# Patient Record
Sex: MALE | Race: WHITE | NOT HISPANIC OR LATINO | Employment: OTHER | URBAN - METROPOLITAN AREA
[De-identification: names, ages, dates, MRNs, and addresses within clinical notes are randomized per-mention and may not be internally consistent; named-entity substitution may affect disease eponyms.]

---

## 2017-02-08 ENCOUNTER — HOSPITAL ENCOUNTER (OUTPATIENT)
Dept: CT IMAGING | Facility: HOSPITAL | Age: 67
Discharge: HOME | End: 2017-02-08
Payer: COMMERCIAL

## 2017-02-08 DIAGNOSIS — I82.220 EMBOLISM OF INFERIOR VENA CAVA (HCC): ICD-10-CM

## 2017-02-08 DIAGNOSIS — I82.423: ICD-10-CM

## 2017-02-08 PROCEDURE — 75635 CT ANGIO ABDOMINAL ARTERIES: CPT

## 2017-02-08 RX ADMIN — IOHEXOL 120 ML: 350 INJECTION, SOLUTION INTRAVENOUS at 10:05

## 2017-02-25 ENCOUNTER — GENERIC CONVERSION - ENCOUNTER (OUTPATIENT)
Dept: OTHER | Facility: OTHER | Age: 67
End: 2017-02-25

## 2017-02-25 ENCOUNTER — APPOINTMENT (EMERGENCY)
Dept: RADIOLOGY | Facility: HOSPITAL | Age: 67
DRG: 698 | End: 2017-02-25
Payer: MEDICARE

## 2017-02-25 ENCOUNTER — HOSPITAL ENCOUNTER (INPATIENT)
Facility: HOSPITAL | Age: 67
LOS: 3 days | Discharge: NURSING, REHAB OR LONG-TERM CARE | DRG: 698 | End: 2017-03-01
Attending: EMERGENCY MEDICINE | Admitting: INTERNAL MEDICINE
Payer: MEDICARE

## 2017-02-25 DIAGNOSIS — N39.0 COMPLICATED URINARY TRACT INFECTION: Primary | ICD-10-CM

## 2017-02-25 DIAGNOSIS — E86.0 DEHYDRATION: ICD-10-CM

## 2017-02-25 LAB
ALBUMIN SERPL BCP-MCNC: 3.3 G/DL (ref 3.5–5)
ALP SERPL-CCNC: 89 U/L (ref 46–116)
ALT SERPL W P-5'-P-CCNC: 25 U/L (ref 12–78)
ANION GAP SERPL CALCULATED.3IONS-SCNC: 7 MMOL/L (ref 4–13)
AST SERPL W P-5'-P-CCNC: 11 U/L (ref 5–45)
BASOPHILS # BLD AUTO: 0.02 THOUSANDS/ΜL (ref 0–0.1)
BASOPHILS NFR BLD AUTO: 0 % (ref 0–1)
BILIRUB SERPL-MCNC: 0.2 MG/DL (ref 0.2–1)
BUN SERPL-MCNC: 20 MG/DL (ref 5–25)
CALCIUM SERPL-MCNC: 9 MG/DL (ref 8.3–10.1)
CHLORIDE SERPL-SCNC: 103 MMOL/L (ref 100–108)
CO2 SERPL-SCNC: 31 MMOL/L (ref 21–32)
CREAT SERPL-MCNC: 1.47 MG/DL (ref 0.6–1.3)
EOSINOPHIL # BLD AUTO: 0.42 THOUSAND/ΜL (ref 0–0.61)
EOSINOPHIL NFR BLD AUTO: 5 % (ref 0–6)
ERYTHROCYTE [DISTWIDTH] IN BLOOD BY AUTOMATED COUNT: 13.7 % (ref 11.6–15.1)
GFR SERPL CREATININE-BSD FRML MDRD: 47.6 ML/MIN/1.73SQ M
GLUCOSE SERPL-MCNC: 122 MG/DL (ref 65–140)
HCT VFR BLD AUTO: 43.7 % (ref 36.5–49.3)
HGB BLD-MCNC: 14.7 G/DL (ref 12–17)
INR PPP: 1.35 (ref 0.86–1.16)
LACTATE SERPL-SCNC: 1.1 MMOL/L (ref 0.5–2)
LIPASE SERPL-CCNC: 161 U/L (ref 73–393)
LYMPHOCYTES # BLD AUTO: 1.68 THOUSANDS/ΜL (ref 0.6–4.47)
LYMPHOCYTES NFR BLD AUTO: 22 % (ref 14–44)
MCH RBC QN AUTO: 28.5 PG (ref 26.8–34.3)
MCHC RBC AUTO-ENTMCNC: 33.6 G/DL (ref 31.4–37.4)
MCV RBC AUTO: 85 FL (ref 82–98)
MONOCYTES # BLD AUTO: 0.6 THOUSAND/ΜL (ref 0.17–1.22)
MONOCYTES NFR BLD AUTO: 8 % (ref 4–12)
NEUTROPHILS # BLD AUTO: 4.99 THOUSANDS/ΜL (ref 1.85–7.62)
NEUTS SEG NFR BLD AUTO: 65 % (ref 43–75)
PLATELET # BLD AUTO: 262 THOUSANDS/UL (ref 149–390)
PMV BLD AUTO: 9.2 FL (ref 8.9–12.7)
POTASSIUM SERPL-SCNC: 3.3 MMOL/L (ref 3.5–5.3)
PROT SERPL-MCNC: 7.2 G/DL (ref 6.4–8.2)
PROTHROMBIN TIME: 16.3 SECONDS (ref 12–14.3)
RBC # BLD AUTO: 5.15 MILLION/UL (ref 3.88–5.62)
SODIUM SERPL-SCNC: 141 MMOL/L (ref 136–145)
TROPONIN I SERPL-MCNC: <0.02 NG/ML
WBC # BLD AUTO: 7.71 THOUSAND/UL (ref 4.31–10.16)

## 2017-02-25 PROCEDURE — 71020 HB CHEST X-RAY 2VW FRONTAL&LATL: CPT

## 2017-02-25 PROCEDURE — 80053 COMPREHEN METABOLIC PANEL: CPT | Performed by: EMERGENCY MEDICINE

## 2017-02-25 PROCEDURE — 96361 HYDRATE IV INFUSION ADD-ON: CPT

## 2017-02-25 PROCEDURE — 83690 ASSAY OF LIPASE: CPT | Performed by: EMERGENCY MEDICINE

## 2017-02-25 PROCEDURE — 36415 COLL VENOUS BLD VENIPUNCTURE: CPT | Performed by: EMERGENCY MEDICINE

## 2017-02-25 PROCEDURE — 84484 ASSAY OF TROPONIN QUANT: CPT | Performed by: EMERGENCY MEDICINE

## 2017-02-25 PROCEDURE — 85025 COMPLETE CBC W/AUTO DIFF WBC: CPT | Performed by: EMERGENCY MEDICINE

## 2017-02-25 PROCEDURE — 85610 PROTHROMBIN TIME: CPT | Performed by: EMERGENCY MEDICINE

## 2017-02-25 PROCEDURE — 93005 ELECTROCARDIOGRAM TRACING: CPT | Performed by: EMERGENCY MEDICINE

## 2017-02-25 PROCEDURE — 83605 ASSAY OF LACTIC ACID: CPT | Performed by: EMERGENCY MEDICINE

## 2017-02-25 PROCEDURE — 93005 ELECTROCARDIOGRAM TRACING: CPT

## 2017-02-25 RX ADMIN — SODIUM CHLORIDE 1000 ML: 0.9 INJECTION, SOLUTION INTRAVENOUS at 23:28

## 2017-02-26 ENCOUNTER — APPOINTMENT (EMERGENCY)
Dept: CT IMAGING | Facility: HOSPITAL | Age: 67
DRG: 698 | End: 2017-02-26
Payer: MEDICARE

## 2017-02-26 PROBLEM — G93.40 ENCEPHALOPATHY: Status: ACTIVE | Noted: 2017-02-26

## 2017-02-26 PROBLEM — N39.0 COMPLICATED URINARY TRACT INFECTION: Status: ACTIVE | Noted: 2017-02-26

## 2017-02-26 PROBLEM — R41.82 ALTERED MENTAL STATUS: Status: ACTIVE | Noted: 2017-02-26

## 2017-02-26 LAB
ANION GAP SERPL CALCULATED.3IONS-SCNC: 8 MMOL/L (ref 4–13)
ATRIAL RATE: 60 BPM
BACTERIA UR QL AUTO: ABNORMAL /HPF
BILIRUB UR QL STRIP: NEGATIVE
BUN SERPL-MCNC: 17 MG/DL (ref 5–25)
CALCIUM SERPL-MCNC: 8.8 MG/DL (ref 8.3–10.1)
CHLORIDE SERPL-SCNC: 106 MMOL/L (ref 100–108)
CLARITY UR: ABNORMAL
CO2 SERPL-SCNC: 26 MMOL/L (ref 21–32)
COLOR UR: YELLOW
CREAT SERPL-MCNC: 1.27 MG/DL (ref 0.6–1.3)
ERYTHROCYTE [DISTWIDTH] IN BLOOD BY AUTOMATED COUNT: 13.7 % (ref 11.6–15.1)
GFR SERPL CREATININE-BSD FRML MDRD: 56.4 ML/MIN/1.73SQ M
GLUCOSE SERPL-MCNC: 110 MG/DL (ref 65–140)
GLUCOSE UR STRIP-MCNC: NEGATIVE MG/DL
HCT VFR BLD AUTO: 42.7 % (ref 36.5–49.3)
HGB BLD-MCNC: 14.1 G/DL (ref 12–17)
HGB UR QL STRIP.AUTO: ABNORMAL
KETONES UR STRIP-MCNC: NEGATIVE MG/DL
LEUKOCYTE ESTERASE UR QL STRIP: ABNORMAL
MCH RBC QN AUTO: 28 PG (ref 26.8–34.3)
MCHC RBC AUTO-ENTMCNC: 33 G/DL (ref 31.4–37.4)
MCV RBC AUTO: 85 FL (ref 82–98)
MUCOUS THREADS UR QL AUTO: ABNORMAL
NITRITE UR QL STRIP: POSITIVE
NON-SQ EPI CELLS URNS QL MICRO: ABNORMAL /HPF
P AXIS: 63 DEGREES
PH UR STRIP.AUTO: 6 [PH] (ref 4.5–8)
PLATELET # BLD AUTO: 257 THOUSANDS/UL (ref 149–390)
PMV BLD AUTO: 9.6 FL (ref 8.9–12.7)
POTASSIUM SERPL-SCNC: 3.6 MMOL/L (ref 3.5–5.3)
PR INTERVAL: 168 MS
PROT UR STRIP-MCNC: ABNORMAL MG/DL
QRS AXIS: -48 DEGREES
QRSD INTERVAL: 84 MS
QT INTERVAL: 406 MS
QTC INTERVAL: 406 MS
RBC # BLD AUTO: 5.03 MILLION/UL (ref 3.88–5.62)
RBC #/AREA URNS AUTO: ABNORMAL /HPF
SODIUM SERPL-SCNC: 140 MMOL/L (ref 136–145)
SP GR UR STRIP.AUTO: 1.02 (ref 1–1.03)
T WAVE AXIS: 20 DEGREES
UROBILINOGEN UR QL STRIP.AUTO: 0.2 E.U./DL
VENTRICULAR RATE: 60 BPM
WBC # BLD AUTO: 8.33 THOUSAND/UL (ref 4.31–10.16)
WBC #/AREA URNS AUTO: ABNORMAL /HPF
WBC CLUMPS # UR AUTO: PRESENT /UL

## 2017-02-26 PROCEDURE — 87086 URINE CULTURE/COLONY COUNT: CPT | Performed by: EMERGENCY MEDICINE

## 2017-02-26 PROCEDURE — 87040 BLOOD CULTURE FOR BACTERIA: CPT | Performed by: EMERGENCY MEDICINE

## 2017-02-26 PROCEDURE — 71260 CT THORAX DX C+: CPT

## 2017-02-26 PROCEDURE — 99285 EMERGENCY DEPT VISIT HI MDM: CPT

## 2017-02-26 PROCEDURE — 96361 HYDRATE IV INFUSION ADD-ON: CPT

## 2017-02-26 PROCEDURE — 85027 COMPLETE CBC AUTOMATED: CPT | Performed by: PHYSICIAN ASSISTANT

## 2017-02-26 PROCEDURE — 36415 COLL VENOUS BLD VENIPUNCTURE: CPT | Performed by: EMERGENCY MEDICINE

## 2017-02-26 PROCEDURE — 96374 THER/PROPH/DIAG INJ IV PUSH: CPT

## 2017-02-26 PROCEDURE — 80048 BASIC METABOLIC PNL TOTAL CA: CPT | Performed by: PHYSICIAN ASSISTANT

## 2017-02-26 PROCEDURE — 81001 URINALYSIS AUTO W/SCOPE: CPT | Performed by: EMERGENCY MEDICINE

## 2017-02-26 PROCEDURE — 74177 CT ABD & PELVIS W/CONTRAST: CPT

## 2017-02-26 RX ORDER — MIRTAZAPINE 15 MG/1
15 TABLET, FILM COATED ORAL
Status: DISCONTINUED | OUTPATIENT
Start: 2017-02-26 | End: 2017-03-01 | Stop reason: HOSPADM

## 2017-02-26 RX ORDER — FAMOTIDINE 20 MG/1
20 TABLET, FILM COATED ORAL 2 TIMES DAILY
Status: DISCONTINUED | OUTPATIENT
Start: 2017-02-26 | End: 2017-03-01 | Stop reason: HOSPADM

## 2017-02-26 RX ORDER — LABETALOL HYDROCHLORIDE 5 MG/ML
10 INJECTION, SOLUTION INTRAVENOUS EVERY 4 HOURS PRN
Status: DISCONTINUED | OUTPATIENT
Start: 2017-02-26 | End: 2017-03-01 | Stop reason: HOSPADM

## 2017-02-26 RX ORDER — RISPERIDONE 0.25 MG/1
0.25 TABLET, FILM COATED ORAL 2 TIMES DAILY
Status: DISCONTINUED | OUTPATIENT
Start: 2017-02-26 | End: 2017-03-01 | Stop reason: HOSPADM

## 2017-02-26 RX ORDER — ACETAMINOPHEN 325 MG/1
650 TABLET ORAL EVERY 6 HOURS PRN
Status: DISCONTINUED | OUTPATIENT
Start: 2017-02-26 | End: 2017-03-01 | Stop reason: HOSPADM

## 2017-02-26 RX ORDER — MEMANTINE HYDROCHLORIDE 5 MG/1
5 TABLET ORAL 2 TIMES DAILY
Status: DISCONTINUED | OUTPATIENT
Start: 2017-02-26 | End: 2017-03-01 | Stop reason: HOSPADM

## 2017-02-26 RX ORDER — ONDANSETRON 2 MG/ML
4 INJECTION INTRAMUSCULAR; INTRAVENOUS EVERY 6 HOURS PRN
Status: DISCONTINUED | OUTPATIENT
Start: 2017-02-26 | End: 2017-03-01 | Stop reason: HOSPADM

## 2017-02-26 RX ORDER — LACTULOSE 20 G/30ML
20 SOLUTION ORAL 2 TIMES DAILY
Status: DISCONTINUED | OUTPATIENT
Start: 2017-02-26 | End: 2017-03-01 | Stop reason: HOSPADM

## 2017-02-26 RX ORDER — BISACODYL 10 MG
10 SUPPOSITORY, RECTAL RECTAL AS NEEDED
Status: DISCONTINUED | OUTPATIENT
Start: 2017-02-26 | End: 2017-02-26

## 2017-02-26 RX ORDER — POTASSIUM CHLORIDE 14.9 MG/ML
20 INJECTION INTRAVENOUS ONCE
Status: COMPLETED | OUTPATIENT
Start: 2017-02-26 | End: 2017-02-27

## 2017-02-26 RX ORDER — BISACODYL 10 MG
10 SUPPOSITORY, RECTAL RECTAL DAILY PRN
Status: DISCONTINUED | OUTPATIENT
Start: 2017-02-26 | End: 2017-03-01 | Stop reason: HOSPADM

## 2017-02-26 RX ORDER — SODIUM CHLORIDE 9 MG/ML
75 INJECTION, SOLUTION INTRAVENOUS ONCE
Status: COMPLETED | OUTPATIENT
Start: 2017-02-26 | End: 2017-02-27

## 2017-02-26 RX ORDER — AMLODIPINE BESYLATE 5 MG/1
5 TABLET ORAL DAILY
Status: DISCONTINUED | OUTPATIENT
Start: 2017-02-26 | End: 2017-02-27

## 2017-02-26 RX ORDER — BUSPIRONE HYDROCHLORIDE 5 MG/1
5 TABLET ORAL 3 TIMES DAILY
Status: DISCONTINUED | OUTPATIENT
Start: 2017-02-26 | End: 2017-03-01 | Stop reason: HOSPADM

## 2017-02-26 RX ORDER — TAMSULOSIN HYDROCHLORIDE 0.4 MG/1
0.4 CAPSULE ORAL
Status: DISCONTINUED | OUTPATIENT
Start: 2017-02-26 | End: 2017-03-01 | Stop reason: HOSPADM

## 2017-02-26 RX ORDER — NYSTATIN 100000 [USP'U]/G
1 POWDER TOPICAL 3 TIMES DAILY
Status: DISCONTINUED | OUTPATIENT
Start: 2017-02-26 | End: 2017-03-01 | Stop reason: HOSPADM

## 2017-02-26 RX ADMIN — NYSTATIN 1 APPLICATION: 100000 POWDER TOPICAL at 17:38

## 2017-02-26 RX ADMIN — MEMANTINE 5 MG: 5 TABLET ORAL at 08:38

## 2017-02-26 RX ADMIN — POTASSIUM CHLORIDE 20 MEQ: 200 INJECTION, SOLUTION INTRAVENOUS at 06:03

## 2017-02-26 RX ADMIN — BUSPIRONE HYDROCHLORIDE 5 MG: 5 TABLET ORAL at 17:38

## 2017-02-26 RX ADMIN — APIXABAN 5 MG: 5 TABLET, FILM COATED ORAL at 08:38

## 2017-02-26 RX ADMIN — METOPROLOL TARTRATE 12.5 MG: 25 TABLET ORAL at 08:38

## 2017-02-26 RX ADMIN — RISPERIDONE 0.25 MG: 0.25 TABLET ORAL at 08:38

## 2017-02-26 RX ADMIN — RISPERIDONE 0.25 MG: 0.25 TABLET ORAL at 17:38

## 2017-02-26 RX ADMIN — NYSTATIN 1 APPLICATION: 100000 POWDER TOPICAL at 21:48

## 2017-02-26 RX ADMIN — BUSPIRONE HYDROCHLORIDE 5 MG: 5 TABLET ORAL at 08:38

## 2017-02-26 RX ADMIN — FAMOTIDINE 20 MG: 20 TABLET ORAL at 17:38

## 2017-02-26 RX ADMIN — AMLODIPINE BESYLATE 5 MG: 5 TABLET ORAL at 08:38

## 2017-02-26 RX ADMIN — MEMANTINE 5 MG: 5 TABLET ORAL at 17:38

## 2017-02-26 RX ADMIN — APIXABAN 5 MG: 5 TABLET, FILM COATED ORAL at 17:38

## 2017-02-26 RX ADMIN — LACTULOSE 20 G: 20 SOLUTION ORAL at 17:35

## 2017-02-26 RX ADMIN — BUSPIRONE HYDROCHLORIDE 5 MG: 5 TABLET ORAL at 23:02

## 2017-02-26 RX ADMIN — METOPROLOL TARTRATE 5 MG: 5 INJECTION INTRAVENOUS at 05:05

## 2017-02-26 RX ADMIN — SODIUM CHLORIDE 75 ML/HR: 0.9 INJECTION, SOLUTION INTRAVENOUS at 06:03

## 2017-02-26 RX ADMIN — IODIXANOL 100 ML: 320 INJECTION, SOLUTION INTRAVASCULAR at 00:54

## 2017-02-26 RX ADMIN — FAMOTIDINE 20 MG: 20 TABLET ORAL at 08:38

## 2017-02-26 RX ADMIN — MIRTAZAPINE 15 MG: 15 TABLET, FILM COATED ORAL at 23:02

## 2017-02-26 RX ADMIN — METOPROLOL TARTRATE 12.5 MG: 25 TABLET ORAL at 17:38

## 2017-02-26 RX ADMIN — LACTULOSE 20 G: 20 SOLUTION ORAL at 13:48

## 2017-02-26 RX ADMIN — SODIUM CHLORIDE 1000 MG: 9 INJECTION, SOLUTION INTRAVENOUS at 01:22

## 2017-02-26 RX ADMIN — NYSTATIN 1 APPLICATION: 100000 POWDER TOPICAL at 09:29

## 2017-02-26 RX ADMIN — TAMSULOSIN HYDROCHLORIDE 0.4 MG: 0.4 CAPSULE ORAL at 17:37

## 2017-02-27 LAB — BACTERIA UR CULT: NORMAL

## 2017-02-27 PROCEDURE — G8978 MOBILITY CURRENT STATUS: HCPCS

## 2017-02-27 PROCEDURE — G8979 MOBILITY GOAL STATUS: HCPCS

## 2017-02-27 PROCEDURE — 97163 PT EVAL HIGH COMPLEX 45 MIN: CPT

## 2017-02-27 RX ORDER — AMLODIPINE BESYLATE 10 MG/1
10 TABLET ORAL DAILY
Status: DISCONTINUED | OUTPATIENT
Start: 2017-02-27 | End: 2017-03-01 | Stop reason: HOSPADM

## 2017-02-27 RX ADMIN — METOPROLOL TARTRATE 25 MG: 25 TABLET ORAL at 18:45

## 2017-02-27 RX ADMIN — FAMOTIDINE 20 MG: 20 TABLET ORAL at 10:07

## 2017-02-27 RX ADMIN — RISPERIDONE 0.25 MG: 0.25 TABLET ORAL at 18:45

## 2017-02-27 RX ADMIN — SODIUM CHLORIDE 500 MG: 9 INJECTION, SOLUTION INTRAVENOUS at 01:59

## 2017-02-27 RX ADMIN — APIXABAN 5 MG: 5 TABLET, FILM COATED ORAL at 18:45

## 2017-02-27 RX ADMIN — BUSPIRONE HYDROCHLORIDE 5 MG: 5 TABLET ORAL at 18:44

## 2017-02-27 RX ADMIN — LABETALOL HYDROCHLORIDE 20 MG: 5 INJECTION, SOLUTION INTRAVENOUS at 14:19

## 2017-02-27 RX ADMIN — LACTULOSE 20 G: 20 SOLUTION ORAL at 10:06

## 2017-02-27 RX ADMIN — APIXABAN 5 MG: 5 TABLET, FILM COATED ORAL at 10:06

## 2017-02-27 RX ADMIN — NYSTATIN 1 APPLICATION: 100000 POWDER TOPICAL at 10:07

## 2017-02-27 RX ADMIN — BUSPIRONE HYDROCHLORIDE 5 MG: 5 TABLET ORAL at 10:07

## 2017-02-27 RX ADMIN — METOPROLOL TARTRATE 25 MG: 25 TABLET ORAL at 10:07

## 2017-02-27 RX ADMIN — AMLODIPINE BESYLATE 10 MG: 10 TABLET ORAL at 10:06

## 2017-02-27 RX ADMIN — FAMOTIDINE 20 MG: 20 TABLET ORAL at 18:44

## 2017-02-27 RX ADMIN — MEMANTINE 5 MG: 5 TABLET ORAL at 18:45

## 2017-02-27 RX ADMIN — LABETALOL HYDROCHLORIDE 10 MG: 5 INJECTION, SOLUTION INTRAVENOUS at 14:21

## 2017-02-27 RX ADMIN — RISPERIDONE 0.25 MG: 0.25 TABLET ORAL at 10:07

## 2017-02-27 RX ADMIN — NYSTATIN 1 APPLICATION: 100000 POWDER TOPICAL at 18:45

## 2017-02-27 RX ADMIN — TAMSULOSIN HYDROCHLORIDE 0.4 MG: 0.4 CAPSULE ORAL at 18:44

## 2017-02-27 RX ADMIN — MEMANTINE 5 MG: 5 TABLET ORAL at 10:07

## 2017-02-28 LAB
ANION GAP SERPL CALCULATED.3IONS-SCNC: 6 MMOL/L (ref 4–13)
BUN SERPL-MCNC: 13 MG/DL (ref 5–25)
CALCIUM SERPL-MCNC: 8.8 MG/DL (ref 8.3–10.1)
CHLORIDE SERPL-SCNC: 104 MMOL/L (ref 100–108)
CO2 SERPL-SCNC: 32 MMOL/L (ref 21–32)
CREAT SERPL-MCNC: 1.38 MG/DL (ref 0.6–1.3)
GFR SERPL CREATININE-BSD FRML MDRD: 51.2 ML/MIN/1.73SQ M
GLUCOSE SERPL-MCNC: 85 MG/DL (ref 65–140)
POTASSIUM SERPL-SCNC: 3.3 MMOL/L (ref 3.5–5.3)
SODIUM SERPL-SCNC: 142 MMOL/L (ref 136–145)

## 2017-02-28 PROCEDURE — 80048 BASIC METABOLIC PNL TOTAL CA: CPT | Performed by: INTERNAL MEDICINE

## 2017-02-28 RX ORDER — METOPROLOL TARTRATE 50 MG/1
50 TABLET, FILM COATED ORAL 2 TIMES DAILY
Status: DISCONTINUED | OUTPATIENT
Start: 2017-02-28 | End: 2017-02-28

## 2017-02-28 RX ORDER — METOPROLOL TARTRATE 50 MG/1
50 TABLET, FILM COATED ORAL 2 TIMES DAILY
Status: DISCONTINUED | OUTPATIENT
Start: 2017-02-28 | End: 2017-03-01 | Stop reason: HOSPADM

## 2017-02-28 RX ADMIN — NYSTATIN 1 APPLICATION: 100000 POWDER TOPICAL at 17:25

## 2017-02-28 RX ADMIN — TAMSULOSIN HYDROCHLORIDE 0.4 MG: 0.4 CAPSULE ORAL at 17:24

## 2017-02-28 RX ADMIN — APIXABAN 5 MG: 5 TABLET, FILM COATED ORAL at 17:24

## 2017-02-28 RX ADMIN — APIXABAN 5 MG: 5 TABLET, FILM COATED ORAL at 08:12

## 2017-02-28 RX ADMIN — METOPROLOL TARTRATE 25 MG: 25 TABLET ORAL at 08:12

## 2017-02-28 RX ADMIN — MEMANTINE 5 MG: 5 TABLET ORAL at 08:13

## 2017-02-28 RX ADMIN — BUSPIRONE HYDROCHLORIDE 5 MG: 5 TABLET ORAL at 17:24

## 2017-02-28 RX ADMIN — METOPROLOL TARTRATE 50 MG: 50 TABLET ORAL at 17:24

## 2017-02-28 RX ADMIN — RISPERIDONE 0.25 MG: 0.25 TABLET ORAL at 08:13

## 2017-02-28 RX ADMIN — AMLODIPINE BESYLATE 10 MG: 10 TABLET ORAL at 08:12

## 2017-02-28 RX ADMIN — BUSPIRONE HYDROCHLORIDE 5 MG: 5 TABLET ORAL at 21:47

## 2017-02-28 RX ADMIN — MEMANTINE 5 MG: 5 TABLET ORAL at 17:25

## 2017-02-28 RX ADMIN — SODIUM CHLORIDE 500 MG: 9 INJECTION, SOLUTION INTRAVENOUS at 01:43

## 2017-02-28 RX ADMIN — FAMOTIDINE 20 MG: 20 TABLET ORAL at 08:12

## 2017-02-28 RX ADMIN — RISPERIDONE 0.25 MG: 0.25 TABLET ORAL at 17:24

## 2017-02-28 RX ADMIN — FAMOTIDINE 20 MG: 20 TABLET ORAL at 17:24

## 2017-02-28 RX ADMIN — NYSTATIN 1 APPLICATION: 100000 POWDER TOPICAL at 08:14

## 2017-02-28 RX ADMIN — BUSPIRONE HYDROCHLORIDE 5 MG: 5 TABLET ORAL at 08:12

## 2017-02-28 RX ADMIN — NYSTATIN 1 APPLICATION: 100000 POWDER TOPICAL at 21:48

## 2017-02-28 RX ADMIN — LACTULOSE 20 G: 20 SOLUTION ORAL at 08:12

## 2017-02-28 RX ADMIN — MIRTAZAPINE 15 MG: 15 TABLET, FILM COATED ORAL at 21:47

## 2017-03-01 ENCOUNTER — APPOINTMENT (INPATIENT)
Dept: RADIOLOGY | Facility: HOSPITAL | Age: 67
DRG: 698 | End: 2017-03-01
Attending: INTERNAL MEDICINE
Payer: MEDICARE

## 2017-03-01 ENCOUNTER — GENERIC CONVERSION - ENCOUNTER (OUTPATIENT)
Dept: OTHER | Facility: OTHER | Age: 67
End: 2017-03-01

## 2017-03-01 VITALS
DIASTOLIC BLOOD PRESSURE: 80 MMHG | TEMPERATURE: 97.7 F | RESPIRATION RATE: 18 BRPM | HEART RATE: 51 BPM | SYSTOLIC BLOOD PRESSURE: 142 MMHG | HEIGHT: 71 IN | OXYGEN SATURATION: 93 % | WEIGHT: 208.11 LBS | BODY MASS INDEX: 29.14 KG/M2

## 2017-03-01 LAB
ANION GAP SERPL CALCULATED.3IONS-SCNC: 7 MMOL/L (ref 4–13)
BUN SERPL-MCNC: 17 MG/DL (ref 5–25)
CALCIUM SERPL-MCNC: 8.8 MG/DL (ref 8.3–10.1)
CHLORIDE SERPL-SCNC: 104 MMOL/L (ref 100–108)
CO2 SERPL-SCNC: 29 MMOL/L (ref 21–32)
CREAT SERPL-MCNC: 1.26 MG/DL (ref 0.6–1.3)
GFR SERPL CREATININE-BSD FRML MDRD: 56.9 ML/MIN/1.73SQ M
GLUCOSE SERPL-MCNC: 95 MG/DL (ref 65–140)
POTASSIUM SERPL-SCNC: 3.7 MMOL/L (ref 3.5–5.3)
SODIUM SERPL-SCNC: 140 MMOL/L (ref 136–145)

## 2017-03-01 PROCEDURE — 36569 INSJ PICC 5 YR+ W/O IMAGING: CPT

## 2017-03-01 PROCEDURE — 77001 FLUOROGUIDE FOR VEIN DEVICE: CPT

## 2017-03-01 PROCEDURE — 76937 US GUIDE VASCULAR ACCESS: CPT

## 2017-03-01 PROCEDURE — C1751 CATH, INF, PER/CENT/MIDLINE: HCPCS

## 2017-03-01 PROCEDURE — 02HV33Z INSERTION OF INFUSION DEVICE INTO SUPERIOR VENA CAVA, PERCUTANEOUS APPROACH: ICD-10-PCS | Performed by: INTERNAL MEDICINE

## 2017-03-01 PROCEDURE — 80048 BASIC METABOLIC PNL TOTAL CA: CPT | Performed by: INTERNAL MEDICINE

## 2017-03-01 RX ORDER — TAMSULOSIN HYDROCHLORIDE 0.4 MG/1
0.4 CAPSULE ORAL
Qty: 30 CAPSULE | Refills: 0 | Status: SHIPPED | OUTPATIENT
Start: 2017-03-01 | End: 2019-12-01 | Stop reason: HOSPADM

## 2017-03-01 RX ORDER — BISACODYL 10 MG
10 SUPPOSITORY, RECTAL RECTAL DAILY PRN
Qty: 12 SUPPOSITORY | Refills: 0 | Status: SHIPPED | OUTPATIENT
Start: 2017-03-01 | End: 2017-05-01

## 2017-03-01 RX ORDER — LACTULOSE 20 G/30ML
20 SOLUTION ORAL 2 TIMES DAILY
Qty: 1800 ML | Refills: 0 | Status: SHIPPED | OUTPATIENT
Start: 2017-03-01 | End: 2017-05-08 | Stop reason: HOSPADM

## 2017-03-01 RX ORDER — NYSTATIN 100000 [USP'U]/G
1 POWDER TOPICAL 3 TIMES DAILY
Qty: 90 APPLICATION | Refills: 0 | Status: SHIPPED | OUTPATIENT
Start: 2017-03-01 | End: 2017-09-21

## 2017-03-01 RX ORDER — MIRTAZAPINE 15 MG/1
15 TABLET, FILM COATED ORAL
Qty: 30 TABLET | Refills: 0 | Status: SHIPPED | OUTPATIENT
Start: 2017-03-01 | End: 2020-03-19 | Stop reason: HOSPADM

## 2017-03-01 RX ORDER — RISPERIDONE 0.25 MG/1
0.25 TABLET, FILM COATED ORAL 2 TIMES DAILY
Qty: 60 TABLET | Refills: 0 | Status: SHIPPED | OUTPATIENT
Start: 2017-03-01 | End: 2017-05-01

## 2017-03-01 RX ADMIN — FAMOTIDINE 20 MG: 20 TABLET ORAL at 08:47

## 2017-03-01 RX ADMIN — FAMOTIDINE 20 MG: 20 TABLET ORAL at 17:44

## 2017-03-01 RX ADMIN — TAMSULOSIN HYDROCHLORIDE 0.4 MG: 0.4 CAPSULE ORAL at 17:44

## 2017-03-01 RX ADMIN — BUSPIRONE HYDROCHLORIDE 5 MG: 5 TABLET ORAL at 08:48

## 2017-03-01 RX ADMIN — BUSPIRONE HYDROCHLORIDE 5 MG: 5 TABLET ORAL at 17:44

## 2017-03-01 RX ADMIN — RISPERIDONE 0.25 MG: 0.25 TABLET ORAL at 08:47

## 2017-03-01 RX ADMIN — NYSTATIN 1 APPLICATION: 100000 POWDER TOPICAL at 08:48

## 2017-03-01 RX ADMIN — LACTULOSE 20 G: 20 SOLUTION ORAL at 08:47

## 2017-03-01 RX ADMIN — APIXABAN 5 MG: 5 TABLET, FILM COATED ORAL at 08:47

## 2017-03-01 RX ADMIN — RISPERIDONE 0.25 MG: 0.25 TABLET ORAL at 17:45

## 2017-03-01 RX ADMIN — MEMANTINE 5 MG: 5 TABLET ORAL at 17:45

## 2017-03-01 RX ADMIN — NYSTATIN 1 APPLICATION: 100000 POWDER TOPICAL at 17:45

## 2017-03-01 RX ADMIN — METOPROLOL TARTRATE 50 MG: 50 TABLET ORAL at 17:44

## 2017-03-01 RX ADMIN — APIXABAN 5 MG: 5 TABLET, FILM COATED ORAL at 17:44

## 2017-03-01 RX ADMIN — METOPROLOL TARTRATE 50 MG: 50 TABLET ORAL at 08:48

## 2017-03-01 RX ADMIN — SODIUM CHLORIDE 500 MG: 9 INJECTION, SOLUTION INTRAVENOUS at 02:19

## 2017-03-01 RX ADMIN — AMLODIPINE BESYLATE 10 MG: 10 TABLET ORAL at 08:48

## 2017-03-01 RX ADMIN — MEMANTINE 5 MG: 5 TABLET ORAL at 08:50

## 2017-03-03 LAB — BACTERIA BLD CULT: NORMAL

## 2017-03-20 ENCOUNTER — ALLSCRIPTS OFFICE VISIT (OUTPATIENT)
Dept: OTHER | Facility: OTHER | Age: 67
End: 2017-03-20

## 2017-03-20 DIAGNOSIS — Z95.828 PRESENCE OF OTHER VASCULAR IMPLANTS AND GRAFTS: ICD-10-CM

## 2017-04-12 ENCOUNTER — HOSPITAL ENCOUNTER (OUTPATIENT)
Dept: RADIOLOGY | Facility: HOSPITAL | Age: 67
Discharge: HOME | End: 2017-04-12
Attending: SURGERY | Admitting: RADIOLOGY
Payer: COMMERCIAL

## 2017-04-12 VITALS
RESPIRATION RATE: 18 BRPM | OXYGEN SATURATION: 99 % | TEMPERATURE: 97.2 F | BODY MASS INDEX: 30.92 KG/M2 | DIASTOLIC BLOOD PRESSURE: 95 MMHG | SYSTOLIC BLOOD PRESSURE: 184 MMHG | WEIGHT: 216 LBS | HEIGHT: 70 IN | HEART RATE: 61 BPM

## 2017-04-12 DIAGNOSIS — Z95.828 PRESENCE OF OTHER VASCULAR IMPLANTS AND GRAFTS: ICD-10-CM

## 2017-04-12 PROCEDURE — 99152 MOD SED SAME PHYS/QHP 5/>YRS: CPT

## 2017-04-12 PROCEDURE — C1894 INTRO/SHEATH, NON-LASER: HCPCS

## 2017-04-12 PROCEDURE — 37193 REM ENDOVAS VENA CAVA FILTER: CPT

## 2017-04-12 PROCEDURE — 99153 MOD SED SAME PHYS/QHP EA: CPT

## 2017-04-12 PROCEDURE — C1773 RET DEV, INSERTABLE: HCPCS

## 2017-04-12 RX ORDER — SODIUM CHLORIDE 9 MG/ML
75 INJECTION, SOLUTION INTRAVENOUS CONTINUOUS
Status: DISCONTINUED | OUTPATIENT
Start: 2017-04-12 | End: 2017-04-13 | Stop reason: HOSPADM

## 2017-04-12 RX ORDER — MIDAZOLAM HYDROCHLORIDE 1 MG/ML
INJECTION INTRAMUSCULAR; INTRAVENOUS CODE/TRAUMA/SEDATION MEDICATION
Status: COMPLETED | OUTPATIENT
Start: 2017-04-12 | End: 2017-04-12

## 2017-04-12 RX ORDER — QUETIAPINE FUMARATE 25 MG/1
25 TABLET, FILM COATED ORAL
COMMUNITY

## 2017-04-12 RX ORDER — FENTANYL CITRATE 50 UG/ML
INJECTION, SOLUTION INTRAMUSCULAR; INTRAVENOUS CODE/TRAUMA/SEDATION MEDICATION
Status: COMPLETED | OUTPATIENT
Start: 2017-04-12 | End: 2017-04-12

## 2017-04-12 RX ADMIN — IOHEXOL 22 ML: 300 INJECTION, SOLUTION INTRAVENOUS at 11:55

## 2017-04-12 RX ADMIN — MIDAZOLAM HYDROCHLORIDE 1 MG: 1 INJECTION, SOLUTION INTRAMUSCULAR; INTRAVENOUS at 11:27

## 2017-04-12 RX ADMIN — SODIUM CHLORIDE 75 ML/HR: 0.9 INJECTION, SOLUTION INTRAVENOUS at 09:45

## 2017-04-12 RX ADMIN — MIDAZOLAM HYDROCHLORIDE 2 MG: 1 INJECTION, SOLUTION INTRAMUSCULAR; INTRAVENOUS at 11:03

## 2017-04-12 RX ADMIN — FENTANYL CITRATE 50 MCG: 50 INJECTION INTRAMUSCULAR; INTRAVENOUS at 11:03

## 2017-04-12 RX ADMIN — FENTANYL CITRATE 50 MCG: 50 INJECTION INTRAMUSCULAR; INTRAVENOUS at 11:28

## 2017-05-01 ENCOUNTER — ANESTHESIA (INPATIENT)
Dept: RADIOLOGY | Facility: HOSPITAL | Age: 67
DRG: 871 | End: 2017-05-01
Payer: MEDICARE

## 2017-05-01 ENCOUNTER — HOSPITAL ENCOUNTER (INPATIENT)
Facility: HOSPITAL | Age: 67
LOS: 7 days | Discharge: NURSING, REHAB OR LONG-TERM CARE | DRG: 871 | End: 2017-05-08
Attending: EMERGENCY MEDICINE | Admitting: INTERNAL MEDICINE
Payer: MEDICARE

## 2017-05-01 ENCOUNTER — APPOINTMENT (EMERGENCY)
Dept: RADIOLOGY | Facility: HOSPITAL | Age: 67
DRG: 871 | End: 2017-05-01
Payer: MEDICARE

## 2017-05-01 ENCOUNTER — APPOINTMENT (INPATIENT)
Dept: RADIOLOGY | Facility: HOSPITAL | Age: 67
DRG: 871 | End: 2017-05-01
Payer: MEDICARE

## 2017-05-01 ENCOUNTER — ANESTHESIA EVENT (INPATIENT)
Dept: RADIOLOGY | Facility: HOSPITAL | Age: 67
DRG: 871 | End: 2017-05-01
Payer: MEDICARE

## 2017-05-01 ENCOUNTER — APPOINTMENT (EMERGENCY)
Dept: CT IMAGING | Facility: HOSPITAL | Age: 67
DRG: 871 | End: 2017-05-01
Payer: MEDICARE

## 2017-05-01 DIAGNOSIS — A41.9 SEPTIC SHOCK (HCC): ICD-10-CM

## 2017-05-01 DIAGNOSIS — R65.21 SEPTIC SHOCK (HCC): ICD-10-CM

## 2017-05-01 DIAGNOSIS — N13.2 URETERAL STONE WITH HYDRONEPHROSIS: ICD-10-CM

## 2017-05-01 DIAGNOSIS — A41.9 SEPSIS (HCC): ICD-10-CM

## 2017-05-01 DIAGNOSIS — N39.0 URINARY TRACT INFECTION: Primary | ICD-10-CM

## 2017-05-01 DIAGNOSIS — N18.9 CHRONIC KIDNEY DISEASE, UNSPECIFIED: ICD-10-CM

## 2017-05-01 PROBLEM — D72.829 LEUKOCYTOSIS: Status: ACTIVE | Noted: 2017-05-01

## 2017-05-01 PROBLEM — N13.5 URETERAL OBSTRUCTION: Status: ACTIVE | Noted: 2017-05-01

## 2017-05-01 LAB
ALBUMIN SERPL BCP-MCNC: 2.6 G/DL (ref 3.5–5)
ALBUMIN SERPL BCP-MCNC: 3.1 G/DL (ref 3.5–5)
ALP SERPL-CCNC: 200 U/L (ref 46–116)
ALP SERPL-CCNC: 88 U/L (ref 46–116)
ALT SERPL W P-5'-P-CCNC: 19 U/L (ref 12–78)
ALT SERPL W P-5'-P-CCNC: 20 U/L (ref 12–78)
ANION GAP SERPL CALCULATED.3IONS-SCNC: 14 MMOL/L (ref 4–13)
ANION GAP SERPL CALCULATED.3IONS-SCNC: 15 MMOL/L (ref 4–13)
AST SERPL W P-5'-P-CCNC: 17 U/L (ref 5–45)
AST SERPL W P-5'-P-CCNC: 21 U/L (ref 5–45)
ATRIAL RATE: 111 BPM
BACTERIA UR QL AUTO: ABNORMAL /HPF
BASE EX.OXY STD BLDV CALC-SCNC: 82.4 % (ref 60–80)
BASE EXCESS BLDA CALC-SCNC: -7.1 MMOL/L
BASE EXCESS BLDV CALC-SCNC: -8.3 MMOL/L
BASOPHILS # BLD MANUAL: 0 THOUSAND/UL (ref 0–0.1)
BASOPHILS # BLD MANUAL: 0 THOUSAND/UL (ref 0–0.1)
BASOPHILS NFR MAR MANUAL: 0 % (ref 0–1)
BASOPHILS NFR MAR MANUAL: 0 % (ref 0–1)
BILIRUB SERPL-MCNC: 0.8 MG/DL (ref 0.2–1)
BILIRUB SERPL-MCNC: 0.8 MG/DL (ref 0.2–1)
BILIRUB UR QL STRIP: ABNORMAL
BUN SERPL-MCNC: 20 MG/DL (ref 5–25)
BUN SERPL-MCNC: 22 MG/DL (ref 5–25)
CA-I BLD-SCNC: 1.15 MMOL/L (ref 1.12–1.32)
CALCIUM SERPL-MCNC: 7.7 MG/DL (ref 8.3–10.1)
CALCIUM SERPL-MCNC: 9.2 MG/DL (ref 8.3–10.1)
CHLORIDE SERPL-SCNC: 102 MMOL/L (ref 100–108)
CHLORIDE SERPL-SCNC: 105 MMOL/L (ref 100–108)
CLARITY UR: ABNORMAL
CO2 SERPL-SCNC: 20 MMOL/L (ref 21–32)
CO2 SERPL-SCNC: 23 MMOL/L (ref 21–32)
COLOR UR: YELLOW
CREAT SERPL-MCNC: 2.37 MG/DL (ref 0.6–1.3)
CREAT SERPL-MCNC: 2.54 MG/DL (ref 0.6–1.3)
EOSINOPHIL # BLD MANUAL: 0 THOUSAND/UL (ref 0–0.4)
EOSINOPHIL # BLD MANUAL: 0 THOUSAND/UL (ref 0–0.4)
EOSINOPHIL NFR BLD MANUAL: 0 % (ref 0–6)
EOSINOPHIL NFR BLD MANUAL: 0 % (ref 0–6)
ERYTHROCYTE [DISTWIDTH] IN BLOOD BY AUTOMATED COUNT: 14.9 % (ref 11.6–15.1)
ERYTHROCYTE [DISTWIDTH] IN BLOOD BY AUTOMATED COUNT: 15.1 % (ref 11.6–15.1)
GFR SERPL CREATININE-BSD FRML MDRD: 25.3 ML/MIN/1.73SQ M
GFR SERPL CREATININE-BSD FRML MDRD: 27.5 ML/MIN/1.73SQ M
GLUCOSE SERPL-MCNC: 157 MG/DL (ref 65–140)
GLUCOSE SERPL-MCNC: 198 MG/DL (ref 65–140)
GLUCOSE UR STRIP-MCNC: NEGATIVE MG/DL
HCO3 BLDA-SCNC: 18.3 MMOL/L (ref 22–28)
HCO3 BLDV-SCNC: 17.5 MMOL/L (ref 24–30)
HCT VFR BLD AUTO: 42.4 % (ref 36.5–49.3)
HCT VFR BLD AUTO: 45.4 % (ref 36.5–49.3)
HGB BLD-MCNC: 14.2 G/DL (ref 12–17)
HGB BLD-MCNC: 15.5 G/DL (ref 12–17)
HGB UR QL STRIP.AUTO: ABNORMAL
HOROWITZ INDEX BLDA+IHG-RTO: 50 MM[HG]
HOROWITZ INDEX BLDA+IHG-RTO: 50 MM[HG]
INR PPP: 1.32 (ref 0.86–1.16)
INR PPP: 1.78 (ref 0.86–1.16)
KETONES UR STRIP-MCNC: ABNORMAL MG/DL
LACTATE SERPL-SCNC: 4 MMOL/L (ref 0.5–2)
LACTATE SERPL-SCNC: 4.1 MMOL/L (ref 0.5–2)
LACTATE SERPL-SCNC: 4.3 MMOL/L (ref 0.5–2)
LACTATE SERPL-SCNC: 4.5 MMOL/L (ref 0.5–2)
LACTATE SERPL-SCNC: 5 MMOL/L (ref 0.5–2)
LEUKOCYTE ESTERASE UR QL STRIP: ABNORMAL
LG PLATELETS BLD QL SMEAR: PRESENT
LG PLATELETS BLD QL SMEAR: PRESENT
LYMPHOCYTES # BLD AUTO: 0 % (ref 14–44)
LYMPHOCYTES # BLD AUTO: 0 THOUSAND/UL (ref 0.6–4.47)
LYMPHOCYTES # BLD AUTO: 0.23 THOUSAND/UL (ref 0.6–4.47)
LYMPHOCYTES # BLD AUTO: 5 % (ref 14–44)
MAGNESIUM SERPL-MCNC: 1.3 MG/DL (ref 1.6–2.6)
MCH RBC QN AUTO: 29.2 PG (ref 26.8–34.3)
MCH RBC QN AUTO: 29.2 PG (ref 26.8–34.3)
MCHC RBC AUTO-ENTMCNC: 33.5 G/DL (ref 31.4–37.4)
MCHC RBC AUTO-ENTMCNC: 34.1 G/DL (ref 31.4–37.4)
MCV RBC AUTO: 86 FL (ref 82–98)
MCV RBC AUTO: 87 FL (ref 82–98)
MONOCYTES # BLD AUTO: 0.05 THOUSAND/UL (ref 0–1.22)
MONOCYTES # BLD AUTO: 0.23 THOUSAND/UL (ref 0–1.22)
MONOCYTES NFR BLD: 1 % (ref 4–12)
MONOCYTES NFR BLD: 1 % (ref 4–12)
MUCOUS THREADS UR QL AUTO: ABNORMAL
NEUTROPHILS # BLD MANUAL: 22.77 THOUSAND/UL (ref 1.85–7.62)
NEUTROPHILS # BLD MANUAL: 4.32 THOUSAND/UL (ref 1.85–7.62)
NEUTS BAND NFR BLD MANUAL: 34 % (ref 0–8)
NEUTS BAND NFR BLD MANUAL: 6 % (ref 0–8)
NEUTS SEG NFR BLD AUTO: 60 % (ref 43–75)
NEUTS SEG NFR BLD AUTO: 93 % (ref 43–75)
NITRITE UR QL STRIP: POSITIVE
NON-SQ EPI CELLS URNS QL MICRO: ABNORMAL /HPF
O2 CT BLDA-SCNC: 19.8 ML/DL (ref 16–23)
O2 CT BLDV-SCNC: 14.4 ML/DL
OXYHGB MFR BLDA: 92.6 % (ref 94–97)
P AXIS: 50 DEGREES
PCO2 BLDA: 37.1 MM HG (ref 36–44)
PCO2 BLDV: 37 MM HG (ref 42–50)
PEEP RESPIRATORY: 5 CM[H2O]
PEEP RESPIRATORY: 5 CM[H2O]
PH BLDA: 7.31 [PH] (ref 7.35–7.45)
PH BLDV: 7.29 [PH] (ref 7.3–7.4)
PH UR STRIP.AUTO: 6 [PH] (ref 4.5–8)
PLATELET # BLD AUTO: 190 THOUSANDS/UL (ref 149–390)
PLATELET # BLD AUTO: 312 THOUSANDS/UL (ref 149–390)
PLATELET BLD QL SMEAR: ADEQUATE
PLATELET BLD QL SMEAR: ADEQUATE
PMV BLD AUTO: 9.1 FL (ref 8.9–12.7)
PMV BLD AUTO: 9.7 FL (ref 8.9–12.7)
PO2 BLDA: 74.7 MM HG (ref 75–129)
PO2 BLDV: 50.9 MM HG (ref 35–45)
POTASSIUM SERPL-SCNC: 3 MMOL/L (ref 3.5–5.3)
POTASSIUM SERPL-SCNC: 3.2 MMOL/L (ref 3.5–5.3)
PR INTERVAL: 160 MS
PROT SERPL-MCNC: 6.2 G/DL (ref 6.4–8.2)
PROT SERPL-MCNC: 7.5 G/DL (ref 6.4–8.2)
PROT UR STRIP-MCNC: ABNORMAL MG/DL
PROTHROMBIN TIME: 16.8 SECONDS (ref 12.1–14.4)
PROTHROMBIN TIME: 21.3 SECONDS (ref 12.1–14.4)
QRS AXIS: -21 DEGREES
QRSD INTERVAL: 76 MS
QT INTERVAL: 302 MS
QTC INTERVAL: 403 MS
RBC # BLD AUTO: 4.87 MILLION/UL (ref 3.88–5.62)
RBC # BLD AUTO: 5.3 MILLION/UL (ref 3.88–5.62)
RBC #/AREA URNS AUTO: ABNORMAL /HPF
SODIUM SERPL-SCNC: 139 MMOL/L (ref 136–145)
SODIUM SERPL-SCNC: 140 MMOL/L (ref 136–145)
SP GR UR STRIP.AUTO: 1.02 (ref 1–1.03)
SPECIMEN SOURCE: ABNORMAL
T WAVE AXIS: 12 DEGREES
TOTAL CELLS COUNTED SPEC: 100
TOTAL CELLS COUNTED SPEC: 100
UROBILINOGEN UR QL STRIP.AUTO: 1 E.U./DL
VENT AC: 12
VENT AC: 12
VENT- AC: AC
VENT- AC: AC
VENTRICULAR RATE: 107 BPM
VT SETTING VENT: 500 ML
VT SETTING VENT: 500 ML
WBC # BLD AUTO: 13.53 THOUSAND/UL (ref 4.31–10.16)
WBC # BLD AUTO: 23 THOUSAND/UL (ref 4.31–10.16)
WBC # BLD AUTO: 4.6 THOUSAND/UL (ref 4.31–10.16)
WBC #/AREA URNS AUTO: ABNORMAL /HPF
WBC TOXIC VACUOLES BLD QL SMEAR: PRESENT

## 2017-05-01 PROCEDURE — 83605 ASSAY OF LACTIC ACID: CPT | Performed by: EMERGENCY MEDICINE

## 2017-05-01 PROCEDURE — 81001 URINALYSIS AUTO W/SCOPE: CPT | Performed by: EMERGENCY MEDICINE

## 2017-05-01 PROCEDURE — 71010 HB CHEST X-RAY 1 VIEW FRONTAL (PORTABLE): CPT

## 2017-05-01 PROCEDURE — 71020 HB CHEST X-RAY 2VW FRONTAL&LATL: CPT

## 2017-05-01 PROCEDURE — 87186 SC STD MICRODIL/AGAR DIL: CPT | Performed by: RADIOLOGY

## 2017-05-01 PROCEDURE — 87077 CULTURE AEROBIC IDENTIFY: CPT | Performed by: RADIOLOGY

## 2017-05-01 PROCEDURE — 96361 HYDRATE IV INFUSION ADD-ON: CPT

## 2017-05-01 PROCEDURE — 85610 PROTHROMBIN TIME: CPT | Performed by: PHYSICIAN ASSISTANT

## 2017-05-01 PROCEDURE — C1729 CATH, DRAINAGE: HCPCS

## 2017-05-01 PROCEDURE — 93005 ELECTROCARDIOGRAM TRACING: CPT | Performed by: EMERGENCY MEDICINE

## 2017-05-01 PROCEDURE — 4A133B1 MONITORING OF ARTERIAL PRESSURE, PERIPHERAL, PERCUTANEOUS APPROACH: ICD-10-PCS | Performed by: INTERNAL MEDICINE

## 2017-05-01 PROCEDURE — 50432 PLMT NEPHROSTOMY CATHETER: CPT

## 2017-05-01 PROCEDURE — 83605 ASSAY OF LACTIC ACID: CPT | Performed by: PHYSICIAN ASSISTANT

## 2017-05-01 PROCEDURE — 82533 TOTAL CORTISOL: CPT | Performed by: PHYSICIAN ASSISTANT

## 2017-05-01 PROCEDURE — 74176 CT ABD & PELVIS W/O CONTRAST: CPT

## 2017-05-01 PROCEDURE — BT1D1ZZ FLUOROSCOPY OF RIGHT KIDNEY, URETER AND BLADDER USING LOW OSMOLAR CONTRAST: ICD-10-PCS | Performed by: RADIOLOGY

## 2017-05-01 PROCEDURE — 85048 AUTOMATED LEUKOCYTE COUNT: CPT | Performed by: PHYSICIAN ASSISTANT

## 2017-05-01 PROCEDURE — 87205 SMEAR GRAM STAIN: CPT | Performed by: RADIOLOGY

## 2017-05-01 PROCEDURE — 85027 COMPLETE CBC AUTOMATED: CPT | Performed by: EMERGENCY MEDICINE

## 2017-05-01 PROCEDURE — 80053 COMPREHEN METABOLIC PANEL: CPT | Performed by: EMERGENCY MEDICINE

## 2017-05-01 PROCEDURE — 0T2BX0Z CHANGE DRAINAGE DEVICE IN BLADDER, EXTERNAL APPROACH: ICD-10-PCS | Performed by: EMERGENCY MEDICINE

## 2017-05-01 PROCEDURE — 82805 BLOOD GASES W/O2 SATURATION: CPT | Performed by: PHYSICIAN ASSISTANT

## 2017-05-01 PROCEDURE — 4A133J1 MONITORING OF ARTERIAL PULSE, PERIPHERAL, PERCUTANEOUS APPROACH: ICD-10-PCS | Performed by: INTERNAL MEDICINE

## 2017-05-01 PROCEDURE — 70450 CT HEAD/BRAIN W/O DYE: CPT

## 2017-05-01 PROCEDURE — 87040 BLOOD CULTURE FOR BACTERIA: CPT | Performed by: EMERGENCY MEDICINE

## 2017-05-01 PROCEDURE — 94002 VENT MGMT INPAT INIT DAY: CPT

## 2017-05-01 PROCEDURE — 85610 PROTHROMBIN TIME: CPT | Performed by: EMERGENCY MEDICINE

## 2017-05-01 PROCEDURE — 0T903ZZ DRAINAGE OF RIGHT KIDNEY, PERCUTANEOUS APPROACH: ICD-10-PCS | Performed by: RADIOLOGY

## 2017-05-01 PROCEDURE — 36415 COLL VENOUS BLD VENIPUNCTURE: CPT | Performed by: EMERGENCY MEDICINE

## 2017-05-01 PROCEDURE — 87086 URINE CULTURE/COLONY COUNT: CPT | Performed by: EMERGENCY MEDICINE

## 2017-05-01 PROCEDURE — 0T9330Z DRAINAGE OF RIGHT KIDNEY PELVIS WITH DRAINAGE DEVICE, PERCUTANEOUS APPROACH: ICD-10-PCS | Performed by: RADIOLOGY

## 2017-05-01 PROCEDURE — 96374 THER/PROPH/DIAG INJ IV PUSH: CPT

## 2017-05-01 PROCEDURE — 83735 ASSAY OF MAGNESIUM: CPT | Performed by: PHYSICIAN ASSISTANT

## 2017-05-01 PROCEDURE — 94760 N-INVAS EAR/PLS OXIMETRY 1: CPT

## 2017-05-01 PROCEDURE — 80053 COMPREHEN METABOLIC PANEL: CPT | Performed by: PHYSICIAN ASSISTANT

## 2017-05-01 PROCEDURE — 85027 COMPLETE CBC AUTOMATED: CPT | Performed by: PHYSICIAN ASSISTANT

## 2017-05-01 PROCEDURE — 5A1945Z RESPIRATORY VENTILATION, 24-96 CONSECUTIVE HOURS: ICD-10-PCS | Performed by: INTERNAL MEDICINE

## 2017-05-01 PROCEDURE — 03HY32Z INSERTION OF MONITORING DEVICE INTO UPPER ARTERY, PERCUTANEOUS APPROACH: ICD-10-PCS | Performed by: INTERNAL MEDICINE

## 2017-05-01 PROCEDURE — 82330 ASSAY OF CALCIUM: CPT | Performed by: PHYSICIAN ASSISTANT

## 2017-05-01 PROCEDURE — 99285 EMERGENCY DEPT VISIT HI MDM: CPT

## 2017-05-01 PROCEDURE — 85007 BL SMEAR W/DIFF WBC COUNT: CPT | Performed by: EMERGENCY MEDICINE

## 2017-05-01 PROCEDURE — 87147 CULTURE TYPE IMMUNOLOGIC: CPT | Performed by: EMERGENCY MEDICINE

## 2017-05-01 PROCEDURE — 87077 CULTURE AEROBIC IDENTIFY: CPT | Performed by: EMERGENCY MEDICINE

## 2017-05-01 PROCEDURE — 87070 CULTURE OTHR SPECIMN AEROBIC: CPT | Performed by: RADIOLOGY

## 2017-05-01 PROCEDURE — 85007 BL SMEAR W/DIFF WBC COUNT: CPT | Performed by: PHYSICIAN ASSISTANT

## 2017-05-01 PROCEDURE — 87186 SC STD MICRODIL/AGAR DIL: CPT | Performed by: EMERGENCY MEDICINE

## 2017-05-01 RX ORDER — HALOPERIDOL 2 MG/1
2 TABLET ORAL 4 TIMES DAILY PRN
Status: ON HOLD | COMMUNITY
End: 2017-05-08

## 2017-05-01 RX ORDER — ROCURONIUM BROMIDE 10 MG/ML
INJECTION, SOLUTION INTRAVENOUS AS NEEDED
Status: DISCONTINUED | OUTPATIENT
Start: 2017-05-01 | End: 2017-05-01 | Stop reason: SURG

## 2017-05-01 RX ORDER — FENTANYL CITRATE/PF 50 MCG/ML
50 SYRINGE (ML) INJECTION ONCE
Status: COMPLETED | OUTPATIENT
Start: 2017-05-01 | End: 2017-05-01

## 2017-05-01 RX ORDER — PROCHLORPERAZINE 25 MG
25 SUPPOSITORY, RECTAL RECTAL EVERY 6 HOURS PRN
COMMUNITY
End: 2017-05-08 | Stop reason: HOSPADM

## 2017-05-01 RX ORDER — ACETAMINOPHEN 650 MG/1
650 SUPPOSITORY RECTAL EVERY 4 HOURS PRN
Status: DISCONTINUED | OUTPATIENT
Start: 2017-05-01 | End: 2017-05-08 | Stop reason: HOSPADM

## 2017-05-01 RX ORDER — SODIUM CHLORIDE 9 MG/ML
125 INJECTION, SOLUTION INTRAVENOUS CONTINUOUS
Status: DISCONTINUED | OUTPATIENT
Start: 2017-05-01 | End: 2017-05-02

## 2017-05-01 RX ORDER — LORAZEPAM 2 MG/ML
0.5 INJECTION INTRAMUSCULAR ONCE
Status: DISCONTINUED | OUTPATIENT
Start: 2017-05-01 | End: 2017-05-03

## 2017-05-01 RX ORDER — FENTANYL CITRATE 50 UG/ML
50 INJECTION, SOLUTION INTRAMUSCULAR; INTRAVENOUS
Status: DISCONTINUED | OUTPATIENT
Start: 2017-05-01 | End: 2017-05-03

## 2017-05-01 RX ORDER — HEPARIN SODIUM 5000 [USP'U]/ML
5000 INJECTION, SOLUTION INTRAVENOUS; SUBCUTANEOUS EVERY 8 HOURS SCHEDULED
Status: DISCONTINUED | OUTPATIENT
Start: 2017-05-01 | End: 2017-05-02

## 2017-05-01 RX ORDER — SUCCINYLCHOLINE/SOD CL,ISO/PF 100 MG/5ML
SYRINGE (ML) INTRAVENOUS AS NEEDED
Status: DISCONTINUED | OUTPATIENT
Start: 2017-05-01 | End: 2017-05-01 | Stop reason: SURG

## 2017-05-01 RX ORDER — ETOMIDATE 2 MG/ML
INJECTION INTRAVENOUS AS NEEDED
Status: DISCONTINUED | OUTPATIENT
Start: 2017-05-01 | End: 2017-05-01 | Stop reason: SURG

## 2017-05-01 RX ORDER — POTASSIUM CHLORIDE 14.9 MG/ML
20 INJECTION INTRAVENOUS ONCE
Status: COMPLETED | OUTPATIENT
Start: 2017-05-01 | End: 2017-05-02

## 2017-05-01 RX ORDER — LIDOCAINE HYDROCHLORIDE 10 MG/ML
INJECTION, SOLUTION EPIDURAL; INFILTRATION; INTRACAUDAL; PERINEURAL
Status: DISPENSED
Start: 2017-05-01 | End: 2017-05-02

## 2017-05-01 RX ORDER — ONDANSETRON 2 MG/ML
4 INJECTION INTRAMUSCULAR; INTRAVENOUS ONCE
Status: COMPLETED | OUTPATIENT
Start: 2017-05-01 | End: 2017-05-01

## 2017-05-01 RX ORDER — FENTANYL CITRATE 50 UG/ML
INJECTION, SOLUTION INTRAMUSCULAR; INTRAVENOUS
Status: COMPLETED
Start: 2017-05-01 | End: 2017-05-01

## 2017-05-01 RX ORDER — ACETAMINOPHEN 650 MG/1
650 SUPPOSITORY RECTAL ONCE
Status: COMPLETED | OUTPATIENT
Start: 2017-05-01 | End: 2017-05-01

## 2017-05-01 RX ORDER — MIDAZOLAM HYDROCHLORIDE 1 MG/ML
INJECTION INTRAMUSCULAR; INTRAVENOUS
Status: COMPLETED
Start: 2017-05-01 | End: 2017-05-02

## 2017-05-01 RX ORDER — ALBUMIN, HUMAN INJ 5% 5 %
25 SOLUTION INTRAVENOUS ONCE
Status: COMPLETED | OUTPATIENT
Start: 2017-05-01 | End: 2017-05-01

## 2017-05-01 RX ORDER — MAGNESIUM SULFATE HEPTAHYDRATE 40 MG/ML
2 INJECTION, SOLUTION INTRAVENOUS ONCE
Status: COMPLETED | OUTPATIENT
Start: 2017-05-01 | End: 2017-05-01

## 2017-05-01 RX ADMIN — SODIUM CHLORIDE 1000 ML: 0.9 INJECTION, SOLUTION INTRAVENOUS at 15:02

## 2017-05-01 RX ADMIN — ONDANSETRON 4 MG: 2 INJECTION INTRAMUSCULAR; INTRAVENOUS at 14:09

## 2017-05-01 RX ADMIN — SODIUM CHLORIDE 125 ML/HR: 0.9 INJECTION, SOLUTION INTRAVENOUS at 22:15

## 2017-05-01 RX ADMIN — SODIUM CHLORIDE 125 ML/HR: 0.9 INJECTION, SOLUTION INTRAVENOUS at 20:30

## 2017-05-01 RX ADMIN — DAPTOMYCIN 600 MG: 500 INJECTION, POWDER, LYOPHILIZED, FOR SOLUTION INTRAVENOUS at 20:02

## 2017-05-01 RX ADMIN — IOHEXOL 10 ML: 300 INJECTION, SOLUTION INTRAVENOUS at 18:23

## 2017-05-01 RX ADMIN — ALBUMIN HUMAN 25 G: 0.05 INJECTION, SOLUTION INTRAVENOUS at 20:40

## 2017-05-01 RX ADMIN — FENTANYL CITRATE 50 MCG: 50 INJECTION INTRAMUSCULAR; INTRAVENOUS at 19:40

## 2017-05-01 RX ADMIN — ROCURONIUM BROMIDE 20 MG: 10 INJECTION, SOLUTION INTRAVENOUS at 17:20

## 2017-05-01 RX ADMIN — POTASSIUM CHLORIDE 20 MEQ: 200 INJECTION, SOLUTION INTRAVENOUS at 19:34

## 2017-05-01 RX ADMIN — SODIUM CHLORIDE 1000 ML: 0.9 INJECTION, SOLUTION INTRAVENOUS at 14:06

## 2017-05-01 RX ADMIN — DEXMEDETOMIDINE HYDROCHLORIDE 0.2 MCG/KG/HR: 100 INJECTION, SOLUTION INTRAVENOUS at 19:59

## 2017-05-01 RX ADMIN — FENTANYL CITRATE 50 MCG: 50 INJECTION INTRAMUSCULAR; INTRAVENOUS at 18:55

## 2017-05-01 RX ADMIN — HYDROCORTISONE SODIUM SUCCINATE 100 MG: 100 INJECTION, POWDER, FOR SOLUTION INTRAMUSCULAR; INTRAVENOUS at 21:53

## 2017-05-01 RX ADMIN — SODIUM CHLORIDE: 0.9 INJECTION, SOLUTION INTRAVENOUS at 16:51

## 2017-05-01 RX ADMIN — ACETAMINOPHEN 650 MG: 650 SUPPOSITORY RECTAL at 19:42

## 2017-05-01 RX ADMIN — SODIUM CHLORIDE 1000 MG: 9 INJECTION, SOLUTION INTRAVENOUS at 15:41

## 2017-05-01 RX ADMIN — VASOPRESSIN 0.04 UNITS/MIN: 20 INJECTION INTRAVENOUS at 21:22

## 2017-05-01 RX ADMIN — HEPARIN SODIUM 5000 UNITS: 5000 INJECTION, SOLUTION INTRAVENOUS; SUBCUTANEOUS at 21:14

## 2017-05-01 RX ADMIN — Medication 100 MG: at 16:55

## 2017-05-01 RX ADMIN — NOREPINEPHRINE BITARTRATE 21 MCG/MIN: 1 INJECTION INTRAVENOUS at 23:35

## 2017-05-01 RX ADMIN — MAGNESIUM SULFATE HEPTAHYDRATE 2 G: 40 INJECTION, SOLUTION INTRAVENOUS at 19:34

## 2017-05-01 RX ADMIN — ETOMIDATE 12 MG: 2 INJECTION, SOLUTION INTRAVENOUS at 16:55

## 2017-05-01 RX ADMIN — NOREPINEPHRINE BITARTRATE 3 MCG/MIN: 1 INJECTION, SOLUTION, CONCENTRATE INTRAVENOUS at 17:25

## 2017-05-01 RX ADMIN — FENTANYL CITRATE 50 MCG: 50 INJECTION INTRAMUSCULAR; INTRAVENOUS at 20:42

## 2017-05-01 RX ADMIN — ACETAMINOPHEN 650 MG: 650 SUPPOSITORY RECTAL at 13:53

## 2017-05-01 RX ADMIN — ROCURONIUM BROMIDE 10 MG: 10 INJECTION, SOLUTION INTRAVENOUS at 17:50

## 2017-05-01 RX ADMIN — ALBUMIN HUMAN 25 G: 0.05 INJECTION, SOLUTION INTRAVENOUS at 19:33

## 2017-05-01 RX ADMIN — NOREPINEPHRINE BITARTRATE 5 MCG/MIN: 1 INJECTION INTRAVENOUS at 20:13

## 2017-05-01 RX ADMIN — POTASSIUM CHLORIDE 20 MEQ: 200 INJECTION, SOLUTION INTRAVENOUS at 21:24

## 2017-05-01 RX ADMIN — MEROPENEM 1000 MG: 1 INJECTION, POWDER, FOR SOLUTION INTRAVENOUS at 21:05

## 2017-05-02 ENCOUNTER — APPOINTMENT (INPATIENT)
Dept: RADIOLOGY | Facility: HOSPITAL | Age: 67
DRG: 871 | End: 2017-05-02
Payer: MEDICARE

## 2017-05-02 PROBLEM — E83.42 HYPOMAGNESEMIA: Status: ACTIVE | Noted: 2017-05-02

## 2017-05-02 LAB
ALBUMIN SERPL BCP-MCNC: 2.6 G/DL (ref 3.5–5)
ALBUMIN SERPL BCP-MCNC: 2.6 G/DL (ref 3.5–5)
ALP SERPL-CCNC: 66 U/L (ref 46–116)
ALP SERPL-CCNC: 79 U/L (ref 46–116)
ALT SERPL W P-5'-P-CCNC: 30 U/L (ref 12–78)
ALT SERPL W P-5'-P-CCNC: 36 U/L (ref 12–78)
ANION GAP SERPL CALCULATED.3IONS-SCNC: 14 MMOL/L (ref 4–13)
ANION GAP SERPL CALCULATED.3IONS-SCNC: 15 MMOL/L (ref 4–13)
ANION GAP SERPL CALCULATED.3IONS-SCNC: 17 MMOL/L (ref 4–13)
ANION GAP SERPL CALCULATED.3IONS-SCNC: 18 MMOL/L (ref 4–13)
ANION GAP SERPL CALCULATED.3IONS-SCNC: 9 MMOL/L (ref 4–13)
APTT PPP: 112 SECONDS (ref 23–35)
APTT PPP: 46 SECONDS (ref 23–35)
AST SERPL W P-5'-P-CCNC: 27 U/L (ref 5–45)
AST SERPL W P-5'-P-CCNC: 38 U/L (ref 5–45)
BASE EXCESS BLDA CALC-SCNC: -11.3 MMOL/L
BASE EXCESS BLDA CALC-SCNC: -7.8 MMOL/L
BASE EXCESS BLDA CALC-SCNC: -9.3 MMOL/L
BASOPHILS # BLD MANUAL: 0 THOUSAND/UL (ref 0–0.1)
BASOPHILS NFR MAR MANUAL: 0 % (ref 0–1)
BILIRUB SERPL-MCNC: 1 MG/DL (ref 0.2–1)
BILIRUB SERPL-MCNC: 1.1 MG/DL (ref 0.2–1)
BUN SERPL-MCNC: 23 MG/DL (ref 5–25)
BUN SERPL-MCNC: 24 MG/DL (ref 5–25)
BUN SERPL-MCNC: 25 MG/DL (ref 5–25)
BUN SERPL-MCNC: 25 MG/DL (ref 5–25)
BUN SERPL-MCNC: 27 MG/DL (ref 5–25)
CALCIUM SERPL-MCNC: 7.2 MG/DL (ref 8.3–10.1)
CALCIUM SERPL-MCNC: 7.3 MG/DL (ref 8.3–10.1)
CALCIUM SERPL-MCNC: 7.4 MG/DL (ref 8.3–10.1)
CALCIUM SERPL-MCNC: 7.4 MG/DL (ref 8.3–10.1)
CALCIUM SERPL-MCNC: 7.7 MG/DL (ref 8.3–10.1)
CHLORIDE SERPL-SCNC: 108 MMOL/L (ref 100–108)
CHLORIDE SERPL-SCNC: 108 MMOL/L (ref 100–108)
CHLORIDE SERPL-SCNC: 109 MMOL/L (ref 100–108)
CO2 SERPL-SCNC: 14 MMOL/L (ref 21–32)
CO2 SERPL-SCNC: 15 MMOL/L (ref 21–32)
CO2 SERPL-SCNC: 19 MMOL/L (ref 21–32)
CO2 SERPL-SCNC: 19 MMOL/L (ref 21–32)
CO2 SERPL-SCNC: 25 MMOL/L (ref 21–32)
CORTIS SERPL-MCNC: 46.8 UG/ML
CREAT SERPL-MCNC: 2.14 MG/DL (ref 0.6–1.3)
CREAT SERPL-MCNC: 2.33 MG/DL (ref 0.6–1.3)
CREAT SERPL-MCNC: 2.38 MG/DL (ref 0.6–1.3)
CREAT SERPL-MCNC: 2.45 MG/DL (ref 0.6–1.3)
CREAT SERPL-MCNC: 2.52 MG/DL (ref 0.6–1.3)
DOHLE BOD BLD QL SMEAR: PRESENT
EOSINOPHIL # BLD MANUAL: 0 THOUSAND/UL (ref 0–0.4)
EOSINOPHIL NFR BLD MANUAL: 0 % (ref 0–6)
ERYTHROCYTE [DISTWIDTH] IN BLOOD BY AUTOMATED COUNT: 15.4 % (ref 11.6–15.1)
GFR SERPL CREATININE-BSD FRML MDRD: 25.6 ML/MIN/1.73SQ M
GFR SERPL CREATININE-BSD FRML MDRD: 26.4 ML/MIN/1.73SQ M
GFR SERPL CREATININE-BSD FRML MDRD: 27.3 ML/MIN/1.73SQ M
GFR SERPL CREATININE-BSD FRML MDRD: 28 ML/MIN/1.73SQ M
GFR SERPL CREATININE-BSD FRML MDRD: 30.9 ML/MIN/1.73SQ M
GLUCOSE SERPL-MCNC: 168 MG/DL (ref 65–140)
GLUCOSE SERPL-MCNC: 171 MG/DL (ref 65–140)
GLUCOSE SERPL-MCNC: 176 MG/DL (ref 65–140)
GLUCOSE SERPL-MCNC: 178 MG/DL (ref 65–140)
GLUCOSE SERPL-MCNC: 189 MG/DL (ref 65–140)
GLUCOSE SERPL-MCNC: 192 MG/DL (ref 65–140)
GLUCOSE SERPL-MCNC: 227 MG/DL (ref 65–140)
GLUCOSE SERPL-MCNC: 228 MG/DL (ref 65–140)
GLUCOSE SERPL-MCNC: 240 MG/DL (ref 65–140)
HCO3 BLDA-SCNC: 12.6 MMOL/L (ref 22–28)
HCO3 BLDA-SCNC: 15.4 MMOL/L (ref 22–28)
HCO3 BLDA-SCNC: 15.6 MMOL/L (ref 22–28)
HCT VFR BLD AUTO: 36.9 % (ref 36.5–49.3)
HGB BLD-MCNC: 12.4 G/DL (ref 12–17)
HOROWITZ INDEX BLDA+IHG-RTO: 50 MM[HG]
INR PPP: 2.75 (ref 0.86–1.16)
LACTATE SERPL-SCNC: 2.8 MMOL/L (ref 0.5–2)
LACTATE SERPL-SCNC: 3.2 MMOL/L (ref 0.5–2)
LACTATE SERPL-SCNC: 3.4 MMOL/L (ref 0.5–2)
LACTATE SERPL-SCNC: 3.4 MMOL/L (ref 0.5–2)
LACTATE SERPL-SCNC: 3.9 MMOL/L (ref 0.5–2)
LACTATE SERPL-SCNC: 4 MMOL/L (ref 0.5–2)
LACTATE SERPL-SCNC: 4.5 MMOL/L (ref 0.5–2)
LYMPHOCYTES # BLD AUTO: 1.72 THOUSAND/UL (ref 0.6–4.47)
LYMPHOCYTES # BLD AUTO: 4 % (ref 14–44)
MAGNESIUM SERPL-MCNC: 1.8 MG/DL (ref 1.6–2.6)
MAGNESIUM SERPL-MCNC: 1.9 MG/DL (ref 1.6–2.6)
MCH RBC QN AUTO: 29.5 PG (ref 26.8–34.3)
MCHC RBC AUTO-ENTMCNC: 33.6 G/DL (ref 31.4–37.4)
MCV RBC AUTO: 88 FL (ref 82–98)
METAMYELOCYTES NFR BLD MANUAL: 8 % (ref 0–1)
MONOCYTES # BLD AUTO: 1.72 THOUSAND/UL (ref 0–1.22)
MONOCYTES NFR BLD: 4 % (ref 4–12)
NEUTROPHILS # BLD MANUAL: 36.17 THOUSAND/UL (ref 1.85–7.62)
NEUTS BAND NFR BLD MANUAL: 21 % (ref 0–8)
NEUTS SEG NFR BLD AUTO: 63 % (ref 43–75)
O2 CT BLDA-SCNC: 17.5 ML/DL (ref 16–23)
O2 CT BLDA-SCNC: 17.5 ML/DL (ref 16–23)
O2 CT BLDA-SCNC: 18.3 ML/DL (ref 16–23)
OXYHGB MFR BLDA: 97 % (ref 94–97)
OXYHGB MFR BLDA: 97.2 % (ref 94–97)
OXYHGB MFR BLDA: 97.4 % (ref 94–97)
PCO2 BLDA: 24 MM HG (ref 36–44)
PCO2 BLDA: 26.6 MM HG (ref 36–44)
PCO2 BLDA: 30.3 MM HG (ref 36–44)
PEEP RESPIRATORY: 5 CM[H2O]
PH BLDA: 7.32 [PH] (ref 7.35–7.45)
PH BLDA: 7.34 [PH] (ref 7.35–7.45)
PH BLDA: 7.39 [PH] (ref 7.35–7.45)
PLATELET # BLD AUTO: 207 THOUSANDS/UL (ref 149–390)
PLATELET BLD QL SMEAR: ADEQUATE
PMV BLD AUTO: 9.6 FL (ref 8.9–12.7)
PO2 BLDA: 105.7 MM HG (ref 75–129)
PO2 BLDA: 115.5 MM HG (ref 75–129)
PO2 BLDA: 132.1 MM HG (ref 75–129)
POTASSIUM SERPL-SCNC: 3.3 MMOL/L (ref 3.5–5.3)
POTASSIUM SERPL-SCNC: 3.7 MMOL/L (ref 3.5–5.3)
POTASSIUM SERPL-SCNC: 4 MMOL/L (ref 3.5–5.3)
POTASSIUM SERPL-SCNC: 4.5 MMOL/L (ref 3.5–5.3)
POTASSIUM SERPL-SCNC: 5 MMOL/L (ref 3.5–5.3)
PROT SERPL-MCNC: 5.4 G/DL (ref 6.4–8.2)
PROT SERPL-MCNC: 5.5 G/DL (ref 6.4–8.2)
PROTHROMBIN TIME: 30.1 SECONDS (ref 12.1–14.4)
RBC # BLD AUTO: 4.2 MILLION/UL (ref 3.88–5.62)
SODIUM SERPL-SCNC: 141 MMOL/L (ref 136–145)
SODIUM SERPL-SCNC: 141 MMOL/L (ref 136–145)
SODIUM SERPL-SCNC: 142 MMOL/L (ref 136–145)
SPECIMEN SOURCE: ABNORMAL
TOTAL CELLS COUNTED SPEC: 100
VENT AC: 12
VENT- AC: AC
VT SETTING VENT: 500 ML
WBC # BLD AUTO: 43.06 THOUSAND/UL (ref 4.31–10.16)
WBC TOXIC VACUOLES BLD QL SMEAR: PRESENT

## 2017-05-02 PROCEDURE — 82948 REAGENT STRIP/BLOOD GLUCOSE: CPT

## 2017-05-02 PROCEDURE — 82805 BLOOD GASES W/O2 SATURATION: CPT | Performed by: NURSE PRACTITIONER

## 2017-05-02 PROCEDURE — 85007 BL SMEAR W/DIFF WBC COUNT: CPT | Performed by: INTERNAL MEDICINE

## 2017-05-02 PROCEDURE — C9113 INJ PANTOPRAZOLE SODIUM, VIA: HCPCS | Performed by: PHYSICIAN ASSISTANT

## 2017-05-02 PROCEDURE — 83735 ASSAY OF MAGNESIUM: CPT | Performed by: PHYSICIAN ASSISTANT

## 2017-05-02 PROCEDURE — 71010 HB CHEST X-RAY 1 VIEW FRONTAL (PORTABLE): CPT

## 2017-05-02 PROCEDURE — 85730 THROMBOPLASTIN TIME PARTIAL: CPT | Performed by: PHYSICIAN ASSISTANT

## 2017-05-02 PROCEDURE — 80053 COMPREHEN METABOLIC PANEL: CPT | Performed by: INTERNAL MEDICINE

## 2017-05-02 PROCEDURE — 94150 VITAL CAPACITY TEST: CPT

## 2017-05-02 PROCEDURE — 83605 ASSAY OF LACTIC ACID: CPT | Performed by: EMERGENCY MEDICINE

## 2017-05-02 PROCEDURE — 82805 BLOOD GASES W/O2 SATURATION: CPT | Performed by: PHYSICIAN ASSISTANT

## 2017-05-02 PROCEDURE — 83605 ASSAY OF LACTIC ACID: CPT | Performed by: INTERNAL MEDICINE

## 2017-05-02 PROCEDURE — 80048 BASIC METABOLIC PNL TOTAL CA: CPT | Performed by: PHYSICIAN ASSISTANT

## 2017-05-02 PROCEDURE — 85730 THROMBOPLASTIN TIME PARTIAL: CPT | Performed by: INTERNAL MEDICINE

## 2017-05-02 PROCEDURE — 94760 N-INVAS EAR/PLS OXIMETRY 1: CPT

## 2017-05-02 PROCEDURE — 83605 ASSAY OF LACTIC ACID: CPT | Performed by: PHYSICIAN ASSISTANT

## 2017-05-02 PROCEDURE — 85610 PROTHROMBIN TIME: CPT | Performed by: NURSE PRACTITIONER

## 2017-05-02 PROCEDURE — 85027 COMPLETE CBC AUTOMATED: CPT | Performed by: INTERNAL MEDICINE

## 2017-05-02 PROCEDURE — 80053 COMPREHEN METABOLIC PANEL: CPT | Performed by: PHYSICIAN ASSISTANT

## 2017-05-02 PROCEDURE — 94003 VENT MGMT INPAT SUBQ DAY: CPT

## 2017-05-02 PROCEDURE — 83735 ASSAY OF MAGNESIUM: CPT | Performed by: INTERNAL MEDICINE

## 2017-05-02 PROCEDURE — 80048 BASIC METABOLIC PNL TOTAL CA: CPT | Performed by: NURSE PRACTITIONER

## 2017-05-02 PROCEDURE — 85730 THROMBOPLASTIN TIME PARTIAL: CPT | Performed by: NURSE PRACTITIONER

## 2017-05-02 RX ORDER — CHLORHEXIDINE GLUCONATE 0.12 MG/ML
15 RINSE ORAL EVERY 12 HOURS SCHEDULED
Status: DISCONTINUED | OUTPATIENT
Start: 2017-05-02 | End: 2017-05-03

## 2017-05-02 RX ORDER — PANTOPRAZOLE SODIUM 40 MG/1
40 INJECTION, POWDER, FOR SOLUTION INTRAVENOUS ONCE
Status: COMPLETED | OUTPATIENT
Start: 2017-05-02 | End: 2017-05-02

## 2017-05-02 RX ORDER — ALBUMIN, HUMAN INJ 5% 5 %
12.5 SOLUTION INTRAVENOUS ONCE
Status: COMPLETED | OUTPATIENT
Start: 2017-05-02 | End: 2017-05-02

## 2017-05-02 RX ORDER — HEPARIN SODIUM 1000 [USP'U]/ML
4000 INJECTION, SOLUTION INTRAVENOUS; SUBCUTANEOUS AS NEEDED
Status: DISCONTINUED | OUTPATIENT
Start: 2017-05-02 | End: 2017-05-04

## 2017-05-02 RX ORDER — SODIUM CHLORIDE, SODIUM GLUCONATE, SODIUM ACETATE, POTASSIUM CHLORIDE, MAGNESIUM CHLORIDE, SODIUM PHOSPHATE, DIBASIC, AND POTASSIUM PHOSPHATE .53; .5; .37; .037; .03; .012; .00082 G/100ML; G/100ML; G/100ML; G/100ML; G/100ML; G/100ML; G/100ML
500 INJECTION, SOLUTION INTRAVENOUS ONCE
Status: COMPLETED | OUTPATIENT
Start: 2017-05-02 | End: 2017-05-02

## 2017-05-02 RX ORDER — SODIUM CHLORIDE, SODIUM GLUCONATE, SODIUM ACETATE, POTASSIUM CHLORIDE, MAGNESIUM CHLORIDE, SODIUM PHOSPHATE, DIBASIC, AND POTASSIUM PHOSPHATE .53; .5; .37; .037; .03; .012; .00082 G/100ML; G/100ML; G/100ML; G/100ML; G/100ML; G/100ML; G/100ML
125 INJECTION, SOLUTION INTRAVENOUS CONTINUOUS
Status: DISCONTINUED | OUTPATIENT
Start: 2017-05-02 | End: 2017-05-04

## 2017-05-02 RX ORDER — POTASSIUM CHLORIDE 29.8 MG/ML
40 INJECTION INTRAVENOUS ONCE
Status: COMPLETED | OUTPATIENT
Start: 2017-05-02 | End: 2017-05-02

## 2017-05-02 RX ORDER — HEPARIN SODIUM 1000 [USP'U]/ML
2000 INJECTION, SOLUTION INTRAVENOUS; SUBCUTANEOUS AS NEEDED
Status: DISCONTINUED | OUTPATIENT
Start: 2017-05-02 | End: 2017-05-04

## 2017-05-02 RX ORDER — HEPARIN SODIUM 10000 [USP'U]/100ML
3-20 INJECTION, SOLUTION INTRAVENOUS
Status: DISCONTINUED | OUTPATIENT
Start: 2017-05-02 | End: 2017-05-04

## 2017-05-02 RX ORDER — MIDAZOLAM HYDROCHLORIDE 1 MG/ML
2 INJECTION INTRAMUSCULAR; INTRAVENOUS ONCE
Status: COMPLETED | OUTPATIENT
Start: 2017-05-02 | End: 2017-05-02

## 2017-05-02 RX ORDER — ACETAMINOPHEN 325 MG/1
650 TABLET ORAL EVERY 6 HOURS PRN
Status: DISCONTINUED | OUTPATIENT
Start: 2017-05-02 | End: 2017-05-08 | Stop reason: HOSPADM

## 2017-05-02 RX ADMIN — HYDROCORTISONE SODIUM SUCCINATE 100 MG: 100 INJECTION, POWDER, FOR SOLUTION INTRAMUSCULAR; INTRAVENOUS at 05:38

## 2017-05-02 RX ADMIN — INSULIN LISPRO 1 UNITS: 100 INJECTION, SOLUTION INTRAVENOUS; SUBCUTANEOUS at 18:22

## 2017-05-02 RX ADMIN — SODIUM CHLORIDE, SODIUM GLUCONATE, SODIUM ACETATE, POTASSIUM CHLORIDE, MAGNESIUM CHLORIDE, SODIUM PHOSPHATE, DIBASIC, AND POTASSIUM PHOSPHATE 500 ML: .53; .5; .37; .037; .03; .012; .00082 INJECTION, SOLUTION INTRAVENOUS at 00:20

## 2017-05-02 RX ADMIN — ALBUMIN HUMAN 12.5 G: 0.05 INJECTION, SOLUTION INTRAVENOUS at 09:14

## 2017-05-02 RX ADMIN — MIDAZOLAM HYDROCHLORIDE 2 MG: 1 INJECTION, SOLUTION INTRAMUSCULAR; INTRAVENOUS at 02:31

## 2017-05-02 RX ADMIN — HYDROCORTISONE SODIUM SUCCINATE 100 MG: 100 INJECTION, POWDER, FOR SOLUTION INTRAMUSCULAR; INTRAVENOUS at 13:43

## 2017-05-02 RX ADMIN — ACETAMINOPHEN 650 MG: 325 TABLET ORAL at 21:12

## 2017-05-02 RX ADMIN — CHLORHEXIDINE GLUCONATE 15 ML: 1.2 RINSE ORAL at 22:32

## 2017-05-02 RX ADMIN — INSULIN LISPRO 1 UNITS: 100 INJECTION, SOLUTION INTRAVENOUS; SUBCUTANEOUS at 23:53

## 2017-05-02 RX ADMIN — HEPARIN SODIUM AND DEXTROSE 11.1 UNITS/KG/HR: 10000; 5 INJECTION INTRAVENOUS at 09:23

## 2017-05-02 RX ADMIN — INSULIN LISPRO 2 UNITS: 100 INJECTION, SOLUTION INTRAVENOUS; SUBCUTANEOUS at 11:57

## 2017-05-02 RX ADMIN — POTASSIUM CHLORIDE 40 MEQ: 400 INJECTION, SOLUTION INTRAVENOUS at 01:00

## 2017-05-02 RX ADMIN — SODIUM BICARBONATE 125 ML/HR: 84 INJECTION, SOLUTION INTRAVENOUS at 15:48

## 2017-05-02 RX ADMIN — HYDROCORTISONE SODIUM SUCCINATE 100 MG: 100 INJECTION, POWDER, FOR SOLUTION INTRAMUSCULAR; INTRAVENOUS at 22:32

## 2017-05-02 RX ADMIN — SODIUM CHLORIDE, SODIUM GLUCONATE, SODIUM ACETATE, POTASSIUM CHLORIDE, MAGNESIUM CHLORIDE, SODIUM PHOSPHATE, DIBASIC, AND POTASSIUM PHOSPHATE 125 ML/HR: .53; .5; .37; .037; .03; .012; .00082 INJECTION, SOLUTION INTRAVENOUS at 01:00

## 2017-05-02 RX ADMIN — NOREPINEPHRINE BITARTRATE 23 MCG/MIN: 1 INJECTION INTRAVENOUS at 05:42

## 2017-05-02 RX ADMIN — MEROPENEM 1000 MG: 1 INJECTION, POWDER, FOR SOLUTION INTRAVENOUS at 20:04

## 2017-05-02 RX ADMIN — MEROPENEM 1000 MG: 1 INJECTION, POWDER, FOR SOLUTION INTRAVENOUS at 08:14

## 2017-05-02 RX ADMIN — PANTOPRAZOLE SODIUM 40 MG: 40 INJECTION, POWDER, FOR SOLUTION INTRAVENOUS at 01:00

## 2017-05-02 RX ADMIN — HEPARIN SODIUM AND DEXTROSE 11.1 UNITS/KG/HR: 10000; 5 INJECTION INTRAVENOUS at 09:31

## 2017-05-02 RX ADMIN — NOREPINEPHRINE BITARTRATE 25 MCG/MIN: 1 INJECTION INTRAVENOUS at 02:34

## 2017-05-02 RX ADMIN — HEPARIN SODIUM 5000 UNITS: 5000 INJECTION, SOLUTION INTRAVENOUS; SUBCUTANEOUS at 05:39

## 2017-05-02 RX ADMIN — MIDAZOLAM HYDROCHLORIDE 2 MG: 1 INJECTION INTRAMUSCULAR; INTRAVENOUS at 02:31

## 2017-05-02 RX ADMIN — SODIUM BICARBONATE 100 ML/HR: 84 INJECTION, SOLUTION INTRAVENOUS at 05:10

## 2017-05-02 RX ADMIN — INSULIN LISPRO 1 UNITS: 100 INJECTION, SOLUTION INTRAVENOUS; SUBCUTANEOUS at 06:09

## 2017-05-02 RX ADMIN — ACETAMINOPHEN 650 MG: 325 TABLET ORAL at 01:32

## 2017-05-02 RX ADMIN — CHLORHEXIDINE GLUCONATE 15 ML: 1.2 RINSE ORAL at 09:32

## 2017-05-02 RX ADMIN — SODIUM CHLORIDE, SODIUM GLUCONATE, SODIUM ACETATE, POTASSIUM CHLORIDE, MAGNESIUM CHLORIDE, SODIUM PHOSPHATE, DIBASIC, AND POTASSIUM PHOSPHATE 500 ML: .53; .5; .37; .037; .03; .012; .00082 INJECTION, SOLUTION INTRAVENOUS at 00:23

## 2017-05-02 RX ADMIN — VASOPRESSIN 0.04 UNITS/MIN: 20 INJECTION INTRAVENOUS at 04:09

## 2017-05-03 PROBLEM — E87.2 LACTIC ACIDOSIS: Status: ACTIVE | Noted: 2017-05-03

## 2017-05-03 PROBLEM — E87.20 LACTIC ACIDOSIS: Status: ACTIVE | Noted: 2017-05-03

## 2017-05-03 LAB
ANION GAP SERPL CALCULATED.3IONS-SCNC: 7 MMOL/L (ref 4–13)
ANION GAP SERPL CALCULATED.3IONS-SCNC: 9 MMOL/L (ref 4–13)
ANION GAP SERPL CALCULATED.3IONS-SCNC: 9 MMOL/L (ref 4–13)
APTT PPP: 65 SECONDS (ref 23–35)
APTT PPP: 68 SECONDS (ref 23–35)
ATRIAL RATE: 96 BPM
BACTERIA SPEC BFLD CULT: ABNORMAL
BACTERIA SPEC BFLD CULT: ABNORMAL
BASOPHILS # BLD MANUAL: 0 THOUSAND/UL (ref 0–0.1)
BASOPHILS NFR MAR MANUAL: 0 % (ref 0–1)
BUN SERPL-MCNC: 28 MG/DL (ref 5–25)
BUN SERPL-MCNC: 31 MG/DL (ref 5–25)
BUN SERPL-MCNC: 31 MG/DL (ref 5–25)
BURR CELLS BLD QL SMEAR: PRESENT
C DIFF TOX GENS STL QL NAA+PROBE: NORMAL
CA-I BLD-SCNC: 1.01 MMOL/L (ref 1.12–1.32)
CALCIUM SERPL-MCNC: 7.3 MG/DL (ref 8.3–10.1)
CALCIUM SERPL-MCNC: 7.6 MG/DL (ref 8.3–10.1)
CALCIUM SERPL-MCNC: 8.4 MG/DL (ref 8.3–10.1)
CHLORIDE SERPL-SCNC: 106 MMOL/L (ref 100–108)
CHLORIDE SERPL-SCNC: 106 MMOL/L (ref 100–108)
CHLORIDE SERPL-SCNC: 107 MMOL/L (ref 100–108)
CO2 SERPL-SCNC: 26 MMOL/L (ref 21–32)
CO2 SERPL-SCNC: 27 MMOL/L (ref 21–32)
CO2 SERPL-SCNC: 31 MMOL/L (ref 21–32)
CREAT SERPL-MCNC: 1.84 MG/DL (ref 0.6–1.3)
CREAT SERPL-MCNC: 2.01 MG/DL (ref 0.6–1.3)
CREAT SERPL-MCNC: 2.19 MG/DL (ref 0.6–1.3)
EOSINOPHIL # BLD MANUAL: 0 THOUSAND/UL (ref 0–0.4)
EOSINOPHIL NFR BLD MANUAL: 0 % (ref 0–6)
ERYTHROCYTE [DISTWIDTH] IN BLOOD BY AUTOMATED COUNT: 15.7 % (ref 11.6–15.1)
GFR SERPL CREATININE-BSD FRML MDRD: 30.1 ML/MIN/1.73SQ M
GFR SERPL CREATININE-BSD FRML MDRD: 33.2 ML/MIN/1.73SQ M
GFR SERPL CREATININE-BSD FRML MDRD: 36.8 ML/MIN/1.73SQ M
GLUCOSE SERPL-MCNC: 112 MG/DL (ref 65–140)
GLUCOSE SERPL-MCNC: 112 MG/DL (ref 65–140)
GLUCOSE SERPL-MCNC: 117 MG/DL (ref 65–140)
GLUCOSE SERPL-MCNC: 126 MG/DL (ref 65–140)
GLUCOSE SERPL-MCNC: 170 MG/DL (ref 65–140)
GLUCOSE SERPL-MCNC: 172 MG/DL (ref 65–140)
GLUCOSE SERPL-MCNC: 215 MG/DL (ref 65–140)
GRAM STN SPEC: ABNORMAL
HCT VFR BLD AUTO: 31.8 % (ref 36.5–49.3)
HGB BLD-MCNC: 10.6 G/DL (ref 12–17)
INR PPP: 2.13 (ref 0.86–1.16)
LACTATE SERPL-SCNC: 2.3 MMOL/L (ref 0.5–2)
LACTATE SERPL-SCNC: 2.5 MMOL/L (ref 0.5–2)
LACTATE SERPL-SCNC: 2.8 MMOL/L (ref 0.5–2)
LYMPHOCYTES # BLD AUTO: 0.3 THOUSAND/UL (ref 0.6–4.47)
LYMPHOCYTES # BLD AUTO: 1 % (ref 14–44)
MAGNESIUM SERPL-MCNC: 1.9 MG/DL (ref 1.6–2.6)
MCH RBC QN AUTO: 29.2 PG (ref 26.8–34.3)
MCHC RBC AUTO-ENTMCNC: 33.3 G/DL (ref 31.4–37.4)
MCV RBC AUTO: 88 FL (ref 82–98)
MONOCYTES # BLD AUTO: 0 THOUSAND/UL (ref 0–1.22)
MONOCYTES NFR BLD: 0 % (ref 4–12)
NEUTROPHILS # BLD MANUAL: 29.63 THOUSAND/UL (ref 1.85–7.62)
NEUTS BAND NFR BLD MANUAL: 12 % (ref 0–8)
NEUTS SEG NFR BLD AUTO: 87 % (ref 43–75)
P AXIS: 50 DEGREES
PHOSPHATE SERPL-MCNC: 2.3 MG/DL (ref 2.3–4.1)
PLATELET # BLD AUTO: 130 THOUSANDS/UL (ref 149–390)
PLATELET BLD QL SMEAR: ABNORMAL
PMV BLD AUTO: 10 FL (ref 8.9–12.7)
POIKILOCYTOSIS BLD QL SMEAR: PRESENT
POTASSIUM SERPL-SCNC: 3.4 MMOL/L (ref 3.5–5.3)
POTASSIUM SERPL-SCNC: 3.6 MMOL/L (ref 3.5–5.3)
POTASSIUM SERPL-SCNC: 3.6 MMOL/L (ref 3.5–5.3)
PR INTERVAL: 160 MS
PROTHROMBIN TIME: 24.6 SECONDS (ref 12.1–14.4)
QRS AXIS: 48 DEGREES
QRSD INTERVAL: 86 MS
QT INTERVAL: 352 MS
QTC INTERVAL: 444 MS
RBC # BLD AUTO: 3.63 MILLION/UL (ref 3.88–5.62)
SODIUM SERPL-SCNC: 141 MMOL/L (ref 136–145)
SODIUM SERPL-SCNC: 143 MMOL/L (ref 136–145)
SODIUM SERPL-SCNC: 144 MMOL/L (ref 136–145)
T WAVE AXIS: 43 DEGREES
TOTAL CELLS COUNTED SPEC: 100
VENTRICULAR RATE: 96 BPM
WBC # BLD AUTO: 29.93 THOUSAND/UL (ref 4.31–10.16)

## 2017-05-03 PROCEDURE — 82330 ASSAY OF CALCIUM: CPT | Performed by: NURSE PRACTITIONER

## 2017-05-03 PROCEDURE — 80048 BASIC METABOLIC PNL TOTAL CA: CPT | Performed by: PHYSICIAN ASSISTANT

## 2017-05-03 PROCEDURE — 94760 N-INVAS EAR/PLS OXIMETRY 1: CPT

## 2017-05-03 PROCEDURE — 85610 PROTHROMBIN TIME: CPT | Performed by: NURSE PRACTITIONER

## 2017-05-03 PROCEDURE — 83605 ASSAY OF LACTIC ACID: CPT | Performed by: INTERNAL MEDICINE

## 2017-05-03 PROCEDURE — 83735 ASSAY OF MAGNESIUM: CPT | Performed by: NURSE PRACTITIONER

## 2017-05-03 PROCEDURE — 94003 VENT MGMT INPAT SUBQ DAY: CPT

## 2017-05-03 PROCEDURE — 84100 ASSAY OF PHOSPHORUS: CPT | Performed by: NURSE PRACTITIONER

## 2017-05-03 PROCEDURE — 80048 BASIC METABOLIC PNL TOTAL CA: CPT | Performed by: NURSE PRACTITIONER

## 2017-05-03 PROCEDURE — 87493 C DIFF AMPLIFIED PROBE: CPT | Performed by: NURSE PRACTITIONER

## 2017-05-03 PROCEDURE — 85730 THROMBOPLASTIN TIME PARTIAL: CPT | Performed by: INTERNAL MEDICINE

## 2017-05-03 PROCEDURE — 85007 BL SMEAR W/DIFF WBC COUNT: CPT | Performed by: NURSE PRACTITIONER

## 2017-05-03 PROCEDURE — 94150 VITAL CAPACITY TEST: CPT

## 2017-05-03 PROCEDURE — 85027 COMPLETE CBC AUTOMATED: CPT | Performed by: NURSE PRACTITIONER

## 2017-05-03 PROCEDURE — 93005 ELECTROCARDIOGRAM TRACING: CPT | Performed by: NURSE PRACTITIONER

## 2017-05-03 PROCEDURE — 82948 REAGENT STRIP/BLOOD GLUCOSE: CPT

## 2017-05-03 PROCEDURE — 83605 ASSAY OF LACTIC ACID: CPT | Performed by: PHYSICIAN ASSISTANT

## 2017-05-03 RX ORDER — POTASSIUM CHLORIDE 14.9 MG/ML
20 INJECTION INTRAVENOUS ONCE
Status: COMPLETED | OUTPATIENT
Start: 2017-05-03 | End: 2017-05-03

## 2017-05-03 RX ORDER — POTASSIUM CHLORIDE 20MEQ/15ML
40 LIQUID (ML) ORAL ONCE
Status: COMPLETED | OUTPATIENT
Start: 2017-05-03 | End: 2017-05-03

## 2017-05-03 RX ORDER — HALOPERIDOL 1 MG/1
2 TABLET ORAL 4 TIMES DAILY PRN
Status: DISCONTINUED | OUTPATIENT
Start: 2017-05-03 | End: 2017-05-08 | Stop reason: HOSPADM

## 2017-05-03 RX ORDER — POTASSIUM CHLORIDE 14.9 MG/ML
20 INJECTION INTRAVENOUS
Status: COMPLETED | OUTPATIENT
Start: 2017-05-03 | End: 2017-05-04

## 2017-05-03 RX ORDER — ALBUMIN, HUMAN INJ 5% 5 %
12.5 SOLUTION INTRAVENOUS ONCE
Status: COMPLETED | OUTPATIENT
Start: 2017-05-03 | End: 2017-05-03

## 2017-05-03 RX ORDER — AMLODIPINE BESYLATE 5 MG/1
5 TABLET ORAL DAILY
Status: DISCONTINUED | OUTPATIENT
Start: 2017-05-03 | End: 2017-05-08 | Stop reason: HOSPADM

## 2017-05-03 RX ORDER — LORAZEPAM 2 MG/ML
INJECTION INTRAMUSCULAR
Status: COMPLETED
Start: 2017-05-03 | End: 2017-05-03

## 2017-05-03 RX ORDER — FUROSEMIDE 10 MG/ML
10 INJECTION INTRAMUSCULAR; INTRAVENOUS ONCE
Status: COMPLETED | OUTPATIENT
Start: 2017-05-03 | End: 2017-05-03

## 2017-05-03 RX ORDER — POTASSIUM CHLORIDE 20 MEQ/1
40 TABLET, EXTENDED RELEASE ORAL ONCE
Status: DISCONTINUED | OUTPATIENT
Start: 2017-05-03 | End: 2017-05-03

## 2017-05-03 RX ORDER — LORAZEPAM 2 MG/ML
1 INJECTION INTRAMUSCULAR ONCE
Status: COMPLETED | OUTPATIENT
Start: 2017-05-03 | End: 2017-05-03

## 2017-05-03 RX ORDER — LABETALOL HYDROCHLORIDE 5 MG/ML
10 INJECTION, SOLUTION INTRAVENOUS EVERY 4 HOURS PRN
Status: DISCONTINUED | OUTPATIENT
Start: 2017-05-03 | End: 2017-05-08 | Stop reason: HOSPADM

## 2017-05-03 RX ORDER — QUETIAPINE FUMARATE 25 MG/1
25 TABLET, FILM COATED ORAL 2 TIMES DAILY
Status: DISCONTINUED | OUTPATIENT
Start: 2017-05-03 | End: 2017-05-08 | Stop reason: HOSPADM

## 2017-05-03 RX ADMIN — VASOPRESSIN 0.04 UNITS/MIN: 20 INJECTION INTRAVENOUS at 04:57

## 2017-05-03 RX ADMIN — FUROSEMIDE 10 MG: 10 INJECTION, SOLUTION INTRAMUSCULAR; INTRAVENOUS at 17:02

## 2017-05-03 RX ADMIN — POTASSIUM CHLORIDE 20 MEQ: 200 INJECTION, SOLUTION INTRAVENOUS at 22:18

## 2017-05-03 RX ADMIN — SODIUM CHLORIDE 1000 MG: 9 INJECTION, SOLUTION INTRAVENOUS at 19:37

## 2017-05-03 RX ADMIN — LORAZEPAM 1 MG: 2 INJECTION INTRAMUSCULAR at 15:28

## 2017-05-03 RX ADMIN — INSULIN LISPRO 1 UNITS: 100 INJECTION, SOLUTION INTRAVENOUS; SUBCUTANEOUS at 05:40

## 2017-05-03 RX ADMIN — ALBUMIN HUMAN 12.5 G: 0.05 INJECTION, SOLUTION INTRAVENOUS at 02:09

## 2017-05-03 RX ADMIN — QUETIAPINE FUMARATE 25 MG: 25 TABLET, FILM COATED ORAL at 15:29

## 2017-05-03 RX ADMIN — POTASSIUM CHLORIDE 20 MEQ: 200 INJECTION, SOLUTION INTRAVENOUS at 02:11

## 2017-05-03 RX ADMIN — POTASSIUM CHLORIDE 40 MEQ: 20 SOLUTION ORAL at 09:10

## 2017-05-03 RX ADMIN — MEROPENEM 1000 MG: 1 INJECTION, POWDER, FOR SOLUTION INTRAVENOUS at 08:44

## 2017-05-03 RX ADMIN — HYDROCORTISONE SODIUM SUCCINATE 100 MG: 100 INJECTION, POWDER, FOR SOLUTION INTRAMUSCULAR; INTRAVENOUS at 05:40

## 2017-05-03 RX ADMIN — HEPARIN SODIUM AND DEXTROSE 8.1 UNITS/KG/HR: 10000; 5 INJECTION INTRAVENOUS at 09:00

## 2017-05-03 RX ADMIN — POTASSIUM CHLORIDE 20 MEQ: 200 INJECTION, SOLUTION INTRAVENOUS at 22:00

## 2017-05-03 RX ADMIN — MAGNESIUM SULFATE HEPTAHYDRATE 1 G: 1 INJECTION, SOLUTION INTRAVENOUS at 08:44

## 2017-05-03 RX ADMIN — AMLODIPINE BESYLATE 5 MG: 5 TABLET ORAL at 14:30

## 2017-05-03 RX ADMIN — SODIUM CHLORIDE, SODIUM GLUCONATE, SODIUM ACETATE, POTASSIUM CHLORIDE, MAGNESIUM CHLORIDE, SODIUM PHOSPHATE, DIBASIC, AND POTASSIUM PHOSPHATE 125 ML/HR: .53; .5; .37; .037; .03; .012; .00082 INJECTION, SOLUTION INTRAVENOUS at 17:00

## 2017-05-03 RX ADMIN — HYDROCORTISONE SODIUM SUCCINATE 100 MG: 100 INJECTION, POWDER, FOR SOLUTION INTRAMUSCULAR; INTRAVENOUS at 14:34

## 2017-05-03 RX ADMIN — SODIUM BICARBONATE 125 ML/HR: 84 INJECTION, SOLUTION INTRAVENOUS at 01:04

## 2017-05-03 RX ADMIN — CALCIUM GLUCONATE 2 G: 94 INJECTION, SOLUTION INTRAVENOUS at 08:44

## 2017-05-03 RX ADMIN — SODIUM CHLORIDE, SODIUM GLUCONATE, SODIUM ACETATE, POTASSIUM CHLORIDE, MAGNESIUM CHLORIDE, SODIUM PHOSPHATE, DIBASIC, AND POTASSIUM PHOSPHATE 125 ML/HR: .53; .5; .37; .037; .03; .012; .00082 INJECTION, SOLUTION INTRAVENOUS at 09:00

## 2017-05-03 RX ADMIN — METOPROLOL TARTRATE 12.5 MG: 25 TABLET ORAL at 17:09

## 2017-05-03 RX ADMIN — HYDROCORTISONE SODIUM SUCCINATE 50 MG: 100 INJECTION, POWDER, FOR SOLUTION INTRAMUSCULAR; INTRAVENOUS at 21:05

## 2017-05-03 RX ADMIN — LORAZEPAM 1 MG: 2 INJECTION INTRAMUSCULAR; INTRAVENOUS at 15:28

## 2017-05-03 RX ADMIN — CHLORHEXIDINE GLUCONATE 15 ML: 1.2 RINSE ORAL at 08:43

## 2017-05-04 ENCOUNTER — APPOINTMENT (INPATIENT)
Dept: RADIOLOGY | Facility: HOSPITAL | Age: 67
DRG: 871 | End: 2017-05-04
Payer: MEDICARE

## 2017-05-04 LAB
ANION GAP SERPL CALCULATED.3IONS-SCNC: 6 MMOL/L (ref 4–13)
ANION GAP SERPL CALCULATED.3IONS-SCNC: 9 MMOL/L (ref 4–13)
APTT PPP: 33 SECONDS (ref 23–35)
APTT PPP: 34 SECONDS (ref 23–35)
ATRIAL RATE: 90 BPM
BASOPHILS # BLD MANUAL: 0 THOUSAND/UL (ref 0–0.1)
BASOPHILS NFR MAR MANUAL: 0 % (ref 0–1)
BUN SERPL-MCNC: 33 MG/DL (ref 5–25)
BUN SERPL-MCNC: 33 MG/DL (ref 5–25)
CALCIUM SERPL-MCNC: 7.8 MG/DL (ref 8.3–10.1)
CALCIUM SERPL-MCNC: 8.2 MG/DL (ref 8.3–10.1)
CHLORIDE SERPL-SCNC: 106 MMOL/L (ref 100–108)
CHLORIDE SERPL-SCNC: 106 MMOL/L (ref 100–108)
CO2 SERPL-SCNC: 30 MMOL/L (ref 21–32)
CO2 SERPL-SCNC: 32 MMOL/L (ref 21–32)
CREAT SERPL-MCNC: 1.82 MG/DL (ref 0.6–1.3)
CREAT SERPL-MCNC: 1.83 MG/DL (ref 0.6–1.3)
EOSINOPHIL # BLD MANUAL: 0 THOUSAND/UL (ref 0–0.4)
EOSINOPHIL NFR BLD MANUAL: 0 % (ref 0–6)
ERYTHROCYTE [DISTWIDTH] IN BLOOD BY AUTOMATED COUNT: 15.6 % (ref 11.6–15.1)
GFR SERPL CREATININE-BSD FRML MDRD: 37 ML/MIN/1.73SQ M
GFR SERPL CREATININE-BSD FRML MDRD: 37.2 ML/MIN/1.73SQ M
GLUCOSE SERPL-MCNC: 109 MG/DL (ref 65–140)
GLUCOSE SERPL-MCNC: 126 MG/DL (ref 65–140)
GLUCOSE SERPL-MCNC: 127 MG/DL (ref 65–140)
GLUCOSE SERPL-MCNC: 92 MG/DL (ref 65–140)
HCT VFR BLD AUTO: 32.6 % (ref 36.5–49.3)
HGB BLD-MCNC: 10.9 G/DL (ref 12–17)
LACTATE SERPL-SCNC: 1.9 MMOL/L (ref 0.5–2)
LYMPHOCYTES # BLD AUTO: 0.33 THOUSAND/UL (ref 0.6–4.47)
LYMPHOCYTES # BLD AUTO: 1 % (ref 14–44)
MCH RBC QN AUTO: 29.4 PG (ref 26.8–34.3)
MCHC RBC AUTO-ENTMCNC: 33.4 G/DL (ref 31.4–37.4)
MCV RBC AUTO: 88 FL (ref 82–98)
METAMYELOCYTES NFR BLD MANUAL: 1 % (ref 0–1)
MONOCYTES # BLD AUTO: 0.33 THOUSAND/UL (ref 0–1.22)
MONOCYTES NFR BLD: 1 % (ref 4–12)
NEUTROPHILS # BLD MANUAL: 31.7 THOUSAND/UL (ref 1.85–7.62)
NEUTS BAND NFR BLD MANUAL: 6 % (ref 0–8)
NEUTS SEG NFR BLD AUTO: 91 % (ref 43–75)
P AXIS: 64 DEGREES
PLATELET # BLD AUTO: 133 THOUSANDS/UL (ref 149–390)
PLATELET BLD QL SMEAR: ABNORMAL
PMV BLD AUTO: 10.5 FL (ref 8.9–12.7)
POTASSIUM SERPL-SCNC: 3.2 MMOL/L (ref 3.5–5.3)
POTASSIUM SERPL-SCNC: 3.6 MMOL/L (ref 3.5–5.3)
PR INTERVAL: 166 MS
QRS AXIS: 60 DEGREES
QRSD INTERVAL: 82 MS
QT INTERVAL: 396 MS
QTC INTERVAL: 484 MS
RBC # BLD AUTO: 3.71 MILLION/UL (ref 3.88–5.62)
SODIUM SERPL-SCNC: 144 MMOL/L (ref 136–145)
SODIUM SERPL-SCNC: 145 MMOL/L (ref 136–145)
T WAVE AXIS: 64 DEGREES
TOTAL CELLS COUNTED SPEC: 100
VENTRICULAR RATE: 90 BPM
WBC # BLD AUTO: 32.68 THOUSAND/UL (ref 4.31–10.16)

## 2017-05-04 PROCEDURE — 82948 REAGENT STRIP/BLOOD GLUCOSE: CPT

## 2017-05-04 PROCEDURE — 93005 ELECTROCARDIOGRAM TRACING: CPT | Performed by: NURSE PRACTITIONER

## 2017-05-04 PROCEDURE — 83605 ASSAY OF LACTIC ACID: CPT | Performed by: INTERNAL MEDICINE

## 2017-05-04 PROCEDURE — 80048 BASIC METABOLIC PNL TOTAL CA: CPT | Performed by: INTERNAL MEDICINE

## 2017-05-04 PROCEDURE — 87076 CULTURE ANAEROBE IDENT EACH: CPT | Performed by: PHYSICIAN ASSISTANT

## 2017-05-04 PROCEDURE — 85007 BL SMEAR W/DIFF WBC COUNT: CPT | Performed by: INTERNAL MEDICINE

## 2017-05-04 PROCEDURE — 85730 THROMBOPLASTIN TIME PARTIAL: CPT | Performed by: INTERNAL MEDICINE

## 2017-05-04 PROCEDURE — 71010 HB CHEST X-RAY 1 VIEW FRONTAL (PORTABLE): CPT

## 2017-05-04 PROCEDURE — 85027 COMPLETE CBC AUTOMATED: CPT | Performed by: INTERNAL MEDICINE

## 2017-05-04 PROCEDURE — 87040 BLOOD CULTURE FOR BACTERIA: CPT | Performed by: PHYSICIAN ASSISTANT

## 2017-05-04 RX ORDER — POTASSIUM CHLORIDE 20MEQ/15ML
40 LIQUID (ML) ORAL ONCE
Status: COMPLETED | OUTPATIENT
Start: 2017-05-04 | End: 2017-05-04

## 2017-05-04 RX ORDER — FUROSEMIDE 10 MG/ML
20 INJECTION INTRAMUSCULAR; INTRAVENOUS ONCE
Status: COMPLETED | OUTPATIENT
Start: 2017-05-04 | End: 2017-05-04

## 2017-05-04 RX ADMIN — POTASSIUM CHLORIDE 40 MEQ: 20 SOLUTION ORAL at 08:29

## 2017-05-04 RX ADMIN — HYDROCORTISONE SODIUM SUCCINATE 50 MG: 100 INJECTION, POWDER, FOR SOLUTION INTRAMUSCULAR; INTRAVENOUS at 05:09

## 2017-05-04 RX ADMIN — HEPARIN SODIUM 4000 UNITS: 1000 INJECTION, SOLUTION INTRAVENOUS; SUBCUTANEOUS at 04:40

## 2017-05-04 RX ADMIN — ACETAMINOPHEN 650 MG: 325 TABLET ORAL at 17:47

## 2017-05-04 RX ADMIN — HALOPERIDOL 2 MG: 1 TABLET ORAL at 09:10

## 2017-05-04 RX ADMIN — METOPROLOL TARTRATE 12.5 MG: 25 TABLET ORAL at 21:47

## 2017-05-04 RX ADMIN — HYDROCORTISONE SODIUM SUCCINATE 25 MG: 100 INJECTION, POWDER, FOR SOLUTION INTRAMUSCULAR; INTRAVENOUS at 21:48

## 2017-05-04 RX ADMIN — FUROSEMIDE 20 MG: 10 INJECTION, SOLUTION INTRAMUSCULAR; INTRAVENOUS at 09:10

## 2017-05-04 RX ADMIN — QUETIAPINE FUMARATE 25 MG: 25 TABLET, FILM COATED ORAL at 08:00

## 2017-05-04 RX ADMIN — QUETIAPINE FUMARATE 25 MG: 25 TABLET, FILM COATED ORAL at 17:47

## 2017-05-04 RX ADMIN — AMLODIPINE BESYLATE 5 MG: 5 TABLET ORAL at 08:00

## 2017-05-04 RX ADMIN — SODIUM CHLORIDE 1000 MG: 9 INJECTION, SOLUTION INTRAVENOUS at 18:06

## 2017-05-04 RX ADMIN — SODIUM CHLORIDE, SODIUM GLUCONATE, SODIUM ACETATE, POTASSIUM CHLORIDE, MAGNESIUM CHLORIDE, SODIUM PHOSPHATE, DIBASIC, AND POTASSIUM PHOSPHATE 125 ML/HR: .53; .5; .37; .037; .03; .012; .00082 INJECTION, SOLUTION INTRAVENOUS at 08:00

## 2017-05-04 RX ADMIN — APIXABAN 2.5 MG: 2.5 TABLET, FILM COATED ORAL at 09:10

## 2017-05-04 RX ADMIN — SODIUM CHLORIDE, SODIUM GLUCONATE, SODIUM ACETATE, POTASSIUM CHLORIDE, MAGNESIUM CHLORIDE, SODIUM PHOSPHATE, DIBASIC, AND POTASSIUM PHOSPHATE 125 ML/HR: .53; .5; .37; .037; .03; .012; .00082 INJECTION, SOLUTION INTRAVENOUS at 00:32

## 2017-05-04 RX ADMIN — METOPROLOL TARTRATE 12.5 MG: 25 TABLET ORAL at 08:00

## 2017-05-04 RX ADMIN — APIXABAN 2.5 MG: 2.5 TABLET, FILM COATED ORAL at 17:47

## 2017-05-05 LAB
ANION GAP SERPL CALCULATED.3IONS-SCNC: 6 MMOL/L (ref 4–13)
ANION GAP SERPL CALCULATED.3IONS-SCNC: 9 MMOL/L (ref 4–13)
BACTERIA BLD CULT: ABNORMAL
BACTERIA BLD CULT: NORMAL
BACTERIA UR CULT: ABNORMAL
BACTERIA UR CULT: ABNORMAL
BUN SERPL-MCNC: 24 MG/DL (ref 5–25)
BUN SERPL-MCNC: 28 MG/DL (ref 5–25)
CALCIUM SERPL-MCNC: 8.5 MG/DL (ref 8.3–10.1)
CALCIUM SERPL-MCNC: 8.7 MG/DL (ref 8.3–10.1)
CHLORIDE SERPL-SCNC: 105 MMOL/L (ref 100–108)
CHLORIDE SERPL-SCNC: 106 MMOL/L (ref 100–108)
CO2 SERPL-SCNC: 32 MMOL/L (ref 21–32)
CO2 SERPL-SCNC: 33 MMOL/L (ref 21–32)
CREAT SERPL-MCNC: 1.36 MG/DL (ref 0.6–1.3)
CREAT SERPL-MCNC: 1.49 MG/DL (ref 0.6–1.3)
GFR SERPL CREATININE-BSD FRML MDRD: 46.9 ML/MIN/1.73SQ M
GFR SERPL CREATININE-BSD FRML MDRD: 52.1 ML/MIN/1.73SQ M
GLUCOSE SERPL-MCNC: 104 MG/DL (ref 65–140)
GLUCOSE SERPL-MCNC: 110 MG/DL (ref 65–140)
GLUCOSE SERPL-MCNC: 111 MG/DL (ref 65–140)
GLUCOSE SERPL-MCNC: 112 MG/DL (ref 65–140)
GLUCOSE SERPL-MCNC: 116 MG/DL (ref 65–140)
GLUCOSE SERPL-MCNC: 138 MG/DL (ref 65–140)
GLUCOSE SERPL-MCNC: 140 MG/DL (ref 65–140)
GLUCOSE SERPL-MCNC: 99 MG/DL (ref 65–140)
GRAM STN SPEC: ABNORMAL
GRAM STN SPEC: NORMAL
POTASSIUM SERPL-SCNC: 3 MMOL/L (ref 3.5–5.3)
POTASSIUM SERPL-SCNC: 3.3 MMOL/L (ref 3.5–5.3)
SODIUM SERPL-SCNC: 144 MMOL/L (ref 136–145)
SODIUM SERPL-SCNC: 147 MMOL/L (ref 136–145)

## 2017-05-05 PROCEDURE — 82948 REAGENT STRIP/BLOOD GLUCOSE: CPT

## 2017-05-05 PROCEDURE — 80048 BASIC METABOLIC PNL TOTAL CA: CPT | Performed by: INTERNAL MEDICINE

## 2017-05-05 RX ORDER — POTASSIUM CHLORIDE 20 MEQ/1
20 TABLET, EXTENDED RELEASE ORAL ONCE
Status: COMPLETED | OUTPATIENT
Start: 2017-05-05 | End: 2017-05-05

## 2017-05-05 RX ADMIN — HYDROCORTISONE SODIUM SUCCINATE 25 MG: 100 INJECTION, POWDER, FOR SOLUTION INTRAMUSCULAR; INTRAVENOUS at 08:28

## 2017-05-05 RX ADMIN — METOPROLOL TARTRATE 12.5 MG: 25 TABLET ORAL at 22:00

## 2017-05-05 RX ADMIN — APIXABAN 2.5 MG: 2.5 TABLET, FILM COATED ORAL at 17:46

## 2017-05-05 RX ADMIN — SODIUM CHLORIDE 1000 MG: 9 INJECTION, SOLUTION INTRAVENOUS at 19:19

## 2017-05-05 RX ADMIN — METOPROLOL TARTRATE 12.5 MG: 25 TABLET ORAL at 08:25

## 2017-05-05 RX ADMIN — QUETIAPINE FUMARATE 25 MG: 25 TABLET, FILM COATED ORAL at 08:25

## 2017-05-05 RX ADMIN — AMLODIPINE BESYLATE 5 MG: 5 TABLET ORAL at 08:25

## 2017-05-05 RX ADMIN — APIXABAN 2.5 MG: 2.5 TABLET, FILM COATED ORAL at 08:25

## 2017-05-05 RX ADMIN — QUETIAPINE FUMARATE 25 MG: 25 TABLET, FILM COATED ORAL at 17:46

## 2017-05-05 RX ADMIN — POTASSIUM CHLORIDE 20 MEQ: 1500 TABLET, EXTENDED RELEASE ORAL at 11:35

## 2017-05-06 LAB
ALBUMIN SERPL BCP-MCNC: 2.5 G/DL (ref 3.5–5)
ALP SERPL-CCNC: 112 U/L (ref 46–116)
ALT SERPL W P-5'-P-CCNC: 53 U/L (ref 12–78)
ANION GAP SERPL CALCULATED.3IONS-SCNC: 8 MMOL/L (ref 4–13)
ANION GAP SERPL CALCULATED.3IONS-SCNC: 8 MMOL/L (ref 4–13)
AST SERPL W P-5'-P-CCNC: 19 U/L (ref 5–45)
BASOPHILS # BLD MANUAL: 0 THOUSAND/UL (ref 0–0.1)
BASOPHILS NFR MAR MANUAL: 0 % (ref 0–1)
BILIRUB SERPL-MCNC: 0.6 MG/DL (ref 0.2–1)
BUN SERPL-MCNC: 20 MG/DL (ref 5–25)
BUN SERPL-MCNC: 21 MG/DL (ref 5–25)
CALCIUM SERPL-MCNC: 8.1 MG/DL (ref 8.3–10.1)
CALCIUM SERPL-MCNC: 8.3 MG/DL (ref 8.3–10.1)
CHLORIDE SERPL-SCNC: 103 MMOL/L (ref 100–108)
CHLORIDE SERPL-SCNC: 104 MMOL/L (ref 100–108)
CO2 SERPL-SCNC: 31 MMOL/L (ref 21–32)
CO2 SERPL-SCNC: 31 MMOL/L (ref 21–32)
CREAT SERPL-MCNC: 1.25 MG/DL (ref 0.6–1.3)
CREAT SERPL-MCNC: 1.47 MG/DL (ref 0.6–1.3)
EOSINOPHIL # BLD MANUAL: 0.49 THOUSAND/UL (ref 0–0.4)
EOSINOPHIL NFR BLD MANUAL: 4 % (ref 0–6)
ERYTHROCYTE [DISTWIDTH] IN BLOOD BY AUTOMATED COUNT: 15.6 % (ref 11.6–15.1)
GFR SERPL CREATININE-BSD FRML MDRD: 47.6 ML/MIN/1.73SQ M
GFR SERPL CREATININE-BSD FRML MDRD: 57.4 ML/MIN/1.73SQ M
GLUCOSE SERPL-MCNC: 103 MG/DL (ref 65–140)
GLUCOSE SERPL-MCNC: 106 MG/DL (ref 65–140)
GLUCOSE SERPL-MCNC: 110 MG/DL (ref 65–140)
GLUCOSE SERPL-MCNC: 121 MG/DL (ref 65–140)
GLUCOSE SERPL-MCNC: 133 MG/DL (ref 65–140)
GLUCOSE SERPL-MCNC: 154 MG/DL (ref 65–140)
HCT VFR BLD AUTO: 37.9 % (ref 36.5–49.3)
HGB BLD-MCNC: 12.6 G/DL (ref 12–17)
LYMPHOCYTES # BLD AUTO: 1.83 THOUSAND/UL (ref 0.6–4.47)
LYMPHOCYTES # BLD AUTO: 15 % (ref 14–44)
MCH RBC QN AUTO: 29.2 PG (ref 26.8–34.3)
MCHC RBC AUTO-ENTMCNC: 33.2 G/DL (ref 31.4–37.4)
MCV RBC AUTO: 88 FL (ref 82–98)
MONOCYTES # BLD AUTO: 1.47 THOUSAND/UL (ref 0–1.22)
MONOCYTES NFR BLD: 12 % (ref 4–12)
NEUTROPHILS # BLD MANUAL: 8.19 THOUSAND/UL (ref 1.85–7.62)
NEUTS BAND NFR BLD MANUAL: 3 % (ref 0–8)
NEUTS SEG NFR BLD AUTO: 64 % (ref 43–75)
PLATELET # BLD AUTO: 187 THOUSANDS/UL (ref 149–390)
PLATELET BLD QL SMEAR: ADEQUATE
PMV BLD AUTO: 10.6 FL (ref 8.9–12.7)
POLYCHROMASIA BLD QL SMEAR: PRESENT
POTASSIUM SERPL-SCNC: 3.1 MMOL/L (ref 3.5–5.3)
POTASSIUM SERPL-SCNC: 3.4 MMOL/L (ref 3.5–5.3)
PROT SERPL-MCNC: 6.3 G/DL (ref 6.4–8.2)
RBC # BLD AUTO: 4.32 MILLION/UL (ref 3.88–5.62)
SODIUM SERPL-SCNC: 142 MMOL/L (ref 136–145)
SODIUM SERPL-SCNC: 143 MMOL/L (ref 136–145)
TOTAL CELLS COUNTED SPEC: 100
VARIANT LYMPHS # BLD AUTO: 2 %
WBC # BLD AUTO: 12.23 THOUSAND/UL (ref 4.31–10.16)

## 2017-05-06 PROCEDURE — 85007 BL SMEAR W/DIFF WBC COUNT: CPT | Performed by: INTERNAL MEDICINE

## 2017-05-06 PROCEDURE — 82948 REAGENT STRIP/BLOOD GLUCOSE: CPT

## 2017-05-06 PROCEDURE — 80048 BASIC METABOLIC PNL TOTAL CA: CPT | Performed by: INTERNAL MEDICINE

## 2017-05-06 PROCEDURE — 85027 COMPLETE CBC AUTOMATED: CPT | Performed by: INTERNAL MEDICINE

## 2017-05-06 PROCEDURE — 80053 COMPREHEN METABOLIC PANEL: CPT | Performed by: INTERNAL MEDICINE

## 2017-05-06 RX ORDER — POTASSIUM CHLORIDE 20 MEQ/1
40 TABLET, EXTENDED RELEASE ORAL ONCE
Status: COMPLETED | OUTPATIENT
Start: 2017-05-06 | End: 2017-05-06

## 2017-05-06 RX ADMIN — SODIUM CHLORIDE 1000 MG: 9 INJECTION, SOLUTION INTRAVENOUS at 18:06

## 2017-05-06 RX ADMIN — QUETIAPINE FUMARATE 25 MG: 25 TABLET, FILM COATED ORAL at 18:13

## 2017-05-06 RX ADMIN — APIXABAN 2.5 MG: 2.5 TABLET, FILM COATED ORAL at 09:03

## 2017-05-06 RX ADMIN — METOPROLOL TARTRATE 25 MG: 25 TABLET ORAL at 20:04

## 2017-05-06 RX ADMIN — AMLODIPINE BESYLATE 5 MG: 5 TABLET ORAL at 09:03

## 2017-05-06 RX ADMIN — QUETIAPINE FUMARATE 25 MG: 25 TABLET, FILM COATED ORAL at 09:03

## 2017-05-06 RX ADMIN — HYDROCORTISONE SODIUM SUCCINATE 25 MG: 100 INJECTION, POWDER, FOR SOLUTION INTRAMUSCULAR; INTRAVENOUS at 09:03

## 2017-05-06 RX ADMIN — APIXABAN 2.5 MG: 2.5 TABLET, FILM COATED ORAL at 18:13

## 2017-05-06 RX ADMIN — METOPROLOL TARTRATE 12.5 MG: 25 TABLET ORAL at 09:03

## 2017-05-06 RX ADMIN — POTASSIUM CHLORIDE 40 MEQ: 1500 TABLET, EXTENDED RELEASE ORAL at 14:24

## 2017-05-07 LAB
ANION GAP SERPL CALCULATED.3IONS-SCNC: 8 MMOL/L (ref 4–13)
BUN SERPL-MCNC: 20 MG/DL (ref 5–25)
CALCIUM SERPL-MCNC: 8.5 MG/DL (ref 8.3–10.1)
CHLORIDE SERPL-SCNC: 104 MMOL/L (ref 100–108)
CO2 SERPL-SCNC: 29 MMOL/L (ref 21–32)
CREAT SERPL-MCNC: 1.24 MG/DL (ref 0.6–1.3)
GFR SERPL CREATININE-BSD FRML MDRD: 58 ML/MIN/1.73SQ M
GLUCOSE SERPL-MCNC: 117 MG/DL (ref 65–140)
GLUCOSE SERPL-MCNC: 128 MG/DL (ref 65–140)
GLUCOSE SERPL-MCNC: 92 MG/DL (ref 65–140)
GLUCOSE SERPL-MCNC: 97 MG/DL (ref 65–140)
POTASSIUM SERPL-SCNC: 3.7 MMOL/L (ref 3.5–5.3)
SODIUM SERPL-SCNC: 141 MMOL/L (ref 136–145)

## 2017-05-07 PROCEDURE — 80048 BASIC METABOLIC PNL TOTAL CA: CPT | Performed by: HOSPITALIST

## 2017-05-07 PROCEDURE — 82948 REAGENT STRIP/BLOOD GLUCOSE: CPT

## 2017-05-07 RX ADMIN — APIXABAN 2.5 MG: 2.5 TABLET, FILM COATED ORAL at 18:22

## 2017-05-07 RX ADMIN — QUETIAPINE FUMARATE 25 MG: 25 TABLET, FILM COATED ORAL at 18:22

## 2017-05-07 RX ADMIN — QUETIAPINE FUMARATE 25 MG: 25 TABLET, FILM COATED ORAL at 08:05

## 2017-05-07 RX ADMIN — METOPROLOL TARTRATE 25 MG: 25 TABLET ORAL at 08:05

## 2017-05-07 RX ADMIN — AMLODIPINE BESYLATE 5 MG: 5 TABLET ORAL at 08:05

## 2017-05-07 RX ADMIN — METOPROLOL TARTRATE 25 MG: 25 TABLET ORAL at 21:59

## 2017-05-07 RX ADMIN — SODIUM CHLORIDE 1000 MG: 9 INJECTION, SOLUTION INTRAVENOUS at 18:19

## 2017-05-07 RX ADMIN — APIXABAN 2.5 MG: 2.5 TABLET, FILM COATED ORAL at 08:05

## 2017-05-08 ENCOUNTER — GENERIC CONVERSION - ENCOUNTER (OUTPATIENT)
Dept: OTHER | Facility: OTHER | Age: 67
End: 2017-05-08

## 2017-05-08 ENCOUNTER — APPOINTMENT (INPATIENT)
Dept: RADIOLOGY | Facility: HOSPITAL | Age: 67
DRG: 871 | End: 2017-05-08
Attending: HOSPITALIST
Payer: MEDICARE

## 2017-05-08 VITALS
DIASTOLIC BLOOD PRESSURE: 90 MMHG | OXYGEN SATURATION: 94 % | HEART RATE: 98 BPM | BODY MASS INDEX: 31.09 KG/M2 | TEMPERATURE: 98 F | RESPIRATION RATE: 16 BRPM | SYSTOLIC BLOOD PRESSURE: 148 MMHG | WEIGHT: 216.71 LBS

## 2017-05-08 LAB
ANION GAP SERPL CALCULATED.3IONS-SCNC: 7 MMOL/L (ref 4–13)
BUN SERPL-MCNC: 21 MG/DL (ref 5–25)
CALCIUM SERPL-MCNC: 9.1 MG/DL (ref 8.3–10.1)
CHLORIDE SERPL-SCNC: 105 MMOL/L (ref 100–108)
CO2 SERPL-SCNC: 32 MMOL/L (ref 21–32)
CREAT SERPL-MCNC: 1.51 MG/DL (ref 0.6–1.3)
GFR SERPL CREATININE-BSD FRML MDRD: 46.2 ML/MIN/1.73SQ M
GLUCOSE SERPL-MCNC: 102 MG/DL (ref 65–140)
GLUCOSE SERPL-MCNC: 107 MG/DL (ref 65–140)
GLUCOSE SERPL-MCNC: 115 MG/DL (ref 65–140)
GLUCOSE SERPL-MCNC: 127 MG/DL (ref 65–140)
GLUCOSE SERPL-MCNC: 94 MG/DL (ref 65–140)
POTASSIUM SERPL-SCNC: 4.2 MMOL/L (ref 3.5–5.3)
SODIUM SERPL-SCNC: 144 MMOL/L (ref 136–145)

## 2017-05-08 PROCEDURE — C1751 CATH, INF, PER/CENT/MIDLINE: HCPCS

## 2017-05-08 PROCEDURE — 77001 FLUOROGUIDE FOR VEIN DEVICE: CPT

## 2017-05-08 PROCEDURE — 36569 INSJ PICC 5 YR+ W/O IMAGING: CPT

## 2017-05-08 PROCEDURE — 02HV33Z INSERTION OF INFUSION DEVICE INTO SUPERIOR VENA CAVA, PERCUTANEOUS APPROACH: ICD-10-PCS | Performed by: RADIOLOGY

## 2017-05-08 PROCEDURE — 76937 US GUIDE VASCULAR ACCESS: CPT

## 2017-05-08 PROCEDURE — 80048 BASIC METABOLIC PNL TOTAL CA: CPT | Performed by: HOSPITALIST

## 2017-05-08 PROCEDURE — 82948 REAGENT STRIP/BLOOD GLUCOSE: CPT

## 2017-05-08 RX ORDER — HALOPERIDOL 2 MG/1
2 TABLET ORAL 2 TIMES DAILY PRN
Qty: 4 TABLET | Refills: 0 | Status: SHIPPED | OUTPATIENT
Start: 2017-05-08 | End: 2018-05-03 | Stop reason: ALTCHOICE

## 2017-05-08 RX ORDER — DOCUSATE SODIUM 100 MG/1
100 CAPSULE, LIQUID FILLED ORAL 2 TIMES DAILY
Qty: 60 CAPSULE | Refills: 0 | Status: CANCELLED
Start: 2017-05-08 | End: 2017-06-07

## 2017-05-08 RX ORDER — DIPHENHYDRAMINE HYDROCHLORIDE, ZINC ACETATE 2; .1 G/100G; G/100G
CREAM TOPICAL 2 TIMES DAILY
Status: DISCONTINUED | OUTPATIENT
Start: 2017-05-08 | End: 2017-05-08 | Stop reason: HOSPADM

## 2017-05-08 RX ORDER — DOCUSATE SODIUM 100 MG/1
100 CAPSULE, LIQUID FILLED ORAL 2 TIMES DAILY
Qty: 60 CAPSULE | Refills: 0
Start: 2017-05-08 | End: 2019-06-13

## 2017-05-08 RX ORDER — DOCUSATE SODIUM 100 MG/1
100 CAPSULE, LIQUID FILLED ORAL 2 TIMES DAILY
Status: DISCONTINUED | OUTPATIENT
Start: 2017-05-08 | End: 2017-05-08 | Stop reason: HOSPADM

## 2017-05-08 RX ORDER — SENNOSIDES 8.6 MG
1 TABLET ORAL
Qty: 7 TABLET | Refills: 0
Start: 2017-05-08 | End: 2018-11-03

## 2017-05-08 RX ORDER — DIPHENHYDRAMINE HYDROCHLORIDE, ZINC ACETATE 2; .1 G/100G; G/100G
1 CREAM TOPICAL 2 TIMES DAILY
Status: CANCELLED
Start: 2017-05-08 | End: 2017-06-07

## 2017-05-08 RX ORDER — SENNOSIDES 8.6 MG
1 TABLET ORAL
Status: DISCONTINUED | OUTPATIENT
Start: 2017-05-08 | End: 2017-05-08 | Stop reason: HOSPADM

## 2017-05-08 RX ORDER — SENNOSIDES 8.6 MG
1 TABLET ORAL
Qty: 7 TABLET | Refills: 0 | Status: CANCELLED
Start: 2017-05-08 | End: 2017-05-15

## 2017-05-08 RX ORDER — DIPHENHYDRAMINE HYDROCHLORIDE, ZINC ACETATE 2; .1 G/100G; G/100G
1 CREAM TOPICAL 2 TIMES DAILY
Start: 2017-05-08 | End: 2017-09-21

## 2017-05-08 RX ADMIN — APIXABAN 2.5 MG: 2.5 TABLET, FILM COATED ORAL at 17:27

## 2017-05-08 RX ADMIN — QUETIAPINE FUMARATE 25 MG: 25 TABLET, FILM COATED ORAL at 17:27

## 2017-05-08 RX ADMIN — METOPROLOL TARTRATE 25 MG: 25 TABLET ORAL at 08:46

## 2017-05-08 RX ADMIN — AMLODIPINE BESYLATE 5 MG: 5 TABLET ORAL at 08:46

## 2017-05-08 RX ADMIN — DOCUSATE SODIUM 100 MG: 100 CAPSULE, LIQUID FILLED ORAL at 11:09

## 2017-05-08 RX ADMIN — QUETIAPINE FUMARATE 25 MG: 25 TABLET, FILM COATED ORAL at 08:46

## 2017-05-08 RX ADMIN — DIPHENHYDRAMINE HYDROCHLORIDE AND ZINC ACETATE: 20; 1 CREAM TOPICAL at 18:03

## 2017-05-08 RX ADMIN — DOCUSATE SODIUM 100 MG: 100 CAPSULE, LIQUID FILLED ORAL at 17:27

## 2017-05-08 RX ADMIN — APIXABAN 2.5 MG: 2.5 TABLET, FILM COATED ORAL at 08:46

## 2017-05-08 RX ADMIN — DIPHENHYDRAMINE HYDROCHLORIDE AND ZINC ACETATE: 20; 1 CREAM TOPICAL at 11:09

## 2017-05-08 RX ADMIN — SODIUM CHLORIDE 1000 MG: 9 INJECTION, SOLUTION INTRAVENOUS at 16:46

## 2017-05-10 ENCOUNTER — ALLSCRIPTS OFFICE VISIT (OUTPATIENT)
Dept: OTHER | Facility: OTHER | Age: 67
End: 2017-05-10

## 2017-05-11 LAB
BACTERIA BLD CULT: NORMAL
BACTERIA BLD CULT: NORMAL
GRAM STN SPEC: NORMAL
GRAM STN SPEC: NORMAL

## 2017-05-15 DIAGNOSIS — N20.0 CALCULUS OF KIDNEY: ICD-10-CM

## 2017-06-05 ENCOUNTER — ALLSCRIPTS OFFICE VISIT (OUTPATIENT)
Dept: OTHER | Facility: OTHER | Age: 67
End: 2017-06-05

## 2017-06-13 ENCOUNTER — ANESTHESIA EVENT (OUTPATIENT)
Dept: PERIOP | Facility: HOSPITAL | Age: 67
End: 2017-06-13
Payer: COMMERCIAL

## 2017-06-14 ENCOUNTER — ANESTHESIA (OUTPATIENT)
Dept: PERIOP | Facility: HOSPITAL | Age: 67
End: 2017-06-14
Payer: COMMERCIAL

## 2017-06-14 ENCOUNTER — HOSPITAL ENCOUNTER (OUTPATIENT)
Dept: RADIOLOGY | Facility: HOSPITAL | Age: 67
Setting detail: OUTPATIENT SURGERY
Discharge: HOME | End: 2017-06-14
Payer: COMMERCIAL

## 2017-06-14 ENCOUNTER — HOSPITAL ENCOUNTER (OUTPATIENT)
Facility: HOSPITAL | Age: 67
Setting detail: OUTPATIENT SURGERY
Discharge: NURSING, REHAB OR LONG-TERM CARE | End: 2017-06-14
Attending: UROLOGY | Admitting: UROLOGY
Payer: COMMERCIAL

## 2017-06-14 VITALS
SYSTOLIC BLOOD PRESSURE: 182 MMHG | RESPIRATION RATE: 18 BRPM | HEIGHT: 71 IN | DIASTOLIC BLOOD PRESSURE: 117 MMHG | TEMPERATURE: 97.7 F | BODY MASS INDEX: 28.42 KG/M2 | OXYGEN SATURATION: 97 % | WEIGHT: 203 LBS | HEART RATE: 86 BPM

## 2017-06-14 DIAGNOSIS — N20.0 CALCULUS OF KIDNEY: ICD-10-CM

## 2017-06-14 DIAGNOSIS — N20.0 URIC ACID NEPHROLITHIASIS: ICD-10-CM

## 2017-06-14 LAB
APTT PPP: 30 SECONDS (ref 23–35)
GLUCOSE SERPL-MCNC: 114 MG/DL (ref 65–140)
INR PPP: 1.01 (ref 0.86–1.16)
PROTHROMBIN TIME: 13.3 SECONDS (ref 12.1–14.4)

## 2017-06-14 PROCEDURE — 82948 REAGENT STRIP/BLOOD GLUCOSE: CPT

## 2017-06-14 PROCEDURE — 74450 X-RAY URETHRA/BLADDER: CPT

## 2017-06-14 PROCEDURE — 85730 THROMBOPLASTIN TIME PARTIAL: CPT | Performed by: UROLOGY

## 2017-06-14 PROCEDURE — C1769 GUIDE WIRE: HCPCS | Performed by: UROLOGY

## 2017-06-14 PROCEDURE — 82360 CALCULUS ASSAY QUANT: CPT | Performed by: UROLOGY

## 2017-06-14 PROCEDURE — C1758 CATHETER, URETERAL: HCPCS | Performed by: UROLOGY

## 2017-06-14 PROCEDURE — C2617 STENT, NON-COR, TEM W/O DEL: HCPCS | Performed by: UROLOGY

## 2017-06-14 PROCEDURE — 85610 PROTHROMBIN TIME: CPT | Performed by: UROLOGY

## 2017-06-14 DEVICE — STENT URETERAL 6 FR 26CM INLAY OPTIMA: Type: IMPLANTABLE DEVICE | Site: URETER | Status: FUNCTIONAL

## 2017-06-14 RX ORDER — ONDANSETRON 2 MG/ML
INJECTION INTRAMUSCULAR; INTRAVENOUS AS NEEDED
Status: DISCONTINUED | OUTPATIENT
Start: 2017-06-14 | End: 2017-06-14 | Stop reason: SURG

## 2017-06-14 RX ORDER — OXYBUTYNIN CHLORIDE 5 MG/1
5 TABLET, EXTENDED RELEASE ORAL ONCE
Status: COMPLETED | OUTPATIENT
Start: 2017-06-14 | End: 2017-06-14

## 2017-06-14 RX ORDER — PROPOFOL 10 MG/ML
INJECTION, EMULSION INTRAVENOUS AS NEEDED
Status: DISCONTINUED | OUTPATIENT
Start: 2017-06-14 | End: 2017-06-14 | Stop reason: SURG

## 2017-06-14 RX ORDER — ESMOLOL HYDROCHLORIDE 10 MG/ML
INJECTION INTRAVENOUS AS NEEDED
Status: DISCONTINUED | OUTPATIENT
Start: 2017-06-14 | End: 2017-06-14 | Stop reason: SURG

## 2017-06-14 RX ORDER — ONDANSETRON 2 MG/ML
4 INJECTION INTRAMUSCULAR; INTRAVENOUS ONCE AS NEEDED
Status: DISCONTINUED | OUTPATIENT
Start: 2017-06-14 | End: 2017-06-14 | Stop reason: HOSPADM

## 2017-06-14 RX ORDER — MAGNESIUM HYDROXIDE 1200 MG/15ML
LIQUID ORAL AS NEEDED
Status: DISCONTINUED | OUTPATIENT
Start: 2017-06-14 | End: 2017-06-14 | Stop reason: HOSPADM

## 2017-06-14 RX ORDER — FENTANYL CITRATE 50 UG/ML
INJECTION, SOLUTION INTRAMUSCULAR; INTRAVENOUS AS NEEDED
Status: DISCONTINUED | OUTPATIENT
Start: 2017-06-14 | End: 2017-06-14 | Stop reason: SURG

## 2017-06-14 RX ORDER — NITROFURANTOIN 25; 75 MG/1; MG/1
100 CAPSULE ORAL 2 TIMES DAILY
Qty: 20 CAPSULE | Refills: 0 | Status: SHIPPED | OUTPATIENT
Start: 2017-06-14 | End: 2017-06-24

## 2017-06-14 RX ORDER — ROCURONIUM BROMIDE 10 MG/ML
INJECTION, SOLUTION INTRAVENOUS AS NEEDED
Status: DISCONTINUED | OUTPATIENT
Start: 2017-06-14 | End: 2017-06-14 | Stop reason: SURG

## 2017-06-14 RX ORDER — FENTANYL CITRATE/PF 50 MCG/ML
50 SYRINGE (ML) INJECTION
Status: DISCONTINUED | OUTPATIENT
Start: 2017-06-14 | End: 2017-06-14 | Stop reason: HOSPADM

## 2017-06-14 RX ORDER — SODIUM CHLORIDE 9 MG/ML
125 INJECTION, SOLUTION INTRAVENOUS CONTINUOUS
Status: DISCONTINUED | OUTPATIENT
Start: 2017-06-14 | End: 2017-06-14 | Stop reason: HOSPADM

## 2017-06-14 RX ORDER — LIDOCAINE HYDROCHLORIDE 10 MG/ML
INJECTION, SOLUTION INFILTRATION; PERINEURAL AS NEEDED
Status: DISCONTINUED | OUTPATIENT
Start: 2017-06-14 | End: 2017-06-14 | Stop reason: SURG

## 2017-06-14 RX ADMIN — OXYBUTYNIN CHLORIDE 5 MG: 5 TABLET, EXTENDED RELEASE ORAL at 13:39

## 2017-06-14 RX ADMIN — LIDOCAINE HYDROCHLORIDE 60 MG: 10 INJECTION, SOLUTION INFILTRATION; PERINEURAL at 10:21

## 2017-06-14 RX ADMIN — LIDOCAINE HYDROCHLORIDE 40 MG: 10 INJECTION, SOLUTION INFILTRATION; PERINEURAL at 10:42

## 2017-06-14 RX ADMIN — SODIUM CHLORIDE 1000 MG: 9 INJECTION, SOLUTION INTRAVENOUS at 10:26

## 2017-06-14 RX ADMIN — ONDANSETRON HYDROCHLORIDE 4 MG: 2 INJECTION, SOLUTION INTRAVENOUS at 11:07

## 2017-06-14 RX ADMIN — ESMOLOL HYDROCHLORIDE 30 MG: 10 INJECTION, SOLUTION INTRAVENOUS at 11:29

## 2017-06-14 RX ADMIN — SODIUM CHLORIDE 125 ML/HR: 0.9 INJECTION, SOLUTION INTRAVENOUS at 09:07

## 2017-06-14 RX ADMIN — PROPOFOL 300 MG: 10 INJECTION, EMULSION INTRAVENOUS at 10:20

## 2017-06-14 RX ADMIN — METOPROLOL TARTRATE 25 MG: 25 TABLET ORAL at 13:37

## 2017-06-14 RX ADMIN — ROCURONIUM BROMIDE 10 MG: 10 INJECTION, SOLUTION INTRAVENOUS at 10:39

## 2017-06-14 RX ADMIN — FENTANYL CITRATE 100 MCG: 50 INJECTION, SOLUTION INTRAMUSCULAR; INTRAVENOUS at 10:20

## 2017-06-23 LAB
CA PHOS MFR STONE: 30 %
CALCIUM OXALATE DIHYDRATE MFR STONE IR: 25 %
COLOR STONE: NORMAL
COM MFR STONE: 45 %
COMMENT-STONE3: NORMAL
COMPOSITION: NORMAL
LABORATORY COMMENT REPORT: NORMAL
NIDUS STONE QL: NORMAL
PHOTO: NORMAL
SIZE STONE: NORMAL MM
STONE ANALYSIS-IMP: NORMAL
STONE ANALYSIS-IMP: NORMAL
WT STONE: 39 MG

## 2017-07-25 ENCOUNTER — HOSPITAL ENCOUNTER (OUTPATIENT)
Dept: RADIOLOGY | Facility: HOSPITAL | Age: 67
Discharge: HOME | End: 2017-07-25
Payer: COMMERCIAL

## 2017-07-25 DIAGNOSIS — N13.30 HYDRONEPHROSIS: ICD-10-CM

## 2017-07-25 PROCEDURE — 50431 NJX PX NFROSGRM &/URTRGRM: CPT

## 2017-07-25 RX ADMIN — IOHEXOL 25 ML: 300 INJECTION, SOLUTION INTRAVENOUS at 14:54

## 2017-08-02 ENCOUNTER — HOSPITAL ENCOUNTER (OUTPATIENT)
Dept: RADIOLOGY | Facility: HOSPITAL | Age: 67
Discharge: HOME | End: 2017-08-02
Attending: INTERNAL MEDICINE
Payer: COMMERCIAL

## 2017-08-02 DIAGNOSIS — N18.9 CHRONIC KIDNEY DISEASE: ICD-10-CM

## 2017-08-25 ENCOUNTER — ALLSCRIPTS OFFICE VISIT (OUTPATIENT)
Dept: OTHER | Facility: OTHER | Age: 67
End: 2017-08-25

## 2017-09-14 ENCOUNTER — HOSPITAL ENCOUNTER (EMERGENCY)
Facility: HOSPITAL | Age: 67
Discharge: HOME | End: 2017-09-14
Attending: EMERGENCY MEDICINE | Admitting: EMERGENCY MEDICINE
Payer: COMMERCIAL

## 2017-09-14 ENCOUNTER — TREATMENT (OUTPATIENT)
Dept: OTHER | Facility: HOSPITAL | Age: 67
End: 2017-09-14

## 2017-09-14 VITALS
DIASTOLIC BLOOD PRESSURE: 98 MMHG | WEIGHT: 202.16 LBS | OXYGEN SATURATION: 96 % | BODY MASS INDEX: 28.2 KG/M2 | TEMPERATURE: 98.2 F | RESPIRATION RATE: 16 BRPM | HEART RATE: 98 BPM | SYSTOLIC BLOOD PRESSURE: 169 MMHG

## 2017-09-14 DIAGNOSIS — R33.9 URINARY RETENTION: Primary | Chronic | ICD-10-CM

## 2017-09-14 PROCEDURE — 99283 EMERGENCY DEPT VISIT LOW MDM: CPT

## 2017-09-14 PROCEDURE — 96372 THER/PROPH/DIAG INJ SC/IM: CPT

## 2017-09-14 RX ORDER — HALOPERIDOL 1 MG/1
2 TABLET ORAL ONCE
Status: COMPLETED | OUTPATIENT
Start: 2017-09-14 | End: 2017-09-14

## 2017-09-14 RX ORDER — LIDOCAINE HYDROCHLORIDE 20 MG/ML
JELLY TOPICAL ONCE
Status: COMPLETED | OUTPATIENT
Start: 2017-09-14 | End: 2017-09-14

## 2017-09-14 RX ORDER — MORPHINE SULFATE 4 MG/ML
4 INJECTION, SOLUTION INTRAMUSCULAR; INTRAVENOUS ONCE
Status: COMPLETED | OUTPATIENT
Start: 2017-09-14 | End: 2017-09-14

## 2017-09-14 RX ORDER — MORPHINE SULFATE 4 MG/ML
4 INJECTION, SOLUTION INTRAMUSCULAR; INTRAVENOUS ONCE
Status: DISCONTINUED | OUTPATIENT
Start: 2017-09-14 | End: 2017-09-14

## 2017-09-14 RX ADMIN — MORPHINE SULFATE 4 MG: 4 INJECTION, SOLUTION INTRAMUSCULAR; INTRAVENOUS at 18:11

## 2017-09-14 RX ADMIN — HALOPERIDOL 2 MG: 1 TABLET ORAL at 18:11

## 2017-09-14 RX ADMIN — LIDOCAINE HYDROCHLORIDE: 20 JELLY TOPICAL at 17:15

## 2017-09-14 NOTE — ED NOTES
Attempted to insert urinary cath with 18f and 16f coude  Both attempts unsuccessful  Dr Yeny Heard made aware        Molly David, RN  09/14/17 6025

## 2017-09-14 NOTE — ED PROVIDER NOTES
History  Chief Complaint   Patient presents with    Urinary Catheter Problem     [facility], pt cath clogged, unable to pass urine  40-year-old male presents for evaluation because of a dislodged Thorne catheter  Patient has urinary retention and a history of a fall cerebral tract  Today at his facility there was a blockage of the Thorne and they were unable to get drainage  They attempted to exchange the Thorne however were not able to place a new Thorne  No fevers or chills  No nausea or vomiting  No significant abdominal pain  Of note patient is in hospice care  History provided by:  Patient   used: No    Urinary Catheter Problem   Associated symptoms: no abdominal pain, no fever, no nausea and no vomiting        Prior to Admission Medications   Prescriptions Last Dose Informant Patient Reported? Taking?    QUEtiapine (SEROquel) 25 mg tablet  Other Yes No   Sig: Take 25 mg by mouth 2 (two) times a day     acetaminophen (TYLENOL) 325 mg tablet  Other Yes No   Sig: Take 650 mg by mouth every 6 (six) hours as needed for mild pain   amLODIPine (NORVASC) 5 mg tablet  Other Yes No   Sig: Take 5 mg by mouth daily   apixaban (ELIQUIS) 2 5 mg  Other Yes No   Sig: Take 2 5 mg by mouth 2 (two) times a day   bisacodyl (BISAC-EVAC) 10 mg suppository  Other Yes No   Sig: Insert 10 mg into the rectum as needed for constipation   busPIRone (BUSPAR) 5 mg tablet  Other Yes No   Sig: Take 5 mg by mouth 3 (three) times a day   diphenhydrAMINE-zinc acetate (BENADRYL) cream   No No   Sig: Apply 1 application topically 2 (two) times a day for 30 days   docusate sodium (COLACE) 100 mg capsule   No No   Sig: Take 1 capsule by mouth 2 (two) times a day for 30 days   haloperidol (HALDOL) 2 mg tablet   No No   Sig: Take 1 tablet by mouth 2 (two) times a day as needed for agitation for up to 2 days   magnesium hydroxide (MILK OF MAGNESIA) 400 mg/5 mL oral suspension  Other No No   Sig: Take 30 mL by mouth daily as needed for constipation for up to 30 days   memantine (NAMENDA) 5 mg tablet  Other No No   Sig: Take 1 tablet by mouth 2 (two) times a day for 30 days   metoprolol tartrate (LOPRESSOR) 25 mg tablet   No No   Sig: Take 1 tablet by mouth 2 (two) times a day for 30 days   Patient taking differently: Take 25 mg by mouth 2 (two) times a day     mirtazapine (REMERON) 15 mg tablet  Other No No   Sig: Take 1 tablet by mouth daily at bedtime for 30 days   nystatin (NYSTOP) 100,000 units/g  Other No No   Sig: Apply 1 application topically 3 (three) times a day for 30 days   Patient taking differently: Apply 1 application topically daily     senna (SENOKOT) 8 6 mg  Other No No   Sig: Take 1 tablet by mouth daily at bedtime for 7 days   tamsulosin (FLOMAX) 0 4 mg  Other No No   Sig: Take 1 capsule by mouth daily with dinner for 30 days      Facility-Administered Medications: None       Past Medical History:   Diagnosis Date    Anxiety     Aortic aneurysm     Aphasia     Ataxia     Bladder adhesions     BPH (benign prostatic hypertrophy)     COPD (chronic obstructive pulmonary disease)     wife denies - "never had"    Dementia     Difficulty walking     Essential (primary) hypertension     Generalized anxiety disorder     GERD (gastroesophageal reflux disease)     H/O blood clots     History of kidney stones     Muscle weakness     Neuromuscular dysfunction of bladder     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     Retention of urine, unspecified     Urinary tract bacterial infections     Urinary tract infection        Past Surgical History:   Procedure Laterality Date    CYSTOSCOPY      IVC FILTER INSERTION      X2    IVC FILTER INSERTION      x2    KIDNEY STONE SURGERY      KNEE SURGERY      Sepsis infection     NEPHROSTOMY      NM CYSTO/URETERO W/LITHOTRIPSY &INDWELL STENT INSRT Right 6/14/2017    Procedure: CYSTOSCOPY URETEROSCOPY WITH LITHOTRIPSY HOLMIUM LASER,,BASKET STONE EXTRACTION, RETROGRADE PYELOGRAM AND INSERTION STENT URETERAL;  Surgeon: Evelyne Tse MD;  Location: [facility];  Service: Urology    URINARY SURGERY         History reviewed  No pertinent family history  I have reviewed and agree with the history as documented  Social History   Substance Use Topics    Smoking status: Never Smoker    Smokeless tobacco: Not on file    Alcohol use No        Review of Systems   Constitutional: Negative for chills and fever  Gastrointestinal: Negative for abdominal pain, nausea and vomiting  Genitourinary: Positive for decreased urine volume  All other systems reviewed and are negative  Physical Exam  ED Triage Vitals [09/14/17 1506]   Temp Pulse Respirations Blood Pressure SpO2   -- 76 18 165/94 95 %      Temp src Heart Rate Source Patient Position - Orthostatic VS BP Location FiO2 (%)   -- Monitor Lying Right arm --      Pain Score       --           Physical Exam   Constitutional: He appears well-developed and well-nourished  HENT:   Head: Normocephalic and atraumatic  Eyes: EOM are normal  Pupils are equal, round, and reactive to light  Cardiovascular: Normal rate, regular rhythm, normal heart sounds and intact distal pulses  Exam reveals no gallop and no friction rub  No murmur heard  Pulmonary/Chest: Effort normal and breath sounds normal  No respiratory distress  He has no wheezes  He has no rales  Abdominal: Soft  He exhibits no distension  There is no tenderness  Musculoskeletal: Normal range of motion  He exhibits edema (trace)  He exhibits no deformity  Neurological: He is alert  Skin: Skin is warm and dry  Capillary refill takes less than 2 seconds  Nursing note and vitals reviewed        ED Medications  Medications   lidocaine (URO-JET) 2 % topical gel ( Topical Given by Other 9/14/17 1715)   haloperidol (HALDOL) tablet 2 mg (2 mg Oral Given 9/14/17 1811)   morphine (PF) 4 mg/mL injection 4 mg (4 mg Intramuscular Given 9/14/17 1811)       Diagnostic Studies  Labs Reviewed - No data to display    No orders to display       Procedures  Procedures      Phone Contacts  ED Phone Contact    ED Course  ED Course as of Sep 14 1857   Thu Sep 14, 2017   8350 I spoke with the PA from Urology and she will be contacting the surgical resident for an attempt at lucia placement  If the resident is also unsuccessful she will come here to make placement  [phone] The surgical nurse practitioner was also unsuccessful in lucia placement  She will contact urology and they will come in for placement  MDM  Number of Diagnoses or Management Options  Urinary retention: new and does not require workup  Diagnosis management comments: Patient with chronic urinary retention with need for lucia placement  Patient Progress  Patient progress: stable    CritCare Time    Disposition  Final diagnoses:   Urinary retention     ED Disposition     ED Disposition Condition Comment    Discharge  [facility] discharge to home/self care  Condition at discharge: Good        Follow-up Information     Follow up With Specialties Details Why Contact Info    Carole Hay MD  Schedule an appointment as soon as possible for a visit As needed [address] [facility]  [phone]          Patient's Medications   Discharge Prescriptions    No medications on file     No discharge procedures on file      ED Provider  Electronically Signed by       Terrie Braragan MD  09/14/17 Ja Stone MD  09/14/17 8738

## 2017-09-14 NOTE — DISCHARGE INSTRUCTIONS
Thorne catheter to be changed only by the urologist!!!!            Urinary Retention in Men   WHAT YOU NEED TO KNOW:   What is urinary retention? Urinary retention is a condition that develops when your bladder does not empty completely when you urinate  What causes urinary retention? · An enlarged prostate    · Blockages, such as a stone, growth, or narrowing of your urethra    · A weak bladder muscle    · Nerve damage from diabetes, stroke, or spinal cord injury    · Bladder diverticula, which are pockets of urine that form in your bladder and do not empty    · Certain medicines, such as narcotics, antihistamines, or antidepressants  What are the signs and symptoms of urinary retention? · Frequent urination, or the urge to urinate right after you finish    · An urge to urinate, but your urine does not come out or dribbles out slowly and weakly    · Frequent urine leaks that happen during the day or while you sleep    · Pain or pressure when you urinate    · Pain or stiffness in your abdomen, lower back, hips, or upper thighs    · Blood in your urine  How is urinary retention diagnosed? Your healthcare provider will ask about your health history and the medicines you take  He will press or tap on your lower abdomen  You may need any of the following tests:  · A digital rectal exam  is when healthcare providers carefully feel the size of your prostate  · A post void residual  test will show how much urine is left in your bladder after you urinate  You will be asked to urinate and then healthcare providers will use a small ultrasound machine to check how much urine is left in your bladder  · Blood or urine tests  may show infection or prostate specific antigen (PSA) levels  PSA may be elevated in prostate cancer  · An ultrasound  uses sound waves to show pictures on a monitor  An ultrasound may be done to show bladder stones, infection, or other problems      · A CT scan , or CAT scan, is a type of x-ray that is taken of your prostate, kidneys, and bladder  The pictures may show what is causing your urinary retention  You may be given a dye before the pictures are taken to help healthcare providers see the pictures better  Tell the healthcare provider if you have ever had an allergic reaction to contrast dye  How is urinary retention treated? · A Thorne catheter  is a tube put into your bladder to drain urine into a bag  Keep the bag below your waist  This will prevent urine from flowing back into your bladder and causing an infection or other problems  Also, keep the tube free of kinks so the urine will drain properly  Do not pull on the catheter  This can cause pain and bleeding, and may cause the catheter to come out  · Medicines  can help decrease the size of your prostate, fight infection, and help you urinate more easily  · Surgery  may be needed to treat the condition that is causing your urinary retention  When should I contact my healthcare provider? · You have a fever  · You have pain when you urinate  · You have blood in your urine  · You have problems with your catheter  · You have questions or concerns about your condition or care  When should I seek immediate care or call 911? · You have severe abdominal pain  · You are breathing faster than usual     · Your heartbeat is faster than usual     · Your face, hands, feet, or ankles are swollen  CARE AGREEMENT:   You have the right to help plan your care  Learn about your health condition and how it may be treated  Discuss treatment options with your caregivers to decide what care you want to receive  You always have the right to refuse treatment  The above information is an  only  It is not intended as medical advice for individual conditions or treatments  Talk to your doctor, nurse or pharmacist before following any medical regimen to see if it is safe and effective for you    © 2017 [employer] [address] [place] [address] is for End User's use only and may not be sold, redistributed or otherwise used for commercial purposes  All illustrations and images included in CareNotes® are the copyrighted property of A D A M [employer] Inc  or Mika Mcclain

## 2017-09-14 NOTE — CONSULTS
Consultation -General Surgery  Roberth Brandon [facility] 74 y o  male MRN: [id number]  Unit/Bed#: ED 29 Encounter: [id number]        Consults    ASSESSMENT/PLAN:  Problem List     Urinary retention (Chronic)       Assessment: 73 yo male with end stage dementia on hospice care in urinary retention  Lucia catheter couldn't be reinserted at SNF     Plan: Lucia catheter  Uro jet used for anesthesia  2 unsuccessful attempts made with 16 and 18 Fr coude tip catheters  Catheter was not able to be passed pass the prostate  Blood noted at urethral meatus prior to insertion  Nursing had attempted 16 Fr coude and 18 Fr regular catheters prior to my arrival  RICARDO Hernandez for [facility] notified  Someone from there practice will come in         Reason for Consult / Principal Problem: urinary retention    HPI: Mary Crews is a 74y o  year old nonverbal male on hospice care for dementia with h/o nephrostomy tube, cystos, a false tract and previous stent insertion was having his chronic lucia catheter exchanged today and nursing was not able to replace it and he was brought to ED  Nursing attempted here but was unsuccessful  Review of Systems   Genitourinary: Difficulty urinating: without catheter in place         Historical Information   Past Medical History:   Diagnosis Date    Anxiety     Aortic aneurysm     Aphasia     Ataxia     Bladder adhesions     BPH (benign prostatic hypertrophy)     COPD (chronic obstructive pulmonary disease)     wife denies - "never had"    Dementia     Difficulty walking     Essential (primary) hypertension     Generalized anxiety disorder     GERD (gastroesophageal reflux disease)     H/O blood clots     History of kidney stones     Muscle weakness     Neuromuscular dysfunction of bladder     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     Retention of urine, unspecified     Urinary tract bacterial infections     Urinary tract infection Past Surgical History:   Procedure Laterality Date    CYSTOSCOPY      IVC FILTER INSERTION      X2    IVC FILTER INSERTION      x2    KIDNEY STONE SURGERY      KNEE SURGERY      Sepsis infection     NEPHROSTOMY      MD CYSTO/URETERO W/LITHOTRIPSY &INDWELL STENT INSRT Right 6/14/2017    Procedure: CYSTOSCOPY URETEROSCOPY WITH LITHOTRIPSY HOLMIUM LASER,,BASKET STONE EXTRACTION, RETROGRADE PYELOGRAM AND INSERTION STENT URETERAL;  Surgeon: Benson Teague MD;  Location: [facility] OR;  Service: Urology    URINARY SURGERY       Social History   History   Alcohol Use No     History   Drug Use No     History   Smoking Status    Never Smoker   Smokeless Tobacco    Not on file     History reviewed  No pertinent family history  Meds/Allergies       (Not in a hospital admission)  Current Facility-Administered Medications   Medication Dose Route Frequency    lidocaine (URO-JET) 2 % topical gel   Topical Once       Allergies   Allergen Reactions    Gentamycin [Gentamicin] Anaphylaxis    Vancomycin Anaphylaxis    Zosyn [Piperacillin Sod-Tazobactam So] Anaphylaxis     However, has tolerated Ertapenem multiple times and Cefepime   Other      antipersperents  Stat lock from lucia catheter    Sulfa Antibiotics Rash       Objective     Blood pressure 165/94, pulse 76, resp  rate 18, weight 91 7 kg (202 lb 2 6 oz), SpO2 95 %  No intake or output data in the 24 hours ending 09/14/17 1714    PHYSICAL EXAM  General appearance: alert and no distress  Skin: Skin color, texture, turgor normal  No rashes or lesions  Head: Normocephalic, without obvious abnormality  Heart: regular rate and rhythm, S1, S2 normal, no murmur, click, rub or gallop  Lungs: clear to auscultation bilaterally   Groin: normal male, small amount of clot at urethral meatus  Abdomen: soft, non-tender; bowel sounds normal; no masses,  no organomegaly  Neurological: awake    Lab Results:   No visits with results within 1 Day(s) from this visit  Latest known visit with results is:   Admission on 06/14/2017, Discharged on 06/14/2017   Component Date Value    PTT 06/14/2017 30     Protime 06/14/2017 13 3     INR 06/14/2017 1 01     COLOR 06/23/2017 Song     Size 06/23/2017 Comment     Stone Weight 06/23/2017 39 0     Composition 06/23/2017 Comment     Ca Oxalate,Dihydrate 06/23/2017 25     Ca Oxalate,Monohydr  06/23/2017 45     CALCIUM PHOSPHATE 06/23/2017 30     Nidus 06/23/2017 No Nidus visualized     STONE COMMENT 06/23/2017 Note:     STONE COMMENT 06/23/2017 Comment     Please note 06/23/2017 Comment     Disclaimer 06/23/2017 Comment     Photo 06/23/2017 Comment     POC Glucose 06/14/2017 114      Imaging Studies: N/A        Counseling / Coordination of Care  Total time spent today  30 minutes  Greater than 50% of total time was spent with the patient and / or family counseling and / or coordination of care

## 2017-09-21 ENCOUNTER — APPOINTMENT (EMERGENCY)
Dept: RADIOLOGY | Facility: HOSPITAL | Age: 67
DRG: 698 | End: 2017-09-21
Payer: MEDICARE

## 2017-09-21 ENCOUNTER — HOSPITAL ENCOUNTER (INPATIENT)
Facility: HOSPITAL | Age: 67
LOS: 6 days | Discharge: NURSING, REHAB OR LONG-TERM CARE | DRG: 698 | End: 2017-09-27
Attending: EMERGENCY MEDICINE | Admitting: INTERNAL MEDICINE
Payer: MEDICARE

## 2017-09-21 DIAGNOSIS — T83.511A URINARY TRACT INFECTION ASSOCIATED WITH INDWELLING URETHRAL CATHETER, INITIAL ENCOUNTER (HCC): Primary | ICD-10-CM

## 2017-09-21 DIAGNOSIS — N39.0 URINARY TRACT INFECTION ASSOCIATED WITH INDWELLING URETHRAL CATHETER, INITIAL ENCOUNTER (HCC): Primary | ICD-10-CM

## 2017-09-21 DIAGNOSIS — N12 EMPHYSEMATOUS PYELITIS: ICD-10-CM

## 2017-09-21 DIAGNOSIS — A41.9 SEPSIS (HCC): ICD-10-CM

## 2017-09-21 PROBLEM — R65.20 SEVERE SEPSIS (HCC): Status: ACTIVE | Noted: 2017-09-21

## 2017-09-21 LAB
ALBUMIN SERPL BCP-MCNC: 3.1 G/DL (ref 3.5–5)
ALP SERPL-CCNC: 91 U/L (ref 46–116)
ALT SERPL W P-5'-P-CCNC: 27 U/L (ref 12–78)
ANION GAP SERPL CALCULATED.3IONS-SCNC: 8 MMOL/L (ref 4–13)
APTT PPP: 35 SECONDS (ref 23–35)
AST SERPL W P-5'-P-CCNC: 14 U/L (ref 5–45)
BACTERIA UR QL AUTO: ABNORMAL /HPF
BASOPHILS # BLD AUTO: 0.02 THOUSANDS/ΜL (ref 0–0.1)
BASOPHILS NFR BLD AUTO: 0 % (ref 0–1)
BILIRUB SERPL-MCNC: 0.4 MG/DL (ref 0.2–1)
BILIRUB UR QL STRIP: NEGATIVE
BUN SERPL-MCNC: 21 MG/DL (ref 5–25)
CALCIUM SERPL-MCNC: 9 MG/DL (ref 8.3–10.1)
CHLORIDE SERPL-SCNC: 103 MMOL/L (ref 100–108)
CLARITY UR: ABNORMAL
CO2 SERPL-SCNC: 29 MMOL/L (ref 21–32)
COLOR UR: YELLOW
CREAT SERPL-MCNC: 1.9 MG/DL (ref 0.6–1.3)
EOSINOPHIL # BLD AUTO: 0 THOUSAND/ΜL (ref 0–0.61)
EOSINOPHIL NFR BLD AUTO: 0 % (ref 0–6)
ERYTHROCYTE [DISTWIDTH] IN BLOOD BY AUTOMATED COUNT: 15.8 % (ref 11.6–15.1)
GFR SERPL CREATININE-BSD FRML MDRD: 35 ML/MIN/1.73SQ M
GLUCOSE SERPL-MCNC: 145 MG/DL (ref 65–140)
GLUCOSE UR STRIP-MCNC: NEGATIVE MG/DL
HCT VFR BLD AUTO: 45.4 % (ref 36.5–49.3)
HGB BLD-MCNC: 14.8 G/DL (ref 12–17)
HGB UR QL STRIP.AUTO: ABNORMAL
INR PPP: 1.27 (ref 0.86–1.16)
KETONES UR STRIP-MCNC: NEGATIVE MG/DL
LACTATE SERPL-SCNC: 1.3 MMOL/L (ref 0.5–2)
LACTATE SERPL-SCNC: 2.2 MMOL/L (ref 0.5–2)
LACTATE SERPL-SCNC: 3.7 MMOL/L (ref 0.5–2)
LEUKOCYTE ESTERASE UR QL STRIP: ABNORMAL
LYMPHOCYTES # BLD AUTO: 0.75 THOUSANDS/ΜL (ref 0.6–4.47)
LYMPHOCYTES NFR BLD AUTO: 6 % (ref 14–44)
MCH RBC QN AUTO: 29.1 PG (ref 26.8–34.3)
MCHC RBC AUTO-ENTMCNC: 32.6 G/DL (ref 31.4–37.4)
MCV RBC AUTO: 89 FL (ref 82–98)
MONOCYTES # BLD AUTO: 0.96 THOUSAND/ΜL (ref 0.17–1.22)
MONOCYTES NFR BLD AUTO: 7 % (ref 4–12)
NEUTROPHILS # BLD AUTO: 11.8 THOUSANDS/ΜL (ref 1.85–7.62)
NEUTS SEG NFR BLD AUTO: 87 % (ref 43–75)
NITRITE UR QL STRIP: NEGATIVE
NON-SQ EPI CELLS URNS QL MICRO: ABNORMAL /HPF
PH UR STRIP.AUTO: 7.5 [PH] (ref 4.5–8)
PLATELET # BLD AUTO: 298 THOUSANDS/UL (ref 149–390)
PMV BLD AUTO: 9.7 FL (ref 8.9–12.7)
POTASSIUM SERPL-SCNC: 3.8 MMOL/L (ref 3.5–5.3)
PROT SERPL-MCNC: 7.7 G/DL (ref 6.4–8.2)
PROT UR STRIP-MCNC: ABNORMAL MG/DL
PROTHROMBIN TIME: 16.3 SECONDS (ref 12.1–14.4)
RBC # BLD AUTO: 5.09 MILLION/UL (ref 3.88–5.62)
RBC #/AREA URNS AUTO: ABNORMAL /HPF
SODIUM SERPL-SCNC: 140 MMOL/L (ref 136–145)
SP GR UR STRIP.AUTO: 1.01 (ref 1–1.03)
UROBILINOGEN UR QL STRIP.AUTO: 0.2 E.U./DL
WBC # BLD AUTO: 13.53 THOUSAND/UL (ref 4.31–10.16)
WBC #/AREA URNS AUTO: ABNORMAL /HPF

## 2017-09-21 PROCEDURE — 80053 COMPREHEN METABOLIC PANEL: CPT | Performed by: PHYSICIAN ASSISTANT

## 2017-09-21 PROCEDURE — 81001 URINALYSIS AUTO W/SCOPE: CPT | Performed by: PHYSICIAN ASSISTANT

## 2017-09-21 PROCEDURE — 87147 CULTURE TYPE IMMUNOLOGIC: CPT | Performed by: PHYSICIAN ASSISTANT

## 2017-09-21 PROCEDURE — 36415 COLL VENOUS BLD VENIPUNCTURE: CPT | Performed by: PHYSICIAN ASSISTANT

## 2017-09-21 PROCEDURE — 87077 CULTURE AEROBIC IDENTIFY: CPT | Performed by: PHYSICIAN ASSISTANT

## 2017-09-21 PROCEDURE — 85730 THROMBOPLASTIN TIME PARTIAL: CPT | Performed by: PHYSICIAN ASSISTANT

## 2017-09-21 PROCEDURE — 87040 BLOOD CULTURE FOR BACTERIA: CPT | Performed by: PHYSICIAN ASSISTANT

## 2017-09-21 PROCEDURE — 83605 ASSAY OF LACTIC ACID: CPT | Performed by: PHYSICIAN ASSISTANT

## 2017-09-21 PROCEDURE — 96365 THER/PROPH/DIAG IV INF INIT: CPT

## 2017-09-21 PROCEDURE — 0T2BX0Z CHANGE DRAINAGE DEVICE IN BLADDER, EXTERNAL APPROACH: ICD-10-PCS | Performed by: INTERNAL MEDICINE

## 2017-09-21 PROCEDURE — 87086 URINE CULTURE/COLONY COUNT: CPT | Performed by: PHYSICIAN ASSISTANT

## 2017-09-21 PROCEDURE — 87081 CULTURE SCREEN ONLY: CPT | Performed by: PHYSICIAN ASSISTANT

## 2017-09-21 PROCEDURE — 71010 HB CHEST X-RAY 1 VIEW FRONTAL (PORTABLE): CPT

## 2017-09-21 PROCEDURE — 85610 PROTHROMBIN TIME: CPT | Performed by: PHYSICIAN ASSISTANT

## 2017-09-21 PROCEDURE — 96366 THER/PROPH/DIAG IV INF ADDON: CPT

## 2017-09-21 PROCEDURE — 85025 COMPLETE CBC W/AUTO DIFF WBC: CPT | Performed by: PHYSICIAN ASSISTANT

## 2017-09-21 PROCEDURE — 93005 ELECTROCARDIOGRAM TRACING: CPT

## 2017-09-21 PROCEDURE — 99285 EMERGENCY DEPT VISIT HI MDM: CPT

## 2017-09-21 PROCEDURE — 87186 SC STD MICRODIL/AGAR DIL: CPT | Performed by: PHYSICIAN ASSISTANT

## 2017-09-21 RX ORDER — BISACODYL 10 MG
10 SUPPOSITORY, RECTAL RECTAL
Status: DISCONTINUED | OUTPATIENT
Start: 2017-09-21 | End: 2017-09-27 | Stop reason: HOSPADM

## 2017-09-21 RX ORDER — DOCUSATE SODIUM 100 MG/1
100 CAPSULE, LIQUID FILLED ORAL 2 TIMES DAILY
Status: DISCONTINUED | OUTPATIENT
Start: 2017-09-21 | End: 2017-09-24

## 2017-09-21 RX ORDER — ACETAMINOPHEN 325 MG/1
975 TABLET ORAL EVERY 6 HOURS PRN
Status: DISCONTINUED | OUTPATIENT
Start: 2017-09-21 | End: 2017-09-27 | Stop reason: HOSPADM

## 2017-09-21 RX ORDER — TAMSULOSIN HYDROCHLORIDE 0.4 MG/1
0.4 CAPSULE ORAL
Status: DISCONTINUED | OUTPATIENT
Start: 2017-09-22 | End: 2017-09-27 | Stop reason: HOSPADM

## 2017-09-21 RX ORDER — MIRTAZAPINE 15 MG/1
15 TABLET, FILM COATED ORAL
Status: DISCONTINUED | OUTPATIENT
Start: 2017-09-21 | End: 2017-09-27 | Stop reason: HOSPADM

## 2017-09-21 RX ORDER — ONDANSETRON 2 MG/ML
4 INJECTION INTRAMUSCULAR; INTRAVENOUS EVERY 6 HOURS PRN
Status: DISCONTINUED | OUTPATIENT
Start: 2017-09-21 | End: 2017-09-27 | Stop reason: HOSPADM

## 2017-09-21 RX ORDER — QUETIAPINE FUMARATE 25 MG/1
25 TABLET, FILM COATED ORAL 2 TIMES DAILY
Status: DISCONTINUED | OUTPATIENT
Start: 2017-09-21 | End: 2017-09-27 | Stop reason: HOSPADM

## 2017-09-21 RX ORDER — HALOPERIDOL 1 MG/1
2 TABLET ORAL 2 TIMES DAILY PRN
Status: DISCONTINUED | OUTPATIENT
Start: 2017-09-21 | End: 2017-09-27 | Stop reason: HOSPADM

## 2017-09-21 RX ORDER — FAMOTIDINE 20 MG/1
20 TABLET, FILM COATED ORAL 2 TIMES DAILY
COMMUNITY

## 2017-09-21 RX ORDER — CALCIUM CARBONATE 200(500)MG
1000 TABLET,CHEWABLE ORAL DAILY PRN
Status: DISCONTINUED | OUTPATIENT
Start: 2017-09-21 | End: 2017-09-27 | Stop reason: HOSPADM

## 2017-09-21 RX ORDER — AMLODIPINE BESYLATE 5 MG/1
5 TABLET ORAL DAILY
Status: DISCONTINUED | OUTPATIENT
Start: 2017-09-22 | End: 2017-09-27 | Stop reason: HOSPADM

## 2017-09-21 RX ORDER — LEVOFLOXACIN 5 MG/ML
750 INJECTION, SOLUTION INTRAVENOUS ONCE
Status: COMPLETED | OUTPATIENT
Start: 2017-09-21 | End: 2017-09-21

## 2017-09-21 RX ORDER — SENNOSIDES 8.6 MG
1 TABLET ORAL
Status: DISCONTINUED | OUTPATIENT
Start: 2017-09-21 | End: 2017-09-24

## 2017-09-21 RX ORDER — BUSPIRONE HYDROCHLORIDE 5 MG/1
5 TABLET ORAL 3 TIMES DAILY
Status: DISCONTINUED | OUTPATIENT
Start: 2017-09-21 | End: 2017-09-27 | Stop reason: HOSPADM

## 2017-09-21 RX ORDER — SODIUM CHLORIDE 9 MG/ML
100 INJECTION, SOLUTION INTRAVENOUS CONTINUOUS
Status: DISCONTINUED | OUTPATIENT
Start: 2017-09-21 | End: 2017-09-25

## 2017-09-21 RX ORDER — MEMANTINE HYDROCHLORIDE 5 MG/1
5 TABLET ORAL 2 TIMES DAILY
Status: DISCONTINUED | OUTPATIENT
Start: 2017-09-21 | End: 2017-09-27 | Stop reason: HOSPADM

## 2017-09-21 RX ORDER — ACETAMINOPHEN 650 MG/1
650 SUPPOSITORY RECTAL ONCE
Status: COMPLETED | OUTPATIENT
Start: 2017-09-21 | End: 2017-09-21

## 2017-09-21 RX ORDER — SENNOSIDES 8.6 MG
1 TABLET ORAL DAILY
Status: DISCONTINUED | OUTPATIENT
Start: 2017-09-22 | End: 2017-09-24

## 2017-09-21 RX ORDER — FAMOTIDINE 20 MG/1
20 TABLET, FILM COATED ORAL 2 TIMES DAILY
Status: DISCONTINUED | OUTPATIENT
Start: 2017-09-21 | End: 2017-09-27 | Stop reason: HOSPADM

## 2017-09-21 RX ADMIN — MEMANTINE 5 MG: 5 TABLET ORAL at 22:07

## 2017-09-21 RX ADMIN — LEVOFLOXACIN 750 MG: 5 INJECTION, SOLUTION INTRAVENOUS at 17:27

## 2017-09-21 RX ADMIN — SODIUM CHLORIDE 1000 ML: 0.9 INJECTION, SOLUTION INTRAVENOUS at 22:08

## 2017-09-21 RX ADMIN — METOPROLOL TARTRATE 25 MG: 25 TABLET, FILM COATED ORAL at 22:06

## 2017-09-21 RX ADMIN — FAMOTIDINE 20 MG: 20 TABLET ORAL at 22:07

## 2017-09-21 RX ADMIN — SENNOSIDES 8.6 MG: 8.6 TABLET, FILM COATED ORAL at 22:06

## 2017-09-21 RX ADMIN — SODIUM CHLORIDE 1000 ML: 0.9 INJECTION, SOLUTION INTRAVENOUS at 17:26

## 2017-09-21 RX ADMIN — SODIUM CHLORIDE 125 ML/HR: 0.9 INJECTION, SOLUTION INTRAVENOUS at 22:15

## 2017-09-21 RX ADMIN — DOCUSATE SODIUM 100 MG: 100 CAPSULE, LIQUID FILLED ORAL at 22:06

## 2017-09-21 RX ADMIN — SODIUM CHLORIDE 1000 ML: 0.9 INJECTION, SOLUTION INTRAVENOUS at 20:04

## 2017-09-21 RX ADMIN — MIRTAZAPINE 15 MG: 15 TABLET, FILM COATED ORAL at 22:06

## 2017-09-21 RX ADMIN — ACETAMINOPHEN 975 MG: 325 TABLET ORAL at 22:22

## 2017-09-21 RX ADMIN — ACETAMINOPHEN 650 MG: 650 SUPPOSITORY RECTAL at 17:16

## 2017-09-21 RX ADMIN — SODIUM CHLORIDE 1000 ML: 0.9 INJECTION, SOLUTION INTRAVENOUS at 20:00

## 2017-09-21 RX ADMIN — QUETIAPINE FUMARATE 25 MG: 25 TABLET, FILM COATED ORAL at 22:07

## 2017-09-21 RX ADMIN — BUSPIRONE HYDROCHLORIDE 5 MG: 5 TABLET ORAL at 22:06

## 2017-09-21 RX ADMIN — APIXABAN 2.5 MG: 2.5 TABLET, FILM COATED ORAL at 22:07

## 2017-09-22 ENCOUNTER — APPOINTMENT (INPATIENT)
Dept: ULTRASOUND IMAGING | Facility: HOSPITAL | Age: 67
DRG: 698 | End: 2017-09-22
Payer: MEDICARE

## 2017-09-22 LAB
ALBUMIN SERPL BCP-MCNC: 2 G/DL (ref 3.5–5)
ALP SERPL-CCNC: 72 U/L (ref 46–116)
ALT SERPL W P-5'-P-CCNC: 23 U/L (ref 12–78)
ANION GAP SERPL CALCULATED.3IONS-SCNC: 10 MMOL/L (ref 4–13)
APTT PPP: 40 SECONDS (ref 23–35)
AST SERPL W P-5'-P-CCNC: 20 U/L (ref 5–45)
ATRIAL RATE: 100 BPM
BILIRUB SERPL-MCNC: 0.5 MG/DL (ref 0.2–1)
BUN SERPL-MCNC: 18 MG/DL (ref 5–25)
CALCIUM SERPL-MCNC: 7.7 MG/DL (ref 8.3–10.1)
CHLORIDE SERPL-SCNC: 109 MMOL/L (ref 100–108)
CO2 SERPL-SCNC: 19 MMOL/L (ref 21–32)
CREAT SERPL-MCNC: 1.71 MG/DL (ref 0.6–1.3)
ERYTHROCYTE [DISTWIDTH] IN BLOOD BY AUTOMATED COUNT: 15.8 % (ref 11.6–15.1)
GFR SERPL CREATININE-BSD FRML MDRD: 40 ML/MIN/1.73SQ M
GLUCOSE SERPL-MCNC: 106 MG/DL (ref 65–140)
HCT VFR BLD AUTO: 39.2 % (ref 36.5–49.3)
HGB BLD-MCNC: 12.6 G/DL (ref 12–17)
INR PPP: 1.44 (ref 0.86–1.16)
MCH RBC QN AUTO: 29 PG (ref 26.8–34.3)
MCHC RBC AUTO-ENTMCNC: 32.1 G/DL (ref 31.4–37.4)
MCV RBC AUTO: 90 FL (ref 82–98)
P AXIS: 49 DEGREES
PLATELET # BLD AUTO: 223 THOUSANDS/UL (ref 149–390)
PMV BLD AUTO: 9.3 FL (ref 8.9–12.7)
POTASSIUM SERPL-SCNC: 4 MMOL/L (ref 3.5–5.3)
PR INTERVAL: 162 MS
PROT SERPL-MCNC: 6 G/DL (ref 6.4–8.2)
PROTHROMBIN TIME: 18 SECONDS (ref 12.1–14.4)
QRS AXIS: -49 DEGREES
QRSD INTERVAL: 76 MS
QT INTERVAL: 314 MS
QTC INTERVAL: 405 MS
RBC # BLD AUTO: 4.34 MILLION/UL (ref 3.88–5.62)
SODIUM SERPL-SCNC: 138 MMOL/L (ref 136–145)
T WAVE AXIS: 53 DEGREES
VENTRICULAR RATE: 100 BPM
WBC # BLD AUTO: 13.37 THOUSAND/UL (ref 4.31–10.16)

## 2017-09-22 PROCEDURE — 85610 PROTHROMBIN TIME: CPT | Performed by: PHYSICIAN ASSISTANT

## 2017-09-22 PROCEDURE — 85730 THROMBOPLASTIN TIME PARTIAL: CPT | Performed by: PHYSICIAN ASSISTANT

## 2017-09-22 PROCEDURE — 80053 COMPREHEN METABOLIC PANEL: CPT | Performed by: PHYSICIAN ASSISTANT

## 2017-09-22 PROCEDURE — 85027 COMPLETE CBC AUTOMATED: CPT | Performed by: PHYSICIAN ASSISTANT

## 2017-09-22 PROCEDURE — 76770 US EXAM ABDO BACK WALL COMP: CPT

## 2017-09-22 RX ORDER — LEVOFLOXACIN 5 MG/ML
750 INJECTION, SOLUTION INTRAVENOUS EVERY 24 HOURS
Status: DISCONTINUED | OUTPATIENT
Start: 2017-09-22 | End: 2017-09-22

## 2017-09-22 RX ADMIN — METOPROLOL TARTRATE 25 MG: 25 TABLET, FILM COATED ORAL at 17:41

## 2017-09-22 RX ADMIN — AMLODIPINE BESYLATE 5 MG: 5 TABLET ORAL at 09:57

## 2017-09-22 RX ADMIN — SODIUM CHLORIDE 125 ML/HR: 0.9 INJECTION, SOLUTION INTRAVENOUS at 07:30

## 2017-09-22 RX ADMIN — SENNOSIDES 8.6 MG: 8.6 TABLET, FILM COATED ORAL at 23:23

## 2017-09-22 RX ADMIN — ACETAMINOPHEN 975 MG: 325 TABLET ORAL at 23:30

## 2017-09-22 RX ADMIN — BUSPIRONE HYDROCHLORIDE 5 MG: 5 TABLET ORAL at 09:57

## 2017-09-22 RX ADMIN — APIXABAN 2.5 MG: 2.5 TABLET, FILM COATED ORAL at 17:41

## 2017-09-22 RX ADMIN — MEMANTINE 5 MG: 5 TABLET ORAL at 17:41

## 2017-09-22 RX ADMIN — FAMOTIDINE 20 MG: 20 TABLET ORAL at 17:42

## 2017-09-22 RX ADMIN — QUETIAPINE FUMARATE 25 MG: 25 TABLET, FILM COATED ORAL at 09:57

## 2017-09-22 RX ADMIN — DOCUSATE SODIUM 100 MG: 100 CAPSULE, LIQUID FILLED ORAL at 09:57

## 2017-09-22 RX ADMIN — FAMOTIDINE 20 MG: 20 TABLET ORAL at 09:57

## 2017-09-22 RX ADMIN — BUSPIRONE HYDROCHLORIDE 5 MG: 5 TABLET ORAL at 16:31

## 2017-09-22 RX ADMIN — TAMSULOSIN HYDROCHLORIDE 0.4 MG: 0.4 CAPSULE ORAL at 16:31

## 2017-09-22 RX ADMIN — APIXABAN 2.5 MG: 2.5 TABLET, FILM COATED ORAL at 09:57

## 2017-09-22 RX ADMIN — SODIUM CHLORIDE 125 ML/HR: 0.9 INJECTION, SOLUTION INTRAVENOUS at 15:34

## 2017-09-22 RX ADMIN — QUETIAPINE FUMARATE 25 MG: 25 TABLET, FILM COATED ORAL at 17:41

## 2017-09-22 RX ADMIN — MEROPENEM 1000 MG: 1 INJECTION, POWDER, FOR SOLUTION INTRAVENOUS at 17:40

## 2017-09-22 RX ADMIN — MEMANTINE 5 MG: 5 TABLET ORAL at 09:57

## 2017-09-22 RX ADMIN — SENNOSIDES 8.6 MG: 8.6 TABLET, FILM COATED ORAL at 09:57

## 2017-09-22 RX ADMIN — ACETAMINOPHEN 975 MG: 325 TABLET ORAL at 16:31

## 2017-09-22 RX ADMIN — METOPROLOL TARTRATE 25 MG: 25 TABLET, FILM COATED ORAL at 09:57

## 2017-09-22 RX ADMIN — MIRTAZAPINE 15 MG: 15 TABLET, FILM COATED ORAL at 23:23

## 2017-09-22 RX ADMIN — BUSPIRONE HYDROCHLORIDE 5 MG: 5 TABLET ORAL at 23:23

## 2017-09-23 PROBLEM — E87.6 HYPOKALEMIA: Status: ACTIVE | Noted: 2017-05-02

## 2017-09-23 LAB
ANION GAP SERPL CALCULATED.3IONS-SCNC: 9 MMOL/L (ref 4–13)
BACTERIA UR CULT: ABNORMAL
BUN SERPL-MCNC: 11 MG/DL (ref 5–25)
CALCIUM SERPL-MCNC: 8.6 MG/DL (ref 8.3–10.1)
CHLORIDE SERPL-SCNC: 107 MMOL/L (ref 100–108)
CO2 SERPL-SCNC: 25 MMOL/L (ref 21–32)
CREAT SERPL-MCNC: 1.4 MG/DL (ref 0.6–1.3)
ERYTHROCYTE [DISTWIDTH] IN BLOOD BY AUTOMATED COUNT: 15.7 % (ref 11.6–15.1)
GFR SERPL CREATININE-BSD FRML MDRD: 51 ML/MIN/1.73SQ M
GLUCOSE SERPL-MCNC: 102 MG/DL (ref 65–140)
HCT VFR BLD AUTO: 42.6 % (ref 36.5–49.3)
HGB BLD-MCNC: 13.9 G/DL (ref 12–17)
MAGNESIUM SERPL-MCNC: 1.8 MG/DL (ref 1.6–2.6)
MCH RBC QN AUTO: 29 PG (ref 26.8–34.3)
MCHC RBC AUTO-ENTMCNC: 32.6 G/DL (ref 31.4–37.4)
MCV RBC AUTO: 89 FL (ref 82–98)
MRSA NOSE QL CULT: ABNORMAL
PLATELET # BLD AUTO: 232 THOUSANDS/UL (ref 149–390)
PMV BLD AUTO: 9.2 FL (ref 8.9–12.7)
POTASSIUM SERPL-SCNC: 2.9 MMOL/L (ref 3.5–5.3)
RBC # BLD AUTO: 4.79 MILLION/UL (ref 3.88–5.62)
SODIUM SERPL-SCNC: 141 MMOL/L (ref 136–145)
WBC # BLD AUTO: 10.05 THOUSAND/UL (ref 4.31–10.16)

## 2017-09-23 PROCEDURE — 80048 BASIC METABOLIC PNL TOTAL CA: CPT | Performed by: FAMILY MEDICINE

## 2017-09-23 PROCEDURE — 85027 COMPLETE CBC AUTOMATED: CPT | Performed by: FAMILY MEDICINE

## 2017-09-23 PROCEDURE — 83735 ASSAY OF MAGNESIUM: CPT | Performed by: PHYSICIAN ASSISTANT

## 2017-09-23 RX ORDER — POTASSIUM CHLORIDE 14.9 MG/ML
20 INJECTION INTRAVENOUS
Status: COMPLETED | OUTPATIENT
Start: 2017-09-23 | End: 2017-09-23

## 2017-09-23 RX ORDER — POTASSIUM CHLORIDE 20 MEQ/1
40 TABLET, EXTENDED RELEASE ORAL ONCE
Status: COMPLETED | OUTPATIENT
Start: 2017-09-23 | End: 2017-09-23

## 2017-09-23 RX ADMIN — MEMANTINE 5 MG: 5 TABLET ORAL at 18:20

## 2017-09-23 RX ADMIN — SENNOSIDES 8.6 MG: 8.6 TABLET, FILM COATED ORAL at 08:53

## 2017-09-23 RX ADMIN — QUETIAPINE FUMARATE 25 MG: 25 TABLET, FILM COATED ORAL at 08:52

## 2017-09-23 RX ADMIN — MEROPENEM 1000 MG: 1 INJECTION, POWDER, FOR SOLUTION INTRAVENOUS at 18:11

## 2017-09-23 RX ADMIN — BUSPIRONE HYDROCHLORIDE 5 MG: 5 TABLET ORAL at 08:52

## 2017-09-23 RX ADMIN — APIXABAN 2.5 MG: 2.5 TABLET, FILM COATED ORAL at 18:20

## 2017-09-23 RX ADMIN — AMLODIPINE BESYLATE 5 MG: 5 TABLET ORAL at 08:52

## 2017-09-23 RX ADMIN — MEROPENEM 1000 MG: 1 INJECTION, POWDER, FOR SOLUTION INTRAVENOUS at 05:06

## 2017-09-23 RX ADMIN — BUSPIRONE HYDROCHLORIDE 5 MG: 5 TABLET ORAL at 16:21

## 2017-09-23 RX ADMIN — TAMSULOSIN HYDROCHLORIDE 0.4 MG: 0.4 CAPSULE ORAL at 16:21

## 2017-09-23 RX ADMIN — DOCUSATE SODIUM 100 MG: 100 CAPSULE, LIQUID FILLED ORAL at 08:52

## 2017-09-23 RX ADMIN — POTASSIUM CHLORIDE 20 MEQ: 200 INJECTION, SOLUTION INTRAVENOUS at 11:17

## 2017-09-23 RX ADMIN — METOPROLOL TARTRATE 25 MG: 25 TABLET, FILM COATED ORAL at 08:52

## 2017-09-23 RX ADMIN — SODIUM CHLORIDE 125 ML/HR: 0.9 INJECTION, SOLUTION INTRAVENOUS at 16:31

## 2017-09-23 RX ADMIN — MIRTAZAPINE 15 MG: 15 TABLET, FILM COATED ORAL at 21:06

## 2017-09-23 RX ADMIN — FAMOTIDINE 20 MG: 20 TABLET ORAL at 08:52

## 2017-09-23 RX ADMIN — QUETIAPINE FUMARATE 25 MG: 25 TABLET, FILM COATED ORAL at 18:20

## 2017-09-23 RX ADMIN — FAMOTIDINE 20 MG: 20 TABLET ORAL at 18:20

## 2017-09-23 RX ADMIN — MEMANTINE 5 MG: 5 TABLET ORAL at 08:52

## 2017-09-23 RX ADMIN — POTASSIUM CHLORIDE 40 MEQ: 1500 TABLET, EXTENDED RELEASE ORAL at 11:17

## 2017-09-23 RX ADMIN — SODIUM CHLORIDE 125 ML/HR: 0.9 INJECTION, SOLUTION INTRAVENOUS at 08:38

## 2017-09-23 RX ADMIN — SENNOSIDES 8.6 MG: 8.6 TABLET, FILM COATED ORAL at 21:06

## 2017-09-23 RX ADMIN — POTASSIUM CHLORIDE 20 MEQ: 200 INJECTION, SOLUTION INTRAVENOUS at 13:02

## 2017-09-23 RX ADMIN — METOPROLOL TARTRATE 25 MG: 25 TABLET, FILM COATED ORAL at 18:20

## 2017-09-23 RX ADMIN — APIXABAN 2.5 MG: 2.5 TABLET, FILM COATED ORAL at 08:52

## 2017-09-23 RX ADMIN — BUSPIRONE HYDROCHLORIDE 5 MG: 5 TABLET ORAL at 21:06

## 2017-09-24 LAB
ANION GAP SERPL CALCULATED.3IONS-SCNC: 5 MMOL/L (ref 4–13)
BACTERIA BLD CULT: ABNORMAL
BASOPHILS # BLD AUTO: 0.02 THOUSANDS/ΜL (ref 0–0.1)
BASOPHILS NFR BLD AUTO: 0 % (ref 0–1)
BUN SERPL-MCNC: 8 MG/DL (ref 5–25)
CALCIUM SERPL-MCNC: 8.5 MG/DL (ref 8.3–10.1)
CHLORIDE SERPL-SCNC: 107 MMOL/L (ref 100–108)
CO2 SERPL-SCNC: 27 MMOL/L (ref 21–32)
CREAT SERPL-MCNC: 1.39 MG/DL (ref 0.6–1.3)
EOSINOPHIL # BLD AUTO: 0.29 THOUSAND/ΜL (ref 0–0.61)
EOSINOPHIL NFR BLD AUTO: 4 % (ref 0–6)
ERYTHROCYTE [DISTWIDTH] IN BLOOD BY AUTOMATED COUNT: 15.6 % (ref 11.6–15.1)
GFR SERPL CREATININE-BSD FRML MDRD: 52 ML/MIN/1.73SQ M
GLUCOSE SERPL-MCNC: 98 MG/DL (ref 65–140)
GRAM STN SPEC: ABNORMAL
HCT VFR BLD AUTO: 39.8 % (ref 36.5–49.3)
HGB BLD-MCNC: 13 G/DL (ref 12–17)
LYMPHOCYTES # BLD AUTO: 1.59 THOUSANDS/ΜL (ref 0.6–4.47)
LYMPHOCYTES NFR BLD AUTO: 19 % (ref 14–44)
MCH RBC QN AUTO: 29 PG (ref 26.8–34.3)
MCHC RBC AUTO-ENTMCNC: 32.7 G/DL (ref 31.4–37.4)
MCV RBC AUTO: 89 FL (ref 82–98)
MONOCYTES # BLD AUTO: 0.83 THOUSAND/ΜL (ref 0.17–1.22)
MONOCYTES NFR BLD AUTO: 10 % (ref 4–12)
NEUTROPHILS # BLD AUTO: 5.6 THOUSANDS/ΜL (ref 1.85–7.62)
NEUTS SEG NFR BLD AUTO: 67 % (ref 43–75)
PLATELET # BLD AUTO: 242 THOUSANDS/UL (ref 149–390)
PMV BLD AUTO: 9.4 FL (ref 8.9–12.7)
POTASSIUM SERPL-SCNC: 3.6 MMOL/L (ref 3.5–5.3)
RBC # BLD AUTO: 4.48 MILLION/UL (ref 3.88–5.62)
SODIUM SERPL-SCNC: 139 MMOL/L (ref 136–145)
WBC # BLD AUTO: 8.33 THOUSAND/UL (ref 4.31–10.16)

## 2017-09-24 PROCEDURE — 80048 BASIC METABOLIC PNL TOTAL CA: CPT | Performed by: PHYSICIAN ASSISTANT

## 2017-09-24 PROCEDURE — 85025 COMPLETE CBC W/AUTO DIFF WBC: CPT | Performed by: PHYSICIAN ASSISTANT

## 2017-09-24 PROCEDURE — 87040 BLOOD CULTURE FOR BACTERIA: CPT | Performed by: PHYSICIAN ASSISTANT

## 2017-09-24 RX ORDER — AMOXICILLIN 250 MG
2 CAPSULE ORAL 2 TIMES DAILY
Status: DISCONTINUED | OUTPATIENT
Start: 2017-09-24 | End: 2017-09-24

## 2017-09-24 RX ORDER — AMOXICILLIN 250 MG
2 CAPSULE ORAL DAILY
Status: DISCONTINUED | OUTPATIENT
Start: 2017-09-25 | End: 2017-09-27 | Stop reason: HOSPADM

## 2017-09-24 RX ADMIN — METOPROLOL TARTRATE 25 MG: 25 TABLET, FILM COATED ORAL at 17:00

## 2017-09-24 RX ADMIN — MEMANTINE 5 MG: 5 TABLET ORAL at 08:36

## 2017-09-24 RX ADMIN — TAMSULOSIN HYDROCHLORIDE 0.4 MG: 0.4 CAPSULE ORAL at 16:54

## 2017-09-24 RX ADMIN — METOPROLOL TARTRATE 25 MG: 25 TABLET, FILM COATED ORAL at 08:37

## 2017-09-24 RX ADMIN — APIXABAN 2.5 MG: 2.5 TABLET, FILM COATED ORAL at 17:00

## 2017-09-24 RX ADMIN — QUETIAPINE FUMARATE 25 MG: 25 TABLET, FILM COATED ORAL at 17:00

## 2017-09-24 RX ADMIN — BUSPIRONE HYDROCHLORIDE 5 MG: 5 TABLET ORAL at 21:26

## 2017-09-24 RX ADMIN — QUETIAPINE FUMARATE 25 MG: 25 TABLET, FILM COATED ORAL at 08:37

## 2017-09-24 RX ADMIN — FAMOTIDINE 20 MG: 20 TABLET ORAL at 18:13

## 2017-09-24 RX ADMIN — BUSPIRONE HYDROCHLORIDE 5 MG: 5 TABLET ORAL at 08:36

## 2017-09-24 RX ADMIN — BUSPIRONE HYDROCHLORIDE 5 MG: 5 TABLET ORAL at 16:54

## 2017-09-24 RX ADMIN — DOCUSATE SODIUM 100 MG: 100 CAPSULE, LIQUID FILLED ORAL at 08:37

## 2017-09-24 RX ADMIN — MEMANTINE 5 MG: 5 TABLET ORAL at 17:00

## 2017-09-24 RX ADMIN — SODIUM CHLORIDE 125 ML/HR: 0.9 INJECTION, SOLUTION INTRAVENOUS at 10:20

## 2017-09-24 RX ADMIN — MIRTAZAPINE 15 MG: 15 TABLET, FILM COATED ORAL at 21:26

## 2017-09-24 RX ADMIN — SODIUM CHLORIDE 125 ML/HR: 0.9 INJECTION, SOLUTION INTRAVENOUS at 02:52

## 2017-09-24 RX ADMIN — AMLODIPINE BESYLATE 5 MG: 5 TABLET ORAL at 08:36

## 2017-09-24 RX ADMIN — FAMOTIDINE 20 MG: 20 TABLET ORAL at 08:36

## 2017-09-24 RX ADMIN — SODIUM CHLORIDE 1000 MG: 9 INJECTION, SOLUTION INTRAVENOUS at 05:12

## 2017-09-24 RX ADMIN — SODIUM CHLORIDE 100 ML/HR: 0.9 INJECTION, SOLUTION INTRAVENOUS at 18:13

## 2017-09-24 RX ADMIN — APIXABAN 2.5 MG: 2.5 TABLET, FILM COATED ORAL at 08:37

## 2017-09-25 LAB
ANION GAP SERPL CALCULATED.3IONS-SCNC: 6 MMOL/L (ref 4–13)
BACTERIA BLD CULT: ABNORMAL
BACTERIA BLD CULT: ABNORMAL
BASOPHILS # BLD AUTO: 0.07 THOUSANDS/ΜL (ref 0–0.1)
BASOPHILS NFR BLD AUTO: 1 % (ref 0–1)
BUN SERPL-MCNC: 10 MG/DL (ref 5–25)
CALCIUM SERPL-MCNC: 8.8 MG/DL (ref 8.3–10.1)
CHLORIDE SERPL-SCNC: 106 MMOL/L (ref 100–108)
CO2 SERPL-SCNC: 29 MMOL/L (ref 21–32)
CREAT SERPL-MCNC: 1.03 MG/DL (ref 0.6–1.3)
EOSINOPHIL # BLD AUTO: 0.4 THOUSAND/ΜL (ref 0–0.61)
EOSINOPHIL NFR BLD AUTO: 6 % (ref 0–6)
ERYTHROCYTE [DISTWIDTH] IN BLOOD BY AUTOMATED COUNT: 15.1 % (ref 11.6–15.1)
GFR SERPL CREATININE-BSD FRML MDRD: 74 ML/MIN/1.73SQ M
GLUCOSE SERPL-MCNC: 91 MG/DL (ref 65–140)
GRAM STN SPEC: ABNORMAL
HCT VFR BLD AUTO: 38.5 % (ref 36.5–49.3)
HGB BLD-MCNC: 12.7 G/DL (ref 12–17)
LYMPHOCYTES # BLD AUTO: 1.52 THOUSANDS/ΜL (ref 0.6–4.47)
LYMPHOCYTES NFR BLD AUTO: 22 % (ref 14–44)
MCH RBC QN AUTO: 28.9 PG (ref 26.8–34.3)
MCHC RBC AUTO-ENTMCNC: 33 G/DL (ref 31.4–37.4)
MCV RBC AUTO: 88 FL (ref 82–98)
MONOCYTES # BLD AUTO: 0.54 THOUSAND/ΜL (ref 0.17–1.22)
MONOCYTES NFR BLD AUTO: 8 % (ref 4–12)
NEUTROPHILS # BLD AUTO: 4.25 THOUSANDS/ΜL (ref 1.85–7.62)
NEUTS SEG NFR BLD AUTO: 63 % (ref 43–75)
PLATELET # BLD AUTO: 277 THOUSANDS/UL (ref 149–390)
PMV BLD AUTO: 9.8 FL (ref 8.9–12.7)
POTASSIUM SERPL-SCNC: 3.5 MMOL/L (ref 3.5–5.3)
RBC # BLD AUTO: 4.39 MILLION/UL (ref 3.88–5.62)
SODIUM SERPL-SCNC: 141 MMOL/L (ref 136–145)
WBC # BLD AUTO: 6.78 THOUSAND/UL (ref 4.31–10.16)

## 2017-09-25 PROCEDURE — 80048 BASIC METABOLIC PNL TOTAL CA: CPT | Performed by: PHYSICIAN ASSISTANT

## 2017-09-25 PROCEDURE — 85025 COMPLETE CBC W/AUTO DIFF WBC: CPT | Performed by: PHYSICIAN ASSISTANT

## 2017-09-25 RX ORDER — POTASSIUM CHLORIDE 20 MEQ/1
20 TABLET, EXTENDED RELEASE ORAL DAILY
Status: DISCONTINUED | OUTPATIENT
Start: 2017-09-26 | End: 2017-09-27 | Stop reason: HOSPADM

## 2017-09-25 RX ORDER — POTASSIUM CHLORIDE 20 MEQ/1
40 TABLET, EXTENDED RELEASE ORAL ONCE
Status: COMPLETED | OUTPATIENT
Start: 2017-09-25 | End: 2017-09-25

## 2017-09-25 RX ADMIN — SODIUM CHLORIDE 1000 MG: 9 INJECTION, SOLUTION INTRAVENOUS at 05:28

## 2017-09-25 RX ADMIN — Medication 400 MG: at 11:38

## 2017-09-25 RX ADMIN — POTASSIUM CHLORIDE 40 MEQ: 1500 TABLET, EXTENDED RELEASE ORAL at 11:39

## 2017-09-25 RX ADMIN — APIXABAN 2.5 MG: 2.5 TABLET, FILM COATED ORAL at 08:42

## 2017-09-25 RX ADMIN — TAMSULOSIN HYDROCHLORIDE 0.4 MG: 0.4 CAPSULE ORAL at 17:39

## 2017-09-25 RX ADMIN — FAMOTIDINE 20 MG: 20 TABLET ORAL at 08:42

## 2017-09-25 RX ADMIN — BUSPIRONE HYDROCHLORIDE 5 MG: 5 TABLET ORAL at 17:38

## 2017-09-25 RX ADMIN — Medication 400 MG: at 17:38

## 2017-09-25 RX ADMIN — BUSPIRONE HYDROCHLORIDE 5 MG: 5 TABLET ORAL at 08:42

## 2017-09-25 RX ADMIN — MEMANTINE 5 MG: 5 TABLET ORAL at 17:38

## 2017-09-25 RX ADMIN — METOPROLOL TARTRATE 25 MG: 25 TABLET, FILM COATED ORAL at 17:38

## 2017-09-25 RX ADMIN — APIXABAN 2.5 MG: 2.5 TABLET, FILM COATED ORAL at 17:38

## 2017-09-25 RX ADMIN — AMLODIPINE BESYLATE 5 MG: 5 TABLET ORAL at 08:42

## 2017-09-25 RX ADMIN — FAMOTIDINE 20 MG: 20 TABLET ORAL at 17:38

## 2017-09-25 RX ADMIN — METOPROLOL TARTRATE 25 MG: 25 TABLET, FILM COATED ORAL at 08:42

## 2017-09-25 RX ADMIN — MEMANTINE 5 MG: 5 TABLET ORAL at 08:42

## 2017-09-25 RX ADMIN — QUETIAPINE FUMARATE 25 MG: 25 TABLET, FILM COATED ORAL at 08:42

## 2017-09-25 RX ADMIN — SODIUM CHLORIDE 100 ML/HR: 0.9 INJECTION, SOLUTION INTRAVENOUS at 04:33

## 2017-09-25 RX ADMIN — QUETIAPINE FUMARATE 25 MG: 25 TABLET, FILM COATED ORAL at 17:38

## 2017-09-26 PROBLEM — E87.6 HYPOKALEMIA: Status: RESOLVED | Noted: 2017-05-02 | Resolved: 2017-09-26

## 2017-09-26 PROBLEM — A49.9 ESBL (EXTENDED SPECTRUM BETA-LACTAMASE) PRODUCING BACTERIA INFECTION: Status: ACTIVE | Noted: 2017-09-26

## 2017-09-26 PROBLEM — Z16.12 ESBL (EXTENDED SPECTRUM BETA-LACTAMASE) PRODUCING BACTERIA INFECTION: Status: ACTIVE | Noted: 2017-09-26

## 2017-09-26 LAB
ANION GAP SERPL CALCULATED.3IONS-SCNC: 5 MMOL/L (ref 4–13)
BUN SERPL-MCNC: 14 MG/DL (ref 5–25)
CALCIUM SERPL-MCNC: 8.9 MG/DL (ref 8.3–10.1)
CHLORIDE SERPL-SCNC: 106 MMOL/L (ref 100–108)
CO2 SERPL-SCNC: 28 MMOL/L (ref 21–32)
CREAT SERPL-MCNC: 1.06 MG/DL (ref 0.6–1.3)
GFR SERPL CREATININE-BSD FRML MDRD: 71 ML/MIN/1.73SQ M
GLUCOSE SERPL-MCNC: 96 MG/DL (ref 65–140)
MAGNESIUM SERPL-MCNC: 1.9 MG/DL (ref 1.6–2.6)
POTASSIUM SERPL-SCNC: 4.4 MMOL/L (ref 3.5–5.3)
SODIUM SERPL-SCNC: 139 MMOL/L (ref 136–145)

## 2017-09-26 PROCEDURE — 83735 ASSAY OF MAGNESIUM: CPT | Performed by: PHYSICIAN ASSISTANT

## 2017-09-26 PROCEDURE — 80048 BASIC METABOLIC PNL TOTAL CA: CPT | Performed by: PHYSICIAN ASSISTANT

## 2017-09-26 RX ADMIN — Medication 400 MG: at 17:06

## 2017-09-26 RX ADMIN — QUETIAPINE FUMARATE 25 MG: 25 TABLET, FILM COATED ORAL at 17:05

## 2017-09-26 RX ADMIN — Medication 400 MG: at 08:43

## 2017-09-26 RX ADMIN — FAMOTIDINE 20 MG: 20 TABLET ORAL at 08:44

## 2017-09-26 RX ADMIN — APIXABAN 2.5 MG: 2.5 TABLET, FILM COATED ORAL at 08:44

## 2017-09-26 RX ADMIN — METOPROLOL TARTRATE 25 MG: 25 TABLET, FILM COATED ORAL at 08:44

## 2017-09-26 RX ADMIN — MIRTAZAPINE 15 MG: 15 TABLET, FILM COATED ORAL at 22:03

## 2017-09-26 RX ADMIN — BUSPIRONE HYDROCHLORIDE 5 MG: 5 TABLET ORAL at 15:51

## 2017-09-26 RX ADMIN — METOPROLOL TARTRATE 25 MG: 25 TABLET, FILM COATED ORAL at 17:05

## 2017-09-26 RX ADMIN — TAMSULOSIN HYDROCHLORIDE 0.4 MG: 0.4 CAPSULE ORAL at 15:51

## 2017-09-26 RX ADMIN — AMLODIPINE BESYLATE 5 MG: 5 TABLET ORAL at 08:43

## 2017-09-26 RX ADMIN — FAMOTIDINE 20 MG: 20 TABLET ORAL at 17:05

## 2017-09-26 RX ADMIN — MEMANTINE 5 MG: 5 TABLET ORAL at 17:06

## 2017-09-26 RX ADMIN — APIXABAN 2.5 MG: 2.5 TABLET, FILM COATED ORAL at 17:05

## 2017-09-26 RX ADMIN — MEMANTINE 5 MG: 5 TABLET ORAL at 08:43

## 2017-09-26 RX ADMIN — BUSPIRONE HYDROCHLORIDE 5 MG: 5 TABLET ORAL at 22:03

## 2017-09-26 RX ADMIN — SODIUM CHLORIDE 1000 MG: 9 INJECTION, SOLUTION INTRAVENOUS at 06:16

## 2017-09-26 RX ADMIN — QUETIAPINE FUMARATE 25 MG: 25 TABLET, FILM COATED ORAL at 08:44

## 2017-09-26 RX ADMIN — BUSPIRONE HYDROCHLORIDE 5 MG: 5 TABLET ORAL at 08:44

## 2017-09-26 RX ADMIN — STANDARDIZED SENNA CONCENTRATE AND DOCUSATE SODIUM 2 TABLET: 8.6; 5 TABLET, FILM COATED ORAL at 08:44

## 2017-09-26 RX ADMIN — POTASSIUM CHLORIDE 20 MEQ: 1500 TABLET, EXTENDED RELEASE ORAL at 08:44

## 2017-09-27 ENCOUNTER — GENERIC CONVERSION - ENCOUNTER (OUTPATIENT)
Dept: OTHER | Facility: OTHER | Age: 67
End: 2017-09-27

## 2017-09-27 ENCOUNTER — APPOINTMENT (INPATIENT)
Dept: RADIOLOGY | Facility: HOSPITAL | Age: 67
DRG: 698 | End: 2017-09-27
Attending: INTERNAL MEDICINE
Payer: MEDICARE

## 2017-09-27 VITALS
DIASTOLIC BLOOD PRESSURE: 78 MMHG | BODY MASS INDEX: 29.07 KG/M2 | SYSTOLIC BLOOD PRESSURE: 160 MMHG | HEIGHT: 71 IN | TEMPERATURE: 98 F | HEART RATE: 61 BPM | WEIGHT: 207.67 LBS | RESPIRATION RATE: 18 BRPM | OXYGEN SATURATION: 96 %

## 2017-09-27 PROBLEM — A41.9 SEVERE SEPSIS (HCC): Status: RESOLVED | Noted: 2017-09-21 | Resolved: 2017-09-27

## 2017-09-27 PROBLEM — R65.20 SEVERE SEPSIS (HCC): Status: RESOLVED | Noted: 2017-09-21 | Resolved: 2017-09-27

## 2017-09-27 PROCEDURE — C1751 CATH, INF, PER/CENT/MIDLINE: HCPCS

## 2017-09-27 PROCEDURE — 76937 US GUIDE VASCULAR ACCESS: CPT

## 2017-09-27 PROCEDURE — 02HV33Z INSERTION OF INFUSION DEVICE INTO SUPERIOR VENA CAVA, PERCUTANEOUS APPROACH: ICD-10-PCS | Performed by: INTERNAL MEDICINE

## 2017-09-27 PROCEDURE — 77001 FLUOROGUIDE FOR VEIN DEVICE: CPT

## 2017-09-27 PROCEDURE — 36569 INSJ PICC 5 YR+ W/O IMAGING: CPT

## 2017-09-27 RX ADMIN — MEMANTINE 5 MG: 5 TABLET ORAL at 17:32

## 2017-09-27 RX ADMIN — Medication 400 MG: at 09:05

## 2017-09-27 RX ADMIN — MEMANTINE 5 MG: 5 TABLET ORAL at 09:05

## 2017-09-27 RX ADMIN — METOPROLOL TARTRATE 25 MG: 25 TABLET, FILM COATED ORAL at 17:32

## 2017-09-27 RX ADMIN — SODIUM CHLORIDE 1000 MG: 9 INJECTION, SOLUTION INTRAVENOUS at 05:34

## 2017-09-27 RX ADMIN — APIXABAN 2.5 MG: 2.5 TABLET, FILM COATED ORAL at 17:32

## 2017-09-27 RX ADMIN — QUETIAPINE FUMARATE 25 MG: 25 TABLET, FILM COATED ORAL at 17:32

## 2017-09-27 RX ADMIN — AMLODIPINE BESYLATE 5 MG: 5 TABLET ORAL at 09:05

## 2017-09-27 RX ADMIN — POTASSIUM CHLORIDE 20 MEQ: 1500 TABLET, EXTENDED RELEASE ORAL at 09:05

## 2017-09-27 RX ADMIN — APIXABAN 2.5 MG: 2.5 TABLET, FILM COATED ORAL at 09:05

## 2017-09-27 RX ADMIN — BUSPIRONE HYDROCHLORIDE 5 MG: 5 TABLET ORAL at 09:05

## 2017-09-27 RX ADMIN — TAMSULOSIN HYDROCHLORIDE 0.4 MG: 0.4 CAPSULE ORAL at 17:32

## 2017-09-27 RX ADMIN — BUSPIRONE HYDROCHLORIDE 5 MG: 5 TABLET ORAL at 17:32

## 2017-09-27 RX ADMIN — FAMOTIDINE 20 MG: 20 TABLET ORAL at 09:05

## 2017-09-27 RX ADMIN — FAMOTIDINE 20 MG: 20 TABLET ORAL at 17:32

## 2017-09-27 RX ADMIN — Medication 400 MG: at 17:32

## 2017-09-27 RX ADMIN — METOPROLOL TARTRATE 25 MG: 25 TABLET, FILM COATED ORAL at 09:05

## 2017-09-27 RX ADMIN — QUETIAPINE FUMARATE 25 MG: 25 TABLET, FILM COATED ORAL at 09:06

## 2017-09-29 LAB
BACTERIA BLD CULT: NORMAL
BACTERIA BLD CULT: NORMAL

## 2017-10-06 DIAGNOSIS — N20.0 CALCULUS OF KIDNEY: ICD-10-CM

## 2017-10-15 ENCOUNTER — HOSPITAL ENCOUNTER (EMERGENCY)
Facility: HOSPITAL | Age: 67
Discharge: HOME | End: 2017-10-15
Attending: EMERGENCY MEDICINE | Admitting: EMERGENCY MEDICINE
Payer: COMMERCIAL

## 2017-10-15 VITALS
TEMPERATURE: 99.1 F | HEART RATE: 69 BPM | RESPIRATION RATE: 16 BRPM | DIASTOLIC BLOOD PRESSURE: 82 MMHG | OXYGEN SATURATION: 97 % | SYSTOLIC BLOOD PRESSURE: 140 MMHG | WEIGHT: 201 LBS | BODY MASS INDEX: 28.03 KG/M2

## 2017-10-15 DIAGNOSIS — T83.021A DISLODGED FOLEY CATHETER, INITIAL ENCOUNTER: Primary | ICD-10-CM

## 2017-10-15 LAB
CLARITY, POC: CLEAR
COLOR, POC: YELLOW
EXT BILIRUBIN, UA: NORMAL
EXT BLOOD URINE: NORMAL
EXT GLUCOSE, UA: NORMAL
EXT KETONES: NORMAL
EXT NITRITE, UA: NORMAL
EXT PH, UA: 6.5
EXT PROTEIN, UA: 30
EXT SPECIFIC GRAVITY, UA: 1.01
EXT UROBILINOGEN: 0.2
WBC # BLD EST: NORMAL 10*3/UL

## 2017-10-15 PROCEDURE — 99284 EMERGENCY DEPT VISIT MOD MDM: CPT

## 2017-10-15 PROCEDURE — 81002 URINALYSIS NONAUTO W/O SCOPE: CPT | Performed by: EMERGENCY MEDICINE

## 2017-10-15 RX ORDER — ACETAMINOPHEN 325 MG/1
650 TABLET ORAL ONCE
Status: COMPLETED | OUTPATIENT
Start: 2017-10-15 | End: 2017-10-15

## 2017-10-15 RX ADMIN — ACETAMINOPHEN 650 MG: 325 TABLET ORAL at 21:25

## 2017-10-16 NOTE — ED NOTES
Per order from Dr Anita Moody, lucia catheter advanced into bladder to Y  Balloon checked 6 ml - filled per lucia instructions to 10 ml  Urine draining from catheter without obstruction or complication  Patient resting more comfortably at this time  See I/O chart for total output  Flushed catheter following emptying of bladder - lucia catheter flushed easily (30 ml) with return of flush         Ara Alexandre RN  10/15/17 1003

## 2017-10-16 NOTE — ED NOTES
Attempted to flush catheter- unable after multiple attempts  Dr Edmond Quintero notified       Debora Dodd, RN  10/15/17 5997

## 2017-10-19 NOTE — ED PROVIDER NOTES
History  Chief Complaint   Patient presents with    Urinary Catheter Problem     Pt presents to ED for evaluation and treatment of uerinary catheter obstruction  Hx of cath problems  Wife reports that catheter needs to be changed with a guidewire by urology  Hx of ESBL/sepsis a few weeks ago       History provided by:  Caregiver   used: No    Urinary Catheter Problem    75-year-old nonverbal male from the nursing home currently getting treated for UTI brought in for blocked Thorne catheter  His caretaker noted no output over the last 4 hours  He has had issues with blockages in the past   Stated that sometimes when he was laid down flat it would flow again  Attempted flushing  Fluids flow in nicely but will not follow out  Will attempt readjustment  Catheter is 12 [address], was placed by Urology and last time needed to be changed over a wire  Prior to Admission Medications   Prescriptions Last Dose Informant Patient Reported? Taking?    QUEtiapine (SEROquel) 25 mg tablet  Other Yes No   Sig: Take 25 mg by mouth 2 (two) times a day     acetaminophen (TYLENOL) 325 mg tablet  Other Yes No   Sig: Take 650 mg by mouth every 6 (six) hours as needed for mild pain   amLODIPine (NORVASC) 5 mg tablet  Other Yes No   Sig: Take 5 mg by mouth daily   apixaban (ELIQUIS) 2 5 mg  Other Yes No   Sig: Take 2 5 mg by mouth 2 (two) times a day   bisacodyl (BISAC-EVAC) 10 mg suppository  Other Yes No   Sig: Insert 10 mg into the rectum as needed for constipation   busPIRone (BUSPAR) 5 mg tablet  Other Yes No   Sig: Take 5 mg by mouth 3 (three) times a day   docusate sodium (COLACE) 100 mg capsule   No No   Sig: Take 1 capsule by mouth 2 (two) times a day for 30 days   famotidine (PEPCID) 20 mg tablet   Yes No   Sig: Take 20 mg by mouth 2 (two) times a day   haloperidol (HALDOL) 2 mg tablet   No No   Sig: Take 1 tablet by mouth 2 (two) times a day as needed for agitation for up to 2 days   memantine (NAMENDA) 5 mg tablet  Other No No   Sig: Take 1 tablet by mouth 2 (two) times a day for 30 days   metoprolol tartrate (LOPRESSOR) 25 mg tablet   No No   Sig: Take 1 tablet by mouth 2 (two) times a day for 30 days   Patient taking differently: Take 25 mg by mouth 2 (two) times a day     mirtazapine (REMERON) 15 mg tablet  Other No No   Sig: Take 1 tablet by mouth daily at bedtime for 30 days   senna (SENOKOT) 8 6 mg  Other No No   Sig: Take 1 tablet by mouth daily at bedtime for 7 days   tamsulosin (FLOMAX) 0 4 mg  Other No No   Sig: Take 1 capsule by mouth daily with dinner for 30 days      Facility-Administered Medications: None       Past Medical History:   Diagnosis Date    Anxiety     Aortic aneurysm (HCC)     Aphasia     Ataxia     Bladder adhesions     BPH (benign prostatic hypertrophy)     COPD (chronic obstructive pulmonary disease) (HCC)     wife denies - "never had"    Dementia     Difficulty walking     Essential (primary) hypertension     Generalized anxiety disorder     GERD (gastroesophageal reflux disease)     Global aphasia     H/O blood clots     History of kidney stones     Muscle weakness     Neuromuscular dysfunction of bladder     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     Retention of urine, unspecified     Urinary tract bacterial infections     Urinary tract infection        Past Surgical History:   Procedure Laterality Date    CYSTOSCOPY      IVC FILTER INSERTION      X2    IVC FILTER INSERTION      x2    KIDNEY STONE SURGERY      KNEE SURGERY      Sepsis infection     NEPHROSTOMY      WY CYSTO/URETERO W/LITHOTRIPSY &INDWELL STENT INSRT Right 6/14/2017    Procedure: CYSTOSCOPY URETEROSCOPY WITH LITHOTRIPSY HOLMIUM LASER,,BASKET STONE EXTRACTION, RETROGRADE PYELOGRAM AND INSERTION STENT URETERAL;  Surgeon: Una Polanco MD;  [facility];  Service: Urology    URINARY SURGERY         No family history on file   I have reviewed and agree with the history as documented  Social History   Substance Use Topics    Smoking status: Never Smoker    Smokeless tobacco: Never Used    Alcohol use No        Review of Systems   Unable to perform ROS: Patient nonverbal       Physical Exam  ED Triage Vitals   Temperature Pulse Respirations Blood Pressure SpO2   10/15/17 2024 10/15/17 2019 10/15/17 2019 10/15/17 2019 10/15/17 2019   100 3 °F (37 9 °C) 80 18 139/84 94 %      Temp Source Heart Rate Source Patient Position - Orthostatic VS BP Location FiO2 (%)   10/15/17 2024 10/15/17 2019 10/15/17 2019 10/15/17 2019 --   Axillary Monitor Lying Right arm       Pain Score       10/15/17 2019       No Pain           Physical Exam   Constitutional: He appears well-developed and well-nourished  HENT:   Head: Normocephalic and atraumatic  Mouth/Throat: Oropharynx is clear and moist    Cardiovascular: Normal rate and regular rhythm  Pulmonary/Chest: Effort normal and breath sounds normal    Abdominal: Soft  He exhibits no distension  Genitourinary:   Genitourinary Comments: Catheter in place with minimal clear yellow urine  Musculoskeletal: Normal range of motion  He exhibits no deformity  Neurological:   Nonverbal    Skin: Skin is warm and dry  Nursing note and vitals reviewed        ED Medications  Medications   acetaminophen (TYLENOL) tablet 650 mg (650 mg Oral Given 10/15/17 2125)       Diagnostic Studies  Labs Reviewed   POCT URINALYSIS DIPSTICK - Normal       Result Value Ref Range Status    Color, UA yellow   Final    Clarity, UA clear   Final    EXT Glucose, UA neg   Final    EXT Bilirubin, UA (Ref: Negative) neg   Final    EXT Ketones, UA (Ref: Negative) neg   Final    EXT Spec Grav, UA 1 015   Final    EXT Blood, UA (Ref: Negative) large   Final    EXT pH, UA 6 5   Final    EXT Protein, UA (Ref: Negative) 30   Final    EXT Urobilinogen, UA (Ref: 0 2- 1 0) 0 2   Final    EXT Leukocytes, UA (Ref: Negative) large Final    EXT Nitrite, UA (Ref: Negative) neg   Final       No orders to display       Procedures  Procedures      Phone Contacts  ED Phone Contact    ED Course  ED Course                                MDM  Number of Diagnoses or Management Options  Dislodged Thorne catheter, initial encounter [facility]):   Diagnosis management comments: 64-year-old male presented with decreased Thorne catheter output over the last 4 hours  Bladder scanned for about 400 mL  Nurse readjusted catheter with normal urine flow following  No other complaints  Amount and/or Complexity of Data Reviewed  Obtain history from someone other than the patient: yes    Patient Progress  Patient progress: resolved    CritCare Time    Disposition  Final diagnoses:   Dislodged Thorne catheter, initial encounter [facility])     ED Disposition     ED Disposition Condition Comment    [facility]self [facility]      Condition at discharge: Good        Follow-up Information     Follow up With Specialties Details Why Contact Info Additional Information    your urologist         [facility] Emergency [facility]  If symptoms worsen [address] [id number] [phone]  ED, [address], [place], [place], [facility]        Discharge Medication List as of 10/15/2017 10:44 PM      CONTINUE these medications which have NOT CHANGED    Details   acetaminophen (TYLENOL) 325 mg tablet Take 650 mg by mouth every 6 (six) hours as needed for mild pain, Until Discontinued, Historical Med      amLODIPine (NORVASC) 5 mg tablet Take 5 mg by mouth daily, Until Discontinued, Historical Med      apixaban (ELIQUIS) 2 5 mg Take 2 5 mg by mouth 2 (two) times a day, Historical Med      bisacodyl (BISAC-EVAC) 10 mg suppository Insert 10 mg into the rectum as needed for constipation, Until Discontinued, Historical Med      busPIRone (BUSPAR) 5 mg tablet Take 5 mg by mouth 3 (three) times a day, Until Discontinued, Historical Med      docusate sodium (COLACE) 100 mg capsule Take 1 capsule by mouth 2 (two) times a day for 30 days, Starting Mon 5/8/2017, Until Thu 9/21/2017, No Print      famotidine (PEPCID) 20 mg tablet Take 20 mg by mouth 2 (two) times a day, Historical Med      haloperidol (HALDOL) 2 mg tablet Take 1 tablet by mouth 2 (two) times a day as needed for agitation for up to 2 days, Starting Mon 5/8/2017, Until Thu 9/21/2017, Print      memantine (NAMENDA) 5 mg tablet Take 1 tablet by mouth 2 (two) times a day for 30 days, Starting Fri 11/11/2016, Until Thu 9/21/2017, Print      metoprolol tartrate (LOPRESSOR) 25 mg tablet Take 1 tablet by mouth 2 (two) times a day for 30 days, Starting Fri 11/11/2016, Until Thu 9/21/2017, Print      mirtazapine (REMERON) 15 mg tablet Take 1 tablet by mouth daily at bedtime for 30 days, Starting Wed 3/1/2017, Until Thu 9/21/2017, Print      QUEtiapine (SEROquel) 25 mg tablet Take 25 mg by mouth 2 (two) times a day  , Historical Med      senna (SENOKOT) 8 6 mg Take 1 tablet by mouth daily at bedtime for 7 days, Starting Mon 5/8/2017, Until Thu 9/21/2017, No Print      tamsulosin (FLOMAX) 0 4 mg Take 1 capsule by mouth daily with dinner for 30 days, Starting Wed 3/1/2017, Until Thu 9/21/2017, Print           No discharge procedures on file      ED Provider  Electronically Signed by       Kane Berkowitz MD  10/19/17 7727

## 2017-10-31 ENCOUNTER — ALLSCRIPTS OFFICE VISIT (OUTPATIENT)
Dept: OTHER | Facility: OTHER | Age: 67
End: 2017-10-31

## 2017-11-01 NOTE — PROGRESS NOTES
Assessment  1  Kidney stones (592 0) (N20 0)   2  Urinary retention (788 20) (R33 9)    Plan  Urinary retention    · Urology Follow Up Evaluation and Treatment  Follow-up catheter change over a wire  (patient has history of difficult catheter placement) with doctor Prince  Status: Hold For -  Scheduling  Requested for: [id number]   Ordered; For: Urinary retention; Ordered By: Sita Guzman Performed:  Due: 48TZH0292    Discussion/Summary  Discussion Summary:   Truong Rose is doing relatively well overall today despite his multiple comorbid medical conditions  He does have a history of sepsis due to urinary source and has previously required hospitalization for this  Thorne catheter now requires changing over a wire due to a likely false passage within his urethra and this difficult Thorne catheter placement bladder irrigation was performed today successfully using sterile technique and he is left with an 18 [address] [place] tip Thorne catheter which will require changing in 1 month  films reviewed that he is stone free at this time does not require further stone surgery intervention for ureteral stones  They should continue to perform Renacidin irrigations of his bladder and Thorne catheter at [facility]  Return to clinic in 1 month for Thorne catheter change over a wire by Dr Lexis Madison  Goals and Barriers: The patient has the current Goals: To maintain Thorne catheter patency  The patent has the current Barriers: Patient's comorbid medical conditions  Patient's Capacity to Self-Care: Patient is unable to Self-Care: Resident at [facility], patient's wife is active in his care  Patient Education: Educational resources provided: Thorne catheter irrigation reviewed with the wife     Counseling Documentation With Imm: The patient, patient's family was counseled regarding diagnostic results,-- instructions for management,-- risk factor reductions,-- prognosis,-- patient and family education,-- impressions,-- risks and benefits of treatment options,-- importance of compliance with treatment  Self Referrals:   Self Referrals: No      Chief Complaint  Chief Complaint Free Text Note Form: Patient presents for neurogenic bladder, nephrolithiasis, urinary retention      History of Present Illness  HPI: 56-year-old gentleman previously seen by Dr Britany Noonan with a history of nephrolithiasis as well as urinary retention  He was last seen roughly 2 months ago in follow-up at which time the plan was for Thorne catheter changes at [facility] in the plan for follow-up imaging  This follow-up imaging was done and reports were reviewed by me personally today which shows no active stone disease  Per review of Dr Griselda Has operative note the patient was stone free at the termination of his procedure and a follow-up IR nephrostogram showed no obstruction and the right nephrostomy tube was removed  patient does have a history of Thorne catheter clogging as well as been undergoing Renacidin irrigations at [facility] with positive affect  His Thorne catheter changes have become difficult, however, and he last required a Thorne catheter placement change over a guidewire in the emergency room by the urology service  He presents today for Thorne catheter change for his history of urinary retention, neurogenic bladder, and difficult Thorne catheter placement likely due to a false passage  is here with his wife who takes excellent care of him and who is an excellent historian and who helps with the Thorne catheter changed today  Review of Systems  Complete-Male Urology:   Constitutional: No fever or chills, feels well, no tiredness, no recent weight gain or weight loss  Respiratory: No complaints of shortness of breath, no wheezing, no cough, no SOB on exertion, no orthopnea or PND  Cardiovascular: No complaints of slow heart rate, no fast heart rate, no chest pain, no palpitations, no leg claudication, no lower extremity     Gastrointestinal: No complaints of abdominal pain, no constipation, no nausea or vomiting, no diarrhea or bloody stools  Genitourinary: Thorne catheter, patients wife states he has a lot of sediment  Musculoskeletal: No complaints of arthralgia, no myalgias, no joint swelling or stiffness, no limb pain or swelling  Integumentary: No complaints of skin rash or skin lesions, no itching, no skin wound, no dry skin  Hematologic/Lymphatic: No complaints of swollen glands, no swollen glands in the neck, does not bleed easily, no easy bruising  Neurological: No compliants of headache, no confusion, no convulsions, no numbness or tingling, no dizziness or fainting, no limb weakness, no difficulty walking  ROS Reviewed:   ROS reviewed  Active Problems  1  Acute deep vein thrombosis (DVT) of both iliofemoral veins (453 41) (I82 423)   2  Aphasia of unknown origin (784 3) (R47 01)   3  Bowel trouble (569 9) (K63 9)   4  GERD (gastroesophageal reflux disease) (530 81) (K21 9)   5  Gram-negative sepsis with organ dysfunction (038 40,995 92) (A41 50,R65 20)   6  High blood pressure (401 9) (I10)   7  Infection of drug-resistant bacteria (136 9,V09 90) (A49 9,Z16 30)   8  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)   9  Kidney stones (592 0) (N20 0)   10  Knee problem (719 96) (M25 9)   11  Mental disorder (300 9) (F99)   12  Presence of IVC filter (V45 89) (Z95 828)   13  Urinary retention (788 20) (R33 9)    Past Medical History  Active Problems And Past Medical History Reviewed: The active problems and past medical history were reviewed and updated today  Surgical History  1  History of Knee Surgery  Surgical History Reviewed: The surgical history was reviewed and updated today  Family History  Mother    1  Family history of cardiac disorder (V17 49) (Z82 49)  Father    2  Family history of diabetes mellitus (V18 0) (Z83 3)  Family History Reviewed: The family history was reviewed and updated today         Social History   · Never a smoker   · No alcohol use  Social History Reviewed: The social history was reviewed and updated today  The social history was reviewed and is unchanged  Current Meds   1  Acetaminophen 325 MG Oral Tablet; TAKE 2 TABLET Every 6 hours PRN; Therapy: (Recorded:05Jun2017) to Recorded   2  AmLODIPine Besylate 5 MG Oral Tablet; TAKE 1 TABLET DAILY AS DIRECTED; Therapy: (Recorded:05Jun2017) to Recorded   3  BusPIRone HCl - 5 MG Oral Tablet; TAKE 1 TABLET 3 TIMES DAILY; Therapy: ([facility]) to Recorded   4  Eliquis 2 5 MG Oral Tablet; Therapy: ([facility]) to Recorded   5  Famotidine 20 MG Oral Tablet; TAKE 1 TABLET TWICE DAILY; Therapy: ([facility]) to Recorded   6  Haloperidol 2 MG Oral Tablet; TAKE 1 TABLET Twice daily PRN; Therapy: ([facility]) to Recorded   7  Metoprolol Tartrate 25 MG Oral Tablet; TAKE 1 TABLET TWICE DAILY; Therapy: ([facility]) to Recorded   8  Mirtazapine 15 MG Oral Tablet; TAKE 1 TABLET AT BEDTIME; Therapy: ([facility]) to Recorded   9  SEROquel 25 MG Oral Tablet; TAKE 1 TABLET TWICE DAILY; Therapy: (Recorded:05Jun2017) to Recorded   10  Tamsulosin HCl - 0 4 MG Oral Capsule; take 1 capsule daily; Therapy: (Recorded:05Jun2017) to Recorded  Medication List Reviewed: The medication list was reviewed and updated today  Allergies  1  gentamicin   2  Sulfa Drugs   3  Vancomycin HCl CAPS   4  Zosyn  5  Adhesive Tape    Vitals  Vital Signs    Recorded: [id number] 02:08PM   Heart Rate 76   Systolic 452   Diastolic 64   Height Unobtainable Yes   Weight Unobtainable Yes     Physical Exam    Constitutional The patient is in a stretcher, is oriented to the examiner and apparently to himself, is aphasic, does contract his knees towards his chest during the Thorne catheter change and somewhat tries to impede the catheter change as well     Pulmonary   Respiratory effort: No increased work of breathing or signs of respiratory distress  Cardiovascular   Examination of extremities for edema and/or varicosities: Normal     Abdomen   Abdomen: Non-tender, no masses  -- Abdomen is soft, nontender, nondistended there are no masses or defects  Liver and spleen: No hepatomegaly or splenomegaly  Genitourinary Deferred  Scrotum contents: Normal size, no masses  Epididymis: Normal, no masses  Testes: Normal testes, no masses  Urethral meatus: Normal, no lesions  Penis: Normal, no lesions  -- Deferred  Musculoskeletal   Gait and station: Abnormal  -- Patient with residual neurologic deficits from his history of stroke, patient is in a stretcher  Digits and nails: Normal without clubbing or cyanosis  Inspection/palpation of joints, bones, and muscles: Abnormal  -- Scar from the surgery over right knee  Skin   Skin and subcutaneous tissue: Normal without rashes or lesions  Lymphatic   Palpation of lymph nodes in groin: Normal     Neurologic   Cranial nerves: Abnormal  -- Patient's face has a masked appearance  Results/Data  Diagnostic Studies Reviewed Urology:   X-ray Review Outside films reviewed shows no stone burden  Procedure    Procedure: Bladder Catheterization  Bladder catheterization was performed for urine retention  The procedure's risks, benefits, alternatives and infection risk were discussed with the patient and the guardian  An indwelling catheter was inserted and Difficult Thorne catheter placement over a Super Stiff wire  Catheter was up sized to an [facility] tip catheter  Placement of bladder was confirmed via bladder irrigation and this flushed well   under strict aseptic technique  Urine Apperance: cloudy-- and-- bloody  The patient tolerated the procedure well  There were no complications        Signatures   Electronically signed by : PATIENCE Hodges ; Oct 31 2017  2:20PM EST                       (Author)

## 2017-11-27 ENCOUNTER — HOSPITAL ENCOUNTER (EMERGENCY)
Facility: HOSPITAL | Age: 67
Discharge: HOME | End: 2017-11-27
Attending: EMERGENCY MEDICINE | Admitting: EMERGENCY MEDICINE
Payer: COMMERCIAL

## 2017-11-27 ENCOUNTER — GENERIC CONVERSION - ENCOUNTER (OUTPATIENT)
Dept: OTHER | Facility: OTHER | Age: 67
End: 2017-11-27

## 2017-11-27 VITALS
WEIGHT: 209.44 LBS | BODY MASS INDEX: 29.21 KG/M2 | HEART RATE: 116 BPM | OXYGEN SATURATION: 94 % | RESPIRATION RATE: 18 BRPM | SYSTOLIC BLOOD PRESSURE: 170 MMHG | DIASTOLIC BLOOD PRESSURE: 101 MMHG | TEMPERATURE: 98.6 F

## 2017-11-27 DIAGNOSIS — T83.9XXS FOLEY CATHETER PROBLEM, SEQUELA: Primary | ICD-10-CM

## 2017-11-27 LAB
ANION GAP SERPL CALCULATED.3IONS-SCNC: 12 MMOL/L (ref 4–13)
BUN SERPL-MCNC: 19 MG/DL (ref 5–25)
CALCIUM SERPL-MCNC: 9.3 MG/DL (ref 8.3–10.1)
CHLORIDE SERPL-SCNC: 106 MMOL/L (ref 100–108)
CO2 SERPL-SCNC: 27 MMOL/L (ref 21–32)
CREAT SERPL-MCNC: 1.46 MG/DL (ref 0.6–1.3)
GFR SERPL CREATININE-BSD FRML MDRD: 48 ML/MIN/1.73SQ M
GLUCOSE SERPL-MCNC: 142 MG/DL (ref 65–140)
POTASSIUM SERPL-SCNC: 3.4 MMOL/L (ref 3.5–5.3)
SODIUM SERPL-SCNC: 145 MMOL/L (ref 136–145)

## 2017-11-27 PROCEDURE — 80048 BASIC METABOLIC PNL TOTAL CA: CPT | Performed by: EMERGENCY MEDICINE

## 2017-11-27 PROCEDURE — 36415 COLL VENOUS BLD VENIPUNCTURE: CPT | Performed by: EMERGENCY MEDICINE

## 2017-11-27 PROCEDURE — 99283 EMERGENCY DEPT VISIT LOW MDM: CPT

## 2017-11-27 NOTE — ED NOTES
Dr Kyle Yoder at bedside to orestes/change lucia catheter  Pt tolerated procedure well with negative complications       Ruby Padilla RN  11/27/17 7159

## 2017-11-27 NOTE — ED PROVIDER NOTES
History  Chief Complaint   Patient presents with    Urinary Catheter Problem     Pt brought to ER via EMS from NH for "clogged lucia for about a day or so" per EMS  Pt schedule for appt with Urology 11/28 for catheter change     HPI    Prior to Admission Medications   Prescriptions Last Dose Informant Patient Reported? Taking?    QUEtiapine (SEROquel) 25 mg tablet  Other (Specify) Yes No   Sig: Take 25 mg by mouth 2 (two) times a day     acetaminophen (TYLENOL) 325 mg tablet  Other (Specify) Yes No   Sig: Take 650 mg by mouth every 6 (six) hours as needed for mild pain   amLODIPine (NORVASC) 5 mg tablet  Other (Specify) Yes No   Sig: Take 5 mg by mouth daily   apixaban (ELIQUIS) 2 5 mg  Other (Specify) Yes No   Sig: Take 2 5 mg by mouth 2 (two) times a day   bisacodyl (BISAC-EVAC) 10 mg suppository  Other (Specify) Yes No   Sig: Insert 10 mg into the rectum as needed for constipation   busPIRone (BUSPAR) 5 mg tablet  Other (Specify) Yes No   Sig: Take 5 mg by mouth 3 (three) times a day   docusate sodium (COLACE) 100 mg capsule   No No   Sig: Take 1 capsule by mouth 2 (two) times a day for 30 days   famotidine (PEPCID) 20 mg tablet   Yes No   Sig: Take 20 mg by mouth 2 (two) times a day   haloperidol (HALDOL) 2 mg tablet   No No   Sig: Take 1 tablet by mouth 2 (two) times a day as needed for agitation for up to 2 days   memantine (NAMENDA) 5 mg tablet  Other (Specify) No No   Sig: Take 1 tablet by mouth 2 (two) times a day for 30 days   metoprolol tartrate (LOPRESSOR) 25 mg tablet   No No   Sig: Take 1 tablet by mouth 2 (two) times a day for 30 days   Patient taking differently: Take 25 mg by mouth 2 (two) times a day     mirtazapine (REMERON) 15 mg tablet  Other (Specify) No No   Sig: Take 1 tablet by mouth daily at bedtime for 30 days   senna (SENOKOT) 8 6 mg  Other (Specify) No No   Sig: Take 1 tablet by mouth daily at bedtime for 7 days   tamsulosin (FLOMAX) 0 4 mg  Other (Specify) No No   Sig: Take 1 capsule by mouth daily with dinner for 30 days      Facility-Administered Medications: None       Past Medical History:   Diagnosis Date    Anxiety     Aortic aneurysm (HCC)     Aphasia     Ataxia     Bladder adhesions     BPH (benign prostatic hypertrophy)     COPD (chronic obstructive pulmonary disease) (HCC)     wife denies - "never had"    Dementia     Difficulty walking     Essential (primary) hypertension     Generalized anxiety disorder     GERD (gastroesophageal reflux disease)     Global aphasia     H/O blood clots     History of kidney stones     Muscle weakness     Neuromuscular dysfunction of bladder     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     Retention of urine, unspecified     Urinary tract bacterial infections     Urinary tract infection        Past Surgical History:   Procedure Laterality Date    CYSTOSCOPY      IVC FILTER INSERTION      X2    IVC FILTER INSERTION      x2    KIDNEY STONE SURGERY      KNEE SURGERY      Sepsis infection     NEPHROSTOMY      MT CYSTO/URETERO W/LITHOTRIPSY &INDWELL STENT INSRT Right 6/14/2017    Procedure: CYSTOSCOPY URETEROSCOPY WITH LITHOTRIPSY HOLMIUM LASER,,BASKET STONE EXTRACTION, RETROGRADE PYELOGRAM AND INSERTION STENT URETERAL;  Surgeon: Mendy Yates MD;  Location[facility] [place] [facility] OR;  Service: Urology    URINARY SURGERY         History reviewed  No pertinent family history  I have reviewed and agree with the history as documented  Social History   Substance Use Topics    Smoking status: Never Smoker    Smokeless tobacco: Never Used    Alcohol use No        Review of Systems   Constitutional: Negative  HENT: Negative  Eyes: Negative  Respiratory: Negative  Cardiovascular: Negative  Gastrointestinal: Negative  Endocrine: Negative  Genitourinary: Negative  Musculoskeletal: Negative  Skin: Negative  Allergic/Immunologic: Negative  Neurological: Negative  Hematological: Negative  Psychiatric/Behavioral: Negative  Physical Exam  ED Triage Vitals [11/27/17 1410]   Temperature Pulse Respirations Blood Pressure SpO2   98 6 °F (37 °C) 86 18 (!) 174/92 99 %      Temp Source Heart Rate Source Patient Position - Orthostatic VS BP Location FiO2 (%)   Oral Monitor Lying Right arm --      Pain Score       6           Orthostatic Vital Signs  Vitals:    11/27/17 1410   BP: (!) 174/92   Pulse: 86   Patient Position - Orthostatic VS: Lying       Physical Exam   Constitutional: No distress  avss-- htnsive- pulse ox 99 % on ra- intepretation is normal- no intervention    HENT:   Head: Normocephalic and atraumatic  Eyes: Conjunctivae and EOM are normal  Pupils are equal, round, and reactive to light  Right eye exhibits no discharge  Left eye exhibits no discharge  No scleral icterus  Mm pink   Neck: Normal range of motion  Neck supple  No JVD present  No tracheal deviation present  No thyromegaly present  Cardiovascular: Normal rate, regular rhythm, normal heart sounds and intact distal pulses  Exam reveals no gallop and no friction rub  No murmur heard  Pulmonary/Chest: Effort normal and breath sounds normal  No stridor  No respiratory distress  He has no wheezes  He has no rales  He exhibits no tenderness  Abdominal: Soft  Bowel sounds are normal  He exhibits no distension and no mass  There is no tenderness  There is no rebound (  ) and no guarding  No hernia  No peritoneal signs- no cva tendenress bilaterally bilaterally   Musculoskeletal: Normal range of motion  He exhibits no edema, tenderness or deformity  Lymphadenopathy:     He has no cervical adenopathy  Neurological: He is alert  No cranial nerve deficit or sensory deficit  He exhibits normal muscle tone  Coordination normal    Skin: Skin is warm  Capillary refill takes less than 2 seconds  No rash noted  He is not diaphoretic  No erythema  No pallor     Nursing note and vitals reviewed  ED Medications  Medications - No data to display    Diagnostic Studies  Results Reviewed     Procedure Component Value Units Date/Time    Basic metabolic panel [[id number]]  (Abnormal) Collected:  11/27/17 1520    Lab Status:  Final result Specimen:  Blood from Arm, Right Updated:  11/27/17 1545     Sodium 145 mmol/L      Potassium 3 4 (L) mmol/L      Chloride 106 mmol/L      CO2 27 mmol/L      Anion Gap 12 mmol/L      BUN 19 mg/dL      Creatinine 1 46 (H) mg/dL      Glucose 142 (H) mg/dL      Calcium 9 3 mg/dL      eGFR 48 ml/min/1 73sq m     Narrative:         National Kidney Disease Education Program recommendations are as follows:  GFR calculation is accurate only with a steady state creatinine  Chronic Kidney disease less than 60 ml/min/1 73 sq  meters  Kidney failure less than 15 ml/min/1 73 sq  meters  No orders to display              Procedures  Procedures       Phone Contacts  ED Phone Contact    ED Course  ED Course as of Nov 27 1617   Mon Nov 27, 2017   1600 Er md note- urology - dr Tonie Stone to come in and  place [facility] Time    Disposition  Final diagnoses:   None     ED Disposition     None      Follow-up Information    None       Patient's Medications   Discharge Prescriptions    No medications on file     No discharge procedures on file      ED Provider  Electronically Signed by           Nia Boyd MD  11/27/17 5321

## 2017-11-27 NOTE — ED NOTES
RN received call from Dr Rama Fields, Urologist office  Instructed to not remove lucia that Dr Rama Fields will bring equipment to replace lucia catheter himself   MD updated     Heather Gr RN  11/27/17 1500

## 2017-11-27 NOTE — DISCHARGE INSTRUCTIONS
Diagnosis: lucia catheter problem    - lucia catheter was chagned by dr Xavier Reed in the er     - please return to  The er for any new/ worsening/concertning symptoms to you

## 2017-11-28 ENCOUNTER — GENERIC CONVERSION - ENCOUNTER (OUTPATIENT)
Dept: OTHER | Facility: OTHER | Age: 67
End: 2017-11-28

## 2017-11-28 NOTE — PROCEDURES
Thorne catheter exchanged over a guidewire for chronic retention due to BPH, false passage    Patient tolerated well

## 2017-12-09 ENCOUNTER — HOSPITAL ENCOUNTER (EMERGENCY)
Facility: HOSPITAL | Age: 67
Discharge: NURSING, REHAB OR LONG-TERM CARE | End: 2017-12-09
Attending: EMERGENCY MEDICINE | Admitting: EMERGENCY MEDICINE
Payer: COMMERCIAL

## 2017-12-09 VITALS
OXYGEN SATURATION: 95 % | TEMPERATURE: 98 F | WEIGHT: 209.22 LBS | BODY MASS INDEX: 29.18 KG/M2 | DIASTOLIC BLOOD PRESSURE: 90 MMHG | SYSTOLIC BLOOD PRESSURE: 159 MMHG | RESPIRATION RATE: 16 BRPM | HEART RATE: 64 BPM

## 2017-12-09 DIAGNOSIS — T83.091A OBSTRUCTION OF FOLEY CATHETER, INITIAL ENCOUNTER (HCC): Primary | ICD-10-CM

## 2017-12-09 LAB
CLARITY, POC: ABNORMAL
COLOR, POC: YELLOW
EXT BILIRUBIN, UA: NEGATIVE
EXT BLOOD URINE: ABNORMAL
EXT GLUCOSE, UA: NEGATIVE
EXT KETONES: NEGATIVE
EXT NITRITE, UA: POSITIVE
EXT PH, UA: 7
EXT PROTEIN, UA: 30
EXT SPECIFIC GRAVITY, UA: 1.01
EXT UROBILINOGEN: 0.2
WBC # BLD EST: ABNORMAL 10*3/UL

## 2017-12-09 PROCEDURE — 99283 EMERGENCY DEPT VISIT LOW MDM: CPT

## 2017-12-09 PROCEDURE — 87077 CULTURE AEROBIC IDENTIFY: CPT | Performed by: EMERGENCY MEDICINE

## 2017-12-09 PROCEDURE — 87186 SC STD MICRODIL/AGAR DIL: CPT | Performed by: EMERGENCY MEDICINE

## 2017-12-09 PROCEDURE — 81002 URINALYSIS NONAUTO W/O SCOPE: CPT | Performed by: EMERGENCY MEDICINE

## 2017-12-09 PROCEDURE — 87086 URINE CULTURE/COLONY COUNT: CPT | Performed by: EMERGENCY MEDICINE

## 2017-12-09 NOTE — ED CARE HANDOFF
Emergency Department Sign Out Note        I needed to print out discharge instructions  ED Course      Procedures  MDM  CritCare Time      Disposition  Final diagnoses:   Obstruction of Thorne catheter, initial encounter [facility])     Time reflects when diagnosis was documented in both MDM as applicable and the Disposition within this note     Time User Action Codes Description Comment    12/9/2017  2:15 AM Shelbie Kayewojciech Add [T83 091A] Obstruction of Thorne catheter, initial encounter [facility])       ED Disposition     ED Disposition Condition Comment    Discharge  [facility] discharge to home/self care  Condition at discharge: Good        Follow-up Information     Follow up With Specialties Details Why Contact Info Additional [address] [facility] [address] [place] for Urology  [address] [facility] Urology   [address] [id number]  [place] [address] Emergency Department Emergency Medicine  If symptoms worsen [address] [address]  [phone]  ED,  [address], [place], [address]        Patient's Medications   Discharge Prescriptions    No medications on file     No discharge procedures on file         ED Provider  Electronically Signed by

## 2017-12-09 NOTE — ED PROVIDER NOTES
History  Chief Complaint   Patient presents with    Urinary Catheter Problem       History provided by:  Patient   used: No     55-year-old male, nonverbal from nursing home sent for blocked urinary catheter  Unclear how long or when last changed  Attempted flushing in the emergency department without improvement  Patient indicating lower abdominal discomfort and need to urinate  Plan to replace Thorne, check urine and re-evaluate  Prior to Admission Medications   Prescriptions Last Dose Informant Patient Reported? Taking?    QUEtiapine (SEROquel) 25 mg tablet  Other (Specify) Yes No   Sig: Take 25 mg by mouth 2 (two) times a day     acetaminophen (TYLENOL) 325 mg tablet  Other (Specify) Yes No   Sig: Take 650 mg by mouth every 6 (six) hours as needed for mild pain   amLODIPine (NORVASC) 5 mg tablet  Other (Specify) Yes No   Sig: Take 5 mg by mouth daily   apixaban (ELIQUIS) 2 5 mg  Other (Specify) Yes No   Sig: Take 2 5 mg by mouth 2 (two) times a day   bisacodyl (BISAC-EVAC) 10 mg suppository  Other (Specify) Yes No   Sig: Insert 10 mg into the rectum as needed for constipation   busPIRone (BUSPAR) 5 mg tablet  Other (Specify) Yes No   Sig: Take 5 mg by mouth 3 (three) times a day   docusate sodium (COLACE) 100 mg capsule   No No   Sig: Take 1 capsule by mouth 2 (two) times a day for 30 days   famotidine (PEPCID) 20 mg tablet   Yes No   Sig: Take 20 mg by mouth 2 (two) times a day   haloperidol (HALDOL) 2 mg tablet   No No   Sig: Take 1 tablet by mouth 2 (two) times a day as needed for agitation for up to 2 days   memantine (NAMENDA) 5 mg tablet  Other (Specify) No No   Sig: Take 1 tablet by mouth 2 (two) times a day for 30 days   metoprolol tartrate (LOPRESSOR) 25 mg tablet   No No   Sig: Take 1 tablet by mouth 2 (two) times a day for 30 days   Patient taking differently: Take 25 mg by mouth 2 (two) times a day     mirtazapine (REMERON) 15 mg tablet  Other (Specify) No No   Sig: Take 1 tablet by mouth daily at bedtime for 30 days   senna (SENOKOT) 8 6 mg  Other (Specify) No No   Sig: Take 1 tablet by mouth daily at bedtime for 7 days   tamsulosin (FLOMAX) 0 4 mg  Other (Specify) No No   Sig: Take 1 capsule by mouth daily with dinner for 30 days      Facility-Administered Medications: None       Past Medical History:   Diagnosis Date    Anxiety     Aortic aneurysm (HCC)     Aphasia     Ataxia     Bladder adhesions     BPH (benign prostatic hypertrophy)     COPD (chronic obstructive pulmonary disease) (HCC)     wife denies - "never had"    Dementia     Difficulty walking     Essential (primary) hypertension     Generalized anxiety disorder     GERD (gastroesophageal reflux disease)     Global aphasia     H/O blood clots     History of kidney stones     Muscle weakness     Neuromuscular dysfunction of bladder     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     Retention of urine, unspecified     Urinary tract bacterial infections     Urinary tract infection        Past Surgical History:   Procedure Laterality Date    CYSTOSCOPY      IVC FILTER INSERTION      X2    IVC FILTER INSERTION      x2    KIDNEY STONE SURGERY      KNEE SURGERY      Sepsis infection     NEPHROSTOMY      SC CYSTO/URETERO W/LITHOTRIPSY &INDWELL STENT INSRT Right 6/14/2017    Procedure: CYSTOSCOPY URETEROSCOPY WITH LITHOTRIPSY HOLMIUM LASER,,BASKET STONE EXTRACTION, RETROGRADE PYELOGRAM AND INSERTION STENT URETERAL;  Surgeon: Jay Garcia MD;  Location: [facility] OR;  Service: Urology    URINARY SURGERY         History reviewed  No pertinent family history  I have reviewed and agree with the history as documented      Social History   Substance Use Topics    Smoking status: Never Smoker    Smokeless tobacco: Never Used    Alcohol use No        Review of Systems   Unable to perform ROS: Patient nonverbal       Physical Exam  ED Triage Vitals   Temperature Pulse Respirations Blood Pressure SpO2   12/09/17 0105 12/09/17 0101 12/09/17 0101 12/09/17 0101 12/09/17 0101   98 °F (36 7 °C) 63 16 162/67 97 %      Temp Source Heart Rate Source Patient Position - Orthostatic VS BP Location FiO2 (%)   12/09/17 0105 -- 12/09/17 0101 12/09/17 0101 --   Oral  Lying Left arm       Pain Score       --                  Orthostatic Vital Signs  Vitals:    12/09/17 0101   BP: 162/67   Pulse: 63   Patient Position - Orthostatic VS: Lying       Physical Exam   Constitutional: He appears well-developed and well-nourished  HENT:   Head: Normocephalic  Mouth/Throat: Oropharynx is clear and moist    Cardiovascular: Normal rate and regular rhythm  Pulmonary/Chest: Effort normal  No respiratory distress  Abdominal: Soft  He exhibits no distension  Suprapubic discomfort  Genitourinary:   Genitourinary Comments: Thorne catheter in place  Neurological: He is alert  Skin: Skin is warm and dry  No rash noted  Psychiatric: He has a normal mood and affect  His behavior is normal    Nursing note and vitals reviewed  ED Medications  Medications - No data to display    Diagnostic Studies  Results Reviewed     Procedure Component Value Units Date/Time    Urine culture [[id number]] Updated:  12/09/17 0216    Lab Status:   In process Specimen:  Urine     POCT urinalysis dipstick [[id number]]  (Abnormal) Resulted:  12/09/17 0211    Lab Status:  Final result Specimen:  Urine Updated:  12/09/17 0213     Color, UA Yellow     Clarity, UA Hazy     EXT Glucose, UA Negative     EXT Bilirubin, UA (Ref: Negative) Negative     EXT Ketones, UA (Ref: Negative) Negative     EXT Spec Grav, UA 1 015     EXT Blood, UA (Ref: Negative) Large     EXT pH, UA 7 0     EXT Protein, UA (Ref: Negative) 30     EXT Urobilinogen, UA (Ref: 0 2- 1 0) 0 2     EXT Leukocytes, UA (Ref: Negative) Large     EXT Nitrite, UA (Ref: Negative) Positive                 No orders to display              Procedures  Procedures Phone Contacts  ED Phone Contact    ED Course  ED Course as of Dec 09 0218   Sat Dec 09, 2017   0136 Patient reported improved pain after Thorne insertion  MDM  Number of Diagnoses or Management Options  Obstruction of Thorne catheter, initial encounter [facility]):   Diagnosis management comments: 75-year-old male with chronic urinary retention, chronic Thorne presented with blocked catheter from the nursing home  Attempted to flush without improvement  Catheter changed without complication in the emergency department with improvement in the patient's discomfort  Urine dip with nitrates, leukocytes  Patient has multiple antibiotic allergies and has had multiple resistant urinary tract infections  Will send for culture prior to initiation of any antibiotics  Patient is otherwise afebrile, has normal vitals and no complaints after catheter change  Patient Progress  Patient progress: improved    CritCare Time    Disposition  Final diagnoses:   Obstruction of Thorne catheter, initial encounter [facility])     Time reflects when diagnosis was documented in both MDM as applicable and the Disposition within this note     Time User Action Codes Description Comment    12/9/2017  2:15 AM Kevin Sessions Add [R23 320C] Obstruction of Thorne catheter, initial encounter [facility])       ED Disposition     ED Disposition Condition Comment    Discharge  [facility] discharge to home/self care      Condition at discharge: Good        Follow-up Information     Follow up With Specialties Details Why Contact Info Additional [address] for Urology  [address] [address]  [place]   [address] [facility] [address]  [facility]  If symptoms worsen [address] [facility] [address] [phone] [facility] ED,  [address], [place], [place], [address]        Patient's Medications Discharge Prescriptions    No medications on file     No discharge procedures on file      ED Provider  Electronically Signed by           Tad Field MD  12/09/17 3882

## 2017-12-11 LAB
BACTERIA UR CULT: ABNORMAL
BACTERIA UR CULT: ABNORMAL

## 2017-12-18 ENCOUNTER — GENERIC CONVERSION - ENCOUNTER (OUTPATIENT)
Dept: OTHER | Facility: OTHER | Age: 67
End: 2017-12-18

## 2018-01-11 ENCOUNTER — GENERIC CONVERSION - ENCOUNTER (OUTPATIENT)
Dept: OTHER | Facility: OTHER | Age: 68
End: 2018-01-11

## 2018-01-12 VITALS — HEART RATE: 80 BPM | SYSTOLIC BLOOD PRESSURE: 142 MMHG | DIASTOLIC BLOOD PRESSURE: 80 MMHG

## 2018-01-12 NOTE — PROCEDURES
Procedures by Alvaro Peng MD at 11/27/2017  6:40 PM      Author:  Alvaro Peng MD Service:  Urology Author Type:  Physician    Filed:  11/28/2017  8:39 AM Date of Service:  11/27/2017  6:40 PM Status:  Signed    :  Alvaro Peng MD (Physician)        Procedures:       1   INSERT URINARY CATHETER [BBX813 (Custom)]                 Thorne catheter exchanged over a guidewire for chronic retention due to BPH, false passage    Patient tolerated well           Received Alyson Vazquez MD  Nov 28 2017  8:39AM [facility] Standard Time

## 2018-01-12 NOTE — PROCEDURES
Procedures by Kisha Nice MD at 11/7/2016   4:44 PM      Author:  Kisha Nice MD Service:  Interventional Radiology  Author Type:  Physician     Filed:  11/7/2016  4:48 PM Date of Service:  11/7/2016  4:44 PM Status:  Signed     :  Kisha Nice MD (Physician)            Procedures    INTERVENTIONAL RADIOLOGY PROCEDURE NOTE    Date: 11/07/16    Procedures:     1  US guided right IJ puncture  2  IVC cavogram  3  ALN retrievable IVC filter placement  4  Completion cavogram      Preoperative diagnosis: Indwelling IVC filter, thrombus above the filter  Postoperative diagnosis: Same    Surgeon: Kisha Nice MD     Assistant: None  No qualified resident was available  Blood loss: Minimal    Specimens: none    Findings: Large thrombus sitting above the filter, also thrombus in the filter  Based on cavogram there was space above the filter and below the renals for additional filter placement, and therefore ALN filter was placed in infra renal position  Completion  cavogram demonstrated appropriate placement of the filter below the renals, no clot above the filter identified  Complications: None    Anesthesia: IV moderate conscious sedation    Plan:  Bed rest 1 hour  If patient clear the thrombus can consider removal of the filter at that time and when medically appropriate  Discussed results with patients spouse Khushi Alberto                  Received for:Provider  [facility]   Nov 7 2016  4:48PM [facility] Standard Time

## 2018-01-13 VITALS — SYSTOLIC BLOOD PRESSURE: 138 MMHG | RESPIRATION RATE: 16 BRPM | HEART RATE: 62 BPM | DIASTOLIC BLOOD PRESSURE: 86 MMHG

## 2018-01-13 VITALS — SYSTOLIC BLOOD PRESSURE: 140 MMHG | HEART RATE: 68 BPM | DIASTOLIC BLOOD PRESSURE: 100 MMHG

## 2018-01-13 VITALS — DIASTOLIC BLOOD PRESSURE: 64 MMHG | SYSTOLIC BLOOD PRESSURE: 102 MMHG | HEART RATE: 76 BPM

## 2018-01-13 NOTE — MISCELLANEOUS
Message  Dr Anglees Oliver was kind enough to change Mr Tomas's catheter over a wire in the [facility] yesterday  This required use of an open ended catheter and wires due to the patient's difficult urethral anatomy  Unfortunately, he is not a candidate for a suprapubic tube due to his neurologic condition and his tendency to pull on tubes and appliances  He will return in 1 month for a lucia catheter change (difficult and over a wire)        Signatures   Electronically signed by : PATIENCE Griffin ; Nov 28 2017  7:20AM EST                       (Author)

## 2018-01-13 NOTE — PROCEDURES
Procedures by Antwon Sweeney RN  at 3/1/2017  4:58 PM      Author:  Antwon Sweeney RN Service:  Interventional Radiology  Author Type:  Registered Nurse    Filed:  3/1/2017  5:04 PM Date of Service:  3/1/2017  4:58 PM Status:  Signed    :  Antwon Sweeney RN (Registered Nurse)  Cosigner:  Danyelle Sanchez MD at 3/3/2017  5:16 PM      Procedure Orders:       1  Insert PICC line [id number] ordered by Antwon Sweeney RN at 03/01/17 1658                    PICC Line Insertion  Date/Time: 3/1/2017 4:58 PM  Performed by: Jordin Regalado  Authorized by: Puja Bui     Patient location: IR  Consent:     Consent obtained:  Written and verbal    Consent given by:  Spouse    Risks discussed:  Bleeding and infection    Alternatives discussed:  No treatment  Universal protocol:     Procedure explained and questions answered to patient or proxy's satisfaction: yes      Relevant documents present and verified: yes      Test results available and properly labeled: yes       Imaging studies available: yes      Required blood products, implants, devices, and special equipment available: yes      Site/side marked: yes      Immediately prior to procedure, a time out was called: yes      Patient identity confirmed:  Verbally with patient, arm band and hospital-assigned identification number  Pre-procedure details:     Hand hygiene: Hand hygiene performed prior to insertion      Sterile barrier technique: All elements of maximal sterile technique followed      Skin preparation:  ChloraPrep    Skin preparation agent: Skin preparation agent completely dried prior to procedure    Indications:     PICC line indications: long term antibiotics    Anesthesia (see MAR for exact dosages):      Anesthesia method:  Local infiltration    Local anesthetic:  Lidocaine 1% w/o epi  Procedure details:     Location:  Basilic    Vessel type: vein      Laterality:  Right    Approach: percutaneous technique used      Patient position:  Flat    Catheter size:  5 Fr    Landmarks identified: yes      Ultrasound guidance: yes      Sterile ultrasound techniques: Sterile gel and sterile probe covers were used      Number of attempts:  1    Successful placement: yes      Total catheter length (cm):  45    Catheter out on skin (cm):  0    Max flow rate:  999 ml/hr    Arm circumference:  33  Post-procedure details:     Post-procedure:  Dressing applied    Assessment:  Blood return through all ports    Post-procedure complications: none      Patient tolerance of procedure:   Tolerated well, no immediate complications  Comments:      RIGHT PICC line placed in IR - TIP in the SVC/right atrium and OK to use                     Received for:Provider  EPIC   Mar  3 2017  5:16PM [facility] Standard Time

## 2018-01-14 NOTE — PROCEDURES
Procedures by Valarie Quintanilla RN at 9/27/2017  4:33 PM      Author:  Valarie Quintanilla RN Service:  Interventional Radiology  Author Type:  Registered Nurse    Filed:  9/27/2017  4:34 PM Date of Service:  9/27/2017  4:33 PM Status:  Signed    :  Valarie Quintanilla RN (Registered Nurse)  Cosigner:  Jo Ann Mccloud MD at 10/4/2017 10:22 AM      Procedure Orders:       1  Insert PICC line [[id number]] ordered by Valarie Quintanilla RN at 09/27/17 1633                  PICC Line Insertion  Date/Time: 9/27/2017 4:33 PM  Performed by: Suzan Palomino by: Colonel Mathis     Patient location:  IR  Consent:     Consent obtained:  Written    Consent given by:  Patient    Risks discussed:  Arterial puncture, bleeding, infection, nerve damage, pneumothorax and incorrect placement    Alternatives discussed:  No treatment, delayed treatment and alternative treatment  Universal protocol:     Procedure explained and questions answered to patient or proxy's satisfaction: yes      Relevant documents present and verified: yes      Test results available and properly labeled: yes       Imaging studies available: yes      Required blood products, implants, devices, and special equipment available: yes      Site/side marked: yes      Immediately prior to procedure, a time out was called: yes      Patient identity confirmed:  Verbally with patient, arm band, provided demographic data and hospital-assigned identification number  Pre-procedure details:     Hand hygiene: Hand hygiene performed prior to insertion      Sterile barrier technique: All elements of maximal sterile technique followed      Skin preparation:  ChloraPrep    Skin preparation agent: Skin preparation agent completely dried prior to procedure    Indications:     PICC line indications: long term antibiotics    Anesthesia (see MAR for exact dosages):      Anesthesia method:  Local infiltration    Local anesthetic:  Lidocaine 1% w/o epi  Procedure details:     Location:  Basilic    Vessel type: vein      Laterality:  Right    Approach: percutaneous technique used      Patient position:  Flat    Procedural supplies:  Single lumen    Catheter size:  5 Fr    Landmarks identified: yes      Ultrasound guidance: yes      Sterile ultrasound techniques: Sterile gel and sterile probe covers were used      Number of attempts:  1    Successful placement: yes      Total catheter length (cm):  44    Catheter out on skin (cm):  0    Max flow rate:  99cc/hour    Arm circumference:  30 5  Post-procedure details:     Post-procedure:  Dressing applied    Assessment:  Blood return through all ports, free fluid flow, placement verified by x-ray and no pneumothorax on x-ray    Post-procedure complications: none      Patient tolerance of procedure:   Tolerated well, no immediate complications                     Received for:Provider  [facility]   Oct  4 2017 10:22AM Long Standard Time

## 2018-01-15 NOTE — PROCEDURES
Procedures by Cruzito Dubois RN  at 5/8/2017  1:37 PM      Author:  Cruzito Dubois RN Service:  Interventional Radiology  Author Type:  Registered Nurse    Filed:  5/8/2017  1:43 PM Date of Service:  5/8/2017  1:37 PM Status:  Attested    :  Cruzito Dubois RN (Registered Nurse)  Cosigner:  Marina Richard MD at 5/8/2017  2:02 PM      Procedure Orders:       1  Insert PICC line [id number] ordered by Cruzito Dubois RN at 05/08/17 1337               Attestation signed by Marina Richard MD at 5/8/2017  2:02 PM           I have reviewed the images and am in agreement  PICC Line Insertion  Date/Time: 5/8/2017 1:37 PM  Performed by: Enrique Pena by: Sydney Carver     Patient location:  IR  Other Assisting Provider: No     Consent:     Consent obtained:  Written    Consent given by:  Spouse    Risks discussed:  Arterial puncture, bleeding, infection, incorrect placement, nerve damage and pneumothorax    Alternatives discussed:  No treatment, delayed treatment, alternative treatment, observation and referral  Universal protocol:     Procedure explained and questions answered to patient or proxy's satisfaction: yes      Relevant documents present and verified: yes      Test results available and properly labeled: yes       Imaging studies available: yes      Required blood products, implants, devices, and special equipment available: yes      Site/side marked: yes      Immediately prior to procedure, a time out was called: yes      Patient identity confirmed:  Arm band  Pre-procedure details:     Hand hygiene: Hand hygiene performed prior to insertion      Sterile barrier technique:  All elements of maximal sterile technique followed      Skin preparation:  ChloraPrep    Skin preparation agent: Skin preparation agent completely dried prior to procedure    Indications:     PICC line indications: long term antibiotics    Anesthesia (see MAR for exact dosages): Anesthesia method:  Local infiltration    Local anesthetic:  Lidocaine 1% w/o epi  Procedure details:     Location:  Cephalic    Vessel type: vein      Laterality:  Left    Approach: percutaneous technique used      Patient position:  Flat    Catheter size:  5 Fr    Landmarks identified: yes      Ultrasound guidance: yes      Sterile ultrasound techniques: Sterile gel and sterile probe covers were used      Number of attempts:  1    Successful placement: yes      Total catheter length (cm):  44    Catheter out on skin (cm):  0    Arm circumference:  33  Post-procedure details:     Post-procedure:  Dressing applied and securement device placed    Assessment:  Blood return through all ports and free fluid flow    Post-procedure complications: none      Patient tolerance of procedure:   Tolerated well, no immediate complications                     Received for:Provider  [facility]   May  8 2017  2:03PM [facility] Standard Time

## 2018-01-16 NOTE — PROGRESS NOTES
Chief Complaint  Chief Complaint Free Text Note Form: Referral from urology for clearance for upcoming stone extraction surgery  History of Present Illness  HPI: Patient with advanced dementia/aphasia, urinary retention and obstructing right ureter stone with right PCN in place  Patient has had multiple admission for UTI/sepsis  He was last admitted at [address] [place] [address] in early May with ESBL producing Escherichia coli sepsis secondary to urinary source  He did well with a 14 day course of IV ertapenem  He was discharged back to [facility], at [facility] care, patient was released is treated with another seven-day course of IV antibiotic for UTI  He completed this a week ago  He still has an IV in place  At present, patient feels well  No fever/chills  No abdominal flank pain  His surgery is scheduled for next week  Review of Systems  Complete-Male:   Constitutional: No fever or chills, feels well, no tiredness, no recent weight gain or weight loss  Eyes: No complaints of eye pain, no red eyes, no discharge from eyes, no itchy eyes  ENT: no complaints of earache, no hearing loss, no nosebleeds, no nasal discharge, no sore throat, no hoarseness  Cardiovascular: No complaints of slow heart rate, no fast heart rate, no chest pain, no palpitations, no leg claudication, no lower extremity  Respiratory: No complaints of shortness of breath, no wheezing, no cough, no SOB on exertion, no orthopnea or PND  Gastrointestinal: No complaints of abdominal pain, no constipation, no nausea or vomiting, no diarrhea or bloody stools  Genitourinary: No complaints of dysuria, no incontinence, no hesitancy, no nocturia, no genital lesion, no testicular pain  Musculoskeletal: No complaints of arthralgia, no myalgias, no joint swelling or stiffness, no limb pain or swelling  Integumentary: No complaints of skin rash or skin lesions, no itching, no skin wound, no dry skin     Neurological: No compliants of headache, no confusion, no convulsions, no numbness or tingling, no dizziness or fainting, no limb weakness, no difficulty walking  Active Problems    1  Acute deep vein thrombosis (DVT) of both iliofemoral veins (453 41) (I82 423)   2  Aphasia of unknown origin (784 3) (R47 01)   3  Bowel trouble (569 9) (K63 9)   4  GERD (gastroesophageal reflux disease) (530 81) (K21 9)   5  High blood pressure (401 9) (I10)   6  Infection of drug-resistant bacteria (136 9,V09 90) (A49 9,Z16 30)   7  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)   8  Kidney stones (592 0) (N20 0)   9  Knee problem (719 96) (M25 9)   10  Mental disorder (300 9) (F99)   11  Presence of IVC filter (V45 89) (Z95 828)   12  Urinary retention (788 20) (R33 9)    Surgical History    1  History of Knee Surgery    Family History    1  Family history of cardiac disorder (V17 49) (Z82 49)    2  Family history of diabetes mellitus (V18 0) (Z83 3)    Social History    · Never a smoker   · No alcohol use    Current Meds   1  Acetaminophen 325 MG Oral Tablet; TAKE 2 TABLET Every 6 hours PRN; Therapy: (Recorded:05Jun2017) to Recorded   2  AmLODIPine Besylate 5 MG Oral Tablet; TAKE 1 TABLET DAILY AS DIRECTED; Therapy: (Recorded:05Jun2017) to Recorded   3  BusPIRone HCl - 5 MG Oral Tablet; TAKE 1 TABLET 3 TIMES DAILY; Therapy: (Delilah Whitaker) to Recorded   4  Eliquis 2 5 MG Oral Tablet; Take 1 tablet twice daily; Therapy: (Delilah Whitaker) to Recorded   5  Famotidine 20 MG Oral Tablet; TAKE 1 TABLET TWICE DAILY; Therapy: (Delilah Whitaker) to Recorded   6  Haloperidol 2 MG Oral Tablet; TAKE 1 TABLET Twice daily PRN; Therapy: (Delilah Whitaker) to Recorded   7  Metoprolol Tartrate 25 MG Oral Tablet; TAKE 1 TABLET TWICE DAILY; Therapy: (Delilah Whitaker) to Recorded   8  Mirtazapine 15 MG Oral Tablet; TAKE 1 TABLET AT BEDTIME; Therapy: (Delilah Whitaker) to Recorded   9  Namenda 5 MG Oral Tablet;  Take 1 tablet twice daily; Therapy: (Recorded:05Jun2017) to Recorded   10  SEROquel 25 MG Oral Tablet; TAKE 1 TABLET TWICE DAILY; Therapy: (Recorded:05Jun2017) to Recorded   11  Tamsulosin HCl - 0 4 MG Oral Capsule; take 1 capsule daily; Therapy: (Recorded:05Jun2017) to Recorded    Allergies    1  gentamicin   2  Sulfa Drugs   3  Vancomycin HCl CAPS   4  Zosyn    5  Adhesive Tape    Vitals  Signs   Recorded: [id number] 12:06PM   Temperature: 97 1 F  Heart Rate: 68  Respiration: 16  Systolic: 322  Diastolic: 78  Height Unobtainable: Yes  Weight Unobtainable: Yes    Physical Exam    Constitutional   General appearance: No acute distress, well appearing and well nourished  well developed, well nourished, within normal limits of ideal weight and appearance reflects stated age  Pulmonary   Respiratory effort: No increased work of breathing or signs of respiratory distress  Respiratory rate: normal  Assessment of respiratory effort revealed normal rhythm and effort  Auscultation of lungs: Clear to auscultation, equal breath sounds bilaterally, no wheezes, no rales, no rhonci  no rales or crackles were heard bilaterally  no rhonchi  no friction rub  no wheezing  Cardiovascular   Auscultation of heart: Normal rate and rhythm, normal S1 and S2, without murmurs  The heart rate was normal  The rhythm was regular  Heart sounds: normal S1 and normal S2  no murmurs were heard  Abdomen   Abdomen: Non-tender, no masses  The abdomen was flat  Bowel sounds were normal  The abdomen was soft and nontender  Liver and spleen: No hepatomegaly or splenomegaly  No hepatosplenomegaly  Musculoskeletal   Inspection/palpation of joints, bones, and muscles: Normal   Trace leg edema  No erythema  No warmth  No tenderness  Assessment    1  Infection of drug-resistant bacteria (136 9,V09 90) (A49 9,Z16 30)   2  Kidney stones (592 0) (N20 0)   3  Urinary retention (788 20) (R33 9)   4   Aphasia of unknown origin (784 3) (R47 01)    Discussion/Summary  Discussion Summary:   1  Recurrent UTI/sepsis with ESBL producing Escherichia coli  Patient is currently well, without active infection  However, he is at risk for recurrent UTI with manipulation of a urinary tract  He should be fine to proceed to renal stone extraction next week  However, he will need perioperative IV ertapenem  Due to his colonization with ESBL, no oral antibiotic alternative exists  2  Obstructing right ureteral stone, with PCN in place  Patient is scheduled to undergo stone extraction and possible right ureteral stent next week  3  Urinary retention  Patient is a candidate for surgery catheterization  However, due to his dementia/aphasia, he is at risk of self removing catheter  4  Dementia/aphasia  Okay from ID viewpoint to proceed to stone extraction next week  Patient will need perioperative IV ertapenem  Recommend removing all IV catheter if not used for antibiotic infusion  Discussed with patient and his wife in detail regarding the above plans/recommendations  Follow-up with chin almazan r n  Future Appointments    Date/Time Provider Specialty Site   06/14/2017 07:30 AM PATIENCE Brunson  Urology [address] [place] [address] OR     Signatures   Electronically signed by :  PATIENCE Yan ; Jun 5 2017 12:30PM EST                       (Author)

## 2018-01-22 VITALS
HEART RATE: 68 BPM | RESPIRATION RATE: 16 BRPM | TEMPERATURE: 97.1 F | SYSTOLIC BLOOD PRESSURE: 114 MMHG | DIASTOLIC BLOOD PRESSURE: 78 MMHG

## 2018-01-24 VITALS — HEART RATE: 84 BPM | DIASTOLIC BLOOD PRESSURE: 80 MMHG | SYSTOLIC BLOOD PRESSURE: 130 MMHG

## 2018-01-24 VITALS — SYSTOLIC BLOOD PRESSURE: 118 MMHG | HEART RATE: 84 BPM | DIASTOLIC BLOOD PRESSURE: 78 MMHG

## 2018-02-12 PROBLEM — F99 MENTAL DISORDER: Status: ACTIVE | Noted: 2017-05-10

## 2018-02-12 PROBLEM — K63.9 BOWEL TROUBLE: Status: ACTIVE | Noted: 2017-05-10

## 2018-02-12 PROBLEM — T83.091D: Status: ACTIVE | Noted: 2018-02-12

## 2018-02-12 PROBLEM — M25.9 KNEE PROBLEM: Status: ACTIVE | Noted: 2017-05-10

## 2018-02-12 PROBLEM — T83.9XXA FOLEY CATHETER PROBLEM (HCC): Status: ACTIVE | Noted: 2017-12-18

## 2018-02-12 PROBLEM — Z16.30 INFECTION OF DRUG-RESISTANT BACTERIA: Status: ACTIVE | Noted: 2017-05-11

## 2018-02-12 PROBLEM — A41.50 GRAM-NEGATIVE SEPSIS WITH ORGAN DYSFUNCTION (HCC): Status: ACTIVE | Noted: 2017-06-20

## 2018-02-12 PROBLEM — N36.5 FALSE PASSAGE OF URETHRA: Status: ACTIVE | Noted: 2018-02-12

## 2018-02-12 PROBLEM — N20.0 KIDNEY STONES: Status: ACTIVE | Noted: 2017-05-10

## 2018-02-12 PROBLEM — A49.9 INFECTION OF DRUG-RESISTANT BACTERIA: Status: ACTIVE | Noted: 2017-05-11

## 2018-02-12 PROBLEM — R65.20 GRAM-NEGATIVE SEPSIS WITH ORGAN DYSFUNCTION (HCC): Status: ACTIVE | Noted: 2017-06-20

## 2018-02-12 NOTE — PATIENT INSTRUCTIONS
Thorne Catheter Placement and Care   WHAT YOU NEED TO KNOW:   What is a Thorne catheter? A Thorne catheter is a sterile tube that is inserted into your bladder to drain urine  It is also called an indwelling urinary catheter  The tip of the catheter has a small balloon filled with solution that holds the catheter in your bladder  How is a Thorne catheter placed? · Your healthcare provider will clean your genital area with a sterile solution  He or she will put lubricant jelly on the catheter to help it go in smoothly  The end of the catheter with the deflated balloon will be inserted into your urethra  The catheter will be moved slowly and gently into your bladder  You may be asked to take slow, deep breaths or to push as if you were trying to urinate as the catheter is inserted  · When your healthcare provider sees urine flowing from the catheter, he or she will fill the balloon at the end of the catheter  The balloon holds the catheter in place so it does not come out  Your healthcare provider will attach the open end of the catheter to a sterile drainage bag  How do I care for my Thorne catheter? · Clean your genital area 2 times every day  Clean your catheter and the area around where it was inserted  Use soap and water  Clean your anal opening and catheter area after every bowel movement  · Secure the catheter tube  so you do not pull or move the catheter  This helps prevent pain and bladder spasms  Healthcare providers will show you how to use medical tape or a strap to secure the catheter tube to your body  · Keep a closed drainage system  Your Thorne catheter should always be attached to the drainage bag to form a closed system  Do not disconnect any part of the closed system unless you need to change the bag  How do I care for my drainage bag? · Ask if a leg bag is right for you  A leg bag can be worn under your clothes   Ask your healthcare provider for more information about a leg bag  · Keep the drainage bag below the level of your waist   This helps stop urine from moving back up the tubing and into your bladder  Do not loop or kink the tubing  This can cause urine to back up and collect in your bladder  Do not let the drainage bag touch or lie on the floor  · Empty the drainage bag when needed  The weight of a full drainage bag can be painful  Empty the drainage bag every 3 to 6 hours or when it is ? full  · Clean and change the drainage bag as directed  Ask your healthcare provider how often you should change the drainage bag and what cleaning solution to use  Wear disposable gloves when you change the bag  Do not allow the end of the catheter or tubing to touch anything  Clean the ends with an alcohol pad before you reconnect them  What can I do if problems develop? · No urine is draining into the bag:      ¨ Check for kinks in the tubing and straighten them out  ¨ Check the tape or strap used to secure the catheter tube to your skin  Make sure it is not blocking the tube  ¨ Make sure you are not sitting or lying on the tubing  ¨ Make sure the urine bag is hanging below the level of your waist     · Urine leaks from or around the catheter, tubing, or drainage bag:  Check if the closed drainage system has accidently come open or apart  Clean the catheter and tubing ends with a new alcohol pad and reconnect them  What are the risks of a Thorne catheter? You may develop an infection caused by bacteria  This can happen when the drainage system is opened and bacteria get inside the tubing  You can also get an infection if the catheter equipment is not cleaned well or you do not wash your hands  The infection can spread to your bladder or other organs, and may become severe  How can I help prevent an infection? · Wash your hands often  Wash before and after you touch your catheter, tubing, or drainage bag  Use soap and water   Wear clean disposable gloves when you care for your catheter or disconnect the drainage bag  Wash your hands before you prepare or eat food  · Drink liquids as directed  Ask your healthcare provider how much liquid to drink each day and which liquids are best for you  Liquids will help flush your kidneys and bladder to help prevent infection  When should I seek immediate care? · Your catheter comes out  · You suddenly have material that looks like sand in the tubing or drainage bag  · No urine is draining into the bag and you have checked the system  · You have pain in your hip, back, pelvis, or lower abdomen  · You are confused or cannot think clearly  When should I contact my healthcare provider? · You have a fever  · You have bladder spasms for more than 1 day after the catheter is placed  · You see blood in the tubing or drainage bag  · You have a rash or itching where the catheter tube is secured to your skin  · Urine leaks from or around the catheter, tubing, or drainage bag  · The closed drainage system has accidently come open or apart  · You see a layer of crystals inside the tubing  · You have questions or concerns about your condition or care  CARE AGREEMENT:   You have the right to help plan your care  Learn about your health condition and how it may be treated  Discuss treatment options with your caregivers to decide what care you want to receive  You always have the right to refuse treatment  The above information is an  only  It is not intended as medical advice for individual conditions or treatments  Talk to your doctor, nurse or pharmacist before following any medical regimen to see if it is safe and effective for you  © 2017 [employer]  Information is for End User's use only and may not be sold, redistributed or otherwise used for commercial purposes   All illustrations and images included in CareNotes® are the copyrighted property of [employer]  or Mika Mcclain

## 2018-02-12 NOTE — ASSESSMENT & PLAN NOTE
Thorne catheter is to remain in place for treatment of urinary retention to be changed every 3-4 week intervals by a urologist

## 2018-02-12 NOTE — PROGRESS NOTES
Bladder catheterization  Date/Time: 2/12/2018 3:16 PM  Performed by: Rodger Marinelli  Authorized by: Rodger Marinelli     Patient location:  Other (comment) (Urology office)  Other Assisting Provider: Yes (comment) Zeke Marinelli RN)    Consent:     Consent obtained:  Verbal    Consent given by:  Spouse    Risks discussed:  False passage, incomplete procedure, infection, pain and urethral injury    Alternatives discussed:  No treatment, delayed treatment, alternative treatment and observation  Universal protocol:     Procedure explained and questions answered to patient or proxy's satisfaction: yes      Relevant documents present and verified: yes      Test results available and properly labeled: yes      Imaging studies available: no      Required blood products, implants, devices and special equipment available: yes      Site/side marked: no (Site marking is not indicated)      Immediately prior to procedure a time out was called: yes      Patient identity confirmed:  Provided demographic data and verbally with patient (Confirmed identity with patient, spouse, and caregivers)  Pre-procedure details:     Procedure purpose:  Therapeutic    Preparation: Patient was prepped and draped in usual sterile fashion    Anesthesia (see MAR for exact dosages): Anesthesia method:  None  Procedure details:     Bladder irrigation: yes      Catheter insertion:  Indwelling    Approach: natural orifice      Catheter type:  Latex ([place] tip catheter over a Super Stiff wire)    Catheter size:  18 Fr    Number of attempts:  1    Successful placement: yes      Urine characteristics:  Cloudy    Provider performed due to:  Complicated insertion, altered anatomy and nurse unable to complete    Altered anatomy:  Urethral stricture and trauma  Post-procedure details:     Patient tolerance of procedure:   Tolerated well, no immediate complications  Comments:      Lana Dub was seen to have debris at the base of the bladder in addition to a residual of approximately 600 milliliters of urine indicating poor drainage from the previous Thorne catheter  His bladder was drained of this debris and gently irrigated with sterile water to decrease debris and bacterial burden  Alejandra Shelton continues to require these Thorne catheter changes given his history of difficult Thorne catheter placement, urethral false passage, urethral stricture, and urinary retention  He is not a candidate for suprapubic catheter placement given his altered mental status and tendency to pull at tubes and lines  Plan:  He should continue Renacidin irrigations and potassium citrate to decrease incrustation of his Thorne catheter  He will return in 3 to 4 weeks for another Thorne catheter change over a wire  I also recommended that his facility consider belladonna and opiate suppositories every 6 hours as needed for bladder spasms

## 2018-02-12 NOTE — ASSESSMENT & PLAN NOTE
Continue Renacidin irrigations and every 3-4 week Thorne catheter changes over a wire given history of difficult Thorne catheter placement by non urology staff

## 2018-02-13 ENCOUNTER — OFFICE VISIT (OUTPATIENT)
Dept: UROLOGY | Facility: CLINIC | Age: 68
End: 2018-02-13
Payer: COMMERCIAL

## 2018-02-13 VITALS — SYSTOLIC BLOOD PRESSURE: 124 MMHG | DIASTOLIC BLOOD PRESSURE: 82 MMHG | HEART RATE: 76 BPM

## 2018-02-13 DIAGNOSIS — T83.091D: ICD-10-CM

## 2018-02-13 DIAGNOSIS — R33.9 RETENTION OF URINE: Primary | Chronic | ICD-10-CM

## 2018-02-13 DIAGNOSIS — N36.5 FALSE PASSAGE OF URETHRA: ICD-10-CM

## 2018-02-13 DIAGNOSIS — T83.9XXD PROBLEM WITH FOLEY CATHETER, SUBSEQUENT ENCOUNTER: ICD-10-CM

## 2018-02-13 PROCEDURE — 99213 OFFICE O/P EST LOW 20 MIN: CPT | Performed by: UROLOGY

## 2018-02-13 PROCEDURE — 51700 IRRIGATION OF BLADDER: CPT | Performed by: UROLOGY

## 2018-02-13 RX ORDER — POTASSIUM CITRATE 10 MEQ/1
10 TABLET, EXTENDED RELEASE ORAL
COMMUNITY
Start: 2018-02-08 | End: 2018-11-03

## 2018-02-13 NOTE — PROGRESS NOTES
Problem List Items Addressed This Visit     Retention of urine - Primary (Chronic)    Relevant Medications    potassium citrate (UROCIT-K) 10 mEq    Other Relevant Orders    Bladder catheterization    Lucia catheter problem ([facility] [address] )    False passage of urethra     Lucia catheter is to remain in place for treatment of urinary retention to be changed every 3-4 week intervals by a urologist         Relevant Medications    potassium citrate (UROCIT-K) 10 mEq    Other Relevant Orders    Bladder catheterization    Complication, blocked Lucia catheter, subsequent encounter     Continue Renacidin irrigations and every 3-4 week Lucia catheter changes over a wire given history of difficult Lucia catheter placement by non urology staff         Relevant Orders    Bladder catheterization              Assessment and plan:       1  Neurogenic bladder with urinary retention requiring complex Lucia catheter changes every 3-4 weeks by a urologist given history of false passage of the urethra difficult Lucia catheter placement  2  Recommend continuing Renacidin irrigations as well as potassium citrate orally  Also recommend belladonna and opiate suppositories for amelioration of bladder spasms as needed  Francie Vivas MD      Chief Complaint     Chief Complaint   Patient presents with    chronic catheter     4 week lucia catheter change         History of Present Illness     Kae Mina is a 69 y o  Man with a complex past medical history including residual   Neurologic deficits and  Neurogenic bladder urinary retention  Since we saw him last 3-4 weeks ago he has not had difficulties with his Lucia catheter has not required emergency room visits per his wife, his main caretaker  He did arrive  Late to today's visit due to transportation problems from the transportation service      Detailed Urologic History     - please refer to HPI    Review of Systems     Review of Systems   Reason unable to perform ROS: Melquiades Myers is nonverbal and cannot provide his own review of systems  Allergies     Allergies   Allergen Reactions    Gentamycin [Gentamicin] Anaphylaxis    Vancomycin Anaphylaxis    Zosyn [Piperacillin Sod-Tazobactam So] Anaphylaxis     However, has tolerated Ertapenem multiple times and Cefepime   Other      antipersperents  Stat lock from lucia catheter    Sulfa Antibiotics Rash       Physical Exam     Physical Exam   Constitutional:    Masked facial expression, patient is unshaven, contractures are present   HENT:   Head: Normocephalic and atraumatic  Eyes: EOM are normal  Pupils are equal, round, and reactive to light  Right eye exhibits no discharge  Left eye exhibits no discharge  Neck: Neck supple  No tracheal deviation present  Cardiovascular: Intact distal pulses  Pulmonary/Chest: Effort normal  No stridor  No respiratory distress  He has no wheezes  Abdominal: Soft  He exhibits no distension and no mass  There is no tenderness  There is no rebound and no guarding  Musculoskeletal: He exhibits edema and deformity  He exhibits no tenderness  Neurological: He is alert  He displays abnormal reflex  A cranial nerve deficit and sensory deficit is present  He exhibits abnormal muscle tone  Coordination abnormal    Skin: Skin is warm  Skin has an oily texture   Psychiatric:    Patient with residual neurologic deficits, unable to assess mood, affect, and behavior    He is able to tolerate catheter changes with oral intake of Skittles   candy during catheter change           Vital Signs  Vitals:    02/13/18 1503   BP: 124/82   BP Location: Right arm   Patient Position: Supine   Cuff Size: Standard   Pulse: 76         Current Medications       Current Outpatient Prescriptions:     acetaminophen (TYLENOL) 325 mg tablet, Take 650 mg by mouth every 6 (six) hours as needed for mild pain, Disp: , Rfl:     amLODIPine (NORVASC) 5 mg tablet, Take 5 mg by mouth daily, Disp: , Rfl:    apixaban (ELIQUIS) 2 5 mg, Take 2 5 mg by mouth 2 (two) times a day, Disp: , Rfl:     bisacodyl (BISAC-EVAC) 10 mg suppository, Insert 10 mg into the rectum as needed for constipation, Disp: , Rfl:     busPIRone (BUSPAR) 5 mg tablet, Take 5 mg by mouth 3 (three) times a day, Disp: , Rfl:     docusate sodium (COLACE) 100 mg capsule, Take 1 capsule by mouth 2 (two) times a day for 30 days, Disp: 60 capsule, Rfl: 0    memantine (NAMENDA) 5 mg tablet, Take 1 tablet by mouth 2 (two) times a day for 30 days, Disp: 60 tablet, Rfl: 0    metoprolol tartrate (LOPRESSOR) 25 mg tablet, Take 1 tablet by mouth 2 (two) times a day for 30 days (Patient taking differently: Take 25 mg by mouth 2 (two) times a day  ), Disp: 60 tablet, Rfl: 0    mirtazapine (REMERON) 15 mg tablet, Take 1 tablet by mouth daily at bedtime for 30 days, Disp: 30 tablet, Rfl: 0    potassium citrate (UROCIT-K) 10 mEq, , Disp: , Rfl:     QUEtiapine (SEROquel) 25 mg tablet, Take 25 mg by mouth 2 (two) times a day  , Disp: , Rfl:     tamsulosin (FLOMAX) 0 4 mg, Take 1 capsule by mouth daily with dinner for 30 days, Disp: 30 capsule, Rfl: 0    famotidine (PEPCID) 20 mg tablet, Take 20 mg by mouth 2 (two) times a day, Disp: , Rfl:     haloperidol (HALDOL) 2 mg tablet, Take 1 tablet by mouth 2 (two) times a day as needed for agitation for up to 2 days, Disp: 4 tablet, Rfl: 0    senna (SENOKOT) 8 6 mg, Take 1 tablet by mouth daily at bedtime for 7 days, Disp: 7 tablet, Rfl: 0      Active Problems     Patient Active Problem List   Diagnosis    Hypotension    Aphagia    COPD (chronic obstructive pulmonary disease) (HCC)    Sepsis ([facility] [address] )    History of DVT (deep vein thrombosis)    Aneurysm of thoracic aorta (HCC)    Retention of urine    Left lower lobe pneumonia (HCC)    Essential hypertension    Gross hematuria    Aphasia of unknown origin    CKD (chronic kidney disease)    Altered mental status    Urinary tract infection    Encephalopathy  Septic shock (HCC)    Hydronephrosis with obstructing calculus    Ureteral obstruction    Leukocytosis    Lactic acidosis    GERD (gastroesophageal reflux disease)    HTN, goal below 140/90    Generalized anxiety disorder    BPH (benign prostatic hypertrophy)    Muscle weakness    H/O blood clots    ESBL (extended spectrum beta-lactamase) producing bacteria infection    Acute deep vein thrombosis (DVT) of both iliofemoral veins ([facility])    Adverse drug effect    Anemia    Bowel trouble    Dementia with behavioral disturbance    Thorne catheter problem ([facility])    Gram-negative sepsis with organ dysfunction ([facility])    Infection of drug-resistant bacteria    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[facility])    Joint effusion, knee    Kidney stones    Knee problem    Mental disorder    MSSA (methicillin susceptible Staphylococcus aureus) septicemia ([facility])    Progressive aphasia     pulmonary hemorrhage    False passage of urethra    Complication, blocked Thorne catheter, subsequent encounter         Past Medical History     Past Medical History:   Diagnosis Date    Anxiety     Aortic aneurysm ([facility])     Aphasia     Ataxia     Bladder adhesions     BPH (benign prostatic hypertrophy)     COPD (chronic obstructive pulmonary disease) ([facility])     wife denies - "never had"    Dementia     Difficulty walking     Essential (primary) hypertension     Generalized anxiety disorder     GERD (gastroesophageal reflux disease)     Global aphasia     H/O blood clots     High blood pressure     History of kidney stones     Kidney stones     Mental disorder     Muscle weakness     Neuromuscular dysfunction of bladder     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     Retention of urine, unspecified     Thrombosis     Urinary retention     Urinary tract bacterial infections     Urinary tract infection          Surgical History     Past Surgical History:   Procedure Laterality Date    CYSTOSCOPY      IVC FILTER INSERTION      X2    IVC FILTER INSERTION      x2    KIDNEY STONE SURGERY      KNEE SURGERY      Sepsis infection     NEPHROSTOMY      LA CYSTO/URETERO W/LITHOTRIPSY &INDWELL STENT INSRT Right 6/14/2017    Procedure: CYSTOSCOPY URETEROSCOPY WITH LITHOTRIPSY HOLMIUM LASER,,BASKET STONE EXTRACTION, RETROGRADE PYELOGRAM AND INSERTION STENT URETERAL;  Surgeon: Brendon Henning MD;  Location: [facility];  Service: Urology    URINARY SURGERY           Family History     Family History   Problem Relation Age of Onset    Diabetes Father          Social History     Social History     History   Smoking Status    Never Smoker   Smokeless Tobacco    Never Used         Pertinent Lab Values     Lab Results   Component Value Date    CREATININE 1 46 (H) 11/27/2017       No results found for: PSA          Pertinent Imaging       No new imaging since his last visit for my review

## 2018-02-13 NOTE — LETTER
February 13, 2018     Asha Anderson, [address] [id number]    Patient: Ines Reynolds [facility]   YOB: 1950   Date of Visit: 2/13/2018       Dear Dr Justice Gonzalez: Thank you for referring Shar Golden to me for evaluation  Below are my notes for this consultation  If you have questions, please do not hesitate to call me  I look forward to following your patient along with you           Sincerely,        Jinny Botello MD        CC: No Recipients

## 2018-03-08 ENCOUNTER — OFFICE VISIT (OUTPATIENT)
Dept: UROLOGY | Facility: CLINIC | Age: 68
End: 2018-03-08
Payer: COMMERCIAL

## 2018-03-08 VITALS — HEART RATE: 72 BPM | DIASTOLIC BLOOD PRESSURE: 80 MMHG | SYSTOLIC BLOOD PRESSURE: 132 MMHG

## 2018-03-08 DIAGNOSIS — T83.9XXS FOLEY CATHETER PROBLEM, SEQUELA: Primary | ICD-10-CM

## 2018-03-08 DIAGNOSIS — IMO0002 CHRONIC URETHRAL STRICTURE: ICD-10-CM

## 2018-03-08 DIAGNOSIS — N30.00 ACUTE CYSTITIS WITHOUT HEMATURIA: ICD-10-CM

## 2018-03-08 PROCEDURE — 99213 OFFICE O/P EST LOW 20 MIN: CPT | Performed by: UROLOGY

## 2018-03-08 PROCEDURE — 51710 CHANGE OF BLADDER TUBE: CPT | Performed by: UROLOGY

## 2018-03-08 NOTE — ASSESSMENT & PLAN NOTE
Patient continues to require every 3 week Thorne catheter changes as he encrusts his Thorne and the lumen thereof if we wait longer than this

## 2018-03-08 NOTE — PROGRESS NOTES
Irrigation of bladder  Date/Time: 3/8/2018 2:13 PM  Performed by: Rodger Marinelli  Authorized by: Rodger Marinelli   Consent: Verbal consent obtained  Consent given by: spouse  Patient understanding: patient states understanding of the procedure being performed  Patient consent: the patient's understanding of the procedure matches consent given  Procedure consent: procedure consent matches procedure scheduled  Relevant documents: relevant documents present and verified  Test results: test results available and properly labeled  Site marked: the operative site was not marked  Imaging studies: imaging studies not available  Required items: required blood products, implants, devices, and special equipment available  Patient identity confirmed: provided demographic data  Local anesthesia used: no    Anesthesia:  Local anesthesia used: no    Sedation:  Patient sedated: no  Patient tolerance: Patient tolerated the procedure well with no immediate complications  Comments: Irrigated patient's bladder of copious debris with sterile water using a catheter tip syringe        Difficult Thorne catheter placement  Performing surgeon and procedure consent information is the same as listed above  Indication:  Patient with history of chronic urethral stricture, and difficult Thorne catheter placement with multiple false passages requiring a difficult Thorne catheter change performed by a urologist    Procedure:  Patient was placed in the supine position and prepped in the usual sterile fashion, upon examination it was noted that his Thorne catheter was essentially completely within the urethra with the Thorne catheter balloon inflated  A Super Stiff wire was placed through this and into the lumen of the bladder and the indwelling catheter was removed    Over the Super Stiff wire a 18 Western Brisa [place] tip catheter was then placed in the lumen of the bladder with return of copious pus and bladder debris necessitated the bladder irrigation above  10 milliliters of sterile water was placed into the Thorne catheter balloon and was placed to gravity drainage after the above irrigation of the bladder  Plan:  He will follow up in 3 weeks, as usual for his Thorne catheter change

## 2018-03-08 NOTE — LETTER
March 8, 2018     Thong Becca, [address] [place] [address] [address] [address]    Patient: Galen Nicole [facility]   YOB: 1950   Date of Visit: 3/8/2018       Dear Dr Sasha Mon: Thank you for referring Riya Marrero to me for evaluation  Below are my notes for this consultation  If you have questions, please do not hesitate to call me  I look forward to following your patient along with you  Sincerely,        Lona Hurt MD        CC: No Recipients  Lona Hurt MD  3/8/2018  2:37 PM  Sign at close encounter       Problem List Items Addressed This Visit     Urinary tract infection     Patient with chronic colonization of his bladder  Patient with pyocystis and poor bladder emptying when his catheter is pulled into his bladder  Catheter was placed into the lumen of the bladder and the bladder was irrigated free of copious debris and pus with sterile water         Lucia catheter problem, sequela - Primary     Patient continues to require every 3 week Lucia catheter changes as he encrusts his Lucia and the lumen thereof if we wait longer than this         Relevant Orders    Irrigation of bladder    Chronic urethral stricture     Patient with history of difficult Lucia catheter placement requiring urological manipulation and wire placement  Lucia catheter was changed today over a wire, again, it was seen to be in the urethra, just as last time      Patient should return in 3 weeks for Lucia catheter change, I have documented in the patient's facility documents that this should take care that he does not pull his catheter into his urethra and that they do not pull the catheter into his urethra         Relevant Orders    Irrigation of bladder              Assessment and plan:       Please see problem oriented charting    Lona Hurt MD      Chief Complaint     Chief Complaint   Patient presents with    Lucia catheter change     3 week lucia change         History of Present Illness     Galen Nicole Genoveva is a 69 y o  man with multiple medical problems including neurological insult with residual deficits and difficult Lucia catheter placement  He also has a history of Lucia catheter encrustation and blockage and has required Renacidin irrigations at his nursing facility  He presents today for Lucia catheter change, and upon examination, his Lucia catheter is again within the lumen of the urethra which is evidence of it being pulled into the urethra  He is unable to complain of any other issues although his wife does not mention any other particular new issues or complaints    Detailed Urologic History     - please refer to HPI    Review of Systems     Review of Systems   Unable to perform ROS: Patient nonverbal             Allergies     Allergies   Allergen Reactions    Gentamycin [Gentamicin] Anaphylaxis    Vancomycin Anaphylaxis    Zosyn [Piperacillin Sod-Tazobactam So] Anaphylaxis     However, has tolerated Ertapenem multiple times and Cefepime   Other      antipersperents  Stat lock from lucia catheter    Sulfa Antibiotics Rash       Physical Exam     Physical Exam   Constitutional: No distress  Patient on a stretcher, strapped to the bed, eating skittles to keep him calm   HENT:   Head: Normocephalic and atraumatic  Eyes: Right eye exhibits no discharge  Left eye exhibits no discharge  No scleral icterus  Neck: No tracheal deviation present  No thyromegaly present  Cardiovascular: Intact distal pulses  Pulmonary/Chest: Effort normal  No stridor  No respiratory distress  He has no wheezes  He exhibits no tenderness  Abdominal: He exhibits no distension and no mass  There is no tenderness  There is no rebound and no guarding  No hernia  Musculoskeletal: He exhibits edema and deformity  He exhibits no tenderness  Lymphadenopathy:     He has no cervical adenopathy  Neurological: He displays abnormal reflex  A cranial nerve deficit and sensory deficit is present   He exhibits abnormal muscle tone  Coordination abnormal    Skin: Skin is warm  No rash noted  He is not diaphoretic  No erythema  No pallor  Skin somewhat greasy   Psychiatric:   Unable to fully assess psychiatric condition given residual neurologic deficits             Vital Signs  Vitals:    03/08/18 1125   BP: 132/80   BP Location: Right arm   Patient Position: Supine   Cuff Size: Standard   Pulse: 72         Current Medications       Current Outpatient Prescriptions:     acetaminophen (TYLENOL) 325 mg tablet, Take 650 mg by mouth every 6 (six) hours as needed for mild pain, Disp: , Rfl:     amLODIPine (NORVASC) 5 mg tablet, Take 5 mg by mouth daily, Disp: , Rfl:     apixaban (ELIQUIS) 2 5 mg, Take 2 5 mg by mouth 2 (two) times a day, Disp: , Rfl:     bisacodyl (BISAC-EVAC) 10 mg suppository, Insert 10 mg into the rectum as needed for constipation, Disp: , Rfl:     busPIRone (BUSPAR) 5 mg tablet, Take 5 mg by mouth 3 (three) times a day, Disp: , Rfl:     famotidine (PEPCID) 20 mg tablet, Take 20 mg by mouth 2 (two) times a day, Disp: , Rfl:     potassium citrate (UROCIT-K) 10 mEq, , Disp: , Rfl:     QUEtiapine (SEROquel) 25 mg tablet, Take 25 mg by mouth 2 (two) times a day  , Disp: , Rfl:     docusate sodium (COLACE) 100 mg capsule, Take 1 capsule by mouth 2 (two) times a day for 30 days, Disp: 60 capsule, Rfl: 0    haloperidol (HALDOL) 2 mg tablet, Take 1 tablet by mouth 2 (two) times a day as needed for agitation for up to 2 days, Disp: 4 tablet, Rfl: 0    memantine (NAMENDA) 5 mg tablet, Take 1 tablet by mouth 2 (two) times a day for 30 days, Disp: 60 tablet, Rfl: 0    metoprolol tartrate (LOPRESSOR) 25 mg tablet, Take 1 tablet by mouth 2 (two) times a day for 30 days (Patient taking differently: Take 25 mg by mouth 2 (two) times a day  ), Disp: 60 tablet, Rfl: 0    mirtazapine (REMERON) 15 mg tablet, Take 1 tablet by mouth daily at bedtime for 30 days, Disp: 30 tablet, Rfl: 0    senna (SENOKOT) 8 6 mg, Take 1 tablet by mouth daily at bedtime for 7 days, Disp: 7 tablet, Rfl: 0    tamsulosin (FLOMAX) 0 4 mg, Take 1 capsule by mouth daily with dinner for 30 days, Disp: 30 capsule, Rfl: 0      Active Problems     Patient Active Problem List   Diagnosis    Hypotension    Aphagia    COPD (chronic obstructive pulmonary disease) ([facility] [address] )    Sepsis ([facility] )    History of DVT (deep vein thrombosis)    Aneurysm of thoracic aorta ([facility] )    Retention of urine    Left lower lobe pneumonia ([facility] )    Essential hypertension    Gross hematuria    Aphasia of unknown origin    CKD (chronic kidney disease)    Altered mental status    Urinary tract infection    Encephalopathy    Septic shock (HCC)    Hydronephrosis with obstructing calculus    Ureteral obstruction    Leukocytosis    Lactic acidosis    GERD (gastroesophageal reflux disease)    HTN, goal below 140/90    Generalized anxiety disorder    BPH (benign prostatic hypertrophy)    Muscle weakness    H/O blood clots    ESBL (extended spectrum beta-lactamase) producing bacteria infection    Acute deep vein thrombosis (DVT) of both iliofemoral veins (HCC)    Adverse drug effect    Anemia    Bowel trouble    Dementia with behavioral disturbance    Thorne catheter problem, sequela    Gram-negative sepsis with organ dysfunction (HCC)    Infection of drug-resistant bacteria    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HCC)    Joint effusion, knee    Kidney stones    Knee problem    Mental disorder    MSSA (methicillin susceptible Staphylococcus aureus) septicemia (HCC)    Progressive aphasia     pulmonary hemorrhage    False passage of urethra    Complication, blocked Thorne catheter, subsequent encounter    Chronic urethral stricture         Past Medical History     Past Medical History:   Diagnosis Date    Anxiety     Aortic aneurysm (HCC)     Aphasia     Ataxia     Bladder adhesions     BPH (benign prostatic hypertrophy)     COPD (ch[facility] obstructive pulmonary 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     Retention of urine, unspecified     Thrombosis     Urinary retention     Urinary tract bacterial infections     Urinary tract infection          Surgical History     Past Surgical History:   Procedure Laterality Date    CYSTOSCOPY      IVC FILTER INSERTION      X2    IVC FILTER INSERTION      x2    KIDNEY STONE SURGERY      KNEE SURGERY      Sepsis infection     NEPHROSTOMY      OR CYSTO/URETERO W/LITHOTRIPSY &INDWELL STENT INSRT Right 6/14/2017    Procedure: CYSTOSCOPY URETEROSCOPY WITH LITHOTRIPSY HOLMIUM LASER,,BASKET STONE EXTRACTION, RETROGRADE PYELOGRAM AND INSERTION STENT URETERAL;  Surgeon: Andrei Schmidt MD;  Location: [facility];  Service: Urology    URINARY SURGERY           Family History     Family History   Problem Relation Age of Onset    Diabetes Father          Social History     Social History     History   Smoking Status    Never Smoker   Smokeless Tobacco    Never Used         Pertinent Lab Values     Lab Results   Component Value Date    CREATININE 1 46 (H) 11/27/2017                 Pertinent Imaging      No newimaging for my review    Naina Freedman MD  3/8/2018  2:16 PM  Sign at close encounter  Irrigation of bladder  Date/Time: 3/8/2018 2:13 PM  Performed by: Wilberto Patel  Authorized by: Wilberto Patel   Consent: Verbal consent obtained    Consent given by: spouse  Patient understanding: patient states understanding of the procedure being performed  Patient consent: the patient's understanding of the procedure matches consent given  Procedure consent: procedure consent matches procedure scheduled  Relevant documents: relevant documents present and verified  Test results: test results available and properly labeled  Site marked: the operative site was not marked  Imaging studies: imaging studies not available  Required items: required blood products, implants, devices, and special equipment available  Patient identity confirmed: provided demographic data  Local anesthesia used: no    Anesthesia:  Local anesthesia used: no    Sedation:  Patient sedated: no  Patient tolerance: Patient tolerated the procedure well with no immediate complications  Comments: Irrigated patient's bladder of copious debris with sterile water using a catheter tip syringe        Difficult Thorne catheter placement  Performing surgeon and procedure consent information is the same as listed above  Indication:  Patient with history of chronic urethral stricture, and difficult Thorne catheter placement with multiple false passages requiring a difficult Thorne catheter change performed by a urologist    Procedure:  Patient was placed in the supine position and prepped in the usual sterile fashion, upon examination it was noted that his Thorne catheter was essentially completely within the urethra with the Thorne catheter balloon inflated  A Super Stiff wire was placed through this and into the lumen of the bladder and the indwelling catheter was removed  Over the Super Stiff wire a 18 Western Brisa [place] tip catheter was then placed in the lumen of the bladder with return of copious pus and bladder debris necessitated the bladder irrigation above  10 milliliters of sterile water was placed into the Thorne catheter balloon and was placed to gravity drainage after the above irrigation of the bladder  Plan:  He will follow up in 3 weeks, as usual for his Thorne catheter change

## 2018-03-08 NOTE — ASSESSMENT & PLAN NOTE
Patient with history of difficult Thorne catheter placement requiring urological manipulation and wire placement  Thorne catheter was changed today over a wire, again, it was seen to be in the urethra, just as last time      Patient should return in 3 weeks for Thorne catheter change, I have documented in the patient's facility documents that this should take care that he does not pull his catheter into his urethra and that they do not pull the catheter into his urethra

## 2018-03-08 NOTE — PATIENT INSTRUCTIONS
Urinary Retention in Men   WHAT YOU NEED TO KNOW:   What is urinary retention? Urinary retention is a condition that develops when your bladder does not empty completely when you urinate  What causes urinary retention? · An enlarged prostate    · Blockages, such as a stone, growth, or narrowing of your urethra    · A weak bladder muscle    · Nerve damage from diabetes, stroke, or spinal cord injury    · Bladder diverticula, which are pockets of urine that form in your bladder and do not empty    · Certain medicines, such as narcotics, antihistamines, or antidepressants  What are the signs and symptoms of urinary retention? · Frequent urination, or the urge to urinate right after you finish    · An urge to urinate, but your urine does not come out or dribbles out slowly and weakly    · Frequent urine leaks that happen during the day or while you sleep    · Pain or pressure when you urinate    · Pain or stiffness in your abdomen, lower back, hips, or upper thighs    · Blood in your urine  How is urinary retention diagnosed? Your healthcare provider will ask about your health history and the medicines you take  He will press or tap on your lower abdomen  You may need any of the following tests:  · A digital rectal exam  is when healthcare providers carefully feel the size of your prostate  · A post void residual  test will show how much urine is left in your bladder after you urinate  You will be asked to urinate and then healthcare providers will use a small ultrasound machine to check how much urine is left in your bladder  · Blood or urine tests  may show infection or prostate specific antigen (PSA) levels  PSA may be elevated in prostate cancer  · An ultrasound  uses sound waves to show pictures on a monitor  An ultrasound may be done to show bladder stones, infection, or other problems  · A CT scan , or CAT scan, is a type of x-ray that is taken of your prostate, kidneys, and bladder   The pictures may show what is causing your urinary retention  You may be given a dye before the pictures are taken to help healthcare providers see the pictures better  Tell the healthcare provider if you have ever had an allergic reaction to contrast dye  How is urinary retention treated? · A Thorne catheter  is a tube put into your bladder to drain urine into a bag  Keep the bag below your waist  This will prevent urine from flowing back into your bladder and causing an infection or other problems  Also, keep the tube free of kinks so the urine will drain properly  Do not pull on the catheter  This can cause pain and bleeding, and may cause the catheter to come out  · Medicines  can help decrease the size of your prostate, fight infection, and help you urinate more easily  · Surgery  may be needed to treat the condition that is causing your urinary retention  When should I contact my healthcare provider? · You have a fever  · You have pain when you urinate  · You have blood in your urine  · You have problems with your catheter  · You have questions or concerns about your condition or care  When should I seek immediate care or call 911? · You have severe abdominal pain  · You are breathing faster than usual     · Your heartbeat is faster than usual     · Your face, hands, feet, or ankles are swollen  CARE AGREEMENT:   You have the right to help plan your care  Learn about your health condition and how it may be treated  Discuss treatment options with your caregivers to decide what care you want to receive  You always have the right to refuse treatment  The above information is an  only  It is not intended as medical advice for individual conditions or treatments  Talk to your doctor, nurse or pharmacist before following any medical regimen to see if it is safe and effective for you    © 2017 [employer] Information is for End User's use only and may not be sold, redistributed or otherwise used for commercial purposes  All illustrations and images included in CareNotes® are the copyrighted property of A [employer]  or Mika Mcclain

## 2018-03-08 NOTE — ASSESSMENT & PLAN NOTE
Patient with chronic colonization of his bladder  Patient with pyocystis and poor bladder emptying when his catheter is pulled into his bladder   Catheter was placed into the lumen of the bladder and the bladder was irrigated free of copious debris and pus with sterile water

## 2018-03-08 NOTE — PROGRESS NOTES
Problem List Items Addressed This Visit     Urinary tract infection     Patient with chronic colonization of his bladder  Patient with pyocystis and poor bladder emptying when his catheter is pulled into his bladder  Catheter was placed into the lumen of the bladder and the bladder was irrigated free of copious debris and pus with sterile water         Lucia catheter problem, sequela - Primary     Patient continues to require every 3 week Lucia catheter changes as he encrusts his Lucia and the lumen thereof if we wait longer than this         Relevant Orders    Irrigation of bladder    Chronic urethral stricture     Patient with history of difficult Lucia catheter placement requiring urological manipulation and wire placement  Lucia catheter was changed today over a wire, again, it was seen to be in the urethra, just as last time  Patient should return in 3 weeks for Lucia catheter change, I have documented in the patient's facility documents that this should take care that he does not pull his catheter into his urethra and that they do not pull the catheter into his urethra         Relevant Orders    Irrigation of bladder              Assessment and plan:       Please see problem oriented charting    Daniele Mayorga MD      Chief Complaint     Chief Complaint   Patient presents with    Lucia catheter change     3 week lucia change         History of Present Illness     Edith Mendez is a 69 y o  man with multiple medical problems including neurological insult with residual deficits and difficult Lucia catheter placement  He also has a history of Lucia catheter encrustation and blockage and has required Renacidin irrigations at his nursing facility  He presents today for Lucia catheter change, and upon examination, his Lucia catheter is again within the lumen of the urethra which is evidence of it being pulled into the urethra      He is unable to complain of any other issues although his wife does not mention any other particular new issues or complaints    Detailed Urologic History     - please refer to HPI    Review of Systems     Review of Systems   Unable to perform ROS: Patient nonverbal             Allergies     Allergies   Allergen Reactions    Gentamycin [Gentamicin] Anaphylaxis    Vancomycin Anaphylaxis    Zosyn [Piperacillin Sod-Tazobactam So] Anaphylaxis     However, has tolerated Ertapenem multiple times and Cefepime   Other      antipersperents  Stat lock from lucia catheter    Sulfa Antibiotics Rash       Physical Exam     Physical Exam   Constitutional: No distress  Patient on a stretcher, strapped to the bed, eating skittles to keep him calm   HENT:   Head: Normocephalic and atraumatic  Eyes: Right eye exhibits no discharge  Left eye exhibits no discharge  No scleral icterus  Neck: No tracheal deviation present  No thyromegaly present  Cardiovascular: Intact distal pulses  Pulmonary/Chest: Effort normal  No stridor  No respiratory distress  He has no wheezes  He exhibits no tenderness  Abdominal: He exhibits no distension and no mass  There is no tenderness  There is no rebound and no guarding  No hernia  Musculoskeletal: He exhibits edema and deformity  He exhibits no tenderness  Lymphadenopathy:     He has no cervical adenopathy  Neurological: He displays abnormal reflex  A cranial nerve deficit and sensory deficit is present  He exhibits abnormal muscle tone  Coordination abnormal    Skin: Skin is warm  No rash noted  He is not diaphoretic  No erythema  No pallor  Skin somewhat greasy   Psychiatric:   Unable to fully assess psychiatric condition given residual neurologic deficits             Vital Signs  Vitals:    03/08/18 1125   BP: 132/80   BP Location: Right arm   Patient Position: Supine   Cuff Size: Standard   Pulse: 72         Current Medications       Current Outpatient Prescriptions:     acetaminophen (TYLENOL) 325 mg tablet, Take 650 mg by mouth every 6 (six) hours as needed for mild pain, Disp: , Rfl:     amLODIPine (NORVASC) 5 mg tablet, Take 5 mg by mouth daily, Disp: , Rfl:     apixaban (ELIQUIS) 2 5 mg, Take 2 5 mg by mouth 2 (two) times a day, Disp: , Rfl:     bisacodyl (BISAC-EVAC) 10 mg suppository, Insert 10 mg into the rectum as needed for constipation, Disp: , Rfl:     busPIRone (BUSPAR) 5 mg tablet, Take 5 mg by mouth 3 (three) times a day, Disp: , Rfl:     famotidine (PEPCID) 20 mg tablet, Take 20 mg by mouth 2 (two) times a day, Disp: , Rfl:     potassium citrate (UROCIT-K) 10 mEq, , Disp: , Rfl:     QUEtiapine (SEROquel) 25 mg tablet, Take 25 mg by mouth 2 (two) times a day  , Disp: , Rfl:     docusate sodium (COLACE) 100 mg capsule, Take 1 capsule by mouth 2 (two) times a day for 30 days, Disp: 60 capsule, Rfl: 0    haloperidol (HALDOL) 2 mg tablet, Take 1 tablet by mouth 2 (two) times a day as needed for agitation for up to 2 days, Disp: 4 tablet, Rfl: 0    memantine (NAMENDA) 5 mg tablet, Take 1 tablet by mouth 2 (two) times a day for 30 days, Disp: 60 tablet, Rfl: 0    metoprolol tartrate (LOPRESSOR) 25 mg tablet, Take 1 tablet by mouth 2 (two) times a day for 30 days (Patient taking differently: Take 25 mg by mouth 2 (two) times a day  ), Disp: 60 tablet, Rfl: 0    mirtazapine (REMERON) 15 mg tablet, Take 1 tablet by mouth daily at bedtime for 30 days, Disp: 30 tablet, Rfl: 0    senna (SENOKOT) 8 6 mg, Take 1 tablet by mouth daily at bedtime for 7 days, Disp: 7 tablet, Rfl: 0    tamsulosin (FLOMAX) 0 4 mg, Take 1 capsule by mouth daily with dinner for 30 days, Disp: 30 capsule, Rfl: 0      Active Problems     Patient Active Problem List   Diagnosis    Hypotension    Aphagia    [facility]    Sepsis ([address]    History of DVT (deep vein thrombosis)    Aneurysm of thoracic aorta ([address] )    Retention of urine    Left lower lobe pneumonia (HCC)    Essential hypertension    Gross hematuria    Aphasia of unknown origin    CKD (chronic kidney disease)    Altered mental status    Urinary tract infection    Encephalopathy    Sept[facility] shock ([facility])    Hydronephrosis with obstructing calculus    Ureteral obstruction    Leukocytosis    Lactic acidosis    GERD (gastroesophageal reflux disease)    HTN, goal below 140/90    Generalized anxiety disorder    BPH (benign prostatic hypertrophy)    Muscle weakness    H/O blood clots    ESBL (extended spectrum beta-lactamase) producing bacteria infection    Acute deep vein thrombosis (DVT) of both iliofemoral veins ([facility])    Adverse drug effect    Anemia    Bowel trouble    Dementia with behavioral disturbance    Thorne catheter problem, sequela    Gram-negative sepsis with organ dysfunction ([facility])    Infection of drug-resistant bacteria    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[facility])    Joint effusion, knee    Kidney stones    Knee problem    Mental disorder    MSSA (methicillin susceptible Staphylococcus aureus) septicemia ([facility])    Progressive aphasia     pulmonary hemorrhage    False passage of urethra    Complication, blocked Thorne catheter, subsequent encounter    Chronic urethral stricture         Past Medical History     Past Medical History:   Diagnosis Date    Anxiety     Aortic aneurysm ([facility])     Aphasia     Ataxia     Bladder adhesions     BPH (benign prostatic hypertrophy)     COPD (chronic obstructive pulmonary disease) ([facility])     wife denies - "never had"    Dementia     Difficulty walking     Essential (primary) hypertension     Generalized anxiety disorder     GERD (gastroesophageal reflux disease)     Global aphasia     H/O blood clots     High blood pressure     History of kidney stones     Kidney stones     Mental disorder     Muscle weakness     Neuromuscular dysfunction of bladder     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     Retention of urine, unspecified     Thrombosis  Urinary retention     Urinary tract bacterial infections     Urinary tract infection          Surgical History     Past Surgical History:   Procedure Laterality Date    CYSTOSCOPY      IVC FILTER INSERTION      X2    IVC FILTER INSERTION      x2    KIDNEY STONE SURGERY      KNEE SURGERY      Sepsis infection     NEPHROSTOMY      VA CYSTO/URETERO W/LITHOTRIPSY &INDWELL STENT INSRT Right 6/14/2017    Procedure: CYSTOSCOPY URETEROSCOPY WITH LITHOTRIPSY HOLMIUM LASER,,BASKET STONE EXTRACTION, RETROGRADE PYELOGRAM AND INSERTION STENT URETERAL;  Surgeon: Yumi Newby MD;  Location: [facility];  Service: Urology    URINARY SURGERY           Family History     Family History   Problem Relation Age of Onset    Diabetes Father          Social History     Social History     History   Smoking Status    Never Smoker   Smokeless Tobacco    Never Used         Pertinent Lab Values     Lab Results   Component Value Date    CREATININE 1 46 (H) 11/27/2017                 Pertinent Imaging      No newimaging for my review

## 2018-04-08 NOTE — PROGRESS NOTES
Problem List Items Addressed This Visit     Retention of urine - Primary (Chronic)     Due to neurogenic bladder, continue urethral Thorne catheter changes over a wire         False passage of urethra     History of false passages, requires Thorne catheter changes over a wire by a urologist every 3-4 weeks    Plan:  Continue with urethral Thorne catheter changes         Complication, blocked Thorne catheter, subsequent encounter       I was happy to see that Sean Jimenez did not have pyocystis today as he has at the previous to Thorne catheter changes  His catheter was patent and I was able to change over a wire with slightly less difficulty than usual          Chronic urethral stricture       Managed with urethral Thorne catheter, not a candidate for suprapubic catheter given his mental state  He was counseled by a urologist at [facility] about undergoing perineal urethrostomy with Thorne catheter placement  While this is an option, I do not feel that it would decrease the chance that he would this logic is Thorne catheter or pull it, given his mental status  For now, we will continue with Thorne catheter changes over a wire                   Assessment and plan:       Please see problem oriented charting for the assessment plan of today's urological complaints      Paul Renteria MD      Chief Complaint     Chief Complaint   Patient presents with    Thorne catheter problem    Urinary Retention         History of Present Illness     Mary Crews is a 69 y o  Medically comorbid man with neurogenic bladder and urinary retention and poor baseline mental status  Since the last time we saw him, he was hospitalized for "urinary tract infection" and was then discharged back to his facility        He presents today for follow-up as well as for difficult Thorne catheter change over a wire, as he does every 3-4 weeks given his history of chronic urethral stricture and difficult Thorne catheter placement requiring a urologist to place his catheter multiple times  Apart from the above, his wife states that they were counseled on undergoing perineal urethrostomy with Lucia catheter drainage via the perineum  I did discuss with them that I am unsure that this would help his pulling of his catheter or make his care any easier or decrease his chance of infection  Detailed Urologic History     - please refer to HPI    Review of Systems     Review of Systems   Constitutional: Negative  HENT: Negative  Eyes: Negative  Respiratory: Negative  Cardiovascular: Negative  Gastrointestinal: Negative  Endocrine: Negative  Genitourinary: Positive for decreased urine volume and difficulty urinating  Musculoskeletal: Negative  Skin: Negative  Allergic/Immunologic: Negative  Neurological: Negative  Hematological: Negative  Psychiatric/Behavioral: Negative  Allergies     Allergies   Allergen Reactions    Gentamycin [Gentamicin] Anaphylaxis    Vancomycin Anaphylaxis    Zosyn [Piperacillin Sod-Tazobactam So] Anaphylaxis     However, has tolerated Ertapenem multiple times and Cefepime   Other      antipersperents  Stat lock from lucia catheter    Sulfa Antibiotics Rash       Physical Exam     Physical Exam   Constitutional: He is oriented to person, place, and time  Patient is strapped to a stretcher, eating Skittle to  Keep him   Calm  HENT:   Head: Normocephalic and atraumatic  Skin is greasy   Eyes: EOM are normal  Pupils are equal, round, and reactive to light  Right eye exhibits no discharge  Left eye exhibits no discharge  No scleral icterus  Neck: Normal range of motion  Neck supple  No tracheal deviation present  No thyromegaly present  Pulmonary/Chest: Effort normal  No stridor  No respiratory distress  He has no wheezes  He exhibits no tenderness  Abdominal: Soft  He exhibits no distension and no mass  There is no tenderness  There is no rebound and no guarding  No hernia  Hernia confirmed negative in the right inguinal area and confirmed negative in the left inguinal area  Genitourinary: Right testis shows no mass, no swelling and no tenderness  Right testis is descended  Cremasteric reflex is not absent on the right side  Left testis shows no mass, no swelling and no tenderness  Left testis is descended  Cremasteric reflex is not absent on the left side  Circumcised  No hypospadias, penile erythema or penile tenderness  No discharge found  Musculoskeletal: Normal range of motion  He exhibits no edema, tenderness or deformity  Lymphadenopathy:     He has no cervical adenopathy  No inguinal adenopathy noted on the right or left side  Neurological: He is alert and oriented to person, place, and time  No cranial nerve deficit or sensory deficit  He exhibits normal muscle tone  Coordination normal    Skin: Skin is warm and dry  No rash noted  No erythema  No pallor  Psychiatric: He has a normal mood and affect  His behavior is normal  Judgment and thought content normal    Nursing note and vitals reviewed            Vital Signs  Vitals:    04/09/18 1027   BP: 136/82   BP Location: Left arm   Patient Position: Sitting   Cuff Size: Standard   Pulse: 76         Current Medications       Current Outpatient Prescriptions:     acetaminophen (TYLENOL) 325 mg tablet, Take 650 mg by mouth every 6 (six) hours as needed for mild pain, Disp: , Rfl:     amLODIPine (NORVASC) 5 mg tablet, Take 5 mg by mouth daily, Disp: , Rfl:     apixaban (ELIQUIS) 2 5 mg, Take 2 5 mg by mouth 2 (two) times a day, Disp: , Rfl:     bisacodyl (BISAC-EVAC) 10 mg suppository, Insert 10 mg into the rectum as needed for constipation, Disp: , Rfl:     busPIRone (BUSPAR) 5 mg tablet, Take 5 mg by mouth 3 (three) times a day, Disp: , Rfl:     famotidine (PEPCID) 20 mg tablet, Take 20 mg by mouth 2 (two) times a day, Disp: , Rfl:     potassium citrate (UROCIT-K) 10 mEq, , Disp: , Rfl:     QUEtiapine (SEROquel) 25 mg tablet, Take 25 mg by mouth 2 (two) times a day  , Disp: , Rfl:     docusate sodium (COLACE) 100 mg capsule, Take 1 capsule by mouth 2 (two) times a day for 30 days, Disp: 60 capsule, Rfl: 0    haloperidol (HALDOL) 2 mg tablet, Take 1 tablet by mouth 2 (two) times a day as needed for agitation for up to 2 days, Disp: 4 tablet, Rfl: 0    memantine (NAMENDA) 5 mg tablet, Take 1 tablet by mouth 2 (two) times a day for 30 days, Disp: 60 tablet, Rfl: 0    metoprolol tartrate (LOPRESSOR) 25 mg tablet, Take 1 tablet by mouth 2 (two) times a day for 30 days (Patient taking differently: Take 25 mg by mouth 2 (two) times a day  ), Disp: 60 tablet, Rfl: 0    mirtazapine (REMERON) 15 mg tablet, Take 1 tablet by mouth daily at bedtime for 30 days, Disp: 30 tablet, Rfl: 0    senna (SENOKOT) 8 6 mg, Take 1 tablet by mouth daily at bedtime for 7 days, Disp: 7 tablet, Rfl: 0    tamsulosin (FLOMAX) 0 4 mg, Take 1 capsule by mouth daily with dinner for 30 days, Disp: 30 capsule, Rfl: 0      Active Problems     Patient Active Problem List   Diagnosis    Hypotension    Aphagia    COPD (chronic obstructive pulmonary disease) (HCC)    Sepsis ([facility] [address] )    History of DVT (deep vein thrombosis)    [facility] aorta ([facility])    Retention of urine    Left lower lobe pneumonia ([facility])    Essential hypertension    Gross hematuria    Aphasia of unknown origin    CKD (chronic kidney disease)    Altered mental status    Urinary tract infection    Encephalopathy    Septic shock ([facility])    Hydronephrosis with obstructing calculus    Ureteral obstruction    Leukocytosis    Lactic acidosis    GERD (gastroesophageal reflux disease)    HTN, goal below 140/90    Generalized anxiety disorder    BPH (benign prostatic hypertrophy)    Muscle weakness    H/O blood clots    ESBL (extended spectrum beta-lactamase) producing bacteria infection    Acute deep vein thrombosis (DVT) of both iliofemoral veins ([facility] [address] )    Adverse drug effect    Anemia    Bowel trouble    Dementia with behavioral disturbance    Thorne catheter problem, sequela    Gram-negative sepsis with organ dysfunction (HCC)    Infection of drug-resistant bacteria    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HCC)    Joint effusion, knee    Kidney stones    Knee problem    Mental disorder    MSSA (methicillin susceptible Staphylococcus aureus) septicemia (HCC)    Progressive aphasia     pulmonary hemorrhage    False passage of urethra    Complication, blocked Thorne catheter, subsequent encounter    Chronic urethral stricture         Past Medical History     Past Medical History:   Diagnosis Date    Anxiety     Aortic aneurysm (HCC)     Aphasia     Ataxia     Bladder adhesions     BPH (benign prostatic hypertrophy)     COPD (chronic obstructive pulmonary disease) ([address] )     wife denies - "never had"    Dementia     Difficulty walking     Essential (primary) hypertension     Generalized anxiety disorder     GERD (gastroesophageal reflux disease)     Global aphasia     H/O blood clots     High blood pressure     History of kidney stones     Kidney stones     Mental disorder     Muscle weakness     Neuromuscular dysfunction of bladder     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     Retention of urine, unspecified     Thrombosis     Urinary retention     Urinary tract bacterial infections     Urinary tract infection          Surgical History     Past Surgical History:   Procedure Laterality Date    CYSTOSCOPY      IVC FILTER INSERTION      X2    IVC FILTER INSERTION      x2    KIDNEY STONE SURGERY      KNEE SURGERY      Sepsis infection     NEPHROSTOMY      [place] CYSTO/URETERO W/LITHOTRIPSY &INDWELL STENT INSRT Right 2017    Procedure: CYSTOSCOPY URETEROSCOPY WITH LITHOTRIPSY HOLMIUM LASER,,BASKET STONE EXTRACTION, RETROGRADE PYELOGRAM AND INSERTION STENT URETERAL;  Surgeon: Shannan Whitaker, MD;  Location: [facility];  Service: Urology    URINARY SURGERY           Family History     Family History   Problem Relation Age of Onset    Diabetes Father          Social History     Social History     History   Smoking Status    Never Smoker   Smokeless Tobacco    Never Used         Pertinent Lab Values     Lab Results   Component Value Date    CREATININE 1 46 (H) 11/27/2017       No results found for: PSA          Pertinent Imaging        There is no new pertinent imaging for my review

## 2018-04-09 ENCOUNTER — OFFICE VISIT (OUTPATIENT)
Dept: UROLOGY | Facility: CLINIC | Age: 68
End: 2018-04-09
Payer: COMMERCIAL

## 2018-04-09 VITALS — HEART RATE: 76 BPM | SYSTOLIC BLOOD PRESSURE: 136 MMHG | DIASTOLIC BLOOD PRESSURE: 82 MMHG

## 2018-04-09 DIAGNOSIS — R33.9 RETENTION OF URINE: Primary | Chronic | ICD-10-CM

## 2018-04-09 DIAGNOSIS — N36.5 FALSE PASSAGE OF URETHRA: ICD-10-CM

## 2018-04-09 DIAGNOSIS — T83.091D: ICD-10-CM

## 2018-04-09 DIAGNOSIS — IMO0002 CHRONIC URETHRAL STRICTURE: ICD-10-CM

## 2018-04-09 PROCEDURE — 51710 CHANGE OF BLADDER TUBE: CPT | Performed by: UROLOGY

## 2018-04-09 PROCEDURE — 99214 OFFICE O/P EST MOD 30 MIN: CPT | Performed by: UROLOGY

## 2018-04-09 NOTE — LETTER
April 9, 2018     Zeeshan Srinivasan, [address] [place] [address]    [facility]   YOB: 1950   Date of Visit: 4/9/2018       Dear Dr Preet Mckeonr: Thank you for referring Brit Reagan to me for evaluation  Below are my notes for this consultation  If you have questions, please do not hesitate to call me  I look forward to following your patient along with you  Sincerely,        Enrique Mcclure MD        CC: No Recipients  Enrique Mcclure MD  4/9/2018  5:00 PM  Sign at close encounter  Irrigation of bladder  Date/Time: 4/9/2018 4:58 PM  Performed by: Lc Szymanski  Authorized by: Lc Szymanski   Consent: Verbal consent obtained  Risks and benefits: risks, benefits and alternatives were discussed  Consent given by: guardian  Patient understanding: patient does not state understanding of the procedure being performed (Patient unable to express consent, patient's guardian does express consent and understanding)  Patient consent: the patient's understanding of the procedure matches consent given  Procedure consent: procedure consent matches procedure scheduled  Relevant documents: relevant documents present and verified  Test results: test results available and properly labeled  Site marked: the operative site was not marked  Imaging studies: imaging studies not available  Required items: required blood products, implants, devices, and special equipment available  Patient identity confirmed: provided demographic data  Preparation: Patient was prepped and draped in the usual sterile fashion  Local anesthesia used: no    Anesthesia:  Local anesthesia used: no    Sedation:  Patient sedated: no  Patient tolerance: Patient tolerated the procedure well with no immediate complications  Comments:   The patient's indwelling Thorne catheter was prepped sterilely, a superstiff Amplatz wire was placed into the lumen of the bladder, after the glans penis was prepped a [address] [place] [address] Sterling tip catheter was placed, with slight difficulty over the Amplatz wire into the urinary bladder  The bladder was then irrigated and there was no significant debris, which represents a positive change from the previous Thorne catheter change  Return in 3-4 weeks for Thorne catheter change  Marli Davidson MD  4/9/2018  4:58 PM  Sign at close encounter       Problem List Items Addressed This Visit     Retention of urine - Primary (Chronic)     Due to neurogenic bladder, continue urethral Thorne catheter changes over a wire         False passage of urethra     History of false passages, requires Thorne catheter changes over a wire by a urologist every 3-4 weeks    Plan:  Continue with urethral Thorne catheter changes         Complication, blocked Thorne catheter, subsequent encounter       I was happy to see that Angle Alcaraz did not have pyocystis today as he has at the previous to Thorne catheter changes  His catheter was patent and I was able to change over a wire with slightly less difficulty than usual          Chronic urethral stricture       Managed with urethral Thorne catheter, not a candidate for suprapubic catheter given his mental state  He was counseled by a urologist at [facility] about undergoing perineal urethrostomy with Thorne catheter placement  While this is an option, I do not feel that it would decrease the chance that he would this logic is Thorne catheter or pull it, given his mental status  For now, we will continue with Thorne catheter changes over a wire                   Assessment and plan:       Please see problem oriented charting for the assessment plan of today's urological complaints      Marli Davidson MD      Chief Complaint     Chief Complaint   Patient presents with    Thorne catheter problem    Urinary Retention         History of Present Illness     Giorgi Holloway is a 69 y o    Medically comorbid man with neurogenic bladder and urinary retention and poor baseline mental status  Since the last time we saw him, he was hospitalized for "urinary tract infection" and was then discharged back to his facility  He presents today for follow-up as well as for difficult Lucia catheter change over a wire, as he does every 3-4 weeks given his history of chronic urethral stricture and difficult Lucia catheter placement requiring a urologist to place his catheter multiple times  Apart from the above, his wife states that they were counseled on undergoing perineal urethrostomy with Lucia catheter drainage via the perineum  I did discuss with them that I am unsure that this would help his pulling of his catheter or make his care any easier or decrease his chance of infection  Detailed Urologic History     - please refer to HPI    Review of Systems     Review of Systems   Constitutional: Negative  HENT: Negative  Eyes: Negative  Respiratory: Negative  Cardiovascular: Negative  Gastrointestinal: Negative  Endocrine: Negative  Genitourinary: Positive for decreased urine volume and difficulty urinating  Musculoskeletal: Negative  Skin: Negative  Allergic/Immunologic: Negative  Neurological: Negative  Hematological: Negative  Psychiatric/Behavioral: Negative  Allergies     Allergies   Allergen Reactions    Gentamycin [Gentamicin] Anaphylaxis    Vancomycin Anaphylaxis    Zosyn [Piperacillin Sod-Tazobactam So] Anaphylaxis     However, has tolerated Ertapenem multiple times and Cefepime   Other      antipersperents  Stat lock from lucia catheter    Sulfa Antibiotics Rash       Physical Exam     Physical Exam   Constitutional: He is oriented to person, place, and time  Patient is strapped to a stretcher, eating Skittle to  Keep him   Calm  HENT:   Head: Normocephalic and atraumatic  Skin is greasy   Eyes: EOM are normal  Pupils are equal, round, and reactive to light  Right eye exhibits no discharge   Left eye exhibits no discharge  No scleral icterus  Neck: Normal range of motion  Neck supple  No tracheal deviation present  No thyromegaly present  Pulmonary/Chest: Effort normal  No stridor  No respiratory distress  He has no wheezes  He exhibits no tenderness  Abdominal: Soft  He exhibits no distension and no mass  There is no tenderness  There is no rebound and no guarding  No hernia  Hernia confirmed negative in the right inguinal area and confirmed negative in the left inguinal area  Genitourinary: Right testis shows no mass, no swelling and no tenderness  Right testis is descended  Cremasteric reflex is not absent on the right side  Left testis shows no mass, no swelling and no tenderness  Left testis is descended  Cremasteric reflex is not absent on the left side  Circumcised  No hypospadias, penile erythema or penile tenderness  No discharge found  Musculoskeletal: Normal range of motion  He exhibits no edema, tenderness or deformity  Lymphadenopathy:     He has no cervical adenopathy  No inguinal adenopathy noted on the right or left side  Neurological: He is alert and oriented to person, place, and time  No cranial nerve deficit or sensory deficit  He exhibits normal muscle tone  Coordination normal    Skin: Skin is warm and dry  No rash noted  No erythema  No pallor  Psychiatric: He has a normal mood and affect  His behavior is normal  Judgment and thought content normal    Nursing note and vitals reviewed            Vital Signs  Vitals:    04/09/18 1027   BP: 136/82   BP Location: Left arm   Patient Position: Sitting   Cuff Size: Standard   Pulse: 76         Current Medications       Current Outpatient Prescriptions:     acetaminophen (TYLENOL) 325 mg tablet, Take 650 mg by mouth every 6 (six) hours as needed for mild pain, Disp: , Rfl:     amLODIPine (NORVASC) 5 mg tablet, Take 5 mg by mouth daily, Disp: , Rfl:     apixaban (ELIQUIS) 2 5 mg, Take 2 5 mg by mouth 2 (two) times a day, Disp: , Rfl:     bisacodyl (BISAC-EVAC) 10 mg suppository, Insert 10 mg into the rectum as needed for constipation, Disp: , Rfl:     busPIRone (BUSPAR) 5 mg tablet, Take 5 mg by mouth 3 (three) times a day, Disp: , Rfl:     famotidine (PEPCID) 20 mg tablet, Take 20 mg by mouth 2 (two) times a day, Disp: , Rfl:     potassium citrate (UROCIT-K) 10 mEq, , Disp: , Rfl:     QUEtiapine (SEROquel) 25 mg tablet, Take 25 mg by mouth 2 (two) times a day  , Disp: , Rfl:     docusate sodium (COLACE) 100 mg capsule, Take 1 capsule by mouth 2 (two) times a day for 30 days, Disp: 60 capsule, Rfl: 0    haloperidol (HALDOL) 2 mg tablet, Take 1 tablet by mouth 2 (two) times a day as needed for agitation for up to 2 days, Disp: 4 tablet, Rfl: 0    memantine (NAMENDA) 5 mg tablet, Take 1 tablet by mouth 2 (two) times a day for 30 days, Disp: 60 tablet, Rfl: 0    metoprolol tartrate (LOPRESSOR) 25 mg tablet, Take 1 tablet by mouth 2 (two) times a day for 30 days (Patient taking differently: Take 25 mg by mouth 2 (two) times a day  ), Disp: 60 tablet, Rfl: 0    mirtazapine (REMERON) 15 mg tablet, Take 1 tablet by mouth daily at bedtime for 30 days, Disp: 30 tablet, Rfl: 0    senna (SENOKOT) 8 6 mg, Take 1 tablet by mouth daily at bedtime for 7 days, Disp: 7 tablet, Rfl: 0    tamsulosin (FLOMAX) 0 4 mg, Take 1 capsule by mouth daily with dinner for 30 days, Disp: 30 capsule, Rfl: 0      Active Problems     Patient Active Problem List   Diagnosis    Hypotension    Aphagia    COPD (chronic obstructive pulmonary disease) (HCC)    [facility]is [facility] [address] [facility]    History of DVT (deep vein thrombosis)    Aneurysm of thoracic aorta (HCC)    Retention of urine    Left lower lobe pneumonia (HCC)    Essential hypertension    Gross hematuria    Aphasia of unknown origin    CKD (chronic kidney disease)    Altered mental status    Urinary tract infection    Encephalopathy    Septic shock (HCC)    Hydronephrosis with obstructing calculus    Ureteral obstruction    Leukocytosis    Lactic acidosis    GERD (gastroesophageal reflux disease)    HTN, goal below 140/90    Generalized anxiety disorder    BPH (benign prostatic hypertrophy)    Muscle weakness    H/O blood clots    ESBL (extended spectrum beta-lactamase) producing bacteria infection    Acute deep vein thrombosis (DVT) of both iliofemoral veins ([facility])    Adverse drug effect    Anemia    Bowel trouble    Dementia with behavioral disturbance    Thorne catheter problem, sequela    Gram-negative sepsis with organ dysfunction ([facility])    Infection of drug-resistant bacteria    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[facility])    Joint effusion, knee    Kidney stones    Knee problem    Mental disorder    MSSA (methicillin susceptible Staphylococcus aureus) septicemia ([facility] [address] )    Progressive aphasia     pulmonary hemorrhage    False passage of urethra    Complication, blocked Thorne catheter, subsequent encounter    Chronic urethral stricture         Past Medical History     Past Medical History:   Diagnosis Date    Anxiety     Aortic aneurysm ([facility])     Aphasia     Ataxia     Bladder adhesions     BPH (benign prostatic hypertrophy)     COPD (chronic obstructive pulmonary disease) ([facility])     wife denies - "never had"    Dementia     Difficulty walking     Essential (primary) hypertension     Generalized anxiety disorder     GERD (gastroesophageal reflux disease)     Global aphasia     H/O blood clots     High blood pressure     History of kidney stones     Kidney stones     Mental disorder     Muscle weakness     Neuromuscular dysfunction of bladder     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     Retention of urine, unspecified     Thrombosis     Urinary retention     Urinary tract bacterial infections     Urinary tract infection          Surgical History     Past Surgical History:   Procedure Laterality Date    CYSTOSCOPY      IVC FILTER INSERTION      X2    IVC FILTER INSERTION      x2    KIDNEY STONE SURGERY      KNEE SURGERY      Sepsis infection     NEPHROSTOMY      [place] CYSTO/URETERO W/LITHOTRIPSY &INDWELL STENT INSRT Right 6/14/2017    Procedure: CYSTOSCOPY URETEROSCOPY WITH LITHOTRIPSY HOLMIUM LASER,,BASKET STONE EXTRACTION, RETROGRADE PYELOGRAM AND INSERTION STENT URETERAL;  Surgeon: Guy Mcdonough MD;  Location: [facility];  Service: Urology    URINARY SURGERY           Family History     Family History   Problem Relation Age of Onset    Diabetes Father          Social History     Social History     History   Smoking Status    Never Smoker   Smokeless Tobacco    Never Used         Pertinent Lab Values     Lab Results   Component Value Date    CREATININE 1 46 (H) 11/27/2017       No results found for: PSA          Pertinent Imaging        There is no new pertinent imaging for my review

## 2018-04-09 NOTE — ASSESSMENT & PLAN NOTE
Managed with urethral Thorne catheter, not a candidate for suprapubic catheter given his mental state  He was counseled by a urologist at [facility] about undergoing perineal urethrostomy with Thorne catheter placement  While this is an option, I do not feel that it would decrease the chance that he would this logic is Thorne catheter or pull it, given his mental status        For now, we will continue with Thorne catheter changes over a wire

## 2018-04-09 NOTE — ASSESSMENT & PLAN NOTE
History of false passages, requires Thorne catheter changes over a wire by a urologist every 3-4 weeks    Plan:  Continue with urethral Thorne catheter changes

## 2018-04-09 NOTE — PROGRESS NOTES
Irrigation of bladder  Date/Time: 4/9/2018 4:58 PM  Performed by: Roberto Lara  Authorized by: Roberto Lara   Consent: Verbal consent obtained  Risks and benefits: risks, benefits and alternatives were discussed  Consent given by: guardian  Patient understanding: patient does not state understanding of the procedure being performed (Patient unable to express consent, patient's guardian does express consent and understanding)  Patient consent: the patient's understanding of the procedure matches consent given  Procedure consent: procedure consent matches procedure scheduled  Relevant documents: relevant documents present and verified  Test results: test results available and properly labeled  Site marked: the operative site was not marked  Imaging studies: imaging studies not available  Required items: required blood products, implants, devices, and special equipment available  Patient identity confirmed: provided demographic data  Preparation: Patient was prepped and draped in the usual sterile fashion  Local anesthesia used: no    Anesthesia:  Local anesthesia used: no    Sedation:  Patient sedated: no  Patient tolerance: Patient tolerated the procedure well with no immediate complications  Comments: The patient's indwelling Thorne catheter was prepped sterilely, a superstiff Amplatz wire was placed into the lumen of the bladder, after the glans penis was prepped a 18 Western Brisa [place] tip catheter was placed, with slight difficulty over the Amplatz wire into the urinary bladder  The bladder was then irrigated and there was no significant debris, which represents a positive change from the previous Thorne catheter change  Return in 3-4 weeks for Thorne catheter change

## 2018-04-09 NOTE — ASSESSMENT & PLAN NOTE
I was happy to see that Tish Ching did not have pyocystis today as he has at the previous to Thorne catheter changes    His catheter was patent and I was able to change over a wire with slightly less difficulty than usual

## 2018-05-01 ENCOUNTER — OFFICE VISIT (OUTPATIENT)
Dept: UROLOGY | Facility: CLINIC | Age: 68
End: 2018-05-01
Payer: COMMERCIAL

## 2018-05-01 DIAGNOSIS — IMO0002 CHRONIC URETHRAL STRICTURE: ICD-10-CM

## 2018-05-01 DIAGNOSIS — R33.9 RETENTION OF URINE: Chronic | ICD-10-CM

## 2018-05-01 DIAGNOSIS — T83.091D: ICD-10-CM

## 2018-05-01 DIAGNOSIS — N36.5 FALSE PASSAGE OF URETHRA: ICD-10-CM

## 2018-05-01 DIAGNOSIS — T83.9XXD PROBLEM WITH FOLEY CATHETER, SUBSEQUENT ENCOUNTER: Primary | ICD-10-CM

## 2018-05-01 PROBLEM — N13.5 URETERAL OBSTRUCTION: Status: RESOLVED | Noted: 2017-05-01 | Resolved: 2018-05-01

## 2018-05-01 PROBLEM — N13.2 HYDRONEPHROSIS WITH OBSTRUCTING CALCULUS: Status: RESOLVED | Noted: 2017-05-01 | Resolved: 2018-05-01

## 2018-05-01 PROCEDURE — 99214 OFFICE O/P EST MOD 30 MIN: CPT | Performed by: UROLOGY

## 2018-05-01 PROCEDURE — 51710 CHANGE OF BLADDER TUBE: CPT | Performed by: UROLOGY

## 2018-05-01 RX ORDER — MEMANTINE HYDROCHLORIDE 5 MG/1
TABLET ORAL
COMMUNITY
Start: 2018-04-11 | End: 2018-05-03 | Stop reason: SDUPTHER

## 2018-05-01 NOTE — PROGRESS NOTES
Irrigation of bladder  Date/Time: 5/1/2018 3:46 PM  Performed by: Manas Darby  Authorized by: Manas Darby   Consent: Verbal consent obtained  Risks and benefits: risks, benefits and alternatives were discussed  Consent given by: guardian  Patient understanding: patient does not state understanding of the procedure being performed (Guardian understands procedure)  Patient consent: the patient's understanding of the procedure matches consent given  Procedure consent: procedure consent matches procedure scheduled  Relevant documents: relevant documents present and verified  Test results: test results available and properly labeled  Site marked: the operative site was not marked  Imaging studies: imaging studies available  Required items: required blood products, implants, devices, and special equipment available  Patient identity confirmed: provided demographic data  Preparation: Patient was prepped and draped in the usual sterile fashion  Local anesthesia used: no    Anesthesia:  Local anesthesia used: no    Sedation:  Patient sedated: no  Patient tolerance: Patient tolerated the procedure well with no immediate complications        The patient's Thorne catheter was seen to be draining clear yellow urine with slight debris  This was prepped and draped in usual sterile fashion and a Amplatz Super Stiff wire was placed through the lumen of the [place] tip catheter and after the glans penis was prepped the old catheter was removed and a new, 25 Western Brisa [place] tip catheter was placed into the lumen of the bladder  The bladder was then irrigated without significant debris were as during previous Thorne changes he had copious thick pus within the body of the bladder      He will return within 3 weeks for a Thorne catheter change over a wire

## 2018-05-01 NOTE — PATIENT INSTRUCTIONS
Thorne Catheter Placement and Care   WHAT YOU NEED TO KNOW:   What is a Thorne catheter? A Thorne catheter is a sterile tube that is inserted into your bladder to drain urine  It is also called an indwelling urinary catheter  The tip of the catheter has a small balloon filled with solution that holds the catheter in your bladder  How is a Thorne catheter placed? · Your healthcare provider will clean your genital area with a sterile solution  He or she will put lubricant jelly on the catheter to help it go in smoothly  The end of the catheter with the deflated balloon will be inserted into your urethra  The catheter will be moved slowly and gently into your bladder  You may be asked to take slow, deep breaths or to push as if you were trying to urinate as the catheter is inserted  · When your healthcare provider sees urine flowing from the catheter, he or she will fill the balloon at the end of the catheter  The balloon holds the catheter in place so it does not come out  Your healthcare provider will attach the open end of the catheter to a sterile drainage bag  How do I care for my Thorne catheter? · Clean your genital area 2 times every day  Clean your catheter and the area around where it was inserted  Use soap and water  Clean your anal opening and catheter area after every bowel movement  · Secure the catheter tube  so you do not pull or move the catheter  This helps prevent pain and bladder spasms  Healthcare providers will show you how to use medical tape or a strap to secure the catheter tube to your body  · Keep a closed drainage system  Your Thorne catheter should always be attached to the drainage bag to form a closed system  Do not disconnect any part of the closed system unless you need to change the bag  How do I care for my drainage bag? · Ask if a leg bag is right for you  A leg bag can be worn under your clothes   Ask your healthcare provider for more information about a leg bag  · Keep the drainage bag below the level of your waist   This helps stop urine from moving back up the tubing and into your bladder  Do not loop or kink the tubing  This can cause urine to back up and collect in your bladder  Do not let the drainage bag touch or lie on the floor  · Empty the drainage bag when needed  The weight of a full drainage bag can be painful  Empty the drainage bag every 3 to 6 hours or when it is ? full  · Clean and change the drainage bag as directed  Ask your healthcare provider how often you should change the drainage bag and what cleaning solution to use  Wear disposable gloves when you change the bag  Do not allow the end of the catheter or tubing to touch anything  Clean the ends with an alcohol pad before you reconnect them  What can I do if problems develop? · No urine is draining into the bag:      ¨ Check for kinks in the tubing and straighten them out  ¨ Check the tape or strap used to secure the catheter tube to your skin  Make sure it is not blocking the tube  ¨ Make sure you are not sitting or lying on the tubing  ¨ Make sure the urine bag is hanging below the level of your waist     · Urine leaks from or around the catheter, tubing, or drainage bag:  Check if the closed drainage system has accidently come open or apart  Clean the catheter and tubing ends with a new alcohol pad and reconnect them  What are the risks of a Thorne catheter? You may develop an infection caused by bacteria  This can happen when the drainage system is opened and bacteria get inside the tubing  You can also get an infection if the catheter equipment is not cleaned well or you do not wash your hands  The infection can spread to your bladder or other organs, and may become severe  How can I help prevent an infection? · Wash your hands often  Wash before and after you touch your catheter, tubing, or drainage bag  Use soap and water   Wear clean disposable gloves when you care for your catheter or disconnect the drainage bag  Wash your hands before you prepare or eat food  · Drink liquids as directed  Ask your healthcare provider how much liquid to drink each day and which liquids are best for you  Liquids will help flush your kidneys and bladder to help prevent infection  When should I seek immediate care? · Your catheter comes out  · You suddenly have material that looks like sand in the tubing or drainage bag  · No urine is draining into the bag and you have checked the system  · You have pain in your hip, back, pelvis, or lower abdomen  · You are confused or cannot think clearly  When should I contact my healthcare provider? · You have a fever  · You have bladder spasms for more than 1 day after the catheter is placed  · You see blood in the tubing or drainage bag  · You have a rash or itching where the catheter tube is secured to your skin  · Urine leaks from or around the catheter, tubing, or drainage bag  · The closed drainage system has accidently come open or apart  · You see a layer of crystals inside the tubing  · You have questions or concerns about your condition or care  CARE AGREEMENT:   You have the right to help plan your care  Learn about your health condition and how it may be treated  Discuss treatment options with your caregivers to decide what care you want to receive  You always have the right to refuse treatment  The above information is an  only  It is not intended as medical advice for individual conditions or treatments  Talk to your doctor, nurse or pharmacist before following any medical regimen to see if it is safe and effective for you  © 2017 [employer]  Information is for End User's use only and may not be sold, redistributed or otherwise used for commercial purposes   All illustrations and images included in CareNotes® are the copyrighted property of [employer]  or Mika [employer]

## 2018-05-01 NOTE — ASSESSMENT & PLAN NOTE
Uneventful change of catheter over a wire today, we will continue this on a every 3-4 week basis given history of significant encrustation of his catheter      Plan:  Continue catheter changes, continue Renacidin irrigation

## 2018-05-01 NOTE — ASSESSMENT & PLAN NOTE
Managed with urethral Thorne catheter, not a candidate for suprapubic catheter given mental state      Plan:  Continue Thorne catheter changes per urethra

## 2018-05-01 NOTE — ASSESSMENT & PLAN NOTE
His Thorne catheter is draining better than usual without debris, it has not been pulled into the urethra as it has been on previous encounters

## 2018-05-01 NOTE — PROGRESS NOTES
Problem List Items Addressed This Visit     Retention of urine (Chronic)     Managed with urethral Thorne catheter, not a candidate for suprapubic catheter given mental state  Plan:  Continue Thorne catheter changes per urethra         False passage of urethra     Uneventful change of catheter over a wire today, we will continue this on a every 3-4 week basis given history of significant encrustation of his catheter  Plan:  Continue catheter changes, continue Renacidin irrigation         Complication, blocked Thorne catheter, subsequent encounter     His Thorne catheter is draining better than usual without debris, it has not been pulled into the urethra as it has been on previous encounters         Chronic urethral stricture     Status post dilation of stricture, requires change over a wire           Other Visit Diagnoses     Problem with Thorne catheter, subsequent encounter    -  Primary    Relevant Orders    Irrigation of bladder              Assessment and plan:       Please see problem oriented charting for the assessment plan of today's urological complaints     Jesus Sauceda MD      Chief Complaint     Chief Complaint   Patient presents with    Urinary Retention     Cath change          History of Present Illness     Pia Hardin is a 69 y o  gentleman with multiple medical problems including poor mental status, dementia, and urethral stricture disease with difficult Thorne catheter placement necessitating change over a wire every 3-4 weeks due to encrustation of catheter  Since his last changed she has been doing quite well and has not been hospitalized  His catheter appears to be in good position and while there is slight debris it is draining well  This is in contrast to previous visits where his catheter has been mostly blocked with the development of pyocystis  Unable to perform review of systems due to patient's mental status      Detailed Urologic History     - please refer to HPI    Review of Systems     Review of Systems   Reason unable to perform ROS: Patient is unable to participate in review of systems  Skin: Negative  Allergies     Allergies   Allergen Reactions    Gentamycin [Gentamicin] Anaphylaxis    Vancomycin Anaphylaxis    Zosyn [Piperacillin Sod-Tazobactam So] Anaphylaxis     However, has tolerated Ertapenem multiple times and Cefepime   Other      antipersperents  Stat lock from lucia catheter    Sulfa Antibiotics Rash       Physical Exam     Physical Exam   Constitutional: No distress  HENT:   Head: Normocephalic and atraumatic  Right Ear: External ear normal    Left Ear: External ear normal    Mouth/Throat: No oropharyngeal exudate  Eyes: Right eye exhibits no discharge  Left eye exhibits no discharge  No scleral icterus  Neck: No thyromegaly present  Cardiovascular: Normal rate and regular rhythm  Pulmonary/Chest: Effort normal  No stridor  No respiratory distress  He has no wheezes  He has no rales  Abdominal: He exhibits no distension and no mass  There is no tenderness  There is no rebound and no guarding  Genitourinary:   Genitourinary Comments: There is no erosion of the urethral meatus   Musculoskeletal: He exhibits deformity  He exhibits no edema or tenderness  Skin: No rash noted  He is not diaphoretic  No erythema  No pallor  Psychiatric:   Sequela of previous stroke are present           Vital Signs  There were no vitals filed for this visit        Current Medications       Current Outpatient Prescriptions:     acetaminophen (TYLENOL) 325 mg tablet, Take 650 mg by mouth every 6 (six) hours as needed for mild pain, Disp: , Rfl:     amLODIPine (NORVASC) 5 mg tablet, Take 5 mg by mouth daily, Disp: , Rfl:     apixaban (ELIQUIS) 2 5 mg, Take 2 5 mg by mouth 2 (two) times a day, Disp: , Rfl:     bisacodyl (BISAC-EVAC) 10 mg suppository, Insert 10 mg into the rectum as needed for constipation, Disp: , Rfl:     busPIRone (BUSPAR) 5 mg tablet, Take 5 mg by mouth 3 (three) times a day, Disp: , Rfl:     famotidine (PEPCID) 20 mg tablet, Take 20 mg by mouth 2 (two) times a day, Disp: , Rfl:     memantine (NAMENDA) 5 mg tablet, , Disp: , Rfl:     potassium citrate (UROCIT-K) 10 mEq, , Disp: , Rfl:     QUEtiapine (SEROquel) 25 mg tablet, Take 25 mg by mouth 2 (two) times a day  , Disp: , Rfl:     docusate sodium (COLACE) 100 mg capsule, Take 1 capsule by mouth 2 (two) times a day for 30 days, Disp: 60 capsule, Rfl: 0    haloperidol (HALDOL) 2 mg tablet, Take 1 tablet by mouth 2 (two) times a day as needed for agitation for up to 2 days, Disp: 4 tablet, Rfl: 0    memantine (NAMENDA) 5 mg tablet, Take 1 tablet by mouth 2 (two) times a day for 30 days, Disp: 60 tablet, Rfl: 0    metoprolol tartrate (LOPRESSOR) 25 mg tablet, Take 1 tablet by mouth 2 (two) times a day for 30 days (Patient taking differently: Take 25 mg by mouth 2 (two) times a day  ), Disp: 60 tablet, Rfl: 0    mirtazapine (REMERON) 15 mg tablet, Take 1 tablet by mouth daily at bedtime for 30 days, Disp: 30 tablet, Rfl: 0    senna (SENOKOT) 8 6 mg, Take 1 tablet by mouth daily at bedtime for 7 days, Disp: 7 tablet, Rfl: 0    tamsulosin (FLOMAX) 0 4 mg, Take 1 capsule by mouth daily with dinner for 30 days, Disp: 30 capsule, Rfl: 0      Active Problems     Patient Active Problem List   Diagnosis    Hypotension    Aphagia    COPD (chronic obstructive pulmonary disease) (HCC)    Sepsis ([facility] [address] )    History of DVT (deep vein thrombosis)    Aneurysm of thoracic aorta (HCC)    Retention of urine    Left lower lobe pneumonia (HCC)    Essential hypertension    Aphasia of unknown origin    CKD (chronic kidney disease)    Altered mental status    Urinary tract infection    Encephalopathy    Septic shock (HCC)    Leukocytosis    Lactic acidosis    GERD (gastroesophageal reflux disease)    HTN, goal below 140/90    Generalized anxiety disorder    BPH (benign prostatic hypertrophy)    Muscle weakness    H/O blood clots    ESBL (extended spectrum beta-lactamase) producing bacteria infection    Acute deep vein thrombosis (DVT) of both iliofemoral veins (HCC)    Adverse drug effect    Anemia    Bowel trouble    Dementia with behavioral disturbance    Thorne catheter problem, sequela    Gram-negative sepsis with organ dysfunction (HCC)    Infection of drug-resistant bacteria    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HCC)    Joint effusion, knee    Kidney stones    Knee problem    Mental disorder    MSSA (methicillin susceptible Staphylococcus aureus) septicemia ([facility])    Progressive aphasia     pulmonary hemorrhage    False passage of urethra    Complication, blocked Thorne catheter, subsequent encounter    Chronic urethral stricture         Past Medical History     Past Medical History:   Diagnosis Date    Anxiety     Aortic aneurysm (HCC)     Aphasia     Ataxia     Bladder adhesions     BPH (benign prostatic hypertrophy)     COPD (chronic obstructive pulmonary disease) ([facility] [address] )     wife denies - "never had"    Dementia     Difficulty walking     Essential (primary) hypertension     Generalized anxiety disorder     GERD (gastroesophageal reflux disease)     Global aphasia     H/O blood clots     High blood pressure     History of kidney stones     Kidney stones     Mental disorder     Muscle weakness     Neuromuscular dysfunction of bladder     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     Retention of urine, unspecified     Thrombosis     Urinary retention     Urinary tract bacterial infections     Urinary tract infection          Surgical History     Past Surgical History:   Procedure Laterality Date    CYSTOSCOPY      IVC FILTER INSERTION      X2    IVC FILTER INSERTION      x2    KIDNEY STONE SURGERY      KNEE SURGERY      Sepsis infection     NEPHROSTOMY      OH CYSTO/URETERO W/LITHOTRIPSY &INDWELL STENT INSRT Right 6/14/2017    Procedure: CYSTOSCOPY URETEROSCOPY WITH LITHOTRIPSY HOLMIUM LASER,,BASKET STONE EXTRACTION, RETROGRADE PYELOGRAM AND INSERTION STENT URETERAL;  Surgeon: Georgia Johnston MD;  Location: [facility];  Service: Urology    URINARY SURGERY           Family History     Family History   Problem Relation Age of Onset    Diabetes Father          Social History     Social History     History   Smoking Status    Never Smoker   Smokeless Tobacco    Never Used         Pertinent Lab Values     Lab Results   Component Value Date    CREATININE 1 46 (H) 11/27/2017       No results found for: PSA          Pertinent Imaging      There is no pertinent urological imaging for my review

## 2018-05-03 ENCOUNTER — OFFICE VISIT (OUTPATIENT)
Dept: INFECTIOUS DISEASES | Facility: CLINIC | Age: 68
End: 2018-05-03
Payer: COMMERCIAL

## 2018-05-03 VITALS
BODY MASS INDEX: 30.52 KG/M2 | DIASTOLIC BLOOD PRESSURE: 60 MMHG | TEMPERATURE: 98 F | SYSTOLIC BLOOD PRESSURE: 110 MMHG | HEART RATE: 51 BPM | HEIGHT: 71 IN | RESPIRATION RATE: 14 BRPM | WEIGHT: 218 LBS

## 2018-05-03 DIAGNOSIS — R33.9 URINARY RETENTION: ICD-10-CM

## 2018-05-03 DIAGNOSIS — N39.0 RECURRENT UTI: Primary | ICD-10-CM

## 2018-05-03 DIAGNOSIS — R21 RASH: ICD-10-CM

## 2018-05-03 DIAGNOSIS — Z88.1 ALLERGY TO MULTIPLE ANTIBIOTICS: ICD-10-CM

## 2018-05-03 PROCEDURE — 99214 OFFICE O/P EST MOD 30 MIN: CPT | Performed by: INTERNAL MEDICINE

## 2018-05-03 RX ORDER — CITRIC ACID, GLUCONOLACTONE AND MAGNESIUM CARBONATE 6.602; .198; 3.268 G/100ML; G/100ML; G/100ML
1 SOLUTION IRRIGATION 2 TIMES DAILY
COMMUNITY
End: 2018-11-03

## 2018-05-03 RX ORDER — DIPHENHYDRAMINE HCL 25 MG
25 TABLET ORAL EVERY 6 HOURS PRN
COMMUNITY
End: 2018-11-03

## 2018-05-03 NOTE — PROGRESS NOTES
Progress Note - Infectious Disease   Hema Tomas 69 y o  male MRN: [id number]  Unit/Bed#:  Encounter: [id number]      Impression/Recommendations:  1  Recurrence ESBL producing E coli UTI/sepsis  This is secondary to chronic indwelling Thorne catheter  At present, patient has no evidence of UTI clinically  No antibiotic necessary  No antibiotic necessary at present  2   Recent rash  Rash has resolved  From photo on patient's wife's phone, it appears to be a drug eruption  At this point, it is impossible for me to even comment on with the culprit drug was  If rash recurs, can consider workup then  3   Urinary retention/incontinence  Patient has chronic Thorne catheter  4   Chronic encephalopathy  5   Multiple antibiotic allergies  I assured her patient's wife that if patient tolerates ertapenem well, he will tolerate meropenem also  He did tolerate meropenem recently  Discussed with patient's wife in detail regarding above plan  Follow-up with me p r n       Antibiotics:  None    Subjective:  Patient's wife requested his follow-up with me for recent UT I     I have seen patient multiple times during hospitalization for ESBL producing E coli UTI and sepsis  Each time, he was successfully treated with IV ertapenem  Apparently, recently, he was admitted he is in hospital with what was felt to be urosepsis  Patient was given meropenem  His wife is concerned that he may have allergic reaction to meropenem due to his multiple other antibiotic allergies  He tolerated it well  In addition, approximately a month ago, patient had a rash which started in his right leg, then involve the whole body  Rash has resolved now  Objective:  Vitals:  Temperature: 98 °F (36 7 °C)    Physical Exam:     General: Awake, alert, comfortable, noncommunicative  Lungs: Expansion symmetric, no rales, no wheezing, respirations unlabored     Heart[de-identified]  Regular rate and rhythm, S1 and S2 normal, no murmur  Abdomen: Soft, nondistended, non-tender, bowel sounds active all four quadrants,        no masses, no organomegaly  Extremities: No edema  No erythema/warmth  No ulcer  Nontender to palpation  Skin:  No rash  Invasive Devices     Drain            Urethral Catheter Latex 79 days                Labs studies:   I have personally reviewed pertinent labs  Imaging Studies:   I have personally reviewed pertinent imaging study reports and images in PACS  EKG, Pathology, and Other Studies:   I have personally reviewed pertinent reports

## 2018-05-10 ENCOUNTER — OFFICE VISIT (OUTPATIENT)
Dept: NEPHROLOGY | Facility: CLINIC | Age: 68
End: 2018-05-10
Payer: COMMERCIAL

## 2018-05-10 VITALS — SYSTOLIC BLOOD PRESSURE: 128 MMHG | DIASTOLIC BLOOD PRESSURE: 88 MMHG

## 2018-05-10 DIAGNOSIS — IMO0002 CHRONIC URETHRAL STRICTURE: ICD-10-CM

## 2018-05-10 DIAGNOSIS — N18.2 CKD (CHRONIC KIDNEY DISEASE) STAGE 2, GFR 60-89 ML/MIN: Primary | ICD-10-CM

## 2018-05-10 DIAGNOSIS — I10 HTN, GOAL BELOW 140/90: ICD-10-CM

## 2018-05-10 PROCEDURE — 99214 OFFICE O/P EST MOD 30 MIN: CPT | Performed by: INTERNAL MEDICINE

## 2018-05-10 NOTE — PATIENT INSTRUCTIONS
1  )  Low 2 g sodium diet    2 )  Monitor weights at home    3 )  Avoid NSAIDs    4 )  Monitor blood pressure at home, call if blood pressure greater than 150/90 persistently

## 2018-05-10 NOTE — PROGRESS NOTES
NEPHROLOGY OFFICE VISIT   Anabel Tomas 69 y o  male MRN: [id number]  5/10/2018    Reason for Visit:  Hospital follow-up for acute kidney injury    ASSESSMENT and PLAN:    I had the pleasure of seeing Angelica Jo today for the initial management and evaluation of chronic kidney disease with acute kidney injury, hospital follow-up  He was discharged from [facility] back at the end of March where he was treated for an ESBL sepsis  1 )  Acute kidney injury  -resolved, renal function back to baseline based on his discharge blood work, was secondary to pre azotemia, sepsis    2 )  Chronic kidney disease stage 2  -baseline creatinine appears to be around 1 2-1 3 mg/dL  -chronic urinary retention, follows with Urology, chronic ureteral stricture, Thorne catheter in place  -avoid NSAIDs  -blood pressure control    3 )  Hypertension  -continue amlodipine and metoprolol    PATIENT INSTRUCTIONS:    Patient Instructions   1 )  Low 2 g sodium diet    2 )  Monitor weights at home    3 )  Avoid NSAIDs    4 )  Monitor blood pressure at home, call if blood pressure greater than 150/90 persistently            Orders Placed This Encounter   Procedures    Basic metabolic panel     This is a patient instruction: Patient fasting for 8 hours or longer recommended  Standing Status:   Future     Standing Expiration Date:   5/10/2019    Comprehensive metabolic panel     This is a patient instruction: Patient fasting for 8 hours or longer recommended  Standing Status:   Future     Standing Expiration Date:   5/10/2019    CBC     Standing Status:   Future     Standing Expiration Date:   5/10/2019       OBJECTIVE:  Current Weight:    Vitals:    05/10/18 1609   BP: 128/88    There is no height or weight on file to calculate BMI  REVIEW OF SYSTEMS:    Review of Systems   Unable to perform ROS: Patient nonverbal       PHYSICAL EXAM:      Physical Exam   Constitutional: He appears well-developed and well-nourished     Start resting comfortably on a stretcher    Nonverbal   HENT:   Head: Normocephalic and atraumatic  Eyes: Conjunctivae and EOM are normal  Pupils are equal, round, and reactive to light  Neck: Normal range of motion  Neck supple  Cardiovascular: Normal rate, regular rhythm and normal heart sounds  Pulmonary/Chest: Effort normal and breath sounds normal  No respiratory distress  He has no wheezes  He has no rales  Abdominal: Soft  He exhibits no distension  There is no tenderness  Genitourinary:   Genitourinary Comments: Thorne   Musculoskeletal: He exhibits no edema  Neurological:   Nonverbal   Nursing note and vitals reviewed        Medications:    Current Outpatient Prescriptions:     acetaminophen (TYLENOL) 325 mg tablet, Take 325 mg by mouth every 4 (four) hours as needed for mild pain  , Disp: , Rfl:     amLODIPine (NORVASC) 5 mg tablet, Take 5 mg by mouth daily, Disp: , Rfl:     apixaban (ELIQUIS) 2 5 mg, Take 2 5 mg by mouth 2 (two) times a day, Disp: , Rfl:     busPIRone (BUSPAR) 5 mg tablet, Take 5 mg by mouth 3 (three) times a day, Disp: , Rfl:     Citric Ko-Qpsifaqclob-Ll Carb (RENACIDIN) SOLN, Irrigate with 1 application as directed 2 (two) times a day Thorne irrigation, Disp: , Rfl:     diphenhydrAMINE (BENADRYL) 25 mg tablet, Take 25 mg by mouth every 6 (six) hours as needed for itching, Disp: , Rfl:     docusate sodium (COLACE) 100 mg capsule, Take 1 capsule by mouth 2 (two) times a day for 30 days, Disp: 60 capsule, Rfl: 0    famotidine (PEPCID) 20 mg tablet, Take 20 mg by mouth 2 (two) times a day, Disp: , Rfl:     memantine (NAMENDA) 5 mg tablet, Take 1 tablet by mouth 2 (two) times a day for 30 days (Patient taking differently: Take 5 mg by mouth daily  ), Disp: 60 tablet, Rfl: 0    metoprolol tartrate (LOPRESSOR) 25 mg tablet, Take 1 tablet by mouth 2 (two) times a day for 30 days (Patient taking differently: Take 25 mg by mouth 2 (two) times a day  ), Disp: 60 tablet, Rfl: 0   miconazole 2 % cream, Apply 1 application topically 2 (two) times a day, Disp: , Rfl:     mirtazapine (REMERON) 15 mg tablet, Take 1 tablet by mouth daily at bedtime for 30 days, Disp: 30 tablet, Rfl: 0    Petrolatum-Zinc Oxide (PHYTOPLEX Z-GUARD) 57-17 % PSTE, Apply 1 application topically 3 (three) times a day To buttocks, Disp: , Rfl:     potassium citrate (UROCIT-K) 10 mEq, 10 mEq 3 (three) times a day with meals  , Disp: , Rfl:     QUEtiapine (SEROquel) 25 mg tablet, Take 25 mg by mouth 2 (two) times a day  , Disp: , Rfl:     tamsulosin (FLOMAX) 0 4 mg, Take 1 capsule by mouth daily with dinner for 30 days, Disp: 30 capsule, Rfl: 0    senna (SENOKOT) 8 6 mg, Take 1 tablet by mouth daily at bedtime for 7 days, Disp: 7 tablet, Rfl: 0    Laboratory Results:        Results for orders placed or performed during the hospital encounter of 12/09/17   Urine culture   Result Value Ref Range    Urine Culture >100,000 cfu/ml Escherichia coli ESBL (A)     Urine Culture >100,000 cfu/ml Morganella morganii (A)        Susceptibility    Morganella morganii - CHARLEY     Amoxicillin + Clavulanate >16/8 Resistant ug/ml     Ampicillin ($$) >16 00 Resistant ug/ml     Ampicillin + Sulbactam ($) >16/8 Resistant ug/ml     Aztreonam ($$$)  <=8 Susceptible ug/ml     Cefazolin ($) >16 00 Resistant ug/ml     Cefepime ($) <=8 00 Susceptible ug/ml     Cefotaxime ($) 8 00 Susceptible ug/ml     Ceftazidime ($$) 4 Susceptible ug/ml     Ceftriaxone ($$) <=8 00 Susceptible ug/ml     Cefuroxime ($$) >16 Resistant ug/ml     Ciprofloxacin ($)  <=1 00 Susceptible ug/ml     Ertapenem ($$$) <=2 0 Susceptible ug/ml     Gentamicin ($$) <=4 Susceptible ug/ml     Imipenem <=4 Susceptible ug/ml     Levofloxacin ($) <=2 00 Susceptible ug/ml     Meropenem ($$) <=4 00 Susceptible ug/ml     Nitrofurantoin 64 Resistant ug/ml     Piperacillin + Tazobactam ($$$) <=16 Susceptible ug/ml     Tetracycline <=4 Resistant ug/ml     Ticarcillin/K Clavulanate <=16 Susceptible ug/ml     Tobramycin ($) <=4 Susceptible ug/ml     Trimethoprim + Sulfamethoxazole ($$$) <=2/38 Susceptible ug/ml    Escherichia coli ESBL - CHARLEY     Ampicillin ($$) >16 00 Resistant ug/ml     Ampicillin + Sulbactam ($) 16/8 Intermediate ug/ml     Aztreonam ($$$)  >16 Resistant ug/ml     Cefazolin ($) >16 00 Resistant ug/ml     Cefepime ($) >16 00 Resistant ug/ml     Cefotaxime ($) >32 00 Resistant ug/ml     Ceftazidime ($$) 16 Resistant ug/ml     Ceftriaxone ($$) >32 00 Resistant ug/ml     Cefuroxime ($$) >16 Resistant ug/ml     Ciprofloxacin ($)  >2 00 Resistant ug/ml     Ertapenem ($$$) <=2 0 Susceptible ug/ml     Gentamicin ($$) <=4 Susceptible ug/ml     Imipenem <=4 Susceptible ug/ml     Levofloxacin ($) >4 00 Resistant ug/ml     Meropenem ($$) <=4 00 Susceptible ug/ml     Nitrofurantoin <=32 Susceptible ug/ml     Piperacillin + Tazobactam ($$$) <=16 Susceptible ug/ml     Tetracycline >8 Resistant ug/ml     Ticarcillin/K Clavulanate <=16 Susceptible ug/ml     Tobramycin ($) <=4 Susceptible ug/ml     Trimethoprim + Sulfamethoxazole ($$$) >2/38 Resistant ug/ml   POCT urinalysis dipstick   Result Value Ref Range    Color, UA Yellow     Clarity, UA Hazy     EXT Glucose, UA (Ref: Negative) Negative     EXT Bilirubin, UA (Ref: Negative) Negative     EXT Ketones, UA (Ref: Negative) Negative     EXT Spec Grav, UA 1 015     EXT Blood, UA (Ref: Negative) Large     EXT pH, UA 7 0     EXT Protein, UA (Ref: Negative) 30     EXT Urobilinogen, UA (Ref: 0 2- 1 0) 0 2     EXT Leukocytes, UA (Ref: Negative) Large     EXT Nitrite, UA (Ref: Negative) Positive

## 2018-05-14 ENCOUNTER — TELEPHONE (OUTPATIENT)
Dept: OTHER | Facility: HOSPITAL | Age: 68
End: 2018-05-14

## 2018-05-14 NOTE — TELEPHONE ENCOUNTER
Nephrology    Most recent creatinine is 1 47 which is just slightly above his baseline but overall I find quite stable  Electrolytes are stable  I discussed this with the nurse the nursing home

## 2018-05-17 ENCOUNTER — TELEPHONE (OUTPATIENT)
Dept: UROLOGY | Facility: CLINIC | Age: 68
End: 2018-05-17

## 2018-05-17 NOTE — TELEPHONE ENCOUNTER
Jaiden Sanches patients wife called  Jaiden Sanches states that at patients last appointment that his change lucia change with Dr Prince was not scheduled  Called [facility] at [phone] and scheduled patient for change on 5/29/18 at 9:30 at the [facility] office  [facility] is aware of time and location of appointment  Also called and spoke to Jaiden Sanches  She is also aware of time and location of appointment

## 2018-05-28 PROBLEM — N20.0 KIDNEY STONES: Status: RESOLVED | Noted: 2017-05-10 | Resolved: 2018-05-28

## 2018-05-28 PROBLEM — N39.0 URINARY TRACT INFECTION: Status: RESOLVED | Noted: 2017-02-26 | Resolved: 2018-05-28

## 2018-05-28 NOTE — PROGRESS NOTES
[id number] Report received from [facility].   Problem List Items Addressed This Visit     Retention of urine - Primary (Chronic)     Continued urethral Thorne catheter changes, in the future, and option is a perineal urethrostomy, although the patient is not the best operative candidate         False passage of urethra     Patient with need for urologist placement of Thorne catheter given multiple false passages and difficult Thorne catheter placements in the future  Status post uneventful Thorne catheter change today, continue changes over a wire every 3-4 weeks given history of encrustation         Complication, blocked Thorne catheter, subsequent encounter     Patient has had less clogging of his Thorne catheter since taking potassium citrate and undergoing Renacidin irrigations  Plan:  Continue potassium citrate orally and Renacidin irrigation per urethra and Thorne catheter         Chronic urethral stricture     Patient stricture has been maintained patent via Thorne catheter changes    Plan:  Continue catheter changes over a wire                   Assessment and plan:       Please see problem oriented charting for the assessment plan of today's urological complaints    Lorenza Busby MD      Chief Complaint      Urethral stricture  Difficult Thorne catheter placement  Urinary retention  Neurogenic bladder  Indwelling Thorne catheter      History of Present Illness     Daniel Byrd is a 69 y o  gentleman with a neurogenic bladder, urinary retention, and history of stroke as well as multiple difficult Thorne catheter placements necessitating urologist placement of Thorne catheter over a wire  Since last time we saw each other he has not had any issues with Thorne catheter dislodgement, he has had less troubles with encrustations since taking potassium citrate and undergoing faithful Renacidin irrigation  He has had no hospitalizations since his last Thorne catheter change      He is unable to perform review of systems today given his poor baseline mental function status post stroke  Detailed Urologic History     - please refer to HPI    Review of Systems     Review of Systems   Reason unable to perform ROS: Patient unable to participate  Allergies     Allergies   Allergen Reactions    Gentamycin [Gentamicin] Anaphylaxis    Vancomycin Anaphylaxis    Zosyn [Piperacillin Sod-Tazobactam So] Anaphylaxis     However, has tolerated Ertapenem multiple times and Cefepime   Other      antipersperents  Stat lock from lucia catheter    Sulfa Antibiotics Rash       Physical Exam     Physical Exam   Constitutional: No distress  Patient with a masked facial expression, grunts intermittently   HENT:   Head: Normocephalic and atraumatic  Right Ear: External ear normal    Left Ear: External ear normal    Nose: Nose normal    Eyes: EOM are normal  Pupils are equal, round, and reactive to light  Right eye exhibits no discharge  Left eye exhibits no discharge  Cardiovascular: Intact distal pulses  Pulmonary/Chest: Effort normal  No respiratory distress  He has no wheezes  He exhibits no tenderness  Abdominal: Soft  He exhibits no distension  There is no tenderness  No hernia  Genitourinary: Penis normal    Musculoskeletal: Normal range of motion  He exhibits tenderness and deformity  Neurological: He is alert  He displays abnormal reflex  A cranial nerve deficit and sensory deficit is present  He exhibits abnormal muscle tone  Coordination abnormal    Skin: Skin is warm and dry  No rash noted  He is not diaphoretic  No erythema  No pallor  Nursing note and vitals reviewed            Vital Signs  Vitals:    05/29/18 0941   BP: 128/76   BP Location: Right arm   Patient Position: Sitting   Cuff Size: Standard   Pulse: 78         Current Medications       Current Outpatient Prescriptions:     acetaminophen (TYLENOL) 325 mg tablet, Take 325 mg by mouth every 4 (four) hours as needed for mild pain  , Disp: , Rfl:     amLODIPine (NORVASC) 5 mg tablet, Take 5 mg by mouth daily, Disp: , Rfl:     apixaban (ELIQUIS) 2 5 mg, Take 2 5 mg by mouth 2 (two) times a day, Disp: , Rfl:     busPIRone (BUSPAR) 5 mg tablet, Take 5 mg by mouth 3 (three) times a day, Disp: , Rfl:     Citric We-Huacypeojvh-Rk Carb (RENACIDIN) SOLN, Irrigate with 1 application as directed 2 (two) times a day Thorne irrigation, Disp: , Rfl:     diphenhydrAMINE (BENADRYL) 25 mg tablet, Take 25 mg by mouth every 6 (six) hours as needed for itching, Disp: , Rfl:     famotidine (PEPCID) 20 mg tablet, Take 20 mg by mouth 2 (two) times a day, Disp: , Rfl:     miconazole 2 % cream, Apply 1 application topically 2 (two) times a day, Disp: , Rfl:     Petrolatum-Zinc Oxide (PHYTOPLEX Z-GUARD) 57-17 % PSTE, Apply 1 application topically 3 (three) times a day To buttocks, Disp: , Rfl:     potassium citrate (UROCIT-K) 10 mEq, 10 mEq 3 (three) times a day with meals  , Disp: , Rfl:     QUEtiapine (SEROquel) 25 mg tablet, Take 25 mg by mouth 2 (two) times a day  , Disp: , Rfl:     docusate sodium (COLACE) 100 mg capsule, Take 1 capsule by mouth 2 (two) times a day for 30 days, Disp: 60 capsule, Rfl: 0    memantine (NAMENDA) 5 mg tablet, Take 1 tablet by mouth 2 (two) times a day for 30 days (Patient taking differently: Take 5 mg by mouth daily  ), Disp: 60 tablet, Rfl: 0    metoprolol tartrate (LOPRESSOR) 25 mg tablet, Take 1 tablet by mouth 2 (two) times a day for 30 days (Patient taking differently: Take 25 mg by mouth 2 (two) times a day  ), Disp: 60 tablet, Rfl: 0    mirtazapine (REMERON) 15 mg tablet, Take 1 tablet by mouth daily at bedtime for 30 days, Disp: 30 tablet, Rfl: 0    senna (SENOKOT) 8 6 mg, Take 1 tablet by mouth daily at bedtime for 7 days, Disp: 7 tablet, Rfl: 0    tamsulosin (FLOMAX) 0 4 mg, Take 1 capsule by mouth daily with dinner for 30 days, Disp: 30 capsule, Rfl: 0      Active Problems     Patient Active Problem List   Diagnosis    Hypotension    Aphagia    COPD (chronic obstructive pulmonary disease) ([facility])    Sepsis ([facility] [address] )    History of DVT (deep vein thrombosis)    Aneurysm of thoracic aorta ([facility])    Retention of urine    Left lower lobe pneumonia (HCC)    Essential hypertension    Aphasia of unknown origin    CKD (chronic kidney disease)    Altered mental status    Encephalopathy    Septic shock ([facility])    Leukocytosis    Lactic acidosis    GERD (gastroesophageal reflux disease)    HTN, goal below 140/90    Generalized anxiety disorder    Muscle weakness    H/O blood clots    ESBL (extended spectrum beta-lactamase) producing bacteria infection    Acute deep vein thrombosis (DVT) of both iliofemoral veins ([facility])    Adverse drug effect    Anemia    Bowel trouble    Dementia with behavioral disturbance    Thorne catheter problem, sequela    Gram-negative sepsis with organ dysfunction ([facility])    Infection of drug-resistant bacteria    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[facility])    Joint effusion, knee    Knee problem    Mental disorder    MSSA (methicillin susceptible Staphylococcus aureus) septicemia ([facility])    Progressive aphasia     pulmonary hemorrhage    False passage of urethra    Complication, blocked Thorne catheter, subsequent encounter    Chronic urethral stricture    CKD (chronic kidney disease) stage 2, GFR 60-89 ml/min         Past Medical History     Past Medical History:   Diagnosis Date    Anxiety     Aortic aneurysm ([facility])     Aphasia     Ataxia     Bladder adhesions     BPH (benign prostatic hypertrophy)     COPD (chronic obstructive pulmonary disease) ([facility])     wife denies - "never had"    Dementia     Difficulty walking     Essential (primary) hypertension     Generalized anxiety disorder     GERD (gastroesophageal reflux disease)     Global aphasia     H/O blood clots     High blood pressure     History of kidney stones     Kidney stones     Mental disorder     Muscle weakness     Neuromuscular dysfunction of bladder     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     Retention of urine, unspecified     Thrombosis     Urinary retention     Urinary tract bacterial infections     Urinary tract infection          Surgical History     Past Surgical History:   Procedure Laterality Date    CYSTOSCOPY      IVC FILTER INSERTION      X2    IVC FILTER INSERTION      x2    KIDNEY STONE SURGERY      KNEE SURGERY      Sepsis infection     NEPHROSTOMY      RI CYSTO/URETERO W/LITHOTRIPSY &INDWELL STENT INSRT Right 6/14/2017    Procedure: CYSTOSCOPY URETEROSCOPY WITH LITHOTRIPSY HOLMIUM LASER,,BASKET STONE EXTRACTION, RETROGRADE PYELOGRAM AND INSERTION STENT URETERAL;  Surgeon: Beni Dixon MD;  [facility];  Service: Urology    URINARY SURGERY           Family History     Family History   Problem Relation Age of Onset    Diabetes Father          Social History     Social History     History   Smoking Status    Never Smoker   Smokeless Tobacco    Never Used         Pertinent Lab Values     Lab Results   Component Value Date    CREATININE 1 46 (H) 11/27/2017        PSA:  Screening for prostate cancer is not indicated in this patient        Pertinent Imaging      There is no pertinent urological imaging for my review

## 2018-05-28 NOTE — PATIENT INSTRUCTIONS
Thorne Catheter Placement and Care   WHAT YOU NEED TO KNOW:   What is a Thorne catheter? A Thorne catheter is a sterile tube that is inserted into your bladder to drain urine  It is also called an indwelling urinary catheter  The tip of the catheter has a small balloon filled with solution that holds the catheter in your bladder  How is a Thorne catheter placed? · Your healthcare provider will clean your genital area with a sterile solution  He or she will put lubricant jelly on the catheter to help it go in smoothly  The end of the catheter with the deflated balloon will be inserted into your urethra  The catheter will be moved slowly and gently into your bladder  You may be asked to take slow, deep breaths or to push as if you were trying to urinate as the catheter is inserted  · When your healthcare provider sees urine flowing from the catheter, he or she will fill the balloon at the end of the catheter  The balloon holds the catheter in place so it does not come out  Your healthcare provider will attach the open end of the catheter to a sterile drainage bag  How do I care for my Thorne catheter? · Clean your genital area 2 times every day  Clean your catheter and the area around where it was inserted  Use soap and water  Clean your anal opening and catheter area after every bowel movement  · Secure the catheter tube  so you do not pull or move the catheter  This helps prevent pain and bladder spasms  Healthcare providers will show you how to use medical tape or a strap to secure the catheter tube to your body  · Keep a closed drainage system  Your Thorne catheter should always be attached to the drainage bag to form a closed system  Do not disconnect any part of the closed system unless you need to change the bag  How do I care for my drainage bag? · Ask if a leg bag is right for you  A leg bag can be worn under your clothes   Ask your healthcare provider for more information about a leg bag  · Keep the drainage bag below the level of your waist   This helps stop urine from moving back up the tubing and into your bladder  Do not loop or kink the tubing  This can cause urine to back up and collect in your bladder  Do not let the drainage bag touch or lie on the floor  · Empty the drainage bag when needed  The weight of a full drainage bag can be painful  Empty the drainage bag every 3 to 6 hours or when it is ? full  · Clean and change the drainage bag as directed  Ask your healthcare provider how often you should change the drainage bag and what cleaning solution to use  Wear disposable gloves when you change the bag  Do not allow the end of the catheter or tubing to touch anything  Clean the ends with an alcohol pad before you reconnect them  What can I do if problems develop? · No urine is draining into the bag:      ¨ Check for kinks in the tubing and straighten them out  ¨ Check the tape or strap used to secure the catheter tube to your skin  Make sure it is not blocking the tube  ¨ Make sure you are not sitting or lying on the tubing  ¨ Make sure the urine bag is hanging below the level of your waist     · Urine leaks from or around the catheter, tubing, or drainage bag:  Check if the closed drainage system has accidently come open or apart  Clean the catheter and tubing ends with a new alcohol pad and reconnect them  What are the risks of a Tohrne catheter? You may develop an infection caused by bacteria  This can happen when the drainage system is opened and bacteria get inside the tubing  You can also get an infection if the catheter equipment is not cleaned well or you do not wash your hands  The infection can spread to your bladder or other organs, and may become severe  How can I help prevent an infection? · Wash your hands often  Wash before and after you touch your catheter, tubing, or drainage bag  Use soap and water   Wear clean disposable gloves when you care for your catheter or disconnect the drainage bag  Wash your hands before you prepare or eat food  · Drink liquids as directed  Ask your healthcare provider how much liquid to drink each day and which liquids are best for you  Liquids will help flush your kidneys and bladder to help prevent infection  When should I seek immediate care? · Your catheter comes out  · You suddenly have material that looks like sand in the tubing or drainage bag  · No urine is draining into the bag and you have checked the system  · You have pain in your hip, back, pelvis, or lower abdomen  · You are confused or cannot think clearly  When should I contact my healthcare provider? · You have a fever  · You have bladder spasms for more than 1 day after the catheter is placed  · You see blood in the tubing or drainage bag  · You have a rash or itching where the catheter tube is secured to your skin  · Urine leaks from or around the catheter, tubing, or drainage bag  · The closed drainage system has accidently come open or apart  · You see a layer of crystals inside the tubing  · You have questions or concerns about your condition or care  CARE AGREEMENT:   You have the right to help plan your care  Learn about your health condition and how it may be treated  Discuss treatment options with your caregivers to decide what care you want to receive  You always have the right to refuse treatment  The above information is an  only  It is not intended as medical advice for individual conditions or treatments  Talk to your doctor, nurse or pharmacist before following any medical regimen to see if it is safe and effective for you  © 2017 [employer]  Information is for End User's use only and may not be sold, redistributed or otherwise used for commercial purposes   All illustrations and images included in CareNotes® are the copyrighted property of [employer]  or Mika [employer]

## 2018-05-29 ENCOUNTER — OFFICE VISIT (OUTPATIENT)
Dept: UROLOGY | Facility: CLINIC | Age: 68
End: 2018-05-29
Payer: COMMERCIAL

## 2018-05-29 VITALS — HEART RATE: 78 BPM | DIASTOLIC BLOOD PRESSURE: 76 MMHG | SYSTOLIC BLOOD PRESSURE: 128 MMHG

## 2018-05-29 DIAGNOSIS — N36.5 FALSE PASSAGE OF URETHRA: ICD-10-CM

## 2018-05-29 DIAGNOSIS — IMO0002 CHRONIC URETHRAL STRICTURE: ICD-10-CM

## 2018-05-29 DIAGNOSIS — T83.091D: ICD-10-CM

## 2018-05-29 DIAGNOSIS — R33.9 RETENTION OF URINE: Primary | Chronic | ICD-10-CM

## 2018-05-29 PROCEDURE — 51710 CHANGE OF BLADDER TUBE: CPT | Performed by: UROLOGY

## 2018-05-29 PROCEDURE — 99213 OFFICE O/P EST LOW 20 MIN: CPT | Performed by: UROLOGY

## 2018-05-29 NOTE — TELEPHONE ENCOUNTER
[facility] called to inform nursing that pt's cath  was clamped 15/30 mins  prior to leaving for scheduled appointment

## 2018-05-29 NOTE — PROGRESS NOTES
Bladder catheterization  Date/Time: 5/29/2018 10:23 AM  Performed by: Veena Rosario  Authorized by: Veena Rosario     Patient location:  Bedside  Other Assisting Provider: Yes (comment)    Consent:     Consent obtained:  Verbal    Consent given by:  Guardian    Risks discussed:  False passage, infection, pain, incomplete procedure and urethral injury    Alternatives discussed:  No treatment  Universal protocol:     Procedure explained and questions answered to patient or proxy's satisfaction: yes      Relevant documents present and verified: yes      Test results available and properly labeled: yes      Imaging studies available: yes      Required blood products, implants, devices and special equipment available: yes      Site/side marked: yes      Immediately prior to procedure a time out was called: yes      Patient identity confirmed:  Provided demographic data  Pre-procedure details:     Procedure purpose:  Therapeutic    Preparation: Patient was prepped and draped in usual sterile fashion    Anesthesia (see MAR for exact dosages): Anesthesia method:  None  Procedure details:     Bladder irrigation: yes      Catheter insertion:  Indwelling    Approach: natural orifice      Catheter type:  Latex and Thorne    Catheter size:  18 Fr ([place] tip)    Number of attempts:  1    Successful placement: yes      Urine characteristics:  Cloudy    Provider performed due to: Altered anatomy and complicated insertion    Altered anatomy:  Urethral stricture  Post-procedure details:     Patient tolerance of procedure: Tolerated well, no immediate complications  Comments:      Eighteen Western Brisa [place] tip catheter placed over a Super Stiff wire, bladder was irrigated free of slight debris, 10 cubic centimeters of sterile water in Thorne catheter balloon, attached to drainage bag    Patient will return in 3-4 weeks for repeat catheter change over a wire given history of multiple urethral strictures and false passages

## 2018-05-29 NOTE — LETTER
May 29, 2018     Emmanuel Sarah MD  [address] [place] [address]  [address]  [address]    Patient: Adriel Nelson [facility]   YOB: 1950   Date of Visit: 5/29/2018       Dear Dr Jeanna Ann: Thank you for referring Rafa  to me for evaluation  Below are my notes for this consultation  If you have questions, please do not hesitate to call me  I look forward to following your patient along with you  Sincerely,        Francesco Sands MD        CC: No Recipients  Francesco Sands MD  5/29/2018 10:25 AM  Sign at close encounter  Bladder catheterization  Date/Time: 5/29/2018 10:23 AM  Performed by: Roberto Lara  Authorized by: Roberto Lara     Patient location:  Bedside  Other Assisting Provider: Yes (comment)    Consent:     Consent obtained:  Verbal    Consent given by:  Guardian    Risks discussed:  False passage, infection, pain, incomplete procedure and urethral injury    Alternatives discussed:  No treatment  Universal protocol:     Procedure explained and questions answered to patient or proxy's satisfaction: yes      Relevant documents present and verified: yes      Test results available and properly labeled: yes      Imaging studies available: yes      Required blood products, implants, devices and special equipment available: yes      Site/side marked: yes      Immediately prior to procedure a time out was called: yes      Patient identity confirmed:  Provided demographic data  Pre-procedure details:     Procedure purpose:  Therapeutic    Preparation: Patient was prepped and draped in usual sterile fashion    Anesthesia (see MAR for exact dosages): Anesthesia method:  None  Procedure details:     Bladder irrigation: yes      Catheter insertion:  Indwelling    Approach: natural orifice      Catheter type:  Latex and Thorne    Catheter size:  18 Fr ([place] tip)    Number of attempts:  1    Successful placement: yes      Urine characteristics:  Cloudy    Provider performed due to:   Altered anatomy and complicated insertion    Altered anatomy:  Urethral stricture  Post-procedure details:     Patient tolerance of procedure: Tolerated well, no immediate complications  Comments:      Eighteen Western Brisa [place] tip catheter placed over a Super Stiff wire, bladder was irrigated free of slight debris, 10 cubic centimeters of sterile water in Thorne catheter balloon, attached to drainage bag  Patient will return in 3-4 weeks for repeat catheter change over a wire given history of multiple urethral strictures and false passages  Jessica Ventura MD  5/29/2018 10:02 AM  Sign at close encounter       Problem List Items Addressed This Visit     Retention of urine - Primary (Chronic)     Continued urethral Thorne catheter changes, in the future, and option is a perineal urethrostomy, although the patient is not the best operative candidate         False passage of urethra     Patient with need for urologist placement of Thorne catheter given multiple false passages and difficult Thorne catheter placements in the future  Status post uneventful Thorne catheter change today, continue changes over a wire every 3-4 weeks given history of encrustation         Complication, blocked Thorne catheter, subsequent encounter     Patient has had less clogging of his Thorne catheter since taking potassium citrate and undergoing Renacidin irrigations      Plan:  Continue potassium citrate orally and Renacidin irrigation per urethra and Thorne catheter         Chronic urethral stricture     Patient stricture has been maintained patent via Thorne catheter changes    Plan:  Continue catheter changes over a wire                   Assessment and plan:       Please see problem oriented charting for the assessment plan of today's urological complaints    Jessica Ventura MD      Chief Complaint      Urethral stricture  Difficult Thorne catheter placement  Urinary retention  Neurogenic bladder  Indwelling Thorne catheter      History of Present Illness     Sundar Anders is a 69 y o  gentleman with a neurogenic bladder, urinary retention, and history of stroke as well as multiple difficult Lucia catheter placements necessitating urologist placement of Lucia catheter over a wire  Since last time we saw each other he has not had any issues with Lucia catheter dislodgement, he has had less troubles with encrustations since taking potassium citrate and undergoing faithful Renacidin irrigation  He has had no hospitalizations since his last Lucia catheter change  He is unable to perform review of systems today given his poor baseline mental function status post stroke  Detailed Urologic History     - please refer to HPI    Review of Systems     Review of Systems   Reason unable to perform ROS: Patient unable to participate  Allergies     Allergies   Allergen Reactions    Gentamycin [Gentamicin] Anaphylaxis    Vancomycin Anaphylaxis    Zosyn [Piperacillin Sod-Tazobactam So] Anaphylaxis     However, has tolerated Ertapenem multiple times and Cefepime   Other      antipersperents  Stat lock from lucia catheter    Sulfa Antibiotics Rash       Physical Exam     Physical Exam   Constitutional: No distress  Patient with a masked facial expression, grunts intermittently   HENT:   Head: Normocephalic and atraumatic  Right Ear: External ear normal    Left Ear: External ear normal    Nose: Nose normal    Eyes: EOM are normal  Pupils are equal, round, and reactive to light  Right eye exhibits no discharge  Left eye exhibits no discharge  Cardiovascular: Intact distal pulses  Pulmonary/Chest: Effort normal  No respiratory distress  He has no wheezes  He exhibits no tenderness  Abdominal: Soft  He exhibits no distension  There is no tenderness  No hernia  Genitourinary: Penis normal    Musculoskeletal: Normal range of motion  He exhibits tenderness and deformity  Neurological: He is alert  He displays abnormal reflex   A cranial nerve deficit and sensory deficit is present  He exhibits abnormal muscle tone  Coordination abnormal    Skin: Skin is warm and dry  No rash noted  He is not diaphoretic  No erythema  No pallor  Nursing note and vitals reviewed            Vital Signs  Vitals:    05/29/18 0941   BP: 128/76   BP Location: Right arm   Patient Position: Sitting   Cuff Size: Standard   Pulse: 78         Current Medications       Current Outpatient Prescriptions:     acetaminophen (TYLENOL) 325 mg tablet, Take 325 mg by mouth every 4 (four) hours as needed for mild pain  , Disp: , Rfl:     amLODIPine (NORVASC) 5 mg tablet, Take 5 mg by mouth daily, Disp: , Rfl:     apixaban (ELIQUIS) 2 5 mg, Take 2 5 mg by mouth 2 (two) times a day, Disp: , Rfl:     busPIRone (BUSPAR) 5 mg tablet, Take 5 mg by mouth 3 (three) times a day, Disp: , Rfl:     Citric Kb-Qidtqznxcvk-Jm Carb (RENACIDIN) SOLN, Irrigate with 1 application as directed 2 (two) times a day Thorne irrigation, Disp: , Rfl:     diphenhydrAMINE (BENADRYL) 25 mg tablet, Take 25 mg by mouth every 6 (six) hours as needed for itching, Disp: , Rfl:     famotidine (PEPCID) 20 mg tablet, Take 20 mg by mouth 2 (two) times a day, Disp: , Rfl:     miconazole 2 % cream, Apply 1 application topically 2 (two) times a day, Disp: , Rfl:     Petrolatum-Zinc Oxide (PHYTOPLEX Z-GUARD) 57-17 % PSTE, Apply 1 application topically 3 (three) times a day To buttocks, Disp: , Rfl:     potassium citrate (UROCIT-K) 10 mEq, 10 mEq 3 (three) times a day with meals  , Disp: , Rfl:     QUEtiapine (SEROquel) 25 mg tablet, Take 25 mg by mouth 2 (two) times a day  , Disp: , Rfl:     docusate sodium (COLACE) 100 mg capsule, Take 1 capsule by mouth 2 (two) times a day for 30 days, Disp: 60 capsule, Rfl: 0    memantine (NAMENDA) 5 mg tablet, Take 1 tablet by mouth 2 (two) times a day for 30 days (Patient taking differently: Take 5 mg by mouth daily  ), Disp: 60 tablet, Rfl: 0    metoprolol tartrate (LOPRESSOR) 25 mg tablet, Take 1 tablet by mouth 2 (two) times a day for 30 days (Patient taking differently: Take 25 mg by mouth 2 (two) times a day  ), Disp: 60 tablet, Rfl: 0    mirtazapine (REMERON) 15 mg tablet, Take 1 tablet by mouth daily at bedtime for 30 days, Disp: 30 tablet, Rfl: 0    senna (SENOKOT) 8 6 mg, Take 1 tablet by mouth daily at bedtime for 7 days, Disp: 7 tablet, Rfl: 0    tamsulosin (FLOMAX) 0 4 mg, Take 1 capsule by mouth daily with dinner for 30 days, Disp: 30 capsule, Rfl: 0      Active Problems     Patient Active Problem List   Diagnosis    Hypotension    Aphagia    [facility])    [facility]    History of DVT (deep vein thrombosis)    Aneurysm of thoracic aorta (HCC)    Retention of urine    Left lower lobe pneumonia (HCC)    Essential hypertension    Aphasia of unknown origin    CKD (chronic kidney disease)    Altered mental status    Encephalopathy    Septic shock (HCC)    Leukocytosis    Lactic acidosis    GERD (gastroesophageal reflux disease)    HTN, goal below 140/90    Generalized anxiety disorder    Muscle weakness    H/O blood clots    ESBL (extended spectrum beta-lactamase) producing bacteria infection    Acute deep vein thrombosis (DVT) of both iliofemoral veins (HCC)    Adverse drug effect    Anemia    Bowel trouble    Dementia with behavioral disturbance    Thorne catheter problem, sequela    Gram-negative sepsis with organ dysfunction (HCC)    Infection of drug-resistant bacteria    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HCC)    Joint effusion, knee    Knee problem    Mental disorder    MSSA (methicillin susceptible Staphylococcus aureus) septicemia (HCC)    Progressive aphasia     pulmonary hemorrhage    False passage of urethra    Complication, blocked Thorne catheter, subsequent encounter    Chronic urethral stricture    CKD (chronic kidney disease) stage 2, GFR 60-89 ml/min         Past Medical History Past Medical History:   Diagnosis Date    Anxiety     Aortic aneurysm (HCC)     Aphasia     Ataxia     Bladder adhesions     BPH (benign prostatic hypertrophy)     COPD (chronic obstructive pulmonary disease) (HCC)     wife denies - "never had"    Dementia     Difficulty walking     Essential (primary) hypertension     Generalized anxiety disorder     GERD (gastroesophageal reflux disease)     Global aphasia     H/O blood clots     High blood pressure     History of kidney stones     Kidney stones     Mental disorder     Muscle weakness     Neuromuscular dysfunction of bladder     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     Retention of urine, unspecified     Thrombosis     Urinary retention     Urinary tract bacterial infections     Urinary tract infection          Surgical History     Past Surgical History:   Procedure Laterality Date    CYSTOSCOPY      IVC FILTER INSERTION      X2    IVC FILTER INSERTION      x2    KIDNEY STONE SURGERY      KNEE SURGERY      Sepsis infection     NEPHROSTOMY      WY CYSTO/URETERO W/LITHOTRIPSY &INDWELL STENT INSRT Right 6/14/2017    Procedure: CYSTOSCOPY URETEROSCOPY WITH LITHOTRIPSY HOLMIUM LASER,,BASKET STONE EXTRACTION, RETROGRADE PYELOGRAM AND INSERTION STENT URETERAL;  Surgeon: Aicha Cummings MD;  [facility];  Service: Urology    URINARY SURGERY           Family History     Family History   Problem Relation Age of Onset    Diabetes Father          Social History     Social History     History   Smoking Status    Never Smoker   Smokeless Tobacco    Never Used         Pertinent Lab Values     Lab Results   Component Value Date    CREATININE 1 46 (H) 11/27/2017        PSA:  Screening for prostate cancer is not indicated in this patient        Pertinent Imaging      There is no pertinent urological imaging for my review

## 2018-05-29 NOTE — ASSESSMENT & PLAN NOTE
Patient with need for urologist placement of Thorne catheter given multiple false passages and difficult Thorne catheter placements in the future      Status post uneventful Thorne catheter change today, continue changes over a wire every 3-4 weeks given history of encrustation

## 2018-05-29 NOTE — ASSESSMENT & PLAN NOTE
Patient stricture has been maintained patent via Thorne catheter changes    Plan:  Continue catheter changes over a wire

## 2018-05-29 NOTE — ASSESSMENT & PLAN NOTE
Patient has had less clogging of his Thorne catheter since taking potassium citrate and undergoing Renacidin irrigations      Plan:  Continue potassium citrate orally and Renacidin irrigation per urethra and Thorne catheter

## 2018-05-29 NOTE — ASSESSMENT & PLAN NOTE
Continued urethral Thorne catheter changes, in the future, and option is a perineal urethrostomy, although the patient is not the best operative candidate

## 2018-06-13 ENCOUNTER — HOSPITAL ENCOUNTER (EMERGENCY)
Facility: HOSPITAL | Age: 68
Discharge: HOME | End: 2018-06-14
Attending: EMERGENCY MEDICINE | Admitting: EMERGENCY MEDICINE
Payer: COMMERCIAL

## 2018-06-13 DIAGNOSIS — N39.0 UTI (URINARY TRACT INFECTION): ICD-10-CM

## 2018-06-13 DIAGNOSIS — T83.091A OBSTRUCTION OF FOLEY CATHETER, INITIAL ENCOUNTER (HCC): Primary | ICD-10-CM

## 2018-06-14 VITALS
DIASTOLIC BLOOD PRESSURE: 103 MMHG | BODY MASS INDEX: 29.09 KG/M2 | OXYGEN SATURATION: 97 % | TEMPERATURE: 98.8 F | RESPIRATION RATE: 16 BRPM | HEART RATE: 55 BPM | SYSTOLIC BLOOD PRESSURE: 204 MMHG | WEIGHT: 208.56 LBS

## 2018-06-14 LAB
BACTERIA UR QL AUTO: ABNORMAL /HPF
BILIRUB UR QL STRIP: NEGATIVE
CLARITY UR: ABNORMAL
COLOR UR: ABNORMAL
GLUCOSE UR STRIP-MCNC: NEGATIVE MG/DL
HGB UR QL STRIP.AUTO: ABNORMAL
KETONES UR STRIP-MCNC: NEGATIVE MG/DL
LEUKOCYTE ESTERASE UR QL STRIP: ABNORMAL
NITRITE UR QL STRIP: POSITIVE
NON-SQ EPI CELLS URNS QL MICRO: ABNORMAL /HPF
PH UR STRIP.AUTO: 7.5 [PH] (ref 4.5–8)
PROT UR STRIP-MCNC: ABNORMAL MG/DL
RBC #/AREA URNS AUTO: ABNORMAL /HPF
SP GR UR STRIP.AUTO: 1.02 (ref 1–1.03)
UROBILINOGEN UR QL STRIP.AUTO: 0.2 E.U./DL
WBC #/AREA URNS AUTO: ABNORMAL /HPF
WBC CLUMPS # UR AUTO: PRESENT /UL

## 2018-06-14 PROCEDURE — 87086 URINE CULTURE/COLONY COUNT: CPT

## 2018-06-14 PROCEDURE — 81001 URINALYSIS AUTO W/SCOPE: CPT

## 2018-06-14 PROCEDURE — 96372 THER/PROPH/DIAG INJ SC/IM: CPT

## 2018-06-14 PROCEDURE — 99284 EMERGENCY DEPT VISIT MOD MDM: CPT

## 2018-06-14 RX ORDER — NITROFURANTOIN 25; 75 MG/1; MG/1
100 CAPSULE ORAL ONCE
Status: COMPLETED | OUTPATIENT
Start: 2018-06-14 | End: 2018-06-14

## 2018-06-14 RX ORDER — NITROFURANTOIN 25; 75 MG/1; MG/1
100 CAPSULE ORAL 2 TIMES DAILY
Qty: 14 CAPSULE | Refills: 0 | Status: SHIPPED | OUTPATIENT
Start: 2018-06-14 | End: 2018-06-21

## 2018-06-14 RX ORDER — DIPHENHYDRAMINE HYDROCHLORIDE 50 MG/ML
INJECTION INTRAMUSCULAR; INTRAVENOUS
Status: COMPLETED
Start: 2018-06-14 | End: 2018-06-14

## 2018-06-14 RX ORDER — LIDOCAINE HYDROCHLORIDE 20 MG/ML
JELLY TOPICAL ONCE
Status: COMPLETED | OUTPATIENT
Start: 2018-06-14 | End: 2018-06-14

## 2018-06-14 RX ORDER — LIDOCAINE HYDROCHLORIDE 20 MG/ML
JELLY TOPICAL
Status: COMPLETED
Start: 2018-06-14 | End: 2018-06-14

## 2018-06-14 RX ORDER — DIPHENHYDRAMINE HYDROCHLORIDE 50 MG/ML
50 INJECTION INTRAMUSCULAR; INTRAVENOUS ONCE
Status: COMPLETED | OUTPATIENT
Start: 2018-06-14 | End: 2018-06-14

## 2018-06-14 RX ADMIN — LIDOCAINE HYDROCHLORIDE: 20 JELLY TOPICAL at 00:48

## 2018-06-14 RX ADMIN — DIPHENHYDRAMINE HYDROCHLORIDE 50 MG: 50 INJECTION INTRAMUSCULAR; INTRAVENOUS at 00:48

## 2018-06-14 RX ADMIN — DIPHENHYDRAMINE HYDROCHLORIDE 50 MG: 50 INJECTION, SOLUTION INTRAMUSCULAR; INTRAVENOUS at 00:48

## 2018-06-14 RX ADMIN — NITROFURANTOIN (MONOHYDRATE/MACROCRYSTALS) 100 MG: 75; 25 CAPSULE ORAL at 07:16

## 2018-06-14 NOTE — ED NOTES
Report called to [facility]  Aware of pt discharge       Sagrario Escobedo RN  06/14/18 [id number]

## 2018-06-14 NOTE — ED PROVIDER NOTES
History  Chief Complaint   Patient presents with    Urinary Catheter Problem     Patient sent in from [facility] for lucia cath blockage  Per EMS, nurses at [facility] are not allowed to change lucia cath; only urology  Has lucia flushed bid  Unable to flush tonight  Patient is a 69year old male with blocked lucia catheter and was sent to ED to have lucia catheter changed  Was last seen in this ED on 12/9/17 for lucia catheter obstruction and it was changed in ED by ED staff then  [facility] website checked on this patient and last Rx filled was on 3/7/18 for ativan for 3 day supply  Patient has dementia and is not cooperative and is scratching his left buttock region with blood noted on hand from left buttocks  History provided by:  EMS personnel and nursing home  History limited by:  Dementia  Urinary Catheter Problem       Prior to Admission Medications   Prescriptions Last Dose Informant Patient Reported? Taking?    Citric Ql-Dluybjxmghh-Zt Carb (RENACIDIN) SOLN  Outside Facility (Specify) Yes No   Sig: Irrigate with 1 application as directed 2 (two) times a day Lucia irrigation   Petrolatum-Zinc Oxide (PHYTOPLEX Z-GUARD) 57-17 % PSTE  Outside Facility (Specify) Yes No   Sig: Apply 1 application topically 3 (three) times a day To buttocks   QUEtiapine (SEROquel) 25 mg tablet  Outside Facility (Specify) Yes No   Sig: Take 25 mg by mouth 2 (two) times a day     acetaminophen (TYLENOL) 325 mg tablet  Outside Facility (Specify) Yes No   Sig: Take 325 mg by mouth every 4 (four) hours as needed for mild pain     amLODIPine (NORVASC) 5 mg tablet  Outside Facility (Specify) Yes No   Sig: Take 5 mg by mouth daily   apixaban (ELIQUIS) 2 5 mg  Outside Facility (Specify) Yes No   Sig: Take 2 5 mg by mouth 2 (two) times a day   busPIRone (BUSPAR) 5 mg tablet  Outside Facility (Specify) Yes No   Sig: Take 5 mg by mouth 3 (three) times a day   diphenhydrAMINE (BENADRYL) 25 mg tablet  Outside Facility (Specify) Yes No   Sig: Take 25 mg by mouth every 6 (six) hours as needed for itching   docusate sodium (COLACE) 100 mg capsule  Outside Facility (Specify) No No   Sig: Take 1 capsule by mouth 2 (two) times a day for 30 days   famotidine (PEPCID) 20 mg tablet  Outside Facility (Specify) Yes No   Sig: Take 20 mg by mouth 2 (two) times a day   memantine (NAMENDA) 5 mg tablet  Outside Facility (Specify) No No   Sig: Take 1 tablet by mouth 2 (two) times a day for 30 days   Patient taking differently: Take 5 mg by mouth daily     metoprolol tartrate (LOPRESSOR) 25 mg tablet  Outside Facility (Specify) No No   Sig: Take 1 tablet by mouth 2 (two) times a day for 30 days   Patient taking differently: Take 25 mg by mouth 2 (two) times a day     miconazole 2 % cream  Outside Facility (Specify) Yes No   Sig: Apply 1 application topically 2 (two) times a day   mirtazapine (REMERON) 15 mg tablet  Outside Facility (Specify) No No   Sig: Take 1 tablet by mouth daily at bedtime for 30 days   potassium citrate (UROCIT-K) 10 mEq  Outside Facility (Specify) Yes No   Sig: 10 mEq 3 (three) times a day with meals     senna (SENOKOT) 8 6 mg  Other (Specify) No No   Sig: Take 1 tablet by mouth daily at bedtime for 7 days   tamsulosin (FLOMAX) 0 4 mg  Outside Facility (Specify) No No   Sig: Take 1 capsule by mouth daily with dinner for 30 days      Facility-Administered Medications: None       Past Medical History:   Diagnosis Date    Anxiety     Aortic aneurysm (HCC)     Aphasia     Ataxia     Bladder adhesions     BPH (benign prostatic hypertrophy)     COPD [facility] obstructive [facility]     wife denies - "never had"    Dementia     Difficulty walking     Essential (primary) hypertension     Generalized anxiety disorder     GERD (gastroesophageal reflux disease)     Global aphasia     H/O blood clots     High blood pressure     History of kidney stones     Kidney stones     Mental disorder     Muscle weakness     Neuromuscular dysfunction of bladder     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     Retention of urine, unspecified     Thrombosis     Urinary retention     Urinary tract bacterial infections     Urinary tract infection        Past Surgical History:   Procedure Laterality Date    CYSTOSCOPY      IVC FILTER INSERTION      X2    IVC FILTER INSERTION      x2    KIDNEY STONE SURGERY      KNEE SURGERY      Sepsis infection     NEPHROSTOMY      NM CYSTO/URETERO W/LITHOTRIPSY &INDWELL STENT INSRT Right 6/14/2017    Procedure: CYSTOSCOPY URETEROSCOPY WITH LITHOTRIPSY HOLMIUM LASER,,BASKET STONE EXTRACTION, RETROGRADE PYELOGRAM AND INSERTION STENT URETERAL;  Surgeon: Meagan Russell MD;  Location: [facility] OR;  Service: Urology    URINARY SURGERY         Family History   Problem Relation Age of Onset    Diabetes Father      I have reviewed and agree with the history as documented  Social History   Substance Use Topics    Smoking status: Never Smoker    Smokeless tobacco: Never Used    Alcohol use No        Review of Systems   Unable to perform ROS: Dementia   Genitourinary:        Blocked lucia catheter         Physical Exam  Physical Exam   Constitutional: He appears distressed (moderate)  HENT:   Head: Normocephalic and atraumatic  Eyes: No scleral icterus  Neck: No tracheal deviation present  Cardiovascular: Regular rhythm and normal heart sounds  No murmur heard  Bradycardia  Pulmonary/Chest: Effort normal and breath sounds normal  No stridor  No respiratory distress  Abdominal: Soft  Bowel sounds are normal  There is no tenderness  Genitourinary:   Genitourinary Comments: (+) lucia without blood noted  Musculoskeletal: He exhibits no edema or deformity  Skin: Skin is warm and dry  Left buttocks wound with some blood noted   Nursing note and vitals reviewed        Vital Signs  ED Triage Vitals [06/13/18 2350] Temperature Pulse Respirations Blood Pressure SpO2   98 8 °F (37 1 °C) (!) 50 14 (!) 182/96 94 %      Temp Source Heart Rate Source Patient Position - Orthostatic VS BP Location FiO2 (%)   Oral Monitor Lying Right arm --      Pain Score       --           Vitals:    06/13/18 2350 06/14/18 0000 06/14/18 0030 06/14/18 0219   BP:  (!) 187/88 (!) 149/120 148/85   Pulse: (!) 50 (!) 50  (!) 50   Patient Position - Orthostatic VS: Lying Lying  Lying       Visual Acuity      ED Medications  Medications   nitrofurantoin (MACROBID) extended-release capsule 100 mg (not administered)   diphenhydrAMINE (BENADRYL) injection 50 mg (50 mg Intramuscular Given 6/14/18 0048)   lidocaine (URO-JET) 2 % topical gel ( Topical Given 6/14/18 0048)       Diagnostic Studies  Results Reviewed     Procedure Component Value Units Date/Time    Urine Microscopic [[id number]]  (Abnormal) Collected:  06/14/18 0135    Lab Status:  Final result Specimen:  Urine from Urine, Indwelling Thorne Catheter Updated:  06/14/18 0342     RBC, UA 20-30 (A) /hpf      WBC, UA 30-50 (A) /hpf      Epithelial Cells None Seen /hpf      Bacteria, UA Moderate (A) /hpf      WBC Clumps Present    Urine culture [[id number]] Collected:  06/14/18 0135    Lab Status:   In process Specimen:  Urine from Urine, Indwelling Thorne Catheter Updated:  06/14/18 0342    ED Urine Macroscopic [[id number]]  (Abnormal) Collected:  06/14/18 0142    Lab Status:  Final result Specimen:  Urine Updated:  06/14/18 0137     Color, UA Dara     Clarity, UA Cloudy     pH, UA 7 5     Leukocytes, UA Large (A)     Nitrite, UA Positive (A)     Protein,  (2+) (A) mg/dl      Glucose, UA Negative mg/dl      Ketones, UA Negative mg/dl      Urobilinogen, UA 0 2 E U /dl      Bilirubin, UA Negative     Blood, UA Large (A)     Specific Chualar, UA 1 020    Narrative:       CLINITEK RESULT                 No orders to display              Procedures  Procedures       Phone Contacts  ED Phone Contact    ED Course                               MDM  Number of Diagnoses or Management Options  Diagnosis management comments: DDx including but not limited to: lucia catheter malfunction, urethral tear, UTI, hematuria, need for new lucia catheter, coagulopathy, metabolic abnormality, dehydration, RUPESH; doubt tumor or renal colic  Amount and/or Complexity of Data Reviewed  Clinical lab tests: ordered and reviewed  Decide to obtain previous medical records or to obtain history from someone other than the patient: yes  Obtain history from someone other than the patient: yes  Review and summarize past medical records: yes      CritCare Time    Disposition  Final diagnoses:   Obstruction of Lucia catheter, initial encounter ([place] [address] )   UTI (urinary tract infection)     Time reflects when diagnosis was documented in both MDM as applicable and the Disposition within this note     Time User Action Codes Description Comment    6/14/2018  5:19 AM Silvia Klein Add [T83 091A] Obstruction of Lucia catheter, initial encounter ([place] [address] )     6/14/2018  5:19 AM Silvia Klein Add [N39 0] UTI (urinary tract infection)       ED Disposition     ED Disposition Condition Comment    Discharge  [facility] discharge to home/self care  Condition at discharge: Stable        Follow-up Information     Follow up With Specialties Details Why Contact Info    Zeeshan Srinivasan MD Internal Medicine Call in 1 day return sooner if bleeding, pain, fever, vomiting, rash    [id number] [facility] Call in 5 days or own urologist  [address] [facility] [id number] [phone]          Patient's Medications   Discharge Prescriptions    NITROFURANTOIN (MACROBID) 100 MG CAPSULE    Take 1 capsule (100 mg total) by mouth 2 (two) times a day for 7 days       Start Date: 6/14/2018 End Date: 6/21/2018       Order Dose: 100 mg       Quantity: 14 capsule Refills: 0     No discharge procedures on file      ED Provider  Electronically Signed by           Kareem Dick MD  06/14/18 3583

## 2018-06-14 NOTE — ED NOTES
16F lucia Catheter inserted at this time with no difficulty, 300cc of cloudy yellow urine returned  Pt  Tolerated well  Tamir Beach RN present at bedside for insertion  Patient Currently resting in bed with call bell in reach       Ian Bajwa RN  06/14/18 7542

## 2018-06-14 NOTE — ED NOTES
This nurse present during lucia cath insertion  [facility] and new brief provided afterwards  Urine sent to madelin Cui RN  06/14/18 3613

## 2018-06-14 NOTE — ED NOTES
Pt resting in room at this time  [facility] called at this time for transport        Saintclair Marble, RN  06/14/18 8317

## 2018-06-14 NOTE — DISCHARGE INSTRUCTIONS
Catheter-associated Urinary Tract Infection   WHAT YOU NEED TO KNOW:   A catheter-associated urinary tract infection (CAUTI) is an infection caused by an indwelling urinary catheter  An indwelling urinary catheter is a thin, flexible tube that is inserted into the bladder  It is left in place to drain urine  The infection may travel along the catheter and into the bladder or kidneys  DISCHARGE INSTRUCTIONS:   Return to the emergency department if:   · You have severe pain in your lower back or abdomen  · You have blood in your urine  · You stop urinating, or you urinate much less than usual   Contact your healthcare provider if:   · You have a fever  · Your symptoms do not improve or get worse  · You have questions or concerns about your condition or care  Medicines: You may need any of the following:  · Antibiotics  help treat an infection caused by bacteria  · Antifungals  help treat an infection caused by fungus  · Acetaminophen  decreases pain and fever  It is available without a doctor's order  Ask how much to take and how often to take it  Follow directions  Read the labels of all other medicines you are using to see if they also contain acetaminophen, or ask your doctor or pharmacist  Acetaminophen can cause liver damage if not taken correctly  Do not use more than 4 grams (4,000 milligrams) total of acetaminophen in one day  · NSAIDs , such as ibuprofen, help decrease swelling, pain, and fever  This medicine is available with or without a doctor's order  NSAIDs can cause stomach bleeding or kidney problems in certain people  If you take blood thinner medicine, always ask your healthcare provider if NSAIDs are safe for you  Always read the medicine label and follow directions  · Take your medicine as directed  Contact your healthcare provider if you think your medicine is not helping or if you have side effects  Tell him of her if you are allergic to any medicine   Keep a list of the medicines, vitamins, and herbs you take  Include the amounts, and when and why you take them  Bring the list or the pill bottles to follow-up visits  Carry your medicine list with you in case of an emergency  Self-care:   · Drink fluids as directed  Fluids may help your kidneys and bladder get rid of the infection  · Keep the catheter area clean  Clean your skin around the catheter as directed  Shower once a day  Do not take baths or go in hot tubs until your infection is gone  · Do not have sex  until your healthcare provider says it is okay  Sex may delay healing or cause another UTI  Prevent another CAUTI:   · Wash your hands before and after you use the bathroom or touch the catheter  Wash your hands to prevent the spread of infection to your urinary tract  · Clean all parts of your catheter as directed  Keep your catheter tubing clean  Do not place the catheter on the ground  Do not allow the drainage spout to touch the toilet  Use an alcohol swab to clean the end of drainage spout as directed  · Keep the drainage bag below your waist   This may prevent urine from moving back into your bladder, which can cause an infection  · Empty the urine bag as directed  This may prevent urine from flowing back into your bladder  · Women should wipe front to back  after a bowel movement  This may prevent germs from getting into the urinary tract  · Keep the catheter secured to your leg as directed  Use tape or a special catheter michel to prevent your catheter from being pulled  This may also prevent kinks that could cause the urine to move back into the bladder  Follow up with your healthcare provider as directed:  Write down your questions so you remember to ask them during your visits  © 2017 [facility] Information is for End User's use only and may not be sold, redistributed or otherwise used for commercial purposes   All illustrations and images included in [employer] are the copyrighted property of [employer]  or Mika Mcclain  The above information is an  only  It is not intended as medical advice for individual conditions or treatments  Talk to your doctor, nurse or pharmacist before following any medical regimen to see if it is safe and effective for you  Thorne Catheter Placement and Care   WHAT YOU NEED TO KNOW:   A Thorne catheter is a sterile tube that is inserted into your bladder to drain urine  It is also called an indwelling urinary catheter  The tip of the catheter has a small balloon filled with solution that holds the catheter inside your bladder  DISCHARGE INSTRUCTIONS:   Return to the emergency department if:   · Your catheter comes out  · You suddenly have material that looks like sand in the tubing or drainage bag  · No urine is draining into the bag and you have checked the system  · You have pain in your hip, back, pelvis, or lower abdomen  · You are confused or cannot think clearly  Contact your healthcare provider if:   · You have a fever  · You have bladder spasms for more than 1 day after the catheter is placed  · You see blood in the tubing or drainage bag  · You have a rash or itching where the catheter tube is secured to your skin  · Urine leaks from or around the catheter, tubing, or drainage bag  · The closed drainage system has accidently come open or apart  · You see a layer of crystals inside the tubing  · You have questions or concerns about your condition or care  Care for your Thorne catheter:   · Clean your genital area 2 times every day  Clean your catheter and the area around where it was inserted  Use soap and water  Clean your anal opening and catheter area after every bowel movement  · Secure the catheter tube  so you do not pull or move the catheter  This helps prevent pain and bladder spasms   Healthcare providers will show you how to use medical tape or a strap to secure the catheter tube to your body  · Keep a closed drainage system  Your Thorne catheter should always be attached to the drainage bag to form a closed system  Do not disconnect any part of the closed system unless you need to change the bag  Care for your drainage bag:   · Ask if a leg bag is right for you  A leg bag can be worn under your clothes  Ask your healthcare provider for more information about a leg bag  · Keep the drainage bag below the level of your waist   This helps stop urine from moving back up the tubing and into your bladder  Do not loop or kink the tubing  This can cause urine to back up and collect in your bladder  Do not let the drainage bag touch or lie on the floor  · Empty the drainage bag when needed  The weight of a full drainage bag can be painful  Empty the drainage bag every 3 to 6 hours or when it is ? full  · Clean and change the drainage bag as directed  Ask your healthcare provider how often you should change the drainage bag and what cleaning solution to use  Wear disposable gloves when you change the bag  Do not allow the end of the catheter or tubing to touch anything  Clean the ends with an alcohol pad before you reconnect them  What to do if problems develop:   · No urine is draining into the bag:      ¨ Check for kinks in the tubing and straighten them out  ¨ Check the tape or strap used to secure the catheter tube to your skin  Make sure it is not blocking the tube  ¨ Make sure you are not sitting or lying on the tubing  ¨ Make sure the urine bag is hanging below the level of your waist     · Urine leaks from or around the catheter, tubing, or drainage bag:  Check if the closed drainage system has accidently come open or apart  Clean the catheter and tubing ends with a new alcohol pad and reconnect them  Prevent an infection:   · Wash your hands often    Wash before and after you touch your catheter, tubing, or drainage bag  Use soap and water  Wear clean disposable gloves when you care for your catheter or disconnect the drainage bag  Wash your hands before you prepare or eat food  · Drink liquids as directed  Ask your healthcare provider how much liquid to drink each day and which liquids are best for you  Liquids will help flush your kidneys and bladder to help prevent infection  Follow up with your healthcare provider as directed:  Write down your questions so you remember to ask them during your visits  © 2017 [facility] Information is for End User's use only and may not be sold, redistributed or otherwise used for commercial purposes  All illustrations and images included in CareNotes® are the copyrighted property of A D A M , Inc  or Mika Mcclain  The above information is an  only  It is not intended as medical advice for individual conditions or treatments  Talk to your doctor, nurse or pharmacist before following any medical regimen to see if it is safe and effective for you

## 2018-06-15 ENCOUNTER — TELEPHONE (OUTPATIENT)
Dept: UROLOGY | Facility: CLINIC | Age: 68
End: 2018-06-15

## 2018-06-15 LAB — BACTERIA UR CULT: NORMAL

## 2018-06-15 NOTE — TELEPHONE ENCOUNTER
[facility] and [facility]  Lm for [facility] with new apt details

## 2018-06-15 NOTE — TELEPHONE ENCOUNTER
Patient managed by Cathryn Coffman at the [facility] office  Patients wife Ashley Headley called to change patients appointments  Patient was in the the hospital for clogged lucia catheter  Lucia was changed in the the hospital  Patients appointment needs to be changed for in 3 weeks  Patient was rescheduled for 7/3/18 at 10:00am  Please call Ashley Headley to confirm appointment

## 2018-07-03 ENCOUNTER — OFFICE VISIT (OUTPATIENT)
Dept: UROLOGY | Facility: CLINIC | Age: 68
End: 2018-07-03
Payer: COMMERCIAL

## 2018-07-03 VITALS — DIASTOLIC BLOOD PRESSURE: 80 MMHG | SYSTOLIC BLOOD PRESSURE: 132 MMHG | HEART RATE: 84 BPM

## 2018-07-03 DIAGNOSIS — T83.091D: ICD-10-CM

## 2018-07-03 DIAGNOSIS — N36.5 FALSE PASSAGE OF URETHRA: Primary | ICD-10-CM

## 2018-07-03 DIAGNOSIS — R33.9 RETENTION OF URINE: Chronic | ICD-10-CM

## 2018-07-03 DIAGNOSIS — IMO0002 CHRONIC URETHRAL STRICTURE: ICD-10-CM

## 2018-07-03 PROBLEM — T83.9XXS: Status: RESOLVED | Noted: 2017-12-18 | Resolved: 2018-07-03

## 2018-07-03 PROCEDURE — 99213 OFFICE O/P EST LOW 20 MIN: CPT | Performed by: UROLOGY

## 2018-07-03 PROCEDURE — 51710 CHANGE OF BLADDER TUBE: CPT | Performed by: UROLOGY

## 2018-07-03 NOTE — PATIENT INSTRUCTIONS
Thorne Catheter Placement and Care   WHAT YOU NEED TO KNOW:   A Thorne catheter is a sterile tube that is inserted into your bladder to drain urine  It is also called an indwelling urinary catheter  The tip of the catheter has a small balloon filled with solution that holds the catheter inside your bladder  DISCHARGE INSTRUCTIONS:   Seek care immediately if:   · Your catheter comes out  · You suddenly have material that looks like sand in the tubing or drainage bag  · No urine is draining into the bag and you have checked the system  · You have pain in your hip, back, pelvis, or lower abdomen  · You are confused or cannot think clearly  Contact your healthcare provider if:   · You have a fever  · You have bladder spasms for more than 1 day after the catheter is placed  · You see blood in the tubing or drainage bag  · You have a rash or itching where the catheter tube is secured to your skin  · Urine leaks from or around the catheter, tubing, or drainage bag  · The closed drainage system has accidently come open or apart  · You see a layer of crystals inside the tubing  · You have questions or concerns about your condition or care  Care for your Thorne catheter:   · Clean your genital area 2 times every day  Clean your catheter and the area around where it was inserted  Use soap and water  Clean your anal opening and catheter area after every bowel movement  · Secure the catheter tube  so you do not pull or move the catheter  This helps prevent pain and bladder spasms  Healthcare providers will show you how to use medical tape or a strap to secure the catheter tube to your body  · Keep a closed drainage system  Your Thorne catheter should always be attached to the drainage bag to form a closed system  Do not disconnect any part of the closed system unless you need to change the bag  Care for your drainage bag:   · Ask if a leg bag is right for you    A leg bag can be worn under your clothes  Ask your healthcare provider for more information about a leg bag  · Keep the drainage bag below the level of your waist   This helps stop urine from moving back up the tubing and into your bladder  Do not loop or kink the tubing  This can cause urine to back up and collect in your bladder  Do not let the drainage bag touch or lie on the floor  · Empty the drainage bag when needed  The weight of a full drainage bag can be painful  Empty the drainage bag every 3 to 6 hours or when it is ? full  · Clean and change the drainage bag as directed  Ask your healthcare provider how often you should change the drainage bag and what cleaning solution to use  Wear disposable gloves when you change the bag  Do not allow the end of the catheter or tubing to touch anything  Clean the ends with an alcohol pad before you reconnect them  What to do if problems develop:   · No urine is draining into the bag:      ¨ Check for kinks in the tubing and straighten them out  ¨ Check the tape or strap used to secure the catheter tube to your skin  Make sure it is not blocking the tube  ¨ Make sure you are not sitting or lying on the tubing  ¨ Make sure the urine bag is hanging below the level of your waist     · Urine leaks from or around the catheter, tubing, or drainage bag:  Check if the closed drainage system has accidently come open or apart  Clean the catheter and tubing ends with a new alcohol pad and reconnect them  Prevent an infection:   · Wash your hands often  Wash before and after you touch your catheter, tubing, or drainage bag  Use soap and water  Wear clean disposable gloves when you care for your catheter or disconnect the drainage bag  Wash your hands before you prepare or eat food  · Drink liquids as directed  Ask your healthcare provider how much liquid to drink each day and which liquids are best for you   Liquids will help flush your kidneys and bladder to help prevent infection  Follow up with your healthcare provider as directed:  Write down your questions so you remember to ask them during your visits  © 2017 [employer] Information is for End User's use only and may not be sold, redistributed or otherwise used for commercial purposes  All illustrations and images included in CareNotes® are the copyrighted property of A [employer] M [employer]  or Mika Mcclain  The above information is an  only  It is not intended as medical advice for individual conditions or treatments  Talk to your doctor, nurse or pharmacist before following any medical regimen to see if it is safe and effective for you

## 2018-07-03 NOTE — PROGRESS NOTES
Irrigation of bladder  Date/Time: 7/3/2018 10:25 AM  Performed by: Lyndon Leigh  Authorized by: Lyndon Leigh   Consent: Verbal consent obtained  Risks and benefits: risks, benefits and alternatives were discussed  Consent given by: guardian and spouse  Patient understanding: patient states understanding of the procedure being performed  Patient consent: the patient's understanding of the procedure matches consent given  Procedure consent: procedure consent matches procedure scheduled  Relevant documents: relevant documents present and verified  Test results: test results available and properly labeled  Site marked: the operative site was not marked  Imaging studies: imaging studies not available  Required items: required blood products, implants, devices, and special equipment available  Patient identity confirmed: provided demographic data  Preparation: Patient was prepped and draped in the usual sterile fashion  Local anesthesia used: no    Anesthesia:  Local anesthesia used: no    Sedation:  Patient sedated: no  Patient tolerance: Patient tolerated the procedure well with no immediate complications  Comments: Patient presents today for his routine catheter change which does require wire placement given history of multiple false passages within the urethra requiring urologist placement of catheter  A Super Stiff wire was placed through the lumen of the indwelling Thorne catheter which was previously within the penile urethra and this was placed carefully into the bladder  The indwelling catheter was removed and a new, [place] tip catheter [address] was placed into the lumen of the bladder  Pyocystis was present and copious pus was irrigated from the lumen of the bladder to clear effluent  The catheter was then placed to gravity drainage

## 2018-07-03 NOTE — PROGRESS NOTES
Problem List Items Addressed This Visit     Retention of urine (Chronic)    False passage of urethra - Primary    Complication, blocked Thorne catheter, subsequent encounter    Relevant Orders    Irrigation of bladder    Chronic urethral stricture    Relevant Orders    Irrigation of bladder          Discussion:  Unfortunately, DOM did require trip to the emergency room due to a blocked Thorne catheter  Per his wife's report they have not been clamping his catheter after instillation of Renacidin  At his visit today, his Thorne catheter was positioned in the distal penile urethra and wire placement was required to advance his catheter into the lumen of the bladder  He is not a candidate for suprapubic catheter or a perineal urethrostomy given his multiple issues  Assessment and plan:       He will continue with 3 week catheter changes over a wire  Terence Matthews MD      Chief Complaint     Chief Complaint   Patient presents with    Urinary Retention         History of Present Illness     Mike Holman is a 69 y o  gentleman who I follow for neurogenic bladder, urethral false passages, urethral stricture, difficult Thorne catheter placement, and need for Thorne catheter placement over a wire on a 3-4 week basis given frequent catheter problems  Please see procedure note from today's visit for details of his Thorne catheter exchange  He did have pyocystis on today's Thorne catheter exchange and I irrigated his bladder free to clear effluent  He is unable to participate in the review of systems  I wrote, again, clear orders for his facility to clamp his Thorne catheter for these 15 minutes after Renacidin instillations      Detailed Urologic History     - please refer to HPI    Review of Systems     Review of Systems   Reason unable to perform ROS: Patient is nonverbal              Allergies     Allergies   Allergen Reactions    Gentamycin [Gentamicin] Anaphylaxis    Vancomycin Anaphylaxis    Zosyn [Piperacillin Sod-Tazobactam So] Anaphylaxis     However, has tolerated Ertapenem multiple times and Cefepime   Other      antipersperents  Stat lock from lucia catheter    Sulfa Antibiotics Rash       Physical Exam     Physical Exam   Constitutional: He is oriented to person, place, and time  He appears well-developed and well-nourished  No distress  HENT:   Head: Normocephalic and atraumatic  Eyes: EOM are normal  Pupils are equal, round, and reactive to light  Right eye exhibits no discharge  Left eye exhibits no discharge  Neck: Normal range of motion  Cardiovascular: Intact distal pulses  Pulmonary/Chest: Effort normal  No respiratory distress  Abdominal: Soft  He exhibits no distension and no mass  There is tenderness (Prior to catheter placement there was suprapubic tenderness)  There is no rebound and no guarding  No hernia  Genitourinary:   Genitourinary Comments: Patient is circumcised, his Lucia catheter was in the distal penile urethra prior to wire placement and proper Lucia catheter placement today  Musculoskeletal: He exhibits edema and deformity  Neurological: He is alert and oriented to person, place, and time  No cranial nerve deficit or sensory deficit  He exhibits normal muscle tone  Coordination normal    Skin: Skin is warm and dry  No rash noted  He is not diaphoretic  No erythema  No pallor  Nursing note and vitals reviewed            Vital Signs  Vitals:    07/03/18 1007   BP: 132/80   BP Location: Right arm   Patient Position: Sitting   Cuff Size: Standard   Pulse: 84         Current Medications       Current Outpatient Prescriptions:     acetaminophen (TYLENOL) 325 mg tablet, Take 325 mg by mouth every 4 (four) hours as needed for mild pain  , Disp: , Rfl:     amLODIPine (NORVASC) 5 mg tablet, Take 5 mg by mouth daily, Disp: , Rfl:     apixaban (ELIQUIS) 2 5 mg, Take 2 5 mg by mouth 2 (two) times a day, Disp: , Rfl:     busPIRone (BUSPAR) 5 mg tablet, Take 5 mg by mouth 3 (three) times a day, Disp: , Rfl:     Citric Oi-Sknwoeolviz-Mx Carb (RENACIDIN) SOLN, Irrigate with 1 application as directed 2 (two) times a day Thorne irrigation, Disp: , Rfl:     diphenhydrAMINE (BENADRYL) 25 mg tablet, Take 25 mg by mouth every 6 (six) hours as needed for itching, Disp: , Rfl:     famotidine (PEPCID) 20 mg tablet, Take 20 mg by mouth 2 (two) times a day, Disp: , Rfl:     miconazole 2 % cream, Apply 1 application topically 2 (two) times a day, Disp: , Rfl:     Petrolatum-Zinc Oxide (PHYTOPLEX Z-GUARD) 57-17 % PSTE, Apply 1 application topically 3 (three) times a day To buttocks, Disp: , Rfl:     potassium citrate (UROCIT-K) 10 mEq, 10 mEq 3 (three) times a day with meals  , Disp: , Rfl:     QUEtiapine (SEROquel) 25 mg tablet, Take 25 mg by mouth 2 (two) times a day  , Disp: , Rfl:     docusate sodium (COLACE) 100 mg capsule, Take 1 capsule by mouth 2 (two) times a day for 30 days, Disp: 60 capsule, Rfl: 0    memantine (NAMENDA) 5 mg tablet, Take 1 tablet by mouth 2 (two) times a day for 30 days (Patient taking differently: Take 5 mg by mouth daily  ), Disp: 60 tablet, Rfl: 0    metoprolol tartrate (LOPRESSOR) 25 mg tablet, Take 1 tablet by mouth 2 (two) times a day for 30 days (Patient taking differently: Take 25 mg by mouth 2 (two) times a day  ), Disp: 60 tablet, Rfl: 0    mirtazapine (REMERON) 15 mg tablet, Take 1 tablet by mouth daily at bedtime for 30 days, Disp: 30 tablet, Rfl: 0    senna (SENOKOT) 8 6 mg, Take 1 tablet by mouth daily at bedtime for 7 days, Disp: 7 tablet, Rfl: 0    tamsulosin (FLOMAX) 0 4 mg, Take 1 capsule by mouth daily with dinner for 30 days, Disp: 30 capsule, Rfl: 0      Active Problems     Patient Active Problem List   Diagnosis    Hypotension    Aphagia    COPD (chronic obstructive pulmonary disease) (HCC)    Sepsis ([address] )    History of DVT (deep vein thrombosis)    Aneurysm of thoracic aorta (HCC)    Retention of urine    Left lower lobe pneumonia ([facility] [address]    Essential hypertension    Aphasia of unknown origin    CKD (chronic kidney disease)    Altered mental status    Encephalopathy    Septic shock (HCC)    Leukocytosis    Lactic acidosis    GERD (gastroesophageal reflux disease)    HTN, goal below 140/90    Generalized anxiety disorder    Muscle weakness    H/O blood clots    ESBL (extended spectrum beta-lactamase) producing bacteria infection    Acute deep vein thrombosis (DVT) of both iliofemoral veins ([facility])    Adverse drug effect    Anemia    Bowel trouble    Dementia with behavioral disturbance    Thorne catheter problem, sequela    Gram-negative sepsis with organ dysfunction ([facility])    Infection of drug-resistant bacteria    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[facility])    Joint effusion, knee    Knee problem    Mental disorder    MSSA (methicillin susceptible Staphylococcus aureus) septicemia ([facility])    Progressive aphasia     pulmonary hemorrhage    False passage of urethra    Complication, blocked Thorne catheter, subsequent encounter    Chronic urethral stricture    CKD (chronic kidney disease) stage 2, GFR 60-89 ml/min         Past Medical History     Past Medical History:   Diagnosis Date    Anxiety     Aortic aneurysm ([facility])     Aphasia     Ataxia     Bladder adhesions     BPH (benign prostatic hypertrophy)     COPD (chronic obstructive pulmonary disease) ([facility])     wife denies - "never had"    Dementia     Difficulty walking     Essential (primary) hypertension     Generalized anxiety disorder     GERD (gastroesophageal reflux disease)     Global aphasia     H/O blood clots     High blood pressure     History of kidney stones     Kidney stones     Mental disorder     Muscle weakness     Neuromuscular dysfunction of bladder     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     Retention of urine, unspecified     Thrombosis     Urinary retention     Urinary tract bacterial infections     Urinary tract infection          Surgical History     Past Surgical History:   Procedure Laterality Date    CYSTOSCOPY      IVC FILTER INSERTION      X2    IVC FILTER INSERTION      x2    KIDNEY STONE SURGERY      KNEE SURGERY      Sepsis infection     NEPHROSTOMY      NV CYSTO/URETERO W/LITHOTRIPSY &INDWELL STENT INSRT Right 6/14/2017    Procedure: CYSTOSCOPY URETEROSCOPY WITH LITHOTRIPSY HOLMIUM LASER,,BASKET STONE EXTRACTION, RETROGRADE PYELOGRAM AND INSERTION STENT URETERAL;  Surgeon: Una Polanco MD;  Location: [facility];  Service: Urology    URINARY SURGERY           Family History     Family History   Problem Relation Age of Onset    Diabetes Father          Social History     Social History     History   Smoking Status    Never Smoker   Smokeless Tobacco    Never Used         Pertinent Lab Values     Lab Results   Component Value Date    CREATININE 1 46 (H) 11/27/2017       No results found for: PSA  This patient is not a candidate for PSA testing        Pertinent Imaging      None

## 2018-07-18 ENCOUNTER — TELEPHONE (OUTPATIENT)
Dept: UROLOGY | Facility: CLINIC | Age: 68
End: 2018-07-18

## 2018-07-18 DIAGNOSIS — T83.9XXD PROBLEM WITH FOLEY CATHETER, SUBSEQUENT ENCOUNTER: Primary | ICD-10-CM

## 2018-07-18 NOTE — TELEPHONE ENCOUNTER
The need for Renacidin is well documented within our previous notes  Below is an exert from my note from December 2017  "I am sorry to hear that Sahil Downs has been having more clogging of his Thorne catheter  Review of his inpatient order show that they have been using ascetic acid instead of Renacidin for Thorne catheter irrigations  I specifically wrote this in his order sheet today for Renacidin 3 times a day instead of ascetic acid  Additionally, I recommend starting potassium citrate as this can be a stone inhibitor  Plan: He will follow up in 3 weeks for a Thorne catheter change over a wire  If he is doing well at that time we will continue with monthly Thorne catheter changes over a wire given his history of difficult Thorne catheter placement )    Since the patient has been undergoing Renacidin irrigations, we have had less difficulty changing his catheter we have had less subjective complaints from the patient's wife  When the other irrigation solution was being use, ascetic acid, we were having more difficulty with Thorne catheter clogging  Continued Renacidin irrigations are indicated in this patient's case given his difficult urologic picture and history of difficult Thorne catheter changes

## 2018-07-18 NOTE — TELEPHONE ENCOUNTER
Zay from [facility] care states that they were calling because patients insurance will no longer cover the renacidin for his catheter flushed  They state that they did have some left but they are running low  They want to know if you can order something else  Bradley Nuno can be reached at [phone] [address]

## 2018-07-18 NOTE — TELEPHONE ENCOUNTER
Spoke with unit manager for unit patient is on at [facility]  He states that he is going to re submit it to patient insurance but he is going to need a new script for the renacidin and also a detailed note on why the patients needs it

## 2018-07-19 RX ORDER — CITRIC ACID, GLUCONOLACTONE AND MAGNESIUM CARBONATE 6.602; .198; 3.268 G/100ML; G/100ML; G/100ML
1 SOLUTION IRRIGATION 2 TIMES DAILY
Qty: 60 BOTTLE | Refills: 6 | Status: SHIPPED | OUTPATIENT
Start: 2018-07-19 | End: 2019-04-25 | Stop reason: HOSPADM

## 2018-07-19 NOTE — TELEPHONE ENCOUNTER
Printed office notes stating need for renacidin  Can you please place a new order for renacidin to be faxed over with notes to [facility]

## 2018-07-24 ENCOUNTER — PROCEDURE VISIT (OUTPATIENT)
Dept: UROLOGY | Facility: CLINIC | Age: 68
End: 2018-07-24
Payer: COMMERCIAL

## 2018-07-24 VITALS — HEART RATE: 78 BPM | DIASTOLIC BLOOD PRESSURE: 74 MMHG | SYSTOLIC BLOOD PRESSURE: 120 MMHG

## 2018-07-24 DIAGNOSIS — N36.5 FALSE PASSAGE OF URETHRA: Primary | ICD-10-CM

## 2018-07-24 DIAGNOSIS — R33.9 RETENTION OF URINE: Chronic | ICD-10-CM

## 2018-07-24 DIAGNOSIS — T83.091D: ICD-10-CM

## 2018-07-24 PROCEDURE — 51710 CHANGE OF BLADDER TUBE: CPT | Performed by: UROLOGY

## 2018-07-24 PROCEDURE — 99214 OFFICE O/P EST MOD 30 MIN: CPT | Performed by: UROLOGY

## 2018-07-24 NOTE — PROGRESS NOTES
Bladder catheterization  Date/Time: 7/24/2018 9:57 AM  Performed by: Dany De Souza  Authorized by: Dany De Souza     Patient location:  Bedside  Other Assisting Provider: Yes (comment) Pamela Valle RN)    Consent:     Consent obtained:  Verbal    Consent given by:  Guardian    Risks discussed:  False passage, incomplete procedure, infection, pain and urethral injury    Alternatives discussed:  No treatment, delayed treatment, alternative treatment and observation  Universal protocol:     Procedure explained and questions answered to patient or proxy's satisfaction: yes      Relevant documents present and verified: yes      Test results available and properly labeled: yes      Radiology Images displayed and confirmed  If images not available, report reviewed: yes      Required blood products, implants, devices and special equipment available: yes      Site/side marked: no      Immediately prior to procedure a time out was called: no      Patient identity confirmed:  Provided demographic data  Pre-procedure details:     Procedure purpose:  Therapeutic    Preparation: Patient was prepped and draped in usual sterile fashion    Anesthesia (see MAR for exact dosages): Anesthesia method:  None  Procedure details:     Bladder irrigation: yes      Catheter insertion:  Indwelling    Approach: natural orifice      Catheter type: Eight[address] [place] tip, over [employer] Stiff wire  Catheter size:  18 Fr    Number of attempts:  1    Successful placement: yes      Urine characteristics:  Clear and mildly cloudy  Post-procedure details:     Patient tolerance of procedure: Tolerated well, no immediate complications  Comments:      Patient returns today for Thorne catheter change over a wire, this was done by removing the fluid in the indwelling Thorne balloon, passing a Super Stiff wire and then placing a fresh 18 [address] [place] tip catheter over this wire with a slight amount of difficulty    This was inflated with 10 cubic centimeters of sterile water and then seen to irrigate well without pyocystis or debris within the bladder

## 2018-07-24 NOTE — ASSESSMENT & PLAN NOTE
Status post exchange of 25 [facility] tip catheter over a Super Stiff wire without issue, the bladder is draining well after Thorne catheter change  Plan:  Continue every 3-4 week catheter changes

## 2018-07-24 NOTE — ASSESSMENT & PLAN NOTE
Continue Thorne catheter drainage of the bladder, continue to change Thorne catheter every 3-4 weeks over a wire given history of multiple fast passages and difficult Thorne placement  He will be due for cystoscopy with possible bladder biopsy in the operating room in December 2018 for squamous cell carcinoma surveillance in the setting of long-term Thorne catheterization

## 2018-07-24 NOTE — PROGRESS NOTES
Problem List Items Addressed This Visit     Retention of urine (Chronic)     Continue Lucia catheter drainage of the bladder, continue to change Lucia catheter every 3-4 weeks over a wire given history of multiple fast passages and difficult Lucia placement  He will be due for cystoscopy with possible bladder biopsy in the operating room in December 2018 for squamous cell carcinoma surveillance in the setting of long-term Lucia catheterization  False passage of urethra - Primary     Status post exchange of 18 [employer] tip catheter over a Super Stiff wire without issue, the bladder is draining well after Lucia catheter change  Plan:  Continue every 3-4 week catheter changes  Complication, blocked Lucia catheter, subsequent encounter     I filled out paperwork for Renacidin approval as the patient previously had trouble with ascetic acid irrigations  Assessment and plan:       Please see problem oriented charting for the assessment plan of today's urological complaints    Xena Thakur MD      Chief Complaint     Chief Complaint   Patient presents with    Urinary Retention    Chronic lucia catheter         History of Present Illness     Renetta Cosme is a 69 y o  gentleman with multiple medical problems including stroke with neurogenic bladder and urinary retention also with history of multiple false passages of the urethra necessitating Lucia catheter change over a wire every 3-4 weeks given history of significant encrustation  In previous notes I have filled out documentation necessary for Renacidin irrigation approval given that he did not do well with ascetic acid irrigations previously  His urine is draining clear than in previous times and his catheter appears to be in good position with no dislodgement of his catheter  See same-day procedure note for details of Lucia catheter change      He is unable to participate in the review of systems but presents with his wife, as always, and she states that he has been doing better since our last visit  I spoke with her and let her know that for a surveillance for squamous cell carcinoma the setting of long-term catheterization he will be due for cystoscopy in December 2018, this is likely best performed in the operating room under sedation or anesthesia given his multiple issues  Detailed Urologic History     - please refer to HPI    Review of Systems     Review of Systems   Reason unable to perform ROS: Poor mental status, patient nonverbal              Allergies     Allergies   Allergen Reactions    Gentamycin [Gentamicin] Anaphylaxis    Vancomycin Anaphylaxis    Zosyn [Piperacillin Sod-Tazobactam So] Anaphylaxis     However, has tolerated Ertapenem multiple times and Cefepime   Other      antipersperents  Stat lock from lucia catheter    Sulfa Antibiotics Rash       Physical Exam     Physical Exam   Constitutional:   The patient is in a stretcher, strapped in, he appears well and is not toxic appearing   HENT:   Head: Normocephalic and atraumatic  Eyes: Conjunctivae and EOM are normal  Pupils are equal, round, and reactive to light  Neck: Normal range of motion  Neck supple  Cardiovascular:   Peripheral pulses are present   Pulmonary/Chest: Breath sounds normal  No respiratory distress  He exhibits no tenderness  Abdominal: He exhibits no distension and no mass  There is no tenderness  There is no guarding  Genitourinary:   Genitourinary Comments: Circumcised, no urethral erosion, Lucia catheter in good position after change, irrigates well   Musculoskeletal: He exhibits no edema or deformity  Neurological: He displays abnormal reflex  A cranial nerve deficit and sensory deficit is present  He exhibits abnormal muscle tone  Coordination abnormal    Skin: Skin is warm and dry  No rash noted     Psychiatric:   Patient intermittently grunts, attempts to withdrawal from the staff Vital Signs  Vitals:    07/24/18 0936   BP: 120/74   BP Location: Right arm   Patient Position: Sitting   Cuff Size: Standard   Pulse: 78         Current Medications       Current Outpatient Prescriptions:     acetaminophen (TYLENOL) 325 mg tablet, Take 325 mg by mouth every 4 (four) hours as needed for mild pain  , Disp: , Rfl:     amLODIPine (NORVASC) 5 mg tablet, Take 5 mg by mouth daily, Disp: , Rfl:     apixaban (ELIQUIS) 2 5 mg, Take 2 5 mg by mouth 2 (two) times a day, Disp: , Rfl:     busPIRone (BUSPAR) 5 mg tablet, Take 5 mg by mouth 3 (three) times a day, Disp: , Rfl:     Citric Fz-Yrcstdtorth-To Carb (RENACIDIN) SOLN, Irrigate with 1 application as directed 2 (two) times a day Lucia irrigation, Disp: , Rfl:     Citric Kj-Tbkqzksiyid-Nr Carb (RENACIDIN) SOLN, Irrigate with 1 ampule as directed 2 (two) times a day Clamp lucia for 15 min after instillation, Disp: 60 Bottle, Rfl: 6    diphenhydrAMINE (BENADRYL) 25 mg tablet, Take 25 mg by mouth every 6 (six) hours as needed for itching, Disp: , Rfl:     docusate sodium (COLACE) 100 mg capsule, Take 1 capsule by mouth 2 (two) times a day for 30 days, Disp: 60 capsule, Rfl: 0    famotidine (PEPCID) 20 mg tablet, Take 20 mg by mouth 2 (two) times a day, Disp: , Rfl:     memantine (NAMENDA) 5 mg tablet, Take 1 tablet by mouth 2 (two) times a day for 30 days (Patient taking differently: Take 5 mg by mouth daily  ), Disp: 60 tablet, Rfl: 0    metoprolol tartrate (LOPRESSOR) 25 mg tablet, Take 1 tablet by mouth 2 (two) times a day for 30 days (Patient taking differently: Take 25 mg by mouth 2 (two) times a day  ), Disp: 60 tablet, Rfl: 0    miconazole 2 % cream, Apply 1 application topically 2 (two) times a day, Disp: , Rfl:     mirtazapine (REMERON) 15 mg tablet, Take 1 tablet by mouth daily at bedtime for 30 days, Disp: 30 tablet, Rfl: 0    Petrolatum-Zinc Oxide (PHYTOPLEX Z-GUARD) 57-17 % PSTE, Apply 1 application topically 3 (three) times a day To buttocks, Disp: , Rfl:     potassium citrate (UROCIT-K) 10 mEq, 10 mEq 3 (three) times a day with meals  , Disp: , Rfl:     QUEtiapine (SEROquel) 25 mg tablet, Take 25 mg by mouth 2 (two) times a day  , Disp: , Rfl:     senna (SENOKOT) 8 6 mg, Take 1 tablet by mouth daily at bedtime for 7 days, Disp: 7 tablet, Rfl: 0    tamsulosin (FLOMAX) 0 4 mg, Take 1 capsule by mouth daily with dinner for 30 days, Disp: 30 capsule, Rfl: 0      Active Problems     Patient Active Problem List   Diagnosis    Hypotension    Aphagia    [facility]    Sepsis [facility]    History of DVT (deep vein thrombosis)    [facility]    Retention of urine    Left lower lobe pneumonia (HCC)    Essential hypertension    Aphasia of unknown origin    CKD (chronic kidney disease)    Altered mental status    Encephalopathy    Septic shock (HCC)    Leukocytosis    Lactic acidosis    GERD (gastroesophageal reflux disease)    HTN, goal below 140/90    Generalized anxiety disorder    Muscle weakness    H/O blood clots    ESBL (extended spectrum beta-lactamase) producing bacteria infection    Acute deep vein thrombosis (DVT) of both iliofemoral veins (HCC)    Adverse drug effect    Anemia    Bowel trouble    Dementia with behavioral disturbance    Gram-negative sepsis with organ dysfunction (HCC)    Infection of drug-resistant bacteria    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HCC)    Joint effusion, knee    Knee problem    Mental disorder    MSSA (methicillin susceptible Staphylococcus aureus) septicemia (HCC)    Progressive aphasia     pulmonary hemorrhage    False passage of urethra    Complication, blocked Thorne catheter, subsequent encounter    Chronic urethral stricture    CKD (chronic kidney disease) stage 2, GFR 60-89 ml/min         Past Medical History     Past Medical History:   Diagnosis Date    Anxiety     Aortic aneurysm (HCC)     Aphasia     Ataxia     Bladder adhesions     BPH (benign prostatic hypertrophy)     COPD (chronic obstructive pulmonary disease) (HCC)     wife denies - "never had"    Dementia     Difficulty walking     Essential (primary) hypertension     Generalized anxiety disorder     GERD (gastroesophageal reflux disease)     Global aphasia     H/O blood clots     High blood pressure     History of kidney stones     Kidney stones     Mental disorder     Muscle weakness     Neuromuscular dysfunction of bladder     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     Retention of urine, unspecified     Thrombosis     Urinary retention     Urinary tract bacterial infections     Urinary tract infection          Surgical History     Past Surgical History:   Procedure Laterality Date    CYSTOSCOPY      IVC FILTER INSERTION      X2    IVC FILTER INSERTION      x2    KIDNEY STONE SURGERY      KNEE SURGERY      Sepsis infection     NEPHROSTOMY      [place] CYSTO/URETERO W/LITHOTRIPSY &INDWELL STENT INSRT Right 6/14/2017    Procedure: CYSTOSCOPY URETEROSCOPY WITH LITHOTRIPSY HOLMIUM LASER,,BASKET STONE EXTRACTION, RETROGRADE PYELOGRAM AND INSERTION STENT URETERAL;  Surgeon: Aaliyah Terrell MD;  Location: [facility];  Service: Urology    URINARY SURGERY           Family History     Family History   Problem Relation Age of Onset    Diabetes Father          Social History     Social History     History   Smoking Status    Never Smoker   Smokeless Tobacco    Never Used         Pertinent Lab Values     Lab Results   Component Value Date    CREATININE 1 46 (H) 11/27/2017       No results found for: PSA          Pertinent Imaging      There is no pertinent urological imaging for my review

## 2018-07-24 NOTE — ASSESSMENT & PLAN NOTE
I filled out paperwork for Renacidin approval as the patient previously had trouble with ascetic acid irrigations

## 2018-08-01 NOTE — TELEPHONE ENCOUNTER
Any untoward events from the insurance company not covering this medication are then the responsibility of the insurance company as this is the recommended medication for this patient

## 2018-08-01 NOTE — TELEPHONE ENCOUNTER
Spoke with Rob Blue from [facility]  They stated that even with office note stating that patients needs renacidin that they will not cover it  Patient just used last dose of renacidin and now out of it

## 2018-08-20 ENCOUNTER — OFFICE VISIT (OUTPATIENT)
Dept: UROLOGY | Facility: CLINIC | Age: 68
End: 2018-08-20
Payer: COMMERCIAL

## 2018-08-20 VITALS — HEIGHT: 71 IN | SYSTOLIC BLOOD PRESSURE: 142 MMHG | HEART RATE: 60 BPM | DIASTOLIC BLOOD PRESSURE: 84 MMHG

## 2018-08-20 DIAGNOSIS — R33.9 RETENTION OF URINE: Chronic | ICD-10-CM

## 2018-08-20 DIAGNOSIS — N36.5 FALSE PASSAGE OF URETHRA: Primary | ICD-10-CM

## 2018-08-20 DIAGNOSIS — IMO0002 CHRONIC URETHRAL STRICTURE: ICD-10-CM

## 2018-08-20 PROCEDURE — 99214 OFFICE O/P EST MOD 30 MIN: CPT | Performed by: UROLOGY

## 2018-08-20 NOTE — LETTER
August 21, 2018     Dot Libman, MD  [address] [place] [address]  [address]   [facility]    Patient: Zeferino [facility]   YOB: 1950   Date of Visit: 8/20/2018       Dear Dr Paulina Chou: Thank you for referring Micaela Ybarra to me for evaluation  Below are my notes for this consultation  If you have questions, please do not hesitate to call me  I look forward to following your patient along with you  Sincerely,        Stephanie Quintana MD        CC: No Recipients  Stephanie Quintana MD  8/20/2018 11:21 AM  Sign at close encounter       Problem List Items Addressed This Visit     Retention of urine (Chronic)    False passage of urethra - Primary    Chronic urethral stricture    Relevant Orders    Irrigation of bladder          Discussion:  I am told by the patient's wife that, luckily, approval was finally obtained for Renacidin irrigation, this is the best medication for this patient  We will continue Lucia catheter changes every 3-4 weeks and I have advised the place of residence to please ensure that the patient's Lucia catheter remains in its proper location within the urinary bladder    We will proceed to the operating room in December 2018 for cystoscopy and bladder biopsy and fulguration given long-term indwelling urethral catheter    Assessment and plan:       Please see problem oriented charting for the assessment plan of today's urological complaints    Stephanie Quintana MD      Chief Complaint     Chief Complaint   Patient presents with    Urinary Retention    lucia change         History of Present Illness     Carin Louis is a 69 y o  gentleman with history of stroke, living in a assisted living facility, with neurogenic bladder and urinary retention  He has a history of difficult Lucia catheter placement and, unfortunately, requires Lucia catheter change over a wire every 3-4 weeks      No acute events since last time we had seen 1 another no hospitalizations, on exam, the patient's Lucia catheter is within the urethra  The following portions of the patient's history were reviewed and updated as appropriate: allergies, current medications, past family history, past medical history, past social history, past surgical history and problem list     Detailed Urologic History     - please refer to HPI    Review of Systems     Review of Systems   Reason unable to perform ROS: Patient is nonverbal              Allergies     Allergies   Allergen Reactions    Gentamycin [Gentamicin] Anaphylaxis    Vancomycin Anaphylaxis    Zosyn [Piperacillin Sod-Tazobactam So] Anaphylaxis     However, has tolerated Ertapenem multiple times and Cefepime   Other Rash     antipersperents  Stat lock from luica catheter    Sulfa Antibiotics Rash       Physical Exam     Physical Exam   Constitutional:   Patient is strapped to a stretcher, as per his usual   HENT:   Patient has aphasia, masked facial expression   Eyes: EOM are normal  No scleral icterus  Neck: No tracheal deviation present  Cardiovascular: Intact distal pulses  Pulmonary/Chest: Effort normal    Abdominal: He exhibits distension  He exhibits no mass  There is no tenderness  There is no rebound and no guarding  No hernia  Genitourinary:   Genitourinary Comments: Circumcised, normal scrotum, no signs of infection, urethral Lucia catheter is well within the urethra and not in the bladder, no urine in the drainage bag, leg bag   Musculoskeletal: He exhibits deformity  Neurological: He displays abnormal reflex  A cranial nerve deficit and sensory deficit is present  He exhibits abnormal muscle tone  Coordination abnormal    Skin: No rash noted  Psychiatric:   Patient intermittently grunts and groans   Nursing note and vitals reviewed            Vital Signs  Vitals:    08/20/18 1038   BP: 142/84   BP Location: Left arm   Patient Position: Supine   Pulse: 60   Height: 5' 11" (1 803 m)         Current Medications       Current Outpatient Prescriptions:     acetaminophen (TYLENOL) 325 mg tablet, Take 325 mg by mouth every 4 (four) hours as needed for mild pain  , Disp: , Rfl:     amLODIPine (NORVASC) 5 mg tablet, Take 5 mg by mouth daily, Disp: , Rfl:     apixaban (ELIQUIS) 2 5 mg, Take 2 5 mg by mouth 2 (two) times a day, Disp: , Rfl:     busPIRone (BUSPAR) 5 mg tablet, Take 5 mg by mouth 3 (three) times a day, Disp: , Rfl:     Citric Ye-Ejtwmywlxrh-Us Carb (RENACIDIN) SOLN, Irrigate with 1 application as directed 2 (two) times a day Lucia irrigation, Disp: , Rfl:     Citric Tg-Rljrfsbbmqb-Ur Carb (RENACIDIN) SOLN, Irrigate with 1 ampule as directed 2 (two) times a day Clamp lucia for 15 min after instillation, Disp: 60 Bottle, Rfl: 6    diphenhydrAMINE (BENADRYL) 25 mg tablet, Take 25 mg by mouth every 6 (six) hours as needed for itching, Disp: , Rfl:     docusate sodium (COLACE) 100 mg capsule, Take 1 capsule by mouth 2 (two) times a day for 30 days, Disp: 60 capsule, Rfl: 0    famotidine (PEPCID) 20 mg tablet, Take 20 mg by mouth 2 (two) times a day, Disp: , Rfl:     memantine (NAMENDA) 5 mg tablet, Take 1 tablet by mouth 2 (two) times a day for 30 days (Patient taking differently: Take 5 mg by mouth daily  ), Disp: 60 tablet, Rfl: 0    metoprolol tartrate (LOPRESSOR) 25 mg tablet, Take 1 tablet by mouth 2 (two) times a day for 30 days (Patient taking differently: Take 25 mg by mouth 2 (two) times a day  ), Disp: 60 tablet, Rfl: 0    miconazole 2 % cream, Apply 1 application topically 2 (two) times a day, Disp: , Rfl:     mirtazapine (REMERON) 15 mg tablet, Take 1 tablet by mouth daily at bedtime for 30 days, Disp: 30 tablet, Rfl: 0    Petrolatum-Zinc Oxide (PHYTOPLEX Z-GUARD) 57-17 % PSTE, Apply 1 application topically 3 (three) times a day To buttocks, Disp: , Rfl:     potassium citrate (UROCIT-K) 10 mEq, 10 mEq 3 (three) times a day with meals  , Disp: , Rfl:     QUEtiapine (SEROquel) 25 mg tablet, Take 25 mg by mouth 2 (two) times a day  , Disp: , Rfl:     senna (SENOKOT) 8 6 mg, Take 1 tablet by mouth daily at bedtime for 7 days, Disp: 7 tablet, Rfl: 0    tamsulosin (FLOMAX) 0 4 mg, Take 1 capsule by mouth daily with dinner for 30 days, Disp: 30 capsule, Rfl: 0      Active Problems     Patient Active Problem List   Diagnosis    Hypotension    Aphagia    [facility]) [facility]    Sepsis ([facility] )    History of DVT (deep vein thrombosis)    Aneurysm of thoracic aorta ([facility] )    Retention of urine    Left lower lobe pneumonia (HCC)    Essential hypertension    Aphasia of unknown origin    CKD (chronic kidney disease)    Altered mental status    Encephalopathy    [facility]    Leukocytosis    Lactic acidosis    GERD (gastroesophageal reflux disease)    HTN, goal below 140/90    Generalized anxiety disorder    Muscle weakness    H/O blood clots    ESBL (extended spectrum beta-lactamase) producing bacteria infection    Acute deep vein thrombosis (DVT) of both iliofemoral veins ([facility])    Adverse drug effect    Anemia    Bowel trouble    Dementia with behavioral disturbance    Gram-negative sepsis with organ dysfunction ([facility])    Infection of drug-resistant bacteria    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[facility])    Joint effusion, knee    Knee problem    Mental disorder    MSSA (methicillin susceptible Staphylococcus aureus) septicemia ([facility])    Progressive aphasia     pulmonary hemorrhage    False passage of urethra    Complication, blocked Thorne catheter, subsequent encounter    Chronic urethral stricture    CKD (chronic kidney disease) stage 2, GFR 60-89 ml/min         Past Medical History     Past Medical History:   Diagnosis Date    Anxiety     Aortic aneurysm (HCC)     Aphasia     Ataxia     Bladder adhesions     BPH (benign prostatic hypertrophy)     COPD (chronic obstructive pulmonary disease) ([facility]     wife denies - "never had"    Dementia     Difficulty walking     Essential (primary) hypertension     Generalized anxiety disorder     GERD (gastroesophageal reflux disease)     Global aphasia     H/O blood clots     High blood pressure     History of kidney stones     Kidney stones     Mental disorder     Muscle weakness     Neuromuscular dysfunction of bladder     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     Retention of urine, unspecified     Thrombosis     Urinary retention     Urinary tract bacterial infections     Urinary tract infection          Surgical History     Past Surgical History:   Procedure Laterality Date    CYSTOSCOPY      IVC FILTER INSERTION      X2    IVC FILTER INSERTION      x2    KIDNEY STONE SURGERY      KNEE SURGERY      Sepsis infection     NEPHROSTOMY      RI CYSTO/URETERO W/LITHOTRIPSY &INDWELL STENT INSRT Right 6/14/2017    Procedure: CYSTOSCOPY URETEROSCOPY WITH LITHOTRIPSY HOLMIUM LASER,,BASKET STONE EXTRACTION, RETROGRADE PYELOGRAM AND INSERTION STENT URETERAL;  Surgeon: Cailin Richmond MD;  Location: [facility];  Service: Urology    URINARY SURGERY           Family History     Family History   Problem Relation Age of Onset    Diabetes Father          Social History     Social History     History   Smoking Status    Never Smoker   Smokeless Tobacco    Never Used         Pertinent Lab Values     Lab Results   Component Value Date    CREATININE 1 46 (H) 11/27/2017       No results found for: PSA          Pertinent Imaging      There is no pertinent urological imaging for my review     Marli Davidson MD  8/20/2018 11:17 AM  Sign at close encounter  Irrigation of bladder  Date/Time: 8/20/2018 11:16 AM  Performed by: Elisha Higgins  Authorized by: Elisha Higgins   Consent: Verbal consent obtained    Risks and benefits: risks, benefits and alternatives were discussed  Consent given by: guardian and spouse  Patient understanding: patient states understanding of the procedure being performed  Patient consent: the patient's understanding of the procedure matches consent given  Procedure consent: procedure consent matches procedure scheduled  Relevant documents: relevant documents present and verified  Test results: test results available and properly labeled  Site marked: the operative site was not marked  Imaging studies: imaging studies not available  Required items: required blood products, implants, devices, and special equipment available  Patient identity confirmed: provided demographic data  Preparation: Patient was prepped and draped in the usual sterile fashion  Local anesthesia used: no    Anesthesia:  Local anesthesia used: no    Sedation:  Patient sedated: no  Patient tolerance: Patient tolerated the procedure well with no immediate complications  Comments: The patient's 25 Czech Thorne catheter was changed over a Super Stiff wire, once the new catheter was in the bladder (the previous catheter was dislodged), copious pus was seen draining from the catheter line  This was evacuated with a Rajni syringe, the bladder was lavaged with sterile water with removal of today shows pus  At the end of the procedure the patient's urine was draining clear      Unfortunately, we have seen this in Mae Schulz multiple times due to his tough clinical situation and a tendency for his urethral catheter to be dislodged into the urethra without corrective action at his place of residence

## 2018-08-20 NOTE — PROGRESS NOTES
Problem List Items Addressed This Visit     Retention of urine (Chronic)    False passage of urethra - Primary    Chronic urethral stricture    Relevant Orders    Irrigation of bladder          Discussion:  I am told by the patient's wife that, luckily, approval was finally obtained for Renacidin irrigation, this is the best medication for this patient  We will continue Lucia catheter changes every 3-4 weeks and I have advised the place of residence to please ensure that the patient's Lucia catheter remains in its proper location within the urinary bladder    We will proceed to the operating room in December 2018 for cystoscopy and bladder biopsy and fulguration given long-term indwelling urethral catheter    Assessment and plan:       Please see problem oriented charting for the assessment plan of today's urological complaints    Eliecer Dutton MD      Chief Complaint     Chief Complaint   Patient presents with    Urinary Retention    lucia change         History of Present Illness     Brenda Lr is a 69 y o  gentleman with history of stroke, living in a assisted living facility, with neurogenic bladder and urinary retention  He has a history of difficult Lucia catheter placement and, unfortunately, requires Lucia catheter change over a wire every 3-4 weeks  No acute events since last time we had seen 1 another no hospitalizations, on exam, the patient's Lucia catheter is within the urethra      The following portions of the patient's history were reviewed and updated as appropriate: allergies, current medications, past family history, past medical history, past social history, past surgical history and problem list     Detailed Urologic History     - please refer to HPI    Review of Systems     Review of Systems   Reason unable to perform ROS: Patient is nonverbal              Allergies     Allergies   Allergen Reactions    Gentamycin [Gentamicin] Anaphylaxis    Vancomycin Anaphylaxis    Zosyn [Piperacillin Sod-Tazobactam So] Anaphylaxis     However, has tolerated Ertapenem multiple times and Cefepime   Other Rash     antipersperents  Stat lock from lucia catheter    Sulfa Antibiotics Rash       Physical Exam     Physical Exam   Constitutional:   Patient is strapped to a stretcher, as per his usual   HENT:   Patient has aphasia, masked facial expression   Eyes: EOM are normal  No scleral icterus  Neck: No tracheal deviation present  Cardiovascular: Intact distal pulses  Pulmonary/Chest: Effort normal    Abdominal: He exhibits distension  He exhibits no mass  There is no tenderness  There is no rebound and no guarding  No hernia  Genitourinary:   Genitourinary Comments: Circumcised, normal scrotum, no signs of infection, urethral Lucia catheter is well within the urethra and not in the bladder, no urine in the drainage bag, leg bag   Musculoskeletal: He exhibits deformity  Neurological: He displays abnormal reflex  A cranial nerve deficit and sensory deficit is present  He exhibits abnormal muscle tone  Coordination abnormal    Skin: No rash noted  Psychiatric:   Patient intermittently grunts and groans   Nursing note and vitals reviewed            Vital Signs  Vitals:    08/20/18 1038   BP: 142/84   BP Location: Left arm   Patient Position: Supine   Pulse: 60   Height: 5' 11" (1 803 m)         Current Medications       Current Outpatient Prescriptions:     acetaminophen (TYLENOL) 325 mg tablet, Take 325 mg by mouth every 4 (four) hours as needed for mild pain  , Disp: , Rfl:     amLODIPine (NORVASC) 5 mg tablet, Take 5 mg by mouth daily, Disp: , Rfl:     apixaban (ELIQUIS) 2 5 mg, Take 2 5 mg by mouth 2 (two) times a day, Disp: , Rfl:     busPIRone (BUSPAR) 5 mg tablet, Take 5 mg by mouth 3 (three) times a day, Disp: , Rfl:     Citric Ei-Oaoxdmuxrrp-Oq Carb (RENACIDIN) SOLN, Irrigate with 1 application as directed 2 (two) times a day Lucia irrigation, Disp: , Rfl:     Citric Bt-Ojxlekfhgur-Ps Carb (RENACIDIN) SOLN, Irrigate with 1 ampule as directed 2 (two) times a day Clamp lucia for 15 min after instillation, Disp: 60 Bottle, Rfl: 6    diphenhydrAMINE (BENADRYL) 25 mg tablet, Take 25 mg by mouth every 6 (six) hours as needed for itching, Disp: , Rfl:     docusate sodium (COLACE) 100 mg capsule, Take 1 capsule by mouth 2 (two) times a day for 30 days, Disp: 60 capsule, Rfl: 0    famotidine (PEPCID) 20 mg tablet, Take 20 mg by mouth 2 (two) times a day, Disp: , Rfl:     memantine (NAMENDA) 5 mg tablet, Take 1 tablet by mouth 2 (two) times a day for 30 days (Patient taking differently: Take 5 mg by mouth daily  ), Disp: 60 tablet, Rfl: 0    metoprolol tartrate (LOPRESSOR) 25 mg tablet, Take 1 tablet by mouth 2 (two) times a day for 30 days (Patient taking differently: Take 25 mg by mouth 2 (two) times a day  ), Disp: 60 tablet, Rfl: 0    miconazole 2 % cream, Apply 1 application topically 2 (two) times a day, Disp: , Rfl:     mirtazapine (REMERON) 15 mg tablet, Take 1 tablet by mouth daily at bedtime for 30 days, Disp: 30 tablet, Rfl: 0    Petrolatum-Zinc Oxide (PHYTOPLEX Z-GUARD) 57-17 % PSTE, Apply 1 application topically 3 (three) times a day To buttocks, Disp: , Rfl:     potassium citrate (UROCIT-K) 10 mEq, 10 mEq 3 (three) times a day with meals  , Disp: , Rfl:     QUEtiapine (SEROquel) 25 mg tablet, Take 25 mg by mouth 2 (two) times a day  , Disp: , Rfl:     senna (SENOKOT) 8 6 mg, Take 1 tablet by mouth daily at bedtime for 7 days, Disp: 7 tablet, Rfl: 0    tamsulosin (FLOMAX) 0 4 mg, Take 1 capsule by mouth daily with dinner for 30 days, Disp: 30 capsule, Rfl: 0      Active Problems     Patient Active Problem List   Diagnosis    Hypotension    Aphagia    [facility]    Sepsis ([facility] [address] )    History of DVT (deep vein thrombosis)    Aneurysm of thoracic aorta (HCC)    Retention of urine    Left lower lobe pneumonia ([address]    Essential hypertension    Aphasia of unknown origin    CKD (chronic kidney disease)    Altered mental status    Encephalopathy    Septic shock (HCC)    Leukocytosis    Lactic acidosis    GERD (gastroesophageal reflux disease)    HTN, goal below 140/90    Generalized anxiety disorder    Muscle weakness    H/O blood clots    ESBL (extended spectrum beta-lactamase) producing bacteria infection    Acute deep vein thrombosis (DVT) of both iliofemoral veins ([facility])    Adverse drug effect    Anemia    Bowel trouble    Dementia with behavioral disturbance    Gram-negative sepsis with organ dysfunction ([facility])    Infection of drug-resistant bacteria    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[facility])    Joint effusion, knee    Knee problem    Mental disorder    MSSA (methicillin susceptible Staphylococcus aureus) septicemia ([facility])    Progressive aphasia     pulmonary hemorrhage    False passage of urethra    Complication, blocked Thorne catheter, subsequent encounter    Chronic urethral stricture    CKD (chronic kidney disease) stage 2, GFR 60-89 ml/min         Past Medical History     Past Medical History:   Diagnosis Date    Anxiety     Aortic aneurysm ([facility])     Aphasia     Ataxia     Bladder adhesions     BPH (benign prostatic hypertrophy)     COPD (chronic obstructive pulmonary disease) ([facility])     wife denies - "never had"    Dementia     Difficulty walking     Essential (primary) hypertension     Generalized anxiety disorder     GERD (gastroesophageal reflux disease)     Global aphasia     H/O blood clots     High blood pressure     History of kidney stones     Kidney stones     Mental disorder     Muscle weakness     Neuromuscular dysfunction of bladder     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     Retention of urine, unspecified     Thrombosis     Urinary retention     Urinary tract bacterial infections     Urinary tract infection Surgical History     Past Surgical History:   Procedure Laterality Date    CYSTOSCOPY      IVC FILTER INSERTION      X2    IVC FILTER INSERTION      x2    KIDNEY STONE SURGERY      KNEE SURGERY      Sepsis infection     NEPHROSTOMY      SC CYSTO/URETERO W/LITHOTRIPSY &INDWELL STENT INSRT Right 6/14/2017    Procedure: CYSTOSCOPY URETEROSCOPY WITH LITHOTRIPSY HOLMIUM LASER,,BASKET STONE EXTRACTION, RETROGRADE PYELOGRAM AND INSERTION STENT URETERAL;  Surgeon: Malaika Thompson MD;  Location: [facility];  Service: Urology    URINARY SURGERY           Family History     Family History   Problem Relation Age of Onset    Diabetes Father          Social History     Social History     History   Smoking Status    Never Smoker   Smokeless Tobacco    Never Used         Pertinent Lab Values     Lab Results   Component Value Date    CREATININE 1 46 (H) 11/27/2017       No results found for: PSA          Pertinent Imaging      There is no pertinent urological imaging for my review

## 2018-08-20 NOTE — PROGRESS NOTES
Irrigation of bladder  Date/Time: 8/20/2018 11:16 AM  Performed by: Lc Szymanski  Authorized by: Lc Szymanski   Consent: Verbal consent obtained  Risks and benefits: risks, benefits and alternatives were discussed  Consent given by: guardian and spouse  Patient understanding: patient states understanding of the procedure being performed  Patient consent: the patient's understanding of the procedure matches consent given  Procedure consent: procedure consent matches procedure scheduled  Relevant documents: relevant documents present and verified  Test results: test results available and properly labeled  Site marked: the operative site was not marked  Imaging studies: imaging studies not available  Required items: required blood products, implants, devices, and special equipment available  Patient identity confirmed: provided demographic data  Preparation: Patient was prepped and draped in the usual sterile fashion  Local anesthesia used: no    Anesthesia:  Local anesthesia used: no    Sedation:  Patient sedated: no  Patient tolerance: Patient tolerated the procedure well with no immediate complications  Comments: The patient's 25 Russian Thorne catheter was changed over a Super Stiff wire, once the new catheter was in the bladder (the previous catheter was dislodged), copious pus was seen draining from the catheter line  This was evacuated with a Rajni syringe, the bladder was lavaged with sterile water with removal of today shows pus  At the end of the procedure the patient's urine was draining clear      Unfortunately, we have seen this in Nini Russo multiple times due to his tough clinical situation and a tendency for his urethral catheter to be dislodged into the urethra without corrective action at his place of residence

## 2018-09-23 ENCOUNTER — HOSPITAL ENCOUNTER (EMERGENCY)
Facility: HOSPITAL | Age: 68
Discharge: HOME | End: 2018-09-24
Attending: EMERGENCY MEDICINE | Admitting: EMERGENCY MEDICINE
Payer: COMMERCIAL

## 2018-09-23 VITALS
TEMPERATURE: 98.2 F | SYSTOLIC BLOOD PRESSURE: 180 MMHG | HEIGHT: 71 IN | BODY MASS INDEX: 29.72 KG/M2 | HEART RATE: 52 BPM | OXYGEN SATURATION: 98 % | WEIGHT: 212.3 LBS | RESPIRATION RATE: 16 BRPM | DIASTOLIC BLOOD PRESSURE: 93 MMHG

## 2018-09-23 DIAGNOSIS — R31.9 HEMATURIA: Primary | ICD-10-CM

## 2018-09-23 LAB
BACTERIA UR QL AUTO: ABNORMAL /HPF
BILIRUB UR QL STRIP: NEGATIVE
CLARITY UR: ABNORMAL
COLOR UR: YELLOW
GLUCOSE UR STRIP-MCNC: NEGATIVE MG/DL
HGB UR QL STRIP.AUTO: ABNORMAL
KETONES UR STRIP-MCNC: NEGATIVE MG/DL
LEUKOCYTE ESTERASE UR QL STRIP: ABNORMAL
NITRITE UR QL STRIP: POSITIVE
NON-SQ EPI CELLS URNS QL MICRO: ABNORMAL /HPF
PH UR STRIP.AUTO: 6.5 [PH] (ref 4.5–8)
PROT UR STRIP-MCNC: ABNORMAL MG/DL
RBC #/AREA URNS AUTO: ABNORMAL /HPF
SP GR UR STRIP.AUTO: 1.02 (ref 1–1.03)
UROBILINOGEN UR QL STRIP.AUTO: 0.2 E.U./DL
WBC #/AREA URNS AUTO: ABNORMAL /HPF

## 2018-09-23 PROCEDURE — 81001 URINALYSIS AUTO W/SCOPE: CPT | Performed by: EMERGENCY MEDICINE

## 2018-09-23 PROCEDURE — 99284 EMERGENCY DEPT VISIT MOD MDM: CPT

## 2018-09-24 NOTE — ED PROVIDER NOTES
History  Chief Complaint   Patient presents with    Blood in Urine     Patient arrives via EMS from [facility] with report of blood in his urine collection bag for his Lucia catheter  64-year-old male with past medical history significant for CVA and chronic indwelling Lucia due to urethral stricture presents today with concern for hematuria  Unknown when it started  Patient is nonverbal from previous CVA  Urine in the Lucia bag is dark, urine in the tubing is light yellow  There are no clots present  History provided by:  EMS personnel  History limited by:  Patient nonverbal  Blood in Urine   The current episode started today  The problem has been resolved since onset  He reports no clotting in his urine stream  Urine color: Urine in the bag is dark yellow, urine in a tubing is light yellow  (Chronic indwelling Lucia due to urethral stricture)       Prior to Admission Medications   Prescriptions Last Dose Informant Patient Reported? Taking?    Citric Uv-Rvvirikpspu-Ir Carb (RENACIDIN) SOLN  Outside Facility (Specify) Yes No   Sig: Irrigate with 1 application as directed 2 (two) times a day Lucia irrigation   Citric Bg-Bocfzzbhigf-Cu Carb (RENACIDIN) SOLN   No No   Sig: Irrigate with 1 ampule as directed 2 (two) times a day Clamp lucia for 15 min after instillation   Petrolatum-Zinc Oxide (PHYTOPLEX Z-GUARD) 57-17 % PSTE  Outside Facility (Specify) Yes No   Sig: Apply 1 application topically 3 (three) times a day To buttocks   QUEtiapine (SEROquel) 25 mg tablet  Outside Facility (Specify) Yes No   Sig: Take 25 mg by mouth 2 (two) times a day     acetaminophen (TYLENOL) 325 mg tablet  Outside Facility (Specify) Yes No   Sig: Take 325 mg by mouth every 4 (four) hours as needed for mild pain     amLODIPine (NORVASC) 5 mg tablet  Outside Facility (Specify) Yes No   Sig: Take 5 mg by mouth daily   apixaban (ELIQUIS) 2 5 mg  Outside Facility (Specify) Yes No   Sig: Take 2 5 mg by mouth 2 (two) times a day busPIRone (BUSPAR) 5 mg tablet  Outside Facility (Specify) Yes No   Sig: Take 5 mg by mouth 3 (three) times a day   diphenhydrAMINE (BENADRYL) 25 mg tablet  Outside Facility (Specify) Yes No   Sig: Take 25 mg by mouth every 6 (six) hours as needed for itching   docusate sodium (COLACE) 100 mg capsule  Outside Facility (Specify) No No   Sig: Take 1 capsule by mouth 2 (two) times a day for 30 days   famotidine (PEPCID) 20 mg tablet  Outside Facility (Specify) Yes No   Sig: Take 20 mg by mouth 2 (two) times a day   memantine (NAMENDA) 5 mg tablet  Outside Facility (Specify) No No   Sig: Take 1 tablet by mouth 2 (two) times a day for 30 days   Patient taking differently: Take 5 mg by mouth daily     metoprolol tartrate (LOPRESSOR) 25 mg tablet  Outside Facility (Specify) No No   Sig: Take 1 tablet by mouth 2 (two) times a day for 30 days   Patient taking differently: Take 25 mg by mouth 2 (two) times a day     miconazole 2 % cream  Outside Facility (Specify) Yes No   Sig: Apply 1 application topically 2 (two) times a day   mirtazapine (REMERON) 15 mg tablet  Outside Facility (Specify) No No   Sig: Take 1 tablet by mouth daily at bedtime for 30 days   potassium citrate (UROCIT-K) 10 mEq  Outside Facility (Specify) Yes No   Sig: 10 mEq 3 (three) times a day with meals     senna (SENOKOT) 8 6 mg  Other (Specify) No No   Sig: Take 1 tablet by mouth daily at bedtime for 7 days   tamsulosin (FLOMAX) 0 4 mg  Outside Facility (Specify) No No   Sig: Take 1 capsule by mouth daily with dinner for 30 days      Facility-Administered Medications: None       Past Medical History:   Diagnosis Date    Anxiety     Aortic aneurysm (HCC)     Aphasia     Ataxia     Bladder adhesions     BPH (benign prostatic hypertrophy)     COPD (chronic obstructive pulmonary disease) (HCC)     wife denies - "never had"    Dementia     Difficulty walking     Essential (primary) hypertension     Generalized anxiety disorder     GERD (gastroesophageal reflux disease)     Global aphasia     H/O blood clots     High blood pressure     History of kidney stones     Kidney stones     Mental disorder     Muscle weakness     Neuromuscular dysfunction of bladder     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     Retention of urine, unspecified     Thrombosis     Urinary retention     Urinary tract bacterial infections     Urinary tract infection        Past Surgical History:   Procedure Laterality Date    CYSTOSCOPY      IVC FILTER INSERTION      X2    IVC FILTER INSERTION      x2    KIDNEY STONE SURGERY      KNEE SURGERY      Sepsis infection     NEPHROSTOMY      AK CYSTO/URETERO W/LITHOTRIPSY &INDWELL STENT INSRT Right 6/14/2017    Procedure: CYSTOSCOPY URETEROSCOPY WITH LITHOTRIPSY HOLMIUM LASER,,BASKET STONE EXTRACTION, RETROGRADE PYELOGRAM AND INSERTION STENT URETERAL;  Surgeon: Yesica Linares MD;  Location: [facility];  Service: Urology    URINARY SURGERY         Family History   Problem Relation Age of Onset    Diabetes Father      I have reviewed and agree with the history as documented  Social History   Substance Use Topics    Smoking status: Never Smoker    Smokeless tobacco: Never Used    Alcohol use No        Review of Systems   Unable to perform ROS: Patient nonverbal   Genitourinary: Positive for hematuria  Physical Exam  Physical Exam   Constitutional: He appears well-developed and well-nourished  HENT:   Head: Normocephalic and atraumatic  Abdominal: Soft  Bowel sounds are normal    Genitourinary:   Genitourinary Comments: Thorne catheter in place, light yellow urine draining from catheter  No blood at the meatus  No swelling or tenderness of the genitalia noted  Neurological: He is alert  Patient is nonverbal   Skin: Skin is warm and dry  Capillary refill takes less than 2 seconds         Vital Signs  ED Triage Vitals [09/23/18 2317]   Temperature Pulse Respirations Blood Pressure SpO2   98 2 °F (36 8 °C) (!) 52 16 (!) 224/106 98 %      Temp Source Heart Rate Source Patient Position - Orthostatic VS BP Location FiO2 (%)   Oral Monitor Lying Right arm --      Pain Score       --           Vitals:    09/23/18 2317 09/23/18 2321   BP: (!) 224/106 (!) 180/93   Pulse: (!) 52    Patient Position - Orthostatic VS: Lying Lying       Visual Acuity      ED Medications  Medications - No data to display    Diagnostic Studies  Results Reviewed     Procedure Component Value Units Date/Time    Urine Microscopic [[id number]]  (Abnormal) Collected:  09/23/18 2332    Lab Status:  Final result Specimen:  Urine from Urine, Indwelling Thorne Catheter Updated:  09/23/18 2350     RBC, UA 10-20 (A) /hpf      WBC, UA Innumerable (A) /hpf      Epithelial Cells Occasional /hpf      Bacteria, UA Innumerable (A) /hpf     UA w Reflex to Microscopic [[id number]]  (Abnormal) Collected:  09/23/18 2332    Lab Status:  Final result Specimen:  Urine from Urine, Indwelling Thorne Catheter Updated:  09/23/18 2341     Color, UA Yellow     Clarity, UA Cloudy     Specific Joshua, UA 1 020     pH, UA 6 5     Leukocytes, UA Large (A)     Nitrite, UA Positive (A)     Protein, UA 30 (1+) (A) mg/dl      Glucose, UA Negative mg/dl      Ketones, UA Negative mg/dl      Urobilinogen, UA 0 2 E U /dl      Bilirubin, UA Negative     Blood, UA Large (A)                 No orders to display              Procedures  Procedures       Phone Contacts  ED Phone Contact    ED Course                               MDM  Number of Diagnoses or Management Options  Hematuria: new and requires workup  Diagnosis management comments: 11:23 PM  Patient presents from [address] [place] [address] with concern for hematuria  This appears to have resolved  Will check a UA to rule out UTI  Patient has a complicated urological history and requires Thorne changes over a wire in the urologist's office due to a urethral stricture    Will recommend follow-up with Urology as an outpatient  12:02 AM  Wife at bedside is concerned that his lucia has been pulled out partially and would like us to advance the catheter back into the bladder  Patient has f/u with urology tomorrow morning  Amount and/or Complexity of Data Reviewed  Clinical lab tests: reviewed and ordered  Obtain history from someone other than the patient: yes  Review and summarize past medical records: yes    Risk of Complications, Morbidity, and/or Mortality  Presenting problems: moderate  Diagnostic procedures: moderate  Management options: low    Patient Progress  Patient progress: stable    CritCare Time    Disposition  Final diagnoses:   Hematuria     Time reflects when diagnosis was documented in both MDM as applicable and the Disposition within this note     Time User Action Codes Description Comment    9/23/2018 11:24 PM Arthur Castro Add [R31 9] Hematuria       ED Disposition     ED Disposition Condition Comment    Discharge  Xochitl [facility] discharge to home/self care  Condition at discharge: Stable        Follow-up Information     Follow up With Specialties Details Why Lizzy Francois MD Urology In 1 day as scheduled [address] [place] [address]  [phone]            Patient's Medications   Discharge Prescriptions    No medications on file     No discharge procedures on file      ED Provider  Electronically Signed by           Michael Prasad DO  09/24/18 0003

## 2018-09-24 NOTE — PROGRESS NOTES
[facility]}      No testing  Lucia catheter change over a wire (please have an 18 [facility] tip catheter, lubrication, Rajni syringe, and wire along with empty syringe and 10 cubic centimeters of sterile water for the catheter change)  Needs to be done in procedure room 2  Assessment and plan:       1  ***  -  -    2  ***  -  -     3  ***  -  -     Iris Giordano MD      Chief Complaint     No chief complaint on file  History of Present Illness     Miguel George is a 69 y o  Detailed Urologic History     - please refer to HPI    Review of Systems     Review of Systems          Allergies     Allergies   Allergen Reactions    Gentamycin [Gentamicin] Anaphylaxis    Vancomycin Anaphylaxis    Zosyn [Piperacillin Sod-Tazobactam So] Anaphylaxis     However, has tolerated Ertapenem multiple times and Cefepime   Other Rash     antipersperents  Stat lock from lucia catheter    Sulfa Antibiotics Rash       Physical Exam     Physical Exam        Vital Signs  There were no vitals filed for this visit        Current Medications       Current Outpatient Prescriptions:     acetaminophen (TYLENOL) 325 mg tablet, Take 325 mg by mouth every 4 (four) hours as needed for mild pain  , Disp: , Rfl:     amLODIPine (NORVASC) 5 mg tablet, Take 5 mg by mouth daily, Disp: , Rfl:     apixaban (ELIQUIS) 2 5 mg, Take 2 5 mg by mouth 2 (two) times a day, Disp: , Rfl:     busPIRone (BUSPAR) 5 mg tablet, Take 5 mg by mouth 3 (three) times a day, Disp: , Rfl:     Citric Lr-Kcdfxmsuddr-Qz Carb (RENACIDIN) SOLN, Irrigate with 1 application as directed 2 (two) times a day Lucia irrigation, Disp: , Rfl:     Citric Qa-Rqquymtvlut-Wj Carb (RENACIDIN) SOLN, Irrigate with 1 ampule as directed 2 (two) times a day Clamp lucia for 15 min after instillation, Disp: 60 Bottle, Rfl: 6    diphenhydrAMINE (BENADRYL) 25 mg tablet, Take 25 mg by mouth every 6 (six) hours as needed for itching, Disp: , Rfl:    docusate sodium (COLACE) 100 mg capsule, Take 1 capsule by mouth 2 (two) times a day for 30 days, Disp: 60 capsule, Rfl: 0    famotidine (PEPCID) 20 mg tablet, Take 20 mg by mouth 2 (two) times a day, Disp: , Rfl:     memantine (NAMENDA) 5 mg tablet, Take 1 tablet by mouth 2 (two) times a day for 30 days (Patient taking differently: Take 5 mg by mouth daily  ), Disp: 60 tablet, Rfl: 0    metoprolol tartrate (LOPRESSOR) 25 mg tablet, Take 1 tablet by mouth 2 (two) times a day for 30 days (Patient taking differently: Take 25 mg by mouth 2 (two) times a day  ), Disp: 60 tablet, Rfl: 0    miconazole 2 % cream, Apply 1 application topically 2 (two) times a day, Disp: , Rfl:     mirtazapine (REMERON) 15 mg tablet, Take 1 tablet by mouth daily at bedtime for 30 days, Disp: 30 tablet, Rfl: 0    Petrolatum-Zinc Oxide (PHYTOPLEX Z-GUARD) 57-17 % PSTE, Apply 1 application topically 3 (three) times a day To buttocks, Disp: , Rfl:     potassium citrate (UROCIT-K) 10 mEq, 10 mEq 3 (three) times a day with meals  , Disp: , Rfl:     QUEtiapine (SEROquel) 25 mg tablet, Take 25 mg by mouth 2 (two) times a day  , Disp: , Rfl:     senna (SENOKOT) 8 6 mg, Take 1 tablet by mouth daily at bedtime for 7 days, Disp: 7 tablet, Rfl: 0    tamsulosin (FLOMAX) 0 4 mg, Take 1 capsule by mouth daily with dinner for 30 days, Disp: 30 capsule, Rfl: 0      Active Problems     Patient Active Problem List   Diagnosis    Hypotension    Aphagia    COPD (chronic obstructive pulmonary disease) (HCC)    Sepsis ([facility] [address] )    History of DVT (deep vein thrombosis)    Aneurysm of thoracic aorta (HCC)    Retention of urine    Left lower lobe pneumonia (HCC)    Essential hypertension    Aphasia of unknown origin    CKD (chronic kidney disease)    Altered mental status    Encephalopathy    Septic shock (HCC)    Leukocytosis    Lactic acidosis    GERD (gastroesophageal reflux disease)    HTN, goal below 140/90    Generalized anxiety disorder  Muscle weakness    H/O blood clots    ESBL (extended spectrum beta-lactamase) producing bacteria infection    Acute deep vein thrombosis (DVT) of both iliofemoral veins (HCC)    Adverse drug effect    Anemia    Bowel trouble    Dementia with behavioral disturbance    Gram-negative sepsis with organ dysfunction (HCC)    Infection of drug-resistant bacteria    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HCC)    Joint effusion, knee    Knee problem    Mental disorder    MSSA (methicillin susceptible Staphylococcus aureus) septicemia (HCC)    Progressive aphasia     pulmonary hemorrhage    False passage of urethra    Complication, blocked Thorne catheter, subsequent encounter    Chronic urethral stricture    CKD (chronic kidney disease) stage 2, GFR 60-89 ml/min         Past Medical History     Past Medical History:   Diagnosis Date    Anxiety     Aortic aneurysm (HCC)     Aphasia     Ataxia     Bladder adhesions     BPH (benign prostatic hypertrophy)     COPD (chronic obstructive pulmonary disease) ([facility] [address]     wife denies - "never had"    Dementia     Difficulty walking     Essential (primary) hypertension     Generalized anxiety disorder     GERD (gastroesophageal reflux disease)     Global aphasia     H/O blood clots     High blood pressure     History of kidney stones     Kidney stones     Mental disorder     Muscle weakness     Neuromuscular dysfunction of bladder     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     Retention of urine, unspecified     Thrombosis     Urinary retention     Urinary tract bacterial infections     Urinary tract infection          Surgical History     Past Surgical History:   Procedure Laterality Date    CYSTOSCOPY      IVC FILTER INSERTION      X2    IVC FILTER INSERTION      x2    KIDNEY STONE SURGERY      KNEE SURGERY      Sepsis infection     NEPHROSTOMY      [place] CYSTO/URETERO W/LITHOTRIPSY &INDWELL STENT INSRT Right 6/14/2017    Procedure: CYSTOSCOPY URETEROSCOPY WITH LITHOTRIPSY HOLMIUM LASER,,BASKET STONE EXTRACTION, RETROGRADE PYELOGRAM AND INSERTION STENT URETERAL;  Surgeon: Kaylynn Carter MD;  Location: [facility];  Service: Urology    URINARY SURGERY           Family History     Family History   Problem Relation Age of Onset    Diabetes Father          Social History     Social History     History   Smoking Status    Never Smoker   Smokeless Tobacco    Never Used         Pertinent Lab Values     Lab Results   Component Value Date    CREATININE 1 46 (H) 11/27/2017       No results found for: PSA          Pertinent Imaging      ***

## 2018-09-24 NOTE — DISCHARGE INSTRUCTIONS
Hematuria   WHAT YOU NEED TO KNOW:   Hematuria is blood in your urine  Your urine may be bright red to dark brown  DISCHARGE INSTRUCTIONS:   Return to the emergency department if:   · You have blood in your urine after a new injury, such as a fall  · You are urinating very small amounts or not at all  · You feel like you cannot empty your bladder  · You have severe back or side pain that does not go away with treatment  Contact your healthcare provider if:   · You have a fever that gets worse or does not go away with treatment  · You cannot keep liquids or medicines down  · Your urine gets darker, even after you drink extra liquids  · You have questions or concerns about your condition, treatment, or care  Drink liquids as directed: You may need to drink extra liquids to help flush the blood from your body through your urine  Water is the best liquid to drink  Ask how much liquid to drink each day and which liquids are best for you  Follow up with your healthcare provider as directed:  Write down your questions so you remember to ask them during your visits  © 2017 [facility] Information is for End User's use only and may not be sold, redistributed or otherwise used for commercial purposes  All illustrations and images included in CareNotes® are the copyrighted property of A D A M [employer] Inc  or Mika Mcclain  The above information is an  only  It is not intended as medical advice for individual conditions or treatments  Talk to your doctor, nurse or pharmacist before following any medical regimen to see if it is safe and effective for you

## 2018-09-25 ENCOUNTER — OFFICE VISIT (OUTPATIENT)
Dept: UROLOGY | Facility: CLINIC | Age: 68
End: 2018-09-25
Payer: COMMERCIAL

## 2018-09-25 VITALS
BODY MASS INDEX: 29.68 KG/M2 | DIASTOLIC BLOOD PRESSURE: 68 MMHG | SYSTOLIC BLOOD PRESSURE: 110 MMHG | HEIGHT: 71 IN | HEART RATE: 56 BPM | WEIGHT: 212 LBS

## 2018-09-25 DIAGNOSIS — T83.091D: Primary | ICD-10-CM

## 2018-09-25 DIAGNOSIS — N36.5 FALSE PASSAGE OF URETHRA: ICD-10-CM

## 2018-09-25 DIAGNOSIS — N31.9 NEUROGENIC BLADDER: ICD-10-CM

## 2018-09-25 PROCEDURE — 51703 INSERT BLADDER CATH COMPLEX: CPT | Performed by: UROLOGY

## 2018-09-25 PROCEDURE — 99213 OFFICE O/P EST LOW 20 MIN: CPT | Performed by: UROLOGY

## 2018-09-25 NOTE — LETTER
September 25, 2018     Vince Arevalo MD  [address] [place] [address]  [address]  [address]    Patient: Chris Savage [facility]   YOB: 1950   Date of Visit: 9/25/2018       Dear Dr Chris Jiménez: Thank you for referring Kayla Peter to me for evaluation  Below are my notes for this consultation  If you have questions, please do not hesitate to call me  I look forward to following your patient along with you  Sincerely,        Veronica Bocanegra MD        CC: No Recipients  Veronica Bocanegra MD  9/25/2018 10:34 AM  Sign at close encounter       Urethral dilation     Date/Time 9/25/2018 10:34 AM     Performed by  Angelica Burgess     Authorized by Angelica Burgess      Procedure Details   Procedure Notes: Patient with history of difficult catheter placement, 18 Armenian catheter placed over Super Stiff wire with moderate difficulty, irrigates well the lumen of the bladder  Patient Transportation: confirmed  Patient tolerance: Patient tolerated the procedure well with no immediate complications               Veronica Bocanegra MD  9/25/2018 10:34 AM  Sign at close encounter       Problem List Items Addressed This Visit     False passage of urethra    Complication, blocked Lucia catheter, subsequent encounter - Primary              Assessment and plan:       Please see problem oriented charting for the assessment plan of today's urological complaints    Veronica Bocanegra MD      Chief Complaint     Chief Complaint   Patient presents with    lucia change         History of Present Illness     Zack Samuels is a 77 y o  gentleman that we have seen multiple times for difficult Lucia placement and neurogenic bladder  He is due for cystoscopy in December 2018  Lucia catheter change over wire performed today, no issues, the patient does not have pyocystis which he has had multiple times previously      The following portions of the patient's history were reviewed and updated as appropriate: allergies, current medications, past family history, past medical history, past social history, past surgical history and problem list     Detailed Urologic History     - please refer to HPI    Review of Systems     Review of Systems   Reason unable to perform ROS: Unable to participate due to mental status  Allergies     Allergies   Allergen Reactions    Gentamycin [Gentamicin] Anaphylaxis    Vancomycin Anaphylaxis    Zosyn [Piperacillin Sod-Tazobactam So] Anaphylaxis     However, has tolerated Ertapenem multiple times and Cefepime   Other Rash     antipersperents  Stat lock from lucia catheter    Sulfa Antibiotics Rash       Physical Exam     Physical Exam   Constitutional:   On a stretcher, nonverbal   HENT:   Right Ear: External ear normal    Left Ear: External ear normal    Nose: Nose normal    Eyes: Conjunctivae are normal  No scleral icterus  Neck: No tracheal deviation present  Cardiovascular: Intact distal pulses  Pulmonary/Chest: Effort normal  No stridor  Abdominal: He exhibits no distension and no mass  There is no tenderness  There is no guarding  Genitourinary: Penis normal    Musculoskeletal: He exhibits no tenderness  Neurological: He is alert  He displays abnormal reflex  A sensory deficit is present  He exhibits abnormal muscle tone  Coordination abnormal    Nursing note and vitals reviewed            Vital Signs  Vitals:    09/25/18 1008   BP: 110/68   BP Location: Right arm   Patient Position: Sitting   Cuff Size: Adult   Pulse: 56   Weight: 96 2 kg (212 lb)   Height: 5' 11" (1 803 m)         Current Medications       Current Outpatient Prescriptions:     acetaminophen (TYLENOL) 325 mg tablet, Take 325 mg by mouth every 4 (four) hours as needed for mild pain  , Disp: , Rfl:     amLODIPine (NORVASC) 5 mg tablet, Take 5 mg by mouth daily, Disp: , Rfl:     apixaban (ELIQUIS) 2 5 mg, Take 2 5 mg by mouth 2 (two) times a day, Disp: , Rfl:     busPIRone (BUSPAR) 5 mg tablet, Take 5 mg by mouth 3 (three) times a day, Disp: , Rfl:     Citric Wo-Igdkiowesdy-Zn Carb (RENACIDIN) SOLN, Irrigate with 1 application as directed 2 (two) times a day Lucia irrigation, Disp: , Rfl:     Citric Lo-Umxpfzaxblv-Zp Carb (RENACIDIN) SOLN, Irrigate with 1 ampule as directed 2 (two) times a day Clamp lucia for 15 min after instillation, Disp: 60 Bottle, Rfl: 6    diphenhydrAMINE (BENADRYL) 25 mg tablet, Take 25 mg by mouth every 6 (six) hours as needed for itching, Disp: , Rfl:     famotidine (PEPCID) 20 mg tablet, Take 20 mg by mouth 2 (two) times a day, Disp: , Rfl:     miconazole 2 % cream, Apply 1 application topically 2 (two) times a day, Disp: , Rfl:     Petrolatum-Zinc Oxide (PHYTOPLEX Z-GUARD) 57-17 % PSTE, Apply 1 application topically 3 (three) times a day To buttocks, Disp: , Rfl:     potassium citrate (UROCIT-K) 10 mEq, 10 mEq 3 (three) times a day with meals  , Disp: , Rfl:     QUEtiapine (SEROquel) 25 mg tablet, Take 25 mg by mouth 2 (two) times a day  , Disp: , Rfl:     docusate sodium (COLACE) 100 mg capsule, Take 1 capsule by mouth 2 (two) times a day for 30 days, Disp: 60 capsule, Rfl: 0    memantine (NAMENDA) 5 mg tablet, Take 1 tablet by mouth 2 (two) times a day for 30 days (Patient taking differently: Take 5 mg by mouth daily  ), Disp: 60 tablet, Rfl: 0    metoprolol tartrate (LOPRESSOR) 25 mg tablet, Take 1 tablet by mouth 2 (two) times a day for 30 days (Patient taking differently: Take 25 mg by mouth 2 (two) times a day  ), Disp: 60 tablet, Rfl: 0    mirtazapine (REMERON) 15 mg tablet, Take 1 tablet by mouth daily at bedtime for 30 days, Disp: 30 tablet, Rfl: 0    senna (SENOKOT) 8 6 mg, Take 1 tablet by mouth daily at bedtime for 7 days, Disp: 7 tablet, Rfl: 0    tamsulosin (FLOMAX) 0 4 mg, Take 1 capsule by mouth daily with dinner for 30 days, Disp: 30 capsule, Rfl: 0      Active Problems     Patient Active Problem List   Diagnosis    Hypotension    Aphagia    [facility]    Sepsis ([facility] [address] )    History of DVT (deep vein thrombosis)    Aneurysm of thoracic aorta ([facility])    Retention of urine    Left lower lobe pneumonia (HCC)    Essential hypertension    Aphasia of unknown origin    CKD (chronic kidney disease)    Altered mental status    Encephalopathy    Septic shock ([facility])    Leukocytosis    Lactic acidosis    GERD (gastroesophageal reflux disease)    HTN, goal below 140/90    Generalized anxiety disorder    Muscle weakness    H/O blood clots    ESBL (extended spectrum beta-lactamase) producing bacteria infection    Acute deep vein thrombosis (DVT) of both iliofemoral veins ([facility])    Adverse drug effect    Anemia    Bowel trouble    Dementia with behavioral disturbance    Gram-negative sepsis with organ dysfunction ([facility])    Infection of drug-resistant bacteria    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[facility])    Joint effusion, knee    Knee problem    Mental disorder    MSSA (methicillin susceptible Staphylococcus aureus) septicemia ([facility])    Progressive aphasia     pulmonary hemorrhage    False passage of urethra    Complication, blocked Thorne catheter, subsequent encounter    Chronic urethral stricture    CKD (chronic kidney disease) stage 2, GFR 60-89 ml/min         Past Medical History     Past Medical History:   Diagnosis Date    Anxiety     Aortic aneurysm ([facility])     Aphasia     Ataxia     Bladder adhesions     BPH (benign prostatic hypertrophy)     COPD (chronic obstructive pulmonary disease) ([facility])     wife denies - "never had"    Dementia     Difficulty walking     Essential (primary) hypertension     Generalized anxiety disorder     GERD (gastroesophageal reflux disease)     Global aphasia     H/O blood clots     High blood pressure     History of kidney stones     Kidney stones     Mental disorder     Muscle weakness     Neuromuscular dysfunction of bladder     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     Retention of urine, unspecified     Thrombosis     Urinary retention     Urinary tract bacterial infections     Urinary tract infection          Surgical History     Past Surgical History:   Procedure Laterality Date    CYSTOSCOPY      IVC FILTER INSERTION      X2    IVC FILTER INSERTION      x2    KIDNEY STONE SURGERY      KNEE SURGERY      Sepsis infection     NEPHROSTOMY      [place] CYSTO/URETERO W/LITHOTRIPSY &INDWELL STENT INSRT Right 6/14/2017    Procedure: CYSTOSCOPY URETEROSCOPY WITH LITHOTRIPSY HOLMIUM LASER,,BASKET STONE EXTRACTION, RETROGRADE PYELOGRAM AND INSERTION STENT URETERAL;  Surgeon: Siomara Barber MD;  Location: [facility] OR;  Service: Urology    URINARY SURGERY           Family History     Family History   Problem Relation Age of Onset    Diabetes Father          Social History     Social History     History   Smoking Status    Never Smoker   Smokeless Tobacco    Never Used         Pertinent Lab Values     Lab Results   Component Value Date    CREATININE 1 46 (H) 11/27/2017       No results found for: PSA          Pertinent Imaging      There is no pertinent urological imaging for my review

## 2018-09-25 NOTE — PROGRESS NOTES
Urethral dilation     Date/Time 9/25/2018 10:34 AM     Performed by  Tulio Perry     Authorized by Tulio Perry      Procedure Details   Procedure Notes: Patient with history of difficult catheter placement, 18 Vietnamese catheter placed over Super Stiff wire with moderate difficulty, irrigates well the lumen of the bladder  Patient Transportation: confirmed  Patient tolerance: Patient tolerated the procedure well with no immediate complications

## 2018-09-25 NOTE — PROGRESS NOTES
Problem List Items Addressed This Visit     False passage of urethra    Complication, blocked Lucia catheter, subsequent encounter - Primary              Assessment and plan:       Please see problem oriented charting for the assessment plan of today's urological complaints    Gloria Dickson MD      Chief Complaint     Chief Complaint   Patient presents with    lucia change         History of Present Illness     Jimmy Constantino is a 77 y o  gentleman that we have seen multiple times for difficult Lucia placement and neurogenic bladder  He is due for cystoscopy in December 2018  Lucia catheter change over wire performed today, no issues, the patient does not have pyocystis which he has had multiple times previously  The following portions of the patient's history were reviewed and updated as appropriate: allergies, current medications, past family history, past medical history, past social history, past surgical history and problem list     Detailed Urologic History     - please refer to HPI    Review of Systems     Review of Systems   Reason unable to perform ROS: Unable to participate due to mental status  Allergies     Allergies   Allergen Reactions    Gentamycin [Gentamicin] Anaphylaxis    Vancomycin Anaphylaxis    Zosyn [Piperacillin Sod-Tazobactam So] Anaphylaxis     However, has tolerated Ertapenem multiple times and Cefepime   Other Rash     antipersperents  Stat lock from lucia catheter    Sulfa Antibiotics Rash       Physical Exam     Physical Exam   Constitutional:   On a stretcher, nonverbal   HENT:   Right Ear: External ear normal    Left Ear: External ear normal    Nose: Nose normal    Eyes: Conjunctivae are normal  No scleral icterus  Neck: No tracheal deviation present  Cardiovascular: Intact distal pulses  Pulmonary/Chest: Effort normal  No stridor  Abdominal: He exhibits no distension and no mass  There is no tenderness  There is no guarding     Genitourinary: Penis normal    Musculoskeletal: He exhibits no tenderness  Neurological: He is alert  He displays abnormal reflex  A sensory deficit is present  He exhibits abnormal muscle tone  Coordination abnormal    Nursing note and vitals reviewed            Vital Signs  Vitals:    09/25/18 1008   BP: 110/68   BP Location: Right arm   Patient Position: Sitting   Cuff Size: Adult   Pulse: 56   Weight: 96 2 kg (212 lb)   Height: 5' 11" (1 803 m)         Current Medications       Current Outpatient Prescriptions:     acetaminophen (TYLENOL) 325 mg tablet, Take 325 mg by mouth every 4 (four) hours as needed for mild pain  , Disp: , Rfl:     amLODIPine (NORVASC) 5 mg tablet, Take 5 mg by mouth daily, Disp: , Rfl:     apixaban (ELIQUIS) 2 5 mg, Take 2 5 mg by mouth 2 (two) times a day, Disp: , Rfl:     busPIRone (BUSPAR) 5 mg tablet, Take 5 mg by mouth 3 (three) times a day, Disp: , Rfl:     Citric Le-Mwcldzzzdmy-Xj Carb (RENACIDIN) SOLN, Irrigate with 1 application as directed 2 (two) times a day Lucia irrigation, Disp: , Rfl:     Citric Ar-Rmwrhhchkbk-Vm Carb (RENACIDIN) SOLN, Irrigate with 1 ampule as directed 2 (two) times a day Clamp lucia for 15 min after instillation, Disp: 60 Bottle, Rfl: 6    diphenhydrAMINE (BENADRYL) 25 mg tablet, Take 25 mg by mouth every 6 (six) hours as needed for itching, Disp: , Rfl:     famotidine (PEPCID) 20 mg tablet, Take 20 mg by mouth 2 (two) times a day, Disp: , Rfl:     miconazole 2 % cream, Apply 1 application topically 2 (two) times a day, Disp: , Rfl:     Petrolatum-Zinc Oxide (PHYTOPLEX Z-GUARD) 57-17 % PSTE, Apply 1 application topically 3 (three) times a day To buttocks, Disp: , Rfl:     potassium citrate (UROCIT-K) 10 mEq, 10 mEq 3 (three) times a day with meals  , Disp: , Rfl:     QUEtiapine (SEROquel) 25 mg tablet, Take 25 mg by mouth 2 (two) times a day  , Disp: , Rfl:     docusate sodium (COLACE) 100 mg capsule, Take 1 capsule by mouth 2 (two) times a day for 30 days, Disp: 60 capsule, Rfl: 0    memantine (NAMENDA) 5 mg tablet, Take 1 tablet by mouth 2 (two) times a day for 30 days (Patient taking differently: Take 5 mg by mouth daily  ), Disp: 60 tablet, Rfl: 0    metoprolol tartrate (LOPRESSOR) 25 mg tablet, Take 1 tablet by mouth 2 (two) times a day for 30 days (Patient taking differently: Take 25 mg by mouth 2 (two) times a day  ), Disp: 60 tablet, Rfl: 0    mirtazapine (REMERON) 15 mg tablet, Take 1 tablet by mouth daily at bedtime for 30 days, Disp: 30 tablet, Rfl: 0    senna (SENOKOT) 8 6 mg, Take 1 tablet by mouth daily at bedtime for 7 days, Disp: 7 tablet, Rfl: 0    tamsulosin (FLOMAX) 0 4 mg, Take 1 capsule by mouth daily with dinner for 30 days, Disp: 30 capsule, Rfl: 0      Active Problems     Patient Active Problem List   Diagnosis    Hypotension    Aphagia    [facility]    Sepsis ([facility] [address] )    History of DVT (deep vein thrombosis)    Aneurysm of thoracic aorta ([facility]    Retention of urine    Left lower lobe pneumonia ([facility]    Essential hypertension    Aphasia of unknown origin    CKD (chronic kidney disease)    Altered mental status    Encephalopathy    Septic shock ([facility]    Leukocytosis    Lactic acidosis    GERD (gastroesophageal reflux disease)    HTN, goal below 140/90    Generalized anxiety disorder    Muscle weakness    H/O blood clots    ESBL (extended spectrum beta-lactamase) producing bacteria infection    Acute deep vein thrombosis (DVT) of both iliofemoral veins ([facility])    Adverse drug effect    Anemia    Bowel trouble    Dementia with behavioral disturbance    Gram-negative sepsis with organ dysfunction ([facility])    Infection of drug-resistant bacteria    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[facility]    Joint effusion, knee    Knee problem    Mental disorder    MSSA (methicillin susceptible Staphylococcus aureus) septicemia ([facility])    Progressive aphasia     pulmonary hemorrhage    False passage of urethra    Complication, blocked Thorne catheter, subsequent encounter    Chronic urethral stricture    CKD (chronic kidney disease) stage 2, GFR 60-89 ml/min         Past Medical History     Past Medical History:   Diagnosis Date    Anxiety     Aortic aneurysm (HCC)     Aphasia     Ataxia     Bladder adhesions     BPH (benign prostatic hypertrophy)     COPD (chronic obstructive pulmonary disease) (HCC)     wife denies - "never had"    Dementia     Difficulty walking     Essential (primary) hypertension     Generalized anxiety disorder     GERD (gastroesophageal reflux disease)     Global aphasia     H/O blood clots     High blood pressure     History of kidney stones     Kidney stones     Mental disorder     Muscle weakness     Neuromuscular dysfunction of bladder     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     Retention of urine, unspecified     Thrombosis     Urinary retention     Urinary tract bacterial infections     Urinary tract infection          Surgical History     Past Surgical History:   Procedure Laterality Date    CYSTOSCOPY      IVC FILTER INSERTION      X2    IVC FILTER INSERTION      x2    KIDNEY STONE SURGERY      KNEE SURGERY      Sepsis infection     NEPHROSTOMY      DE CYSTO/URETERO W/LITHOTRIPSY &INDWELL STENT INSRT Right 6/14/2017    Procedure: CYSTOSCOPY URETEROSCOPY WITH LITHOTRIPSY HOLMIUM LASER,,BASKET STONE EXTRACTION, RETROGRADE PYELOGRAM AND INSERTION STENT URETERAL;  Surgeon: Michael Marmolejo MD;  Location: [facility] OR;  Service: Urology    URINARY SURGERY           Family History     Family History   Problem Relation Age of Onset    Diabetes Father          Social History     Social History     History   Smoking Status    Never Smoker   Smokeless Tobacco    Never Used         Pertinent Lab Values     Lab Results   Component Value Date    CREATININE 1 46 (H) 11/27/2017       No results found for: PSA          Pertinent Imaging      There is no pertinent urological imaging for my review

## 2018-10-15 ENCOUNTER — OFFICE VISIT (OUTPATIENT)
Dept: UROLOGY | Facility: CLINIC | Age: 68
End: 2018-10-15
Payer: COMMERCIAL

## 2018-10-15 VITALS
HEART RATE: 66 BPM | BODY MASS INDEX: 29.68 KG/M2 | HEIGHT: 71 IN | SYSTOLIC BLOOD PRESSURE: 140 MMHG | DIASTOLIC BLOOD PRESSURE: 72 MMHG | WEIGHT: 212 LBS

## 2018-10-15 DIAGNOSIS — T83.9XXS FOLEY CATHETER PROBLEM, SEQUELA: Primary | ICD-10-CM

## 2018-10-15 PROCEDURE — 51710 CHANGE OF BLADDER TUBE: CPT | Performed by: UROLOGY

## 2018-10-15 NOTE — PROGRESS NOTES
Bladder catheterization  Date/Time: 10/15/2018 6:24 AM  Performed by: John Bustamante  Authorized by: John Bustamante     Patient location:  Bedside  Other Assisting Provider: Yes (comment)    Consent:     Consent obtained:  Verbal    Consent given by:  Spouse    Risks discussed:  False passage, infection, urethral injury, pain and incomplete procedure    Alternatives discussed:  No treatment, delayed treatment, alternative treatment and observation  Universal protocol:     Procedure explained and questions answered to patient or proxy's satisfaction: yes      Relevant documents present and verified: yes      Test results available and properly labeled: yes      Radiology Images displayed and confirmed  If images not available, report reviewed: no      Required blood products, implants, devices and special equipment available: no      Site/side marked: no      Immediately prior to procedure a time out was called: yes      Patient identity confirmed:  Provided demographic data  Pre-procedure details:     Procedure purpose:  Therapeutic    Preparation: Patient was prepped and draped in usual sterile fashion    Anesthesia (see MAR for exact dosages): Anesthesia method:  Topical application  Procedure details:     Bladder irrigation: yes      Catheter insertion:  Indwelling    Approach: natural orifice      Catheter type:  Latex and Thorne    Catheter size:  18 Fr    Number of attempts:  2    Successful placement: yes      Urine characteristics:  Clear and bloody  Post-procedure details:     Patient tolerance of procedure:   Tolerated well, no immediate complications  Comments:      Patient with difficult Thorne catheter placement due to stricture disease, this was done over a Super Stiff wire, palpable scar tissue was noted in the proximal urethra he will come back in 3 weeks for Thorne catheter change over wire, at that time we will schedule him for a cystoscopic evaluation in the operating room for surveillance cystoscopy given many years of catheterization

## 2018-10-15 NOTE — LETTER
October 15, 2018     Mirian Ryan MD  [address] [place] [address]  [address]  [address]    [facility]   YOB: 1950   Date of Visit: 10/15/2018       Dear Dr Kennie Rinne: Thank you for referring Harden Links to me for evaluation  Below are my notes for this consultation  If you have questions, please do not hesitate to call me  I look forward to following your patient along with you  Sincerely,        William Law MD        CC: No Recipients  William Law MD  10/15/2018 12:02 PM  Sign at close encounter  Bladder catheterization  Date/Time: 10/15/2018 6:24 AM  Performed by: Janeth Field  Authorized by: Janeth Field     Patient location:  Bedside  Other Assisting Provider: Yes (comment)    Consent:     Consent obtained:  Verbal    Consent given by:  Spouse    Risks discussed:  False passage, infection, urethral injury, pain and incomplete procedure    Alternatives discussed:  No treatment, delayed treatment, alternative treatment and observation  Universal protocol:     Procedure explained and questions answered to patient or proxy's satisfaction: yes      Relevant documents present and verified: yes      Test results available and properly labeled: yes      Radiology Images displayed and confirmed  If images not available, report reviewed: no      Required blood products, implants, devices and special equipment available: no      Site/side marked: no      Immediately prior to procedure a time out was called: yes      Patient identity confirmed:  Provided demographic data  Pre-procedure details:     Procedure purpose:  Therapeutic    Preparation: Patient was prepped and draped in usual sterile fashion    Anesthesia (see MAR for exact dosages):      Anesthesia method:  Topical application  Procedure details:     Bladder irrigation: yes      Catheter insertion:  Indwelling    Approach: natural orifice      Catheter type:  Latex and Thorne    Catheter size:  18 Fr Number of attempts:  2    Successful placement: yes      Urine characteristics:  Clear and bloody  Post-procedure details:     Patient tolerance of procedure:   Tolerated well, no immediate complications  Comments:      Patient with difficult Thorne catheter placement due to stricture disease, this was done over a Super Stiff wire, palpable scar tissue was noted in the proximal urethra he will come back in 3 weeks for Thorne catheter change over wire, at that time we will schedule him for a cystoscopic evaluation in the operating room for surveillance cystoscopy given many years of catheterization        Thorne change over a wire  Same as last time  Oh CHANEL!!!!

## 2018-11-03 ENCOUNTER — HOSPITAL ENCOUNTER (EMERGENCY)
Facility: HOSPITAL | Age: 68
Discharge: NURSING, REHAB OR LONG-TERM CARE | End: 2018-11-03
Attending: EMERGENCY MEDICINE | Admitting: EMERGENCY MEDICINE
Payer: COMMERCIAL

## 2018-11-03 VITALS
OXYGEN SATURATION: 98 % | BODY MASS INDEX: 30.38 KG/M2 | HEART RATE: 53 BPM | WEIGHT: 217.81 LBS | DIASTOLIC BLOOD PRESSURE: 100 MMHG | TEMPERATURE: 97.8 F | SYSTOLIC BLOOD PRESSURE: 187 MMHG | RESPIRATION RATE: 18 BRPM
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
## 2018-11-03 PROCEDURE — 99283 EMERGENCY DEPT VISIT LOW MDM: CPT

## 2018-11-03 RX ORDER — POTASSIUM CHLORIDE 750 MG/1
10 TABLET, EXTENDED RELEASE ORAL 3 TIMES DAILY
Status: ON HOLD | COMMUNITY
End: 2019-07-10 | Stop reason: SDUPTHER

## 2018-11-03 NOTE — ED ATTENDING ATTESTATION
I, Vanesa Galvin, DO, saw and evaluated the patient  I have discussed the patient with the resident/non-physician practitioner and agree with the resident's/non-physician practitioner's findings, Plan of Care, and MDM as documented in the resident's/non-physician practitioner's note, except where noted  All available labs and Radiology studies were reviewed  At this point I agree with the current assessment done in the Emergency Department  I have conducted an independent evaluation of this patient a history and physical is as follows:      Critical Care Time  CritCare Time    Procedures     38-year-old male with a history of dementia presents for evaluation of left index finger swelling  The patient has his wedding band on his index finger  It is unclear how long this has been in place  The patient has moderate amount of swelling around the ring and the ring is not able to be removed manually  [place] [facility]  He is mostly nonverbal and has severe dementia  His wife was requesting that the ring be removed  I was able to remove the ring with the davdi raptor without consequence  Ring will be sent back to NH with patient  Past medical history:  Dementia, urinary tract infection,    Physical exam:  The patient is resting comfortably in the bed in no acute distress he smiles and nods when I talk to him but does not respond verbally  There is a cold wedding band at the base of the left index finger  This was removed with 1 cut with the Justaer  Patient's finger is neurovascularly intact        Plan:  Discharge to nursing facility

## 2018-11-03 NOTE — DISCHARGE INSTRUCTIONS
Finger Sprain   WHAT YOU NEED TO KNOW:   A finger sprain happens when ligaments in your finger or thumb are stretched or torn  Ligaments are the tough tissues that connect bones  Ligaments allow your hands to grasp and pinch  DISCHARGE INSTRUCTIONS:   Return to the emergency department if:   · The skin on your injured finger looks bluish or pale (less color than normal)  · You have new weakness or numbness in your finger or thumb  It may tingle or burn  · You have a splint that you cannot adjust and it feels too tight  Contact your healthcare provider if:   · You have new or increased swelling or pain in your finger  · You have new or increased stiffness when you move your injured finger  · You have questions or concerns about your injury or treatment  Medicines:   · Pain medicine  may be given  Do not wait until the pain is severe before taking your medicine  · Take your medicine as directed  Call your healthcare provider if you think your medicines are not helping or if you have side effects  Tell him if you take vitamins, herbs, or any other medicines  Keep a written list of your medicines  Include the amounts, and when and why you take them  Bring the list or the pill bottles to follow-up visits  Care for your finger:   · Rest  your finger for at least 48 hours  Do not do activities that cause pain  Return to normal activities as directed  · Apply ice  on your finger to help decrease pain and swelling  Put crushed ice in a plastic bag and cover it with a towel  Put the ice on your injured finger or thumb every hour for 15 to 20 minutes at a time  You may need to ice the area at least 4 to 8 times each day  Ice your finger for as many days as directed  · Elevate your finger  above the level of your heart as often as you can  This will help decrease swelling and pain  You can elevate your hand by resting it on a pillow  · Use a splint or compression as directed    Compression (tight hold) helps support your finger or thumb as it heals  Tape your injured finger to the finger beside it  Severe sprains may be treated with a splint  A splint prevents your finger from moving while it heals  Ask how long you must wear the splint or tape, and how to apply them  · Do exercises as directed  You may be given gentle exercises to begin in a few days  Exercises can help decrease stiffness in your finger or thumb  Exercises also help decrease pain and swelling and improve the movement of your finger or thumb  Check with your healthcare provider before you return to your normal activities or sports  Follow up with your healthcare provider as directed:  Write down any questions you may have to ask at your follow up visits  © 2017 [facility] Information is for End User's use only and may not be sold, redistributed or otherwise used for commercial purposes  All illustrations and images included in CareNotes® are the copyrighted property of A D A M [employer]  or Mika Mcclain  The above information is an  only  It is not intended as medical advice for individual conditions or treatments  Talk to your doctor, nurse or pharmacist before following any medical regimen to see if it is safe and effective for you

## 2018-11-03 NOTE — ED PROVIDER NOTES
History  Chief Complaint   Patient presents with    Finger Swelling     pt presents with wedding band stuck on left index finger since this morning  78-year-old male with past medical history including dementia and other psych disorders  He was brought in by EMS after wife called to have ring cut off  Patient is completely nonverbal   His wedding ring is stuck around his left index finger for unknown period of time  History limited by:  Dementia and age   used: No        Prior to Admission Medications   Prescriptions Last Dose Informant Patient Reported? Taking?    Citric Pc-Gpwpxpiofbd-Is Carb (RENACIDIN) SOLN  Outside Facility (Specify) No Yes   Sig: Irrigate with 1 ampule as directed 2 (two) times a day Clamp lucia for 15 min after instillation   QUEtiapine (SEROquel) 25 mg tablet  Outside Facility (Specify) Yes Yes   Sig: Take 25 mg by mouth 2 (two) times a day     acetaminophen (TYLENOL) 325 mg tablet  Outside Facility (Specify) Yes Yes   Sig: Take 325 mg by mouth every 4 (four) hours as needed for mild pain     amLODIPine (NORVASC) 5 mg tablet  Outside Facility (Specify) Yes Yes   Sig: Take 5 mg by mouth daily   apixaban (ELIQUIS) 2 5 mg  Outside Facility (Specify) Yes Yes   Sig: Take 2 5 mg by mouth 2 (two) times a day   busPIRone (BUSPAR) 7 5 mg tablet  Outside Facility (Specify) Yes Yes   Sig: Take 7 5 mg by mouth 3 (three) times a day     docusate sodium (COLACE) 100 mg capsule  Outside Facility (Specify) No Yes   Sig: Take 1 capsule by mouth 2 (two) times a day for 30 days   ertapenem (INVANZ) 1 g SOLR   Yes Yes   Sig: Infuse into a venous catheter daily   famotidine (PEPCID) 20 mg tablet  Outside Facility (Specify) Yes Yes   Sig: Take 20 mg by mouth 2 (two) times a day   memantine (NAMENDA) 5 mg tablet  Outside Facility (Specify) No Yes   Sig: Take 1 tablet by mouth 2 (two) times a day for 30 days   Patient taking differently: Take 5 mg by mouth daily     metoprolol tartrate (LOPRESSOR) 25 mg tablet  Outside Facility (Specify) No Yes   Sig: Take 1 tablet by mouth 2 (two) times a day for 30 days   Patient taking differently: Take 25 mg by mouth 2 (two) times a day     mirtazapine (REMERON) 15 mg tablet  Outside Facility (Specify) No Yes   Sig: Take 1 tablet by mouth daily at bedtime for 30 days   potassium chloride (K-DUR,KLOR-CON) 10 mEq tablet   Yes Yes   Sig: Take 10 mEq by mouth 3 (three) times a day   tamsulosin (FLOMAX) 0 4 mg  Outside Facility (Specify) No Yes   Sig: Take 1 capsule by mouth daily with dinner for 30 days      Facility-Administered Medications: None       Past Medical History:   Diagnosis Date    Anxiety     Aortic aneurysm (HCC)     Aphasia     Ataxia     Bladder adhesions     BPH (benign prostatic hypertrophy)     COPD [facility]chronic obstructive pulmonary disease) ([facility] )     wife denies - "never had"    Dementia     Difficulty walking     Essential (primary) hypertension     Generalized anxiety disorder     GERD (gastroesophageal reflux disease)     Global aphasia     H/O blood clots     High blood pressure     History of kidney stones     Kidney stones     Mental disorder     Muscle weakness     Neuromuscular dysfunction of bladder     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 ([facility] )     Retention of urine, unspecified     Thrombosis     Urinary retention     Urinary tract bacterial infections     Urinary tract infection        Past Surgical History:   Procedure Laterality Date    CYSTOSCOPY      IVC FILTER INSERTION      X2    IVC FILTER INSERTION      x2    KIDNEY STONE SURGERY      KNEE SURGERY      Sepsis infection     NEPHROSTOMY      [place] CYSTO/URETERO W/LITHOTRIPSY &INDWELL STENT INSRT Right 6/14/2017    Procedure: CYSTOSCOPY URETEROSCOPY WITH LITHOTRIPSY HOLMIUM LASER,,BASKET STONE EXTRACTION, RETROGRADE PYELOGRAM AND INSERTION STENT URETERAL;  Surgeon: Nia Dey MD; Location: [facility] OR;  Service: Urology    URINARY SURGERY         Family History   Problem Relation Age of Onset    Diabetes Father      I have reviewed and agree with the history as documented  Social History   Substance Use Topics    Smoking status: Never Smoker    Smokeless tobacco: Never Used    Alcohol use No        Review of Systems   Reason unable to perform ROS: Severe dementia, nonverbal        Physical Exam  Physical Exam   Constitutional: He appears well-nourished  HENT:   Head: Normocephalic and atraumatic  Nose: Nose normal    Eyes: Pupils are equal, round, and reactive to light  Conjunctivae and EOM are normal    Neck: Normal range of motion  Neck supple  Abdominal: Soft  Bowel sounds are normal    Musculoskeletal: Normal range of motion  Neurological: He is alert  Dementia,   Skin: Skin is warm  Psychiatric: He has a normal mood and affect  Judgment normal    Nursing note and vitals reviewed        Vital Signs  ED Triage Vitals [11/03/18 1524]   Temperature Pulse Respirations Blood Pressure SpO2   97 8 °F (36 6 °C) (!) 53 18 (!) 187/100 98 %      Temp Source Heart Rate Source Patient Position - Orthostatic VS BP Location FiO2 (%)   Axillary Monitor -- -- --      Pain Score       --           Vitals:    11/03/18 1524   BP: (!) 187/100   Pulse: (!) 53       Visual Acuity      ED Medications  Medications - No data to display    Diagnostic Studies  Results Reviewed     None                 No orders to display              Procedures  Procedures       Phone Contacts  ED Phone Contact    ED Course                               MDM  Number of Diagnoses or Management Options  Ring or other jewelry causing external constriction, initial encounter: new and requires workup  Risk of Complications, Morbidity, and/or Mortality  Presenting problems: low  Diagnostic procedures: low  Management options: low    Patient Progress  Patient progress: improved    CritCare Time    Disposition  Final diagnoses:   Ring or other jewelry causing external constriction, initial encounter     Time reflects when diagnosis was documented in both MDM as applicable and the Disposition within this note     Time User Action Codes Description Comment    11/3/2018  4:05 PM Michelina Gitelman Add [Y56 208T] Ring avulsion     11/3/2018  4:05 PM Dayday Bates Judson Ring avulsion     11/3/2018  4:05 PM Michelina Gitelman Add [O25 34CP] Ring or other jewelry causing external constriction, initial encounter       ED Disposition     ED Disposition Condition Comment    Discharge  [facility] discharge to home/self care  Condition at discharge: Stable        Follow-up Information    None         Patient's Medications   Discharge Prescriptions    No medications on file     No discharge procedures on file      ED Provider  Electronically Signed by           Kelsey Smith PA-C  11/03/18 8439

## 2018-11-26 NOTE — PROGRESS NOTES
[id number]      Perform Lucia catheter change over wire  Book patient for cystoscopy with possible bladder biopsy and fulguration for the next 4-6 weeks  Assessment and plan:       Please see problem oriented charting for the assessment plan of today's urological complaints    Jessica Hernandez MD      Chief Complaint     No chief complaint on file  History of Present Illness     Alexis Arias is a 77 y o  Detailed Urologic History     - please refer to HPI    Review of Systems     Review of Systems          Allergies     Allergies   Allergen Reactions    Gentamycin [Gentamicin] Anaphylaxis    Vancomycin Anaphylaxis    Zosyn [Piperacillin Sod-Tazobactam So] Anaphylaxis     However, has tolerated Ertapenem multiple times and Cefepime   Clindamycin     Nsaids     Other Rash     antipersperents  Stat lock from lucia catheter    Sulfa Antibiotics Rash       Physical Exam     Physical Exam        Vital Signs  There were no vitals filed for this visit        Current Medications       Current Outpatient Prescriptions:     acetaminophen (TYLENOL) 325 mg tablet, Take 325 mg by mouth every 4 (four) hours as needed for mild pain  , Disp: , Rfl:     amLODIPine (NORVASC) 5 mg tablet, Take 5 mg by mouth daily, Disp: , Rfl:     apixaban (ELIQUIS) 2 5 mg, Take 2 5 mg by mouth 2 (two) times a day, Disp: , Rfl:     busPIRone (BUSPAR) 7 5 mg tablet, Take 7 5 mg by mouth 3 (three) times a day  , Disp: , Rfl:     Citric Fp-Ykgnzhnsbtq-Mi Carb (RENACIDIN) SOLN, Irrigate with 1 ampule as directed 2 (two) times a day Clamp lucia for 15 min after instillation, Disp: 60 Bottle, Rfl: 6    docusate sodium (COLACE) 100 mg capsule, Take 1 capsule by mouth 2 (two) times a day for 30 days, Disp: 60 capsule, Rfl: 0    famotidine (PEPCID) 20 mg tablet, Take 20 mg by mouth 2 (two) times a day, Disp: , Rfl:     memantine (NAMENDA) 5 mg tablet, Take 1 tablet by mouth 2 (two) times a day for 30 days (Patient taking differently: Take 5 mg by mouth daily  ), Disp: 60 tablet, Rfl: 0    metoprolol tartrate (LOPRESSOR) 25 mg tablet, Take 1 tablet by mouth 2 (two) times a day for 30 days (Patient taking differently: Take 25 mg by mouth 2 (two) times a day  ), Disp: 60 tablet, Rfl: 0    mirtazapine (REMERON) 15 mg tablet, Take 1 tablet by mouth daily at bedtime for 30 days, Disp: 30 tablet, Rfl: 0    potassium chloride (K-DUR,KLOR-CON) 10 mEq tablet, Take 10 mEq by mouth 3 (three) times a day, Disp: , Rfl:     QUEtiapine (SEROquel) 25 mg tablet, Take 25 mg by mouth 2 (two) times a day  , Disp: , Rfl:     tamsulosin (FLOMAX) 0 4 mg, Take 1 capsule by mouth daily with dinner for 30 days, Disp: 30 capsule, Rfl: 0      Active Problems     Patient Active Problem List   Diagnosis    Hypotension    Aphagia    [facility]) [facility] )    Sepsis ([facility] )    History of DVT (deep vein thrombosis)    Aneurysm of thoracic aorta (HCC)    Retention of urine    Left lower lobe pneumonia (HCC)    Essential hypertension    Aphasia of unknown origin    CKD (chronic kidney disease)    Altered mental status    Encephalopathy    Septic shock (HCC)    Leukocytosis    Lactic acidosis    GERD (gastroesophageal reflux disease)    HTN, goal below 140/90    Generalized anxiety disorder    Muscle weakness    H/O blood clots    ESBL (extended spectrum beta-lactamase) producing bacteria infection    Acute deep vein thrombosis (DVT) of both iliofemoral veins (HCC)    Adverse drug effect    Anemia    Bowel trouble    Dementia with behavioral disturbance    Gram-negative sepsis with organ dysfunction (HCC)    Infection of drug-resistant bacteria    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HCC)    Joint effusion, knee    Knee problem    Mental disorder    MSSA (methicillin susceptible Staphylococcus aureus) septicemia (HCC)    Progressive aphasia     pulmonary hemorrhage    False passage of urethra    Complication, blocked Thorne catheter, subsequent encounter    Chronic urethral stricture    CKD (chronic kidney disease) stage 2, GFR 60-89 ml/min         Past Medical History     Past Medical History:   Diagnosis Date    Anxiety     Aortic aneurysm (HCC)     Aphasia     Ataxia     Bladder adhesions     BPH (benign prostatic hypertrophy)     COPD (chronic obstructive pulmonary disease) (HCC)     wife denies - "never had"    Dementia     Difficulty walking     Essential (primary) hypertension     Generalized anxiety disorder     GERD (gastroesophageal reflux disease)     Global aphasia     H/O blood clots     High blood pressure     History of kidney stones     Kidney stones     Mental disorder     Muscle weakness     Neuromuscular dysfunction of bladder     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 ([facility] )     Retention of urine, unspecified     Thrombosis     Urinary retention     Urinary tract bacterial infections     Urinary tract infection          Surgical History     Past Surgical History:   Procedure Laterality Date    CYSTOSCOPY      IVC FILTER INSERTION      X2    IVC FILTER INSERTION      x2    KIDNEY STONE SURGERY      KNEE SURGERY      Sepsis infection     NEPHROSTOMY      [place] CYSTO/URETERO W/LITHOTRIPSY &INDWELL STENT INSRT Right 6/14/2017    Procedure: CYSTOSCOPY URETEROSCOPY WITH LITHOTRIPSY HOLMIUM LASER,,BASKET STONE EXTRACTION, RETROGRADE PYELOGRAM AND INSERTION STENT URETERAL;  Surgeon: Ilsa Sandra MD;  Location[facility];  Service: Urology    URINARY SURGERY           Family History     Family History   Problem Relation Age of Onset    Diabetes Father          Social History     Social History     History   Smoking Status    Never Smoker   Smokeless Tobacco    Never Used         Pertinent Lab Values     Lab Results   Component Value Date    CREATININE 1 46 (H) 11/27/2017       No results found for: PSA          Pertinent Imaging      ***

## 2018-11-26 NOTE — PATIENT INSTRUCTIONS
Cystoscopy   WHAT YOU SHOULD KNOW:   Cystoscopy is a procedure to examine the inside of your bladder with a scope  A scope is a small tube with a light and camera on the end  Dye may be used to help your caregiver see your bladder better  CARE AGREEMENT:   You have the right to help plan your care  Learn about your health condition and how it may be treated  Discuss treatment options with your caregivers to decide what care you want to receive  You always have the right to refuse treatment  RISKS:   · You may feel pain or discomfort when a rigid scope is used during your procedure  Small or flat tumors may not be seen with a rigid or flexible scope  Inflamed cells may be mistaken for tumors when fluorescence cystoscopy is done  Dye used for fluorescence cystoscopy may cause damage to your skin  You may get an infection, have blood in your urine, or have swelling that blocks the flow of urine  You may have pain when your urinate, feel like you need to urinate right away, or have difficulty having a bowel movement  After your procedure, you may have headaches or dizziness  You may lose your appetite, have nausea, or vomit  · If you do not have the cystoscopy, the cause of your bladder problems may not be found or treated  Your signs and symptoms may not resolve and may get worse  GETTING READY:   Before your procedure:   · Bring your medicine bottles or a list of your medicines when you see your caregiver  Tell your caregiver if you are allergic to any medicine  Tell your caregiver if you use any herbs, food supplements, or over-the-counter medicine  · If you smoke, your caregiver may tell you not to smoke for at least 12 hours before your procedure  You may also need to avoid drinks with alcohol and caffeine  · If you have a urinary infection, you may need to take antibiotic medicine before your procedure  Antibiotic medicine helps kill the bacteria that caused your infection   Ask your caregiver if you need to take this medicine  · You may need to have blood and urine tests done before your procedure  Ask your caregiver for more information about these and other tests that you may need  Write down the date, time, and location of each test     · Write down the correct date, time, and location of your procedure  Night before your procedure:   · Ask caregivers about directions for eating and drinking  Day of your procedure:   · You or a close family member will be asked to sign a legal document called a consent form  It gives caregivers permission to do the procedure or surgery  It also explains the problems that may happen, and your choices  Make sure all your questions are answered before you sign this form  · Caregivers may insert an intravenous tube (IV) into your vein  A vein in the arm is usually chosen  Through the IV tube, you may be given liquids and medicine  · If you need a fluorescence cystoscopy, your caregiver will put a dye into your bladder  The dye will need to be in your bladder for a short period of time before your procedure starts  Tell caregivers if you are allergic to iodine or shellfish  You may also be allergic to the dye  · An anesthesiologist will talk to you before your surgery  You may need medicine to keep you asleep or numb an area of your body during surgery  Tell caregivers if you or anyone in your family has had a problem with anesthesia in the past   TREATMENT:   What will happen:   · You will be taken to the room where your procedure will be done  You will be given anesthesia medicine to make you comfortable during your procedure  You may also have numbing medicine put into your urethra to decrease pain  During your procedure, you may be fully asleep or you may be drowsy and numb in your hips and legs  · Your caregiver will put a cystoscope through your urethra and into your bladder   Your caregiver will carefully check your bladder for problems or abnormal growths  Dye may be put into your urethra so your caregiver can take samples of your bladder cells  The dye will make the bladder cells show up better under a microscope  Your caregiver may take tissue samples from your bladder and send them to a lab for tests  He may remove stones or abnormal growths  He may place or remove a stent, which is a bendable tube put into your urethra to help you urinate  When your procedure is finished, your caregiver will carefully remove the scope  After your procedure: You will be taken to a room where you can rest  You will stay there until you are fully awake and feeling returns in your hips and legs  Caregivers will watch you closely for any problems  Do not get out of bed until your caregiver says it is okay  When caregivers see that you are okay, you may be able to go home  If you are staying in the hospital, you will be taken back to your room  CONTACT A CAREGIVER IF:   · You cannot make it to your procedure  · You get a cold or the flu  © 2014 [employer] is for End User's use only and may not be sold, redistributed or otherwise used for commercial purposes  All illustrations and images included in CareNotes® are the copyrighted property of [employer]  or Mika [employer]cl[employer]  The above information is an  only  It is not intended as medical advice for individual conditions or treatments  Talk to your doctor, nurse or pharmacist before following any medical regimen to see if it is safe and effective for you

## 2018-11-27 ENCOUNTER — HOSPITAL ENCOUNTER (EMERGENCY)
Facility: HOSPITAL | Age: 68
Discharge: HOME | End: 2018-11-27
Attending: EMERGENCY MEDICINE
Payer: COMMERCIAL

## 2018-11-27 DIAGNOSIS — T83.091A OBSTRUCTION OF FOLEY CATHETER, INITIAL ENCOUNTER (HCC): Primary | ICD-10-CM

## 2018-11-27 PROCEDURE — 99283 EMERGENCY DEPT VISIT LOW MDM: CPT

## 2018-11-27 NOTE — ED PROVIDER NOTES
History  Chief Complaint   Patient presents with    Urinary Catheter Problem     from [facility] via EMS with blocked lucia catheter, PT non verbal, HX chronic UTI's     Patient is 75-year-old male that comes from [facility] via EMS for a blocked Lucia catheter  Patient is nonverbal and in no acute distress  I spoke with a nurse that takes care of him at [facility] in states that today when he noticed there is no urine in the Lucia catheter they attempted to flush it and deflate the balloon  There was no urine MD and from the Lucia and 1 bladder scan there was a 1000 mL of retention  Patient has chronic Lucia use and chronic UTI  Patient is afebrile and does not appear septic or in any distress he has no appreciable mental status changes  And I believe with a new Lucia and appropriate urine removal antibiotics at this point are unnecessary  Prior to Admission Medications   Prescriptions Last Dose Informant Patient Reported? Taking?    Citric Gg-Uszintkzsta-Cy Carb (RENACIDIN) SOLN  Outside Facility (Specify) No No   Sig: Irrigate with 1 ampule as directed 2 (two) times a day Clamp lucia for 15 min after instillation   QUEtiapine (SEROquel) 25 mg tablet  Outside Facility (Specify) Yes No   Sig: Take 25 mg by mouth 2 (two) times a day     acetaminophen (TYLENOL) 325 mg tablet  Outside Facility (Specify) Yes No   Sig: Take 325 mg by mouth every 4 (four) hours as needed for mild pain     amLODIPine (NORVASC) 5 mg tablet  Outside Facility (Specify) Yes No   Sig: Take 5 mg by mouth daily   apixaban (ELIQUIS) 2 5 mg  Outside Facility (Specify) Yes No   Sig: Take 2 5 mg by mouth 2 (two) times a day   busPIRone (BUSPAR) 7 5 mg tablet  Outside Facility (Specify) Yes No   Sig: Take 7 5 mg by mouth 3 (three) times a day     docusate sodium (COLACE) 100 mg capsule  Outside Facility (Specify) No No   Sig: Take 1 capsule by mouth 2 (two) times a day for 30 days   famotidine (PEPCID) 20 mg tablet  Outside Facility (Specify) Yes No   Sig: Take 20 mg by mouth 2 (two) times a day   memantine (NAMENDA) 5 mg tablet  Outside Facility (Specify) No No   Sig: Take 1 tablet by mouth 2 (two) times a day for 30 days   Patient taking differently: Take 5 mg by mouth daily     metoprolol tartrate (LOPRESSOR) 25 mg tablet  Outside Facility (Specify) No No   Sig: Take 1 tablet by mouth 2 (two) times a day for 30 days   Patient taking differently: Take 25 mg by mouth 2 (two) times a day     mirtazapine (REMERON) 15 mg tablet  Outside Facility (Specify) No No   Sig: Take 1 tablet by mouth daily at bedtime for 30 days   potassium chloride (K-DUR,KLOR-CON) 10 mEq tablet   Yes No   Sig: Take 10 mEq by mouth 3 (three) times a day   tamsulosin (FLOMAX) 0 4 mg  Outside Facility (Specify) No No   Sig: Take 1 capsule by mouth daily with dinner for 30 days      Facility-Administered Medications: None       Past Medical History:   Diagnosis Date    Anxiety     [facility])     Aphasia     Ataxia     Bladder adhesions     BPH (benign prostatic hypertrophy)     COPD (chronic obstructive pulmonary disease) ([facility])     wife denies - "never had"    Dementia     Difficulty walking     Essential (primary) hypertension     Generalized anxiety disorder     GERD (gastroesophageal reflux disease)     Global aphasia     H/O blood clots     High blood pressure     History of kidney stones     Kidney stones     Mental disorder     Muscle weakness     Neuromuscular dysfunction of bladder     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 [facility]     Retention of urine, unspecified     Thrombosis     Urinary retention     Urinary tract bacterial infections     Urinary tract infection        Past Surgical History:   Procedure Laterality Date    CYSTOSCOPY      IVC FILTER INSERTION      X2    IVC FILTER INSERTION      x2    KIDNEY STONE SURGERY      KNEE SURGERY      Sepsis infection     NEPHROSTOMY      [place] CYSTO/URETERO W/LITHOTRIPSY &INDWELL STENT INSRT Right 6/14/2017    Procedure: CYSTOSCOPY URETEROSCOPY WITH LITHOTRIPSY HOLMIUM LASER,,BASKET STONE EXTRACTION, RETROGRADE PYELOGRAM AND INSERTION STENT URETERAL;  Surgeon: Ursula Phelps MD;  Location: [facility];  Service: Urology    URINARY SURGERY         Family History   Problem Relation Age of Onset    Diabetes Father      I have reviewed and agree with the history as documented  Social History   Substance Use Topics    Smoking status: Never Smoker    Smokeless tobacco: Never Used    Alcohol use No        Review of Systems   Unable to perform ROS: Dementia       Physical Exam  Physical Exam   Constitutional: He is oriented to person, place, and time  He appears well-developed and well-nourished  HENT:   Head: Normocephalic and atraumatic  Eyes: Pupils are equal, round, and reactive to light  EOM are normal    Neck: Normal range of motion  Neck supple  Cardiovascular: Normal rate, regular rhythm and normal heart sounds  Pulmonary/Chest: Effort normal and breath sounds normal    Genitourinary:   Genitourinary Comments: Significant smegma under for skin  Glans of penis is red and irritated  Thorne catheter clogged  1000 mL urine in the bladder   Musculoskeletal: Normal range of motion  Neurological: He is alert and oriented to person, place, and time  Skin: Skin is warm and dry  Psychiatric: He has a normal mood and affect  Judgment normal    Nursing note and vitals reviewed  Vital Signs  ED Triage Vitals   Temp Pulse Resp BP SpO2   -- -- -- -- --      Temp src Heart Rate Source Patient Position - Orthostatic VS BP Location FiO2 (%)   -- -- -- -- --      Pain Score       --           There were no vitals filed for this visit      Visual Acuity      ED Medications  Medications - No data to display    Diagnostic Studies  Results Reviewed     None                 No orders to display Procedures  Procedures       Phone Contacts  ED Phone Contact    ED Course                               MDM  Number of Diagnoses or Management Options  Obstruction of Thorne catheter, initial encounter [facility]): new and requires workup     Amount and/or Complexity of Data Reviewed  Discussion of test results with the performing providers: yes  Decide to obtain previous medical records or to obtain history from someone other than the patient: yes  Obtain history from someone other than the patient: yes  Review and summarize past medical records: yes  Discuss the patient with other providers: yes  Independent visualization of images, tracings, or specimens: yes      CritCare Time    Disposition  Final diagnoses:   Obstruction of Thorne catheter, initial encounter [facility])     Time reflects when diagnosis was documented in both MDM as applicable and the Disposition within this note     Time User Action Codes Description Comment    11/27/2018  3:27 PM Nobie Confer E Add [T83 [id number]] Obstruction of Thorne catheter, initial encounter [facility])       ED Disposition     ED Disposition Condition Comment    Discharge  [facility] discharge to home/self care  Condition at discharge: Stable        Follow-up Information     Follow up With Specialties Details Why Contact Info Additional Information    Barbara Mccoy MD Internal Medicine  As needed [address] [facility] [phone]       [facility] Emergency Department Emergency Medicine  If symptoms worsen [address] [id number]  [phone]  ED,  [address], [place], [place], [address]          Patient's Medications   Discharge Prescriptions    No medications on file     No discharge procedures on file      ED Provider  Electronically Signed by           Donna Ambrose PA-C  11/27/18 1856

## 2018-11-27 NOTE — DISCHARGE INSTRUCTIONS
Thorne Catheter Placement and Care   WHAT YOU NEED TO KNOW:   A Thorne catheter is a sterile tube that is inserted into your bladder to drain urine  It is also called an indwelling urinary catheter  The tip of the catheter has a small balloon filled with solution that holds the catheter inside your bladder  DISCHARGE INSTRUCTIONS:   Return to the emergency department if:   · Your catheter comes out  · You suddenly have material that looks like sand in the tubing or drainage bag  · No urine is draining into the bag and you have checked the system  · You have pain in your hip, back, pelvis, or lower abdomen  · You are confused or cannot think clearly  Contact your healthcare provider if:   · You have a fever  · You have bladder spasms for more than 1 day after the catheter is placed  · You see blood in the tubing or drainage bag  · You have a rash or itching where the catheter tube is secured to your skin  · Urine leaks from or around the catheter, tubing, or drainage bag  · The closed drainage system has accidently come open or apart  · You see a layer of crystals inside the tubing  · You have questions or concerns about your condition or care  Care for your Thorne catheter:   · Clean your genital area 2 times every day  Clean your catheter and the area around where it was inserted  Use soap and water  Clean your anal opening and catheter area after every bowel movement  · Secure the catheter tube  so you do not pull or move the catheter  This helps prevent pain and bladder spasms  Healthcare providers will show you how to use medical tape or a strap to secure the catheter tube to your body  · Keep a closed drainage system  Your Thorne catheter should always be attached to the drainage bag to form a closed system  Do not disconnect any part of the closed system unless you need to change the bag    Care for your drainage bag:   · Ask if a leg bag is right for you   A leg bag can be worn under your clothes  Ask your healthcare provider for more information about a leg bag  · Keep the drainage bag below the level of your waist   This helps stop urine from moving back up the tubing and into your bladder  Do not loop or kink the tubing  This can cause urine to back up and collect in your bladder  Do not let the drainage bag touch or lie on the floor  · Empty the drainage bag when needed  The weight of a full drainage bag can be painful  Empty the drainage bag every 3 to 6 hours or when it is ? full  · Clean and change the drainage bag as directed  Ask your healthcare provider how often you should change the drainage bag and what cleaning solution to use  Wear disposable gloves when you change the bag  Do not allow the end of the catheter or tubing to touch anything  Clean the ends with an alcohol pad before you reconnect them  What to do if problems develop:   · No urine is draining into the bag:      ¨ Check for kinks in the tubing and straighten them out  ¨ Check the tape or strap used to secure the catheter tube to your skin  Make sure it is not blocking the tube  ¨ Make sure you are not sitting or lying on the tubing  ¨ Make sure the urine bag is hanging below the level of your waist     · Urine leaks from or around the catheter, tubing, or drainage bag:  Check if the closed drainage system has accidently come open or apart  Clean the catheter and tubing ends with a new alcohol pad and reconnect them  Prevent an infection:   · Wash your hands often  Wash before and after you touch your catheter, tubing, or drainage bag  Use soap and water  Wear clean disposable gloves when you care for your catheter or disconnect the drainage bag  Wash your hands before you prepare or eat food  · Drink liquids as directed  Ask your healthcare provider how much liquid to drink each day and which liquids are best for you   Liquids will help flush your kidneys and bladder to help prevent infection  Follow up with your healthcare provider as directed:  Write down your questions so you remember to ask them during your visits  © 2017 [employer] Information is for End User's use only and may not be sold, redistributed or otherwise used for commercial purposes  All illustrations and images included in CareNotes® are the copyrighted property of A D A M , Inc  or Mika Mcclain  The above information is an  only  It is not intended as medical advice for individual conditions or treatments  Talk to your doctor, nurse or pharmacist before following any medical regimen to see if it is safe and effective for you

## 2018-11-27 NOTE — ED PROVIDER NOTES
History  Chief Complaint   Patient presents with    Urinary Catheter Problem     from [facility] via EMS with blocked lucia catheter, PT non verbal, HX chronic UTI's     HPI    Prior to Admission Medications   Prescriptions Last Dose Informant Patient Reported? Taking?    Citric Rn-Nubkdpjupio-Mc Carb (RENACIDIN) SOLN  Outside Facility (Specify) No No   Sig: Irrigate with 1 ampule as directed 2 (two) times a day Clamp lucia for 15 min after instillation   QUEtiapine (SEROquel) 25 mg tablet  Outside Facility (Specify) Yes No   Sig: Take 25 mg by mouth 2 (two) times a day     acetaminophen (TYLENOL) 325 mg tablet  Outside Facility (Specify) Yes No   Sig: Take 325 mg by mouth every 4 (four) hours as needed for mild pain     amLODIPine (NORVASC) 5 mg tablet  Outside Facility (Specify) Yes No   Sig: Take 5 mg by mouth daily   apixaban (ELIQUIS) 2 5 mg  Outside Facility (Specify) Yes No   Sig: Take 2 5 mg by mouth 2 (two) times a day   busPIRone (BUSPAR) 7 5 mg tablet  Outside Facility (Specify) Yes No   Sig: Take 7 5 mg by mouth 3 (three) times a day     docusate sodium (COLACE) 100 mg capsule  Outside Facility (Specify) No No   Sig: Take 1 capsule by mouth 2 (two) times a day for 30 days   famotidine (PEPCID) 20 mg tablet  Outside Facility (Specify) Yes No   Sig: Take 20 mg by mouth 2 (two) times a day   memantine (NAMENDA) 5 mg tablet  Outside Facility (Specify) No No   Sig: Take 1 tablet by mouth 2 (two) times a day for 30 days   Patient taking differently: Take 5 mg by mouth daily     metoprolol tartrate (LOPRESSOR) 25 mg tablet  Outside Facility (Specify) No No   Sig: Take 1 tablet by mouth 2 (two) times a day for 30 days   Patient taking differently: Take 25 mg by mouth 2 (two) times a day     mirtazapine (REMERON) 15 mg tablet  Outside Facility (Specify) No No   Sig: Take 1 tablet by mouth daily at bedtime for 30 days   potassium chloride (K-DUR,KLOR-CON) 10 mEq tablet   Yes No   Sig: Take 10 mEq by mouth 3 (three) times a day   tamsulosin (FLOMAX) 0 4 mg  Outside Facility (Specify) No No   Sig: Take 1 capsule by mouth daily with dinner for 30 days      Facility-Administered Medications: None       Past Medical History:   Diagnosis Date    Anxiety     Aortic aneurysm (HCC)     Aphasia     Ataxia     Bladder adhesions     BPH (benign prostatic hypertrophy)     [facility]     wife denies - "never had"    Dementia     Difficulty walking     Essential (primary) hypertension     Generalized anxiety disorder     GERD (gastroesophageal reflux disease)     Global aphasia     H/O blood clots     High blood pressure     History of kidney stones     Kidney stones     Mental disorder     Muscle weakness     Neuromuscular dysfunction of bladder     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 ([facility]     Retention of urine, unspecified     Thrombosis     Urinary retention     Urinary tract bacterial infections     Urinary tract infection        Past Surgical History:   Procedure Laterality Date    CYSTOSCOPY      IVC FILTER INSERTION      X2    IVC FILTER INSERTION      x2    KIDNEY STONE SURGERY      KNEE SURGERY      Sepsis infection     NEPHROSTOMY      [place] CYSTO/URETERO W/LITHOTRIPSY &INDWELL STENT INSRT Right 6/14/2017    Procedure: CYSTOSCOPY URETEROSCOPY WITH LITHOTRIPSY HOLMIUM LASER,,BASKET STONE EXTRACTION, RETROGRADE PYELOGRAM AND INSERTION STENT URETERAL;  Surgeon: Jana Renteria MD;  Location: [facility];  Service: Urology    URINARY SURGERY         Family History   Problem Relation Age of Onset    Diabetes Father      I have reviewed and agree with the history as documented      Social History   Substance Use Topics    Smoking status: Never Smoker    Smokeless tobacco: Never Used    Alcohol use No        Review of Systems    Physical Exam  Physical Exam    Vital Signs  ED Triage Vitals   Temp Pulse Resp BP SpO2   -- -- -- -- --      Temp src Heart Rate Source Patient Position - Orthostatic VS BP Location FiO2 (%)   -- -- -- -- --      Pain Score       --           There were no vitals filed for this visit  Visual Acuity      ED Medications  Medications - No data to display    Diagnostic Studies  Results Reviewed     None                 No orders to display              Procedures  Procedures       Phone Contacts  ED Phone Contact    ED Course                               MDM  CritCare Time    Disposition  Final diagnoses:   Obstruction of Thorne catheter, initial encounter [facility])     Time reflects when diagnosis was documented in both MDM as applicable and the Disposition within this note     Time User Action Codes Description Comment    11/27/2018  3:27 PM Rosalina HERNANDEZ Add [T83 [id number]] Obstruction of Thorne catheter, initial encounter [facility])       ED Disposition     ED Disposition Condition Comment    Discharge  [facility] discharge to home/self care  Condition at discharge: Stable        Follow-up Information     Follow up With Specialties Details Why Contact Info Additional Information    Mirian Ryan MD Internal Medicine  As needed [address] [facility] [phone]       [facility] Emergency Department Emergency Medicine  If symptoms worsen [address] [facility] [address] [phone] AN ED,  [address], [place], [address]          Patient's Medications   Discharge Prescriptions    No medications on file     No discharge procedures on file      ED Provider  Electronically Signed by           Jitendra Paiz MD  11/29/18 7343

## 2018-11-27 NOTE — ED ATTENDING ATTESTATION
Maria L Wright MD, saw and evaluated the patient  I have discussed the patient with the resident/non-physician practitioner and agree with the resident's/non-physician practitioner's findings, Plan of Care, and MDM as documented in the resident's/non-physician practitioner's note, except where noted  All available labs and Radiology studies were reviewed  At this point I agree with the current assessment done in the Emergency Department  I have conducted an independent evaluation of this patient a history and physical is as follows:  53-year-old male with dementia nursing home patient  Chronic indwelling catheter  Likely colonized  Presents with obstructed catheter  Urine leaking around the catheter  Unable to flush at nursing home  No fever  No mental status changes  Physical exam:  Patient was examined after catheter change  Abdomen is soft and nontender  Nondistended  Regular rate and rhythm  Lungs are clear  Patient is nonverbal   Patient is nontoxic appearing  Patient is afebrile  Assessment/plan:  Patient is at his baseline mental status  There is no fever  No need for treatment for acute UTI at this time      Critical Care Time  CritCare Time    Procedures

## 2018-11-28 ENCOUNTER — TELEPHONE (OUTPATIENT)
Dept: UROLOGY | Facility: CLINIC | Age: 68
End: 2018-11-28

## 2018-11-28 ENCOUNTER — TELEPHONE (OUTPATIENT)
Dept: UROLOGY | Facility: AMBULATORY SURGERY CENTER | Age: 68
End: 2018-11-28

## 2018-11-28 ENCOUNTER — OFFICE VISIT (OUTPATIENT)
Dept: UROLOGY | Facility: CLINIC | Age: 68
End: 2018-11-28
Payer: COMMERCIAL

## 2018-11-28 DIAGNOSIS — T83.9XXS FOLEY CATHETER PROBLEM, SEQUELA: Primary | ICD-10-CM

## 2018-11-28 DIAGNOSIS — R31.0 GROSS HEMATURIA: ICD-10-CM

## 2018-11-28 PROCEDURE — 99211 OFF/OP EST MAY X REQ PHY/QHP: CPT | Performed by: UROLOGY

## 2018-11-28 RX ORDER — CIPROFLOXACIN 2 MG/ML
400 INJECTION, SOLUTION INTRAVENOUS ONCE
Status: CANCELLED | OUTPATIENT
Start: 2018-11-28 | End: 2018-11-28

## 2018-11-28 NOTE — TELEPHONE ENCOUNTER
Wife returned call, reviewed with her recommendation of cystoscopy with biopsies in OR by DR Prince, wife states she has discussed this in the past with DR Prince  She is in agreement with proceeding  Advised her orders for surgery were placed by Dr Mindy Gallegos and I will update his surgical coordinator, Jenifer Cadena that orders were entered  Advised will update coordinator that lucia catheter was changed yesterday to see if possibly surgery can be scheduled within the next 4 weeks due to his ongoing blocked catheter issues  Advised spouse however this is dependent on OR schedule and MD schedule  I advised spouse that surgical coordinator will be in contact within the next week  Patient resides at [facility] and wife is his advocate, she requests to be called at either home [phone] or her mobile [phone] regarding scheduling surgery

## 2018-11-28 NOTE — TELEPHONE ENCOUNTER
Spoke with patient's wife, Cristino Wilson  Patient is in the ER due to  Blocked catheter  She said that Dr Jalaine Hodgkins also mentioned setting up a bladder scan for patient? When can patient come back in for an appointment   Please call her back

## 2018-11-28 NOTE — TELEPHONE ENCOUNTER
Left message on Bashir's home phone with appointment details explaining that surgery cannot be done until January in OR and we would like to schedule Thorne change as well as H&P visit with Dr Liliam Tate  Asked for a call back to confirm

## 2018-11-28 NOTE — PROGRESS NOTES
Patient had Thorne catheter change yesterday in the emergency room, he will be scheduled for cystoscopy with possible bladder biopsy and fulguration and retrograde pyelography given that he has a chronic Thorne catheter in place

## 2018-11-28 NOTE — TELEPHONE ENCOUNTER
Patient was on schedule today with DR Prince in office for his catheter change  Patient seen in ER on 11/27/18 for blocked lucia catheter  Lucia replaced in ER   Please advise of recommendation for follow up and route to [facility] clinical pool

## 2018-11-28 NOTE — TELEPHONE ENCOUNTER
With the patient's wife may be referring to is his need for surveillance cystoscopy given long-term Thorne catheterization  This is best performed in the operating room given his tendency to try to hit us or grab us when changing his catheter      I have placed prep for procedure orders for this purpose, for cystoscopy with potential bladder biopsy and fulguration and difficult Thorne catheter exchange

## 2018-11-28 NOTE — TELEPHONE ENCOUNTER
I pulled the orders from the case depot for the pt and will follow up with his wife to get him scheduled  Unfortunately, due to the fact the pt needs to have surgery in the main OR at [facility] per Dr Bebeto Ware request, and not the [facility], I do not have any OR time available until January  I will not be able to schedule the pt within 4 weeks  Please advise the  if the pt needs to be schedule for an office visit within that time frame for a lucia check

## 2018-11-29 PROBLEM — T83.9XXS: Status: ACTIVE | Noted: 2018-11-29

## 2018-11-29 PROBLEM — R31.0 GROSS HEMATURIA: Status: ACTIVE | Noted: 2018-11-29

## 2018-11-29 NOTE — TELEPHONE ENCOUNTER
Patients wife called and confirmed appt for lucia change wife asked if we could call [facility] and let them know  Called and spoke with judy and confirmed details

## 2018-11-30 ENCOUNTER — TELEPHONE (OUTPATIENT)
Dept: CARDIOLOGY CLINIC | Facility: CLINIC | Age: 68
End: 2018-11-30

## 2018-12-02 ENCOUNTER — HOSPITAL ENCOUNTER (EMERGENCY)
Facility: HOSPITAL | Age: 68
Discharge: HOME | End: 2018-12-03
Attending: EMERGENCY MEDICINE | Admitting: EMERGENCY MEDICINE
Payer: COMMERCIAL

## 2018-12-02 DIAGNOSIS — T83.091A OBSTRUCTION OF FOLEY CATHETER (HCC): Primary | ICD-10-CM

## 2018-12-02 DIAGNOSIS — E86.0 MILD DEHYDRATION: ICD-10-CM

## 2018-12-02 LAB
ALBUMIN SERPL BCP-MCNC: 2.8 G/DL (ref 3.5–5)
ALP SERPL-CCNC: 106 U/L (ref 46–116)
ALT SERPL W P-5'-P-CCNC: 31 U/L (ref 12–78)
ANION GAP SERPL CALCULATED.3IONS-SCNC: 9 MMOL/L (ref 4–13)
AST SERPL W P-5'-P-CCNC: 13 U/L (ref 5–45)
BASOPHILS # BLD AUTO: 0.04 THOUSANDS/ΜL (ref 0–0.1)
BASOPHILS NFR BLD AUTO: 0 % (ref 0–1)
BILIRUB SERPL-MCNC: 0.3 MG/DL (ref 0.2–1)
BUN SERPL-MCNC: 22 MG/DL (ref 5–25)
CALCIUM SERPL-MCNC: 8.8 MG/DL (ref 8.3–10.1)
CHLORIDE SERPL-SCNC: 112 MMOL/L (ref 100–108)
CO2 SERPL-SCNC: 26 MMOL/L (ref 21–32)
CREAT SERPL-MCNC: 1.54 MG/DL (ref 0.6–1.3)
EOSINOPHIL # BLD AUTO: 0.98 THOUSAND/ΜL (ref 0–0.61)
EOSINOPHIL NFR BLD AUTO: 9 % (ref 0–6)
ERYTHROCYTE [DISTWIDTH] IN BLOOD BY AUTOMATED COUNT: 14.3 % (ref 11.6–15.1)
GFR SERPL CREATININE-BSD FRML MDRD: 45 ML/MIN/1.73SQ M
GLUCOSE SERPL-MCNC: 90 MG/DL (ref 65–140)
HCT VFR BLD AUTO: 44.6 % (ref 36.5–49.3)
HGB BLD-MCNC: 14.7 G/DL (ref 12–17)
IMM GRANULOCYTES # BLD AUTO: 0.04 THOUSAND/UL (ref 0–0.2)
IMM GRANULOCYTES NFR BLD AUTO: 0 % (ref 0–2)
LYMPHOCYTES # BLD AUTO: 2.09 THOUSANDS/ΜL (ref 0.6–4.47)
LYMPHOCYTES NFR BLD AUTO: 20 % (ref 14–44)
MCH RBC QN AUTO: 30.5 PG (ref 26.8–34.3)
MCHC RBC AUTO-ENTMCNC: 33 G/DL (ref 31.4–37.4)
MCV RBC AUTO: 93 FL (ref 82–98)
MONOCYTES # BLD AUTO: 0.78 THOUSAND/ΜL (ref 0.17–1.22)
MONOCYTES NFR BLD AUTO: 7 % (ref 4–12)
NEUTROPHILS # BLD AUTO: 6.62 THOUSANDS/ΜL (ref 1.85–7.62)
NEUTS SEG NFR BLD AUTO: 64 % (ref 43–75)
NRBC BLD AUTO-RTO: 0 /100 WBCS
PLATELET # BLD AUTO: 239 THOUSANDS/UL (ref 149–390)
PMV BLD AUTO: 9.8 FL (ref 8.9–12.7)
POTASSIUM SERPL-SCNC: 3.5 MMOL/L (ref 3.5–5.3)
PROT SERPL-MCNC: 6.7 G/DL (ref 6.4–8.2)
RBC # BLD AUTO: 4.82 MILLION/UL (ref 3.88–5.62)
SODIUM SERPL-SCNC: 147 MMOL/L (ref 136–145)
WBC # BLD AUTO: 10.55 THOUSAND/UL (ref 4.31–10.16)

## 2018-12-02 PROCEDURE — 85025 COMPLETE CBC W/AUTO DIFF WBC: CPT | Performed by: EMERGENCY MEDICINE

## 2018-12-02 PROCEDURE — 80053 COMPREHEN METABOLIC PANEL: CPT | Performed by: EMERGENCY MEDICINE

## 2018-12-02 PROCEDURE — 36415 COLL VENOUS BLD VENIPUNCTURE: CPT | Performed by: EMERGENCY MEDICINE

## 2018-12-02 PROCEDURE — 99284 EMERGENCY DEPT VISIT MOD MDM: CPT

## 2018-12-02 PROCEDURE — 96360 HYDRATION IV INFUSION INIT: CPT

## 2018-12-02 RX ADMIN — SODIUM CHLORIDE 1000 ML: 0.9 INJECTION, SOLUTION INTRAVENOUS at 23:32

## 2018-12-03 ENCOUNTER — HOSPITAL ENCOUNTER (EMERGENCY)
Facility: HOSPITAL | Age: 68
Discharge: NURSING, REHAB OR LONG-TERM CARE | End: 2018-12-03
Attending: EMERGENCY MEDICINE | Admitting: EMERGENCY MEDICINE
Payer: COMMERCIAL

## 2018-12-03 VITALS
BODY MASS INDEX: 31.79 KG/M2 | HEART RATE: 55 BPM | RESPIRATION RATE: 20 BRPM | SYSTOLIC BLOOD PRESSURE: 149 MMHG | OXYGEN SATURATION: 99 % | HEIGHT: 71 IN | WEIGHT: 227.07 LBS | DIASTOLIC BLOOD PRESSURE: 83 MMHG | TEMPERATURE: 98.5 F

## 2018-12-03 VITALS
BODY MASS INDEX: 30.56 KG/M2 | SYSTOLIC BLOOD PRESSURE: 137 MMHG | DIASTOLIC BLOOD PRESSURE: 75 MMHG | OXYGEN SATURATION: 95 % | RESPIRATION RATE: 18 BRPM | HEART RATE: 48 BPM | WEIGHT: 219.14 LBS | TEMPERATURE: 99.4 F

## 2018-12-03 DIAGNOSIS — L74.0 SWEAT RASH: Primary | ICD-10-CM

## 2018-12-03 DIAGNOSIS — R23.4 SCAB: ICD-10-CM

## 2018-12-03 DIAGNOSIS — T83.9XXA PROBLEM WITH FOLEY CATHETER, INITIAL ENCOUNTER (HCC): ICD-10-CM

## 2018-12-03 PROCEDURE — 99284 EMERGENCY DEPT VISIT MOD MDM: CPT

## 2018-12-03 PROCEDURE — 87186 SC STD MICRODIL/AGAR DIL: CPT | Performed by: EMERGENCY MEDICINE

## 2018-12-03 PROCEDURE — 87147 CULTURE TYPE IMMUNOLOGIC: CPT | Performed by: EMERGENCY MEDICINE

## 2018-12-03 PROCEDURE — 87086 URINE CULTURE/COLONY COUNT: CPT | Performed by: EMERGENCY MEDICINE

## 2018-12-03 NOTE — ED PROVIDER NOTES
History  Chief Complaint   Patient presents with    Fever - 76 years or older     Sent from [facility] [place], reports patient had fever and a new rash that was noted this AM  Also reports that Urinary Catherter is not draining   Rash       History provided by:  Nursing home  History limited by:  Patient nonverbal  Fever - 75 years or older   Associated symptoms: rash    Rash    64-year-old male nursing home patient, nonverbal sent for evaluation of decreased urine output, bilateral thigh rash  Patient had been seen emergency department yesterday for a blocked Lucia catheter  The position was adjusted began draining getting in  Before my evaluation the nurse had noted that the catheter tubing was kinked  This was untwisted resulting in normal, appropriate urine flow  The patient has a maculopapular rash on both sides which is pruritic  He continues to scratch on exam   He has a scab of the left eye, likely secondary to scratching  He is already getting treatment for this  Will advise keeping him clean, dry  Appears to be a sweat rash  There are no other areas of rash on his trunk or extremities other than the anterior thighs  Stable for discharge  Prior to Admission Medications   Prescriptions Last Dose Informant Patient Reported? Taking?    Citric Uj-Qhzyrrgvnqc-Xq Carb (RENACIDIN) SOLN  Outside Facility (Specify) No No   Sig: Irrigate with 1 ampule as directed 2 (two) times a day Clamp lucia for 15 min after instillation   QUEtiapine (SEROquel) 25 mg tablet  Outside Facility (Specify) Yes No   Sig: Take 25 mg by mouth 2 (two) times a day     acetaminophen (TYLENOL) 325 mg tablet  Outside Facility (Specify) Yes No   Sig: Take 325 mg by mouth every 4 (four) hours as needed for mild pain     amLODIPine (NORVASC) 5 mg tablet  Outside Facility (Specify) Yes No   Sig: Take 5 mg by mouth daily   apixaban (ELIQUIS) 2 5 mg  Outside Facility (Specify) Yes No   Sig: Take 2 5 mg by mouth 2 (two) times a day busPIRone (BUSPAR) 7 5 mg tablet  Outside Facility (Specify) Yes No   Sig: Take 7 5 mg by mouth 3 (three) times a day     docusate sodium (COLACE) 100 mg capsule  Outside Facility (Specify) No No   Sig: Take 1 capsule by mouth 2 (two) times a day for 30 days   famotidine (PEPCID) 20 mg tablet  Outside Facility (Specify) Yes No   Sig: Take 20 mg by mouth 2 (two) times a day   memantine (NAMENDA) 5 mg tablet  Outside Facility (Specify) No No   Sig: Take 1 tablet by mouth 2 (two) times a day for 30 days   Patient taking differently: Take 5 mg by mouth daily     metoprolol tartrate (LOPRESSOR) 25 mg tablet  Outside Facility (Specify) No No   Sig: Take 1 tablet by mouth 2 (two) times a day for 30 days   Patient taking differently: Take 25 mg by mouth 2 (two) times a day     mirtazapine (REMERON) 15 mg tablet  Outside Facility (Specify) No No   Sig: Take 1 tablet by mouth daily at bedtime for 30 days   potassium chloride (K-DUR,KLOR-CON) 10 mEq tablet   Yes No   Sig: Take 10 mEq by mouth 3 (three) times a day   tamsulosin (FLOMAX) 0 4 mg  Outside Facility (Specify) No No   Sig: Take 1 capsule by mouth daily with dinner for 30 days      Facility-Administered Medications: None       Past Medical History:   Diagnosis Date    Anxiety     Aortic aneurysm (HCC)     Aphasia     Ataxia     Bladder adhesions     BPH (benign prostatic hypertrophy)     COPD (chronic obstructive pulmonary disease) ([facility] )     wife denies - "never had"    Dementia     Difficulty walking     Essential (primary) hypertension     Generalized anxiety disorder     GERD (gastroesophageal reflux disease)     Global aphasia     H/O blood clots     High blood pressure     History of kidney stones     Kidney stones     Mental disorder     Muscle weakness     Neuromuscular dysfunction of bladder     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 ([facility] )     Retention of urine, unspecified     Thrombosis     Urinary retention     Urinary tract bacterial infections     Urinary tract infection        Past Surgical History:   Procedure Laterality Date    CYSTOSCOPY      IVC FILTER INSERTION      X2    IVC FILTER INSERTION      x2    KIDNEY STONE SURGERY      KNEE SURGERY      Sepsis infection     NEPHROSTOMY      MA CYSTO/URETERO W/LITHOTRIPSY &INDWELL STENT INSRT Right 6/14/2017    Procedure: CYSTOSCOPY URETEROSCOPY WITH LITHOTRIPSY HOLMIUM LASER,,BASKET STONE EXTRACTION, RETROGRADE PYELOGRAM AND INSERTION STENT URETERAL;  Surgeon: Jann Harp MD;  Location: [facility] OR;  Service: Urology    URINARY SURGERY         Family History   Problem Relation Age of Onset    Diabetes Father      I have reviewed and agree with the history as documented  Social History   Substance Use Topics    Smoking status: Never Smoker    Smokeless tobacco: Never Used    Alcohol use No        Review of Systems   Unable to perform ROS: Patient nonverbal   Skin: Positive for rash  Physical Exam  Physical Exam   Constitutional: He appears well-developed and well-nourished  No distress  HENT:   Head: Normocephalic  Mouth/Throat: Oropharynx is clear and moist    Cardiovascular: Normal rate and regular rhythm  Pulmonary/Chest: Effort normal  No respiratory distress  Abdominal: Soft  He exhibits no distension  There is no tenderness  Genitourinary:   Genitourinary Comments: Thorne catheter in place  Clear, light yellow urine  Musculoskeletal: He exhibits no edema  Neurological: He is alert  Normal upper extremity tone  Skin: Skin is warm and dry  Maculopapular rash of the anterior thighs  There is a scab on the left side  Nursing note and vitals reviewed        Vital Signs  ED Triage Vitals [12/03/18 1637]   Temperature Pulse Respirations Blood Pressure SpO2   98 5 °F (36 9 °C) 55 20 149/83 99 %      Temp Source Heart Rate Source Patient Position - Orthostatic VS BP Location FiO2 (%)   Oral Monitor Sitting Right arm --      Pain Score       No Pain           Vitals:    12/03/18 1637   BP: 149/83   Pulse: 55   Patient Position - Orthostatic VS: Sitting       Visual Acuity      ED Medications  Medications - No data to display    Diagnostic Studies  Results Reviewed     None                 No orders to display              Procedures  Procedures       Phone Contacts  ED Phone Contact    ED Course                               MDM  Number of Diagnoses or Management Options  Problem with Thorne catheter, initial encounter [facility]):   Scab:   Sweat rash:   Diagnosis management comments: 56-year-old male sent from nursing home for evaluation of obstructed Thorne catheter and rash on legs  Catheter tubing was the twisted which resolved  Rash is somewhat chronic, appears to be a sweat rash  Advised keeping clean and dry  Patient Progress  Patient progress: improved    CritCare Time    Disposition  Final diagnoses:   Sweat rash   Scab   Problem with Thorne catheter, initial encounter [facility])     Time reflects when diagnosis was documented in both MDM as applicable and the Disposition within this note     Time User Action Codes Description Comment    12/3/2018  4:52 PM Shavonne DE LA ROSA Add [L74 0] Sweat rash     12/3/2018  4:53 PM Patricia Pollack Add [R23 4] Scab     12/3/2018  4:53 PM Daniel, 1 [facility]  [id number]] Problem with Thorne catheter, initial encounter [facility])       ED Disposition     ED Disposition Condition Comment    Discharge  [facility] discharge to home/self care      Condition at discharge: Good        Follow-up Information     Follow up With Specialties Details Why Contact Brown Headley MD Internal Medicine   [address] [place] [address] Doctors   [phone]            Discharge Medication List as of 12/3/2018  4:54 PM      CONTINUE these medications which have NOT CHANGED    Details   acetaminophen (TYLENOL) 325 mg tablet Take 325 mg by mouth every 4 (four) hours as needed for mild pain  , Historical Med      amLODIPine (NORVASC) 5 mg tablet Take 5 mg by mouth daily, Historical Med      apixaban (ELIQUIS) 2 5 mg Take 2 5 mg by mouth 2 (two) times a day, Historical Med      busPIRone (BUSPAR) 7 5 mg tablet Take 7 5 mg by mouth 3 (three) times a day  , Historical Med      Citric Cp-Sgbgdkdswhp-Ud Carb (RENACIDIN) SOLN Irrigate with 1 ampule as directed 2 (two) times a day Clamp lucia for 15 min after instillation, Starting u 7/19/2018, Normal      docusate sodium (COLACE) 100 mg capsule Take 1 capsule by mouth 2 (two) times a day for 30 days, Starting Mon 5/8/2017, Until Sat 11/3/2018, No Print      famotidine (PEPCID) 20 mg tablet Take 20 mg by mouth 2 (two) times a day, Historical Med      memantine (NAMENDA) 5 mg tablet Take 1 tablet by mouth 2 (two) times a day for 30 days, Starting Fri 11/11/2016, Until Sat 11/3/2018, Print      metoprolol tartrate (LOPRESSOR) 25 mg tablet Take 1 tablet by mouth 2 (two) times a day for 30 days, Starting Fri 11/11/2016, Until Sat 11/3/2018, Print      mirtazapine (REMERON) 15 mg tablet Take 1 tablet by mouth daily at bedtime for 30 days, Starting Wed 3/1/2017, Until Sat 11/3/2018, Print      potassium chloride (K-DUR,KLOR-CON) 10 mEq tablet Take 10 mEq by mouth 3 (three) times a day, Historical Med      QUEtiapine (SEROquel) 25 mg tablet Take 25 mg by mouth 2 (two) times a day  , Historical Med      tamsulosin (FLOMAX) 0 4 mg Take 1 capsule by mouth daily with dinner for 30 days, Starting Wed 3/1/2017, Until Sat 11/3/2018, Print           No discharge procedures on file      ED Provider  Electronically Signed by           Hailey Lopez MD  12/03/18 8096

## 2018-12-03 NOTE — DISCHARGE INSTRUCTIONS
Dehydration   WHAT YOU NEED TO KNOW:   Dehydration is a condition that develops when your body does not have enough fluid  You may become dehydrated if you do not drink enough water or lose too much fluid  Fluid loss may also cause loss of electrolytes (minerals), such as sodium  DISCHARGE INSTRUCTIONS:   Seek care immediately if:   · You have a seizure  · You are confused or cannot think clearly  · You are extremely sleepy, or another person cannot wake you  · You become dizzy or faint when you stand  · You are not able to urinate  · You have trouble breathing  · You have a fast or irregular heartbeat  · Your hands or feet are cold, or your face is pale  Contact your healthcare provider if:   · You have trouble drinking liquids because you are vomiting  · Your symptoms get worse  · You have a fever  · You feel very weak or tired  · You have questions or concerns about your condition or care  Follow up with your healthcare provider as directed:  Write down your questions so you remember to ask them during your visits  Prevent or manage dehydration:   · Drink liquids as directed  Liquids that contain water, sugar, and minerals can help your body hold in fluid and help prevent dehydration  Drink liquids throughout the day, not just when you feel thirsty  Men should drink about 3 liters (13 eight-ounce cups) of liquid each day  Women should drink about 2 liters (9 eight-ounce cups) of liquid each day  Drink even more liquid if you will be outdoors, in the sun for a long time, or exercising  · Stay cool  Limit the time you spend outdoors during the hottest part of the day  Dress in lightweight clothes  · Keep track of how often you urinate  If you urinate less than usual or your urine is darker, drink more liquids  © 2017 [employer] Information is for End User's use only and may not be sold, redistributed or otherwise used for commercial purposes   All illustrations and images included in CareNotes® are the copyrighted property of [employer]  or Mika Mcclain  The above information is an  only  It is not intended as medical advice for individual conditions or treatments  Talk to your doctor, nurse or pharmacist before following any medical regimen to see if it is safe and effective for you  Thorne Catheter Placement and Care   WHAT YOU NEED TO KNOW:   A Thorne catheter is a sterile tube that is inserted into your bladder to drain urine  It is also called an indwelling urinary catheter  The tip of the catheter has a small balloon filled with solution that holds the catheter inside your bladder  DISCHARGE INSTRUCTIONS:   Seek care immediately if:   · Your catheter comes out  · You suddenly have material that looks like sand in the tubing or drainage bag  · No urine is draining into the bag and you have checked the system  · You have pain in your hip, back, pelvis, or lower abdomen  · You are confused or cannot think clearly  Contact your healthcare provider if:   · You have a fever  · You have bladder spasms for more than 1 day after the catheter is placed  · You see blood in the tubing or drainage bag  · You have a rash or itching where the catheter tube is secured to your skin  · Urine leaks from or around the catheter, tubing, or drainage bag  · The closed drainage system has accidently come open or apart  · You see a layer of crystals inside the tubing  · You have questions or concerns about your condition or care  Care for your Thorne catheter:   · Clean your genital area 2 times every day  Clean your catheter and the area around where it was inserted  Use soap and water  Clean your anal opening and catheter area after every bowel movement  · Secure the catheter tube  so you do not pull or move the catheter  This helps prevent pain and bladder spasms   Healthcare providers will show you how to use medical tape or a strap to secure the catheter tube to your body  · Keep a closed drainage system  Your Thorne catheter should always be attached to the drainage bag to form a closed system  Do not disconnect any part of the closed system unless you need to change the bag  Care for your drainage bag:   · Ask if a leg bag is right for you  A leg bag can be worn under your clothes  Ask your healthcare provider for more information about a leg bag  · Keep the drainage bag below the level of your waist   This helps stop urine from moving back up the tubing and into your bladder  Do not loop or kink the tubing  This can cause urine to back up and collect in your bladder  Do not let the drainage bag touch or lie on the floor  · Empty the drainage bag when needed  The weight of a full drainage bag can be painful  Empty the drainage bag every 3 to 6 hours or when it is ? full  · Clean and change the drainage bag as directed  Ask your healthcare provider how often you should change the drainage bag and what cleaning solution to use  Wear disposable gloves when you change the bag  Do not allow the end of the catheter or tubing to touch anything  Clean the ends with an alcohol pad before you reconnect them  What to do if problems develop:   · No urine is draining into the bag:      ¨ Check for kinks in the tubing and straighten them out  ¨ Check the tape or strap used to secure the catheter tube to your skin  Make sure it is not blocking the tube  ¨ Make sure you are not sitting or lying on the tubing  ¨ Make sure the urine bag is hanging below the level of your waist     · Urine leaks from or around the catheter, tubing, or drainage bag:  Check if the closed drainage system has accidently come open or apart  Clean the catheter and tubing ends with a new alcohol pad and reconnect them  Prevent an infection:   · Wash your hands often    Wash before and after you touch your catheter, tubing, or drainage bag  Use soap and water  Wear clean disposable gloves when you care for your catheter or disconnect the drainage bag  Wash your hands before you prepare or eat food  · Drink liquids as directed  Ask your healthcare provider how much liquid to drink each day and which liquids are best for you  Liquids will help flush your kidneys and bladder to help prevent infection  Follow up with your healthcare provider as directed:  Write down your questions so you remember to ask them during your visits  © 2017 [facility] Information is for End User's use only and may not be sold, redistributed or otherwise used for commercial purposes  All illustrations and images included in CareNotes® are the copyrighted property of A [employer] M , Inc  or Mika Mcclain  The above information is an  only  It is not intended as medical advice for individual conditions or treatments  Talk to your doctor, nurse or pharmacist before following any medical regimen to see if it is safe and effective for you

## 2018-12-03 NOTE — DISCHARGE INSTRUCTIONS
Thorne catheter tubing was kinked  This was adjusted allowing normal urine flow  Urine appears clear  Afebrile here  No signs to suspect infection  Rash on the thighs appears to be a sweat rash  Ensure patient is kept clean and dry  May give Benadryl as needed for itching

## 2018-12-03 NOTE — ED NOTES
Frankie Dinh from [employer] notified of need for BLS transport back to [facility], will update when available       Ashanti Potter RN  12/03/18 5540

## 2018-12-03 NOTE — ED PROVIDER NOTES
History  Chief Complaint   Patient presents with    Urinary Retention     Pt arrives via EMS from [facility]  Pt was here in ED on Tuesday and and urinary catheter was replaced  Today no urine output from catheter  Bladder scan at [facility] was 543ml  Patient is a 69-year-old male who presents to the emergency department via EMS from his nursing residence after experiencing decreased output in his Thorne catheter  His wife accompanies him and provides much history  He was seen in the emergency department on November 27th after catheter occlusion and was discharged with new catheter in place draining well  He was seen by his urologist the following day and plan was made given presence long-term indwelling catheter for cystoscopy with bladder biopsy, fulguration and retrograde pyelogram fee as needed  Patient has history of multiple prior UTIs  His wife notes that he has had prior displacement of the tubes frequently from positioning and traction on the catheter  He had been seated in a chair today and she suspects that this may have been what occurred  Patient has sensitivity to many of the adhesives used for holding the catheter in place and was wearing only the elastic band while seated today  She notes the staff the nursing residence is unable to make any adjustments to the positioning  Tonight he did have his usual medication (starting with our) instilled into the catheter  No flow was appreciated back  When questioned regarding any other changes in condition his wife notes that he has been running low-grade fevers  She notes that 2 to 3 weeks ago he was placed on Invanz for cellulitis of the legs  She gestures to the caps and relates that these are much improved  On Tuesday of this past week he was identified to have an infection on the anterior aspect of the left proximal thigh  A PICC line was placed and he has been receiving antibiotics through this    Urine was a darker coloring yesterday and his physician ordered fluids to be administered through the PICC line today  Following receipt of these urine did lighten  There has been no known nausea or vomiting  No other changes in condition  Patient is allergic to multiple antibiotics  Wife additionally expresses concern that he has difficult catheterizations in the past including those with false passages as well as having required placement using a wire and exchange over this  For this reason she expresses significant hesitancy upon suggestion of catheter replacement  Prior to Admission Medications   Prescriptions Last Dose Informant Patient Reported? Taking?    Citric Cj-Idrelqcxger-Jv Carb (RENACIDIN) SOLN  Outside Facility (Specify) No No   Sig: Irrigate with 1 ampule as directed 2 (two) times a day Clamp lucia for 15 min after instillation   QUEtiapine (SEROquel) 25 mg tablet  Outside Facility (Specify) Yes No   Sig: Take 25 mg by mouth 2 (two) times a day     acetaminophen (TYLENOL) 325 mg tablet  Outside Facility (Specify) Yes No   Sig: Take 325 mg by mouth every 4 (four) hours as needed for mild pain     amLODIPine (NORVASC) 5 mg tablet  Outside Facility (Specify) Yes No   Sig: Take 5 mg by mouth daily   apixaban (ELIQUIS) 2 5 mg  Outside Facility (Specify) Yes No   Sig: Take 2 5 mg by mouth 2 (two) times a day   busPIRone (BUSPAR) 7 5 mg tablet  Outside Facility (Specify) Yes No   Sig: Take 7 5 mg by mouth 3 (three) times a day     docusate sodium (COLACE) 100 mg capsule  Outside Facility (Specify) No No   Sig: Take 1 capsule by mouth 2 (two) times a day for 30 days   famotidine (PEPCID) 20 mg tablet  Outside Facility (Specify) Yes No   Sig: Take 20 mg by mouth 2 (two) times a day   memantine (NAMENDA) 5 mg tablet  Outside Facility (Specify) No No   Sig: Take 1 tablet by mouth 2 (two) times a day for 30 days   Patient taking differently: Take 5 mg by mouth daily     metoprolol tartrate (LOPRESSOR) 25 mg tablet  Outside Facility (Specify) No No   Sig: Take 1 tablet by mouth 2 (two) times a day for 30 days   Patient taking differently: Take 25 mg by mouth 2 (two) times a day     mirtazapine (REMERON) 15 mg tablet  Outside Facility (Specify) No No   Sig: Take 1 tablet by mouth daily at bedtime for 30 days   potassium chloride (K-DUR,KLOR-CON) 10 mEq tablet   Yes No   Sig: Take 10 mEq by mouth 3 (three) times a day   tamsulosin (FLOMAX) 0 4 mg  Outside Facility (Specify) No No   Sig: Take 1 capsule by mouth daily with dinner for 30 days      Facility-Administered Medications: None       Past Medical History:   Diagnosis Date    Anxiety     Aortic aneurysm (HCC)     Aphasia     Ataxia     Bladder adhesions     BPH (benign prostatic hypertrophy)     COPD [facility]chronic obstructive pulmonary disease) [facility])     wife denies - "never had"    Dementia     Difficulty walking     Essential (primary) hypertension     Generalized anxiety disorder     GERD (gastroesophageal reflux disease)     Global aphasia     H/O blood clots     High blood pressure     History of kidney stones     Kidney stones     Mental disorder     Muscle weakness     Neuromuscular dysfunction of bladder     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 ([facility]     Retention of urine, unspecified     Thrombosis     Urinary retention     Urinary tract bacterial infections     Urinary tract infection        Past Surgical History:   Procedure Laterality Date    CYSTOSCOPY      IVC FILTER INSERTION      X2    IVC FILTER INSERTION      x2    KIDNEY STONE SURGERY      KNEE SURGERY      Sepsis infection     NEPHROSTOMY      VA CYSTO/URETERO W/LITHOTRIPSY &INDWELL STENT INSRT Right 6/14/2017    Procedure: CYSTOSCOPY URETEROSCOPY WITH LITHOTRIPSY HOLMIUM LASER,,BASKET STONE EXTRACTION, RETROGRADE PYELOGRAM AND INSERTION STENT URETERAL;  Surgeon: Ursula Phelps MD;  Location: [facility];  Service: Urology    URINARY SURGERY         Family History   Problem Relation Age of Onset    Diabetes Father      I have reviewed and agree with the history as documented  Social History   Substance Use Topics    Smoking status: Never Smoker    Smokeless tobacco: Never Used    Alcohol use No        Review of Systems   All other systems reviewed and are negative  Physical Exam  Physical Exam   Constitutional: He is oriented to person, place, and time  Chronically ixd-moyeawhsr-ysbhnm still  Vitals within normal limits  HENT:   Head: Normocephalic  Eyes: Conjunctivae are normal    Cardiovascular: Normal rate and regular rhythm  Pulmonary/Chest: Effort normal and breath sounds normal    Abdominal: Bowel sounds are normal  He exhibits distension (suprapubic region)  There is tenderness (suprapubic region )  Genitourinary:   Genitourinary Comments: Patient with dry scaly skin the genitalia  This appears macerated  Powder is present  Small amount mucoid discharge at the meatus  Upon slight leg movement lucia catheter noted to be quite taut between penis & adhesive dressing on leg  Musculoskeletal: Normal range of motion  Neurological: He is alert and oriented to person, place, and time  Skin: Skin is warm and dry  Large firm scab appreciated on the superior aspect of the proximal left thigh  No fluctuance or surrounding erythema  Adhesive lucia dressing appreciated anteriorly on the right thigh  No surrounding erythema  Psychiatric: He has a normal mood and affect  His behavior is normal    Nursing note and vitals reviewed        Vital Signs  ED Triage Vitals [12/02/18 2203]   Temperature Pulse Respirations Blood Pressure SpO2   99 4 °F (37 4 °C) 66 18 126/61 94 %      Temp Source Heart Rate Source Patient Position - Orthostatic VS BP Location FiO2 (%)   Oral Monitor Lying Left arm --      Pain Score       --           Vitals:    12/02/18 2203 12/03/18 0115   BP: 126/61 137/75   Pulse: 66 (!) 48   Patient Position - Orthostatic VS: Lying        Visual Acuity      ED Medications  Medications   sodium chloride 0 9 % bolus 1,000 mL (0 mL Intravenous Stopped 12/3/18 0100)       Diagnostic Studies  Results Reviewed     Procedure Component Value Units Date/Time    Urine culture [[id number]]  (Abnormal) Collected:  12/03/18 0048    Lab Status:  Preliminary result Specimen:  Urine from Urine, Indwelling Thorne Catheter Updated:  12/04/18 1034     Urine Culture 50,000-59,000 cfu/ml Staphylococcus aureus (A)      40,000-49,000 cfu/ml Candida sp  presumptively albicans (A)    Comprehensive metabolic panel [[id number]]  (Abnormal) Collected:  12/02/18 2259    Lab Status:  Final result Specimen:  Blood from Arm, Left Updated:  12/02/18 2322     Sodium 147 (H) mmol/L      Potassium 3 5 mmol/L      Chloride 112 (H) mmol/L      CO2 26 mmol/L      ANION GAP 9 mmol/L      BUN 22 mg/dL      Creatinine 1 54 (H) mg/dL      Glucose 90 mg/dL      Calcium 8 8 mg/dL      AST 13 U/L      ALT 31 U/L      Alkaline Phosphatase 106 U/L      Total Protein 6 7 g/dL      Albumin 2 8 (L) g/dL      Total Bilirubin 0 30 mg/dL      eGFR 45 ml/min/1 73sq m     Narrative:         National Kidney Disease Education Program recommendations are as follows:  GFR calculation is accurate only with a steady state creatinine  Chronic Kidney disease less than 60 ml/min/1 73 sq  meters  Kidney failure less than 15 ml/min/1 73 sq  meters      CBC and differential [[id number]]  (Abnormal) Collected:  12/02/18 2259    Lab Status:  Final result Specimen:  Blood from Arm, Left Updated:  12/02/18 2306     WBC 10 55 (H) Thousand/uL      RBC 4 82 Million/uL      Hemoglobin 14 7 g/dL      Hematocrit 44 6 %      MCV 93 fL      MCH 30 5 pg      MCHC 33 0 g/dL      RDW 14 3 %      MPV 9 8 fL      Platelets 220 Thousands/uL      nRBC 0 /100 WBCs      Neutrophils Relative 64 %      Immat GRANS % 0 %      Lymphocytes Relative 20 %      Monocytes Relative 7 %      Eosinophils Relative 9 (H) %      Basophils Relative 0 %      Neutrophils Absolute 6 62 Thousands/µL      Immature Grans Absolute 0 04 Thousand/uL      Lymphocytes Absolute 2 09 Thousands/µL      Monocytes Absolute 0 78 Thousand/µL      Eosinophils Absolute 0 98 (H) Thousand/µL      Basophils Absolute 0 04 Thousands/µL                  No orders to display              Procedures  Procedures       Phone Contacts  ED Phone Contact    ED Course  ED Course as of Dec 05 0717   Hayley Muñoz Dec 02, 2018   2249 Urethral meatus and the Thorne catheter protruding from this cleansed  Balloon deflated and catheter advanced slightly with return of urine- a few mL slightly cloudy and then several 100 mL of translucent yellow urine  The following large amount of drainage the turbid portion of urine was appreciated  Urine will be sent off for culture  Given discolored urine yesterday in the setting of patient having been treated for skin infections with ongoing low-grade fevers am inclined to check basic blood work  Looking for significant change in creatinine or overwhelming evidence of infection which might change treatment from that which he is currently receiving     2326 Creatinine mildly elevated in sodium and chloride both also a little bit elevated  Will administer L of saline  Will return to nursing center with urine culture pending  This was discussed with wife who demonstrated understanding  Patient has had sensitivity in skin breakdown from varied skin adhesives  Wife notes that he seemed to be most sensitive to the stat lock Thorne michel used in the hospitals  She demonstrates understanding that a more medial/proximal placement would be ideal to potential tugging  At this time I placed a more distal portion of the catheter in the plastic tie portion of the bandage  Consideration can be made for placement of a new michel at the facility  Wife notes that he tolerates that adhesive better                                  [facility]  CritCshayla Time    Disposition  Final diagnoses:   Obstruction of Lucia catheter ([address] )   Mild dehydration     Time reflects when diagnosis was documented in both MDM as applicable and the Disposition within this note     Time User Action Codes Description Comment    12/2/2018 11:54 PM  Amaury A Add [O34 689D] Obstruction of Lucia catheter ([address] )     12/2/2018 11:55 PM  Romely A Add [E86 0] Mild dehydration       ED Disposition     ED Disposition Condition Comment    Discharge  [facility] discharge to home/self care  Condition at discharge: Good        Follow-up Information     Follow up With Specialties Details Why Contact Steven Barnes MD Internal Medicine Schedule an appointment as soon as possible for a visit For re-evaluation in the next 2-4 days [address] [facility] [address]      Deidre Brown MD Urology Call to inform office of today's visit  Urine culture has been sent   [address]  [facility]  [facility]            Discharge Medication List as of 12/2/2018 11:57 PM      CONTINUE these medications which have NOT CHANGED    Details   acetaminophen (TYLENOL) 325 mg tablet Take 325 mg by mouth every 4 (four) hours as needed for mild pain  , Historical Med      amLODIPine (NORVASC) 5 mg tablet Take 5 mg by mouth daily, Historical Med      apixaban (ELIQUIS) 2 5 mg Take 2 5 mg by mouth 2 (two) times a day, Historical Med      busPIRone (BUSPAR) 7 5 mg tablet Take 7 5 mg by mouth 3 (three) times a day  , Historical Med      Citric Yy-Nbbredypirz-Hc Carb (RENACIDIN) SOLN Irrigate with 1 ampule as directed 2 (two) times a day Clamp lucia for 15 min after instillation, Starting Thu 7/19/2018, Normal      docusate sodium (COLACE) 100 mg capsule Take 1 capsule by mouth 2 (two) times a day for 30 days, Starting Mon 5/8/2017, Until Sat 11/3/2018, No Print      famotidine (PEPCID) 20 mg tablet Take 20 mg by mouth 2 (two) times a day, Historical Med      memantine (NAMENDA) 5 mg tablet Take 1 tablet by mouth 2 (two) times a day for 30 days, Starting Fri 11/11/2016, Until Sat 11/3/2018, Print      metoprolol tartrate (LOPRESSOR) 25 mg tablet Take 1 tablet by mouth 2 (two) times a day for 30 days, Starting Fri 11/11/2016, Until Sat 11/3/2018, Print      mirtazapine (REMERON) 15 mg tablet Take 1 tablet by mouth daily at bedtime for 30 days, Starting Wed 3/1/2017, Until Sat 11/3/2018, Print      potassium chloride (K-DUR,KLOR-CON) 10 mEq tablet Take 10 mEq by mouth 3 (three) times a day, Historical Med      QUEtiapine (SEROquel) 25 mg tablet Take 25 mg by mouth 2 (two) times a day  , Historical Med      tamsulosin (FLOMAX) 0 4 mg Take 1 capsule by mouth daily with dinner for 30 days, Starting Wed 3/1/2017, Until Sat 11/3/2018, Print           No discharge procedures on file      ED Provider  Electronically Signed by           Chantal Rendon MD  12/05/18 1466

## 2018-12-03 NOTE — ED NOTES
Yovany Forrest from [facility] called back and stated On license of [facility] will be picking pt up in 15 minutes  [place] care notified at this time and wife at bedside notified       Juanita Frey, RN  12/03/18 8464

## 2018-12-03 NOTE — ED NOTES
Discharge instruction given  Report given to JESSE Gauthier, at receiving facility    at 20:00 by Tyra Mccloud RN  12/03/18 0807

## 2018-12-05 LAB
BACTERIA UR CULT: ABNORMAL
BACTERIA UR CULT: ABNORMAL

## 2018-12-11 NOTE — PROGRESS NOTES
NEPHROLOGY OFFICE VISIT   Olive Tomas 77 y o  male MRN: [id number]  12/12/2018    Reason for Visit:   Follow-up    Interval history:    I had the pleasure of seeing Daniele Caceres today in the renal clinic for the continued management of chronic kidney disease  He is accompanied by his wife, Koki Kerr  Patient was last seen May 2018 after hospitalization at [facility] for ESBL sepsis  He had acute kidney injury and was seen by Nephrology  He has history of chronic kidney disease stage 2 and hypertension  He is followed by Dr Darren Dotson     He has a past medical history of nephrolithiasis, neurogenic bladder and hydronephrosis, status post PCN right  Chronic Thorne catheter  2010- [facility] lithotripsy, sepsis  Coded twice in March  Encephaolpathic beginning in 2010 after sepsis  Gradual decline since now aphasic, ambulatory with assistance and walker    On 12/03/2018 he was seen in the emergency department for blocked Thorne catheter and rash  Labs on 12/02 show a creatinine of 1 54  He is residing at [facility]  His wife relates that he is eating well and will drink sufficiently if offered fluids  He is scheduled for cystoscopy next month  He has a history of aortic aneurysm and will require cardiac clearance before the procedure  The last blood work was done on 12/2/2018, which we have reviewed together  Eats 80-90 % of food and needs encouragement by staff to drink  ASSESSMENT and PLAN:  1  Chronic kidney disease, stage II-III:  Previously Baseline creatinine 1 2-1 3  · Frequent RUPESH in the past 2 years  · Etiology obstructive uropathy   · Last Creatinine up to 1 46 on 11/27/2017  · Currently creatinine 1 54     2  Hypertension:    · On amlodipine, metoprolol and Flomax  3  Chronic urinary retention:    · Due to ureteral stricture  Thorne catheter, follows with Urology  · Cystoscopy in January 4  Global aphasia: follows with neurology at the [facility]   Gradual decline in functional status- non-verbal    5  AAA: Follows with cardiology    PATIENT INSTRUCTIONS:    Patient Instructions   1  Maintain high  Offer fluids frequently  2  If BP remains consistently greater than 140/80 increase amlodipine to 5 mg BID  3  BMP, phosphorus, magnesium in 4 weeks  4  Return visit in 4 months  5  In April: CMP, CBC, MG, Urinalysis, Urine protein creatinine ratio, PTH        No orders of the defined types were placed in this encounter  OBJECTIVE:  Current Weight: Weight - Scale:  (unable to obtain)  Vitals:    12/12/18 1053 12/12/18 1055   BP: 136/72 132/80   BP Location: Left arm Right arm   Patient Position: Supine Supine   Cuff Size: Standard Standard    There is no height or weight on file to calculate BMI  REVIEW OF SYSTEMS:    Review of Systems   Unable to perform ROS: Patient nonverbal       PHYSICAL EXAM:      Physical Exam   Constitutional: He is oriented to person, place, and time  No distress  Appears well-nourished and well-developed   HENT:   Head: Normocephalic and atraumatic  Mouth/Throat: Oropharynx is clear and moist    Eyes: Conjunctivae are normal  Right eye exhibits no discharge  Left eye exhibits no discharge  No scleral icterus  Neck: Neck supple  No JVD present  Cardiovascular: Normal rate, regular rhythm and normal heart sounds  Exam reveals no gallop and no friction rub  No murmur heard  Pulmonary/Chest: Effort normal and breath sounds normal  No respiratory distress  He has no wheezes  He has no rales  Abdominal: Soft  Bowel sounds are normal  He exhibits no distension and no mass  There is no tenderness  There is no rebound and no guarding  Musculoskeletal: He exhibits no edema  Neurological: He is alert and oriented to person, place, and time  Skin: Skin is warm and dry  He is not diaphoretic  No erythema  No pallor  Psychiatric: He has a normal mood and affect   His behavior is normal  Judgment and thought content normal        Medications:    Current Outpatient Prescriptions:   acetaminophen (TYLENOL) 325 mg tablet, Take 325 mg by mouth every 4 (four) hours as needed for mild pain  , Disp: , Rfl:     amLODIPine (NORVASC) 5 mg tablet, Take 5 mg by mouth daily, Disp: , Rfl:     apixaban (ELIQUIS) 2 5 mg, Take 2 5 mg by mouth 2 (two) times a day, Disp: , Rfl:     busPIRone (BUSPAR) 7 5 mg tablet, Take 7 5 mg by mouth 3 (three) times a day  , Disp: , Rfl:     Citric Ge-Cvcdfqkhlya-Cq Carb (RENACIDIN) SOLN, Irrigate with 1 ampule as directed 2 (two) times a day Clamp lucia for 15 min after instillation, Disp: 60 Bottle, Rfl: 6    docusate sodium (COLACE) 100 mg capsule, Take 1 capsule by mouth 2 (two) times a day for 30 days, Disp: 60 capsule, Rfl: 0    famotidine (PEPCID) 20 mg tablet, Take 20 mg by mouth 2 (two) times a day, Disp: , Rfl:     memantine (NAMENDA) 5 mg tablet, Take 1 tablet by mouth 2 (two) times a day for 30 days (Patient taking differently: Take 5 mg by mouth daily  ), Disp: 60 tablet, Rfl: 0    metoprolol tartrate (LOPRESSOR) 25 mg tablet, Take 1 tablet by mouth 2 (two) times a day for 30 days (Patient taking differently: Take 25 mg by mouth 2 (two) times a day  ), Disp: 60 tablet, Rfl: 0    mirtazapine (REMERON) 15 mg tablet, Take 1 tablet by mouth daily at bedtime for 30 days, Disp: 30 tablet, Rfl: 0    NYSTATIN powder, , Disp: , Rfl:     potassium chloride (K-DUR,KLOR-CON) 10 mEq tablet, Take 10 mEq by mouth 3 (three) times a day, Disp: , Rfl:     QUEtiapine (SEROquel) 25 mg tablet, Take 25 mg by mouth 2 (two) times a day  , Disp: , Rfl:     tamsulosin (FLOMAX) 0 4 mg, Take 1 capsule by mouth daily with dinner for 30 days, Disp: 30 capsule, Rfl: 0    Laboratory Results:        Invalid input(s): ALBUMIN    Results for orders placed or performed during the hospital encounter of 12/02/18   Urine culture   Result Value Ref Range    Urine Culture (A)      50,000-59,000 cfu/ml Methicillin Resistant Staphylococcus aureus    Urine Culture (A)      40,000-49,000 cfu/ml Candida sp  presumptively albicans       Susceptibility    Methicillin Resistant Staphylococcus aureus - CHARLEY     Ampicillin ($$) >8 00 Resistant ug/ml     Cefazolin ($) >16 00 Resistant ug/ml     Gentamicin ($$) <1 Susceptible ug/ml     Nitrofurantoin <32 Susceptible ug/ml     Oxacillin >2 00 Resistant ug/ml     Tetracycline 4 Susceptible ug/ml     Trimethoprim + Sulfamethoxazole ($$$) <0 5/9 5 Susceptible ug/ml     Vancomycin ($) 2 00 Susceptible ug/ml   CBC and differential   Result Value Ref Range    WBC 10 55 (H) 4 31 - 10 16 Thousand/uL    RBC 4 82 3 88 - 5 62 Million/uL    Hemoglobin 14 7 12 0 - 17 0 g/dL    Hematocrit 44 6 36 5 - 49 3 %    MCV 93 82 - 98 fL    MCH 30 5 26 8 - 34 3 pg    MCHC 33 0 31 4 - 37 4 g/dL    RDW 14 3 11 6 - 15 1 %    MPV 9 8 8 9 - 12 7 fL    Platelets 909 477 - 160 Thousands/uL    nRBC 0 /100 WBCs    Neutrophils Relative 64 43 - 75 %    Immat GRANS % 0 0 - 2 %    Lymphocytes Relative 20 14 - 44 %    Monocytes Relative 7 4 - 12 %    Eosinophils Relative 9 (H) 0 - 6 %    Basophils Relative 0 0 - 1 %    Neutrophils Absolute 6 62 1 85 - 7 62 Thousands/µL    Immature Grans Absolute 0 04 0 00 - 0 20 Thousand/uL    Lymphocytes Absolute 2 09 0 60 - 4 47 Thousands/µL    Monocytes Absolute 0 78 0 17 - 1 22 Thousand/µL    Eosinophils Absolute 0 98 (H) 0 00 - 0 61 Thousand/µL    Basophils Absolute 0 04 0 00 - 0 10 Thousands/µL   Comprehensive metabolic panel   Result Value Ref Range    Sodium 147 (H) 136 - 145 mmol/L    Potassium 3 5 3 5 - 5 3 mmol/L    Chloride 112 (H) 100 - 108 mmol/L    CO2 26 21 - 32 mmol/L    ANION GAP 9 4 - 13 mmol/L    BUN 22 5 - 25 mg/dL    Creatinine 1 54 (H) 0 60 - 1 30 mg/dL    Glucose 90 65 - 140 mg/dL    Calcium 8 8 8 3 - 10 1 mg/dL    AST 13 5 - 45 U/L    ALT 31 12 - 78 U/L    Alkaline Phosphatase 106 46 - 116 U/L    Total Protein 6 7 6 4 - 8 2 g/dL    Albumin 2 8 (L) 3 5 - 5 0 g/dL    Total Bilirubin 0 30 0 20 - 1 00 mg/dL    eGFR 45 ml/min/1 73sq m

## 2018-12-12 ENCOUNTER — OFFICE VISIT (OUTPATIENT)
Dept: NEPHROLOGY | Facility: CLINIC | Age: 68
End: 2018-12-12
Payer: COMMERCIAL

## 2018-12-12 VITALS — DIASTOLIC BLOOD PRESSURE: 80 MMHG | SYSTOLIC BLOOD PRESSURE: 132 MMHG

## 2018-12-12 DIAGNOSIS — R41.82 ALTERED MENTAL STATUS, UNSPECIFIED ALTERED MENTAL STATUS TYPE: Primary | ICD-10-CM

## 2018-12-12 DIAGNOSIS — I10 ESSENTIAL HYPERTENSION: Chronic | ICD-10-CM

## 2018-12-12 DIAGNOSIS — D63.1 ANEMIA DUE TO STAGE 3 CHRONIC KIDNEY DISEASE (HCC): ICD-10-CM

## 2018-12-12 DIAGNOSIS — N18.30 ANEMIA DUE TO STAGE 3 CHRONIC KIDNEY DISEASE (HCC): ICD-10-CM

## 2018-12-12 DIAGNOSIS — I10 HTN, GOAL BELOW 140/90: ICD-10-CM

## 2018-12-12 DIAGNOSIS — N18.30 STAGE 3 CHRONIC KIDNEY DISEASE (HCC): ICD-10-CM

## 2018-12-12 PROCEDURE — 99214 OFFICE O/P EST MOD 30 MIN: CPT | Performed by: NURSE PRACTITIONER

## 2018-12-12 RX ORDER — NYSTATIN 100000 [USP'U]/G
POWDER TOPICAL 2 TIMES DAILY
COMMUNITY
Start: 2018-12-04 | End: 2019-12-01 | Stop reason: HOSPADM

## 2018-12-12 NOTE — PATIENT INSTRUCTIONS
1  Maintain high  Offer fluids frequently  2  If BP remains consistently greater than 140/80 increase amlodipine to 5 mg BID  3  BMP, phosphorus, magnesium in 4 weeks  4  Return visit in 4 months  5   In April: CMP, CBC, MG, Urinalysis, Urine protein creatinine ratio, PTH

## 2018-12-30 ENCOUNTER — HOSPITAL ENCOUNTER (EMERGENCY)
Facility: HOSPITAL | Age: 68
Discharge: HOME | End: 2018-12-30
Attending: EMERGENCY MEDICINE
Payer: COMMERCIAL

## 2018-12-30 VITALS
WEIGHT: 213.85 LBS | DIASTOLIC BLOOD PRESSURE: 84 MMHG | OXYGEN SATURATION: 95 % | TEMPERATURE: 98.4 F | SYSTOLIC BLOOD PRESSURE: 152 MMHG | BODY MASS INDEX: 29.83 KG/M2 | HEART RATE: 57 BPM | RESPIRATION RATE: 15 BRPM

## 2018-12-30 DIAGNOSIS — T83.9XXA FOLEY CATHETER PROBLEM (HCC): Primary | ICD-10-CM

## 2018-12-30 LAB
AMORPH PHOS CRY URNS QL MICRO: ABNORMAL /HPF
ANION GAP SERPL CALCULATED.3IONS-SCNC: 8 MMOL/L (ref 4–13)
BACTERIA UR QL AUTO: ABNORMAL /HPF
BASOPHILS # BLD AUTO: 0.07 THOUSANDS/ΜL (ref 0–0.1)
BASOPHILS NFR BLD AUTO: 1 % (ref 0–1)
BILIRUB UR QL STRIP: ABNORMAL
BUN SERPL-MCNC: 22 MG/DL (ref 5–25)
CALCIUM SERPL-MCNC: 8.9 MG/DL (ref 8.3–10.1)
CHLORIDE SERPL-SCNC: 108 MMOL/L (ref 100–108)
CLARITY UR: ABNORMAL
CO2 SERPL-SCNC: 26 MMOL/L (ref 21–32)
COLOR UR: YELLOW
CREAT SERPL-MCNC: 1.57 MG/DL (ref 0.6–1.3)
EOSINOPHIL # BLD AUTO: 0.27 THOUSAND/ΜL (ref 0–0.61)
EOSINOPHIL NFR BLD AUTO: 4 % (ref 0–6)
ERYTHROCYTE [DISTWIDTH] IN BLOOD BY AUTOMATED COUNT: 14.1 % (ref 11.6–15.1)
GFR SERPL CREATININE-BSD FRML MDRD: 44 ML/MIN/1.73SQ M
GLUCOSE SERPL-MCNC: 99 MG/DL (ref 65–140)
GLUCOSE UR STRIP-MCNC: NEGATIVE MG/DL
HCT VFR BLD AUTO: 47.2 % (ref 36.5–49.3)
HGB BLD-MCNC: 15.8 G/DL (ref 12–17)
HGB UR QL STRIP.AUTO: ABNORMAL
IMM GRANULOCYTES # BLD AUTO: 0.02 THOUSAND/UL (ref 0–0.2)
IMM GRANULOCYTES NFR BLD AUTO: 0 % (ref 0–2)
KETONES UR STRIP-MCNC: NEGATIVE MG/DL
LEUKOCYTE ESTERASE UR QL STRIP: ABNORMAL
LYMPHOCYTES # BLD AUTO: 1.45 THOUSANDS/ΜL (ref 0.6–4.47)
LYMPHOCYTES NFR BLD AUTO: 19 % (ref 14–44)
MCH RBC QN AUTO: 30.3 PG (ref 26.8–34.3)
MCHC RBC AUTO-ENTMCNC: 33.5 G/DL (ref 31.4–37.4)
MCV RBC AUTO: 91 FL (ref 82–98)
MONOCYTES # BLD AUTO: 0.61 THOUSAND/ΜL (ref 0.17–1.22)
MONOCYTES NFR BLD AUTO: 8 % (ref 4–12)
MUCOUS THREADS UR QL AUTO: ABNORMAL
NEUTROPHILS # BLD AUTO: 5.32 THOUSANDS/ΜL (ref 1.85–7.62)
NEUTS SEG NFR BLD AUTO: 68 % (ref 43–75)
NITRITE UR QL STRIP: POSITIVE
NON-SQ EPI CELLS URNS QL MICRO: ABNORMAL /HPF
NRBC BLD AUTO-RTO: 0 /100 WBCS
PH UR STRIP.AUTO: >=9 [PH] (ref 4.5–8)
PLATELET # BLD AUTO: 234 THOUSANDS/UL (ref 149–390)
PMV BLD AUTO: 9.5 FL (ref 8.9–12.7)
POTASSIUM SERPL-SCNC: 3.8 MMOL/L (ref 3.5–5.3)
PROT UR STRIP-MCNC: ABNORMAL MG/DL
RBC # BLD AUTO: 5.21 MILLION/UL (ref 3.88–5.62)
RBC #/AREA URNS AUTO: ABNORMAL /HPF
SODIUM SERPL-SCNC: 142 MMOL/L (ref 136–145)
SP GR UR STRIP.AUTO: 1.01 (ref 1–1.03)
TRI-PHOS CRY URNS QL MICRO: ABNORMAL /HPF
UROBILINOGEN UR QL STRIP.AUTO: 0.2 E.U./DL
WBC # BLD AUTO: 7.74 THOUSAND/UL (ref 4.31–10.16)
WBC #/AREA URNS AUTO: ABNORMAL /HPF

## 2018-12-30 PROCEDURE — 80048 BASIC METABOLIC PNL TOTAL CA: CPT | Performed by: EMERGENCY MEDICINE

## 2018-12-30 PROCEDURE — 99284 EMERGENCY DEPT VISIT MOD MDM: CPT

## 2018-12-30 PROCEDURE — 85025 COMPLETE CBC W/AUTO DIFF WBC: CPT | Performed by: EMERGENCY MEDICINE

## 2018-12-30 PROCEDURE — 36415 COLL VENOUS BLD VENIPUNCTURE: CPT | Performed by: EMERGENCY MEDICINE

## 2018-12-30 PROCEDURE — 87147 CULTURE TYPE IMMUNOLOGIC: CPT | Performed by: EMERGENCY MEDICINE

## 2018-12-30 PROCEDURE — 87186 SC STD MICRODIL/AGAR DIL: CPT | Performed by: EMERGENCY MEDICINE

## 2018-12-30 PROCEDURE — 87077 CULTURE AEROBIC IDENTIFY: CPT | Performed by: EMERGENCY MEDICINE

## 2018-12-30 PROCEDURE — 87086 URINE CULTURE/COLONY COUNT: CPT | Performed by: EMERGENCY MEDICINE

## 2018-12-30 PROCEDURE — 81001 URINALYSIS AUTO W/SCOPE: CPT | Performed by: EMERGENCY MEDICINE

## 2018-12-30 NOTE — ED NOTES
Spoke with [facility] regarding transport, will call back with time       Triny Knutson RN  12/30/18 6675

## 2018-12-30 NOTE — ED NOTES
Attempted to flush catheter with sterile water per protocol and as directed by physician; unable to flush sterile water through or aspirate urine d/t blockage of catheter  Sterile water leaking out around catheter and 60 mL syringe during attempts to flush       Bj Yin RN  12/30/18 6582

## 2018-12-30 NOTE — DISCHARGE INSTRUCTIONS
Thorne Catheter Placement and Care   WHAT YOU NEED TO KNOW:   A Thorne catheter is a sterile tube that is inserted into your bladder to drain urine  It is also called an indwelling urinary catheter  The tip of the catheter has a small balloon filled with solution that holds the catheter inside your bladder  DISCHARGE INSTRUCTIONS:   Return to the emergency department if:   · Your catheter comes out  · You suddenly have material that looks like sand in the tubing or drainage bag  · No urine is draining into the bag and you have checked the system  · You have pain in your hip, back, pelvis, or lower abdomen  · You are confused or cannot think clearly  Contact your healthcare provider if:   · You have a fever  · You have bladder spasms for more than 1 day after the catheter is placed  · You see blood in the tubing or drainage bag  · You have a rash or itching where the catheter tube is secured to your skin  · Urine leaks from or around the catheter, tubing, or drainage bag  · The closed drainage system has accidently come open or apart  · You see a layer of crystals inside the tubing  · You have questions or concerns about your condition or care  Care for your Thorne catheter:   · Clean your genital area 2 times every day  Clean your catheter and the area around where it was inserted  Use soap and water  Clean your anal opening and catheter area after every bowel movement  · Secure the catheter tube  so you do not pull or move the catheter  This helps prevent pain and bladder spasms  Healthcare providers will show you how to use medical tape or a strap to secure the catheter tube to your body  · Keep a closed drainage system  Your Thorne catheter should always be attached to the drainage bag to form a closed system  Do not disconnect any part of the closed system unless you need to change the bag    Care for your drainage bag:   · Ask if a leg bag is right for you   A leg bag can be worn under your clothes  Ask your healthcare provider for more information about a leg bag  · Keep the drainage bag below the level of your waist   This helps stop urine from moving back up the tubing and into your bladder  Do not loop or kink the tubing  This can cause urine to back up and collect in your bladder  Do not let the drainage bag touch or lie on the floor  · Empty the drainage bag when needed  The weight of a full drainage bag can be painful  Empty the drainage bag every 3 to 6 hours or when it is ? full  · Clean and change the drainage bag as directed  Ask your healthcare provider how often you should change the drainage bag and what cleaning solution to use  Wear disposable gloves when you change the bag  Do not allow the end of the catheter or tubing to touch anything  Clean the ends with an alcohol pad before you reconnect them  What to do if problems develop:   · No urine is draining into the bag:      ¨ Check for kinks in the tubing and straighten them out  ¨ Check the tape or strap used to secure the catheter tube to your skin  Make sure it is not blocking the tube  ¨ Make sure you are not sitting or lying on the tubing  ¨ Make sure the urine bag is hanging below the level of your waist     · Urine leaks from or around the catheter, tubing, or drainage bag:  Check if the closed drainage system has accidently come open or apart  Clean the catheter and tubing ends with a new alcohol pad and reconnect them  Prevent an infection:   · Wash your hands often  Wash before and after you touch your catheter, tubing, or drainage bag  Use soap and water  Wear clean disposable gloves when you care for your catheter or disconnect the drainage bag  Wash your hands before you prepare or eat food  · Drink liquids as directed  Ask your healthcare provider how much liquid to drink each day and which liquids are best for you   Liquids will help flush your kidneys and bladder to help prevent infection  Follow up with your healthcare provider as directed:  Write down your questions so you remember to ask them during your visits  © 2017 [facility] Information is for End User's use only and may not be sold, redistributed or otherwise used for commercial purposes  All illustrations and images included in CareNotes® are the copyrighted property of A D A M , Inc  or Mika Mcclain  The above information is an  only  It is not intended as medical advice for individual conditions or treatments  Talk to your doctor, nurse or pharmacist before following any medical regimen to see if it is safe and effective for you

## 2018-12-30 NOTE — ED PROVIDER NOTES
History  Chief Complaint   Patient presents with    Urinary Catheter Problem     Patient arrives via EMS from [facility] with report from nursing home staff that his catheter is not draining and pt has been bladder scanned for 887 mL  Pt's catheter is reportedly difficult to change and it is usually done by urology with a wire  Patient is a 77year old male who presents with difficulty urinating  He has a lucia catheter and has frequent difficulties with it  Per report, he had large amount of urine on bladder scan at the nursing home  MDM 77 yom, no acute distress, will chec        The patient (and any family present) verbalized understanding of the discharge instructions and warnings that would necessitate return to the Emergency Department  Gave verbal in addition to written discharge instructions  Specifically highlighted areas of special concern regarding the written and verbal discharge instructions and return precautions  All questions were answered prior to discharge  Prior to Admission Medications   Prescriptions Last Dose Informant Patient Reported? Taking?    Citric Lk-Htafzggzkqw-Of Carb (RENACIDIN) SOLN  Outside Facility (Specify) No No   Sig: Irrigate with 1 ampule as directed 2 (two) times a day Clamp lucia for 15 min after instillation   NYSTATIN powder  Outside Facility (Specify) Yes No   QUEtiapine (SEROquel) 25 mg tablet  Outside Facility (Specify) Yes No   Sig: Take 25 mg by mouth 2 (two) times a day     acetaminophen (TYLENOL) 325 mg tablet  Outside Facility (Specify) Yes No   Sig: Take 325 mg by mouth every 4 (four) hours as needed for mild pain     amLODIPine (NORVASC) 5 mg tablet  Outside Facility (Specify) Yes No   Sig: Take 5 mg by mouth daily   apixaban (ELIQUIS) 2 5 mg  Outside Facility (Specify) Yes No   Sig: Take 2 5 mg by mouth 2 (two) times a day   busPIRone (BUSPAR) 7 5 mg tablet  Outside Facility (Specify) Yes No   Sig: Take 7 5 mg by mouth 3 (three) times a day     docusate sodium (COLACE) 100 mg capsule  Outside Facility (Specify) No No   Sig: Take 1 capsule by mouth 2 (two) times a day for 30 days   famotidine (PEPCID) 20 mg tablet  Outside Facility (Specify) Yes No   Sig: Take 20 mg by mouth 2 (two) times a day   memantine (NAMENDA) 5 mg tablet  Outside Facility (Specify) No No   Sig: Take 1 tablet by mouth 2 (two) times a day for 30 days   Patient taking differently: Take 5 mg by mouth daily     metoprolol tartrate (LOPRESSOR) 25 mg tablet  Outside Facility (Specify) No No   Sig: Take 1 tablet by mouth 2 (two) times a day for 30 days   Patient taking differently: Take 25 mg by mouth 2 (two) times a day     mirtazapine (REMERON) 15 mg tablet  Outside Facility (Specify) No No   Sig: Take 1 tablet by mouth daily at bedtime for 30 days   potassium chloride (K-DUR,KLOR-CON) 10 mEq tablet  Outside Facility (Specify) Yes No   Sig: Take 10 mEq by mouth 3 (three) times a day   tamsulosin (FLOMAX) 0 4 mg  Outside Facility (Specify) No No   Sig: Take 1 capsule by mouth daily with dinner for 30 days      Facility-Administered Medications: None       Past Medical History:   Diagnosis Date    Anxiety     Aortic aneurysm (HCC)     Aphasia     Ataxia     Bladder adhesions     BPH (benign prostatic hypertrophy)     COPD (chronic obstructive pulmonary disease) ([facility])     wife denies - "never had"    Dementia     Difficulty walking     Essential (primary) hypertension     Generalized anxiety disorder     GERD (gastroesophageal reflux disease)     Global aphasia     H/O blood clots     High blood pressure     History of kidney stones     Kidney stones     Mental disorder     Muscle weakness     Neuromuscular dysfunction of bladder     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 ([facility] [address] )     Retention of urine, unspecified     Thrombosis     Urinary retention     Urinary tract bacterial infections     Urinary tract infection Past Surgical History:   Procedure Laterality Date    CYSTOSCOPY      IVC FILTER INSERTION      X2    IVC FILTER INSERTION      x2    KIDNEY STONE SURGERY      KNEE SURGERY      Sepsis infection     NEPHROSTOMY      AZ CYSTO/URETERO W/LITHOTRIPSY &INDWELL STENT INSRT Right 6/14/2017    Procedure: CYSTOSCOPY URETEROSCOPY WITH LITHOTRIPSY HOLMIUM LASER,,BASKET STONE EXTRACTION, RETROGRADE PYELOGRAM AND INSERTION STENT URETERAL;  Surgeon: Ilsa Sandra MD;  Location: [facility] OR;  Service: Urology    URINARY SURGERY         Family History   Problem Relation Age of Onset    Diabetes Father      I have reviewed and agree with the history as documented  Social History   Substance Use Topics    Smoking status: Never Smoker    Smokeless tobacco: Never Used    Alcohol use No        Review of Systems   Unable to perform ROS: Dementia   Genitourinary: Positive for difficulty urinating  All other systems reviewed and are negative  Physical Exam  Physical Exam   Constitutional: He is oriented to person, place, and time  He appears well-developed and well-nourished  HENT:   Head: Normocephalic and atraumatic  Eyes: Pupils are equal, round, and reactive to light  EOM are normal    Neck: Normal range of motion  Neck supple  Cardiovascular: Normal rate, regular rhythm and normal heart sounds  No murmur heard  Pulmonary/Chest: Effort normal and breath sounds normal  No respiratory distress  He has no wheezes  Abdominal: Soft  Bowel sounds are normal  He exhibits no distension  There is no tenderness  Genitourinary:   Genitourinary Comments: Thorne in place   Musculoskeletal: Normal range of motion  He exhibits no edema or tenderness  Neurological: He is alert and oriented to person, place, and time  No cranial nerve deficit  Coordination normal    Skin: Skin is warm and dry  He is not diaphoretic  No erythema  Psychiatric: He has a normal mood and affect   His behavior is normal  Nursing note and vitals reviewed  Vital Signs  ED Triage Vitals   Temperature Pulse Respirations Blood Pressure SpO2   12/30/18 0421 12/30/18 0443 12/30/18 0443 12/30/18 0443 12/30/18 0443   98 4 °F (36 9 °C) 63 16 (!) 183/109 94 %      Temp Source Heart Rate Source Patient Position - Orthostatic VS BP Location FiO2 (%)   12/30/18 0421 12/30/18 0443 12/30/18 0443 12/30/18 0443 --   Oral Monitor Sitting Right arm       Pain Score       12/30/18 0443       No Pain           Vitals:    12/30/18 0600 12/30/18 0645 12/30/18 0830 12/30/18 0858   BP: (!) 173/111 145/76 146/82 152/84   Pulse: 65 58 (!) 54 57   Patient Position - Orthostatic VS: Lying Lying Lying        Visual Acuity      ED Medications  Medications - No data to display    Diagnostic Studies  Results Reviewed     Procedure Component Value Units Date/Time    Basic metabolic panel [[id number]]  (Abnormal) Collected:  12/30/18 0709    Lab Status:  Final result Specimen:  Blood from Hand, Left Updated:  12/30/18 0725     Sodium 142 mmol/L      Potassium 3 8 mmol/L      Chloride 108 mmol/L      CO2 26 mmol/L      ANION GAP 8 mmol/L      BUN 22 mg/dL      Creatinine 1 57 (H) mg/dL      Glucose 99 mg/dL      Calcium 8 9 mg/dL      eGFR 44 ml/min/1 73sq m     Narrative:         National Kidney Disease Education Program recommendations are as follows:  GFR calculation is accurate only with a steady state creatinine  Chronic Kidney disease less than 60 ml/min/1 73 sq  meters  Kidney failure less than 15 ml/min/1 73 sq  meters      Urine Microscopic [[id number]]  (Abnormal) Collected:  12/30/18 0617    Lab Status:  Final result Specimen:  Urine from Urine, Indwelling Thorne Catheter Updated:  12/30/18 0721     RBC, UA 30-50 (A) /hpf      WBC, UA 20-30 (A) /hpf      Epithelial Cells None Seen /hpf      Bacteria, UA Innumerable (A) /hpf      AMORPH PHOSPATES Innumerable /hpf      Triplep Phos Lulu, UA Moderate /hpf      MUCUS THREADS Moderate (A)    Urine culture [[id number]] Collected:  12/30/18 0617    Lab Status: In process Specimen:  Urine from Urine, Indwelling Thorne Catheter Updated:  12/30/18 0721    CBC and differential [[id number]] Collected:  12/30/18 0709    Lab Status:  Final result Specimen:  Blood from Hand, Left Updated:  12/30/18 0717     WBC 7 74 Thousand/uL      RBC 5 21 Million/uL      Hemoglobin 15 8 g/dL      Hematocrit 47 2 %      MCV 91 fL      MCH 30 3 pg      MCHC 33 5 g/dL      RDW 14 1 %      MPV 9 5 fL      Platelets 774 Thousands/uL      nRBC 0 /100 WBCs      Neutrophils Relative 68 %      Immat GRANS % 0 %      Lymphocytes Relative 19 %      Monocytes Relative 8 %      Eosinophils Relative 4 %      Basophils Relative 1 %      Neutrophils Absolute 5 32 Thousands/µL      Immature Grans Absolute 0 02 Thousand/uL      Lymphocytes Absolute 1 45 Thousands/µL      Monocytes Absolute 0 61 Thousand/µL      Eosinophils Absolute 0 27 Thousand/µL      Basophils Absolute 0 07 Thousands/µL     UA w Reflex to Microscopic w Reflex to Culture [[id number]]  (Abnormal) Collected:  12/30/18 0617    Lab Status:  Final result Specimen:  Urine from Urine, Indwelling Thorne Catheter Updated:  12/30/18 0705     Color, UA Yellow     Clarity, UA Cloudy     Specific [place], UA 1 010     pH, UA >=9 0 (H)     Leukocytes, UA Moderate (A)     Nitrite, UA Positive (A)     Protein,  (2+) (A) mg/dl      Glucose, UA Negative mg/dl      Ketones, UA Negative mg/dl      Urobilinogen, UA 0 2 E U /dl      Bilirubin, UA Small (A)     Blood, UA Large (A)                 No orders to display              Procedures  Procedures       Phone Contacts  ED Phone Contact    ED Course  ED Course as of Dec 30 2356   Sun Dec 30, 2018   0722 Patient with no symptoms of uti, normal white count, no suprapubic tenderness and chronic colonization of urine, will defer from treating for uti                                 MDM  CritCare Time    Disposition  Final diagnoses:    Thorne catheter problem [facility])     Time reflects when diagnosis was documented in both MDM as applicable and the Disposition within this note     Time User Action Codes Description Comment    12/30/2018  7:18 AM Dipti Doran, [address] [facility]  9XXA] Lucia catheter problem [facility])       ED Disposition     ED Disposition Condition Comment    Discharge  Kim [facility] discharge to home/self care      Condition at discharge: Good        Follow-up Information     Follow up With Specialties Details Why Contact Info    urology  In 2 days            Discharge Medication List as of 12/30/2018  7:30 AM      CONTINUE these medications which have NOT CHANGED    Details   acetaminophen (TYLENOL) 325 mg tablet Take 325 mg by mouth every 4 (four) hours as needed for mild pain  , Historical Med      amLODIPine (NORVASC) 5 mg tablet Take 5 mg by mouth daily, Historical Med      apixaban (ELIQUIS) 2 5 mg Take 2 5 mg by mouth 2 (two) times a day, Historical Med      busPIRone (BUSPAR) 7 5 mg tablet Take 7 5 mg by mouth 3 (three) times a day  , Historical Med      Citric Sy-Ektzlpohigi-Xt Carb (RENACIDIN) SOLN Irrigate with 1 ampule as directed 2 (two) times a day Clamp lucia for 15 min after instillation, Starting Thu 7/19/2018, Normal      docusate sodium (COLACE) 100 mg capsule Take 1 capsule by mouth 2 (two) times a day for 30 days, Starting Mon 5/8/2017, Until Wed 12/12/2018, No Print      famotidine (PEPCID) 20 mg tablet Take 20 mg by mouth 2 (two) times a day, Historical Med      memantine (NAMENDA) 5 mg tablet Take 1 tablet by mouth 2 (two) times a day for 30 days, Starting Fri 11/11/2016, Until Wed 12/12/2018, Print      metoprolol tartrate (LOPRESSOR) 25 mg tablet Take 1 tablet by mouth 2 (two) times a day for 30 days, Starting Fri 11/11/2016, Until Wed 12/12/2018, Print      mirtazapine (REMERON) 15 mg tablet Take 1 tablet by mouth daily at bedtime for 30 days, Starting Wed 3/1/2017, Until Wed 12/12/2018, Print      NYSTATIN powder Starting Tue 12/4/2018, Historical Med      potassium chloride (K-DUR,KLOR-CON) 10 mEq tablet Take 10 mEq by mouth 3 (three) times a day, Historical Med      QUEtiapine (SEROquel) 25 mg tablet Take 25 mg by mouth 2 (two) times a day  , Historical Med      tamsulosin (FLOMAX) 0 4 mg Take 1 capsule by mouth daily with dinner for 30 days, Starting Wed 3/1/2017, Until Wed 12/12/2018, Print           No discharge procedures on file      ED Provider  Electronically Signed by           Davide Carmona MD  12/30/18 2941

## 2018-12-30 NOTE — ED NOTES
Dr Alix Eugene at bedside to attempt to unblock catheter using equipment from urology cart       Iverson Spatz, RN  12/30/18 4032

## 2018-12-31 ENCOUNTER — TELEPHONE (OUTPATIENT)
Dept: UROLOGY | Facility: CLINIC | Age: 68
End: 2018-12-31

## 2018-12-31 NOTE — PROGRESS NOTES
Patient was not discharged home on antibiotics due to the fact that he has got a chronic indwelling Thorne with colonization

## 2018-12-31 NOTE — TELEPHONE ENCOUNTER
Patient went to the ER on 12/30/18 for a clogged lucia  Nursing home stated that lucia was changed, so that cancelled follow up appointment for today  They did not realize that an H&P was also to be done at appointment today  Can this be done on admit?

## 2019-01-02 LAB
BACTERIA UR CULT: ABNORMAL

## 2019-01-08 ENCOUNTER — TELEPHONE (OUTPATIENT)
Dept: OTHER | Facility: OTHER | Age: 69
End: 2019-01-08

## 2019-01-08 NOTE — TELEPHONE ENCOUNTER
Carlitos File called from [facility]  Carlitos File was speaking with Rhoda Brantley  Regarding rescheduling this patient's appointment for tomorrow 1/9/19  Miguel Mulligan offered Carlitos File several appointments for the patient  She would like to accept the appointment for Mr Bonny Marino at 1300 on 1/9/19 at [address] [facility]  Carlitos Ordaz did not know the name of the physician  She will arrange for  [address] [facility] to bring him to the appointment  Please call her back in the morning to confirm

## 2019-01-09 ENCOUNTER — CONSULT (OUTPATIENT)
Dept: CARDIOLOGY CLINIC | Facility: CLINIC | Age: 69
End: 2019-01-09
Payer: COMMERCIAL

## 2019-01-09 VITALS — DIASTOLIC BLOOD PRESSURE: 80 MMHG | HEART RATE: 65 BPM | SYSTOLIC BLOOD PRESSURE: 120 MMHG

## 2019-01-09 DIAGNOSIS — I71.2 THORACIC AORTIC ANEURYSM WITHOUT RUPTURE (HCC): ICD-10-CM

## 2019-01-09 DIAGNOSIS — Z01.810 PREOP CARDIOVASCULAR EXAM: Primary | ICD-10-CM

## 2019-01-09 PROCEDURE — 99204 OFFICE O/P NEW MOD 45 MIN: CPT | Performed by: INTERNAL MEDICINE

## 2019-01-09 PROCEDURE — 93000 ELECTROCARDIOGRAM COMPLETE: CPT | Performed by: INTERNAL MEDICINE

## 2019-01-09 RX ORDER — DIPHENHYDRAMINE HCL 25 MG
25 TABLET ORAL EVERY 6 HOURS PRN
COMMUNITY
End: 2019-04-25 | Stop reason: HOSPADM

## 2019-01-09 NOTE — PROGRESS NOTES
Cardiology Consultation      Erik Boris [facility]      1950      [id number]        Discussion/Summary:    1  Mildly ectatic ascending aorta:  3 9 cm reported on prior CT chest 02/26/2017 with no prior studies or subsequent studies noted  Small pericardial effusion reported 05/01/2017 CT abdomen  Will schedule echocardiogram with next visit  2  Preoperative risk stratification:  Mildly ectatic ascending aorta does not preclude bladder biopsy and cystoscopy  Pericardial effusion was small/trace reported in 2017  Blood pressure stable, therefore would not hold off on surgery, however, does need repeat imaging to re-evaluate very small pericardial effusion from 2017 and mildly ectatic ascending aorta  Okay to proceed with cystoscopy and bladder biopsy from cardiac standpoint  Will schedule echocardiogram with next visit in 1 month  Discussed at length with patient's wife, who was very much agreeable  ECG reviewed, revealing sinus rhythm  Q-waves noted in leads 3 and AVF, which may be positional, although cannot completely rule out inferior infarct, age undetermined  Follow-up in 1 month with echocardiogram      Interval History: This is a 69-year-old male, was nonverbal, with significant cognitive decline, does not follow commands or respond to questions  He has a history of chronic indwelling Thorne catheter, hematuria, multiple UTIs and follows with Urology, and chronic kidney disease with baseline creatinine about 1 4-1 5, follows with Nephrology  He also has a history of DVT, status post IVC filter, on chronic anticoagulation with Eliquis  He underwent a CT of his chest 02/26/2017 which reported a 3 9 cm ectatic ascending aorta with trace pericardial fluid  Subsequent CT abdomen 05/01/2017 reported small pericardial effusion  He is scheduled for cystoscopy and bladder biopsy in the near future in light of hematuria    Cardiology clearance has been requested for the same  As above, the patient is nonverbal, does not follow commands, and does not respond to questions  His wife denies any prior history of cardiovascular disease  She states she learned about the aortic aneurysm sometime in 2017, but no workup has been pursued        Vitals:  Vitals:    01/09/19 1509   BP: 120/80   BP Location: Left arm   Patient Position: Supine   Cuff Size: Adult   Pulse: 72         Past Medical History:   Diagnosis Date    Anxiety     Aortic aneurysm (HCC)     Aphasia     Ataxia     Bladder adhesions     BPH (benign prostatic hypertrophy)     COPD (chronic obstructive pulmonary disease) (HCC)     wife denies - "never had"    Dementia     Difficulty walking     Essential (primary) hypertension     Generalized anxiety disorder     GERD (gastroesophageal reflux disease)     Global aphasia     H/O blood clots     High blood pressure     History of kidney stones     Kidney stones     Mental disorder     Muscle weakness     Neuromuscular dysfunction of bladder     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 [facility]     Retention of urine, unspecified     Thrombosis     Urinary retention     Urinary tract bacterial infections     Urinary tract infection      Social History     Social History    Marital status: /Civil Union     Spouse name: N/A    Number of children: N/A    Years of education: N/A     Occupational History    disabled      Social History Main Topics    Smoking status: Never Smoker    Smokeless tobacco: Never Used    Alcohol use No    Drug use: No    Sexual activity: No     Other Topics Concern    Not on file     Social History Narrative    No narrative on file      Family History   Problem Relation Age of Onset    Diabetes Father      Past Surgical History:   Procedure Laterality Date    CYSTOSCOPY      IVC FILTER INSERTION      X2    IVC FILTER INSERTION      x2    KIDNEY STONE SURGERY      KNEE SURGERY      Sepsis infection     NEPHROSTOMY      [place] CYSTO/URETERO W/LITHOTRIPSY &INDWELL STENT INSRT Right 6/14/2017    Procedure: CYSTOSCOPY URETEROSCOPY WITH LITHOTRIPSY HOLMIUM LASER,,BASKET STONE EXTRACTION, RETROGRADE PYELOGRAM AND INSERTION STENT URETERAL;  Surgeon: Ilsa Sandra MD;  Location: [facility];  Service: Urology    URINARY SURGERY         Current Outpatient Prescriptions:     acetaminophen (TYLENOL) 325 mg tablet, Take 325 mg by mouth every 4 (four) hours as needed for mild pain  , Disp: , Rfl:     amLODIPine (NORVASC) 5 mg tablet, Take 5 mg by mouth 2 (two) times a day  , Disp: , Rfl:     apixaban (ELIQUIS) 2 5 mg, Take 2 5 mg by mouth 2 (two) times a day, Disp: , Rfl:     busPIRone (BUSPAR) 7 5 mg tablet, Take 7 5 mg by mouth 3 (three) times a day  , Disp: , Rfl:     Citric Nd-Frhnjbdlgqw-Kg Carb (RENACIDIN) SOLN, Irrigate with 1 ampule as directed 2 (two) times a day Clamp lucia for 15 min after instillation, Disp: 60 Bottle, Rfl: 6    diphenhydrAMINE (BENADRYL) 25 mg tablet, Take 25 mg by mouth every 6 (six) hours as needed for itching, Disp: , Rfl:     docusate sodium (COLACE) 100 mg capsule, Take 1 capsule by mouth 2 (two) times a day for 30 days, Disp: 60 capsule, Rfl: 0    famotidine (PEPCID) 20 mg tablet, Take 20 mg by mouth 2 (two) times a day, Disp: , Rfl:     memantine (NAMENDA) 5 mg tablet, Take 1 tablet by mouth 2 (two) times a day for 30 days (Patient taking differently: Take 5 mg by mouth daily  ), Disp: 60 tablet, Rfl: 0    metoprolol tartrate (LOPRESSOR) 25 mg tablet, Take 1 tablet by mouth 2 (two) times a day for 30 days (Patient taking differently: Take 25 mg by mouth 2 (two) times a day  ), Disp: 60 tablet, Rfl: 0    miconazole 2 % cream, Apply topically 2 (two) times a day, Disp: , Rfl:     mirtazapine (REMERON) 15 mg tablet, Take 1 tablet by mouth daily at bedtime for 30 days, Disp: 30 tablet, Rfl: 0    NYSTATIN powder, , Disp: , Rfl:     potassium chloride (K-DUR,KLOR-CON) 10 mEq tablet, Take 10 mEq by mouth 3 (three) times a day, Disp: , Rfl:     QUEtiapine (SEROquel) 25 mg tablet, Take 25 mg by mouth daily at bedtime  , Disp: , Rfl:     tamsulosin (FLOMAX) 0 4 mg, Take 1 capsule by mouth daily with dinner for 30 days, Disp: 30 capsule, Rfl: 0        Review of Systems:  Review of Systems   Unable to perform ROS: Patient nonverbal         Physical Exam:  Physical Exam   Constitutional: He appears well-developed and well-nourished  No distress  HENT:   Head: Normocephalic and atraumatic  Eyes: EOM are normal    Neck: Neck supple  No JVD present  No thyromegaly present  Cardiovascular: Normal rate and regular rhythm  Exam reveals no gallop and no friction rub  No murmur heard  Distant heart sounds   Pulmonary/Chest: Effort normal and breath sounds normal  No respiratory distress  He has no wheezes  He has no rales  Abdominal: Soft  He exhibits no distension  There is no tenderness  Musculoskeletal: He exhibits no edema  Neurological: He is alert  Skin: Skin is warm and dry  This note was completed in part utilizing [employer] Software  Grammatical errors, random word insertions, spelling mistakes, and incomplete sentences can be an occasional consequence of this system secondary to software limitations, ambient noise, and hardware issues  If you have any questions or concerns about the content, text, or information contained within the body of this dictation, please contact the provider for clarification

## 2019-01-11 NOTE — PRE-PROCEDURE INSTRUCTIONS
Pre-Surgery Instructions:   Medication Instructions    acetaminophen (TYLENOL) 325 mg tablet Instructed patient per Anesthesia Guidelines   amLODIPine (NORVASC) 5 mg tablet Instructed patient per Anesthesia Guidelines   apixaban (ELIQUIS) 2 5 mg Patient was instructed by Physician and understands   busPIRone (BUSPAR) 7 5 mg tablet Instructed patient per Anesthesia Guidelines   Citric Pt-Hxislvplcyy-Aw Carb (RENACIDIN) SOLN Instructed patient per Anesthesia Guidelines   diphenhydrAMINE (BENADRYL) 25 mg tablet Instructed patient per Anesthesia Guidelines   docusate sodium (COLACE) 100 mg capsule Instructed patient per Anesthesia Guidelines   famotidine (PEPCID) 20 mg tablet Instructed patient per Anesthesia Guidelines   memantine (NAMENDA) 5 mg tablet Instructed patient per Anesthesia Guidelines   metoprolol tartrate (LOPRESSOR) 25 mg tablet Instructed patient per Anesthesia Guidelines   miconazole 2 % cream Instructed patient per Anesthesia Guidelines   mirtazapine (REMERON) 15 mg tablet Instructed patient per Anesthesia Guidelines   NYSTATIN powder Instructed patient per Anesthesia Guidelines   potassium chloride (K-DUR,KLOR-CON) 10 mEq tablet Instructed patient per Anesthesia Guidelines   QUEtiapine (SEROquel) 25 mg tablet Instructed patient per Anesthesia Guidelines   tamsulosin (FLOMAX) 0 4 mg Instructed patient per Anesthesia Guidelines      [facility]

## 2019-01-14 ENCOUNTER — ANESTHESIA EVENT (OUTPATIENT)
Dept: PERIOP | Facility: HOSPITAL | Age: 69
End: 2019-01-14
Payer: COMMERCIAL

## 2019-01-15 ENCOUNTER — TELEPHONE (OUTPATIENT)
Dept: UROLOGY | Facility: CLINIC | Age: 69
End: 2019-01-15

## 2019-01-15 ENCOUNTER — APPOINTMENT (OUTPATIENT)
Dept: RADIOLOGY | Facility: HOSPITAL | Age: 69
End: 2019-01-15
Payer: COMMERCIAL

## 2019-01-15 ENCOUNTER — ANESTHESIA (OUTPATIENT)
Dept: PERIOP | Facility: HOSPITAL | Age: 69
End: 2019-01-15
Payer: COMMERCIAL

## 2019-01-15 ENCOUNTER — HOSPITAL ENCOUNTER (OUTPATIENT)
Facility: HOSPITAL | Age: 69
Setting detail: OUTPATIENT SURGERY
Discharge: NURSING, REHAB OR LONG-TERM CARE | End: 2019-01-15
Attending: UROLOGY | Admitting: UROLOGY
Payer: COMMERCIAL

## 2019-01-15 VITALS
HEIGHT: 71 IN | WEIGHT: 219 LBS | HEART RATE: 80 BPM | RESPIRATION RATE: 16 BRPM | DIASTOLIC BLOOD PRESSURE: 84 MMHG | SYSTOLIC BLOOD PRESSURE: 137 MMHG | BODY MASS INDEX: 30.66 KG/M2 | OXYGEN SATURATION: 93 % | TEMPERATURE: 97.6 F

## 2019-01-15 DIAGNOSIS — T83.9XXS FOLEY CATHETER PROBLEM, SEQUELA: ICD-10-CM

## 2019-01-15 DIAGNOSIS — R31.0 GROSS HEMATURIA: ICD-10-CM

## 2019-01-15 DIAGNOSIS — Z87.448 H/O URETHRAL STRICTURE: Primary | ICD-10-CM

## 2019-01-15 LAB
APTT PPP: 29 SECONDS (ref 26–38)
INR PPP: 1.06 (ref 0.86–1.17)
PROTHROMBIN TIME: 13.5 SECONDS (ref 11.8–14.2)

## 2019-01-15 PROCEDURE — C1769 GUIDE WIRE: HCPCS | Performed by: UROLOGY

## 2019-01-15 PROCEDURE — 85610 PROTHROMBIN TIME: CPT | Performed by: UROLOGY

## 2019-01-15 PROCEDURE — 74430 CONTRAST X-RAY BLADDER: CPT

## 2019-01-15 PROCEDURE — 51600 INJECTION FOR BLADDER X-RAY: CPT | Performed by: UROLOGY

## 2019-01-15 PROCEDURE — 85730 THROMBOPLASTIN TIME PARTIAL: CPT | Performed by: UROLOGY

## 2019-01-15 PROCEDURE — 52281 CYSTOSCOPY AND TREATMENT: CPT | Performed by: UROLOGY

## 2019-01-15 RX ORDER — ACETAMINOPHEN 160 MG/5ML
650 SUSPENSION, ORAL (FINAL DOSE FORM) ORAL EVERY 4 HOURS PRN
Status: DISCONTINUED | OUTPATIENT
Start: 2019-01-15 | End: 2019-01-15 | Stop reason: HOSPADM

## 2019-01-15 RX ORDER — CIPROFLOXACIN 2 MG/ML
400 INJECTION, SOLUTION INTRAVENOUS ONCE
Status: COMPLETED | OUTPATIENT
Start: 2019-01-15 | End: 2019-01-15

## 2019-01-15 RX ORDER — FENTANYL CITRATE/PF 50 MCG/ML
25 SYRINGE (ML) INJECTION
Status: DISCONTINUED | OUTPATIENT
Start: 2019-01-15 | End: 2019-01-15 | Stop reason: HOSPADM

## 2019-01-15 RX ORDER — LIDOCAINE HYDROCHLORIDE 10 MG/ML
INJECTION, SOLUTION INFILTRATION; PERINEURAL AS NEEDED
Status: DISCONTINUED | OUTPATIENT
Start: 2019-01-15 | End: 2019-01-15 | Stop reason: SURG

## 2019-01-15 RX ORDER — EPHEDRINE SULFATE 50 MG/ML
INJECTION, SOLUTION INTRAVENOUS AS NEEDED
Status: DISCONTINUED | OUTPATIENT
Start: 2019-01-15 | End: 2019-01-15 | Stop reason: SURG

## 2019-01-15 RX ORDER — OXYCODONE HCL 5 MG/5 ML
5 SOLUTION, ORAL ORAL EVERY 4 HOURS PRN
Status: DISCONTINUED | OUTPATIENT
Start: 2019-01-15 | End: 2019-01-15 | Stop reason: HOSPADM

## 2019-01-15 RX ORDER — ONDANSETRON 2 MG/ML
4 INJECTION INTRAMUSCULAR; INTRAVENOUS ONCE AS NEEDED
Status: DISCONTINUED | OUTPATIENT
Start: 2019-01-15 | End: 2019-01-15 | Stop reason: HOSPADM

## 2019-01-15 RX ORDER — SODIUM CHLORIDE 9 MG/ML
125 INJECTION, SOLUTION INTRAVENOUS CONTINUOUS
Status: DISCONTINUED | OUTPATIENT
Start: 2019-01-15 | End: 2019-01-15 | Stop reason: HOSPADM

## 2019-01-15 RX ORDER — PROPOFOL 10 MG/ML
INJECTION, EMULSION INTRAVENOUS AS NEEDED
Status: DISCONTINUED | OUTPATIENT
Start: 2019-01-15 | End: 2019-01-15 | Stop reason: SURG

## 2019-01-15 RX ORDER — OXYCODONE HYDROCHLORIDE AND ACETAMINOPHEN 5; 325 MG/1; MG/1
1 TABLET ORAL EVERY 6 HOURS PRN
Qty: 8 TABLET | Refills: 0 | Status: SHIPPED | OUTPATIENT
Start: 2019-01-15 | End: 2019-01-20

## 2019-01-15 RX ORDER — MAGNESIUM HYDROXIDE 1200 MG/15ML
LIQUID ORAL AS NEEDED
Status: DISCONTINUED | OUTPATIENT
Start: 2019-01-15 | End: 2019-01-15 | Stop reason: HOSPADM

## 2019-01-15 RX ORDER — FENTANYL CITRATE 50 UG/ML
INJECTION, SOLUTION INTRAMUSCULAR; INTRAVENOUS AS NEEDED
Status: DISCONTINUED | OUTPATIENT
Start: 2019-01-15 | End: 2019-01-15 | Stop reason: SURG

## 2019-01-15 RX ORDER — DIPHENHYDRAMINE HYDROCHLORIDE 50 MG/ML
25 INJECTION INTRAMUSCULAR; INTRAVENOUS EVERY 6 HOURS PRN
Status: DISCONTINUED | OUTPATIENT
Start: 2019-01-15 | End: 2019-01-15 | Stop reason: HOSPADM

## 2019-01-15 RX ORDER — ONDANSETRON 2 MG/ML
4 INJECTION INTRAMUSCULAR; INTRAVENOUS EVERY 6 HOURS PRN
Status: DISCONTINUED | OUTPATIENT
Start: 2019-01-15 | End: 2019-01-15 | Stop reason: HOSPADM

## 2019-01-15 RX ORDER — ONDANSETRON 2 MG/ML
INJECTION INTRAMUSCULAR; INTRAVENOUS AS NEEDED
Status: DISCONTINUED | OUTPATIENT
Start: 2019-01-15 | End: 2019-01-15 | Stop reason: SURG

## 2019-01-15 RX ORDER — DEXAMETHASONE SODIUM PHOSPHATE 4 MG/ML
INJECTION, SOLUTION INTRA-ARTICULAR; INTRALESIONAL; INTRAMUSCULAR; INTRAVENOUS; SOFT TISSUE AS NEEDED
Status: DISCONTINUED | OUTPATIENT
Start: 2019-01-15 | End: 2019-01-15 | Stop reason: SURG

## 2019-01-15 RX ADMIN — CIPROFLOXACIN: 2 INJECTION INTRAVENOUS at 15:00

## 2019-01-15 RX ADMIN — FENTANYL CITRATE 50 MCG: 50 INJECTION INTRAMUSCULAR; INTRAVENOUS at 15:45

## 2019-01-15 RX ADMIN — SODIUM CHLORIDE: 0.9 INJECTION, SOLUTION INTRAVENOUS at 14:00

## 2019-01-15 RX ADMIN — DEXAMETHASONE SODIUM PHOSPHATE 4 MG: 4 INJECTION, SOLUTION INTRAMUSCULAR; INTRAVENOUS at 15:45

## 2019-01-15 RX ADMIN — PROPOFOL 150 MG: 10 INJECTION, EMULSION INTRAVENOUS at 15:31

## 2019-01-15 RX ADMIN — EPHEDRINE SULFATE 5 MG: 50 INJECTION, SOLUTION INTRAMUSCULAR; INTRAVENOUS; SUBCUTANEOUS at 15:44

## 2019-01-15 RX ADMIN — ONDANSETRON 4 MG: 2 INJECTION INTRAMUSCULAR; INTRAVENOUS at 15:45

## 2019-01-15 RX ADMIN — FENTANYL CITRATE 50 MCG: 50 INJECTION INTRAMUSCULAR; INTRAVENOUS at 15:34

## 2019-01-15 RX ADMIN — EPHEDRINE SULFATE 5 MG: 50 INJECTION, SOLUTION INTRAMUSCULAR; INTRAVENOUS; SUBCUTANEOUS at 15:47

## 2019-01-15 RX ADMIN — LIDOCAINE HYDROCHLORIDE ANHYDROUS 50 MG: 10 INJECTION, SOLUTION INFILTRATION at 15:31

## 2019-01-15 NOTE — TELEPHONE ENCOUNTER
[facility] will call and scheduled once discharged

## 2019-01-15 NOTE — H&P
H&P Exam - Urology   Erik [facility] 77 y o  male MRN: [id number]  Unit/Bed#: OR POOL Encounter: [id number]    Assessment/Plan     Assessment:  78-year-old gentleman with multiple medical problems including neurogenic bladder with indwelling Thorne catheter, for surveillance cystoscopy for urothelial carcinoma screening  The plan has been to consent him via his wife his power of  for potential bladder biopsy with fulguration and retrograde pyelography, in the event that worrisome findings are seen on cystoscopy  He is not a candidate for office based cystoscopy given his comorbidities and mental status    Plan:  Proceed to cystoscopy with potential bladder biopsy and fulguration, transurethral section of bladder tumor, and bilateral retrograde pyelography    History of Present Illness   HPI:  Katia Castellano is a 77 y o  male who presents with indwelling Thorne catheter for neurogenic bladder, also with history of urethral stricture and difficult Thorne catheter placement  He is due for his yearly surveillance cystoscopy to look for urothelial carcinoma given long-term catheterization  There been no changes to his state of health, his mental status remains poor, and his wife provides informed consent for today's procedure  We have previously discussed this in the office and the patient's power-of- understands the risks and benefits and alternatives of the proposed procedure today  We will proceed to cystoscopic evaluation with potential biopsy and retrograde pyelography  Review of Systems   Reason unable to perform ROS: The patient is nonverbal, as such review of systems cannot be performed         Historical Information   Past Medical History:   Diagnosis Date    Anxiety     Aortic aneurysm (HCC)     Aphasia     Ataxia     Bladder adhesions     BPH (benign prostatic hypertrophy)     COPD (chronic obstructive pulmonary disease) (HCC)     wife denies - "never had"    Dementia     Difficulty walking     Essential (primary) hypertension     Generalized anxiety disorder     GERD (gastroesophageal reflux disease)     Global aphasia     H/O blood clots     High blood pressure     History of kidney stones     Kidney stones     Mental disorder     Muscle weakness     Neuromuscular dysfunction of bladder     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 ([facility] )     Retention of urine, unspecified     Thrombosis     Urinary retention     Urinary tract bacterial infections     Urinary tract infection      Past Surgical History:   Procedure Laterality Date    CYSTOSCOPY      IVC FILTER INSERTION      X2    IVC FILTER INSERTION      x2    KIDNEY STONE SURGERY      KNEE SURGERY      Sepsis infection     NEPHROSTOMY      [place] CYSTO/URETERO W/LITHOTRIPSY &INDWELL STENT INSRT Right 6/14/2017    Procedure: CYSTOSCOPY URETEROSCOPY WITH LITHOTRIPSY HOLMIUM LASER,,BASKET STONE EXTRACTION, RETROGRADE PYELOGRAM AND INSERTION STENT URETERAL;  Surgeon: Serina Hudson MD;  Location: [facility] OR;  Service: Urology    URINARY SURGERY       Social History   History   Alcohol Use No     History   Drug Use No     History   Smoking Status    Never Smoker   Smokeless Tobacco    Never Used     Family History:   Family History   Problem Relation Age of Onset    Diabetes Father     Hypertension Father     Coronary artery disease Father     Cancer Father     Hypertension Mother        Meds/Allergies   PTA meds:   Prior to Admission Medications   Prescriptions Last Dose Informant Patient Reported? Taking?    Citric Xj-Ntqttdzqrad-Zs Carb (RENACIDIN) SOLN  Outside Facility (Specify) No Yes   Sig: Irrigate with 1 ampule as directed 2 (two) times a day Clamp lucia for 15 min after instillation   NYSTATIN powder  Outside Facility (Specify) Yes Yes   Sig: Apply topically 2 (two) times a day     QUEtiapine (SEROquel) 25 mg tablet 1/15/2019 at [address] [place] [address] (Specify) Yes Yes   Sig: Take 25 mg by mouth daily at bedtime     acetaminophen (TYLENOL) 325 mg tablet  Outside Facility (Specify) Yes Yes   Sig: Take 325 mg by mouth every 4 (four) hours as needed for mild pain     amLODIPine (NORVASC) 5 mg tablet  Outside Facility (Specify) Yes Yes   Sig: Take 5 mg by mouth 2 (two) times a day     apixaban (ELIQUIS) 2 5 mg 1/11/2019 Outside Facility (Specify) Yes Yes   Sig: Take 2 5 mg by mouth 2 (two) times a day   busPIRone (BUSPAR) 7 5 mg tablet 1/15/2019 at [address] [place] [address] ([address] [place] [address]) Yes Yes   Sig: Take 7 5 mg by mouth 3 (three) times a day     diphenhydrAMINE (BENADRYL) 25 mg tablet   Yes Yes   Sig: Take 25 mg by mouth every 6 (six) hours as needed for itching   docusate sodium (COLACE) 100 mg capsule 1/14/2019 at 1800 Outside Facility (Specify) No Yes   Sig: Take 1 capsule by mouth 2 (two) times a day for 30 days   famotidine (PEPCID) 20 mg tablet 1/14/2019 at 1800 Outside Facility (Specify) Yes Yes   Sig: Take 20 mg by mouth 2 (two) times a day   memantine (NAMENDA) 5 mg tablet 1/14/2019 at 1000 Outside Facility (Specify) No Yes   Sig: Take 1 tablet by mouth 2 (two) times a day for 30 days   Patient taking differently: Take 5 mg by mouth daily in the early morning     metoprolol tartrate (LOPRESSOR) 25 mg tablet  Outside Facility (Specify) No Yes   Sig: Take 1 tablet by mouth 2 (two) times a day for 30 days   Patient taking differently: Take 25 mg by mouth 2 (two) times a day     miconazole 2 % cream   Yes Yes   Sig: Apply topically 3 (three) times a day     mirtazapine (REMERON) 15 mg tablet 1/15/2019 at [facility] (Specify) No Yes   Sig: Take 1 tablet by mouth daily at bedtime for 30 days   potassium chloride (K-DUR,KLOR-CON) 10 mEq tablet  Outside Facility (Specify) Yes Yes   Sig: Take 10 mEq by mouth 3 (three) times a day   tamsulosin (FLOMAX) 0 4 mg 1/14/2019 at 2100 Outside Facility (Specify) No Yes   Sig: Take 1 capsule by mouth daily with dinner for 30 days      Facility-Administered Medications: None     Allergies   Allergen Reactions    Gentamycin [Gentamicin] Anaphylaxis    Vancomycin Anaphylaxis    Zosyn [Piperacillin Sod-Tazobactam So] Anaphylaxis     However, has tolerated Ertapenem multiple times and Cefepime   Clindamycin Itching    Nsaids Other (See Comments)     Nephrotic Syndrome    Other Rash     antipersperents  Stat lock from lucia catheter    Sulfa Antibiotics Rash       Objective   Vitals: Blood pressure 157/88, pulse (!) 48, temperature (!) 97 4 °F (36 3 °C), temperature source Temporal, resp  rate 16, height 5' 11" (1 803 m), weight 93 9 kg (207 lb), SpO2 95 %  No intake/output data recorded  Invasive Devices     Central Venous Catheter Line            CVC Central Lines Single Right -- days          Peripheral Intravenous Line            Peripheral IV 01/15/19 Left Antecubital less than 1 day          Drain            Urethral Catheter Latex 16 Fr  16 days                Physical Exam   Constitutional: Vital signs are normal  He appears well-developed and well-nourished  He is active and cooperative  He is easily aroused  Non-toxic appearance  He does not have a sickly appearance  He does not appear ill (Well groomed and appears nontoxic)  No distress  Patient appears to be in his usual state of poor mental status   HENT:   Head: Normocephalic and atraumatic  Right Ear: External ear normal    Left Ear: External ear normal    Eyes: Pupils are equal, round, and reactive to light  Conjunctivae, EOM and lids are normal  Right eye exhibits no discharge  Left eye exhibits no discharge  No scleral icterus  Neck: Trachea normal and normal range of motion  Neck supple  No tracheal tenderness present  No tracheal deviation present  No thyroid mass present  Cardiovascular: Normal rate, regular rhythm and intact distal pulses  Pulses:       Carotid pulses are 2+ on the right side, and 2+ on the left side         Radial pulses are 2+ on the right side, and 2+ on the left side  Extremities are warm and well-perfused   Pulmonary/Chest: Effort normal  No accessory muscle usage or stridor  No tachypnea  No respiratory distress  He exhibits no mass, no tenderness, no deformity, no swelling and no retraction  Abdominal: Soft  Normal appearance  He exhibits no distension, no pulsatile liver, no abdominal bruit, no ascites and no mass  There is no hepatosplenomegaly  There is no tenderness  There is no rigidity, no rebound, no guarding and no CVA tenderness  Genitourinary:   Genitourinary Comments: Thorne catheter in place   Musculoskeletal: He exhibits tenderness and deformity (Contractures)  Extremities are warm and symmetrical with normal muscle tone and bulk  Strength 4/4, normal ROM, gait is normal   Lymphadenopathy:        Head (right side): No submental, no submandibular, no preauricular, no posterior auricular and no occipital adenopathy present  Head (left side): No submental, no submandibular, no preauricular, no posterior auricular and no occipital adenopathy present  He has no axillary adenopathy  Right: No inguinal and no supraclavicular adenopathy present  Left: No inguinal and no supraclavicular adenopathy present  Neurological: He is alert and easily aroused  He has normal strength  He is not disoriented  He displays abnormal reflex  No cranial nerve deficit or sensory deficit  He exhibits abnormal muscle tone  Coordination abnormal  Gait normal    Skin: Skin is warm, dry and intact  No rash noted  He is not diaphoretic  No cyanosis  No pallor  Nails show no clubbing  No skin breakdown, greasy   Psychiatric: His speech is normal  Cognition and memory are normal    Patient behaviors consistent with history of stroke and poor mental status, does intermittently become combative   Nursing note and vitals reviewed  Lab Results: I have personally reviewed pertinent reports      Imaging: I have personally reviewed pertinent reports  EKG, Pathology, and Other Studies: I have personally reviewed pertinent reports  VTE Prophylaxis: Sequential compression device Sylwia Felder)     Code Status: Prior  Advance Directive and Living Will:      Power of :    POLST:      Counseling / Coordination of Care  Total floor / unit time spent today 15 minutes  Greater than 50% of total time was spent with the patient and / or family counseling and / or coordination of care  A description of the counseling / coordination of care:  Review of the pre, khalif, and postoperative care for this procedure

## 2019-01-15 NOTE — ANESTHESIA POSTPROCEDURE EVALUATION
Post-Op Assessment Note      CV Status:  Stable    Mental Status:  Lethargic    Hydration Status:  Euvolemic    PONV Controlled:  Controlled    Airway Patency:  Patent and adequate    Post Op Vitals Reviewed: Yes          Staff: CRNA           BP (P) 158/87 (01/15/19 1610)    Temp (P) 98 4 °F (36 9 °C) (01/15/19 1610)    Pulse (P) 63 (01/15/19 1610)   Resp (P) 14 (01/15/19 1610)    SpO2 (P) 98 % (01/15/19 1610)

## 2019-01-15 NOTE — ANESTHESIA PREPROCEDURE EVALUATION
Review of Systems/Medical History  Patient summary reviewed  Chart reviewed  No history of anesthetic complications     Cardiovascular  Hypertension , Aortic disease (ectatic ascending aorta - to be followed by cardiology), DVT (hx IVC filter)   Pulmonary  Negative pulmonary ROS No pneumonia,   Comment: Wife reports no hx aspiration/PNAs     GI/Hepatic  Negative GI/hepatic ROS   No GERD ,        Kidney stones, Chronic kidney disease stage 2,   Comment: Bladder tumor  Chronic lucia     Endo/Other  Negative endo/other ROS      GYN       Hematology    Coagulation disorder (eliquis held 4 days) currently taking oral anticoagulants,    Musculoskeletal  Negative musculoskeletal ROS        Neurology      Comment: Aphasic/encephalopathic/does not follow commands following cardiac arrest/hypoxic brain injury 2010 Psychology   Negative psychology ROS            Physical Exam    Airway  Comment: Pt unable to cooperate; no loose teeth/dentures per wife           Dental       Cardiovascular      Pulmonary      Other Findings       Lab Results   Component Value Date    WBC 7 74 12/30/2018    HGB 15 8 12/30/2018     12/30/2018     Lab Results   Component Value Date    K 3 8 12/30/2018    BUN 22 12/30/2018    CREATININE 1 57 (H) 12/30/2018     Anesthesia Plan  ASA Score- 3     Anesthesia Type- general with ASA Monitors  Additional Monitors:   Airway Plan: LMA  Plan Factors-    Induction- intravenous  Postoperative Plan-     Informed Consent- Anesthetic plan and risks discussed with spouse  I personally reviewed this patient with the CRNA  Discussed and agreed on the Anesthesia Plan with the CRNA  Beth Milton

## 2019-01-15 NOTE — TELEPHONE ENCOUNTER
Marla Escobarkwabenaviji is status post cystoscopy and dilation of urethral stricture today which shows no false passage and no bladder tumor  He will be due for a Thorne catheter change in our urology office in 3-4 weeks, no need for wire exchange given operative findings today      He will next be due for cystoscopic evaluation of the bladder and prostate and urethra in 1 year for urothelial carcinoma surveillance purposes

## 2019-01-15 NOTE — OP NOTE
OPERATIVE REPORT  PATIENT NAME: Erik Escalante [facility]    :  1950  MRN: [id number]  Pt Location: AN OR ROOM 03    SURGERY DATE: 1/15/2019    Surgeon(s) and Role:     * Uriah Law MD - Primary    Preop Diagnosis:  Thorne catheter problem, sequela [T83  9XXS]  Gross hematuria [R31 0]    Post-Op Diagnosis Codes:     * Thorne catheter problem, sequela [T83  9XXS]     * Gross hematuria [R31 0]    Procedure(s) (LRB):  CYSTOSCOPY, CYSTOGRAM, DILATION OF URETHRAL STRICTURE (N/A)    Specimen(s):  * No specimens in log *    Estimated Blood Loss:   Minimal    Drains:  Urethral Catheter Latex 16 Fr  (Active)   Collection Container Standard drainage bag 1/15/2019 11:29 AM   Securement Method Leg strap 1/15/2019 11:29 AM   Number of days: 16       Anesthesia Type:   General    Operative Indications: Thorne catheter problem, sequela [T83  9XXS]  Gross hematuria [R31 0]  History of urethral stricture    Operative Findings:  Mid urethral stricture, approximately 25 Lithuanian in caliber, was able to traverse this and dilate this with the scope over a wire, the bladder was filled with copious debris, this was irrigated free, the bladder was then seen to be without lesions or defects or signs of malignancy  A cystogram was performed which showed an intact bladder without trabeculations    Complications:   None    Procedure and Technique:      Informed consent was obtained including the risks, benefits, and alternatives of the proposed procedure  Specific risks of this procedure include bleeding, infection, bladder injury, and need for secondary procedures  A full time-out confirming the proper patient, position, and procedure was performed and general anesthesia was induced without issue, event, or complication  The patient was then was placed in the lithotomy position in the operating room theater taking care to pad all pressure points  Sequential compression devices were placed      The patient was prepped and draped in the usual sterile fashion  He was soiled with stool as well as unkempt old skin and copious pubic hair  Air was atraumatically clipped, he was debrided of stool from the perineum and the gluteal cleft, and washed copiously with chlorhexidine soap  Cystoscopy was performed with a 21 Nauruan rigid cystoscope with a 30° lens  This revealed a strictured urethra, approximately 16-18 Nauruan in the mid urethra, a normal prostate, and a atonic bladder which showed copious debris but no signs of urothelial carcinoma or stones  There was no indication for bladder biopsy today  A 20 Nauruan catheter was placed over a solo wire into the lumen of the bladder and a final cystogram showed a intact bladder without trabeculation  The bladder was drained and the patient was awakened from anesthesia having tolerated the procedure well  There were no complications  All instrument and sponge counts were correct  PLAN:  The patient will be discharged back to his living facility, he will return for a Thorne catheter change in the urology office in 3-4 weeks  He will next be due for cystoscopic evaluation of the bladder in the operating room in 1 year for surveillance purposes for urothelial carcinoma         I was present for the entire procedure and A qualified resident physician was not available    Patient Disposition:  PACU     SIGNATURE: Lula Mon MD  DATE: January 15, 2019  TIME: 4:06 PM

## 2019-01-15 NOTE — DISCHARGE INSTRUCTIONS
Cystoscopy   WHAT YOU NEED TO KNOW:     Irasema Amaro,    Today you had a cystoscopy with dilation of urethral stricture and cystogram   The good news from today's procedure is that you do not have any bladder tumors  You do have a stricture in the mid urethra as we already knew, you did not have any other false passages  I was able to get through your stricture with gentle pressure with the cystoscope and placed a 20 Yi catheter over a wire into the bladder  Going forward, we will continue to change her catheter on an every 3-4 week basis, and you will next be due for a return to the operating room in 1 year for a similar procedure to look for bladder tumors  If you have any questions or concerns in the postoperative time frame, please do not hesitate to contact my office  Take care and be well,  Dr Arabella Davies  A cystoscopy is a procedure to look inside of your urethra and bladder using a cystoscope  A cystoscope is a small tube with a light and magnifying camera on the end  The procedure is used to diagnose and treat conditions of the bladder, urethra, and prostate  The procedure is also done to remove stones or blood clots from the urethra or bladder  Your healthcare provider may do other tests, such as ureteroscopy, during a cystoscopy  DISCHARGE INSTRUCTIONS:   Call 911 if:   · You suddenly have chest pain or trouble breathing  Seek care immediately if:   · Your urine turns from pink to red, or you have clots in your urine  · You cannot urinate and your bladder feels full  · Your pain or burning becomes worse or lasts longer than 2 days  Contact your healthcare provider or urologist if:   · Your urine stays pink for longer than 3 days  · You urinate less than normal, or still feel like you have to urinate after you use the bathroom  · Your skin is itchy, swollen, or has a new rash  · You have a fever and chills      · You have questions or concerns about your condition or care   Medicines: You may  be given any of the following:  · Antibiotics  help treat or prevent a bacterial infection  · Acetaminophen  decreases pain and fever  It is available without a doctor's order  Ask how much to take and how often to take it  Follow directions  Read the labels of all other medicines you are using to see if they also contain acetaminophen, or ask your doctor or pharmacist  Acetaminophen can cause liver damage if not taken correctly  Do not use more than 4 grams (4,000 milligrams) total of acetaminophen in one day  · Take your medicine as directed  Contact your healthcare provider if you think your medicine is not helping or if you have side effects  Tell him or her if you are allergic to any medicine  Keep a list of the medicines, vitamins, and herbs you take  Include the amounts, and when and why you take them  Bring the list or the pill bottles to follow-up visits  Carry your medicine list with you in case of an emergency  Follow up with your healthcare provider as directed: You may need to have another cystoscopy  Write down your questions so you remember to ask them during your visits  Self-care:   · Drink at least 3 to 4 glasses of water daily for 2 days after your procedure  Do not drink acidic juices such as orange juice and lemonade  Drink water to help prevent blood clots from forming  It can also help decrease the amount of acid in your urine  Acid in your urine may increase the burning feeling when you urinate  · Sit in a warm tub of water  Warm water may relieve pain and bladder spasms  · Do not have sex  until your healthcare provider tells you it is okay  Sex may increase your risk for a urinary tract infection  © 2017 [facility] Information is for End User's use only and may not be sold, redistributed or otherwise used for commercial purposes   All illustrations and images included in CareNotes® are the copyrighted property of VERONICA MORIN Jose Luis  or Mika Mcclain  The above information is an  only  It is not intended as medical advice for individual conditions or treatments  Talk to your doctor, nurse or pharmacist before following any medical regimen to see if it is safe and effective for you

## 2019-01-16 ENCOUNTER — TELEPHONE (OUTPATIENT)
Dept: UROLOGY | Facility: MEDICAL CENTER | Age: 69
End: 2019-01-16

## 2019-01-16 NOTE — TELEPHONE ENCOUNTER
Wife called to schedule lucia catheter change  Offered to schedule for 2/14/19 on Diagnostic nurse schedule, patient has another pre existing appointment that day  Patient scheduled for 4 week lucia change on 2/12/19 at 1 pm [facility]  [facility]  Wife will contact [facility] with Appointment details  Appt for 1/22/19 with DR Prince cancelled as not needed

## 2019-02-12 ENCOUNTER — PROCEDURE VISIT (OUTPATIENT)
Dept: UROLOGY | Facility: CLINIC | Age: 69
End: 2019-02-12
Payer: COMMERCIAL

## 2019-02-12 VITALS — SYSTOLIC BLOOD PRESSURE: 140 MMHG | DIASTOLIC BLOOD PRESSURE: 82 MMHG | HEART RATE: 86 BPM

## 2019-02-12 DIAGNOSIS — Z87.448 H/O URETHRAL STRICTURE: Primary | ICD-10-CM

## 2019-02-12 PROCEDURE — 51702 INSERT TEMP BLADDER CATH: CPT

## 2019-02-12 NOTE — PROGRESS NOTES
Bladder catheterization  Date/Time: 2/12/2019 1:28 PM  Performed by: Georgiana Reed  Authorized by: Dorcas Floyd MD     Pre-procedure details:     Procedure purpose:  Therapeutic  Anesthesia (see MAR for exact dosages): Anesthesia method:  None  Procedure details:     Catheter insertion:  Indwelling    Approach: natural orifice      Catheter type:  Coude, latex and Thorne    Catheter size:  18 Fr    Number of attempts:  1    Successful placement: yes      Urine characteristics:  Foul smelling and yellow  Post-procedure details:     Patient tolerance of procedure: Tolerated well, no immediate complications  Comments:      Patient's 20 Fr Thorne catheter was removed without incident after deflation of an intact balloon  A new 18 Fr Thorne catheter was placed under strict aseptic technique  Patient's bladder drained approx 400 cc yellow cloudy urine  Attached to bedside drainage bag  Patient tolerated the procedure well

## 2019-02-14 ENCOUNTER — OFFICE VISIT (OUTPATIENT)
Dept: CARDIOLOGY CLINIC | Facility: CLINIC | Age: 69
End: 2019-02-14
Payer: COMMERCIAL

## 2019-02-14 ENCOUNTER — HOSPITAL ENCOUNTER (OUTPATIENT)
Dept: NON INVASIVE DIAGNOSTICS | Facility: CLINIC | Age: 69
Discharge: HOME | End: 2019-02-14
Payer: COMMERCIAL

## 2019-02-14 VITALS
SYSTOLIC BLOOD PRESSURE: 120 MMHG | HEART RATE: 68 BPM | HEIGHT: 71 IN | BODY MASS INDEX: 30.54 KG/M2 | DIASTOLIC BLOOD PRESSURE: 50 MMHG

## 2019-02-14 DIAGNOSIS — I71.2 THORACIC AORTIC ANEURYSM WITHOUT RUPTURE (HCC): ICD-10-CM

## 2019-02-14 DIAGNOSIS — I71.2 ASCENDING AORTIC ANEURYSM (HCC): ICD-10-CM

## 2019-02-14 DIAGNOSIS — I10 BENIGN ESSENTIAL HTN: Primary | ICD-10-CM

## 2019-02-14 PROCEDURE — 99213 OFFICE O/P EST LOW 20 MIN: CPT | Performed by: INTERNAL MEDICINE

## 2019-02-14 PROCEDURE — 93321 DOPPLER ECHO F-UP/LMTD STD: CPT | Performed by: INTERNAL MEDICINE

## 2019-02-14 PROCEDURE — 93308 TTE F-UP OR LMTD: CPT | Performed by: INTERNAL MEDICINE

## 2019-02-14 PROCEDURE — 93308 TTE F-UP OR LMTD: CPT

## 2019-02-14 PROCEDURE — 93325 DOPPLER ECHO COLOR FLOW MAPG: CPT | Performed by: INTERNAL MEDICINE

## 2019-02-14 NOTE — PROGRESS NOTES
Cardiology Consultation      Bhumika Rivers [facility]      1950      [id number]        Discussion/Summary:    1  Mildly ectatic ascending aorta:  3 9 cm reported on prior CT chest 02/26/2017, and 3 7 cm on echocardiogram today  Continue beta-blocker and blood pressure control  No intervention indicated  No significant pericardial effusion noted on today's echocardiogram, although views were limited  Follow-up as needed from here on      Interval History: This is a 24-year-old male, was nonverbal, with significant cognitive decline, does not follow commands or respond to questions  He has a history of chronic indwelling Thorne catheter, hematuria, multiple UTIs and follows with Urology, and chronic kidney disease with baseline creatinine about 1 4-1 5, follows with Nephrology  He also has a history of DVT, status post IVC filter, on chronic anticoagulation with Eliquis  He underwent a CT of his chest 02/26/2017 which reported a 3 9 cm ectatic ascending aorta with trace pericardial fluid  Subsequent CT abdomen 05/01/2017 reported small pericardial effusion          Vitals:  Vitals:    02/14/19 1506   BP: 120/50   BP Location: Right arm   Patient Position: Supine   Cuff Size: Large   Pulse: 68   Height: 5' 11" (1 803 m)         Past Medical History:   Diagnosis Date    Anxiety     Aortic aneurysm (HCC)     Aphasia     Ataxia     Bladder adhesions     BPH (benign prostatic hypertrophy)     COPD (chronic obstructive pulmonary disease) (HCC)     wife denies - "never had"    Dementia     Difficulty walking     Essential (primary) hypertension     Generalized anxiety disorder     GERD (gastroesophageal reflux disease)     Global aphasia     H/O blood clots     High blood pressure     History of kidney stones     Kidney stones     Mental disorder     Muscle weakness     Neuromuscular dysfunction of bladder     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 ([facility] [address] )     Retention of urine, unspecified     Thrombosis     Urinary retention     Urinary tract bacterial infections     Urinary tract infection      Social History     Socioeconomic History    Marital status: /Civil Union     Spouse name: Not on file    Number of children: Not on file    Years of education: Not on file    Highest education level: Not on file   Occupational History    Occupation: disabled   Social Needs    Financial resource strain: Not on file    Food insecurity:     Worry: Not on file     Inability: Not on file   Infrastruct Security needs:     Medical: Not on file     Non-medical: Not on file   Tobacco Use    Smoking status: Never Smoker    Smokeless tobacco: Never Used   Substance and Sexual Activity    Alcohol use: No    Drug use: No    Sexual activity: Never   Lifestyle    Physical activity:     Days per week: Not on file     Minutes per session: Not on file    Stress: Not on file   Relationships    Social connections:     Talks on phone: Not on file     Gets together: Not on file     Attends Quaker service: Not on file     Active member of club or organization: Not on file     Attends meetings of clubs or organizations: Not on file     Relationship status: Not on file    Intimate partner violence:     Fear of current or ex partner: Not on file     Emotionally abused: Not on file     Physically abused: Not on file     Forced sexual activity: Not on file   Other Topics Concern    Not on file   Social History Narrative    Not on file      Family History   Problem Relation Age of Onset    Diabetes Father     Hypertension Father     Coronary artery disease Father     Cancer Father     Hypertension Mother      Past Surgical History:   Procedure Laterality Date    Carmen LÓPEZ CYSTOGRAM  1/15/2019    IVC FILTER INSERTION      X2    IVC FILTER INSERTION      x2    KIDNEY STONE SURGERY      KNEE SURGERY Sepsis infection     NEPHROSTOMY      MD CYSTO/URETERO W/LITHOTRIPSY &INDWELL STENT INSRT Right 6/14/2017    Procedure: CYSTOSCOPY URETEROSCOPY WITH LITHOTRIPSY HOLMIUM LASER,,BASKET STONE EXTRACTION, RETROGRADE PYELOGRAM AND INSERTION STENT URETERAL;  Surgeon: Darrell Bailey MD;  Location: [facility];  Service: Urology    MD CYSTOURETHROSCOPY,BIOPSY N/A 1/15/2019    Procedure: CYSTOSCOPY, CYSTOGRAM, DILATION OF URETHRAL STRICTURE;  Surgeon: Scarlett Simms MD;  [facility];  Service: Urology    URINARY SURGERY         Current Outpatient Medications:     acetaminophen (TYLENOL) 325 mg tablet, Take 325 mg by mouth every 4 (four) hours as needed for mild pain  , Disp: , Rfl:     amLODIPine (NORVASC) 5 mg tablet, Take 5 mg by mouth 2 (two) times a day  , Disp: , Rfl:     apixaban (ELIQUIS) 2 5 mg, Take 2 5 mg by mouth 2 (two) times a day, Disp: , Rfl:     busPIRone (BUSPAR) 7 5 mg tablet, Take 7 5 mg by mouth 3 (three) times a day  , Disp: , Rfl:     diphenhydrAMINE (BENADRYL) 25 mg tablet, Take 25 mg by mouth every 6 (six) hours as needed for itching, Disp: , Rfl:     famotidine (PEPCID) 20 mg tablet, Take 20 mg by mouth 2 (two) times a day, Disp: , Rfl:     memantine (NAMENDA) 5 mg tablet, Take 1 tablet by mouth 2 (two) times a day for 30 days (Patient taking differently: Take 5 mg by mouth daily in the early morning  ), Disp: 60 tablet, Rfl: 0    metoprolol tartrate (LOPRESSOR) 25 mg tablet, Take 1 tablet by mouth 2 (two) times a day for 30 days (Patient taking differently: Take 25 mg by mouth 2 (two) times a day  ), Disp: 60 tablet, Rfl: 0    miconazole 2 % cream, Apply topically 3 (three) times a day  , Disp: , Rfl:     mirtazapine (REMERON) 15 mg tablet, Take 1 tablet by mouth daily at bedtime for 30 days, Disp: 30 tablet, Rfl: 0    NYSTATIN powder, Apply topically 2 (two) times a day  , Disp: , Rfl:     potassium chloride (K-DUR,KLOR-CON) 10 mEq tablet, Take 10 mEq by mouth 3 (three) times a day, Disp: , Rfl:     QUEtiapine (SEROquel) 25 mg tablet, Take 25 mg by mouth daily at bedtime  , Disp: , Rfl:     tamsulosin (FLOMAX) 0 4 mg, Take 1 capsule by mouth daily with dinner for 30 days, Disp: 30 capsule, Rfl: 0    Citric Bm-Vqrkcgfgzmi-Nl Carb (RENACIDIN) SOLN, Irrigate with 1 ampule as directed 2 (two) times a day Clamp lucia for 15 min after instillation (Patient not taking: Reported on 2/14/2019), Disp: 60 Bottle, Rfl: 6    docusate sodium (COLACE) 100 mg capsule, Take 1 capsule by mouth 2 (two) times a day for 30 days, Disp: 60 capsule, Rfl: 0        Review of Systems:  Review of Systems   Unable to perform ROS: Patient nonverbal         Physical Exam:  Physical Exam   Constitutional: He appears well-developed and well-nourished  No distress  HENT:   Head: Normocephalic and atraumatic  Eyes: EOM are normal    Neck: Neck supple  No JVD present  No thyromegaly present  Cardiovascular: Normal rate and regular rhythm  Exam reveals no gallop and no friction rub  No murmur heard  Distant heart sounds   Pulmonary/Chest: Effort normal and breath sounds normal  No respiratory distress  He has no wheezes  He has no rales  Abdominal: Soft  He exhibits no distension  There is no tenderness  Musculoskeletal: He exhibits no edema  Neurological: He is alert  Skin: Skin is warm and dry  No change in physical examination since last visit    This note was completed in part utilizing [employer] Direct Software  Grammatical errors, random word insertions, spelling mistakes, and incomplete sentences can be an occasional consequence of this system secondary to software limitations, ambient noise, and hardware issues  If you have any questions or concerns about the content, text, or information contained within the body of this dictation, please contact the provider for clarification

## 2019-03-05 ENCOUNTER — TELEPHONE (OUTPATIENT)
Dept: NEPHROLOGY | Facility: CLINIC | Age: 69
End: 2019-03-05

## 2019-03-05 NOTE — TELEPHONE ENCOUNTER
Spoke with [facility], they stated [place] schedules his appointments  I left a message with her to call and schedule follow up for patient

## 2019-03-14 ENCOUNTER — PROCEDURE VISIT (OUTPATIENT)
Dept: UROLOGY | Facility: CLINIC | Age: 69
End: 2019-03-14
Payer: COMMERCIAL

## 2019-03-14 VITALS
WEIGHT: 219 LBS | SYSTOLIC BLOOD PRESSURE: 130 MMHG | HEIGHT: 71 IN | DIASTOLIC BLOOD PRESSURE: 80 MMHG | BODY MASS INDEX: 30.66 KG/M2 | HEART RATE: 75 BPM

## 2019-03-14 DIAGNOSIS — R33.9 URINARY RETENTION: Primary | ICD-10-CM

## 2019-03-14 DIAGNOSIS — Z97.8 CHRONIC INDWELLING FOLEY CATHETER: ICD-10-CM

## 2019-03-14 PROCEDURE — 51705 CHANGE OF BLADDER TUBE: CPT | Performed by: UROLOGY

## 2019-03-14 NOTE — PROGRESS NOTES
Bladder catheterization  Date/Time: 3/14/2019 2:09 PM  Performed by: Sonny Hashimoto, RN  Authorized by: Manuel Hidalgo MD     Patient location:  Bedside  Other Assisting Provider: Yes (comment) Shannon Pedro MA)    Consent:     Consent obtained:  Verbal    Consent given by:  Spouse  Pre-procedure details:     Procedure purpose:  Diagnostic (routine Lucia change)    Preparation: Patient was prepped and draped in usual sterile fashion    Anesthesia (see MAR for exact dosages): Anesthesia method:  None  Procedure details:     Bladder irrigation: yes (Bladder irrigated with 180 mL sterile water)      Catheter insertion:  Indwelling    Approach: natural orifice      Catheter type: Lucia    Catheter size:  18 Fr    Number of attempts:  1    Successful placement: yes      Urine characteristics:  Dark, cloudy and foul smelling (Extreme sediment noted)    Patient presented today for routine lucia change, managed by Dr Daquan Pack  Patient presented on a stretcher  trans    Plan  Patient will follow up in 4 weeks for routine lucia change  Upon removal of Lucia, a large stream of urine flowed from patients penis  Urine was extremely foul smelling  2000 mL was drained from patients bladder  Extreme sediment was noted, which was likely the cause blocking the cathater  Patients wife stated she felt it was due to the pulling of the catheter however patient is unable to use a stat lock due to sensitivity  Instructed wife to increase patients fluid and to inform staff at facility of visit today  Patients wife verbalized understanding and had no additional questions at this time

## 2019-03-22 ENCOUNTER — TELEPHONE (OUTPATIENT)
Dept: UROLOGY | Facility: CLINIC | Age: 69
End: 2019-03-22

## 2019-03-22 NOTE — TELEPHONE ENCOUNTER
[facility] patient, managed by Dr Camilo lucia  Called wife and rescheduled nursing visit from DEBRA Lu 4/10/19 to Tuesday 4/9/19 at 1 pm [facility] due to provider availability  Called and spoke to [facility] and informed them appt on 4/10/19 has been rescheduled to 4/9/19 at 1 pm [employer]  [facility] to arrange transport and wife will accompany   Patient is on stretcher and needs 2 person assist

## 2019-03-27 ENCOUNTER — PROCEDURE VISIT (OUTPATIENT)
Dept: UROLOGY | Facility: CLINIC | Age: 69
End: 2019-03-27
Payer: COMMERCIAL

## 2019-03-27 ENCOUNTER — TELEPHONE (OUTPATIENT)
Dept: UROLOGY | Facility: CLINIC | Age: 69
End: 2019-03-27

## 2019-03-27 VITALS — HEART RATE: 72 BPM | SYSTOLIC BLOOD PRESSURE: 128 MMHG | DIASTOLIC BLOOD PRESSURE: 80 MMHG

## 2019-03-27 DIAGNOSIS — R33.9 URINARY RETENTION: Primary | ICD-10-CM

## 2019-03-27 PROCEDURE — 51700 IRRIGATION OF BLADDER: CPT

## 2019-03-27 NOTE — TELEPHONE ENCOUNTER
Received call from [facility] with [facility], patient with chronic indwelling lucia, unable to irrigate lucia, urine leaking around insertion site  Patient is seen in [facility] and managed by Dr Roderick Rutledge, I scheduled patient for nurse visit today at 1 pm for lucia check  [place] care to arrange transport

## 2019-03-27 NOTE — PROGRESS NOTES
3/27/2019  Amaris Walton is a 77 y o  male  [id number]    Diagnosis:  Chief Complaint     Urinary Retention          Patient presents for lucia catheter check/ change managed by Dr Dias [facility]  Nursing facility called this AM reporting that lucia catheter unable to be irrigated and urine leaking at insertion site  Upon assessment in office unable to irrigate lucia,  Existing lucia removed and lucia replaced  See procedure note  Plan:  Follow up in 4 weeks for next lucia change  Patient instructed to call with any questions or concerns in the meantime  Vitals:    03/27/19 1309   BP: 128/80   BP Location: Right arm   Cuff Size: Adult   Pulse: 72           Procedure:    Bladder catheterization  Date/Time: 3/27/2019 1:34 PM  Performed by: Aydin Che RN  Authorized by: Natanael Leon MD     Patient location:  Bedside  Consent:     Consent obtained:  Verbal    Consent given by:  Spouse  Universal protocol:     Procedure explained and questions answered to patient or proxy's satisfaction: yes      Relevant documents present and verified: yes      Patient identity confirmed:  Provided demographic data and verbally with patient  Pre-procedure details:     Procedure purpose:  Therapeutic    Preparation: Patient was prepped and draped in usual sterile fashion    Anesthesia (see MAR for exact dosages): Anesthesia method:  None  Procedure details:     Bladder irrigation: yes      Catheter insertion:  Indwelling    Approach: natural orifice      Catheter type:  Coude and latex    Catheter size:  18 Fr    Number of attempts:  1    Successful placement: yes      Urine characteristics:  Cloudy, foul smelling and yellow  Post-procedure details:     Patient tolerance of procedure: Tolerated well, no immediate complications  Comments:      Existing 18 FR lucia removed after deflation of balloon, intact    Inserted 18 FR coude tip lucia catheter, 10 ml balloon,  Lucia irrigated with 250 ml sterile water, for cloudy, foul smelling urine, total 550 drained from bladder  Thorne attached to bedside drainage bag  Patient tolerated well with no complications noted              Lanice Angelucci, RN

## 2019-04-02 LAB
CREAT ?TM UR-SCNC: 166 UMOL/L
EXT PROTEIN URINE: 131.6
PROT/CREAT UR: 0.79 MG/G{CREAT}

## 2019-04-11 ENCOUNTER — OFFICE VISIT (OUTPATIENT)
Dept: NEPHROLOGY | Facility: CLINIC | Age: 69
End: 2019-04-11
Payer: COMMERCIAL

## 2019-04-11 ENCOUNTER — TELEPHONE (OUTPATIENT)
Dept: UROLOGY | Facility: CLINIC | Age: 69
End: 2019-04-11

## 2019-04-11 VITALS — DIASTOLIC BLOOD PRESSURE: 70 MMHG | SYSTOLIC BLOOD PRESSURE: 110 MMHG

## 2019-04-11 DIAGNOSIS — N18.2 CKD (CHRONIC KIDNEY DISEASE) STAGE 2, GFR 60-89 ML/MIN: ICD-10-CM

## 2019-04-11 DIAGNOSIS — I10 ESSENTIAL HYPERTENSION: Primary | Chronic | ICD-10-CM

## 2019-04-11 DIAGNOSIS — R33.9 RETENTION OF URINE: Chronic | ICD-10-CM

## 2019-04-11 DIAGNOSIS — I10 HTN, GOAL BELOW 140/90: ICD-10-CM

## 2019-04-11 PROCEDURE — 99213 OFFICE O/P EST LOW 20 MIN: CPT | Performed by: INTERNAL MEDICINE

## 2019-04-12 LAB
CREAT ?TM UR-SCNC: 166 UMOL/L
EXT BLOOD, UA: NORMAL
EXT GLUCOSE BLD: 105
EXT GLUCOSE, UA: NORMAL
EXT KETONES: NORMAL
EXT NITRITE, UA: POSITIVE
EXT PH, UA: 9
EXT PROTEIN URINE: 131.6
EXT PROTEIN, UA: NORMAL
EXT SPECIFIC GRAVITY, UA: 1.01
EXTERNAL ALBUMIN: 2.6
EXTERNAL ALK PHOS: 87
EXTERNAL ALT: 16
EXTERNAL ANION GAP: 6
EXTERNAL AST: 8
EXTERNAL BACTERIA (UA): NORMAL
EXTERNAL BICARBONATE: 28
EXTERNAL BUN: 15
EXTERNAL CALCIUM: 8.3
EXTERNAL CHLORIDE: 109
EXTERNAL CREATININE: 1.35
EXTERNAL EGFR: 53
EXTERNAL MAGNESIUM: 2.1
EXTERNAL POTASSIUM: 3.8
EXTERNAL PTH: 38.1
EXTERNAL RBC (UA): NORMAL
EXTERNAL SODIUM: 143
EXTERNAL T.BILIRUBIN: 0.4
EXTERNAL TOTAL PROTEIN: 6
EXTERNAL WBC (UA): NORMAL
HCT VFR BLD AUTO: 44 % (ref 36.5–49.3)
HGB BLD-MCNC: 14.7 G/DL (ref 12–17)
PLATELET # BLD AUTO: 252 THOUSANDS/UL (ref 149–390)
PROT/CREAT UR: 0.79 MG/G{CREAT}
WBC # BLD AUTO: 6.7 THOUSAND/UL
WBC # BLD EST: NORMAL 10*3/UL

## 2019-04-21 ENCOUNTER — HOSPITAL ENCOUNTER (INPATIENT)
Facility: HOSPITAL | Age: 69
LOS: 4 days | Discharge: NURSING, REHAB OR LONG-TERM CARE | DRG: 698 | End: 2019-04-25
Attending: EMERGENCY MEDICINE | Admitting: INTERNAL MEDICINE
Payer: MEDICARE

## 2019-04-21 DIAGNOSIS — T83.011A MALFUNCTION OF FOLEY CATHETER, INITIAL ENCOUNTER (HCC): ICD-10-CM

## 2019-04-21 DIAGNOSIS — Z16.12 ESBL (EXTENDED SPECTRUM BETA-LACTAMASE) PRODUCING BACTERIA INFECTION: ICD-10-CM

## 2019-04-21 DIAGNOSIS — Z87.448 H/O URETHRAL STRICTURE: ICD-10-CM

## 2019-04-21 DIAGNOSIS — A49.9 ESBL (EXTENDED SPECTRUM BETA-LACTAMASE) PRODUCING BACTERIA INFECTION: ICD-10-CM

## 2019-04-21 DIAGNOSIS — A41.9 SEPSIS (HCC): Primary | ICD-10-CM

## 2019-04-21 LAB
ALBUMIN SERPL BCP-MCNC: 3 G/DL (ref 3.5–5)
ALP SERPL-CCNC: 101 U/L (ref 46–116)
ALT SERPL W P-5'-P-CCNC: 16 U/L (ref 12–78)
ANION GAP SERPL CALCULATED.3IONS-SCNC: 11 MMOL/L (ref 4–13)
APTT PPP: 32 SECONDS (ref 26–38)
AST SERPL W P-5'-P-CCNC: 12 U/L (ref 5–45)
BACTERIA UR QL AUTO: ABNORMAL /HPF
BASOPHILS # BLD AUTO: 0.06 THOUSANDS/ΜL (ref 0–0.1)
BASOPHILS NFR BLD AUTO: 1 % (ref 0–1)
BILIRUB DIRECT SERPL-MCNC: 0.11 MG/DL (ref 0–0.2)
BILIRUB SERPL-MCNC: 0.2 MG/DL (ref 0.2–1)
BILIRUB UR QL STRIP: NEGATIVE
BUN SERPL-MCNC: 19 MG/DL (ref 5–25)
CALCIUM SERPL-MCNC: 8.9 MG/DL (ref 8.3–10.1)
CHLORIDE SERPL-SCNC: 103 MMOL/L (ref 100–108)
CLARITY UR: ABNORMAL
CO2 SERPL-SCNC: 25 MMOL/L (ref 21–32)
COLOR UR: YELLOW
CREAT SERPL-MCNC: 1.64 MG/DL (ref 0.6–1.3)
EOSINOPHIL # BLD AUTO: 0.38 THOUSAND/ΜL (ref 0–0.61)
EOSINOPHIL NFR BLD AUTO: 3 % (ref 0–6)
ERYTHROCYTE [DISTWIDTH] IN BLOOD BY AUTOMATED COUNT: 13.9 % (ref 11.6–15.1)
GFR SERPL CREATININE-BSD FRML MDRD: 42 ML/MIN/1.73SQ M
GLUCOSE SERPL-MCNC: 119 MG/DL (ref 65–140)
GLUCOSE UR STRIP-MCNC: NEGATIVE MG/DL
HCT VFR BLD AUTO: 46 % (ref 36.5–49.3)
HGB BLD-MCNC: 15.1 G/DL (ref 12–17)
HGB UR QL STRIP.AUTO: ABNORMAL
IMM GRANULOCYTES # BLD AUTO: 0.05 THOUSAND/UL (ref 0–0.2)
IMM GRANULOCYTES NFR BLD AUTO: 0 % (ref 0–2)
INR PPP: 1.04 (ref 0.86–1.17)
KETONES UR STRIP-MCNC: NEGATIVE MG/DL
LACTATE SERPL-SCNC: 1.8 MMOL/L (ref 0.5–2)
LACTATE SERPL-SCNC: 2.5 MMOL/L (ref 0.5–2)
LEUKOCYTE ESTERASE UR QL STRIP: ABNORMAL
LYMPHOCYTES # BLD AUTO: 0.85 THOUSANDS/ΜL (ref 0.6–4.47)
LYMPHOCYTES NFR BLD AUTO: 7 % (ref 14–44)
MAGNESIUM SERPL-MCNC: 1.9 MG/DL (ref 1.6–2.6)
MCH RBC QN AUTO: 29.7 PG (ref 26.8–34.3)
MCHC RBC AUTO-ENTMCNC: 32.8 G/DL (ref 31.4–37.4)
MCV RBC AUTO: 90 FL (ref 82–98)
MONOCYTES # BLD AUTO: 0.96 THOUSAND/ΜL (ref 0.17–1.22)
MONOCYTES NFR BLD AUTO: 7 % (ref 4–12)
NEUTROPHILS # BLD AUTO: 10.65 THOUSANDS/ΜL (ref 1.85–7.62)
NEUTS SEG NFR BLD AUTO: 82 % (ref 43–75)
NITRITE UR QL STRIP: NEGATIVE
NON-SQ EPI CELLS URNS QL MICRO: ABNORMAL /HPF
NRBC BLD AUTO-RTO: 0 /100 WBCS
PH UR STRIP.AUTO: 8 [PH]
PLATELET # BLD AUTO: 358 THOUSANDS/UL (ref 149–390)
PMV BLD AUTO: 9 FL (ref 8.9–12.7)
POTASSIUM SERPL-SCNC: 3.6 MMOL/L (ref 3.5–5.3)
PROT SERPL-MCNC: 7.6 G/DL (ref 6.4–8.2)
PROT UR STRIP-MCNC: ABNORMAL MG/DL
PROTHROMBIN TIME: 13.3 SECONDS (ref 11.8–14.2)
RBC # BLD AUTO: 5.09 MILLION/UL (ref 3.88–5.62)
RBC #/AREA URNS AUTO: ABNORMAL /HPF
SODIUM SERPL-SCNC: 139 MMOL/L (ref 136–145)
SP GR UR STRIP.AUTO: 1.02 (ref 1–1.03)
TRI-PHOS CRY URNS QL MICRO: ABNORMAL /HPF
UROBILINOGEN UR QL STRIP.AUTO: 0.2 E.U./DL
WBC # BLD AUTO: 12.95 THOUSAND/UL (ref 4.31–10.16)
WBC #/AREA URNS AUTO: ABNORMAL /HPF

## 2019-04-21 PROCEDURE — 36415 COLL VENOUS BLD VENIPUNCTURE: CPT | Performed by: PHYSICIAN ASSISTANT

## 2019-04-21 PROCEDURE — 83735 ASSAY OF MAGNESIUM: CPT | Performed by: PHYSICIAN ASSISTANT

## 2019-04-21 PROCEDURE — 99285 EMERGENCY DEPT VISIT HI MDM: CPT

## 2019-04-21 PROCEDURE — 0T2BX0Z CHANGE DRAINAGE DEVICE IN BLADDER, EXTERNAL APPROACH: ICD-10-PCS | Performed by: EMERGENCY MEDICINE

## 2019-04-21 PROCEDURE — 83605 ASSAY OF LACTIC ACID: CPT | Performed by: PHYSICIAN ASSISTANT

## 2019-04-21 PROCEDURE — 87086 URINE CULTURE/COLONY COUNT: CPT | Performed by: PHYSICIAN ASSISTANT

## 2019-04-21 PROCEDURE — 81001 URINALYSIS AUTO W/SCOPE: CPT | Performed by: PHYSICIAN ASSISTANT

## 2019-04-21 PROCEDURE — 99223 1ST HOSP IP/OBS HIGH 75: CPT | Performed by: PHYSICIAN ASSISTANT

## 2019-04-21 PROCEDURE — 85610 PROTHROMBIN TIME: CPT | Performed by: PHYSICIAN ASSISTANT

## 2019-04-21 PROCEDURE — 87040 BLOOD CULTURE FOR BACTERIA: CPT | Performed by: PHYSICIAN ASSISTANT

## 2019-04-21 PROCEDURE — 80076 HEPATIC FUNCTION PANEL: CPT | Performed by: PHYSICIAN ASSISTANT

## 2019-04-21 PROCEDURE — 87077 CULTURE AEROBIC IDENTIFY: CPT | Performed by: PHYSICIAN ASSISTANT

## 2019-04-21 PROCEDURE — 80048 BASIC METABOLIC PNL TOTAL CA: CPT | Performed by: PHYSICIAN ASSISTANT

## 2019-04-21 PROCEDURE — 99285 EMERGENCY DEPT VISIT HI MDM: CPT | Performed by: PHYSICIAN ASSISTANT

## 2019-04-21 PROCEDURE — 87186 SC STD MICRODIL/AGAR DIL: CPT | Performed by: PHYSICIAN ASSISTANT

## 2019-04-21 PROCEDURE — 85025 COMPLETE CBC W/AUTO DIFF WBC: CPT | Performed by: PHYSICIAN ASSISTANT

## 2019-04-21 PROCEDURE — 85730 THROMBOPLASTIN TIME PARTIAL: CPT | Performed by: PHYSICIAN ASSISTANT

## 2019-04-21 RX ORDER — POTASSIUM CHLORIDE 20MEQ/15ML
40 LIQUID (ML) ORAL ONCE
Status: COMPLETED | OUTPATIENT
Start: 2019-04-21 | End: 2019-04-21

## 2019-04-21 RX ORDER — MAGNESIUM SULFATE 1 G/100ML
1 INJECTION INTRAVENOUS ONCE
Status: COMPLETED | OUTPATIENT
Start: 2019-04-22 | End: 2019-04-22

## 2019-04-21 RX ORDER — ACETAMINOPHEN 325 MG/1
650 TABLET ORAL EVERY 6 HOURS PRN
Status: DISCONTINUED | OUTPATIENT
Start: 2019-04-21 | End: 2019-04-25 | Stop reason: HOSPADM

## 2019-04-21 RX ORDER — SODIUM CHLORIDE, SODIUM LACTATE, POTASSIUM CHLORIDE, CALCIUM CHLORIDE 600; 310; 30; 20 MG/100ML; MG/100ML; MG/100ML; MG/100ML
1000 INJECTION, SOLUTION INTRAVENOUS CONTINUOUS
Status: DISCONTINUED | OUTPATIENT
Start: 2019-04-21 | End: 2019-04-21

## 2019-04-21 RX ORDER — TAMSULOSIN HYDROCHLORIDE 0.4 MG/1
0.4 CAPSULE ORAL
Status: DISCONTINUED | OUTPATIENT
Start: 2019-04-22 | End: 2019-04-25 | Stop reason: HOSPADM

## 2019-04-21 RX ORDER — LEVOFLOXACIN 5 MG/ML
750 INJECTION, SOLUTION INTRAVENOUS ONCE
Status: DISCONTINUED | OUTPATIENT
Start: 2019-04-21 | End: 2019-04-21

## 2019-04-21 RX ORDER — NYSTATIN 100000 [USP'U]/G
POWDER TOPICAL 2 TIMES DAILY
Status: DISCONTINUED | OUTPATIENT
Start: 2019-04-22 | End: 2019-04-25 | Stop reason: HOSPADM

## 2019-04-21 RX ORDER — SODIUM CHLORIDE, SODIUM LACTATE, POTASSIUM CHLORIDE, CALCIUM CHLORIDE 600; 310; 30; 20 MG/100ML; MG/100ML; MG/100ML; MG/100ML
1000 INJECTION, SOLUTION INTRAVENOUS ONCE
Status: COMPLETED | OUTPATIENT
Start: 2019-04-21 | End: 2019-04-21

## 2019-04-21 RX ORDER — DOCUSATE SODIUM 100 MG/1
100 CAPSULE, LIQUID FILLED ORAL 2 TIMES DAILY
Status: DISCONTINUED | OUTPATIENT
Start: 2019-04-22 | End: 2019-04-25 | Stop reason: HOSPADM

## 2019-04-21 RX ORDER — FAMOTIDINE 20 MG/1
20 TABLET, FILM COATED ORAL 2 TIMES DAILY
Status: DISCONTINUED | OUTPATIENT
Start: 2019-04-22 | End: 2019-04-25 | Stop reason: HOSPADM

## 2019-04-21 RX ORDER — BUSPIRONE HYDROCHLORIDE 5 MG/1
7.5 TABLET ORAL 3 TIMES DAILY
Status: DISCONTINUED | OUTPATIENT
Start: 2019-04-21 | End: 2019-04-25 | Stop reason: HOSPADM

## 2019-04-21 RX ORDER — MEMANTINE HYDROCHLORIDE 5 MG/1
5 TABLET ORAL 2 TIMES DAILY
Status: DISCONTINUED | OUTPATIENT
Start: 2019-04-22 | End: 2019-04-25 | Stop reason: HOSPADM

## 2019-04-21 RX ORDER — ERTAPENEM 1 G/1
1000 INJECTION, POWDER, LYOPHILIZED, FOR SOLUTION INTRAMUSCULAR; INTRAVENOUS EVERY 24 HOURS
Status: DISCONTINUED | OUTPATIENT
Start: 2019-04-21 | End: 2019-04-21 | Stop reason: SDUPTHER

## 2019-04-21 RX ORDER — POTASSIUM CHLORIDE 750 MG/1
10 TABLET, EXTENDED RELEASE ORAL 3 TIMES DAILY
Status: DISCONTINUED | OUTPATIENT
Start: 2019-04-21 | End: 2019-04-25 | Stop reason: HOSPADM

## 2019-04-21 RX ORDER — SODIUM CHLORIDE 9 MG/ML
50 INJECTION, SOLUTION INTRAVENOUS CONTINUOUS
Status: DISCONTINUED | OUTPATIENT
Start: 2019-04-21 | End: 2019-04-25 | Stop reason: HOSPADM

## 2019-04-21 RX ORDER — FENTANYL CITRATE/PF 50 MCG/ML
50 SYRINGE (ML) INJECTION ONCE
Status: COMPLETED | OUTPATIENT
Start: 2019-04-21 | End: 2019-04-21

## 2019-04-21 RX ORDER — MIRTAZAPINE 15 MG/1
15 TABLET, FILM COATED ORAL
Status: DISCONTINUED | OUTPATIENT
Start: 2019-04-21 | End: 2019-04-25 | Stop reason: HOSPADM

## 2019-04-21 RX ORDER — QUETIAPINE FUMARATE 25 MG/1
25 TABLET, FILM COATED ORAL
Status: DISCONTINUED | OUTPATIENT
Start: 2019-04-21 | End: 2019-04-25 | Stop reason: HOSPADM

## 2019-04-21 RX ORDER — ONDANSETRON 2 MG/ML
4 INJECTION INTRAMUSCULAR; INTRAVENOUS EVERY 6 HOURS PRN
Status: DISCONTINUED | OUTPATIENT
Start: 2019-04-21 | End: 2019-04-25 | Stop reason: HOSPADM

## 2019-04-21 RX ORDER — AMLODIPINE BESYLATE 5 MG/1
5 TABLET ORAL 2 TIMES DAILY
Status: DISCONTINUED | OUTPATIENT
Start: 2019-04-22 | End: 2019-04-25 | Stop reason: HOSPADM

## 2019-04-21 RX ADMIN — ACETAMINOPHEN 650 MG: 325 TABLET, FILM COATED ORAL at 23:46

## 2019-04-21 RX ADMIN — QUETIAPINE FUMARATE 25 MG: 25 TABLET ORAL at 23:52

## 2019-04-21 RX ADMIN — ERTAPENEM SODIUM 1000 MG: 1 INJECTION, POWDER, LYOPHILIZED, FOR SOLUTION INTRAMUSCULAR; INTRAVENOUS at 22:53

## 2019-04-21 RX ADMIN — SODIUM CHLORIDE, SODIUM LACTATE, POTASSIUM CHLORIDE, AND CALCIUM CHLORIDE 1000 ML: .6; .31; .03; .02 INJECTION, SOLUTION INTRAVENOUS at 22:35

## 2019-04-21 RX ADMIN — MIRTAZAPINE 15 MG: 15 TABLET, FILM COATED ORAL at 23:52

## 2019-04-21 RX ADMIN — FENTANYL CITRATE 50 MCG: 50 INJECTION, SOLUTION INTRAMUSCULAR; INTRAVENOUS at 22:29

## 2019-04-21 RX ADMIN — SODIUM CHLORIDE 50 ML/HR: 0.9 INJECTION, SOLUTION INTRAVENOUS at 23:50

## 2019-04-21 RX ADMIN — MAGNESIUM SULFATE HEPTAHYDRATE 1 G: 1 INJECTION, SOLUTION INTRAVENOUS at 23:55

## 2019-04-21 RX ADMIN — POTASSIUM CHLORIDE 40 MEQ: 20 SOLUTION ORAL at 23:49

## 2019-04-21 RX ADMIN — SODIUM CHLORIDE, SODIUM LACTATE, POTASSIUM CHLORIDE, AND CALCIUM CHLORIDE 1000 ML: .6; .31; .03; .02 INJECTION, SOLUTION INTRAVENOUS at 21:53

## 2019-04-22 ENCOUNTER — APPOINTMENT (INPATIENT)
Dept: ULTRASOUND IMAGING | Facility: HOSPITAL | Age: 69
DRG: 698 | End: 2019-04-22
Payer: MEDICARE

## 2019-04-22 LAB
ANION GAP SERPL CALCULATED.3IONS-SCNC: 9 MMOL/L (ref 4–13)
BUN SERPL-MCNC: 15 MG/DL (ref 5–25)
CALCIUM SERPL-MCNC: 8.6 MG/DL (ref 8.3–10.1)
CHLORIDE SERPL-SCNC: 104 MMOL/L (ref 100–108)
CO2 SERPL-SCNC: 24 MMOL/L (ref 21–32)
CREAT SERPL-MCNC: 1.41 MG/DL (ref 0.6–1.3)
ERYTHROCYTE [DISTWIDTH] IN BLOOD BY AUTOMATED COUNT: 13.9 % (ref 11.6–15.1)
GFR SERPL CREATININE-BSD FRML MDRD: 50 ML/MIN/1.73SQ M
GLUCOSE SERPL-MCNC: 116 MG/DL (ref 65–140)
HCT VFR BLD AUTO: 45.7 % (ref 36.5–49.3)
HGB BLD-MCNC: 14.9 G/DL (ref 12–17)
MCH RBC QN AUTO: 29.3 PG (ref 26.8–34.3)
MCHC RBC AUTO-ENTMCNC: 32.6 G/DL (ref 31.4–37.4)
MCV RBC AUTO: 90 FL (ref 82–98)
PLATELET # BLD AUTO: 344 THOUSANDS/UL (ref 149–390)
PMV BLD AUTO: 9.1 FL (ref 8.9–12.7)
POTASSIUM SERPL-SCNC: 4.5 MMOL/L (ref 3.5–5.3)
PROCALCITONIN SERPL-MCNC: 0.06 NG/ML
RBC # BLD AUTO: 5.08 MILLION/UL (ref 3.88–5.62)
SODIUM SERPL-SCNC: 137 MMOL/L (ref 136–145)
WBC # BLD AUTO: 12 THOUSAND/UL (ref 4.31–10.16)

## 2019-04-22 PROCEDURE — 99223 1ST HOSP IP/OBS HIGH 75: CPT | Performed by: INTERNAL MEDICINE

## 2019-04-22 PROCEDURE — 80048 BASIC METABOLIC PNL TOTAL CA: CPT | Performed by: PHYSICIAN ASSISTANT

## 2019-04-22 PROCEDURE — 85027 COMPLETE CBC AUTOMATED: CPT | Performed by: PHYSICIAN ASSISTANT

## 2019-04-22 PROCEDURE — 84145 PROCALCITONIN (PCT): CPT | Performed by: PHYSICIAN ASSISTANT

## 2019-04-22 PROCEDURE — 99254 IP/OBS CNSLTJ NEW/EST MOD 60: CPT | Performed by: UROLOGY

## 2019-04-22 PROCEDURE — 76770 US EXAM ABDO BACK WALL COMP: CPT

## 2019-04-22 PROCEDURE — 99232 SBSQ HOSP IP/OBS MODERATE 35: CPT | Performed by: INTERNAL MEDICINE

## 2019-04-22 RX ORDER — NITROFURANTOIN 25; 75 MG/1; MG/1
100 CAPSULE ORAL 2 TIMES DAILY WITH MEALS
Status: DISCONTINUED | OUTPATIENT
Start: 2019-04-22 | End: 2019-04-25 | Stop reason: HOSPADM

## 2019-04-22 RX ADMIN — DOCUSATE SODIUM 100 MG: 100 CAPSULE, LIQUID FILLED ORAL at 17:31

## 2019-04-22 RX ADMIN — DOCUSATE SODIUM 100 MG: 100 CAPSULE, LIQUID FILLED ORAL at 08:40

## 2019-04-22 RX ADMIN — MIRTAZAPINE 15 MG: 15 TABLET, FILM COATED ORAL at 21:18

## 2019-04-22 RX ADMIN — POTASSIUM CHLORIDE 10 MEQ: 750 TABLET, EXTENDED RELEASE ORAL at 21:18

## 2019-04-22 RX ADMIN — APIXABAN 2.5 MG: 2.5 TABLET, FILM COATED ORAL at 08:40

## 2019-04-22 RX ADMIN — QUETIAPINE FUMARATE 25 MG: 25 TABLET ORAL at 21:18

## 2019-04-22 RX ADMIN — METOPROLOL TARTRATE 25 MG: 25 TABLET, FILM COATED ORAL at 17:30

## 2019-04-22 RX ADMIN — APIXABAN 2.5 MG: 2.5 TABLET, FILM COATED ORAL at 17:30

## 2019-04-22 RX ADMIN — METOPROLOL TARTRATE 25 MG: 25 TABLET, FILM COATED ORAL at 08:39

## 2019-04-22 RX ADMIN — POTASSIUM CHLORIDE 10 MEQ: 750 TABLET, EXTENDED RELEASE ORAL at 17:31

## 2019-04-22 RX ADMIN — NYSTATIN: 100000 POWDER TOPICAL at 17:31

## 2019-04-22 RX ADMIN — CEFEPIME HYDROCHLORIDE 2000 MG: 2 INJECTION, POWDER, FOR SOLUTION INTRAVENOUS at 15:45

## 2019-04-22 RX ADMIN — AMLODIPINE BESYLATE 5 MG: 5 TABLET ORAL at 17:31

## 2019-04-22 RX ADMIN — FAMOTIDINE 20 MG: 20 TABLET ORAL at 17:31

## 2019-04-22 RX ADMIN — POTASSIUM CHLORIDE 10 MEQ: 750 TABLET, EXTENDED RELEASE ORAL at 08:40

## 2019-04-22 RX ADMIN — TAMSULOSIN HYDROCHLORIDE 0.4 MG: 0.4 CAPSULE ORAL at 17:31

## 2019-04-22 RX ADMIN — MEMANTINE 5 MG: 5 TABLET ORAL at 17:30

## 2019-04-22 RX ADMIN — FAMOTIDINE 20 MG: 20 TABLET ORAL at 08:40

## 2019-04-22 RX ADMIN — SODIUM CHLORIDE 50 ML/HR: 0.9 INJECTION, SOLUTION INTRAVENOUS at 15:50

## 2019-04-22 RX ADMIN — BUSPIRONE HYDROCHLORIDE 7.5 MG: 5 TABLET ORAL at 21:18

## 2019-04-22 RX ADMIN — BUSPIRONE HYDROCHLORIDE 7.5 MG: 5 TABLET ORAL at 08:40

## 2019-04-22 RX ADMIN — ATROPA BELLADONNA AND OPIUM 1 SUPPOSITORY: 16.2; 3 SUPPOSITORY RECTAL at 10:54

## 2019-04-22 RX ADMIN — ATROPA BELLADONNA AND OPIUM 1 SUPPOSITORY: 16.2; 3 SUPPOSITORY RECTAL at 17:31

## 2019-04-22 RX ADMIN — NITROFURANTOIN MONOHYDRATE AND NITROFURANTOIN MACROCRYSTALLINE 100 MG: 75; 25 CAPSULE ORAL at 17:30

## 2019-04-22 RX ADMIN — BUSPIRONE HYDROCHLORIDE 7.5 MG: 5 TABLET ORAL at 17:32

## 2019-04-22 RX ADMIN — AMLODIPINE BESYLATE 5 MG: 5 TABLET ORAL at 08:39

## 2019-04-22 RX ADMIN — ATROPA BELLADONNA AND OPIUM 1 SUPPOSITORY: 16.2; 3 SUPPOSITORY RECTAL at 23:29

## 2019-04-22 RX ADMIN — MEMANTINE 5 MG: 5 TABLET ORAL at 08:39

## 2019-04-22 RX ADMIN — NYSTATIN: 100000 POWDER TOPICAL at 08:41

## 2019-04-23 ENCOUNTER — TELEPHONE (OUTPATIENT)
Dept: OTHER | Facility: HOSPITAL | Age: 69
End: 2019-04-23

## 2019-04-23 ENCOUNTER — APPOINTMENT (INPATIENT)
Dept: CT IMAGING | Facility: HOSPITAL | Age: 69
DRG: 698 | End: 2019-04-23
Payer: MEDICARE

## 2019-04-23 LAB
ANION GAP SERPL CALCULATED.3IONS-SCNC: 7 MMOL/L (ref 4–13)
BASOPHILS # BLD AUTO: 0.05 THOUSANDS/ΜL (ref 0–0.1)
BASOPHILS NFR BLD AUTO: 1 % (ref 0–1)
BUN SERPL-MCNC: 12 MG/DL (ref 5–25)
CALCIUM SERPL-MCNC: 8.5 MG/DL (ref 8.3–10.1)
CHLORIDE SERPL-SCNC: 109 MMOL/L (ref 100–108)
CO2 SERPL-SCNC: 25 MMOL/L (ref 21–32)
CREAT SERPL-MCNC: 1.2 MG/DL (ref 0.6–1.3)
EOSINOPHIL # BLD AUTO: 0.97 THOUSAND/ΜL (ref 0–0.61)
EOSINOPHIL NFR BLD AUTO: 15 % (ref 0–6)
ERYTHROCYTE [DISTWIDTH] IN BLOOD BY AUTOMATED COUNT: 13.8 % (ref 11.6–15.1)
GFR SERPL CREATININE-BSD FRML MDRD: 61 ML/MIN/1.73SQ M
GLUCOSE SERPL-MCNC: 89 MG/DL (ref 65–140)
HCT VFR BLD AUTO: 43.2 % (ref 36.5–49.3)
HGB BLD-MCNC: 13.8 G/DL (ref 12–17)
IMM GRANULOCYTES # BLD AUTO: 0.02 THOUSAND/UL (ref 0–0.2)
IMM GRANULOCYTES NFR BLD AUTO: 0 % (ref 0–2)
LYMPHOCYTES # BLD AUTO: 1.35 THOUSANDS/ΜL (ref 0.6–4.47)
LYMPHOCYTES NFR BLD AUTO: 21 % (ref 14–44)
MCH RBC QN AUTO: 29 PG (ref 26.8–34.3)
MCHC RBC AUTO-ENTMCNC: 31.9 G/DL (ref 31.4–37.4)
MCV RBC AUTO: 91 FL (ref 82–98)
MONOCYTES # BLD AUTO: 0.5 THOUSAND/ΜL (ref 0.17–1.22)
MONOCYTES NFR BLD AUTO: 8 % (ref 4–12)
NEUTROPHILS # BLD AUTO: 3.7 THOUSANDS/ΜL (ref 1.85–7.62)
NEUTS SEG NFR BLD AUTO: 55 % (ref 43–75)
NRBC BLD AUTO-RTO: 0 /100 WBCS
PLATELET # BLD AUTO: 310 THOUSANDS/UL (ref 149–390)
PMV BLD AUTO: 9.6 FL (ref 8.9–12.7)
POTASSIUM SERPL-SCNC: 3.9 MMOL/L (ref 3.5–5.3)
RBC # BLD AUTO: 4.76 MILLION/UL (ref 3.88–5.62)
SODIUM SERPL-SCNC: 141 MMOL/L (ref 136–145)
WBC # BLD AUTO: 6.59 THOUSAND/UL (ref 4.31–10.16)

## 2019-04-23 PROCEDURE — 85025 COMPLETE CBC W/AUTO DIFF WBC: CPT | Performed by: PHYSICIAN ASSISTANT

## 2019-04-23 PROCEDURE — 74176 CT ABD & PELVIS W/O CONTRAST: CPT

## 2019-04-23 PROCEDURE — 80048 BASIC METABOLIC PNL TOTAL CA: CPT | Performed by: PHYSICIAN ASSISTANT

## 2019-04-23 PROCEDURE — 99232 SBSQ HOSP IP/OBS MODERATE 35: CPT | Performed by: INTERNAL MEDICINE

## 2019-04-23 RX ORDER — POLYETHYLENE GLYCOL 3350 17 G/17G
17 POWDER, FOR SOLUTION ORAL DAILY
Status: DISCONTINUED | OUTPATIENT
Start: 2019-04-23 | End: 2019-04-25 | Stop reason: HOSPADM

## 2019-04-23 RX ADMIN — DOCUSATE SODIUM 100 MG: 100 CAPSULE, LIQUID FILLED ORAL at 09:45

## 2019-04-23 RX ADMIN — AMLODIPINE BESYLATE 5 MG: 5 TABLET ORAL at 09:47

## 2019-04-23 RX ADMIN — BUSPIRONE HYDROCHLORIDE 7.5 MG: 5 TABLET ORAL at 21:01

## 2019-04-23 RX ADMIN — FAMOTIDINE 20 MG: 20 TABLET ORAL at 09:45

## 2019-04-23 RX ADMIN — METOPROLOL TARTRATE 25 MG: 25 TABLET, FILM COATED ORAL at 09:45

## 2019-04-23 RX ADMIN — MEMANTINE 5 MG: 5 TABLET ORAL at 17:28

## 2019-04-23 RX ADMIN — APIXABAN 2.5 MG: 2.5 TABLET, FILM COATED ORAL at 09:38

## 2019-04-23 RX ADMIN — APIXABAN 2.5 MG: 2.5 TABLET, FILM COATED ORAL at 17:27

## 2019-04-23 RX ADMIN — POTASSIUM CHLORIDE 10 MEQ: 750 TABLET, EXTENDED RELEASE ORAL at 09:45

## 2019-04-23 RX ADMIN — NITROFURANTOIN MONOHYDRATE AND NITROFURANTOIN MACROCRYSTALLINE 100 MG: 75; 25 CAPSULE ORAL at 09:46

## 2019-04-23 RX ADMIN — POTASSIUM CHLORIDE 10 MEQ: 750 TABLET, EXTENDED RELEASE ORAL at 21:01

## 2019-04-23 RX ADMIN — FAMOTIDINE 20 MG: 20 TABLET ORAL at 17:29

## 2019-04-23 RX ADMIN — ATROPA BELLADONNA AND OPIUM 1 SUPPOSITORY: 16.2; 3 SUPPOSITORY RECTAL at 11:21

## 2019-04-23 RX ADMIN — NYSTATIN 1 APPLICATION: 100000 POWDER TOPICAL at 17:41

## 2019-04-23 RX ADMIN — ATROPA BELLADONNA AND OPIUM 1 SUPPOSITORY: 16.2; 3 SUPPOSITORY RECTAL at 17:34

## 2019-04-23 RX ADMIN — POTASSIUM CHLORIDE 10 MEQ: 750 TABLET, EXTENDED RELEASE ORAL at 17:29

## 2019-04-23 RX ADMIN — AMLODIPINE BESYLATE 5 MG: 5 TABLET ORAL at 17:29

## 2019-04-23 RX ADMIN — DOCUSATE SODIUM 100 MG: 100 CAPSULE, LIQUID FILLED ORAL at 17:28

## 2019-04-23 RX ADMIN — CEFEPIME HYDROCHLORIDE 2000 MG: 2 INJECTION, POWDER, FOR SOLUTION INTRAVENOUS at 15:09

## 2019-04-23 RX ADMIN — MEMANTINE 5 MG: 5 TABLET ORAL at 09:38

## 2019-04-23 RX ADMIN — NITROFURANTOIN MONOHYDRATE AND NITROFURANTOIN MACROCRYSTALLINE 100 MG: 75; 25 CAPSULE ORAL at 17:29

## 2019-04-23 RX ADMIN — QUETIAPINE FUMARATE 25 MG: 25 TABLET ORAL at 21:01

## 2019-04-23 RX ADMIN — NYSTATIN: 100000 POWDER TOPICAL at 09:47

## 2019-04-23 RX ADMIN — BUSPIRONE HYDROCHLORIDE 7.5 MG: 5 TABLET ORAL at 09:38

## 2019-04-23 RX ADMIN — BUSPIRONE HYDROCHLORIDE 7.5 MG: 5 TABLET ORAL at 17:44

## 2019-04-23 RX ADMIN — CEFEPIME HYDROCHLORIDE 2000 MG: 2 INJECTION, POWDER, FOR SOLUTION INTRAVENOUS at 02:43

## 2019-04-23 RX ADMIN — METOPROLOL TARTRATE 25 MG: 25 TABLET, FILM COATED ORAL at 17:32

## 2019-04-23 RX ADMIN — MIRTAZAPINE 15 MG: 15 TABLET, FILM COATED ORAL at 21:01

## 2019-04-23 RX ADMIN — SODIUM CHLORIDE 50 ML/HR: 0.9 INJECTION, SOLUTION INTRAVENOUS at 15:10

## 2019-04-23 RX ADMIN — TAMSULOSIN HYDROCHLORIDE 0.4 MG: 0.4 CAPSULE ORAL at 17:29

## 2019-04-23 RX ADMIN — ATROPA BELLADONNA AND OPIUM 1 SUPPOSITORY: 16.2; 3 SUPPOSITORY RECTAL at 23:57

## 2019-04-24 PROBLEM — R41.82 ALTERED MENTAL STATUS: Status: RESOLVED | Noted: 2017-02-26 | Resolved: 2019-04-24

## 2019-04-24 PROBLEM — R65.20 GRAM-NEGATIVE SEPSIS WITH ORGAN DYSFUNCTION (HCC): Status: RESOLVED | Noted: 2017-06-20 | Resolved: 2019-04-24

## 2019-04-24 PROBLEM — Z16.30 INFECTION OF DRUG-RESISTANT BACTERIA: Status: RESOLVED | Noted: 2017-05-11 | Resolved: 2019-04-24

## 2019-04-24 PROBLEM — E87.20 LACTIC ACIDOSIS: Status: RESOLVED | Noted: 2017-05-03 | Resolved: 2019-04-24

## 2019-04-24 PROBLEM — A41.50 GRAM-NEGATIVE SEPSIS WITH ORGAN DYSFUNCTION (HCC): Status: RESOLVED | Noted: 2017-06-20 | Resolved: 2019-04-24

## 2019-04-24 PROBLEM — R31.0 GROSS HEMATURIA: Status: RESOLVED | Noted: 2018-11-29 | Resolved: 2019-04-24

## 2019-04-24 PROBLEM — E87.2 LACTIC ACIDOSIS: Status: RESOLVED | Noted: 2017-05-03 | Resolved: 2019-04-24

## 2019-04-24 PROBLEM — R65.21 SEPTIC SHOCK (HCC): Status: RESOLVED | Noted: 2017-05-01 | Resolved: 2019-04-24

## 2019-04-24 PROBLEM — M25.9 KNEE PROBLEM: Status: RESOLVED | Noted: 2017-05-10 | Resolved: 2019-04-24

## 2019-04-24 PROBLEM — N36.5 FALSE PASSAGE OF URETHRA: Status: RESOLVED | Noted: 2018-02-12 | Resolved: 2019-04-24

## 2019-04-24 PROBLEM — F99 MENTAL DISORDER: Status: RESOLVED | Noted: 2017-05-10 | Resolved: 2019-04-24

## 2019-04-24 PROBLEM — G93.40 ENCEPHALOPATHY: Status: RESOLVED | Noted: 2017-02-26 | Resolved: 2019-04-24

## 2019-04-24 PROBLEM — A49.9 INFECTION OF DRUG-RESISTANT BACTERIA: Status: RESOLVED | Noted: 2017-05-11 | Resolved: 2019-04-24

## 2019-04-24 PROBLEM — T83.091D: Status: RESOLVED | Noted: 2018-02-12 | Resolved: 2019-04-24

## 2019-04-24 PROBLEM — D72.829 LEUKOCYTOSIS: Status: RESOLVED | Noted: 2017-05-01 | Resolved: 2019-04-24

## 2019-04-24 PROBLEM — K63.9 BOWEL TROUBLE: Status: RESOLVED | Noted: 2017-05-10 | Resolved: 2019-04-24

## 2019-04-24 PROBLEM — A49.9 ESBL (EXTENDED SPECTRUM BETA-LACTAMASE) PRODUCING BACTERIA INFECTION: Status: RESOLVED | Noted: 2017-09-26 | Resolved: 2019-04-24

## 2019-04-24 PROBLEM — T83.9XXS: Status: RESOLVED | Noted: 2018-11-29 | Resolved: 2019-04-24

## 2019-04-24 PROBLEM — Z16.12 ESBL (EXTENDED SPECTRUM BETA-LACTAMASE) PRODUCING BACTERIA INFECTION: Status: RESOLVED | Noted: 2017-09-26 | Resolved: 2019-04-24

## 2019-04-24 PROBLEM — A41.9 SEPTIC SHOCK (HCC): Status: RESOLVED | Noted: 2017-05-01 | Resolved: 2019-04-24

## 2019-04-24 LAB
BACTERIA UR CULT: ABNORMAL

## 2019-04-24 PROCEDURE — 99232 SBSQ HOSP IP/OBS MODERATE 35: CPT | Performed by: INTERNAL MEDICINE

## 2019-04-24 PROCEDURE — 99232 SBSQ HOSP IP/OBS MODERATE 35: CPT | Performed by: PHYSICIAN ASSISTANT

## 2019-04-24 RX ORDER — CEFDINIR 300 MG/1
300 CAPSULE ORAL EVERY 12 HOURS SCHEDULED
Status: DISCONTINUED | OUTPATIENT
Start: 2019-04-24 | End: 2019-04-25 | Stop reason: HOSPADM

## 2019-04-24 RX ADMIN — BUSPIRONE HYDROCHLORIDE 7.5 MG: 5 TABLET ORAL at 17:32

## 2019-04-24 RX ADMIN — CEFEPIME HYDROCHLORIDE 2000 MG: 2 INJECTION, POWDER, FOR SOLUTION INTRAVENOUS at 04:26

## 2019-04-24 RX ADMIN — POTASSIUM CHLORIDE 10 MEQ: 750 TABLET, EXTENDED RELEASE ORAL at 17:32

## 2019-04-24 RX ADMIN — CEFEPIME HYDROCHLORIDE 2000 MG: 2 INJECTION, POWDER, FOR SOLUTION INTRAVENOUS at 14:30

## 2019-04-24 RX ADMIN — MIRTAZAPINE 15 MG: 15 TABLET, FILM COATED ORAL at 21:59

## 2019-04-24 RX ADMIN — ATROPA BELLADONNA AND OPIUM 1 SUPPOSITORY: 16.2; 3 SUPPOSITORY RECTAL at 19:15

## 2019-04-24 RX ADMIN — AMLODIPINE BESYLATE 5 MG: 5 TABLET ORAL at 10:10

## 2019-04-24 RX ADMIN — NITROFURANTOIN MONOHYDRATE AND NITROFURANTOIN MACROCRYSTALLINE 100 MG: 75; 25 CAPSULE ORAL at 10:09

## 2019-04-24 RX ADMIN — ATROPA BELLADONNA AND OPIUM 1 SUPPOSITORY: 16.2; 3 SUPPOSITORY RECTAL at 10:23

## 2019-04-24 RX ADMIN — QUETIAPINE FUMARATE 25 MG: 25 TABLET ORAL at 21:59

## 2019-04-24 RX ADMIN — NITROFURANTOIN MONOHYDRATE AND NITROFURANTOIN MACROCRYSTALLINE 100 MG: 75; 25 CAPSULE ORAL at 17:31

## 2019-04-24 RX ADMIN — SODIUM CHLORIDE 50 ML/HR: 0.9 INJECTION, SOLUTION INTRAVENOUS at 10:23

## 2019-04-24 RX ADMIN — METOPROLOL TARTRATE 25 MG: 25 TABLET, FILM COATED ORAL at 17:32

## 2019-04-24 RX ADMIN — APIXABAN 2.5 MG: 2.5 TABLET, FILM COATED ORAL at 17:31

## 2019-04-24 RX ADMIN — METOPROLOL TARTRATE 25 MG: 25 TABLET, FILM COATED ORAL at 10:10

## 2019-04-24 RX ADMIN — POTASSIUM CHLORIDE 10 MEQ: 750 TABLET, EXTENDED RELEASE ORAL at 10:09

## 2019-04-24 RX ADMIN — POTASSIUM CHLORIDE 10 MEQ: 750 TABLET, EXTENDED RELEASE ORAL at 21:59

## 2019-04-24 RX ADMIN — APIXABAN 2.5 MG: 2.5 TABLET, FILM COATED ORAL at 10:09

## 2019-04-24 RX ADMIN — DOCUSATE SODIUM 100 MG: 100 CAPSULE, LIQUID FILLED ORAL at 17:31

## 2019-04-24 RX ADMIN — DOCUSATE SODIUM 100 MG: 100 CAPSULE, LIQUID FILLED ORAL at 10:09

## 2019-04-24 RX ADMIN — MEMANTINE 5 MG: 5 TABLET ORAL at 10:10

## 2019-04-24 RX ADMIN — CEFDINIR 300 MG: 300 CAPSULE ORAL at 21:58

## 2019-04-24 RX ADMIN — TAMSULOSIN HYDROCHLORIDE 0.4 MG: 0.4 CAPSULE ORAL at 17:32

## 2019-04-24 RX ADMIN — AMLODIPINE BESYLATE 5 MG: 5 TABLET ORAL at 17:32

## 2019-04-24 RX ADMIN — POLYETHYLENE GLYCOL 3350 17 G: 17 POWDER, FOR SOLUTION ORAL at 10:08

## 2019-04-24 RX ADMIN — NYSTATIN: 100000 POWDER TOPICAL at 17:33

## 2019-04-24 RX ADMIN — BUSPIRONE HYDROCHLORIDE 7.5 MG: 5 TABLET ORAL at 10:08

## 2019-04-24 RX ADMIN — FAMOTIDINE 20 MG: 20 TABLET ORAL at 17:32

## 2019-04-24 RX ADMIN — MEMANTINE 5 MG: 5 TABLET ORAL at 17:33

## 2019-04-24 RX ADMIN — BUSPIRONE HYDROCHLORIDE 7.5 MG: 5 TABLET ORAL at 21:58

## 2019-04-24 RX ADMIN — NYSTATIN: 100000 POWDER TOPICAL at 10:11

## 2019-04-24 RX ADMIN — FAMOTIDINE 20 MG: 20 TABLET ORAL at 10:10

## 2019-04-25 VITALS
HEIGHT: 71 IN | DIASTOLIC BLOOD PRESSURE: 80 MMHG | BODY MASS INDEX: 30.09 KG/M2 | WEIGHT: 214.95 LBS | TEMPERATURE: 98 F | OXYGEN SATURATION: 94 % | RESPIRATION RATE: 18 BRPM | SYSTOLIC BLOOD PRESSURE: 150 MMHG | HEART RATE: 77 BPM

## 2019-04-25 PROCEDURE — 99238 HOSP IP/OBS DSCHRG MGMT 30/<: CPT | Performed by: INTERNAL MEDICINE

## 2019-04-25 RX ORDER — CEFDINIR 300 MG/1
300 CAPSULE ORAL EVERY 12 HOURS SCHEDULED
Qty: 8 CAPSULE | Refills: 0
Start: 2019-04-25 | End: 2019-04-29

## 2019-04-25 RX ORDER — POLYETHYLENE GLYCOL 3350 17 G/17G
17 POWDER, FOR SOLUTION ORAL DAILY
Qty: 14 EACH | Refills: 0
Start: 2019-04-26

## 2019-04-25 RX ORDER — NITROFURANTOIN 25; 75 MG/1; MG/1
100 CAPSULE ORAL 2 TIMES DAILY WITH MEALS
Qty: 7 CAPSULE | Refills: 0
Start: 2019-04-25 | End: 2019-04-29

## 2019-04-25 RX ADMIN — CEFDINIR 300 MG: 300 CAPSULE ORAL at 09:08

## 2019-04-25 RX ADMIN — BUSPIRONE HYDROCHLORIDE 7.5 MG: 5 TABLET ORAL at 09:08

## 2019-04-25 RX ADMIN — MEMANTINE 5 MG: 5 TABLET ORAL at 09:09

## 2019-04-25 RX ADMIN — METOPROLOL TARTRATE 25 MG: 25 TABLET, FILM COATED ORAL at 09:09

## 2019-04-25 RX ADMIN — NYSTATIN: 100000 POWDER TOPICAL at 09:16

## 2019-04-25 RX ADMIN — POLYETHYLENE GLYCOL 3350 17 G: 17 POWDER, FOR SOLUTION ORAL at 09:11

## 2019-04-25 RX ADMIN — ATROPA BELLADONNA AND OPIUM 1 SUPPOSITORY: 16.2; 3 SUPPOSITORY RECTAL at 14:00

## 2019-04-25 RX ADMIN — POTASSIUM CHLORIDE 10 MEQ: 750 TABLET, EXTENDED RELEASE ORAL at 09:10

## 2019-04-25 RX ADMIN — FAMOTIDINE 20 MG: 20 TABLET ORAL at 09:09

## 2019-04-25 RX ADMIN — APIXABAN 2.5 MG: 2.5 TABLET, FILM COATED ORAL at 09:11

## 2019-04-25 RX ADMIN — AMLODIPINE BESYLATE 5 MG: 5 TABLET ORAL at 09:10

## 2019-04-25 RX ADMIN — SODIUM CHLORIDE 50 ML/HR: 0.9 INJECTION, SOLUTION INTRAVENOUS at 07:32

## 2019-04-25 RX ADMIN — DOCUSATE SODIUM 100 MG: 100 CAPSULE, LIQUID FILLED ORAL at 09:09

## 2019-04-25 RX ADMIN — ATROPA BELLADONNA AND OPIUM 1 SUPPOSITORY: 16.2; 3 SUPPOSITORY RECTAL at 01:22

## 2019-04-25 RX ADMIN — NITROFURANTOIN MONOHYDRATE AND NITROFURANTOIN MACROCRYSTALLINE 100 MG: 75; 25 CAPSULE ORAL at 09:08

## 2019-04-25 RX ADMIN — ATROPA BELLADONNA AND OPIUM 1 SUPPOSITORY: 16.2; 3 SUPPOSITORY RECTAL at 07:32

## 2019-04-27 LAB
BACTERIA BLD CULT: NORMAL
BACTERIA BLD CULT: NORMAL

## 2019-04-29 ENCOUNTER — TELEPHONE (OUTPATIENT)
Dept: UROLOGY | Facility: MEDICAL CENTER | Age: 69
End: 2019-04-29

## 2019-05-02 NOTE — TELEPHONE ENCOUNTER
Awaiting lab results. Patient will need to be scheduled for procedure visit lucia change with DR Prince in 4 weeks  Please contact spouse to schedule  Surgical procedure cannot be done until January

## 2019-05-22 ENCOUNTER — OFFICE VISIT (OUTPATIENT)
Dept: UROLOGY | Facility: CLINIC | Age: 69
End: 2019-05-22
Payer: COMMERCIAL

## 2019-05-22 VITALS — HEART RATE: 84 BPM | DIASTOLIC BLOOD PRESSURE: 72 MMHG | SYSTOLIC BLOOD PRESSURE: 112 MMHG

## 2019-05-22 DIAGNOSIS — R33.9 RETENTION OF URINE: Primary | Chronic | ICD-10-CM

## 2019-05-22 DIAGNOSIS — F03.91 DEMENTIA WITH BEHAVIORAL DISTURBANCE, UNSPECIFIED DEMENTIA TYPE (HCC): ICD-10-CM

## 2019-05-22 DIAGNOSIS — N31.9 NEUROGENIC BLADDER: ICD-10-CM

## 2019-05-22 DIAGNOSIS — Z87.448 H/O URETHRAL STRICTURE: ICD-10-CM

## 2019-05-22 PROBLEM — A41.9 SEVERE SEPSIS (HCC): Status: RESOLVED | Noted: 2017-09-21 | Resolved: 2019-05-22

## 2019-05-22 PROBLEM — R65.20 SEVERE SEPSIS (HCC): Status: RESOLVED | Noted: 2017-09-21 | Resolved: 2019-05-22

## 2019-05-22 PROCEDURE — 99214 OFFICE O/P EST MOD 30 MIN: CPT | Performed by: UROLOGY

## 2019-05-22 PROCEDURE — 51705 CHANGE OF BLADDER TUBE: CPT | Performed by: UROLOGY

## 2019-05-22 RX ORDER — CIPROFLOXACIN 2 MG/ML
400 INJECTION, SOLUTION INTRAVENOUS ONCE
Status: CANCELLED | OUTPATIENT
Start: 2019-05-22 | End: 2019-05-22

## 2019-05-23 ENCOUNTER — PREP FOR PROCEDURE (OUTPATIENT)
Dept: UROLOGY | Facility: CLINIC | Age: 69
End: 2019-05-23

## 2019-05-23 DIAGNOSIS — N31.9 NEUROGENIC BLADDER: Primary | ICD-10-CM

## 2019-05-29 ENCOUNTER — TELEPHONE (OUTPATIENT)
Dept: UROLOGY | Facility: CLINIC | Age: 69
End: 2019-05-29

## 2019-06-10 ENCOUNTER — TELEPHONE (OUTPATIENT)
Dept: UROLOGY | Facility: CLINIC | Age: 69
End: 2019-06-10

## 2019-06-17 ENCOUNTER — ANESTHESIA EVENT (OUTPATIENT)
Dept: PERIOP | Facility: HOSPITAL | Age: 69
End: 2019-06-17
Payer: COMMERCIAL

## 2019-06-18 ENCOUNTER — ANESTHESIA (OUTPATIENT)
Dept: PERIOP | Facility: HOSPITAL | Age: 69
End: 2019-06-18
Payer: COMMERCIAL

## 2019-06-18 ENCOUNTER — HOSPITAL ENCOUNTER (OUTPATIENT)
Facility: HOSPITAL | Age: 69
Setting detail: OUTPATIENT SURGERY
Discharge: HOME | End: 2019-06-18
Attending: UROLOGY | Admitting: UROLOGY
Payer: COMMERCIAL

## 2019-06-18 ENCOUNTER — TELEPHONE (OUTPATIENT)
Dept: UROLOGY | Facility: CLINIC | Age: 69
End: 2019-06-18

## 2019-06-18 VITALS
HEART RATE: 86 BPM | OXYGEN SATURATION: 92 % | BODY MASS INDEX: 30.14 KG/M2 | SYSTOLIC BLOOD PRESSURE: 152 MMHG | TEMPERATURE: 98 F | RESPIRATION RATE: 20 BRPM | HEIGHT: 70 IN | DIASTOLIC BLOOD PRESSURE: 100 MMHG | WEIGHT: 210.5 LBS

## 2019-06-18 PROCEDURE — 52287 CYSTOSCOPY CHEMODENERVATION: CPT | Performed by: UROLOGY

## 2019-06-18 PROCEDURE — NC001 PR NO CHARGE: Performed by: UROLOGY

## 2019-06-18 PROCEDURE — C1769 GUIDE WIRE: HCPCS | Performed by: UROLOGY

## 2019-06-18 RX ORDER — MORPHINE SULFATE 10 MG/ML
4 INJECTION, SOLUTION INTRAMUSCULAR; INTRAVENOUS EVERY 4 HOURS PRN
Status: CANCELLED | OUTPATIENT
Start: 2019-06-18

## 2019-06-18 RX ORDER — AMLODIPINE BESYLATE AND ATORVASTATIN CALCIUM 10; 10 MG/1; MG/1
1 TABLET, FILM COATED ORAL DAILY
Status: ON HOLD | COMMUNITY
End: 2019-07-10 | Stop reason: CLARIF

## 2019-06-18 RX ORDER — ONDANSETRON 2 MG/ML
INJECTION INTRAMUSCULAR; INTRAVENOUS AS NEEDED
Status: DISCONTINUED | OUTPATIENT
Start: 2019-06-18 | End: 2019-06-18 | Stop reason: SURG

## 2019-06-18 RX ORDER — PROPOFOL 10 MG/ML
INJECTION, EMULSION INTRAVENOUS AS NEEDED
Status: DISCONTINUED | OUTPATIENT
Start: 2019-06-18 | End: 2019-06-18 | Stop reason: SURG

## 2019-06-18 RX ORDER — SODIUM CHLORIDE, SODIUM LACTATE, POTASSIUM CHLORIDE, CALCIUM CHLORIDE 600; 310; 30; 20 MG/100ML; MG/100ML; MG/100ML; MG/100ML
INJECTION, SOLUTION INTRAVENOUS CONTINUOUS PRN
Status: DISCONTINUED | OUTPATIENT
Start: 2019-06-18 | End: 2019-06-18 | Stop reason: SURG

## 2019-06-18 RX ORDER — ONDANSETRON 2 MG/ML
4 INJECTION INTRAMUSCULAR; INTRAVENOUS ONCE AS NEEDED
Status: DISCONTINUED | OUTPATIENT
Start: 2019-06-18 | End: 2019-06-18 | Stop reason: HOSPADM

## 2019-06-18 RX ORDER — AMLODIPINE BESYLATE 5 MG/1
5 TABLET ORAL 2 TIMES DAILY
COMMUNITY
End: 2020-09-09

## 2019-06-18 RX ORDER — CIPROFLOXACIN 2 MG/ML
400 INJECTION, SOLUTION INTRAVENOUS ONCE
Status: DISCONTINUED | OUTPATIENT
Start: 2019-06-18 | End: 2019-06-18 | Stop reason: HOSPADM

## 2019-06-18 RX ORDER — LIDOCAINE HYDROCHLORIDE 20 MG/ML
INJECTION, SOLUTION INFILTRATION; PERINEURAL AS NEEDED
Status: DISCONTINUED | OUTPATIENT
Start: 2019-06-18 | End: 2019-06-18 | Stop reason: SURG

## 2019-06-18 RX ORDER — OXYCODONE HYDROCHLORIDE AND ACETAMINOPHEN 5; 325 MG/1; MG/1
2 TABLET ORAL EVERY 6 HOURS PRN
Status: CANCELLED | OUTPATIENT
Start: 2019-06-18

## 2019-06-18 RX ORDER — FENTANYL CITRATE 50 UG/ML
INJECTION, SOLUTION INTRAMUSCULAR; INTRAVENOUS AS NEEDED
Status: DISCONTINUED | OUTPATIENT
Start: 2019-06-18 | End: 2019-06-18 | Stop reason: SURG

## 2019-06-18 RX ORDER — FENTANYL CITRATE/PF 50 MCG/ML
25 SYRINGE (ML) INJECTION
Status: DISCONTINUED | OUTPATIENT
Start: 2019-06-18 | End: 2019-06-18 | Stop reason: HOSPADM

## 2019-06-18 RX ORDER — DIPHENHYDRAMINE HCL 25 MG
25 TABLET ORAL EVERY 6 HOURS PRN
Status: CANCELLED | OUTPATIENT
Start: 2019-06-18

## 2019-06-18 RX ORDER — ONDANSETRON 2 MG/ML
4 INJECTION INTRAMUSCULAR; INTRAVENOUS EVERY 6 HOURS PRN
Status: CANCELLED | OUTPATIENT
Start: 2019-06-18

## 2019-06-18 RX ORDER — MAGNESIUM HYDROXIDE 1200 MG/15ML
LIQUID ORAL AS NEEDED
Status: DISCONTINUED | OUTPATIENT
Start: 2019-06-18 | End: 2019-06-18 | Stop reason: HOSPADM

## 2019-06-18 RX ORDER — SODIUM CHLORIDE, SODIUM LACTATE, POTASSIUM CHLORIDE, CALCIUM CHLORIDE 600; 310; 30; 20 MG/100ML; MG/100ML; MG/100ML; MG/100ML
125 INJECTION, SOLUTION INTRAVENOUS CONTINUOUS
Status: DISCONTINUED | OUTPATIENT
Start: 2019-06-18 | End: 2019-06-18 | Stop reason: HOSPADM

## 2019-06-18 RX ORDER — AMLODIPINE BESYLATE AND BENAZEPRIL HYDROCHLORIDE 10; 20 MG/1; MG/1
1 CAPSULE ORAL DAILY
Status: ON HOLD | COMMUNITY
End: 2019-07-10 | Stop reason: CLARIF

## 2019-06-18 RX ADMIN — FENTANYL CITRATE 25 MCG: 50 INJECTION INTRAMUSCULAR; INTRAVENOUS at 15:05

## 2019-06-18 RX ADMIN — PROPOFOL 30 MG: 10 INJECTION, EMULSION INTRAVENOUS at 15:03

## 2019-06-18 RX ADMIN — PHENYLEPHRINE HYDROCHLORIDE 100 MCG: 10 INJECTION INTRAVENOUS at 14:51

## 2019-06-18 RX ADMIN — CIPROFLOXACIN 400 MG: 2 INJECTION INTRAVENOUS at 14:31

## 2019-06-18 RX ADMIN — FENTANYL CITRATE 50 MCG: 50 INJECTION INTRAMUSCULAR; INTRAVENOUS at 15:32

## 2019-06-18 RX ADMIN — PROPOFOL 200 MG: 10 INJECTION, EMULSION INTRAVENOUS at 14:44

## 2019-06-18 RX ADMIN — FENTANYL CITRATE 25 MCG: 50 INJECTION INTRAMUSCULAR; INTRAVENOUS at 15:03

## 2019-06-18 RX ADMIN — PHENYLEPHRINE HYDROCHLORIDE 100 MCG: 10 INJECTION INTRAVENOUS at 15:10

## 2019-06-18 RX ADMIN — LIDOCAINE HYDROCHLORIDE 5 ML: 20 INJECTION, SOLUTION INFILTRATION; PERINEURAL at 14:44

## 2019-06-18 RX ADMIN — Medication 1000 MG: at 14:56

## 2019-06-18 RX ADMIN — ONDANSETRON 4 MG: 2 INJECTION INTRAMUSCULAR; INTRAVENOUS at 14:59

## 2019-06-18 RX ADMIN — SODIUM CHLORIDE, SODIUM LACTATE, POTASSIUM CHLORIDE, AND CALCIUM CHLORIDE: .6; .31; .03; .02 INJECTION, SOLUTION INTRAVENOUS at 13:55

## 2019-07-08 ENCOUNTER — APPOINTMENT (INPATIENT)
Dept: CT IMAGING | Facility: HOSPITAL | Age: 69
DRG: 684 | End: 2019-07-08
Payer: MEDICARE

## 2019-07-08 ENCOUNTER — APPOINTMENT (INPATIENT)
Dept: ULTRASOUND IMAGING | Facility: HOSPITAL | Age: 69
DRG: 684 | End: 2019-07-08
Payer: MEDICARE

## 2019-07-08 ENCOUNTER — HOSPITAL ENCOUNTER (INPATIENT)
Facility: HOSPITAL | Age: 69
LOS: 2 days | Discharge: NURSING, REHAB OR LONG-TERM CARE | DRG: 684 | End: 2019-07-10
Attending: EMERGENCY MEDICINE | Admitting: INTERNAL MEDICINE
Payer: MEDICARE

## 2019-07-08 DIAGNOSIS — N39.0 URINARY TRACT INFECTION: Primary | ICD-10-CM

## 2019-07-08 DIAGNOSIS — Z87.448 H/O URETHRAL STRICTURE: ICD-10-CM

## 2019-07-08 DIAGNOSIS — N31.9 NEUROGENIC BLADDER: ICD-10-CM

## 2019-07-08 DIAGNOSIS — I10 ESSENTIAL HYPERTENSION: Chronic | ICD-10-CM

## 2019-07-08 DIAGNOSIS — E87.6 HYPOKALEMIA: ICD-10-CM

## 2019-07-08 LAB
ALBUMIN SERPL BCP-MCNC: 3 G/DL (ref 3.5–5)
ALP SERPL-CCNC: 100 U/L (ref 46–116)
ALT SERPL W P-5'-P-CCNC: 12 U/L (ref 12–78)
ANION GAP SERPL CALCULATED.3IONS-SCNC: 6 MMOL/L (ref 4–13)
ANION GAP SERPL CALCULATED.3IONS-SCNC: 9 MMOL/L (ref 4–13)
APTT PPP: 36 SECONDS (ref 23–37)
AST SERPL W P-5'-P-CCNC: 13 U/L (ref 5–45)
ATRIAL RATE: 73 BPM
BACTERIA UR QL AUTO: ABNORMAL /HPF
BASOPHILS # BLD AUTO: 0.03 THOUSANDS/ΜL (ref 0–0.1)
BASOPHILS NFR BLD AUTO: 0 % (ref 0–1)
BILIRUB SERPL-MCNC: 0.4 MG/DL (ref 0.2–1)
BILIRUB UR QL STRIP: NEGATIVE
BUN SERPL-MCNC: 24 MG/DL (ref 5–25)
BUN SERPL-MCNC: 24 MG/DL (ref 5–25)
CALCIUM SERPL-MCNC: 8.5 MG/DL (ref 8.3–10.1)
CALCIUM SERPL-MCNC: 9 MG/DL (ref 8.3–10.1)
CHLORIDE SERPL-SCNC: 107 MMOL/L (ref 100–108)
CHLORIDE SERPL-SCNC: 107 MMOL/L (ref 100–108)
CLARITY UR: ABNORMAL
CO2 SERPL-SCNC: 26 MMOL/L (ref 21–32)
CO2 SERPL-SCNC: 31 MMOL/L (ref 21–32)
COLOR UR: YELLOW
CREAT SERPL-MCNC: 1.46 MG/DL (ref 0.6–1.3)
CREAT SERPL-MCNC: 1.95 MG/DL (ref 0.6–1.3)
EOSINOPHIL # BLD AUTO: 0.22 THOUSAND/ΜL (ref 0–0.61)
EOSINOPHIL NFR BLD AUTO: 2 % (ref 0–6)
ERYTHROCYTE [DISTWIDTH] IN BLOOD BY AUTOMATED COUNT: 14.4 % (ref 11.6–15.1)
GFR SERPL CREATININE-BSD FRML MDRD: 34 ML/MIN/1.73SQ M
GFR SERPL CREATININE-BSD FRML MDRD: 48 ML/MIN/1.73SQ M
GLUCOSE SERPL-MCNC: 106 MG/DL (ref 65–140)
GLUCOSE SERPL-MCNC: 96 MG/DL (ref 65–140)
GLUCOSE UR STRIP-MCNC: NEGATIVE MG/DL
HCT VFR BLD AUTO: 46.8 % (ref 36.5–49.3)
HGB BLD-MCNC: 15 G/DL (ref 12–17)
HGB UR QL STRIP.AUTO: ABNORMAL
IMM GRANULOCYTES # BLD AUTO: 0.04 THOUSAND/UL (ref 0–0.2)
IMM GRANULOCYTES NFR BLD AUTO: 0 % (ref 0–2)
INR PPP: 1.2 (ref 0.84–1.19)
KETONES UR STRIP-MCNC: NEGATIVE MG/DL
LEUKOCYTE ESTERASE UR QL STRIP: ABNORMAL
LYMPHOCYTES # BLD AUTO: 1.39 THOUSANDS/ΜL (ref 0.6–4.47)
LYMPHOCYTES NFR BLD AUTO: 13 % (ref 14–44)
MCH RBC QN AUTO: 29.1 PG (ref 26.8–34.3)
MCHC RBC AUTO-ENTMCNC: 32.1 G/DL (ref 31.4–37.4)
MCV RBC AUTO: 91 FL (ref 82–98)
MONOCYTES # BLD AUTO: 0.86 THOUSAND/ΜL (ref 0.17–1.22)
MONOCYTES NFR BLD AUTO: 8 % (ref 4–12)
NEUTROPHILS # BLD AUTO: 7.88 THOUSANDS/ΜL (ref 1.85–7.62)
NEUTS SEG NFR BLD AUTO: 77 % (ref 43–75)
NITRITE UR QL STRIP: POSITIVE
NON-SQ EPI CELLS URNS QL MICRO: ABNORMAL /HPF
NRBC BLD AUTO-RTO: 0 /100 WBCS
P AXIS: 70 DEGREES
PH UR STRIP.AUTO: 5.5 [PH]
PLATELET # BLD AUTO: 285 THOUSANDS/UL (ref 149–390)
PMV BLD AUTO: 9.2 FL (ref 8.9–12.7)
POTASSIUM SERPL-SCNC: 3.8 MMOL/L (ref 3.5–5.3)
POTASSIUM SERPL-SCNC: 3.8 MMOL/L (ref 3.5–5.3)
PR INTERVAL: 162 MS
PROT SERPL-MCNC: 7.2 G/DL (ref 6.4–8.2)
PROT UR STRIP-MCNC: ABNORMAL MG/DL
PROTHROMBIN TIME: 14.6 SECONDS (ref 11.6–14.5)
QRS AXIS: -56 DEGREES
QRSD INTERVAL: 74 MS
QT INTERVAL: 368 MS
QTC INTERVAL: 405 MS
RBC # BLD AUTO: 5.15 MILLION/UL (ref 3.88–5.62)
RBC #/AREA URNS AUTO: ABNORMAL /HPF
SODIUM SERPL-SCNC: 142 MMOL/L (ref 136–145)
SODIUM SERPL-SCNC: 144 MMOL/L (ref 136–145)
SP GR UR STRIP.AUTO: >=1.03 (ref 1–1.03)
T WAVE AXIS: 33 DEGREES
TROPONIN I SERPL-MCNC: 0.02 NG/ML
UROBILINOGEN UR QL STRIP.AUTO: 0.2 E.U./DL
VENTRICULAR RATE: 73 BPM
WBC # BLD AUTO: 10.42 THOUSAND/UL (ref 4.31–10.16)
WBC #/AREA URNS AUTO: ABNORMAL /HPF

## 2019-07-08 PROCEDURE — 87147 CULTURE TYPE IMMUNOLOGIC: CPT | Performed by: NURSE PRACTITIONER

## 2019-07-08 PROCEDURE — 96365 THER/PROPH/DIAG IV INF INIT: CPT

## 2019-07-08 PROCEDURE — 93005 ELECTROCARDIOGRAM TRACING: CPT

## 2019-07-08 PROCEDURE — 85025 COMPLETE CBC W/AUTO DIFF WBC: CPT | Performed by: EMERGENCY MEDICINE

## 2019-07-08 PROCEDURE — 99284 EMERGENCY DEPT VISIT MOD MDM: CPT | Performed by: EMERGENCY MEDICINE

## 2019-07-08 PROCEDURE — 99223 1ST HOSP IP/OBS HIGH 75: CPT | Performed by: INTERNAL MEDICINE

## 2019-07-08 PROCEDURE — 96361 HYDRATE IV INFUSION ADD-ON: CPT

## 2019-07-08 PROCEDURE — 80053 COMPREHEN METABOLIC PANEL: CPT | Performed by: EMERGENCY MEDICINE

## 2019-07-08 PROCEDURE — 74176 CT ABD & PELVIS W/O CONTRAST: CPT

## 2019-07-08 PROCEDURE — 87081 CULTURE SCREEN ONLY: CPT | Performed by: NURSE PRACTITIONER

## 2019-07-08 PROCEDURE — 99254 IP/OBS CNSLTJ NEW/EST MOD 60: CPT | Performed by: INTERNAL MEDICINE

## 2019-07-08 PROCEDURE — 99285 EMERGENCY DEPT VISIT HI MDM: CPT

## 2019-07-08 PROCEDURE — 36415 COLL VENOUS BLD VENIPUNCTURE: CPT | Performed by: EMERGENCY MEDICINE

## 2019-07-08 PROCEDURE — 80048 BASIC METABOLIC PNL TOTAL CA: CPT | Performed by: INTERNAL MEDICINE

## 2019-07-08 PROCEDURE — 87086 URINE CULTURE/COLONY COUNT: CPT | Performed by: EMERGENCY MEDICINE

## 2019-07-08 PROCEDURE — 93010 ELECTROCARDIOGRAM REPORT: CPT | Performed by: INTERNAL MEDICINE

## 2019-07-08 PROCEDURE — 99254 IP/OBS CNSLTJ NEW/EST MOD 60: CPT | Performed by: UROLOGY

## 2019-07-08 PROCEDURE — 84484 ASSAY OF TROPONIN QUANT: CPT | Performed by: EMERGENCY MEDICINE

## 2019-07-08 PROCEDURE — 81001 URINALYSIS AUTO W/SCOPE: CPT | Performed by: EMERGENCY MEDICINE

## 2019-07-08 PROCEDURE — 85730 THROMBOPLASTIN TIME PARTIAL: CPT | Performed by: EMERGENCY MEDICINE

## 2019-07-08 PROCEDURE — 85610 PROTHROMBIN TIME: CPT | Performed by: EMERGENCY MEDICINE

## 2019-07-08 PROCEDURE — 76770 US EXAM ABDO BACK WALL COMP: CPT

## 2019-07-08 RX ORDER — MEMANTINE HYDROCHLORIDE 5 MG/1
5 TABLET ORAL 2 TIMES DAILY
Status: DISCONTINUED | OUTPATIENT
Start: 2019-07-08 | End: 2019-07-10 | Stop reason: HOSPADM

## 2019-07-08 RX ORDER — FAMOTIDINE 20 MG/1
20 TABLET, FILM COATED ORAL DAILY
Status: DISCONTINUED | OUTPATIENT
Start: 2019-07-08 | End: 2019-07-10 | Stop reason: HOSPADM

## 2019-07-08 RX ORDER — POLYETHYLENE GLYCOL 3350 17 G/17G
17 POWDER, FOR SOLUTION ORAL DAILY
Status: DISCONTINUED | OUTPATIENT
Start: 2019-07-08 | End: 2019-07-10 | Stop reason: HOSPADM

## 2019-07-08 RX ORDER — ONDANSETRON 2 MG/ML
4 INJECTION INTRAMUSCULAR; INTRAVENOUS EVERY 6 HOURS PRN
Status: DISCONTINUED | OUTPATIENT
Start: 2019-07-08 | End: 2019-07-10 | Stop reason: HOSPADM

## 2019-07-08 RX ORDER — BUSPIRONE HYDROCHLORIDE 5 MG/1
7.5 TABLET ORAL 3 TIMES DAILY
Status: DISCONTINUED | OUTPATIENT
Start: 2019-07-08 | End: 2019-07-10 | Stop reason: HOSPADM

## 2019-07-08 RX ORDER — FAMOTIDINE 20 MG/1
20 TABLET, FILM COATED ORAL 2 TIMES DAILY
Status: DISCONTINUED | OUTPATIENT
Start: 2019-07-08 | End: 2019-07-08

## 2019-07-08 RX ORDER — AMLODIPINE BESYLATE 5 MG/1
5 TABLET ORAL DAILY
Status: DISCONTINUED | OUTPATIENT
Start: 2019-07-08 | End: 2019-07-10 | Stop reason: HOSPADM

## 2019-07-08 RX ORDER — QUETIAPINE FUMARATE 25 MG/1
25 TABLET, FILM COATED ORAL
Status: DISCONTINUED | OUTPATIENT
Start: 2019-07-08 | End: 2019-07-10 | Stop reason: HOSPADM

## 2019-07-08 RX ORDER — TAMSULOSIN HYDROCHLORIDE 0.4 MG/1
0.4 CAPSULE ORAL
Status: DISCONTINUED | OUTPATIENT
Start: 2019-07-08 | End: 2019-07-10 | Stop reason: HOSPADM

## 2019-07-08 RX ORDER — NITROFURANTOIN 25; 75 MG/1; MG/1
100 CAPSULE ORAL 2 TIMES DAILY WITH MEALS
Status: DISCONTINUED | OUTPATIENT
Start: 2019-07-08 | End: 2019-07-08

## 2019-07-08 RX ORDER — DOCUSATE SODIUM 100 MG/1
100 CAPSULE, LIQUID FILLED ORAL 2 TIMES DAILY
Status: DISCONTINUED | OUTPATIENT
Start: 2019-07-08 | End: 2019-07-10 | Stop reason: HOSPADM

## 2019-07-08 RX ORDER — MIRTAZAPINE 15 MG/1
15 TABLET, FILM COATED ORAL
Status: DISCONTINUED | OUTPATIENT
Start: 2019-07-08 | End: 2019-07-10 | Stop reason: HOSPADM

## 2019-07-08 RX ORDER — ACETAMINOPHEN 325 MG/1
650 TABLET ORAL EVERY 6 HOURS PRN
Status: DISCONTINUED | OUTPATIENT
Start: 2019-07-08 | End: 2019-07-10 | Stop reason: HOSPADM

## 2019-07-08 RX ADMIN — MIRTAZAPINE 15 MG: 15 TABLET, FILM COATED ORAL at 21:24

## 2019-07-08 RX ADMIN — NITROFURANTOIN MONOHYDRATE AND NITROFURANTOIN MACROCRYSTALLINE 100 MG: 75; 25 CAPSULE ORAL at 16:22

## 2019-07-08 RX ADMIN — APIXABAN 2.5 MG: 2.5 TABLET, FILM COATED ORAL at 17:12

## 2019-07-08 RX ADMIN — APIXABAN 2.5 MG: 2.5 TABLET, FILM COATED ORAL at 09:39

## 2019-07-08 RX ADMIN — CEFEPIME HYDROCHLORIDE 2000 MG: 2 INJECTION, POWDER, FOR SOLUTION INTRAVENOUS at 03:35

## 2019-07-08 RX ADMIN — BUSPIRONE HYDROCHLORIDE 7.5 MG: 5 TABLET ORAL at 21:24

## 2019-07-08 RX ADMIN — SODIUM CHLORIDE 500 ML: 0.9 INJECTION, SOLUTION INTRAVENOUS at 03:30

## 2019-07-08 RX ADMIN — POLYETHYLENE GLYCOL 3350 17 G: 17 POWDER, FOR SOLUTION ORAL at 15:18

## 2019-07-08 RX ADMIN — QUETIAPINE FUMARATE 25 MG: 25 TABLET ORAL at 21:24

## 2019-07-08 RX ADMIN — FAMOTIDINE 20 MG: 20 TABLET ORAL at 09:39

## 2019-07-08 RX ADMIN — AMLODIPINE BESYLATE 5 MG: 5 TABLET ORAL at 09:39

## 2019-07-08 RX ADMIN — BUSPIRONE HYDROCHLORIDE 7.5 MG: 5 TABLET ORAL at 16:18

## 2019-07-08 RX ADMIN — CEFEPIME HYDROCHLORIDE 2000 MG: 2 INJECTION, POWDER, FOR SOLUTION INTRAVENOUS at 16:30

## 2019-07-08 RX ADMIN — MEMANTINE 5 MG: 5 TABLET ORAL at 09:39

## 2019-07-08 RX ADMIN — DOCUSATE SODIUM 100 MG: 100 CAPSULE, LIQUID FILLED ORAL at 17:48

## 2019-07-08 RX ADMIN — METOPROLOL TARTRATE 25 MG: 25 TABLET ORAL at 17:47

## 2019-07-08 RX ADMIN — METOPROLOL TARTRATE 25 MG: 25 TABLET ORAL at 09:39

## 2019-07-08 RX ADMIN — TAMSULOSIN HYDROCHLORIDE 0.4 MG: 0.4 CAPSULE ORAL at 16:21

## 2019-07-08 RX ADMIN — MEMANTINE 5 MG: 5 TABLET ORAL at 17:47

## 2019-07-08 RX ADMIN — NITROFURANTOIN MONOHYDRATE AND NITROFURANTOIN MACROCRYSTALLINE 100 MG: 75; 25 CAPSULE ORAL at 14:21

## 2019-07-08 NOTE — PLAN OF CARE
Problem: Potential for Falls  Goal: Patient will remain free of falls  Description  INTERVENTIONS:  - Assess patient frequently for physical needs  -  Identify cognitive and physical deficits and behaviors that affect risk of falls    -  Rule fall precautions as indicated by assessment   - Educate patient/family on patient safety including physical limitations  - Instruct patient to call for assistance with activity based on assessment  - Modify environment to reduce risk of injury  - Consider OT/PT consult to assist with strengthening/mobility  Outcome: Progressing

## 2019-07-08 NOTE — CONSULTS
Consultation - Infectious Disease   [facility] 77 y o  male MRN: [id number]  Unit/Bed#: -01 Encounter: [id number]      IMPRESSION & RECOMMENDATIONS:   1  Leukocytosis and abnormal UA  Patient presents on this admission with leukocytosis along with an abnormal UA  He is otherwise afebrile and hemodynamically stable  CT abdomen pelvis noted with bilateral renal stones  I suspect that the patient's presentation may have been related to the stones  UA is likely abnormal in the setting of chronic catheter  His catheter has been exchange in continues to drain clear yellow urine  He has been evaluated by Urology and no additional plans at this time  I suspect that his stones or chronically colonized  Recommend monitoring off antibiotics  Continue to trend fever curve/vitals  Repeat CBC/chemistry tomorrow  Follow up pending urine culture  Thorne catheter as per Urology  Additional care as per primary  Would recommend monitoring the patient inpatient over the next 48 hours  Would need to consider stone removal as outpatient    2  Chronic Thorne catheter  Patient is now status post Thorne catheter exchange in the emergency department and is draining clear yellow urine  No additional interventions recommended as per Urology  3  Chronic kidney disease  Patient's creatinine seems to be at his baseline  He remains with Thorne catheter in place  Continue to monitor creatinine  Additional care as per primary    4  Multiple drug allergies and MDR infection  Patient unfortunately has very serious antibiotic allergies including anaphylaxis to major classes of drugs  His partner is well aware that over time he is running out of antibiotic options  He has also isolated multi-drug resistant organisms in the past   Maintain contact isolation while admitted  Monitoring off antibiotics as above  Additional care as per primary    Above plan reviewed in detail with the patient's partner at bedside      Primary service updated of the above plan  ID consult service will continue to follow  HISTORY OF PRESENT ILLNESS:  Reason for Consult:  Urinary tract infection    HPI: Gordo Guerrero is a 77y o  year old male who is known to me from his last admission baseline he is aphasic  He has known chronic kidney disease along with urethral stricture  He has a chronic catheter in place  Patient was sent in from nursing facility with cloudy urine  On evaluation he was noted to have a mildly elevated white count 10 4  Creatinine mildly elevated at 1 9  LFTs is within normal limits  Patient was admitted for urinary tract infection  UA was grossly positive  The patient was ultimately placed on cefepime in the ER  His creatinine was monitored  He is otherwise afebrile  White blood cell count 10 4  Urine culture is pending  Min pelvis largely unremarkable except for fecal impaction along with bilateral calcific densities in both kidneys  No collections noted  Trace perinephric stranding without any hydronephrosis  Patient's other vitals are stable  Notes reviewed from patient's last admission and at that time he presented with severe sepsis  Previous culture data again reviewed  Patient has multiple drug allergies including anaphylaxis to vancomycin as well as gentamicin and Zosyn  We are consulted at this time for further assistance in management given this patient's presentation again of what is presumed to be a urinary tract infection  Patient's Thorne catheter was exchanged in the emergency department  He was evaluated by Urology based on his CT scan findings and no additional interventions were recommended at this time  On evaluation, the patient remains aphasic but he seems very awake and is tolerating p  O  At this time  Discussed with his partner at bedside and she reports that the patient was noted to have temperatures higher than his baseline    She reports that his normal body temperature is 97 4° but he was noted to have temperature readings as high as 98 5 and 99 at the facility  The patient did not have any change to the urine color  He seemed to be draining clear yellow urine yesterday  He however did appear more lethargic and did not seem to participate as much yesterday  He was also noted with bradycardia overnight  Given the symptoms he was sent to the emergency department for further evaluation  Reviewed with the patient's partner in he does have true anaphylaxis to vancomycin as well as to Zosyn and a cause the patient to have episode of cardiac arrest   He developed blistering lesions all over his body due to clindamycin and has had adverse outcome also to gentamicin  She is aware of limited antibiotic options at this point  She was afraid that the patient recurred after oral antibiotics and so 3 weeks later he had received a course of ertapenem again  Subsequently the patient went on to have Botox of his bladder  He is now recurring after an IV antibiotic course with ertapenem  She reports that prior to his admission here in the hospital he was given IM ertapenem  Since that time  he is now also received doses of cefepime and Macrobid  We reviewed his CT imaging in detail  REVIEW OF SYSTEMS:  Unable to obtain as the patient is aphasic      PAST MEDICAL HISTORY:  Past Medical History:   Diagnosis Date    Anxiety     Aortic aneurysm (HCC)     Aphasia     Ataxia     Bladder adhesions     BPH (benign prostatic hypertrophy)     COPD (chronic obstructive pulmonary disease) (HCC)     wife denies - "never had"    Dementia     Difficulty walking     Essential (primary) hypertension     Generalized anxiety disorder     GERD (gastroesophageal reflux disease)     Global aphasia     H/O blood clots     High blood pressure     History of kidney stones     Kidney stones     Mental disorder     Muscle weakness     Neuromuscular dysfunction of bladder     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 ([address] )     Retention of urine, unspecified     Stroke ([address] )     Thrombosis     Urinary retention     Urinary tract bacterial infections     Urinary tract infection      Past Surgical History:   Procedure Laterality Date    BOTOX INJECTION N/A 6/18/2019    Procedure: INJECTION BOTULINUM TOXIN (BOTOX), intra detrusor;  Surgeon: Suki Watson MD;  [facility];  Service: Urology    CYSTOSCOPY      CYSTOSCOPY N/A 6/18/2019    Procedure: cystoscopy and all indicated procedures;  Surgeon: Suki Watson MD;  [facility];  Service: Urology    FL CYSTOGRAM  1/15/2019    IVC FILTER INSERTION      X2    IVC FILTER INSERTION      x2    KIDNEY STONE SURGERY      KNEE SURGERY      Sepsis infection     NEPHROSTOMY      SC CYSTO/URETERO W/LITHOTRIPSY &INDWELL STENT INSRT Right 6/14/2017    Procedure: CYSTOSCOPY URETEROSCOPY WITH LITHOTRIPSY HOLMIUM LASER,,BASKET STONE EXTRACTION, RETROGRADE PYELOGRAM AND INSERTION STENT URETERAL;  Surgeon: Georgia Johnston MD;  [facility];  Service: Urology    SC CYSTOURETHROSCOPY,BIOPSY N/A 1/15/2019    Procedure: CYSTOSCOPY, CYSTOGRAM, DILATION OF URETHRAL STRICTURE;  Surgeon: Suki Watson MD;  [facility];  Service: Urology    URINARY SURGERY         FAMILY HISTORY:  Non-contributory    SOCIAL HISTORY:  Social History   Social History     Substance and Sexual Activity   Alcohol Use No     Social History     Substance and Sexual Activity   Drug Use No     Social History     Tobacco Use   Smoking Status Never Smoker   Smokeless Tobacco Never Used       ALLERGIES:  Allergies   Allergen Reactions    Gentamycin [Gentamicin] Anaphylaxis    Vancomycin Anaphylaxis    Zosyn [Piperacillin Sod-Tazobactam So] Anaphylaxis     However, has tolerated Ertapenem, Cefdinir, and Cefepime, which all have different side chains   Avoid Penicillins and the following Cephs with similar side chains (Cephalexin, Cefadroxil, Cefaclor, Cefprozil, or  Cefoxitin)    Clindamycin Itching    Nsaids Other (See Comments)     Nephrotic Syndrome    Sulfa Antibiotics Rash    Other Rash     antipersperents  Stat lock from lucia catheter       MEDICATIONS:  All current active medications have been reviewed  PHYSICAL EXAM:  Temp:  [97 5 °F (36 4 °C)-98 5 °F (36 9 °C)] 97 5 °F (36 4 °C)  HR:  [57-75] 62  Resp:  [17-18] 18  BP: (109-158)/(57-91) 142/78  SpO2:  [93 %-99 %] 95 %  Temp (24hrs), Av °F (36 7 °C), Min:97 5 °F (36 4 °C), Max:98 5 °F (36 9 °C)  Current: Temperature: 97 5 °F (36 4 °C)    Intake/Output Summary (Last 24 hours) at 2019 1839  Last data filed at 2019 6439  Gross per 24 hour   Intake 550 ml   Output --   Net 550 ml       General Appearance:  Appearing well compared to his previous evaluations by myself, nontoxic, and in no distress; he is currently accepting oral intake without issue  Head:  Normocephalic, without obvious abnormality, atraumatic   Eyes:  Conjunctiva pink and sclera anicteric, both eyes   Nose: Nares normal, mucosa normal, no drainage   Throat: Oropharynx moist without lesions   Neck: Supple, symmetrical, no adenopathy, no tenderness/mass/nodules   Back:   Unable to fully examine the patient's back at this time  Lungs:   Clear to auscultation anteriorly bilaterally, respirations unlabored on room air   Chest Wall:  No tenderness or deformity   Heart:  RRR; no murmur, rub or gallop   Abdomen:   Soft, no tenderness elicited on exam, non-distended, positive bowel sounds    Extremities: No cyanosis, clubbing or edema   Skin: No rashes or lesions on exposed skin  No draining wounds noted on exposed skin  Lymph nodes: Cervical, supraclavicular nodes normal   Neurologic: Patient is aphasic and is able to nod his partner to provide him more food  He does not answer any questions or follow commands         LABS, IMAGING, & OTHER STUDIES:  Lab Results:  I have personally reviewed pertinent labs  Results from last 7 days   Lab Units 07/08/19  0240   WBC Thousand/uL 10 42*   HEMOGLOBIN g/dL 15 0   PLATELETS Thousands/uL 285     Results from last 7 days   Lab Units 07/08/19  0240   POTASSIUM mmol/L 3 8   CHLORIDE mmol/L 107   CO2 mmol/L 31   BUN mg/dL 24   CREATININE mg/dL 1 95*   EGFR ml/min/1 73sq m 34   CALCIUM mg/dL 9 0   AST U/L 13   ALT U/L 12   ALK PHOS U/L 100           Imaging Studies:   I have personally reviewed pertinent imaging study reports and images in PACS  Other Studies:   I have personally reviewed pertinent reports

## 2019-07-08 NOTE — ASSESSMENT & PLAN NOTE
· Neurogenic bladder with chronic lucia and recurrent urinary tract infections  · Urine positive for nitrite, white blood cells, blood and bacteria  · Has multiple allergies and history of multiple MDRO  · Has tolerated Ertapenem, Cefdinir, and Cefepime  · Received cefepime in the ER  · Trend temps, WBC  · Urine culture, blood cultures pending    · Urology consult  · ID consult

## 2019-07-08 NOTE — H&P
[address] Internal Medicine  H&P- Leslee Keyes [facility] 1950, 77 y o  male MRN: [id number]    Unit/Bed#: ED 25 Encounter: [id number]    Primary Care Provider: David Leggett MD   Date and time admitted to hospital: 7/8/2019  2:13 AM        * UTI (urinary tract infection)  Assessment & Plan  · Neurogenic bladder with chronic lucia and recurrent urinary tract infections  · Urine positive for nitrite, white blood cells, blood and bacteria  · Has multiple allergies and history of multiple MDRO  · Has tolerated Ertapenem, Cefdinir, and Cefepime  · Received cefepime in the ER  · Trend temps, WBC  · Urine culture, blood cultures pending  · Urology consult  · ID consult    CKD (chronic kidney disease)  Assessment & Plan  · Cr 1 64   · Recent baseline appears to be 1 3-1 5  · Monitor BMP  VTE Prophylaxis: Apixaban (Eliquis)  / sequential compression device   Code Status:  DNR/DNI  POLST: POLST form is not discussed and not completed at this time  Discussion with family:     Anticipated Length of Stay:  Patient will be admitted on an Inpatient basis with an anticipated length of stay of  greater than 2 midnights  Justification for Hospital Stay:          complicated urinary tract infection     Total Time for Visit, including Counseling / Coordination of Care: 30 minutes  Greater than 50% of this total time spent on direct patient counseling and coordination of care  Chief Complaint:   Cloudy urine    History of Present Illness:    Esthela Johnson is a 77 y o  male with history urethral stricture with chronic lucia, CKD, aphasia who presents from a nursing facility with cloudy urine  The patient is aphasic and I am unable to obtain review of systems       Review of Systems:    Review of Systems   Unable to perform ROS: Patient nonverbal       Past Medical and Surgical History:     Past Medical History:   Diagnosis Date    Anxiety     Aortic aneurysm ([address] )     Aphasia     Ataxia     Bladder adhesions     BPH (benign prostatic hypertrophy)     COPD [facility]ch[facility]) [facility])     wife denies - "never had"    Dementia     Difficulty walking     Essential (primary) hypertension     Generalized anxiety disorder     GERD (gastroesophageal reflux disease)     Global aphasia     H/O blood clots     High blood pressure     History of kidney stones     Kidney stones     Mental disorder     Muscle weakness     Neuromuscular dysfunction of bladder     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 ([facility] [address] )     Retention of urine, unspecified     Stroke ([facility] )     Thrombosis     Urinary retention     Urinary tract bacterial infections     Urinary tract infection        Past Surgical History:   Procedure Laterality Date    BOTOX INJECTION N/A 6/18/2019    Procedure: INJECTION BOTULINUM TOXIN (BOTOX), intra detrusor;  Surgeon: Meredith Fontenot MD;  [facility];  Service: Urology    CYSTOSCOPY      CYSTOSCOPY N/A 6/18/2019    Procedure: cystoscopy and all indicated procedures;  Surgeon: Meredith Fontenot MD;  [facility];  Service: Urology    FL CYSTOGRAM  1/15/2019    IVC FILTER INSERTION      X2    IVC FILTER INSERTION      x2    KIDNEY STONE SURGERY      KNEE SURGERY      Sepsis infection     NEPHROSTOMY      NV CYSTO/URETERO W/LITHOTRIPSY &INDWELL STENT INSRT Right 6/14/2017    Procedure: CYSTOSCOPY URETEROSCOPY WITH LITHOTRIPSY HOLMIUM LASER,,BASKET STONE EXTRACTION, RETROGRADE PYELOGRAM AND INSERTION STENT URETERAL;  Surgeon: Yanelis Narvaez MD;  [facility];  Service: Urology    NV CYSTOURETHROSCOPY,BIOPSY N/A 1/15/2019    Procedure: CYSTOSCOPY, CYSTOGRAM, DILATION OF URETHRAL STRICTURE;  Surgeon: Meredith Fontenot MD;  [facility];  Service: Urology    URINARY SURGERY         Meds/Allergies:    Prior to Admission medications    Medication Sig Start Date End Date Taking?  Authorizing Provider   acetaminophen (TYLENOL) 325 mg tablet Take 650 mg by mouth every 4 (four) hours as needed for mild pain     Historical Provider, MD   amLODIPine (NORVASC) 5 mg tablet Take 5 mg by mouth daily    Historical Provider, MD   amLODIPine-atorvastatin (CADUET) 10-10 MG per tablet Take 1 tablet by mouth daily    Historical Provider, MD   amLODIPine-benazepril (LOTREL) 10-20 MG per capsule Take 1 capsule by mouth daily    Historical Provider, MD   apixaban (ELIQUIS) 2 5 mg Take 2 5 mg by mouth 2 (two) times a day    Historical Provider, MD   famotidine (PEPCID) 20 mg tablet Take 20 mg by mouth 2 (two) times a day    Historical Provider, MD   memantine (NAMENDA) 5 mg tablet Take 1 tablet by mouth 2 (two) times a day for 30 days  Patient taking differently: Take 5 mg by mouth 2 (two) times a day  11/11/16 6/13/19  Nikolay Walton MD   metoprolol tartrate (LOPRESSOR) 25 mg tablet Take 1 tablet by mouth 2 (two) times a day for 30 days  Patient taking differently: Take 25 mg by mouth 2 (two) times a day   11/11/16 6/13/19  Nikolay Walton MD   mirtazapine (REMERON) 15 mg tablet Take 1 tablet by mouth daily at bedtime for 30 days 3/1/17 6/13/19  Nikolay Walton MD   NYSTATIN powder Apply topically 2 (two) times a day   12/4/18   Historical Provider, MD   polyethylene glycol (MIRALAX) 17 g packet Take 17 g by mouth daily 4/26/19   Lesa Cabrera MD   potassium chloride (K-DUR,KLOR-CON) 10 mEq tablet Take 10 mEq by mouth 3 (three) times a day    Historical Provider, MD   QUEtiapine (SEROquel) 25 mg tablet Take 25 mg by mouth daily at bedtime      Historical Provider, MD   tamsulosin (FLOMAX) 0 4 mg Take 1 capsule by mouth daily with dinner for 30 days 3/1/17 6/13/19  Nikolay Walton MD     I have reviewed home medications using allscripts  Allergies:    Allergies   Allergen Reactions    Gentamycin [Gentamicin] Anaphylaxis    Vancomycin Anaphylaxis    Zosyn [Piperacillin Sod-Tazobactam So] Anaphylaxis     However, has tolerated Ertapenem, Cefdinir, and Cefepime, which all have different side chains  Avoid Penicillins and the following Cephs with similar side chains (Cephalexin, Cefadroxil, Cefaclor, Cefprozil, or  Cefoxitin)    Clindamycin Itching    Nsaids Other (See Comments)     Nephrotic Syndrome    Sulfa Antibiotics Rash    Other Rash     antipersperents  Stat lock from lucia catheter       Social History:     Marital Status: /Civil Union   Occupation:   Patient Pre-hospital Living Situation:  Nursing home  Patient Pre-hospital Level of Mobility:  Dependent  Patient Pre-hospital Diet Restrictions:   Substance Use History:   Social History     Substance and Sexual Activity   Alcohol Use No     Social History     Tobacco Use   Smoking Status Never Smoker   Smokeless Tobacco Never Used     Social History     Substance and Sexual Activity   Drug Use No       Family History:    Family History   Problem Relation Age of Onset    Diabetes Father     Hypertension Father     Coronary artery disease Father     Cancer Father     Hypertension Mother        Physical Exam:     Vitals:   Blood Pressure: 139/57 (07/08/19 0508)  Pulse: 72 (07/08/19 0508)  Respirations: 18 (07/08/19 0508)  Weight - Scale: 96 1 kg (211 lb 13 8 oz) (07/08/19 0219)  SpO2: 96 % (07/08/19 0508)    Physical Exam   Constitutional: He appears well-developed and well-nourished  HENT:   Head: Normocephalic and atraumatic  Eyes: Pupils are equal, round, and reactive to light  Conjunctivae and EOM are normal    Neck: Normal range of motion  Neck supple  Cardiovascular: Normal rate, regular rhythm, normal heart sounds and intact distal pulses  Pulmonary/Chest: Effort normal and breath sounds normal    Abdominal: Soft  Bowel sounds are normal    Musculoskeletal: Normal range of motion  Skin: Skin is warm and dry  Capillary refill takes less than 2 seconds  Nursing note and vitals reviewed            Additional Data:     Lab Results: I have personally reviewed pertinent reports  Results from last 7 days   Lab Units 07/08/19  0240   WBC Thousand/uL 10 42*   HEMOGLOBIN g/dL 15 0   HEMATOCRIT % 46 8   PLATELETS Thousands/uL 285   NEUTROS PCT % 77*   LYMPHS PCT % 13*   MONOS PCT % 8   EOS PCT % 2     Results from last 7 days   Lab Units 07/08/19  0240   SODIUM mmol/L 144   POTASSIUM mmol/L 3 8   CHLORIDE mmol/L 107   CO2 mmol/L 31   BUN mg/dL 24   CREATININE mg/dL 1 95*   ANION GAP mmol/L 6   CALCIUM mg/dL 9 0   ALBUMIN g/dL 3 0*   TOTAL BILIRUBIN mg/dL 0 40   ALK PHOS U/L 100   ALT U/L 12   AST U/L 13   GLUCOSE RANDOM mg/dL 106     Results from last 7 days   Lab Units 07/08/19  0240   INR  1 20*                   Imaging: I have personally reviewed pertinent reports  No orders to display       EKG, Pathology, and Other Studies Reviewed on Admission:   EKG:   Allscripts / Epic Records Reviewed: Yes     ** Please Note: This note has been constructed using a voice recognition system   **

## 2019-07-08 NOTE — ED PROVIDER NOTES
History  Chief Complaint   Patient presents with    Slow Heart Rate     Pt arrives to ED via EMS for low HR of 42bpm  Staff also report "milky" colored urine  HPI     Patient sent in for UTI/low heart rate  No bradycardia here  History of multiple UTIs  Thorne changed, urine very cloudy and white  No abdominal tenderness  It is difficult to obtain a history from the patient as he is non verbal      MDM 77 yom, uti, will admit  Prior to Admission Medications   Prescriptions Last Dose Informant Patient Reported? Taking?    NYSTATIN powder  Outside Facility (Specify) Yes No   Sig: Apply topically 2 (two) times a day     QUEtiapine (SEROquel) 25 mg tablet  Outside Facility (Specify) Yes No   Sig: Take 25 mg by mouth daily at bedtime     acetaminophen (TYLENOL) 325 mg tablet  Outside Facility (Specify) Yes No   Sig: Take 650 mg by mouth every 4 (four) hours as needed for mild pain    amLODIPine (NORVASC) 5 mg tablet   Yes No   Sig: Take 5 mg by mouth daily   apixaban (ELIQUIS) 2 5 mg  Outside Facility (Specify) Yes No   Sig: Take 2 5 mg by mouth 2 (two) times a day   busPIRone (BUSPAR) 7 5 mg tablet   Yes Yes   Sig: Take 7 5 mg by mouth 3 (three) times a day   docusate sodium (COLACE) 100 mg capsule   Yes Yes   Sig: Take 100 mg by mouth 2 (two) times a day   famotidine (PEPCID) 20 mg tablet  Outside Facility (Specify) Yes No   Sig: Take 20 mg by mouth 2 (two) times a day   memantine (NAMENDA) 5 mg tablet  Outside Facility (Specify) No No   Sig: Take 1 tablet by mouth 2 (two) times a day for 30 days   Patient taking differently: Take 5 mg by mouth 2 (two) times a day    metoprolol tartrate (LOPRESSOR) 25 mg tablet  Outside Facility (Specify) No No   Sig: Take 1 tablet by mouth 2 (two) times a day for 30 days   Patient taking differently: Take 25 mg by mouth 2 (two) times a day     mirtazapine (REMERON) 15 mg tablet  Outside Facility (Specify) No No   Sig: Take 1 tablet by mouth daily at bedtime for 30 days   other medication, see sig,   Yes Yes   Sig: Renaicidin solution - 1 application via irrigation every evening and night shift for lucia irrigation   Instil and clamp for 15 minutes   polyethylene glycol (MIRALAX) 17 g packet  Outside Facility (Specify) No No   Sig: Take 17 g by mouth daily   potassium chloride (K-DUR,KLOR-CON) 10 mEq tablet  Outside Facility (Specify) Yes No   Sig: Take 10 mEq by mouth 3 (three) times a day   tamsulosin (FLOMAX) 0 4 mg  Outside Facility (Specify) No No   Sig: Take 1 capsule by mouth daily with dinner for 30 days      Facility-Administered Medications: None       Past Medical History:   Diagnosis Date    Anxiety     Aortic aneurysm (HCC)     Aphasia     Ataxia     Bladder adhesions     BPH (benign prostatic hypertrophy)     COPD [facility]chronic obstructive pulmonary disease) [facility])     wife denies - "never had"    Dementia     Difficulty walking     Essential (primary) hypertension     Generalized anxiety disorder     GERD (gastroesophageal reflux disease)     Global aphasia     H/O blood clots     High blood pressure     History of kidney stones     Kidney stones     Mental disorder     Muscle weakness     Neuromuscular dysfunction of bladder     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 ([facility] [address] [facility]     Retention of urine, unspecified     Stroke ([facility] [address] [facility]     Thrombosis     Urinary retention     Urinary tract bacterial infections     Urinary tract infection        Past Surgical History:   Procedure Laterality Date    BOTOX INJECTION N/A 6/18/2019    Procedure: INJECTION BOTULINUM TOXIN (BOTOX), intra detrusor;  Surgeon: Sophie Carrion MD;  [facility];  Service: Urology    CYSTOSCOPY      CYSTOSCOPY N/A 6/18/2019    Procedure: cystoscopy and all indicated procedures;  Surgeon: Sophie Carrion MD;  [facility];  Service: Urology    FL CYSTOGRAM  1/15/2019    IVC FILTER INSERTION      X2    IVC FILTER INSERTION      x2    KIDNEY STONE SURGERY      KNEE SURGERY      Sepsis infection     NEPHROSTOMY      LA CYSTO/URETERO W/LITHOTRIPSY &INDWELL STENT INSRT Right 6/14/2017    Procedure: CYSTOSCOPY URETEROSCOPY WITH LITHOTRIPSY HOLMIUM LASER,,BASKET STONE EXTRACTION, RETROGRADE PYELOGRAM AND INSERTION STENT URETERAL;  Surgeon: Jose R Pereira MD;  [facility];  Service: Urology    LA CYSTOURETHROSCOPY,BIOPSY N/A 1/15/2019    Procedure: CYSTOSCOPY, CYSTOGRAM, DILATION OF URETHRAL STRICTURE;  Surgeon: Alphonso Ellis MD;  [facility];  Service: Urology    URINARY SURGERY         Family History   Problem Relation Age of Onset    Diabetes Father     Hypertension Father     Coronary artery disease Father     Cancer Father     Hypertension Mother      I have reviewed and agree with the history as documented  Social History     Tobacco Use    Smoking status: Never Smoker    Smokeless tobacco: Never Used   Substance Use Topics    Alcohol use: No    Drug use: No        Review of Systems   Unable to perform ROS: Dementia       Physical Exam  Physical Exam   Constitutional: He is oriented to person, place, and time  He appears well-developed and well-nourished  HENT:   Head: Normocephalic and atraumatic  Eyes: Pupils are equal, round, and reactive to light  EOM are normal    Neck: Normal range of motion  Neck supple  Cardiovascular: Normal rate, regular rhythm and normal heart sounds  No murmur heard  Pulmonary/Chest: Effort normal and breath sounds normal  No respiratory distress  He has no wheezes  Abdominal: Soft  Bowel sounds are normal  He exhibits no distension  There is no tenderness  Genitourinary:   Genitourinary Comments: Thorne in place, changed, urine was very cloudy and white   Musculoskeletal: Normal range of motion  He exhibits no edema or tenderness  Neurological: He is alert and oriented to person, place, and time  No cranial nerve deficit   Coordination normal    Skin: Skin is warm and dry  He is not diaphoretic  No erythema  Psychiatric: He has a normal mood and affect  His behavior is normal    Nursing note and vitals reviewed        Vital Signs  ED Triage Vitals   Temperature Pulse Respirations Blood Pressure SpO2   07/08/19 0939 07/08/19 0219 07/08/19 0219 07/08/19 0219 07/08/19 0219   98 5 °F (36 9 °C) 64 18 150/91 99 %      Temp Source Heart Rate Source Patient Position - Orthostatic VS BP Location FiO2 (%)   07/08/19 0939 07/08/19 0219 07/08/19 0219 07/08/19 0219 --   Axillary Monitor Lying Right arm       Pain Score       07/08/19 0219       No Pain           Vitals:    07/09/19 1420 07/09/19 1746 07/09/19 2333 07/10/19 0714   BP: 141/82  141/77 143/85   Pulse: 56 (!) 50 (!) 50 64   Patient Position - Orthostatic VS: Lying  Lying Lying         Visual Acuity      ED Medications  Medications   sodium chloride 0 9 % bolus 500 mL (0 mL Intravenous Stopped 7/8/19 0508)   cefepime (MAXIPIME) 2 g/50 mL dextrose IVPB (0 mg Intravenous Stopped 7/8/19 0405)       Diagnostic Studies  Results Reviewed     Procedure Component Value Units Date/Time    MRSA culture [[id number]]  (Abnormal) Collected:  07/08/19 2130    Lab Status:  Final result Specimen:  Nares from Nose Updated:  07/10/19 1422     MRSA Culture Only Methicillin Resistant Staphylococcus aureus isolated    Urine culture [[id number]] Collected:  07/08/19 0329    Lab Status:  Final result Specimen:  Urine, Indwelling Thorne Catheter Updated:  07/09/19 1042     Urine Culture >100,000 cfu/ml     Basic metabolic panel [[id number]]  (Abnormal) Collected:  07/09/19 0607    Lab Status:  Final result Specimen:  Blood from Arm, Left Updated:  07/09/19 0737     Sodium 141 mmol/L      Potassium 3 8 mmol/L      Chloride 108 mmol/L      CO2 28 mmol/L      ANION GAP 5 mmol/L      BUN 21 mg/dL      Creatinine 1 41 mg/dL      Glucose 85 mg/dL      Calcium 8 4 mg/dL      eGFR 50 ml/min/1 73sq m     Narrative:       [employer] guidelines for Chronic Kidney Disease (CKD):     Stage 1 with normal or high GFR (GFR > 90 mL/min/1 73 square meters)    Stage 2 Mild CKD (GFR = 60-89 mL/min/1 73 square meters)    Stage 3A Moderate CKD (GFR = 45-59 mL/min/1 73 square meters)    Stage 3B Moderate CKD (GFR = 30-44 mL/min/1 73 square meters)    Stage 4 Severe CKD (GFR = 15-29 mL/min/1 73 square meters)    Stage 5 End Stage CKD (GFR <15 mL/min/1 73 square meters)  Note: GFR calculation is accurate only with a steady state creatinine    CBC and differential [[id number]]  (Abnormal) Collected:  07/09/19 0607    Lab Status:  Final result Specimen:  Blood from Arm, Left Updated:  07/09/19 0710     WBC 7 68 Thousand/uL      RBC 4 82 Million/uL      Hemoglobin 14 3 g/dL      Hematocrit 44 1 %      MCV 92 fL      MCH 29 7 pg      MCHC 32 4 g/dL      RDW 14 5 %      MPV 9 7 fL      Platelets 712 Thousands/uL      nRBC 0 /100 WBCs      Neutrophils Relative 62 %      Immat GRANS % 0 %      Lymphocytes Relative 22 %      Monocytes Relative 8 %      Eosinophils Relative 7 %      Basophils Relative 1 %      Neutrophils Absolute 4 78 Thousands/µL      Immature Grans Absolute 0 02 Thousand/uL      Lymphocytes Absolute 1 66 Thousands/µL      Monocytes Absolute 0 64 Thousand/µL      Eosinophils Absolute 0 53 Thousand/µL      Basophils Absolute 0 05 Thousands/µL     Urine Microscopic [[id number]]  (Abnormal) Collected:  07/08/19 0329    Lab Status:  Final result Specimen:  Urine, Indwelling Thorne Catheter Updated:  07/08/19 0343     RBC, UA 1-2 /hpf      WBC, UA Innumerable /hpf      Epithelial Cells Occasional /hpf      Bacteria, UA Moderate /hpf     UA w Reflex to Microscopic w Reflex to Culture [[id number]]  (Abnormal) Collected:  07/08/19 0329    Lab Status:  Final result Specimen:  Urine, Indwelling Thorne Catheter Updated:  07/08/19 0337     Color, UA Yellow     Clarity, UA Cloudy     Specific Gravity, UA >=1 030     pH, UA 5 5 Leukocytes, UA Moderate     Nitrite, UA Positive     Protein,  (2+) mg/dl      Glucose, UA Negative mg/dl      Ketones, UA Negative mg/dl      Urobilinogen, UA 0 2 E U /dl      Bilirubin, UA Negative     Blood, UA Moderate    Troponin I [[id number]]  (Normal) Collected:  07/08/19 0240    Lab Status:  Final result Specimen:  Blood from Arm, Right Updated:  07/08/19 0305     Troponin I 0 02 ng/mL     APTT [[id number]]  (Normal) Collected:  07/08/19 0240    Lab Status:  Final result Specimen:  Blood from Arm, Right Updated:  07/08/19 0305     PTT 36 seconds     Protime-INR [[id number]]  (Abnormal) Collected:  07/08/19 0240    Lab Status:  Final result Specimen:  Blood from Arm, Right Updated:  07/08/19 0305     Protime 14 6 seconds      INR 1 20    Comprehensive metabolic panel [[id number]]  (Abnormal) Collected:  07/08/19 0240    Lab Status:  Final result Specimen:  Blood from Arm, Right Updated:  07/08/19 0303     Sodium 144 mmol/L      Potassium 3 8 mmol/L      Chloride 107 mmol/L      CO2 31 mmol/L      ANION GAP 6 mmol/L      BUN 24 mg/dL      Creatinine 1 95 mg/dL      Glucose 106 mg/dL      Calcium 9 0 mg/dL      AST 13 U/L      ALT 12 U/L      Alkaline Phosphatase 100 U/L      Total Protein 7 2 g/dL      Albumin 3 0 g/dL      Total Bilirubin 0 40 mg/dL      eGFR 34 ml/min/1 73sq m     Narrative:       [facility] guidelines for Chronic Kidney Disease (CKD):     Stage 1 with normal or high GFR (GFR > 90 mL/min/1 73 square meters)    Stage 2 Mild CKD (GFR = 60-89 mL/min/1 73 square meters)    Stage 3A Moderate CKD (GFR = 45-59 mL/min/1 73 square meters)    Stage 3B Moderate CKD (GFR = 30-44 mL/min/1 73 square meters)    Stage 4 Severe CKD (GFR = 15-29 mL/min/1 73 square meters)    Stage 5 End Stage CKD (GFR <15 mL/min/1 73 square meters)  Note: GFR calculation is accurate only with a steady state creatinine    CBC and differential [[id number]]  (Abnormal) Collected:  07/08/19 0240 Lab Status:  Final result Specimen:  Blood from Arm, Right Updated:  07/08/19 0246     WBC 10 42 Thousand/uL      RBC 5 15 Million/uL      Hemoglobin 15 0 g/dL      Hematocrit 46 8 %      MCV 91 fL      MCH 29 1 pg      MCHC 32 1 g/dL      RDW 14 4 %      MPV 9 2 fL      Platelets 304 Thousands/uL      nRBC 0 /100 WBCs      Neutrophils Relative 77 %      Immat GRANS % 0 %      Lymphocytes Relative 13 %      Monocytes Relative 8 %      Eosinophils Relative 2 %      Basophils Relative 0 %      Neutrophils Absolute 7 88 Thousands/µL      Immature Grans Absolute 0 04 Thousand/uL      Lymphocytes Absolute 1 39 Thousands/µL      Monocytes Absolute 0 86 Thousand/µL      Eosinophils Absolute 0 22 Thousand/µL      Basophils Absolute 0 03 Thousands/µL                  CT abdomen pelvis wo contrast   Final Result by Sadia Rothman MD (07/08 2581)         1  No significant perinephric collection  Trace perinephric stranding is nonspecific and may reflect underlying renal insufficiency, infection or other nephropathy  Further clinical evaluation recommended  No hydronephrosis  No large retroperitoneal    or perinephric collection  2   Bilateral renal calcifications right greater than left are stable favoring medullary nephrocalcinosis versus multiple nonobstructing calculi  3   Contracted gallbladder containing high density material likely gallstones  4   Fecal impaction  Workstation performed: [id number]         US kidney and bladder   Final Result by Yovanny Tenorio DO (07/08 8802)   Very limited exam due to patient's inability to cooperate and interfering with the sonographer performing the exam       The left kidney is larger than the right  Both kidneys demonstrate numerous calculi but no obvious hydronephrosis  Of concern however: Is what appears to be right-sided perinephric fluid which could suggest calyceal rupture  Computed tomography follow-up recommended when possible        Urinary bladder collapsed by Thorne catheter which significantly limits evaluation  I personally discussed this study with Serge Riedel on 7/8/2019 at 3:47 PM                    Workstation performed: [id number]                    Procedures  Procedures       ED Course                               MDM    Disposition  Final diagnoses:   Urinary tract infection   H/O urethral stricture   Neurogenic bladder     Time reflects when diagnosis was documented in both MDM as applicable and the Disposition within this note     Time User Action Codes Description Comment    7/8/2019  5:07 AM Daniel Sawant Add [N39 0] Urinary tract infection     7/8/2019  6:27 AM Gerardine Yareli Add [Z87 448] H/O urethral stricture     7/8/2019  6:27 AM Gerardine Yareli Add [N31 9] Neurogenic bladder     7/10/2019 12:59 PM Kylee Moody Add [E87 6] Hypokalemia     7/10/2019 12:59 PM Mikel Ladd [facility] Essential hypertension       ED Disposition     ED Disposition Condition Date/Time Comment    Admit Stable Mon Jul 8, 2019  5:07 AM Case was discussed with slim ap and the patient's admission status was agreed to be Admission Status: inpatient status to the service of Dr Teetee Macias MD Documentation      Most Recent Value   Accepting Facility Name, [address] by (Company and Unit #)  [facility]      RN Documentation      Most [facility] Name, [address] by (Company and Unit #)  [facility]      Follow-up Information     Follow up With Specialties Details Why Contact Connie Casillas MD Internal Medicine Follow up  [address] [facility]      Niecy Iverson MD Urology Follow up Call for appointment [address] [place] [address]  [phone]            Discharge Medication List as of 7/10/2019  1:21 PM      CONTINUE these medications which have CHANGED    Details   metoprolol tartrate (LOPRESSOR) 25 mg tablet Take 0 5 tablets (12 5 mg total) by mouth 2 (two) times a day, Starting Wed 7/10/2019, Until Tue 2/8/2022, Print      potassium chloride (K-DUR,KLOR-CON) 10 mEq tablet Take 1 tablet (10 mEq total) by mouth 2 (two) times a day, Starting Wed 7/10/2019, Normal         CONTINUE these medications which have NOT CHANGED    Details   acetaminophen (TYLENOL) 325 mg tablet Take 650 mg by mouth every 4 (four) hours as needed for mild pain , Historical Med      amLODIPine (NORVASC) 5 mg tablet Take 5 mg by mouth daily, Historical Med      apixaban (ELIQUIS) 2 5 mg Take 2 5 mg by mouth 2 (two) times a day, Historical Med      busPIRone (BUSPAR) 7 5 mg tablet Take 7 5 mg by mouth 3 (three) times a day, Historical Med      docusate sodium (COLACE) 100 mg capsule Take 100 mg by mouth 2 (two) times a day, Historical Med      famotidine (PEPCID) 20 mg tablet Take 20 mg by mouth 2 (two) times a day, Historical Med      memantine (NAMENDA) 5 mg tablet Take 1 tablet by mouth 2 (two) times a day for 30 days, Starting Fri 11/11/2016, Until Thu 6/13/2019, Print      mirtazapine (REMERON) 15 mg tablet Take 1 tablet by mouth daily at bedtime for 30 days, Starting Wed 3/1/2017, Until Thu 6/13/2019, Print      NYSTATIN powder Apply topically 2 (two) times a day  , Starting Tue 12/4/2018, Historical Med      other medication, see sig, Renaicidin solution - 1 application via irrigation every evening and night shift for lucia irrigation   Instil and clamp for 15 minutes, Historical Med      polyethylene glycol (MIRALAX) 17 g packet Take 17 g by mouth daily, Starting Fri 4/26/2019, No Print      QUEtiapine (SEROquel) 25 mg tablet Take 25 mg by mouth daily at bedtime  , Historical Med      tamsulosin (FLOMAX) 0 4 mg Take 1 capsule by mouth daily with dinner for 30 days, Starting Wed 3/1/2017, Until Thu 6/13/2019, Print           Outpatient Discharge Orders   Discharge Diet Discharge Condtion:  Stabilized     Free of Communicable Disease:    Yes     Activity:  As Tolerated     Future Lab Orders at SNF     Inpatient consult to Wound Care       ED Provider  Electronically Signed by           Huey Gotti MD  07/13/19 2464

## 2019-07-08 NOTE — CONSULTS
UROLOGY CONSULTATION NOTE     Patient Identifiers: Cierra Yeh (MRN [id number])  Service Requesting Consultation:  [facility]  Service Providing Consultation:  Urology, Patricia Ramos MD    Date of Service: 7/8/2019  Consults    Reason for Consultation:    Urinary tract infection  Neurogenic bladder  Indwelling Thorne catheter  History of pyocystis      History of Present Illness:     Cierra Yeh is a 77 y o  old with a history of multiple urologic complaints including previous false passage, since healed, most recently status post cystoscopy with botulinum toxin injection some weeks ago for recalcitrant bladder spasms  He has a history of multiple presentations to hospital for dislodged Thorne catheter given that he has baseline mental dysfunction after having a stroke sometime ago, and routinely pulses Thorne catheter into the bulbous urethra leading to accumulation of bacteria and urothelial cells within the bladder  His Thorne catheter was easy replaced by the emergency room staff, it is currently draining clear yellow urine and is in good position  I was contacted by the emergency room staff regarding some potential fluid noted around the kidneys on ultrasound, a CT scan was appropriately ordered and this shows no hydronephrosis, bilateral kidney stones (very common in patients with poor mobility and in facilities due to mobilization of bone calcium and hypercalciuria), and no indication for acute urologic intervention at this time      He is unable to tell me anything in terms of review of systems or his history, as he is nonverbal     Interestingly, he actually looks better than when we typically see him in the office I see him today at the bedside      Past Medical, Past Surgical History:     Past Medical History:   Diagnosis Date    Anxiety     Aortic aneurysm ([address] )     Aphasia     Ataxia     Bladder adhesions     BPH (benign prostatic hypertrophy)     COPD (chronic obstructive pulmonary disease) (HCC)     wife denies - "never had"    Dementia     Difficulty walking     Essential (primary) hypertension     Generalized anxiety disorder     GERD (gastroesophageal reflux disease)     Global aphasia     H/O blood clots     High blood pressure     History of kidney stones     Kidney stones     Mental disorder     Muscle weakness     Neuromuscular dysfunction of bladder     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 ([facility] [address]     Retention of urine, unspecified     [facility]     Thrombosis     Urinary retention     Urinary tract bacterial infections     Urinary tract infection    :    Past Surgical History:   Procedure Laterality Date    BOTOX INJECTION N/A 6/18/2019    Procedure: INJECTION BOTULINUM TOXIN (BOTOX), intra detrusor;  Surgeon: Janeen Alfaro MD;  [facility];  Service: Urology    CYSTOSCOPY      CYSTOSCOPY N/A 6/18/2019    Procedure: cystoscopy and all indicated procedures;  Surgeon: Janeen Alfaro MD;  [facility];  Service: Urology    [place] CYSTOGRAM  1/15/2019    IVC FILTER INSERTION      X2    IVC FILTER INSERTION      x2    KIDNEY STONE SURGERY      KNEE SURGERY      Sepsis infection     NEPHROSTOMY      [place] CYSTO/URETERO W/LITHOTRIPSY &INDWELL STENT INSRT Right 6/14/2017    Procedure: CYSTOSCOPY URETEROSCOPY WITH LITHOTRIPSY HOLMIUM LASER,,BASKET STONE EXTRACTION, RETROGRADE PYELOGRAM AND INSERTION STENT URETERAL;  Surgeon: Jennifer Bell MD;  [facility];  Service: Urology    [place] CYSTOURETHROSCOPY,BIOPSY N/A 1/15/2019    Procedure: CYSTOSCOPY, CYSTOGRAM, DILATION OF URETHRAL STRICTURE;  Surgeon: Janeen Alfaro MD;  [facility];  Service: Urology    URINARY SURGERY     :    Medications, Allergies:     Current Facility-Administered Medications   Medication Dose Route Frequency    acetaminophen (TYLENOL) tablet 650 mg  650 mg Oral Q6H PRN    amLODIPine (NORVASC) tablet 5 mg  5 mg Oral Daily    apixaban (ELIQUIS) tablet 2 5 mg  2 5 mg Oral BID    busPIRone (BUSPAR) tablet 7 5 mg  7 5 mg Oral TID    cefepime (MAXIPIME) 2 g/50 mL dextrose IVPB  2,000 mg Intravenous Q12H    docusate sodium (COLACE) capsule 100 mg  100 mg Oral BID    famotidine (PEPCID) tablet 20 mg  20 mg Oral Daily    memantine (NAMENDA) tablet 5 mg  5 mg Oral BID    metoprolol tartrate (LOPRESSOR) tablet 25 mg  25 mg Oral BID    mirtazapine (REMERON) tablet 15 mg  15 mg Oral HS    nitrofurantoin (MACROBID) extended-release capsule 100 mg  100 mg Oral BID With Meals    ondansetron (ZOFRAN) injection 4 mg  4 mg Intravenous Q6H PRN    polyethylene glycol (MIRALAX) packet 17 g  17 g Oral Daily    QUEtiapine (SEROquel) tablet 25 mg  25 mg Oral HS    tamsulosin (FLOMAX) capsule 0 4 mg  0 4 mg Oral Daily With Dinner       Allergies: Allergies   Allergen Reactions    Gentamycin [Gentamicin] Anaphylaxis    Vancomycin Anaphylaxis    Zosyn [Piperacillin Sod-Tazobactam So] Anaphylaxis     However, has tolerated Ertapenem, Cefdinir, and Cefepime, which all have different side chains   Avoid Penicillins and the following Cephs with similar side chains (Cephalexin, Cefadroxil, Cefaclor, Cefprozil, or  Cefoxitin)    Clindamycin Itching    Nsaids Other (See Comments)     Nephrotic Syndrome    Sulfa Antibiotics Rash    Other Rash     antipersperents  Stat lock from lucia catheter   :    Social and Family History:   Social History:   Social History     Tobacco Use    Smoking status: Never Smoker    Smokeless tobacco: Never Used   Substance Use Topics    Alcohol use: No    Drug use: No        Social History     Tobacco Use   Smoking Status Never Smoker   Smokeless Tobacco Never Used       Family History:  Family History   Problem Relation Age of Onset    Diabetes Father     Hypertension Father     Coronary artery disease Father     Cancer Father     Hypertension Mother    :     Review of Systems:     Unable to perform, patient is nonverbal    Physical Exam:   /78 (BP Location: Left arm)   Pulse 62   Temp 97 5 °F (36 4 °C) (Oral)   Resp 18   Wt 98 kg (216 lb 0 8 oz)   SpO2 95%   BMI 31 00 kg/m² Temp (24hrs), Av °F (36 7 °C), Min:97 5 °F (36 4 °C), Max:98 5 °F (36 9 °C)  current; Temperature: 97 5 °F (36 4 °C)  I/O last 24 hours: In: 550 [IV Piggyback:550]  Out: -   General: Patient is in no apparent distress, laying quietly in bed    Cardiac: Peripheral edema: negative, peripheral pulses are present and indicated a regular rate and rhythm    Pulmonary: Non-labored breathing    Abdomen: Soft, non-tender, non-distended  Genitourinary: Negative CVA tenderness, negative suprapubic tenderness  Neurological:  Flat affect, masked facial expression, motor and sensory deficits  Musculoskeletal: Extremities are contracted, ROM is limited    Psychiatric: The patient's train of thought is Unable to be assessed, mood and affect are Flat, the patient denies suicidal and homicidal ideations  Lymphatic: There is not adenopathy in the abdominal region      Skin:  Warm    BERMUDEZ: in place draining clear yellow urine        Labs:     Lab Results   Component Value Date    HGB 15 0 2019    HCT 46 8 2019    WBC 10 42 (H) 2019     2019   ]    Lab Results   Component Value Date    K 3 8 2019     2019    CO2 31 2019    BUN 24 2019    CREATININE 1 95 (H) 2019    CALCIUM 9 0 2019   ]    Imaging:   I personally reviewed the images and report of the following studies, and reviewed them with the patient:    CT Abdomen/Pelvis: Bilateral medullary calcinosis with bilateral renal calcifications, no hydronephrosis, no fluid collection or ruptured calyx      ASSESSMENT:     77 y o  old male with  multiple medical issues including significant residual deficits from previous stroke, indwelling Bermudez catheter for neurogenic bladder  Multiple catheter dislodgement previously bleeding to pyocystis, consistent with his recent admission to hospital     No indication for urologic intervention at this time, his Thorne catheter is draining well  Erick Jacobson PLAN:     Continued care per primary team     Follow-up as scheduled on the 18th for Thorne catheter change in our office  Please re-consult as necessary  The following portions of the patient's history were reviewed and updated as appropriate: allergies, current medications, past family history, past medical history, past social history, past surgical history and problem list       Portions of the above record have been created with voice recognition software  Occasional wrong word or "sound alike" substitution may have occurred due to the inherent limitations of voice recognition software  Read the chart carefully and recognize, using context, where substitution may have occurred  Portions of the above record have been created with voice recognition software  Occasional wrong word or "sound alike" substitution may have occurred due to the inherent limitations of voice recognition software  Read the chart carefully and recognize, using context, where substitution may have occurred  Thank you for allowing me to participate in this patients care  Please do not hesitate to call with any additional questions    Jerry Ruggiero MD

## 2019-07-09 LAB
ANION GAP SERPL CALCULATED.3IONS-SCNC: 5 MMOL/L (ref 4–13)
BACTERIA UR CULT: NORMAL
BASOPHILS # BLD AUTO: 0.05 THOUSANDS/ΜL (ref 0–0.1)
BASOPHILS NFR BLD AUTO: 1 % (ref 0–1)
BUN SERPL-MCNC: 21 MG/DL (ref 5–25)
CALCIUM SERPL-MCNC: 8.4 MG/DL (ref 8.3–10.1)
CHLORIDE SERPL-SCNC: 108 MMOL/L (ref 100–108)
CO2 SERPL-SCNC: 28 MMOL/L (ref 21–32)
CREAT SERPL-MCNC: 1.41 MG/DL (ref 0.6–1.3)
EOSINOPHIL # BLD AUTO: 0.53 THOUSAND/ΜL (ref 0–0.61)
EOSINOPHIL NFR BLD AUTO: 7 % (ref 0–6)
ERYTHROCYTE [DISTWIDTH] IN BLOOD BY AUTOMATED COUNT: 14.5 % (ref 11.6–15.1)
GFR SERPL CREATININE-BSD FRML MDRD: 50 ML/MIN/1.73SQ M
GLUCOSE SERPL-MCNC: 85 MG/DL (ref 65–140)
HCT VFR BLD AUTO: 44.1 % (ref 36.5–49.3)
HGB BLD-MCNC: 14.3 G/DL (ref 12–17)
IMM GRANULOCYTES # BLD AUTO: 0.02 THOUSAND/UL (ref 0–0.2)
IMM GRANULOCYTES NFR BLD AUTO: 0 % (ref 0–2)
LYMPHOCYTES # BLD AUTO: 1.66 THOUSANDS/ΜL (ref 0.6–4.47)
LYMPHOCYTES NFR BLD AUTO: 22 % (ref 14–44)
MAGNESIUM SERPL-MCNC: 2.2 MG/DL (ref 1.6–2.6)
MCH RBC QN AUTO: 29.7 PG (ref 26.8–34.3)
MCHC RBC AUTO-ENTMCNC: 32.4 G/DL (ref 31.4–37.4)
MCV RBC AUTO: 92 FL (ref 82–98)
MONOCYTES # BLD AUTO: 0.64 THOUSAND/ΜL (ref 0.17–1.22)
MONOCYTES NFR BLD AUTO: 8 % (ref 4–12)
NEUTROPHILS # BLD AUTO: 4.78 THOUSANDS/ΜL (ref 1.85–7.62)
NEUTS SEG NFR BLD AUTO: 62 % (ref 43–75)
NRBC BLD AUTO-RTO: 0 /100 WBCS
PLATELET # BLD AUTO: 271 THOUSANDS/UL (ref 149–390)
PMV BLD AUTO: 9.7 FL (ref 8.9–12.7)
POTASSIUM SERPL-SCNC: 3.8 MMOL/L (ref 3.5–5.3)
PROCALCITONIN SERPL-MCNC: 0.21 NG/ML
RBC # BLD AUTO: 4.82 MILLION/UL (ref 3.88–5.62)
SODIUM SERPL-SCNC: 141 MMOL/L (ref 136–145)
WBC # BLD AUTO: 7.68 THOUSAND/UL (ref 4.31–10.16)

## 2019-07-09 PROCEDURE — 99232 SBSQ HOSP IP/OBS MODERATE 35: CPT | Performed by: INTERNAL MEDICINE

## 2019-07-09 PROCEDURE — 93005 ELECTROCARDIOGRAM TRACING: CPT

## 2019-07-09 PROCEDURE — 80048 BASIC METABOLIC PNL TOTAL CA: CPT | Performed by: NURSE PRACTITIONER

## 2019-07-09 PROCEDURE — 84145 PROCALCITONIN (PCT): CPT | Performed by: INTERNAL MEDICINE

## 2019-07-09 PROCEDURE — 83735 ASSAY OF MAGNESIUM: CPT | Performed by: INTERNAL MEDICINE

## 2019-07-09 PROCEDURE — 85025 COMPLETE CBC W/AUTO DIFF WBC: CPT | Performed by: NURSE PRACTITIONER

## 2019-07-09 RX ORDER — POLYVINYL ALCOHOL 14 MG/ML
1 SOLUTION/ DROPS OPHTHALMIC
Status: DISCONTINUED | OUTPATIENT
Start: 2019-07-09 | End: 2019-07-10 | Stop reason: HOSPADM

## 2019-07-09 RX ADMIN — TAMSULOSIN HYDROCHLORIDE 0.4 MG: 0.4 CAPSULE ORAL at 17:53

## 2019-07-09 RX ADMIN — APIXABAN 2.5 MG: 2.5 TABLET, FILM COATED ORAL at 17:53

## 2019-07-09 RX ADMIN — METOPROLOL TARTRATE 25 MG: 25 TABLET ORAL at 09:55

## 2019-07-09 RX ADMIN — BUSPIRONE HYDROCHLORIDE 7.5 MG: 5 TABLET ORAL at 17:53

## 2019-07-09 RX ADMIN — MIRTAZAPINE 15 MG: 15 TABLET, FILM COATED ORAL at 22:59

## 2019-07-09 RX ADMIN — BUSPIRONE HYDROCHLORIDE 7.5 MG: 5 TABLET ORAL at 09:55

## 2019-07-09 RX ADMIN — FAMOTIDINE 20 MG: 20 TABLET ORAL at 09:55

## 2019-07-09 RX ADMIN — QUETIAPINE FUMARATE 25 MG: 25 TABLET ORAL at 22:59

## 2019-07-09 RX ADMIN — AMLODIPINE BESYLATE 5 MG: 5 TABLET ORAL at 09:55

## 2019-07-09 RX ADMIN — BUSPIRONE HYDROCHLORIDE 7.5 MG: 5 TABLET ORAL at 22:59

## 2019-07-09 RX ADMIN — MEMANTINE 5 MG: 5 TABLET ORAL at 17:53

## 2019-07-09 RX ADMIN — MEMANTINE 5 MG: 5 TABLET ORAL at 09:55

## 2019-07-09 RX ADMIN — APIXABAN 2.5 MG: 2.5 TABLET, FILM COATED ORAL at 09:55

## 2019-07-09 NOTE — PROGRESS NOTES
Patient resting comfortably in bed  Vitals stable  Call bell within reach  Will continue to monitor

## 2019-07-09 NOTE — PLAN OF CARE
Problem: Potential for Falls  Goal: Patient will remain free of falls  Description  INTERVENTIONS:  - Assess patient frequently for physical needs  -  Identify cognitive and physical deficits and behaviors that affect risk of falls    -  [place] fall precautions as indicated by assessment   - Educate patient/family on patient safety including physical limitations  - Instruct patient to call for assistance with activity based on assessment  - Modify environment to reduce risk of injury  - Consider OT/PT consult to assist with strengthening/mobility  Outcome: Progressing     Problem: Prexisting or High Potential for Compromised Skin Integrity  Goal: Skin integrity is maintained or improved  Description  INTERVENTIONS:  - Identify patients at risk for skin breakdown  - Assess and monitor skin integrity  - Assess and monitor nutrition and hydration status  - Monitor labs (i e  albumin)  - Assess for incontinence   - Turn and reposition patient  - Assist with mobility/ambulation  - Relieve pressure over bony prominences  - Avoid friction and shearing  - Provide appropriate hygiene as needed including keeping skin clean and dry  - Evaluate need for skin moisturizer/barrier cream  - Collaborate with interdisciplinary team (i e  Nutrition, Rehabilitation, etc )   - Patient/family teaching  Outcome: Progressing     Problem: PAIN - ADULT  Goal: Verbalizes/displays adequate comfort level or baseline comfort level  Description  Interventions:  - Encourage patient to monitor pain and request assistance  - Assess pain using appropriate pain scale  - Administer analgesics based on type and severity of pain and evaluate response  - Implement non-pharmacological measures as appropriate and evaluate response  - Consider cultural and social influences on pain and pain management  - Notify physician/advanced practitioner if interventions unsuccessful or patient reports new pain  Outcome: Progressing     Problem: INFECTION - ADULT  Goal: Absence or prevention of progression during hospitalization  Description  INTERVENTIONS:  - Assess and monitor for signs and symptoms of infection  - Monitor lab/diagnostic results  - Monitor all insertion sites, i e  indwelling lines, tubes, and drains  - Monitor endotracheal (as able) and nasal secretions for changes in amount and color  - [place] appropriate cooling/warming therapies per order  - Administer medications as ordered  - Instruct and encourage patient and family to use good hand hygiene technique  - Identify and instruct in appropriate isolation precautions for identified infection/condition  Outcome: Progressing     Problem: SAFETY ADULT  Goal: Patient will remain free of falls  Description  INTERVENTIONS:  - Assess patient frequently for physical needs  -  Identify cognitive and physical deficits and behaviors that affect risk of falls    -  [place] fall precautions as indicated by assessment   - Educate patient/family on patient safety including physical limitations  - Instruct patient to call for assistance with activity based on assessment  - Modify environment to reduce risk of injury  - Consider OT/PT consult to assist with strengthening/mobility  Outcome: Progressing  Goal: Maintain or return to baseline ADL function  Description  INTERVENTIONS:  -  Assess patient's ability to carry out ADLs; assess patient's baseline for ADL function and identify physical deficits which impact ability to perform ADLs (bathing, care of mouth/teeth, toileting, grooming, dressing, etc )  - Assess/evaluate cause of self-care deficits   - Assess range of motion  - Assess patient's mobility; develop plan if impaired  - Assess patient's need for assistive devices and provide as appropriate  - Encourage maximum independence but intervene and supervise when necessary  ¯ Involve family in performance of ADLs  ¯ Assess for home care needs following discharge   ¯ Request OT consult to assist with ADL evaluation and planning for discharge  ¯ Provide patient education as appropriate  Outcome: Progressing  Goal: Maintain or return mobility status to optimal level  Description  INTERVENTIONS:  - Assess patient's baseline mobility status (ambulation, transfers, stairs, etc )    - Identify cognitive and physical deficits and behaviors that affect mobility  - Identify mobility aids required to assist with transfers and/or ambulation (gait belt, sit-to-stand, lift, walker, cane, etc )  - [place] fall precautions as indicated by assessment  - Record patient progress and toleration of activity level on Mobility SBAR; progress patient to next Phase/Stage  - Instruct patient to call for assistance with activity based on assessment  - Request Rehabilitation consult to assist with strengthening/weightbearing, etc   Outcome: Progressing     Problem: DISCHARGE PLANNING  Goal: Discharge to home or other facility with appropriate resources  Description  INTERVENTIONS:  - Identify barriers to discharge w/patient and caregiver  - Arrange for needed discharge resources and transportation as appropriate  - Identify discharge learning needs (meds, wound care, etc )  - Arrange for interpretive services to assist at discharge as needed  - Refer to Case Management Department for coordinating discharge planning if the patient needs post-hospital services based on physician/advanced practitioner order or complex needs related to functional status, cognitive ability, or social support system  Outcome: Progressing     Problem: Knowledge Deficit  Goal: Patient/family/caregiver demonstrates understanding of disease process, treatment plan, medications, and discharge instructions  Description  Complete learning assessment and assess knowledge base    Interventions:  - Provide teaching at level of understanding  - Provide teaching via preferred learning methods  Outcome: Progressing     Problem: NEUROSENSORY - ADULT  Goal: Achieves stable or improved neurological status  Description  INTERVENTIONS  - Monitor and report changes in neurological status  - Initiate measures to prevent increased intracranial pressure  - Maintain blood pressure and fluid volume within ordered parameters to optimize cerebral perfusion  - Monitor temperature, glucose, and sodium or any other associated labs   Initiate appropriate interventions as ordered  - Monitor for seizure activity   - Administer anti-seizure medications as ordered  Outcome: Progressing     Problem: GENITOURINARY - ADULT  Goal: Maintains or returns to baseline urinary function  Description  INTERVENTIONS:  - Assess urinary function  - Encourage oral fluids to ensure adequate hydration  - Administer IV fluids as ordered to ensure adequate hydration  - Administer ordered medications as needed  - Offer frequent toileting  - Follow urinary retention protocol if ordered  Outcome: Progressing  Goal: Absence of urinary retention  Description  INTERVENTIONS:  - Assess patients ability to void and empty bladder  - Monitor I/O  - Bladder scan as needed  - Discuss with physician/AP medications to alleviate retention as needed  - Discuss catheterization for long term situations as appropriate  Outcome: Progressing  Goal: Urinary catheter remains patent  Description  INTERVENTIONS:  - Assess patency of urinary catheter  - If patient has a chronic lucia, consider changing catheter if non-functioning  - Follow guidelines for intermittent irrigation of non-functioning urinary catheter  Outcome: Progressing     Problem: SKIN/TISSUE INTEGRITY - ADULT  Goal: Skin integrity remains intact  Description  INTERVENTIONS  - Identify patients at risk for skin breakdown  - Assess and monitor skin integrity  - Assess and monitor nutrition and hydration status  - Monitor labs (i e  albumin)  - Assess for incontinence   - Turn and reposition patient  - Assist with mobility/ambulation  - Relieve pressure over bony prominences  - Avoid friction and shearing  - Provide appropriate hygiene as needed including keeping skin clean and dry  - Evaluate need for skin moisturizer/barrier cream  - Collaborate with interdisciplinary team (i e  Nutrition, Rehabilitation, etc )   - Patient/family teaching  Outcome: Progressing  Goal: Oral mucous membranes remain intact  Description  INTERVENTIONS  - Assess oral mucosa and hygiene practices  - Implement preventative oral hygiene regimen  - Implement oral medicated treatments as ordered  - Initiate Nutrition services referral as needed  Outcome: Progressing     Problem: HEMATOLOGIC - ADULT  Goal: Maintains hematologic stability  Description  INTERVENTIONS  - Assess for signs and symptoms of bleeding or hemorrhage  - Monitor labs  - Administer supportive blood products/factors as ordered and appropriate  Outcome: Progressing     Problem: MUSCULOSKELETAL - ADULT  Goal: Maintain or return mobility to safest level of function  Description  INTERVENTIONS:  - Assess patient's ability to carry out ADLs; assess patient's baseline for ADL function and identify physical deficits which impact ability to perform ADLs (bathing, care of mouth/teeth, toileting, grooming, dressing, etc )  - Assess/evaluate cause of self-care deficits   - Assess range of motion  - Assess patient's mobility; develop plan if impaired  - Assess patient's need for assistive devices and provide as appropriate  - Encourage maximum independence but intervene and supervise when necessary  - Involve family in performance of ADLs  - Assess for home care needs following discharge   - Request OT consult to assist with ADL evaluation and planning for discharge  - Provide patient education as appropriate  Outcome: Progressing  Goal: Maintain proper alignment of affected body part  Description  INTERVENTIONS:  - Support, maintain and protect limb and body alignment  - Provide pt/fam with appropriate education  Outcome: Progressing

## 2019-07-09 NOTE — PROGRESS NOTES
Progress Note - [facility] 1950, 77 y o  male MRN: [id number]    Unit/Bed#: -[place] Encounter: [id number]    Primary Care Provider: Zeeshan Srinivasan MD   Date and time admitted to hospital: 7/8/2019  2:13 AM        * UTI (urinary tract infection)  Assessment & Plan  · Neurogenic bladder with chronic lucai and recurrent urinary tract infections  · Urine positive for nitrite, white blood cells, blood and bacteria  · Has multiple allergies and history of multiple MDRO  · Has tolerated Ertapenem, Cefdinir, and Cefepime  · Received cefepime in the ER  · Patient remains afebrile with no leukocytosis  ·  Urine culture, blood cultures pending  · Urology consult appreciated  · ID consult noted    Neurogenic bladder  Assessment & Plan  Status post chronic indwelling Lucia catheter  Seen by Urology  Next catheter change on 07/18  CKD (chronic kidney disease)  Assessment & Plan  · Cr 1 64   · Recent baseline appears to be 1 3-1 5  · Monitor BMP  Essential hypertension  Assessment & Plan  Stable on amlodipine  History of DVT (deep vein thrombosis)  Assessment & Plan  Maintained on Eliquis        VTE Pharmacologic Prophylaxis:   Pharmacologic: Apixaban (Eliquis)  Mechanical VTE Prophylaxis in Place: Yes    Patient Centered Rounds: I have performed bedside rounds with nursing staff today  Discussions with Specialists or Other Care Team Provider:  Infectious Disease  Education and Discussions with Family / Patient:  Discussed with nursing  Time Spent for Care: 20 minutes  More than 50% of total time spent on counseling and coordination of care as described above  Current Length of Stay: 1 day(s)    Current Patient Status: Inpatient   Certification Statement: The patient will continue to require additional inpatient hospital stay due to Close monitoring of temperature and await urine culture results  Discharge Plan:  Likely discharge in the next 24 hours once urine cultures are available    Code Status: Level 3 - DNAR and DNI      Subjective:   Patient nonverbal and does not offer any complaints  Remains afebrile  Objective:     Vitals:   Temp (24hrs), Av °F (36 7 °C), Min:97 5 °F (36 4 °C), Max:98 3 °F (36 8 °C)    Temp:  [97 5 °F (36 4 °C)-98 3 °F (36 8 °C)] 98 3 °F (36 8 °C)  HR:  [52-75] 62  Resp:  [17-18] 18  BP: (118-158)/(65-87) 147/72  SpO2:  [93 %-97 %] 94 %  Body mass index is 31 kg/m²  Input and Output Summary (last 24 hours): Intake/Output Summary (Last 24 hours) at 2019 1006  Last data filed at 2019 0901  Gross per 24 hour   Intake 380 ml   Output 975 ml   Net -595 ml       Physical Exam:     Physical Exam   Constitutional: No distress  HENT:   Head: Normocephalic and atraumatic  Mouth/Throat: Oropharynx is clear and moist    Eyes: Pupils are equal, round, and reactive to light  EOM are normal    Neck: Normal range of motion  Neck supple  Cardiovascular: Normal rate and regular rhythm  Pulmonary/Chest: Effort normal and breath sounds normal    Abdominal: Soft  Bowel sounds are normal    Genitourinary:   Genitourinary Comments: Indwelling Thorne catheter in place with clear urine   Musculoskeletal: He exhibits no edema  Neurological: He is alert  A phasic at baseline   Skin: He is not diaphoretic       Additional Data:     Labs:    Results from last 7 days   Lab Units 19  0607   WBC Thousand/uL 7 68   HEMOGLOBIN g/dL 14 3   HEMATOCRIT % 44 1   PLATELETS Thousands/uL 271   NEUTROS PCT % 62   LYMPHS PCT % 22   MONOS PCT % 8   EOS PCT % 7*     Results from last 7 days   Lab Units 19  0607  19  0240   SODIUM mmol/L 141   < > 144   POTASSIUM mmol/L 3 8   < > 3 8   CHLORIDE mmol/L 108   < > 107   CO2 mmol/L 28   < > 31   BUN mg/dL 21   < > 24   CREATININE mg/dL 1 41*   < > 1 95*   ANION GAP mmol/L 5   < > 6   CALCIUM mg/dL 8 4   < > 9 0   ALBUMIN g/dL  --   --  3 0*   TOTAL BILIRUBIN mg/dL  --   --  0 40   ALK PHOS U/L  --   --  100   ALT U/L  -- --  12   AST U/L  --   --  13   GLUCOSE RANDOM mg/dL 85   < > 106    < > = values in this interval not displayed  Results from last 7 days   Lab Units 07/08/19  0240   INR  1 20*                       * I Have Reviewed All Lab Data Listed Above  * Additional Pertinent Lab Tests Reviewed: Nader 66 Admission Reviewed    Imaging:    Imaging Reports Reviewed Today Include: NA      Recent Cultures (last 7 days):     Results from last 7 days   Lab Units 07/08/19  0329   URINE CULTURE  Culture too young- will reincubate       Last 24 Hours Medication List:     Current Facility-Administered Medications:  acetaminophen 650 mg Oral Q6H PRN RICARDO Polo   amLODIPine 5 mg Oral Daily RICARDO Polo   apixaban 2 5 mg Oral BID RICARDO Polo   busPIRone 7 5 mg Oral TID Fredinura Celaya MD   docusate sodium 100 mg Oral BID Fredinura Celaya MD   famotidine 20 mg Oral Daily RICARDO Polo   memantine 5 mg Oral BID RICARDO Polo   metoprolol tartrate 25 mg Oral BID RICARDO Polo   mirtazapine 15 mg Oral HS RICARDO Polo   ondansetron 4 mg Intravenous Q6H PRN RICARDO Polo   polyethylene glycol 17 g Oral Daily Fredi Celaya MD   QUEtiapine 25 mg Oral HS RICARDO Polo   tamsulosin 0 4 mg Oral Daily With [facility], RICARDO        Today, Patient Was Seen By: Delroy Sethi MD    ** Please Note: Dictation voice to text software may have been used in the creation of this document   **

## 2019-07-09 NOTE — ASSESSMENT & PLAN NOTE
· Neurogenic bladder with chronic lucia and recurrent urinary tract infections  · Urine positive for nitrite, white blood cells, blood and bacteria  · Has multiple allergies and history of multiple MDRO  · Has tolerated Ertapenem, Cefdinir, and Cefepime  · Received cefepime in the ER  · Patient remains afebrile with no leukocytosis  ·  Urine culture, blood cultures pending    · Urology consult appreciated  · ID consult noted

## 2019-07-09 NOTE — PLAN OF CARE
Problem: Potential for Falls  Goal: Patient will remain free of falls  Description  INTERVENTIONS:  - Assess patient frequently for physical needs  -  Identify cognitive and physical deficits and behaviors that affect risk of falls    -  [place] fall precautions as indicated by assessment   - Educate patient/family on patient safety including physical limitations  - Instruct patient to call for assistance with activity based on assessment  - Modify environment to reduce risk of injury  - Consider OT/PT consult to assist with strengthening/mobility  Outcome: Progressing     Problem: Prexisting or High Potential for Compromised Skin Integrity  Goal: Skin integrity is maintained or improved  Description  INTERVENTIONS:  - Identify patients at risk for skin breakdown  - Assess and monitor skin integrity  - Assess and monitor nutrition and hydration status  - Monitor labs (i e  albumin)  - Assess for incontinence   - Turn and reposition patient  - Assist with mobility/ambulation  - Relieve pressure over bony prominences  - Avoid friction and shearing  - Provide appropriate hygiene as needed including keeping skin clean and dry  - Evaluate need for skin moisturizer/barrier cream  - Collaborate with interdisciplinary team (i e  Nutrition, Rehabilitation, etc )   - Patient/family teaching  Outcome: Progressing     Problem: PAIN - ADULT  Goal: Verbalizes/displays adequate comfort level or baseline comfort level  Description  Interventions:  - Encourage patient to monitor pain and request assistance  - Assess pain using appropriate pain scale  - Administer analgesics based on type and severity of pain and evaluate response  - Implement non-pharmacological measures as appropriate and evaluate response  - Consider cultural and social influences on pain and pain management  - Notify physician/advanced practitioner if interventions unsuccessful or patient reports new pain  Outcome: Progressing     Problem: INFECTION - ADULT  Goal: Absence or prevention of progression during hospitalization  Description  INTERVENTIONS:  - Assess and monitor for signs and symptoms of infection  - Monitor lab/diagnostic results  - Monitor all insertion sites, i e  indwelling lines, tubes, and drains  - Monitor endotracheal (as able) and nasal secretions for changes in amount and color  - [place] appropriate cooling/warming therapies per order  - Administer medications as ordered  - Instruct and encourage patient and family to use good hand hygiene technique  - Identify and instruct in appropriate isolation precautions for identified infection/condition  Outcome: Progressing     Problem: SAFETY ADULT  Goal: Patient will remain free of falls  Description  INTERVENTIONS:  - Assess patient frequently for physical needs  -  Identify cognitive and physical deficits and behaviors that affect risk of falls    -  [place] fall precautions as indicated by assessment   - Educate patient/family on patient safety including physical limitations  - Instruct patient to call for assistance with activity based on assessment  - Modify environment to reduce risk of injury  - Consider OT/PT consult to assist with strengthening/mobility  Outcome: Progressing  Goal: Maintain or return to baseline ADL function  Description  INTERVENTIONS:  -  Assess patient's ability to carry out ADLs; assess patient's baseline for ADL function and identify physical deficits which impact ability to perform ADLs (bathing, care of mouth/teeth, toileting, grooming, dressing, etc )  - Assess/evaluate cause of self-care deficits   - Assess range of motion  - Assess patient's mobility; develop plan if impaired  - Assess patient's need for assistive devices and provide as appropriate  - Encourage maximum independence but intervene and supervise when necessary  ¯ Involve family in performance of ADLs  ¯ Assess for home care needs following discharge   ¯ Request OT consult to assist with ADL evaluation and planning for discharge  ¯ Provide patient education as appropriate  Outcome: Progressing  Goal: Maintain or return mobility status to optimal level  Description  INTERVENTIONS:  - Assess patient's baseline mobility status (ambulation, transfers, stairs, etc )    - Identify cognitive and physical deficits and behaviors that affect mobility  - Identify mobility aids required to assist with transfers and/or ambulation (gait belt, sit-to-stand, lift, walker, cane, etc )  - [place] fall precautions as indicated by assessment  - Record patient progress and toleration of activity level on Mobility SBAR; progress patient to next Phase/Stage  - Instruct patient to call for assistance with activity based on assessment  - Request Rehabilitation consult to assist with strengthening/weightbearing, etc   Outcome: Progressing     Problem: DISCHARGE PLANNING  Goal: Discharge to home or other facility with appropriate resources  Description  INTERVENTIONS:  - Identify barriers to discharge w/patient and caregiver  - Arrange for needed discharge resources and transportation as appropriate  - Identify discharge learning needs (meds, wound care, etc )  - Arrange for interpretive services to assist at discharge as needed  - Refer to Case Management Department for coordinating discharge planning if the patient needs post-hospital services based on physician/advanced practitioner order or complex needs related to functional status, cognitive ability, or social support system  Outcome: Progressing     Problem: Knowledge Deficit  Goal: Patient/family/caregiver demonstrates understanding of disease process, treatment plan, medications, and discharge instructions  Description  Complete learning assessment and assess knowledge base    Interventions:  - Provide teaching at level of understanding  - Provide teaching via preferred learning methods  Outcome: Progressing     Problem: NEUROSENSORY - ADULT  Goal: Achieves stable or improved neurological status  Description  INTERVENTIONS  - Monitor and report changes in neurological status  - Initiate measures to prevent increased intracranial pressure  - Maintain blood pressure and fluid volume within ordered parameters to optimize cerebral perfusion  - Monitor temperature, glucose, and sodium or any other associated labs   Initiate appropriate interventions as ordered  - Monitor for seizure activity   - Administer anti-seizure medications as ordered  Outcome: Progressing     Problem: GENITOURINARY - ADULT  Goal: Maintains or returns to baseline urinary function  Description  INTERVENTIONS:  - Assess urinary function  - Encourage oral fluids to ensure adequate hydration  - Administer IV fluids as ordered to ensure adequate hydration  - Administer ordered medications as needed  - Offer frequent toileting  - Follow urinary retention protocol if ordered  Outcome: Progressing  Goal: Absence of urinary retention  Description  INTERVENTIONS:  - Assess patients ability to void and empty bladder  - Monitor I/O  - Bladder scan as needed  - Discuss with physician/AP medications to alleviate retention as needed  - Discuss catheterization for long term situations as appropriate  Outcome: Progressing  Goal: Urinary catheter remains patent  Description  INTERVENTIONS:  - Assess patency of urinary catheter  - If patient has a chronic lucia, consider changing catheter if non-functioning  - Follow guidelines for intermittent irrigation of non-functioning urinary catheter  Outcome: Progressing     Problem: SKIN/TISSUE INTEGRITY - ADULT  Goal: Skin integrity remains intact  Description  INTERVENTIONS  - Identify patients at risk for skin breakdown  - Assess and monitor skin integrity  - Assess and monitor nutrition and hydration status  - Monitor labs (i e  albumin)  - Assess for incontinence   - Turn and reposition patient  - Assist with mobility/ambulation  - Relieve pressure over bony prominences  - Avoid friction and shearing  - Provide appropriate hygiene as needed including keeping skin clean and dry  - Evaluate need for skin moisturizer/barrier cream  - Collaborate with interdisciplinary team (i e  Nutrition, Rehabilitation, etc )   - Patient/family teaching  Outcome: Progressing  Goal: Oral mucous membranes remain intact  Description  INTERVENTIONS  - Assess oral mucosa and hygiene practices  - Implement preventative oral hygiene regimen  - Implement oral medicated treatments as ordered  - Initiate Nutrition services referral as needed  Outcome: Progressing     Problem: HEMATOLOGIC - ADULT  Goal: Maintains hematologic stability  Description  INTERVENTIONS  - Assess for signs and symptoms of bleeding or hemorrhage  - Monitor labs  - Administer supportive blood products/factors as ordered and appropriate  Outcome: Progressing     Problem: MUSCULOSKELETAL - ADULT  Goal: Maintain or return mobility to safest level of function  Description  INTERVENTIONS:  - Assess patient's ability to carry out ADLs; assess patient's baseline for ADL function and identify physical deficits which impact ability to perform ADLs (bathing, care of mouth/teeth, toileting, grooming, dressing, etc )  - Assess/evaluate cause of self-care deficits   - Assess range of motion  - Assess patient's mobility; develop plan if impaired  - Assess patient's need for assistive devices and provide as appropriate  - Encourage maximum independence but intervene and supervise when necessary  - Involve family in performance of ADLs  - Assess for home care needs following discharge   - Request OT consult to assist with ADL evaluation and planning for discharge  - Provide patient education as appropriate  Outcome: Progressing  Goal: Maintain proper alignment of affected body part  Description  INTERVENTIONS:  - Support, maintain and protect limb and body alignment  - Provide pt/fam with appropriate education  Outcome: Progressing

## 2019-07-09 NOTE — UTILIZATION REVIEW
Initial Clinical Review    Admission: Date/Time/Statement: 7/8/19 @ 0507     Orders Placed This Encounter   Procedures    Inpatient Admission     Standing Status:   Standing     Number of Occurrences:   1     Order Specific Question:   Admitting Physician     Answer:   Pedro Pablo Moran     Order Specific Question:   Level of Care     Answer:   Med Surg [16]     Order Specific Question:   Estimated length of stay     Answer:   More than 2 Midnights     Order Specific Question:   Certification     Answer:   I certify that inpatient services are medically necessary for this patient for a duration of greater than two midnights  See H&P and MD Progress Notes for additional information about the patient's course of treatment  ED Arrival Information     Expected Arrival Acuity Means of Arrival Escorted By Service Admission Type    - 7/8/2019 02:13 Urgent Ambulance [facility] Hospitalist Urgent    Arrival Complaint    -        Chief Complaint   Patient presents with    Slow Heart Rate     Pt arrives to ED via EMS for low HR of 42bpm  Staff also report "milky" colored urine  Assessment/Plan: UTI (urinary tract infection)  Assessment & Plan  · Neurogenic bladder with chronic lucia and recurrent urinary tract infections  · Urine positive for nitrite, white blood cells, blood and bacteria  · Has multiple allergies and history of multiple MDRO  · Has tolerated Ertapenem, Cefdinir, and Cefepime  · Received cefepime in the ER  · Trend temps, WBC  · Urine culture, blood cultures pending  · Urology consult  · ID consult     CKD (chronic kidney disease)  Assessment & Plan  · Cr 1 64   · Recent baseline appears to be 1 3-1 5  Monitor BMP  Bubba Child is a 77 y o  male with history urethral stricture with chronic lucia, CKD, aphasia who presents from a nursing facility with cloudy urine  The patient is aphasic and I am unable to obtain review of systems       Per  ID  Consult:     Leukocytosis and abnormal UA   Patient presents on this admission with leukocytosis along with an abnormal UA  He is otherwise afebrile and hemodynamically stable  CT abdomen pelvis noted with bilateral renal stones  I suspect that the patient's presentation may have been related to the stones  UA is likely abnormal in the setting of chronic catheter  His catheter has been exchange in continues to drain clear yellow urine  He has been evaluated by Urology and no additional plans at this time  I suspect that his stones or chronically colonized  Recommend monitoring off antibiotics  Continue to trend fever curve/vitals  Repeat CBC/chemistry tomorrow  Follow up pending urine culture  Thorne catheter as per Urology  Additional care as per primary  Would recommend monitoring the patient inpatient over the next 48 hours  Would need to consider stone removal as outpatient    Per  Urology  Consult:  77 y o  old male with  multiple medical issues including significant residual deficits from previous stroke, indwelling Thorne catheter for neurogenic bladder  Multiple catheter dislodgement previously bleeding to pyocystis, consistent with his recent admission to hospital     Anticipated Length of Stay:  Patient will be admitted on an Inpatient basis with an anticipated length of stay of  greater than 2 midnights     Justification for Hospital Stay:          complicated urinary tract infection       ED Triage Vitals   Temperature Pulse Respirations Blood Pressure SpO2   07/08/19 0939 07/08/19 0219 07/08/19 0219 07/08/19 0219 07/08/19 0219   98 5 °F (36 9 °C) 64 18 150/91 99 %      Temp Source Heart Rate Source Patient Position - Orthostatic VS BP Location FiO2 (%)   07/08/19 0939 07/08/19 0219 07/08/19 0219 07/08/19 0219 --   Axillary Monitor Lying Right arm       Pain Score       07/08/19 0219       No Pain        Wt Readings from Last 1 Encounters:   07/08/19 98 kg (216 lb 0 8 oz)     Additional Vital Signs:   07/09/19 0955  --  62   --  --  -- --  --  --   Pulse: monitor at 07/09/19 0955   07/09/19 0811  98 3 °F (36 8 °C)  52Abnormal   18  147/72  96  94 %  None (Room air)  Lying   07/08/19 2219  98 3 °F (36 8 °C)  57  18  132/67  91  93 %  None (Room air)  Lying   07/08/19 1800  --  --  --  --  --  --  None (Room air)  --   07/08/19 1732  97 5 °F (36 4 °C)  62  18  142/78  101  95 %  None (Room air)  Lying   07/08/19 1600  --  58  18  118/68  --  97 %  None (Room air)  Lying   07/08/19 1501  --  70  18  123/65  --  94 %  None (Room air)  Lying           Pertinent Labs/Diagnostic Test Results:     Ct  Abd/pelvis  ( 7/8)    No significant perinephric collection   Trace perinephric stranding is nonspecific and may reflect underlying renal insufficiency, infection or other nephropathy   Further clinical evaluation recommended   No hydronephrosis   No large retroperitoneal   or perinephric collection  2   Bilateral renal calcifications right greater than left are stable favoring medullary nephrocalcinosis versus multiple nonobstructing calculi  3   Contracted gallbladder containing high density material likely gallstones  4   Fecal impaction  U/S   Kidney/bladder: The left kidney is larger than the right   Both kidneys demonstrate numerous calculi but no obvious hydronephrosis  Of concern however: Is what appears to be right-sided perinephric fluid which could suggest calyceal rupture   Computed tomography follow-up recommended when possible  Urinary bladder collapsed by Thorne catheter which significantly limits evaluation    Results from last 7 days   Lab Units 07/09/19  0607 07/08/19  0240   WBC Thousand/uL 7 68 10 42*   HEMOGLOBIN g/dL 14 3 15 0   HEMATOCRIT % 44 1 46 8   PLATELETS Thousands/uL 271 285   NEUTROS ABS Thousands/µL 4 78 7 88*         Results from last 7 days   Lab Units 07/09/19  0607 07/08/19  1834 07/08/19  0240   SODIUM mmol/L 141 142 144   POTASSIUM mmol/L 3 8 3 8 3 8   CHLORIDE mmol/L 108 107 107   CO2 mmol/L 28 26 31 ANION GAP mmol/L 5 9 6   BUN mg/dL 21 24 24   CREATININE mg/dL 1 41* 1 46* 1 95*   EGFR ml/min/1 73sq m 50 48 34   CALCIUM mg/dL 8 4 8 5 9 0   MAGNESIUM mg/dL 2 2  --   --      Results from last 7 days   Lab Units 07/08/19  0240   AST U/L 13   ALT U/L 12   ALK PHOS U/L 100   TOTAL PROTEIN g/dL 7 2   ALBUMIN g/dL 3 0*   TOTAL BILIRUBIN mg/dL 0 40         Results from last 7 days   Lab Units 07/09/19  0607 07/08/19  1834 07/08/19  0240   GLUCOSE RANDOM mg/dL 85 96 106           Results from last 7 days   Lab Units 07/08/19  0240   TROPONIN I ng/mL 0 02         Results from last 7 days   Lab Units 07/08/19  0240   PROTIME seconds 14 6*   INR  1 20*   PTT seconds 36     Results from last 7 days   Lab Units 07/09/19  1027   PROCALCITONIN ng/ml 0 21         Results from last 7 days   Lab Units 07/08/19  0329   CLARITY UA  Cloudy   COLOR UA  Yellow   SPEC GRAV UA  >=1 030   PH UA  5 5   GLUCOSE UA mg/dl Negative   KETONES UA mg/dl Negative   BLOOD UA  Moderate*   PROTEIN UA mg/dl 100 (2+)*   NITRITE UA  Positive*   BILIRUBIN UA  Negative   UROBILINOGEN UA E U /dl 0 2   LEUKOCYTES UA  Moderate*   WBC UA /hpf Innumerable*   RBC UA /hpf 1-2*   BACTERIA UA /hpf Moderate*   EPITHELIAL CELLS WET PREP /hpf Occasional       Results from last 7 days   Lab Units 07/08/19  0329   URINE CULTURE  >100,000 cfu/ml        ED Treatment:   Medication Administration from 07/08/2019 0213 to 07/08/2019 1732       Date/Time Order Dose Route Action Action by Comments     07/08/2019 0508 sodium chloride 0 9 % bolus 500 mL 0 mL Intravenous Stopped ESTRELLA Melton RN      07/08/2019 0330 sodium chloride 0 9 % bolus 500 mL 500 mL Intravenous [address] [facility]      07/08/2019 0405 cefepime (MAXIPIME) 2 g/50 mL dextrose IVPB 0 mg Intravenous Stopped ESTRELLA Melton RN      07/08/2019 0335 cefepime (MAXIPIME) 2 g/50 mL dextrose IVPB 2,000 mg Intravenous New Bag Jeniffer Hai, RN      07/08/2019 0362 apixaban (ELIQUIS) tablet 2 5 mg 2 5 mg Oral Given Vertie Minus      07/08/2019 1215 apixaban (ELIQUIS) tablet 2 5 mg 2 5 mg Oral Given Lucien Cons, RN      07/08/2019 1621 tamsulosin (FLOMAX) capsule 0 4 mg 0 4 mg Oral Given Negra Bolaños, RN      07/08/2019 2771 amLODIPine (NORVASC) tablet 5 mg 5 mg Oral Given [facility], RN      07/08/2019 3519 memantine (NAMENDA) tablet 5 mg 5 mg Oral Given [facility], RN      07/08/2019 6167 metoprolol tartrate (LOPRESSOR) tablet 25 mg 25 mg Oral Given [facility], RN      07/08/2019 5932 famotidine (PEPCID) tablet 20 mg 20 mg Oral Given [facility], RN      07/08/2019 1630 cefepime (MAXIPIME) 2 g/50 mL dextrose IVPB 2,000 mg Intravenous New [address] [facility] [address] Negra Bolaños, JESSE      07/08/2019 1622 nitrofurantoin (MACROBID) extended-release capsule 100 mg 100 mg Oral Given Arvelijah Bolaños, JESSE      07/08/2019 1421 nitrofurantoin (MACROBID) extended-release capsule 100 mg 100 mg Oral Given Lucien Cons, RN      07/08/2019 1618 busPIRone (BUSPAR) tablet 7 5 mg 7 5 mg Oral Given Arvelijah Bolaños, JESSE      07/08/2019 1518 polyethylene glycol (MIRALAX) packet 17 g 17 g Oral Given Aydee Menezes RN         Past Medical History:   Diagnosis Date    Anxiety     Aortic aneurysm ([facility] )     Aphasia     Ataxia     Bladder adhesions     BPH (benign prostatic hypertrophy)     [facility] obstructive [facility]     wife denies - "never had"    Dementia     Difficulty walking     Essential (primary) hypertension     Generalized anxiety disorder     GERD (gastroesophageal reflux disease)     Global aphasia     H/O blood clots     High blood pressure     History of kidney stones     Kidney stones     Mental disorder     Muscle weakness     Neuromuscular dysfunction of bladder     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 ([facility] )     Retention of urine, unspecified     Stroke [facility]     Thrombosis     Urinary retention     Urinary tract bacterial infections     Urinary tract infection      Present on Admission:   CKD (chronic kidney disease)   Essential hypertension   Neurogenic bladder      Admitting Diagnosis: Urinary tract infection [N39 0]  Slow heart rate [R00 1]  Neurogenic bladder [N31 9]  H/O urethral stricture [Z87 448]  Age/Sex: 77 y o  male  Admission Orders:    Current Facility-Administered Medications:  acetaminophen 650 mg Oral Q6H PRN Braxton Latus, CRNP   amLODIPine 5 mg Oral Daily Braxton Latus, CRNP   apixaban 2 5 mg Oral BID Braxton Latus, CRNP   busPIRone 7 5 mg Oral TID Elba Dhaliwal MD   docusate sodium 100 mg Oral BID Elba Dhaliwal MD   famotidine 20 mg Oral Daily Braxton Latus, CRNP   memantine 5 mg Oral BID Braxton Latus, CRNP   metoprolol tartrate 25 mg Oral BID Braxton Latus, CRNP   mirtazapine 15 mg Oral HS [facility] Latus, CRNP   ondansetron 4 mg Intravenous Q6H PRN Braxton Latus, CRNP   polyethylene glycol 17 g Oral Daily Elba Dhaliwal MD   QUEtiapine 25 mg Oral HS [facility] Latus, CRNP   tamsulosin 0 4 mg Oral Daily With [facility], RICARDO       IP CONSULT TO INFECTIOUS DISEASES  IP CONSULT TO UROLOGY  IP CONSULT TO [address] [place] [address] TO [address] [place] [address] Utilization Review Department  Phone: [phone]; Fax [phone]  [email or web address]  ATTENTION: Please call with any questions or concerns to [phone]  and carefully listen to the prompts so that you are directed to the right person  Send all requests for admission clinical reviews, approved or denied determinations and any other requests to fax [phone]   All voicemails are confidential

## 2019-07-09 NOTE — PROGRESS NOTES
Progress Note - Infectious Disease   Xochitl Weinstein [facility] 77 y o  male MRN: [id number]  Unit/Bed#: -01 Encounter: [id number]      Impression/Plan:  1  Leukocytosis and abnormal UA  Patient presents on this admission with leukocytosis along with an abnormal UA  He is otherwise afebrile and hemodynamically stable  CT abdomen pelvis noted with bilateral renal stones  I suspect that the patient's presentation may have been related to the stones  UA is likely abnormal in the setting of chronic catheter  His catheter has been exchanged and continues to drain clear yellow urine  He has been evaluated by Urology and no additional plans at this time  I suspect that his stones or chronically colonized  Urine cultures with more than 5 organisms  Continue to monitor off antibiotics  Continue to trend fever curve/vitals  Repeat CBC/chemistry tomorrow  Thorne catheter as per Urology  Additional care as per primary  Would need to consider stone removal as outpatient  If patient remains clinically stable over the next 24 hours he is cleared from an ID standpoint for discharge      2  Chronic Thorne catheter  Patient is now status post Thorne catheter exchange in the emergency department and is draining clear yellow urine  No additional interventions recommended as per Urology      3  Chronic kidney disease  Patient's creatinine seems to be at his baseline  He remains with Thorne catheter in place  Continue to monitor creatinine  Additional care as per primary     4  Multiple drug allergies and MDR infection  Patient unfortunately has very serious antibiotic allergies including anaphylaxis to major classes of drugs  His partner is well aware that over time he is running out of antibiotic options  He has also isolated multi-drug resistant organisms in the past   Maintain contact isolation while admitted  Monitoring off antibiotics as above  Additional care as per primary     ID consult service will continue to follow  Antibiotics:  None    24 hour events:  No acute events noted overnight on chart review  Patient's creatinine labs are stable  No new imaging overnight  Urine culture with mixed contaminant  Patient is currently afebrile and without leukocytosis    Subjective:  Patient does not provide any history on exam and he does not react or interact in any significant way  Objective:  Vitals:  Temp:  [97 5 °F (36 4 °C)-98 8 °F (37 1 °C)] 98 8 °F (37 1 °C)  HR:  [52-62] 56  Resp:  [18] 18  BP: (118-147)/(67-82) 141/82  SpO2:  [93 %-97 %] 96 %  Temp (24hrs), Av 2 °F (36 8 °C), Min:97 5 °F (36 4 °C), Max:98 8 °F (37 1 °C)  Current: Temperature: 98 8 °F (37 1 °C)    Physical Exam:   General Appearance:  Patient is awake and will look at me but he will not interact otherwise on exam    Throat: Oropharynx moist without lesions  Lungs:   Clear to auscultation bilaterally on quiet breathing; no wheezes, rhonchi or rales; respirations unlabored on room air   Heart:  RRR; no murmur, rub or gallop   Abdomen:   Soft, no tenderness/reaction elicited on palpation, non-distended, positive bowel sounds  Extremities: No clubbing, cyanosis or edema   Skin: No new rashes or lesions  No new draining wounds noted  Thorne catheter draining clear yellow urine       Labs, Imaging, & Other studies:   All pertinent labs and imaging studies were personally reviewed  Results from last 7 days   Lab Units 19  0607 19  0240   WBC Thousand/uL 7 68 10 42*   HEMOGLOBIN g/dL 14 3 15 0   PLATELETS Thousands/uL 271 285     Results from last 7 days   Lab Units 19  0607  19  0240   POTASSIUM mmol/L 3 8   < > 3 8   CHLORIDE mmol/L 108   < > 107   CO2 mmol/L 28   < > 31   BUN mg/dL 21   < > 24   CREATININE mg/dL 1 41*   < > 1 95*   EGFR ml/min/1 73sq m 50   < > 34   CALCIUM mg/dL 8 4   < > 9 0   AST U/L  --   --  13   ALT U/L  --   --  12   ALK PHOS U/L  --   --  100    < > = values in this interval not displayed       Results from last 7 days   Lab Units 07/08/19  0329   URINE CULTURE  >100,000 cfu/ml

## 2019-07-10 VITALS
OXYGEN SATURATION: 96 % | RESPIRATION RATE: 18 BRPM | DIASTOLIC BLOOD PRESSURE: 85 MMHG | TEMPERATURE: 98.9 F | HEART RATE: 64 BPM | WEIGHT: 216.05 LBS | BODY MASS INDEX: 31 KG/M2 | SYSTOLIC BLOOD PRESSURE: 143 MMHG

## 2019-07-10 PROBLEM — R82.90 ABNORMAL URINE FINDINGS: Status: ACTIVE | Noted: 2019-07-10

## 2019-07-10 LAB
ANION GAP SERPL CALCULATED.3IONS-SCNC: 6 MMOL/L (ref 4–13)
ATRIAL RATE: 60 BPM
BASOPHILS # BLD AUTO: 0.05 THOUSANDS/ΜL (ref 0–0.1)
BASOPHILS NFR BLD AUTO: 1 % (ref 0–1)
BUN SERPL-MCNC: 18 MG/DL (ref 5–25)
CALCIUM SERPL-MCNC: 8.6 MG/DL (ref 8.3–10.1)
CHLORIDE SERPL-SCNC: 107 MMOL/L (ref 100–108)
CO2 SERPL-SCNC: 29 MMOL/L (ref 21–32)
CREAT SERPL-MCNC: 1.37 MG/DL (ref 0.6–1.3)
EOSINOPHIL # BLD AUTO: 0.54 THOUSAND/ΜL (ref 0–0.61)
EOSINOPHIL NFR BLD AUTO: 7 % (ref 0–6)
ERYTHROCYTE [DISTWIDTH] IN BLOOD BY AUTOMATED COUNT: 14 % (ref 11.6–15.1)
GFR SERPL CREATININE-BSD FRML MDRD: 52 ML/MIN/1.73SQ M
GLUCOSE SERPL-MCNC: 98 MG/DL (ref 65–140)
HCT VFR BLD AUTO: 44.2 % (ref 36.5–49.3)
HGB BLD-MCNC: 14.6 G/DL (ref 12–17)
IMM GRANULOCYTES # BLD AUTO: 0.02 THOUSAND/UL (ref 0–0.2)
IMM GRANULOCYTES NFR BLD AUTO: 0 % (ref 0–2)
LYMPHOCYTES # BLD AUTO: 1.49 THOUSANDS/ΜL (ref 0.6–4.47)
LYMPHOCYTES NFR BLD AUTO: 20 % (ref 14–44)
MCH RBC QN AUTO: 29.8 PG (ref 26.8–34.3)
MCHC RBC AUTO-ENTMCNC: 33 G/DL (ref 31.4–37.4)
MCV RBC AUTO: 90 FL (ref 82–98)
MONOCYTES # BLD AUTO: 0.51 THOUSAND/ΜL (ref 0.17–1.22)
MONOCYTES NFR BLD AUTO: 7 % (ref 4–12)
MRSA NOSE QL CULT: ABNORMAL
NEUTROPHILS # BLD AUTO: 4.79 THOUSANDS/ΜL (ref 1.85–7.62)
NEUTS SEG NFR BLD AUTO: 65 % (ref 43–75)
NRBC BLD AUTO-RTO: 0 /100 WBCS
P AXIS: 36 DEGREES
PLATELET # BLD AUTO: 266 THOUSANDS/UL (ref 149–390)
PMV BLD AUTO: 9.2 FL (ref 8.9–12.7)
POTASSIUM SERPL-SCNC: 3.7 MMOL/L (ref 3.5–5.3)
PR INTERVAL: 168 MS
QRS AXIS: -31 DEGREES
QRSD INTERVAL: 72 MS
QT INTERVAL: 430 MS
QTC INTERVAL: 430 MS
RBC # BLD AUTO: 4.9 MILLION/UL (ref 3.88–5.62)
SODIUM SERPL-SCNC: 142 MMOL/L (ref 136–145)
T WAVE AXIS: 20 DEGREES
VENTRICULAR RATE: 60 BPM
WBC # BLD AUTO: 7.4 THOUSAND/UL (ref 4.31–10.16)

## 2019-07-10 PROCEDURE — 85025 COMPLETE CBC W/AUTO DIFF WBC: CPT | Performed by: INTERNAL MEDICINE

## 2019-07-10 PROCEDURE — 80048 BASIC METABOLIC PNL TOTAL CA: CPT | Performed by: INTERNAL MEDICINE

## 2019-07-10 PROCEDURE — 93010 ELECTROCARDIOGRAM REPORT: CPT | Performed by: INTERNAL MEDICINE

## 2019-07-10 PROCEDURE — 99239 HOSP IP/OBS DSCHRG MGMT >30: CPT | Performed by: INTERNAL MEDICINE

## 2019-07-10 RX ORDER — DOCUSATE SODIUM 100 MG/1
100 CAPSULE, LIQUID FILLED ORAL 2 TIMES DAILY
COMMUNITY

## 2019-07-10 RX ORDER — BUSPIRONE HYDROCHLORIDE 7.5 MG/1
7.5 TABLET ORAL 3 TIMES DAILY
COMMUNITY

## 2019-07-10 RX ORDER — POTASSIUM CHLORIDE 750 MG/1
10 TABLET, EXTENDED RELEASE ORAL 2 TIMES DAILY
Qty: 60 TABLET | Refills: 0 | Status: ON HOLD | OUTPATIENT
Start: 2019-07-10 | End: 2019-11-27 | Stop reason: SDUPTHER

## 2019-07-10 RX ADMIN — FAMOTIDINE 20 MG: 20 TABLET ORAL at 09:58

## 2019-07-10 RX ADMIN — AMLODIPINE BESYLATE 5 MG: 5 TABLET ORAL at 09:57

## 2019-07-10 RX ADMIN — BUSPIRONE HYDROCHLORIDE 7.5 MG: 5 TABLET ORAL at 09:57

## 2019-07-10 RX ADMIN — MEMANTINE 5 MG: 5 TABLET ORAL at 09:57

## 2019-07-10 RX ADMIN — APIXABAN 2.5 MG: 2.5 TABLET, FILM COATED ORAL at 09:57

## 2019-07-10 RX ADMIN — POLYETHYLENE GLYCOL 3350 17 G: 17 POWDER, FOR SOLUTION ORAL at 10:02

## 2019-07-10 RX ADMIN — DOCUSATE SODIUM 100 MG: 100 CAPSULE, LIQUID FILLED ORAL at 09:57

## 2019-07-10 NOTE — DISCHARGE SUMMARY
Discharge Summary - [facility] Internal Medicine    Patient Information: Riya Ortiz 77 y o  male MRN: [id number]  Unit/Bed#: -01 Encounter: [id number]    Discharging Physician / Practitioner: Mitzy Sutton MD  PCP: Pierre Pennington MD  Admission Date: 7/8/2019  Discharge Date: 07/10/19    Principal discharge diagnoses:  1  Acute kidney injury on chronic kidney disease stage 3  2  Leukocytosis and abnormal urinalysis  3  Sinus bradycardia    Secondary diagnoses:  1  History of DVT  2  History of bladder spasms  3  Dementia  4  Hypertension  5  History of urinary retention from neurogenic bladder with chronic Thorne    Consultations During Hospital Stay:  Urology  Infectious Disease    Procedures Performed:   1  Renal ultrasound - Very limited exam due to patient's inability to cooperate and interfering with the sonographer performing the exam  The left kidney is larger than the right  Both kidneys demonstrate numerous calculi but no obvious hydronephrosis  Of concern however: Is what appears to be right-sided perinephric fluid which could suggest calyceal rupture  Computed tomography follow-up recommended when possible  Urinary bladder collapsed by Thorne catheter which significantly limits evaluation  2  CT abdomen and pelvis without contrast - No significant perinephric collection  Trace perinephric stranding is nonspecific and may reflect underlying renal insufficiency, infection or other nephropathy  No hydronephrosis  No large retroperitoneal or perinephric collection  Bilateral renal calcifications right greater than left are stable favoring medullary nephrocalcinosis versus multiple nonobstructing calculi  Contracted gallbladder containing high density material likely gallstones  Fecal impaction      Hospital Course:     Riya Ortiz is a 77 y o  male patient with a history of chronic Thorne, dementia, hypertension, chronic kidney disease stage 3, DVT, aphasic at baseline who originally presented to the hospital on 7/8/2019 as he was noted to have white, cloudy urine at his facility  On arrival of EMS he was noted to have a heart rate of 42  He has history of multiple drug allergies and multidrug resistant infections  Based on his latest urine culture he was begun initially on cefepime and Macrobid  He was evaluated in consultation with Urology and Infectious Disease  His Thorne catheter was changed in the ED  Urine was clear subsequently  Renal ultrasound showed initial concern for possible right perinephric fluid and he underwent a CT abdomen and pelvis which showed no perinephric collection  He had mild leukocytosis with a white count of 10 42 on presentation  His antibiotics were discontinued per Infectious Disease and he was monitored  His leukocytosis resolved and he had no fever  He is likely chronically colonized  Urine culture showed mixed contaminants x 5  He has possible bilateral multiple nonobstructing calculi per CT abdomen and has been advised to follow up with Urology as an outpatient  No inpatient treatment was recommended by Urology  He was monitored on telemetry and noted to have sinus bradycardia  His dose of metoprolol has been increased to 12 5 mg twice daily from 25 mg twice daily and should be given to him with hold parameters  Condition at Discharge: stable     Discharge Day Visit / Exam:     * Please refer to separate progress for these details *    Discharge instructions/Information to patient and family:   See after visit summary for information provided to patient and family  Provisions for Follow-Up Care:  See after visit summary for information related to follow-up care and any pertinent home health orders  Disposition: [facility]    Planned Readmission: No    Discharge Statement:  I spent 40 minutes discharging the patient  This time was spent on the day of discharge  I had direct contact with the patient on the day of discharge   Greater than 50% of the total time was spent examining patient, answering all patient questions, arranging and discussing plan of care with patient as well as directly providing post-discharge instructions  Additional time then spent on discharge activities  Discharge Medications:  See after visit summary for reconciled discharge medications provided to patient and family  ** Please Note: Dragon 360 Dictation voice to text software may have been used in the creation of this document   **

## 2019-07-10 NOTE — PLAN OF CARE
Problem: Potential for Falls  Goal: Patient will remain free of falls  Description  INTERVENTIONS:  - Assess patient frequently for physical needs  -  Identify cognitive and physical deficits and behaviors that affect risk of falls    -  [place] fall precautions as indicated by assessment   - Educate patient/family on patient safety including physical limitations  - Instruct patient to call for assistance with activity based on assessment  - Modify environment to reduce risk of injury  - Consider OT/PT consult to assist with strengthening/mobility  Outcome: Progressing     Problem: Prexisting or High Potential for Compromised Skin Integrity  Goal: Skin integrity is maintained or improved  Description  INTERVENTIONS:  - Identify patients at risk for skin breakdown  - Assess and monitor skin integrity  - Assess and monitor nutrition and hydration status  - Monitor labs (i e  albumin)  - Assess for incontinence   - Turn and reposition patient  - Assist with mobility/ambulation  - Relieve pressure over bony prominences  - Avoid friction and shearing  - Provide appropriate hygiene as needed including keeping skin clean and dry  - Evaluate need for skin moisturizer/barrier cream  - Collaborate with interdisciplinary team (i e  Nutrition, Rehabilitation, etc )   - Patient/family teaching  Outcome: Progressing     Problem: PAIN - ADULT  Goal: Verbalizes/displays adequate comfort level or baseline comfort level  Description  Interventions:  - Encourage patient to monitor pain and request assistance  - Assess pain using appropriate pain scale  - Administer analgesics based on type and severity of pain and evaluate response  - Implement non-pharmacological measures as appropriate and evaluate response  - Consider cultural and social influences on pain and pain management  - Notify physician/advanced practitioner if interventions unsuccessful or patient reports new pain  Outcome: Progressing     Problem: INFECTION - ADULT  Goal: Absence or prevention of progression during hospitalization  Description  INTERVENTIONS:  - Assess and monitor for signs and symptoms of infection  - Monitor lab/diagnostic results  - Monitor all insertion sites, i e  indwelling lines, tubes, and drains  - Monitor endotracheal (as able) and nasal secretions for changes in amount and color  - [employer] appropriate cooling/warming therapies per order  - Administer medications as ordered  - Instruct and encourage patient and family to use good hand hygiene technique  - Identify and instruct in appropriate isolation precautions for identified infection/condition  Outcome: Progressing     Problem: SAFETY ADULT  Goal: Patient will remain free of falls  Description  INTERVENTIONS:  - Assess patient frequently for physical needs  -  Identify cognitive and physical deficits and behaviors that affect risk of falls    -  [employer] fall precautions as indicated by assessment   - Educate patient/family on patient safety including physical limitations  - Instruct patient to call for assistance with activity based on assessment  - Modify environment to reduce risk of injury  - Consider OT/PT consult to assist with strengthening/mobility  Outcome: Progressing  Goal: Maintain or return to baseline ADL function  Description  INTERVENTIONS:  -  Assess patient's ability to carry out ADLs; assess patient's baseline for ADL function and identify physical deficits which impact ability to perform ADLs (bathing, care of mouth/teeth, toileting, grooming, dressing, etc )  - Assess/evaluate cause of self-care deficits   - Assess range of motion  - Assess patient's mobility; develop plan if impaired  - Assess patient's need for assistive devices and provide as appropriate  - Encourage maximum independence but intervene and supervise when necessary  ¯ Involve family in performance of ADLs  ¯ Assess for home care needs following discharge   ¯ Request OT consult to assist with ADL evaluation and planning for discharge  ¯ Provide patient education as appropriate  Outcome: Progressing  Goal: Maintain or return mobility status to optimal level  Description  INTERVENTIONS:  - Assess patient's baseline mobility status (ambulation, transfers, stairs, etc )    - Identify cognitive and physical deficits and behaviors that affect mobility  - Identify mobility aids required to assist with transfers and/or ambulation (gait belt, sit-to-stand, lift, walker, cane, etc )  - [place] fall precautions as indicated by assessment  - Record patient progress and toleration of activity level on Mobility SBAR; progress patient to next Phase/Stage  - Instruct patient to call for assistance with activity based on assessment  - Request Rehabilitation consult to assist with strengthening/weightbearing, etc   Outcome: Progressing     Problem: DISCHARGE PLANNING  Goal: Discharge to home or other facility with appropriate resources  Description  INTERVENTIONS:  - Identify barriers to discharge w/patient and caregiver  - Arrange for needed discharge resources and transportation as appropriate  - Identify discharge learning needs (meds, wound care, etc )  - Arrange for interpretive services to assist at discharge as needed  - Refer to Case Management Department for coordinating discharge planning if the patient needs post-hospital services based on physician/advanced practitioner order or complex needs related to functional status, cognitive ability, or social support system  Outcome: Progressing     Problem: Knowledge Deficit  Goal: Patient/family/caregiver demonstrates understanding of disease process, treatment plan, medications, and discharge instructions  Description  Complete learning assessment and assess knowledge base    Interventions:  - Provide teaching at level of understanding  - Provide teaching via preferred learning methods  Outcome: Progressing     Problem: NEUROSENSORY - ADULT  Goal: Achieves stable or improved neurological status  Description  INTERVENTIONS  - Monitor and report changes in neurological status  - Initiate measures to prevent increased intracranial pressure  - Maintain blood pressure and fluid volume within ordered parameters to optimize cerebral perfusion  - Monitor temperature, glucose, and sodium or any other associated labs   Initiate appropriate interventions as ordered  - Monitor for seizure activity   - Administer anti-seizure medications as ordered  Outcome: Progressing     Problem: GENITOURINARY - ADULT  Goal: Maintains or returns to baseline urinary function  Description  INTERVENTIONS:  - Assess urinary function  - Encourage oral fluids to ensure adequate hydration  - Administer IV fluids as ordered to ensure adequate hydration  - Administer ordered medications as needed  - Offer frequent toileting  - Follow urinary retention protocol if ordered  Outcome: Progressing  Goal: Absence of urinary retention  Description  INTERVENTIONS:  - Assess patients ability to void and empty bladder  - Monitor I/O  - Bladder scan as needed  - Discuss with physician/AP medications to alleviate retention as needed  - Discuss catheterization for long term situations as appropriate  Outcome: Progressing  Goal: Urinary catheter remains patent  Description  INTERVENTIONS:  - Assess patency of urinary catheter  - If patient has a chronic lucia, consider changing catheter if non-functioning  - Follow guidelines for intermittent irrigation of non-functioning urinary catheter  Outcome: Progressing     Problem: SKIN/TISSUE INTEGRITY - ADULT  Goal: Skin integrity remains intact  Description  INTERVENTIONS  - Identify patients at risk for skin breakdown  - Assess and monitor skin integrity  - Assess and monitor nutrition and hydration status  - Monitor labs (i e  albumin)  - Assess for incontinence   - Turn and reposition patient  - Assist with mobility/ambulation  - Relieve pressure over bony prominences  - Avoid friction and shearing  - Provide appropriate hygiene as needed including keeping skin clean and dry  - Evaluate need for skin moisturizer/barrier cream  - Collaborate with interdisciplinary team (i e  Nutrition, Rehabilitation, etc )   - Patient/family teaching  Outcome: Progressing  Goal: Oral mucous membranes remain intact  Description  INTERVENTIONS  - Assess oral mucosa and hygiene practices  - Implement preventative oral hygiene regimen  - Implement oral medicated treatments as ordered  - Initiate Nutrition services referral as needed  Outcome: Progressing     Problem: HEMATOLOGIC - ADULT  Goal: Maintains hematologic stability  Description  INTERVENTIONS  - Assess for signs and symptoms of bleeding or hemorrhage  - Monitor labs  - Administer supportive blood products/factors as ordered and appropriate  Outcome: Progressing     Problem: MUSCULOSKELETAL - ADULT  Goal: Maintain or return mobility to safest level of function  Description  INTERVENTIONS:  - Assess patient's ability to carry out ADLs; assess patient's baseline for ADL function and identify physical deficits which impact ability to perform ADLs (bathing, care of mouth/teeth, toileting, grooming, dressing, etc )  - Assess/evaluate cause of self-care deficits   - Assess range of motion  - Assess patient's mobility; develop plan if impaired  - Assess patient's need for assistive devices and provide as appropriate  - Encourage maximum independence but intervene and supervise when necessary  - Involve family in performance of ADLs  - Assess for home care needs following discharge   - Request OT consult to assist with ADL evaluation and planning for discharge  - Provide patient education as appropriate  Outcome: Progressing  Goal: Maintain proper alignment of affected body part  Description  INTERVENTIONS:  - Support, maintain and protect limb and body alignment  - Provide pt/fam with appropriate education  Outcome: Progressing

## 2019-07-10 NOTE — TRANSPORTATION MEDICAL NECESSITY
Section I - General Information    Name of Patient: Mary Reed                 : 1950    Medicare #: [id number]  Transport Date: 07/10/19 (PCS is valid for round trips on this date and for all repetitive trips in the 60-day range as noted below )  Origin: [address]: [facility]  Is the pt's stay covered under Medicare Part A (PPS/DRG)   []     Closest appropriate facility? If no, why is transport to more distant facility required? Yes  If hospice pt, is this transport related to pt's terminal illness? NA       Section II - Medical Necessity Questionnaire  Ambulance transportation is medically necessary only if other means of transport are contraindicated or would be potentially harmful to the patient  To meet this requirement, the patient must either be "bed confined" or suffer from a condition such that transport by means other than ambulance is contraindicated by the patient's condition  The following questions must be answered by the medical professional signing below for this form to be valid:    1)  Describe the MEDICAL CONDITION (physical and/or mental) of this patient AT [address] [place] [address] that requires the patient to be transported in an ambulance and why transport by other means is contraindicated by the patient's condition: Patient is confused, bed bound, contact precautions for +MRSA    2) Is the patient "bed confined" as defined below? Yes  To be "be confined" the patient must satisfy all three of the following conditions: (1) unable to get up from bed without Assistance; AND (2) unable to ambulate; AND (3) unable to sit in a chair or wheelchair  3) Can this patient safely be transported by car or wheelchair van (i e , seated during transport without a medical attendant or monitoring)?    No    4) In addition to completing questions 1-3 above, please check any of the following conditions that apply*:   *Note: supporting documentation for any boxes checked must be maintained in the patient's medical records  If hosp-hosp transfer, describe services needed at 2nd facility not available at 1st facility? Moderate/severe pain on movement   Medical attendant required   Unable to tolerate seated position for time needed to transport   Other(specify) [place] high fall risk      Section III - Signature of Physician or Healthcare Professional  I certify that the above information is true and correct based on my evaluation of this patient, and represent that the patient requires transport by ambulance and that other forms of transport are contraindicated  I understand that this information will be used by the Centers for Medicare and Medicaid Services (CMS) to support the determination of medical necessity for ambulance services, and I represent that I have personal knowledge of the patient's condition at time of transport  [x]  If this box is checked, I also certify that the patient is physically or mentally incapable of signing the ambulance service's claim and that the institution with which I am affiliated has furnished care, services, or assistance to the patient  My signature below is made on behalf of the patient pursuant to 42 CFR §424 36(b)(4)  In accordance with 42 CFR §424 37, the specific reason(s) that the patient is physically or mentally incapable of signing the claim form is as follows:       Signature of Physician* or Healthcare Professional______________________________________________________________  Signature Date 07/10/19 (For scheduled repetitive transports, this form is not valid for transports performed more than 60 days after this date)    Printed Name & Credentials of Physician or Healthcare Professional (MD, DO, RN, etc )________________________________  *Form must be signed by patient's attending physician for scheduled, repetitive transports  For non-repetitive, unscheduled ambulance transports, if unable to obtain the signature of the attending physician, any of the following may sign (choose appropriate option below)  [] Physician Assistant []  Clinical Nurse Specialist []  Registered Nurse  []  Nurse Practitioner  [x] Discharge Planner

## 2019-07-10 NOTE — PLAN OF CARE
Problem: Potential for Falls  Goal: Patient will remain free of falls  Description  INTERVENTIONS:  - Assess patient frequently for physical needs  -  Identify cognitive and physical deficits and behaviors that affect risk of falls    -  [place] fall precautions as indicated by assessment   - Educate patient/family on patient safety including physical limitations  - Instruct patient to call for assistance with activity based on assessment  - Modify environment to reduce risk of injury  - Consider OT/PT consult to assist with strengthening/mobility  7/10/2019 1700 by Rosy Cornejo RN  Outcome: Adequate for Discharge  7/10/2019 0818 by Rosy Cornejo RN  Outcome: Progressing     Problem: Prexisting or High Potential for Compromised Skin Integrity  Goal: Skin integrity is maintained or improved  Description  INTERVENTIONS:  - Identify patients at risk for skin breakdown  - Assess and monitor skin integrity  - Assess and monitor nutrition and hydration status  - Monitor labs (i e  albumin)  - Assess for incontinence   - Turn and reposition patient  - Assist with mobility/ambulation  - Relieve pressure over bony prominences  - Avoid friction and shearing  - Provide appropriate hygiene as needed including keeping skin clean and dry  - Evaluate need for skin moisturizer/barrier cream  - Collaborate with interdisciplinary team (i e  Nutrition, Rehabilitation, etc )   - Patient/family teaching  7/10/2019 1700 by Rosy Cornejo RN  Outcome: Adequate for Discharge  7/10/2019 0818 by Rosy Cornejo RN  Outcome: Progressing     Problem: PAIN - ADULT  Goal: Verbalizes/displays adequate comfort level or baseline comfort level  Description  Interventions:  - Encourage patient to monitor pain and request assistance  - Assess pain using appropriate pain scale  - Administer analgesics based on type and severity of pain and evaluate response  - Implement non-pharmacological measures as appropriate and evaluate response  - Consider cultural and social influences on pain and pain management  - Notify physician/advanced practitioner if interventions unsuccessful or patient reports new pain  7/10/2019 1700 by Yeison Laoiza RN  Outcome: Adequate for Discharge  7/10/2019 0818 by Yeison Loaiza RN  Outcome: Progressing     Problem: INFECTION - ADULT  Goal: Absence or prevention of progression during hospitalization  Description  INTERVENTIONS:  - Assess and monitor for signs and symptoms of infection  - Monitor lab/diagnostic results  - Monitor all insertion sites, i e  indwelling lines, tubes, and drains  - Monitor endotracheal (as able) and nasal secretions for changes in amount and color  - [place] appropriate cooling/warming therapies per order  - Administer medications as ordered  - Instruct and encourage patient and family to use good hand hygiene technique  - Identify and instruct in appropriate isolation precautions for identified infection/condition  7/10/2019 1700 by Yeison Loaiza RN  Outcome: Adequate for Discharge  7/10/2019 0818 by Yeison Loaiza RN  Outcome: Progressing     Problem: SAFETY ADULT  Goal: Patient will remain free of falls  Description  INTERVENTIONS:  - Assess patient frequently for physical needs  -  Identify cognitive and physical deficits and behaviors that affect risk of falls    -  [place] fall precautions as indicated by assessment   - Educate patient/family on patient safety including physical limitations  - Instruct patient to call for assistance with activity based on assessment  - Modify environment to reduce risk of injury  - Consider OT/PT consult to assist with strengthening/mobility  7/10/2019 1700 by Yeison Loaiza RN  Outcome: Adequate for Discharge  7/10/2019 0818 by Yeison Loaiza RN  Outcome: Progressing  Goal: Maintain or return to baseline ADL function  Description  INTERVENTIONS:  -  Assess patient's ability to carry out ADLs; assess patient's baseline for ADL function and identify physical deficits which impact ability to perform ADLs (bathing, care of mouth/teeth, toileting, grooming, dressing, etc )  - Assess/evaluate cause of self-care deficits   - Assess range of motion  - Assess patient's mobility; develop plan if impaired  - Assess patient's need for assistive devices and provide as appropriate  - Encourage maximum independence but intervene and supervise when necessary  ¯ Involve family in performance of ADLs  ¯ Assess for home care needs following discharge   ¯ Request OT consult to assist with ADL evaluation and planning for discharge  ¯ Provide patient education as appropriate  7/10/2019 1700 by Rosy Cornejo RN  Outcome: Adequate for Discharge  7/10/2019 0818 by Rosy Cornejo RN  Outcome: Progressing  Goal: Maintain or return mobility status to optimal level  Description  INTERVENTIONS:  - Assess patient's baseline mobility status (ambulation, transfers, stairs, etc )    - Identify cognitive and physical deficits and behaviors that affect mobility  - Identify mobility aids required to assist with transfers and/or ambulation (gait belt, sit-to-stand, lift, walker, cane, etc )  - [place] fall precautions as indicated by assessment  - Record patient progress and toleration of activity level on Mobility SBAR; progress patient to next Phase/Stage  - Instruct patient to call for assistance with activity based on assessment  - Request Rehabilitation consult to assist with strengthening/weightbearing, etc   7/10/2019 1700 by Rosy Cornejo RN  Outcome: Adequate for Discharge  7/10/2019 0818 by Rosy Cornejo RN  Outcome: Progressing     Problem: DISCHARGE PLANNING  Goal: Discharge to home or other facility with appropriate resources  Description  INTERVENTIONS:  - Identify barriers to discharge w/patient and caregiver  - Arrange for needed discharge resources and transportation as appropriate  - Identify discharge learning needs (meds, wound care, etc )  - Arrange for interpretive services to assist at discharge as needed  - Refer to Case Management Department for coordinating discharge planning if the patient needs post-hospital services based on physician/advanced practitioner order or complex needs related to functional status, cognitive ability, or social support system  7/10/2019 1700 by Jordana Sarah RN  Outcome: Adequate for Discharge  7/10/2019 0818 by Jordana Sarah RN  Outcome: Progressing     Problem: Knowledge Deficit  Goal: Patient/family/caregiver demonstrates understanding of disease process, treatment plan, medications, and discharge instructions  Description  Complete learning assessment and assess knowledge base  Interventions:  - Provide teaching at level of understanding  - Provide teaching via preferred learning methods  7/10/2019 1700 by Jordana Sarah RN  Outcome: Adequate for Discharge  7/10/2019 0818 by Jordana Sarah RN  Outcome: Progressing     Problem: NEUROSENSORY - ADULT  Goal: Achieves stable or improved neurological status  Description  INTERVENTIONS  - Monitor and report changes in neurological status  - Initiate measures to prevent increased intracranial pressure  - Maintain blood pressure and fluid volume within ordered parameters to optimize cerebral perfusion  - Monitor temperature, glucose, and sodium or any other associated labs   Initiate appropriate interventions as ordered  - Monitor for seizure activity   - Administer anti-seizure medications as ordered  7/10/2019 1700 by Jordana Sarah RN  Outcome: Adequate for Discharge  7/10/2019 0818 by Jordana Sarah RN  Outcome: Progressing     Problem: GENITOURINARY - ADULT  Goal: Maintains or returns to baseline urinary function  Description  INTERVENTIONS:  - Assess urinary function  - Encourage oral fluids to ensure adequate hydration  - Administer IV fluids as ordered to ensure adequate hydration  - Administer ordered medications as needed  - Offer frequent toileting  - Follow urinary retention protocol if ordered  7/10/2019 1700 by Froylan Hair RN  Outcome: Adequate for Discharge  7/10/2019 0818 by Froylan Hair RN  Outcome: Progressing  Goal: Absence of urinary retention  Description  INTERVENTIONS:  - Assess patients ability to void and empty bladder  - Monitor I/O  - Bladder scan as needed  - Discuss with physician/AP medications to alleviate retention as needed  - Discuss catheterization for long term situations as appropriate  7/10/2019 1700 by Froylan Hair RN  Outcome: Adequate for Discharge  7/10/2019 0818 by Froylan Hair RN  Outcome: Progressing  Goal: Urinary catheter remains patent  Description  INTERVENTIONS:  - Assess patency of urinary catheter  - If patient has a chronic lucia, consider changing catheter if non-functioning  - Follow guidelines for intermittent irrigation of non-functioning urinary catheter  7/10/2019 1700 by Froylan Hair RN  Outcome: Adequate for Discharge  7/10/2019 0818 by Froylan Hari RN  Outcome: Progressing     Problem: SKIN/TISSUE INTEGRITY - ADULT  Goal: Skin integrity remains intact  Description  INTERVENTIONS  - Identify patients at risk for skin breakdown  - Assess and monitor skin integrity  - Assess and monitor nutrition and hydration status  - Monitor labs (i e  albumin)  - Assess for incontinence   - Turn and reposition patient  - Assist with mobility/ambulation  - Relieve pressure over bony prominences  - Avoid friction and shearing  - Provide appropriate hygiene as needed including keeping skin clean and dry  - Evaluate need for skin moisturizer/barrier cream  - Collaborate with interdisciplinary team (i e  Nutrition, Rehabilitation, etc )   - Patient/family teaching  7/10/2019 1700 by Froylan Hair RN  Outcome: Adequate for Discharge  7/10/2019 0818 by Froylan Hair RN  Outcome: Progressing  Goal: Oral mucous membranes remain intact  Description  INTERVENTIONS  - Assess oral mucosa and hygiene practices  - Implement preventative oral hygiene regimen  - Implement oral medicated treatments as ordered  - Initiate Nutrition services referral as needed  7/10/2019 1700 by Theron Travis RN  Outcome: Adequate for Discharge  7/10/2019 0818 by Theron Travis RN  Outcome: Progressing     Problem: HEMATOLOGIC - ADULT  Goal: Maintains hematologic stability  Description  INTERVENTIONS  - Assess for signs and symptoms of bleeding or hemorrhage  - Monitor labs  - Administer supportive blood products/factors as ordered and appropriate  7/10/2019 1700 by Theron Travis RN  Outcome: Adequate for Discharge  7/10/2019 0818 by Theron Travis RN  Outcome: Progressing     Problem: MUSCULOSKELETAL - ADULT  Goal: Maintain or return mobility to safest level of function  Description  INTERVENTIONS:  - Assess patient's ability to carry out ADLs; assess patient's baseline for ADL function and identify physical deficits which impact ability to perform ADLs (bathing, care of mouth/teeth, toileting, grooming, dressing, etc )  - Assess/evaluate cause of self-care deficits   - Assess range of motion  - Assess patient's mobility; develop plan if impaired  - Assess patient's need for assistive devices and provide as appropriate  - Encourage maximum independence but intervene and supervise when necessary  - Involve family in performance of ADLs  - Assess for home care needs following discharge   - Request OT consult to assist with ADL evaluation and planning for discharge  - Provide patient education as appropriate  7/10/2019 1700 by Theron Travis RN  Outcome: Adequate for Discharge  7/10/2019 0818 by Theron Travis RN  Outcome: Progressing  Goal: Maintain proper alignment of affected body part  Description  INTERVENTIONS:  - Support, maintain and protect limb and body alignment  - Provide pt/fam with appropriate education  7/10/2019 1700 by Theron Travis RN  Outcome: Adequate for Discharge  7/10/2019 0818 by Theron Travis RN  Outcome: Progressing

## 2019-07-10 NOTE — DISCHARGE INSTRUCTIONS
He has chronic urinary colonization  Antibiotics for possible urinary tract infection should be given only for a true urinary tract infection  Renaicidin - 1 application via irrigation every evening and night shift for Thorne catheter irrigation as recommended by urology should be continued  The solution should be instilled and catheter clamped for 15 minutes

## 2019-07-11 LAB
ATRIAL RATE: 61 BPM
P AXIS: 63 DEGREES
PR INTERVAL: 182 MS
QRS AXIS: -44 DEGREES
QRSD INTERVAL: 74 MS
QT INTERVAL: 410 MS
QTC INTERVAL: 412 MS
T WAVE AXIS: -4 DEGREES
VENTRICULAR RATE: 61 BPM

## 2019-07-11 PROCEDURE — 93010 ELECTROCARDIOGRAM REPORT: CPT | Performed by: INTERNAL MEDICINE

## 2019-07-11 NOTE — PROGRESS NOTES
Progress Note - Александр Haskins [facility] 1950, 77 y o  male MRN: [id number]    Unit/Bed#: -[place] Encounter: [id number]    Primary Care Provider: Naga Gudino MD   Date and time admitted to hospital: 2019  2:13 AM        Neurogenic bladder  Assessment & Plan  Status post chronic indwelling Thorne catheter  Seen by Urology  Next catheter change on   CKD (chronic kidney disease)  Assessment & Plan  · Had RUPESH - creatinine improved to 1 37   · Recent baseline appears to be 1 3-1 5  · Monitor BMP  Essential hypertension  Assessment & Plan  Stable on amlodipine  History of DVT (deep vein thrombosis)  Assessment & Plan  Maintained on Eliquis    * Abnormal urine findings  Assessment & Plan  · Likely chronic colonization  · No active infection  · Cleared for discharge by Infectious Disease      VTE Pharmacologic Prophylaxis:   Pharmacologic: Apixaban (Eliquis)  Mechanical VTE Prophylaxis in Place: Yes    Patient Centered Rounds: I have performed bedside rounds with nursing staff today  Discussions with Specialists or Other Care Team Provider:  Discussed with Dr Jillian Klein and Discussions with Family / Patient:  Discussed with wife at the bedside    Time Spent for Care: 20 minutes  More than 50% of total time spent on counseling and coordination of care as described above  Current Length of Stay: 2 day(s)    Current Patient Status: Inpatient     Discharge Plan:  Discharge back to facility today    Subjective:   No fever  Mental status at baseline    Objective:     Vitals:   Temp (24hrs), Av 9 °F (37 2 °C), Min:98 9 °F (37 2 °C), Max:98 9 °F (37 2 °C)    Temp:  [98 9 °F (37 2 °C)] 98 9 °F (37 2 °C)  HR:  [64] 64  Resp:  [18] 18  BP: (143)/(85) 143/85  SpO2:  [96 %] 96 %  Body mass index is 31 kg/m²  Physical Exam:     Physical Exam   HENT:   Head: Normocephalic and atraumatic  Eyes: Pupils are equal, round, and reactive to light  EOM are normal    Neck: Normal range of motion  Neck supple  Cardiovascular: Normal rate and regular rhythm  Pulmonary/Chest: Effort normal and breath sounds normal  No stridor  No respiratory distress  He has no wheezes  Abdominal: Soft  Bowel sounds are normal  He exhibits no distension  There is no tenderness  Musculoskeletal: He exhibits no edema  Neurological: He is alert  Nonverbal   Skin: Skin is warm and dry  Additional Data:     Labs:    Results from last 7 days   Lab Units 07/10/19  0713   WBC Thousand/uL 7 40   HEMOGLOBIN g/dL 14 6   HEMATOCRIT % 44 2   PLATELETS Thousands/uL 266   NEUTROS PCT % 65   LYMPHS PCT % 20   MONOS PCT % 7   EOS PCT % 7*     Results from last 7 days   Lab Units 07/10/19  0713  07/08/19  0240   SODIUM mmol/L 142   < > 144   POTASSIUM mmol/L 3 7   < > 3 8   CHLORIDE mmol/L 107   < > 107   CO2 mmol/L 29   < > 31   BUN mg/dL 18   < > 24   CREATININE mg/dL 1 37*   < > 1 95*   ANION GAP mmol/L 6   < > 6   CALCIUM mg/dL 8 6   < > 9 0   ALBUMIN g/dL  --   --  3 0*   TOTAL BILIRUBIN mg/dL  --   --  0 40   ALK PHOS U/L  --   --  100   ALT U/L  --   --  12   AST U/L  --   --  13   GLUCOSE RANDOM mg/dL 98   < > 106    < > = values in this interval not displayed  Results from last 7 days   Lab Units 07/08/19  0240   INR  1 20*             Results from last 7 days   Lab Units 07/09/19  1027   PROCALCITONIN ng/ml 0 21           * I Have Reviewed All Lab Data Listed Above  * Additional Pertinent Lab Tests Reviewed: All [facility] Admission Reviewed       Today, Patient Was Seen By: Billy Arnold MD    ** Please Note: Dictation voice to text software may have been used in the creation of this document   **

## 2019-07-11 NOTE — ASSESSMENT & PLAN NOTE
· Had RUPESH - creatinine improved to 1 37   · Recent baseline appears to be 1 3-1 5  · Monitor BMP

## 2019-07-11 NOTE — CONSULTS
Consult Note - Wound   Michelene Gitelman Hoskin 77 y o  male MRN: [id number]  Unit/Bed#: -01 Encounter: [id number]      Assessment:   Bilateral buttocks with incontinence associated dermatitis  Dermal layer skin erosion  Periwound with peeliing skin  Reddish/purplish skin discoloration indicative of fungal rash  Fecal incontinence  Pt  Has chronic lucia  Pt  Non-verbal  Requires 1-2 people to turn in bed    Plan:   Pt  Preparing to transfer back to [facility]  Discharge summary has been dictated  Wife at bedside  Discussed with her that an antifungal cream may be beneficial  She said she will let the staff at [facility] know this  Discussed with Laura Xavier RN and wife  Vitals: Blood pressure 143/85, pulse 64, temperature 98 9 °F (37 2 °C), temperature source Oral, resp  rate 18, weight 98 kg (216 lb 0 8 oz), SpO2 96 %  ,Body mass index is 31 kg/m²        Wound 07/08/19 Moisture associated skin damage Buttocks (Active)   Wound Image   7/8/2019  6:07 PM   Wound Description Fragile;Edema;Pink 7/10/2019  8:10 AM   Vivian-wound Assessment Maceration 7/10/2019  8:10 AM   Drainage Amount Scant 7/8/2019  8:00 PM   Treatments Cleansed;Site care 7/9/2019  8:00 PM   Dressing Moisture barrier 7/10/2019  8:10 AM

## 2019-07-12 NOTE — ASSESSMENT & PLAN NOTE
Maintained on Eliquis Problem: Falls - Risk of:  Goal: Will remain free from falls  Description  Will remain free from falls  Outcome: Met This Shift  Goal: Absence of physical injury  Description  Absence of physical injury  Outcome: Met This Shift

## 2019-07-15 ENCOUNTER — TELEPHONE (OUTPATIENT)
Dept: UROLOGY | Facility: MEDICAL CENTER | Age: 69
End: 2019-07-15

## 2019-08-09 ENCOUNTER — TELEPHONE (OUTPATIENT)
Dept: UROLOGY | Facility: MEDICAL CENTER | Age: 69
End: 2019-08-09

## 2019-08-09 NOTE — TELEPHONE ENCOUNTER
Patient of Dr Angie Hall seen in the [facility] office  Landon Carrizales in the [facility] care advised that the patient is having a yellow discharge coming from his cathter  Landon Carrizales also advise that he has swollen testicles  Patient also can not take antibiotic unless consulted by infectious gemma Carrizales would like to know what to do  Please advise

## 2019-08-09 NOTE — TELEPHONE ENCOUNTER
Alexander Davidson patient, managed by DR Nick Mayes , scheduled for follow up on Monday 8/12/19 with DR Prince and for nurse visit lucia change at time of visit  Patient chronic indwelling lucia , neurogenic bladder, s/p Botox on 6/18/19  Called and spoke to Skyla on [address]  She is reporting that patient has "significant" scrotal swelling and "significant yellow drainage from lucia"  Advised nursing home that patient will need to go to ER for evaluation and treatment as it is 4 pm and unable to facilitate transport or visit in office today   Verbal understanding given

## 2019-08-11 NOTE — PATIENT INSTRUCTIONS
Ureteroscopy   WHAT YOU NEED TO KNOW:   What is a ureteroscopy? A ureteroscopy is a procedure to examine in the inside of your urinary tract, which includes your urethra, bladder, ureters, and kidneys  A ureteroscope is a small, thin tube with a light and camera on the end  Ureteroscopy can help your healthcare provider diagnose and treat problems in your urinary tract, such as kidney stones  How do I prepare for a ureteroscopy? Arrange for a ride home after your procedure  You will not be allowed to drive yourself home  Your healthcare provider will talk to you about how to prepare for your procedure  He may tell you not to eat or drink anything after midnight on the day of your surgery  He will tell you what medicines to take or not take on the day of your surgery  You may need blood and urine tests before your procedure  You will receive medicine to keep you asleep or to numb an area of your body during your procedure  Tell healthcare providers if you or anyone in your family has had a problem with anesthesia in the past    What will happen during a ureteroscopy? Your healthcare provider will place the ureteroscope into your urethra  He will pass it through your bladder and into your ureters and kidneys  Your healthcare provider may place tools through the scope that will help him remove tissue or stones  The tools may also help him place stents or sheaths to help keep your ureters open  What are the risks of ureteroscopy? You may bleed more than expected or get an infection  One of your ureters may be injured  You may have a blockage in one of your ureters  You may need another procedure or surgery  CARE AGREEMENT:   You have the right to help plan your care  Learn about your health condition and how it may be treated  Discuss treatment options with your caregivers to decide what care you want to receive  You always have the right to refuse treatment  The above information is an  only  It is not intended as medical advice for individual conditions or treatments  Talk to your doctor, nurse or pharmacist before following any medical regimen to see if it is safe and effective for you  © 2017 [employer] Information is for End User's use only and may not be sold, redistributed or otherwise used for commercial purposes  All illustrations and images included in CareNotes® are the copyrighted property of A [employer]  or Mika Mcclain

## 2019-08-11 NOTE — PROGRESS NOTES
Problem List Items Addressed This Visit        Nervous and Auditory    Dementia with behavioral disturbance - Primary     Unfortunately, Norm's mental status remains the same, he has intermittently combative with us during catheter changes  Genitourinary    Retention of urine (Chronic)    Nephrolithiasis     I had a nice talk today with Tae Tipton and with his wife regarding his bilateral nephrolithiasis, likely exacerbated by his immobility with resultant hypercalciuria and mobilization of calcium from bone reservoirs  We did talk about ureteroscopic stone intervention to attempt to make him stone free, the fact that this would require multiple procedures, and the lack of a guarantee that this would help with his recurrent urinary tract infections  Additionally, I do have concerns about his history of multi-drug resistant organisms, and concern for sepsis due to urinary source after ureteroscopic stone intervention  Plan:  After this discussion, the patient's wife does not wish for him to undergo ureteroscopic stone intervention as he does not currently have any obstructing stone burden, I believe that this is the appropriate decision for the patient, as well            Other    H/O urethral stricture    Neurogenic bladder    Relevant Orders    Case request operating room: INJECTION BOTULINUM TOXIN (BOTOX), intradetrusor (Completed)    Thorne catheter in place     Tae Tipton has a longstanding history of a urethral Thorne catheter, he is not a good candidate for suprapubic catheter placement given his history of traumatic Thorne removal previously  Today at the bedside I am unable to place either a coude catheter or a regular urethral Thorne catheter, necessitating cystoscopic Thorne catheter placement  Plan:   We will continue with long-term Thorne catheter, we will likely referred back to Thorne catheter changes over a wire every 4-6 weeks as this has previously been necessary due to his extremely complex history         Relevant Orders    Case request operating room: INJECTION BOTULINUM TOXIN (BOTOX), intradetrusor (Completed)          Discussion:  Thierno Castillo did have improvement in his bladder spasms with botulinum toxin injection, I have placed a repeat case request order for 200 units injection of intra detrusor botulinum toxin for his neurogenic bladder      Assessment and plan:     Please see problem oriented charting for the assessment plan of today's urological complaints    Tara Goyal MD      Chief Complaint     Chief Complaint   Patient presents with    Urinary Retention    Groin Swelling   Please note that groin swelling was not assessed today  Additional chief complaints and problems as above  History of Present Illness     Winston Martinez is a 77 y o  gentleman well-known to me for his history of neurogenic bladder, indwelling Thorne catheter, history of urethral stricture, as well as history of severe bladder spasms  Detailed Urologic History     - please refer to HPI    Review of Systems     Review of Systems   Constitutional: Negative  HENT: Negative  Eyes: Negative  Respiratory: Negative  Cardiovascular: Negative  Gastrointestinal: Positive for abdominal pain (longstanding)  Endocrine: Negative  Genitourinary:        As per HPI   Musculoskeletal: Negative  Skin: Negative  Allergic/Immunologic: Negative  Neurological: Positive for speech difficulty and weakness  Hematological: Negative  Psychiatric/Behavioral: Negative  Allergies     Allergies   Allergen Reactions    Gentamycin [Gentamicin] Anaphylaxis    Vancomycin Anaphylaxis    Zosyn [Piperacillin Sod-Tazobactam So] Anaphylaxis     However, has tolerated Ertapenem, Cefdinir, and Cefepime, which all have different side chains   Avoid Penicillins and the following Cephs with similar side chains (Cephalexin, Cefadroxil, Cefaclor, Cefprozil, or  Cefoxitin)    Clindamycin Itching    Nsaids Other (See Comments)     Nephrotic Syndrome    Sulfa Antibiotics Rash    Other Rash     antipersperents  Stat lock from lucia catheter       Physical Exam     Physical Exam   Constitutional: He is oriented to person, place, and time  He appears well-developed and well-nourished  No distress  Patient seen in a stretcher, appears nontoxic, mental status is at his baseline   HENT:   Head: Normocephalic and atraumatic  Right Ear: External ear normal    Left Ear: External ear normal    Nose: Nose normal    Eyes: Conjunctivae are normal  Right eye exhibits no discharge  Left eye exhibits no discharge  Neck: No tracheal deviation present  Cardiovascular: Intact distal pulses  Pulmonary/Chest: Effort normal  No stridor  No respiratory distress  Abdominal: Soft  He exhibits no distension and no mass  There is no tenderness  There is no rebound and no guarding  No hernia  Genitourinary:   Genitourinary Comments: Normal circumcised phallus, normal scrotum, no signs of soft tissue infection   Musculoskeletal: He exhibits no edema, tenderness or deformity  Neurological: He is alert and oriented to person, place, and time  He displays abnormal reflex  A sensory deficit is present  No cranial nerve deficit  He exhibits abnormal muscle tone  Coordination abnormal    Skin: Skin is warm and dry  No rash noted  He is not diaphoretic  No erythema  No pallor  Psychiatric: He has a normal mood and affect  His behavior is normal  Judgment and thought content normal    Nursing note and vitals reviewed            Vital Signs  Vitals:    08/12/19 1003   BP: 142/78   Pulse: 84         Current Medications       Current Outpatient Medications:     acetaminophen (TYLENOL) 325 mg tablet, Take 650 mg by mouth every 4 (four) hours as needed for mild pain , Disp: , Rfl:     amLODIPine (NORVASC) 5 mg tablet, Take 5 mg by mouth daily, Disp: , Rfl:     apixaban (ELIQUIS) 2 5 mg, Take 2 5 mg by mouth 2 (two) times a day, Disp: , Rfl:     busPIRone (BUSPAR) 7 5 mg tablet, Take 7 5 mg by mouth 3 (three) times a day, Disp: , Rfl:     docusate sodium (COLACE) 100 mg capsule, Take 100 mg by mouth 2 (two) times a day, Disp: , Rfl:     famotidine (PEPCID) 20 mg tablet, Take 20 mg by mouth 2 (two) times a day, Disp: , Rfl:     metoprolol tartrate (LOPRESSOR) 25 mg tablet, Take 0 5 tablets (12 5 mg total) by mouth 2 (two) times a day, Disp: 60 tablet, Rfl: 0    NYSTATIN powder, Apply topically 2 (two) times a day  , Disp: , Rfl:     other medication, see sig,, Renaicidin solution - 1 application via irrigation every evening and night shift for lucia irrigation   Instil and clamp for 15 minutes, Disp: , Rfl:     polyethylene glycol (MIRALAX) 17 g packet, Take 17 g by mouth daily, Disp: 14 each, Rfl: 0    potassium chloride (K-DUR,KLOR-CON) 10 mEq tablet, Take 1 tablet (10 mEq total) by mouth 2 (two) times a day, Disp: 60 tablet, Rfl: 0    QUEtiapine (SEROquel) 25 mg tablet, Take 25 mg by mouth daily at bedtime  , Disp: , Rfl:     memantine (NAMENDA) 5 mg tablet, Take 1 tablet by mouth 2 (two) times a day for 30 days (Patient taking differently: Take 5 mg by mouth 2 (two) times a day ), Disp: 60 tablet, Rfl: 0    mirtazapine (REMERON) 15 mg tablet, Take 1 tablet by mouth daily at bedtime for 30 days, Disp: 30 tablet, Rfl: 0    tamsulosin (FLOMAX) 0 4 mg, Take 1 capsule by mouth daily with dinner for 30 days, Disp: 30 capsule, Rfl: 0      Active Problems     Patient Active Problem List   Diagnosis    Aphasia    COPD (chronic obstructive pulmonary disease) (HCC)    History of DVT (deep vein thrombosis)    Aneurysm of thoracic aorta (HCC)    Retention of urine    Essential hypertension    Aphasia of unknown origin    CKD (chronic kidney disease)    GERD (gastroesophageal reflux disease)    HTN, goal below 140/90    Generalized anxiety disorder    H/O blood clots    Anemia    Dementia with behavioral disturbance    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[facility] )    MSSA (methicillin susceptible Staphylococcus aureus) septicemia ([facility] )    H/O urethral stricture    CKD (chronic kidney disease) stage 2, GFR 60-89 ml/min    Neurogenic bladder    Nephrolithiasis    Thorne catheter in place         Past Medical History     Past Medical History:   Diagnosis Date    Anxiety     Aortic aneurysm (HCC)     Aphasia     Ataxia     Bladder adhesions     BPH (benign prostatic hypertrophy)     COPD (chronic obstructive pulmonary disease) ([facility] )     wife denies - "never had"    Dementia     Difficulty walking     Essential (primary) hypertension     Generalized anxiety disorder     GERD (gastroesophageal reflux disease)     Global aphasia     H/O blood clots     High blood pressure     History of kidney stones     Kidney stones     Mental disorder     Muscle weakness     Neuromuscular dysfunction of bladder     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 ([place] [address] )     Retention of urine, unspecified     Stroke ([place] [address] )     Thrombosis     Urinary retention     Urinary tract bacterial infections     Urinary tract infection          Surgical History     Past Surgical History:   Procedure Laterality Date    BOTOX INJECTION N/A 6/18/2019    Procedure: INJECTION BOTULINUM TOXIN (BOTOX), intra detrusor;  Surgeon: Mary Rodarte MD;  [facility];  Service: Urology    CYSTOSCOPY      CYSTOSCOPY N/A 6/18/2019    Procedure: cystoscopy and all indicated procedures;  Surgeon: Mary Rodarte MD;  [facility];  Service: Urology    FL CYSTOGRAM  1/15/2019    IVC FILTER INSERTION      X2    IVC FILTER INSERTION      x2    KIDNEY STONE SURGERY      KNEE SURGERY      Sepsis infection     NEPHROSTOMY      IL CYSTO/URETERO W/LITHOTRIPSY &INDWELL STENT INSRT Right 6/14/2017    Procedure: CYSTOSCOPY URETEROSCOPY WITH LITHOTRIPSY HOLMIUM LASER,,BASKET STONE EXTRACTION, RETROGRADE PYELOGRAM AND INSERTION STENT URETERAL;  Surgeon: Michele Kat MD;  [facility];  Service: Urology    DC CYSTOURETHROSCOPY,BIOPSY N/A 1/15/2019    Procedure: Juan Champagne, CYSTOGRAM, DILATION OF URETHRAL STRICTURE;  Surgeon: Gloria Dickson MD;  [facility];  Service: Urology    URINARY SURGERY           Family History     Family History   Problem Relation Age of Onset    Diabetes Father     Hypertension Father     Coronary artery disease Father     Cancer Father     Hypertension Mother          Social History     Social History     Social History     Tobacco Use   Smoking Status Never Smoker   Smokeless Tobacco Never Used         Pertinent Lab Values     Lab Results   Component Value Date    CREATININE 1 37 (H) 07/10/2019       No results found for: PSA          Pertinent Imaging      No new imaging for my review

## 2019-08-12 ENCOUNTER — OFFICE VISIT (OUTPATIENT)
Dept: UROLOGY | Facility: CLINIC | Age: 69
End: 2019-08-12
Payer: COMMERCIAL

## 2019-08-12 VITALS — SYSTOLIC BLOOD PRESSURE: 142 MMHG | DIASTOLIC BLOOD PRESSURE: 78 MMHG | HEART RATE: 84 BPM

## 2019-08-12 DIAGNOSIS — N31.9 NEUROGENIC BLADDER: ICD-10-CM

## 2019-08-12 DIAGNOSIS — F03.91 DEMENTIA WITH BEHAVIORAL DISTURBANCE, UNSPECIFIED DEMENTIA TYPE (HCC): Primary | ICD-10-CM

## 2019-08-12 DIAGNOSIS — Z87.448 H/O URETHRAL STRICTURE: ICD-10-CM

## 2019-08-12 DIAGNOSIS — Z97.8 FOLEY CATHETER IN PLACE: ICD-10-CM

## 2019-08-12 DIAGNOSIS — R33.9 RETENTION OF URINE: Chronic | ICD-10-CM

## 2019-08-12 DIAGNOSIS — N20.0 NEPHROLITHIASIS: ICD-10-CM

## 2019-08-12 PROBLEM — R82.90 ABNORMAL URINE FINDINGS: Status: RESOLVED | Noted: 2019-07-10 | Resolved: 2019-08-12

## 2019-08-12 PROCEDURE — 51710 CHANGE OF BLADDER TUBE: CPT | Performed by: UROLOGY

## 2019-08-12 PROCEDURE — 99214 OFFICE O/P EST MOD 30 MIN: CPT | Performed by: UROLOGY

## 2019-08-12 NOTE — ASSESSMENT & PLAN NOTE
Unfortunately, Norm's mental status remains the same, he has intermittently combative with us during catheter changes

## 2019-08-12 NOTE — ASSESSMENT & PLAN NOTE
Amber Bronson has a longstanding history of a urethral Thorne catheter, he is not a good candidate for suprapubic catheter placement given his history of traumatic Thorne removal previously  Today at the bedside I am unable to place either a coude catheter or a regular urethral Thorne catheter, necessitating cystoscopic Thorne catheter placement  Plan:   We will continue with long-term Thorne catheter, we will likely referred back to Thorne catheter changes over a wire every 4-6 weeks as this has previously been necessary due to his extremely complex history

## 2019-08-12 NOTE — ASSESSMENT & PLAN NOTE
I had a nice talk today with Yonatan Norton and with his wife regarding his bilateral nephrolithiasis, likely exacerbated by his immobility with resultant hypercalciuria and mobilization of calcium from bone reservoirs  We did talk about ureteroscopic stone intervention to attempt to make him stone free, the fact that this would require multiple procedures, and the lack of a guarantee that this would help with his recurrent urinary tract infections  Additionally, I do have concerns about his history of multi-drug resistant organisms, and concern for sepsis due to urinary source after ureteroscopic stone intervention      Plan:  After this discussion, the patient's wife does not wish for him to undergo ureteroscopic stone intervention as he does not currently have any obstructing stone burden, I believe that this is the appropriate decision for the patient, as well

## 2019-08-12 NOTE — PROGRESS NOTES
Office Cystoscopy Procedure Note    Indication:    Difficult Thorne catheter, history of urethral stricture    Informed consent   The risks, benefits, complications, treatment options, and expected outcomes were discussed with the patient  The patient's wife concurred with the plan and signed informed consent    Anesthesia  Lidocaine jelly 2%    Antibiotic prophylaxis   None    Procedure  The patient was placed in the supineposition, was prepped and draped in the usual manner using sterile technique, and 2% lidocaine jelly instilled into the urethra  A 17 F flexible cystoscope was then inserted into the urethra and the urethra and bladder carefully examined  The following findings were noted:    Findings:  Urethra:  Distal pendulous urethra is normal, a false passages noted within the proximal to mid ureter, negotiated with a superstiff wire  Prostate:  High bladder neck, obstructive lateral lobes  Bladder:  Debris within the bladder, this is the patient's baseline  Ureteral orifices:  Orthotopic  Other findings:  None  Over the previously placed superstiff wire in sterile fashion a 18 [facility] tip catheter was then placed with good position into the bladder, 15 milliliters of sterile water was then placed into the Thorne catheter balloon    Specimens: None                 Complications:    None; patient tolerated the procedure well           Disposition: To home           Condition: Stable    Plan: Patient will require long-term Thorne catheter changes over a wire, unfortunately this is due to his history of urethral false passage      Cystoscopy  Date/Time: 8/12/2019 12:06 PM  Performed by: Terence Matthews MD  Authorized by: Terence Matthews MD     Procedure details: cystoscopy    Patient tolerance: Patient tolerated the procedure well with no immediate complications    Additional Procedure Details: Urethra:  Distal pendulous urethra is normal, a false passages noted within the proximal to mid ureter, negotiated with a superstiff wire  Prostate:  High bladder neck, obstructive lateral lobes  Bladder:  Debris within the bladder, this is the patient's baseline  Ureteral orifices:  Orthotopic  Other findings:  None      Over the previously placed superstiff wire in sterile fashion a 18 [facility] [place] [facility] [place] tip catheter was then placed with good position into the bladder, 15 milliliters of sterile water was then placed into the Thorne catheter balloon

## 2019-08-13 ENCOUNTER — TELEPHONE (OUTPATIENT)
Dept: UROLOGY | Facility: CLINIC | Age: 69
End: 2019-08-13

## 2019-08-13 NOTE — TELEPHONE ENCOUNTER
Spoke to pt's nurse Jazmyne Douglas at [facility]  Surgery is scheduled for 10/7 at [facility]  I explained the pt will need medical clearance and pre-admission testing prior to surgery and I will send all the orders over to them

## 2019-08-22 ENCOUNTER — TELEPHONE (OUTPATIENT)
Dept: NEPHROLOGY | Facility: CLINIC | Age: 69
End: 2019-08-22

## 2019-08-22 NOTE — TELEPHONE ENCOUNTER
I scheduled October follow up with Dr Sosa Sam in the [facility] with his wife  I tried letting [facility] know of the appointment but they did not   I was instructed to call back in 15 minutes

## 2019-08-22 NOTE — TELEPHONE ENCOUNTER
I called [facility] back to confirm the follow up appointment but no one was answering on his floor  The  paged the unit several times and I asked to speak to a manager that could relay the message but she did not answer either  I left our office number with the  and she will have someone from the nursing station call me to confirm the October appointment

## 2019-09-06 ENCOUNTER — HOSPITAL ENCOUNTER (EMERGENCY)
Facility: HOSPITAL | Age: 69
Discharge: NURSING, REHAB OR LONG-TERM CARE | End: 2019-09-07
Attending: EMERGENCY MEDICINE | Admitting: EMERGENCY MEDICINE
Payer: COMMERCIAL

## 2019-09-06 DIAGNOSIS — T83.9XXA PROBLEM WITH URINARY CATHETER (HCC): Primary | ICD-10-CM

## 2019-09-06 PROCEDURE — 99284 EMERGENCY DEPT VISIT MOD MDM: CPT | Performed by: EMERGENCY MEDICINE

## 2019-09-06 PROCEDURE — 99284 EMERGENCY DEPT VISIT MOD MDM: CPT

## 2019-09-06 RX ORDER — CITRIC ACID, GLUCONOLACTONE AND MAGNESIUM CARBONATE 6.602; .198; 3.268 G/100ML; G/100ML; G/100ML
SOLUTION IRRIGATION 2 TIMES DAILY
Status: ON HOLD | COMMUNITY
End: 2019-11-26 | Stop reason: SDUPTHER

## 2019-09-07 VITALS
TEMPERATURE: 99.1 F | DIASTOLIC BLOOD PRESSURE: 85 MMHG | RESPIRATION RATE: 16 BRPM | HEART RATE: 74 BPM | OXYGEN SATURATION: 95 % | SYSTOLIC BLOOD PRESSURE: 148 MMHG | WEIGHT: 220.46 LBS | BODY MASS INDEX: 31.63 KG/M2

## 2019-09-07 PROCEDURE — 99244 OFF/OP CNSLTJ NEW/EST MOD 40: CPT | Performed by: UROLOGY

## 2019-09-07 PROCEDURE — 51703 INSERT BLADDER CATH COMPLEX: CPT | Performed by: UROLOGY

## 2019-09-07 RX ORDER — LIDOCAINE HYDROCHLORIDE 20 MG/ML
1 JELLY TOPICAL ONCE
Status: DISCONTINUED | OUTPATIENT
Start: 2019-09-07 | End: 2019-09-07 | Stop reason: HOSPADM

## 2019-09-07 RX ORDER — CIPROFLOXACIN 500 MG/1
500 TABLET, FILM COATED ORAL ONCE
Status: COMPLETED | OUTPATIENT
Start: 2019-09-07 | End: 2019-09-07

## 2019-09-07 RX ORDER — CIPROFLOXACIN 500 MG/1
500 TABLET, FILM COATED ORAL ONCE
Qty: 1 TABLET | Refills: 0 | Status: SHIPPED | OUTPATIENT
Start: 2019-09-07 | End: 2019-09-07

## 2019-09-07 RX ADMIN — CIPROFLOXACIN HYDROCHLORIDE 500 MG: 500 TABLET, FILM COATED ORAL at 00:58

## 2019-09-07 NOTE — ED PROVIDER NOTES
History  Chief Complaint   Patient presents with    Urinary Catheter Problem     EMS called for urinary retention  Per ems believed not retaining but lucia catheter leaking  77year old male presenting for evaluation of urinary catheter problem  Patient is nonverbal at baseline so history obtained from family member  She states that Lucia catheter has remained in place however urine is not draining into the bag and urine is leaking around the outside of the catheter  States that he has had catheter for many years however during insertion a few months ago a false lumen was created requiring cystoscopy and placement using wire  She notes that the Lucia catheter bag has not had any more urine in it since approximately noon today  Patient has continued to eat and drink appropriately and she is concerned that he is retaining or has blocked urinary catheter  Denies fever, chills, nausea, vomiting, severe abdominal pain  Prior to Admission Medications   Prescriptions Last Dose Informant Patient Reported? Taking?    Citric My-Glcfvhtpovg-Tw Carb (RENACIDIN) SOLN   Yes Yes   Sig: Irrigate with as directed 2 (two) times a day Use 1 application via irrigation every evening and night shift for lucia irrigation instill and clamp for 15 minutes   NYSTATIN powder  Outside Facility (Specify) Yes Yes   Sig: Apply topically 2 (two) times a day     QUEtiapine (SEROquel) 25 mg tablet  Outside Facility (Specify) Yes Yes   Sig: Take 25 mg by mouth daily at bedtime     acetaminophen (TYLENOL) 325 mg tablet  Outside Facility (Specify) Yes Yes   Sig: Take 650 mg by mouth every 4 (four) hours as needed for mild pain    amLODIPine (NORVASC) 5 mg tablet  Outside Facility (Specify) Yes Yes   Sig: Take 5 mg by mouth 2 (two) times a day    apixaban (ELIQUIS) 2 5 mg  Outside Facility (Specify) Yes Yes   Sig: Take 2 5 mg by mouth 2 (two) times a day   busPIRone (BUSPAR) 7 5 mg tablet  Outside Facility (Specify) Yes Yes   Sig: Take 7 5 mg by mouth 3 (three) times a day   docusate sodium (COLACE) 100 mg capsule  Outside Facility (Specify) Yes Yes   Sig: Take 100 mg by mouth 2 (two) times a day   famotidine (PEPCID) 20 mg tablet  Outside Facility (Specify) Yes Yes   Sig: Take 20 mg by mouth 2 (two) times a day   memantine (NAMENDA) 5 mg tablet  Outside Facility (Specify) No Yes   Sig: Take 1 tablet by mouth 2 (two) times a day for 30 days   Patient taking differently: Take 5 mg by mouth 2 (two) times a day    metoprolol tartrate (LOPRESSOR) 25 mg tablet  Outside Facility (Specify) No Yes   Sig: Take 0 5 tablets (12 5 mg total) by mouth 2 (two) times a day   mirtazapine (REMERON) 15 mg tablet  Outside Facility (Specify) No Yes   Sig: Take 1 tablet by mouth daily at bedtime for 30 days   other medication, see sig,  Outside Facility (Specify) Yes No   Sig: Renaicidin solution - 1 application via irrigation every evening and night shift for lucia irrigation   Instil and clamp for 15 minutes   polyethylene glycol (MIRALAX) 17 g packet  Outside Facility (Specify) No Yes   Sig: Take 17 g by mouth daily   potassium chloride (K-DUR,KLOR-CON) 10 mEq tablet  Outside Facility (Specify) No Yes   Sig: Take 1 tablet (10 mEq total) by mouth 2 (two) times a day   Patient taking differently: Take 10 mEq by mouth 3 (three) times a day    tamsulosin (FLOMAX) 0 4 mg  Outside Facility (Specify) No Yes   Sig: Take 1 capsule by mouth daily with dinner for 30 days      Facility-Administered Medications: None       Past Medical History:   Diagnosis Date    Anxiety     Aortic aneurysm (HCC)     Aphasia     Ataxia     Bladder adhesions     BPH (benign prostatic hypertrophy)     COPD [facility]chronic obstructive pulmonary disease) ([facility])     wife denies - "never had"    Dementia     Difficulty walking     Essential (primary) hypertension     Generalized anxiety disorder     GERD (gastroesophageal reflux disease)     Global aphasia     H/O blood clots     High blood pressure     History of kidney stones     Kidney stones     Mental disorder     Muscle weakness     Neuromuscular dysfunction of bladder     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 ([facility] [address] )     Retention of urine, unspecified     Stroke ([facility] [address] [facility] )     Thrombosis     Urinary retention     Urinary tract bacterial infections     Urinary tract infection        Past Surgical History:   Procedure Laterality Date    BOTOX INJECTION N/A 6/18/2019    Procedure: INJECTION BOTULINUM TOXIN (BOTOX), intra detrusor;  Surgeon: Marli Davidson MD;  [facility];  Service: Urology    CYSTOSCOPY      CYSTOSCOPY N/A 6/18/2019    Procedure: cystoscopy and all indicated procedures;  Surgeon: Marli Davidson MD;  [facility];  Service: Urology    FL CYSTOGRAM  1/15/2019    IVC FILTER INSERTION      X2    IVC FILTER INSERTION      x2    KIDNEY STONE SURGERY      KNEE SURGERY      Sepsis infection     NEPHROSTOMY      AK CYSTO/URETERO W/LITHOTRIPSY &INDWELL STENT INSRT Right 6/14/2017    Procedure: CYSTOSCOPY URETEROSCOPY WITH LITHOTRIPSY HOLMIUM LASER,,BASKET STONE EXTRACTION, RETROGRADE PYELOGRAM AND INSERTION STENT URETERAL;  Surgeon: Caiiln Richmond MD;  [facility];  Service: Urology    AK CYSTOURETHROSCOPY,BIOPSY N/A 1/15/2019    Procedure: CYSTOSCOPY, CYSTOGRAM, DILATION OF URETHRAL STRICTURE;  Surgeon: Marli Davidson MD;  [facility];  Service: Urology    URINARY SURGERY         Family History   Problem Relation Age of Onset    Diabetes Father     Hypertension Father     Coronary artery disease Father     Cancer Father     Hypertension Mother      I have reviewed and agree with the history as documented      Social History     Tobacco Use    Smoking status: Never Smoker    Smokeless tobacco: Never Used   Substance Use Topics    Alcohol use: No    Drug use: No        Review of Systems   Unable to perform ROS: Patient nonverbal       Physical Exam  Physical Exam   Constitutional: He appears well-developed and well-nourished  No distress  Elderly chronically ill male with aphasic, nonverbal at baseline   HENT:   Head: Normocephalic and atraumatic  Nose: Nose normal    Eyes: Pupils are equal, round, and reactive to light  Neck: Neck supple  No JVD present  No tracheal deviation present  Cardiovascular: Normal rate, regular rhythm and normal heart sounds  Pulmonary/Chest: Effort normal and breath sounds normal  No respiratory distress  He has no wheezes  Abdominal: Soft  Bowel sounds are normal  He exhibits distension  There is no tenderness  There is no guarding  Bladder distention   Genitourinary:   Genitourinary Comments: Thorne catheter in place  Significant sediment noted at the urethral meatus  Patient covered through pubic area and proximal upper limbs in urine   Musculoskeletal: He exhibits no tenderness or deformity  Neurological: He is alert  Skin: Skin is warm  He is not diaphoretic  Psychiatric: He has a normal mood and affect  Judgment normal    Nursing note and vitals reviewed        Vital Signs  ED Triage Vitals   Temperature Pulse Respirations Blood Pressure SpO2   09/06/19 2032 09/06/19 2029 09/06/19 2029 09/06/19 2032 09/06/19 2029   99 1 °F (37 3 °C) (!) 49 18 (!) 174/90 93 %      Temp Source Heart Rate Source Patient Position - Orthostatic VS BP Location FiO2 (%)   09/06/19 2032 09/06/19 2029 09/06/19 2029 09/06/19 2029 --   Oral Monitor Lying Right arm       Pain Score       --                  Vitals:    09/06/19 2029 09/06/19 2032 09/06/19 2302 09/07/19 0107   BP:  (!) 174/90 141/91 148/85   Pulse: (!) 49  77 74   Patient Position - Orthostatic VS: Lying  Lying Lying         Visual Acuity      ED Medications  Medications   ciprofloxacin (CIPRO) tablet 500 mg (500 mg Oral Given 9/7/19 0058)       Diagnostic Studies  Results Reviewed     None                 No orders to display Procedures  Procedures       ED Course  ED Course as of Sep 07 0517   Fri Sep 06, 2019   2315 Wife is adamantly refusing Thorne catheter exchange without cystoscopy or wire guidance due to prior false lumen creation  2323 Discussed case with Urology Dr Nadya Treviño who states that he will be in to evaluate patient and attempt Thorne catheter exchange  MDM  Number of Diagnoses or Management Options  Problem with urinary catheter [facility]): established and worsening  Diagnosis management comments: 77year old male presenting for evaluation of Thorne catheter problem/urinary retention  Patient with chronic Thorne catheter  Concern for significant sediment or catheter being passed in the false lumen  Patient is found to be retaining urine although catheter is in appropriate place  He has been off approximately 1 month since catheter exchanged so will attempt catheter replacement  Family resistant to removing catheter without assistance from Urology or using a guidewire  Neurology pains and provider exchanged Thorne catheter after multiple attempts irrigating bladder  Urology also requested patient be given 2 doses of ciprofloxacin to cover for UTI  He has follow-up with Urology as an outpatient         Amount and/or Complexity of Data Reviewed  Clinical lab tests: reviewed  Tests in the radiology section of CPT®: reviewed  Tests in the medicine section of CPT®: reviewed  Obtain history from someone other than the patient: yes  Review and summarize past medical records: yes  Discuss the patient with other providers: yes  Independent visualization of images, tracings, or specimens: yes    Risk of Complications, Morbidity, and/or Mortality  Presenting problems: high  Diagnostic procedures: high  Management options: high    Patient Progress  Patient progress: stable      Disposition  Final diagnoses:   Problem with urinary catheter ([address] )     Time reflects when diagnosis was documented in both MDM as applicable and the Disposition within this note     Time User Action Codes Description Comment    9/7/2019 12:47 AM Betina Eller Add [T83  9XXA] Problem with urinary catheter [facility])       ED Disposition     ED Disposition Condition Date/Time Comment    Discharge Stable Sat Sep 7, 2019 12:47 AM Amado Amanda [facility] discharge to home/self care              MD Documentation      Most Recent Value   Accepting Facility Name, [facility]      RN Documentation      Most [address] Name, [facility]   Report Given to  Hernán      Follow-up Information     Follow up With Specialties Details Why Contact Info Additional [address] [facility] Schedule an appointment as soon as possible for a visit in 1 week Lucia catheter evaluation [address] [phone]  [phone]  [facility], [address] [place] [address], [phone]          Discharge Medication List as of 9/7/2019 12:49 AM      START taking these medications    Details   ciprofloxacin (CIPRO) 500 mg tablet Take 1 tablet (500 mg total) by mouth once for 1 dose, Starting Sat 9/7/2019, Normal         CONTINUE these medications which have NOT CHANGED    Details   acetaminophen (TYLENOL) 325 mg tablet Take 650 mg by mouth every 4 (four) hours as needed for mild pain , Historical Med      amLODIPine (NORVASC) 5 mg tablet Take 5 mg by mouth 2 (two) times a day , Historical Med      apixaban (ELIQUIS) 2 5 mg Take 2 5 mg by mouth 2 (two) times a day, Historical Med      busPIRone (BUSPAR) 7 5 mg tablet Take 7 5 mg by mouth 3 (three) times a day, Historical Med      Citric Jy-Qgeylsgikxt-Na Carb (RENACIDIN) SOLN Irrigate with as directed 2 (two) times a day Use 1 application via irrigation every evening and night shift for lucia irrigation instill and clamp for 15 minutes, Historical Med      docusate sodium (COLACE) 100 mg capsule Take 100 mg by mouth 2 (two) times a day, Historical Med      famotidine (PEPCID) 20 mg tablet Take 20 mg by mouth 2 (two) times a day, Historical Med      memantine (NAMENDA) 5 mg tablet Take 1 tablet by mouth 2 (two) times a day for 30 days, Starting Fri 11/11/2016, Until Fri 9/6/2019, Print      metoprolol tartrate (LOPRESSOR) 25 mg tablet Take 0 5 tablets (12 5 mg total) by mouth 2 (two) times a day, Starting Wed 7/10/2019, Until Tue 2/8/2022, Print      mirtazapine (REMERON) 15 mg tablet Take 1 tablet by mouth daily at bedtime for 30 days, Starting Wed 3/1/2017, Until Fri 9/6/2019, Print      NYSTATIN powder Apply topically 2 (two) times a day  , Starting Tue 12/4/2018, Historical Med      polyethylene glycol (MIRALAX) 17 g packet Take 17 g by mouth daily, Starting Fri 4/26/2019, No Print      potassium chloride (K-DUR,KLOR-CON) 10 mEq tablet Take 1 tablet (10 mEq total) by mouth 2 (two) times a day, Starting Wed 7/10/2019, Normal      QUEtiapine (SEROquel) 25 mg tablet Take 25 mg by mouth daily at bedtime  , Historical Med      tamsulosin (FLOMAX) 0 4 mg Take 1 capsule by mouth daily with dinner for 30 days, Starting Wed 3/1/2017, Until Fri 9/6/2019, Print      other medication, see sig, Renaicidin solution - 1 application via irrigation every evening and night shift for lucia irrigation  Instil and clamp for 15 minutes, Historical Med           No discharge procedures on file      ED Provider  Electronically Signed by           Concha Riedel, PA-C  09/07/19 9086

## 2019-09-07 NOTE — ED NOTES
Urology removed the catheter and replaced it with a different catheter        Giovanny Gr RN  09/07/19 3185

## 2019-09-07 NOTE — ED NOTES
Flushed Lucia with 30mL, able to get return of urine but would not drain to gravity  Able to pull off 325mL of urine from lucia manually  Started to get resistance and flushed again with 30mL fluid  Not able to manually pull off urine after  Provider aware, family at bedside aware  Patient appeared to be in no acute distress  Call bell within reach  Will continue to monitor       Odette Hernandez RN  09/06/19 1580

## 2019-09-07 NOTE — DISCHARGE INSTRUCTIONS
Patient was instructed and educated on Cipro use  He is to follow up with Urology as soon as possible as an outpatient for re-evaluation of Thorne catheter placement  ED return precautions discussed

## 2019-09-07 NOTE — ED ATTENDING ATTESTATION
Nevaeh Polo MD, saw and evaluated the patient  I have discussed the patient with the resident/non-physician practitioner and agree with the resident's/non-physician practitioner's findings, Plan of Care, and MDM as documented in the resident's/non-physician practitioner's note, except where noted  All available labs and Radiology studies were reviewed  I was present for key portions of any procedure(s) performed by the resident/non-physician practitioner and I was immediately available to provide assistance  At this point I agree with the current assessment done in the Emergency Department  I have conducted an independent evaluation of this patient a history and physical is as follows:    68-year-old nonverbal male with history of stroke, dementia, neurogenic bladder with chronic Thorne catheter brought for evaluation of blocked catheter over the last 8 hours or so  He has a history of having significant sediment in the urine and this has happened multiple times in the past   Catheter was last changed more than a month ago by Urology  He had developed a false urethral lumen previously and the catheter need to be changed over a wire with cystoscopy  Multiple attempts to flush the catheter were unsuccessful  On-call urologist was called in for catheter replacement due to nature of previous procedure  It was replaced successfully by the urologist who also had recommended prophylactic antibiotic for 24 hours      Critical Care Time  Procedures

## 2019-09-07 NOTE — CONSULTS
Consultation - Urology   Zeferino Tomas 77 y o  male MRN: [id number]  Unit/Bed#: ED 27 Encounter: [id number] 9/7/2019           Assessment/Plan      Assessment:  Urinary retention secondary to neurogenic bladder  Malfunctioning Thorne catheter    Plan: Thorne catheter change at bedside  Would discharge home with short course of oral antibiotic  History of Present Illness   68-year-old male with dementia maintained urologically with a chronic Thorne catheter  The patient has a history of urethral stricture and false passage and the catheter is changed monthly cystoscopically over a wire  The patient's catheter was noted to not be draining well earlier today  He was taken to the emergency room  There is no fever  In the emergency room the catheter could not be irrigated  The family insisted the catheter be changed over a wire  Urology has now been consulted for Thorne catheter change  Procedure--the patient was sterilely prepped and draped  The indwelling Thorne catheter balloon was deflated  A guidewire was passed through the [place] Thorne and into the bladder  The Thorne catheter was removed  There was some incrustation and the balloon would not fully deflate  The catheter was successfully removed  The urethra was lubricated with KY jelly and then a 18 [employer] Thorne placed easily over the wire into the bladder  The balloon was filled to 10 cc  The catheter irrigated very well  Irrigant returned light pink  Postvoid residual by bladder scan at the conclusion the procedure was less than 17 cc  The procedure was well tolerated         Attending: Shahana Bailey MD  Reason for Consult / Principal Problem:  Patient Active Problem List   Diagnosis    Aphasia    COPD (chronic obstructive pulmonary disease) ([facility] [address] [facility]    History of DVT (deep vein thrombosis)    Aneurysm of thoracic aorta ([facility] [address] )    Retention of urine    Essential hypertension    Aphasia of unknown origin    CKD (chronic kidney disease)    GERD (gastroesophageal reflux disease)    HTN, goal below 140/90    Generalized anxiety disorder    H/O blood clots    Anemia    Dementia with behavioral disturbance    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HCC)    MSSA (methicillin susceptible Staphylococcus aureus) septicemia (HCC)    H/O urethral stricture    CKD (chronic kidney disease) stage 2, GFR 60-89 ml/min    Neurogenic bladder    Nephrolithiasis    Lucia catheter in place       Inpatient consult to Urology  Consult performed by: Moises Tse MD  Consult ordered by: Ladarius Palacios PA-C          Review of Systems   Unable to perform ROS: Dementia       Historical Information   Allergies   Allergen Reactions    Gentamycin [Gentamicin] Anaphylaxis    Vancomycin Anaphylaxis    Zosyn [Piperacillin Sod-Tazobactam So] Anaphylaxis     However, has tolerated Ertapenem, Cefdinir, and Cefepime, which all have different side chains   Avoid Penicillins and the following Cephs with similar side chains (Cephalexin, Cefadroxil, Cefaclor, Cefprozil, or  Cefoxitin)    Clindamycin Itching    Nsaids Other (See Comments)     Nephrotic Syndrome    Sulfa Antibiotics Rash    Other Rash     antipersperents  Stat lock from lucia catheter     Past Medical History:   Diagnosis Date    Anxiety     Aortic aneurysm (HCC)     Aphasia     Ataxia     Bladder adhesions     BPH (benign prostatic hypertrophy)     COPD (chronic obstructive pulmonary disease) ([facility] [address] )     wife denies - "never had"    Dementia     Difficulty walking     Essential (primary) hypertension     Generalized anxiety disorder     GERD (gastroesophageal reflux disease)     Global aphasia     H/O blood clots     High blood pressure     History of kidney stones     Kidney stones     Mental disorder     Muscle weakness     Neuromuscular dysfunction of bladder     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 ([address] )     Retention of urine, unspecified     Stroke ([facility] [address] [facility]     Thrombosis     Urinary retention     Urinary tract bacterial infections     Urinary tract infection      Past Surgical History:   Procedure Laterality Date    BOTOX INJECTION N/A 6/18/2019    Procedure: INJECTION BOTULINUM TOXIN (BOTOX), intra detrusor;  Surgeon: Kandace Meneses MD;  [facility];  Service: Urology    CYSTOSCOPY      CYSTOSCOPY N/A 6/18/2019    Procedure: cystoscopy and all indicated procedures;  Surgeon: Kandace Meneses MD;  [facility];  Service: Urology    FL CYSTOGRAM  1/15/2019    IVC FILTER INSERTION      X2    IVC FILTER INSERTION      x2    KIDNEY STONE SURGERY      KNEE SURGERY      Sepsis infection     NEPHROSTOMY      DC CYSTO/URETERO W/LITHOTRIPSY &INDWELL STENT INSRT Right 6/14/2017    Procedure: CYSTOSCOPY URETEROSCOPY WITH LITHOTRIPSY HOLMIUM LASER,,BASKET STONE EXTRACTION, RETROGRADE PYELOGRAM AND INSERTION STENT URETERAL;  Surgeon: Mendy Yates MD;  Location: [facility];  Service: Urology    DC CYSTOURETHROSCOPY,BIOPSY N/A 1/15/2019    Procedure: CYSTOSCOPY, CYSTOGRAM, DILATION OF URETHRAL STRICTURE;  Surgeon: Kandace Meneses MD;  [facility];  Service: Urology    URINARY SURGERY       Social History   Social History     Substance and Sexual Activity   Alcohol Use No     Social History     Substance and Sexual Activity   Drug Use No     Social History     Tobacco Use   Smoking Status Never Smoker   Smokeless Tobacco Never Used     Family History: non-contributory    Meds/Allergies   all current active meds have been reviewed    Current Facility-Administered Medications:     lidocaine (URO-JET) 2 % urethral/mucosal gel 1 application, 1 application, Urethral, Once, Jimdarrin Kuhn PA-C    Current Outpatient Medications:     acetaminophen (TYLENOL) 325 mg tablet, Take 650 mg by mouth every 4 (four) hours as needed for mild pain , Disp: , Rfl:     amLODIPine (NORVASC) 5 mg tablet, Take 5 mg by mouth 2 (two) times a day , Disp: , Rfl:     apixaban (ELIQUIS) 2 5 mg, Take 2 5 mg by mouth 2 (two) times a day, Disp: , Rfl:     busPIRone (BUSPAR) 7 5 mg tablet, Take 7 5 mg by mouth 3 (three) times a day, Disp: , Rfl:     Citric Ba-Zxrcamovrib-Hx Carb (RENACIDIN) SOLN, Irrigate with as directed 2 (two) times a day Use 1 application via irrigation every evening and night shift for lucia irrigation instill and clamp for 15 minutes, Disp: , Rfl:     docusate sodium (COLACE) 100 mg capsule, Take 100 mg by mouth 2 (two) times a day, Disp: , Rfl:     famotidine (PEPCID) 20 mg tablet, Take 20 mg by mouth 2 (two) times a day, Disp: , Rfl:     memantine (NAMENDA) 5 mg tablet, Take 1 tablet by mouth 2 (two) times a day for 30 days (Patient taking differently: Take 5 mg by mouth 2 (two) times a day ), Disp: 60 tablet, Rfl: 0    metoprolol tartrate (LOPRESSOR) 25 mg tablet, Take 0 5 tablets (12 5 mg total) by mouth 2 (two) times a day, Disp: 60 tablet, Rfl: 0    mirtazapine (REMERON) 15 mg tablet, Take 1 tablet by mouth daily at bedtime for 30 days, Disp: 30 tablet, Rfl: 0    NYSTATIN powder, Apply topically 2 (two) times a day  , Disp: , Rfl:     polyethylene glycol (MIRALAX) 17 g packet, Take 17 g by mouth daily, Disp: 14 each, Rfl: 0    potassium chloride (K-DUR,KLOR-CON) 10 mEq tablet, Take 1 tablet (10 mEq total) by mouth 2 (two) times a day (Patient taking differently: Take 10 mEq by mouth 3 (three) times a day ), Disp: 60 tablet, Rfl: 0    QUEtiapine (SEROquel) 25 mg tablet, Take 25 mg by mouth daily at bedtime  , Disp: , Rfl:     tamsulosin (FLOMAX) 0 4 mg, Take 1 capsule by mouth daily with dinner for 30 days, Disp: 30 capsule, Rfl: 0    other medication, see sig,, Renaicidin solution - 1 application via irrigation every evening and night shift for lucia irrigation   Instil and clamp for 15 minutes, Disp: , Rfl:     Current Facility-Administered Medications:  lidocaine 1 application Urethral Once Janet Jane, HONORIO            Objective   Vitals: Blood pressure 141/91, pulse 77, temperature 99 1 °F (37 3 °C), temperature source Oral, resp  rate 18, weight 100 kg (220 lb 7 4 oz), SpO2 93 %  No intake/output data recorded  Invasive Devices     Drain            Urethral Catheter Straight-tip 16 Fr  60 days                Physical Exam   Constitutional: He is oriented to person, place, and time  He appears well-developed and well-nourished  HENT:   Head: Normocephalic and atraumatic  Eyes: Conjunctivae are normal    Neck: Neck supple  Cardiovascular: Normal rate  Pulmonary/Chest: Effort normal    Abdominal: Soft  Bowel sounds are normal  He exhibits no distension and no mass  There is no tenderness  There is no rebound, no guarding and no CVA tenderness  No hernia  Genitourinary: Testes normal and penis normal  Right testis shows no mass  Left testis shows no mass  No phimosis or hypospadias  Genitourinary Comments: Thorne in place-not draining well   Musculoskeletal: He exhibits no edema  Neurological: He is alert and oriented to person, place, and time  Skin: Skin is warm and dry  Psychiatric: He has a normal mood and affect  His behavior is normal  Judgment and thought content normal        Lab Results: I have personally reviewed pertinent reports      CBC: No results found for: WBC, HGB, HCT, MCV, PLT, ADJUSTEDWBC, MCH, MCHC, RDW, MPV, NRBC  CMP: No results found for: SODIUM, CL, CO2, ANIONGAP, BUN, CREATININE, GLUCOSE, CALCIUM, AST, ALT, ALKPHOS, PROT, BILITOT, EGFR     Pat Giordano MD

## 2019-09-07 NOTE — ED NOTES
Pt lucia balloon deflated  Lucia irraggated with saline  Able to irraggated and pull back what was irraggated  Unable to manually pull back more fluid than irraggated with  Loreda  aware  Pt balloon reinflated with 10mL of NSS  Pt family did not want catheter removed        Lorene Helm, RN  09/06/19 1603

## 2019-09-07 NOTE — ED NOTES
Pt appeared to be in no acute distress  Call bell within reach, will continue to monitor       Pietro Alvarez RN  09/06/19 [id number]

## 2019-09-07 NOTE — ED NOTES
Pt family reports blood coming from around the catheter  Urology aware and at bedside        William Martinez RN  09/07/19 [id number]

## 2019-09-09 ENCOUNTER — OFFICE VISIT (OUTPATIENT)
Dept: UROLOGY | Facility: CLINIC | Age: 69
End: 2019-09-09
Payer: COMMERCIAL

## 2019-09-09 VITALS — RESPIRATION RATE: 14 BRPM | HEART RATE: 65 BPM

## 2019-09-09 DIAGNOSIS — Z87.448 H/O URETHRAL STRICTURE: ICD-10-CM

## 2019-09-09 DIAGNOSIS — N31.9 NEUROGENIC BLADDER: ICD-10-CM

## 2019-09-09 DIAGNOSIS — Z97.8 FOLEY CATHETER IN PLACE: ICD-10-CM

## 2019-09-09 DIAGNOSIS — R33.9 RETENTION OF URINE: Primary | Chronic | ICD-10-CM

## 2019-09-09 PROCEDURE — 99213 OFFICE O/P EST LOW 20 MIN: CPT | Performed by: UROLOGY

## 2019-09-09 RX ORDER — ERTAPENEM 1 G/1
INJECTION, POWDER, LYOPHILIZED, FOR SOLUTION INTRAMUSCULAR; INTRAVENOUS
COMMUNITY
Start: 2019-07-07 | End: 2019-10-03

## 2019-09-09 RX ORDER — CIPROFLOXACIN 500 MG/1
TABLET, FILM COATED ORAL
COMMUNITY
Start: 2019-09-07 | End: 2019-10-03

## 2019-09-09 NOTE — PATIENT INSTRUCTIONS
OnabotulinumtoxinA (By injection)   OnabotulinumtoxinA (xn-u-exn-hw-NGW-oty-tox-in-ay)  Treats muscle stiffness, muscle spasms, excessive sweating, overactive bladder, or loss of bladder control  Prevents chronic migraine headaches  Improves the appearance of wrinkles on the face  Brand Name(s): Botox, Botox Cosmetic   There may be other brand names for this medicine  When This Medicine Should Not Be Used: This medicine is not right for everyone  You should not receive this medicine if you had an allergic reaction to onabotulinumtoxinA or any other botulinum toxin product  How to Use This Medicine:   Injectable  · Your doctor will prescribe your exact dose and tell you how often it should be given  This medicine is given by a healthcare provider as a shot under your skin or into a muscle  · You may be given medicine to numb the area where the shot will be injected  If you receive the medicine around your eyes, you may be given eye drops or ointment to numb the area  After your injection, you may need to wear a protective contact lens or eye patch  · If you are being treated for excessive sweating, shave your underarms but do not use deodorant for 24 hours before your injection  Avoid exercise, hot foods or liquids, or anything else that could make you sweat for 30 minutes before your injection  · The recommended treatment schedule for chronic migraine is every 12 weeks  · This medicine works slowly  Once your condition has improved, the medicine will last about 3 months, then the effects will slowly go away  You might need more injections to treat your condition  ¨ Muscle spasms in the eyelids should improve within 3 to 10 days  ¨ Eye muscle problems should improve 1 or 2 days after the injection, and the improvement should last for 2 to 6 weeks  ¨ Neck pain should improve within 2 to 6 weeks  ¨ Arm stiffness should improve within 4 to 6 weeks    ¨ Facial lines or wrinkles should improve 1 or 2 days   · This medicine should come with a Medication Guide  Ask your pharmacist for a copy if you do not have one  · Missed dose:Call your doctor or pharmacist for instructions  Drugs and Foods to Avoid:   Ask your doctor or pharmacist before using any other medicine, including over-the-counter medicines, vitamins, and herbal products  · Some foods and medicine can affect how onabotulinumtoxinA works  Tell your doctor if you are using any of the following:  ¨ Aspirin or a blood thinner (such as ticlopidine, warfarin)  ¨ Muscle relaxer  ¨ Medicine for an infection (such as amikacin, gentamicin, streptomycin, tobramycin)  · Tell your doctor if you have received an injection of any botulinum toxin product within the past 4 months  Warnings While Using This Medicine:   · Tell your doctor if you are pregnant or breastfeeding, or if you have breathing or lung problems, bleeding problems, heart or blood vessel disease, or nerve or muscle problems (such as myasthenia gravis)  Tell your doctor if you have ever had face surgery or if you have a urinary tract infection or trouble urinating, diabetes, or multiple sclerosis  · This medicine may cause the following problems:  ¨ Muscle weakness, loss of bladder control, trouble swallowing, speaking, or breathing (caused by the toxin spreading to other parts of your body)  · This medicine may make your muscles weak or cause vision problems  Do not drive or do anything else that could be dangerous until you know how this medicine affects you  · There are some warnings that only apply if you are receiving this medicine to treat the following:   ¨ Injections near the eye: This medicine may reduce blinking, which can raise the risk of eye problems such as corneal exposure and ulcers  Tell your doctor right away if you notice that you are blinking less than usual or your eyes feel dry  ¨ Urinary incontinence:  This medicine may cause autonomic dysreflexia, which can be a life-threatening condition  ¨ Overactive bladder: Check with your doctor right away if you have trouble urinating or a burning sensation while urinating  · This medicine contains products from donated human blood, so it may contain viruses, although the risk is low  Human donors and blood are always tested for viruses to keep the risk low  Talk with your doctor about this risk if you are concerned  · Your doctor will check your progress and the effects of this medicine at regular visits  Keep all appointments  Possible Side Effects While Using This Medicine:   Call your doctor right away if you notice any of these side effects:  · Allergic reaction: Itching or hives, swelling in your face or hands, swelling or tingling in your mouth or throat, chest tightness, trouble breathing  · Blurred or double vision, droopy eyelids  · Change in how much or how often you urinate, trouble urinating, or painful urination  · Chest pain, slow or uneven heartbeat  · Headache, increased sweating, warmth or redness in your face, neck, or arm  · Muscle weakness  · Trouble swallowing, talking, or breathing  If you notice these less serious side effects, talk with your doctor:   · Fever, chills, cough, stuffy or runny nose, sore throat, and body aches  · Pain in your neck, back, arms, or legs  · Redness, pain, tenderness, bruising, swelling, or weakness where the shot was given  If you notice other side effects that you think are caused by this medicine, tell your doctor  Call your doctor for medical advice about side effects  You may report side effects to FDA at 1-322-FDA-1732  © 2017 [employer] Information is for End User's use only and may not be sold, redistributed or otherwise used for commercial purposes  The above information is an  only  It is not intended as medical advice for individual conditions or treatments   Talk to your doctor, nurse or pharmacist before following any medical regimen to see if it is safe and effective for you

## 2019-09-09 NOTE — PROGRESS NOTES
Problem List Items Addressed This Visit        Genitourinary    Retention of urine - Primary (Chronic)       Other    H/O urethral stricture    Neurogenic bladder    Lucia catheter in place            Discussion:  Unfortunately, Destinee Jacobson had an issue with his catheter over the weekend, and this was changed by the on-call urologist     Catheter irrigates well today without debris as such a wire changes not necessary today  He will follow up with us for cystoscopy and botulinum toxin injection as this has previously helped with his bladder spasms    Assessment and plan:       Please see problem oriented charting for the assessment plan of today's urological complaints    Iris Giordano MD      Chief Complaint     Chief Complaint   Patient presents with    lucia care    Indwelling catheter  History of urethral stricture  Neurogenic bladder      History of Present Illness     Miguel George is a 77 y o  gentleman very well known to me into our office for difficult Lucia catheter placement and neurogenic bladder with indwelling Lucia catheter  His catheter was changed a number of days ago in the emergency room by the on-call urologist   This irrigates well today  Patient has otherwise been less like himself since his last catheter change, his wife states that this last catheter change was quite traumatic  I apologized that they had a unpleasant experience with the last catheter change      The following portions of the patient's history were reviewed and updated as appropriate: allergies, current medications, past family history, past medical history, past social history, past surgical history and problem list     Detailed Urologic History     - please refer to HPI    Review of Systems     Review of Systems   Reason unable to perform ROS: Unable to perform, patient is nonverbal              Allergies     Allergies   Allergen Reactions    Gentamycin [Gentamicin] Anaphylaxis    Vancomycin Anaphylaxis    Zosyn [Piperacillin Sod-Tazobactam So] Anaphylaxis     However, has tolerated Ertapenem, Cefdinir, and Cefepime, which all have different side chains  Avoid Penicillins and the following Cephs with similar side chains (Cephalexin, Cefadroxil, Cefaclor, Cefprozil, or  Cefoxitin)    Clindamycin Itching    Nsaids Other (See Comments)     Nephrotic Syndrome    Sulfa Antibiotics Rash    Other Rash     antipersperents  Stat lock from lucia catheter       Physical Exam     Physical Exam   Constitutional: He is oriented to person, place, and time  No distress  Guerin Milton is less animated and less responsive than usual, appears nontoxic   HENT:   Head: Normocephalic and atraumatic  Eyes: Right eye exhibits no discharge  Left eye exhibits no discharge  Neck: No tracheal deviation present  Cardiovascular: Intact distal pulses  Pulmonary/Chest: Effort normal  No stridor  No respiratory distress  Abdominal: Soft  He exhibits no distension  Genitourinary:   Genitourinary Comments: Catheter is irrigating well and draining well, no debris within the lumen of the catheter or bladder   Musculoskeletal: Normal range of motion  He exhibits no edema, tenderness or deformity  Neurological: He is alert and oriented to person, place, and time  He displays abnormal reflex  No cranial nerve deficit  He exhibits abnormal muscle tone  Coordination abnormal    Skin: Skin is warm and dry  No rash noted  He is not diaphoretic  No erythema  No pallor  Psychiatric: He has a normal mood and affect  His behavior is normal  Judgment and thought content normal    Nursing note and vitals reviewed            Vital Signs  Vitals:    09/09/19 0807   Pulse: 65   Resp: 14         Current Medications       Current Outpatient Medications:     acetaminophen (TYLENOL) 325 mg tablet, Take 650 mg by mouth every 4 (four) hours as needed for mild pain , Disp: , Rfl:     amLODIPine (NORVASC) 5 mg tablet, Take 5 mg by mouth 2 (two) times a day , Disp: , Rfl:     apixaban (ELIQUIS) 2 5 mg, Take 2 5 mg by mouth 2 (two) times a day, Disp: , Rfl:     busPIRone (BUSPAR) 7 5 mg tablet, Take 7 5 mg by mouth 3 (three) times a day, Disp: , Rfl:     Citric Oz-Zctzyoalcut-Ie Carb (RENACIDIN) SOLN, Irrigate with as directed 2 (two) times a day Use 1 application via irrigation every evening and night shift for lucia irrigation instill and clamp for 15 minutes, Disp: , Rfl:     docusate sodium (COLACE) 100 mg capsule, Take 100 mg by mouth 2 (two) times a day, Disp: , Rfl:     ertapenem (INVanz) 1 g, , Disp: , Rfl:     famotidine (PEPCID) 20 mg tablet, Take 20 mg by mouth 2 (two) times a day, Disp: , Rfl:     metoprolol tartrate (LOPRESSOR) 25 mg tablet, Take 0 5 tablets (12 5 mg total) by mouth 2 (two) times a day, Disp: 60 tablet, Rfl: 0    NYSTATIN powder, Apply topically 2 (two) times a day  , Disp: , Rfl:     other medication, see sig,, Renaicidin solution - 1 application via irrigation every evening and night shift for lucia irrigation   Instil and clamp for 15 minutes, Disp: , Rfl:     polyethylene glycol (MIRALAX) 17 g packet, Take 17 g by mouth daily, Disp: 14 each, Rfl: 0    potassium chloride (K-DUR,KLOR-CON) 10 mEq tablet, Take 1 tablet (10 mEq total) by mouth 2 (two) times a day (Patient taking differently: Take 10 mEq by mouth 3 (three) times a day ), Disp: 60 tablet, Rfl: 0    QUEtiapine (SEROquel) 25 mg tablet, Take 25 mg by mouth daily at bedtime  , Disp: , Rfl:     ciprofloxacin (CIPRO) 500 mg tablet, , Disp: , Rfl:     memantine (NAMENDA) 5 mg tablet, Take 1 tablet by mouth 2 (two) times a day for 30 days (Patient taking differently: Take 5 mg by mouth 2 (two) times a day ), Disp: 60 tablet, Rfl: 0    mirtazapine (REMERON) 15 mg tablet, Take 1 tablet by mouth daily at bedtime for 30 days, Disp: 30 tablet, Rfl: 0    tamsulosin (FLOMAX) 0 4 mg, Take 1 capsule by mouth daily with dinner for 30 days, Disp: 30 capsule, Rfl: 0      Active Problems Patient Active Problem List   Diagnosis    Aphasia    COPD [facility]chronic obstructive [facility] disease) [facility]    History of DVT (deep vein thrombosis)    Aneurysm of thoracic aorta ([facility]    Retention of urine    Essential hypertension    Aphasia of unknown origin    CKD (chronic kidney disease)    GERD (gastroesophageal reflux disease)    HTN, goal below 140/90    Generalized anxiety disorder    H/O blood clots    Anemia    Dementia with behavioral disturbance    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HCC)    [facility]icillin susceptible St[facility] aureus[facility]    H/O urethral stricture    CKD (chronic kidney disease) stage 2, GFR 60-89 ml/min    Neurogenic bladder    Nephrolithiasis    Thorne catheter in place         Past Medical History     Past Medical History:   Diagnosis Date    Anxiety     [facility]     Aphasia     Ataxia     Bladder adhesions     BPH (benign prostatic hypertrophy)     COPD [facility]chronic obstructive pulmonary disease) [facility]     wife denies - "never had"    Dementia     Difficulty walking     Essential (primary) hypertension     Generalized anxiety disorder     GERD (gastroesophageal reflux disease)     Global aphasia     H/O blood clots     High blood pressure     History of kidney stones     Kidney stones     Mental disorder     Muscle weakness     Neuromuscular dysfunction of bladder     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 ([facility] )     Retention of urine, unspecified     Stroke ([facility]     Thrombosis     Urinary retention     Urinary tract bacterial infections     Urinary tract infection          Surgical History     Past Surgical History:   Procedure Laterality Date    BOTOX INJECTION N/A 6/18/2019    Procedure: INJECTION BOTULINUM TOXIN (BOTOX), intra detrusor;  Surgeon: Martha Garcia MD;  [facility];  Service: Urology    CYSTOSCOPY      CYSTOSCOPY N/A 6/18/2019    Procedure: cystoscopy and all indicated procedures;  Surgeon: Meredith Fontenot MD;  [facility];  Service: Urology    FL CYSTOGRAM  1/15/2019    IVC FILTER INSERTION      X2    IVC FILTER INSERTION      x2    KIDNEY STONE SURGERY      KNEE SURGERY      Sepsis infection     NEPHROSTOMY      MT CYSTO/URETERO W/LITHOTRIPSY &INDWELL STENT INSRT Right 6/14/2017    Procedure: CYSTOSCOPY URETEROSCOPY WITH LITHOTRIPSY HOLMIUM LASER,,BASKET STONE EXTRACTION, RETROGRADE PYELOGRAM AND INSERTION STENT URETERAL;  Surgeon: Yanelis Narvaez MD;  [facility];  Service: Urology    MT CYSTOURETHROSCOPY,BIOPSY N/A 1/15/2019    Procedure: CYSTOSCOPY, CYSTOGRAM, DILATION OF URETHRAL STRICTURE;  Surgeon: Meredith Fontenot MD;  [facility];  Service: Urology    URINARY SURGERY           Family History     Family History   Problem Relation Age of Onset    Diabetes Father     Hypertension Father     Coronary artery disease Father     Cancer Father     Hypertension Mother          Social History     Social History     Social History     Tobacco Use   Smoking Status Never Smoker   Smokeless Tobacco Never Used         Pertinent Lab Values     Lab Results   Component Value Date    CREATININE 1 37 (H) 07/10/2019       No results found for: PSA          Pertinent Imaging      No new imaging for my review

## 2019-10-01 LAB
CREAT ?TM UR-SCNC: 99.6 UMOL/L
EXT PROTEIN URINE: 27.3
PROT/CREAT UR: 0.27 MG/G{CREAT}

## 2019-10-02 ENCOUNTER — DOCUMENTATION (OUTPATIENT)
Dept: NEPHROLOGY | Facility: CLINIC | Age: 69
End: 2019-10-02

## 2019-10-02 LAB
EXT GLUCOSE BLD: 72
EXTERNAL ANION GAP: 4
EXTERNAL BICARBONATE: 29
EXTERNAL BUN: 17
EXTERNAL CALCIUM: 8.5
EXTERNAL CHLORIDE: 109
EXTERNAL CREATININE: 1.37
EXTERNAL EGFR: 52
EXTERNAL POTASSIUM: 4.1
EXTERNAL SODIUM: 142

## 2019-10-03 ENCOUNTER — OFFICE VISIT (OUTPATIENT)
Dept: NEPHROLOGY | Facility: CLINIC | Age: 69
End: 2019-10-03
Payer: COMMERCIAL

## 2019-10-03 VITALS — DIASTOLIC BLOOD PRESSURE: 70 MMHG | SYSTOLIC BLOOD PRESSURE: 140 MMHG

## 2019-10-03 DIAGNOSIS — N20.0 NEPHROLITHIASIS: ICD-10-CM

## 2019-10-03 DIAGNOSIS — N18.2 CKD (CHRONIC KIDNEY DISEASE) STAGE 2, GFR 60-89 ML/MIN: ICD-10-CM

## 2019-10-03 DIAGNOSIS — N31.9 NEUROGENIC BLADDER: ICD-10-CM

## 2019-10-03 DIAGNOSIS — Z87.448 H/O URETHRAL STRICTURE: ICD-10-CM

## 2019-10-03 DIAGNOSIS — I10 ESSENTIAL HYPERTENSION: Primary | Chronic | ICD-10-CM

## 2019-10-03 PROCEDURE — 99213 OFFICE O/P EST LOW 20 MIN: CPT | Performed by: INTERNAL MEDICINE

## 2019-10-03 NOTE — PROGRESS NOTES
NEPHROLOGY OFFICE VISIT   Rob Tomas 68 y o  male MRN: [id number]  10/3/2019    Reason for Visit:  Chronic kidney disease    ASSESSMENT and PLAN:    I had the pleasure of seeing Jeanette Myles today in the renal clinic for the continued management of chronic kidney disease  Since our last visit, he went to the emergency room as he was having some blood around his Thorne catheter, and malfunctioning Thorne  He was seen by Urology who changed the Thorne catheter  And saw Urology few days afterwards for his uric usual follow-up  He is planned for Botox injections into the bladder next Monday  1 ) Chronic kidney disease stage II/III  -baseline creatinine is around 1 2-1 4 mg/dL  -in the setting of chronic urinary retention, chronic ureteral stricture, hypertension, likely also arteriolar sclerosis  -Thorne catheter in place, chronic gets changed every month  -blood pressure control  -avoid NSAIDs  -renal function is currently stable at baseline     2 ) Hypertension  -blood pressure acceptable  -amlodipine 5 mg daily  -metoprolol 12 5 mg twice a day, reduced dose due to bradycardia     3 ) Chronic urinary retention  -Thorne catheter in place  -follow with Urology  -changed every month  -urinalysis was positive for protein with a pH of 9, positive nitrate, 6-10 RBCs, less than 2 WBCs with few bacteria, urine culture grew out non lactose fermenting gram-negative rods, this is likely colonization     4 ) DVT/IVC filter  -chronic anticoagulation with Eliquis    5 ) Nephrolithiasis  -CT scan showed bilateral renal calcifications consistent with medullary nephrocalcinosis along with nonobstructing renal calculi    Encourage free water intake and low 2 g sodium diet      PATIENT INSTRUCTIONS:    Patient Instructions   1 )  Low 2 g sodium diet    2 )  Monitor weights at home    3 )  Avoid NSAIDs    4 )  Monitor blood pressure at home, call if blood pressure greater than 150/90 persistently              Orders Placed This Encounter Procedures    Comprehensive metabolic panel     Standing Status:   Future     Standing Expiration Date:   10/3/2020    CBC     Standing Status:   Future     Standing Expiration Date:   10/3/2020       OBJECTIVE:  Current Weight:    Vitals:    10/03/19 1309   BP: 140/70    There is no height or weight on file to calculate BMI  REVIEW OF SYSTEMS:    Review of Systems   Constitutional: Negative for activity change and fever  Respiratory: Negative for cough, chest tightness, shortness of breath and wheezing  Cardiovascular: Negative for chest pain and leg swelling  Gastrointestinal: Negative for abdominal pain, diarrhea, nausea and vomiting  Endocrine: Negative for polyuria  Genitourinary: Negative for difficulty urinating, dysuria, flank pain, frequency and urgency  Skin: Negative for rash  Neurological: Negative for dizziness, syncope, light-headedness and headaches  PHYSICAL EXAM:      Physical Exam   Constitutional: He is oriented to person, place, and time  He appears well-developed and well-nourished  No distress  HENT:   Head: Normocephalic and atraumatic  Eyes: Pupils are equal, round, and reactive to light  No scleral icterus  Neck: Normal range of motion  Neck supple  Cardiovascular: Normal rate, regular rhythm and normal heart sounds  Exam reveals no gallop and no friction rub  No murmur heard  Pulmonary/Chest: Effort normal and breath sounds normal  No respiratory distress  He has no wheezes  He has no rales  He exhibits no tenderness  Abdominal: Soft  Bowel sounds are normal  He exhibits no distension  There is no tenderness  There is no rebound  Musculoskeletal: Normal range of motion  He exhibits no edema  Neurological: He is alert and oriented to person, place, and time  Skin: No rash noted  He is not diaphoretic  Psychiatric: He has a normal mood and affect  Nursing note and vitals reviewed        Medications:    Current Outpatient Medications:    acetaminophen (TYLENOL) 325 mg tablet, Take 650 mg by mouth every 4 (four) hours as needed for mild pain , Disp: , Rfl:     amLODIPine (NORVASC) 5 mg tablet, Take 5 mg by mouth 2 (two) times a day , Disp: , Rfl:     apixaban (ELIQUIS) 2 5 mg, Take 2 5 mg by mouth 2 (two) times a day, Disp: , Rfl:     busPIRone (BUSPAR) 7 5 mg tablet, Take 7 5 mg by mouth 3 (three) times a day, Disp: , Rfl:     Citric Gz-Bqcutaoupjg-Cs Carb (RENACIDIN) SOLN, Irrigate with as directed 2 (two) times a day Use 1 application via irrigation every evening and night shift for lcuia irrigation instill and clamp for 15 minutes, Disp: , Rfl:     famotidine (PEPCID) 20 mg tablet, Take 20 mg by mouth 2 (two) times a day, Disp: , Rfl:     memantine (NAMENDA) 5 mg tablet, Take 1 tablet by mouth 2 (two) times a day for 30 days (Patient taking differently: Take 5 mg by mouth 2 (two) times a day ), Disp: 60 tablet, Rfl: 0    metoprolol tartrate (LOPRESSOR) 25 mg tablet, Take 0 5 tablets (12 5 mg total) by mouth 2 (two) times a day, Disp: 60 tablet, Rfl: 0    mirtazapine (REMERON) 15 mg tablet, Take 1 tablet by mouth daily at bedtime for 30 days, Disp: 30 tablet, Rfl: 0    NYSTATIN powder, Apply topically 2 (two) times a day  , Disp: , Rfl:     potassium chloride (K-DUR,KLOR-CON) 10 mEq tablet, Take 1 tablet (10 mEq total) by mouth 2 (two) times a day (Patient taking differently: Take 10 mEq by mouth 3 (three) times a day ), Disp: 60 tablet, Rfl: 0    QUEtiapine (SEROquel) 25 mg tablet, Take 25 mg by mouth daily at bedtime  , Disp: , Rfl:     tamsulosin (FLOMAX) 0 4 mg, Take 1 capsule by mouth daily with dinner for 30 days, Disp: 30 capsule, Rfl: 0    docusate sodium (COLACE) 100 mg capsule, Take 100 mg by mouth 2 (two) times a day, Disp: , Rfl:     polyethylene glycol (MIRALAX) 17 g packet, Take 17 g by mouth daily, Disp: 14 each, Rfl: 0    Laboratory Results:  Results from last 7 days   Lab Units 09/27/19   POTASSIUM  4 1   CHLORIDE 109   BUN  17   CREATININE  1 37   CALCIUM  8 5       Results for orders placed or performed in visit on 10/02/19   Comprehensive metabolic panel   Result Value Ref Range    SODIUM 142     POTASSIUM 4 1     CHLORIDE 109     BICARB 29     ANION GAP 4     BUN 17     CREATININE 1 37     Glucose 72     EXTERNAL CALCIUM 8 5     EXTERNAL EGFR 52

## 2019-10-06 ENCOUNTER — ANESTHESIA EVENT (OUTPATIENT)
Dept: PERIOP | Facility: HOSPITAL | Age: 69
End: 2019-10-06
Payer: COMMERCIAL

## 2019-10-06 NOTE — ANESTHESIA PREPROCEDURE EVALUATION
Review of Systems/Medical History          Cardiovascular  EKG reviewed, Hypertension , Aortic disease, DVT (IVC fillter)  Comment: 2/19-EF-70%,  Pulmonary  Not a smoker , COPD ,        GI/Hepatic    GERD ,        Kidney stones, Chronic kidney disease ,   Comment: Neurogenic Bladder     Endo/Other     GYN       Hematology  Anemia ,     Musculoskeletal       Neurology    CVA , Aphasia/Dysphagia,   Comment: Ataxia, Aphasia  S/P Hypoxic Brain Injury 2010 after Cardiac arrest with Sepsis Psychology   Anxiety,   Dementia           Physical Exam    Airway  Comment: Not cooperative            Dental       Cardiovascular      Pulmonary      Other Findings        Anesthesia Plan  ASA Score- 3     Anesthesia Type- general with ASA Monitors  Additional Monitors:   Airway Plan: LMA  Plan Factors-Patient not instructed to abstain from smoking on day of procedure  Patient did not smoke on day of surgery  Induction- intravenous  Postoperative Plan-     Informed Consent- Anesthetic plan and risks discussed with patient and spouse  I personally reviewed this patient with the CRNA  Discussed and agreed on the Anesthesia Plan with the CRNA             Lab Results   Component Value Date    GLUC 72 09/27/2019    ALT 12 07/08/2019    AST 13 07/08/2019    BUN 17 09/27/2019    CALCIUM 8 5 09/27/2019     09/27/2019    CO2 29 07/10/2019    CREATININE 1 37 09/27/2019    INR 1 20 (H) 07/08/2019    HCT 44 2 07/10/2019    HGB 14 6 07/10/2019    MG 2 2 07/09/2019    PHOS 2 3 05/03/2017     07/10/2019    K 4 1 09/27/2019    WBC 7 40 07/10/2019

## 2019-10-07 ENCOUNTER — HOSPITAL ENCOUNTER (OUTPATIENT)
Facility: HOSPITAL | Age: 69
Setting detail: OUTPATIENT SURGERY
Discharge: HOME | End: 2019-10-07
Attending: UROLOGY | Admitting: UROLOGY
Payer: COMMERCIAL

## 2019-10-07 ENCOUNTER — TELEPHONE (OUTPATIENT)
Dept: UROLOGY | Facility: CLINIC | Age: 69
End: 2019-10-07

## 2019-10-07 ENCOUNTER — ANESTHESIA (OUTPATIENT)
Dept: PERIOP | Facility: HOSPITAL | Age: 69
End: 2019-10-07
Payer: COMMERCIAL

## 2019-10-07 VITALS
BODY MASS INDEX: 29.54 KG/M2 | OXYGEN SATURATION: 95 % | HEIGHT: 71 IN | WEIGHT: 211 LBS | DIASTOLIC BLOOD PRESSURE: 94 MMHG | SYSTOLIC BLOOD PRESSURE: 169 MMHG | TEMPERATURE: 98.2 F | HEART RATE: 80 BPM | RESPIRATION RATE: 17 BRPM

## 2019-10-07 PROCEDURE — NC001 PR NO CHARGE: Performed by: UROLOGY

## 2019-10-07 PROCEDURE — 52287 CYSTOSCOPY CHEMODENERVATION: CPT | Performed by: UROLOGY

## 2019-10-07 RX ORDER — ONDANSETRON 2 MG/ML
INJECTION INTRAMUSCULAR; INTRAVENOUS AS NEEDED
Status: DISCONTINUED | OUTPATIENT
Start: 2019-10-07 | End: 2019-10-07 | Stop reason: SURG

## 2019-10-07 RX ORDER — PROPOFOL 10 MG/ML
INJECTION, EMULSION INTRAVENOUS AS NEEDED
Status: DISCONTINUED | OUTPATIENT
Start: 2019-10-07 | End: 2019-10-07 | Stop reason: SURG

## 2019-10-07 RX ORDER — FENTANYL CITRATE/PF 50 MCG/ML
50 SYRINGE (ML) INJECTION
Status: DISCONTINUED | OUTPATIENT
Start: 2019-10-07 | End: 2019-10-07 | Stop reason: HOSPADM

## 2019-10-07 RX ORDER — SODIUM CHLORIDE, SODIUM LACTATE, POTASSIUM CHLORIDE, CALCIUM CHLORIDE 600; 310; 30; 20 MG/100ML; MG/100ML; MG/100ML; MG/100ML
125 INJECTION, SOLUTION INTRAVENOUS CONTINUOUS
Status: DISCONTINUED | OUTPATIENT
Start: 2019-10-07 | End: 2019-10-07 | Stop reason: HOSPADM

## 2019-10-07 RX ORDER — ONDANSETRON 2 MG/ML
4 INJECTION INTRAMUSCULAR; INTRAVENOUS EVERY 4 HOURS PRN
Status: DISCONTINUED | OUTPATIENT
Start: 2019-10-07 | End: 2019-10-07 | Stop reason: HOSPADM

## 2019-10-07 RX ORDER — FENTANYL CITRATE 50 UG/ML
INJECTION, SOLUTION INTRAMUSCULAR; INTRAVENOUS AS NEEDED
Status: DISCONTINUED | OUTPATIENT
Start: 2019-10-07 | End: 2019-10-07 | Stop reason: SURG

## 2019-10-07 RX ORDER — MAGNESIUM HYDROXIDE 1200 MG/15ML
LIQUID ORAL AS NEEDED
Status: DISCONTINUED | OUTPATIENT
Start: 2019-10-07 | End: 2019-10-07 | Stop reason: HOSPADM

## 2019-10-07 RX ORDER — ACETAMINOPHEN 160 MG/5ML
650 SUSPENSION, ORAL (FINAL DOSE FORM) ORAL EVERY 4 HOURS PRN
Status: DISCONTINUED | OUTPATIENT
Start: 2019-10-07 | End: 2019-10-07 | Stop reason: HOSPADM

## 2019-10-07 RX ADMIN — FENTANYL CITRATE 25 MCG: 50 INJECTION INTRAMUSCULAR; INTRAVENOUS at 13:01

## 2019-10-07 RX ADMIN — SODIUM CHLORIDE, SODIUM LACTATE, POTASSIUM CHLORIDE, AND CALCIUM CHLORIDE: .6; .31; .03; .02 INJECTION, SOLUTION INTRAVENOUS at 12:44

## 2019-10-07 RX ADMIN — PROPOFOL 200 MG: 10 INJECTION, EMULSION INTRAVENOUS at 12:46

## 2019-10-07 RX ADMIN — LIDOCAINE HYDROCHLORIDE 100 MG: 20 INJECTION, SOLUTION INTRAVENOUS at 12:46

## 2019-10-07 RX ADMIN — FENTANYL CITRATE 25 MCG: 50 INJECTION INTRAMUSCULAR; INTRAVENOUS at 13:11

## 2019-10-07 RX ADMIN — FENTANYL CITRATE 50 MCG: 50 INJECTION INTRAMUSCULAR; INTRAVENOUS at 12:41

## 2019-10-07 RX ADMIN — ONDANSETRON 4 MG: 2 INJECTION INTRAMUSCULAR; INTRAVENOUS at 12:59

## 2019-10-07 RX ADMIN — ERTAPENEM SODIUM 1000 MG: 1 INJECTION, POWDER, LYOPHILIZED, FOR SOLUTION INTRAMUSCULAR; INTRAVENOUS at 12:39

## 2019-10-07 NOTE — TELEPHONE ENCOUNTER
Appointment made for lucia catheter change over wire with Dr Lizett Royal for 11/5/19 at 9:45am  When phone call transferred to his unit at [facility] care the phone just kept ringing  Please confirm this appointment

## 2019-10-07 NOTE — TELEPHONE ENCOUNTER
Status post botulinum toxin injection, 200 units, he will follow up with me in 4 weeks for Thorne catheter change over a wire

## 2019-10-07 NOTE — OP NOTE
OPERATIVE REPORT  PATIENT NAME: Ace Stoll [facility]    :  1950  MRN: [id number]  Pt [facility] RO[facility]    SURGERY DATE: 10/7/2019    Surgeon(s) and Role:     * Eliezer Millan MD - Primary    Preop Diagnosis:  Neurogenic bladder [N31 9]  Thorne catheter in place [Z96 0]    Post-Op Diagnosis Codes:     * Neurogenic bladder [N31 9]     * Thorne catheter in place [Z96 0]    Procedure(s) (LRB):  INJECTION BOTULINUM TOXIN (BOTOX), intradetrusor (N/A)    Specimen(s):  * No specimens in log *    Estimated Blood Loss:   Minimal    Drains:  Urethral Catheter Non-latex 20 Fr  (Active)   Number of days: 0       Anesthesia Type:   General    Operative Indications:  Neurogenic bladder [N31 9]  Thorne catheter in place [Z96 0]      Operative Findings:  Urethral stricture noted, able to pass with the cystoscope, high bladder neck, difficult to access the entire bladder but ultimately successful placement of 200 units of botulinum toxin  Blood loss was minimal   Thorne catheter was replaced over a wire  Complications:   None    Procedure and Technique:    PROCEDURES:  1) cystoscopy  2) injection of onabotulumtoxin A (botox), 200 units      SURGEON:   Eliezer Millan MD    ANESTHESIA TYPE:  LMA    ESTIMATED BLOOD LOSS:   Minimal    COMPLICATIONS:   None    ANTIBIOTICS:  Ertapenem    INTRAOPERATIVE THROMBOEMBOLISM PROPHYLAXIS:  Pneumatic compression stockings        INDICATIONS:  Paola De La Paz is a patient with medically refractory neurogenic bladder  After discussing the options they have elected to proceed to the operating room for cystoscopy with bladder Botox  We discussed the procedure in detail, the alternatives, the risks, and the expected postoperative course, and the patient provided informed consent and elected to proceed  The patient is aware of the risk of urinary retention and hyper continence and is prepared to perform clean intermittent catheterization if this is necessary      PROCEDURE SUMMARY:    The patient was brought to the operating room and anesthesia obtained  The patient was then placed in the lithotomy position and prepped and draped using standard sterile technique  All pressure points were carefully padded  A surgical time-out occurred, antibiotics were administered, and thromboembolism prophylaxis was given  A 21 [place] rigid cystoscope was introduced per urethra  Pan cystoscopy was performed  There were no abnormal lesions  The bladder neck was high, and tight, there was a urethral stricture, these are pre-existing conditions  Next the agent was diluted into a total of 10 mL of normal saline x2 according to standard procedures  These were injected according to a standard template and a supratrigonal location  The bladder was then inspected over multiple cycles of filling and emptying and hemostasis was excellent  The bladder was emptied and the scope removed  DISPOSITION:   PACU - hemodynamically stable  PLAN:  Lyndsay Hernandes will go home with a Thorne catheter as per his usual   He does not need any new medications  He will follow up in the office in 4 weeks for Thorne catheter change over a wire                 I was present for the entire procedure and A qualified resident physician was not available    Patient Disposition:  PACU     SIGNATURE: Komal Pelayo MD  DATE: October 7, 2019  TIME: 1:15 PM

## 2019-10-07 NOTE — DISCHARGE INSTRUCTIONS
Jeanette Myles,    Today you had cystoscopy with botulinum toxin injection, we are hopeful that this will make your bladder less painful and that you will have less bladder spasms  We replaced a Thorne catheter, you will have some blood in your urine  We will see you back in the office in 1 month to change her Thorne catheter over a wire  If you have questions or concerns in the postoperative time frame, please do not hesitate to contact my office  Take care and be well,  Dr Hernandez Byrd (By injection)   OnabotulinumtoxinA (vc-b-xyx-xr-YMK-hgt-tox-in-ay)  Treats muscle stiffness, muscle spasms, excessive sweating, overactive bladder, or loss of bladder control  Prevents chronic migraine headaches  Improves the appearance of wrinkles on the face  Brand Name(s): Botox, Botox Cosmetic   There may be other brand names for this medicine  When This Medicine Should Not Be Used: This medicine is not right for everyone  You should not receive this medicine if you had an allergic reaction to onabotulinumtoxinA or any other botulinum toxin product  How to Use This Medicine:   Injectable  · Your doctor will prescribe your exact dose and tell you how often it should be given  This medicine is given by a healthcare provider as a shot under your skin or into a muscle  · You may be given medicine to numb the area where the shot will be injected  If you receive the medicine around your eyes, you may be given eye drops or ointment to numb the area  After your injection, you may need to wear a protective contact lens or eye patch  · If you are being treated for excessive sweating, shave your underarms but do not use deodorant for 24 hours before your injection  Avoid exercise, hot foods or liquids, or anything else that could make you sweat for 30 minutes before your injection  · The recommended treatment schedule for chronic migraine is every 12 weeks  · This medicine works slowly   Once your condition has improved, the medicine will last about 3 months, then the effects will slowly go away  You might need more injections to treat your condition  ¨ Muscle spasms in the eyelids should improve within 3 to 10 days  ¨ Eye muscle problems should improve 1 or 2 days after the injection, and the improvement should last for 2 to 6 weeks  ¨ Neck pain should improve within 2 to 6 weeks  ¨ Arm stiffness should improve within 4 to 6 weeks  ¨ Facial lines or wrinkles should improve 1 or 2 days  · This medicine should come with a Medication Guide  Ask your pharmacist for a copy if you do not have one  · Missed dose:Call your doctor or pharmacist for instructions  Drugs and Foods to Avoid:   Ask your doctor or pharmacist before using any other medicine, including over-the-counter medicines, vitamins, and herbal products  · Some foods and medicine can affect how onabotulinumtoxinA works  Tell your doctor if you are using any of the following:  ¨ Aspirin or a blood thinner (such as ticlopidine, warfarin)  ¨ Muscle relaxer  ¨ Medicine for an infection (such as amikacin, gentamicin, streptomycin, tobramycin)  · Tell your doctor if you have received an injection of any botulinum toxin product within the past 4 months  Warnings While Using This Medicine:   · Tell your doctor if you are pregnant or breastfeeding, or if you have breathing or lung problems, bleeding problems, heart or blood vessel disease, or nerve or muscle problems (such as myasthenia gravis)  Tell your doctor if you have ever had face surgery or if you have a urinary tract infection or trouble urinating, diabetes, or multiple sclerosis  · This medicine may cause the following problems:  ¨ Muscle weakness, loss of bladder control, trouble swallowing, speaking, or breathing (caused by the toxin spreading to other parts of your body)  · This medicine may make your muscles weak or cause vision problems   Do not drive or do anything else that could be dangerous until you know how this medicine affects you  · There are some warnings that only apply if you are receiving this medicine to treat the following:   ¨ Injections near the eye: This medicine may reduce blinking, which can raise the risk of eye problems such as corneal exposure and ulcers  Tell your doctor right away if you notice that you are blinking less than usual or your eyes feel dry  ¨ Urinary incontinence: This medicine may cause autonomic dysreflexia, which can be a life-threatening condition  ¨ Overactive bladder: Check with your doctor right away if you have trouble urinating or a burning sensation while urinating  · This medicine contains products from donated human blood, so it may contain viruses, although the risk is low  Human donors and blood are always tested for viruses to keep the risk low  Talk with your doctor about this risk if you are concerned  · Your doctor will check your progress and the effects of this medicine at regular visits  Keep all appointments    Possible Side Effects While Using This Medicine:   Call your doctor right away if you notice any of these side effects:  · Allergic reaction: Itching or hives, swelling in your face or hands, swelling or tingling in your mouth or throat, chest tightness, trouble breathing  · Blurred or double vision, droopy eyelids  · Change in how much or how often you urinate, trouble urinating, or painful urination  · Chest pain, slow or uneven heartbeat  · Headache, increased sweating, warmth or redness in your face, neck, or arm  · Muscle weakness  · Trouble swallowing, talking, or breathing  If you notice these less serious side effects, talk with your doctor:   · Fever, chills, cough, stuffy or runny nose, sore throat, and body aches  · Pain in your neck, back, arms, or legs  · Redness, pain, tenderness, bruising, swelling, or weakness where the shot was given  If you notice other side effects that you think are caused by this medicine, tell your doctor  Call your doctor for medical advice about side effects  You may report side effects to FDA at 5-970-FDA-3722  © 2017 [employer] Information is for End User's use only and may not be sold, redistributed or otherwise used for commercial purposes  The above information is an  only  It is not intended as medical advice for individual conditions or treatments  Talk to your doctor, nurse or pharmacist before following any medical regimen to see if it is safe and effective for you

## 2019-10-07 NOTE — H&P
The patient was seen and examined  There are no changes from the prior outpatient history and physical examination  The patient is appropriately prepared for surgery today as planned      BP (!) 172/95   Pulse 72   Temp 97 9 °F (36 6 °C) (Temporal)   Resp 18   Ht 5' 11" (1 803 m)   Wt 95 7 kg (211 lb)   SpO2 94%   BMI 29 43 kg/m²       Marking is not required

## 2019-10-08 NOTE — TELEPHONE ENCOUNTER
I spoke to [facility] and confirmed apt

## 2019-11-04 NOTE — PROGRESS NOTES
Problem List Items Addressed This Visit        Nervous and Auditory    Dementia with behavioral disturbance ([facility] [address] ) - Primary       Genitourinary    Retention of urine (Chronic)       Other    Neurogenic bladder            Discussion:  Gray Vanegas is doing very well, he has been somewhat verbal recently which is a nice change for him, no issues with Thorne catheter clogging, they have been performing Renacidin irrigations, in review of his inpatient medications, he is on tamsulosin, I have asked that they please stop this as it does not make sense given that he has a Thorne catheter in place  He will see us back in 4 weeks for a Thorne catheter change over wire  Assessment and plan:       Please see problem oriented charting for the assessment plan of today's urological complaints    Harry Solano MD      Chief Complaint     Chief Complaint   Patient presents with    Urinary Retention         History of Present Illness     Gudelia Trent is a 68 y o  gentleman with a storied past medical history, I have known him for quite some time now he undergoes catheter changes over a wire every 4 weeks or so, he has also been undergoing injection of botulinum toxin for neurogenic bladder is well  Since his last visit in the operating room for botulinum toxin injection he has done much better in terms of his bladder discomfort and spasms  In terms of issues with his Thorne catheter he denies these  Status post Thorne catheter change today      The following portions of the patient's history were reviewed and updated as appropriate: allergies, current medications, past family history, past medical history, past social history, past surgical history and problem list     Detailed Urologic History     - please refer to HPI    Review of Systems     Review of Systems   Reason unable to perform ROS: Unable to perform, patient is almost nonverbal              Allergies     Allergies   Allergen Reactions    Gentamycin [Gentamicin] Anaphylaxis    Vancomycin Anaphylaxis    Zosyn [Piperacillin Sod-Tazobactam So] Anaphylaxis     However, has tolerated Ertapenem, Cefdinir, and Cefepime, which all have different side chains  Avoid Penicillins and the following Cephs with similar side chains (Cephalexin, Cefadroxil, Cefaclor, Cefprozil, or  Cefoxitin)    Clindamycin Itching    Nsaids Other (See Comments)     Nephrotic Syndrome    Sulfa Antibiotics Rash    Other Rash     antipersperents  Stat lock from lucia catheter       Physical Exam     Physical Exam   Constitutional: He appears well-developed and well-nourished  No distress  HENT:   Head: Normocephalic and atraumatic  Eyes: Right eye exhibits no discharge  Left eye exhibits no discharge  Neck: No tracheal deviation present  Cardiovascular: Intact distal pulses  Pulmonary/Chest: Effort normal  No stridor  No respiratory distress  Abdominal: Soft  He exhibits no distension  There is no tenderness  Musculoskeletal: He exhibits no edema  Neurological: He displays abnormal reflex  A sensory deficit is present  No cranial nerve deficit  He exhibits abnormal muscle tone  Coordination abnormal    Skin: Skin is warm and dry  He is not diaphoretic  Nursing note and vitals reviewed            Vital Signs  Vitals:    11/05/19 0951   BP: 138/74   BP Location: Left arm   Patient Position: Sitting   Cuff Size: Standard   Pulse: 68   Height: 5' 11" (1 803 m)         Current Medications       Current Outpatient Medications:     acetaminophen (TYLENOL) 325 mg tablet, Take 650 mg by mouth every 4 (four) hours as needed for mild pain , Disp: , Rfl:     amLODIPine (NORVASC) 5 mg tablet, Take 5 mg by mouth 2 (two) times a day , Disp: , Rfl:     apixaban (ELIQUIS) 2 5 mg, Take 2 5 mg by mouth 2 (two) times a day, Disp: , Rfl:     busPIRone (BUSPAR) 7 5 mg tablet, Take 7 5 mg by mouth 3 (three) times a day, Disp: , Rfl:     Citric Hn-Mttlmhzgkxn-Zw Carb (RENACIDIN) SOLN, Irrigate with as directed 2 (two) times a day Use 1 application via irrigation every evening and night shift for lucia irrigation instill and clamp for 15 minutes, Disp: , Rfl:     docusate sodium (COLACE) 100 mg capsule, Take 100 mg by mouth 2 (two) times a day, Disp: , Rfl:     famotidine (PEPCID) 20 mg tablet, Take 20 mg by mouth 2 (two) times a day, Disp: , Rfl:     metoprolol tartrate (LOPRESSOR) 25 mg tablet, Take 0 5 tablets (12 5 mg total) by mouth 2 (two) times a day, Disp: 60 tablet, Rfl: 0    NYSTATIN powder, Apply topically 2 (two) times a day  , Disp: , Rfl:     polyethylene glycol (MIRALAX) 17 g packet, Take 17 g by mouth daily, Disp: 14 each, Rfl: 0    potassium chloride (K-DUR,KLOR-CON) 10 mEq tablet, Take 1 tablet (10 mEq total) by mouth 2 (two) times a day (Patient taking differently: Take 10 mEq by mouth 3 (three) times a day ), Disp: 60 tablet, Rfl: 0    QUEtiapine (SEROquel) 25 mg tablet, Take 25 mg by mouth daily at bedtime  , Disp: , Rfl:     memantine (NAMENDA) 5 mg tablet, Take 1 tablet by mouth 2 (two) times a day for 30 days (Patient taking differently: Take 5 mg by mouth 2 (two) times a day ), Disp: 60 tablet, Rfl: 0    mirtazapine (REMERON) 15 mg tablet, Take 1 tablet by mouth daily at bedtime for 30 days, Disp: 30 tablet, Rfl: 0    tamsulosin (FLOMAX) 0 4 mg, Take 1 capsule by mouth daily with dinner for 30 days, Disp: 30 capsule, Rfl: 0      Active Problems     Patient Active Problem List   Diagnosis    Aphasia    COPD (chronic obstructive pulmonary disease) (HCC)    History of DVT (deep vein thrombosis)    Aneurysm of thoracic aorta (HCC)    Retention of urine    Essential hypertension    Aphasia of unknown origin    CKD (chronic kidney disease)    GERD (gastroesophageal reflux disease)    HTN, goal below 140/90    Generalized anxiety disorder    H/O blood clots    Anemia    Dementia with behavioral disturbance (HCC)    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HCC)    [facility]    H/O urethral stricture    CKD (chronic kidney disease) stage 2, GFR 60-89 ml/min    Neurogenic bladder    Nephrolithiasis    Thorne catheter in place         Past Medical History     Past Medical History:   Diagnosis Date    Anxiety     Aortic aneurysm (HCC)     Aphasia     Ataxia     Bladder adhesions     BPH (benign prostatic hypertrophy)     COPD [facility]chronic obstructive pulmonary disease) [facility] [address] )     wife denies - "never had"    Dementia ([facility] )     Difficulty walking     Essential (primary) hypertension     Generalized anxiety disorder     GERD (gastroesophageal reflux disease)     Global aphasia     H/O blood clots     High blood pressure     History of kidney stones     Kidney stones     Mental disorder     Muscle weakness     Neuromuscular dysfunction of bladder     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 ([facility] )     Retention of urine, unspecified     Stroke ([place] [address] )     Thrombosis     Urinary retention     Urinary tract bacterial infections     Urinary tract infection          Surgical History     Past Surgical History:   Procedure Laterality Date    BOTOX INJECTION N/A 6/18/2019    Procedure: INJECTION BOTULINUM TOXIN (BOTOX), intra detrusor;  Surgeon: Adan Medina MD;  [facility];  Service: Urology    BOTOX INJECTION N/A 10/7/2019    Procedure: INJECTION BOTULINUM TOXIN (BOTOX), intradetrusor;  Surgeon: Adan Medina MD;  [facility];  Service: Urology    CYSTOSCOPY      CYSTOSCOPY N/A 6/18/2019    Procedure: cystoscopy and all indicated procedures;  Surgeon: Adan Medina MD;  [facility];  Service: Urology    FL CYSTOGRAM  1/15/2019    IVC FILTER INSERTION      X2    IVC FILTER INSERTION      x2    KIDNEY STONE SURGERY      KNEE SURGERY      Sepsis infection     NEPHROSTOMY      NV CYSTO/URETERO W/LITHOTRIPSY &INDWELL STENT INSRT Right 6/14/2017    Procedure: CYSTOSCOPY URETEROSCOPY WITH LITHOTRIPSY HOLMIUM LASER,,BASKET STONE EXTRACTION, RETROGRADE PYELOGRAM AND INSERTION STENT URETERAL;  Surgeon: Mira Brandon MD;  [facility];  Service: Urology    ND CYSTOURETHROSCOPY,BIOPSY N/A 1/15/2019    Procedure: Vahid Hoop, CYSTOGRAM, DILATION OF URETHRAL STRICTURE;  Surgeon: Hudson Hickey MD;  [facility];  Service: Urology    URINARY SURGERY           Family History     Family History   Problem Relation Age of Onset    Diabetes Father     Hypertension Father     Coronary artery disease Father     Cancer Father     Hypertension Mother          Social History     Social History     Social History     Tobacco Use   Smoking Status Never Smoker   Smokeless Tobacco Never Used         Pertinent Lab Values     Lab Results   Component Value Date    CREATININE 1 37 09/27/2019       No results found for: PSA          Pertinent Imaging      No imaging for my review

## 2019-11-04 NOTE — PATIENT INSTRUCTIONS
Bladder Management Program After Spinal Cord Injury   WHAT YOU NEED TO KNOW:   What do I need to know about a bladder management program?  After a spinal cord injury, you may have trouble controlling urine or emptying your bladder  A bladder management program helps you control when and how you empty your bladder  It will also help prevent bladder and kidney infections  A bladder management program includes medicines, devices to empty your bladder, and scheduled bladder care  Why is a bladder management program important? An overfilled bladder can cause autonomic dysreflexia (AD)  AD is a medical emergency that causes very high blood pressure  AD is more likely to happen if you have a spinal cord injury at or above T7 or T8  What do I need to know about medicines and bladder management? Medicines can help prevent leakage of urine and help you control urination  Medicines can also help prevent bladder spasms  What devices can help me empty my bladder? There are several devices you can use to empty your bladder  Healthcare providers will help you find the device that is best for you  You may need someone to help you use any of the following:  · Intermittent self-catheterization  means you will place a tube into your bladder several times each day to drain your urine  Your healthcare provider will teach you how to catheterize your bladder  · A Thorne catheter  is a tube that stays in your bladder and drains your urine  The catheter is placed through your urethra and into your bladder  The catheter drains your urine into a bag  · A suprapubic catheter  is a tube that drains your urine through a surgically made stoma (created opening) in your abdomen  The opening ends in your bladder  The catheter drains urine into a bag  · An external condom catheter  is put over a man's penis  The tip of the condom catheter is connected to a tube  The tube drains urine into a bag    How often should I empty my bladder? Your healthcare provider will help you develop a bladder schedule  Your bladder schedule may depend on which devices you use to empty your bladder  It is important to urinate often to decrease your risk for AD, infection, and accidents  What else should I do while on a bladder management program?   · Drink liquids as directed  Ask your healthcare provider how much liquid to drink each day and which liquids are best for you  Do not drink coffee, tea, or alcohol  These liquids may cause dehydration or make it difficult to control your urine  · Monitor your intake and output as directed  Write down how much liquid you drink each day and how much you urinate  This will help your healthcare provider know if your program is working  · Prevent skin breakdown from urine leakage  If you leak urine or have accidents, your skin is at risk for breakdown and infection  Use pads, guards, or drip collectors as directed to help protect your skin  You can also use creams or ointments on your skin to decrease irritation  Ask your healthcare provider where to buy these products  When should I seek immediate care? You have any of the following symptoms of autonomic dysreflexia:  · You have sudden changes in your vision  · Your heart is beating faster than usual and you suddenly feel anxious  · Your skin above the spinal cord injury is red and sweaty  · You have a severe headache  · You stop urinating  When should I contact my healthcare provider? · You have a fever or chills  · You have blood in your urine  · You are urinating less than usual     · You have pain in your back  · You have questions or concerns about your condition or care  CARE AGREEMENT:   You have the right to help plan your care  Learn about your health condition and how it may be treated  Discuss treatment options with your caregivers to decide what care you want to receive   You always have the right to refuse treatment  The above information is an  only  It is not intended as medical advice for individual conditions or treatments  Talk to your doctor, nurse or pharmacist before following any medical regimen to see if it is safe and effective for you  © 2017 [employer] Information is for End User's use only and may not be sold, redistributed or otherwise used for commercial purposes  All illustrations and images included in CareNotes® are the copyrighted property of A D A M [employer] Inc  or Mika Mcclain

## 2019-11-05 ENCOUNTER — PROCEDURE VISIT (OUTPATIENT)
Dept: UROLOGY | Facility: CLINIC | Age: 69
End: 2019-11-05
Payer: COMMERCIAL

## 2019-11-05 VITALS
BODY MASS INDEX: 29.43 KG/M2 | DIASTOLIC BLOOD PRESSURE: 74 MMHG | HEART RATE: 68 BPM | SYSTOLIC BLOOD PRESSURE: 138 MMHG | HEIGHT: 71 IN

## 2019-11-05 DIAGNOSIS — R33.9 RETENTION OF URINE: Chronic | ICD-10-CM

## 2019-11-05 DIAGNOSIS — N31.9 NEUROGENIC BLADDER: ICD-10-CM

## 2019-11-05 DIAGNOSIS — F03.91 DEMENTIA WITH BEHAVIORAL DISTURBANCE, UNSPECIFIED DEMENTIA TYPE (HCC): Primary | ICD-10-CM

## 2019-11-05 PROCEDURE — 99213 OFFICE O/P EST LOW 20 MIN: CPT | Performed by: UROLOGY

## 2019-11-05 PROCEDURE — 51702 INSERT TEMP BLADDER CATH: CPT | Performed by: UROLOGY

## 2019-11-05 NOTE — PROGRESS NOTES
Bladder catheterization  Date/Time: 11/5/2019 10:11 AM  Performed by: Komal Pelayo MD  Authorized by: Komal Pelayo MD     Patient location:  Bedside  Consent:     Consent obtained:  Verbal    Consent given by:  Spouse    Risks discussed:  False passage, incomplete procedure, pain, infection and urethral injury    Alternatives discussed:  No treatment, delayed treatment, alternative treatment and observation  Universal protocol:     Procedure explained and questions answered to patient or proxy's satisfaction: yes      Relevant documents present and verified: yes      Test results available and properly labeled: yes      Radiology Images displayed and confirmed  If images not available, report reviewed: yes      Required blood products, implants, devices and special equipment available: yes      Site/side marked: no      Immediately prior to procedure a time out was called: yes      Patient identity confirmed:  Verbally with patient  Pre-procedure details:     Procedure purpose:  Therapeutic    Preparation: Patient was prepped and draped in usual sterile fashion    Procedure details:     Bladder irrigation: yes      Catheter insertion:  Indwelling    Catheter type: Thorne    Catheter size:  18 Fr    Number of attempts:  1    Successful placement: yes      Urine characteristics:  Blood-tinged    Provider performed due to: Altered anatomy and complicated insertion    Altered anatomy:  High bladder neck and urethral stricture  Post-procedure details:     Patient tolerance of procedure:   Tolerated well, no immediate complications

## 2019-11-07 ENCOUNTER — DOCUMENTATION (OUTPATIENT)
Dept: NEPHROLOGY | Facility: CLINIC | Age: 69
End: 2019-11-07

## 2019-11-07 LAB
CO2 SERPL-SCNC: 27 MMOL/L (ref 21–32)
EXT GLUCOSE BLD: 66
EXTERNAL ANION GAP: 9
EXTERNAL BUN: 15
EXTERNAL CALCIUM: NORMAL
EXTERNAL CHLORIDE: 110
EXTERNAL CREATININE: 1.37
EXTERNAL EGFR: 52
EXTERNAL POTASSIUM: 3.9
EXTERNAL SODIUM: 146
HCT VFR BLD AUTO: 43.7 % (ref 36.5–49.3)
HGB BLD-MCNC: 14.7 G/DL (ref 12–17)
PLATELET # BLD AUTO: 252 THOUSANDS/UL (ref 149–390)
WBC # BLD AUTO: 6.9 THOUSAND/UL

## 2019-11-20 ENCOUNTER — TELEPHONE (OUTPATIENT)
Dept: UROLOGY | Facility: MEDICAL CENTER | Age: 69
End: 2019-11-20

## 2019-11-20 NOTE — TELEPHONE ENCOUNTER
Called and left message for patients wife to call the office back to further discuss her concerns with clinical staff  Office number was left in the message

## 2019-11-20 NOTE — TELEPHONE ENCOUNTER
Pt managed by Karen Heard - wife calling concerned that pt's urine dark in color reddish brown,she spoke with [facility] regarding matter but nothing has been Steffi Romo has numerous questions to why this happening,they have been doing saline flushes but reoccurs

## 2019-11-21 NOTE — TELEPHONE ENCOUNTER
Patient's wife Igor Pope is stating that since his last lucia change over wire and Tamsulosin being discontinued, Rose Mary Ferguson has had increased pain and he can't state where the pain is located  Wife wants to know what is causing this and if there is a stone somewhere  The nursing home is only doing saline flushes and not really helping him  Please advise and contact patients wife back at [phone], or speak to Skyla or Tirso Hopkins at [facility] with instructions to help Rose Mary Ferguson going forward  Wife is very concerned because he has never acted this way before and wants something done

## 2019-11-21 NOTE — TELEPHONE ENCOUNTER
Could be an infection  Would recommend plugging the catheter for 30 minutes and then collecting a specimen directly from the bladder

## 2019-11-21 NOTE — TELEPHONE ENCOUNTER
Patient's wife, Erma Bullard, called in regard to patient's care with no resolution   Urine is still dark orange despite the continued flushes  Due to patient's inability to communicate, she would like to know why he is still having pain  She can be reached at [phone]  Please call to discuss    Thank you

## 2019-11-21 NOTE — TELEPHONE ENCOUNTER
Called and spoke with wife, at this time would recommend checking UA C&S to rule out infection  She asked me to call [facility] for these orders  Called and spoke with Shaun at [facility]  Gave orders for UA C&S, directly from catheter not drainage bag  Reviewed that they could clamp below sampling port and get sample from port or plug the catheter for 30 min to an hour  She verbalized understanding and repeated back orders appropriately  They send samples to [facility]  Will need to watch for results

## 2019-11-22 NOTE — TELEPHONE ENCOUNTER
Awaiting culture results as recommended by Dr Prince  [facility] and made them aware that Ivet Gandhi recommends waiting until we have urine culture results in before we treat anything  They verbalized understanding and will fax over these results when they are in  Please review urine culture results once they are in  Thank you

## 2019-11-22 NOTE — TELEPHONE ENCOUNTER
We need culture results prior to prescribing and antibiotics, he has a history of some resistance previously, he also gets some pus within the bladder and this also likely represents some colonization    Would recommend that they treat in a culture specific fashion when these results are available

## 2019-11-25 ENCOUNTER — HOSPITAL ENCOUNTER (OUTPATIENT)
Facility: HOSPITAL | Age: 69
Setting detail: OBSERVATION
Discharge: NURSING, REHAB OR LONG-TERM CARE | End: 2019-11-27
Attending: EMERGENCY MEDICINE | Admitting: FAMILY MEDICINE
Payer: COMMERCIAL

## 2019-11-25 ENCOUNTER — TELEPHONE (OUTPATIENT)
Dept: UROLOGY | Facility: AMBULATORY SURGERY CENTER | Age: 69
End: 2019-11-25

## 2019-11-25 DIAGNOSIS — R33.9 RETENTION OF URINE: Chronic | ICD-10-CM

## 2019-11-25 DIAGNOSIS — R33.8 ACUTE URINARY RETENTION: Primary | ICD-10-CM

## 2019-11-25 DIAGNOSIS — E87.6 HYPOKALEMIA: ICD-10-CM

## 2019-11-25 DIAGNOSIS — Z86.718 H/O BLOOD CLOTS: ICD-10-CM

## 2019-11-25 DIAGNOSIS — T83.9XXA FOLEY CATHETER PROBLEM, INITIAL ENCOUNTER (HCC): ICD-10-CM

## 2019-11-25 DIAGNOSIS — N31.9 NEUROGENIC BLADDER: ICD-10-CM

## 2019-11-25 DIAGNOSIS — Z93.59 SUPRAPUBIC CATHETER (HCC): ICD-10-CM

## 2019-11-25 PROBLEM — T83.018A URINARY CATHETER DYSFUNCTION (HCC): Status: ACTIVE | Noted: 2018-11-29

## 2019-11-25 LAB
ANION GAP SERPL CALCULATED.3IONS-SCNC: 8 MMOL/L (ref 4–13)
BASOPHILS # BLD AUTO: 0.05 THOUSANDS/ΜL (ref 0–0.1)
BASOPHILS NFR BLD AUTO: 1 % (ref 0–1)
BUN SERPL-MCNC: 20 MG/DL (ref 5–25)
CALCIUM SERPL-MCNC: 8.4 MG/DL (ref 8.3–10.1)
CHLORIDE SERPL-SCNC: 110 MMOL/L (ref 100–108)
CO2 SERPL-SCNC: 29 MMOL/L (ref 21–32)
CREAT SERPL-MCNC: 1.45 MG/DL (ref 0.6–1.3)
EOSINOPHIL # BLD AUTO: 0.45 THOUSAND/ΜL (ref 0–0.61)
EOSINOPHIL NFR BLD AUTO: 5 % (ref 0–6)
ERYTHROCYTE [DISTWIDTH] IN BLOOD BY AUTOMATED COUNT: 14.1 % (ref 11.6–15.1)
GFR SERPL CREATININE-BSD FRML MDRD: 48 ML/MIN/1.73SQ M
GLUCOSE SERPL-MCNC: 110 MG/DL (ref 65–140)
HCT VFR BLD AUTO: 48.7 % (ref 36.5–49.3)
HGB BLD-MCNC: 16.1 G/DL (ref 12–17)
IMM GRANULOCYTES # BLD AUTO: 0.03 THOUSAND/UL (ref 0–0.2)
IMM GRANULOCYTES NFR BLD AUTO: 0 % (ref 0–2)
LYMPHOCYTES # BLD AUTO: 1.57 THOUSANDS/ΜL (ref 0.6–4.47)
LYMPHOCYTES NFR BLD AUTO: 18 % (ref 14–44)
MCH RBC QN AUTO: 29.9 PG (ref 26.8–34.3)
MCHC RBC AUTO-ENTMCNC: 33.1 G/DL (ref 31.4–37.4)
MCV RBC AUTO: 91 FL (ref 82–98)
MONOCYTES # BLD AUTO: 0.51 THOUSAND/ΜL (ref 0.17–1.22)
MONOCYTES NFR BLD AUTO: 6 % (ref 4–12)
NEUTROPHILS # BLD AUTO: 6.02 THOUSANDS/ΜL (ref 1.85–7.62)
NEUTS SEG NFR BLD AUTO: 70 % (ref 43–75)
NRBC BLD AUTO-RTO: 0 /100 WBCS
PLATELET # BLD AUTO: 275 THOUSANDS/UL (ref 149–390)
PMV BLD AUTO: 8.9 FL (ref 8.9–12.7)
POTASSIUM SERPL-SCNC: 3.8 MMOL/L (ref 3.5–5.3)
RBC # BLD AUTO: 5.38 MILLION/UL (ref 3.88–5.62)
SODIUM SERPL-SCNC: 147 MMOL/L (ref 136–145)
WBC # BLD AUTO: 8.63 THOUSAND/UL (ref 4.31–10.16)

## 2019-11-25 PROCEDURE — 99285 EMERGENCY DEPT VISIT HI MDM: CPT | Performed by: EMERGENCY MEDICINE

## 2019-11-25 PROCEDURE — NC001 PR NO CHARGE: Performed by: NURSE PRACTITIONER

## 2019-11-25 PROCEDURE — 99284 EMERGENCY DEPT VISIT MOD MDM: CPT

## 2019-11-25 PROCEDURE — 99220 PR INITIAL OBSERVATION CARE/DAY 70 MINUTES: CPT | Performed by: FAMILY MEDICINE

## 2019-11-25 PROCEDURE — 99255 IP/OBS CONSLTJ NEW/EST HI 80: CPT | Performed by: NURSE PRACTITIONER

## 2019-11-25 PROCEDURE — 80048 BASIC METABOLIC PNL TOTAL CA: CPT | Performed by: EMERGENCY MEDICINE

## 2019-11-25 PROCEDURE — 85025 COMPLETE CBC W/AUTO DIFF WBC: CPT | Performed by: EMERGENCY MEDICINE

## 2019-11-25 PROCEDURE — 36415 COLL VENOUS BLD VENIPUNCTURE: CPT | Performed by: EMERGENCY MEDICINE

## 2019-11-25 RX ORDER — ACETAMINOPHEN 325 MG/1
650 TABLET ORAL EVERY 6 HOURS PRN
Status: DISCONTINUED | OUTPATIENT
Start: 2019-11-25 | End: 2019-11-27 | Stop reason: HOSPADM

## 2019-11-25 RX ORDER — ACETAMINOPHEN 325 MG/1
650 TABLET ORAL EVERY 8 HOURS PRN
Status: DISCONTINUED | OUTPATIENT
Start: 2019-11-25 | End: 2019-11-27 | Stop reason: HOSPADM

## 2019-11-25 RX ORDER — POTASSIUM CHLORIDE 750 MG/1
10 TABLET, EXTENDED RELEASE ORAL 2 TIMES DAILY
Status: DISCONTINUED | OUTPATIENT
Start: 2019-11-25 | End: 2019-11-27

## 2019-11-25 RX ORDER — ONDANSETRON 2 MG/ML
4 INJECTION INTRAMUSCULAR; INTRAVENOUS EVERY 6 HOURS PRN
Status: DISCONTINUED | OUTPATIENT
Start: 2019-11-25 | End: 2019-11-27 | Stop reason: HOSPADM

## 2019-11-25 RX ORDER — NYSTATIN 100000 [USP'U]/G
POWDER TOPICAL 2 TIMES DAILY
Status: DISCONTINUED | OUTPATIENT
Start: 2019-11-25 | End: 2019-11-27 | Stop reason: HOSPADM

## 2019-11-25 RX ORDER — FAMOTIDINE 20 MG/1
20 TABLET, FILM COATED ORAL DAILY
Status: DISCONTINUED | OUTPATIENT
Start: 2019-11-26 | End: 2019-11-27 | Stop reason: HOSPADM

## 2019-11-25 RX ORDER — ACETAMINOPHEN 325 MG/1
975 TABLET ORAL EVERY 8 HOURS PRN
Status: DISCONTINUED | OUTPATIENT
Start: 2019-11-25 | End: 2019-11-25

## 2019-11-25 RX ORDER — POLYETHYLENE GLYCOL 3350 17 G/17G
17 POWDER, FOR SOLUTION ORAL DAILY
Status: DISCONTINUED | OUTPATIENT
Start: 2019-11-26 | End: 2019-11-27 | Stop reason: HOSPADM

## 2019-11-25 RX ORDER — OXYCODONE HYDROCHLORIDE 5 MG/1
5 TABLET ORAL EVERY 6 HOURS PRN
Status: DISCONTINUED | OUTPATIENT
Start: 2019-11-25 | End: 2019-11-27 | Stop reason: HOSPADM

## 2019-11-25 RX ORDER — AMLODIPINE BESYLATE 5 MG/1
5 TABLET ORAL 2 TIMES DAILY
Status: DISCONTINUED | OUTPATIENT
Start: 2019-11-25 | End: 2019-11-27 | Stop reason: HOSPADM

## 2019-11-25 RX ORDER — QUETIAPINE FUMARATE 25 MG/1
25 TABLET, FILM COATED ORAL
Status: DISCONTINUED | OUTPATIENT
Start: 2019-11-25 | End: 2019-11-27 | Stop reason: HOSPADM

## 2019-11-25 RX ORDER — DOCUSATE SODIUM 100 MG/1
100 CAPSULE, LIQUID FILLED ORAL 2 TIMES DAILY
Status: DISCONTINUED | OUTPATIENT
Start: 2019-11-25 | End: 2019-11-27 | Stop reason: HOSPADM

## 2019-11-25 RX ORDER — HYDROMORPHONE HCL/PF 1 MG/ML
0.5 SYRINGE (ML) INJECTION EVERY 4 HOURS PRN
Status: DISCONTINUED | OUTPATIENT
Start: 2019-11-25 | End: 2019-11-27 | Stop reason: HOSPADM

## 2019-11-25 RX ORDER — BUSPIRONE HYDROCHLORIDE 5 MG/1
7.5 TABLET ORAL 3 TIMES DAILY
Status: DISCONTINUED | OUTPATIENT
Start: 2019-11-25 | End: 2019-11-27 | Stop reason: HOSPADM

## 2019-11-25 RX ADMIN — POTASSIUM CHLORIDE 10 MEQ: 10 TABLET, EXTENDED RELEASE ORAL at 18:40

## 2019-11-25 RX ADMIN — QUETIAPINE FUMARATE 25 MG: 25 TABLET ORAL at 21:23

## 2019-11-25 RX ADMIN — NYSTATIN 1 APPLICATION: 100000 POWDER TOPICAL at 21:24

## 2019-11-25 RX ADMIN — METOPROLOL TARTRATE 12.5 MG: 25 TABLET ORAL at 18:40

## 2019-11-25 RX ADMIN — BUSPIRONE HYDROCHLORIDE 7.5 MG: 5 TABLET ORAL at 21:23

## 2019-11-25 RX ADMIN — OXYCODONE HYDROCHLORIDE 5 MG: 5 TABLET ORAL at 18:45

## 2019-11-25 RX ADMIN — AMLODIPINE BESYLATE 5 MG: 5 TABLET ORAL at 18:40

## 2019-11-25 NOTE — TELEPHONE ENCOUNTER
The lab did no run sensitivities as there are multiple strains growing and they reported this as "colonization", can we please call them and have him obtain sensitivities?

## 2019-11-25 NOTE — ASSESSMENT & PLAN NOTE
· Bladder scans  Chronic Thorne was removed earlier today and remains out  · Hold fluids to prevent exacerbation  · Wife concerned that patient will retain excessive urine overnight and damage his bladder  She reports this has happened in the past   · Would need to contact Urology on-call overnight should retention become too severe  · Neurology to see in a m  For either fully reimplantation with guidewire were suprapubic catheter  Of note, wife adamantly opposed to suprapubic catheter she believes patient will yank it out but he tolerates Thorne without issue

## 2019-11-25 NOTE — TELEPHONE ENCOUNTER
[facility]  Informed her we do not have the provider capability in the office today, I could look in other offices or they can report to the ER  As he has transportation from [facility] they will just head to [facility] ER  Patient's wife is currently on her way to [facility] now  Will route to on call providers

## 2019-11-25 NOTE — TELEPHONE ENCOUNTER
Patient is in [facility] now and his lucia Catheter fell out  She stated it needs to be put back in today with guide wire  He needs to go to office and have it done because hospital does not do it with wire  Please advise [phone] [address]  Please advise

## 2019-11-25 NOTE — ED PROVIDER NOTES
History  Chief Complaint   Patient presents with    Urinary Catheter Problem     Pt sent for eval by urology for catherter re-insertion per EMS  61-year-old male, history of dementia, nonverbal, presents after his Lucia catheter was accidentally removed  Patient has a complicated Lucia catheter patient  He has had multiple false passages in the past, his Lucia catheter changes need to be done in the Urology office over a guidewire  Patient no distress no modifying or alleviating factors, unable to fully characterize pain or discomfort due to being nonverbal      History provided by:  Patient and spouse  History limited by:  Dementia      Prior to Admission Medications   Prescriptions Last Dose Informant Patient Reported? Taking?    Citric Cb-Hxgftnysxmt-Vt Carb (RENACIDIN) SOLN  Outside Facility (Specify) Yes No   Sig: Irrigate with as directed 2 (two) times a day Use 1 application via irrigation every evening and night shift for lucia irrigation instill and clamp for 15 minutes   NYSTATIN powder  Outside Facility (Specify) Yes No   Sig: Apply topically 2 (two) times a day     QUEtiapine (SEROquel) 25 mg tablet  Outside Facility (Specify) Yes No   Sig: Take 25 mg by mouth daily at bedtime     acetaminophen (TYLENOL) 325 mg tablet  Outside Facility (Specify) Yes No   Sig: Take 650 mg by mouth every 4 (four) hours as needed for mild pain    amLODIPine (NORVASC) 5 mg tablet  Outside Facility (Specify) Yes No   Sig: Take 5 mg by mouth 2 (two) times a day    apixaban (ELIQUIS) 2 5 mg  Outside Facility (Specify) Yes No   Sig: Take 2 5 mg by mouth 2 (two) times a day   busPIRone (BUSPAR) 7 5 mg tablet  Outside Facility (Specify) Yes No   Sig: Take 7 5 mg by mouth 3 (three) times a day   docusate sodium (COLACE) 100 mg capsule  Outside Facility (Specify) Yes No   Sig: Take 100 mg by mouth 2 (two) times a day   famotidine (PEPCID) 20 mg tablet  Outside Facility (Specify) Yes No   Sig: Take 20 mg by mouth 2 (two) times a day   memantine (NAMENDA) 5 mg tablet  Outside Facility (Specify) No No   Sig: Take 1 tablet by mouth 2 (two) times a day for 30 days   Patient taking differently: Take 5 mg by mouth 2 (two) times a day    metoprolol tartrate (LOPRESSOR) 25 mg tablet  Outside Facility (Specify) No No   Sig: Take 0 5 tablets (12 5 mg total) by mouth 2 (two) times a day   mirtazapine (REMERON) 15 mg tablet  Outside Facility (Specify) No No   Sig: Take 1 tablet by mouth daily at bedtime for 30 days   polyethylene glycol (MIRALAX) 17 g packet  Outside Facility (Specify) No No   Sig: Take 17 g by mouth daily   potassium chloride (K-DUR,KLOR-CON) 10 mEq tablet  Outside Facility (Specify) No No   Sig: Take 1 tablet (10 mEq total) by mouth 2 (two) times a day   Patient taking differently: Take 10 mEq by mouth 3 (three) times a day    tamsulosin (FLOMAX) 0 4 mg  Outside Facility (Specify) No No   Sig: Take 1 capsule by mouth daily with dinner for 30 days      Facility-Administered Medications: None       Past Medical History:   Diagnosis Date    Anxiety     Aortic aneurysm (HCC)     Aphasia     Ataxia     Bladder adhesions     BPH (benign prostatic hypertrophy)     [facility])     wife denies - "never had"    Dementia ([facility] )     Difficulty walking     Essential (primary) hypertension     Generalized anxiety disorder     GERD (gastroesophageal reflux disease)     Global aphasia     H/O blood clots     High blood pressure     History of kidney stones     Kidney stones     Mental disorder     Muscle weakness     Neuromuscular dysfunction of bladder     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 ([facility] )     Retention of urine, unspecified     Stroke ([facility]     Thrombosis     Urinary retention     Urinary tract bacterial infections     Urinary tract infection        Past Surgical History:   Procedure Laterality Date    BOTOX INJECTION N/A 6/18/2019    Procedure: INJECTION BOTULINUM TOXIN (BOTOX), intra detrusor;  Surgeon: Lamin Freedman MD;  [facility];  Service: Urology    BOTOX INJECTION N/A 10/7/2019    Procedure: INJECTION BOTULINUM TOXIN (BOTOX), intradetrusor;  Surgeon: Lamin Freedman MD;  [facility];  Service: Urology    CYSTOSCOPY      CYSTOSCOPY N/A 6/18/2019    Procedure: cystoscopy and all indicated procedures;  Surgeon: Lamin Freedman MD;  [facility];  Service: Urology    FL CYSTOGRAM  1/15/2019    IVC FILTER INSERTION      X2    IVC FILTER INSERTION      x2    KIDNEY STONE SURGERY      KNEE SURGERY      Sepsis infection     NEPHROSTOMY      WA CYSTO/URETERO W/LITHOTRIPSY &INDWELL STENT INSRT Right 6/14/2017    Procedure: CYSTOSCOPY URETEROSCOPY WITH LITHOTRIPSY HOLMIUM LASER,,BASKET STONE EXTRACTION, RETROGRADE PYELOGRAM AND INSERTION STENT URETERAL;  Surgeon: Emily Vazquez MD;  [facility];  Service: Urology    WA CYSTOURETHROSCOPY,BIOPSY N/A 1/15/2019    Procedure: CYSTOSCOPY, CYSTOGRAM, DILATION OF URETHRAL STRICTURE;  Surgeon: Lamin Freedman MD;  [facility];  Service: Urology    URINARY SURGERY         Family History   Problem Relation Age of Onset    Diabetes Father     Hypertension Father     Coronary artery disease Father     Cancer Father     Hypertension Mother      I have reviewed and agree with the history as documented  Social History     Tobacco Use    Smoking status: Never Smoker    Smokeless tobacco: Never Used   Substance Use Topics    Alcohol use: No    Drug use: No        Review of Systems   Unable to perform ROS: Dementia       Physical Exam  Physical Exam   Constitutional: He appears well-developed  No distress  HENT:   Head: Normocephalic and atraumatic  Eyes: Pupils are equal, round, and reactive to light  Neck: Normal range of motion  Neck supple  No tracheal deviation present     Cardiovascular: Normal rate, regular rhythm, normal heart sounds and intact distal pulses  No murmur heard  Pulmonary/Chest: Effort normal and breath sounds normal  No stridor  No respiratory distress  Abdominal: Soft  He exhibits no distension  There is no tenderness  There is no rebound and no guarding  Musculoskeletal: Normal range of motion  Neurological: He is alert  Skin: Skin is warm and dry  He is not diaphoretic  No erythema  No pallor  Psychiatric: He has a normal mood and affect  Vitals reviewed        Vital Signs  ED Triage Vitals   Temperature Pulse Respirations Blood Pressure SpO2   11/25/19 1300 11/25/19 1255 11/25/19 1255 11/25/19 1255 11/25/19 1255   98 1 °F (36 7 °C) 58 18 169/84 97 %      Temp Source Heart Rate Source Patient Position - Orthostatic VS BP Location FiO2 (%)   11/25/19 1300 11/25/19 1255 11/25/19 1255 11/25/19 1255 --   Oral Monitor Lying Right arm       Pain Score       --                  Vitals:    11/25/19 1255 11/25/19 1500   BP: 169/84 (!) 193/91   Pulse: 58 60   Patient Position - Orthostatic VS: Lying Lying         Visual Acuity      ED Medications  Medications - No data to display    Diagnostic Studies  Results Reviewed     Procedure Component Value Units Date/Time    CBC and differential [[id number]] Collected:  11/25/19 1625    Lab Status:  Final result Specimen:  Blood from Arm, Right Updated:  11/25/19 1633     WBC 8 63 Thousand/uL      RBC 5 38 Million/uL      Hemoglobin 16 1 g/dL      Hematocrit 48 7 %      MCV 91 fL      MCH 29 9 pg      MCHC 33 1 g/dL      RDW 14 1 %      MPV 8 9 fL      Platelets 528 Thousands/uL      nRBC 0 /100 WBCs      Neutrophils Relative 70 %      Immat GRANS % 0 %      Lymphocytes Relative 18 %      Monocytes Relative 6 %      Eosinophils Relative 5 %      Basophils Relative 1 %      Neutrophils Absolute 6 02 Thousands/µL      Immature Grans Absolute 0 03 Thousand/uL      Lymphocytes Absolute 1 57 Thousands/µL      Monocytes Absolute 0 51 Thousand/µL Eosinophils Absolute 0 45 Thousand/µL      Basophils Absolute 0 05 Thousands/µL     Basic metabolic panel [[id number]] Collected:  11/25/19 1625    Lab Status: In process Specimen:  Blood from Arm, Right Updated:  11/25/19 1630                 No orders to display              Procedures  Procedures       ED Course  ED Course as of Nov 25 1642   Mon Nov 25, 2019   1305 Page placed Urology      [phone] Urology called back and is aware  Discussed case with Linda Bustillo, there is no available urologist here at [facility] at this time  They understands this needs to be done, cannot give me an accurate window on went will be completed, but again are aware  I updated patient's wife, she is appropriately upset but understands  [id number] Discussed case with Urology again, they are unavailable to come in tonight, requesting to bladder scan, condom cath as he is passing some urine to measure, NPO after midnight, admit Internal Medicine, plan for suprapubic verses reinserted  of Thorne through guidewire                                  MDM  Number of Diagnoses or Management Options  Acute urinary retention: new and requires workup  Thorne catheter problem, initial encounter [facility]): new and requires workup  Diagnosis management comments:       Initial ED assessment:   75-year-old male long history of chronic fully catheter, presents after catheter was accidentally removed       Initial ED plan:   Patient will require replacement of Thorne catheter, will consult Urology        Final ED summary/disposition:   After evaluation and workup in the emergency department, urology unable to come into her place, suggesting to admit patient to hospital, they will do 1st thing in the morning over a guidewire possibly suprapubic catheter           Amount and/or Complexity of Data Reviewed  Clinical lab tests: ordered and reviewed  Decide to obtain previous medical records or to obtain history from someone other than the patient: yes  Obtain history from someone other than the patient: yes  Review and summarize past medical records: yes  Discuss the patient with other providers: yes        Disposition  Final diagnoses: Thorne catheter problem, initial encounter [facility]   Acute urinary retention     Time reflects when diagnosis was documented in both MDM as applicable and the Disposition within this note     Time User Action Codes Description Comment    11/25/2019  1:51 PM Donovan Stair Add [P72 829V] Skin tear of left forearm without complication, initial encounter     11/25/2019  1:51 PM Donovan Stair Remove [V55 635L] Skin tear of left forearm without complication, initial encounter     11/25/2019  4:41 PM Donovan Stair Add [T83  9XXA] Thorne catheter problem, initial encounter ([facility] )     11/25/2019  4:41 PM Andrez DE LA ROSA Add [R33 8] Acute urinary retention     11/25/2019  4:41 PM Donovan Stair Modify Manoj Akersda  9XXA] Thorne catheter problem, initial encounter ([place] [address] )     11/25/2019  4:41 PM Donovan Stair Modify [R33 8] Acute urinary retention       ED Disposition     ED Disposition Condition Date/Time Comment    Admit Stable Mon Nov 25, 2019  4:42 PM Case was discussed with Dr Pipe Rodrigues and the patient's admission status was agreed to be Admission Status: observation status to the service of Dr Pipe Rodrigues   Follow-up Information    None         Patient's Medications   Discharge Prescriptions    No medications on file     No discharge procedures on file      ED Provider  Electronically Signed by           Patrick Espinosa DO  11/25/19 8373

## 2019-11-25 NOTE — H&P
H&P- Neda Dacosta [facility] 1950, 68 y o  male MRN: [id number]  Unit/Bed#: S -01 Encounter: [id number]  Primary Care Provider: Ishmael Torres MD   Date and time admitted to hospital: 11/25/2019 12:52 PM    * Urinary catheter dysfunction [facility])  Assessment & Plan  · Patient's Thorne came out while staff cleaned him earlier today at [facility]  · Follows with Urology, Dr Debbie Harrison  · Requires guide wire to reinsert Thorne catheter  · Urology assessed in ED  Recommended observation over night to see if patient requires catheter  Ideally, could discharge without catheter if he demonstrates adequate voiding  Otherwise, he will need OR tomorrow for guidewire reinsertion vs suprapubic catheter  · Of note, patient's wife insist patient would not tolerate suprapubic as he has yanked out other abdominal tubes however he seems to tolerate Thorne catheter without issue  To discuss with Urology team in a m  Zaida Schroeder · NPO after midnight  · Do not give fluids  · Bladder scans    Retention of urine  Assessment & Plan  · Bladder scans  Chronic Thorne was removed earlier today and remains out  · Hold fluids to prevent exacerbation  · Wife concerned that patient will retain excessive urine overnight and damage his bladder  She reports this has happened in the past   · Would need to contact Urology on-call overnight should retention become too severe  · Neurology to see in a m  For either fully reimplantation with guidewire were suprapubic catheter  Of note, wife adamantly opposed to suprapubic catheter she believes patient will yank it out but he tolerates Thorne without issue  CKD (chronic kidney disease)  Assessment & Plan  Follows with Nephrology  Baseline appears 1 2-1 4      He is currently at baseline however may anticipate some rise if urinary retention worsens    Essential hypertension  Assessment & Plan  Continue home medications    History of DVT (deep vein thrombosis)  Assessment & Plan  Will hold patient's Eliquis until Thorne can be removed given hematuria  To OR tomorrow for guidewire versus suprapubic    Aphasia  Assessment & Plan  Patient is averbal at baseline  Unable to communicate times of pain        VTE Prophylaxis: Pharmacologic VTE Prophylaxis contraindicated due to hematuria   / sequential compression device   Code Status: DNR/DNI  POLST: There is no POLST form on file for this patient (pre-hospital)  Discussion with family: d/w wife @ bedside    Anticipated Length of Stay:  Patient will be admitted on an Observation basis with an anticipated length of stay of  Less than 2 midnights  Justification for Hospital Stay:  Thorne catheter reinsertation    Total Time for Visit, including Counseling / Coordination of Care: 45 minutes  Greater than 50% of this total time spent on direct patient counseling and coordination of care  Chief Complaint:   Thorne catheter dysfunction    History of Present Illness:    Nomi Moore is a 68 y o  male past medical history of hypertension , aphasia, neurogenic bladder, history of DVT on Eliquis, and CKD who presents with Thorne catheter dysfunction  Patient lives at [facility]  He has a verbal and all history obtained through wife  Wife states well he was being cleaned today, his Thorne catheter must have been deflated and came out  He has a history of extremely difficult catheterization requiring guide wire  His primary urologist is Dr Lisa Sanchez  Urologist office was called however could not see him therefore presented to emergency room  Urology assessed the patient in the emergency room  Given patient's repeated episodes of self injury, Urology would like to observe overnight with bladder scans  Per their plan, patient demonstrates adequate bladder emptying, it would be safest to continue without catheter  It patient retains, will need suprapubic versus Thorne catheter  Patient will be admitted observation status  See above plans for further detail    Review of Systems:    Review of Systems   Unable to perform ROS: Patient nonverbal       Past Medical and Surgical History:     Past Medical History:   Diagnosis Date    Anxiety     [facility]     Aphasia     Ataxia     Bladder adhesions     BPH (benign prostatic hypertrophy)     COPD (chronic obstructive pulmonary disease) ([facility])     wife denies - "never had"    Dementia [facility] )     Difficulty walking     Essential (primary) hypertension     Generalized anxiety disorder     GERD (gastroesophageal reflux disease)     Global aphasia     H/O blood clots     High blood pressure     History of kidney stones     Kidney stones     Mental disorder     Muscle weakness     Neuromuscular dysfunction of bladder     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 ([facility] )     Retention of urine, unspecified     Stroke ([facility] )     Thrombosis     Urinary retention     Urinary tract bacterial infections     Urinary tract infection        Past Surgical History:   Procedure Laterality Date    BOTOX INJECTION N/A 6/18/2019    Procedure: INJECTION BOTULINUM TOXIN (BOTOX), intra detrusor;  Surgeon: Adan Medina MD;  [facility];  Service: Urology    BOTOX INJECTION N/A 10/7/2019    Procedure: INJECTION BOTULINUM TOXIN (BOTOX), intradetrusor;  Surgeon: Adan Medina MD;  [facility];  Service: Urology    CYSTOSCOPY      CYSTOSCOPY N/A 6/18/2019    Procedure: cystoscopy and all indicated procedures;  Surgeon: Adan Medina MD;  [facility];  Service: Urology    FL CYSTOGRAM  1/15/2019    IVC FILTER INSERTION      X2    IVC FILTER INSERTION      x2    KIDNEY STONE SURGERY      KNEE SURGERY      Sepsis infection     NEPHROSTOMY      OR CYSTO/URETERO W/LITHOTRIPSY &INDWELL STENT INSRT Right 6/14/2017    Procedure: CYSTOSCOPY URETEROSCOPY WITH LITHOTRIPSY HOLMIUM LASER,,BASKET STONE EXTRACTION, RETROGRADE PYELOGRAM AND INSERTION STENT URETERAL;  Surgeon: Mira Brandon MD;  [facility];  Service: Urology    NM CYSTOURETHROSCOPY,BIOPSY N/A 1/15/2019    Procedure: CYSTOSCOPY, CYSTOGRAM, DILATION OF URETHRAL STRICTURE;  Surgeon: Hudson Hickey MD;  [facility];  Service: Urology    URINARY SURGERY         Meds/Allergies:    Prior to Admission medications    Medication Sig Start Date End Date Taking?  Authorizing Provider   acetaminophen (TYLENOL) 325 mg tablet Take 650 mg by mouth every 4 (four) hours as needed for mild pain     Historical Provider, MD   amLODIPine (NORVASC) 5 mg tablet Take 5 mg by mouth 2 (two) times a day     Historical Provider, MD   apixaban (ELIQUIS) 2 5 mg Take 2 5 mg by mouth 2 (two) times a day    Historical Provider, MD   busPIRone (BUSPAR) 7 5 mg tablet Take 7 5 mg by mouth 3 (three) times a day    Historical Provider, MD   Citric Mk-Wfecwoyrxdh-Da Carb (RENACIDIN) SOLN Irrigate with as directed 2 (two) times a day Use 1 application via irrigation every evening and night shift for lucia irrigation instill and clamp for 15 minutes    Historical Provider, MD   docusate sodium (COLACE) 100 mg capsule Take 100 mg by mouth 2 (two) times a day    Historical Provider, MD   famotidine (PEPCID) 20 mg tablet Take 20 mg by mouth 2 (two) times a day    Historical Provider, MD   memantine (NAMENDA) 5 mg tablet Take 1 tablet by mouth 2 (two) times a day for 30 days  Patient taking differently: Take 5 mg by mouth 2 (two) times a day  11/11/16 10/3/19  Andi Wilson MD   metoprolol tartrate (LOPRESSOR) 25 mg tablet Take 0 5 tablets (12 5 mg total) by mouth 2 (two) times a day 7/10/19 2/8/22  Brandie Daniels MD   mirtazapine (REMERON) 15 mg tablet Take 1 tablet by mouth daily at bedtime for 30 days 3/1/17 10/3/19  Andi Wilson MD   NYSTATIN powder Apply topically 2 (two) times a day   12/4/18   Historical Provider, MD   polyethylene glycol (MIRALAX) 17 g packet Take 17 g by mouth daily 4/26/19 Dalila Lamb MD   potassium chloride (K-DUR,KLOR-CON) 10 mEq tablet Take 1 tablet (10 mEq total) by mouth 2 (two) times a day  Patient taking differently: Take 10 mEq by mouth 3 (three) times a day  7/10/19   Robert Pickens MD   QUEtiapine (SEROquel) 25 mg tablet Take 25 mg by mouth daily at bedtime      Historical Provider, MD   tamsulosin (FLOMAX) 0 4 mg Take 1 capsule by mouth daily with dinner for 30 days 3/1/17 10/3/19  Juan Carolina MD     I have reviewed home medications using allscripts  Allergies: Allergies   Allergen Reactions    Gentamycin [Gentamicin] Anaphylaxis    Vancomycin Anaphylaxis    Zosyn [Piperacillin Sod-Tazobactam So] Anaphylaxis     However, has tolerated Ertapenem, Cefdinir, and Cefepime, which all have different side chains   Avoid Penicillins and the following Cephs with similar side chains (Cephalexin, Cefadroxil, Cefaclor, Cefprozil, or  Cefoxitin)    Clindamycin Itching    Nsaids Other (See Comments)     Nephrotic Syndrome    Sulfa Antibiotics Rash    Other Rash     antipersperents  Stat lock from lucia catheter       Social History:     Marital Status: /Civil Union   Occupation: none  Patient Pre-hospital Living Situation: [facility]  Patient Pre-hospital Diet Restrictions: None  Substance Use History:   Social History     Substance and Sexual Activity   Alcohol Use No     Social History     Tobacco Use   Smoking Status Never Smoker   Smokeless Tobacco Never Used     Social History     Substance and Sexual Activity   Drug Use No       Family History:    non-contributory    Physical Exam:     Vitals:   Blood Pressure: 161/85 (11/25/19 1729)  Pulse: 61 (11/25/19 1729)  Temperature: 98 1 °F (36 7 °C) (11/25/19 1729)  Temp Source: Oral (11/25/19 1729)  Respirations: 18 (11/25/19 1729)  Height: 5' 11" (180 3 cm) (11/25/19 1729)  Weight - Scale: 101 kg (222 lb 0 1 oz) (11/25/19 1729)  SpO2: 96 % (11/25/19 1729)    Physical Exam   Constitutional: He appears well-nourished  No distress  HENT:   Head: Normocephalic and atraumatic  Cardiovascular: Normal rate and regular rhythm  Exam reveals no gallop and no friction rub  No murmur heard  Pulmonary/Chest: No stridor  No respiratory distress  He has no wheezes  He has no rales  He exhibits no tenderness  Abdominal: Soft  Bowel sounds are normal  He exhibits no distension  There is no tenderness  Genitourinary: No penile tenderness  Genitourinary Comments: Retracted phallus  No obvious trauma, lesions, or bleeding  Nursing note and vitals reviewed  Additional Data:     Lab Results: I have personally reviewed pertinent reports  Results from last 7 days   Lab Units 11/25/19  1625   WBC Thousand/uL 8 63   HEMOGLOBIN g/dL 16 1   HEMATOCRIT % 48 7   PLATELETS Thousands/uL 275   NEUTROS PCT % 70   LYMPHS PCT % 18   MONOS PCT % 6   EOS PCT % 5     Results from last 7 days   Lab Units 11/25/19  1625   SODIUM mmol/L 147*   POTASSIUM mmol/L 3 8   CHLORIDE mmol/L 110*   CO2 mmol/L 29   BUN mg/dL 20   CREATININE mg/dL 1 45*   ANION GAP mmol/L 8   CALCIUM mg/dL 8 4   GLUCOSE RANDOM mg/dL 110                       Imaging: I have personally reviewed pertinent reports  No orders to display         Allscripts / Epic Records Reviewed: Yes     ** Please Note: This note has been constructed using a voice recognition system   **

## 2019-11-25 NOTE — ED NOTES
Attempted condom catheter w/o success   Dr Sarika Valnecia made aware     Oriana Leavitt, RN  11/25/19 1640

## 2019-11-25 NOTE — ASSESSMENT & PLAN NOTE
Follows with Nephrology  Baseline appears 1 2-1 4      He is currently at baseline however may anticipate some rise if urinary retention worsens

## 2019-11-25 NOTE — PROGRESS NOTES
Patient arrived to floor accompanied by spouse  Admission data base completed with her assistance as patient is non-verbal   Spouse provided with menu and admission packet  Call bell within reach  Will continue to monitor

## 2019-11-25 NOTE — CONSULTS
CONSULT    Patient Name: William Guardado [facility]  Patient MRN: [id number]  Date of Service: 11/25/2019   Date / Time Note Created: 11/25/2019 4:47 PM   Referring Provider: Nubia Jameson DO      Provider Creating Note: RICARDO Fink    PCP: Micaela Dean  Attending Provider:  Nubia Jameson DO    Reason for Consult: Urinary Retention    HPI --Joe Reyes is a 68 male with end-stage dementia, known to our service for neurogenic bladder with chronic Thorne catheter and subsequent development of severe urethral stricture  He undergoes monthly catheter exchanges requiring cystoscopic re-placement over wire by attending urologist only  Unfortunately, secondary to mental status/cognitive impairment, patient has made several emergency room trip secondary to self-induced Thorne trauma requiring emergent cystoscopic examination for catheter replacements  His catheter was inserted by Dr Rodriguez Matos earlier this month and now presents to [address] with inadvertent Thorne catheter removal     He is hemodynamically stable and not in extremis  Brief is saturated with clear yellow urine  Urologic consultation was requested for emergency cystoscopic placement of Thorne catheter    Source:chart review and the patient       Patient Active Problem List   Diagnosis    Aphasia    COPD (chronic obstructive [facility] disease) [facility]    History of DVT (deep vein thrombosis)    Aneurysm of thoracic aorta ([facility] )    Retention of urine    Essential hypertension    Aphasia of unknown origin    CKD (chronic kidney disease)    GERD (gastroesophageal reflux disease)    HTN, goal below 140/90    Generalized anxiety disorder    H/O blood clots    Anemia    Dementia with behavioral disturbance ([address] )    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HCC)    MSSA (methicillin susceptible Staphylococcus aureus) septicemia ([facility] )    H/O urethral stricture    CKD (chronic kidney disease) stage 2, GFR 60-89 ml/min    Neurogenic bladder    Nephrolithiasis    Thorne catheter in place             Impressions  History of neurogenic bladder  Chronic urinary retention  Catheter status  Severe urethral stenosis  Difficult catheterization secondary to previous    Recommendations  Patient's previous saturated  This indicates past voiding  Recommend observation at this time  Overnight monitoring of bladder scans  If patient demonstrates adequate bladder emptying, would be safest not to replace Thorne due to repeated episodes of self-induced Thorne trauma  If patient requires catheter insertion, could be taken to the OR for cystoscopy Thorne insertion, but more likely IR suprapubic tube  Will follow            Past Medical History:   Diagnosis Date    Anxiety     Aortic aneurysm (HCC)     Aphasia     Ataxia     Bladder adhesions     BPH (benign prostatic hypertrophy)     COPD (chronic obstructive [facility] disease) ([facility])     wife denies - "never had"    Dementia ([facility] )     Difficulty walking     Essential (primary) hypertension     Generalized anxiety disorder     GERD (gastroesophageal reflux disease)     Global aphasia     H/O blood clots     High blood pressure     History of kidney stones     Kidney stones     Mental disorder     Muscle weakness     Neuromuscular dysfunction of bladder     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 ([facility] )     Retention of urine, unspecified     Stroke ([facility]     Thrombosis     Urinary retention     Urinary tract bacterial infections     Urinary tract infection        Past Surgical History:   Procedure Laterality Date    BOTOX INJECTION N/A 6/18/2019    Procedure: INJECTION BOTULINUM TOXIN (BOTOX), intra detrusor;  Surgeon: Shana Leon MD;  [facility];  Service: Urology    BOTOX INJECTION N/A 10/7/2019    Procedure: INJECTION BOTULINUM TOXIN (BOTOX), intradetrusor;  Surgeon: Shana Leon MD;  [facility];  Service: Urology    CYSTOSCOPY      CYSTOSCOPY N/A 6/18/2019    Procedure: cystoscopy and all indicated procedures;  Surgeon: Corrinne Scriver, MD;  [facility];  Service: Urology    FL CYSTOGRAM  1/15/2019    IVC FILTER INSERTION      X2    IVC FILTER INSERTION      x2    KIDNEY STONE SURGERY      KNEE SURGERY      Sepsis infection     NEPHROSTOMY      CT CYSTO/URETERO W/LITHOTRIPSY &INDWELL STENT INSRT Right 6/14/2017    Procedure: CYSTOSCOPY URETEROSCOPY WITH LITHOTRIPSY HOLMIUM LASER,,BASKET STONE EXTRACTION, RETROGRADE PYELOGRAM AND INSERTION STENT URETERAL;  Surgeon: Deepika Brandon MD;  [facility];  Service: Urology    CT CYSTOURETHROSCOPY,BIOPSY N/A 1/15/2019    Procedure: CYSTOSCOPY, CYSTOGRAM, DILATION OF URETHRAL STRICTURE;  Surgeon: Corrinne Scriver, MD;  [facility];  Service: Urology    URINARY SURGERY         Family History   Problem Relation Age of Onset    Diabetes Father     Hypertension Father     Coronary artery disease Father     Cancer Father     Hypertension Mother        Social History     Socioeconomic History    Marital status: /Civil Union     Spouse name: Not on file    Number of children: Not on file    Years of education: Not on file    Highest education level: Not on file   Occupational History    Occupation: disabled   Social Needs    Financial resource strain: Not on file    Food insecurity:     Worry: Not on file     Inability: Not on file   [employer] needs:     Medical: Not on file     Non-medical: Not on file   Tobacco Use    Smoking status: Never Smoker    Smokeless tobacco: Never Used   Substance and Sexual Activity    Alcohol use: No    Drug use: No    Sexual activity: Never   Lifestyle    Physical activity:     Days per week: Not on file     Minutes per session: Not on file    Stress: Not on file   Relationships    Social connections:     Talks on phone: Not on file     Gets together: Not on file     Attends [employer] service: Not on file     Active member of club or organization: Not on file     Attends meetings of clubs or organizations: Not on file     Relationship status: Not on file    Intimate partner violence:     Fear of current or ex partner: Not on file     Emotionally abused: Not on file     Physically abused: Not on file     Forced sexual activity: Not on file   Other Topics Concern    Not on file   Social History Narrative    Not on file       Allergies   Allergen Reactions    Gentamycin [Gentamicin] Anaphylaxis    Vancomycin Anaphylaxis    Zosyn [Piperacillin Sod-Tazobactam So] Anaphylaxis     However, has tolerated Ertapenem, Cefdinir, and Cefepime, which all have different side chains   Avoid Penicillins and the following Cephs with similar side chains (Cephalexin, Cefadroxil, Cefaclor, Cefprozil, or  Cefoxitin)    Clindamycin Itching    Nsaids Other (See Comments)     Nephrotic Syndrome    Sulfa Antibiotics Rash    Other Rash     antipersperents  Stat lock from lucia catheter       Review of Systems  Review of Systems - History obtained from unobtainable from patient due to mental status    Chart Review   Allergies, current medications, history, problem list    Vital Signs & Physical Exam  General appearance: alert, appears older than stated age, cooperative, distracted, no distress, pale and slowed mentation  Head: Normocephalic, without obvious abnormality, atraumatic  Neck: no adenopathy, no carotid bruit, no JVD, supple, symmetrical, trachea midline and thyroid not enlarged, symmetric, no tenderness/mass/nodules  Lungs: diminished breath sounds  Heart: regular rate and rhythm, S1, S2 normal, no murmur, click, rub or gallop  Abdomen: soft, non-tender; bowel sounds normal; no masses,  no organomegaly  Extremities: extremities normal, warm and well-perfused; no cyanosis, clubbing, or edema  Pulses: 2+ and symmetric  Neurologic: Grossly normal  No urinary drains     Laboratory Studies  Lab Results Component Value Date    K 4 1 09/27/2019    K 3 7 07/10/2019     09/27/2019     07/10/2019    CO2 29 07/10/2019    CREATININE 1 37 09/27/2019    CREATININE 1 37 (H) 07/10/2019    BUN 17 09/27/2019    BUN 18 07/10/2019    MG 2 2 07/09/2019    PHOS 2 3 05/03/2017               Imaging and Other Studies  )No results found  Medications   Scheduled Meds:  Continuous Infusions:  No current facility-administered medications for this encounter     PRN Meds:                 )Minus Ends, CRNP

## 2019-11-25 NOTE — ASSESSMENT & PLAN NOTE
Will hold patient's Eliquis until Thorne can be removed given hematuria    To OR tomorrow for guidewire versus suprapubic

## 2019-11-25 NOTE — ASSESSMENT & PLAN NOTE
· Patient's Thorne came out while staff [place] [facility]  · Follows with Urology, Dr Ubaldo Madrid  · Requires guide wire to reinsert Thorne catheter  · Urology assessed in ED  Recommended observation over night to see if patient requires catheter  Ideally, could discharge without catheter if he demonstrates adequate voiding  Otherwise, he will need OR tomorrow for guidewire reinsertion vs suprapubic catheter  · Of note, patient's wife insist patient would not tolerate suprapubic as he has yanked out other abdominal tubes however he seems to tolerate Thorne catheter without issue  To discuss with Urology team in a rhett Hoang   · NPO after midnight  · Do not give fluids  · Bladder scans

## 2019-11-25 NOTE — TELEPHONE ENCOUNTER
Called [facility] and spoke with Paul  They will run sensitivities and fax results over  [facility] reports this could take 24-48 hrs  Please monitor and advise based on sensitivities

## 2019-11-26 ENCOUNTER — TELEPHONE (OUTPATIENT)
Dept: UROLOGY | Facility: MEDICAL CENTER | Age: 69
End: 2019-11-26

## 2019-11-26 ENCOUNTER — ANESTHESIA (OUTPATIENT)
Dept: RADIOLOGY | Facility: HOSPITAL | Age: 69
End: 2019-11-26
Payer: COMMERCIAL

## 2019-11-26 ENCOUNTER — ANESTHESIA EVENT (OUTPATIENT)
Dept: RADIOLOGY | Facility: HOSPITAL | Age: 69
End: 2019-11-26
Payer: COMMERCIAL

## 2019-11-26 ENCOUNTER — TELEPHONE (OUTPATIENT)
Dept: OTHER | Facility: HOSPITAL | Age: 69
End: 2019-11-26

## 2019-11-26 ENCOUNTER — APPOINTMENT (OUTPATIENT)
Dept: RADIOLOGY | Facility: HOSPITAL | Age: 69
End: 2019-11-26
Payer: COMMERCIAL

## 2019-11-26 LAB
ANION GAP SERPL CALCULATED.3IONS-SCNC: 6 MMOL/L (ref 4–13)
APTT PPP: 26 SECONDS (ref 23–37)
BASOPHILS # BLD AUTO: 0.05 THOUSANDS/ΜL (ref 0–0.1)
BASOPHILS NFR BLD AUTO: 1 % (ref 0–1)
BUN SERPL-MCNC: 19 MG/DL (ref 5–25)
CALCIUM SERPL-MCNC: 8.3 MG/DL (ref 8.3–10.1)
CHLORIDE SERPL-SCNC: 109 MMOL/L (ref 100–108)
CO2 SERPL-SCNC: 27 MMOL/L (ref 21–32)
CREAT SERPL-MCNC: 1.36 MG/DL (ref 0.6–1.3)
EOSINOPHIL # BLD AUTO: 0.47 THOUSAND/ΜL (ref 0–0.61)
EOSINOPHIL NFR BLD AUTO: 5 % (ref 0–6)
ERYTHROCYTE [DISTWIDTH] IN BLOOD BY AUTOMATED COUNT: 14.2 % (ref 11.6–15.1)
GFR SERPL CREATININE-BSD FRML MDRD: 52 ML/MIN/1.73SQ M
GLUCOSE SERPL-MCNC: 107 MG/DL (ref 65–140)
HCT VFR BLD AUTO: 43.5 % (ref 36.5–49.3)
HGB BLD-MCNC: 14.4 G/DL (ref 12–17)
IMM GRANULOCYTES # BLD AUTO: 0.04 THOUSAND/UL (ref 0–0.2)
IMM GRANULOCYTES NFR BLD AUTO: 1 % (ref 0–2)
INR PPP: 1.07 (ref 0.84–1.19)
LYMPHOCYTES # BLD AUTO: 1.8 THOUSANDS/ΜL (ref 0.6–4.47)
LYMPHOCYTES NFR BLD AUTO: 21 % (ref 14–44)
MCH RBC QN AUTO: 30 PG (ref 26.8–34.3)
MCHC RBC AUTO-ENTMCNC: 33.1 G/DL (ref 31.4–37.4)
MCV RBC AUTO: 91 FL (ref 82–98)
MONOCYTES # BLD AUTO: 0.68 THOUSAND/ΜL (ref 0.17–1.22)
MONOCYTES NFR BLD AUTO: 8 % (ref 4–12)
NEUTROPHILS # BLD AUTO: 5.71 THOUSANDS/ΜL (ref 1.85–7.62)
NEUTS SEG NFR BLD AUTO: 64 % (ref 43–75)
NRBC BLD AUTO-RTO: 0 /100 WBCS
PLATELET # BLD AUTO: 245 THOUSANDS/UL (ref 149–390)
PMV BLD AUTO: 9.2 FL (ref 8.9–12.7)
POTASSIUM SERPL-SCNC: 3.6 MMOL/L (ref 3.5–5.3)
PROTHROMBIN TIME: 13.3 SECONDS (ref 11.6–14.5)
RBC # BLD AUTO: 4.8 MILLION/UL (ref 3.88–5.62)
SODIUM SERPL-SCNC: 142 MMOL/L (ref 136–145)
WBC # BLD AUTO: 8.75 THOUSAND/UL (ref 4.31–10.16)

## 2019-11-26 PROCEDURE — 76942 ECHO GUIDE FOR BIOPSY: CPT | Performed by: RADIOLOGY

## 2019-11-26 PROCEDURE — C1725 CATH, TRANSLUMIN NON-LASER: HCPCS

## 2019-11-26 PROCEDURE — 99226 PR SBSQ OBSERVATION CARE/DAY 35 MINUTES: CPT | Performed by: PHYSICIAN ASSISTANT

## 2019-11-26 PROCEDURE — 85730 THROMBOPLASTIN TIME PARTIAL: CPT | Performed by: NURSE PRACTITIONER

## 2019-11-26 PROCEDURE — 99225 PR SBSQ OBSERVATION CARE/DAY 25 MINUTES: CPT | Performed by: NURSE PRACTITIONER

## 2019-11-26 PROCEDURE — 51102 DRAIN BL W/CATH INSERTION: CPT | Performed by: RADIOLOGY

## 2019-11-26 PROCEDURE — 85610 PROTHROMBIN TIME: CPT | Performed by: NURSE PRACTITIONER

## 2019-11-26 PROCEDURE — 85025 COMPLETE CBC W/AUTO DIFF WBC: CPT | Performed by: PHYSICIAN ASSISTANT

## 2019-11-26 PROCEDURE — 99024 POSTOP FOLLOW-UP VISIT: CPT | Performed by: RADIOLOGY

## 2019-11-26 PROCEDURE — 51102 DRAIN BL W/CATH INSERTION: CPT

## 2019-11-26 PROCEDURE — C1769 GUIDE WIRE: HCPCS

## 2019-11-26 PROCEDURE — 80048 BASIC METABOLIC PNL TOTAL CA: CPT | Performed by: PHYSICIAN ASSISTANT

## 2019-11-26 PROCEDURE — 76942 ECHO GUIDE FOR BIOPSY: CPT

## 2019-11-26 RX ORDER — SODIUM CHLORIDE, SODIUM LACTATE, POTASSIUM CHLORIDE, CALCIUM CHLORIDE 600; 310; 30; 20 MG/100ML; MG/100ML; MG/100ML; MG/100ML
INJECTION, SOLUTION INTRAVENOUS CONTINUOUS PRN
Status: DISCONTINUED | OUTPATIENT
Start: 2019-11-26 | End: 2019-11-26 | Stop reason: SURG

## 2019-11-26 RX ORDER — FENTANYL CITRATE 50 UG/ML
INJECTION, SOLUTION INTRAMUSCULAR; INTRAVENOUS AS NEEDED
Status: DISCONTINUED | OUTPATIENT
Start: 2019-11-26 | End: 2019-11-26 | Stop reason: SURG

## 2019-11-26 RX ORDER — CITRIC ACID, GLUCONOLACTONE AND MAGNESIUM CARBONATE 6.602; .198; 3.268 G/100ML; G/100ML; G/100ML
5 SOLUTION IRRIGATION 2 TIMES DAILY
Qty: 1 BOTTLE | Refills: 6
Start: 2019-11-26 | End: 2019-12-01 | Stop reason: HOSPADM

## 2019-11-26 RX ORDER — PROPOFOL 10 MG/ML
INJECTION, EMULSION INTRAVENOUS AS NEEDED
Status: DISCONTINUED | OUTPATIENT
Start: 2019-11-26 | End: 2019-11-26 | Stop reason: SURG

## 2019-11-26 RX ORDER — FENTANYL CITRATE/PF 50 MCG/ML
25 SYRINGE (ML) INJECTION
Status: CANCELLED | OUTPATIENT
Start: 2019-11-26

## 2019-11-26 RX ORDER — CEFAZOLIN SODIUM 1 G/3ML
INJECTION, POWDER, FOR SOLUTION INTRAMUSCULAR; INTRAVENOUS AS NEEDED
Status: DISCONTINUED | OUTPATIENT
Start: 2019-11-26 | End: 2019-11-26 | Stop reason: SURG

## 2019-11-26 RX ORDER — ONDANSETRON 2 MG/ML
4 INJECTION INTRAMUSCULAR; INTRAVENOUS ONCE AS NEEDED
Status: CANCELLED | OUTPATIENT
Start: 2019-11-26

## 2019-11-26 RX ORDER — PROPOFOL 10 MG/ML
INJECTION, EMULSION INTRAVENOUS CONTINUOUS PRN
Status: DISCONTINUED | OUTPATIENT
Start: 2019-11-26 | End: 2019-11-26 | Stop reason: SURG

## 2019-11-26 RX ADMIN — BUSPIRONE HYDROCHLORIDE 7.5 MG: 5 TABLET ORAL at 21:32

## 2019-11-26 RX ADMIN — AMLODIPINE BESYLATE 5 MG: 5 TABLET ORAL at 17:57

## 2019-11-26 RX ADMIN — FENTANYL CITRATE 25 MCG: 50 INJECTION INTRAMUSCULAR; INTRAVENOUS at 16:07

## 2019-11-26 RX ADMIN — METOPROLOL TARTRATE 12.5 MG: 25 TABLET ORAL at 17:59

## 2019-11-26 RX ADMIN — SODIUM CHLORIDE, SODIUM LACTATE, POTASSIUM CHLORIDE, AND CALCIUM CHLORIDE: .6; .31; .03; .02 INJECTION, SOLUTION INTRAVENOUS at 15:49

## 2019-11-26 RX ADMIN — BUSPIRONE HYDROCHLORIDE 7.5 MG: 5 TABLET ORAL at 17:57

## 2019-11-26 RX ADMIN — PROPOFOL 80 MCG/KG/MIN: 10 INJECTION, EMULSION INTRAVENOUS at 15:56

## 2019-11-26 RX ADMIN — POTASSIUM CHLORIDE 10 MEQ: 10 TABLET, EXTENDED RELEASE ORAL at 17:58

## 2019-11-26 RX ADMIN — PROPOFOL 50 MG: 10 INJECTION, EMULSION INTRAVENOUS at 15:56

## 2019-11-26 RX ADMIN — QUETIAPINE FUMARATE 25 MG: 25 TABLET ORAL at 21:32

## 2019-11-26 RX ADMIN — IOHEXOL 5 ML: 350 INJECTION, SOLUTION INTRAVENOUS at 17:04

## 2019-11-26 RX ADMIN — NYSTATIN 1 APPLICATION: 100000 POWDER TOPICAL at 18:02

## 2019-11-26 RX ADMIN — ONDANSETRON 4 MG: 2 INJECTION INTRAMUSCULAR; INTRAVENOUS at 19:14

## 2019-11-26 RX ADMIN — CEFAZOLIN SODIUM 2000 MG: 1 INJECTION, POWDER, FOR SOLUTION INTRAMUSCULAR; INTRAVENOUS at 16:22

## 2019-11-26 RX ADMIN — PROPOFOL 20 MG: 10 INJECTION, EMULSION INTRAVENOUS at 16:07

## 2019-11-26 RX ADMIN — FAMOTIDINE 20 MG: 20 TABLET, FILM COATED ORAL at 17:59

## 2019-11-26 RX ADMIN — POLYETHYLENE GLYCOL 3350 17 G: 17 POWDER, FOR SOLUTION ORAL at 17:57

## 2019-11-26 NOTE — PROGRESS NOTES
[address] Internal Medicine  Progress Note - Yonatan Messer [facility] 1950, 68 y o  male MRN: [id number]  Unit/Bed#: S -01 Encounter: [id number]  Primary Care Provider: Yane Lu MD   Date and time admitted to hospital: 11/25/2019 12:52 PM    CKD (chronic kidney disease)  Assessment & Plan  · Follows with Nephrology  Baseline appears 1 2-1 4     · He is currently at baseline however may anticipate some rise if urinary retention worsens    Essential hypertension  Assessment & Plan  · Continue home medications    Retention of urine  Assessment & Plan  · Bladder scans  Chronic Thorne was removed on day of admission and remains out  · Patient continues to retain  · Hold fluids to prevent exacerbation  · Wife concerned that patient will retain excessive urine overnight and damage his bladder  She reports this has happened in the past   · Wife initially refused suprapubic catheter but after speaking with Urology, is agreeable  History of DVT (deep vein thrombosis)  Assessment & Plan  · Continue to hold Eliquis pending suprapubic catheter  Aphasia  Assessment & Plan  · Patient is nonverbal at baseline  · Unable to communicate times of pain    * Urinary catheter dysfunction [facility])  Assessment & Plan  · Patient's Thorne came out while staff cleaned him earlier today at [facility]  · Follows with Urology, Dr Lisa Sanchez  · Requires guide wire to reinsert Thorne catheter  · Urology assessed in ED  Recommended observation over night to see if patient requires catheter  Ideally, could discharge without catheter if he demonstrates adequate voiding  Otherwise, he will need OR tomorrow for guidewire reinsertion vs suprapubic catheter    · Urology recommending suprapubic catheter, order for IR consult has been placed  · PTT and PT/INR pending  · NPO after midnight  · Do not give fluids  · Bladder scans      VTE Pharmacologic Prophylaxis:   Pharmacologic: Pharmacologic VTE Prophylaxis contraindicated due to Hematuria  Mechanical VTE Prophylaxis in Place: No    Patient Centered Rounds: I have performed bedside rounds with nursing staff today  Discussions with Specialists or Other Care Team Provider:  Appreciate input from Urology note  Will anticipate suprapubic catheter and IR consultation    Education and Discussions with Family / Patient:  Discussed care plan with patient's wife at bedside  Answered all questions to the best of my ability  Time Spent for Care: 20 minutes  More than 50% of total time spent on counseling and coordination of care as described above  Current Length of Stay: 0 day(s)    Current Patient Status: Observation   Certification Statement: The patient will continue to require additional inpatient hospital stay due to Pending suprapubic catheter placement    Discharge Plan:  Patient comes from [facility], plan to return once catheter is placed and functioning appropriately  Hopeful discharge     Code Status: Level 3 - DNAR and DNI      Subjective:   Patient nonverbal, patient's wife reports he seems to be in pain from the urinary retention  Objective:     Vitals:   Temp (24hrs), Av 9 °F (36 6 °C), Min:97 7 °F (36 5 °C), Max:98 1 °F (36 7 °C)    Temp:  [97 7 °F (36 5 °C)-98 1 °F (36 7 °C)] 97 7 °F (36 5 °C)  HR:  [48-61] 48  Resp:  [18] 18  BP: (153-193)/(81-91) 177/81  SpO2:  [93 %-96 %] 95 %  Body mass index is 30 96 kg/m²  Input and Output Summary (last 24 hours): Intake/Output Summary (Last 24 hours) at 2019 1411  Last data filed at 2019 1937  Gross per 24 hour   Intake 220 ml   Output    Net 220 ml       Physical Exam:     Physical Exam   Constitutional: He appears well-developed and well-nourished  No distress  HENT:   Head: Normocephalic and atraumatic  Eyes: Conjunctivae are normal  No scleral icterus  Cardiovascular: Normal rate and regular rhythm  No murmur heard    Pulmonary/Chest: Effort normal and breath sounds normal  No respiratory distress  He has no wheezes  He has no rales  Abdominal: He exhibits distension (suprapubic)  Musculoskeletal: He exhibits no edema  Neurological: He is alert  Skin: Skin is warm and dry  No erythema  Psychiatric: He is noncommunicative  Nursing note and vitals reviewed  Additional Data:     Labs:    Results from last 7 days   Lab Units 11/26/19  0530   WBC Thousand/uL 8 75   HEMOGLOBIN g/dL 14 4   HEMATOCRIT % 43 5   PLATELETS Thousands/uL 245   NEUTROS PCT % 64   LYMPHS PCT % 21   MONOS PCT % 8   EOS PCT % 5     Results from last 7 days   Lab Units 11/26/19  0530   SODIUM mmol/L 142   POTASSIUM mmol/L 3 6   CHLORIDE mmol/L 109*   CO2 mmol/L 27   BUN mg/dL 19   CREATININE mg/dL 1 36*   ANION GAP mmol/L 6   CALCIUM mg/dL 8 3   GLUCOSE RANDOM mg/dL 107                           * I Have Reviewed All Lab Data Listed Above  * Additional Pertinent Lab Tests Reviewed:  All Labs Within Last 24 Hours Reviewed    Imaging:    Imaging Reports Reviewed Today Include: None  Imaging Personally Reviewed by Myself Includes:  None    Recent Cultures (last 7 days):           Last 24 Hours Medication List:     Current Facility-Administered Medications:  acetaminophen 650 mg Oral Q6H PRN Irish Suresh PA-C   acetaminophen 650 mg Oral Q8H PRN Irish Suresh PA-C   amLODIPine 5 mg Oral BID Irish Suresh PA-C   busPIRone 7 5 mg Oral TID Irish Suresh PA-C   docusate sodium 100 mg Oral BID Irish Suresh PA-C   famotidine 20 mg Oral Daily Irish Suresh PA-C   HYDROmorphone 0 5 mg Intravenous Q4H PRN Irish Suresh PA-C   metoprolol tartrate 12 5 mg Oral BID Irish Suresh PA-C   nystatin  Topical BID Irish Suresh PA-C   ondansetron 4 mg Intravenous Q6H PRN Irish Suresh PA-C   oxyCODONE 5 mg Oral Q6H PRN Irish Suresh PA-C   polyethylene glycol 17 g Oral Daily Irish Suresh PA-C   potassium chloride 10 mEq Oral BID Irish MARTIN HONORIO Suresh   QUEtiapine 25 mg Oral HS Irish Suresh PA-C        Today, Patient Was Seen By: Dharmesh Grover PA-C    ** Please Note: Dictation voice to text software may have been used in the creation of this document   **

## 2019-11-26 NOTE — BRIEF OP NOTE (RAD/CATH)
IR SUPRAPUBIC TUBE Procedure Note    PATIENT NAME: Fariba Camacho [facility]  : 1950  MRN: [id number]    Pre-op Diagnosis:   1  Acute urinary retention    2  Thorne catheter problem, initial encounter ([facility] [address] )    3  H/O blood clots      Post-op Diagnosis:   1  Acute urinary retention    2  Thorne catheter problem, initial encounter ([facility] )    3  H/O blood clots        Surgeon:   Evelyn Teague MD    Estimated Blood Loss: 2 mL    Findings: Successful suprapubic catheter placement using 18 Fr Thorne catheter      Specimens: None    Complications:  None    Anesthesia: Local and Sepideh Hui MD     Date: 2019  Time: 4:50 PM

## 2019-11-26 NOTE — ANESTHESIA POSTPROCEDURE EVALUATION
Post-Op Assessment Note    CV Status:  Stable    Pain management: adequate     Mental Status:  Lethargic   Hydration Status:  Euvolemic   PONV Controlled:  Controlled   Airway Patency:  Patent   Post Op Vitals Reviewed: Yes      Staff: CRNA           BP   124/72   Temp   99 3   Pulse  70   Resp   20   SpO2   95

## 2019-11-26 NOTE — PROGRESS NOTES
Progress Note - Pamela Joe [facility] 68 y o  male MRN: [id number]    Unit/Bed#: S -01 Encounter: [id number]      Assessment:  Heladio Ryan is a 17-year-old male with dementia, neurogenic bladder, severe urethral stricture, history of self-induced traumatic removal of multiple tubes, admitted with catheter dislodgement from nursing home  Overnight observation revealed passive voiding but with significant bladder residuals  Unfortunately, patient is essentially nonverbal   He is afebrile and hemodynamically stable without leukocytosis  Creatinine between 1 4--1  3  Plan:  Discussed patient case at length with spouse and attending Dr Parker Chadwick  We have failed a trial to keep patient catheter free  However, long-term goal to improved quality of life and provide comfort can likely be achieved through placement of suprapubic tube  Spouse is agreeable to try     Obtain PTT and PT/INR  Will place consult for IR  Maintain NPO  Will continue to developed long-term care plan with spouse for nursing care of suprapubic tube prior to discharge  Subjective:   Unable secondary to cognitive impairment    Objective:     Vitals: Blood pressure (!) 177/81, pulse (!) 48, temperature 97 7 °F (36 5 °C), temperature source Axillary, resp  rate 18, height 5' 11" (1 803 m), weight 101 kg (222 lb 0 1 oz), SpO2 95 %  ,Body mass index is 30 96 kg/m²        Intake/Output Summary (Last 24 hours) at 11/26/2019 1312  Last data filed at 11/25/2019 1937  Gross per 24 hour   Intake 220 ml   Output    Net 220 ml       Physical Exam: General appearance: alert  Head: Normocephalic, without obvious abnormality, atraumatic  Neck: no adenopathy, no carotid bruit, no JVD, supple, symmetrical, trachea midline and thyroid not enlarged, symmetric, no tenderness/mass/nodules  Lungs: diminished breath sounds  Heart: regular rate and rhythm, S1, S2 normal, no murmur, click, rub or gallop  Abdomen: soft, non-tender; bowel sounds normal; no masses,  no organomegaly  Extremities: extremities normal, warm and well-perfused; no cyanosis, clubbing, or edema  Pulses: 2+ and symmetric  Neurologic: Mental status: alertness: alert, orientation: person  No urinary drains     Invasive Devices     Peripheral Intravenous Line            Peripheral IV 11/25/19 Right Antecubital less than 1 day          Drain            Urethral Catheter Non-latex 20 Fr  50 days              Lab Results   Component Value Date    WBC 8 75 11/26/2019    HGB 14 4 11/26/2019    HCT 43 5 11/26/2019    MCV 91 11/26/2019     11/26/2019     Lab Results   Component Value Date    SODIUM 142 11/26/2019    K 3 6 11/26/2019     (H) 11/26/2019    CO2 27 11/26/2019    BUN 19 11/26/2019    CREATININE 1 36 (H) 11/26/2019    GLUC 107 11/26/2019    CALCIUM 8 3 11/26/2019       Lab, Imaging and other studies: I have personally reviewed pertinent reports

## 2019-11-26 NOTE — UTILIZATION REVIEW
Initial Clinical Review    Admission: Date/Time/Statement: 11/25/2019 1641 OBSERVATION   Orders Placed This Encounter   Procedures    Place in Observation (expected length of stay for this patient is less than two midnights)     Standing Status:   Standing     Number of Occurrences:   1     Order Specific Question:   Admitting Physician     Answer:   Miracle Joshi [[id number]]     Order Specific Question:   Level of Care     Answer:   Med Surg [[id number]]     ED Arrival Information     Expected Arrival Acuity Means of Arrival Escorted By Service Admission Type    - 11/25/2019 12:52 Urgent Ambulance [facility] EMS General Medicine Urgent    [address] [facility] PROBLEM        Chief Complaint   Patient presents with    Urinary Catheter Problem     Pt sent for eval by urology for catherter re-insertion per EMS  Assessment/Plan:  this is a 68year old male from SNF  to ED via ems  per urology admitted to observation due to urinary catheter dysfunction/urinary retention  Patient presents after his lucia was accidentally removed, has history of false passage  On exam has dementia  Able to pass some urine, condom catheter applied  Creatinine 1 45 with baseline of 1 2- 1 4     Bun 20  Urology consulted    Per urology has history of neurogenic bladder, patient with saturated brief, which indicates voiding  Monitor overnight  If patient demonstrates adequate bladder emptying, would be safest not to replace Lucia due to repeated episodes of self-induced Lucia trauma  If patient requires catheter insertion, could be taken to the OR for cystoscopy Lucia insertion, but more likely IR suprapubic tube  Wife does not want suprapubic  On 11/26-  Overnight observation revealed passive voiding but with significant bladder residuals  Discussed with family that  long-term goal to improved quality of life and provide comfort can likely be achieved through placement of suprapubic tube  Spouse is agreeable to try      Obtain PTT and PT/INR  Will place consult for IR    Maintain NPO    ED Triage Vitals   Temperature Pulse Respirations Blood Pressure SpO2   11/25/19 1300 11/25/19 1255 11/25/19 1255 11/25/19 1255 11/25/19 1255   98 1 °F (36 7 °C) 58 18 169/84 97 %      Temp Source Heart Rate Source Patient Position - Orthostatic VS BP Location FiO2 (%)   11/25/19 1300 11/25/19 1255 11/25/19 1255 11/25/19 1255 --   Oral Monitor Lying Right arm       Pain Score       11/25/19 1845       6        Wt Readings from Last 1 Encounters:   11/25/19 101 kg (222 lb 0 1 oz)     Additional Vital Signs:   11/26/19 0700  97 7 °F (36 5 °C)  48Abnormal   18  177/81Abnormal   117  95 %  None (Room air)  Lying   11/25/19 2224  97 8 °F (36 6 °C)  57  18  153/84  113  93 %  None (Room air)  Lying   11/25/19 1729  98 1 °F (36 7 °C)  61  18  161/85  117  96 %  None (Room air)  Lying   11/25/19 1500    60  18  193/91Abnormal     95 %  None (Room air)         Pertinent Labs/Diagnostic Test Results: no imaging  Results from last 7 days   Lab Units 11/26/19  0530 11/25/19  1625   WBC Thousand/uL 8 75 8 63   HEMOGLOBIN g/dL 14 4 16 1   HEMATOCRIT % 43 5 48 7   PLATELETS Thousands/uL 245 275   NEUTROS ABS Thousands/µL 5 71 6 02     Results from last 7 days   Lab Units 11/26/19  0530 11/25/19  1625   SODIUM mmol/L 142 147*   POTASSIUM mmol/L 3 6 3 8   CHLORIDE mmol/L 109* 110*   CO2 mmol/L 27 29   ANION GAP mmol/L 6 8   BUN mg/dL 19 20   CREATININE mg/dL 1 36* 1 45*   EGFR ml/min/1 73sq m 52 48   CALCIUM mg/dL 8 3 8 4     Results from last 7 days   Lab Units 11/26/19  0530 11/25/19  1625   GLUCOSE RANDOM mg/dL 107 110       ED Treatment:   Medication Administration from 11/25/2019 1252 to 11/25/2019 1726     None        Past Medical History:   Diagnosis Date    Anxiety     Aortic aneurysm (HCC)     Aphasia     Ataxia     Bladder adhesions     BPH (benign prostatic hypertrophy)     COPD [facility]chronic obstructive pulmonary disease) ([facility] [address] )     wife denies - "never had"    Dementia ([place] [address] )     Difficulty walking     Essential (primary) hypertension     Generalized anxiety disorder     GERD (gastroesophageal reflux disease)     Global aphasia     H/O blood clots     High blood pressure     History of kidney stones     Kidney stones     Mental disorder     Muscle weakness     Neuromuscular dysfunction of bladder     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 ([place] [address] )     Retention of urine, unspecified     Stroke ([place] [address] )     Thrombosis     Urinary retention     Urinary tract bacterial infections     Urinary tract infection      Present on Admission:   Retention of urine   Essential hypertension   Aphasia   CKD (chronic kidney disease)      Admitting Diagnosis: Acute urinary retention [R33 8]  Thorne catheter problem ([place] [address] ) [J20  9XXA]  Thorne catheter problem, initial encounter ([place] [address] ) Wallace Jacqueline  9XXA]  Age/Sex: 68 y o  male  Admission Orders: 11/25/2019 1641 OBSERVATION  Scheduled Medications:  Medications:  amLODIPine 5 mg Oral BID   busPIRone 7 5 mg Oral TID   docusate sodium 100 mg Oral BID   famotidine 20 mg Oral Daily   metoprolol tartrate 12 5 mg Oral BID   nystatin  Topical BID   polyethylene glycol 17 g Oral Daily   potassium chloride 10 mEq Oral BID   QUEtiapine 25 mg Oral HS     Continuous IV Infusions: none     PRN Meds:  acetaminophen 650 mg Oral Q6H PRN   acetaminophen 650 mg Oral Q8H PRN   HYDROmorphone 0 5 mg Intravenous Q4H PRN   ondansetron 4 mg Intravenous Q6H PRN   oxyCODONE - used x 1  5 mg Oral Q6H PRN     IP CONSULT TO UROLOGY      Network Utilization Review Department  [email or web address]  ATTENTION: Please call with any questions or concerns to [phone] and carefully listen to the prompts so that you are directed to the right person   All voicemails are confidential   Jacqueline Henderson all requests for admission clinical reviews, approved or denied determinations and any other requests to dedicated fax number below belonging to the campus where the patient is receiving treatment    FACILITY NAME UR FAX NUMBER   ADMISSION DENIALS (Administrative/Medical Necessity) [address] [facility] (Maternity/NICU/Pediatrics) [phone]   [facility] [id number] [place] [address] [address] [facility] [phone]   [address] [facility] [address] [facility] [phone]   Rigoberto Monge [address] [facility] [address] [place] [address] [phone]

## 2019-11-26 NOTE — CONSULTS
Interventional Radiology  Consultation 11/26/2019     History of Present Illness:  68year old male with severe urethral stricture and neurogenic bladder is referred for suprapubic catheter placement      Past Medical History:   Diagnosis Date    Anxiety     Aortic aneurysm (HCC)     Aphasia     Ataxia     Bladder adhesions     BPH (benign prostatic hypertrophy)     COPD [facility]ch[facility]) ([facility])     wife denies - "never had"    Dementia ([facility] [address] )     Difficulty walking     Essential (primary) hypertension     Generalized anxiety disorder     GERD (gastroesophageal reflux disease)     Global aphasia     H/O blood clots     High blood pressure     History of kidney stones     Kidney stones     Mental disorder     Muscle weakness     Neuromuscular dysfunction of bladder     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 ([facility] [address] )     Retention of urine, unspecified     Stroke ([facility] [address] )     Thrombosis     Urinary retention     Urinary tract bacterial infections     Urinary tract infection         Past Surgical History:   Procedure Laterality Date    BOTOX INJECTION N/A 6/18/2019    Procedure: INJECTION BOTULINUM TOXIN (BOTOX), intra detrusor;  Surgeon: Oscar Fields MD;  [facility];  Service: Urology    BOTOX INJECTION N/A 10/7/2019    Procedure: INJECTION BOTULINUM TOXIN (BOTOX), intradetrusor;  Surgeon: Oscar Fields MD;  [facility];  Service: Urology    CYSTOSCOPY      CYSTOSCOPY N/A 6/18/2019    Procedure: cystoscopy and all indicated procedures;  Surgeon: Oscar Fields MD;  [facility];  Service: Urology    FL CYSTOGRAM  1/15/2019    IVC FILTER INSERTION      X2    IVC FILTER INSERTION      x2    KIDNEY STONE SURGERY      KNEE SURGERY      Sepsis infection     NEPHROSTOMY      SD CYSTO/URETERO W/LITHOTRIPSY &INDWELL STENT INSRT Right 6/14/2017    Procedure: CYSTOSCOPY URETEROSCOPY WITH LITHOTRIPSY HOLMIUM LASER,,BASKET STONE EXTRACTION, RETROGRADE PYELOGRAM AND INSERTION STENT URETERAL;  Surgeon: Marquis Luis Eduardo MD;  [facility]: [facility];  Service: Urology    WA CYSTOURETHROSCOPY,BIOPSY N/A 1/15/2019    Procedure: CYSTOSCOPY, CYSTOGRAM, DILATION OF URETHRAL STRICTURE;  Surgeon: Shama Sears MD;  [facility]: [facility];  Service: Urology    URINARY SURGERY          Social History     Tobacco Use   Smoking Status Never Smoker   Smokeless Tobacco Never Used        Social History     Substance and Sexual Activity   Alcohol Use No        Social History     Substance and Sexual Activity   Drug Use No        Allergies   Allergen Reactions    Gentamycin [Gentamicin] Anaphylaxis    Vancomycin Anaphylaxis    Zosyn [Piperacillin Sod-Tazobactam So] Anaphylaxis     However, has tolerated Ertapenem, Cefdinir, and Cefepime, which all have different side chains   Avoid Penicillins and the following Cephs with similar side chains (Cephalexin, Cefadroxil, Cefaclor, Cefprozil, or  Cefoxitin)    Clindamycin Itching    Nsaids Other (See Comments)     Nephrotic Syndrome    Sulfa Antibiotics Rash    Other Rash     antipersperents  Stat lock from lucia catheter       Current Facility-Administered Medications   Medication Dose Route Frequency Provider Last Rate Last Dose    acetaminophen (TYLENOL) tablet 650 mg  650 mg Oral Q6H PRN Irish Suresh PA-C        acetaminophen (TYLENOL) tablet 650 mg  650 mg Oral Q8H PRN Irish Suresh PA-C        amLODIPine (NORVASC) tablet 5 mg  5 mg Oral BID Irish Suresh PA-C   5 mg at 11/25/19 1840    busPIRone (BUSPAR) tablet 7 5 mg  7 5 mg Oral TID Irish Suresh PA-C   7 5 mg at 11/25/19 2123    docusate sodium (COLACE) capsule 100 mg  100 mg Oral BID Irish Suresh PA-C        famotidine (PEPCID) tablet 20 mg  20 mg Oral Daily Irish Suresh PA-C        HYDROmorphone (DILAUDID) injection 0 5 mg  0 5 mg Intravenous Q4H PRN Irish MARTIN HONORIO Suresh        metoprolol tartrate (LOPRESSOR) partial tablet 12 5 mg  12 5 mg Oral BID Irish Suresh PA-C   12 5 mg at 11/25/19 1840    nystatin (MYCOSTATIN) powder   Topical BID Juan Suresh PA-C   1 application at 75/59/88 2124    ondansetron (ZOFRAN) injection 4 mg  4 mg Intravenous Q6H PRN Irish Suresh PA-C        oxyCODONE (ROXICODONE) IR tablet 5 mg  5 mg Oral Q6H PRN Juan Suresh PA-C   5 mg at 11/25/19 1845    polyethylene glycol (MIRALAX) packet 17 g  17 g Oral Daily Irish Suresh PA-C        potassium chloride (K-DUR,KLOR-CON) CR tablet 10 mEq  10 mEq Oral BID Irish Suresh PA-C   10 mEq at 11/25/19 1840    QUEtiapine (SEROquel) tablet 25 mg  25 mg Oral HS Irish Suresh PA-C   25 mg at 11/25/19 2123      Objective:    Vitals:    11/25/19 1500 11/25/19 1729 11/25/19 2224 11/26/19 0700   BP: (!) 193/91 161/85 153/84 (!) 177/81   BP Location: Right arm Left arm Left arm Left arm   Pulse: 60 61 57 (!) 48   Resp: 18 18 18 18   Temp:  98 1 °F (36 7 °C) 97 8 °F (36 6 °C) 97 7 °F (36 5 °C)   TempSrc:  Oral Oral Axillary   SpO2: 95% 96% 93% 95%   Weight:  101 kg (222 lb 0 1 oz)     Height:  5' 11" (1 803 m)          Physical Exam    Gen: NAD    Lab Results   Component Value Date    WBC 8 75 11/26/2019    HGB 14 4 11/26/2019    HCT 43 5 11/26/2019    MCV 91 11/26/2019     11/26/2019     Lab Results   Component Value Date    INR 1 07 11/26/2019    INR 1 20 (H) 07/08/2019    INR 1 04 04/21/2019    PROTIME 13 3 11/26/2019    PROTIME 14 6 (H) 07/08/2019    PROTIME 13 3 04/21/2019     Lab Results   Component Value Date    PTT 26 11/26/2019     I have personally reviewed pertinent imaging and laboratory results  Assessment/Plan:  - Suprapubic catheter placement  This procedure has been fully reviewed with the patient and written informed consent has been obtained

## 2019-11-26 NOTE — ASSESSMENT & PLAN NOTE
· Bladder scans  Chronic Thorne was removed on day of admission and remains out  · Patient continues to retain  · Hold fluids to prevent exacerbation  · Wife concerned that patient will retain excessive urine overnight and damage his bladder  She reports this has happened in the past   · Wife initially refused suprapubic catheter but after speaking with Urology, is agreeable

## 2019-11-26 NOTE — ASSESSMENT & PLAN NOTE
· Patient's Thorne came out while staff [place] [facility]  · Follows with Urology, Dr Lizett Royal  · Requires guide wire to reinsert Thorne catheter  · Urology assessed in ED  Recommended observation over night to see if patient requires catheter  Ideally, could discharge without catheter if he demonstrates adequate voiding  Otherwise, he will need OR tomorrow for guidewire reinsertion vs suprapubic catheter    · Urology recommending suprapubic catheter, order for IR consult has been placed  · PTT and PT/INR pending  · NPO after midnight  · Do not give fluids  · Bladder scans

## 2019-11-26 NOTE — SEDATION DOCUMENTATION
Here for suprapubic catheter placement with anesthesia  On IR table, padded, Haydee huggviji hannonics

## 2019-11-26 NOTE — ASSESSMENT & PLAN NOTE
· Follows with Nephrology    Baseline appears 1 2-1 4     · He is currently at baseline however may anticipate some rise if urinary retention worsens

## 2019-11-26 NOTE — TELEPHONE ENCOUNTER
Called and spoke with Alfonso Barrera at this time  She is distressed as the patient's catheter has not been replaced yet  Alfonso Barrera requesting if there is anything the office can do at this time  Informed her I can route our conversation to the on call providers so they are aware on the back end about the patient  Bashir agreeable to this plan  She reports she just wants her  out of pain  She reports he does leak urine, but doesn't empty his bladder as his 'last two bladder scans were over 500mL ' She states he has had a chronic catheter since 2016 and he 'needs to have one replaced '    Will route to on call providers

## 2019-11-26 NOTE — TELEPHONE ENCOUNTER
Patient of Dr Nav Pavon seen in [facility]  Patient is in [facility] ER and wife is extremely upset  Patient is having pain and she says noone is taking care of her   He is in pain and filled with urine since last night at hospital   Please call Anita Awan at [phone]

## 2019-11-26 NOTE — ANESTHESIA PREPROCEDURE EVALUATION
Review of Systems/Medical History  Patient summary reviewed  Chart reviewed  No history of anesthetic complications     Cardiovascular  Hypertension , Aortic disease (ectatic ascending aorta - to be followed by cardiology), DVT (hx IVC filter)   Pulmonary  Negative pulmonary ROS COPD ,   Comment: Wife reports no hx aspiration/PNAs     GI/Hepatic  Negative GI/hepatic ROS   GERD ,        Kidney stones, Chronic kidney disease stage 2,   Comment: Bladder tumor  Chronic lucia     Endo/Other  Negative endo/other ROS      GYN       Hematology    Coagulation disorder (eliquis held 4 days) currently taking oral anticoagulants,    Musculoskeletal  Negative musculoskeletal ROS        Neurology    CVA ,   Comment: Aphasic/encephalopathic/does not follow commands following cardiac arrest/hypoxic brain injury 2010 Psychology   Anxiety,              Physical Exam    Airway    Mallampati score: II  TM Distance: >3 FB  Neck ROM: full     Dental       Cardiovascular      Pulmonary      Other Findings        Anesthesia Plan  ASA Score- 3     Anesthesia Type- IV sedation with anesthesia with ASA Monitors  Additional Monitors:   Airway Plan:         Plan Factors-    Induction- intravenous  Postoperative Plan-     Informed Consent- Anesthetic plan and risks discussed with patient  I personally reviewed this patient with the CRNA  Discussed and agreed on the Anesthesia Plan with the CRNA  William Crandall

## 2019-11-26 NOTE — PLAN OF CARE
Problem: PAIN - ADULT  Goal: Verbalizes/displays adequate comfort level or baseline comfort level  Description  Interventions:  - Encourage patient to monitor pain and request assistance  - Assess pain using appropriate pain scale  - Administer analgesics based on type and severity of pain and evaluate response  - Implement non-pharmacological measures as appropriate and evaluate response  - Consider cultural and social influences on pain and pain management  - Notify physician/advanced practitioner if interventions unsuccessful or patient reports new pain  Outcome: Progressing     Problem: Knowledge Deficit  Goal: Patient/family/caregiver demonstrates understanding of disease process, treatment plan, medications, and discharge instructions  Description  Complete learning assessment and assess knowledge base    Interventions:  - Provide teaching at level of understanding  - Provide teaching via preferred learning methods  Outcome: Progressing

## 2019-11-27 ENCOUNTER — APPOINTMENT (EMERGENCY)
Dept: CT IMAGING | Facility: HOSPITAL | Age: 69
DRG: 698 | End: 2019-11-27
Payer: MEDICARE

## 2019-11-27 ENCOUNTER — HOSPITAL ENCOUNTER (INPATIENT)
Facility: HOSPITAL | Age: 69
LOS: 4 days | Discharge: NURSING, REHAB OR LONG-TERM CARE | DRG: 698 | End: 2019-12-01
Attending: EMERGENCY MEDICINE | Admitting: INTERNAL MEDICINE
Payer: MEDICARE

## 2019-11-27 ENCOUNTER — TELEPHONE (OUTPATIENT)
Dept: CASE MANAGEMENT | Facility: HOSPITAL | Age: 69
End: 2019-11-27

## 2019-11-27 VITALS
HEIGHT: 71 IN | OXYGEN SATURATION: 92 % | SYSTOLIC BLOOD PRESSURE: 136 MMHG | BODY MASS INDEX: 31.08 KG/M2 | RESPIRATION RATE: 18 BRPM | HEART RATE: 95 BPM | DIASTOLIC BLOOD PRESSURE: 79 MMHG | TEMPERATURE: 98.7 F | WEIGHT: 222 LBS

## 2019-11-27 DIAGNOSIS — N39.0 UTI (URINARY TRACT INFECTION): Primary | ICD-10-CM

## 2019-11-27 DIAGNOSIS — K63.9 BOWEL TROUBLE: ICD-10-CM

## 2019-11-27 DIAGNOSIS — Z93.59 SUPRAPUBIC CATHETER (HCC): ICD-10-CM

## 2019-11-27 DIAGNOSIS — K59.00 CONSTIPATION: ICD-10-CM

## 2019-11-27 DIAGNOSIS — R11.10 VOMITING: ICD-10-CM

## 2019-11-27 PROBLEM — E87.5 HYPERKALEMIA: Status: ACTIVE | Noted: 2019-11-27

## 2019-11-27 LAB
ALBUMIN SERPL BCP-MCNC: 2.7 G/DL (ref 3.5–5)
ALP SERPL-CCNC: 96 U/L (ref 46–116)
ALT SERPL W P-5'-P-CCNC: 6 U/L (ref 12–78)
ANION GAP SERPL CALCULATED.3IONS-SCNC: 10 MMOL/L (ref 4–13)
ANION GAP SERPL CALCULATED.3IONS-SCNC: 7 MMOL/L (ref 4–13)
APTT PPP: 31 SECONDS (ref 23–37)
APTT PPP: 40 SECONDS (ref 23–37)
AST SERPL W P-5'-P-CCNC: 12 U/L (ref 5–45)
BACTERIA UR QL AUTO: ABNORMAL /HPF
BASOPHILS # BLD AUTO: 0.07 THOUSANDS/ΜL (ref 0–0.1)
BASOPHILS # BLD MANUAL: 0 THOUSAND/UL (ref 0–0.1)
BASOPHILS NFR BLD AUTO: 0 % (ref 0–1)
BASOPHILS NFR MAR MANUAL: 0 % (ref 0–1)
BILIRUB DIRECT SERPL-MCNC: 0.24 MG/DL (ref 0–0.2)
BILIRUB SERPL-MCNC: 0.67 MG/DL (ref 0.2–1)
BILIRUB UR QL STRIP: NEGATIVE
BUN SERPL-MCNC: 27 MG/DL (ref 5–25)
BUN SERPL-MCNC: 37 MG/DL (ref 5–25)
CALCIUM SERPL-MCNC: 8.5 MG/DL (ref 8.3–10.1)
CALCIUM SERPL-MCNC: 9 MG/DL (ref 8.3–10.1)
CHLORIDE SERPL-SCNC: 107 MMOL/L (ref 100–108)
CHLORIDE SERPL-SCNC: 109 MMOL/L (ref 100–108)
CLARITY UR: ABNORMAL
CO2 SERPL-SCNC: 25 MMOL/L (ref 21–32)
CO2 SERPL-SCNC: 28 MMOL/L (ref 21–32)
COLOR UR: YELLOW
CREAT SERPL-MCNC: 1.76 MG/DL (ref 0.6–1.3)
CREAT SERPL-MCNC: 1.88 MG/DL (ref 0.6–1.3)
EOSINOPHIL # BLD AUTO: 0 THOUSAND/ΜL (ref 0–0.61)
EOSINOPHIL # BLD MANUAL: 0 THOUSAND/UL (ref 0–0.4)
EOSINOPHIL NFR BLD AUTO: 0 % (ref 0–6)
EOSINOPHIL NFR BLD MANUAL: 0 % (ref 0–6)
ERYTHROCYTE [DISTWIDTH] IN BLOOD BY AUTOMATED COUNT: 14.2 % (ref 11.6–15.1)
ERYTHROCYTE [DISTWIDTH] IN BLOOD BY AUTOMATED COUNT: 14.5 % (ref 11.6–15.1)
GFR SERPL CREATININE-BSD FRML MDRD: 35 ML/MIN/1.73SQ M
GFR SERPL CREATININE-BSD FRML MDRD: 38 ML/MIN/1.73SQ M
GLUCOSE SERPL-MCNC: 125 MG/DL (ref 65–140)
GLUCOSE SERPL-MCNC: 159 MG/DL (ref 65–140)
GLUCOSE UR STRIP-MCNC: NEGATIVE MG/DL
HCT VFR BLD AUTO: 45.3 % (ref 36.5–49.3)
HCT VFR BLD AUTO: 48.4 % (ref 36.5–49.3)
HGB BLD-MCNC: 15.1 G/DL (ref 12–17)
HGB BLD-MCNC: 16 G/DL (ref 12–17)
HGB UR QL STRIP.AUTO: ABNORMAL
IMM GRANULOCYTES # BLD AUTO: 0.08 THOUSAND/UL (ref 0–0.2)
IMM GRANULOCYTES NFR BLD AUTO: 0 % (ref 0–2)
INR PPP: 1.28 (ref 0.84–1.19)
INR PPP: 1.55 (ref 0.84–1.19)
KETONES UR STRIP-MCNC: NEGATIVE MG/DL
LACTATE SERPL-SCNC: 1.5 MMOL/L (ref 0.5–2)
LEUKOCYTE ESTERASE UR QL STRIP: ABNORMAL
LIPASE SERPL-CCNC: 56 U/L (ref 73–393)
LYMPHOCYTES # BLD AUTO: 0.54 THOUSAND/UL (ref 0.6–4.47)
LYMPHOCYTES # BLD AUTO: 0.59 THOUSANDS/ΜL (ref 0.6–4.47)
LYMPHOCYTES # BLD AUTO: 3 % (ref 14–44)
LYMPHOCYTES NFR BLD AUTO: 3 % (ref 14–44)
MCH RBC QN AUTO: 29.8 PG (ref 26.8–34.3)
MCH RBC QN AUTO: 30.1 PG (ref 26.8–34.3)
MCHC RBC AUTO-ENTMCNC: 33.1 G/DL (ref 31.4–37.4)
MCHC RBC AUTO-ENTMCNC: 33.3 G/DL (ref 31.4–37.4)
MCV RBC AUTO: 90 FL (ref 82–98)
MCV RBC AUTO: 91 FL (ref 82–98)
MONOCYTES # BLD AUTO: 0.63 THOUSAND/ΜL (ref 0.17–1.22)
MONOCYTES # BLD AUTO: 0.72 THOUSAND/UL (ref 0–1.22)
MONOCYTES NFR BLD AUTO: 3 % (ref 4–12)
MONOCYTES NFR BLD: 4 % (ref 4–12)
NEUTROPHILS # BLD AUTO: 18.1 THOUSANDS/ΜL (ref 1.85–7.62)
NEUTROPHILS # BLD MANUAL: 16.85 THOUSAND/UL (ref 1.85–7.62)
NEUTS BAND NFR BLD MANUAL: 3 % (ref 0–8)
NEUTS SEG NFR BLD AUTO: 90 % (ref 43–75)
NEUTS SEG NFR BLD AUTO: 94 % (ref 43–75)
NITRITE UR QL STRIP: POSITIVE
NON-SQ EPI CELLS URNS QL MICRO: ABNORMAL /HPF
NRBC BLD AUTO-RTO: 0 /100 WBCS
NRBC BLD AUTO-RTO: 0 /100 WBCS
PH UR STRIP.AUTO: 6 [PH]
PLATELET # BLD AUTO: 289 THOUSANDS/UL (ref 149–390)
PLATELET # BLD AUTO: 292 THOUSANDS/UL (ref 149–390)
PLATELET BLD QL SMEAR: ADEQUATE
PMV BLD AUTO: 9.3 FL (ref 8.9–12.7)
PMV BLD AUTO: 9.3 FL (ref 8.9–12.7)
POTASSIUM SERPL-SCNC: 3.9 MMOL/L (ref 3.5–5.3)
POTASSIUM SERPL-SCNC: 4 MMOL/L (ref 3.5–5.3)
PROT SERPL-MCNC: 7.1 G/DL (ref 6.4–8.2)
PROT UR STRIP-MCNC: ABNORMAL MG/DL
PROTHROMBIN TIME: 15.3 SECONDS (ref 11.6–14.5)
PROTHROMBIN TIME: 17.8 SECONDS (ref 11.6–14.5)
RBC # BLD AUTO: 5.06 MILLION/UL (ref 3.88–5.62)
RBC # BLD AUTO: 5.31 MILLION/UL (ref 3.88–5.62)
RBC #/AREA URNS AUTO: ABNORMAL /HPF
SODIUM SERPL-SCNC: 142 MMOL/L (ref 136–145)
SODIUM SERPL-SCNC: 144 MMOL/L (ref 136–145)
SP GR UR STRIP.AUTO: 1.01 (ref 1–1.03)
TOTAL CELLS COUNTED SPEC: 100
UROBILINOGEN UR QL STRIP.AUTO: 0.2 E.U./DL
WBC # BLD AUTO: 18.12 THOUSAND/UL (ref 4.31–10.16)
WBC # BLD AUTO: 19.47 THOUSAND/UL (ref 4.31–10.16)
WBC #/AREA URNS AUTO: ABNORMAL /HPF

## 2019-11-27 PROCEDURE — 80076 HEPATIC FUNCTION PANEL: CPT | Performed by: EMERGENCY MEDICINE

## 2019-11-27 PROCEDURE — 85610 PROTHROMBIN TIME: CPT | Performed by: PHYSICIAN ASSISTANT

## 2019-11-27 PROCEDURE — 85730 THROMBOPLASTIN TIME PARTIAL: CPT | Performed by: PHYSICIAN ASSISTANT

## 2019-11-27 PROCEDURE — 81001 URINALYSIS AUTO W/SCOPE: CPT | Performed by: EMERGENCY MEDICINE

## 2019-11-27 PROCEDURE — 80048 BASIC METABOLIC PNL TOTAL CA: CPT | Performed by: PHYSICIAN ASSISTANT

## 2019-11-27 PROCEDURE — 87040 BLOOD CULTURE FOR BACTERIA: CPT | Performed by: EMERGENCY MEDICINE

## 2019-11-27 PROCEDURE — 99285 EMERGENCY DEPT VISIT HI MDM: CPT | Performed by: EMERGENCY MEDICINE

## 2019-11-27 PROCEDURE — 83690 ASSAY OF LIPASE: CPT | Performed by: EMERGENCY MEDICINE

## 2019-11-27 PROCEDURE — 99223 1ST HOSP IP/OBS HIGH 75: CPT | Performed by: PHYSICIAN ASSISTANT

## 2019-11-27 PROCEDURE — 85007 BL SMEAR W/DIFF WBC COUNT: CPT | Performed by: EMERGENCY MEDICINE

## 2019-11-27 PROCEDURE — 99217 PR OBSERVATION CARE DISCHARGE MANAGEMENT: CPT | Performed by: PHYSICIAN ASSISTANT

## 2019-11-27 PROCEDURE — 96361 HYDRATE IV INFUSION ADD-ON: CPT

## 2019-11-27 PROCEDURE — 74176 CT ABD & PELVIS W/O CONTRAST: CPT

## 2019-11-27 PROCEDURE — 83605 ASSAY OF LACTIC ACID: CPT | Performed by: EMERGENCY MEDICINE

## 2019-11-27 PROCEDURE — 87181 SC STD AGAR DILUTION PER AGT: CPT | Performed by: EMERGENCY MEDICINE

## 2019-11-27 PROCEDURE — 87086 URINE CULTURE/COLONY COUNT: CPT | Performed by: EMERGENCY MEDICINE

## 2019-11-27 PROCEDURE — 85025 COMPLETE CBC W/AUTO DIFF WBC: CPT | Performed by: PHYSICIAN ASSISTANT

## 2019-11-27 PROCEDURE — 84145 PROCALCITONIN (PCT): CPT | Performed by: EMERGENCY MEDICINE

## 2019-11-27 PROCEDURE — 87077 CULTURE AEROBIC IDENTIFY: CPT | Performed by: EMERGENCY MEDICINE

## 2019-11-27 PROCEDURE — 80048 BASIC METABOLIC PNL TOTAL CA: CPT | Performed by: EMERGENCY MEDICINE

## 2019-11-27 PROCEDURE — 85027 COMPLETE CBC AUTOMATED: CPT | Performed by: EMERGENCY MEDICINE

## 2019-11-27 PROCEDURE — 36415 COLL VENOUS BLD VENIPUNCTURE: CPT | Performed by: EMERGENCY MEDICINE

## 2019-11-27 PROCEDURE — 85730 THROMBOPLASTIN TIME PARTIAL: CPT | Performed by: EMERGENCY MEDICINE

## 2019-11-27 PROCEDURE — 87186 SC STD MICRODIL/AGAR DIL: CPT | Performed by: EMERGENCY MEDICINE

## 2019-11-27 PROCEDURE — 96374 THER/PROPH/DIAG INJ IV PUSH: CPT

## 2019-11-27 PROCEDURE — 85610 PROTHROMBIN TIME: CPT | Performed by: EMERGENCY MEDICINE

## 2019-11-27 PROCEDURE — 99285 EMERGENCY DEPT VISIT HI MDM: CPT

## 2019-11-27 RX ORDER — METOPROLOL TARTRATE 5 MG/5ML
5 INJECTION INTRAVENOUS EVERY 6 HOURS PRN
Status: DISCONTINUED | OUTPATIENT
Start: 2019-11-27 | End: 2019-12-01 | Stop reason: HOSPADM

## 2019-11-27 RX ORDER — ONDANSETRON 2 MG/ML
4 INJECTION INTRAMUSCULAR; INTRAVENOUS ONCE
Status: COMPLETED | OUTPATIENT
Start: 2019-11-27 | End: 2019-11-27

## 2019-11-27 RX ORDER — POTASSIUM CHLORIDE 750 MG/1
10 TABLET, EXTENDED RELEASE ORAL 2 TIMES DAILY
Status: DISCONTINUED | OUTPATIENT
Start: 2019-11-30 | End: 2019-11-27 | Stop reason: HOSPADM

## 2019-11-27 RX ORDER — OXYCODONE HYDROCHLORIDE 5 MG/1
5 TABLET ORAL EVERY 6 HOURS PRN
Qty: 20 TABLET | Refills: 0
Start: 2019-11-27 | End: 2019-11-27

## 2019-11-27 RX ORDER — OXYCODONE HYDROCHLORIDE 5 MG/1
5 TABLET ORAL EVERY 6 HOURS PRN
Qty: 20 TABLET | Refills: 0
Start: 2019-11-27 | End: 2019-12-01 | Stop reason: HOSPADM

## 2019-11-27 RX ORDER — OXYCODONE HYDROCHLORIDE 5 MG/1
5 TABLET ORAL EVERY 6 HOURS PRN
Qty: 30 TABLET | Refills: 0
Start: 2019-11-27 | End: 2019-11-27

## 2019-11-27 RX ORDER — POTASSIUM CHLORIDE 750 MG/1
10 TABLET, EXTENDED RELEASE ORAL DAILY
Qty: 60 TABLET | Refills: 0
Start: 2019-11-27 | End: 2019-11-27 | Stop reason: SDUPTHER

## 2019-11-27 RX ORDER — SODIUM CHLORIDE, SODIUM LACTATE, POTASSIUM CHLORIDE, CALCIUM CHLORIDE 600; 310; 30; 20 MG/100ML; MG/100ML; MG/100ML; MG/100ML
100 INJECTION, SOLUTION INTRAVENOUS CONTINUOUS
Status: DISCONTINUED | OUTPATIENT
Start: 2019-11-27 | End: 2019-11-27 | Stop reason: HOSPADM

## 2019-11-27 RX ORDER — LACTULOSE 20 G/30ML
30 SOLUTION ORAL ONCE
Status: COMPLETED | OUTPATIENT
Start: 2019-11-27 | End: 2019-11-28

## 2019-11-27 RX ORDER — POTASSIUM CHLORIDE 750 MG/1
10 TABLET, EXTENDED RELEASE ORAL 2 TIMES DAILY
Qty: 60 TABLET | Refills: 0
Start: 2019-11-27 | End: 2020-03-19 | Stop reason: HOSPADM

## 2019-11-27 RX ADMIN — ONDANSETRON 4 MG: 2 INJECTION INTRAMUSCULAR; INTRAVENOUS at 21:22

## 2019-11-27 RX ADMIN — METOPROLOL TARTRATE 12.5 MG: 25 TABLET ORAL at 09:18

## 2019-11-27 RX ADMIN — AMLODIPINE BESYLATE 5 MG: 5 TABLET ORAL at 09:17

## 2019-11-27 RX ADMIN — POLYETHYLENE GLYCOL 3350 17 G: 17 POWDER, FOR SOLUTION ORAL at 09:17

## 2019-11-27 RX ADMIN — NYSTATIN: 100000 POWDER TOPICAL at 09:45

## 2019-11-27 RX ADMIN — SODIUM CHLORIDE 1000 ML: 0.9 INJECTION, SOLUTION INTRAVENOUS at 21:22

## 2019-11-27 RX ADMIN — CEFEPIME HYDROCHLORIDE 1000 MG: 1 INJECTION, POWDER, FOR SOLUTION INTRAMUSCULAR; INTRAVENOUS at 23:31

## 2019-11-27 RX ADMIN — POTASSIUM CHLORIDE 10 MEQ: 10 TABLET, EXTENDED RELEASE ORAL at 09:18

## 2019-11-27 RX ADMIN — SODIUM CHLORIDE, SODIUM LACTATE, POTASSIUM CHLORIDE, AND CALCIUM CHLORIDE 100 ML/HR: .6; .31; .03; .02 INJECTION, SOLUTION INTRAVENOUS at 09:18

## 2019-11-27 RX ADMIN — FAMOTIDINE 20 MG: 20 TABLET, FILM COATED ORAL at 09:18

## 2019-11-27 RX ADMIN — BUSPIRONE HYDROCHLORIDE 7.5 MG: 5 TABLET ORAL at 09:21

## 2019-11-27 NOTE — ASSESSMENT & PLAN NOTE
· Note: hemolyzed sample  Very slight  · Will defer patient's dose of potassium today and tomorrow    · Will request repeat BMP to be performed by facility

## 2019-11-27 NOTE — ASSESSMENT & PLAN NOTE
· This was not present on admission, however creatinine elevation was expected due to severe urinary retention, now alleviated by suprapubic catheter  · Patient with bladder scan of only 10 cc residual   Good urine output from suprapubic  · Patient received several hours of IV fluid prior to discharge  · Will request outpatient facility to repeat BMP in 3-5 days to assess kidney function  No need for further monitoring as an inpatient as the cause creatinine elevation is known and corrected

## 2019-11-27 NOTE — TREATMENT PLAN
Kaveh Huang is an unfortunate 27-year-old male with profound dementia, neurogenic bladder, chronic Thorne catheter, severe urethral stricture and difficult catheterization requiring monthly X cystoscopic Thorne catheter exchanges  He presented [facility] with the dislodgement of catheter from skilled nursing facility  Patient was in overflow incontinence and kept overnight  However, bladder scans were consistent with high residuals  After discussion of risks versus benefits and potential complications, family was agreeable to proceed with suprapubic tube insertion  Patient is postprocedure day 0 interventional radiology suprapubic tube insertion  Situation is compounded by patient's impulsivity and multiple instances of self-induced traumatic drain removals  Plan:  Maintain suprapubic tube to straight drainage  Abdominal binder must be in place at all times  Patient may be discharged at the discretion of Internal Medicine  Our service will contact patient to schedule both re-evaluation and for suprapubic tube exchange in office  No further  intervention is indicated during this hospital stay  Refer to AVS for information on cystotomy care  [address] may continue to flush as prior  Prescription provided for irrigant  Our service will sign off

## 2019-11-27 NOTE — TRANSPORTATION MEDICAL NECESSITY
Section I - General Information    Name of Patient: Ahmet Jorge [facility]                 : 1950    Medicare #: [id number]  Transport Date: 19 (PCS is valid for round trips on this date and for all repetitive trips in the 60-day range as noted below )  Origin: [address]: [address]  Is the pt's stay covered under Medicare Part A (PPS/DRG)   []     Closest appropriate facility? If no, why is transport to more distant facility required? Yes  If hospice pt, is this transport related to pt's terminal illness? NA       Section II - Medical Necessity Questionnaire  Ambulance transportation is medically necessary only if other means of transport are contraindicated or would be potentially harmful to the patient  To meet this requirement, the patient must either be "bed confined" or suffer from a condition such that transport by means other than ambulance is contraindicated by the patient's condition  The following questions must be answered by the medical professional signing below for this form to be valid:    1)  Describe the MEDICAL CONDITION (physical and/or mental) of this patient AT [address] [place] [address] that requires the patient to be transported in an ambulance and why transport by other means is contraindicated by the patient's condition: Patient is bed bound, no verbal, unable to maintain seat position, confused, lethargic at times,     2) Is the patient "bed confined" as defined below? Yes  To be "be confined" the patient must satisfy all three of the following conditions: (1) unable to get up from bed without Assistance; AND (2) unable to ambulate; AND (3) unable to sit in a chair or wheelchair  3) Can this patient safely be transported by car or wheelchair van (i e , seated during transport without a medical attendant or monitoring)?    No    4) In addition to completing questions 1-3 above, please check any of the following conditions that apply*:   *Note: supporting documentation for any boxes checked must be maintained in the patient's medical records  If hosp-hosp transfer, describe services needed at 2nd facility not available at 1st facility? Patient is confused  Medical attendant required   Special handling/isolation/infection control precautions required   Unable to tolerate seated position for time needed to transport       Section III - Signature of Physician or Healthcare Professional  I certify that the above information is true and correct based on my evaluation of this patient, and represent that the patient requires transport by ambulance and that other forms of transport are contraindicated  I understand that this information will be used by the Centers for Medicare and Medicaid Services (CMS) to support the determination of medical necessity for ambulance services, and I represent that I have personal knowledge of the patient's condition at time of transport  []  If this box is checked, I also certify that the patient is physically or mentally incapable of signing the ambulance service's claim and that the institution with which I am affiliated has furnished care, services, or assistance to the patient  My signature below is made on behalf of the patient pursuant to 42 CFR §424 36(b)(4)  In accordance with 42 CFR §424 37, the specific reason(s) that the patient is physically or mentally incapable of signing the claim form is as follows: Confused        Signature of Physician* or Healthcare Professional______________________________________________________________  Signature Date 11/27/19 (For scheduled repetitive transports, this form is not valid for transports performed more than 60 days after this date)    Printed Name & Credentials of Physician or Healthcare Professional (MD, DO, RN, etc )________________________________  *Form must be signed by patient's attending physician for scheduled, repetitive transports   For non-repetitive, unscheduled ambulance transports, if unable to obtain the signature of the attending physician, any of the following may sign (choose appropriate option below)  [] Physician Assistant []  Clinical Nurse Specialist [x]  Registered Nurse  []  Nurse Practitioner  [x] Discharge Planner

## 2019-11-27 NOTE — ASSESSMENT & PLAN NOTE
· Follows with Nephrology    Baseline appears 1 2-1 4     · He presented at baseline however we did anticipate some rise due to urinary retention

## 2019-11-27 NOTE — SOCIAL WORK
LOS 2 days  Patient is not a bundle or a readmission  Patient is a resident at [facility]  DCP for patient to return  Patient is cleared for d/c today  JUAN s/w Perla Soto at [facility] and patient was set up for a 115pm BLS transport via [facility] to [facility]  AND [place] [facility] today  GIOVANNI Louise, nurse Nemesio Wooten, wife Jhoana Brown, and Lacey at [facility] all aware

## 2019-11-27 NOTE — TELEPHONE ENCOUNTER
Melina Ricks is a 66-year-old male known to Dr Rani Dutton for neurogenic bladder, chronic Thorne severe urethral stricture requiring monthly catheter exchanges by cystoscopy with guidewire  Patient was admitted to [facility] after catheter dislodgement  Family was agreeable to pursue IR inserted suprapubic tube  Patient is now status post SP tube  He has a pending appointment with Dr Debbie Harrison scheduled for 12/3  Please cancel this appointment and reschedule an appointment for 6 weeks from this date and for suprapubic tube exchange and for re-evaluation with APC

## 2019-11-27 NOTE — DISCHARGE SUMMARY
Discharge- Yonatan Messer [facility] 1950, 68 y o  male MRN: [id number]  Unit/Bed#: S -01 Encounter: [id number]  Primary Care Provider: Yane Lu MD   Date and time admitted to hospital: 11/25/2019 12:52 PM    Suprapubic catheter [facility])  Assessment & Plan  · Patient's suprapubic catheter placed 11/26/2019  · Keep abdominal binding to prevent hampering  · Instructions for catheter care placed by Urology an after visit summary  · Urology will contact patient for follow-up    * Urinary catheter dysfunction [facility])  Assessment & Plan  · Patient's Thorne came out while staff cleaned him at [facility] 11/25/2019  · Follows with Urology, Dr Lisa Sanchez  · Requires guide wire to reinsert Thorne catheter  · Urology assessed in ED  Recommended observation over night to see if patient required catheter  · Patient had high residuals, therefore was brought to ED for suprapubic catheter  · Urology discussed with wife that suprapubic catheter would be better for patient than Thorne, long-term   · Procedure successful  · Patient now with good urine output through suprapubic catheter, low postvoid residual  · Urology has cleared for discharge  They will contact patient for follow-up    Retention of urine  Assessment & Plan  · Resolved S/P suprapubic catheter placement   · Prior to catheter placement, patient was observed overnight without a Thorne and it was determined that he does require catheterization    CKD (chronic kidney disease)  Assessment & Plan  · Follows with Nephrology    Baseline appears 1 2-1 4     · He presented at baseline however we did anticipate some rise due to urinary retention    Essential hypertension  Assessment & Plan  · Continue home medications    History of DVT (deep vein thrombosis)  Assessment & Plan  · Can restart Eliquis which was held for suprapubic catheter placement    Aphasia  Assessment & Plan  · Patient is nonverbal at baseline  · Unable to communicate times of pain    Acute kidney injury [facility])  Assessment & Plan  · This was not present on admission, however creatinine elevation was expected due to severe urinary retention, now alleviated by suprapubic catheter  · Patient with bladder scan of only 10 cc residual   Good urine output from suprapubic  · Patient received several hours of IV fluid prior to discharge  · Will request outpatient facility to repeat BMP in 3-5 days to assess kidney function  No need for further monitoring as an inpatient as the cause creatinine elevation is known and corrected  Discharging Physician / Practitioner: Garth Comer PA-C  PCP: Jasper Zimmerman MD  Admission Date:   Admission Orders (From admission, onward)     Ordered        11/25/19 1641  Place in Observation (expected length of stay for this patient is less than two midnights)  Once                   Discharge Date: 11/27/19    Resolved Problems  Date Reviewed: 11/27/2019    None          Consultations During Hospital Stay:  · Case management, Urology, IR    Procedures Performed:   · Suprapubic catheter placement on 11/26/2019    Significant Findings / Test Results:   · None    Incidental Findings:   · Patient had significant urinary retention without catheter     Test Results Pending at Discharge (will require follow up): · None     Outpatient Tests Requested:  · None    Complications:  None    Reason for Admission:  Catheter dysfunction    Hospital Course:     Maribeth Gambino is a 68 y o  male nonverbal patient with severe dementia and neurogenic bladder with frequent catheterization dysfunction who originally presented to the hospital on 11/25/2019 due to catheter being pulled out accidentally at [facility]  He follows with Urology as an outpatient is notoriously difficult to catheterize usually requiring guidewire  He was evaluated by Urology service in the emergency department  The catheter was kept out overnight with frequent bladder scans to see if patient would retain    His Eliquis was initially held however this can be restarted at discharge  As patient did retain urine, decision was made to bring to OR and placed suprapubic catheter  This required long discussion between Neurology patient's wife and she was concerned that patient would pull out any abdominal tubing as he has done in the past   Ultimately, decision made to proceed with suprapubic catheter with precautions such as abdominal binder afterwards to prevent tampering as it seems it is best for patient long-term  Patient tolerated procedure well  After suprapubic catheter, he is low residuals and high urine output  He did develop a slight kidney injury, however this is expected given urinary retention  With given fluids prior to discharge  Instructions given to SNF to recheck BMP  Patient not on nephrotoxin medications  Medically cleared for discharge back to SNF at this time  Please see above list of diagnoses and related plan for additional information  Condition at Discharge: stable     Discharge Day Visit / Exam:     Subjective:  Patient is nonverbal and therefore ROS unavailable  Vitals: Blood Pressure: 136/79 (11/27/19 0700)  Pulse: 95 (11/27/19 0700)  Temperature: 98 7 °F (37 1 °C) (11/27/19 0700)  Temp Source: Axillary (11/27/19 0700)  Respirations: 18 (11/27/19 0700)  Height: 5' 11" (180 3 cm) (11/25/19 1729)  Weight - Scale: 101 kg (222 lb 0 1 oz) (11/25/19 1729)  SpO2: 92 % (11/27/19 0700)  Exam:   Physical Exam   Constitutional: He appears well-developed and well-nourished  No distress  HENT:   Head: Normocephalic and atraumatic  Mouth/Throat: No oropharyngeal exudate  Eyes: Right eye exhibits no discharge  Left eye exhibits no discharge  No scleral icterus  Neck: No JVD present  No tracheal deviation present  No thyromegaly present  Cardiovascular: Regular rhythm  Exam reveals no gallop and no friction rub  No murmur heard    Pulmonary/Chest: Effort normal and breath sounds normal  No respiratory distress  He has no wheezes  He has no rales  He exhibits no tenderness  Abdominal: Soft  Bowel sounds are normal  He exhibits no distension  There is no tenderness  There is no rebound  Abdominal binder in place  Genitourinary:   Genitourinary Comments: Suprapubic catheter in place, draining clear yellow urine   Musculoskeletal: He exhibits no edema, tenderness or deformity  Skin: Skin is warm and dry  He is not diaphoretic  No erythema  No pallor  Psychiatric: He has a normal mood and affect  His behavior is normal        Discussion with Family:  Discussed with patient  Phone call made to patient's wife, Juan Luis Murillo  Discharge instructions/Information to patient and family:   See after visit summary for information provided to patient and family  Provisions for Follow-Up Care:  See after visit summary for information related to follow-up care and any pertinent home health orders  Disposition:     Other: [facility] [address] [facility]:   · Not Applicable to this Patient - Not Applicable to this Patient    Planned Readmission:  None     Discharge Statement:  I spent 45 minutes discharging the patient  This time was spent on the day of discharge  I had direct contact with the patient on the day of discharge  Greater than 50% of the total time was spent examining patient, answering all patient questions, arranging and discussing plan of care with patient as well as directly providing post-discharge instructions  Additional time then spent on discharge activities  Discharge Medications:  See after visit summary for reconciled discharge medications provided to patient and family        ** Please Note: This note has been constructed using a voice recognition system **

## 2019-11-27 NOTE — ASSESSMENT & PLAN NOTE
· Patient's Thorne came out while staff cleaned [place] [facility] [place]25[place]  · Follows with Urology, Dr Louise Henriquez  · Requires guide wire to reinsert Thorne catheter  · Urology assessed in ED  Recommended observation over night to see if patient required catheter  · Patient had high residuals, therefore was brought to ED for suprapubic catheter  · Urology discussed with wife that suprapubic catheter would be better for patient than Thorne, long-term   · Procedure successful  · Patient now with good urine output through suprapubic catheter, low postvoid residual  · Urology has cleared for discharge    They will contact patient for follow-up

## 2019-11-27 NOTE — ASSESSMENT & PLAN NOTE
· Patient's suprapubic catheter placed 11/26/2019    · Keep abdominal binding to prevent hampering  · Instructions for catheter care placed by Urology an after visit summary  · Urology will contact patient for follow-up

## 2019-11-27 NOTE — ASSESSMENT & PLAN NOTE
· Resolved S/P suprapubic catheter placement   · Prior to catheter placement, patient was observed overnight without a Thorne and it was determined that he does require catheterization

## 2019-11-27 NOTE — UTILIZATION REVIEW
Continued Stay Review    Date: 11/27/2019                         Current Patient Class: observation  Current Level of Care: med surg    HPI:69 y o  male initially admitted on 11/25/2019 to observation due to urinary catheter dysfunction/urinary retention  Patient presents after his lucia was accidentally removed, has history of false passage,severe urethral stricture and difficult catheterization requiring monthly X cystoscopic Lucia catheter exchanges   Has profound  dementia  Per urology has history of neurogenic bladder, patient with saturated brief, which indicates voiding  Monitor overnight on 11/25  If patient demonstrates adequate bladder emptying, would be safest not to replace Lucia due to repeated episodes of self-induced Lucia trauma  If patient requires catheter insertion, could be taken to the OR for cystoscopy Lucia insertion, but more likely IR suprapubic tube  On 11/26 decided need suprapubic      11/26/2019 Procedure  IR- suprapubic    Assessment/Plan:  Patient is post op day one of suprapubic catheter  Abdominal binder must be in place at all times  Creatinine elevated and IVF in progress       Pertinent Labs/Diagnostic Results:   11/26/2019 IR - Successful ultrasound-guided suprapubic catheter placement  Results from last 7 days   Lab Units 11/27/19 0628 11/26/19 0530 11/25/19  1625   WBC Thousand/uL 19 47* 8 75 8 63   HEMOGLOBIN g/dL 16 0 14 4 16 1   HEMATOCRIT % 48 4 43 5 48 7   PLATELETS Thousands/uL 292 245 275   NEUTROS ABS Thousands/µL 18 10* 5 71 6 02         Results from last 7 days   Lab Units 11/27/19 0628 11/26/19  0530 11/25/19  1625   SODIUM mmol/L 144 142 147*   POTASSIUM mmol/L 4 0 3 6 3 8   CHLORIDE mmol/L 109* 109* 110*   CO2 mmol/L 25 27 29   ANION GAP mmol/L 10 6 8   BUN mg/dL 27* 19 20   CREATININE mg/dL 1 76* 1 36* 1 45*   EGFR ml/min/1 73sq m 38 52 48   CALCIUM mg/dL 8 5 8 3 8 4     Results from last 7 days   Lab Units 11/27/19 0628 11/26/19 0530 11/25/19  1625 GLUCOSE RANDOM mg/dL 159* 107 110     Results from last 7 days   Lab Units 11/27/19  0628 11/26/19  1429   PROTIME seconds 15 3* 13 3   INR  1 28* 1 07   PTT seconds 31 26     Vital Signs:   11/27/19 0700  98 7 °F (37 1 °C)  95  18  136/79  102  92 %  None (Room air       Medications:   Scheduled Medications:  Medications:  amLODIPine 5 mg Oral BID   busPIRone 7 5 mg Oral TID   docusate sodium 100 mg Oral BID   famotidine 20 mg Oral Daily   metoprolol tartrate 12 5 mg Oral BID   nystatin  Topical BID   polyethylene glycol 17 g Oral Daily   potassium chloride 10 mEq Oral BID   QUEtiapine 25 mg Oral HS     Continuous IV Infusions:  lactated ringers 100 mL/hr Intravenous Continuous     PRN Meds:  acetaminophen 650 mg Oral Q6H PRN   acetaminophen 650 mg Oral Q8H PRN   HYDROmorphone 0 5 mg Intravenous Q4H PRN   Ondansetron - used x 1 11/26 4 mg Intravenous Q6H PRN   oxyCODONE 5 mg Oral Q6H PRN       Discharge Plan: resident of [facility]    Network Utilization Review Department  [email or web address]  ATTENTION: Please call with any questions or concerns to [phone] and carefully listen to the prompts so that you are directed to the right person  All voicemails are confidential   Nataliya Avendaño all requests for admission clinical reviews, approved or denied determinations and any other requests to dedicated fax number below belonging to the campus where the patient is receiving treatment    FACILITY NAME UR FAX NUMBER   ADMISSION DENIALS (Administrative/Medical Necessity) [phone]   PARENT CHILD HEALTH (Maternity/NICU/Pediatrics) [phone]   [facility] [address] [facility] [address] [place] [address] [address] [place] [phone]

## 2019-11-27 NOTE — PLAN OF CARE
Problem: PAIN - ADULT  Goal: Verbalizes/displays adequate comfort level or baseline comfort level  Description  Interventions:  - Encourage patient to monitor pain and request assistance  - Assess pain using appropriate pain scale  - Administer analgesics based on type and severity of pain and evaluate response  - Implement non-pharmacological measures as appropriate and evaluate response  - Consider cultural and social influences on pain and pain management  - Notify physician/advanced practitioner if interventions unsuccessful or patient reports new pain  Outcome: Progressing     Problem: Knowledge Deficit  Goal: Patient/family/caregiver demonstrates understanding of disease process, treatment plan, medications, and discharge instructions  Description  Complete learning assessment and assess knowledge base  Interventions:  - Provide teaching at level of understanding  - Provide teaching via preferred learning methods  Outcome: Progressing     Problem: Prexisting or High Potential for Compromised Skin Integrity  Goal: Skin integrity is maintained or improved  Description  INTERVENTIONS:  - Identify patients at risk for skin breakdown  - Assess and monitor skin integrity  - Assess and monitor nutrition and hydration status  - Monitor labs   - Assess for incontinence   - Turn and reposition patient  - Assist with mobility/ambulation  - Relieve pressure over bony prominences  - Avoid friction and shearing  - Provide appropriate hygiene as needed including keeping skin clean and dry  - Evaluate need for skin moisturizer/barrier cream  - Collaborate with interdisciplinary team   - Patient/family teaching  - Consider wound care consult   Outcome: Progressing     Problem: Potential for Falls  Goal: Patient will remain free of falls  Description  INTERVENTIONS:  - Assess patient frequently for physical needs  -  Identify cognitive and physical deficits and behaviors that affect risk of falls    -  [place] fall precautions as indicated by assessment   - Educate patient/family on patient safety including physical limitations  - Instruct patient to call for assistance with activity based on assessment  - Modify environment to reduce risk of injury  - Consider OT/PT consult to assist with strengthening/mobility  Outcome: Progressing     Problem: GENITOURINARY - ADULT  Goal: Maintains or returns to baseline urinary function  Description  INTERVENTIONS:  - Assess urinary function  - Encourage oral fluids to ensure adequate hydration if ordered  - Administer IV fluids as ordered to ensure adequate hydration  - Administer ordered medications as needed  - Offer frequent toileting  - Follow urinary retention protocol if ordered  Outcome: Progressing  Goal: Absence of urinary retention  Description  INTERVENTIONS:  - Assess patients ability to void and empty bladder  - Monitor I/O  - Bladder scan as needed  - Discuss with physician/AP medications to alleviate retention as needed  - Discuss catheterization for long term situations as appropriate  Outcome: Progressing  Goal: Urinary catheter remains patent  Description  INTERVENTIONS:  - Assess patency of urinary catheter  - If patient has a chronic lucia, consider changing catheter if non-functioning  - Follow guidelines for intermittent irrigation of non-functioning urinary catheter  Outcome: Progressing     Problem: METABOLIC, FLUID AND ELECTROLYTES - ADULT  Goal: Electrolytes maintained within normal limits  Description  INTERVENTIONS:  - Monitor labs and assess patient for signs and symptoms of electrolyte imbalances  - Administer electrolyte replacement as ordered  - Monitor response to electrolyte replacements, including repeat lab results as appropriate  - Instruct patient on fluid and nutrition as appropriate  Outcome: Progressing     Problem: METABOLIC, FLUID AND ELECTROLYTES - ADULT  Goal: Electrolytes maintained within normal limits  Description  INTERVENTIONS:  - Monitor labs and assess patient for signs and symptoms of electrolyte imbalances  - Administer electrolyte replacement as ordered  - Monitor response to electrolyte replacements, including repeat lab results as appropriate  - Instruct patient on fluid and nutrition as appropriate  Outcome: Progressing  Goal: Fluid balance maintained  Description  INTERVENTIONS:  - Monitor labs   - Monitor I/O and WT  - Instruct patient on fluid and nutrition as appropriate  - Assess for signs & symptoms of volume excess or deficit  Outcome: Progressing  Goal: Glucose maintained within target range  Description  INTERVENTIONS:  - Monitor Blood Glucose as ordered  - Assess for signs and symptoms of hyperglycemia and hypoglycemia  - Administer ordered medications to maintain glucose within target range  - Assess nutritional intake and initiate nutrition service referral as needed  Outcome: Progressing     Problem: SKIN/TISSUE INTEGRITY - ADULT  Goal: Skin integrity remains intact  Description  INTERVENTIONS  - Identify patients at risk for skin breakdown  - Assess and monitor skin integrity  - Assess and monitor nutrition and hydration status  - Monitor labs (i e  albumin)  - Assess for incontinence   - Turn and reposition patient  - Assist with mobility/ambulation  - Relieve pressure over bony prominences  - Avoid friction and shearing  - Provide appropriate hygiene as needed including keeping skin clean and dry  - Evaluate need for skin moisturizer/barrier cream  - Collaborate with interdisciplinary team (i e  Nutrition, Rehabilitation, etc )   - Patient/family teaching  Outcome: Progressing  Goal: Incision(s), wounds(s) or drain site(s) healing without S/S of infection  Description  INTERVENTIONS  - Assess and document risk factors for skin impairment   - Assess and document dressing, incision, wound bed, drain sites and surrounding tissue  - Consider nutrition services referral as needed  - Oral mucous membranes remain intact  - Provide patient/ family education  Outcome: Progressing  Goal: Oral mucous membranes remain intact  Description  INTERVENTIONS  - Assess oral mucosa and hygiene practices  - Implement preventative oral hygiene regimen  - Implement oral medicated treatments as ordered  - Initiate Nutrition services referral as needed  Outcome: Progressing     Problem: MUSCULOSKELETAL - ADULT  Goal: Maintain or return mobility to safest level of function  Description  INTERVENTIONS:  - Assess patient's ability to carry out ADLs; assess patient's baseline for ADL function and identify physical deficits which impact ability to perform ADLs (bathing, care of mouth/teeth, toileting, grooming, dressing, etc )  - Assess/evaluate cause of self-care deficits   - Assess range of motion  - Assess patient's mobility  - Assess patient's need for assistive devices and provide as appropriate  - Encourage maximum independence but intervene and supervise when necessary  - Involve family in performance of ADLs  - Assess for home care needs following discharge   - Consider OT consult to assist with ADL evaluation and planning for discharge  - Provide patient education as appropriate  Outcome: Progressing  Goal: Maintain proper alignment of affected body part  Description  INTERVENTIONS:  - Support, maintain and protect limb and body alignment  - Provide patient/ family with appropriate education  Outcome: Progressing

## 2019-11-27 NOTE — TELEPHONE ENCOUNTER
Cm called patient's wife to discuss return to [facility] today  Wife concerned that no script for oxycodone was sent with patient and he is unable to get mhis medication with out it  Cm discussed with Dr Richard Hairston who supplied CM script for oxycodone  Cm [phone]  Physical script to be sent this evening

## 2019-11-28 LAB
ALBUMIN SERPL BCP-MCNC: 2.3 G/DL (ref 3.5–5)
ALP SERPL-CCNC: 75 U/L (ref 46–116)
ALT SERPL W P-5'-P-CCNC: 6 U/L (ref 12–78)
ANION GAP SERPL CALCULATED.3IONS-SCNC: 8 MMOL/L (ref 4–13)
AST SERPL W P-5'-P-CCNC: 11 U/L (ref 5–45)
BASOPHILS # BLD AUTO: 0.03 THOUSANDS/ΜL (ref 0–0.1)
BASOPHILS NFR BLD AUTO: 0 % (ref 0–1)
BILIRUB SERPL-MCNC: 0.62 MG/DL (ref 0.2–1)
BUN SERPL-MCNC: 34 MG/DL (ref 5–25)
CALCIUM SERPL-MCNC: 8.6 MG/DL (ref 8.3–10.1)
CHLORIDE SERPL-SCNC: 111 MMOL/L (ref 100–108)
CO2 SERPL-SCNC: 25 MMOL/L (ref 21–32)
CREAT SERPL-MCNC: 1.64 MG/DL (ref 0.6–1.3)
EOSINOPHIL # BLD AUTO: 0.02 THOUSAND/ΜL (ref 0–0.61)
EOSINOPHIL NFR BLD AUTO: 0 % (ref 0–6)
ERYTHROCYTE [DISTWIDTH] IN BLOOD BY AUTOMATED COUNT: 14.4 % (ref 11.6–15.1)
GFR SERPL CREATININE-BSD FRML MDRD: 41 ML/MIN/1.73SQ M
GLUCOSE SERPL-MCNC: 102 MG/DL (ref 65–140)
HCT VFR BLD AUTO: 39.5 % (ref 36.5–49.3)
HGB BLD-MCNC: 12.8 G/DL (ref 12–17)
IMM GRANULOCYTES # BLD AUTO: 0.08 THOUSAND/UL (ref 0–0.2)
IMM GRANULOCYTES NFR BLD AUTO: 1 % (ref 0–2)
LYMPHOCYTES # BLD AUTO: 1.09 THOUSANDS/ΜL (ref 0.6–4.47)
LYMPHOCYTES NFR BLD AUTO: 7 % (ref 14–44)
MCH RBC QN AUTO: 29.4 PG (ref 26.8–34.3)
MCHC RBC AUTO-ENTMCNC: 32.4 G/DL (ref 31.4–37.4)
MCV RBC AUTO: 91 FL (ref 82–98)
MONOCYTES # BLD AUTO: 0.55 THOUSAND/ΜL (ref 0.17–1.22)
MONOCYTES NFR BLD AUTO: 4 % (ref 4–12)
NEUTROPHILS # BLD AUTO: 13.13 THOUSANDS/ΜL (ref 1.85–7.62)
NEUTS SEG NFR BLD AUTO: 88 % (ref 43–75)
NRBC BLD AUTO-RTO: 0 /100 WBCS
PLATELET # BLD AUTO: 230 THOUSANDS/UL (ref 149–390)
PMV BLD AUTO: 9.1 FL (ref 8.9–12.7)
POTASSIUM SERPL-SCNC: 3.8 MMOL/L (ref 3.5–5.3)
PROCALCITONIN SERPL-MCNC: 72.47 NG/ML
PROCALCITONIN SERPL-MCNC: 84.9 NG/ML
PROT SERPL-MCNC: 5.9 G/DL (ref 6.4–8.2)
RBC # BLD AUTO: 4.36 MILLION/UL (ref 3.88–5.62)
SODIUM SERPL-SCNC: 144 MMOL/L (ref 136–145)
WBC # BLD AUTO: 14.9 THOUSAND/UL (ref 4.31–10.16)

## 2019-11-28 PROCEDURE — 84145 PROCALCITONIN (PCT): CPT | Performed by: PHYSICIAN ASSISTANT

## 2019-11-28 PROCEDURE — 99232 SBSQ HOSP IP/OBS MODERATE 35: CPT | Performed by: INTERNAL MEDICINE

## 2019-11-28 PROCEDURE — 87081 CULTURE SCREEN ONLY: CPT | Performed by: PHYSICIAN ASSISTANT

## 2019-11-28 PROCEDURE — 99254 IP/OBS CNSLTJ NEW/EST MOD 60: CPT | Performed by: UROLOGY

## 2019-11-28 PROCEDURE — 99223 1ST HOSP IP/OBS HIGH 75: CPT | Performed by: INTERNAL MEDICINE

## 2019-11-28 PROCEDURE — 85025 COMPLETE CBC W/AUTO DIFF WBC: CPT | Performed by: PHYSICIAN ASSISTANT

## 2019-11-28 PROCEDURE — 87147 CULTURE TYPE IMMUNOLOGIC: CPT | Performed by: PHYSICIAN ASSISTANT

## 2019-11-28 PROCEDURE — 80053 COMPREHEN METABOLIC PANEL: CPT | Performed by: PHYSICIAN ASSISTANT

## 2019-11-28 RX ORDER — DOCUSATE SODIUM 100 MG/1
100 CAPSULE, LIQUID FILLED ORAL 2 TIMES DAILY
Status: DISCONTINUED | OUTPATIENT
Start: 2019-11-28 | End: 2019-12-01 | Stop reason: HOSPADM

## 2019-11-28 RX ORDER — AMLODIPINE BESYLATE 5 MG/1
5 TABLET ORAL 2 TIMES DAILY
Status: DISCONTINUED | OUTPATIENT
Start: 2019-11-28 | End: 2019-12-01 | Stop reason: HOSPADM

## 2019-11-28 RX ORDER — FAMOTIDINE 20 MG/1
20 TABLET, FILM COATED ORAL DAILY
Status: DISCONTINUED | OUTPATIENT
Start: 2019-11-28 | End: 2019-12-01 | Stop reason: HOSPADM

## 2019-11-28 RX ORDER — OXYCODONE HYDROCHLORIDE 5 MG/1
5 TABLET ORAL EVERY 6 HOURS PRN
Status: DISCONTINUED | OUTPATIENT
Start: 2019-11-28 | End: 2019-12-01 | Stop reason: HOSPADM

## 2019-11-28 RX ORDER — BUSPIRONE HYDROCHLORIDE 5 MG/1
7.5 TABLET ORAL 3 TIMES DAILY
Status: DISCONTINUED | OUTPATIENT
Start: 2019-11-28 | End: 2019-12-01 | Stop reason: HOSPADM

## 2019-11-28 RX ORDER — POTASSIUM CHLORIDE 750 MG/1
10 TABLET, EXTENDED RELEASE ORAL 2 TIMES DAILY
Status: DISCONTINUED | OUTPATIENT
Start: 2019-11-28 | End: 2019-12-01 | Stop reason: HOSPADM

## 2019-11-28 RX ORDER — SODIUM CHLORIDE, SODIUM LACTATE, POTASSIUM CHLORIDE, CALCIUM CHLORIDE 600; 310; 30; 20 MG/100ML; MG/100ML; MG/100ML; MG/100ML
75 INJECTION, SOLUTION INTRAVENOUS ONCE
Status: COMPLETED | OUTPATIENT
Start: 2019-11-28 | End: 2019-11-28

## 2019-11-28 RX ORDER — POLYETHYLENE GLYCOL 3350 17 G/17G
17 POWDER, FOR SOLUTION ORAL DAILY
Status: DISCONTINUED | OUTPATIENT
Start: 2019-11-28 | End: 2019-12-01 | Stop reason: HOSPADM

## 2019-11-28 RX ORDER — MEMANTINE HYDROCHLORIDE 5 MG/1
5 TABLET ORAL 2 TIMES DAILY
Status: DISCONTINUED | OUTPATIENT
Start: 2019-11-28 | End: 2019-12-01 | Stop reason: HOSPADM

## 2019-11-28 RX ORDER — QUETIAPINE FUMARATE 25 MG/1
25 TABLET, FILM COATED ORAL
Status: DISCONTINUED | OUTPATIENT
Start: 2019-11-28 | End: 2019-12-01 | Stop reason: HOSPADM

## 2019-11-28 RX ORDER — SODIUM CHLORIDE, SODIUM LACTATE, POTASSIUM CHLORIDE, CALCIUM CHLORIDE 600; 310; 30; 20 MG/100ML; MG/100ML; MG/100ML; MG/100ML
100 INJECTION, SOLUTION INTRAVENOUS CONTINUOUS
Status: DISCONTINUED | OUTPATIENT
Start: 2019-11-28 | End: 2019-11-28

## 2019-11-28 RX ORDER — GLYCERIN .002; .002; .01 MG/MG; MG/MG; MG/MG
1 SOLUTION/ DROPS OPHTHALMIC 2 TIMES DAILY
COMMUNITY

## 2019-11-28 RX ORDER — ACETAMINOPHEN 325 MG/1
650 TABLET ORAL EVERY 6 HOURS PRN
Status: DISCONTINUED | OUTPATIENT
Start: 2019-11-28 | End: 2019-12-01 | Stop reason: HOSPADM

## 2019-11-28 RX ORDER — ONDANSETRON 2 MG/ML
4 INJECTION INTRAMUSCULAR; INTRAVENOUS EVERY 6 HOURS PRN
Status: DISCONTINUED | OUTPATIENT
Start: 2019-11-28 | End: 2019-12-01 | Stop reason: HOSPADM

## 2019-11-28 RX ADMIN — DOCUSATE SODIUM 100 MG: 100 CAPSULE, LIQUID FILLED ORAL at 18:05

## 2019-11-28 RX ADMIN — BUSPIRONE HYDROCHLORIDE 7.5 MG: 5 TABLET ORAL at 18:06

## 2019-11-28 RX ADMIN — APIXABAN 2.5 MG: 2.5 TABLET, FILM COATED ORAL at 18:06

## 2019-11-28 RX ADMIN — QUETIAPINE FUMARATE 25 MG: 25 TABLET ORAL at 22:13

## 2019-11-28 RX ADMIN — AMLODIPINE BESYLATE 5 MG: 5 TABLET ORAL at 18:06

## 2019-11-28 RX ADMIN — APIXABAN 2.5 MG: 2.5 TABLET, FILM COATED ORAL at 09:56

## 2019-11-28 RX ADMIN — SODIUM CHLORIDE, SODIUM LACTATE, POTASSIUM CHLORIDE, AND CALCIUM CHLORIDE 100 ML/HR: .6; .31; .03; .02 INJECTION, SOLUTION INTRAVENOUS at 00:57

## 2019-11-28 RX ADMIN — CEFEPIME HYDROCHLORIDE 1000 MG: 1 INJECTION, POWDER, FOR SOLUTION INTRAMUSCULAR; INTRAVENOUS at 00:32

## 2019-11-28 RX ADMIN — DOCUSATE SODIUM 100 MG: 100 CAPSULE, LIQUID FILLED ORAL at 09:57

## 2019-11-28 RX ADMIN — MEMANTINE 5 MG: 5 TABLET ORAL at 18:06

## 2019-11-28 RX ADMIN — AMLODIPINE BESYLATE 5 MG: 5 TABLET ORAL at 00:56

## 2019-11-28 RX ADMIN — METOPROLOL TARTRATE 12.5 MG: 25 TABLET ORAL at 00:56

## 2019-11-28 RX ADMIN — POLYETHYLENE GLYCOL 3350 17 G: 17 POWDER, FOR SOLUTION ORAL at 10:05

## 2019-11-28 RX ADMIN — SODIUM CHLORIDE, SODIUM LACTATE, POTASSIUM CHLORIDE, AND CALCIUM CHLORIDE 75 ML/HR: .6; .31; .03; .02 INJECTION, SOLUTION INTRAVENOUS at 11:41

## 2019-11-28 RX ADMIN — CEFEPIME HYDROCHLORIDE 2000 MG: 2 INJECTION, POWDER, FOR SOLUTION INTRAVENOUS at 23:58

## 2019-11-28 RX ADMIN — QUETIAPINE FUMARATE 25 MG: 25 TABLET ORAL at 00:57

## 2019-11-28 RX ADMIN — METOPROLOL TARTRATE 12.5 MG: 25 TABLET ORAL at 18:06

## 2019-11-28 RX ADMIN — POTASSIUM CHLORIDE 10 MEQ: 750 TABLET, EXTENDED RELEASE ORAL at 09:56

## 2019-11-28 RX ADMIN — MEMANTINE 5 MG: 5 TABLET ORAL at 09:56

## 2019-11-28 RX ADMIN — POTASSIUM CHLORIDE 10 MEQ: 750 TABLET, EXTENDED RELEASE ORAL at 18:06

## 2019-11-28 RX ADMIN — BUSPIRONE HYDROCHLORIDE 7.5 MG: 5 TABLET ORAL at 22:13

## 2019-11-28 RX ADMIN — ACETAMINOPHEN 650 MG: 325 TABLET, FILM COATED ORAL at 00:56

## 2019-11-28 RX ADMIN — BUSPIRONE HYDROCHLORIDE 7.5 MG: 5 TABLET ORAL at 09:57

## 2019-11-28 RX ADMIN — LACTULOSE 20 G: 10 SOLUTION ORAL at 00:01

## 2019-11-28 RX ADMIN — FAMOTIDINE 20 MG: 20 TABLET, FILM COATED ORAL at 09:57

## 2019-11-28 NOTE — ASSESSMENT & PLAN NOTE
· Fecal impaction noted on CT scan of the abdomen, noted that the patient also refused any medications for his bowel regimen as an inpatient  · Continue with his home medications including docusate, MiraLax  · Monitor for bowel movements  · If no BM, consult GI

## 2019-11-28 NOTE — ASSESSMENT & PLAN NOTE
SIRS criteria:  Fever of 101 at nursing home, leukocytosis   Patient was also discharged with leukocytosis present, however this was likely stress reaction  Suspected source:  Urine  Lactic acid: 1 5  End organ damage:  Acute kidney injury at 1 8  I received IV fluids overnight with improved renal function to 1 64  Infectious Disease input appreciated  Continue with  IV cefepime, renally dosed  Follow up on blood and urine cultures  Patient has intolerance to multiple antibiotics

## 2019-11-28 NOTE — H&P
H&P- [facility] 1950, 68 y o  male MRN: [id number]  Unit/Bed#: TR 30A Encounter: [id number]  Primary Care Provider: Aniya Borja MD   Date and time admitted to hospital: 11/27/2019  7:53 PM    UTI (urinary tract infection)  Assessment & Plan  · Urinalysis reveals blood, white blood cells, nitrite positive, leukocyte positive, innumerable bacteria  · Noted that the patient is a chronic colonizer, as well as has history of multidrug resistant organisms as well as multiple antibiotic intolerances  · Additionally, the patient had recent urologic procedure, suprapubic catheter placement  · Given severe sepsis with fever, leukocytosis will treat as true infection with IV cefepime  Follow up on urine culture results closely  · Consult Urology  · Consult Infectious Disease  Suprapubic catheter [facility])  Assessment & Plan  · Patient has a history of neurogenic bladder with Thorne catheter in place in the past, had recent hospitalization secondary to retention after repeating his Thorne out by accident  He is status post suprapubic catheter insertion yesterday by interventional Radiology  · Good output in suprapubic at this point, no gross hematuria noted  Some sediment is noted in the bag  · Strict intake and output  Dementia with behavioral disturbance [facility])  Assessment & Plan  · Supportive care, fall precautions  · Patient is nonverbal at baseline, unable to communicate any pain  Bowel trouble  Assessment & Plan  · Fecal impaction noted on CT scan of the abdomen, noted that the patient also refused any medications for his bowel regimen as an inpatient  · Continue with his home medications including docusate, MiraLax  · Monitor for bowel movements  · If no BM, consult GI      Essential hypertension  Assessment & Plan   Blood pressure elevated, did not take home medications at the nursing home   o Last recorded pressure: 198/96   Continue amlodipine and metoprolol with doses now  o Add lopressor PRN   Monitor blood pressure  Acute kidney injury [facility])  Assessment & Plan   Creatinine upon admission: 1 88  o Baseline: 1 2-1 4   Secondary to either postrenal from recent obstruction requiring suprapubic catheter for pre renal in the setting of severe sepsis and febrile illness   Treatment: IV fluids, abx, monitor I/O strictly   Monitor BMP  History of DVT (deep vein thrombosis)  Assessment & Plan  · Continue anticoagulation with Eliquis    Aphasia  Assessment & Plan  · Supportive care, fall precautions  · Continue home medications  * Severe sepsis [facility])  Assessment & Plan  SIRS criteria:  Fever of 101 at nursing home, leukocytosis   Patient was also discharged with leukocytosis present, however this was likely stress reaction  Suspected source:  Urine  Cannot rule out atelectasis versus infiltrate as noted on CT scan  Noted atelectasis can also cause low-grade temps  Lactic acid: 1 5  End organ damage:  Acute kidney injury at 1 8  IV Fluids:  Status post 1 L of IV fluids in the emergency department, continue with lactated Ringer's at 100 mL/hr   IV antibiotics:  IV cefepime, renally dosed  Follow up on culture results, procalcitonin  Monitor vital signs, laboratory studies  VTE Prophylaxis: Apixaban (Eliquis)  / sequential compression device   Code Status: LEVEL 3 DNR DNI  POLST: POLST form is not discussed and not completed at this time  Discussion with family: patient, wife at bedside     Anticipated Length of Stay:  Patient will be admitted on an Inpatient basis with an anticipated length of stay of greater than 2 midnights  Justification for Hospital Stay:  Severe sepsis, acute kidney injury, urinary tract infection    Total Time for Visit, including Counseling / Coordination of Care: 1 hour  Greater than 50% of this total time spent on direct patient counseling and coordination of care      Chief Complaint:   Fatigue, fever of 101    History of Present Illness:    Fox Dill Genoveva is a 68 y o  male who presents with fatigue as well as fever of 101 [place] [facility]  Patient had a recent hospitalization and was discharged earlier this morning  Briefly, the patient originally presented to the hospital after pulling at his catheter accidentally at the nursing facility  He was evaluated by Urology in the emergency department, he had frequent bladder scans done and the patient was retaining his urine  The decision was made at that time to take patient to the operating room and placed a suprapubic catheter  Abdominal binder was in place afterwards to prevent the patient from pulling out his catheter  He tolerated well, was discharged back to the nursing facility  He had low residuals and high urine output  Developed a slight acute kidney injury, but it was expected given urinary retention history  The patient was discharged earlier this morning to [facility], noted to have slept all day and been increasingly fatigued which is abnormal for him per wife, who is at bedside  Patient also had a fever of 101 at the nursing facility  One episode of vomiting, no episodes of coughing afterwards  He has not been coughing, having any changes in his stools  No medication changes made upon discharge      Review of Systems:    Review of Systems   Unable to perform ROS: Patient nonverbal       Past Medical and Surgical History:     Past Medical History:   Diagnosis Date    Acute kidney [facility] ) 10/21/2016    Anxiety     Aortic aneurysm ([facility]     Aphasia     Ataxia     Bladder adhesions     BPH (benign prostatic hypertrophy)     COPD (chronic obstructive pulmonary disease) ([facility] )     wife denies - "never had"    Dementia ([facility] )     Difficulty walking     Essential (primary) hypertension     Generalized anxiety disorder     GERD (gastroesophageal reflux disease)     Global aphasia     H/O blood clots     High blood pressure     History of kidney stones     Kidney stones     Mental disorder     Muscle weakness     Neuromuscular dysfunction of bladder     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 ([facility] [address] [facility]     Retention of urine, unspecified     Stroke ([facility]     Thrombosis     Urinary retention     Urinary tract bacterial infections     Urinary tract infection        Past Surgical History:   Procedure Laterality Date    BOTOX INJECTION N/A 6/18/2019    Procedure: INJECTION BOTULINUM TOXIN (BOTOX), intra detrusor;  Surgeon: Francine Collier MD;  [facility];  Service: Urology    BOTOX INJECTION N/A 10/7/2019    Procedure: INJECTION BOTULINUM TOXIN (BOTOX), intradetrusor;  Surgeon: Francine Collier MD;  [facility];  Service: Urology    CYSTOSCOPY      CYSTOSCOPY N/A 6/18/2019    Procedure: cystoscopy and all indicated procedures;  Surgeon: Francine Collier MD;  [facility];  Service: Urology    FL CYSTOGRAM  1/15/2019    IR SUPRAPUBIC TUBE  11/26/2019    IVC FILTER INSERTION      X2    IVC FILTER INSERTION      x2    KIDNEY STONE SURGERY      KNEE SURGERY      Sepsis infection     NEPHROSTOMY      NV CYSTO/URETERO W/LITHOTRIPSY &INDWELL STENT INSRT Right 6/14/2017    Procedure: CYSTOSCOPY URETEROSCOPY WITH LITHOTRIPSY HOLMIUM LASER,,BASKET STONE EXTRACTION, RETROGRADE PYELOGRAM AND INSERTION STENT URETERAL;  Surgeon: Hiram Pardo MD;  [facility];  Service: Urology    NV CYSTOURETHROSCOPY,BIOPSY N/A 1/15/2019    Procedure: CYSTOSCOPY, CYSTOGRAM, DILATION OF URETHRAL STRICTURE;  Surgeon: Francine Collier MD;  [facility];  Service: Urology    URINARY SURGERY         Meds/Allergies:    Prior to Admission medications    Medication Sig Start Date End Date Taking?  Authorizing Provider   acetaminophen (TYLENOL) 325 mg tablet Take 650 mg by mouth every 4 (four) hours as needed for mild pain     Historical Provider, MD   amLODIPine (NORVASC) 5 mg tablet Take 5 mg by mouth 2 (two) times a day     Historical Provider, MD   apixaban (ELIQUIS) 2 5 mg Take 2 5 mg by mouth 2 (two) times a day    Historical Provider, MD   busPIRone (BUSPAR) 7 5 mg tablet Take 7 5 mg by mouth 3 (three) times a day    Historical Provider, MD   Citric Xt-Vycgvrqmzwi-Mi Carb (RENACIDIN) SOLN Irrigate with 5 mg as directed 2 (two) times a day Use 1 application via irrigation every evening and night shift for suprapubic tube irrigation instill and clamp for 15 minutes  Contact Dr Gloria Eldridge office for refills   11/26/19 12/26/19  RICARDO Walden   docusate sodium (COLACE) 100 mg capsule Take 100 mg by mouth 2 (two) times a day    Historical Provider, MD   famotidine (PEPCID) 20 mg tablet Take 20 mg by mouth 2 (two) times a day    Historical Provider, MD   memantine (NAMENDA) 5 mg tablet Take 1 tablet by mouth 2 (two) times a day for 30 days  Patient taking differently: Take 5 mg by mouth 2 (two) times a day  11/11/16 10/3/19  Meir Denney MD   metoprolol tartrate (LOPRESSOR) 25 mg tablet Take 0 5 tablets (12 5 mg total) by mouth 2 (two) times a day 7/10/19 2/8/22  Emmanuel Breen MD   mirtazapine (REMERON) 15 mg tablet Take 1 tablet by mouth daily at bedtime for 30 days 3/1/17 10/3/19  Meir Denney MD   NYSTATIN powder Apply topically 2 (two) times a day   12/4/18   Historical Provider, MD   oxyCODONE (ROXICODONE) 5 mg immediate release tablet Take 1 tablet (5 mg total) by mouth every 6 (six) hours as needed for severe pain for up to 5 daysMax Daily Amount: 20 mg 11/27/19 12/2/19  Afshan Manley MD   polyethylene glycol (MIRALAX) 17 g packet Take 17 g by mouth daily 4/26/19   David Pillai MD   potassium chloride (K-DUR,KLOR-CON) 10 mEq tablet Take 1 tablet (10 mEq total) by mouth 2 (two) times a day 11/27/19   Irish A Imagene Spurling, PA-C   QUEtiapine (SEROquel) 25 mg tablet Take 25 mg by mouth daily at bedtime      Historical Provider, MD   tamsulosin (FLOMAX) 0 4 mg Take 1 capsule by mouth daily with dinner for 30 days 3/1/17 10/3/19  Radha Leigh MD   oxyCODONE (ROXICODONE) 5 mg immediate release tablet Take 1 tablet (5 mg total) by mouth every 6 (six) hours as needed for severe pain for up to 10 daysMax Daily Amount: 20 mg 11/27/19 11/27/19  Celina Gomez MD   oxyCODONE (ROXICODONE) 5 mg immediate release tablet Take 1 tablet (5 mg total) by mouth every 6 (six) hours as needed for severe pain for up to 5 daysMax Daily Amount: 20 mg 11/27/19 11/27/19  Celina Gomez MD   potassium chloride (K-DUR,KLOR-CON) 10 mEq tablet Take 1 tablet (10 mEq total) by mouth 2 (two) times a day  Patient taking differently: Take 10 mEq by mouth 3 (three) times a day  7/10/19 11/27/19  Luis E Caraballo MD   potassium chloride (K-DUR,KLOR-CON) 10 mEq tablet Take 1 tablet (10 mEq total) by mouth daily 11/27/19 11/27/19  Celina Gomez MD     I have reviewed home medications with patient personally  Allergies: Allergies   Allergen Reactions    Gentamycin [Gentamicin] Anaphylaxis    Vancomycin Anaphylaxis    Zosyn [Piperacillin Sod-Tazobactam So] Anaphylaxis     However, has tolerated Ertapenem, Cefdinir, and Cefepime, which all have different side chains   Avoid Penicillins and the following Cephs with similar side chains (Cephalexin, Cefadroxil, Cefaclor, Cefprozil, or  Cefoxitin)    Clindamycin Itching    Nsaids Other (See Comments)     Nephrotic Syndrome    Sulfa Antibiotics Rash    Other Rash     antipersperents  Stat lock from lucia catheter       Social History:     Marital Status: /Civil Union   Occupation: disabled   Patient Pre-hospital Living Situation: [facility]  Patient Pre-hospital Level of Mobility: bedbound  Patient Pre-hospital Diet Restrictions: none  Substance Use History:   Social History     Substance and Sexual Activity   Alcohol Use No     Social History     Tobacco Use   Smoking Status Never Smoker   Smokeless Tobacco Never Used     Social History     Substance and Sexual Activity   Drug Use No       Family History:    Family History   Problem Relation Age of Onset    Diabetes Father     Hypertension Father     Coronary artery disease Father     Cancer Father     Hypertension Mother        Physical Exam:     Vitals:   Blood Pressure: (!) 198/96 (11/27/19 2258)  Pulse: 94 (11/27/19 2258)  Temperature: 98 9 °F (37 2 °C) (11/27/19 1955)  Respirations: 18 (11/27/19 2258)  Weight - Scale: 102 kg (224 lb 13 9 oz) (11/27/19 1955)  SpO2: 98 % (11/27/19 2258)    Physical Exam   Constitutional: He appears well-developed and well-nourished  No distress  HENT:   Head: Normocephalic and atraumatic  Eyes: Pupils are equal, round, and reactive to light  No scleral icterus  Neck: Neck supple  Cardiovascular: Normal rate, regular rhythm and normal heart sounds  No murmur heard  Pulmonary/Chest: Effort normal and breath sounds normal  No respiratory distress  He has no wheezes  He has no rales  Abdominal: Soft  Bowel sounds are normal  He exhibits no distension  There is no tenderness  Abdominal binding in place   Musculoskeletal: He exhibits no deformity  Neurological: He is alert  GCS eye subscore is 4  GCS verbal subscore is 5  GCS motor subscore is 6  Nonverbal   Skin: Skin is warm and dry  Capillary refill takes less than 2 seconds  No rash noted  He is not diaphoretic  No erythema  No pallor  Psychiatric: He is noncommunicative  Nursing note and vitals reviewed  Additional Data:     Lab Results: I have personally reviewed pertinent reports        Results from last 7 days   Lab Units 11/27/19 2121 11/27/19  0628   WBC Thousand/uL 18 12* 19 47*   HEMOGLOBIN g/dL 15 1 16 0   HEMATOCRIT % 45 3 48 4   PLATELETS Thousands/uL 289 292   BANDS PCT % 3  --    NEUTROS PCT %  --  94*   LYMPHS PCT %  --  3*   LYMPHO PCT % 3*  --    MONOS PCT %  --  3*   MONO PCT % 4  --    EOS PCT % 0 0     Results from last 7 days   Lab Units 11/27/19 2121   SODIUM mmol/L 142   POTASSIUM mmol/L 3 9   CHLORIDE mmol/L 107   CO2 mmol/L 28   BUN mg/dL 37*   CREATININE mg/dL 1 88*   ANION GAP mmol/L 7   CALCIUM mg/dL 9 0   ALBUMIN g/dL 2 7*   TOTAL BILIRUBIN mg/dL 0 67   ALK PHOS U/L 96   ALT U/L 6*   AST U/L 12   GLUCOSE RANDOM mg/dL 125     Results from last 7 days   Lab Units 11/27/19  2230   INR  1 55*             Results from last 7 days   Lab Units 11/27/19  2230   LACTIC ACID mmol/L 1 5       Imaging: I have personally reviewed pertinent reports  CT abdomen pelvis wo contrast   Final Result by Izzy Marcano MD (11/27 2219)      1  Interval placement of suprapubic catheter  There is diffuse perivesical inflammatory fat stranding with scattered foci of gas in the bladder lumen  These findings are likely related to recent instrumentation, however correlate for superimposed    cystitis  2   Large volume of retained stool in the rectal vault compatible with fecal impaction  3   Cholelithiasis  4   Mild right basilar patchy opacity compatible with atelectasis or infiltrate  Workstation performed: [id number]             EKG, Pathology, and Other Studies Reviewed on Admission:   · None     Allscripts / Epic Records Reviewed: Yes     ** Please Note: This note has been constructed using a voice recognition system   **

## 2019-11-28 NOTE — ASSESSMENT & PLAN NOTE
 Creatinine upon admission: 1 88  o Baseline: 1 2-1 4   Secondary to either postrenal from recent obstruction requiring suprapubic catheter for pre renal in the setting of severe sepsis and febrile illness   Treatment: IV fluids, abx, monitor I/O strictly   Monitor BMP

## 2019-11-28 NOTE — ASSESSMENT & PLAN NOTE
· Patient has a history of neurogenic bladder with Thorne catheter in place in the past, had recent hospitalization secondary to retention after repeating his Thorne out by accident  He is status post suprapubic catheter insertion yesterday by interventional Radiology  · Good output in suprapubic at this point, no gross hematuria noted  Some sediment is noted in the bag  · Strict intake and output

## 2019-11-28 NOTE — CONSULTS
Consultation - Urology   Oneta Showers Genoveva 68 y o  male MRN: [id number]  Unit/Bed#: S -18 Encounter: [id number] 11/28/2019           Assessment/Plan      Assessment:  Sepsis-likely related to recent placement of suprapubic catheter  Bilateral kidney stones-he appears to be asymptomatic and there is no evidence of hydronephrosis  Plan:  Agree with present therapy  Antibiotics as per Infectious Disease  I do not anticipate any urologic surgical intervention will be necessary at this time  Outpatient follow-up can be arranged for further therapy of his kidney stones if this is desired  History of Present Illness   47-year-old male with dementia and neurogenic bladder initially maintained urologically with Thorne catheter which was changed monthly over a wire  The catheter recently fell out and the decision was made to place a suprapubic tube which was done at interventional Radiology on November 26  The patient was discharged recently only to return with fevers and evidence of sepsis  He has been started on intravenous antibiotic and is improving  He is nonverbal and unable to give any history  He appears comfortable  Urology has been consulted for further evaluation      Attending: Caitlyn Walters MD  Reason for Consult / Principal Problem:  Patient Active Problem List   Diagnosis    Aphasia    COPD [facility]ch[facility] obstructive [facility] disease) [facility] [address] [facility]    History of DVT (deep vein thrombosis)    Aneurysm of thoracic aorta ([facility] [address] [facility]    Retention of urine    Acute kidney injury ([address] )    Essential hypertension    Aphasia of unknown origin    CKD (chronic kidney disease)    GERD (gastroesophageal reflux disease)    HTN, goal below 140/90    Generalized anxiety disorder    H/O blood clots    Severe sepsis (HCC)    Anemia    Bowel trouble    Dementia with behavioral disturbance ([address] )    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    MSSA (methicillin susceptible Staphylococcus aureus) septicemia ([facility] [address] )    H/O urethral stricture    CKD (chronic kidney disease) stage 2, GFR 60-89 ml/min    Urinary catheter dysfunction (HCC)    Neurogenic bladder    Nephrolithiasis    Suprapubic catheter ([facility] [address] [facility]    UTI (urinary tract infection)       Inpatient consult to Urology  Consult performed by: Patricia Hjai MD  Consult ordered by: Hi Guevara PA-C          Review of Systems   Unable to perform ROS: Patient nonverbal       Historical Information   Allergies   Allergen Reactions    Gentamycin [Gentamicin] Anaphylaxis    Vancomycin Anaphylaxis    Zosyn [Piperacillin Sod-Tazobactam So] Anaphylaxis     However, has tolerated Ertapenem, Cefdinir, and Cefepime, which all have different side chains   Avoid Penicillins and the following Cephs with similar side chains (Cephalexin, Cefadroxil, Cefaclor, Cefprozil, or  Cefoxitin)    Clindamycin Itching    Nsaids Other (See Comments)     Nephrotic Syndrome    Sulfa Antibiotics Rash    Other Rash     antipersperents  Stat lock from lucia catheter     Past Medical History:   Diagnosis Date    Acute kidney injury ([facility] [address] ) 10/21/2016    Anxiety     Aortic aneurysm (HCC)     Aphasia     Ataxia     Bladder adhesions     BPH (benign prostatic hypertrophy)     COPD (chronic obstructive pulmonary disease) ([facility] [address] )     wife denies - "never had"    Dementia ([facility] [address] )     Difficulty walking     Essential (primary) hypertension     Generalized anxiety disorder     GERD (gastroesophageal reflux disease)     Global aphasia     H/O blood clots     High blood pressure     History of kidney stones     Kidney stones     Mental disorder     Muscle weakness     Neuromuscular dysfunction of bladder     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 ([address] )     Retention of urine, unspecified     Stroke ([facility] [address] )     Thrombosis     Urinary retention     Urinary tract bacterial infections     Urinary tract infection      Past Surgical History:   Procedure Laterality Date    BOTOX INJECTION N/A 6/18/2019    Procedure: INJECTION BOTULINUM TOXIN (BOTOX), intra detrusor;  Surgeon: Komal Pelayo MD;  [facility];  Service: Urology    BOTOX INJECTION N/A 10/7/2019    Procedure: INJECTION BOTULINUM TOXIN (BOTOX), intradetrusor;  Surgeon: Komal Pelayo MD;  [facility];  Service: Urology    CYSTOSCOPY      CYSTOSCOPY N/A 6/18/2019    Procedure: cystoscopy and all indicated procedures;  Surgeon: Komal Pelayo MD;  [facility];  Service: Urology    FL CYSTOGRAM  1/15/2019    IR SUPRAPUBIC TUBE  11/26/2019    IVC FILTER INSERTION      X2    IVC FILTER INSERTION      x2    KIDNEY STONE SURGERY      KNEE SURGERY      Sepsis infection     NEPHROSTOMY      LA CYSTO/URETERO W/LITHOTRIPSY &INDWELL STENT INSRT Right 6/14/2017    Procedure: CYSTOSCOPY URETEROSCOPY WITH LITHOTRIPSY HOLMIUM LASER,,BASKET STONE EXTRACTION, RETROGRADE PYELOGRAM AND INSERTION STENT URETERAL;  Surgeon: Thierno Gomez MD;  [facility];  Service: Urology    LA CYSTOURETHROSCOPY,BIOPSY N/A 1/15/2019    Procedure: CYSTOSCOPY, CYSTOGRAM, DILATION OF URETHRAL STRICTURE;  Surgeon: Komal Pelayo MD;  [facility];  Service: Urology    URINARY SURGERY       Social History   Social History     Substance and Sexual Activity   Alcohol Use No     Social History     Substance and Sexual Activity   Drug Use No     Social History     Tobacco Use   Smoking Status Never Smoker   Smokeless Tobacco Never Used     Family History: non-contributory    Meds/Allergies   all current active meds have been reviewed    Current Facility-Administered Medications:     acetaminophen (TYLENOL) tablet 650 mg, 650 mg, Oral, Q6H PRN, Cornell Pea, PA-C, 650 mg at 11/28/19 0056    amLODIPine (NORVASC) tablet 5 mg, 5 mg, Oral, BID, Cornell Pea, PA-C, 5 mg at 11/28/19 0056    apixaban (ELIQUIS) tablet 2 5 mg, 2 5 mg, Oral, BID, Cornell Pea, PA-C, 2 5 mg at 11/28/19 0956    busPIRone (BUSPAR) tablet 7 5 mg, 7 5 mg, Oral, TID, Ericka Christy PA-C, 7 5 mg at 11/28/19 0957    [COMPLETED] cefepime (MAXIPIME) 1,000 mg in dextrose 5 % 50 mL IVPB, 1,000 mg, Intravenous, Once, Last Rate: 100 mL/hr at 11/28/19 0032, 1,000 mg at 11/28/19 0032 **FOLLOWED BY** cefepime (MAXIPIME) 2 g/50 mL dextrose IVPB, 2,000 mg, Intravenous, Q24H, Ericka Christy PA-C    docusate sodium (COLACE) capsule 100 mg, 100 mg, Oral, BID, Ericka Christy PA-C, 100 mg at 11/28/19 0957    famotidine (PEPCID) tablet 20 mg, 20 mg, Oral, Daily, Ericka Christy PA-C, 20 mg at 11/28/19 0957    lactated ringers infusion, 75 mL/hr, Intravenous, Once, Tawana Castro MD    [facility]) tablet 5 mg, 5 mg, Oral, BID, Ericka Christy PA-C, 5 mg at 11/28/19 0956    metoprolol (LOPRESSOR) injection 5 mg, 5 mg, Intravenous, Q6H PRN, Ericka Christy PA-C    metoprolol tartrate (LOPRESSOR) partial tablet 12 5 mg, 12 5 mg, Oral, BID, Ericka Christy PA-C, 12 5 mg at 11/28/19 0056    ondansetron (ZOFRAN) injection 4 mg, 4 mg, Intravenous, Q6H PRN, Ericka Christy PA-C    oxyCODONE (ROXICODONE) IR tablet 5 mg, 5 mg, Oral, Q6H PRN, Ericka Christy PA-C    polyethylene glycol (MIRALAX) packet 17 g, 17 g, Oral, Daily, MAYELA Ford-C, 17 g at 11/28/19 1005    potassium chloride (K-DUR,KLOR-CON) CR tablet 10 mEq, 10 mEq, Oral, BID, Ericka Christy PA-C, 10 mEq at 11/28/19 0956    QUEtiapine (SEROquel) tablet 25 mg, 25 mg, Oral, HS, Ericka Christy PA-C, 25 mg at 11/28/19 0057    Current Facility-Administered Medications:  acetaminophen 650 mg Oral Q6H PRN Ericka Christy PA-C   amLODIPine 5 mg Oral BID Ericka Christy PA-C   apixaban 2 5 mg Oral BID Ericka Christy PA-C   busPIRone 7 5 mg Oral TID Ericka Christy PA-C   cefepime 2,000 mg Intravenous Q24H Ericka Christy PA-C   docusate sodium 100 mg Oral BID Ericka Christy PA-C   famotidine 20 mg Oral Daily Ericka Christy PA-C lactated ringers 75 mL/hr Intravenous Once John Davis MD   memantine 5 mg Oral BID Ellis Hernández PA-C   metoprolol 5 mg Intravenous Q6H PRN Ellis Hernández PA-C   metoprolol tartrate 12 5 mg Oral BID Ellis Hernández PA-C   ondansetron 4 mg Intravenous Q6H PRN MAYELA Meyer-DEBRA   oxyCODONE 5 mg Oral Q6H PRN Ellis Hernández PA-C   polyethylene glycol 17 g Oral Daily MAYELA Meyer-DEBRA   potassium chloride 10 mEq Oral BID MAYELA Meyer-DEBRA   QUEtiapine 25 mg Oral HS MAYELA Meyer-DEBRA       acetaminophen    metoprolol    ondansetron    oxyCODONE       Objective   Vitals: Blood pressure 110/62, pulse 73, temperature 98 8 °F (37 1 °C), temperature source Oral, resp  rate 18, weight 101 kg (221 lb 12 5 oz), SpO2 92 %  I/O last 24 hours: In: 1000 [IV Piggyback:1000]  Out: 900 [Urine:900]    Invasive Devices     Peripheral Intravenous Line            Peripheral IV 11/27/19 Left less than 1 day          Drain            Suprapubic Catheter Latex 18 Fr  1 day                Physical Exam   Constitutional: He is oriented to person, place, and time  He appears well-developed and well-nourished  HENT:   Head: Normocephalic and atraumatic  Eyes: Conjunctivae are normal    Neck: Neck supple  Cardiovascular: Normal rate  Pulmonary/Chest: Effort normal    Abdominal: Soft  He exhibits no distension and no mass  There is no tenderness  There is no rebound and no guarding  No hernia  SP tube site is clean and SP tube is draining well  Genitourinary:   Genitourinary Comments: Mild penoscrotal edema  Testes descended and normal to palpation  No induration to suggest acute scrotal infection  Neurological: He is alert and oriented to person, place, and time  Skin: Skin is warm and dry  Psychiatric: He has a normal mood and affect  His behavior is normal  Judgment and thought content normal    Vitals reviewed  Lab Results:   I have personally reviewed pertinent reports      CBC: Lab Results   Component Value Date    WBC 14 90 (H) 11/28/2019    HGB 12 8 11/28/2019    HCT 39 5 11/28/2019    MCV 91 11/28/2019     11/28/2019    MCH 29 4 11/28/2019    MCHC 32 4 11/28/2019    RDW 14 4 11/28/2019    MPV 9 1 11/28/2019    NRBC 0 11/28/2019     CMP:   Lab Results   Component Value Date    SODIUM 144 11/28/2019     (H) 11/28/2019    CO2 25 11/28/2019    BUN 34 (H) 11/28/2019    CREATININE 1 64 (H) 11/28/2019    CALCIUM 8 6 11/28/2019    AST 11 11/28/2019    ALT 6 (L) 11/28/2019    ALKPHOS 75 11/28/2019    EGFR 41 11/28/2019       Imaging Studies: I have personally reviewed pertinent films in PACS  Ct Abdomen Pelvis Wo Contrast    Result Date: 11/27/2019  Narrative: CT ABDOMEN AND PELVIS WITHOUT IV CONTRAST INDICATION:   Abdominal pain, acute, nonlocalized  COMPARISON:  7/8/2019  TECHNIQUE:  CT examination of the abdomen and pelvis was performed without intravenous contrast   Axial, sagittal, and coronal 2D reformatted images were created from the source data and submitted for interpretation  Radiation dose length product (DLP) for this visit:  1199 mGy-cm   This examination, like all CT scans performed in the [facility], was performed utilizing techniques to minimize radiation dose exposure, including the use of iterative reconstruction and automated exposure control  Enteric contrast was not administered  FINDINGS: Limited evaluation of intra-abdominal organs without intravenous contrast   Limited evaluation of bowel without enteric contrast  ABDOMEN LOWER CHEST:  Mild right basilar patchy opacity compatible with atelectasis or infiltrate  Punctate calcified granuloma in the right lung base  LIVER/BILIARY TREE:  Unremarkable  GALLBLADDER:  There are gallstone(s) within the gallbladder, without pericholecystic inflammatory changes  SPLEEN:  Unremarkable  PANCREAS:  Unremarkable  ADRENAL GLANDS:  Unremarkable   KIDNEYS/URETERS:  There are multiple nonobstructing bilateral renal calculi, the largest of which measures 18 mm in the upper pole of the right kidney  There is a calculus in the left renal pelvis measuring 10 mm  No hydronephrosis  STOMACH AND BOWEL:  Large volume of retained stool in the rectal vault  No bowel obstruction  Colonic interposition, a normal variant  APPENDIX:  No findings to suggest appendicitis  ABDOMINOPELVIC CAVITY:  No ascites or free intraperitoneal air  No lymphadenopathy  VESSELS:  Infrarenal IVC filter is noted  PELVIS REPRODUCTIVE ORGANS:  Unremarkable for patient's age  URINARY BLADDER:  Decompressed by suprapubic catheter  There is diffuse perivesical inflammatory fat stranding with scattered foci of gas in the bladder lumen  ABDOMINAL WALL/INGUINAL REGIONS:  Small fat-containing left inguinal hernia  Interval placement of suprapubic catheter  OSSEOUS STRUCTURES:  No acute fracture or destructive osseous lesion  Impression: 1  Interval placement of suprapubic catheter  There is diffuse perivesical inflammatory fat stranding with scattered foci of gas in the bladder lumen  These findings are likely related to recent instrumentation, however correlate for superimposed cystitis  2   Large volume of retained stool in the rectal vault compatible with fecal impaction  3   Cholelithiasis  4   Mild right basilar patchy opacity compatible with atelectasis or infiltrate  Workstation performed: [id number]     Ir Suprapubic Tube    Result Date: 11/26/2019  Narrative: EXAMINATION: Suprapubic tube placement INDICATION: 61-year-old male with severe urethral stricture and neurogenic bladder is referred for suprapubic catheter placement  CONTRAST: 5 mL Omnipaque 300 used for Thorne balloon FLUOROSCOPY TIME:   1 3 minutes IMAGES:  4 ANESTHESIA: Monitored anesthesia control and local lidocaine PROCEDURE:  The patient was identified verbally and by wristband  Timeout was performed  Informed consent was obtained   Following obtaining informed consent, the patient was prepped and draped in the usual sterile fashion  All elements of maximal sterile barrier technique, cap and mask and sterile gown and sterile gloves and sterile full-body drape and hand hygiene and 2% chlorhexidine for cutaneous antisepsis  Sterile ultrasound technique with sterile gel and sterile probe covers was also utilized  Prior to beginning of case, a Thorne catheter was inserted through the urethra with the guidance of a stiff hydrophilic wire  The bladder was distended with 500 mL of normal saline for a total of approximately 1000 mL fluid in the bladder  Under ultrasound guidance, a 19-gauge needle was advanced into the bladder lumen  A stiff Amplatz wire was advanced through the needle into the bladder lumen  The needle was removed, tract dilated using a 10 mm x 60 mm conventional angioplasty balloon,  and an 18-Icelandic Thorne catheter advanced into the bladder lumen during deflation of the angioplasty balloon  Good return of urine was noted upon placement of the suprapubic catheter  Catheter was connected to bag gravity drainage  The Thorne catheter which was placed through the urethra prior to beginning of the case was removed at the end of the case  The patient tolerated the procedure well without complication  The patient left the IR department in stable condition  Findings: Successful suprapubic catheter placement using 18-Icelandic Thorne catheter  Impression: Impression: Successful ultrasound-guided suprapubic catheter placement  Workstation performed: [id number]       EKG, Pathology, and Other Studies: I have personally reviewed pertinent reports          Code Status: Level 3 - DNAR and DNI     Edris Primrose, MD

## 2019-11-28 NOTE — ASSESSMENT & PLAN NOTE
 Creatinine upon admission: 1 88  o Baseline: 1 2-1 4   Secondary to either postrenal from recent obstruction requiring suprapubic catheter for pre renal in the setting of severe sepsis and febrile illness   Continue with intravenous fluids x 1 L   Follow up on BMP   · Avoid hypotension and nephrotoxin medication

## 2019-11-28 NOTE — PROGRESS NOTES
Pt is resting in bed  No signs of distress  Call bell and bed side table within reach  Bed alarm on  Will continue to monitor

## 2019-11-28 NOTE — UTILIZATION REVIEW
Initial Clinical Review    Admission: Date/Time/Statement: Inpatient Admission Orders (From admission, onward)     Ordered        11/27/19 2248  Inpatient Admission  Once                   Orders Placed This Encounter   Procedures    Inpatient Admission     Standing Status:   Standing     Number of Occurrences:   1     Order Specific Question:   Admitting Physician     Answer:   Franklin Gallego [[id number]]     Order Specific Question:   Level of Care     Answer:   Med Surg [[id number]]     Order Specific Question:   Estimated length of stay     Answer:   More than 2 Midnights     Order Specific Question:   Certification     Answer:   I certify that inpatient services are medically necessary for this patient for a duration of greater than two midnights  See H&P and MD Progress Notes for additional information about the patient's course of treatment  ED Arrival Information     Expected Arrival Acuity Means of Arrival Escorted By Service Admission Type    - 11/27/2019 19:53 Urgent Ambulance [facility] EMS Hospitalist Urgent    Arrival Complaint    Vomiting        Chief Complaint   Patient presents with    Vomiting     Pt here for vomiting x1 coffee ground emesis and fever 101  Recent suprapubic catheter placement     Assessment/Plan: This is a 68year old male from SNF to ED via ems admitted inpatient due to UTI/sepsis  History of multidrug resistant organisms  Recent observation admission 11/25/2019 to 11/27/2019 due to urinary cathter dysfunction and suprapubic was inserted  Presented due to weakness and vomiting with temperature of 101, with increased lethargy since returning to SNF  Has profound dementia  On exam has suprapubic catheter  Wbc 18 12   UA with + nitrites, moderate leukocytes, innumerable RBC, WBC and bacteria  Blood and urine cultures sent  Procalcitonin 84 90  Bun 37    Creatinine 1 88 with baseline of 1 2 - 1 4      CT showed possible cystitis, fecal impaction, cholelithiasis and right base opacity  IVF, IV antibiotics, bowel regimen in progress,   Urology and ID consulted  ED Triage Vitals   Temperature Pulse Respirations Blood Pressure SpO2   11/27/19 1955 11/27/19 1955 11/27/19 2258 11/27/19 1955 11/27/19 1955   98 9 °F (37 2 °C) 64 18 (!) 184/99 94 %      Temp Source Heart Rate Source Patient Position - Orthostatic VS BP Location FiO2 (%)   11/28/19 0034 11/27/19 2258 11/27/19 2258 11/27/19 1955 --   Axillary Monitor Lying Left arm       Pain Score       11/27/19 2258       No Pain        Wt Readings from Last 1 Encounters:   11/28/19 101 kg (221 lb 12 5 oz)     Additional Vital Signs:   11/28/19 0753  98 8 °F (37 1 °C)  73  18  110/62    92 %  None (Room air)     11/28/19 0153  100 4 °F (38 °C)                 11/28/19 0034  101 3 °F (38 5 °C)Abnormal   90  18  142/71  99  92 %  None (Room air)         Pertinent Labs/Diagnostic Test Results:   11/27/2019 CT abdomen -  Interval placement of suprapubic catheter   There is diffuse perivesical inflammatory fat stranding with scattered foci of gas in the bladder lumen   These findings are likely related to recent instrumentation, however correlate for superimposed   cystitis  2   Large volume of retained stool in the rectal vault compatible with fecal impaction  3   Cholelithiasis    4   Mild right basilar patchy opacity compatible with atelectasis or infiltrate  Results from last 7 days   Lab Units 11/28/19  0532 11/27/19 2121 11/27/19 0628 11/26/19 0530 11/25/19  1625   WBC Thousand/uL 14 90* 18 12* 19 47* 8 75 8 63   HEMOGLOBIN g/dL 12 8 15 1 16 0 14 4 16 1   HEMATOCRIT % 39 5 45 3 48 4 43 5 48 7   PLATELETS Thousands/uL 230 289 292 245 275   NEUTROS ABS Thousands/µL 13 13*  --  18 10* 5 71 6 02   BANDS PCT %  --  3  --   --   --      Results from last 7 days   Lab Units 11/28/19  0532 11/27/19 2121 11/27/19 0628 11/26/19  0530 11/25/19  1625   SODIUM mmol/L 144 142 144 142 147*   POTASSIUM mmol/L 3 8 3 9 4 0 3 6 3 8   CHLORIDE mmol/L 111* 107 109* 109* 110*   CO2 mmol/L 25 28 25 27 29   ANION GAP mmol/L 8 7 10 6 8   BUN mg/dL 34* 37* 27* 19 20   CREATININE mg/dL 1 64* 1 88* 1 76* 1 36* 1 45*   EGFR ml/min/1 73sq m 41 35 38 52 48   CALCIUM mg/dL 8 6 9 0 8 5 8 3 8 4     Results from last 7 days   Lab Units 11/28/19  0532 11/27/19 2121   AST U/L 11 12   ALT U/L 6* 6*   ALK PHOS U/L 75 96   TOTAL PROTEIN g/dL 5 9* 7 1   ALBUMIN g/dL 2 3* 2 7*   TOTAL BILIRUBIN mg/dL 0 62 0 67   BILIRUBIN DIRECT mg/dL  --  0 24*     Results from last 7 days   Lab Units 11/28/19  0532 11/27/19 2121 11/27/19  0628 11/26/19  0530 11/25/19  1625   GLUCOSE RANDOM mg/dL 102 125 159* 107 110     Results from last 7 days   Lab Units 11/27/19 2230 11/27/19  0628 11/26/19  1429   PROTIME seconds 17 8* 15 3* 13 3   INR  1 55* 1 28* 1 07   PTT seconds 40* 31 26     Results from last 7 days   Lab Units 11/27/19  2230   PROCALCITONIN ng/ml 84 90*     Results from last 7 days   Lab Units 11/27/19 2230   LACTIC ACID mmol/L 1 5     Results from last 7 days   Lab Units 11/27/19 2121   LIPASE u/L 56*       Results from last 7 days   Lab Units 11/27/19  2115   CLARITY UA  Hazy   COLOR UA  Yellow   SPEC GRAV UA  1 010   PH UA  6 0   GLUCOSE UA mg/dl Negative   KETONES UA mg/dl Negative   BLOOD UA  Large*   PROTEIN UA mg/dl 30 (1+)*   NITRITE UA  Positive*   BILIRUBIN UA  Negative   UROBILINOGEN UA E U /dl 0 2   LEUKOCYTES UA  Moderate*   WBC UA /hpf Innumerable*   RBC UA /hpf Innumerable*   BACTERIA UA /hpf Innumerable*   EPITHELIAL CELLS WET PREP /hpf Occasional     Results from last 7 days   Lab Units 11/27/19  2328 11/27/19 2230   BLOOD CULTURE  Received in Microbiology Lab  Culture in Progress  Received in Microbiology Lab  Culture in Progress       Results from last 7 days   Lab Units 11/27/19 2121   TOTAL COUNTED  100       ED Treatment: blood and urine culture   Medication Administration from 11/27/2019 1953 to 11/28/2019 0011       Date/Time Order Dose Route Action Comments     11/27/2019 2122 sodium chloride 0 9 % bolus 1,000 mL 1,000 mL Intravenous New Bag      11/27/2019 2122 ondansetron (ZOFRAN) injection 4 mg 4 mg Intravenous Given      11/27/2019 2331 cefepime (MAXIPIME) 1,000 mg in dextrose 5 % 50 mL IVPB 1,000 mg Intravenous New Bag      11/28/2019 0001 lactulose 20 g/30 mL oral solution 30 g 20 g Oral Given Per Dr Rishabh Snider Give 20g 30/ml vorb        Past Medical History:   Diagnosis Date    Acute kidney injury ([address] ) 10/21/2016    Anxiety     [facility] aneurysm ([facility]     Aphasia     Ataxia     Bladder adhesions     BPH (benign prostatic hypertrophy)     COPD (chronic obstructive pulmonary disease) ([facility])     wife denies - "never had"    Dementia ([address] )     Difficulty walking     Essential (primary) hypertension     Generalized anxiety disorder     GERD (gastroesophageal reflux disease)     Global aphasia     H/O blood clots     High blood pressure     History of kidney stones     Kidney stones     Mental disorder     Muscle weakness     Neuromuscular dysfunction of bladder     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 ([address] )     Retention of urine, unspecified     Stroke ([address] )     Thrombosis     Urinary retention     Urinary tract bacterial infections     Urinary tract infection      Present on Admission:   Acute kidney injury ([address] )   Aphasia   Bowel trouble   Dementia with behavioral disturbance ([address] )   Essential hypertension   Severe sepsis ([facility]   UTI (urinary tract infection)      Admitting Diagnosis: Vomiting [R11 10]  UTI (urinary tract infection) [N39 0]  Constipation [K59 00]  Age/Sex: 68 y o  male  Admission Orders: 11/27/2019 2248 Inpatient  Scheduled Medications:  Medications:  amLODIPine 5 mg Oral BID   apixaban 2 5 mg Oral BID   busPIRone 7 5 mg Oral TID   cefepime 2,000 mg Intravenous Q24H   docusate sodium 100 mg Oral BID   famotidine 20 mg Oral Daily   memantine 5 mg Oral BID metoprolol tartrate 12 5 mg Oral BID   polyethylene glycol 17 g Oral Daily   potassium chloride 10 mEq Oral BID   QUEtiapine 25 mg Oral HS     Continuous IV Infusions:  lactated ringers 100 mL/hr Intravenous Continuous     PRN Meds:  Acetaminophen - used x 1  650 mg Oral Q6H PRN   metoprolol 5 mg Intravenous Q6H PRN   ondansetron 4 mg Intravenous Q6H PRN   oxyCODONE 5 mg Oral Q6H PRN       IP CONSULT TO UROLOGY  IP CONSULT TO INFECTIOUS DISEASES    Network Utilization Review Department  [email or web address]  ATTENTION: Please call with any questions or concerns to [phone] and carefully listen to the prompts so that you are directed to the right person  All voicemails are confidential   [place] all requests for admission clinical reviews, approved or denied determinations and any other requests to dedicated fax number below belonging to the campus where the patient is receiving treatment    FACILITY NAME UR FAX NUMBER   ADMISSION DENIALS (Administrative/Medical Necessity) [address] [facility] (Maternity/NICU/Pediatrics) [phone]   [facility] [id number] [place] [address] [address] [facility] [phone]   [address] [place] [address] [address] [facility] [phone]   Celia Winters [address] [phone]

## 2019-11-28 NOTE — ED PROVIDER NOTES
History  Chief Complaint   Patient presents with    Vomiting     Pt here for vomiting x1 coffee ground emesis and fever 101  Recent suprapubic catheter placement     68-year-old gentleman brought in for weakness and vomiting  Nursing home reported a fever of 101 as well  They did not give him anything for it  Temperature here is 98 9  Patient is aphasic and unable to give any history most history is from notes and also his wife who is here  Patient recently had a suprapubic catheter placed he is currently in a nursing facility  According to her they said that he had vomited 1 time after being fed dinner  They said it looked like coffee grounds  They also told the wife that he slept all day today which is not normal for him  Patient's wife states she does not think he works right  History provided by:  Medical records and spouse  History limited by:  Patient nonverbal   used: No    Vomiting   Severity:  Mild  Quality:  Stomach contents  Progression:  Resolved  Chronicity:  New  Urine output: New suprapubic catheter  Ineffective treatments:  None tried  Associated symptoms: fever    Associated symptoms: no diarrhea    Risk factors: prior abdominal surgery and sick contacts        Prior to Admission Medications   Prescriptions Last Dose Informant Patient Reported? Taking? Citric Tz-Dctnvxkjqht-Qn Carb (RENACIDIN) SOLN   No No   Sig: Irrigate with 5 mg as directed 2 (two) times a day Use 1 application via irrigation every evening and night shift for suprapubic tube irrigation instill and clamp for 15 minutes  Contact Dr Nic Astorga office for refills     NYSTATIN powder  Outside Facility (Specify) Yes No   Sig: Apply topically 2 (two) times a day     QUEtiapine (SEROquel) 25 mg tablet  Outside Facility (Specify) Yes No   Sig: Take 25 mg by mouth daily at bedtime     acetaminophen (TYLENOL) 325 mg tablet  Outside Facility (Specify) Yes No   Sig: Take 650 mg by mouth every 4 (four) hours as needed for mild pain    amLODIPine (NORVASC) 5 mg tablet  Outside Facility (Specify) Yes No   Sig: Take 5 mg by mouth 2 (two) times a day    apixaban (ELIQUIS) 2 5 mg  Outside Facility (Specify) Yes No   Sig: Take 2 5 mg by mouth 2 (two) times a day   busPIRone (BUSPAR) 7 5 mg tablet  Outside Facility (Specify) Yes No   Sig: Take 7 5 mg by mouth 3 (three) times a day   docusate sodium (COLACE) 100 mg capsule  Outside Facility (Specify) Yes No   Sig: Take 100 mg by mouth 2 (two) times a day   famotidine (PEPCID) 20 mg tablet  Outside Facility (Specify) Yes No   Sig: Take 20 mg by mouth 2 (two) times a day   memantine (NAMENDA) 5 mg tablet  Outside Facility (Specify) No No   Sig: Take 1 tablet by mouth 2 (two) times a day for 30 days   Patient taking differently: Take 5 mg by mouth 2 (two) times a day    metoprolol tartrate (LOPRESSOR) 25 mg tablet  Outside Facility (Specify) No No   Sig: Take 0 5 tablets (12 5 mg total) by mouth 2 (two) times a day   mirtazapine (REMERON) 15 mg tablet  Outside Facility (Specify) No No   Sig: Take 1 tablet by mouth daily at bedtime for 30 days   oxyCODONE (ROXICODONE) 5 mg immediate release tablet   No No   Sig: Take 1 tablet (5 mg total) by mouth every 6 (six) hours as needed for severe pain for up to 5 daysMax Daily Amount: 20 mg   polyethylene glycol (MIRALAX) 17 g packet  Outside Facility (Specify) No No   Sig: Take 17 g by mouth daily   potassium chloride (K-DUR,KLOR-CON) 10 mEq tablet   No No   Sig: Take 1 tablet (10 mEq total) by mouth 2 (two) times a day   tamsulosin (FLOMAX) 0 4 mg  Outside Facility (Specify) No No   Sig: Take 1 capsule by mouth daily with dinner for 30 days      Facility-Administered Medications: None       Past Medical History:   Diagnosis Date    Acute kidney injury ([facility] [address] ) 10/21/2016    Anxiety     Aortic aneurysm (HCC)     Aphasia     Ataxia     Bladder adhesions     BPH (benign prostatic hypertrophy)     [facility]     wife denies - "never had"    Dementia ([facility] [address] )     Difficulty walking     Essential (primary) hypertension     Generalized anxiety disorder     GERD (gastroesophageal reflux disease)     Global aphasia     H/O blood clots     High blood pressure     History of kidney stones     Kidney stones     Mental disorder     Muscle weakness     Neuromuscular dysfunction of bladder     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 ([facility] [address] )     Retention of urine, unspecified     Stroke ([facility] )     Thrombosis     Urinary retention     Urinary tract bacterial infections     Urinary tract infection        Past Surgical History:   Procedure Laterality Date    BOTOX INJECTION N/A 6/18/2019    Procedure: INJECTION BOTULINUM TOXIN (BOTOX), intra detrusor;  Surgeon: Ariel Marin MD;  [facility];  Service: Urology    BOTOX INJECTION N/A 10/7/2019    Procedure: INJECTION BOTULINUM TOXIN (BOTOX), intradetrusor;  Surgeon: Ariel Marin MD;  [facility];  Service: Urology    CYSTOSCOPY      CYSTOSCOPY N/A 6/18/2019    Procedure: cystoscopy and all indicated procedures;  Surgeon: Ariel Marin MD;  [facility];  Service: Urology    FL CYSTOGRAM  1/15/2019    IR SUPRAPUBIC TUBE  11/26/2019    IVC FILTER INSERTION      X2    IVC FILTER INSERTION      x2    KIDNEY STONE SURGERY      KNEE SURGERY      Sepsis infection     NEPHROSTOMY      MD CYSTO/URETERO W/LITHOTRIPSY &INDWELL STENT INSRT Right 6/14/2017    Procedure: CYSTOSCOPY URETEROSCOPY WITH LITHOTRIPSY HOLMIUM LASER,,BASKET STONE EXTRACTION, RETROGRADE PYELOGRAM AND INSERTION STENT URETERAL;  Surgeon: Jeremie Prado MD;  [facility];  Service: Urology    MD CYSTOURETHROSCOPY,BIOPSY N/A 1/15/2019    Procedure: CYSTOSCOPY, CYSTOGRAM, DILATION OF URETHRAL STRICTURE;  Surgeon: Ariel Marin MD;  [facility];  Service: Urology    URINARY SURGERY         Family History   Problem Relation Age of Onset    Diabetes Father     Hypertension Father     Coronary artery disease Father     Cancer Father     Hypertension Mother      I have reviewed and agree with the history as documented  Social History     Tobacco Use    Smoking status: Never Smoker    Smokeless tobacco: Never Used   Substance Use Topics    Alcohol use: No    Drug use: No        Review of Systems   Unable to perform ROS: Patient nonverbal   Constitutional: Positive for fever  Gastrointestinal: Positive for vomiting  Negative for diarrhea  Physical Exam  Physical Exam   Constitutional: He is oriented to person, place, and time  Non-toxic appearance  He has a sickly appearance  He appears distressed  HENT:   Head: Normocephalic and atraumatic  Right Ear: Tympanic membrane normal    Left Ear: Tympanic membrane normal    Nose: Nose normal    Mouth/Throat: Oropharynx is clear and moist    Eyes: Pupils are equal, round, and reactive to light  Conjunctivae, EOM and lids are normal    Neck: Trachea normal and normal range of motion  Neck supple  No JVD present  Carotid bruit is not present  Cardiovascular: Normal rate, regular rhythm, normal heart sounds and intact distal pulses  No extrasystoles are present  Pulmonary/Chest: Effort normal  He has no decreased breath sounds  He has no wheezes  He has no rhonchi  He has no rales  He exhibits no tenderness and no deformity  Abdominal: Soft  Bowel sounds are normal  There is no tenderness  There is no rebound, no guarding and no CVA tenderness  Musculoskeletal:        Right shoulder: He exhibits normal range of motion, no tenderness, no swelling and no deformity  Cervical back: Normal  He exhibits normal range of motion, no tenderness, no bony tenderness and no deformity  Lymphadenopathy:     He has no cervical adenopathy  He has no axillary adenopathy     Neurological: He is alert and oriented to person, place, and time  He has normal strength and normal reflexes  No cranial nerve deficit or sensory deficit  He displays a negative Romberg sign  Skin: Skin is warm and dry  Psychiatric: He has a normal mood and affect  His speech is normal and behavior is normal  Judgment and thought content normal  Cognition and memory are normal    Nursing note and vitals reviewed  Vital Signs  ED Triage Vitals [11/27/19 1955]   Temperature Pulse Resp Blood Pressure SpO2   98 9 °F (37 2 °C) 64 -- (!) 184/99 94 %      Temp src Heart Rate Source Patient Position - Orthostatic VS BP Location FiO2 (%)   -- -- -- Left arm --      Pain Score       --           Vitals:    11/27/19 1955   BP: (!) 184/99   Pulse: 64         Visual Acuity      ED Medications  Medications   cefepime (MAXIPIME) 1,000 mg in dextrose 5 % 50 mL IVPB (has no administration in time range)   lactulose 20 g/30 mL oral solution 30 g (has no administration in time range)   sodium chloride 0 9 % bolus 1,000 mL (1,000 mL Intravenous New Bag 11/27/19 2122)   ondansetron (ZOFRAN) injection 4 mg (4 mg Intravenous Given 11/27/19 2122)       Diagnostic Studies  Results Reviewed     Procedure Component Value Units Date/Time    Protime-INR [[id number]]  (Abnormal) Collected:  11/27/19 2230    Lab Status:  Final result Specimen:  Blood from Arm, Right Updated:  11/27/19 2245     Protime 17 8 seconds      INR 1 55    APTT [[id number]]  (Abnormal) Collected:  11/27/19 2230    Lab Status:  Final result Specimen:  Blood from Arm, Right Updated:  11/27/19 2245     PTT 40 seconds     Blood culture #1 [[id number]] Collected:  11/27/19 2230    Lab Status: In process Specimen:  Blood from Arm, Right Updated:  11/27/19 2233    Lactic Acid x2 [[id number]] Collected:  11/27/19 2230    Lab Status: In process Specimen:  Blood from Arm, Right Updated:  11/27/19 2232    Procalcitonin [[id number]] Collected:  11/27/19 2230    Lab Status:   In process Specimen:  Blood from Arm, Right Updated:  11/27/19 2232    CBC and differential [[id number]]  (Abnormal) Collected:  11/27/19 2121    Lab Status:  Final result Specimen:  Blood from Arm, Left Updated:  11/27/19 2209     WBC 18 12 Thousand/uL      RBC 5 06 Million/uL      Hemoglobin 15 1 g/dL      Hematocrit 45 3 %      MCV 90 fL      MCH 29 8 pg      MCHC 33 3 g/dL      RDW 14 5 %      MPV 9 3 fL      Platelets 266 Thousands/uL      nRBC 0 /100 WBCs     Narrative: This is an appended report  These results have been appended to a previously verified report      Lipase [[id number]]  (Abnormal) Collected:  11/27/19 2121    Lab Status:  Final result Specimen:  Blood from Arm, Left Updated:  11/27/19 2146     Lipase 56 u/L     Hepatic function panel [[id number]]  (Abnormal) Collected:  11/27/19 2121    Lab Status:  Final result Specimen:  Blood from Arm, Left Updated:  11/27/19 2146     Total Bilirubin 0 67 mg/dL      Bilirubin, Direct 0 24 mg/dL      Alkaline Phosphatase 96 U/L      AST 12 U/L      ALT 6 U/L      Total Protein 7 1 g/dL      Albumin 2 7 g/dL     Basic metabolic panel [[id number]]  (Abnormal) Collected:  11/27/19 2121    Lab Status:  Final result Specimen:  Blood from Arm, Left Updated:  11/27/19 2146     Sodium 142 mmol/L      Potassium 3 9 mmol/L      Chloride 107 mmol/L      CO2 28 mmol/L      ANION GAP 7 mmol/L      BUN 37 mg/dL      Creatinine 1 88 mg/dL      Glucose 125 mg/dL      Calcium 9 0 mg/dL      eGFR 35 ml/min/1 73sq m     Narrative:       Nancy guidelines for Chronic Kidney Disease (CKD):     Stage 1 with normal or high GFR (GFR > 90 mL/min/1 73 square meters)    Stage 2 Mild CKD (GFR = 60-89 mL/min/1 73 square meters)    Stage 3A Moderate CKD (GFR = 45-59 mL/min/1 73 square meters)    Stage 3B Moderate CKD (GFR = 30-44 mL/min/1 73 square meters)    Stage 4 Severe CKD (GFR = 15-29 mL/min/1 73 square meters)    Stage 5 End Stage CKD (GFR <15 mL/min/1 73 square meters)  Note: GFR calculation is accurate only with a steady state creatinine    Lactic Acid x2 [[id number]]     Lab Status:  No result Specimen:  Blood     Blood culture #2 [[id number]]     Lab Status:  No result Specimen:  Blood     Urine Microscopic [[id number]]  (Abnormal) Collected:  11/27/19 2115    Lab Status:  Final result Specimen:  Urine, Suprapubic catheter Updated:  11/27/19 2141     RBC, UA Innumerable /hpf      WBC, UA Innumerable /hpf      Epithelial Cells Occasional /hpf      Bacteria, UA Innumerable /hpf     Urine culture [[id number]] Collected:  11/27/19 2115    Lab Status: In process Specimen:  Urine, Suprapubic catheter Updated:  11/27/19 2141    UA w Reflex to Microscopic w Reflex to Culture [[id number]]  (Abnormal) Collected:  11/27/19 2115    Lab Status:  Final result Specimen:  Urine, Suprapubic catheter Updated:  11/27/19 2139     Color, UA Yellow     Clarity, UA Hazy     Specific [place], UA 1 010     pH, UA 6 0     Leukocytes, UA Moderate     Nitrite, UA Positive     Protein, UA 30 (1+) mg/dl      Glucose, UA Negative mg/dl      Ketones, UA Negative mg/dl      Urobilinogen, UA 0 2 E U /dl      Bilirubin, UA Negative     Blood, UA Large                 CT abdomen pelvis wo contrast   Final Result by Sim Milan MD (11/27 2219)      1  Interval placement of suprapubic catheter  There is diffuse perivesical inflammatory fat stranding with scattered foci of gas in the bladder lumen  These findings are likely related to recent instrumentation, however correlate for superimposed    cystitis  2   Large volume of retained stool in the rectal vault compatible with fecal impaction  3   Cholelithiasis  4   Mild right basilar patchy opacity compatible with atelectasis or infiltrate                 Workstation performed: [id number]                    Procedures  Procedures       ED Course                               MDM  Number of Diagnoses or Management Options  Constipation: new and requires workup  UTI (urinary tract infection): new and requires workup  Vomiting: new and requires workup     Amount and/or Complexity of Data Reviewed  Clinical lab tests: ordered and reviewed  Tests in the radiology section of CPT®: ordered and reviewed  Tests in the medicine section of CPT®: ordered and reviewed  Independent visualization of images, tracings, or specimens: yes    Patient Progress  Patient progress: stable      Disposition  Final diagnoses:   UTI (urinary tract infection)   Constipation   Vomiting     Time reflects when diagnosis was documented in both MDM as applicable and the Disposition within this note     Time User Action Codes Description Comment    11/27/2019 10:47 PM Joaquín LEAL Add [N39 0] UTI (urinary tract infection)     11/27/2019 10:47 PM Joaquín LEAL Add [K59 00] Constipation     11/27/2019 10:47 PM Joaquín LEAL Add [R11 10] Vomiting       ED Disposition     ED Disposition Condition Date/Time Comment    Admit Stable Wed Nov 27, 2019 10:47 PM Case was discussed with dana cuellar pa-c and the patient's admission status was agreed to be Admission Status: inpatient status to the service of Dr Wayne Enriquez   Follow-up Information    None         Patient's Medications   Discharge Prescriptions    No medications on file     No discharge procedures on file      ED Provider  Electronically Signed by           Dick Plascencia DO  11/27/19 6640

## 2019-11-28 NOTE — ASSESSMENT & PLAN NOTE
· Patient has a history of neurogenic bladder with Thorne catheter in place in the past, had recent hospitalization secondary to retention after repeating his Thorne out by accident  He is status post suprapubic catheter insertion 11/25 by interventional Radiology  · Good output in suprapubic at this point, no gross hematuria noted  Some sediment is noted in the bag  · Strict intake and output

## 2019-11-28 NOTE — ASSESSMENT & PLAN NOTE
 Blood pressure elevated on admission  Stable currently    Continue amlodipine and metoprolol with doses now

## 2019-11-28 NOTE — CONSULTS
Consultation - Infectious Disease   Stacia Pagan [facility] 68 y o  male MRN: [id number]  Unit/Bed#: S -01 Encounter: [id number]      IMPRESSION & RECOMMENDATIONS:     76-year-old male patient with aphasia, neurogenic bladder, recent placement of suprapubic catheter 11/26/2019  Admitted for fever, leukocytosis, and is currently being treated empirically for sepsis secondary to UTI    1- sepsis, POA:  Fever 101 3, leukocytosis at 19,000; sepsis is likely secondary to UTI and recent urinary tract instrumentation suprapubic catheter placement  Concern for bacteremia  Patient is hemodynamically stable  Reportedly started feeling better after starting on IV antibiotics  - repeat CBC in a m   - continue cefepime IV  - follow-up urine culture collected 11/27/2019; will adjust antibiotic therapy according to urine culture results  - follow-up blood culture collected 11/27/2019  - supportive care as per primary team  - close clinical monitoring      2- complicated urine tract infection:  UA positive for pyuria  Urine culture still in process  Previous urine cultures reviewed  - Patient does have history of E coli ESBL (Ucx 2017), though now he is improving on cefepime  Will continue cefepime IV and follow up urine culture result and adjust accordingly  - close clinical monitoring    3- leukocytosis:  With elevated procalcitonin  Likely secondary to problem # 2    - repeat CBC in a m , continue antibiotic as mentioned above    4- multiple antibiotic allergies:  History of anaphylaxis to Zosyn, gentamicin, vancomycin  Patient is tolerating cefepime well  - continue cefepime with close clinical monitoring    5- aphasia:  Patient does not provide any history at this time  History provided by his wife    - supportive care as per primary team    Plan mentioned above discussed in detail with the patient's wife present at the bedside  Case discussed with primary team    HISTORY OF PRESENT ILLNESS:  Reason for Consult:  UTI  HPI: Zeferino Bryant is a 68y o  year old male  with history of aphasia, neurogenic bladder, chronic kidney disease and urethral stricture  Patient was recently evaluated by Urology and a suprapubic cath was placed on 11/26/2019  Right after discharge, patient [place] to [place] hospital [place] [facility] [place]  Upon evaluation in the ER, patient was noted to have a fever of 101 3, with leukocytosis at 19,000 and elevated procalcitonin at 84  He was also noted to have acute kidney injury with creatinine of 1 8  His urinalysis showed pyuria  Patient was started on cefepime empirically for urinary tract infection  A CT scan abdomen pelvis was done on admission showing perivesical fat stranding and foci of gas in the bladder lumen consistent with recent instrumentation but with possible superimposed cystitis  Upon evaluation today, patient is nonverbal, but looks comfortable, no signs of acute distress  Wife at bedside reports that he is showing signs of improvement compared to time of hospital admission yesterday  To note that patient has history of anaphylaxis to Zosyn, gentamicin, vancomycin  Previous charts review showed that he tolerated cefepime before  Review of previous microbiology data show a urine culture 04/2019 with E faecalis ampicillin sensitive, Providencia sensitive to 3rd generation cephalosporin, Morganella sensitive to 3rd generation cephalosporin  Urine culture in 2017 positive for E coli ESBL    REVIEW OF SYSTEMS:  A complete 12 point system-based review of systems is negative other than that noted in the HPI      PAST MEDICAL HISTORY:  Past Medical History:   Diagnosis Date    Acute kidney injury ([facility] [address] ) 10/21/2016    Anxiety     Aortic aneurysm (formerly [facility])     Aphasia     Ataxia     Bladder adhesions     BPH (benign prostatic hypertrophy)     COPD (chronic obstructive pulmonary disease) (formerly [facility])     wife denies - "never had"    Dementia ([facility] [address] )     Difficulty walking     Essential (primary) hypertension     Generalized anxiety disorder     GERD (gastroesophageal reflux disease)     Global aphasia     H/O blood clots     High blood pressure     History of kidney stones     Kidney stones     Mental disorder     Muscle weakness     Neuromuscular dysfunction of bladder     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 ([facility] [address] )     Retention of urine, unspecified     Stroke ([facility] [address] )     Thrombosis     Urinary retention     Urinary tract bacterial infections     Urinary tract infection      Past Surgical History:   Procedure Laterality Date    BOTOX INJECTION N/A 6/18/2019    Procedure: INJECTION BOTULINUM TOXIN (BOTOX), intra detrusor;  Surgeon: Corrinne Scriver, MD;  [facility];  Service: Urology    BOTOX INJECTION N/A 10/7/2019    Procedure: INJECTION BOTULINUM TOXIN (BOTOX), intradetrusor;  Surgeon: Corrinne Scriver, MD;  [facility];  Service: Urology    CYSTOSCOPY      CYSTOSCOPY N/A 6/18/2019    Procedure: cystoscopy and all indicated procedures;  Surgeon: Corrinne Scriver, MD;  [facility];  Service: Urology    FL CYSTOGRAM  1/15/2019    IR SUPRAPUBIC TUBE  11/26/2019    IVC FILTER INSERTION      X2    IVC FILTER INSERTION      x2    KIDNEY STONE SURGERY      KNEE SURGERY      Sepsis infection     NEPHROSTOMY      VT CYSTO/URETERO W/LITHOTRIPSY &INDWELL STENT INSRT Right 6/14/2017    Procedure: CYSTOSCOPY URETEROSCOPY WITH LITHOTRIPSY HOLMIUM LASER,,BASKET STONE EXTRACTION, RETROGRADE PYELOGRAM AND INSERTION STENT URETERAL;  Surgeon: Deepika Brandon MD;  [facility];  Service: Urology    VT CYSTOURETHROSCOPY,BIOPSY N/A 1/15/2019    Procedure: CYSTOSCOPY, CYSTOGRAM, DILATION OF URETHRAL STRICTURE;  Surgeon: Corrinne Scriver, MD;  [facility];  Service: Urology    URINARY SURGERY         FAMILY HISTORY:  Non-contributory    SOCIAL HISTORY:  Social History   Social History     Substance and Sexual Activity   Alcohol Use No     Social History     Substance and Sexual Activity   Drug Use No     Social History     Tobacco Use   Smoking Status Never Smoker   Smokeless Tobacco Never Used       ALLERGIES:  Allergies   Allergen Reactions    Gentamycin [Gentamicin] Anaphylaxis    Vancomycin Anaphylaxis    Zosyn [Piperacillin Sod-Tazobactam So] Anaphylaxis     However, has tolerated Ertapenem, Cefdinir, and Cefepime, which all have different side chains  Avoid Penicillins and the following Cephs with similar side chains (Cephalexin, Cefadroxil, Cefaclor, Cefprozil, or  Cefoxitin)    Clindamycin Itching    Nsaids Other (See Comments)     Nephrotic Syndrome    Sulfa Antibiotics Rash    Other Rash     antipersperents  Stat lock from lucia catheter       MEDICATIONS:  All current active medications have been reviewed  PHYSICAL EXAM:  Temp:  [98 8 °F (37 1 °C)-101 3 °F (38 5 °C)] 98 8 °F (37 1 °C)  HR:  [64-94] 73  Resp:  [18] 18  BP: (110-198)/(62-99) 110/62  SpO2:  [92 %-98 %] 92 %  Temp (24hrs), Av 9 °F (37 7 °C), Min:98 8 °F (37 1 °C), Max:101 3 °F (38 5 °C)  Current: Temperature: 98 8 °F (37 1 °C)    Intake/Output Summary (Last 24 hours) at 2019 1052  Last data filed at 2019 1039  Gross per 24 hour   Intake 1000 ml   Output 900 ml   Net 100 ml       General Appearance:  Appearing well, nontoxic, and in no distress    He is nonverbal at baseline   Head:  Normocephalic, without obvious abnormality, atraumatic   Eyes:  Conjunctiva pink and sclera anicteric, both eyes   Nose: Nares normal, mucosa normal, no drainage   Throat: Oropharynx moist without lesions   Neck: Supple, symmetrical, no adenopathy, no tenderness/mass/nodules   Back:   Symmetric, no curvature, ROM normal, no CVA tenderness   Lungs:   Clear to auscultation bilaterally, respirations unlabored   Chest Wall:  No tenderness or deformity   Heart:  RRR; no murmur, rub or gallop   Abdomen:   Soft, non-tender, non-distended, positive bowel sounds  Suprapubic catheter in place draining clear urine   Extremities: No cyanosis, clubbing or edema   Skin: No rashes or lesions  No draining wounds noted  Lymph nodes: Cervical, supraclavicular nodes normal   Neurologic: Awake and alert, nonverbal, extremity strength 5/5 and symmetric       LABS, IMAGING, & OTHER STUDIES:  Lab Results:  I have personally reviewed pertinent labs  Results from last 7 days   Lab Units 11/28/19  0532 11/27/19 2121 11/27/19 0628   WBC Thousand/uL 14 90* 18 12* 19 47*   HEMOGLOBIN g/dL 12 8 15 1 16 0   PLATELETS Thousands/uL 230 289 292     Results from last 7 days   Lab Units 11/28/19  0532 11/27/19 2121 11/27/19 0628   SODIUM mmol/L 144 142  --  144   POTASSIUM mmol/L 3 8 3 9  --  4 0   CHLORIDE mmol/L 111* 107  --  109*   CO2 mmol/L 25 28  --  25   BUN mg/dL 34* 37*  --  27*   CREATININE mg/dL 1 64* 1 88*  --  1 76*   EGFR ml/min/1 73sq m 41 35  --  38   CALCIUM mg/dL 8 6 9 0  --  8 5   AST U/L 11 12  --   --    ALT U/L 6* 6*   < >  --    ALK PHOS U/L 75 96   < >  --     < > = values in this interval not displayed  Results from last 7 days   Lab Units 11/27/19  2328 11/27/19  2230   BLOOD CULTURE  Received in Microbiology Lab  Culture in Progress  Received in Microbiology Lab  Culture in Progress  Results from last 7 days   Lab Units 11/27/19  2230   PROCALCITONIN ng/ml 84 90*       Imaging Studies:   I have personally reviewed pertinent imaging study reports and images in PACS  CT scan abdomen pelvis suprapubic catheter placement, fat stranding and scattered foci of gas in the bladder lumen consistent with the recent instrumentation but could be also related to superimposed cystitis    Other Studies:   I have personally reviewed pertinent reports    Urine culture 04/2019 E faecalis ampicillin sensitive, Providencia sensitive to 3rd generation cephalosporin, Morganella sensitive to 3rd generation cephalosporin  Urine culture in 2017 positive for E coli ESBL

## 2019-11-28 NOTE — ASSESSMENT & PLAN NOTE
· Supportive care, fall precautions  · Patient is nonverbal at baseline, unable to communicate any pain

## 2019-11-28 NOTE — ASSESSMENT & PLAN NOTE
 Blood pressure elevated, did not take home medications at the nursing home   o Last recorded pressure: 198/96   Continue amlodipine and metoprolol with doses now  o Add lopressor PRN   Monitor blood pressure

## 2019-11-28 NOTE — PROGRESS NOTES
Progress Note - William Guardado [facility] 1950, 68 y o  male MRN: [id number]    Unit/Bed#: S -01 Encounter: [id number]    Primary Care Provider: Micaela Dean MD   Date and time admitted to hospital: 11/27/2019  7:53 PM        * Severe sepsis [facility])  Assessment & Plan  SIRS criteria:  Fever of 101 at nursing home, leukocytosis   Patient was also discharged with leukocytosis present, however this was likely stress reaction  Suspected source:  Urine  Lactic acid: 1 5  End organ damage:  Acute kidney injury at 1 8  I received IV fluids overnight with improved renal function to 1 64  Infectious Disease input appreciated  Continue with  IV cefepime, renally dosed  Follow up on blood and urine cultures  Patient has intolerance to multiple antibiotics  UTI (urinary tract infection)  Assessment & Plan  · Urinalysis reveals blood, white blood cells, nitrite positive, leukocyte positive, innumerable bacteria  · Noted that the patient is a chronic colonizer, as well as has history of multidrug resistant organisms as well as multiple antibiotic intolerances  · Additionally, the patient had recent urologic procedure, suprapubic catheter placement  · Given severe sepsis with fever, leukocytosis will treat as true infection with IV cefepime  Follow up on urine culture results closely  · Continue with intravenous cefepime  · Appreciate Infectious Disease evaluation       Suprapubic catheter [facility])  Assessment & Plan  · Patient has a history of neurogenic bladder with Thorne catheter in place in the past, had recent hospitalization secondary to retention after repeating his Thorne out by accident  He is status post suprapubic catheter insertion 11/25 by interventional Radiology  · Good output in suprapubic at this point, no gross hematuria noted  Some sediment is noted in the bag  · Strict intake and output      Dementia with behavioral disturbance [facility])  Assessment & Plan  · Supportive care, fall precautions  · Patient is nonverbal at baseline, unable to communicate any pain  Bowel trouble  Assessment & Plan  · Fecal impaction noted on CT scan of the abdomen  · Wife reports last bowel movement yesterday  Continue with bowel regimen  · Dulcolax suppository today  Essential hypertension  Assessment & Plan   Blood pressure elevated on admission  Stable currently  Continue amlodipine and metoprolol with doses now      Acute kidney injury [facility])  Assessment & Plan   Creatinine upon admission: 1 88  o Baseline: 1 2-1 4   Secondary to either postrenal from recent obstruction requiring suprapubic catheter for pre renal in the setting of severe sepsis and febrile illness   Continue with intravenous fluids x 1 L  Follow up on BMP   · Avoid hypotension and nephrotoxin medication    History of DVT (deep vein thrombosis)  Assessment & Plan  · Continue anticoagulation with Eliquis    Aphasia  Assessment & Plan  · Nonverbal at baseline  Supportive care  · Continue home medications  VTE Pharmacologic Prophylaxis:   Pharmacologic: Apixaban (Eliquis)  Mechanical VTE Prophylaxis in Place: Yes    Patient Centered Rounds: I have performed bedside rounds with nursing staff today  Discussions with Specialists or Other Care Team Provider:  Discussed with Infectious Disease    Education and Discussions with Family / Patient:  Discussed with patient's wife at bedside    Time Spent for Care: 20 minutes  More than 50% of total time spent on counseling and coordination of care as described above  Current Length of Stay: 1 day(s)    Current Patient Status: Inpatient   Certification Statement: The patient will continue to require additional inpatient hospital stay due to Ongoing IV antibiotics for urinary tract infection  Discharge Plan:  Currently not medically stable for discharge      Code Status: Level 3 - DNAR and DNI      Subjective:   Patient nonverbal   Remains afebrile obtained no acute events since admission  Objective:     Vitals:   Temp (24hrs), Av 9 °F (37 7 °C), Min:98 8 °F (37 1 °C), Max:101 3 °F (38 5 °C)    Temp:  [98 8 °F (37 1 °C)-101 3 °F (38 5 °C)] 98 8 °F (37 1 °C)  HR:  [64-94] 73  Resp:  [18] 18  BP: (110-198)/(62-99) 110/62  SpO2:  [92 %-98 %] 92 %  Body mass index is 30 93 kg/m²  Input and Output Summary (last 24 hours): Intake/Output Summary (Last 24 hours) at 2019 1151  Last data filed at 2019 1039  Gross per 24 hour   Intake 1000 ml   Output 900 ml   Net 100 ml       Physical Exam:     Physical Exam   Constitutional: No distress  HENT:   Head: Normocephalic and atraumatic  Eyes: Pupils are equal, round, and reactive to light  EOM are normal    Neck: Normal range of motion  Neck supple  Cardiovascular: Normal rate and regular rhythm  Pulmonary/Chest: Effort normal    Decreased breath sounds at bases   Abdominal:   Suprapubic catheter in place with mild exudate around the catheter site  Genitourinary:   Genitourinary Comments: Scrotal and penile swelling  Neurological:   Nonverbal at baseline   Skin: He is not diaphoretic       Additional Data:     Labs:    Results from last 7 days   Lab Units 19  0532 19  2121   WBC Thousand/uL 14 90* 18 12*   HEMOGLOBIN g/dL 12 8 15 1   HEMATOCRIT % 39 5 45 3   PLATELETS Thousands/uL 230 289   BANDS PCT %  --  3   NEUTROS PCT % 88*  --    LYMPHS PCT % 7*  --    LYMPHO PCT %  --  3*   MONOS PCT % 4  --    MONO PCT %  --  4   EOS PCT % 0 0     Results from last 7 days   Lab Units 19  0532   SODIUM mmol/L 144   POTASSIUM mmol/L 3 8   CHLORIDE mmol/L 111*   CO2 mmol/L 25   BUN mg/dL 34*   CREATININE mg/dL 1 64*   ANION GAP mmol/L 8   CALCIUM mg/dL 8 6   ALBUMIN g/dL 2 3*   TOTAL BILIRUBIN mg/dL 0 62   ALK PHOS U/L 75   ALT U/L 6*   AST U/L 11   GLUCOSE RANDOM mg/dL 102     Results from last 7 days   Lab Units 19   INR  1 55*             Results from last 7 days   Lab Units 19 LACTIC ACID mmol/L 1 5   PROCALCITONIN ng/ml 84 90*           * I Have Reviewed All Lab Data Listed Above  * Additional Pertinent Lab Tests Reviewed: [place] [address] Admission Reviewed    Imaging:    Imaging Reports Reviewed Today Include: NA  Recent Cultures (last 7 days):     Results from last 7 days   Lab Units 11/27/19  2328 11/27/19  2230   BLOOD CULTURE  Received in Microbiology Lab  Culture in Progress  Received in Microbiology Lab  Culture in Progress  Last 24 Hours Medication List:     Current Facility-Administered Medications:  acetaminophen 650 mg Oral Q6H PRN Luiz Quarto, PA-C   amLODIPine 5 mg Oral BID Luiz Quarto, PA-C   apixaban 2 5 mg Oral BID Luiz Quarto, PA-C   busPIRone 7 5 mg Oral TID Luiz Quarto, PA-C   cefepime 2,000 mg Intravenous Q24H Luiz Quarto, PA-C   docusate sodium 100 mg Oral BID Luiz Quarto, PA-C   famotidine 20 mg Oral Daily Luiz Quarto, PA-C   memantine 5 mg Oral BID Luiz Quarto, PA-C   metoprolol 5 mg Intravenous Q6H PRN Luiz Quarto, PA-C   metoprolol tartrate 12 5 mg Oral BID Luiz Quarto, PA-C   ondansetron 4 mg Intravenous Q6H PRN Luiz Quarto, PA-C   oxyCODONE 5 mg Oral Q6H PRN Luiz Quarto, PA-C   polyethylene glycol 17 g Oral Daily Luiz Quarto, PA-C   potassium chloride 10 mEq Oral BID Luiz Quarto, PA-C   QUEtiapine 25 mg Oral HS Luiz Quarto, PA-C        Today, Patient Was Seen By: Deandre Alcazar MD    ** Please Note: Dictation voice to text software may have been used in the creation of this document   **

## 2019-11-28 NOTE — ASSESSMENT & PLAN NOTE
· Fecal impaction noted on CT scan of the abdomen  · Wife reports last bowel movement yesterday  Continue with bowel regimen  · Dulcolax suppository today

## 2019-11-28 NOTE — ASSESSMENT & PLAN NOTE
· Urinalysis reveals blood, white blood cells, nitrite positive, leukocyte positive, innumerable bacteria  · Noted that the patient is a chronic colonizer, as well as has history of multidrug resistant organisms as well as multiple antibiotic intolerances  · Additionally, the patient had recent urologic procedure, suprapubic catheter placement  · Given severe sepsis with fever, leukocytosis will treat as true infection with IV cefepime  Follow up on urine culture results closely  · Consult Urology  · Consult Infectious Disease

## 2019-11-28 NOTE — ASSESSMENT & PLAN NOTE
· Urinalysis reveals blood, white blood cells, nitrite positive, leukocyte positive, innumerable bacteria  · Noted that the patient is a chronic colonizer, as well as has history of multidrug resistant organisms as well as multiple antibiotic intolerances  · Additionally, the patient had recent urologic procedure, suprapubic catheter placement  · Given severe sepsis with fever, leukocytosis will treat as true infection with IV cefepime  Follow up on urine culture results closely  · Continue with intravenous cefepime  · Appreciate Infectious Disease evaluation  Kiko Garcia

## 2019-11-29 LAB
ANION GAP SERPL CALCULATED.3IONS-SCNC: 6 MMOL/L (ref 4–13)
BASOPHILS # BLD AUTO: 0.04 THOUSANDS/ΜL (ref 0–0.1)
BASOPHILS NFR BLD AUTO: 0 % (ref 0–1)
BUN SERPL-MCNC: 23 MG/DL (ref 5–25)
CALCIUM SERPL-MCNC: 8.5 MG/DL (ref 8.3–10.1)
CHLORIDE SERPL-SCNC: 108 MMOL/L (ref 100–108)
CO2 SERPL-SCNC: 27 MMOL/L (ref 21–32)
CREAT SERPL-MCNC: 1.35 MG/DL (ref 0.6–1.3)
EOSINOPHIL # BLD AUTO: 0.29 THOUSAND/ΜL (ref 0–0.61)
EOSINOPHIL NFR BLD AUTO: 3 % (ref 0–6)
ERYTHROCYTE [DISTWIDTH] IN BLOOD BY AUTOMATED COUNT: 14.5 % (ref 11.6–15.1)
GFR SERPL CREATININE-BSD FRML MDRD: 52 ML/MIN/1.73SQ M
GLUCOSE SERPL-MCNC: 85 MG/DL (ref 65–140)
HCT VFR BLD AUTO: 36.8 % (ref 36.5–49.3)
HGB BLD-MCNC: 11.9 G/DL (ref 12–17)
IMM GRANULOCYTES # BLD AUTO: 0.05 THOUSAND/UL (ref 0–0.2)
IMM GRANULOCYTES NFR BLD AUTO: 1 % (ref 0–2)
LYMPHOCYTES # BLD AUTO: 1.14 THOUSANDS/ΜL (ref 0.6–4.47)
LYMPHOCYTES NFR BLD AUTO: 10 % (ref 14–44)
MCH RBC QN AUTO: 29.5 PG (ref 26.8–34.3)
MCHC RBC AUTO-ENTMCNC: 32.3 G/DL (ref 31.4–37.4)
MCV RBC AUTO: 91 FL (ref 82–98)
MONOCYTES # BLD AUTO: 0.42 THOUSAND/ΜL (ref 0.17–1.22)
MONOCYTES NFR BLD AUTO: 4 % (ref 4–12)
MRSA NOSE QL CULT: ABNORMAL
MRSA NOSE QL CULT: ABNORMAL
NEUTROPHILS # BLD AUTO: 9 THOUSANDS/ΜL (ref 1.85–7.62)
NEUTS SEG NFR BLD AUTO: 82 % (ref 43–75)
NRBC BLD AUTO-RTO: 0 /100 WBCS
PLATELET # BLD AUTO: 230 THOUSANDS/UL (ref 149–390)
PMV BLD AUTO: 10.3 FL (ref 8.9–12.7)
POTASSIUM SERPL-SCNC: 4.1 MMOL/L (ref 3.5–5.3)
PROCALCITONIN SERPL-MCNC: 49.59 NG/ML
RBC # BLD AUTO: 4.03 MILLION/UL (ref 3.88–5.62)
SODIUM SERPL-SCNC: 141 MMOL/L (ref 136–145)
WBC # BLD AUTO: 10.94 THOUSAND/UL (ref 4.31–10.16)

## 2019-11-29 PROCEDURE — 99232 SBSQ HOSP IP/OBS MODERATE 35: CPT | Performed by: INTERNAL MEDICINE

## 2019-11-29 PROCEDURE — 84145 PROCALCITONIN (PCT): CPT | Performed by: PHYSICIAN ASSISTANT

## 2019-11-29 PROCEDURE — 85025 COMPLETE CBC W/AUTO DIFF WBC: CPT | Performed by: PHYSICIAN ASSISTANT

## 2019-11-29 PROCEDURE — 80048 BASIC METABOLIC PNL TOTAL CA: CPT | Performed by: PHYSICIAN ASSISTANT

## 2019-11-29 RX ORDER — BISACODYL 10 MG
10 SUPPOSITORY, RECTAL RECTAL DAILY PRN
Status: DISCONTINUED | OUTPATIENT
Start: 2019-11-29 | End: 2019-12-01 | Stop reason: HOSPADM

## 2019-11-29 RX ADMIN — DOCUSATE SODIUM 100 MG: 100 CAPSULE, LIQUID FILLED ORAL at 08:52

## 2019-11-29 RX ADMIN — BUSPIRONE HYDROCHLORIDE 7.5 MG: 5 TABLET ORAL at 21:18

## 2019-11-29 RX ADMIN — AMLODIPINE BESYLATE 5 MG: 5 TABLET ORAL at 17:22

## 2019-11-29 RX ADMIN — QUETIAPINE FUMARATE 25 MG: 25 TABLET ORAL at 21:18

## 2019-11-29 RX ADMIN — MEMANTINE 5 MG: 5 TABLET ORAL at 08:51

## 2019-11-29 RX ADMIN — BUSPIRONE HYDROCHLORIDE 7.5 MG: 5 TABLET ORAL at 08:52

## 2019-11-29 RX ADMIN — FAMOTIDINE 20 MG: 20 TABLET, FILM COATED ORAL at 08:51

## 2019-11-29 RX ADMIN — MEMANTINE 5 MG: 5 TABLET ORAL at 17:22

## 2019-11-29 RX ADMIN — CEFEPIME HYDROCHLORIDE 2000 MG: 2 INJECTION, POWDER, FOR SOLUTION INTRAVENOUS at 23:52

## 2019-11-29 RX ADMIN — BISACODYL 10 MG: 10 SUPPOSITORY RECTAL at 16:37

## 2019-11-29 RX ADMIN — POTASSIUM CHLORIDE 10 MEQ: 750 TABLET, EXTENDED RELEASE ORAL at 08:52

## 2019-11-29 RX ADMIN — APIXABAN 2.5 MG: 2.5 TABLET, FILM COATED ORAL at 17:21

## 2019-11-29 RX ADMIN — BUSPIRONE HYDROCHLORIDE 7.5 MG: 5 TABLET ORAL at 17:21

## 2019-11-29 RX ADMIN — METOPROLOL TARTRATE 12.5 MG: 25 TABLET ORAL at 08:52

## 2019-11-29 RX ADMIN — METOPROLOL TARTRATE 12.5 MG: 25 TABLET ORAL at 17:22

## 2019-11-29 RX ADMIN — POLYETHYLENE GLYCOL 3350 17 G: 17 POWDER, FOR SOLUTION ORAL at 08:52

## 2019-11-29 RX ADMIN — POTASSIUM CHLORIDE 10 MEQ: 750 TABLET, EXTENDED RELEASE ORAL at 17:17

## 2019-11-29 RX ADMIN — APIXABAN 2.5 MG: 2.5 TABLET, FILM COATED ORAL at 08:52

## 2019-11-29 RX ADMIN — AMLODIPINE BESYLATE 5 MG: 5 TABLET ORAL at 08:52

## 2019-11-29 NOTE — ASSESSMENT & PLAN NOTE
 Creatinine upon admission: 1 88  o Baseline: 1 2-1 4   Secondary to either postrenal from recent obstruction requiring suprapubic catheter for pre renal in the setting of severe sepsis and febrile illness  · Creatine improved to 1 41 today  Discontinue IV fluids    ·   Avoid hypotension and nephrotoxic medication

## 2019-11-29 NOTE — PROGRESS NOTES
Progress Note - Facundo Pulse [facility] 1950, 68 y o  male MRN: [id number]    Unit/Bed#: S -01 Encounter: [id number]    Primary Care Provider: Adry Graves MD   Date and time admitted to hospital: 11/27/2019  7:53 PM        * Severe sepsis [facility])  Assessment & Plan  Present on admission with  Fever of 101 at nursing home, leukocytosis likely secondary to urinary tract infection with recent instrumentation and placement of suprapubic catheter     Lactic acid: 1 5  Procalcitonin elevated at 72  He received IV fluids overnight with improved renal function to 1 35  Infectious Disease input appreciated  Continue with  IV cefepime, renally dosed  Follow up on blood and urine cultures  Patient has intolerance to multiple antibiotics  UTI (urinary tract infection)  Assessment & Plan  · Urinalysis reveals blood, white blood cells, nitrite positive, leukocyte positive, innumerable bacteria  · Noted that the patient is a chronic colonizer, as well as has history of multidrug resistant organisms as well as multiple antibiotic intolerances  · Additionally, the patient had recent urologic procedure, suprapubic catheter placement  · Given severe sepsis with fever, leukocytosis will treat as true infection with IV cefepime  Follow up on urine culture results closely  · Continue with intravenous cefepime day#3  · Appreciate Infectious Disease evaluation       Suprapubic catheter [facility])  Assessment & Plan  · Patient has a history of neurogenic bladder with Thorne catheter in place in the past, had recent hospitalization secondary to retention after repeating his Thorne out by accident  He is status post suprapubic catheter insertion 11/25 by interventional Radiology  · Good output in suprapubic at this point, no gross hematuria noted  Some sediment is noted in the bag  · Urology input appreciated  Outpatient follow-up      Dementia with behavioral disturbance [facility])  Assessment & Plan  · Supportive care, fall precautions  · Patient is nonverbal at baseline, unable to communicate any pain  Constipation  Assessment & Plan  · Fecal impaction noted on CT scan of the abdomen  · Wife reports last bowel movement yesterday  Continue with bowel regimen  Nursing attempted 5 fecal disimpaction at bedside but and unable to do so  Abdominal soft and nontender  · Continue with bowel regimen and Dulcolax suppository today  Essential hypertension  Assessment & Plan   Blood pressure elevated on admission  Stable currently  Continue amlodipine and metoprolol with doses now      Acute kidney injury [facility])  Assessment & Plan   Creatinine upon admission: 1 88  o Baseline: 1 2-1 4   Secondary to either postrenal from recent obstruction requiring suprapubic catheter for pre renal in the setting of severe sepsis and febrile illness  · Creatine improved to 1 35 today  Discontinue IV fluids  ·   Avoid hypotension and nephrotoxic medication    History of DVT (deep vein thrombosis)  Assessment & Plan  · Continue anticoagulation with Eliquis    Aphasia  Assessment & Plan  · Nonverbal at baseline  Supportive care  · Continue home medications  VTE Pharmacologic Prophylaxis:   Pharmacologic: Apixaban (Eliquis)  Mechanical VTE Prophylaxis in Place: Yes    Patient Centered Rounds: I have performed bedside rounds with nursing staff today  Discussions with Specialists or Other Care Team Provider:  Discussed with nursing  Education and Discussions with Family / Patient:    Time Spent for Care: 20 minutes  More than 50% of total time spent on counseling and coordination of care as described above  Current Length of Stay: 2 day(s)    Current Patient Status: Inpatient   Certification Statement: The patient will continue to require additional inpatient hospital stay due to Ongoing IV antibiotics    Discharge Plan:  Currently not medically stable for discharge      Code Status: Level 3 - DNAR and DNI      Subjective:   Patient does not offer any complaints  Remains afebrile  No acute events in the last 24 hours  Objective:     Vitals:   Temp (24hrs), Av 2 °F (36 8 °C), Min:98 1 °F (36 7 °C), Max:98 4 °F (36 9 °C)    Temp:  [98 1 °F (36 7 °C)-98 4 °F (36 9 °C)] 98 1 °F (36 7 °C)  HR:  [61-94] 61  Resp:  [18-20] 20  BP: (128-161)/(64-82) 128/64  SpO2:  [93 %-94 %] 93 %  Body mass index is 30 93 kg/m²  Input and Output Summary (last 24 hours): Intake/Output Summary (Last 24 hours) at 2019 1454  Last data filed at 2019 1216  Gross per 24 hour   Intake 680 ml   Output 1750 ml   Net -1070 ml       Physical Exam:     Physical Exam   Constitutional: No distress  HENT:   Head: Normocephalic  Mouth/Throat: Oropharynx is clear and moist    Eyes: Pupils are equal, round, and reactive to light  EOM are normal    Neck: Normal range of motion  Cardiovascular: Normal rate and regular rhythm  Pulmonary/Chest: Effort normal and breath sounds normal    Abdominal: Soft  Bowel sounds are normal    Neurological:   Nonverbal at baseline   Skin: Skin is warm  He is not diaphoretic  Mild erythema bilateral shoulder and upper chest wall  Additional Data:     Labs:    Results from last 7 days   Lab Units 19  2121   WBC Thousand/uL 10 94*   < > 18 12*   HEMOGLOBIN g/dL 11 9*   < > 15 1   HEMATOCRIT % 36 8   < > 45 3   PLATELETS Thousands/uL 230   < > 289   BANDS PCT %  --   --  3   NEUTROS PCT % 82*   < >  --    LYMPHS PCT % 10*   < >  --    LYMPHO PCT %  --   --  3*   MONOS PCT % 4   < >  --    MONO PCT %  --   --  4   EOS PCT % 3   < > 0    < > = values in this interval not displayed       Results from last 7 days   Lab Units 19  0441 19  0532   SODIUM mmol/L 141 144   POTASSIUM mmol/L 4 1 3 8   CHLORIDE mmol/L 108 111*   CO2 mmol/L 27 25   BUN mg/dL 23 34*   CREATININE mg/dL 1 35* 1 64*   ANION GAP mmol/L 6 8   CALCIUM mg/dL 8 5 8 6   ALBUMIN g/dL  --  2 3*   TOTAL BILIRUBIN mg/dL  -- 0  62   ALK PHOS U/L  --  75   ALT U/L  --  6*   AST U/L  --  11   GLUCOSE RANDOM mg/dL 85 102     Results from last 7 days   Lab Units 11/27/19  2230   INR  1 55*             Results from last 7 days   Lab Units 11/29/19  0441 11/28/19  0947 11/27/19  2230   LACTIC ACID mmol/L  --   --  1 5   PROCALCITONIN ng/ml 49 59* 72 47* 84 90*           * I Have Reviewed All Lab Data Listed Above  * Additional Pertinent Lab Tests Reviewed: Nader Carter Admission Reviewed    Imaging:    Imaging Reports Reviewed Today Include: NA    Recent Cultures (last 7 days):     Results from last 7 days   Lab Units 11/27/19  2328 11/27/19  2230 11/27/19  2115   BLOOD CULTURE  No Growth at 24 hrs   No Growth at 24 hrs   --    URINE CULTURE   --   --  >100,000 cfu/ml Oxidase Positive gram negative sarah*       Last 24 Hours Medication List:     Current Facility-Administered Medications:  acetaminophen 650 mg Oral Q6H PRN Kalpana Carver, PA-C    amLODIPine 5 mg Oral BID Kalpana Carver, PA-C    apixaban 2 5 mg Oral BID Kalpana Carver, PA-C    bisacodyl 10 mg Rectal Daily PRN Fransico Lloyd MD    busPIRone 7 5 mg Oral TID Kalpana Carver, PA-DEBRA    cefepime 2,000 mg Intravenous Q24H Kalpana Carver, PA-C Last Rate: 2,000 mg (11/28/19 3958)   docusate sodium 100 mg Oral BID Kalpana Carver, PA-C    famotidine 20 mg Oral Daily Kalpana Carver, PA-C    memantine 5 mg Oral BID Kalpana Carver, PA-C    metoprolol 5 mg Intravenous Q6H PRN Kalpana Carver, PA-C    metoprolol tartrate 12 5 mg Oral BID Kalpana Carver, PA-C    ondansetron 4 mg Intravenous Q6H PRN Kalpana Carver, PA-C    oxyCODONE 5 mg Oral Q6H PRN Kalpana Carver, PA-C    polyethylene glycol 17 g Oral Daily Kalpana Carver, PA-C    potassium chloride 10 mEq Oral BID Kalpana Carver, PA-C    QUEtiapine 25 mg Oral HS Kalpana Carver, PA-C         Today, Patient Was Seen By: Fransico Lloyd MD    ** Please Note: Dictation voice to text software may have been used in the creation of this document   **

## 2019-11-29 NOTE — ASSESSMENT & PLAN NOTE
· Patient has a history of neurogenic bladder with Thorne catheter in place in the past, had recent hospitalization secondary to retention after repeating his Thorne out by accident  He is status post suprapubic catheter insertion 11/25 by interventional Radiology  · Good output in suprapubic at this point, no gross hematuria noted  Some sediment is noted in the bag  · Urology input appreciated  Outpatient follow-up

## 2019-11-29 NOTE — ASSESSMENT & PLAN NOTE
 Creatinine upon admission: 1 88  o Baseline: 1 2-1 4   Secondary to either postrenal from recent obstruction requiring suprapubic catheter for pre renal in the setting of severe sepsis and febrile illness  · Creatine improved to 1 35 today  Discontinue IV fluids    ·   Avoid hypotension and nephrotoxic medication

## 2019-11-29 NOTE — PROGRESS NOTES
Patient resting in bed with wife at beside  Wife spoke with Dr Farideh Bar on telephone regarding treatment plan  Patient repositioned  Bed low and locked and alarm on  Will continue to monitor

## 2019-11-29 NOTE — ASSESSMENT & PLAN NOTE
Present on admission  Fever of 101 at nursing home, leukocytosis   Patient was also discharged with leukocytosis present, however this was likely stress reaction  Suspected source:  Urine  Lactic acid: 1 5  Procalcitonin elevated at 72  End organ damage:  Acute kidney injury at 1 8  I received IV fluids overnight with improved renal function to 1 64  Infectious Disease input appreciated  Continue with  IV cefepime, renally dosed  Follow up on blood and urine cultures  Patient has intolerance to multiple antibiotics

## 2019-11-29 NOTE — ASSESSMENT & PLAN NOTE
· Fecal impaction noted on CT scan of the abdomen  Patient received Dulcolax suppository yesterday  Good bowel movement yesterday and today  Continue with stool softener  And bowel regimen

## 2019-11-29 NOTE — ASSESSMENT & PLAN NOTE
· Urinalysis reveals blood, white blood cells, nitrite positive, leukocyte positive, innumerable bacteria  · Noted that the patient is a chronic colonizer, as well as has history of multidrug resistant organisms as well as multiple antibiotic intolerances  · Additionally, the patient had recent urologic procedure, suprapubic catheter placement  · Given severe sepsis with fever, leukocytosis will treat as true infection with IV cefepime  Follow up on urine culture results closely  · Continue with intravenous cefepime day#3  · Appreciate Infectious Disease evaluation  Tia Gao

## 2019-11-29 NOTE — ASSESSMENT & PLAN NOTE
· Urinalysis reveals blood, white blood cells, nitrite positive, leukocyte positive, innumerable bacteria  · Noted that the patient is a chronic colonizer, as well as has history of multidrug resistant organisms as well as multiple antibiotic intolerances  · Additionally, the patient had recent urologic procedure, suprapubic catheter placement  · Currently on intravenous cefepime day 4  Urine culture grew Pseudomonas  Discussed with Infectious Disease  Patient will be maintained on IV cefepime to complete 5 days of treatment followed by 1 dose of Levaquin to complete total of 7 days of antibiotic treatment  He has remained afebrile and his leukocytosis has resolved

## 2019-11-29 NOTE — ASSESSMENT & PLAN NOTE
· Fecal impaction noted on CT scan of the abdomen  · Wife reports last bowel movement yesterday  Continue with bowel regimen  Nursing attempted 5 fecal disimpaction at bedside but and unable to do so  Abdominal soft and nontender  · Continue with bowel regimen and Dulcolax suppository today

## 2019-11-29 NOTE — ASSESSMENT & PLAN NOTE
Present on admission with  Fever of 101 at nursing home, leukocytosis  Sepsis secondary to urinary tract infection with recent instrumentation and placement of suprapubic catheter     Lactic acid: 1 5  Procalcitonin trending down 84-72-49  Infectious Disease input appreciated  Continue with  IV cefepime, renally dosed  Blood cultures had remained sterile to date and urine cultures growing Pseudomonas  Discussed with Infectious Disease  Will maintain on cefepime to complete 5 days of treatment followed by 1 time dose of Levaquin on 12/2 to complete 7 days of treatment  Patient has intolerance to multiple antibiotics

## 2019-11-30 LAB
ANION GAP SERPL CALCULATED.3IONS-SCNC: 8 MMOL/L (ref 4–13)
BASOPHILS # BLD AUTO: 0.03 THOUSANDS/ΜL (ref 0–0.1)
BASOPHILS NFR BLD AUTO: 0 % (ref 0–1)
BUN SERPL-MCNC: 17 MG/DL (ref 5–25)
CALCIUM SERPL-MCNC: 8.8 MG/DL (ref 8.3–10.1)
CHLORIDE SERPL-SCNC: 106 MMOL/L (ref 100–108)
CO2 SERPL-SCNC: 25 MMOL/L (ref 21–32)
CREAT SERPL-MCNC: 1.41 MG/DL (ref 0.6–1.3)
EOSINOPHIL # BLD AUTO: 0.4 THOUSAND/ΜL (ref 0–0.61)
EOSINOPHIL NFR BLD AUTO: 4 % (ref 0–6)
ERYTHROCYTE [DISTWIDTH] IN BLOOD BY AUTOMATED COUNT: 14.1 % (ref 11.6–15.1)
GFR SERPL CREATININE-BSD FRML MDRD: 50 ML/MIN/1.73SQ M
GLUCOSE SERPL-MCNC: 123 MG/DL (ref 65–140)
HCT VFR BLD AUTO: 41.5 % (ref 36.5–49.3)
HGB BLD-MCNC: 13.5 G/DL (ref 12–17)
IMM GRANULOCYTES # BLD AUTO: 0.06 THOUSAND/UL (ref 0–0.2)
IMM GRANULOCYTES NFR BLD AUTO: 1 % (ref 0–2)
LYMPHOCYTES # BLD AUTO: 1.14 THOUSANDS/ΜL (ref 0.6–4.47)
LYMPHOCYTES NFR BLD AUTO: 12 % (ref 14–44)
MCH RBC QN AUTO: 29.3 PG (ref 26.8–34.3)
MCHC RBC AUTO-ENTMCNC: 32.5 G/DL (ref 31.4–37.4)
MCV RBC AUTO: 90 FL (ref 82–98)
MONOCYTES # BLD AUTO: 0.53 THOUSAND/ΜL (ref 0.17–1.22)
MONOCYTES NFR BLD AUTO: 6 % (ref 4–12)
NEUTROPHILS # BLD AUTO: 7.42 THOUSANDS/ΜL (ref 1.85–7.62)
NEUTS SEG NFR BLD AUTO: 77 % (ref 43–75)
NRBC BLD AUTO-RTO: 0 /100 WBCS
PLATELET # BLD AUTO: 273 THOUSANDS/UL (ref 149–390)
PMV BLD AUTO: 9.4 FL (ref 8.9–12.7)
POTASSIUM SERPL-SCNC: 3.5 MMOL/L (ref 3.5–5.3)
PROCALCITONIN SERPL-MCNC: 26.48 NG/ML
RBC # BLD AUTO: 4.6 MILLION/UL (ref 3.88–5.62)
SODIUM SERPL-SCNC: 139 MMOL/L (ref 136–145)
WBC # BLD AUTO: 9.58 THOUSAND/UL (ref 4.31–10.16)

## 2019-11-30 PROCEDURE — 99232 SBSQ HOSP IP/OBS MODERATE 35: CPT | Performed by: UROLOGY

## 2019-11-30 PROCEDURE — 80048 BASIC METABOLIC PNL TOTAL CA: CPT | Performed by: INTERNAL MEDICINE

## 2019-11-30 PROCEDURE — 85025 COMPLETE CBC W/AUTO DIFF WBC: CPT | Performed by: INTERNAL MEDICINE

## 2019-11-30 PROCEDURE — 99232 SBSQ HOSP IP/OBS MODERATE 35: CPT | Performed by: INTERNAL MEDICINE

## 2019-11-30 PROCEDURE — 84145 PROCALCITONIN (PCT): CPT | Performed by: INTERNAL MEDICINE

## 2019-11-30 RX ADMIN — MEMANTINE 5 MG: 5 TABLET ORAL at 09:27

## 2019-11-30 RX ADMIN — METOPROLOL TARTRATE 12.5 MG: 25 TABLET ORAL at 09:26

## 2019-11-30 RX ADMIN — BUSPIRONE HYDROCHLORIDE 7.5 MG: 5 TABLET ORAL at 09:26

## 2019-11-30 RX ADMIN — POTASSIUM CHLORIDE 10 MEQ: 750 TABLET, EXTENDED RELEASE ORAL at 09:26

## 2019-11-30 RX ADMIN — AMLODIPINE BESYLATE 5 MG: 5 TABLET ORAL at 09:27

## 2019-11-30 RX ADMIN — CEFEPIME HYDROCHLORIDE 2000 MG: 2 INJECTION, POWDER, FOR SOLUTION INTRAVENOUS at 23:30

## 2019-11-30 RX ADMIN — BUSPIRONE HYDROCHLORIDE 7.5 MG: 5 TABLET ORAL at 17:22

## 2019-11-30 RX ADMIN — POTASSIUM CHLORIDE 10 MEQ: 750 TABLET, EXTENDED RELEASE ORAL at 17:22

## 2019-11-30 RX ADMIN — MEMANTINE 5 MG: 5 TABLET ORAL at 17:22

## 2019-11-30 RX ADMIN — METOPROLOL TARTRATE 12.5 MG: 25 TABLET ORAL at 17:22

## 2019-11-30 RX ADMIN — QUETIAPINE FUMARATE 25 MG: 25 TABLET ORAL at 22:08

## 2019-11-30 RX ADMIN — AMLODIPINE BESYLATE 5 MG: 5 TABLET ORAL at 17:22

## 2019-11-30 RX ADMIN — APIXABAN 2.5 MG: 2.5 TABLET, FILM COATED ORAL at 09:27

## 2019-11-30 RX ADMIN — BUSPIRONE HYDROCHLORIDE 7.5 MG: 5 TABLET ORAL at 22:07

## 2019-11-30 RX ADMIN — FAMOTIDINE 20 MG: 20 TABLET, FILM COATED ORAL at 09:27

## 2019-11-30 RX ADMIN — APIXABAN 2.5 MG: 2.5 TABLET, FILM COATED ORAL at 17:22

## 2019-11-30 NOTE — PROGRESS NOTES
Progress Note - Urology Progress  Reford Humza [facility] 68 y o  male MRN: [id number]  Unit/Bed#: S -01 Encounter: [id number]    Assessment:  Sepsis-improving  Status post placement of SP tube  Plan:  The suprapubic tube is functioning well  Sepsis is resolving  Urology will sign off  Please call should problems arise  Subjective/Objective       Subjective:  Appears comfortable  Objective:     Blood pressure 158/82, pulse 100, temperature 99 9 °F (37 7 °C), temperature source Axillary, resp  rate 20, weight 101 kg (221 lb 12 5 oz), SpO2 92 %  ,Body mass index is 30 93 kg/m²  Intake/Output Summary (Last 24 hours) at 11/30/2019 0954  Last data filed at 11/30/2019 7897  Gross per 24 hour   Intake 340 ml   Output 1900 ml   Net -1560 ml       Invasive Devices     Peripheral Intravenous Line            Peripheral IV 11/29/19 Left Hand 1 day          Drain            Suprapubic Catheter Latex 18 Fr  3 days                Review of Systems   Unable to perform ROS: Patient nonverbal       Physical Exam: General appearance: alert and No distress  Head: Normocephalic, without obvious abnormality, atraumatic  Neck: supple, symmetrical, trachea midline  Back: symmetric, no curvature  ROM normal  No CVA tenderness  Abdomen: soft, non-tender; bowel sounds normal; no masses,  no organomegaly and SP tube site unremarkable  Male genitalia: normal, Testes descended and nontender  Neurologic: Grossly normal  Urine clear via suprapubic tube    Lab, Imaging and other studies:  I have personally reviewed pertinent lab results    , CBC:   Lab Results   Component Value Date    WBC 9 58 11/30/2019    HGB 13 5 11/30/2019    HCT 41 5 11/30/2019    MCV 90 11/30/2019     11/30/2019    MCH 29 3 11/30/2019    MCHC 32 5 11/30/2019    RDW 14 1 11/30/2019    MPV 9 4 11/30/2019    NRBC 0 11/30/2019   , CMP:   Lab Results   Component Value Date    SODIUM 139 11/30/2019    K 3 5 11/30/2019     11/30/2019    CO2 25 11/30/2019 BUN 17 11/30/2019    CREATININE 1 41 (H) 11/30/2019    CALCIUM 8 8 11/30/2019    EGFR 50 11/30/2019

## 2019-11-30 NOTE — TRANSPORTATION MEDICAL NECESSITY
Section I - General Information    Name of Patient: Jaiden Starks                 : 1950    Medicare #: [id number]  Transport Date: 19 (PCS is valid for round trips on this date and for all repetitive trips in the 60-day range as noted below )  Origin: [facility]: [facility]  Is the pt's stay covered under Medicare Part A (PPS/DRG)   [x]     Closest appropriate facility? If no, why is transport to more distant facility required? Yes  If hospice pt, is this transport related to pt's terminal illness? NA       Section II - Medical Necessity Questionnaire  Ambulance transportation is medically necessary only if other means of transport are contraindicated or would be potentially harmful to the patient  To meet this requirement, the patient must either be "bed confined" or suffer from a condition such that transport by means other than ambulance is contraindicated by the patient's condition  The following questions must be answered by the medical professional signing below for this form to be valid:    1)  Describe the MEDICAL CONDITION (physical and/or mental) of this patient AT [address] [place] [address] that requires the patient to be transported in an ambulance and why transport by other means is contraindicated by the patient's condition:Patient has a diagnosis of dementia and is documented as being non-verbal at baseline, thus medical attendant required  2) Is the patient "bed confined" as defined below? yes  To be "be confined" the patient must satisfy all three of the following conditions: (1) unable to get up from bed without Assistance; AND (2) unable to ambulate; AND (3) unable to sit in a chair or wheelchair  3) Can this patient safely be transported by car or wheelchair van (i e , seated during transport without a medical attendant or monitoring)?    NO    4) In addition to completing questions 1-3 above, please check any of the following conditions that apply*:   *Note: supporting documentation for any boxes checked must be maintained in the patient's medical records  If hosp-hosp transfer, describe services needed at 2nd facility not available at 1st facility? Patient is confused  Medical attendant required   Special handling/isolation/infection control precautions required   Other(specify) Dementia, non-verbal, Contact precautions for MRSA and ESBL, suprapubic catheter in place, patient is a long-term nursing home resident      Section III - Signature of Physician or Healthcare Professional  I certify that the above information is true and correct based on my evaluation of this patient, and represent that the patient requires transport by ambulance and that other forms of transport are contraindicated  I understand that this information will be used by the Centers for Medicare and Medicaid Services (CMS) to support the determination of medical necessity for ambulance services, and I represent that I have personal knowledge of the patient's condition at time of transport  [x]  If this box is checked, I also certify that the patient is physically or mentally incapable of signing the ambulance service's claim and that the institution with which I am affiliated has furnished care, services, or assistance to the patient  My signature below is made on behalf of the patient pursuant to 42 CFR §424 36(b)(4)   In accordance with 42 CFR §424 37, the specific reason(s) that the patient is physically or mentally incapable of signing the claim form is as follows: NA      Signature of Physician* or Healthcare Professional______________________________________________________________  Signature Date 11/30/19 (For scheduled repetitive transports, this form is not valid for transports performed more than 60 days after this date)    Printed Name & Credentials of Physician or Healthcare Professional (MD, DO, RN, etc ) Demetra Calixto  *Form must be signed by patient's attending physician for scheduled, repetitive transports   For non-repetitive, unscheduled ambulance transports, if unable to obtain the signature of the attending physician, any of the following may sign (choose appropriate option below)  [] Physician Assistant []  Clinical Nurse Specialist []  Registered Nurse  []  Nurse Practitioner  [x] Discharge Planner

## 2019-11-30 NOTE — ASSESSMENT & PLAN NOTE
· Present on admission after of recent urologic procedure(suprapubic catheter placement)  · Currently on intravenous cefepime day 5  Urine culture grew Pseudomonas  After completion of  5 days of treatment with intravenous cefepime, patient will receive f 1 dose of oral Levaquin tomorrow to complete total of 7 days of antibiotic treatment  He has remained afebrile and his leukocytosis has resolved

## 2019-11-30 NOTE — DISCHARGE SUMMARY
Discharge- Megan Murray [facility] 1950, 68 y o  male MRN: [id number]    Unit/Bed#: S -01 Encounter: [id number]    Primary Care Provider: Fanny Carpenter MD   Date and time admitted to hospital: 11/27/2019  7:53 PM        * Severe sepsis [facility])  Assessment & Plan  Present on admission with  Fever of 101 at nursing home, leukocytosis  Sepsis secondary to urinary tract infection with recent instrumentation and placement of suprapubic catheter     Lactic acid: 1 5  Procalcitonin trending down 84-72-49  Infectious Disease input appreciated  Blood cultures had remained sterile to date  Urine cultures growing Pseudomonas and ESBL/Ecoli  Did 5 days of intravenous cefepime  Will give him 1 time dose of Levaquin 750 mg p o  On 12/02  This will complete 7 days of treatment of with antibiotics  Patient has remained afebrile and his leukocytosis has resolved  Stable for discharge back to nursing facility  UTI (urinary tract infection)  Assessment & Plan  · Present on admission after of recent urologic procedure(suprapubic catheter placement)  · Currently on intravenous cefepime day 5  Urine culture grew Pseudomonas  After completion of  5 days of treatment with intravenous cefepime, patient will receive f 1 dose of oral Levaquin tomorrow to complete total of 7 days of antibiotic treatment  His urine culture also had a delayed growth of ESBL+ E coli  This was believed to be colonization as patient had shown improvement on cefepime  He has remained afebrile and his leukocytosis has resolved  He was deemed stable for discharge to the nursing home  Essential hypertension  Assessment & Plan   Blood pressure elevated on admission  Stable currently  Continue amlodipine and metoprolol with doses now      History of DVT (deep vein thrombosis)  Assessment & Plan  · Continue anticoagulation with Eliquis    Aphasia  Assessment & Plan  · Nonverbal at baseline    Supportive care  · Continue home medications  Discharging Physician / Practitioner: Wesly Romo MD  PCP: Alona Cisse MD  Admission Date:   Admission Orders (From admission, onward)     Ordered        11/27/19 2248  Inpatient Admission  Once                   Discharge Date: 12/01/19    Disposition:      Short Term Rehab or SNF at [place] [address] (see below)    For Discharges to [facility] SNF:   · [facility]  AND [facility] - Not Applicable to this Patient    Reason for Admission:  Fever  Discharge Diagnoses:     Please see assessment and plan section above for further details regarding discharge diagnoses  Resolved Problems  Date Reviewed: 11/27/2019    None          Consultations During Hospital Stay:  · Infectious disease  · Urology    Procedures Performed:   CT scan of the abdomen:Interval placement of suprapubic catheter  There is diffuse perivesical inflammatory fat stranding with scattered foci of gas in the bladder lumen  These findings are likely related to recent instrumentation, however correlate for superimposed   cystitis  2   Large volume of retained stool in the rectal vault compatible with fecal impaction  · 3  Cholelithiasis  Medication Adjustments and Discharge Medications:  · Summary of Medication Adjustments made as a result of this hospitalization:  See med recMedication Dosing Tapers - Please refer to Discharge Medication List for details on any medication dosing tapers (if applicable to patient)  · Medications being temporarily held (include recommended restart time):  See med rec  · Discharge Medication List: See after visit summary for reconciled discharge medications  Wound Care Recommendations:  When applicable, please see wound care section of After Visit Summary      Diet Recommendations at Discharge:  Diet -        Diet Orders   (From admission, onward)             Start     Ordered    11/28/19 0741  Room Service  Once     Question:  Type of Service  Answer:  Room Service - Appropriate with Assistance    11/28/19 0740    11/28/19 0024  Diet Regular; Regular House  Diet effective now     Question Answer Comment   Diet Type Regular    Regular Regular House    RD to adjust diet per protocol? Yes        11/28/19 0023                Instructions for any Catheters / Lines Present at Discharge (including removal date, if applicable):  Suprapubic catheter care per protocol    Significant Findings / Test Results:   · Urine culture : Pseudomonas    Incidental Findings:   · None    Test Results Pending at Discharge (will require follow up): · None     Outpatient Tests Requested:  · None    Complications:  None  Hospital Course:     Shay Soto is a 68 y o  male patient who originally presented to the hospital on 11/27/2019 due to fever at his nursing facility  Patient has a history of a facial, neurogenic bladder, chronic kidney disease any urethral structure  He was evaluate right rib Urology recently and had a suprapubic catheter placed on 11/26  After discharge from the hospital he returned back to the emergency room with fever of 101 and was found to have leukocytosis and elevated procalcitonin at 84  He was also found to have acute kidney injury with creatinine elevation at 1 8  His urinalysis showed pyuria  He was empirically started on cefepime for urinary tract infection likely resulted from recent suprapubic catheter placement  His CT scan of the abdomen done on admission showed perivesicular fat stranding and foci of gas in the bladder lumen consistent with recent instrumentation with possible superimposed cystitis  His blood cultures remained sterile in his urine culture grew Pseudomonas and ESBL E coli  His E coli was believed to be colonization   He completed 5 days of intravenous cefepime and his leukocytosis resolved and he remained afebrile  His Lewis  also resolved    He was deemed stable for discharge back to the nursing facility to receive 1 more day of oral Levaquin to complete total of 7 days of treatment  Throughout his hospitalization plan of care was discussed with wife at length and all questions were addressed  Patient was discharged in a stable condition  Condition at Discharge: stable     Discharge Day Visit / Exam:     Subjective:  Patient remained afebrile  No acute events in the last 24 hours  Vitals: Blood Pressure: 142/80 (12/01/19 0723)  Pulse: 72 (12/01/19 0723)  Temperature: 98 1 °F (36 7 °C) (12/01/19 0723)  Temp Source: Oral (12/01/19 0723)  Respirations: 20 (12/01/19 0723)  Weight - Scale: 101 kg (221 lb 12 5 oz) (11/28/19 0034)  SpO2: 95 % (12/01/19 0723)  Exam:   Physical Exam   Constitutional: No distress  HENT:   Head: Normocephalic and atraumatic  Mouth/Throat: Oropharynx is clear and moist    Eyes: Pupils are equal, round, and reactive to light  EOM are normal    Neck: Normal range of motion  Neck supple  Cardiovascular: Normal rate and regular rhythm  Pulmonary/Chest: Effort normal and breath sounds normal    Abdominal: Soft  Bowel sounds are normal    Genitourinary:   Genitourinary Comments: Suprapubic catheter in place without any erythema surrounding it  Musculoskeletal:   Mild scrotal edema   Neurological: He is alert  Nonverbal at baseline   Skin: Skin is warm  He is not diaphoretic  (Discussion with Family:  Discussed with patient's wife    Goals of Care Discussions:  · Code Status at Discharge: Level 3 - DNAR and DNI  · Were there any Goals of Care Discussions during Hospitalization?: No  · Results of any General Goals of Care Discussions: NA   · POLST Completed: No   · If POLST Completed, Summary of POLST Agreement Provided Here: NA   · OK to Rehospitalize if Needed? Yes    Discharge instructions/Information to patient and family:   See after visit summary section titled Discharge Instructions for information provided to patient and family        Planned Readmission:NO     Discharge Statement:  I spent 35 minutes discharging the patient  This time was spent on the day of discharge  I had direct contact with the patient on the day of discharge  Greater than 50% of the total time was spent examining patient, answering all patient questions, arranging and discussing plan of care with patient as well as directly providing post-discharge instructions  Additional time then spent on discharge activities      ** Please Note: This note has been constructed using a voice recognition system **

## 2019-11-30 NOTE — PLAN OF CARE
Problem: Potential for Falls  Goal: Patient will remain free of falls  Description  INTERVENTIONS:  - Assess patient frequently for physical needs  -  Identify cognitive and physical deficits and behaviors that affect risk of falls    -  [place] fall precautions as indicated by assessment   - Educate patient/family on patient safety including physical limitations  - Instruct patient to call for assistance with activity based on assessment  - Modify environment to reduce risk of injury  - Consider OT/PT consult to assist with strengthening/mobility  Outcome: Progressing     Problem: Prexisting or High Potential for Compromised Skin Integrity  Goal: Skin integrity is maintained or improved  Description  INTERVENTIONS:  - Identify patients at risk for skin breakdown  - Assess and monitor skin integrity  - Assess and monitor nutrition and hydration status  - Monitor labs   - Assess for incontinence   - Turn and reposition patient  - Assist with mobility/ambulation  - Relieve pressure over bony prominences  - Avoid friction and shearing  - Provide appropriate hygiene as needed including keeping skin clean and dry  - Evaluate need for skin moisturizer/barrier cream  - Collaborate with interdisciplinary team   - Patient/family teaching  - Consider wound care consult   Outcome: Progressing     Problem: PAIN - ADULT  Goal: Verbalizes/displays adequate comfort level or baseline comfort level  Description  Interventions:  - Encourage patient to monitor pain and request assistance  - Assess pain using appropriate pain scale  - Administer analgesics based on type and severity of pain and evaluate response  - Implement non-pharmacological measures as appropriate and evaluate response  - Consider cultural and social influences on pain and pain management  - Notify physician/advanced practitioner if interventions unsuccessful or patient reports new pain  Outcome: Progressing     Problem: INFECTION - ADULT  Goal: Absence or prevention of progression during hospitalization  Description  INTERVENTIONS:  - Assess and monitor for signs and symptoms of infection  - Monitor lab/diagnostic results  - Monitor all insertion sites, i e  indwelling lines, tubes, and drains  - Monitor endotracheal if appropriate and nasal secretions for changes in amount and color  - [place] appropriate cooling/warming therapies per order  - Administer medications as ordered  - Instruct and encourage patient and family to use good hand hygiene technique  - Identify and instruct in appropriate isolation precautions for identified infection/condition  Outcome: Progressing  Goal: Absence of fever/infection during neutropenic period  Description  INTERVENTIONS:  - Monitor WBC    Outcome: Progressing     Problem: SAFETY ADULT  Goal: Patient will remain free of falls  Description  INTERVENTIONS:  - Assess patient frequently for physical needs  -  Identify cognitive and physical deficits and behaviors that affect risk of falls    -  [place] fall precautions as indicated by assessment   - Educate patient/family on patient safety including physical limitations  - Instruct patient to call for assistance with activity based on assessment  - Modify environment to reduce risk of injury  - Consider OT/PT consult to assist with strengthening/mobility  Outcome: Progressing  Goal: Maintain or return to baseline ADL function  Description  INTERVENTIONS:  -  Assess patient's ability to carry out ADLs; assess patient's baseline for ADL function and identify physical deficits which impact ability to perform ADLs (bathing, care of mouth/teeth, toileting, grooming, dressing, etc )  - Assess/evaluate cause of self-care deficits   - Assess range of motion  - Assess patient's mobility; develop plan if impaired  - Assess patient's need for assistive devices and provide as appropriate  - Encourage maximum independence but intervene and supervise when necessary  - Involve family in performance of ADLs  - Assess for home care needs following discharge   - Consider OT consult to assist with ADL evaluation and planning for discharge  - Provide patient education as appropriate  Outcome: Progressing  Goal: Maintain or return mobility status to optimal level  Description  INTERVENTIONS:  - Assess patient's baseline mobility status (ambulation, transfers, stairs, etc )    - Identify cognitive and physical deficits and behaviors that affect mobility  - Identify mobility aids required to assist with transfers and/or ambulation (gait belt, sit-to-stand, lift, walker, cane, etc )  - [place] fall precautions as indicated by assessment  - Record patient progress and toleration of activity level on Mobility SBAR; progress patient to next Phase/Stage  - Instruct patient to call for assistance with activity based on assessment  - Consider rehabilitation consult to assist with strengthening/weightbearing, etc   Outcome: Progressing     Problem: DISCHARGE PLANNING  Goal: Discharge to home or other facility with appropriate resources  Description  INTERVENTIONS:  - Identify barriers to discharge w/patient and caregiver  - Arrange for needed discharge resources and transportation as appropriate  - Identify discharge learning needs (meds, wound care, etc )  - Arrange for interpretive services to assist at discharge as needed  - Refer to Case Management Department for coordinating discharge planning if the patient needs post-hospital services based on physician/advanced practitioner order or complex needs related to functional status, cognitive ability, or social support system  Outcome: Progressing     Problem: Knowledge Deficit  Goal: Patient/family/caregiver demonstrates understanding of disease process, treatment plan, medications, and discharge instructions  Description  Complete learning assessment and assess knowledge base    Interventions:  - Provide teaching at level of understanding  - Provide teaching via preferred learning methods  Outcome: Progressing

## 2019-11-30 NOTE — ASSESSMENT & PLAN NOTE
Present on admission with  Fever of 101 at nursing home, leukocytosis  Sepsis secondary to urinary tract infection with recent instrumentation and placement of suprapubic catheter     Lactic acid: 1 5  Procalcitonin trending down 84-72-49  Infectious Disease input appreciated  Blood cultures had remained sterile to date  Urine cultures growing Pseudomonas  Did 5 days of intravenous cefepime  Will give him 1 time dose of Levaquin 750 mg p o  On 12/02  This will complete 7 days of treatment of with antibiotics  Patient has remained afebrile and his leukocytosis has resolved  Stable for discharge back to nursing facility

## 2019-11-30 NOTE — PROGRESS NOTES
Progress Note - Huy Bullard [facility] 1950, 68 y o  male MRN: [id number]    Unit/Bed#: S -01 Encounter: [id number]    Primary Care Provider: Gogo Chand MD   Date and time admitted to hospital: 11/27/2019  7:53 PM        * Severe sepsis [facility])  Assessment & Plan  Present on admission with  Fever of 101 at nursing home, leukocytosis  Sepsis secondary to urinary tract infection with recent instrumentation and placement of suprapubic catheter     Lactic acid: 1 5  Procalcitonin trending down 84-72-49  Infectious Disease input appreciated  Continue with  IV cefepime, renally dosed  Blood cultures had remained sterile to date and urine cultures growing Pseudomonas  Discussed with Infectious Disease  Will maintain on cefepime to complete 5 days of treatment followed by 1 time dose of Levaquin on 12/2 to complete 7 days of treatment  Patient has intolerance to multiple antibiotics  UTI (urinary tract infection)  Assessment & Plan  · Urinalysis reveals blood, white blood cells, nitrite positive, leukocyte positive, innumerable bacteria  · Noted that the patient is a chronic colonizer, as well as has history of multidrug resistant organisms as well as multiple antibiotic intolerances  · Additionally, the patient had recent urologic procedure, suprapubic catheter placement  · Currently on intravenous cefepime day 4  Urine culture grew Pseudomonas  Discussed with Infectious Disease  Patient will be maintained on IV cefepime to complete 5 days of treatment followed by 1 dose of Levaquin to complete total of 7 days of antibiotic treatment  He has remained afebrile and his leukocytosis has resolved  Suprapubic catheter [facility])  Assessment & Plan  · Patient has a history of neurogenic bladder with Thorne catheter in place in the past, had recent hospitalization secondary to retention after repeating his Thorne out by accident    He is status post suprapubic catheter insertion 11/25 by interventional Radiology  · Good output in suprapubic at this point, no gross hematuria noted  Some sediment is noted in the bag  · Urology input appreciated  Outpatient follow-up  Dementia with behavioral disturbance [facility])  Assessment & Plan  · Supportive care, fall precautions  · Patient is nonverbal at baseline, unable to communicate any pain  Constipation  Assessment & Plan  · Fecal impaction noted on CT scan of the abdomen  Patient received Dulcolax suppository yesterday  Good bowel movement yesterday and today  Continue with stool softener  And bowel regimen  Essential hypertension  Assessment & Plan   Blood pressure elevated on admission  Stable currently  Continue amlodipine and metoprolol with doses now      Acute kidney injury [facility])  Assessment & Plan   Creatinine upon admission: 1 88  o Baseline: 1 2-1 4   Secondary to either postrenal from recent obstruction requiring suprapubic catheter for pre renal in the setting of severe sepsis and febrile illness  · Creatine improved to 1 41 today  Discontinue IV fluids  ·   Avoid hypotension and nephrotoxic medication    History of DVT (deep vein thrombosis)  Assessment & Plan  · Continue anticoagulation with Eliquis    Aphasia  Assessment & Plan  · Nonverbal at baseline  Supportive care  · Continue home medications  VTE Pharmacologic Prophylaxis:   Pharmacologic: Apixaban (Eliquis)  Mechanical VTE Prophylaxis in Place: Yes    Patient Centered Rounds: I have performed bedside rounds with nursing staff today  Discussions with Specialists or Other Care Team Provider:  Discussed with Infectious Disease    Education and Discussions with Family / Patient:  Discussed with patient over the phone  Time Spent for Care: 20 minutes  More than 50% of total time spent on counseling and coordination of care as described above      Current Length of Stay: 3 day(s)    Current Patient Status: Inpatient   Certification Statement: The patient will continue to require additional inpatient hospital stay due to Ongoing IV antibiotics    Discharge Plan:  Currently not stable for discharge  Code Status: Level 3 - DNAR and DNI      Subjective:   Patient seen and examined  Does not offer any complaints  Remains afebrile  Objective:     Vitals:   Temp (24hrs), Av 1 °F (37 3 °C), Min:98 4 °F (36 9 °C), Max:99 9 °F (37 7 °C)    Temp:  [98 4 °F (36 9 °C)-99 9 °F (37 7 °C)] 99 9 °F (37 7 °C)  HR:  [] 100  Resp:  [16-20] 20  BP: (130-158)/(70-82) 158/82  SpO2:  [92 %-94 %] 92 %  Body mass index is 30 93 kg/m²  Input and Output Summary (last 24 hours): Intake/Output Summary (Last 24 hours) at 2019 1156  Last data filed at 2019 1001  Gross per 24 hour   Intake 340 ml   Output 2675 ml   Net -2335 ml       Physical Exam:     Physical Exam   Constitutional: No distress  HENT:   Head: Normocephalic and atraumatic  Mouth/Throat: Oropharynx is clear and moist    Eyes: Pupils are equal, round, and reactive to light  EOM are normal    Neck: Normal range of motion  Neck supple  Cardiovascular: Normal rate and regular rhythm  Pulmonary/Chest: Effort normal and breath sounds normal    Abdominal: Soft  Bowel sounds are normal    Genitourinary:   Genitourinary Comments: Suprapubic catheter in place  Musculoskeletal: He exhibits edema  Neurological: He is alert  Nonverbal at baseline   Skin: Skin is warm  He is not diaphoretic  Additional Data:     Labs:    Results from last 7 days   Lab Units 19  0453  19  2121   WBC Thousand/uL 9 58   < > 18 12*   HEMOGLOBIN g/dL 13 5   < > 15 1   HEMATOCRIT % 41 5   < > 45 3   PLATELETS Thousands/uL 273   < > 289   BANDS PCT %  --   --  3   NEUTROS PCT % 77*   < >  --    LYMPHS PCT % 12*   < >  --    LYMPHO PCT %  --   --  3*   MONOS PCT % 6   < >  --    MONO PCT %  --   --  4   EOS PCT % 4   < > 0    < > = values in this interval not displayed       Results from last 7 days   Lab Units 11/30/19  0453  11/28/19  0532   SODIUM mmol/L 139   < > 144   POTASSIUM mmol/L 3 5   < > 3 8   CHLORIDE mmol/L 106   < > 111*   CO2 mmol/L 25   < > 25   BUN mg/dL 17   < > 34*   CREATININE mg/dL 1 41*   < > 1 64*   ANION GAP mmol/L 8   < > 8   CALCIUM mg/dL 8 8   < > 8 6   ALBUMIN g/dL  --   --  2 3*   TOTAL BILIRUBIN mg/dL  --   --  0 62   ALK PHOS U/L  --   --  75   ALT U/L  --   --  6*   AST U/L  --   --  11   GLUCOSE RANDOM mg/dL 123   < > 102    < > = values in this interval not displayed  Results from last 7 days   Lab Units 11/27/19  2230   INR  1 55*             Results from last 7 days   Lab Units 11/29/19  0441 11/28/19  0947 11/27/19  2230   LACTIC ACID mmol/L  --   --  1 5   PROCALCITONIN ng/ml 49 59* 72 47* 84 90*           * I Have Reviewed All Lab Data Listed Above  * Additional Pertinent Lab Tests Reviewed: Nader 66 Admission Reviewed    Imaging:    Imaging Reports Reviewed Today Include: NA  Recent Cultures (last 7 days):     Results from last 7 days   Lab Units 11/27/19  2328 11/27/19  2230 11/27/19  2115   BLOOD CULTURE  No Growth at 48 hrs  No Growth at 48 hrs    --    URINE CULTURE   --   --  >100,000 cfu/ml Pseudomonas aeruginosa*       Last 24 Hours Medication List:     Current Facility-Administered Medications:  acetaminophen 650 mg Oral Q6H PRN MAYELA Smith-C    amLODIPine 5 mg Oral BID Matty Adam, PA-C    apixaban 2 5 mg Oral BID Matty Adam, PA-C    bisacodyl 10 mg Rectal Daily PRN Adeel Akers MD    busPIRone 7 5 mg Oral TID MAYELA Smith-DEBRA    cefepime 2,000 mg Intravenous Q24H MAYELA Smith-C Last Rate: 2,000 mg (11/29/19 4589)   docusate sodium 100 mg Oral BID Mattyamilcar Medina, PA-C    famotidine 20 mg Oral Daily Matty Adam, PA-C    memantine 5 mg Oral BID Matty Adam, PA-C    metoprolol 5 mg Intravenous Q6H PRN MAYELA Smith-DEBRA    metoprolol tartrate 12 5 mg Oral BID Matty Medina, HONORIO    ondansetron 4 mg Intravenous Q6H PRN Cem Boss PA-C    oxyCODONE 5 mg Oral Q6H PRN eCm Boss PA-C    polyethylene glycol 17 g Oral Daily MAYELA Rizvi-DEBRA    potassium chloride 10 mEq Oral BID MAYELA Rizvi-DEBRA    QUEtiapine 25 mg Oral HS Cem Boss PA-C         Today, Patient Was Seen By: Oscar Cota MD    ** Please Note: Dictation voice to text software may have been used in the creation of this document   **

## 2019-11-30 NOTE — SOCIAL WORK
CM was notified by [facility] that patient is a tentative discharge tomorrow back to his home at [facility], transport requested  CM spoke to Hermelindo Guerra at [address] [facility]  [facility] who confirmed that patient is a bed hold and may return  Facility Contacts Updated  Patient requires BLS transport  CM called [facility] and spoke to Nany, 12 noon pickup with [facility] arranged for tomorrow  CM notified ELAYNE, nurse, updated [facility] Intake via [employer]

## 2019-12-01 VITALS
RESPIRATION RATE: 20 BRPM | TEMPERATURE: 98.1 F | BODY MASS INDEX: 30.93 KG/M2 | WEIGHT: 221.78 LBS | OXYGEN SATURATION: 95 % | SYSTOLIC BLOOD PRESSURE: 142 MMHG | HEART RATE: 72 BPM | DIASTOLIC BLOOD PRESSURE: 80 MMHG

## 2019-12-01 PROCEDURE — 99233 SBSQ HOSP IP/OBS HIGH 50: CPT | Performed by: INTERNAL MEDICINE

## 2019-12-01 RX ORDER — BISACODYL 10 MG
10 SUPPOSITORY, RECTAL RECTAL DAILY PRN
Qty: 12 SUPPOSITORY | Refills: 0 | Status: SHIPPED | OUTPATIENT
Start: 2019-12-01

## 2019-12-01 RX ORDER — LEVOFLOXACIN 750 MG/1
750 TABLET ORAL EVERY 24 HOURS
Qty: 1 TABLET | Refills: 0 | Status: SHIPPED | OUTPATIENT
Start: 2019-12-02 | End: 2019-12-03

## 2019-12-01 RX ADMIN — CEFEPIME 2000 MG: 2 INJECTION, POWDER, FOR SOLUTION INTRAMUSCULAR; INTRAVENOUS at 10:24

## 2019-12-01 RX ADMIN — METOPROLOL TARTRATE 12.5 MG: 25 TABLET ORAL at 08:49

## 2019-12-01 RX ADMIN — BUSPIRONE HYDROCHLORIDE 7.5 MG: 5 TABLET ORAL at 08:49

## 2019-12-01 RX ADMIN — APIXABAN 2.5 MG: 2.5 TABLET, FILM COATED ORAL at 08:49

## 2019-12-01 RX ADMIN — AMLODIPINE BESYLATE 5 MG: 5 TABLET ORAL at 08:50

## 2019-12-01 RX ADMIN — MEMANTINE 5 MG: 5 TABLET ORAL at 08:49

## 2019-12-01 RX ADMIN — FAMOTIDINE 20 MG: 20 TABLET, FILM COATED ORAL at 08:51

## 2019-12-01 RX ADMIN — POTASSIUM CHLORIDE 10 MEQ: 750 TABLET, EXTENDED RELEASE ORAL at 08:49

## 2019-12-01 NOTE — PROGRESS NOTES
Progress Note - Infectious Disease   George Tomas 68 y o  male MRN: [id number]  Unit/Bed#: S -01 Encounter: [id number]      Impression/Plan:    28-year-old male patient with aphasia, neurogenic bladder, recent placement of suprapubic catheter 11/26/2019   Admitted for fever, leukocytosis, and is currently being treated empirically for sepsis secondary to UTI     1- sepsis, POA:  Fever 101 3, leukocytosis at 19,000; sepsis is likely secondary to UTI and recent urinary tract instrumentation suprapubic catheter placement    Patient is hemodynamically stable  Has been afebrile in the past 72 hours, WBC count down to 9,000, procalcitonin trending down  Urine culture positive for pan susceptible Pseudomonas aeruginosa; blood culture negative  - continue cefepime IV  - patient will need 7 days course of antibiotic; he is on day 5 today; if plan to discharge before end of his 7 days course, patient will need 1 dose of Levaquin 750 mg to be given tomorrow 12/02/2019  - supportive care as per primary team  -Simba Carter clinical monitoring   Ucx also reported today to be positive for E coli ESBL, though patient improved on cefepime iv  He has been afebrile since cefepime has been started 5 days ago, wbc trended down (19 000 then 18K, 14K, 9K); procalcitonin trending down (84>72>49>26)  Suspect that PsA is the true pathogen responsible for his symptoms at presentation  E coli has been present on previous Ucx in 2017, suspect that E coli is a colonizer   Continue antibiotics as mentioned above to treat for PsA UTI       2- complicated urine tract infection:  UA positive for pyuria   Urine culture positive for Pseudomonas  Blood culture negative  Patient significantly improved on cefepime IV  - antibiotic plan as above  - close clinical monitoring       3- leukocytosis:  With elevated procalcitonin  Likely secondary to problem # 2    WBC count down to 9,000, procalcitonin trending down  - continue antibiotic as mentioned above     4- multiple antibiotic allergies:  History of anaphylaxis to Zosyn, gentamicin, vancomycin  Patient is tolerating cefepime well  - continue cefepime with close clinical monitoring     5- aphasia:  Patient does not provide any history at this time   History provided by his wife  - supportive care as per primary team    6- Cory on CKD:  Baseline creatinine around 1 4  - antibiotic doses as above     Case discussed with primary team      Antibiotics:  Cefepime day 5       Subjective:  Patient has no feve; no  vomiting, diarrhea; no cough, shortness of breath; No new symptoms  Patient is aphasic, case was discussed with nursing staff    Objective:  Vitals:  Temp:  [98 °F (36 7 °C)-98 1 °F (36 7 °C)] 98 1 °F (36 7 °C)  HR:  [72-73] 72  Resp:  [18-20] 20  BP: (137-148)/(56-80) 142/80  SpO2:  [95 %-96 %] 95 %  Temp (24hrs), Av 1 °F (36 7 °C), Min:98 °F (36 7 °C), Max:98 1 °F (36 7 °C)  Current: Temperature: 98 1 °F (36 7 °C)    Physical Exam:   General Appearance:  Alert, aphasic, no signs of acute distress  Throat: Oropharynx moist without lesions  Lungs:   Clear to auscultation bilaterally; no wheezes, rhonchi or rales; respirations unlabored   Heart:  RRR; no murmur, rub or gallop   Abdomen:   Soft, non-tender, non-distended, positive bowel sounds  Suprapubic catheter in place   Extremities: No clubbing, cyanosis or edema   Skin: No new rashes or lesions  No draining wounds noted         Labs, Imaging, & Other studies:   All pertinent labs and imaging studies were personally reviewed  Results from last 7 days   Lab Units 19  0532   WBC Thousand/uL 9 58 10 94* 14 90*   HEMOGLOBIN g/dL 13 5 11 9* 12 8   PLATELETS Thousands/uL 273 230 230     Results from last 7 days   Lab Units 19  0532 19  2121   SODIUM mmol/L 139 141 144 142   POTASSIUM mmol/L 3 5 4 1 3 8 3 9   CHLORIDE mmol/L 106 108 111* 107   CO2 mmol/L 25 27 25 28 BUN mg/dL 17 23 34* 37*   CREATININE mg/dL 1 41* 1 35* 1 64* 1 88*   EGFR ml/min/1 73sq m 50 52 41 35   CALCIUM mg/dL 8 8 8 5 8 6 9 0   AST U/L  --   --  11 12   ALT U/L  --   --  6* 6*   ALK PHOS U/L  --   --  75 96     Results from last 7 days   Lab Units 11/28/19  0059 11/27/19  2328 11/27/19 2230 11/27/19 2115   BLOOD CULTURE   --  No Growth at 72 hrs  No Growth at 72 hrs   --    URINE CULTURE   --   --   --  >100,000 cfu/ml Pseudomonas aeruginosa*   MRSA CULTURE ONLY  Methicillin Resistant Staphylococcus aureus isolated*  Please note: This patient requires contact precautions    --   --   --      Results from last 7 days   Lab Units 11/30/19  0453 11/29/19  0441 11/28/19  0947 11/27/19  2230   PROCALCITONIN ng/ml 26 48* 49 59* 72 47* 84 90*

## 2019-12-01 NOTE — SOCIAL WORK
CM was notified by [facility] that patient is cleared for discharge  CM spoke to patient's wife Brayan Vieyra to verify discharge plan back to [facility]  [facility]  Wife Brayan Vieyra ([phone]) confirmed plan and reports that she is aware of patient's discharge today because she spoke with ELAYNE yesterday  IMM reviewed with wife Brayan Vieyra over the telephone  CM called [facility] and spoke to Alyssa Gutiérrez to confirm 12 noon pickup today  CM contacted [facility]  [facility] via [employer] to re-confirm  Medical Necessity completed and placed in label book, faxed to [facility], and placed in black bin

## 2019-12-01 NOTE — ASSESSMENT & PLAN NOTE
Present on admission with  Fever of 101 at nursing home, leukocytosis  Sepsis secondary to urinary tract infection with recent instrumentation and placement of suprapubic catheter     Lactic acid: 1 5  Procalcitonin trending down 84-72-49  Infectious Disease input appreciated  Blood cultures had remained sterile to date  Urine cultures growing Pseudomonas and ESBL/Ecoli  Did 5 days of intravenous cefepime  Will give him 1 time dose of Levaquin 750 mg p o  On 12/02  This will complete 7 days of treatment of with antibiotics  Patient has remained afebrile and his leukocytosis has resolved  Stable for discharge back to nursing facility

## 2019-12-01 NOTE — ASSESSMENT & PLAN NOTE
· Present on admission after of recent urologic procedure(suprapubic catheter placement)  · Currently on intravenous cefepime day 5  Urine culture grew Pseudomonas  After completion of  5 days of treatment with intravenous cefepime, patient will receive f 1 dose of oral Levaquin tomorrow to complete total of 7 days of antibiotic treatment  His urine culture also had a delayed growth of ESBL+ E coli  This was believed to be colonization as patient had shown improvement on cefepime  He has remained afebrile and his leukocytosis has resolved  He was deemed stable for discharge to the nursing home

## 2019-12-02 LAB
BACTERIA UR CULT: ABNORMAL

## 2019-12-02 NOTE — RESULT ENCOUNTER NOTE
Completed course of cefepime  ESBL felt to be due to chronic colonization by inpatient team and Infectious Disease

## 2019-12-03 LAB
BACTERIA BLD CULT: NORMAL
BACTERIA BLD CULT: NORMAL

## 2019-12-03 NOTE — TELEPHONE ENCOUNTER
Patient left message with answering service  Requesting a call back to discuss his upcoming appointment on 1/7 with Dr Althea Machado      He can be reached at [phone]

## 2019-12-03 NOTE — TELEPHONE ENCOUNTER
Called and spoke with Matti Carreon, patient's wife  She was inquiring if patient had to be seen sooner than 1/7/20  Informed her the first SPT change is 6 weeks from placement so this appointment is appropriate  She also reports the patient's urine has been dark since discharge from the hospital  Informed her it is probably from dehydration and she should encourage increased water intake  Matti Carreon verbalized understanding and confirmed appointment for first SPT change

## 2019-12-10 NOTE — UTILIZATION REVIEW
URGENT/EMERGENT  INPATIENT/SPU AUTHORIZATION REQUEST    Date: 12/10/19            # Pages in this Request:     x New Request   Additional Information for PA#:     Office Contact Name:  Garrett Dominguez Title: Utilization Review, [facility] Nurse     Phone: [phone]  Ext  Availability (Date/Time): Wednesday - Friday 8 am- 4 pm     Inpatient Review x SPU Review       x Current        Late Pick-up   · How your facility was first notified of the Late Pick-up:  · When your facility was first notified of the Late Pick-up (date):         RECIPIENT INFORMATION    Recipient ID#: [id number]   Recipient Name:  Megan Holliday    YOB: 1950  68 y o  Recipient Alias:     Gender:  x Male  Female Medicaid Eligibility ([address] [place] [address]): INSURANCE INFORMATION    (All other private or governmental health insurance benefits must be utilized prior to billing the [place] Program)    Was this admission the result of an MVA, other accident, assault, injury, fall, gunshot, bite etc ? Yes x No                   If yes, provide a brief description of the incident  Does the recipient have other insurance coverage? Yes x No        Insurance Company Name/Policy #      Did that insurance pay on this claim? Yes  No        Did that insurance deny this claim? Yes  No    If yes, reason for denial:      Does the recipient have Medicare? Yes x No        Did Medicare exhaust prior to this admission? Yes  No        Did Medicare partially pay this claim? Yes  No        Did that insurance deny this claim? Yes  No    If yes, reason for denial:          Was the recipient a prisoner at the time of admission? Yes x No            PROVIDER INFORMATION    Hospital Name:  [facility] Provider ID#: [id number]    [address] [place] [address] Physician Name:  Nghia Murillo Provider ID#: [id number]        ADMISSION INFORMATION    Type of Admission: (please choose one)    x ED      Direct    If yes, from where? Transfer    If yes, transferring hospital (inpatient, rehab, or psych) Provider Name/Provider ID#: Admission Floor or Unit Type: med surg     Dates/Times:        ED Date/Time: 11/25/2019 12:52 PM        Observation Date/Time:         Admission Date/Time: N/A N/A        Discharge or Transfer Date/Time: 11/27/2019  1:40 PM        DIAGNOSIS/PROCEDURE CODES    Primary Diagnosis Code/Primary Diagnosis Code description:  Z43 5 Encounter for attention to cystostomy  R33 8 Other retention of urine  N35 919  Unspecified urethral stricture, male, unspecified site  N31 9 Neuromuscular dysfunction of bladder, unspecified   Additional Diagnosis Code(s) and Description(s)-(up to three additional codes):    Procedure Code (one) and description:  02929  Aspiration bladder insert suprapubic catheter      CLINICAL INFORMATION - PRIOR ADMISSION ONLY    Is there a prior admission with a discharge date within 30 days of the date of this admission?    x No (Proceed to the next section - "Clinical Information - General Review Checklist:)      Yes (Provide the following information)     Prior admission dates:    MA Prior Authorization Number:        Review Outcome:     Diagnosis Code(s)/Description:    Procedure Code/Description:    Findings:    Treatment:    Condition on Discharge:   Vitals:    Labs:   Imaging:   Medications: Follow-up Instructions:    Disposition:        CLINICAL INFORMATION - GENERAL REVIEW CHECKLIST    EMERGENCY DEPARTMENT: (Proceed to "ADMISSION" if Direct Admission)    Presenting Signs/Symptoms:68year old male from SNF  to ED via ems  per urology admitted to observation due to urinary catheter dysfunction/urinary retention  Patient presents after his lucia was accidentally removed, has history of false passage  On exam has dementia  Able to pass some urine, condom catheter applied  Creatinine 1 45 with baseline of 1 2- 1 4     Bun 20    Urology consulted    Medication/treatment prior to arrival in the ED:    Past Medical History:     Past Medical History:   Diagnosis Date    Anxiety     Aortic aneurysm (HCC)     Aphasia     Ataxia     Bladder adhesions     BPH (benign prostatic hypertrophy)     Dementia (HCC)     Difficulty walking     Essential (primary) hypertension     Global aphasia     High blood pressure     History of kidney stones     Neuromuscular dysfunction of bladder     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 ([facility] )     Retention of urine, unspecified     Stroke ([facility] )     Thrombosis     Urinary retention     Urinary tract bacterial infections     Urinary tract infection        Clinical Exam:    Initial Vital Signs: (Temp, Pulse, Resp, and BP)   ED Triage Vitals   Temperature Pulse Respirations Blood Pressure SpO2   11/25/19 1300 11/25/19 1255 11/25/19 1255 11/25/19 1255 11/25/19 1255   98 1 °F (36 7 °C) 58 18 169/84 97 %      Temp Source Heart Rate Source Patient Position - Orthostatic VS BP Location FiO2 (%)   11/25/19 1300 11/25/19 1255 11/25/19 1255 11/25/19 1255 --   Oral Monitor Lying Right arm       Pain Score       11/25/19 1845       6           Pertinent Repeat Vital Signs: (include times they were obtained)  11/26/19 0700   97 7 °F (36 5 °C)   48Abnormal    18   177/81Abnormal    117   95 %   None (Room air)   Lying   11/25/19 2224   97 8 °F (36 6 °C)   57   18   153/84   113   93 %   None (Room air)   Lying   11/25/19 1729   98 1 °F (36 7 °C)   61   18   161/85   117   96 %   None (Room air)   Lying   11/25/19 1500      60   18   193/91Abnormal       95 %   None (Room air)         Pertinent Sustained Findings: (include times they were obtained)    Weight in Kilograms:  Wt Readings from Last 1 Encounters:   11/28/19 101 kg (221 lb 12 5 oz)       Pertinent Labs (results):  Results from last 7 days   Lab Units 11/26/19  0530 11/25/19  1625   WBC Thousand/uL 8 75 8 63   HEMOGLOBIN g/dL 14 4 16 1   HEMATOCRIT % 43 5 48 7   PLATELETS Thousands/uL 245 275   NEUTROS ABS Thousands/µL 5 71 6 02            Results from last 7 days   Lab Units 11/26/19  0530 11/25/19  1625   SODIUM mmol/L 142 147*   POTASSIUM mmol/L 3 6 3 8   CHLORIDE mmol/L 109* 110*   CO2 mmol/L 27 29   ANION GAP mmol/L 6 8   BUN mg/dL 19 20   CREATININE mg/dL 1 36* 1 45*   EGFR ml/min/1 73sq m 52 48   CALCIUM mg/dL 8 3 8 4            Results from last 7 days   Lab Units 11/26/19  0530 11/25/19  1625   GLUCOSE RANDOM mg/dL 107 110        Radiology (results):    EKG (results): Other tests (results):    Tests pending final results:    Treatment in the ED:   Medication Administration from 11/25/2019 1252 to 11/25/2019 1726     None           Other treatments:      Change in condition while in the ED:     Response to ED Treatment:          OBSERVATION: (Proceed to "ADMISSION" if Direct Admission)    Orders written during the observation period  Meds Name, dose, route, time, how may doses given:  amLODIPine 5 mg Oral BID   busPIRone 7 5 mg Oral TID   docusate sodium 100 mg Oral BID   famotidine 20 mg Oral Daily   metoprolol tartrate 12 5 mg Oral BID   nystatin   Topical BID   polyethylene glycol 17 g Oral Daily   potassium chloride 10 mEq Oral BID   QUEtiapine 25 mg Oral HS       PRN Meds Name, dose, route, time, how many doses given within the first 24 hrs :  acetaminophen 650 mg Oral Q6H PRN   acetaminophen 650 mg Oral Q8H PRN   HYDROmorphone 0 5 mg Intravenous Q4H PRN   ondansetron 4 mg Intravenous Q6H PRN   oxyCODONE - used x 1  5 mg Oral Q6H PRN        IVs Type, rate, and total amt  ordered/given:  Labs, imaging, other:  Consults and findings:Per urology has history of neurogenic bladder, patient with saturated brief, which indicates voiding  Monitor overnight  If patient demonstrates adequate bladder emptying, would be safest not to replace Thorne due to repeated episodes of self-induced Thorne trauma    If patient requires catheter insertion, could be taken to the OR for cystoscopy Thorne insertion, but more likely IR suprapubic tube  Wife does not want suprapubic  Test Results during the observation period  Pertinent Lab tests (dates/results):  Culture results (blood, urine, spinal, wound, respiratory, etc ):  Imaging tests (dates/results):  EKG (dates/results): Other test (dates/results):  Tests pending (dates/results):    Surgical or Invasive Procedures during the observation period  Name of surgery/procedure: IR Suprapubic Catheter placement   Date & Time:11/26/2019  4:50 pm   Patient Response: tolerated   Post-operative orders: same   Operative Report/Findings:Successful suprapubic catheter placement using 18 Fr Thorne catheter  Response to Treatment, Major Change in Condition, Major Charge in Treatment during the observation period  On 11/26-  Overnight observation revealed passive voiding but with significant bladder residuals  Discussed with family that  long-term goal to improved quality of life and provide comfort can likely be achieved through placement of suprapubic tube   Spouse is agreeable to try  Donivan Cunas PTT and PT/INR  Cortney ken consult for IR   Maintain NPO        ADMISSION:    DIRECT Admissions Only:    · Presenting Signs/Symptoms:   ·   · Medication/treatment prior to arrival:  ·   · Past Medical History:  ·   · Clinical Exam on admission:  ·   · Vital Signs on admission: (Temp, Pulse, Resp, and BP)  ·   · Weight in kilograms:     ALL Admissions:    Admission Orders and Other Orders written within the first 24 hrs after admission  Meds Name, dose, route, time, how may doses given:  PRN Meds Name, dose, route, time, how many doses given within the first 24 hrs :  IVs Type, rate, and total amt  ordered/given:  Labs, imaging, other:      Consults and findings:    Test Results after admission  Pertinent Lab tests (dates/results):  Culture results (blood, urine, spinal, wound, respiratory, etc ):  Imaging tests (dates/results):  EKG (dates/results):   Other test (dates/results):  Tests pending (dates/results):    Surgical or Invasive Procedures  Name of surgery/procedure:  Date & Time:  Patient Response:  Post-operative orders:  Operative Report/Findings:    Response to Treatment, Major Change in Condition, Major Charge in Treatment anytime during admission  Madeline Fisher is a 68 y o  male nonverbal patient with severe dementia and neurogenic bladder with frequent catheterization dysfunction who originally presented to the hospital on 11/25/2019 due to catheter being pulled out accidentally at SNF  He follows with Urology as an outpatient is notoriously difficult to catheterize usually requiring guidewire      He was evaluated by Urology service in the emergency department  The catheter was kept out overnight with frequent bladder scans to see if patient would retain  His Eliquis was initially held however this can be restarted at discharge  As patient did retain urine, decision was made to bring to OR and placed suprapubic catheter  This required long discussion between Neurology patient's wife and she was concerned that patient would pull out any abdominal tubing as he has done in the past   Ultimately, decision made to proceed with suprapubic catheter with precautions such as abdominal binder afterwards to prevent tampering as it seems it is best for patient long-term      Patient tolerated procedure well  After suprapubic catheter, he is low residuals and high urine output  He did develop a slight kidney injury, however this is expected given urinary retention  With given fluids prior to discharge  Instructions given to SNF to recheck BMP    Patient not on nephrotoxin medications      Disposition on Discharge  Home, Rehab, SNF, LTC, Shelter, etc : Non [facility] SNF/TCU/SNU    Cease to Breathe (CTB)  If a patient expires during an admission, in addition to the above information, please include:    Summary/timeline of the patient's decline in condition: Medications and treatment:    Patient response to treatment:    Date and time patient ceased to breathe:        Is there a Readmission that follows this admission? Yes x No    If yes, provide dates:          InterQual Review    InterQual Criteria Met:  Yes  No x N/A        Please include the InterQual Review, InterQual year/version used, and the criteria selected:         PLEASE SUBMIT THE COMPLETED FORM TO [address] [place] [address] [phone] or VIA E-MAIL TO [email or web address]    Signature: Tea King Date:  12/10/19    Confidentiality Notice: The documents accompanying this telecopy may contain confidential information belonging to the sender  The information is intended only for the use of the individual named above  If you are not the intended recipient, you are hereby notified  That any disclosure, copying, distribution or taking of any telecopy is strictly prohibited

## 2019-12-18 NOTE — DISCHARGE INSTRUCTIONS
For skilled nursing, per Urology--please maintain abdominal binder to minimize risk of self-induced suprapubic tube removal   Extra care during bathing/hygiene when abdominal binder is removed for short duration  Otherwise, must remain on at all times  Can flush as prior  Refer to orders  Suprapubic Cystostomy     WHAT YOU NEED TO KNOW:   Suprapubic cystostomy is surgery to create a stoma (opening) through your abdomen into your bladder  This opening is where a catheter is inserted to drain urine  You may need a cystostomy if your urine flow is blocked  DISCHARGE INSTRUCTIONS:   Resume your normal diet  Small sips of flat soda will help with mild nausea  Follow up with your healthcare provider as directed: You may need to return to have your suprapubic catheter changed or removed  Write down your questions so you remember to ask them during your visits  Empty your urine drainage bag: Empty your urine drainage bag when it is ½ to ? full, or every 8 hours  If you have a smaller leg bag, empty it every 3 to 4 hours  Do the following when you empty your urine drainage bag:  · Hold the urine bag over a toilet or large container  · Remove the drain spout from its sleeve at the bottom of the urine bag  Do not touch the tip of the drain spout  Open the slide valve on the spout  · Let the urine flow out of the urine bag into the toilet or container  Do not let the drainage tube touch anything  · Clean the end of the drain spout with alcohol when the bag is empty  Ask which cleaning solution is best to use  · Close the slide valve and put the drain spout into its sleeve at the bottom of the urine bag  Write down how much urine was in your bag if you were asked to keep a record  Prevent an infection:   · Clean the stoma and skin around it daily  Wash your hands before and after cystostomy care  Put on a new pair of clean medical gloves  · Change your urine bag or clean reusable bags   Ask how often you need to change or clean your urine drainage bag  You may need to change your reusable bag at least once a week  · Keep the bag below your waist  This will prevent urine from flowing back into your bladder and causing an infection or other problems  Also, keep the tube free of kinks so the urine will drain properly  Do not pull on the catheter  This can cause pain and bleeding and may cause the catheter to come out  Contact Interventional Radiology at [phone] [facility] PATIENTS: Contact Interventional Radiology at [phone]) [facility] PATIENTS: Contact Interventional Radiology at [phone]) if any of the following occur:  · You have a fever or chills  · Persistent nausea or vomiting  · You have severe pain  · You have a burning pain in your stoma  · Your stoma is red, swollen, or draining pus  · You have blood in your urine  · You have questions or concerns about your condition or care    Seek care immediately or call 911 if:   · No urine is draining into the urine bag        1

## 2020-01-07 ENCOUNTER — OFFICE VISIT (OUTPATIENT)
Dept: UROLOGY | Facility: CLINIC | Age: 70
End: 2020-01-07
Payer: COMMERCIAL

## 2020-01-07 VITALS
DIASTOLIC BLOOD PRESSURE: 76 MMHG | SYSTOLIC BLOOD PRESSURE: 136 MMHG | BODY MASS INDEX: 30.93 KG/M2 | HEIGHT: 71 IN | HEART RATE: 48 BPM

## 2020-01-07 DIAGNOSIS — R33.9 RETENTION OF URINE: Primary | Chronic | ICD-10-CM

## 2020-01-07 PROCEDURE — 99213 OFFICE O/P EST LOW 20 MIN: CPT | Performed by: PHYSICIAN ASSISTANT

## 2020-01-07 RX ORDER — CEFUROXIME AXETIL 250 MG/1
TABLET ORAL
COMMUNITY
Start: 2019-12-30 | End: 2020-03-19 | Stop reason: HOSPADM

## 2020-01-07 RX ORDER — POTASSIUM CHLORIDE 750 MG/1
TABLET, FILM COATED, EXTENDED RELEASE ORAL
COMMUNITY
Start: 2019-12-07 | End: 2020-03-19 | Stop reason: HOSPADM

## 2020-01-07 RX ORDER — MIRTAZAPINE 15 MG/1
TABLET, FILM COATED ORAL
COMMUNITY
Start: 2019-12-14

## 2020-01-07 RX ORDER — MEMANTINE HYDROCHLORIDE 5 MG/1
TABLET ORAL 2 TIMES DAILY
Status: ON HOLD | COMMUNITY
Start: 2019-12-17 | End: 2022-02-20 | Stop reason: CLARIF

## 2020-01-07 NOTE — PROGRESS NOTES
1  Retention of urine           Assessment and plan:     1  Neurogenic bladder - managed by Dr Yakov Covarrubias  2  Urinary retention with SPT  Patient's suprapubic tube was exchanged in the office today and up sized to a 20 [place] without difficulty  Patient tolerated the procedure very well  Catheter irrigated and was draining into catheter bag  Plan for a SPT exchange in approximately 6 weeks times  We reviewed need for coverage of the suprapubic tube to minimize patient from traumatically removing catheter  Patient's past verbalized understanding  His facility can continue Renacidin irrigation needed  Melonie Chambers PA-C      Chief Complaint     Follow-up suprapubic tube    History of Present Illness     Liudmila Lizama is a 68 y o  male patient of Dr Yakov Covarrubias with a history of neurogenic bladder, urethral stricture disease, and urinary retention presenting for follow-up  Patient was undergoing routine catheter changes over guidewire approximately ever for 4 weeks  Also was getting intermittent Botox injection for his neurogenic bladder  Darletta Pac Unfortunately patient had catheter dislodgement  Thorne catheter was unable to be and placed during his hospitalization  Patient ultimately underwent suprapubic tube insertion (11/26/2019)  Patient does have a history of dementia with some combative behavior  Recommendations were to always have suprapubic tube hidden under abdominal binder at all times  Patient unfortunately did undergo sepsis after suprapubic tube placement which required readmission  Per patient's spouse, the abdominal binder kept riding up so they eliminated it    They continue to try maneuvers in order to hide his suprapubic to given his mental status and concern for traumatic removal     Review of Systems     Review of Systems   Reason unable to perform ROS: Unable to perform, patient is almost nonverbal          Allergies     Allergies   Allergen Reactions    Gentamycin [Gentamicin] Anaphylaxis    Vancomycin Anaphylaxis    Zosyn [Piperacillin Sod-Tazobactam So] Anaphylaxis     However, has tolerated Ertapenem, Cefdinir, and Cefepime, which all have different side chains  Avoid Penicillins and the following Cephs with similar side chains (Cephalexin, Cefadroxil, Cefaclor, Cefprozil, or  Cefoxitin)    Clindamycin Itching    Nsaids Other (See Comments)     Nephrotic Syndrome    Sulfa Antibiotics Rash    Other Rash     antipersperents  Stat lock from lucia catheter       Physical Exam     Physical Exam   Constitutional: He appears well-developed and well-nourished  No distress  HENT:   Head: Normocephalic and atraumatic  Eyes: Right eye exhibits no discharge  Left eye exhibits no discharge  Neck: No tracheal deviation present  Cardiovascular: Intact distal pulses  Pulmonary/Chest: Effort normal  No stridor  No respiratory distress  Abdominal: Soft  He exhibits no distension  There is no tenderness  Abdominal incision clean, dry, intact  Suprapubic tube is draining clear yellow urine  Patient's 18 French suprapubic tube was removed without difficulty  Twenty French catheter was instilled  Hand irrigated with 60 cc normal saline without complication  Musculoskeletal: He exhibits no edema  Neurological: He displays abnormal reflex  A sensory deficit is present  No cranial nerve deficit  He exhibits abnormal muscle tone  Coordination abnormal    Present on stretcher   Skin: Skin is warm and dry  He is not diaphoretic  Nursing note and vitals reviewed            Vital Signs  Vitals:    01/07/20 1107   BP: 136/76   Pulse: (!) 48   Height: 5' 11" (1 803 m)         Current Medications       Current Outpatient Medications:     acetaminophen (TYLENOL) 325 mg tablet, Take 650 mg by mouth every 4 (four) hours as needed for mild pain , Disp: , Rfl:     amLODIPine (NORVASC) 5 mg tablet, Take 5 mg by mouth 2 (two) times a day , Disp: , Rfl:     apixaban (ELIQUIS) 2 5 mg, Take 2 5 mg by mouth 2 (two) times a day, Disp: , Rfl:     bisacodyl (DULCOLAX) 10 mg suppository, Insert 1 suppository (10 mg total) into the rectum daily as needed for constipation, Disp: 12 suppository, Rfl: 0    busPIRone (BUSPAR) 7 5 mg tablet, Take 7 5 mg by mouth 3 (three) times a day, Disp: , Rfl:     cefuroxime (CEFTIN) 250 mg tablet, , Disp: , Rfl:     docusate sodium (COLACE) 100 mg capsule, Take 100 mg by mouth 2 (two) times a day, Disp: , Rfl:     famotidine (PEPCID) 20 mg tablet, Take 20 mg by mouth 2 (two) times a day, Disp: , Rfl:     glycerin-hypromellose- (ARTIFICIAL TEARS) 0 2-0 2-1 % SOLN, Administer 1 drop to both eyes 2 (two) times a day, Disp: , Rfl:     memantine (NAMENDA) 5 mg tablet, , Disp: , Rfl:     metoprolol tartrate (LOPRESSOR) 25 mg tablet, Take 0 5 tablets (12 5 mg total) by mouth 2 (two) times a day, Disp: 60 tablet, Rfl: 0    mirtazapine (REMERON) 15 mg tablet, Take 1 tablet by mouth daily at bedtime for 30 days, Disp: 30 tablet, Rfl: 0    polyethylene glycol (MIRALAX) 17 g packet, Take 17 g by mouth daily, Disp: 14 each, Rfl: 0    potassium chloride (K-DUR,KLOR-CON) 10 mEq tablet, Take 1 tablet (10 mEq total) by mouth 2 (two) times a day, Disp: 60 tablet, Rfl: 0    QUEtiapine (SEROquel) 25 mg tablet, Take 25 mg by mouth daily at bedtime  , Disp: , Rfl:     memantine (NAMENDA) 5 mg tablet, Take 1 tablet by mouth 2 (two) times a day for 30 days (Patient taking differently: Take 5 mg by mouth 2 (two) times a day ), Disp: 60 tablet, Rfl: 0    mirtazapine (REMERON) 15 mg tablet, , Disp: , Rfl:     potassium chloride (K-DUR) 10 mEq tablet, , Disp: , Rfl:       Active Problems     Patient Active Problem List   Diagnosis    Aphasia    COPD (chronic obstructive pulmonary disease) (HCC)    History of DVT (deep vein thrombosis)    Aneurysm of thoracic aorta (HCC)    Retention of urine    Acute kidney injury ([address]    Essential hypertension    Aphasia of unknown origin  CKD (chronic kidney disease)    GERD (gastroesophageal reflux disease)    HTN, goal below 140/90    Generalized anxiety disorder    H/O blood clots    Severe sepsis (HCC)    Anemia    Constipation    Dementia with behavioral disturbance (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)    MSSA (methicillin susceptible Staphylococcus aureus) septicemia ([facility])    H/O urethral stricture    CKD (chronic kidney disease) stage 2, GFR 60-89 ml/min    Urinary catheter dysfunction (HCC)    Neurogenic bladder    Nephrolithiasis    Suprapub[facility] catheter [facility] [facility]    UTI (urinary tract infection)         Past Medical History     Past Medical History:   Diagnosis Date    Acute kidney injury ([facility] [address] ) 10/21/2016    Anxiety     Aortic aneurysm ([facility])     Aphasia     Ataxia     Bladder adhesions     BPH (benign prostatic hypertrophy)     COPD (chronic obstructive pulmonary disease) ([facility])     wife denies - "never had"    Dementia ([facility] [address] )     Difficulty walking     Essential (primary) hypertension     Generalized anxiety disorder     GERD (gastroesophageal reflux disease)     Global aphasia     H/O blood clots     High blood pressure     History of kidney stones     Kidney stones     Mental disorder     Muscle weakness     Neuromuscular dysfunction of bladder     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 ([facility] [address] )     Retention of urine, unspecified     Stroke ([facility] [address] )     Thrombosis     Urinary retention     Urinary tract bacterial infections     Urinary tract infection          Surgical History     Past Surgical History:   Procedure Laterality Date    BOTOX INJECTION N/A 6/18/2019    Procedure: INJECTION BOTULINUM TOXIN (BOTOX), intra detrusor;  Surgeon: Floyd Richardson MD;  [facility];  Service: Urology    BOTOX INJECTION N/A 10/7/2019    Procedure: INJECTION BOTULINUM TOXIN (BOTOX), intradetrusor;  Surgeon: Floyd Richardson MD;  [facility];  Service: Urology  CYSTOSCOPY      CYSTOSCOPY N/A 6/18/2019    Procedure: cystoscopy and all indicated procedures;  Surgeon: Hudson Hickey MD;  [facility];  Service: Urology    [place] CYSTOGRAM  1/15/2019    IR SUPRAPUBIC TUBE  11/26/2019    IVC FILTER INSERTION      X2    IVC FILTER INSERTION      x2    KIDNEY STONE SURGERY      KNEE SURGERY      Sepsis infection     NEPHROSTOMY      [place] CYSTO/URETERO W/LITHOTRIPSY &INDWELL STENT INSRT Right 6/14/2017    Procedure: CYSTOSCOPY URETEROSCOPY WITH LITHOTRIPSY HOLMIUM LASER,,BASKET STONE EXTRACTION, RETROGRADE PYELOGRAM AND INSERTION STENT URETERAL;  Surgeon: Mira Brandon MD;  [facility];  Service: Urology    [place] CYSTOURETHROSCOPY,BIOPSY N/A 1/15/2019    Procedure: CYSTOSCOPY, CYSTOGRAM, DILATION OF URETHRAL STRICTURE;  Surgeon: Hudson Hickey MD;  [facility];  Service: Urology    URINARY SURGERY           Family History     Family History   Problem Relation Age of Onset    Diabetes Father     Hypertension Father     Coronary artery disease Father     Cancer Father     Hypertension Mother          Social History     Social History     Social History     Tobacco Use   Smoking Status Never Smoker   Smokeless Tobacco Never Used         Pertinent Lab Values     Lab Results   Component Value Date    CREATININE 1 41 (H) 11/30/2019       No results found for: PSA          Pertinent Imaging      No imaging for my review

## 2020-01-31 ENCOUNTER — TELEPHONE (OUTPATIENT)
Dept: UROLOGY | Facility: CLINIC | Age: 70
End: 2020-01-31

## 2020-01-31 NOTE — TELEPHONE ENCOUNTER
If they are not having clogging issues, can continue using redness seen in as they are now  It may be that the catheter is causing irritation with transfers  I would agree, increasing hydration and making sure that the catheter is not on tension  If he becomes symptomatic, we should send for urine studies  At this point he should follow up as scheduled  Also to be considered would be cystoscopy

## 2020-01-31 NOTE — TELEPHONE ENCOUNTER
Called and spoke with Deuce Mclean from [facility]  Informed her this can be from irritation with transfers  Advised to continue to hydrate, monitor for UTI symptoms, can send for urine testing if one is suspected  Otherwise follow up as scheduled  If worsening and more frequently happening we can scheduled for a cystoscopy  Deuce Mclean asking our office to call patient's wife as she is most concerned by the hematuria  Called and left message for patient's wife per communication consent  Advised her [facility] did reach out to our office in regards to the hematuria  Advised this is likely from irritation and it is reassuring it resolves with flushing  Recommendations from our providers are continued hydration, monitor for UTI symptoms and keep next scheduled follow up  If things worsen, occur more frequently advised a call back  Encouraged a call back if she has any further questions or concerns

## 2020-01-31 NOTE — TELEPHONE ENCOUNTER
Patient of Dr Alisha Blanton who previously had a lucia catheter changed over a wire  Patient had SPT placed 11/26/2019  The first exchange was done on 1/7/20 with Ramin OLMEDO and the next exchange will be on 2/18/20 with Ramin Melgar Prior calling from [facility] at this time  She reports patient has been having bouts of hematuria, cranberry colored urine in his SPT bag after transfers from bed to wheelchair  When this occurs they generally flush SPT and after a time it clears  This has been going on the past week  She admits they have been transferring him to the chair more frequently this past week  He typically stayed in the bed a lot previously  She reports no tension on catheter noted during these times  She reports he is probably dehydrated although they try to get him to drink more water per his wife's direction  She reports no changes in his mental status and he has not displayed any fevers  She also reports they are doing the renacidin flushes twice a day per order  Aureliecer Prior wondering if there are any recommendations, if this is to be expected  Advised her to try and have him drink more water and monitor the hematuria  I will route to our provides for recommendations and return call  Advised she will be leaving soon and the nurse on the next shift is Jenn

## 2020-02-03 NOTE — TELEPHONE ENCOUNTER
We cannot make a recommendation for Eliquis as he has known DVTs currently  I would recommend aggressive hydration and flushing as needed  If clots are not clogging the catheter, hydration would be our best recommendation  They can certainly try to plug the SPT for transfers but would not recommend extended plugging for risk of postponing drainage and increasing clots

## 2020-02-03 NOTE — TELEPHONE ENCOUNTER
Called and spoke with Phylicia Gilmore from [facility]  Reviewed my conversation with patient;s wife  Reviewed recommendations related to SPT plug  She reports patient's hematuria is better and she will make a note of using SPT plus during position changes and during showers  [facility] knows to call us with any questions or concerns

## 2020-02-03 NOTE — TELEPHONE ENCOUNTER
Called and spoke with patient's wife at this time  She reports patient has been having gross hematuria for a week now and there are clots in the drainage tubing  Patient's wife reports the Eliquis was held on Saturday and Sunday but after a scan of his legs, he still has blood clots, they decided to resume Eliquis today  She is unsure what our recommendations would be related to Eliquis  Would our office recommend holding it longer? She also states when the SPT was originally placed it was talked about how they could cap it with a plug  Would we recommend plugging SPT during transfers to possibly decrease bleeding? Advised patient;s wife I can route this to our providers for recommendations and then call [facility]  She would appreciate that as she does not know what to do anymore and is concerned for her

## 2020-02-03 NOTE — TELEPHONE ENCOUNTER
Patients wife called back and said patient has a lot blood with blood clots  The care giver was told to stop Eliquis and they stopped in on Saturday and then he stopped bleeding  They want to start him back on Eliquis and the wife is concerned  Please call her back at [phone]

## 2020-02-18 ENCOUNTER — OFFICE VISIT (OUTPATIENT)
Dept: UROLOGY | Facility: CLINIC | Age: 70
End: 2020-02-18
Payer: COMMERCIAL

## 2020-02-18 ENCOUNTER — TELEPHONE (OUTPATIENT)
Dept: UROLOGY | Facility: CLINIC | Age: 70
End: 2020-02-18

## 2020-02-18 VITALS
DIASTOLIC BLOOD PRESSURE: 84 MMHG | SYSTOLIC BLOOD PRESSURE: 120 MMHG | HEIGHT: 71 IN | BODY MASS INDEX: 30.93 KG/M2 | HEART RATE: 52 BPM

## 2020-02-18 DIAGNOSIS — R33.9 RETENTION OF URINE: Primary | Chronic | ICD-10-CM

## 2020-02-18 DIAGNOSIS — Z93.59 SUPRAPUBIC CATHETER (HCC): ICD-10-CM

## 2020-02-18 PROCEDURE — 99214 OFFICE O/P EST MOD 30 MIN: CPT | Performed by: PHYSICIAN ASSISTANT

## 2020-02-18 NOTE — PROGRESS NOTES
1  Retention of urine     2  Suprapubic catheter ([address] )           Assessment and plan:     1  Neurogenic bladder - managed by Dr Larisa Lanier  2  Urinary retention with SPT  3  Urethral stricture    Patient had a markedly difficult SPT exchange in the office today  Unfortunately this required cystoscopic evaluation and placement over guidewire  This was done by Dr Larisa Lanier in the office  We discussed given his complexity will we would need to continue with guidewire exchanges  This will be coordinated in the next 5-6 weeks  They are encouraged contact us the meantime with any concerns  All questions answered  Tojinny Encarnacion PA-C      Chief Complaint     Follow-up suprapubic tube    History of Present Illness     Shay Soto is a 68 y o  male patient of Dr Larisa Lanire with a history of neurogenic bladder, urethral stricture disease, and urinary retention presenting for follow-up  Patient was undergoing routine catheter changes over guidewire approximately ever for 4 weeks  Also was getting intermittent Botox injection for his neurogenic bladder  Unfortunately patient had catheter dislodgement  Thorne catheter was unable to be and placed during his hospitalization  Patient ultimately underwent suprapubic tube insertion (11/26/2019)  Patient does have a history of dementia with some combative behavior  Recommendations were to always have suprapubic tube hidden under abdominal binder at all times  Patient unfortunately did undergo sepsis after suprapubic tube placement which required readmission  Patient has added some issues with gross hematuria  This is secondary to his anticoagulation use with Eliquis due to his DVTs  His Eliquis was held for 2 days and his hematuria resolved  Upon re-initiation is hematuria reoccurred  This is been managed with serial irrigations with his facility  His urine today is arun in color    She states that they are monitoring his H&H levels with stable hemoglobins  He has not required any transfusions  Review of Systems     Review of Systems   Reason unable to perform ROS: Unable to perform, patient is almost nonverbal        Allergies     Allergies   Allergen Reactions    Gentamycin [Gentamicin] Anaphylaxis    Vancomycin Anaphylaxis    Zosyn [Piperacillin Sod-Tazobactam So] Anaphylaxis     However, has tolerated Ertapenem, Cefdinir, and Cefepime, which all have different side chains  Avoid Penicillins and the following Cephs with similar side chains (Cephalexin, Cefadroxil, Cefaclor, Cefprozil, or  Cefoxitin)    Clindamycin Itching    Nsaids Other (See Comments)     Nephrotic Syndrome    Sulfa Antibiotics Rash    Other Rash     antipersperents  Stat lock from lucia catheter       Physical Exam     Physical Exam   Constitutional: He appears well-developed and well-nourished  No distress  HENT:   Head: Normocephalic and atraumatic  Eyes: Right eye exhibits no discharge  Left eye exhibits no discharge  Neck: No tracheal deviation present  Cardiovascular: Intact distal pulses  Pulmonary/Chest: Effort normal  No stridor  No respiratory distress  Abdominal: Soft  He exhibits no distension  There is no tenderness  Abdominal incision clean, dry, intact  Patient's 20 Czech suprapubic tube was removed without difficulty  Upon placement of repeat suprapubic tube unfortunately this will unable to be cannulize the his tract  Upon attempt of inflation of balloon this was met with resistance and stopped immediately  This was irrigated which resulted in displacement of suprapubic tube  Dr Dominique Reyes was consult in presented into the office  Urethral cystoscopy was performed in which a tight urethral stricture was encountered  Cystoscopy through his suprapubic tract was performed  Guidewire placed through  Consult to catheter threaded over guidewire wire and placed  This was draining clear yellow urine  Musculoskeletal: He exhibits no edema  Neurological: He displays abnormal reflex  A sensory deficit is present  No cranial nerve deficit  He exhibits abnormal muscle tone  Coordination abnormal    Present on stretcher   Skin: Skin is warm and dry  He is not diaphoretic  Nursing note and vitals reviewed            Vital Signs  Vitals:    02/18/20 1123   BP: 120/84   BP Location: Left arm   Patient Position: Sitting   Cuff Size: Standard   Pulse: (!) 52   Height: 5' 11" (1 803 m)         Current Medications       Current Outpatient Medications:     acetaminophen (TYLENOL) 325 mg tablet, Take 650 mg by mouth every 4 (four) hours as needed for mild pain , Disp: , Rfl:     amLODIPine (NORVASC) 5 mg tablet, Take 5 mg by mouth 2 (two) times a day , Disp: , Rfl:     apixaban (ELIQUIS) 2 5 mg, Take 2 5 mg by mouth 2 (two) times a day, Disp: , Rfl:     bisacodyl (DULCOLAX) 10 mg suppository, Insert 1 suppository (10 mg total) into the rectum daily as needed for constipation, Disp: 12 suppository, Rfl: 0    busPIRone (BUSPAR) 7 5 mg tablet, Take 7 5 mg by mouth 3 (three) times a day, Disp: , Rfl:     cefuroxime (CEFTIN) 250 mg tablet, , Disp: , Rfl:     docusate sodium (COLACE) 100 mg capsule, Take 100 mg by mouth 2 (two) times a day, Disp: , Rfl:     famotidine (PEPCID) 20 mg tablet, Take 20 mg by mouth 2 (two) times a day, Disp: , Rfl:     glycerin-hypromellose- (ARTIFICIAL TEARS) 0 2-0 2-1 % SOLN, Administer 1 drop to both eyes 2 (two) times a day, Disp: , Rfl:     memantine (NAMENDA) 5 mg tablet, Take 1 tablet by mouth 2 (two) times a day for 30 days (Patient taking differently: Take 5 mg by mouth 2 (two) times a day ), Disp: 60 tablet, Rfl: 0    memantine (NAMENDA) 5 mg tablet, , Disp: , Rfl:     metoprolol tartrate (LOPRESSOR) 25 mg tablet, Take 0 5 tablets (12 5 mg total) by mouth 2 (two) times a day, Disp: 60 tablet, Rfl: 0    mirtazapine (REMERON) 15 mg tablet, Take 1 tablet by mouth daily at bedtime for 30 days, Disp: 30 tablet, Rfl: 0    mirtazapine (REMERON) 15 mg tablet, , Disp: , Rfl:     polyethylene glycol (MIRALAX) 17 g packet, Take 17 g by mouth daily, Disp: 14 each, Rfl: 0    potassium chloride (K-DUR) 10 mEq tablet, , Disp: , Rfl:     potassium chloride (K-DUR,KLOR-CON) 10 mEq tablet, Take 1 tablet (10 mEq total) by mouth 2 (two) times a day, Disp: 60 tablet, Rfl: 0    QUEtiapine (SEROquel) 25 mg tablet, Take 25 mg by mouth daily at bedtime  , Disp: , Rfl:       Active Problems     Patient Active Problem List   Diagnosis    Aphasia    COPD [facility]chronic obstructive pulmonary disease) ([facility] )    History of DVT (deep vein thrombosis)    [facility]    Retention of urine    Acute kidney injury [facility]    Essential hypertension    Aphasia of unknown origin    CKD (chronic kidney disease)    GERD (gastroesophageal reflux disease)    HTN, goal below 140/90    Generalized anxiety disorder    H/O blood clots    Severe sepsis (HCC)    Anemia    Constipation    Dementia with behavioral disturbance ([facility]    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    MSSA (methicillin susceptible Staphylococcus aureus) septicemia ([facility] )    H/O urethral stricture    CKD (chronic kidney disease) stage 2, GFR 60-89 ml/min    Urinary catheter dysfunction (HCC)    Neurogenic bladder    Nephrolithiasis    Suprapubic catheter ([facility]    UTI (urinary tract infection)         Past Medical History     Past Medical History:   Diagnosis Date    Acute kidney injury ([facility] ) 10/21/2016    Anxiety     Aortic aneurysm (HCC)     Aphasia     Ataxia     Bladder adhesions     BPH (benign prostatic hypertrophy)     COPD (chronic obstructive pulmonary disease) ([facility] )     wife denies - "never had"    Dementia ([facility]     Difficulty walking     Essential (primary) hypertension     Generalized anxiety disorder     GERD (gastroesophageal reflux disease)     Global aphasia     H/O blood clots     High blood pressure     History of kidney stones     Kidney stones     Mental disorder     Muscle weakness     Neuromuscular dysfunction of bladder     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 ([facility]     Retention of urine, unspecified     Stroke ([facility]     Thrombosis     Urinary retention     Urinary tract bacterial infections     Urinary tract infection          Surgical History     Past Surgical History:   Procedure Laterality Date    BOTOX INJECTION N/A 6/18/2019    Procedure: INJECTION BOTULINUM TOXIN (BOTOX), intra detrusor;  Surgeon: Marry Paul MD;  [facility];  Service: Urology    BOTOX INJECTION N/A 10/7/2019    Procedure: INJECTION BOTULINUM TOXIN (BOTOX), intradetrusor;  Surgeon: Marry Paul MD;  [facility];  Service: Urology    CYSTOSCOPY      CYSTOSCOPY N/A 6/18/2019    Procedure: cystoscopy and all indicated procedures;  Surgeon: Marry Paul MD;  [facility];  Service: Urology    FL CYSTOGRAM  1/15/2019    IR SUPRAPUBIC TUBE  11/26/2019    IVC FILTER INSERTION      X2    IVC FILTER INSERTION      x2    KIDNEY STONE SURGERY      KNEE SURGERY      Sepsis infection     NEPHROSTOMY      RI CYSTO/URETERO W/LITHOTRIPSY &INDWELL STENT INSRT Right 6/14/2017    Procedure: CYSTOSCOPY URETEROSCOPY WITH LITHOTRIPSY HOLMIUM LASER,,BASKET STONE EXTRACTION, RETROGRADE PYELOGRAM AND INSERTION STENT URETERAL;  Surgeon: Su Arrieta MD;  [facility];  Service: Urology    RI CYSTOURETHROSCOPY,BIOPSY N/A 1/15/2019    Procedure: CYSTOSCOPY, CYSTOGRAM, DILATION OF URETHRAL STRICTURE;  Surgeon: Marry Paul MD;  [facility];  Service: Urology    URINARY SURGERY           Family History     Family History   Problem Relation Age of Onset    Diabetes Father     Hypertension Father     Coronary artery disease Father     Cancer Father     Hypertension Mother          Social History     Social History     Social History     Tobacco Use Smoking Status Never Smoker   Smokeless Tobacco Never Used         Pertinent Lab Values     Lab Results   Component Value Date    CREATININE 1 41 (H) 11/30/2019       No results found for: PSA          Pertinent Imaging      No imaging for my review

## 2020-02-18 NOTE — TELEPHONE ENCOUNTER
Patient's next exchange scheduled for 3/25/20 at 1130am with Dr Nasir Vizcaino  Also scheduled his next few appointments to prevent an issue of not finding a time spot  Please confirm appointments with patient's wife

## 2020-02-18 NOTE — TELEPHONE ENCOUNTER
[facility]ursing Home was notified with the appt dates  Tried to call wife but her phone wasn't allowing me to leave a message so I mailed her appt cards with those dates on them

## 2020-02-18 NOTE — TELEPHONE ENCOUNTER
Pt needs to  Return for complex SPT exchange with DV only in 5-6 weeks  Could you please assist with this? I can't find anything

## 2020-03-10 ENCOUNTER — APPOINTMENT (EMERGENCY)
Dept: CT IMAGING | Facility: HOSPITAL | Age: 70
DRG: 872 | End: 2020-03-10
Payer: MEDICARE

## 2020-03-10 ENCOUNTER — APPOINTMENT (EMERGENCY)
Dept: RADIOLOGY | Facility: HOSPITAL | Age: 70
DRG: 872 | End: 2020-03-10
Payer: MEDICARE

## 2020-03-10 ENCOUNTER — HOSPITAL ENCOUNTER (INPATIENT)
Facility: HOSPITAL | Age: 70
LOS: 8 days | Discharge: NURSING, REHAB OR LONG-TERM CARE | DRG: 872 | End: 2020-03-19
Attending: EMERGENCY MEDICINE | Admitting: FAMILY MEDICINE
Payer: MEDICARE

## 2020-03-10 DIAGNOSIS — N20.0 NEPHROLITHIASIS: ICD-10-CM

## 2020-03-10 DIAGNOSIS — L85.3 DRY SKIN: ICD-10-CM

## 2020-03-10 DIAGNOSIS — R52 SEVERE PAIN: ICD-10-CM

## 2020-03-10 DIAGNOSIS — N13.30 HYDRONEPHROSIS: ICD-10-CM

## 2020-03-10 DIAGNOSIS — N31.9 NEUROGENIC BLADDER: ICD-10-CM

## 2020-03-10 DIAGNOSIS — Z93.59 SUPRAPUBIC CATHETER (HCC): ICD-10-CM

## 2020-03-10 DIAGNOSIS — N23 RENAL COLIC: ICD-10-CM

## 2020-03-10 DIAGNOSIS — E87.6 HYPOKALEMIA: ICD-10-CM

## 2020-03-10 DIAGNOSIS — F03.91 DEMENTIA WITH BEHAVIORAL DISTURBANCE, UNSPECIFIED DEMENTIA TYPE (HCC): ICD-10-CM

## 2020-03-10 DIAGNOSIS — N13.30 HYDRONEPHROSIS, UNSPECIFIED HYDRONEPHROSIS TYPE: ICD-10-CM

## 2020-03-10 DIAGNOSIS — L03.311 CELLULITIS, ABDOMINAL WALL: ICD-10-CM

## 2020-03-10 DIAGNOSIS — K59.00 CONSTIPATION: ICD-10-CM

## 2020-03-10 DIAGNOSIS — A41.9 SEPSIS (HCC): Primary | ICD-10-CM

## 2020-03-10 DIAGNOSIS — R06.2 WHEEZE: ICD-10-CM

## 2020-03-10 DIAGNOSIS — N12 PYELONEPHRITIS OF LEFT KIDNEY: ICD-10-CM

## 2020-03-10 DIAGNOSIS — N20.0 BILATERAL NEPHROLITHIASIS: ICD-10-CM

## 2020-03-10 DIAGNOSIS — N39.0 UTI (URINARY TRACT INFECTION): ICD-10-CM

## 2020-03-10 LAB
ALBUMIN SERPL BCP-MCNC: 3.7 G/DL (ref 3.5–5)
ALP SERPL-CCNC: 117 U/L (ref 46–116)
ALT SERPL W P-5'-P-CCNC: 17 U/L (ref 12–78)
ANION GAP SERPL CALCULATED.3IONS-SCNC: 10 MMOL/L (ref 4–13)
APTT PPP: 30 SECONDS (ref 23–37)
AST SERPL W P-5'-P-CCNC: 12 U/L (ref 5–45)
BACTERIA UR QL AUTO: ABNORMAL /HPF
BASOPHILS # BLD AUTO: 0.06 THOUSANDS/ΜL (ref 0–0.1)
BASOPHILS NFR BLD AUTO: 1 % (ref 0–1)
BILIRUB SERPL-MCNC: 0.31 MG/DL (ref 0.2–1)
BILIRUB UR QL STRIP: NEGATIVE
BILIRUB UR QL STRIP: NEGATIVE
BUN SERPL-MCNC: 19 MG/DL (ref 5–25)
CALCIUM SERPL-MCNC: 8.9 MG/DL (ref 8.3–10.1)
CHLORIDE SERPL-SCNC: 107 MMOL/L (ref 100–108)
CLARITY UR: ABNORMAL
CLARITY UR: ABNORMAL
CO2 SERPL-SCNC: 27 MMOL/L (ref 21–32)
COLOR UR: YELLOW
COLOR UR: YELLOW
CREAT SERPL-MCNC: 1.59 MG/DL (ref 0.6–1.3)
EOSINOPHIL # BLD AUTO: 0.09 THOUSAND/ΜL (ref 0–0.61)
EOSINOPHIL NFR BLD AUTO: 1 % (ref 0–6)
ERYTHROCYTE [DISTWIDTH] IN BLOOD BY AUTOMATED COUNT: 14.1 % (ref 11.6–15.1)
GFR SERPL CREATININE-BSD FRML MDRD: 43 ML/MIN/1.73SQ M
GLUCOSE SERPL-MCNC: 147 MG/DL (ref 65–140)
GLUCOSE UR STRIP-MCNC: NEGATIVE MG/DL
GLUCOSE UR STRIP-MCNC: NEGATIVE MG/DL
HCT VFR BLD AUTO: 49 % (ref 36.5–49.3)
HGB BLD-MCNC: 15.8 G/DL (ref 12–17)
HGB UR QL STRIP.AUTO: ABNORMAL
HGB UR QL STRIP.AUTO: ABNORMAL
HOLD SPECIMEN: NORMAL
IMM GRANULOCYTES # BLD AUTO: 0.05 THOUSAND/UL (ref 0–0.2)
IMM GRANULOCYTES NFR BLD AUTO: 0 % (ref 0–2)
INR PPP: 1.11 (ref 0.84–1.19)
KETONES UR STRIP-MCNC: NEGATIVE MG/DL
KETONES UR STRIP-MCNC: NEGATIVE MG/DL
LACTATE SERPL-SCNC: 1.7 MMOL/L (ref 0.5–2)
LEUKOCYTE ESTERASE UR QL STRIP: ABNORMAL
LEUKOCYTE ESTERASE UR QL STRIP: ABNORMAL
LIPASE SERPL-CCNC: 147 U/L (ref 73–393)
LYMPHOCYTES # BLD AUTO: 0.89 THOUSANDS/ΜL (ref 0.6–4.47)
LYMPHOCYTES NFR BLD AUTO: 7 % (ref 14–44)
MCH RBC QN AUTO: 28.9 PG (ref 26.8–34.3)
MCHC RBC AUTO-ENTMCNC: 32.2 G/DL (ref 31.4–37.4)
MCV RBC AUTO: 90 FL (ref 82–98)
MONOCYTES # BLD AUTO: 0.6 THOUSAND/ΜL (ref 0.17–1.22)
MONOCYTES NFR BLD AUTO: 5 % (ref 4–12)
NEUTROPHILS # BLD AUTO: 11.47 THOUSANDS/ΜL (ref 1.85–7.62)
NEUTS SEG NFR BLD AUTO: 86 % (ref 43–75)
NITRITE UR QL STRIP: NEGATIVE
NITRITE UR QL STRIP: NEGATIVE
NON-SQ EPI CELLS URNS QL MICRO: ABNORMAL /HPF
NRBC BLD AUTO-RTO: 0 /100 WBCS
PH UR STRIP.AUTO: 7 [PH]
PH UR STRIP.AUTO: 7 [PH] (ref 4.5–8)
PLATELET # BLD AUTO: 310 THOUSANDS/UL (ref 149–390)
PMV BLD AUTO: 8.8 FL (ref 8.9–12.7)
POTASSIUM SERPL-SCNC: 3.6 MMOL/L (ref 3.5–5.3)
PROT SERPL-MCNC: 8.4 G/DL (ref 6.4–8.2)
PROT UR STRIP-MCNC: >=300 MG/DL
PROT UR STRIP-MCNC: ABNORMAL MG/DL
PROTHROMBIN TIME: 13.7 SECONDS (ref 11.6–14.5)
RBC # BLD AUTO: 5.47 MILLION/UL (ref 3.88–5.62)
RBC #/AREA URNS AUTO: ABNORMAL /HPF
SODIUM SERPL-SCNC: 144 MMOL/L (ref 136–145)
SP GR UR STRIP.AUTO: 1.02 (ref 1–1.03)
SP GR UR STRIP.AUTO: 1.02 (ref 1–1.03)
UROBILINOGEN UR QL STRIP.AUTO: 0.2 E.U./DL
UROBILINOGEN UR QL STRIP.AUTO: 0.2 E.U./DL
WBC # BLD AUTO: 13.16 THOUSAND/UL (ref 4.31–10.16)
WBC #/AREA URNS AUTO: ABNORMAL /HPF

## 2020-03-10 PROCEDURE — 74177 CT ABD & PELVIS W/CONTRAST: CPT

## 2020-03-10 PROCEDURE — 83690 ASSAY OF LIPASE: CPT | Performed by: EMERGENCY MEDICINE

## 2020-03-10 PROCEDURE — 87186 SC STD MICRODIL/AGAR DIL: CPT | Performed by: EMERGENCY MEDICINE

## 2020-03-10 PROCEDURE — 96375 TX/PRO/DX INJ NEW DRUG ADDON: CPT

## 2020-03-10 PROCEDURE — 85730 THROMBOPLASTIN TIME PARTIAL: CPT | Performed by: EMERGENCY MEDICINE

## 2020-03-10 PROCEDURE — 81001 URINALYSIS AUTO W/SCOPE: CPT | Performed by: EMERGENCY MEDICINE

## 2020-03-10 PROCEDURE — 71045 X-RAY EXAM CHEST 1 VIEW: CPT

## 2020-03-10 PROCEDURE — 83605 ASSAY OF LACTIC ACID: CPT | Performed by: EMERGENCY MEDICINE

## 2020-03-10 PROCEDURE — 36415 COLL VENOUS BLD VENIPUNCTURE: CPT

## 2020-03-10 PROCEDURE — 85025 COMPLETE CBC W/AUTO DIFF WBC: CPT | Performed by: EMERGENCY MEDICINE

## 2020-03-10 PROCEDURE — 96361 HYDRATE IV INFUSION ADD-ON: CPT

## 2020-03-10 PROCEDURE — 87147 CULTURE TYPE IMMUNOLOGIC: CPT | Performed by: EMERGENCY MEDICINE

## 2020-03-10 PROCEDURE — 87040 BLOOD CULTURE FOR BACTERIA: CPT | Performed by: EMERGENCY MEDICINE

## 2020-03-10 PROCEDURE — 71260 CT THORAX DX C+: CPT

## 2020-03-10 PROCEDURE — 99285 EMERGENCY DEPT VISIT HI MDM: CPT | Performed by: EMERGENCY MEDICINE

## 2020-03-10 PROCEDURE — 80053 COMPREHEN METABOLIC PANEL: CPT | Performed by: EMERGENCY MEDICINE

## 2020-03-10 PROCEDURE — 87077 CULTURE AEROBIC IDENTIFY: CPT | Performed by: EMERGENCY MEDICINE

## 2020-03-10 PROCEDURE — 99285 EMERGENCY DEPT VISIT HI MDM: CPT

## 2020-03-10 PROCEDURE — 85610 PROTHROMBIN TIME: CPT | Performed by: EMERGENCY MEDICINE

## 2020-03-10 PROCEDURE — 87086 URINE CULTURE/COLONY COUNT: CPT | Performed by: EMERGENCY MEDICINE

## 2020-03-10 RX ORDER — ONDANSETRON 2 MG/ML
4 INJECTION INTRAMUSCULAR; INTRAVENOUS ONCE
Status: COMPLETED | OUTPATIENT
Start: 2020-03-10 | End: 2020-03-10

## 2020-03-10 RX ORDER — BUSPIRONE HYDROCHLORIDE 5 MG/1
7.5 TABLET ORAL ONCE
Status: COMPLETED | OUTPATIENT
Start: 2020-03-11 | End: 2020-03-11

## 2020-03-10 RX ORDER — ONDANSETRON 2 MG/ML
INJECTION INTRAMUSCULAR; INTRAVENOUS
Status: DISPENSED
Start: 2020-03-10 | End: 2020-03-11

## 2020-03-10 RX ORDER — QUETIAPINE FUMARATE 25 MG/1
25 TABLET, FILM COATED ORAL ONCE
Status: COMPLETED | OUTPATIENT
Start: 2020-03-10 | End: 2020-03-11

## 2020-03-10 RX ADMIN — SODIUM CHLORIDE 1000 ML: 0.9 INJECTION, SOLUTION INTRAVENOUS at 21:30

## 2020-03-10 RX ADMIN — ONDANSETRON 4 MG: 2 INJECTION INTRAMUSCULAR; INTRAVENOUS at 23:40

## 2020-03-10 RX ADMIN — IOHEXOL 100 ML: 350 INJECTION, SOLUTION INTRAVENOUS at 22:39

## 2020-03-11 ENCOUNTER — ANESTHESIA EVENT (INPATIENT)
Dept: RADIOLOGY | Facility: HOSPITAL | Age: 70
DRG: 872 | End: 2020-03-11
Payer: MEDICARE

## 2020-03-11 ENCOUNTER — TELEPHONE (OUTPATIENT)
Dept: OTHER | Facility: HOSPITAL | Age: 70
End: 2020-03-11

## 2020-03-11 ENCOUNTER — ANESTHESIA (INPATIENT)
Dept: RADIOLOGY | Facility: HOSPITAL | Age: 70
DRG: 872 | End: 2020-03-11
Payer: MEDICARE

## 2020-03-11 ENCOUNTER — APPOINTMENT (INPATIENT)
Dept: RADIOLOGY | Facility: HOSPITAL | Age: 70
DRG: 872 | End: 2020-03-11
Attending: UROLOGY
Payer: MEDICARE

## 2020-03-11 PROBLEM — N20.0 BILATERAL NEPHROLITHIASIS: Status: ACTIVE | Noted: 2020-03-11

## 2020-03-11 PROBLEM — J98.11 ATELECTASIS: Status: ACTIVE | Noted: 2020-03-11

## 2020-03-11 PROBLEM — K56.41 FECAL IMPACTION (HCC): Status: ACTIVE | Noted: 2020-03-11

## 2020-03-11 LAB
ALBUMIN SERPL BCP-MCNC: 2.8 G/DL (ref 3.5–5)
ALP SERPL-CCNC: 84 U/L (ref 46–116)
ALT SERPL W P-5'-P-CCNC: 14 U/L (ref 12–78)
ANION GAP SERPL CALCULATED.3IONS-SCNC: 10 MMOL/L (ref 4–13)
AST SERPL W P-5'-P-CCNC: 16 U/L (ref 5–45)
ATRIAL RATE: 106 BPM
BASOPHILS # BLD MANUAL: 0 THOUSAND/UL (ref 0–0.1)
BASOPHILS NFR MAR MANUAL: 0 % (ref 0–1)
BILIRUB SERPL-MCNC: 0.74 MG/DL (ref 0.2–1)
BUN SERPL-MCNC: 21 MG/DL (ref 5–25)
CALCIUM SERPL-MCNC: 8 MG/DL (ref 8.3–10.1)
CHLORIDE SERPL-SCNC: 111 MMOL/L (ref 100–108)
CO2 SERPL-SCNC: 24 MMOL/L (ref 21–32)
CREAT SERPL-MCNC: 2.3 MG/DL (ref 0.6–1.3)
EOSINOPHIL # BLD MANUAL: 0 THOUSAND/UL (ref 0–0.4)
EOSINOPHIL NFR BLD MANUAL: 0 % (ref 0–6)
ERYTHROCYTE [DISTWIDTH] IN BLOOD BY AUTOMATED COUNT: 14.2 % (ref 11.6–15.1)
GFR SERPL CREATININE-BSD FRML MDRD: 28 ML/MIN/1.73SQ M
GLUCOSE SERPL-MCNC: 143 MG/DL (ref 65–140)
HCT VFR BLD AUTO: 41.7 % (ref 36.5–49.3)
HGB BLD-MCNC: 13.3 G/DL (ref 12–17)
LYMPHOCYTES # BLD AUTO: 0.2 THOUSAND/UL (ref 0.6–4.47)
LYMPHOCYTES # BLD AUTO: 1 % (ref 14–44)
MAGNESIUM SERPL-MCNC: 1.6 MG/DL (ref 1.6–2.6)
MCH RBC QN AUTO: 29.2 PG (ref 26.8–34.3)
MCHC RBC AUTO-ENTMCNC: 31.9 G/DL (ref 31.4–37.4)
MCV RBC AUTO: 92 FL (ref 82–98)
METAMYELOCYTES NFR BLD MANUAL: 2 % (ref 0–1)
MONOCYTES # BLD AUTO: 0.2 THOUSAND/UL (ref 0–1.22)
MONOCYTES NFR BLD: 1 % (ref 4–12)
NEUTROPHILS # BLD MANUAL: 19.23 THOUSAND/UL (ref 1.85–7.62)
NEUTS BAND NFR BLD MANUAL: 36 % (ref 0–8)
NEUTS SEG NFR BLD AUTO: 59 % (ref 43–75)
NRBC BLD AUTO-RTO: 0 /100 WBCS
P AXIS: 46 DEGREES
PLATELET # BLD AUTO: 199 THOUSANDS/UL (ref 149–390)
PLATELET BLD QL SMEAR: ADEQUATE
PMV BLD AUTO: 8.8 FL (ref 8.9–12.7)
POTASSIUM SERPL-SCNC: 3.3 MMOL/L (ref 3.5–5.3)
PR INTERVAL: 186 MS
PROCALCITONIN SERPL-MCNC: 278.64 NG/ML
PROT SERPL-MCNC: 6.3 G/DL (ref 6.4–8.2)
QRS AXIS: -22 DEGREES
QRSD INTERVAL: 74 MS
QT INTERVAL: 334 MS
QTC INTERVAL: 443 MS
RBC # BLD AUTO: 4.55 MILLION/UL (ref 3.88–5.62)
RBC MORPH BLD: NORMAL
SODIUM SERPL-SCNC: 145 MMOL/L (ref 136–145)
T WAVE AXIS: 43 DEGREES
TOTAL CELLS COUNTED SPEC: 100
VARIANT LYMPHS # BLD AUTO: 1 %
VENTRICULAR RATE: 106 BPM
WBC # BLD AUTO: 20.24 THOUSAND/UL (ref 4.31–10.16)

## 2020-03-11 PROCEDURE — BT121ZZ FLUOROSCOPY OF LEFT KIDNEY USING LOW OSMOLAR CONTRAST: ICD-10-PCS | Performed by: RADIOLOGY

## 2020-03-11 PROCEDURE — 50432 PLMT NEPHROSTOMY CATHETER: CPT | Performed by: RADIOLOGY

## 2020-03-11 PROCEDURE — 87086 URINE CULTURE/COLONY COUNT: CPT | Performed by: RADIOLOGY

## 2020-03-11 PROCEDURE — 93005 ELECTROCARDIOGRAM TRACING: CPT

## 2020-03-11 PROCEDURE — 85007 BL SMEAR W/DIFF WBC COUNT: CPT | Performed by: NURSE PRACTITIONER

## 2020-03-11 PROCEDURE — 87077 CULTURE AEROBIC IDENTIFY: CPT | Performed by: RADIOLOGY

## 2020-03-11 PROCEDURE — 99223 1ST HOSP IP/OBS HIGH 75: CPT | Performed by: FAMILY MEDICINE

## 2020-03-11 PROCEDURE — 84145 PROCALCITONIN (PCT): CPT | Performed by: FAMILY MEDICINE

## 2020-03-11 PROCEDURE — 87186 SC STD MICRODIL/AGAR DIL: CPT | Performed by: RADIOLOGY

## 2020-03-11 PROCEDURE — 96365 THER/PROPH/DIAG IV INF INIT: CPT

## 2020-03-11 PROCEDURE — C1729 CATH, DRAINAGE: HCPCS

## 2020-03-11 PROCEDURE — 93010 ELECTROCARDIOGRAM REPORT: CPT | Performed by: INTERNAL MEDICINE

## 2020-03-11 PROCEDURE — 83735 ASSAY OF MAGNESIUM: CPT | Performed by: NURSE PRACTITIONER

## 2020-03-11 PROCEDURE — 99255 IP/OBS CONSLTJ NEW/EST HI 80: CPT | Performed by: INTERNAL MEDICINE

## 2020-03-11 PROCEDURE — 80053 COMPREHEN METABOLIC PANEL: CPT | Performed by: NURSE PRACTITIONER

## 2020-03-11 PROCEDURE — 50432 PLMT NEPHROSTOMY CATHETER: CPT

## 2020-03-11 PROCEDURE — 85027 COMPLETE CBC AUTOMATED: CPT | Performed by: NURSE PRACTITIONER

## 2020-03-11 PROCEDURE — 99254 IP/OBS CNSLTJ NEW/EST MOD 60: CPT | Performed by: UROLOGY

## 2020-03-11 PROCEDURE — 87081 CULTURE SCREEN ONLY: CPT | Performed by: PHYSICIAN ASSISTANT

## 2020-03-11 PROCEDURE — NC001 PR NO CHARGE: Performed by: RADIOLOGY

## 2020-03-11 PROCEDURE — 87181 SC STD AGAR DILUTION PER AGT: CPT | Performed by: RADIOLOGY

## 2020-03-11 PROCEDURE — C1769 GUIDE WIRE: HCPCS

## 2020-03-11 PROCEDURE — 0T9130Z DRAINAGE OF LEFT KIDNEY WITH DRAINAGE DEVICE, PERCUTANEOUS APPROACH: ICD-10-PCS | Performed by: RADIOLOGY

## 2020-03-11 PROCEDURE — 87147 CULTURE TYPE IMMUNOLOGIC: CPT | Performed by: RADIOLOGY

## 2020-03-11 RX ORDER — AMLODIPINE BESYLATE 5 MG/1
5 TABLET ORAL 2 TIMES DAILY
Status: DISCONTINUED | OUTPATIENT
Start: 2020-03-11 | End: 2020-03-12

## 2020-03-11 RX ORDER — SENNOSIDES 8.6 MG
2 TABLET ORAL
Status: DISCONTINUED | OUTPATIENT
Start: 2020-03-11 | End: 2020-03-19 | Stop reason: HOSPADM

## 2020-03-11 RX ORDER — ACETAMINOPHEN 650 MG/1
650 SUPPOSITORY RECTAL EVERY 6 HOURS PRN
Status: DISCONTINUED | OUTPATIENT
Start: 2020-03-11 | End: 2020-03-13

## 2020-03-11 RX ORDER — DEXAMETHASONE SODIUM PHOSPHATE 10 MG/ML
INJECTION, SOLUTION INTRAMUSCULAR; INTRAVENOUS AS NEEDED
Status: DISCONTINUED | OUTPATIENT
Start: 2020-03-11 | End: 2020-03-11 | Stop reason: SURG

## 2020-03-11 RX ORDER — ONDANSETRON 2 MG/ML
4 INJECTION INTRAMUSCULAR; INTRAVENOUS EVERY 6 HOURS PRN
Status: DISCONTINUED | OUTPATIENT
Start: 2020-03-11 | End: 2020-03-19 | Stop reason: HOSPADM

## 2020-03-11 RX ORDER — MINERAL OIL AND PETROLATUM 150; 830 MG/G; MG/G
OINTMENT OPHTHALMIC
Status: DISCONTINUED | OUTPATIENT
Start: 2020-03-11 | End: 2020-03-19 | Stop reason: HOSPADM

## 2020-03-11 RX ORDER — MIRTAZAPINE 15 MG/1
15 TABLET, FILM COATED ORAL
Status: DISCONTINUED | OUTPATIENT
Start: 2020-03-11 | End: 2020-03-14

## 2020-03-11 RX ORDER — POLYETHYLENE GLYCOL 3350 17 G/17G
17 POWDER, FOR SOLUTION ORAL DAILY
Status: DISCONTINUED | OUTPATIENT
Start: 2020-03-11 | End: 2020-03-19 | Stop reason: HOSPADM

## 2020-03-11 RX ORDER — QUETIAPINE FUMARATE 25 MG/1
25 TABLET, FILM COATED ORAL
Status: DISCONTINUED | OUTPATIENT
Start: 2020-03-11 | End: 2020-03-19 | Stop reason: HOSPADM

## 2020-03-11 RX ORDER — POTASSIUM CHLORIDE 14.9 MG/ML
20 INJECTION INTRAVENOUS
Status: COMPLETED | OUTPATIENT
Start: 2020-03-11 | End: 2020-03-11

## 2020-03-11 RX ORDER — DOCUSATE SODIUM 100 MG/1
100 CAPSULE, LIQUID FILLED ORAL 2 TIMES DAILY
Status: DISCONTINUED | OUTPATIENT
Start: 2020-03-11 | End: 2020-03-19 | Stop reason: HOSPADM

## 2020-03-11 RX ORDER — ALBUMIN, HUMAN INJ 5% 5 %
SOLUTION INTRAVENOUS CONTINUOUS PRN
Status: DISCONTINUED | OUTPATIENT
Start: 2020-03-11 | End: 2020-03-11 | Stop reason: SURG

## 2020-03-11 RX ORDER — ONDANSETRON 2 MG/ML
INJECTION INTRAMUSCULAR; INTRAVENOUS AS NEEDED
Status: DISCONTINUED | OUTPATIENT
Start: 2020-03-11 | End: 2020-03-11 | Stop reason: SURG

## 2020-03-11 RX ORDER — PROPOFOL 10 MG/ML
INJECTION, EMULSION INTRAVENOUS AS NEEDED
Status: DISCONTINUED | OUTPATIENT
Start: 2020-03-11 | End: 2020-03-11 | Stop reason: SURG

## 2020-03-11 RX ORDER — MAGNESIUM SULFATE HEPTAHYDRATE 40 MG/ML
2 INJECTION, SOLUTION INTRAVENOUS ONCE
Status: COMPLETED | OUTPATIENT
Start: 2020-03-11 | End: 2020-03-11

## 2020-03-11 RX ORDER — POTASSIUM CHLORIDE 20 MEQ/1
40 TABLET, EXTENDED RELEASE ORAL ONCE
Status: DISCONTINUED | OUTPATIENT
Start: 2020-03-11 | End: 2020-03-11

## 2020-03-11 RX ORDER — HEPARIN SODIUM 5000 [USP'U]/ML
5000 INJECTION, SOLUTION INTRAVENOUS; SUBCUTANEOUS EVERY 12 HOURS SCHEDULED
Status: DISCONTINUED | OUTPATIENT
Start: 2020-03-11 | End: 2020-03-12

## 2020-03-11 RX ORDER — ACETAMINOPHEN 325 MG/1
650 TABLET ORAL EVERY 6 HOURS PRN
Status: DISCONTINUED | OUTPATIENT
Start: 2020-03-11 | End: 2020-03-11

## 2020-03-11 RX ORDER — MAGNESIUM HYDROXIDE/ALUMINUM HYDROXICE/SIMETHICONE 120; 1200; 1200 MG/30ML; MG/30ML; MG/30ML
30 SUSPENSION ORAL EVERY 4 HOURS PRN
Status: DISCONTINUED | OUTPATIENT
Start: 2020-03-11 | End: 2020-03-19 | Stop reason: HOSPADM

## 2020-03-11 RX ORDER — FAMOTIDINE 20 MG/1
20 TABLET, FILM COATED ORAL 2 TIMES DAILY
Status: DISCONTINUED | OUTPATIENT
Start: 2020-03-11 | End: 2020-03-19 | Stop reason: HOSPADM

## 2020-03-11 RX ORDER — SODIUM CHLORIDE 9 MG/ML
75 INJECTION, SOLUTION INTRAVENOUS CONTINUOUS
Status: DISCONTINUED | OUTPATIENT
Start: 2020-03-11 | End: 2020-03-11

## 2020-03-11 RX ORDER — MEMANTINE HYDROCHLORIDE 5 MG/1
5 TABLET ORAL 2 TIMES DAILY
Status: DISCONTINUED | OUTPATIENT
Start: 2020-03-11 | End: 2020-03-19 | Stop reason: HOSPADM

## 2020-03-11 RX ORDER — SODIUM CHLORIDE, SODIUM LACTATE, POTASSIUM CHLORIDE, CALCIUM CHLORIDE 600; 310; 30; 20 MG/100ML; MG/100ML; MG/100ML; MG/100ML
INJECTION, SOLUTION INTRAVENOUS CONTINUOUS PRN
Status: DISCONTINUED | OUTPATIENT
Start: 2020-03-11 | End: 2020-03-11 | Stop reason: SURG

## 2020-03-11 RX ORDER — ACETAMINOPHEN 325 MG/1
650 TABLET ORAL ONCE
Status: COMPLETED | OUTPATIENT
Start: 2020-03-11 | End: 2020-03-11

## 2020-03-11 RX ORDER — SUCCINYLCHOLINE/SOD CL,ISO/PF 100 MG/5ML
SYRINGE (ML) INTRAVENOUS AS NEEDED
Status: DISCONTINUED | OUTPATIENT
Start: 2020-03-11 | End: 2020-03-11 | Stop reason: SURG

## 2020-03-11 RX ORDER — FENTANYL CITRATE 50 UG/ML
INJECTION, SOLUTION INTRAMUSCULAR; INTRAVENOUS AS NEEDED
Status: DISCONTINUED | OUTPATIENT
Start: 2020-03-11 | End: 2020-03-11 | Stop reason: SURG

## 2020-03-11 RX ORDER — SODIUM CHLORIDE, SODIUM LACTATE, POTASSIUM CHLORIDE, CALCIUM CHLORIDE 600; 310; 30; 20 MG/100ML; MG/100ML; MG/100ML; MG/100ML
100 INJECTION, SOLUTION INTRAVENOUS CONTINUOUS
Status: DISCONTINUED | OUTPATIENT
Start: 2020-03-11 | End: 2020-03-12

## 2020-03-11 RX ORDER — POTASSIUM CHLORIDE 750 MG/1
10 TABLET, EXTENDED RELEASE ORAL
Status: DISCONTINUED | OUTPATIENT
Start: 2020-03-11 | End: 2020-03-19 | Stop reason: HOSPADM

## 2020-03-11 RX ORDER — BISACODYL 10 MG
10 SUPPOSITORY, RECTAL RECTAL DAILY
Status: DISCONTINUED | OUTPATIENT
Start: 2020-03-11 | End: 2020-03-19

## 2020-03-11 RX ORDER — BUSPIRONE HYDROCHLORIDE 5 MG/1
7.5 TABLET ORAL 3 TIMES DAILY
Status: DISCONTINUED | OUTPATIENT
Start: 2020-03-11 | End: 2020-03-19 | Stop reason: HOSPADM

## 2020-03-11 RX ADMIN — MEROPENEM 1000 MG: 1 INJECTION, POWDER, FOR SOLUTION INTRAVENOUS at 16:24

## 2020-03-11 RX ADMIN — HEPARIN SODIUM 5000 UNITS: 5000 INJECTION INTRAVENOUS; SUBCUTANEOUS at 14:18

## 2020-03-11 RX ADMIN — ALBUMIN (HUMAN): 12.5 SOLUTION INTRAVENOUS at 02:54

## 2020-03-11 RX ADMIN — BUSPIRONE HYDROCHLORIDE 7.5 MG: 5 TABLET ORAL at 21:06

## 2020-03-11 RX ADMIN — POTASSIUM CHLORIDE 20 MEQ: 14.9 INJECTION, SOLUTION INTRAVENOUS at 16:30

## 2020-03-11 RX ADMIN — IOHEXOL 30 ML: 350 INJECTION, SOLUTION INTRAVENOUS at 03:47

## 2020-03-11 RX ADMIN — SODIUM CHLORIDE 75 ML/HR: 0.9 INJECTION, SOLUTION INTRAVENOUS at 05:07

## 2020-03-11 RX ADMIN — SODIUM CHLORIDE, SODIUM LACTATE, POTASSIUM CHLORIDE, AND CALCIUM CHLORIDE: .6; .31; .03; .02 INJECTION, SOLUTION INTRAVENOUS at 02:27

## 2020-03-11 RX ADMIN — SODIUM CHLORIDE 500 ML: 0.9 INJECTION, SOLUTION INTRAVENOUS at 01:22

## 2020-03-11 RX ADMIN — MIRTAZAPINE 15 MG: 15 TABLET, FILM COATED ORAL at 21:06

## 2020-03-11 RX ADMIN — PHENYLEPHRINE HYDROCHLORIDE 100 MCG: 10 INJECTION INTRAVENOUS at 02:51

## 2020-03-11 RX ADMIN — PHENYLEPHRINE HYDROCHLORIDE 100 MCG: 10 INJECTION INTRAVENOUS at 02:45

## 2020-03-11 RX ADMIN — QUETIAPINE FUMARATE 25 MG: 25 TABLET ORAL at 21:05

## 2020-03-11 RX ADMIN — BISACODYL 10 MG: 10 SUPPOSITORY RECTAL at 10:41

## 2020-03-11 RX ADMIN — CEFEPIME HYDROCHLORIDE 1000 MG: 1 INJECTION, POWDER, FOR SOLUTION INTRAMUSCULAR; INTRAVENOUS at 00:44

## 2020-03-11 RX ADMIN — PROPOFOL 200 MG: 10 INJECTION, EMULSION INTRAVENOUS at 02:32

## 2020-03-11 RX ADMIN — Medication 100 MG: at 02:32

## 2020-03-11 RX ADMIN — ONDANSETRON 4 MG: 2 INJECTION INTRAMUSCULAR; INTRAVENOUS at 02:55

## 2020-03-11 RX ADMIN — FENTANYL CITRATE 50 MCG: 50 INJECTION INTRAMUSCULAR; INTRAVENOUS at 03:14

## 2020-03-11 RX ADMIN — POTASSIUM CHLORIDE 20 MEQ: 14.9 INJECTION, SOLUTION INTRAVENOUS at 14:16

## 2020-03-11 RX ADMIN — ACETAMINOPHEN 650 MG: 325 TABLET, FILM COATED ORAL at 01:25

## 2020-03-11 RX ADMIN — SODIUM CHLORIDE, SODIUM LACTATE, POTASSIUM CHLORIDE, AND CALCIUM CHLORIDE 100 ML/HR: .6; .31; .03; .02 INJECTION, SOLUTION INTRAVENOUS at 10:52

## 2020-03-11 RX ADMIN — DEXAMETHASONE SODIUM PHOSPHATE 4 MG: 10 INJECTION, SOLUTION INTRAMUSCULAR; INTRAVENOUS at 02:55

## 2020-03-11 RX ADMIN — ACETAMINOPHEN 650 MG: 650 SUPPOSITORY RECTAL at 06:16

## 2020-03-11 RX ADMIN — SODIUM CHLORIDE, SODIUM LACTATE, POTASSIUM CHLORIDE, AND CALCIUM CHLORIDE: .6; .31; .03; .02 INJECTION, SOLUTION INTRAVENOUS at 03:32

## 2020-03-11 RX ADMIN — BUSPIRONE HYDROCHLORIDE 7.5 MG: 5 TABLET ORAL at 00:20

## 2020-03-11 RX ADMIN — MAGNESIUM SULFATE HEPTAHYDRATE 2 G: 40 INJECTION, SOLUTION INTRAVENOUS at 10:41

## 2020-03-11 RX ADMIN — HEPARIN SODIUM 5000 UNITS: 5000 INJECTION INTRAVENOUS; SUBCUTANEOUS at 21:06

## 2020-03-11 RX ADMIN — FENTANYL CITRATE 50 MCG: 50 INJECTION INTRAMUSCULAR; INTRAVENOUS at 03:33

## 2020-03-11 RX ADMIN — QUETIAPINE FUMARATE 25 MG: 25 TABLET ORAL at 00:22

## 2020-03-11 RX ADMIN — CEFEPIME HYDROCHLORIDE 1000 MG: 1 INJECTION, POWDER, FOR SOLUTION INTRAMUSCULAR; INTRAVENOUS at 12:48

## 2020-03-11 NOTE — TELEPHONE ENCOUNTER
Our service was contacted overnight due to this patient being febrile to 102 5 with a large obstructing proximal ureteral stone  I contacted the on call physician, Dr Kostas Romero, who reviewed the CT scan and pertinent parts of the patient's chart and recommended nephrostomy tube to be placed by IR

## 2020-03-11 NOTE — H&P
H&P- Boaz Ramirez [facility] 1950, 68 y o  male MRN: [id number]    Unit/Bed#: RICKI Encounter: [id number]    Primary Care Provider: Mana Rios MD   Date and time admitted to hospital: 3/10/2020  8:43 PM        Bilateral nephrolithiasis  Assessment & Plan  · Presentation:  Multiple episodes of vomiting that occurred in the evening  Temperature a 100 3° in EMS  From nursing facility  · History:  Suprapubic catheter/neurogenic bladder, history of nephrolithiasis requiring right nephrostomy tube in the past  · Suprapubic catheter site without obvious infection  · CT abdomen:  Bilateral nephrolithiasis with 1 2 x 0 9 cm obstructing stone in the left ureteral junction with mild-to-moderate hydronephrosis and obstructive uropathy  Nonobstructing stones in the right kidney  · Case was reviewed by ED provider with critical care AP  Recommending step-down 2    · Case was discussed with Urology by emergency department provider  Urology consulted Interventional Radiology  Plans for patient to go to [facility] for nephrostomy tube    · Continue cefepime  Patient's wife is at bedside reports multiple drug allergies  She reports reactions include "bleeding" and " head to toe blisters "  At this time, patient's wife is agreeable to patient ONLY receiving cefepime  · Consult infectious disease for antibiotic assistance  · Consult urology  · Appreciate IR    * Sepsis ([address] )  Assessment & Plan  · Present on admission-as evidenced by fever, tachycardia, leukocytosis  · Urinalysis:  Innumerable WBC  · CT scan:  Obstructing left nephrolithiasis  ·  Right lower lobe atelectasis versus infiltrate  · Continue cefepime  · Consult infectious disease given allergies  · Admitted step-down 2    Neurogenic bladder  Assessment & Plan  · CT abdomen:  "Bladder is collapsed around suprapubic catheter    There was some fat stranding adjacent to the bladder which could be related to catheter placement versus cystitis "  · Urinalysis: Innumerable WBC  Field obscured, unable to enumerate bacteria  History of DVT (deep vein thrombosis)  Assessment & Plan  · Anticoagulated on Eliquis  · Hold in light of IR intervention    Fecal impaction [facility])  Assessment & Plan  · Patient is a wife is at bedside reports bowel movement yet today and today which was normal  · Consider enema  · For now, will continue with home regimen under direction of patient's wife  Atelectasis  Assessment & Plan  · Noted on CT abdomen  · Ill-defined opacity-atelectasis versus infection  · Patient's wife is at bedside and denies any recent coughing  · Patient's wife is agreeable to only cefepime    Dementia with behavioral disturbance (HCC)  Assessment & Plan  · Nonverbal at baseline  · Prior history of CVA  · Home regimen:  · BuSpar 7 5 mg t i d   · Namenda 5 mg b i d  · Remeron 50 mg HS  · Seroquel 25 mg HS  ·     Essential hypertension  Assessment & Plan  · Home regimen:  Amlodipine 5 mg b i d , metoprolol tartrate 12 5 mg b i d   · Continue  · Blood pressure stable  ·         VTE Prophylaxis: Pharmacologic VTE Prophylaxis contraindicated due to IR procedure   / sequential compression device   Code Status: Level 3 DNR DNI  POLST: POLST form is on file already (pre-hospital)  Discussion with family: patient's wife     Anticipated Length of Stay:  Patient will be admitted on an Inpatient basis with an anticipated length of stay of  Greater than 2 midnights  Justification for Hospital Stay: IV abx, urology consult, ID consult     Total Time for Visit, including Counseling / Coordination of Care: 45 minutes  Greater than 50% of this total time spent on direct patient counseling and coordination of care  Chief Complaint:   Vomiting     History of Present Illness:    Raul Tejada is a 68 y o  male who presents with vomiting  Patient is resident of [facility]  He is nonverbal at baseline due to prior stroke  History is obtained by patient's wife was at bedside    She reports he had eating his dinner and shortly after began vomiting  She is unsure if there was any aspiration  Notes a brief episode of coughing  She reports he had been feeling his normal self until this incident  She tells me that when he gets sick, he become sick very quickly  Past medical history significant for CVA, hypertension, dementia with behavioral disturbance, CKD, DVT, neurogenic bladder with suprapubic catheter, nephrolithiasis requiring right nephrostomy in the past, thoracic aneurysm  On arrival to emergency department, patient met sepsis criteria  He received fluid boluses in emergency department  He was started on cefepime  CT scans as below  Case was discussed by emergency department provider with Urology  Urology consult IR for nephrostomy tube placement to be done tonight  Admitted as inpatient  Consult Urology  Consult infectious disease  Appreciate IR    Review of Systems:    Review of Systems   Unable to perform ROS: Dementia   Constitutional: Positive for fever  Gastrointestinal: Positive for vomiting  Genitourinary: Positive for difficulty urinating  Neurological: Positive for speech difficulty  Psychiatric/Behavioral: Positive for agitation         Past Medical and Surgical History:     Past Medical History:   Diagnosis Date    Acute kidney injury ([facility] [address] ) 10/21/2016    Anxiety     Aortic aneurysm (HCC)     Aphasia     Ataxia     Bladder adhesions     BPH (benign prostatic hypertrophy)     COPD [facility]chronic obstructive pulmonary disease) ([facility]     wife denies - "never had"    Dementia ([facility] [address] )     Difficulty walking     Essential (primary) hypertension     Generalized anxiety disorder     GERD (gastroesophageal reflux disease)     Global aphasia     H/O blood clots     High blood pressure     History of kidney stones     Kidney stones     Mental disorder     Muscle weakness     Neuromuscular dysfunction of bladder     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 ([facility] [address] )     Retention of urine, unspecified     Stroke ([facility] [address] )     Thrombosis     Urinary retention     Urinary tract bacterial infections     Urinary tract infection        Past Surgical History:   Procedure Laterality Date    BOTOX INJECTION N/A 6/18/2019    Procedure: INJECTION BOTULINUM TOXIN (BOTOX), intra detrusor;  Surgeon: Francine Collier MD;  [facility];  Service: Urology    BOTOX INJECTION N/A 10/7/2019    Procedure: INJECTION BOTULINUM TOXIN (BOTOX), intradetrusor;  Surgeon: Francine Collier MD;  [facility];  Service: Urology    CYSTOSCOPY      CYSTOSCOPY N/A 6/18/2019    Procedure: cystoscopy and all indicated procedures;  Surgeon: Francine Collier MD;  [facility];  Service: Urology    FL CYSTOGRAM  1/15/2019    IR SUPRAPUBIC TUBE  11/26/2019    IVC FILTER INSERTION      X2    IVC FILTER INSERTION      x2    KIDNEY STONE SURGERY      KNEE SURGERY      Sepsis infection     NEPHROSTOMY      SC CYSTO/URETERO W/LITHOTRIPSY &INDWELL STENT INSRT Right 6/14/2017    Procedure: CYSTOSCOPY URETEROSCOPY WITH LITHOTRIPSY HOLMIUM LASER,,BASKET STONE EXTRACTION, RETROGRADE PYELOGRAM AND INSERTION STENT URETERAL;  Surgeon: Hiram Pardo MD;  [facility];  Service: Urology    SC CYSTOURETHROSCOPY,BIOPSY N/A 1/15/2019    Procedure: CYSTOSCOPY, CYSTOGRAM, DILATION OF URETHRAL STRICTURE;  Surgeon: Francine Collier MD;  [facility];  Service: Urology    URINARY SURGERY         Meds/Allergies:    Prior to Admission medications    Medication Sig Start Date End Date Taking?  Authorizing Provider   acetaminophen (TYLENOL) 325 mg tablet Take 650 mg by mouth every 4 (four) hours as needed for mild pain     Historical Provider, MD   amLODIPine (NORVASC) 5 mg tablet Take 5 mg by mouth 2 (two) times a day     Historical Provider, MD   apixaban (ELIQUIS) 2 5 mg Take 2 5 mg by mouth 2 (two) times a day    Historical Provider, MD   bisacodyl (DULCOLAX) 10 mg suppository Insert 1 suppository (10 mg total) into the rectum daily as needed for constipation 12/1/19   Star Perla MD   busPIRone (BUSPAR) 7 5 mg tablet Take 7 5 mg by mouth 3 (three) times a day    Historical Provider, MD   cefuroxime (CEFTIN) 250 mg tablet  12/30/19   Historical Provider, MD   docusate sodium (COLACE) 100 mg capsule Take 100 mg by mouth 2 (two) times a day    Historical Provider, MD   famotidine (PEPCID) 20 mg tablet Take 20 mg by mouth 2 (two) times a day    Historical Provider, MD   glycerin-hypromellose- (ARTIFICIAL TEARS) 0 2-0 2-1 % SOLN Administer 1 drop to both eyes 2 (two) times a day    Historical Provider, MD   memantine (NAMENDA) 5 mg tablet Take 1 tablet by mouth 2 (two) times a day for 30 days  Patient taking differently: Take 5 mg by mouth 2 (two) times a day  11/11/16 11/28/19  Linda Garvin MD   memantine (NAMENDA) 5 mg tablet 2 (two) times a day  12/17/19   Historical Provider, MD   metoprolol tartrate (LOPRESSOR) 25 mg tablet Take 0 5 tablets (12 5 mg total) by mouth 2 (two) times a day 7/10/19 2/8/22  Justin Rivera MD   mirtazapine (REMERON) 15 mg tablet Take 1 tablet by mouth daily at bedtime for 30 days 3/1/17 1/7/20  Linda Garvin MD   mirtazapine (REMERON) 15 mg tablet daily at bedtime  12/14/19   Historical Provider, MD   polyethylene glycol (MIRALAX) 17 g packet Take 17 g by mouth daily 4/26/19   Venkat Lehman MD   potassium chloride (K-DUR) 10 mEq tablet  12/7/19   Historical Provider, MD   potassium chloride (K-DUR,KLOR-CON) 10 mEq tablet Take 1 tablet (10 mEq total) by mouth 2 (two) times a day 11/27/19   Irish Cantu PA-C   QUEtiapine (SEROquel) 25 mg tablet Take 25 mg by mouth daily at bedtime      Historical Provider, MD     I have reveiwed home medications using records provided by [facility]  Allergies:    Allergies   Allergen Reactions    Gentamycin [Gentamicin] Anaphylaxis    Vancomycin Anaphylaxis    Zosyn [Piperacillin Sod-Tazobactam So] Anaphylaxis     However, has tolerated Ertapenem, Cefdinir, and Cefepime, which all have different side chains  Avoid Penicillins and the following Cephs with similar side chains (Cephalexin, Cefadroxil, Cefaclor, Cefprozil, or  Cefoxitin)    Clindamycin Itching    Nsaids Other (See Comments)     Nephrotic Syndrome    Sulfa Antibiotics Rash    Other Rash     antipersperents  Stat lock from lucia catheter       Social History:     Marital Status: /Civil Union     Substance Use History:   Social History     Substance and Sexual Activity   Alcohol Use No     Social History     Tobacco Use   Smoking Status Never Smoker   Smokeless Tobacco Never Used     Social History     Substance and Sexual Activity   Drug Use No       Family History:    non-contributory    Physical Exam:     Vitals:   Blood Pressure: 146/81 (03/11/20 0030)  Pulse: (!) 136 (03/11/20 0030)  Temperature: (!) 102 5 °F (39 2 °C) (03/11/20 0044)  Temp Source: Rectal (03/11/20 0044)  Respirations: 20 (03/11/20 0030)  Weight - Scale: 106 kg (233 lb 11 oz) (03/10/20 2047)  SpO2: 91 % (03/11/20 0030)    Physical Exam   Constitutional: No distress  HENT:   Head: Normocephalic and atraumatic  Eyes: EOM are normal  No scleral icterus  Neck: Normal range of motion  Neck supple  Cardiovascular: Regular rhythm, normal heart sounds and intact distal pulses  Exam reveals no friction rub  No murmur heard  Tachycardic   Pulmonary/Chest: Effort normal and breath sounds normal  No respiratory distress  He has no wheezes  He has no rales  Diminished   Abdominal: Soft  Bowel sounds are normal  He exhibits no distension and no mass  There is tenderness (Most noted in epigastric region)  There is no rebound and no guarding  No hernia  Musculoskeletal: Normal range of motion  He exhibits no edema or tenderness  Neurological:   Nonverbal at baseline   Skin: Skin is warm   Capillary refill takes less than 2 seconds  Psychiatric: He has a normal mood and affect  Additional Data:     Lab Results: I have personally reviewed pertinent reports  Results from last 7 days   Lab Units 03/10/20  2111   WBC Thousand/uL 13 16*   HEMOGLOBIN g/dL 15 8   HEMATOCRIT % 49 0   PLATELETS Thousands/uL 310   NEUTROS PCT % 86*   LYMPHS PCT % 7*   MONOS PCT % 5   EOS PCT % 1     Results from last 7 days   Lab Units 03/10/20  2111   SODIUM mmol/L 144   POTASSIUM mmol/L 3 6   CHLORIDE mmol/L 107   CO2 mmol/L 27   BUN mg/dL 19   CREATININE mg/dL 1 59*   ANION GAP mmol/L 10   CALCIUM mg/dL 8 9   ALBUMIN g/dL 3 7   TOTAL BILIRUBIN mg/dL 0 31   ALK PHOS U/L 117*   ALT U/L 17   AST U/L 12   GLUCOSE RANDOM mg/dL 147*     Results from last 7 days   Lab Units 03/10/20  2111   INR  1 11             Results from last 7 days   Lab Units 03/10/20  2142   LACTIC ACID mmol/L 1 7       Imaging: I have personally reviewed pertinent reports  CT chest abdomen pelvis w contrast   Final Result by Atiya Lazaro DO (03/11 0011)      Bilateral nephrolithiasis with 1 2 x 0 9 cm obstructing stone in the left ureteropelvic junction with associated mild to moderate hydronephrosis and obstructive uropathy  Bladder is collapsed around a suprapubic catheter  There is some fat stranding adjacent to the bladder which could be related to catheter placement versus cystitis  Correlation with the patient's symptoms, laboratory values and urinalysis recommended  Stool in the rectum measuring approximately 9 6 x 8 8 x 12 0 cm in size suspicious for fecal impaction  There is a small amount of posterior perirectal and presacral edema which may suggest a component of stercoral colitis  Ill-defined opacity redemonstrated in the posterior right lower lobe which could represent atelectasis or infection  Suspected platelike atelectasis in the left lower lung field  Lungs otherwise appear grossly clear        Incidental thyroid nodules identified on this CT  According to guidelines published in the February 2015 white paper on incidental thyroid nodules in the Journal of the [employer] of Radiology [facility]), because there are suspicious imaging features,    further evaluation by nonemergent ultrasound is recommended  Cholelithiasis without discrete evidence of acute cholecystitis, mild body wall edema, and other findings as above  Findings discussed with Dr Krystin Montanez by Dr Emmy Grant at 12:05 AM on 3/10/2020  Workstation performed: [id number]         XR chest 1 view portable    (Results Pending)   IR tube placement nephrostomy    (Results Pending)       EKG, Pathology, and Other Studies Reviewed on Admission:   · Obtain     Allscripts / Epic Records Reviewed: Yes     ** Please Note: This note has been constructed using a voice recognition system   **

## 2020-03-11 NOTE — ASSESSMENT & PLAN NOTE
· CT abdomen:  "Bladder is collapsed around suprapubic catheter  There was some fat stranding adjacent to the bladder which could be related to catheter placement versus cystitis "  · Urinalysis:  Innumerable WBC  Field obscured, unable to enumerate bacteria

## 2020-03-11 NOTE — CONSULTS
Consultation - Infectious Disease   Alonzo Tomas 68 y o  male MRN: [id number]  Unit/Bed#: ICU 04 Encounter: [id number]      IMPRESSION & RECOMMENDATIONS:     1  Sepsis  POA  Fever, tachycardia, leukocytosis, and RUPESH  Patient with extensive nephrolithiasis history with new obstructing left ureteral kidney stone on CT scan now status post left nephrostomy tube placement  Blood cultures positive for gram-negative rods  Most recent urinary microbiology history was for Pseudomonas sensitive to cefepime, meropenem and ESBL EColi sensitive to ertapenem  Leukocytosis elisabeth to 20,000 may be secondary to sepsis and reactive to percutaneous nephrostomy tube placement  Rec:  · Discontinue cefepime  · Switch to meropenem renal dose adjusted for RUPESH  · Follow-up pending blood cultures   · Follow-up pending urine culture  · Repeat CBC in am  · Repeat BMP in a m  · Supportive care per primary care and urology teams    2  Gram-negative bacteremia  Urinary source suspected  In patient with obstructing left ureteral stone and most recent urinary microbiology history was for Pseudomonas sensitive to cefepime and ESBL EColi presumed to be a colonizer  Rec:  · Switch antibiotics as above  · Follow-up pending blood cultures   · Repeat CBC in am    3  Left pyelonephritis with acute obstructing left ureteral stone  Likely source of #2  Patient is status post percutaneous left nephrostomy tube placement  Urine culture pending  Rec:  · Switch antibiotics as above  · Follow-up pending urine culture  · Repeat CBC in am  · Percutaneous nephrostomy and suprapubic tube drainage management per Urology    4  Acute kidney injury  Likely secondary to sepsis and obstructing stone  Creatinine 2 3   Rec  · Monitor creatinine  · Renal dose adjust antibiotics as needed    5  Cellulitis lower abdominal wall  Possible secondary to central scabbed soft tissue boil  Not known to have a prior suprapubic site at this location  Rec  · Serial exams  · Antibiotics as above    6  Aphasia with CVA  Supportive care per primary care team    7  Multiple listed antibiotic allergies: History of anaphylaxis to Zosyn, gentamicin, vancomycin  Patient is tolerating cefepime well and has tolerated ertapenem and cefdinir in past   Rec:  Transition to meropenem renally dose adjusted with close clinical monitoring    Antibiotics:  Cefepime D1    Detailed review of prior medical records  Above plan discussed in detail with patient, RNValdo, Urology, NP, and Dr Elizabeth Muhammad  Thank you for this consultation  We will follow along with you  HISTORY OF PRESENT ILLNESS:  Reason for Consult:  1  Sepsis 2  Suprapubic catheter 3  UTI 4  Neurogenic bladder 5  Nephrolithiasis    HPI: Neetu Ferrari is a 68 y o  male with CVA, hypertension, dementia with behavioral disturbance, CKD, DVT, neurogenic bladder with suprapubic catheter, known nephrolithiasis requiring a right nephrostomy tube in the past, as well as ESBL E coli urinary colonization since 2017, and Pseudomonas UTI with sepsis in November 2019  who is a resident of [facility] and presented to the ER yesterday with vomiting x3 right after eating his dinner and temperature of 100 3° at the nursing home  In the ER, patient had fever to 102 5° F, tachycardia, leukocytosis at 13 16 and creatinine 1 59  Urinalysis with heavy pyuria and bacteriuria  Chest x-ray showed clear lungs, but CT scan of abdomen and pelvis showed a 1 2 x 0 9 cm obstructing stone in the left ureteropelvic junction with mild to moderate hydronephrosis and left-sided perinephric stranding  Patient had a left nephrostomy tube placed at 3:00 a m  Patient was admitted to ICU stepdown on cefepime and fluids postprocedure  Infectious Disease now being consulted regarding evaluation of sepsis in a patient with suprapubic catheter, UTI, and neurogenic bladder, and obstructing nephrolithiasis      Two of 2 blood cultures have now turned positive for gram-negative rods and urine culture pending  Much of subjective history is obtained from RN as patient is nonverbal   Currently patient is awaiting swallow evaluation  He has had some nonproductive cough but no reported vomiting or diarrhea  His urine is lightening up in the left nephrostomy since he arrived to the unit  He has a healed scar on his sacrum but no open wounds  RN reports wife it hot report if they scabbed below his suprapubic tube was new finding or old  REVIEW OF SYSTEMS:  As above in HPI, otherwise the patient is nonverbal and unable to offer own review of systems      PAST MEDICAL HISTORY:  Past Medical History:   Diagnosis Date    Acute kidney injury ([facility] [address] ) 10/21/2016    Anxiety     [facility])     Aphasia     Ataxia     Bladder adhesions     BPH (benign prostatic hypertrophy)     COPD (chronic obstructive pulmonary disease) ([facility])     wife denies - "never had"    Dementia ([facility] [address] )     Difficulty walking     Essential (primary) hypertension     Generalized anxiety disorder     GERD (gastroesophageal reflux disease)     Global aphasia     H/O blood clots     High blood pressure     History of kidney stones     Kidney stones     Mental disorder     Muscle weakness     Neuromuscular dysfunction of bladder     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 ([facility] [address] )     Retention of urine, unspecified     Stroke ([facility] [address] )     Thrombosis     Urinary retention     Urinary tract bacterial infections     Urinary tract infection      Past Surgical History:   Procedure Laterality Date    BOTOX INJECTION N/A 6/18/2019    Procedure: INJECTION BOTULINUM TOXIN (BOTOX), intra detrusor;  Surgeon: Marry Paul MD;  [facility];  Service: Urology    BOTOX INJECTION N/A 10/7/2019    Procedure: INJECTION BOTULINUM TOXIN (BOTOX), intradetrusor;  Surgeon: Marry Paul MD;  [facility];  Service: Urology    CYSTOSCOPY      CYSTOSCOPY N/A 6/18/2019    Procedure: cystoscopy and all indicated procedures;  Surgeon: Hemant Linares MD;  [facility];  Service: Urology    FL CYSTOGRAM  1/15/2019    IR SUPRAPUBIC TUBE  11/26/2019    IR TUBE PLACEMENT NEPHROSTOMY  3/11/2020    IVC FILTER INSERTION      X2    IVC FILTER INSERTION      x2    KIDNEY STONE SURGERY      KNEE SURGERY      Sepsis infection     NEPHROSTOMY      IA CYSTO/URETERO W/LITHOTRIPSY &INDWELL STENT INSRT Right 6/14/2017    Procedure: CYSTOSCOPY URETEROSCOPY WITH LITHOTRIPSY HOLMIUM LASER,,BASKET STONE EXTRACTION, RETROGRADE PYELOGRAM AND INSERTION STENT URETERAL;  Surgeon: Katie Askew MD;  [facility];  Service: Urology    IA CYSTOURETHROSCOPY,BIOPSY N/A 1/15/2019    Procedure: CYSTOSCOPY, CYSTOGRAM, DILATION OF URETHRAL STRICTURE;  Surgeon: Hemant Linares MD;  [facility];  Service: Urology    URINARY SURGERY         FAMILY HISTORY:  Non-contributory    SOCIAL HISTORY:  Social History     Substance and Sexual Activity   Alcohol Use No     Social History     Substance and Sexual Activity   Drug Use No     Social History     Tobacco Use   Smoking Status Never Smoker   Smokeless Tobacco Never Used       ALLERGIES:  Allergies   Allergen Reactions    Gentamycin [Gentamicin] Anaphylaxis    Vancomycin Anaphylaxis    Zosyn [Piperacillin Sod-Tazobactam So] Anaphylaxis     However, has tolerated Ertapenem, Cefdinir, and Cefepime, which all have different side chains  Avoid Penicillins and the following Cephs with similar side chains (Cephalexin, Cefadroxil, Cefaclor, Cefprozil, or  Cefoxitin)    Clindamycin Itching    Nsaids Other (See Comments)     Nephrotic Syndrome    Sulfa Antibiotics Rash    Other Rash     antipersperents  Stat lock from lucia catheter       MEDICATIONS:  All current active medications have been reviewed      PHYSICAL EXAM:  Vitals:  Temp:  [97 8 °F (36 6 °C)-102 5 °F (39 2 °C)] 100 4 °F (38 °C)  HR:  [] 96  Resp:  [16-23] 21  BP: (103-146)/(57-81) 103/57  SpO2:  [91 %-100 %] 95 %  Temp (24hrs), Av 5 °F (38 1 °C), Min:97 8 °F (36 6 °C), Max:102 5 °F (39 2 °C)  Current: Temperature: 100 4 °F (38 °C)     Physical Exam:  General:  77-year-old elderly male patient weakly arousable to name, sluggish, and then tracks me some in room upon further awaking, nonverbal, positive dry cough x 2 with no acute distress  Eyes:  Conjunctive clear with no hemorrhages or effusions  Oropharynx:  No ulcers, no lesions  Neck:  Supple, no lymphadenopathy  Lungs:  Clear to auscultation bilaterally, no accessory muscle use  Cardiac:  Regular rate and rhythm, no murmurs  Abdomen:  Soft, non-tender, non-distended, positive bowel sounds  :  Suprapubic tube in place with slightly cloudy yellow urine in bag  Patient with scab beneath suprapubic tube with surrounding erythema extending more into left groin than right, induration and blanching  No active or expressible drainage  New left percutaneous nephrostomy with blood-tinged urine which is lightening up in bag per RN  Extremities:  No peripheral cyanosis, clubbing, or edema, lower extremity venodynes in place, arm IV site nontender  Skin:  No rashes, no ulcers, patient offloaded towards right side and healed scar with no open wounds of sacrum  Neurological:  Nonverbal at baseline but awake    LABS, IMAGING, & OTHER STUDIES:  Lab Results:  I have personally reviewed pertinent labs    Results from last 7 days   Lab Units 20  0505 03/10/20  2111   POTASSIUM mmol/L 3 3* 3 6   CHLORIDE mmol/L 111* 107   CO2 mmol/L 24 27   BUN mg/dL 21 19   CREATININE mg/dL 2 30* 1 59*   EGFR ml/min/1 73sq m 28 43   CALCIUM mg/dL 8 0* 8 9   AST U/L 16 12   ALT U/L 14 17   ALK PHOS U/L 84 117*     Results from last 7 days   Lab Units 20  0505 03/10/20  2111   WBC Thousand/uL 20 24* 13 16*   HEMOGLOBIN g/dL 13 3 15 8   PLATELETS Thousands/uL 199 310     Results from last 7 days   Lab Units 03/10/20  0379   GRAM STAIN RESULT  Gram negative rods*  Gram negative rods*           Imaging Studies:   I have personally reviewed pertinent imaging study reports and images in PACS  EKG, Pathology, and Other Studies:   I have personally reviewed pertinent reports

## 2020-03-11 NOTE — BRIEF OP NOTE (RAD/CATH)
Left nephrostomy tube placement  PATIENT NAME: Penny Granados [facility]  : 1950  MRN: [id number]     Pre-op Diagnosis:   1  Sepsis ([address] )    2  Renal colic    3  Suprapubic catheter ([address] )    4  UTI (urinary tract infection)    5  Neurogenic bladder    6  Nephrolithiasis    7  Bilateral nephrolithiasis      Post-op Diagnosis:   1  Sepsis ([address] )    2  Renal colic    3  Suprapubic catheter ([address] )    4  UTI (urinary tract infection)    5  Neurogenic bladder    6  Nephrolithiasis    7  Bilateral nephrolithiasis        Surgeon:   Freddy Glaser DO  Assistants:     No qualified resident was available  Estimated Blood Loss:  Minimal   Findings:   · Unable to visualize left kidney with ultrasound  · Fortunately, retained IV contrast within the calices  Had to perform fluoroscopic guided left renal lower pole jannet access  · Eight Turkmen nephrostomy tube placed  Specimens:  Bloody urine  Complications:  Left renal collecting system perforation      Anesthesia: General    Freddy Glaser DO     Date: 3/11/2020  Time: 3:39 AM

## 2020-03-11 NOTE — ANESTHESIA POSTPROCEDURE EVALUATION
Post-Op Assessment Note    CV Status:  Stable  Pain Score: 0    Pain management: adequate     Mental Status:  Somnolent   Hydration Status:  Euvolemic   PONV Controlled:  Controlled   Airway Patency:  Patent   Post Op Vitals Reviewed: Yes      Staff: Anesthesiologist   Comments: Pt is not responsive, is non verbal as he was preop          BP   117/69   Temp  99 9   Pulse 109   Resp   20   SpO2   99

## 2020-03-11 NOTE — ASSESSMENT & PLAN NOTE
· Present on admission-as evidenced by fever, tachycardia, leukocytosis  · Urinalysis:  Innumerable WBC  · CT scan:  Obstructing left nephrolithiasis  ·  Right lower lobe atelectasis versus infiltrate  · Continue cefepime  · Consult infectious disease given allergies  · Admitted step-down 2

## 2020-03-11 NOTE — ASSESSMENT & PLAN NOTE
· Patient is a wife is at bedside reports bowel movement yet today and today which was normal  · Consider enema  · For now, will continue with home regimen under direction of patient's wife

## 2020-03-11 NOTE — CONSULTS
UROLOGY CONSULTATION NOTE     Patient Identifiers: Tonie Bishop (MRN [id number])  Service Requesting Consultation:  Critical Care Medicine/[facility]  Service Providing Consultation:  Urology, Marry Paul MD    Date of Service: 3/11/2020  Consults    Reason for Consultation:  Complicated UTI, left nephrolithiasis    History of Present Illness:     Tonie Bishop is a 68 y o  old with a history of neurogenic bladder with suprapubic catheter, history of nephrolithiasis likely due to hypercalciuria due to his limited physical capacity due to previous stroke    This has been present for years and the patient has noted nothing as associated symptoms, as he is unable to tell us what his symptoms are  Nothing and nothing are identified as aggravating and alleviating factors, respectively  Previous therapies include nephrostomy tube placement overnight into the left collecting system and previous work-up includes imaging and laboratory workup  The patient has previously seen a urologist for this problem, myself, I know him very well  The patient otherwise endorses a history of urologic malignancy or malady in the form of neurogenic bladder and issues with recurrent urethral stricture disease  The patient denies a family history of urologic malignancy or malady      Past Medical, Past Surgical History:     Past Medical History:   Diagnosis Date    Acute kidney [facility] ([facility] [address] ) 10/21/2016    Anxiety     [facility] ([facility])     Aphasia     Ataxia     Bladder adhesions     BPH (benign prostatic hypertrophy)     COPD (chronic obstructive pulmonary disease) ([facility])     wife denies - "never had"    Dementia ([facility] [address] )     Difficulty walking     Essential (primary) hypertension     Generalized anxiety disorder     GERD (gastroesophageal reflux disease)     Global aphasia     H/O blood clots     High blood pressure     History of kidney stones     Kidney stones     Mental disorder     Muscle weakness     Neuromuscular dysfunction of bladder     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 ([facility] [address] )     Retention of urine, unspecified     Stroke ([facility] [address] )     Thrombosis     Urinary retention     Urinary tract bacterial infections     Urinary tract infection    :    Past Surgical History:   Procedure Laterality Date    BOTOX INJECTION N/A 6/18/2019    Procedure: INJECTION BOTULINUM TOXIN (BOTOX), intra detrusor;  Surgeon: Divya Rodriguez MD;  [facility];  Service: Urology    BOTOX INJECTION N/A 10/7/2019    Procedure: INJECTION BOTULINUM TOXIN (BOTOX), intradetrusor;  Surgeon: Divya Rodriguez MD;  [facility];  Service: Urology    CYSTOSCOPY      CYSTOSCOPY N/A 6/18/2019    Procedure: cystoscopy and all indicated procedures;  Surgeon: Divya Rodriguez MD;  [facility];  Service: Urology    FL CYSTOGRAM  1/15/2019    IR SUPRAPUBIC TUBE  11/26/2019    IVC FILTER INSERTION      X2    IVC FILTER INSERTION      x2    KIDNEY STONE SURGERY      KNEE SURGERY      Sepsis infection     NEPHROSTOMY      MN CYSTO/URETERO W/LITHOTRIPSY &INDWELL STENT INSRT Right 6/14/2017    Procedure: CYSTOSCOPY URETEROSCOPY WITH LITHOTRIPSY HOLMIUM LASER,,BASKET STONE EXTRACTION, RETROGRADE PYELOGRAM AND INSERTION STENT URETERAL;  Surgeon: Hailey Schroeder MD;  [facility];  Service: Urology    MN CYSTOURETHROSCOPY,BIOPSY N/A 1/15/2019    Procedure: CYSTOSCOPY, CYSTOGRAM, DILATION OF URETHRAL STRICTURE;  Surgeon: Divya Rodriguez MD;  [facility];  Service: Urology    URINARY SURGERY     :    Medications, Allergies:     Current Facility-Administered Medications   Medication Dose Route Frequency    acetaminophen (TYLENOL) rectal suppository 650 mg  650 mg Rectal Q6H PRN    aluminum-magnesium hydroxide-simethicone (MYLANTA) 200-200-20 mg/5 mL oral suspension 30 mL  30 mL Oral Q4H PRN    amLODIPine (NORVASC) tablet 5 mg  5 mg Oral BID    artificial tear (LUBRIFRESH P M ) ophthalmic ointment   Both Eyes HS    bisacodyl (DULCOLAX) rectal suppository 10 mg  10 mg Rectal Daily    busPIRone (BUSPAR) tablet 7 5 mg  7 5 mg Oral TID    cefepime (MAXIPIME) 1,000 mg in dextrose 5 % 50 mL IVPB  1,000 mg Intravenous Q12H    docusate sodium (COLACE) capsule 100 mg  100 mg Oral BID    famotidine (PEPCID) tablet 20 mg  20 mg Oral BID    memantine (NAMENDA) tablet 5 mg  5 mg Oral BID    metoprolol tartrate (LOPRESSOR) partial tablet 12 5 mg  12 5 mg Oral BID    mirtazapine (REMERON) tablet 15 mg  15 mg Oral HS    ondansetron (ZOFRAN) injection 4 mg  4 mg Intravenous Q6H PRN    polyethylene glycol (MIRALAX) packet 17 g  17 g Oral Daily    potassium chloride (K-DUR,KLOR-CON) CR tablet 10 mEq  10 mEq Oral Daily With Breakfast    QUEtiapine (SEROquel) tablet 25 mg  25 mg Oral HS    senna (SENOKOT) tablet 17 2 mg  2 tablet Oral HS    sodium chloride 0 9 % infusion  75 mL/hr Intravenous Continuous       Allergies: Allergies   Allergen Reactions    Gentamycin [Gentamicin] Anaphylaxis    Vancomycin Anaphylaxis    Zosyn [Piperacillin Sod-Tazobactam So] Anaphylaxis     However, has tolerated Ertapenem, Cefdinir, and Cefepime, which all have different side chains   Avoid Penicillins and the following Cephs with similar side chains (Cephalexin, Cefadroxil, Cefaclor, Cefprozil, or  Cefoxitin)    Clindamycin Itching    Nsaids Other (See Comments)     Nephrotic Syndrome    Sulfa Antibiotics Rash    Other Rash     antipersperents  Stat lock from lucia catheter   :    Social and Family History:   Social History:   Social History     Tobacco Use    Smoking status: Never Smoker    Smokeless tobacco: Never Used   Substance Use Topics    Alcohol use: No    Drug use: No        Social History     Tobacco Use   Smoking Status Never Smoker   Smokeless Tobacco Never Used       Family History:  Family History   Problem Relation Age of Onset    Diabetes Father     Hypertension Father     Coronary artery disease Father     Cancer Father     Hypertension Mother    :     Review of Systems:     Unable to perform as he is nonverbal    Physical Exam:   /67 (BP Location: Right arm)   Pulse 96   Temp (!) 100 9 °F (38 3 °C) (Probe)   Resp 20   Wt 102 kg (225 lb 8 5 oz)   SpO2 97%   BMI 31 46 kg/m² Temp (24hrs), Av 4 °F (38 °C), Min:97 8 °F (36 6 °C), Max:102 5 °F (39 2 °C)  current; Temperature: (!) 100 9 °F (38 3 °C)  I/O last 24 hours: In: 2400 [I V :1100; IV Piggyback:1300]  Out: 825 [Urine:825]  General: Patient is less attentive to the examiner than usual, he appears ill    Cardiac: Peripheral edema: negative, peripheral pulses are present and indicated a tachycardic rate and rhythm    Pulmonary: Non-labored breathing    Abdomen: Soft, non-tender, non-distended  suprapubic catheter is in place    Genitourinary: Negative CVA tenderness, negative suprapubic tenderness  Neurological:  Unable to perform due to patient's neurologic status    Musculoskeletal: Extremities are warm, ROM is limited    Psychiatric:  Unable to assess    Lymphatic: There is not adenopathy in the abdominal region      Skin:  Intact    Suprapubic catheter in place  Nephrostomy in place draining well    Labs:     Lab Results   Component Value Date    HGB 13 3 2020    HCT 41 7 2020    WBC 20 24 (H) 2020     2020   ]    Lab Results   Component Value Date    K 3 3 (L) 2020     (H) 2020    CO2 24 2020    BUN 21 2020    CREATININE 2 30 (H) 2020    CALCIUM 8 0 (L) 2020   ]    Imaging:   I personally reviewed the images and report of the following studies, and reviewed them with the patient:    CT Abdomen/Pelvis: Images reviewed, extensive bilateral nephrolithiasis, left UPJ stone, impacted, large      ASSESSMENT:     68 y o  old male with  multiple medical issues, urologic issues include neurogenic bladder with suprapubic catheter, urethral stricture, and extensive bilateral nephrolithiasis  We have tried previously not to treat his stone disease given his history of multi-drug resistant organisms, and given that he has poor protoplasm  He has a left nephrostomy tube in place which is draining well, along with a suprapubic catheter  Discussed with him and with his wife at the bedside today his options for eventual ureteroscopic stone intervention (I think that he would tolerate PCNL poorly), versus nephrostomy tube exchanges given his poor health status  We discussed the risks and benefits of each of these interventions  His wife, who is the power-of-, will think about the options going forward  Celso Chaudhary PLAN:     No acute urologic surgery intervention upon this admission  Continued care per primary team     Agree with Infectious Disease consultation  Keep nephrostomy tube to gravity drainage, along with suprapubic catheter to gravity drainage  As an outpatient we will discuss further with the patient and with his wife whether not they wish to have the left renal pelvic stone treated  Given his poor mobility he will likely continue to form recurrent stones, unfortunate this is something that cannot be avoided  reconsult as necessary    The following portions of the patient's history were reviewed and updated as appropriate: allergies, current medications, past family history, past medical history, past social history, past surgical history and problem list     Portions of the above record have been created with voice recognition software  Occasional wrong word or "sound alike" substitution may have occurred due to the inherent limitations of voice recognition software  Read the chart carefully and recognize, using context, where substitution may have occurred  Thank you for allowing me to participate in this patients care    Please do not hesitate to call with any additional questions    Sintia Irving MD

## 2020-03-11 NOTE — ASSESSMENT & PLAN NOTE
· Home regimen:  Amlodipine 5 mg b i d , metoprolol tartrate 12 5 mg b i d   · Continue  · Blood pressure stable  ·

## 2020-03-11 NOTE — QUICK NOTE
Called to bedside as patient's wife Gail Howard has significant concerns about antibiotic selection  Per the patient's wife, patient has had significant allergic reaction, resulting in cardiac arrest secondary to Zosyn  She reports in the past, when he has been offered meropenem for antibiotics for UTIs, she has refused that she has been told in the past that there are similarities between Zosyn and meropenem, and that patients who are allergic to Zosyn should not take meropenem  She reports he has tolerated cefepime and ertapenem without reactions  I discussed these concerns with infectious disease on call, who did recommend continuing meropenem until blood culture sensitivities return  I discussed with the wife that if we choose 1 of these other medications, we may not be fully covering patient has bacteria  She continues to express understanding, and also notes that patient has had multiple multiple antibiotics in his life time, and she is aware that there will be a point where he will no longer respond to antibiotics on the pharmaceutical market  At this time, she requests discontinuation of meropenem and initiation of ertapenem  I will make these changes per the patient's wife's request   Will will continue to monitor the patient closely, as he did receive meropenem this afternoon  Will start ertapenem at next dose  Patient requests that any and all medication changes be initiated through her  She can be reached at her cell phone at all times  Her phone number is available on the Facesheet in this chart

## 2020-03-11 NOTE — ANESTHESIA PREPROCEDURE EVALUATION
Review of Systems/Medical History  Patient summary reviewed  Chart reviewed  No history of anesthetic complications     Cardiovascular  EKG reviewed, Hypertension , Aortic disease, DVT  Comment: Echo 70%EF,3 7cm ascending aortic aneurysm  Now with urosepsis,  Pulmonary  COPD mild- PRN medication ,        GI/Hepatic    GERD ,        Kidney stones, Chronic kidney disease stage 2, Prostatic disorder, benign prostatic hyperplasia  Comment: S/P suprapubic tube  Urosepsis     Endo/Other     GYN       Hematology  Anemia ,     Musculoskeletal       Neurology    CVA , residual symptoms, Aphasia/Dysphagia,   Comment: S/P cardiac arrest and anoxic injury 2010 Psychology   Anxiety,              Physical Exam    Airway    Mallampati score: II  TM Distance: >3 FB  Neck ROM: full     Dental   No notable dental hx     Cardiovascular      Pulmonary      Other Findings        Anesthesia Plan  ASA Score- 4 Emergent    Anesthesia Type- general with ASA Monitors  Additional Monitors:   Airway Plan: ETT  Comment: ASA 4E for sepsis  Plan Factors-  Patient did not smoke on day of surgery  Induction- intravenous and rapid sequence induction  Postoperative Plan-     Informed Consent- Anesthetic plan and risks discussed with spouse  I personally reviewed this patient with the CRNA  Discussed and agreed on the Anesthesia Plan with the CRNA  Milagro Salgado

## 2020-03-11 NOTE — UTILIZATION REVIEW
Initial Clinical Review    Admission: Date/Time/Statement: Admission Orders (From admission, onward)     Ordered        03/11/20 0138  Inpatient Admission  Once                   Orders Placed This Encounter   Procedures    Inpatient Admission     Standing Status:   Standing     Number of Occurrences:   1     Order Specific Question:   Admitting Physician     Answer:   Diane Valverde [[id number]]     Order Specific Question:   Level of Care     Answer:   Level 2 Stepdown / HOT [14]     Order Specific Question:   Estimated length of stay     Answer:   More than 2 Midnights     Order Specific Question:   Certification     Answer:   I certify that inpatient services are medically necessary for this patient for a duration of greater than two midnights  See H&P and MD Progress Notes for additional information about the patient's course of treatment  ED Arrival Information     Expected Arrival Acuity Means of Arrival Escorted By Service Admission Type    - 3/10/2020 20:43 Urgent Ambulance [facility] Hospitalist Urgent    Arrival Complaint    vomitting        Chief Complaint   Patient presents with    Vomiting     [facility] with c/o vomiting x3 since 1845  per EMS also has jan of 100 3 orally  per EMS pt has suprapubic catheter and has chronic UTIs  pt has h/o stroke and is nonverbal but does follow command     Assessment/Plan: 67 yo male to ED by EMS from Nursing facility  admitted as Inpatient due to Sepsis with bilateral Nephrolithiasis  PMHx: CVA, hypertension, dementia with behavioral disturbance, CKD, DVT, neurogenic bladder with suprapubic catheter, nephrolithiasis requiring right nephrostomy in the past, thoracic aneurysm; and is nonverbal at baseline due to prior stroke  History is obtained by patient's wife was at bedside  She reports he had eating his dinner and shortly after began vomiting  She is unsure if there was any aspiration, brief episode of coughing    She reports he had been feeling his normal self until this incident  Historically, if patient becomes ill, he quickly decompensates  Ct reveals obstructing left Nephrolithiasis with Hydronephrosis and obstructive Uropathy; non obstructive stones in right kidney, right lower lobe infiltrate vs atelectasis  Inpatient labs with UA-complicated UTI  Consult Urology & IR- per discussion plans for IR Nephrostomy tube  Consult Infectious Disease  MD exam with fever, tachycardia, leukocytosis  IN ED: IV antibiotics, IV antiemetics, pain med  admit to step down     3/11 Left nephrostomy tube placement    Findings:   · Unable to visualize left kidney with ultrasound  · Fortunately, retained IV contrast within the calices  Had to perform fluoroscopic guided left renal lower pole jannet access  · Eight Frisian nephrostomy tube placed  Specimens:  Bloody urine  Complications:  Left renal collecting system perforation  Anesthesia: General     3/11 Urology:  68 y o  old male with  multiple medical issues, urologic issues include neurogenic bladder with suprapubic catheter, urethral stricture, and extensive bilateral nephrolithiasis  We have tried previously not to treat his stone disease given his history of multi-drug resistant organisms, and given that he has poor protoplasm  He has a left nephrostomy tube in place which is draining well, along with a suprapubic catheter  Keep Nephrostomy tube to gravity drainage, along with suprapubic catheter to gravity drainage  Consult Infectious Disease         ED Triage Vitals [03/10/20 2047]   Temperature Pulse Respirations Blood Pressure SpO2   97 8 °F (36 6 °C) 88 18 140/73 94 %      Temp Source Heart Rate Source Patient Position - Orthostatic VS BP Location FiO2 (%)   Oral Monitor Sitting Right arm --      Pain Score       --        Wt Readings from Last 1 Encounters:   03/11/20 102 kg (225 lb 8 5 oz)     Additional Vital Signs:   Date/Time  Temp  Pulse  Resp  BP  MAP (mmHg)  SpO2  O2 Device  Cardiac (WDL)  Patient Position - Orthostatic VS   03/11/20 1100  100 4 °F (38 °C)  96  21  103/57  73  95 %  Nasal cannula    Lying   03/11/20 0815  100 6 °F (38 1 °C)Abnormal   97  18               03/11/20 0800  100 8 °F (38 2 °C)Abnormal   97  18               03/11/20 0707  100 9 °F (38 3 °C)Abnormal   96  20  105/67  82  97 %  Nasal cannula    Lying   03/11/20 0445  102 4 °F (39 1 °C)Abnormal   108Abnormal   23Abnormal   104/66  79        Lying   03/11/20 0439    104  20      95 %      Lying   03/11/20 0415  99 9 °F (37 7 °C)  110Abnormal   16  105/63    96 %  Simple mask  X     03/11/20 0357  99 9 °F (37 7 °C)  113Abnormal   20  117/69    100 %  Simple mask  X     03/11/20 0044  102 5 °F (39 2 °C)Abnormal                    03/11/20 0030    136Abnormal   20  146/81    91 %  None (Room air)       03/10/20 2127  99 3 °F (37 4 °C)                   03/10/20 2047  97 8 °F (36 6 °C)  88  18  140/73    94 %  None (Room air)    Sitting      Weights (last 14 days)     Date/Time  Weight  Weight Method   03/11/20 0600  102 kg (225 lb 8 5 oz)  Bed scale   03/11/20 0439  102 kg (225 lb 15 5 oz)  Bed scale   03/10/20 2047  106 kg (233 lb 11 oz)         Pertinent Labs/Diagnostic Test Results:   Results from last 7 days   Lab Units 03/11/20  0505 03/10/20  2111   WBC Thousand/uL 20 24* 13 16*   HEMOGLOBIN g/dL 13 3 15 8   HEMATOCRIT % 41 7 49 0   PLATELETS Thousands/uL 199 310   NEUTROS ABS Thousands/µL  --  11 47*   BANDS PCT % 36*  --          Results from last 7 days   Lab Units 03/11/20  0505 03/10/20  2111   SODIUM mmol/L 145 144   POTASSIUM mmol/L 3 3* 3 6   CHLORIDE mmol/L 111* 107   CO2 mmol/L 24 27   ANION GAP mmol/L 10 10   BUN mg/dL 21 19   CREATININE mg/dL 2 30* 1 59*   EGFR ml/min/1 73sq m 28 43   CALCIUM mg/dL 8 0* 8 9   MAGNESIUM mg/dL 1 6  --      Results from last 7 days   Lab Units 03/11/20  0505 03/10/20  2111   AST U/L 16 12   ALT U/L 14 17   ALK PHOS U/L 84 117* TOTAL PROTEIN g/dL 6 3* 8 4*   ALBUMIN g/dL 2 8* 3 7   TOTAL BILIRUBIN mg/dL 0 74 0 31         Results from last 7 days   Lab Units 03/11/20  0505 03/10/20  2111   GLUCOSE RANDOM mg/dL 143* 147*           Results from last 7 days   Lab Units 03/10/20  2111   PROTIME seconds 13 7   INR  1 11   PTT seconds 30             Results from last 7 days   Lab Units 03/10/20  2142   LACTIC ACID mmol/L 1 7       Results from last 7 days   Lab Units 03/10/20  2111   LIPASE u/L 147             Results from last 7 days   Lab Units 03/10/20  2136 03/10/20  2055   CLARITY UA  Cloudy Turbid   COLOR UA  Yellow Yellow   SPEC GRAV UA  1 020 1 025   PH UA  7 0 7 0   GLUCOSE UA mg/dl Negative Negative   KETONES UA mg/dl Negative Negative   BLOOD UA  Large* Large*   PROTEIN UA mg/dl 100 (2+)* >=300*   NITRITE UA  Negative Negative   BILIRUBIN UA  Negative Negative   UROBILINOGEN UA E U /dl 0 2 0 2   LEUKOCYTES UA  Large* Large*   WBC UA /hpf Innumerable*  --    RBC UA /hpf Field obscured, unable to enumerate*  --    BACTERIA UA /hpf Field obscured, unable to enumerate*  --    EPITHELIAL CELLS WET PREP /hpf Field obscured, unable to enumerate*  --        Results from last 7 days   Lab Units 03/10/20  2136   GRAM STAIN RESULT  Gram negative rods*  Gram negative rods*     Results from last 7 days   Lab Units 03/11/20  0505   TOTAL COUNTED  100   3/11 CXR:  No acute cardiopulmonary disease  3/11 IR tube placement left hydronephrosis:  Successful placement of a new left nephrostomy tube   Maintain to bag drainage   Follow-up with urology  3/10 CT chest abd w contrast:  Bilateral nephrolithiasis with 1 2 x 0 9 cm obstructing stone in the left ureteropelvic junction with associated mild to moderate hydronephrosis and obstructive uropathy      Bladder is collapsed around a suprapubic catheter   There is some fat stranding adjacent to the bladder which could be related to catheter placement versus cystitis   Correlation with the patient's symptoms, laboratory values and urinalysis recommended  Stool in the rectum measuring approximately 9 6 x 8 8 x 12 0 cm in size suspicious for fecal impaction  Sita Galla is a small amount of posterior perirectal and presacral edema which may suggest a component of stercoral colitis      ED Treatment:   Medication Administration from 03/10/2020 2043 to 03/11/2020 0439       Date/Time Order Dose Route Action     03/10/2020 2130 sodium chloride 0 9 % bolus 1,000 mL 1,000 mL Intravenous New Bag     03/11/2020 0020 busPIRone (BUSPAR) tablet 7 5 mg 7 5 mg Oral Given     03/11/2020 0022 QUEtiapine (SEROquel) tablet 25 mg 25 mg Oral Given     03/10/2020 2340 ondansetron (ZOFRAN) injection 4 mg 4 mg Intravenous Given     03/11/2020 0044 cefepime (MAXIPIME) 1,000 mg in dextrose 5 % 50 mL IVPB 1,000 mg Intravenous New Bag     03/11/2020 0125 acetaminophen (TYLENOL) tablet 650 mg 650 mg Oral Given     03/11/2020 0122 sodium chloride 0 9 % bolus 500 mL 500 mL Intravenous New Bag        Past Medical History:   Diagnosis Date    Acute kidney [facility] ([facility] [address] ) 10/21/2016    Anxiety     Aortic aneurysm (HCC)     Aphasia     Ataxia     Bladder adhesions     BPH (benign prostatic hypertrophy)     COPD (chronic obstructive pulmonary disease) ([address] )     wife denies - "never had"    Dementia ([address] )     Difficulty walking     Essential (primary) hypertension     Generalized anxiety disorder     GERD (gastroesophageal reflux disease)     Global aphasia     H/O blood clots     High blood pressure     History of kidney stones     Kidney stones     Mental disorder     Muscle weakness     Neuromuscular dysfunction of bladder     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 ([address] )     Retention of urine, unspecified     Stroke ([address] )     Thrombosis     Urinary retention     Urinary tract bacterial infections     Urinary tract infection      Present on Admission:   Essential hypertension   Dementia with behavioral disturbance (HCC)   Neurogenic bladder      Admitting Diagnosis: Renal colic [Z43]  Nephrolithiasis [N20 0]  Vomiting [R11 10]  UTI (urinary tract infection) [N39 0]  Neurogenic bladder [N31 9]  Suprapubic catheter (HCC) [Z93 59]  Sepsis ([facility] [address] ) [A41 9]  Bilateral nephrolithiasis [N20 0]  Age/Sex: 68 y o  male  Admission Orders:  Scheduled Medications:    Medications:  amLODIPine 5 mg Oral BID   artificial tear  Both Eyes HS   bisacodyl 10 mg Rectal Daily   busPIRone 7 5 mg Oral TID   cefepime 1,000 mg Intravenous Q12H   docusate sodium 100 mg Oral BID   famotidine 20 mg Oral BID   memantine 5 mg Oral BID   metoprolol tartrate 12 5 mg Oral BID   mirtazapine 15 mg Oral HS   polyethylene glycol 17 g Oral Daily   potassium chloride 10 mEq Oral Daily With Breakfast   potassium chloride 40 mEq Oral Once   QUEtiapine 25 mg Oral HS   senna 2 tablet Oral HS     Continuous IV Infusions:    lactated ringers 100 mL/hr Intravenous Continuous     PRN Meds:    acetaminophen 650 mg Rectal Q6H PRN   aluminum-magnesium hydroxide-simethicone 30 mL Oral Q4H PRN   ondansetron 4 mg Intravenous Q6H PRN     IP CONSULT TO UROLOGY  IP CONSULT TO INFECTIOUS DISEASES  Continuous cardiopulmonary monitoring/pulse oximetry  Daily wt  I&O  IR to PCN tube change  NPO    Network Utilization Review Department  [email or web address]  ATTENTION: Please call with any questions or concerns to [phone] and carefully listen to the prompts so that you are directed to the right person  All voicemails are confidential   [place] all requests for admission clinical reviews, approved or denied determinations and any other requests to dedicated fax number below belonging to the campus where the patient is receiving treatment   List of dedicated fax numbers for the Facilities:  [address] [place] [address] DENIALS (Administrative/Medical Necessity) [phone]   [address] (Maternity/NICU/Pediatrics) [phone]   [facility] [phone]   [facility] [phone]   Benjie Garcia [phone]   [facility] [address] [facility] [phone]   [facility]  [phone]   [address] [facility]  [address] Alek Fernandez [phone]

## 2020-03-11 NOTE — ASSESSMENT & PLAN NOTE
· Nonverbal at baseline  · Prior history of CVA  · Home regimen:  · BuSpar 7 5 mg t i d   · Namenda 5 mg b i d    · Remeron 50 mg HS  · Seroquel 25 mg HS  ·

## 2020-03-11 NOTE — ASSESSMENT & PLAN NOTE
· Presentation:  Multiple episodes of vomiting that occurred in the evening  Temperature a 100 3° in EMS  From nursing facility  · History:  Suprapubic catheter/neurogenic bladder, history of nephrolithiasis requiring right nephrostomy tube in the past  · Suprapubic catheter site without obvious infection  · CT abdomen:  Bilateral nephrolithiasis with 1 2 x 0 9 cm obstructing stone in the left ureteral junction with mild-to-moderate hydronephrosis and obstructive uropathy  Nonobstructing stones in the right kidney  · Case was reviewed by ED provider with critical care AP  Recommending step-down 2    · Case was discussed with Urology by emergency department provider  Urology consulted Interventional Radiology  Plans for patient to go to IR tonight for nephrostomy tube    · Continue cefepime  Patient's wife is at bedside reports multiple drug allergies    She reports reactions include "bleeding" and " head to toe blisters "    · Consult infectious disease for antibiotic assistance  · Consult urology  · Appreciate IR

## 2020-03-11 NOTE — PLAN OF CARE
Problem: Prexisting or High Potential for Compromised Skin Integrity  Goal: Skin integrity is maintained or improved  Description  INTERVENTIONS:  - Identify patients at risk for skin breakdown  - Assess and monitor skin integrity  - Assess and monitor nutrition and hydration status  - Monitor labs   - Assess for incontinence   - Turn and reposition patient  - Assist with mobility/ambulation  - Relieve pressure over bony prominences  - Avoid friction and shearing  - Provide appropriate hygiene as needed including keeping skin clean and dry  - Evaluate need for skin moisturizer/barrier cream  - Collaborate with interdisciplinary team   - Patient/family teaching  - Consider wound care consult   Outcome: Progressing     Problem: PAIN - ADULT  Goal: Verbalizes/displays adequate comfort level or baseline comfort level  Description  Interventions:  - Encourage patient to monitor pain and request assistance  - Assess pain using appropriate pain scale  - Administer analgesics based on type and severity of pain and evaluate response  - Implement non-pharmacological measures as appropriate and evaluate response  - Consider cultural and social influences on pain and pain management  - Notify physician/advanced practitioner if interventions unsuccessful or patient reports new pain  Outcome: Progressing     Problem: INFECTION - ADULT  Goal: Absence or prevention of progression during hospitalization  Description  INTERVENTIONS:  - Assess and monitor for signs and symptoms of infection  - Monitor lab/diagnostic results  - Monitor all insertion sites, i e  indwelling lines, tubes, and drains  - Monitor endotracheal if appropriate and nasal secretions for changes in amount and color  - [place] appropriate cooling/warming therapies per order  - Administer medications as ordered  - Instruct and encourage patient and family to use good hand hygiene technique  - Identify and instruct in appropriate isolation precautions for identified infection/condition  Outcome: Progressing  Goal: Absence of fever/infection during neutropenic period  Description  INTERVENTIONS:  - Monitor WBC    Outcome: Progressing     Problem: SAFETY ADULT  Goal: Patient will remain free of falls  Description  INTERVENTIONS:  - Assess patient frequently for physical needs  -  Identify cognitive and physical deficits and behaviors that affect risk of falls    -  [place] fall precautions as indicated by assessment   - Educate patient/family on patient safety including physical limitations  - Instruct patient to call for assistance with activity based on assessment  - Modify environment to reduce risk of injury  - Consider OT/PT consult to assist with strengthening/mobility  Outcome: Progressing  Goal: Maintain or return to baseline ADL function  Description  INTERVENTIONS:  -  Assess patient's ability to carry out ADLs; assess patient's baseline for ADL function and identify physical deficits which impact ability to perform ADLs (bathing, care of mouth/teeth, toileting, grooming, dressing, etc )  - Assess/evaluate cause of self-care deficits   - Assess range of motion  - Assess patient's mobility; develop plan if impaired  - Assess patient's need for assistive devices and provide as appropriate  - Encourage maximum independence but intervene and supervise when necessary  - Involve family in performance of ADLs  - Assess for home care needs following discharge   - Consider OT consult to assist with ADL evaluation and planning for discharge  - Provide patient education as appropriate  Outcome: Progressing  Goal: Maintain or return mobility status to optimal level  Description  INTERVENTIONS:  - Assess patient's baseline mobility status (ambulation, transfers, stairs, etc )    - Identify cognitive and physical deficits and behaviors that affect mobility  - Identify mobility aids required to assist with transfers and/or ambulation (gait belt, sit-to-stand, lift, walker, cane, etc )  - [place] fall precautions as indicated by assessment  - Record patient progress and toleration of activity level on Mobility SBAR; progress patient to next Phase/Stage  - Instruct patient to call for assistance with activity based on assessment  - Consider rehabilitation consult to assist with strengthening/weightbearing, etc   Outcome: Progressing     Problem: DISCHARGE PLANNING  Goal: Discharge to home or other facility with appropriate resources  Description  INTERVENTIONS:  - Identify barriers to discharge w/patient and caregiver  - Arrange for needed discharge resources and transportation as appropriate  - Identify discharge learning needs (meds, wound care, etc )  - Arrange for interpretive services to assist at discharge as needed  - Refer to Case Management Department for coordinating discharge planning if the patient needs post-hospital services based on physician/advanced practitioner order or complex needs related to functional status, cognitive ability, or social support system  Outcome: Progressing     Problem: Knowledge Deficit  Goal: Patient/family/caregiver demonstrates understanding of disease process, treatment plan, medications, and discharge instructions  Description  Complete learning assessment and assess knowledge base    Interventions:  - Provide teaching at level of understanding  - Provide teaching via preferred learning methods  Outcome: Progressing

## 2020-03-11 NOTE — ED PROVIDER NOTES
History  Chief Complaint   Patient presents with    Vomiting     pt present from [facility] with c/o vomiting x3 since 1845  per EMS also has jan of 100 3 orally  per EMS pt has suprapubic catheter and has chronic UTIs  pt has h/o stroke and is nonverbal but does follow command       History provided by:  Patient   used: No    Vomiting     Patient is a 15-year-old male presenting to emergency department due to vomiting x3 tonight  Temp of 100 3° at nursing home  Patient has a history of UTIs and suprapubic catheter  History of stroke and nonverbal   Limited history due to being nonverbal at baseline  MDM will do septic evaluation, CT abdomen pelvis, likely admission    Patient with renal colic with hydronephrosis and sepsis  Discussed with urology PA who discussed case with urology attending, per PA attending will discuss with IR for tube placement  Patient evaluated by critical care advanced practitioner, will be admitted to level two step-down      Prior to Admission Medications   Prescriptions Last Dose Informant Patient Reported? Taking?    QUEtiapine (SEROquel) 25 mg tablet  Outside Facility (Specify) Yes No   Sig: Take 25 mg by mouth daily at bedtime     acetaminophen (TYLENOL) 325 mg tablet  Outside Facility (Specify) Yes No   Sig: Take 650 mg by mouth every 4 (four) hours as needed for mild pain    amLODIPine (NORVASC) 5 mg tablet  Outside Facility (Specify) Yes No   Sig: Take 5 mg by mouth 2 (two) times a day    apixaban (ELIQUIS) 2 5 mg  Outside Facility (Specify) Yes No   Sig: Take 2 5 mg by mouth 2 (two) times a day   bisacodyl (DULCOLAX) 10 mg suppository  Outside Facility (Specify) No No   Sig: Insert 1 suppository (10 mg total) into the rectum daily as needed for constipation   busPIRone (BUSPAR) 7 5 mg tablet  Outside Facility (Specify) Yes No   Sig: Take 7 5 mg by mouth 3 (three) times a day   cefuroxime (CEFTIN) 250 mg tablet   Yes No   docusate sodium (COLACE) 100 mg capsule  Outside Facility (Specify) Yes No   Sig: Take 100 mg by mouth 2 (two) times a day   famotidine (PEPCID) 20 mg tablet  Outside Facility (Specify) Yes No   Sig: Take 20 mg by mouth 2 (two) times a day   glycerin-hypromellose- (ARTIFICIAL TEARS) 0 2-0 2-1 % SOLN  Outside Facility (Specify) Yes No   Sig: Administer 1 drop to both eyes 2 (two) times a day   memantine (NAMENDA) 5 mg tablet  Outside Facility (Specify) No No   Sig: Take 1 tablet by mouth 2 (two) times a day for 30 days   Patient taking differently: Take 5 mg by mouth 2 (two) times a day    memantine (NAMENDA) 5 mg tablet   Yes No   metoprolol tartrate (LOPRESSOR) 25 mg tablet  Outside Facility (Specify) No No   Sig: Take 0 5 tablets (12 5 mg total) by mouth 2 (two) times a day   mirtazapine (REMERON) 15 mg tablet  Outside Facility (Specify) No No   Sig: Take 1 tablet by mouth daily at bedtime for 30 days   mirtazapine (REMERON) 15 mg tablet   Yes No   polyethylene glycol (MIRALAX) 17 g packet  Outside Facility (Specify) No No   Sig: Take 17 g by mouth daily   potassium chloride (K-DUR) 10 mEq tablet   Yes No   potassium chloride (K-DUR,KLOR-CON) 10 mEq tablet  Outside Facility (Specify) No No   Sig: Take 1 tablet (10 mEq total) by mouth 2 (two) times a day      Facility-Administered Medications: None       Past Medical History:   Diagnosis Date    Acute kidney injury ([facility] [address] ) 10/21/2016    Anxiety     Aortic aneurysm (HCC)     Aphasia     Ataxia     Bladder adhesions     BPH (benign prostatic hypertrophy)     COPD (chronic obstructive pulmonary disease) ([facility] [address] )     wife denies - "never had"    Dementia ([facility] [address] )     Difficulty walking     Essential (primary) hypertension     Generalized anxiety disorder     GERD (gastroesophageal reflux disease)     Global aphasia     H/O blood clots     High blood pressure     History of kidney stones     Kidney stones     Mental disorder     Muscle weakness     Neuromuscular dysfunction of bladder     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 ([facility] [address] )     Retention of urine, unspecified     Stroke ([facility] [address] )     Thrombosis     Urinary retention     Urinary tract bacterial infections     Urinary tract infection        Past Surgical History:   Procedure Laterality Date    BOTOX INJECTION N/A 6/18/2019    Procedure: INJECTION BOTULINUM TOXIN (BOTOX), intra detrusor;  Surgeon: Shama Sears MD;  [facility];  Service: Urology    BOTOX INJECTION N/A 10/7/2019    Procedure: INJECTION BOTULINUM TOXIN (BOTOX), intradetrusor;  Surgeon: Shama Sears MD;  [facility];  Service: Urology    CYSTOSCOPY      CYSTOSCOPY N/A 6/18/2019    Procedure: cystoscopy and all indicated procedures;  Surgeon: Shama Sears MD;  [facility];  Service: Urology    [place] CYSTOGRAM  1/15/2019    IR SUPRAPUBIC TUBE  11/26/2019    IVC FILTER INSERTION      X2    IVC FILTER INSERTION      x2    KIDNEY STONE SURGERY      KNEE SURGERY      Sepsis infection     NEPHROSTOMY      [place] CYSTO/URETERO W/LITHOTRIPSY &INDWELL STENT INSRT Right 6/14/2017    Procedure: CYSTOSCOPY URETEROSCOPY WITH LITHOTRIPSY HOLMIUM LASER,,BASKET STONE EXTRACTION, RETROGRADE PYELOGRAM AND INSERTION STENT URETERAL;  Surgeon: Marquis Luis Eduardo MD;  [facility];  Service: Urology    [place] CYSTOURETHROSCOPY,BIOPSY N/A 1/15/2019    Procedure: CYSTOSCOPY, CYSTOGRAM, DILATION OF URETHRAL STRICTURE;  Surgeon: Shama Sears MD;  [facility];  Service: Urology    URINARY SURGERY         Family History   Problem Relation Age of Onset    Diabetes Father     Hypertension Father     Coronary artery disease Father     Cancer Father     Hypertension Mother      I have reviewed and agree with the history as documented      E-Cigarette/Vaping    E-Cigarette Use Never User      E-Cigarette/Vaping Substances     Social History     Tobacco Use    Smoking status: Never Smoker  Smokeless tobacco: Never Used   Substance Use Topics    Alcohol use: No    Drug use: No       Review of Systems   Unable to perform ROS: Patient nonverbal   Gastrointestinal: Positive for vomiting  Physical Exam  Physical Exam   Constitutional: He appears well-developed and well-nourished  No distress  HENT:   Head: Normocephalic  Eyes: Pupils are equal, round, and reactive to light  Neck: Neck supple  Cardiovascular: Normal rate and regular rhythm  Pulmonary/Chest:   Decreased breath sound   Abdominal: There is tenderness  Tenderness throughout  Suprapubic catheter in place   Musculoskeletal: He exhibits no deformity  Neurological: He is alert  Patient nonverbal at baseline  Per family at baseline at this time   Skin: Skin is warm  Capillary refill takes less than 2 seconds  He is not diaphoretic  No erythema         Vital Signs  ED Triage Vitals [03/10/20 2047]   Temperature Pulse Respirations Blood Pressure SpO2   97 8 °F (36 6 °C) 88 18 140/73 94 %      Temp Source Heart Rate Source Patient Position - Orthostatic VS BP Location FiO2 (%)   Oral Monitor Sitting Right arm --      Pain Score       --           Vitals:    03/10/20 2047 03/11/20 0030   BP: 140/73 146/81   Pulse: 88 (!) 136   Patient Position - Orthostatic VS: Sitting          Visual Acuity      ED Medications  Medications   sodium chloride 0 9 % bolus 500 mL (500 mL Intravenous New Bag 3/11/20 0122)   sodium chloride 0 9 % bolus 1,000 mL (0 mL Intravenous Stopped 3/11/20 0047)   iohexol (OMNIPAQUE) 350 MG/ML injection (MULTI-DOSE) 100 mL (100 mL Intravenous Given 3/10/20 2239)   busPIRone (BUSPAR) tablet 7 5 mg (7 5 mg Oral Given 3/11/20 0020)   QUEtiapine (SEROquel) tablet 25 mg (25 mg Oral Given 3/11/20 0022)   ondansetron (ZOFRAN) injection 4 mg (4 mg Intravenous Given 3/10/20 2340)   cefepime (MAXIPIME) 1,000 mg in dextrose 5 % 50 mL IVPB (0 mg Intravenous Stopped 3/11/20 0120)   acetaminophen (TYLENOL) tablet 650 mg (650 mg Oral Given 3/11/20 0125)       Diagnostic Studies  Results Reviewed     Procedure Component Value Units Date/Time    Blood culture #1 [[id number]] Collected:  03/10/20 2136    Lab Status:  Preliminary result Specimen:  Blood from Arm, Left Updated:  03/11/20 0001     Blood Culture Received in Microbiology Lab  Culture in Progress  Blood culture #2 [[id number]] Collected:  03/10/20 2136    Lab Status:  Preliminary result Specimen:  Blood from Arm, Right Updated:  03/11/20 0001     Blood Culture Received in Microbiology Lab  Culture in Progress  Urine Microscopic [[id number]]  (Abnormal) Collected:  03/10/20 2136    Lab Status:  Final result Specimen:  Urine, Indwelling Thorne Catheter Updated:  03/10/20 2224     RBC, UA       Field obscured, unable to enumerate     /hpf     WBC, UA Innumerable /hpf      Epithelial Cells       Field obscured, unable to enumerate     /hpf     Bacteria, UA       Field obscured, unable to enumerate     /hpf    Urine culture [[id number]] Collected:  03/10/20 2136    Lab Status: In process Specimen:  Urine, Indwelling Thorne Catheter Updated:  03/10/20 2224    UA w Reflex to Microscopic w Reflex to Culture [[id number]]  (Abnormal) Collected:  03/10/20 2136    Lab Status:  Final result Specimen:  Urine, Indwelling Thorne Catheter Updated:  03/10/20 2210     Color, UA Yellow     Clarity, UA Cloudy     Specific [place], UA 1 020     pH, UA 7 0     Leukocytes, UA Large     Nitrite, UA Negative     Protein,  (2+) mg/dl      Glucose, UA Negative mg/dl      Ketones, UA Negative mg/dl      Urobilinogen, UA 0 2 E U /dl      Bilirubin, UA Negative     Blood, UA Large    Lactic acid, plasma x2 [[id number]]  (Normal) Collected:  03/10/20 2142    Lab Status:  Final result Specimen:  Blood from Arm, Right Updated:  03/10/20 2209     LACTIC ACID 1 7 mmol/L     Narrative:       Result may be elevated if tourniquet was used during collection      Protime-INR [[id number]]  (Normal) Collected: 03/10/20 2111    Lab Status:  Final result Specimen:  Blood from Arm, Right Updated:  03/10/20 2152     Protime 13 7 seconds      INR 1 11    APTT [[id number]]  (Normal) Collected:  03/10/20 2111    Lab Status:  Final result Specimen:  Blood from Arm, Right Updated:  03/10/20 2152     PTT 30 seconds     Lipase [[id number]]  (Normal) Collected:  03/10/20 2111    Lab Status:  Final result Specimen:  Blood from Arm, Right Updated:  03/10/20 2149     Lipase 147 u/L     Comprehensive metabolic panel [[id number]]  (Abnormal) Collected:  03/10/20 2111    Lab Status:  Final result Specimen:  Blood from Arm, Right Updated:  03/10/20 2149     Sodium 144 mmol/L      Potassium 3 6 mmol/L      Chloride 107 mmol/L      CO2 27 mmol/L      ANION GAP 10 mmol/L      BUN 19 mg/dL      Creatinine 1 59 mg/dL      Glucose 147 mg/dL      Calcium 8 9 mg/dL      AST 12 U/L      ALT 17 U/L      Alkaline Phosphatase 117 U/L      Total Protein 8 4 g/dL      Albumin 3 7 g/dL      Total Bilirubin 0 31 mg/dL      eGFR 43 ml/min/1 73sq m     Narrative:       [place] guidelines for Chronic Kidney Disease (CKD):     Stage 1 with normal or high GFR (GFR > 90 mL/min/1 73 square meters)    Stage 2 Mild CKD (GFR = 60-89 mL/min/1 73 square meters)    Stage 3A Moderate CKD (GFR = 45-59 mL/min/1 73 square meters)    Stage 3B Moderate CKD (GFR = 30-44 mL/min/1 73 square meters)    Stage 4 Severe CKD (GFR = 15-29 mL/min/1 73 square meters)    Stage 5 End Stage CKD (GFR <15 mL/min/1 73 square meters)  Note: GFR calculation is accurate only with a steady state creatinine    Urine Macroscopic, POC [[id number]]  (Abnormal) Collected:  03/10/20 2055    Lab Status:  Final result Specimen:  Urine Updated:  03/10/20 2148     Color, UA Yellow     Clarity, UA Turbid     pH, UA 7 0     Leukocytes, UA Large     Nitrite, UA Negative     Protein, UA >=300 mg/dl      Glucose, UA Negative mg/dl      Ketones, UA Negative mg/dl      Urobilinogen, UA 0 2 E U /dl      Bilirubin, UA Negative     Blood, UA Large     Specific Gravity, UA 1 025    Narrative:       CLINITEK RESULT    CBC and differential [[id number]]  (Abnormal) Collected:  03/10/20 2111    Lab Status:  Final result Specimen:  Blood from Arm, Right Updated:  03/10/20 2130     WBC 13 16 Thousand/uL      RBC 5 47 Million/uL      Hemoglobin 15 8 g/dL      Hematocrit 49 0 %      MCV 90 fL      MCH 28 9 pg      MCHC 32 2 g/dL      RDW 14 1 %      MPV 8 8 fL      Platelets 543 Thousands/uL      nRBC 0 /100 WBCs      Neutrophils Relative 86 %      Immat GRANS % 0 %      Lymphocytes Relative 7 %      Monocytes Relative 5 %      Eosinophils Relative 1 %      Basophils Relative 1 %      Neutrophils Absolute 11 47 Thousands/µL      Immature Grans Absolute 0 05 Thousand/uL      Lymphocytes Absolute 0 89 Thousands/µL      Monocytes Absolute 0 60 Thousand/µL      Eosinophils Absolute 0 09 Thousand/µL      Basophils Absolute 0 06 Thousands/µL                  CT chest abdomen pelvis w contrast   Final Result by Balaji Pickens DO (03/11 0011)      Bilateral nephrolithiasis with 1 2 x 0 9 cm obstructing stone in the left ureteropelvic junction with associated mild to moderate hydronephrosis and obstructive uropathy  Bladder is collapsed around a suprapubic catheter  There is some fat stranding adjacent to the bladder which could be related to catheter placement versus cystitis  Correlation with the patient's symptoms, laboratory values and urinalysis recommended  Stool in the rectum measuring approximately 9 6 x 8 8 x 12 0 cm in size suspicious for fecal impaction  There is a small amount of posterior perirectal and presacral edema which may suggest a component of stercoral colitis  Ill-defined opacity redemonstrated in the posterior right lower lobe which could represent atelectasis or infection  Suspected platelike atelectasis in the left lower lung field    Lungs otherwise appear grossly clear       Incidental thyroid nodules identified on this CT  According to guidelines published in the February 2015 white paper on incidental thyroid nodules in the Journal of the [employer] of Radiology [facility]), because there are suspicious imaging features,    further evaluation by nonemergent ultrasound is recommended  Cholelithiasis without discrete evidence of acute cholecystitis, mild body wall edema, and other findings as above  Findings discussed with Dr Hailey Morris by Dr Bud Hu at 12:05 AM on 3/10/2020  Workstation performed: [id number]         XR chest 1 view portable    (Results Pending)   IR consult    (Results Pending)              Procedures  Procedures         ED Course                               MDM      Disposition  Final diagnoses:   Sepsis ([facility] [address] )   Renal colic     Time reflects when diagnosis was documented in both MDM as applicable and the Disposition within this note     Time User Action Codes Description Comment    3/11/2020  1:37 AM Bessy Ulloa [A41 9] Sepsis ([facility] [address] )     3/11/2020  1:37 AM Bessy Ulloa [O80] Renal colic       ED Disposition     ED Disposition Condition Date/Time Comment    Admit Stable Wed Mar 11, 2020  1:37 AM Case was discussed with CC AP and the patient's admission status was agreed to be Admission Status: inpatient status to the service of Dr Lesli Gibson   Follow-up Information    None         Patient's Medications   Discharge Prescriptions    No medications on file     No discharge procedures on file      PDMP Review     None          ED Provider  Electronically Signed by           Lyna Gowers, MD  03/11/20 0148

## 2020-03-11 NOTE — PROGRESS NOTES
Patient arrived to IR for left nephrostomy tube placement    The procedure and risks were discussed with the patient and wife  All questions were answered  Informed consent was obtained

## 2020-03-11 NOTE — ASSESSMENT & PLAN NOTE
· Noted on CT abdomen  · Ill-defined opacity-atelectasis versus infection  · Patient's wife is at bedside and denies any recent coughing  · Patient's wife is agreeable to only cefepime

## 2020-03-12 LAB
ANION GAP SERPL CALCULATED.3IONS-SCNC: 9 MMOL/L (ref 4–13)
ATRIAL RATE: 106 BPM
BASOPHILS # BLD MANUAL: 0 THOUSAND/UL (ref 0–0.1)
BASOPHILS NFR MAR MANUAL: 0 % (ref 0–1)
BUN SERPL-MCNC: 30 MG/DL (ref 5–25)
CALCIUM SERPL-MCNC: 8.2 MG/DL (ref 8.3–10.1)
CHLORIDE SERPL-SCNC: 113 MMOL/L (ref 100–108)
CO2 SERPL-SCNC: 25 MMOL/L (ref 21–32)
CREAT SERPL-MCNC: 2.24 MG/DL (ref 0.6–1.3)
EOSINOPHIL # BLD MANUAL: 0 THOUSAND/UL (ref 0–0.4)
EOSINOPHIL NFR BLD MANUAL: 0 % (ref 0–6)
ERYTHROCYTE [DISTWIDTH] IN BLOOD BY AUTOMATED COUNT: 14.8 % (ref 11.6–15.1)
GFR SERPL CREATININE-BSD FRML MDRD: 28 ML/MIN/1.73SQ M
GLUCOSE SERPL-MCNC: 105 MG/DL (ref 65–140)
HCT VFR BLD AUTO: 36.7 % (ref 36.5–49.3)
HGB BLD-MCNC: 11.7 G/DL (ref 12–17)
LYMPHOCYTES # BLD AUTO: 1.29 THOUSAND/UL (ref 0.6–4.47)
LYMPHOCYTES # BLD AUTO: 5 % (ref 14–44)
MAGNESIUM SERPL-MCNC: 2.3 MG/DL (ref 1.6–2.6)
MCH RBC QN AUTO: 29.1 PG (ref 26.8–34.3)
MCHC RBC AUTO-ENTMCNC: 31.9 G/DL (ref 31.4–37.4)
MCV RBC AUTO: 91 FL (ref 82–98)
MONOCYTES # BLD AUTO: 0.26 THOUSAND/UL (ref 0–1.22)
MONOCYTES NFR BLD: 1 % (ref 4–12)
NEUTROPHILS # BLD MANUAL: 24.18 THOUSAND/UL (ref 1.85–7.62)
NEUTS BAND NFR BLD MANUAL: 27 % (ref 0–8)
NEUTS SEG NFR BLD AUTO: 67 % (ref 43–75)
NRBC BLD AUTO-RTO: 0 /100 WBCS
P AXIS: 49 DEGREES
PLATELET # BLD AUTO: 159 THOUSANDS/UL (ref 149–390)
PLATELET BLD QL SMEAR: ADEQUATE
PMV BLD AUTO: 10.2 FL (ref 8.9–12.7)
POTASSIUM SERPL-SCNC: 4 MMOL/L (ref 3.5–5.3)
PR INTERVAL: 186 MS
PROCALCITONIN SERPL-MCNC: 272.37 NG/ML
QRS AXIS: -26 DEGREES
QRSD INTERVAL: 74 MS
QT INTERVAL: 298 MS
QTC INTERVAL: 395 MS
RBC # BLD AUTO: 4.02 MILLION/UL (ref 3.88–5.62)
SODIUM SERPL-SCNC: 147 MMOL/L (ref 136–145)
T WAVE AXIS: 49 DEGREES
TOTAL CELLS COUNTED SPEC: 100
VENTRICULAR RATE: 106 BPM
WBC # BLD AUTO: 25.72 THOUSAND/UL (ref 4.31–10.16)

## 2020-03-12 PROCEDURE — 83735 ASSAY OF MAGNESIUM: CPT | Performed by: PHYSICIAN ASSISTANT

## 2020-03-12 PROCEDURE — 99232 SBSQ HOSP IP/OBS MODERATE 35: CPT | Performed by: FAMILY MEDICINE

## 2020-03-12 PROCEDURE — 92610 EVALUATE SWALLOWING FUNCTION: CPT

## 2020-03-12 PROCEDURE — 99233 SBSQ HOSP IP/OBS HIGH 50: CPT | Performed by: INTERNAL MEDICINE

## 2020-03-12 PROCEDURE — 93010 ELECTROCARDIOGRAM REPORT: CPT | Performed by: INTERNAL MEDICINE

## 2020-03-12 PROCEDURE — 85027 COMPLETE CBC AUTOMATED: CPT | Performed by: PHYSICIAN ASSISTANT

## 2020-03-12 PROCEDURE — 80048 BASIC METABOLIC PNL TOTAL CA: CPT | Performed by: PHYSICIAN ASSISTANT

## 2020-03-12 PROCEDURE — 84145 PROCALCITONIN (PCT): CPT | Performed by: PHYSICIAN ASSISTANT

## 2020-03-12 PROCEDURE — 85007 BL SMEAR W/DIFF WBC COUNT: CPT | Performed by: PHYSICIAN ASSISTANT

## 2020-03-12 RX ORDER — SODIUM CHLORIDE 450 MG/100ML
75 INJECTION, SOLUTION INTRAVENOUS CONTINUOUS
Status: DISCONTINUED | OUTPATIENT
Start: 2020-03-12 | End: 2020-03-15

## 2020-03-12 RX ORDER — ACETAMINOPHEN 325 MG/1
325 TABLET ORAL ONCE
Status: COMPLETED | OUTPATIENT
Start: 2020-03-12 | End: 2020-03-12

## 2020-03-12 RX ADMIN — HEPARIN SODIUM 5000 UNITS: 5000 INJECTION INTRAVENOUS; SUBCUTANEOUS at 10:00

## 2020-03-12 RX ADMIN — MIRTAZAPINE 15 MG: 15 TABLET, FILM COATED ORAL at 21:40

## 2020-03-12 RX ADMIN — FAMOTIDINE 20 MG: 20 TABLET ORAL at 18:15

## 2020-03-12 RX ADMIN — SODIUM CHLORIDE, SODIUM LACTATE, POTASSIUM CHLORIDE, AND CALCIUM CHLORIDE 100 ML/HR: .6; .31; .03; .02 INJECTION, SOLUTION INTRAVENOUS at 05:13

## 2020-03-12 RX ADMIN — METOPROLOL TARTRATE 12.5 MG: 25 TABLET ORAL at 10:01

## 2020-03-12 RX ADMIN — ERTAPENEM SODIUM 1000 MG: 1 INJECTION, POWDER, LYOPHILIZED, FOR SOLUTION INTRAMUSCULAR; INTRAVENOUS at 05:13

## 2020-03-12 RX ADMIN — APIXABAN 2.5 MG: 2.5 TABLET, FILM COATED ORAL at 18:15

## 2020-03-12 RX ADMIN — BUSPIRONE HYDROCHLORIDE 7.5 MG: 5 TABLET ORAL at 09:59

## 2020-03-12 RX ADMIN — ACETAMINOPHEN 650 MG: 650 SUPPOSITORY RECTAL at 18:16

## 2020-03-12 RX ADMIN — BISACODYL 10 MG: 10 SUPPOSITORY RECTAL at 10:01

## 2020-03-12 RX ADMIN — STANDARDIZED SENNA CONCENTRATE 17.2 MG: 8.6 TABLET ORAL at 21:40

## 2020-03-12 RX ADMIN — ACETAMINOPHEN 325 MG: 325 TABLET, FILM COATED ORAL at 22:23

## 2020-03-12 RX ADMIN — BUSPIRONE HYDROCHLORIDE 7.5 MG: 5 TABLET ORAL at 18:15

## 2020-03-12 RX ADMIN — POLYETHYLENE GLYCOL 3350 17 G: 17 POWDER, FOR SOLUTION ORAL at 10:01

## 2020-03-12 RX ADMIN — SODIUM CHLORIDE 150 ML/HR: 0.45 INJECTION, SOLUTION INTRAVENOUS at 19:36

## 2020-03-12 RX ADMIN — MEMANTINE 5 MG: 5 TABLET ORAL at 10:01

## 2020-03-12 RX ADMIN — BUSPIRONE HYDROCHLORIDE 7.5 MG: 5 TABLET ORAL at 21:40

## 2020-03-12 RX ADMIN — FAMOTIDINE 20 MG: 20 TABLET ORAL at 09:59

## 2020-03-12 RX ADMIN — MEMANTINE 5 MG: 5 TABLET ORAL at 18:15

## 2020-03-12 RX ADMIN — SODIUM CHLORIDE 150 ML/HR: 0.45 INJECTION, SOLUTION INTRAVENOUS at 12:38

## 2020-03-12 RX ADMIN — POTASSIUM CHLORIDE 10 MEQ: 750 TABLET, EXTENDED RELEASE ORAL at 10:00

## 2020-03-12 RX ADMIN — QUETIAPINE FUMARATE 25 MG: 25 TABLET ORAL at 21:40

## 2020-03-12 RX ADMIN — METOPROLOL TARTRATE 12.5 MG: 25 TABLET ORAL at 21:40

## 2020-03-12 NOTE — PLAN OF CARE
Problem: Prexisting or High Potential for Compromised Skin Integrity  Goal: Skin integrity is maintained or improved  Description  INTERVENTIONS:  - Identify patients at risk for skin breakdown  - Assess and monitor skin integrity  - Assess and monitor nutrition and hydration status  - Monitor labs   - Assess for incontinence   - Turn and reposition patient  - Assist with mobility/ambulation  - Relieve pressure over bony prominences  - Avoid friction and shearing  - Provide appropriate hygiene as needed including keeping skin clean and dry  - Evaluate need for skin moisturizer/barrier cream  - Collaborate with interdisciplinary team   - Patient/family teaching  - Consider wound care consult   Outcome: Progressing     Problem: PAIN - ADULT  Goal: Verbalizes/displays adequate comfort level or baseline comfort level  Description  Interventions:  - Encourage patient to monitor pain and request assistance  - Assess pain using appropriate pain scale  - Administer analgesics based on type and severity of pain and evaluate response  - Implement non-pharmacological measures as appropriate and evaluate response  - Consider cultural and social influences on pain and pain management  - Notify physician/advanced practitioner if interventions unsuccessful or patient reports new pain  Outcome: Progressing     Problem: INFECTION - ADULT  Goal: Absence or prevention of progression during hospitalization  Description  INTERVENTIONS:  - Assess and monitor for signs and symptoms of infection  - Monitor lab/diagnostic results  - Monitor all insertion sites, i e  indwelling lines, tubes, and drains  - Monitor endotracheal if appropriate and nasal secretions for changes in amount and color  - [place] appropriate cooling/warming therapies per order  - Administer medications as ordered  - Instruct and encourage patient and family to use good hand hygiene technique  - Identify and instruct in appropriate isolation precautions for identified infection/condition  Outcome: Progressing  Goal: Absence of fever/infection during neutropenic period  Description  INTERVENTIONS:  - Monitor WBC    Outcome: Progressing     Problem: SAFETY ADULT  Goal: Patient will remain free of falls  Description  INTERVENTIONS:  - Assess patient frequently for physical needs  -  Identify cognitive and physical deficits and behaviors that affect risk of falls    -  [place] fall precautions as indicated by assessment   - Educate patient/family on patient safety including physical limitations  - Instruct patient to call for assistance with activity based on assessment  - Modify environment to reduce risk of injury  - Consider OT/PT consult to assist with strengthening/mobility  Outcome: Progressing  Goal: Maintain or return to baseline ADL function  Description  INTERVENTIONS:  -  Assess patient's ability to carry out ADLs; assess patient's baseline for ADL function and identify physical deficits which impact ability to perform ADLs (bathing, care of mouth/teeth, toileting, grooming, dressing, etc )  - Assess/evaluate cause of self-care deficits   - Assess range of motion  - Assess patient's mobility; develop plan if impaired  - Assess patient's need for assistive devices and provide as appropriate  - Encourage maximum independence but intervene and supervise when necessary  - Involve family in performance of ADLs  - Assess for home care needs following discharge   - Consider OT consult to assist with ADL evaluation and planning for discharge  - Provide patient education as appropriate  Outcome: Progressing  Goal: Maintain or return mobility status to optimal level  Description  INTERVENTIONS:  - Assess patient's baseline mobility status (ambulation, transfers, stairs, etc )    - Identify cognitive and physical deficits and behaviors that affect mobility  - Identify mobility aids required to assist with transfers and/or ambulation (gait belt, sit-to-stand, lift, walker, cane, etc )  - [place] fall precautions as indicated by assessment  - Record patient progress and toleration of activity level on Mobility SBAR; progress patient to next Phase/Stage  - Instruct patient to call for assistance with activity based on assessment  - Consider rehabilitation consult to assist with strengthening/weightbearing, etc   Outcome: Progressing     Problem: DISCHARGE PLANNING  Goal: Discharge to home or other facility with appropriate resources  Description  INTERVENTIONS:  - Identify barriers to discharge w/patient and caregiver  - Arrange for needed discharge resources and transportation as appropriate  - Identify discharge learning needs (meds, wound care, etc )  - Arrange for interpretive services to assist at discharge as needed  - Refer to Case Management Department for coordinating discharge planning if the patient needs post-hospital services based on physician/advanced practitioner order or complex needs related to functional status, cognitive ability, or social support system  Outcome: Progressing     Problem: Knowledge Deficit  Goal: Patient/family/caregiver demonstrates understanding of disease process, treatment plan, medications, and discharge instructions  Description  Complete learning assessment and assess knowledge base    Interventions:  - Provide teaching at level of understanding  - Provide teaching via preferred learning methods  Outcome: Progressing

## 2020-03-12 NOTE — ASSESSMENT & PLAN NOTE
· Nonverbal at baseline  Patient does not actually have behavioral disturbance  · Prior history of CVA  · Home regimen:  · BuSpar 7 5 mg t i d   · Namenda 5 mg b i d    · Remeron 50 mg HS  · Seroquel 25 mg HS  ·

## 2020-03-12 NOTE — ASSESSMENT & PLAN NOTE
· Home regimen:  Amlodipine 5 mg b i d , metoprolol tartrate 12 5 mg b i d  Blood pressure has been at the low end of normal   Given that patient is septic would like to avoid hypotension  Will stop amlodipine and will continue Lopressor

## 2020-03-12 NOTE — SPEECH THERAPY NOTE
Speech-Language Pathology Bedside Swallow Evaluation        Patient Name: Nichole Palmer    [id number] Date: 3/12/2020     Problem List  Principal Problem:    Sepsis ([facility] )  Active Problems:    History of DVT (deep vein thrombosis)    Essential hypertension    Dementia with behavioral disturbance ([facility])    Neurogenic bladder    Bilateral nephrolithiasis    Fecal impaction ([facility] )    Atelectasis         Past Medical History  Past Medical History:   Diagnosis Date    Acute kidney injury ([facility] ) 10/21/2016    Anxiety     Aortic aneurysm ([facility])     Aphasia     Ataxia     Bladder adhesions     BPH (benign prostatic hypertrophy)     COPD (chronic obstructive pulmonary disease) ([facility])     wife denies - "never had"    Dementia ([facility] )     Difficulty walking     Essential (primary) hypertension     Generalized anxiety disorder     GERD (gastroesophageal reflux disease)     Global aphasia     H/O blood clots     High blood pressure     History of kidney stones     Kidney stones     Mental disorder     Muscle weakness     Neuromuscular dysfunction of bladder     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 ([facility] )     Retention of urine, unspecified     Stroke ([facility] )     Thrombosis     Urinary retention     Urinary tract bacterial infections     Urinary tract infection        Past Surgical History  Past Surgical History:   Procedure Laterality Date    BOTOX INJECTION N/A 6/18/2019    Procedure: INJECTION BOTULINUM TOXIN (BOTOX), intra detrusor;  Surgeon: Krupa Gauthier MD;  [facility];  Service: Urology    BOTOX INJECTION N/A 10/7/2019    Procedure: INJECTION BOTULINUM TOXIN (BOTOX), intradetrusor;  Surgeon: Krupa Gauthier MD;  [facility];  Service: Urology    CYSTOSCOPY      CYSTOSCOPY N/A 6/18/2019    Procedure: cystoscopy and all indicated procedures;  Surgeon: Krupa Gauthier MD;  [facility];  Service: Urology    FL CYSTOGRAM  1/15/2019  IR SUPRAPUBIC TUBE  11/26/2019    IR TUBE PLACEMENT NEPHROSTOMY  3/11/2020    IVC FILTER INSERTION      X2    IVC FILTER INSERTION      x2    KIDNEY STONE SURGERY      KNEE SURGERY      Sepsis infection     NEPHROSTOMY      FL CYSTO/URETERO W/LITHOTRIPSY &INDWELL STENT INSRT Right 6/14/2017    Procedure: CYSTOSCOPY URETEROSCOPY WITH LITHOTRIPSY HOLMIUM LASER,,BASKET STONE EXTRACTION, RETROGRADE PYELOGRAM AND INSERTION STENT URETERAL;  Surgeon: Holden Curry MD;  [facility];  Service: Urology    FL CYSTOURETHROSCOPY,BIOPSY N/A 1/15/2019    Procedure: CYSTOSCOPY, CYSTOGRAM, DILATION OF URETHRAL STRICTURE;  Surgeon: Hilda Edouard MD;  [facility];  Service: Urology    URINARY SURGERY         Summary    Pt presents with Mild-moderate oral dysphagia due to repetitive, excessive mastication/manipulation  No overt s/s aspiration  Recommendations:   Diet: mechanically altered/level 2 diet and thin liquids   Meds: crushed with puree   Frequent Oral care: 2x/day  Aspiration precautions and compensatory swallowing strategies: upright posture, only feed when fully alert, slow rate of feeding and alternating bites and sips  Other Recommendations/ considerations: will cont to follow for diet tolerance and potential to advance diet  Current Medical Status  Pt is a 68 y o  male who presented to [address] [place] [address]  with sepsis  Patient is resident of [facility]  He is nonverbal at baseline due to prior stroke  History is obtained by patient's wife was at bedside  She reports he had eating his dinner and shortly after began vomiting  She is unsure if there was any aspiration  Notes a brief episode of coughing  She reports he had been feeling his normal self until this incident  She tells me that when he gets sick, he become sick very quickly      Past medical history:   Please see H&P for details    Special Studies:  CT-C/A/P: 3/10/20 Ill-defined opacity redemonstrated in the posterior right lower lobe which could represent atelectasis or infection  Suspected platelike atelectasis in the left lower lung field  Lungs otherwise appear grossly clear  Social/Education/Vocational Hx:  [facility]    Swallow Information   Current Risks for Dysphagia & Aspiration: AMS; hx of dementia  Current Symptoms/Concerns: hx of dementia  Current Diet: NPO except medications   Baseline Diet: regular diet and thin liquids  Takes pills- unknown    Baseline Assessment   Behavior/Cognition: waxing and waning arousal level and decreased attention  Speech/Language Status: not able to to follow commands and no verbal output noted  Patient Positioning: upright in bed     Swallow Mechanism Exam   Facial: symmetrical  Labial: unable to test 2/2 limited command following  Lingual: unable to test 2/2 limited command following  Velum: unable to visualize  Mandible: adequate ROM  Dentition: adequate- limited dentition- broken teeth noted   Vocal quality:unable to elicit voicing   Volitional Cough: unable to initiate volitional cough   Respiratory: RA    Consistencies Assessed and Performance   Consistencies Administered: puree, cheerios, NTL, thin liquids  Oral Stage: pt w/ adequate bolus retrieval, better w/ straw than cup for liquids  Appeared w/ adequate oral control of liquids; effective but excessive/repetitive mastication/manipulation  Oral residue noted  Pharyngeal Stage: swallows were timely and complete w/ no overt s/s aspiration         Esophageal Concerns: none reported      Results Reviewed with: patient and RN   Dysphagia Goals: pt will tolerate mech soft with thin liquids without s/s of aspiration x48 hours       Destinee Tabares CCC-SLP  Speech Pathogist  Available via  tiger text

## 2020-03-12 NOTE — PLAN OF CARE
Problem: Prexisting or High Potential for Compromised Skin Integrity  Goal: Skin integrity is maintained or improved  Description  INTERVENTIONS:  - Identify patients at risk for skin breakdown  - Assess and monitor skin integrity  - Assess and monitor nutrition and hydration status  - Monitor labs   - Assess for incontinence   - Turn and reposition patient  - Assist with mobility/ambulation  - Relieve pressure over bony prominences  - Avoid friction and shearing  - Provide appropriate hygiene as needed including keeping skin clean and dry  - Evaluate need for skin moisturizer/barrier cream  - Collaborate with interdisciplinary team   - Patient/family teaching  - Consider wound care consult   Outcome: Progressing     Problem: PAIN - ADULT  Goal: Verbalizes/displays adequate comfort level or baseline comfort level  Description  Interventions:  - Encourage patient to monitor pain and request assistance  - Assess pain using appropriate pain scale  - Administer analgesics based on type and severity of pain and evaluate response  - Implement non-pharmacological measures as appropriate and evaluate response  - Consider cultural and social influences on pain and pain management  - Notify physician/advanced practitioner if interventions unsuccessful or patient reports new pain  Outcome: Progressing     Problem: INFECTION - ADULT  Goal: Absence or prevention of progression during hospitalization  Description  INTERVENTIONS:  - Assess and monitor for signs and symptoms of infection  - Monitor lab/diagnostic results  - Monitor all insertion sites, i e  indwelling lines, tubes, and drains  - Monitor endotracheal if appropriate and nasal secretions for changes in amount and color  - [place] appropriate cooling/warming therapies per order  - Administer medications as ordered  - Instruct and encourage patient and family to use good hand hygiene technique  - Identify and instruct in appropriate isolation precautions for identified infection/condition  Outcome: Progressing  Goal: Absence of fever/infection during neutropenic period  Description  INTERVENTIONS:  - Monitor WBC    Outcome: Progressing     Problem: SAFETY ADULT  Goal: Patient will remain free of falls  Description  INTERVENTIONS:  - Assess patient frequently for physical needs  -  Identify cognitive and physical deficits and behaviors that affect risk of falls    -  [place] fall precautions as indicated by assessment   - Educate patient/family on patient safety including physical limitations  - Instruct patient to call for assistance with activity based on assessment  - Modify environment to reduce risk of injury  - Consider OT/PT consult to assist with strengthening/mobility  Outcome: Progressing  Goal: Maintain or return to baseline ADL function  Description  INTERVENTIONS:  -  Assess patient's ability to carry out ADLs; assess patient's baseline for ADL function and identify physical deficits which impact ability to perform ADLs (bathing, care of mouth/teeth, toileting, grooming, dressing, etc )  - Assess/evaluate cause of self-care deficits   - Assess range of motion  - Assess patient's mobility; develop plan if impaired  - Assess patient's need for assistive devices and provide as appropriate  - Encourage maximum independence but intervene and supervise when necessary  - Involve family in performance of ADLs  - Assess for home care needs following discharge   - Consider OT consult to assist with ADL evaluation and planning for discharge  - Provide patient education as appropriate  Outcome: Progressing  Goal: Maintain or return mobility status to optimal level  Description  INTERVENTIONS:  - Assess patient's baseline mobility status (ambulation, transfers, stairs, etc )    - Identify cognitive and physical deficits and behaviors that affect mobility  - Identify mobility aids required to assist with transfers and/or ambulation (gait belt, sit-to-stand, lift, walker, cane, etc )  - [place] fall precautions as indicated by assessment  - Record patient progress and toleration of activity level on Mobility SBAR; progress patient to next Phase/Stage  - Instruct patient to call for assistance with activity based on assessment  - Consider rehabilitation consult to assist with strengthening/weightbearing, etc   Outcome: Progressing     Problem: DISCHARGE PLANNING  Goal: Discharge to home or other facility with appropriate resources  Description  INTERVENTIONS:  - Identify barriers to discharge w/patient and caregiver  - Arrange for needed discharge resources and transportation as appropriate  - Identify discharge learning needs (meds, wound care, etc )  - Arrange for interpretive services to assist at discharge as needed  - Refer to Case Management Department for coordinating discharge planning if the patient needs post-hospital services based on physician/advanced practitioner order or complex needs related to functional status, cognitive ability, or social support system  Outcome: Progressing     Problem: Knowledge Deficit  Goal: Patient/family/caregiver demonstrates understanding of disease process, treatment plan, medications, and discharge instructions  Description  Complete learning assessment and assess knowledge base    Interventions:  - Provide teaching at level of understanding  - Provide teaching via preferred learning methods  Outcome: Progressing

## 2020-03-12 NOTE — PROGRESS NOTES
Progress Note - Rob Escobar [facility] 1950, 68 y o  male MRN: [id number]    Unit/Bed#: S -01 Encounter: [id number]    Primary Care Provider: Jacqueline Cervantes MD   Date and time admitted to hospital: 3/10/2020  8:43 PM      Atelectasis  Assessment & Plan  · Noted on CT abdomen  · Ill-defined opacity-atelectasis versus infection  · Patient's wife is at bedside and denies any recent coughing  · Patient's wife is agreeable to only cefepime    Fecal impaction [facility])  Assessment & Plan  · Patient is a wife is at bedside reports bowel movement yet today and today which was normal  · Consider enema  · For now, will continue with home regimen under direction of patient's wife  Bilateral nephrolithiasis  Assessment & Plan  · Presentation:  Multiple episodes of vomiting that occurred in the evening  Temperature a 100 3° in EMS  From nursing facility  · History:  Suprapubic catheter/neurogenic bladder, history of nephrolithiasis requiring right nephrostomy tube in the past  · Suprapubic catheter site without obvious infection  · CT abdomen:  Bilateral nephrolithiasis with 1 2 x 0 9 cm obstructing stone in the left ureteral junction with mild-to-moderate hydronephrosis and obstructive uropathy  Nonobstructing stones in the right kidney  ·     · Patient had left-sided nephrostomy tube placed yesterday  It is draining well  Continue nephrostomy tube in management as per Urology  Neurogenic bladder  Assessment & Plan  · CT abdomen:  "Bladder is collapsed around suprapubic catheter  There was some fat stranding adjacent to the bladder which could be related to catheter placement versus cystitis "    There did appear to be some cellulitis around the suprapubic catheter  Per ID, this appears to be improving with antibiotics  Dementia with behavioral disturbance ([facility] )  Assessment & Plan  · Nonverbal at baseline  Patient does not actually have behavioral disturbance    · Prior history of CVA  · Home regimen:  · BuSpar 7 5 mg t i d   · Namenda 5 mg b i d  · Remeron 50 mg HS  · Seroquel 25 mg HS  ·     Essential hypertension  Assessment & Plan  · Home regimen:  Amlodipine 5 mg b i d , metoprolol tartrate 12 5 mg b i d  Blood pressure has been at the low end of normal   Given that patient is septic would like to avoid hypotension  Will stop amlodipine and will continue Lopressor  Acute kidney injury [facility])  Assessment & Plan  Most likely due to combination of obstructive uropathy and sepsis  Baseline creatinine appears to be around 1 4-1 6  Yesterday was 2 30  It is relatively stable today at 2 24  IV fluids were increased  There changed to half normal saline given that patient is mildly hypernatremic  History of DVT (deep vein thrombosis)  Assessment & Plan  · Continue Eliquis  * Sepsis ([facility] )  Assessment & Plan  · Present on admission-as evidenced by fever, tachycardia, leukocytosis  · Urinalysis:  Innumerable WBC  · CT scan:  Obstructing left nephrolithiasis    Cefepime have been changed yesterday to our meropenem  However, patient's wife states that because of his reaction to Zosyn he needs to be on ertapenem instead  Therefore ertapenem was started and patient is tolerating it well so far  Blood cultures are growing gram-negative rods  Id notes reviewed and appreciate their assistance  Continue ertapenem  Fevers appear to be resolving  White blood cell count was even higher today, but this is not unusual considering that patient had nephrostomy tube placed yesterday  Subjective/Objective     Subjective:   Patient seen and examined with wife at bedside  Per wife, he still not back to his usual self which she understands is due to his sepsis  Lengthy discussion was had including medical updates which wife appreciated  She is agreeable to learning about palliative care services so will see if our palliative care team can give education about palliative care services  Patient himself otherwise is resting comfortably and does wake up when roused  I cannot get any history from him because he is nonverbal at baseline  Objective:  Vitals: Blood pressure 118/60, pulse 99, temperature 98 °F (36 7 °C), temperature source Oral, resp  rate 18, weight 103 kg (226 lb 6 6 oz), SpO2 92 %  ,Body mass index is 31 58 kg/m²  Intake/Output Summary (Last 24 hours) at 3/12/2020 1613  Last data filed at 3/12/2020 1238  Gross per 24 hour   Intake 543 33 ml   Output 1725 ml   Net -1181 67 ml       Invasive Devices     Peripheral Intravenous Line            Peripheral IV 03/10/20 Right Antecubital 1 day          Drain            Suprapubic Catheter Latex 18 Fr  106 days    Nephrostomy Left 1 8 5 Fr  1 day                Physical Exam: /60 (BP Location: Right arm)   Pulse 99   Temp 98 °F (36 7 °C) (Oral)   Resp 18   Wt 103 kg (226 lb 6 6 oz) Comment: patient non ambulatory  SpO2 92%   BMI 31 58 kg/m²   General appearance: Lethargic  Resting comfortably  Does wake up when roused  Nonverbal   Head: Normocephalic, without obvious abnormality, atraumatic  Eyes: No scleral icterus  Lungs: clear to auscultation bilaterally  Heart: regular rate and rhythm, S1, S2 normal, no murmur, click, rub or gallop   :  Left-sided nephrostomy tube in place draining clear yellow urine  Suprapubic catheter in place also draining clear yellow urine  Skin around suprapubic catheter is mildly erythematous  Extremities: edema Some edema noted in all extremities  Neurologic:  Nonverbal, bedbound    Lab, Imaging and other studies: I have personally reviewed pertinent reports      VTE Pharmacologic Prophylaxis:  Eliquis  VTE Mechanical Prophylaxis: sequential compression device

## 2020-03-12 NOTE — ASSESSMENT & PLAN NOTE
· Present on admission-as evidenced by fever, tachycardia, leukocytosis  · Urinalysis:  Innumerable WBC  · CT scan:  Obstructing left nephrolithiasis    Cefepime have been changed yesterday to our meropenem  However, patient's wife states that because of his reaction to Zosyn he needs to be on ertapenem instead  Therefore ertapenem was started and patient is tolerating it well so far  Blood cultures are growing gram-negative rods  Id notes reviewed and appreciate their assistance  Continue ertapenem  Fevers appear to be resolving  White blood cell count was even higher today, but this is not unusual considering that patient had nephrostomy tube placed yesterday

## 2020-03-12 NOTE — ASSESSMENT & PLAN NOTE
· Presentation:  Multiple episodes of vomiting that occurred in the evening  Temperature a 100 3° in EMS  From nursing facility  · History:  Suprapubic catheter/neurogenic bladder, history of nephrolithiasis requiring right nephrostomy tube in the past  · Suprapubic catheter site without obvious infection  · CT abdomen:  Bilateral nephrolithiasis with 1 2 x 0 9 cm obstructing stone in the left ureteral junction with mild-to-moderate hydronephrosis and obstructive uropathy  Nonobstructing stones in the right kidney  ·     · Patient had left-sided nephrostomy tube placed yesterday  It is draining well  Continue nephrostomy tube in management as per Urology

## 2020-03-12 NOTE — ASSESSMENT & PLAN NOTE
· CT abdomen:  "Bladder is collapsed around suprapubic catheter  There was some fat stranding adjacent to the bladder which could be related to catheter placement versus cystitis "    There did appear to be some cellulitis around the suprapubic catheter  Per ID, this appears to be improving with antibiotics

## 2020-03-12 NOTE — PROGRESS NOTES
Progress Note - Infectious Disease   Josselyn Tomas 68 y o  male MRN: [id number]  Unit/Bed#: S -01 Encounter: [id number]      Impression/Plan:  1  Sepsis  POA  Fever, tachycardia, leukocytosis, and RUPESH  Patient with extensive nephrolithiasis history with new obstructing left ureteral kidney stone on CT scan now status post left nephrostomy tube placement  Blood cultures positive for gram-negative rods  Most recent urinary microbiology history was for Pseudomonas sensitive to cefepime, meropenem and ESBL EColi sensitive to ertapenem  Leukocytosis up to 25 72  Rec:  · Continues ertapenem at present as below  · Follow-up pending blood cultures   · Follow-up pending urine culture  · Repeat CBC in am  · Repeat BMP in a m  · Supportive care per primary care and urology teams     2  Non lactose fermenting gram-negative sarah bacteremia  Urinary source suspected  In patient with obstructing left ureteral stone and most recent urinary microbiology history was for Pseudomonas sensitive to cefepime and ESBL EColi presumed to be a colonizer  Rec:  · Continues ertapenem as below  · Follow-up pending blood cultures   · Repeat CBC in am     3  Left pyelonephritis with acute obstructing left ureteral stone  Likely source of #2  Patient is status post percutaneous left nephrostomy tube placement  Urine culture pending  Rec:  · Continues ertapenem  · Follow-up pending urine culture  · Repeat CBC in am  · Percutaneous nephrostomy and suprapubic tube drainage management per Urology     4  Acute kidney injury  Likely secondary to sepsis and obstructing stone  Creatinine 2 3   Rec  · Monitor creatinine  · Renal dose adjust antibiotics as needed     5   Cellulitis lower abdominal wall  Possible secondary to central scabbed soft tissue boil  Not known to have a prior suprapubic site at this location  clinically improving  Rec  · Serial exams  · Antibiotics as above     6  Aphasia with CVA      Supportive care per primary care team     7   Multiple listed antibiotic allergies: History of anaphylaxis to Zosyn, gentamicin, vancomycin  Patient is tolerating cefepime well and has tolerated ertapenem and cefdinir in past     Documented event noted last night of patient's wife refusing meropenem due to his cardiac arrest history with Zosyn  Patient did receive 1 dose of meropenem 16:40 hours without adverse event  Ertapenem was then started 5:00 a m  this morning  Rec:  Continues ertapenem with close clinical monitoring     Antibiotics:  Meropenem to Ertapenem D2     Above plan discussed in detail with patient, RN, Amandeep San, Urology, NP, and Braden PEDRO  Subjective:  Patient nonverbal and subsequently offering no complaints  RN reports that patient takes pills crushed in applesauce  No reported stool  No reported abdominal pain  Documented event noted last night of patient's wife refusing meropenem due to his cardiac arrest history with Zosyn  Patient did receive 1 dose of meropenem 16:40 hours without adverse event  Ertapenem was then started 5:00 a m  this morning      ROS: Patient has no reported fever, chills, sweats; no nausea, vomiting, diarrhea; no cough, shortness of breath  Objective:  Vitals:  Temp:  [98 °F (36 7 °C)-99 9 °F (37 7 °C)] 98 °F (36 7 °C)  HR:  [] 99  Resp:  [18] 18  BP: (116-121)/(57-65) 118/60  SpO2:  [92 %-94 %] 92 %  Temp (24hrs), Av 1 °F (37 3 °C), Min:98 °F (36 7 °C), Max:99 9 °F (37 7 °C)  Current: Temperature: 98 °F (36 7 °C)    General Appearance:  73-year-old male, awake, nonverbal, cooperative, resting in bed, no acute distress  Throat: Oropharynx moist without lesions  Lungs:   Clear to auscultation bilaterally; no wheezes, rhonchi or rales; respirations unlabored   Heart:  RRR; S1-S2 heard, no murmur   Abdomen:   Soft, non-tender, non-distended, positive bowel sounds  : Suprapubic tube in place with some cloudy urine in [place]orne bag    Scabbed area beneath suprapubic site without active drainage and erythema and induration subsiding  Left nephrostomy tube with clear yellow urine, no longer bloody  Extremities: No edema, lower extremity venodynes, arm IV site nontender   Skin: No rashes      Labs, Imaging, & Other studies:   All pertinent labs and imaging studies were personally reviewed  Results from last 7 days   Lab Units 03/12/20  0533 03/11/20  0505 03/10/20  2111   WBC Thousand/uL 25 72* 20 24* 13 16*   HEMOGLOBIN g/dL 11 7* 13 3 15 8   PLATELETS Thousands/uL 159 199 310     Results from last 7 days   Lab Units 03/12/20  0533 03/11/20  0505 03/10/20  2111   POTASSIUM mmol/L 4 0 3 3* 3 6   CHLORIDE mmol/L 113* 111* 107   CO2 mmol/L 25 24 27   BUN mg/dL 30* 21 19   CREATININE mg/dL 2 24* 2 30* 1 59*   EGFR ml/min/1 73sq m 28 28 43   CALCIUM mg/dL 8 2* 8 0* 8 9   AST U/L  --  16 12   ALT U/L  --  14 17   ALK PHOS U/L  --  84 117*     Results from last 7 days   Lab Units 03/12/20  0533 03/11/20  1050   PROCALCITONIN ng/ml 272 37* 278 64*     Results from last 7 days   Lab Units 03/11/20  0338 03/10/20  2136   BLOOD CULTURE   --  Non lactose fermenting gram negative sarah*  Non lactose fermenting gram negative sarah*   GRAM STAIN RESULT   --  Gram negative rods*  Gram negative rods*   URINE CULTURE  Culture results to follow  Culture results to follow

## 2020-03-12 NOTE — ASSESSMENT & PLAN NOTE
Most likely due to combination of obstructive uropathy and sepsis  Baseline creatinine appears to be around 1 4-1 6  Yesterday was 2 30  It is relatively stable today at 2 24  IV fluids were increased  There changed to half normal saline given that patient is mildly hypernatremic

## 2020-03-13 ENCOUNTER — APPOINTMENT (INPATIENT)
Dept: RADIOLOGY | Facility: HOSPITAL | Age: 70
DRG: 872 | End: 2020-03-13
Payer: MEDICARE

## 2020-03-13 LAB
ALBUMIN SERPL BCP-MCNC: 2.3 G/DL (ref 3.5–5)
ALP SERPL-CCNC: 77 U/L (ref 46–116)
ALT SERPL W P-5'-P-CCNC: 14 U/L (ref 12–78)
ANION GAP SERPL CALCULATED.3IONS-SCNC: 10 MMOL/L (ref 4–13)
AST SERPL W P-5'-P-CCNC: 20 U/L (ref 5–45)
BASOPHILS # BLD MANUAL: 0 THOUSAND/UL (ref 0–0.1)
BASOPHILS NFR MAR MANUAL: 0 % (ref 0–1)
BILIRUB SERPL-MCNC: 0.47 MG/DL (ref 0.2–1)
BUN SERPL-MCNC: 27 MG/DL (ref 5–25)
CALCIUM SERPL-MCNC: 8 MG/DL (ref 8.3–10.1)
CHLORIDE SERPL-SCNC: 110 MMOL/L (ref 100–108)
CO2 SERPL-SCNC: 22 MMOL/L (ref 21–32)
CREAT SERPL-MCNC: 1.96 MG/DL (ref 0.6–1.3)
DOHLE BOD BLD QL SMEAR: PRESENT
EOSINOPHIL # BLD MANUAL: 0.18 THOUSAND/UL (ref 0–0.4)
EOSINOPHIL NFR BLD MANUAL: 1 % (ref 0–6)
ERYTHROCYTE [DISTWIDTH] IN BLOOD BY AUTOMATED COUNT: 14.8 % (ref 11.6–15.1)
GFR SERPL CREATININE-BSD FRML MDRD: 33 ML/MIN/1.73SQ M
GLUCOSE SERPL-MCNC: 140 MG/DL (ref 65–140)
HCT VFR BLD AUTO: 38.6 % (ref 36.5–49.3)
HGB BLD-MCNC: 12.3 G/DL (ref 12–17)
LYMPHOCYTES # BLD AUTO: 0.18 THOUSAND/UL (ref 0.6–4.47)
LYMPHOCYTES # BLD AUTO: 1 % (ref 14–44)
MAGNESIUM SERPL-MCNC: 2.3 MG/DL (ref 1.6–2.6)
MCH RBC QN AUTO: 29.5 PG (ref 26.8–34.3)
MCHC RBC AUTO-ENTMCNC: 31.9 G/DL (ref 31.4–37.4)
MCV RBC AUTO: 93 FL (ref 82–98)
MONOCYTES # BLD AUTO: 0.72 THOUSAND/UL (ref 0–1.22)
MONOCYTES NFR BLD: 4 % (ref 4–12)
MRSA NOSE QL CULT: NORMAL
NEUTROPHILS # BLD MANUAL: 16.95 THOUSAND/UL (ref 1.85–7.62)
NEUTS BAND NFR BLD MANUAL: 8 % (ref 0–8)
NEUTS SEG NFR BLD AUTO: 86 % (ref 43–75)
NRBC BLD AUTO-RTO: 0 /100 WBCS
PLATELET # BLD AUTO: 146 THOUSANDS/UL (ref 149–390)
PLATELET BLD QL SMEAR: ADEQUATE
PMV BLD AUTO: 10.2 FL (ref 8.9–12.7)
POTASSIUM SERPL-SCNC: 3.7 MMOL/L (ref 3.5–5.3)
PROT SERPL-MCNC: 6.4 G/DL (ref 6.4–8.2)
RBC # BLD AUTO: 4.17 MILLION/UL (ref 3.88–5.62)
SODIUM SERPL-SCNC: 142 MMOL/L (ref 136–145)
TOTAL CELLS COUNTED SPEC: 100
WBC # BLD AUTO: 18.03 THOUSAND/UL (ref 4.31–10.16)

## 2020-03-13 PROCEDURE — 85027 COMPLETE CBC AUTOMATED: CPT | Performed by: FAMILY MEDICINE

## 2020-03-13 PROCEDURE — 71045 X-RAY EXAM CHEST 1 VIEW: CPT

## 2020-03-13 PROCEDURE — 85007 BL SMEAR W/DIFF WBC COUNT: CPT | Performed by: FAMILY MEDICINE

## 2020-03-13 PROCEDURE — 80053 COMPREHEN METABOLIC PANEL: CPT | Performed by: FAMILY MEDICINE

## 2020-03-13 PROCEDURE — 99233 SBSQ HOSP IP/OBS HIGH 50: CPT | Performed by: INTERNAL MEDICINE

## 2020-03-13 PROCEDURE — 94760 N-INVAS EAR/PLS OXIMETRY 1: CPT

## 2020-03-13 PROCEDURE — 83735 ASSAY OF MAGNESIUM: CPT | Performed by: FAMILY MEDICINE

## 2020-03-13 PROCEDURE — 94640 AIRWAY INHALATION TREATMENT: CPT

## 2020-03-13 PROCEDURE — 94664 DEMO&/EVAL PT USE INHALER: CPT

## 2020-03-13 PROCEDURE — 99233 SBSQ HOSP IP/OBS HIGH 50: CPT | Performed by: FAMILY MEDICINE

## 2020-03-13 RX ORDER — ACETAMINOPHEN 325 MG/1
650 TABLET ORAL EVERY 6 HOURS PRN
Status: DISCONTINUED | OUTPATIENT
Start: 2020-03-13 | End: 2020-03-19 | Stop reason: HOSPADM

## 2020-03-13 RX ADMIN — BUSPIRONE HYDROCHLORIDE 7.5 MG: 5 TABLET ORAL at 21:08

## 2020-03-13 RX ADMIN — MEMANTINE 5 MG: 5 TABLET ORAL at 17:14

## 2020-03-13 RX ADMIN — APIXABAN 2.5 MG: 2.5 TABLET, FILM COATED ORAL at 08:37

## 2020-03-13 RX ADMIN — FAMOTIDINE 20 MG: 20 TABLET ORAL at 08:37

## 2020-03-13 RX ADMIN — Medication: at 21:09

## 2020-03-13 RX ADMIN — STANDARDIZED SENNA CONCENTRATE 17.2 MG: 8.6 TABLET ORAL at 21:08

## 2020-03-13 RX ADMIN — FAMOTIDINE 20 MG: 20 TABLET ORAL at 17:13

## 2020-03-13 RX ADMIN — ACETAMINOPHEN 650 MG: 325 TABLET, FILM COATED ORAL at 18:22

## 2020-03-13 RX ADMIN — MIRTAZAPINE 15 MG: 15 TABLET, FILM COATED ORAL at 21:08

## 2020-03-13 RX ADMIN — APIXABAN 2.5 MG: 2.5 TABLET, FILM COATED ORAL at 17:13

## 2020-03-13 RX ADMIN — POLYETHYLENE GLYCOL 3350 17 G: 17 POWDER, FOR SOLUTION ORAL at 08:37

## 2020-03-13 RX ADMIN — MEMANTINE 5 MG: 5 TABLET ORAL at 08:36

## 2020-03-13 RX ADMIN — SODIUM CHLORIDE 100 ML/HR: 0.45 INJECTION, SOLUTION INTRAVENOUS at 19:51

## 2020-03-13 RX ADMIN — ERTAPENEM SODIUM 1000 MG: 1 INJECTION, POWDER, LYOPHILIZED, FOR SOLUTION INTRAMUSCULAR; INTRAVENOUS at 06:22

## 2020-03-13 RX ADMIN — QUETIAPINE FUMARATE 25 MG: 25 TABLET ORAL at 21:08

## 2020-03-13 RX ADMIN — POTASSIUM CHLORIDE 10 MEQ: 750 TABLET, EXTENDED RELEASE ORAL at 08:36

## 2020-03-13 RX ADMIN — METOPROLOL TARTRATE 12.5 MG: 25 TABLET ORAL at 21:08

## 2020-03-13 RX ADMIN — BUSPIRONE HYDROCHLORIDE 7.5 MG: 5 TABLET ORAL at 17:10

## 2020-03-13 RX ADMIN — BUSPIRONE HYDROCHLORIDE 7.5 MG: 5 TABLET ORAL at 08:36

## 2020-03-13 RX ADMIN — METOPROLOL TARTRATE 12.5 MG: 25 TABLET ORAL at 08:37

## 2020-03-13 RX ADMIN — IPRATROPIUM BROMIDE 0.5 MG: 0.5 SOLUTION RESPIRATORY (INHALATION) at 19:38

## 2020-03-13 NOTE — ASSESSMENT & PLAN NOTE
Most likely due to combination of obstructive uropathy and sepsis  Baseline creatinine appears to be around 1 4-1 6  Creatinine upon admission was 2 30  Creatinine has come down from 2 24 yesterday to 1 96 today  I think it is okay to reduce fluids a little bit  Will recheck creatinine tomorrow

## 2020-03-13 NOTE — PLAN OF CARE
Problem: Prexisting or High Potential for Compromised Skin Integrity  Goal: Skin integrity is maintained or improved  Description  INTERVENTIONS:  - Identify patients at risk for skin breakdown  - Assess and monitor skin integrity  - Assess and monitor nutrition and hydration status  - Monitor labs   - Assess for incontinence   - Turn and reposition patient  - Assist with mobility/ambulation  - Relieve pressure over bony prominences  - Avoid friction and shearing  - Provide appropriate hygiene as needed including keeping skin clean and dry  - Evaluate need for skin moisturizer/barrier cream  - Collaborate with interdisciplinary team   - Patient/family teaching  - Consider wound care consult   Outcome: Progressing     Problem: PAIN - ADULT  Goal: Verbalizes/displays adequate comfort level or baseline comfort level  Description  Interventions:  - Encourage patient to monitor pain and request assistance  - Assess pain using appropriate pain scale  - Administer analgesics based on type and severity of pain and evaluate response  - Implement non-pharmacological measures as appropriate and evaluate response  - Consider cultural and social influences on pain and pain management  - Notify physician/advanced practitioner if interventions unsuccessful or patient reports new pain  Outcome: Progressing     Problem: INFECTION - ADULT  Goal: Absence or prevention of progression during hospitalization  Description  INTERVENTIONS:  - Assess and monitor for signs and symptoms of infection  - Monitor lab/diagnostic results  - Monitor all insertion sites, i e  indwelling lines, tubes, and drains  - Monitor endotracheal if appropriate and nasal secretions for changes in amount and color  - [place] appropriate cooling/warming therapies per order  - Administer medications as ordered  - Instruct and encourage patient and family to use good hand hygiene technique  - Identify and instruct in appropriate isolation precautions for identified infection/condition  Outcome: Progressing  Goal: Absence of fever/infection during neutropenic period  Description  INTERVENTIONS:  - Monitor WBC    Outcome: Progressing     Problem: SAFETY ADULT  Goal: Patient will remain free of falls  Description  INTERVENTIONS:  - Assess patient frequently for physical needs  -  Identify cognitive and physical deficits and behaviors that affect risk of falls    -  [place] fall precautions as indicated by assessment   - Educate patient/family on patient safety including physical limitations  - Instruct patient to call for assistance with activity based on assessment  - Modify environment to reduce risk of injury  - Consider OT/PT consult to assist with strengthening/mobility  Outcome: Progressing  Goal: Maintain or return to baseline ADL function  Description  INTERVENTIONS:  -  Assess patient's ability to carry out ADLs; assess patient's baseline for ADL function and identify physical deficits which impact ability to perform ADLs (bathing, care of mouth/teeth, toileting, grooming, dressing, etc )  - Assess/evaluate cause of self-care deficits   - Assess range of motion  - Assess patient's mobility; develop plan if impaired  - Assess patient's need for assistive devices and provide as appropriate  - Encourage maximum independence but intervene and supervise when necessary  - Involve family in performance of ADLs  - Assess for home care needs following discharge   - Consider OT consult to assist with ADL evaluation and planning for discharge  - Provide patient education as appropriate  Outcome: Progressing  Goal: Maintain or return mobility status to optimal level  Description  INTERVENTIONS:  - Assess patient's baseline mobility status (ambulation, transfers, stairs, etc )    - Identify cognitive and physical deficits and behaviors that affect mobility  - Identify mobility aids required to assist with transfers and/or ambulation (gait belt, sit-to-stand, lift, walker, cane, etc )  - [place] fall precautions as indicated by assessment  - Record patient progress and toleration of activity level on Mobility SBAR; progress patient to next Phase/Stage  - Instruct patient to call for assistance with activity based on assessment  - Consider rehabilitation consult to assist with strengthening/weightbearing, etc   Outcome: Progressing     Problem: DISCHARGE PLANNING  Goal: Discharge to home or other facility with appropriate resources  Description  INTERVENTIONS:  - Identify barriers to discharge w/patient and caregiver  - Arrange for needed discharge resources and transportation as appropriate  - Identify discharge learning needs (meds, wound care, etc )  - Arrange for interpretive services to assist at discharge as needed  - Refer to Case Management Department for coordinating discharge planning if the patient needs post-hospital services based on physician/advanced practitioner order or complex needs related to functional status, cognitive ability, or social support system  Outcome: Progressing     Problem: Knowledge Deficit  Goal: Patient/family/caregiver demonstrates understanding of disease process, treatment plan, medications, and discharge instructions  Description  Complete learning assessment and assess knowledge base  Interventions:  - Provide teaching at level of understanding  - Provide teaching via preferred learning methods  Outcome: Progressing     Problem: Nutrition/Hydration-ADULT  Goal: Nutrient/Hydration intake appropriate for improving, restoring or maintaining nutritional needs  Description  Monitor and assess patient's nutrition/hydration status for malnutrition  Collaborate with interdisciplinary team and initiate plan and interventions as ordered  Monitor patient's weight and dietary intake as ordered or per policy  Utilize nutrition screening tool and intervene as necessary   Determine patient's food preferences and provide high-protein, high-caloric foods as appropriate       INTERVENTIONS:  - Monitor oral intake, urinary output, labs, and treatment plans  - Assess nutrition and hydration status and recommend course of action  - Evaluate amount of meals eaten  - Assist patient with eating if necessary   - Allow adequate time for meals  - Recommend/ encourage appropriate diets, oral nutritional supplements, and vitamin/mineral supplements  - Order, calculate, and assess calorie counts as needed  - Recommend, monitor, and adjust tube feedings and TPN/PPN based on assessed needs  - Assess need for intravenous fluids  - Provide specific nutrition/hydration education as appropriate  - Include patient/family/caregiver in decisions related to nutrition  Outcome: Progressing

## 2020-03-13 NOTE — PROGRESS NOTES
Progress Note - Boaz Ramirez [facility] 1950, 68 y o  male MRN: [id number]    Unit/Bed#: S -01 Encounter: [id number]    Primary Care Provider: Mana Rios MD   Date and time admitted to hospital: 3/10/2020  8:43 PM        Fecal impaction [facility])  Assessment & Plan  · Patient is a wife is at bedside reports bowel movement yet today and today which was normal  · Consider enema  · For now, will continue with home regimen under direction of patient's wife  Bilateral nephrolithiasis  Assessment & Plan  · Presentation:  Multiple episodes of vomiting that occurred in the evening  Temperature a 100 3° in EMS  From nursing facility  · History:  Suprapubic catheter/neurogenic bladder, history of nephrolithiasis requiring right nephrostomy tube in the past  · Suprapubic catheter site without obvious infection  · CT abdomen:  Bilateral nephrolithiasis with 1 2 x 0 9 cm obstructing stone in the left ureteral junction with mild-to-moderate hydronephrosis and obstructive uropathy  Nonobstructing stones in the right kidney  ·     · Patient had left-sided nephrostomy tube placed 3/11  It is draining well  Continue nephrostomy tube in management as per Urology  Neurogenic bladder  Assessment & Plan  · CT abdomen:  "Bladder is collapsed around suprapubic catheter  There was some fat stranding adjacent to the bladder which could be related to catheter placement versus cystitis "    There did appear to be some cellulitis around the suprapubic catheter  Per ID, this appears to be improving with antibiotics  Dementia with behavioral disturbance ([facility] [address] )  Assessment & Plan  · Nonverbal at baseline  Patient does not actually have behavioral disturbance  · Prior history of CVA  · Home regimen:  · BuSpar 7 5 mg t i d   · Namenda 5 mg b i d  · Remeron 50 mg HS  · Seroquel 25 mg HS  ·     Essential hypertension  Assessment & Plan  · Home regimen:  Amlodipine 5 mg b i d , metoprolol tartrate 12 5 mg b i d      Blood pressure has been controlled  Currently on Lopressor but amlodipine is on hold  Continue Lopressor for now  Acute kidney injury [facility])  Assessment & Plan  Most likely due to combination of obstructive uropathy and sepsis  Baseline creatinine appears to be around 1 4-1 6  Creatinine upon admission was 2 30  Creatinine has come down from 2 24 yesterday to 1 96 today  I think it is okay to reduce fluids a little bit  Will recheck creatinine tomorrow  History of DVT (deep vein thrombosis)  Assessment & Plan  · Continue Eliquis  * Sepsis ([address] )  Assessment & Plan  · Present on admission-as evidenced by fever, tachycardia, leukocytosis  · Urinalysis:  Innumerable WBC  · CT scan:  Obstructing left nephrolithiasis    Cefepime have been changed yesterday to our meropenem  However, patient's wife states that because of his reaction to Zosyn he needs to be on ertapenem instead  Therefore ertapenem was started and patient is tolerating it well so far  Blood cultures are growing gram-negative rods  Id notes reviewed and appreciate their assistance  Continue ertapenem  Patient is still spiking fevers at times including when I saw him today  Per ID, we may have to consider repeat CT imaging of the abdomen/pelvis if he continues to spike fevers  Nonetheless, white blood cell count has improved  Urine culture growing ESBL producing E coli and Providencia both sensitive to ertapenem  Blood cultures are growing E coli and 1 set is also growing Providencia  Continue ertapenem  Id following and case was discussed with them  Subjective/Objective     Subjective:   Patient seen and examined with wife at bedside  He is currently sweaty and has some wheezing which appears to be up in his throat as opposed to his lungs  Per nursing and according to his wife he was doing much better earlier in the day and has been eating more  We did check his temperature during visit and was 101  2    No other issues reported  Objective:  Vitals: Blood pressure 169/88, pulse (!) 106, temperature (!) 101 2 °F (38 4 °C), temperature source Axillary, resp  rate 18, weight 103 kg (227 lb 8 2 oz), SpO2 91 %  ,Body mass index is 31 73 kg/m²  Intake/Output Summary (Last 24 hours) at 3/13/2020 1834  Last data filed at 3/13/2020 1709  Gross per 24 hour   Intake 840 ml   Output 1701 ml   Net -861 ml       Invasive Devices     Peripheral Intravenous Line            Peripheral IV 03/10/20 Right Antecubital 2 days          Drain            Suprapubic Catheter Latex 18 Fr  108 days    Nephrostomy Left 1 8 5 Fr  2 days                Physical Exam: /88 (BP Location: Left arm)   Pulse (!) 106   Temp (!) 101 2 °F (38 4 °C) (Axillary)   Resp 18   Wt 103 kg (227 lb 8 2 oz) Comment: pt not ambulating   SpO2 91%   BMI 31 73 kg/m²   General appearance: Awake  Nonverbal   Has some wheezing  Is sweaty  Head: Normocephalic, without obvious abnormality, atraumatic  Eyes: No scleral icterus  Lungs: clear to auscultation bilaterally  Heart: Tachycardic with regular rhythm  No murmurs, rubs or gallops  Extremities: extremities normal, warm and well-perfused; no cyanosis, clubbing, or edema  Neurologic:  Patient nonverbal   :  Suprapubic catheter in place draining clear yellow urine  Left-sided nephrostomy tube in place draining clear yellow urine  Lab, Imaging and other studies: I have personally reviewed pertinent reports      VTE Pharmacologic Prophylaxis:  Eliquis  VTE Mechanical Prophylaxis: sequential compression device

## 2020-03-13 NOTE — ASSESSMENT & PLAN NOTE
· Home regimen:  Amlodipine 5 mg b i d , metoprolol tartrate 12 5 mg b i d  Blood pressure has been controlled  Currently on Lopressor but amlodipine is on hold  Continue Lopressor for now

## 2020-03-13 NOTE — ASSESSMENT & PLAN NOTE
· Present on admission-as evidenced by fever, tachycardia, leukocytosis  · Urinalysis:  Innumerable WBC  · CT scan:  Obstructing left nephrolithiasis    Cefepime have been changed yesterday to our meropenem  However, patient's wife states that because of his reaction to Zosyn he needs to be on ertapenem instead  Therefore ertapenem was started and patient is tolerating it well so far  Blood cultures are growing gram-negative rods  Id notes reviewed and appreciate their assistance  Continue ertapenem  Patient is still spiking fevers at times including when I saw him today  Per ID, we may have to consider repeat CT imaging of the abdomen/pelvis if he continues to spike fevers  Nonetheless, white blood cell count has improved  Urine culture growing ESBL producing E coli and Providencia both sensitive to ertapenem  Blood cultures are growing E coli and 1 set is also growing Providencia  Continue ertapenem  Id following and case was discussed with them

## 2020-03-13 NOTE — ASSESSMENT & PLAN NOTE
· Presentation:  Multiple episodes of vomiting that occurred in the evening  Temperature a 100 3° in EMS  From nursing facility  · History:  Suprapubic catheter/neurogenic bladder, history of nephrolithiasis requiring right nephrostomy tube in the past  · Suprapubic catheter site without obvious infection  · CT abdomen:  Bilateral nephrolithiasis with 1 2 x 0 9 cm obstructing stone in the left ureteral junction with mild-to-moderate hydronephrosis and obstructive uropathy  Nonobstructing stones in the right kidney  ·     · Patient had left-sided nephrostomy tube placed 3/11  It is draining well  Continue nephrostomy tube in management as per Urology

## 2020-03-13 NOTE — PROGRESS NOTES
Progress Note - Infectious Disease   Mariel Tomas 68 y o  male MRN: [id number]  Unit/Bed#: S -01 Encounter: [id number]      Impression/Plan:  1   Sepsis   POA   Fever, tachycardia, leukocytosis, and RUPESH   Patient with extensive nephrolithiasis history with new obstructing left ureteral kidney stone on CT scan now status post left nephrostomy tube placement   Blood cultures positive for gram-negative rods   Most recent urinary microbiology history was for Pseudomonas sensitive to cefepime, meropenem and ESBL EColi sensitive to ertapenem   Leukocytosis up to 25 72, now down to 18 03  Procalcitonin level 272 37  -continue ertapenem for now at current dose  -drain management  -recheck CBC with diff and BMP   -recheck procalcitonin level  -supportive care     2   E coli bacteremia    In patient with obstructing left ureteral stone with nephrostomy urine and suprapubic Thorne urine both growing greater than 100,000 colonies of E coli, Providencia and [phone] colonies of Enterococcus   -continue ertapenem for now at current dose  -drain management  -recheck CBC with diff and BMP   -recheck procalcitonin level  -supportive care     3   Left pyelonephritis with acute obstructing left ureteral stone   Likely source of #2  Patient is status post percutaneous left nephrostomy tube placement   nephrostomy urine and suprapubic Thorne urine cxs both growing greater than 100,000 colonies of E coli, Providencia and [phone] colonies of Enterococcus   -continue ertapenem for now at current dose  -follow up sensitivities and adjust antibiotics as needed  -drain management  -recheck CBC with diff and BMP   -recheck procalcitonin level  -supportive care     4  Acute kidney injury   Likely secondary to sepsis and obstructing stone   Creatinine 2 3 > 1 96  -monitor creatinine  -renal dose antibiotics as needed      5   Cellulitis lower abdominal wall   Possible secondary to central scabbed soft tissue boil   Not known to have a prior suprapubic site at this location   clinically improving  -serial exam  -antibiotic as above        6  Aphasia with CVA      -supportive care per primary care team     7   Multiple listed antibiotic allergies: History of anaphylaxis to Zosyn, gentamicin, vancomycin  Patient is tolerating cefepime well and has tolerated ertapenem and cefdinir in past      Documented event noted 3/11/20 of patient's wife refusing meropenem due to his cardiac arrest history with Zosyn  Patient did receive 1 dose of meropenem 3/11/20 16:40 hours without adverse event  Ertapenem was then started 5:00 a m  3/12/20   -Continues ertapenem with close clinical monitoring     Antibiotics:  Meropenem to Ertapenem D3     Above plan discussed in detail with patient, RN, Lacy, Urology, NP, and Dr Vicky Edwards  Infectious disease on-call physician available as needed over weekend  Otherwise will resume follow-up on 2020      Subjective:  Patient nonverbal and subsequently offering no complaints  RN reports that patient takes pills crushed in applesauce  No reported stool  No reported abdominal pain  ROS: Patient did have fever to 102 at 2200, now trended down; no nausea, vomiting, diarrhea; no cough, shortness of breath; no pain  Objective:  Vitals:  Temp:  [97 5 °F (36 4 °C)-102 °F (38 9 °C)] 99 °F (37 2 °C)  HR:  [75-76] 76  Resp:  [18] 18  BP: (127-155)/(73-92) 138/82  SpO2:  [92 %-93 %] 92 %  Temp (24hrs), Av 6 °F (37 6 °C), Min:97 5 °F (36 4 °C), Max:102 °F (38 9 °C)  Current: Temperature: 99 °F (37 2 °C)    General Appearance:  Awakes to his name, alert, cooperative, resting in bed, no acute distress, number   Throat: Oropharynx moist without lesions  Lungs:   No cough, respirations unlabored   Heart:  RRR   Abdomen:   Soft, non-tender, non-distended, positive bowel sounds  : Suprapubic tube in place with clear urine in Thorne    Scabbed area beneath suprapubic tube without active drainage and steadily decreasing erythema and induration  Left nephrostomy tube with arun urine  Extremities: No edema, arm IV site nontender   Skin: No rashes      Labs, Imaging, & Other studies:   All pertinent labs and imaging studies were personally reviewed  Results from last 7 days   Lab Units 03/13/20  1131 03/12/20  0533 03/11/20  0505   WBC Thousand/uL 18 03* 25 72* 20 24*   HEMOGLOBIN g/dL 12 3 11 7* 13 3   PLATELETS Thousands/uL 146* 159 199     Results from last 7 days   Lab Units 03/13/20  1131  03/11/20  0505 03/10/20  2111   POTASSIUM mmol/L 3 7   < > 3 3* 3 6   CHLORIDE mmol/L 110*   < > 111* 107   CO2 mmol/L 22   < > 24 27   BUN mg/dL 27*   < > 21 19   CREATININE mg/dL 1 96*   < > 2 30* 1 59*   EGFR ml/min/1 73sq m 33   < > 28 43   CALCIUM mg/dL 8 0*   < > 8 0* 8 9   AST U/L 20  --  16 12   ALT U/L 14  --  14 17   ALK PHOS U/L 77  --  84 117*    < > = values in this interval not displayed       Results from last 7 days   Lab Units 03/12/20  0533 03/11/20  1050   PROCALCITONIN ng/ml 272 37* 278 64*     Results from last 7 days   Lab Units 03/11/20  2243 03/11/20  0338 03/10/20  2136   BLOOD CULTURE   --   --  Escherichia coli*  Escherichia coli*   GRAM STAIN RESULT   --   --  Gram negative rods*  Gram negative rods*   URINE CULTURE   --  >100,000 cfu/ml Providencia stuartii*  >100,000 cfu/ml Escherichia coli ESBL*  30,000-39,000 cfu/ml Enterococcus faecalis* >100,000 cfu/ml Oxidase Positive gram negative sarah*  >100,000 cfu/ml Non lactose fermenting gram negative sarah*  >100,000 cfu/ml Beta Hemolytic Streptococcus Group F*   MRSA CULTURE ONLY  No Methicillin Resistant Staphlyococcus aureus (MRSA) isolated  --   --

## 2020-03-14 LAB
ANION GAP SERPL CALCULATED.3IONS-SCNC: 6 MMOL/L (ref 4–13)
BACTERIA UR CULT: ABNORMAL
BASOPHILS # BLD AUTO: 0.04 THOUSANDS/ΜL (ref 0–0.1)
BASOPHILS NFR BLD AUTO: 0 % (ref 0–1)
BUN SERPL-MCNC: 24 MG/DL (ref 5–25)
CALCIUM SERPL-MCNC: 8.1 MG/DL (ref 8.3–10.1)
CHLORIDE SERPL-SCNC: 111 MMOL/L (ref 100–108)
CO2 SERPL-SCNC: 24 MMOL/L (ref 21–32)
CREAT SERPL-MCNC: 1.71 MG/DL (ref 0.6–1.3)
EOSINOPHIL # BLD AUTO: 0.35 THOUSAND/ΜL (ref 0–0.61)
EOSINOPHIL NFR BLD AUTO: 3 % (ref 0–6)
ERYTHROCYTE [DISTWIDTH] IN BLOOD BY AUTOMATED COUNT: 14.6 % (ref 11.6–15.1)
GFR SERPL CREATININE-BSD FRML MDRD: 39 ML/MIN/1.73SQ M
GLUCOSE SERPL-MCNC: 123 MG/DL (ref 65–140)
HCT VFR BLD AUTO: 36.7 % (ref 36.5–49.3)
HGB BLD-MCNC: 11.7 G/DL (ref 12–17)
IMM GRANULOCYTES # BLD AUTO: 0.06 THOUSAND/UL (ref 0–0.2)
IMM GRANULOCYTES NFR BLD AUTO: 1 % (ref 0–2)
LYMPHOCYTES # BLD AUTO: 0.99 THOUSANDS/ΜL (ref 0.6–4.47)
LYMPHOCYTES NFR BLD AUTO: 8 % (ref 14–44)
MAGNESIUM SERPL-MCNC: 2.1 MG/DL (ref 1.6–2.6)
MCH RBC QN AUTO: 29 PG (ref 26.8–34.3)
MCHC RBC AUTO-ENTMCNC: 31.9 G/DL (ref 31.4–37.4)
MCV RBC AUTO: 91 FL (ref 82–98)
MONOCYTES # BLD AUTO: 0.65 THOUSAND/ΜL (ref 0.17–1.22)
MONOCYTES NFR BLD AUTO: 6 % (ref 4–12)
NEUTROPHILS # BLD AUTO: 9.83 THOUSANDS/ΜL (ref 1.85–7.62)
NEUTS SEG NFR BLD AUTO: 82 % (ref 43–75)
NRBC BLD AUTO-RTO: 0 /100 WBCS
PLATELET # BLD AUTO: 145 THOUSANDS/UL (ref 149–390)
PMV BLD AUTO: 10.7 FL (ref 8.9–12.7)
POTASSIUM SERPL-SCNC: 3.7 MMOL/L (ref 3.5–5.3)
PROCALCITONIN SERPL-MCNC: 62.18 NG/ML
RBC # BLD AUTO: 4.04 MILLION/UL (ref 3.88–5.62)
SODIUM SERPL-SCNC: 141 MMOL/L (ref 136–145)
WBC # BLD AUTO: 11.92 THOUSAND/UL (ref 4.31–10.16)

## 2020-03-14 PROCEDURE — 99233 SBSQ HOSP IP/OBS HIGH 50: CPT | Performed by: INTERNAL MEDICINE

## 2020-03-14 PROCEDURE — 84145 PROCALCITONIN (PCT): CPT | Performed by: FAMILY MEDICINE

## 2020-03-14 PROCEDURE — 80048 BASIC METABOLIC PNL TOTAL CA: CPT | Performed by: FAMILY MEDICINE

## 2020-03-14 PROCEDURE — 85025 COMPLETE CBC W/AUTO DIFF WBC: CPT | Performed by: FAMILY MEDICINE

## 2020-03-14 PROCEDURE — 83735 ASSAY OF MAGNESIUM: CPT | Performed by: FAMILY MEDICINE

## 2020-03-14 PROCEDURE — 99233 SBSQ HOSP IP/OBS HIGH 50: CPT | Performed by: FAMILY MEDICINE

## 2020-03-14 RX ORDER — LINEZOLID 600 MG/1
600 TABLET, FILM COATED ORAL EVERY 12 HOURS SCHEDULED
Status: DISCONTINUED | OUTPATIENT
Start: 2020-03-14 | End: 2020-03-14

## 2020-03-14 RX ADMIN — METOPROLOL TARTRATE 12.5 MG: 25 TABLET ORAL at 21:50

## 2020-03-14 RX ADMIN — QUETIAPINE FUMARATE 25 MG: 25 TABLET ORAL at 21:54

## 2020-03-14 RX ADMIN — SODIUM CHLORIDE 75 ML/HR: 0.45 INJECTION, SOLUTION INTRAVENOUS at 17:19

## 2020-03-14 RX ADMIN — BUSPIRONE HYDROCHLORIDE 7.5 MG: 5 TABLET ORAL at 08:59

## 2020-03-14 RX ADMIN — METOPROLOL TARTRATE 12.5 MG: 25 TABLET ORAL at 09:00

## 2020-03-14 RX ADMIN — FAMOTIDINE 20 MG: 20 TABLET ORAL at 09:00

## 2020-03-14 RX ADMIN — MEMANTINE 5 MG: 5 TABLET ORAL at 17:14

## 2020-03-14 RX ADMIN — APIXABAN 2.5 MG: 2.5 TABLET, FILM COATED ORAL at 09:00

## 2020-03-14 RX ADMIN — Medication: at 21:57

## 2020-03-14 RX ADMIN — ERTAPENEM SODIUM 1000 MG: 1 INJECTION, POWDER, LYOPHILIZED, FOR SOLUTION INTRAMUSCULAR; INTRAVENOUS at 05:20

## 2020-03-14 RX ADMIN — POTASSIUM CHLORIDE 10 MEQ: 750 TABLET, EXTENDED RELEASE ORAL at 08:59

## 2020-03-14 RX ADMIN — FAMOTIDINE 20 MG: 20 TABLET ORAL at 17:13

## 2020-03-14 RX ADMIN — APIXABAN 2.5 MG: 2.5 TABLET, FILM COATED ORAL at 17:14

## 2020-03-14 RX ADMIN — BUSPIRONE HYDROCHLORIDE 7.5 MG: 5 TABLET ORAL at 17:13

## 2020-03-14 RX ADMIN — BUSPIRONE HYDROCHLORIDE 7.5 MG: 5 TABLET ORAL at 21:54

## 2020-03-14 RX ADMIN — LINEZOLID 600 MG: 600 TABLET, FILM COATED ORAL at 12:37

## 2020-03-14 RX ADMIN — MEMANTINE 5 MG: 5 TABLET ORAL at 08:59

## 2020-03-14 RX ADMIN — SODIUM CHLORIDE 100 ML/HR: 0.45 INJECTION, SOLUTION INTRAVENOUS at 05:55

## 2020-03-14 NOTE — ASSESSMENT & PLAN NOTE
· Home regimen:  Amlodipine 5 mg b i d , metoprolol tartrate 12 5 mg b i d  Because blood pressure was a little bit low amlodipine was being held  Blood pressure is slightly elevated today  Will continue Lopressor and will consider starting back amlodipine tomorrow if blood pressure is still a little elevated

## 2020-03-14 NOTE — ASSESSMENT & PLAN NOTE
· CT abdomen:  "Bladder is collapsed around suprapubic catheter  There was some fat stranding adjacent to the bladder which could be related to catheter placement versus cystitis "    There did appear to be some cellulitis around the suprapubic catheter  This is improving with antibiotics

## 2020-03-14 NOTE — ASSESSMENT & PLAN NOTE
· Present on admission-as evidenced by fever, tachycardia, leukocytosis  · Urinalysis:  Innumerable WBC  · CT scan:  Obstructing left nephrolithiasis    Cefepime had been changed to meropenem  However, patient's wife states that because of his reaction to Zosyn he needs to be on ertapenem instead  Therefore ertapenem was started and patient is tolerating it well  Urine culture growing ESBL producing E coli, Providencia, beta-hemolytic Streptococcus, Enterococcus faecalis and Pseudomonas  Blood cultures are growing E coli and 1 set is also growing Providencia  Also, 1 set of blood cultures is growing Enterococcus  Discussed with both ID and also discussed with patient's wife who wants to be notified of any antibiotic changes  We were initially going to put patient on daptomycin for Enterococcus  However, patient's wife was very apprehensive about this explaining that patient had reactions to vancomycin and clindamycin as well as gentamicin the past   I explained to her that daptomycin is completely different, but she still did not want to use daptomycin  She is agreeable to using linezolid  We did explain to her that serotonin syndrome can occur as a reaction with Remeron but that it is very rare  She expressed understanding but is okay with trying linezolid  Will order linezolid and will hold Remeron for now  Repeat blood cultures were ordered by Dr Shelbie Gutierrez for tomorrow  Also continue ertapenem  Patient was still spiking fevers yesterday, though he is been okay today  White blood cell count is almost normal now

## 2020-03-14 NOTE — PLAN OF CARE
Problem: Prexisting or High Potential for Compromised Skin Integrity  Goal: Skin integrity is maintained or improved  Description  INTERVENTIONS:  - Identify patients at risk for skin breakdown  - Assess and monitor skin integrity  - Assess and monitor nutrition and hydration status  - Monitor labs   - Assess for incontinence   - Turn and reposition patient  - Assist with mobility/ambulation  - Relieve pressure over bony prominences  - Avoid friction and shearing  - Provide appropriate hygiene as needed including keeping skin clean and dry  - Evaluate need for skin moisturizer/barrier cream  - Collaborate with interdisciplinary team   - Patient/family teaching  - Consider wound care consult   Outcome: Progressing     Problem: PAIN - ADULT  Goal: Verbalizes/displays adequate comfort level or baseline comfort level  Description  Interventions:  - Encourage patient to monitor pain and request assistance  - Assess pain using appropriate pain scale  - Administer analgesics based on type and severity of pain and evaluate response  - Implement non-pharmacological measures as appropriate and evaluate response  - Consider cultural and social influences on pain and pain management  - Notify physician/advanced practitioner if interventions unsuccessful or patient reports new pain  Outcome: Progressing     Problem: INFECTION - ADULT  Goal: Absence or prevention of progression during hospitalization  Description  INTERVENTIONS:  - Assess and monitor for signs and symptoms of infection  - Monitor lab/diagnostic results  - Monitor all insertion sites, i e  indwelling lines, tubes, and drains  - Monitor endotracheal if appropriate and nasal secretions for changes in amount and color  - [place] appropriate cooling/warming therapies per order  - Administer medications as ordered  - Instruct and encourage patient and family to use good hand hygiene technique  - Identify and instruct in appropriate isolation precautions for identified infection/condition  Outcome: Progressing  Goal: Absence of fever/infection during neutropenic period  Description  INTERVENTIONS:  - Monitor WBC    Outcome: Progressing     Problem: SAFETY ADULT  Goal: Patient will remain free of falls  Description  INTERVENTIONS:  - Assess patient frequently for physical needs  -  Identify cognitive and physical deficits and behaviors that affect risk of falls    -  [place] fall precautions as indicated by assessment   - Educate patient/family on patient safety including physical limitations  - Instruct patient to call for assistance with activity based on assessment  - Modify environment to reduce risk of injury  - Consider OT/PT consult to assist with strengthening/mobility  Outcome: Progressing  Goal: Maintain or return to baseline ADL function  Description  INTERVENTIONS:  -  Assess patient's ability to carry out ADLs; assess patient's baseline for ADL function and identify physical deficits which impact ability to perform ADLs (bathing, care of mouth/teeth, toileting, grooming, dressing, etc )  - Assess/evaluate cause of self-care deficits   - Assess range of motion  - Assess patient's mobility; develop plan if impaired  - Assess patient's need for assistive devices and provide as appropriate  - Encourage maximum independence but intervene and supervise when necessary  - Involve family in performance of ADLs  - Assess for home care needs following discharge   - Consider OT consult to assist with ADL evaluation and planning for discharge  - Provide patient education as appropriate  Outcome: Progressing  Goal: Maintain or return mobility status to optimal level  Description  INTERVENTIONS:  - Assess patient's baseline mobility status (ambulation, transfers, stairs, etc )    - Identify cognitive and physical deficits and behaviors that affect mobility  - Identify mobility aids required to assist with transfers and/or ambulation (gait belt, sit-to-stand, lift, walker, cane, etc )  - [place] fall precautions as indicated by assessment  - Record patient progress and toleration of activity level on Mobility SBAR; progress patient to next Phase/Stage  - Instruct patient to call for assistance with activity based on assessment  - Consider rehabilitation consult to assist with strengthening/weightbearing, etc   Outcome: Progressing     Problem: DISCHARGE PLANNING  Goal: Discharge to home or other facility with appropriate resources  Description  INTERVENTIONS:  - Identify barriers to discharge w/patient and caregiver  - Arrange for needed discharge resources and transportation as appropriate  - Identify discharge learning needs (meds, wound care, etc )  - Arrange for interpretive services to assist at discharge as needed  - Refer to Case Management Department for coordinating discharge planning if the patient needs post-hospital services based on physician/advanced practitioner order or complex needs related to functional status, cognitive ability, or social support system  Outcome: Progressing     Problem: Knowledge Deficit  Goal: Patient/family/caregiver demonstrates understanding of disease process, treatment plan, medications, and discharge instructions  Description  Complete learning assessment and assess knowledge base  Interventions:  - Provide teaching at level of understanding  - Provide teaching via preferred learning methods  Outcome: Progressing     Problem: Nutrition/Hydration-ADULT  Goal: Nutrient/Hydration intake appropriate for improving, restoring or maintaining nutritional needs  Description  Monitor and assess patient's nutrition/hydration status for malnutrition  Collaborate with interdisciplinary team and initiate plan and interventions as ordered  Monitor patient's weight and dietary intake as ordered or per policy  Utilize nutrition screening tool and intervene as necessary   Determine patient's food preferences and provide high-protein, high-caloric foods as appropriate       INTERVENTIONS:  - Monitor oral intake, urinary output, labs, and treatment plans  - Assess nutrition and hydration status and recommend course of action  - Evaluate amount of meals eaten  - Assist patient with eating if necessary   - Allow adequate time for meals  - Recommend/ encourage appropriate diets, oral nutritional supplements, and vitamin/mineral supplements  - Order, calculate, and assess calorie counts as needed  - Recommend, monitor, and adjust tube feedings and TPN/PPN based on assessed needs  - Assess need for intravenous fluids  - Provide specific nutrition/hydration education as appropriate  - Include patient/family/caregiver in decisions related to nutrition  Outcome: Progressing

## 2020-03-14 NOTE — PROGRESS NOTES
Progress Note - [facility] 1950, 68 y o  male MRN: [id number]    Unit/Bed#: S -01 Encounter: [id number]    Primary Care Provider: Draryl Sarkar MD   Date and time admitted to hospital: 3/10/2020  8:43 PM        Fecal impaction [facility])  Assessment & Plan  · Patient is a wife is at bedside reports bowel movement yet today and today which was normal  · Consider enema  · For now, will continue with home regimen under direction of patient's wife  Bilateral nephrolithiasis  Assessment & Plan  · Presentation:  Multiple episodes of vomiting that occurred in the evening  Temperature a 100 3° in EMS  From nursing facility  · History:  Suprapubic catheter/neurogenic bladder, history of nephrolithiasis requiring right nephrostomy tube in the past  · Suprapubic catheter site without obvious infection  · CT abdomen:  Bilateral nephrolithiasis with 1 2 x 0 9 cm obstructing stone in the left ureteral junction with mild-to-moderate hydronephrosis and obstructive uropathy  Nonobstructing stones in the right kidney  ·     · Patient had left-sided nephrostomy tube placed 3/11  It is draining well  Continue nephrostomy tube in management as per Urology  Neurogenic bladder  Assessment & Plan  · CT abdomen:  "Bladder is collapsed around suprapubic catheter  There was some fat stranding adjacent to the bladder which could be related to catheter placement versus cystitis "    There did appear to be some cellulitis around the suprapubic catheter  This is improving with antibiotics  Dementia with behavioral disturbance ([facility] [address] )  Assessment & Plan  · Nonverbal at baseline  Patient does not actually have behavioral disturbance  · Prior history of CVA  · Home regimen:  · BuSpar 7 5 mg t i d   · Namenda 5 mg b i d  · Remeron 50 mg HS  · Seroquel 25 mg HS  ·     Essential hypertension  Assessment & Plan  · Home regimen:  Amlodipine 5 mg b i d , metoprolol tartrate 12 5 mg b i d      Because blood pressure was a little bit low amlodipine was being held  Blood pressure is slightly elevated today  Will continue Lopressor and will consider starting back amlodipine tomorrow if blood pressure is still a little elevated  Acute kidney injury [facility])  Assessment & Plan  Most likely due to combination of obstructive uropathy and sepsis  Baseline creatinine appears to be around 1 4-1 6  Creatinine upon admission was 2 30  Creatinine continues to improve  It was 1 96 yesterday and is 1 71 today which is close to patient's baseline  Will scale back on fluid some more and will continue to monitor  History of DVT (deep vein thrombosis)  Assessment & Plan  · Continue Eliquis  * Sepsis ([facility] [address] )  Assessment & Plan  · Present on admission-as evidenced by fever, tachycardia, leukocytosis  · Urinalysis:  Innumerable WBC  · CT scan:  Obstructing left nephrolithiasis    Cefepime had been changed to meropenem  However, patient's wife states that because of his reaction to Zosyn he needs to be on ertapenem instead  Therefore ertapenem was started and patient is tolerating it well  Urine culture growing ESBL producing E coli, Providencia, beta-hemolytic Streptococcus, Enterococcus faecalis and Pseudomonas  Blood cultures are growing E coli and 1 set is also growing Providencia  Also, 1 set of blood cultures is growing Enterococcus  Discussed with both ID and also discussed with patient's wife who wants to be notified of any antibiotic changes  We were initially going to put patient on daptomycin for Enterococcus  However, patient's wife was very apprehensive about this explaining that patient had reactions to vancomycin and clindamycin as well as gentamicin the past   I explained to her that daptomycin is completely different, but she still did not want to use daptomycin  She is agreeable to using linezolid  We did explain to her that serotonin syndrome can occur as a reaction with Remeron but that it is very rare    She expressed understanding but is okay with trying linezolid  Will order linezolid and will hold Remeron for now  Repeat blood cultures were ordered by Dr Halima Dangelo for tomorrow  Also continue ertapenem  Patient was still spiking fevers yesterday, though he is been okay today  White blood cell count is almost normal now  Addendum:  Per nursing, patient's wife is concerned about a rash on his chest and is concerned because he had coded 2 times previously due to antibiotic use  Will stop the linezolid but will continue the ertapenem for now  Subjective/Objective     Subjective:   Patient seen and examined this morning  I cannot get much history out of him because he is nonverbal   He is resting comfortably  His wife was updated over the phone and she expressed appreciation  We did touch upon palliative care services  She does not want hospice care but would like to establish an outpatient relationship with our palliative care team and states that transportation can be arranged  Objective:  Vitals: Blood pressure 157/89, pulse 62, temperature 99 2 °F (37 3 °C), temperature source Axillary, resp  rate 18, weight 104 kg (228 lb 13 4 oz), SpO2 90 %  ,Body mass index is 31 92 kg/m²  Intake/Output Summary (Last 24 hours) at 3/14/2020 1210  Last data filed at 3/14/2020 0529  Gross per 24 hour   Intake 240 ml   Output 2425 ml   Net -2185 ml       Invasive Devices     Peripheral Intravenous Line            Peripheral IV 03/10/20 Right Antecubital 3 days    Peripheral IV 03/11/20 Left Antecubital 3 days          Drain            Suprapubic Catheter Latex 18 Fr  108 days    Nephrostomy Left 1 8 5 Fr  3 days                Physical Exam: /89 (BP Location: Right arm)   Pulse 62   Temp 99 2 °F (37 3 °C) (Axillary)   Resp 18   Wt 104 kg (228 lb 13 4 oz)   SpO2 90%   BMI 31 92 kg/m²   General appearance: Lethargic, nonverbal, chronically ill-appearing    Head: Normocephalic, without obvious abnormality, atraumatic  Eyes: No scleral icterus  Lungs: clear to auscultation bilaterally  Heart: regular rate and rhythm, S1, S2 normal, no murmur, click, rub or gallop  Extremities: extremities normal, warm and well-perfused; no cyanosis, clubbing, or edema  Neurologic:  Nonverbal at baseline  :  Suprapubic catheter in place draining clear yellow urine  Minimal erythema now noted on skin surrounding suprapubic catheter  Left-sided nephrostomy tube in place draining clear yellow urine      Lab, Imaging and other studies: I have personally reviewed pertinent films in PACS  VTE Pharmacologic Prophylaxis:  Eliquis  VTE Mechanical Prophylaxis: sequential compression device

## 2020-03-14 NOTE — PROGRESS NOTES
Progress Note - Infectious Disease   Ace Tomas 68 y o  male MRN: [id number]  Unit/Bed#: S -01 Encounter: [id number]      Impression/Plan:  1   Sepsis   POA   Fever, tachycardia, leukocytosis, and RUPESH   Patient with extensive nephrolithiasis history with new obstructing left ureteral kidney stone on CT scan now status post left nephrostomy tube placement   Blood cultures positive for gram-negative rods   Most recent urinary microbiology history was for Pseudomonas sensitive to cefepime, meropenem and ESBL EColi sensitive to ertapenem   Leukocytosis up to 25 72  Rec:  · Continues ertapenem at present as below  · Had treatment coverage for enterococcus as below  · Follow-up pending blood cultures   · Recheck blood cultures x2 sets as below  · Repeat CBC in am  · Repeat BMP in a m  · Supportive care per primary care and urology teams  · If fever persist despite treatment of the Enterococcus, recheck CT the abdomen and pelvis     2  Polymicrobial  bacteremia   With 2 gram-negative rods and enterococcus growing  Symone Quan source suspected   Doubt endocarditis with the polymicrobial nature, however with ongoing fever would definitely begin treatment for the Enterococcus  However the patient is allergic to vancomycin, ampicillin, and the wife adamantly refuses for the patient to get daptomycin due to the fact that the patient reacts to all "mycins " It has been explained to the wife that daptomycin is in a different class of antibiotics and vancomycin without any cross allergy    However she continues to refuse  Rec:  · Continues ertapenem as below  · If patient's wife would allow, recommend adding daptomycin 8 milligrams/kilogram, dosing weight, Q 24 hours  · The alternative would be using linezolid 600 mg p o  Q 12 hours, but the patient is had a rare risk of serotonin syndrome  · Repeat blood cultures x2 sets  · Repeat CBC in am  · Check echocardiogram     3   Left pyelonephritis with acute obstructing left ureteral stone   Likely source of #2  Patient is status post percutaneous left nephrostomy tube placement  Polymicrobial including gram-negative rods, group B Streptococcus, and Enterococcus  Rec:  · Continues ertapenem  · Recommending adding coverage for the Enterococcus, however the wife refuses for the patient to get daptomycin which leaves linezolid as the only remaining choice without allergy; however with the drug interaction with Remeron there could be rare serotonin syndrome  · Repeat CBC in am  · Percutaneous nephrostomy and suprapubic tube drainage management per Urology     4  Acute kidney injury   Likely secondary to sepsis and obstructing stone and pre renal issues   The renal function has improved    Rec    · Recheck BMP  · Renal dose adjust antibiotics as needed     5   Cellulitis lower abdominal wall   Possible secondary to central scabbed soft tissue boil   Not known to have a prior suprapubic site at this location   clinically improving  Rec    · Serial exams  · Antibiotics as above     6  Aphasia with CVA      Supportive care per primary care team     7   Multiple listed antibiotic allergies: History of anaphylaxis to Zosyn, gentamicin, vancomycin  Patient is tolerating cefepime well and has tolerated ertapenem and cefdinir in past   Seems to be tolerating current antibiotics    Have extensively discussed the above management plan with the primary service    Antibiotics:  Ertapenem 3    Subjective:  Patient still having intermittent fever; no report nausea, vomiting, diarrhea; no reported cough, shortness of breath; no reported pain    He remains nonverbal    Objective:  Vitals:  Temp:  [98 1 °F (36 7 °C)-101 2 °F (38 4 °C)] 99 2 °F (37 3 °C)  HR:  [] 62  Resp:  [18-20] 18  BP: (134-169)/(81-89) 157/89  SpO2:  [90 %-92 %] 90 %  Temp (24hrs), Av 4 °F (37 4 °C), Min:98 1 °F (36 7 °C), Max:101 2 °F (38 4 °C)  Current: Temperature: 99 2 °F (37 3 °C)    Physical Exam:   General Appearance: Alert, nonverbal, interactive, nontoxic, no acute distress  Throat: Oropharynx moist without lesions  Lungs:   Clear to auscultation bilaterally; no wheezes, rhonchi or rales; respirations unlabored   Heart:  RRR; no murmur, rub or gallop   Abdomen:   Soft, non-tender, non-distended, positive bowel sounds  Nephrostomy drain in place    Extremities: No clubbing, cyanosis or edema   Skin: No new rashes or lesions  No draining wounds noted         Labs, Imaging, & Other studies:   All pertinent labs and imaging studies were personally reviewed  Results from last 7 days   Lab Units 03/14/20  0526 03/13/20  1131 03/12/20  0533   WBC Thousand/uL 11 92* 18 03* 25 72*   HEMOGLOBIN g/dL 11 7* 12 3 11 7*   PLATELETS Thousands/uL 145* 146* 159     Results from last 7 days   Lab Units 03/14/20  0526 03/13/20  1131 03/12/20  0533 03/11/20  0505 03/10/20  2111   SODIUM mmol/L 141 142 147* 145 144   POTASSIUM mmol/L 3 7 3 7 4 0 3 3* 3 6   CHLORIDE mmol/L 111* 110* 113* 111* 107   CO2 mmol/L 24 22 25 24 27   BUN mg/dL 24 27* 30* 21 19   CREATININE mg/dL 1 71* 1 96* 2 24* 2 30* 1 59*   EGFR ml/min/1 73sq m 39 33 28 28 43   CALCIUM mg/dL 8 1* 8 0* 8 2* 8 0* 8 9   AST U/L  --  20  --  16 12   ALT U/L  --  14  --  14 17   ALK PHOS U/L  --  77  --  84 117*     Results from last 7 days   Lab Units 03/11/20  2243 03/11/20  0338 03/10/20  2136   BLOOD CULTURE   --   --  Escherichia coli*  Providencia stuartii*  Escherichia coli ESBL*  Enterococcus faecalis*   GRAM STAIN RESULT   --   --  Gram negative rods*  Gram negative rods*   URINE CULTURE   --  >100,000 cfu/ml Providencia stuartii*  >100,000 cfu/ml Escherichia coli ESBL*  >100,000 cfu/ml Beta Hemolytic Streptococcus Group F*  30,000-39,000 cfu/ml Enterococcus faecalis* >100,000 cfu/ml Pseudomonas aeruginosa*  >100,000 cfu/ml Escherichia coli ESBL*  >100,000 cfu/ml Providencia stuartii*  >100,000 cfu/ml Beta Hemolytic Streptococcus Group F*  80,000-89,000 cfu/ml Enterococcus species*   MRSA CULTURE ONLY  No Methicillin Resistant Staphlyococcus aureus (MRSA) isolated  --   --      Results from last 7 days   Lab Units 03/12/20  0533 03/11/20  1050   PROCALCITONIN ng/ml 272 37* 278 64*

## 2020-03-14 NOTE — ASSESSMENT & PLAN NOTE
Most likely due to combination of obstructive uropathy and sepsis  Baseline creatinine appears to be around 1 4-1 6  Creatinine upon admission was 2 30  Creatinine continues to improve  It was 1 96 yesterday and is 1 71 today which is close to patient's baseline  Will scale back on fluid some more and will continue to monitor

## 2020-03-15 ENCOUNTER — APPOINTMENT (INPATIENT)
Dept: NON INVASIVE DIAGNOSTICS | Facility: HOSPITAL | Age: 70
DRG: 872 | End: 2020-03-15
Payer: MEDICARE

## 2020-03-15 LAB
ANION GAP SERPL CALCULATED.3IONS-SCNC: 8 MMOL/L (ref 4–13)
BACTERIA BLD CULT: ABNORMAL
BACTERIA UR CULT: ABNORMAL
BASOPHILS # BLD AUTO: 0.04 THOUSANDS/ΜL (ref 0–0.1)
BASOPHILS NFR BLD AUTO: 0 % (ref 0–1)
BUN SERPL-MCNC: 15 MG/DL (ref 5–25)
CALCIUM SERPL-MCNC: 8.2 MG/DL (ref 8.3–10.1)
CHLORIDE SERPL-SCNC: 109 MMOL/L (ref 100–108)
CK SERPL-CCNC: 57 U/L (ref 39–308)
CO2 SERPL-SCNC: 25 MMOL/L (ref 21–32)
CREAT SERPL-MCNC: 1.45 MG/DL (ref 0.6–1.3)
EOSINOPHIL # BLD AUTO: 0.45 THOUSAND/ΜL (ref 0–0.61)
EOSINOPHIL NFR BLD AUTO: 4 % (ref 0–6)
ERYTHROCYTE [DISTWIDTH] IN BLOOD BY AUTOMATED COUNT: 14.5 % (ref 11.6–15.1)
GFR SERPL CREATININE-BSD FRML MDRD: 48 ML/MIN/1.73SQ M
GLUCOSE SERPL-MCNC: 112 MG/DL (ref 65–140)
GRAM STN SPEC: ABNORMAL
GRAM STN SPEC: ABNORMAL
HCT VFR BLD AUTO: 38.4 % (ref 36.5–49.3)
HGB BLD-MCNC: 12.2 G/DL (ref 12–17)
IMM GRANULOCYTES # BLD AUTO: 0.12 THOUSAND/UL (ref 0–0.2)
IMM GRANULOCYTES NFR BLD AUTO: 1 % (ref 0–2)
LYMPHOCYTES # BLD AUTO: 1.13 THOUSANDS/ΜL (ref 0.6–4.47)
LYMPHOCYTES NFR BLD AUTO: 10 % (ref 14–44)
MAGNESIUM SERPL-MCNC: 1.9 MG/DL (ref 1.6–2.6)
MCH RBC QN AUTO: 28.5 PG (ref 26.8–34.3)
MCHC RBC AUTO-ENTMCNC: 31.8 G/DL (ref 31.4–37.4)
MCV RBC AUTO: 90 FL (ref 82–98)
MONOCYTES # BLD AUTO: 0.88 THOUSAND/ΜL (ref 0.17–1.22)
MONOCYTES NFR BLD AUTO: 8 % (ref 4–12)
NEUTROPHILS # BLD AUTO: 8.2 THOUSANDS/ΜL (ref 1.85–7.62)
NEUTS SEG NFR BLD AUTO: 77 % (ref 43–75)
NRBC BLD AUTO-RTO: 0 /100 WBCS
PLATELET # BLD AUTO: 154 THOUSANDS/UL (ref 149–390)
PMV BLD AUTO: 10.4 FL (ref 8.9–12.7)
POTASSIUM SERPL-SCNC: 3.3 MMOL/L (ref 3.5–5.3)
RBC # BLD AUTO: 4.28 MILLION/UL (ref 3.88–5.62)
SODIUM SERPL-SCNC: 142 MMOL/L (ref 136–145)
WBC # BLD AUTO: 10.82 THOUSAND/UL (ref 4.31–10.16)

## 2020-03-15 PROCEDURE — 93306 TTE W/DOPPLER COMPLETE: CPT | Performed by: INTERNAL MEDICINE

## 2020-03-15 PROCEDURE — 99233 SBSQ HOSP IP/OBS HIGH 50: CPT | Performed by: INTERNAL MEDICINE

## 2020-03-15 PROCEDURE — 80048 BASIC METABOLIC PNL TOTAL CA: CPT | Performed by: INTERNAL MEDICINE

## 2020-03-15 PROCEDURE — 87040 BLOOD CULTURE FOR BACTERIA: CPT | Performed by: INTERNAL MEDICINE

## 2020-03-15 PROCEDURE — 82550 ASSAY OF CK (CPK): CPT | Performed by: INTERNAL MEDICINE

## 2020-03-15 PROCEDURE — 83735 ASSAY OF MAGNESIUM: CPT | Performed by: FAMILY MEDICINE

## 2020-03-15 PROCEDURE — 93306 TTE W/DOPPLER COMPLETE: CPT

## 2020-03-15 PROCEDURE — 85025 COMPLETE CBC W/AUTO DIFF WBC: CPT | Performed by: FAMILY MEDICINE

## 2020-03-15 PROCEDURE — 99233 SBSQ HOSP IP/OBS HIGH 50: CPT | Performed by: FAMILY MEDICINE

## 2020-03-15 RX ORDER — MIRTAZAPINE 15 MG/1
15 TABLET, FILM COATED ORAL
Status: DISCONTINUED | OUTPATIENT
Start: 2020-03-15 | End: 2020-03-19 | Stop reason: HOSPADM

## 2020-03-15 RX ORDER — ALBUTEROL SULFATE 2.5 MG/3ML
SOLUTION RESPIRATORY (INHALATION)
Status: DISCONTINUED
Start: 2020-03-15 | End: 2020-03-15 | Stop reason: WASHOUT

## 2020-03-15 RX ORDER — AMLODIPINE BESYLATE 5 MG/1
5 TABLET ORAL 2 TIMES DAILY
Status: DISCONTINUED | OUTPATIENT
Start: 2020-03-15 | End: 2020-03-19 | Stop reason: HOSPADM

## 2020-03-15 RX ORDER — POTASSIUM CHLORIDE 20 MEQ/1
40 TABLET, EXTENDED RELEASE ORAL ONCE
Status: COMPLETED | OUTPATIENT
Start: 2020-03-15 | End: 2020-03-15

## 2020-03-15 RX ADMIN — IPRATROPIUM BROMIDE 0.5 MG: 0.5 SOLUTION RESPIRATORY (INHALATION) at 09:05

## 2020-03-15 RX ADMIN — AMLODIPINE BESYLATE 5 MG: 5 TABLET ORAL at 17:01

## 2020-03-15 RX ADMIN — ACETAMINOPHEN 650 MG: 325 TABLET, FILM COATED ORAL at 22:20

## 2020-03-15 RX ADMIN — MIRTAZAPINE 15 MG: 15 TABLET, FILM COATED ORAL at 22:20

## 2020-03-15 RX ADMIN — FAMOTIDINE 20 MG: 20 TABLET ORAL at 17:01

## 2020-03-15 RX ADMIN — METOPROLOL TARTRATE 12.5 MG: 25 TABLET ORAL at 22:20

## 2020-03-15 RX ADMIN — APIXABAN 2.5 MG: 2.5 TABLET, FILM COATED ORAL at 17:01

## 2020-03-15 RX ADMIN — AMLODIPINE BESYLATE 5 MG: 5 TABLET ORAL at 08:40

## 2020-03-15 RX ADMIN — QUETIAPINE FUMARATE 25 MG: 25 TABLET ORAL at 22:20

## 2020-03-15 RX ADMIN — MEMANTINE 5 MG: 5 TABLET ORAL at 17:02

## 2020-03-15 RX ADMIN — POTASSIUM CHLORIDE 10 MEQ: 750 TABLET, EXTENDED RELEASE ORAL at 08:46

## 2020-03-15 RX ADMIN — BUSPIRONE HYDROCHLORIDE 7.5 MG: 5 TABLET ORAL at 16:57

## 2020-03-15 RX ADMIN — DAPTOMYCIN 700 MG: 500 INJECTION, POWDER, LYOPHILIZED, FOR SOLUTION INTRAVENOUS at 15:54

## 2020-03-15 RX ADMIN — Medication: at 22:22

## 2020-03-15 RX ADMIN — ACETAMINOPHEN 650 MG: 325 TABLET, FILM COATED ORAL at 11:05

## 2020-03-15 RX ADMIN — POTASSIUM CHLORIDE 40 MEQ: 1500 TABLET, EXTENDED RELEASE ORAL at 16:06

## 2020-03-15 RX ADMIN — BUSPIRONE HYDROCHLORIDE 7.5 MG: 5 TABLET ORAL at 22:19

## 2020-03-15 RX ADMIN — FAMOTIDINE 20 MG: 20 TABLET ORAL at 08:46

## 2020-03-15 RX ADMIN — DOCUSATE SODIUM 100 MG: 100 CAPSULE, LIQUID FILLED ORAL at 17:01

## 2020-03-15 RX ADMIN — APIXABAN 2.5 MG: 2.5 TABLET, FILM COATED ORAL at 08:46

## 2020-03-15 RX ADMIN — MEMANTINE 5 MG: 5 TABLET ORAL at 08:45

## 2020-03-15 RX ADMIN — BUSPIRONE HYDROCHLORIDE 7.5 MG: 5 TABLET ORAL at 08:46

## 2020-03-15 RX ADMIN — ERTAPENEM SODIUM 1000 MG: 1 INJECTION, POWDER, LYOPHILIZED, FOR SOLUTION INTRAMUSCULAR; INTRAVENOUS at 05:34

## 2020-03-15 RX ADMIN — METOPROLOL TARTRATE 12.5 MG: 25 TABLET ORAL at 08:46

## 2020-03-15 NOTE — ASSESSMENT & PLAN NOTE
· Present on admission-as evidenced by fever, tachycardia, leukocytosis  · Urinalysis:  Innumerable WBC  · CT scan:  Obstructing left nephrolithiasis    Cefepime had been changed to meropenem  However, patient's wife states that because of his reaction to Zosyn he needs to be on ertapenem instead  Therefore ertapenem was started and patient is tolerating it well  Urine culture growing ESBL producing E coli, Providencia, beta-hemolytic Streptococcus, Enterococcus faecalis and Pseudomonas  Blood cultures are growing E coli and 1 set is also growing Providencia  Also, 1 set of blood cultures is growing Enterococcus  Patient's wife is very selective about which antibiotics we can use as she is concerned about adverse reactions from the antibiotics  We gave a dose of linezolid yesterday but wife became concerned as she felt as if rash was developing on patient's chest   For that reason linezolid was stopped  Dr Mamadou Abdalla did discuss daptomycin with patient's wife explaining that it is different from gentamicin, vancomycin and clindamycin  Patient's wife did agree to trying daptomycin  There for we will start daptomycin today

## 2020-03-15 NOTE — PLAN OF CARE
Problem: Prexisting or High Potential for Compromised Skin Integrity  Goal: Skin integrity is maintained or improved  Description  INTERVENTIONS:  - Identify patients at risk for skin breakdown  - Assess and monitor skin integrity  - Assess and monitor nutrition and hydration status  - Monitor labs   - Assess for incontinence   - Turn and reposition patient  - Assist with mobility/ambulation  - Relieve pressure over bony prominences  - Avoid friction and shearing  - Provide appropriate hygiene as needed including keeping skin clean and dry  - Evaluate need for skin moisturizer/barrier cream  - Collaborate with interdisciplinary team   - Patient/family teaching  - Consider wound care consult   Outcome: Progressing     Problem: PAIN - ADULT  Goal: Verbalizes/displays adequate comfort level or baseline comfort level  Description  Interventions:  - Encourage patient to monitor pain and request assistance  - Assess pain using appropriate pain scale  - Administer analgesics based on type and severity of pain and evaluate response  - Implement non-pharmacological measures as appropriate and evaluate response  - Consider cultural and social influences on pain and pain management  - Notify physician/advanced practitioner if interventions unsuccessful or patient reports new pain  Outcome: Progressing     Problem: INFECTION - ADULT  Goal: Absence or prevention of progression during hospitalization  Description  INTERVENTIONS:  - Assess and monitor for signs and symptoms of infection  - Monitor lab/diagnostic results  - Monitor all insertion sites, i e  indwelling lines, tubes, and drains  - Monitor endotracheal if appropriate and nasal secretions for changes in amount and color  - [place] appropriate cooling/warming therapies per order  - Administer medications as ordered  - Instruct and encourage patient and family to use good hand hygiene technique  - Identify and instruct in appropriate isolation precautions for identified infection/condition  Outcome: Progressing  Goal: Absence of fever/infection during neutropenic period  Description  INTERVENTIONS:  - Monitor WBC    Outcome: Progressing     Problem: SAFETY ADULT  Goal: Patient will remain free of falls  Description  INTERVENTIONS:  - Assess patient frequently for physical needs  -  Identify cognitive and physical deficits and behaviors that affect risk of falls    -  [place] fall precautions as indicated by assessment   - Educate patient/family on patient safety including physical limitations  - Instruct patient to call for assistance with activity based on assessment  - Modify environment to reduce risk of injury  - Consider OT/PT consult to assist with strengthening/mobility  Outcome: Progressing  Goal: Maintain or return to baseline ADL function  Description  INTERVENTIONS:  -  Assess patient's ability to carry out ADLs; assess patient's baseline for ADL function and identify physical deficits which impact ability to perform ADLs (bathing, care of mouth/teeth, toileting, grooming, dressing, etc )  - Assess/evaluate cause of self-care deficits   - Assess range of motion  - Assess patient's mobility; develop plan if impaired  - Assess patient's need for assistive devices and provide as appropriate  - Encourage maximum independence but intervene and supervise when necessary  - Involve family in performance of ADLs  - Assess for home care needs following discharge   - Consider OT consult to assist with ADL evaluation and planning for discharge  - Provide patient education as appropriate  Outcome: Progressing  Goal: Maintain or return mobility status to optimal level  Description  INTERVENTIONS:  - Assess patient's baseline mobility status (ambulation, transfers, stairs, etc )    - Identify cognitive and physical deficits and behaviors that affect mobility  - Identify mobility aids required to assist with transfers and/or ambulation (gait belt, sit-to-stand, lift, walker, cane, etc )  - [place] fall precautions as indicated by assessment  - Record patient progress and toleration of activity level on Mobility SBAR; progress patient to next Phase/Stage  - Instruct patient to call for assistance with activity based on assessment  - Consider rehabilitation consult to assist with strengthening/weightbearing, etc   Outcome: Progressing     Problem: DISCHARGE PLANNING  Goal: Discharge to home or other facility with appropriate resources  Description  INTERVENTIONS:  - Identify barriers to discharge w/patient and caregiver  - Arrange for needed discharge resources and transportation as appropriate  - Identify discharge learning needs (meds, wound care, etc )  - Arrange for interpretive services to assist at discharge as needed  - Refer to Case Management Department for coordinating discharge planning if the patient needs post-hospital services based on physician/advanced practitioner order or complex needs related to functional status, cognitive ability, or social support system  Outcome: Progressing     Problem: Knowledge Deficit  Goal: Patient/family/caregiver demonstrates understanding of disease process, treatment plan, medications, and discharge instructions  Description  Complete learning assessment and assess knowledge base  Interventions:  - Provide teaching at level of understanding  - Provide teaching via preferred learning methods  Outcome: Progressing     Problem: Nutrition/Hydration-ADULT  Goal: Nutrient/Hydration intake appropriate for improving, restoring or maintaining nutritional needs  Description  Monitor and assess patient's nutrition/hydration status for malnutrition  Collaborate with interdisciplinary team and initiate plan and interventions as ordered  Monitor patient's weight and dietary intake as ordered or per policy  Utilize nutrition screening tool and intervene as necessary   Determine patient's food preferences and provide high-protein, high-caloric foods as appropriate  INTERVENTIONS:  - Monitor oral intake, urinary output, labs, and treatment plans  - Assess nutrition and hydration status and recommend course of action  - Evaluate amount of meals eaten  - Assist patient with eating if necessary   - Allow adequate time for meals  - Recommend/ encourage appropriate diets, oral nutritional supplements, and vitamin/mineral supplements  - Order, calculate, and assess calorie counts as needed  - Recommend, monitor, and adjust tube feedings and TPN/PPN based on assessed needs  - Assess need for intravenous fluids  - Provide specific nutrition/hydration education as appropriate  - Include patient/family/caregiver in decisions related to nutrition  Outcome: Progressing     Problem: Potential for Falls  Goal: Patient will remain free of falls  Description  INTERVENTIONS:  - Assess patient frequently for physical needs  -  Identify cognitive and physical deficits and behaviors that affect risk of falls    -  [place] fall precautions as indicated by assessment   - Educate patient/family on patient safety including physical limitations  - Instruct patient to call for assistance with activity based on assessment  - Modify environment to reduce risk of injury  - Consider OT/PT consult to assist with strengthening/mobility  Outcome: Progressing

## 2020-03-15 NOTE — PROGRESS NOTES
Progress Note - Ahmet Jorge [facility] 1950, 68 y o  male MRN: [id number]    Unit/Bed#: S -01 Encounter: [id number]    Primary Care Provider: Mario De La O MD   Date and time admitted to hospital: 3/10/2020  8:43 PM        Fecal impaction [facility])  Assessment & Plan  · Patient is a wife is at bedside reports bowel movement yet today and today which was normal  · Consider enema  · For now, will continue with home regimen under direction of patient's wife  Bilateral nephrolithiasis  Assessment & Plan  · Presentation:  Multiple episodes of vomiting that occurred in the evening  Temperature a 100 3° in EMS  From nursing facility  · History:  Suprapubic catheter/neurogenic bladder, history of nephrolithiasis requiring right nephrostomy tube in the past  · Suprapubic catheter site without obvious infection  · CT abdomen:  Bilateral nephrolithiasis with 1 2 x 0 9 cm obstructing stone in the left ureteral junction with mild-to-moderate hydronephrosis and obstructive uropathy  Nonobstructing stones in the right kidney  ·     · Patient had left-sided nephrostomy tube placed 3/11  It is draining well  Continue nephrostomy tube in management as per Urology  Neurogenic bladder  Assessment & Plan  · CT abdomen:  "Bladder is collapsed around suprapubic catheter  There was some fat stranding adjacent to the bladder which could be related to catheter placement versus cystitis "    There did appear to be some cellulitis around the suprapubic catheter  This is improving with antibiotics  Dementia with behavioral disturbance ([facility] [address] )  Assessment & Plan  · Nonverbal at baseline  Patient does not actually have behavioral disturbance  · Prior history of CVA  · Home regimen:  · BuSpar 7 5 mg t i d   · Namenda 5 mg b i d  · Remeron 50 mg HS  · Seroquel 25 mg HS  ·     Essential hypertension  Assessment & Plan  · Home regimen:  Amlodipine 5 mg b i d , metoprolol tartrate 12 5 mg b i d      Blood pressure has been elevated today  Will start back amlodipine  Continue Lopressor  Acute kidney injury [facility])  Assessment & Plan  Most likely due to combination of obstructive uropathy and sepsis  Baseline creatinine appears to be around 1 4-1 6  Creatinine upon admission was 2 30  Resolved at this point  Creatinine today is 1 45  History of DVT (deep vein thrombosis)  Assessment & Plan  · Continue Eliquis  * Sepsis ([address] )  Assessment & Plan  · Present on admission-as evidenced by fever, tachycardia, leukocytosis  · Urinalysis:  Innumerable WBC  · CT scan:  Obstructing left nephrolithiasis    Cefepime had been changed to meropenem  However, patient's wife states that because of his reaction to Zosyn he needs to be on ertapenem instead  Therefore ertapenem was started and patient is tolerating it well  Urine culture growing ESBL producing E coli, Providencia, beta-hemolytic Streptococcus, Enterococcus faecalis and Pseudomonas  Blood cultures are growing E coli and 1 set is also growing Providencia  Also, 1 set of blood cultures is growing Enterococcus  Patient's wife is very selective about which antibiotics we can use as she is concerned about adverse reactions from the antibiotics  We gave a dose of linezolid yesterday but wife became concerned as she felt as if rash was developing on patient's chest   For that reason linezolid was stopped  Dr Nate Trejo did discuss daptomycin with patient's wife explaining that it is different from gentamicin, vancomycin and clindamycin  Patient's wife did agree to trying daptomycin  There for we will start daptomycin today  Subjective/Objective     Subjective:   Patient seen and examined this afternoon  He is nonverbal so I can't get any history out of him  No issues reported by nursing  Patient's wife was called for an update  It went to voicemail, so I left voice message with an update      Objective:  Vitals: Blood pressure 142/90, pulse 77, temperature 99 1 °F (37 3 °C), temperature source Axillary, resp  rate 17, weight 105 kg (231 lb 14 8 oz), SpO2 94 %  ,Body mass index is 32 35 kg/m²  Intake/Output Summary (Last 24 hours) at 3/15/2020 1343  Last data filed at 3/15/2020 1217  Gross per 24 hour   Intake 340 ml   Output 4650 ml   Net -4310 ml       Invasive Devices     Peripheral Intravenous Line            Peripheral IV 03/11/20 Left Antecubital 4 days          Drain            Suprapubic Catheter Latex 18 Fr  109 days    Nephrostomy Left 1 8 5 Fr  4 days                Physical Exam: /90 (BP Location: Right arm)   Pulse 77   Temp 99 1 °F (37 3 °C) (Axillary)   Resp 17   Wt 105 kg (231 lb 14 8 oz)   SpO2 94%   BMI 32 35 kg/m²   General appearance: alert and Nonverbal  Head: Normocephalic, without obvious abnormality, atraumatic  Eyes: No scleral icterus  Lungs: clear to auscultation bilaterally  Heart: regular rate and rhythm, S1, S2 normal, no murmur, click, rub or gallop  Extremities: extremities normal, warm and well-perfused; no cyanosis, clubbing, or edema  Skin:  No rashes noted on patient's back  No significant erythema around suprapubic catheter  Neurologic:  Patient is nonverbal   :  Left-sided nephrostomy tube in place with blood-tinged urine draining  Suprapubic catheter in place draining clear yellow urine  Lab, Imaging and other studies: I have personally reviewed pertinent reports      VTE Pharmacologic Prophylaxis:  Eliquis  VTE Mechanical Prophylaxis: sequential compression device

## 2020-03-15 NOTE — ASSESSMENT & PLAN NOTE
Most likely due to combination of obstructive uropathy and sepsis  Baseline creatinine appears to be around 1 4-1 6  Creatinine upon admission was 2 30  Resolved at this point  Creatinine today is 1 45

## 2020-03-15 NOTE — ASSESSMENT & PLAN NOTE
· Home regimen:  Amlodipine 5 mg b i d , metoprolol tartrate 12 5 mg b i d  Blood pressure has been elevated today  Will start back amlodipine  Continue Lopressor

## 2020-03-15 NOTE — PROGRESS NOTES
Progress Note - Infectious Disease   Carmen Tomas 68 y o  male MRN: [id number]  Unit/Bed#: S -01 Encounter: [id number]      Impression/Plan:  1   Sepsis   POA   Fever, tachycardia, leukocytosis, and RUPESH   Patient with extensive nephrolithiasis history with new obstructing left ureteral kidney stone on CT scan now status post left nephrostomy tube placement   Blood cultures positive for gram-negative rods   Most recent urinary microbiology history was for Pseudomonas sensitive to cefepime, meropenem and ESBL EColi sensitive to ertapenem  The white cell count has come down  Rec:  · Continues ertapenem at present as below  · Add treatment coverage for enterococcus as below  · Recheck blood cultures x2 sets as below  · Repeat CBC in am  · Repeat BMP in a m  · Supportive care per primary care and urology teams  · Repeat CT the abdomen pelvis     2  Polymicrobial  bacteremia   With 2 gram-negative rods and enterococcus growing  Krause Christie source suspected   Doubt endocarditis with the polymicrobial nature, however with ongoing fever would definitely begin treatment for the Enterococcus  However the patient is allergic to vancomycin, ampicillin, and the wife adamantly refuses for the patient to get daptomycin due to the fact that the patient reacts to all "mycins " It has been explained to the wife that daptomycin is in a different class of antibiotics and vancomycin without any cross allergy  However she continues to refuse  The patient reportedly had a rash with linezolid and so that was discontinued    I am concerned about the sticking nature of Enterococcus if the bacteremia is not treated it may result in endocarditis,, recurrent sepsis, and death  Rec:  · Continues ertapenem as below  · If patient's wife would allow, recommend adding daptomycin 8 milligrams/kilogram, dosing weight, Q 24 hours  · Repeat blood cultures x2 sets  · Repeat CBC in am  · Check echocardiogram     3   Left pyelonephritis with acute obstructing left ureteral stone   Likely source of #2  Patient is status post percutaneous left nephrostomy tube placement  Polymicrobial including gram-negative rods, group B Streptococcus, and Enterococcus  Of note daptomycin has no cross-reactivity with vancomycin and should be safe to use in this patient  Rec:  · Continues ertapenem  · Recommending adding coverage for the Enterococcus, however the wife refuses for the patient to get daptomycin and the patient reportedly developed a rash with linezolid   · Repeat CBC in am  · Percutaneous nephrostomy and suprapubic tube drainage management per Urology     4  Acute kidney injury   Likely secondary to sepsis and obstructing stone and pre renal issues   The renal function has improved    Rec    · Recheck BMP  · Renal dose adjust antibiotics as needed     5   Cellulitis lower abdominal wall   Possible secondary to central scabbed soft tissue boil   Not known to have a prior suprapubic site at this location   clinically improving  Rec    · Serial exams  · Antibiotics as above     6  Aphasia with CVA      Supportive care per primary care team     7   Multiple listed antibiotic allergies: History of anaphylaxis to Zosyn, gentamicin, vancomycin  Patient has tolerated cefepime well and has tolerated ertapenem and cefdinir in past   Seems to be tolerating current antibiotics, although now by report developed rash although no one can confirm with me that they saw the rash      Have extensively discussed the above management plan with the primary service    Attempted to reach the patient's wife on her home phone and mobile phone, however only of reach voicemail    The primary will now try to connect with her today and call me so I can talk to the wife to discuss treatment options    I spent 40 minutes on the unit floor of which 25 minutes was in counseling/coordination of care as outlined in my note    Antibiotics:  Ertapenem 4    Subjective:  Patient with decreased temperature curve; no nausea, vomiting, diarrhea; no cough, shortness of breath; no pain  No new symptoms  Patient apparently developed a rash when started on linezolid although currently there does not appear to be a rash  Objective:  Vitals:  Temp:  [98 2 °F (36 8 °C)-100 1 °F (37 8 °C)] 98 2 °F (36 8 °C)  HR:  [50-71] 69  Resp:  [17-18] 17  BP: (140-188)/(74-92) 188/91  SpO2:  [91 %-98 %] 98 %  Temp (24hrs), Av 1 °F (37 3 °C), Min:98 2 °F (36 8 °C), Max:100 1 °F (37 8 °C)  Current: Temperature: 98 2 °F (36 8 °C)    Physical Exam:   General Appearance:  Alert, interactive, nontoxic, no acute distress  Patient is nonverbal   Throat: Oropharynx moist without lesions  Lungs:   Clear to auscultation bilaterally; no wheezes, rhonchi or rales; respirations unlabored   Heart:  RRR; no murmur, rub or gallop   Abdomen:   Soft, non-tender, non-distended, positive bowel sounds  Extremities: No clubbing, cyanosis or edema   Skin: No new rashes or lesions  No draining wounds noted  Labs, Imaging, & Other studies:   All pertinent labs and imaging studies were personally reviewed  Results from last 7 days   Lab Units 03/15/20  0544 20  0526 20  1131   WBC Thousand/uL 10 82* 11 92* 18 03*   HEMOGLOBIN g/dL 12 2 11 7* 12 3   PLATELETS Thousands/uL 154 145* 146*     Results from last 7 days   Lab Units 03/15/20  0544 20  0526 20  1131  20  0505 03/10/20  2111   SODIUM mmol/L 142 141 142   < > 145 144   POTASSIUM mmol/L 3 3* 3 7 3 7   < > 3 3* 3 6   CHLORIDE mmol/L 109* 111* 110*   < > 111* 107   CO2 mmol/L 25 24 22   < > 24 27   BUN mg/dL 15 24 27*   < > 21 19   CREATININE mg/dL 1 45* 1 71* 1 96*   < > 2 30* 1 59*   EGFR ml/min/1 73sq m 48 39 33   < > 28 43   CALCIUM mg/dL 8 2* 8 1* 8 0*   < > 8 0* 8 9   AST U/L  --   --  20  --  16 12   ALT U/L  --   --  14  --  14 17   ALK PHOS U/L  --   --  77  --  84 117*    < > = values in this interval not displayed       Results from last 7 days   Lab Units 03/11/20  2243 03/11/20  0338 03/10/20  2136   BLOOD CULTURE   --   --  Escherichia coli ESBL*  Enterococcus faecalis*  Providencia stuartii*  Escherichia coli*  Providencia stuartii*   GRAM STAIN RESULT   --   --  Gram negative rods*  Gram negative rods*   URINE CULTURE   --  >100,000 cfu/ml Providencia stuartii*  >100,000 cfu/ml Escherichia coli ESBL*  >100,000 cfu/ml Beta Hemolytic Streptococcus Group F*  30,000-39,000 cfu/ml Enterococcus faecalis* >100,000 cfu/ml Pseudomonas aeruginosa*  >100,000 cfu/ml Escherichia coli ESBL*  >100,000 cfu/ml Providencia stuartii*  >100,000 cfu/ml Beta Hemolytic Streptococcus Group F*  80,000-89,000 cfu/ml Enterococcus faecalis*   MRSA CULTURE ONLY  No Methicillin Resistant Staphlyococcus aureus (MRSA) isolated  --   --      Results from last 7 days   Lab Units 03/14/20  0526 03/12/20  0533 03/11/20  1050   PROCALCITONIN ng/ml 62 18* 272 37* 278 64*     Chest x-ray-no acute cardiopulmonary disease    Images personally reviewed by me in PACS

## 2020-03-16 ENCOUNTER — APPOINTMENT (INPATIENT)
Dept: CT IMAGING | Facility: HOSPITAL | Age: 70
DRG: 872 | End: 2020-03-16
Payer: MEDICARE

## 2020-03-16 LAB
ALBUMIN SERPL BCP-MCNC: 2.6 G/DL (ref 3.5–5)
ALP SERPL-CCNC: 114 U/L (ref 46–116)
ALT SERPL W P-5'-P-CCNC: 29 U/L (ref 12–78)
ANION GAP SERPL CALCULATED.3IONS-SCNC: 9 MMOL/L (ref 4–13)
AST SERPL W P-5'-P-CCNC: 21 U/L (ref 5–45)
BASOPHILS # BLD AUTO: 0.04 THOUSANDS/ΜL (ref 0–0.1)
BASOPHILS NFR BLD AUTO: 0 % (ref 0–1)
BILIRUB SERPL-MCNC: 0.51 MG/DL (ref 0.2–1)
BUN SERPL-MCNC: 13 MG/DL (ref 5–25)
CALCIUM SERPL-MCNC: 8.6 MG/DL (ref 8.3–10.1)
CHLORIDE SERPL-SCNC: 108 MMOL/L (ref 100–108)
CO2 SERPL-SCNC: 27 MMOL/L (ref 21–32)
CREAT SERPL-MCNC: 1.72 MG/DL (ref 0.6–1.3)
EOSINOPHIL # BLD AUTO: 0.31 THOUSAND/ΜL (ref 0–0.61)
EOSINOPHIL NFR BLD AUTO: 3 % (ref 0–6)
ERYTHROCYTE [DISTWIDTH] IN BLOOD BY AUTOMATED COUNT: 14.6 % (ref 11.6–15.1)
GFR SERPL CREATININE-BSD FRML MDRD: 39 ML/MIN/1.73SQ M
GLUCOSE SERPL-MCNC: 110 MG/DL (ref 65–140)
HCT VFR BLD AUTO: 42.5 % (ref 36.5–49.3)
HGB BLD-MCNC: 13.6 G/DL (ref 12–17)
IMM GRANULOCYTES # BLD AUTO: 0.21 THOUSAND/UL (ref 0–0.2)
IMM GRANULOCYTES NFR BLD AUTO: 2 % (ref 0–2)
LYMPHOCYTES # BLD AUTO: 1.11 THOUSANDS/ΜL (ref 0.6–4.47)
LYMPHOCYTES NFR BLD AUTO: 10 % (ref 14–44)
MAGNESIUM SERPL-MCNC: 1.9 MG/DL (ref 1.6–2.6)
MCH RBC QN AUTO: 28.9 PG (ref 26.8–34.3)
MCHC RBC AUTO-ENTMCNC: 32 G/DL (ref 31.4–37.4)
MCV RBC AUTO: 90 FL (ref 82–98)
MONOCYTES # BLD AUTO: 0.82 THOUSAND/ΜL (ref 0.17–1.22)
MONOCYTES NFR BLD AUTO: 7 % (ref 4–12)
NEUTROPHILS # BLD AUTO: 8.96 THOUSANDS/ΜL (ref 1.85–7.62)
NEUTS SEG NFR BLD AUTO: 78 % (ref 43–75)
NRBC BLD AUTO-RTO: 0 /100 WBCS
PLATELET # BLD AUTO: 221 THOUSANDS/UL (ref 149–390)
PMV BLD AUTO: 11 FL (ref 8.9–12.7)
POTASSIUM SERPL-SCNC: 3.5 MMOL/L (ref 3.5–5.3)
PROCALCITONIN SERPL-MCNC: 13.05 NG/ML
PROT SERPL-MCNC: 7.5 G/DL (ref 6.4–8.2)
RBC # BLD AUTO: 4.7 MILLION/UL (ref 3.88–5.62)
SODIUM SERPL-SCNC: 144 MMOL/L (ref 136–145)
WBC # BLD AUTO: 11.45 THOUSAND/UL (ref 4.31–10.16)

## 2020-03-16 PROCEDURE — 92526 ORAL FUNCTION THERAPY: CPT

## 2020-03-16 PROCEDURE — 94760 N-INVAS EAR/PLS OXIMETRY 1: CPT

## 2020-03-16 PROCEDURE — 83735 ASSAY OF MAGNESIUM: CPT | Performed by: FAMILY MEDICINE

## 2020-03-16 PROCEDURE — 71250 CT THORAX DX C-: CPT

## 2020-03-16 PROCEDURE — 99233 SBSQ HOSP IP/OBS HIGH 50: CPT | Performed by: INTERNAL MEDICINE

## 2020-03-16 PROCEDURE — 85025 COMPLETE CBC W/AUTO DIFF WBC: CPT | Performed by: FAMILY MEDICINE

## 2020-03-16 PROCEDURE — 84145 PROCALCITONIN (PCT): CPT | Performed by: FAMILY MEDICINE

## 2020-03-16 PROCEDURE — 80053 COMPREHEN METABOLIC PANEL: CPT | Performed by: FAMILY MEDICINE

## 2020-03-16 PROCEDURE — 94640 AIRWAY INHALATION TREATMENT: CPT

## 2020-03-16 PROCEDURE — 74176 CT ABD & PELVIS W/O CONTRAST: CPT

## 2020-03-16 PROCEDURE — 99233 SBSQ HOSP IP/OBS HIGH 50: CPT | Performed by: FAMILY MEDICINE

## 2020-03-16 RX ORDER — SODIUM CHLORIDE, SODIUM LACTATE, POTASSIUM CHLORIDE, CALCIUM CHLORIDE 600; 310; 30; 20 MG/100ML; MG/100ML; MG/100ML; MG/100ML
100 INJECTION, SOLUTION INTRAVENOUS CONTINUOUS
Status: DISCONTINUED | OUTPATIENT
Start: 2020-03-16 | End: 2020-03-19 | Stop reason: HOSPADM

## 2020-03-16 RX ORDER — OXYCODONE HYDROCHLORIDE 5 MG/1
5 TABLET ORAL EVERY 6 HOURS PRN
Status: DISCONTINUED | OUTPATIENT
Start: 2020-03-16 | End: 2020-03-19 | Stop reason: HOSPADM

## 2020-03-16 RX ADMIN — ONDANSETRON 4 MG: 2 INJECTION INTRAMUSCULAR; INTRAVENOUS at 17:56

## 2020-03-16 RX ADMIN — APIXABAN 2.5 MG: 2.5 TABLET, FILM COATED ORAL at 09:28

## 2020-03-16 RX ADMIN — BUSPIRONE HYDROCHLORIDE 7.5 MG: 5 TABLET ORAL at 21:32

## 2020-03-16 RX ADMIN — AMLODIPINE BESYLATE 5 MG: 5 TABLET ORAL at 09:28

## 2020-03-16 RX ADMIN — FAMOTIDINE 20 MG: 20 TABLET ORAL at 09:27

## 2020-03-16 RX ADMIN — POTASSIUM CHLORIDE 10 MEQ: 750 TABLET, EXTENDED RELEASE ORAL at 09:27

## 2020-03-16 RX ADMIN — BUSPIRONE HYDROCHLORIDE 7.5 MG: 5 TABLET ORAL at 09:27

## 2020-03-16 RX ADMIN — DOCUSATE SODIUM 100 MG: 100 CAPSULE, LIQUID FILLED ORAL at 09:28

## 2020-03-16 RX ADMIN — ERTAPENEM SODIUM 1000 MG: 1 INJECTION, POWDER, LYOPHILIZED, FOR SOLUTION INTRAMUSCULAR; INTRAVENOUS at 06:12

## 2020-03-16 RX ADMIN — BISACODYL 10 MG: 10 SUPPOSITORY RECTAL at 09:28

## 2020-03-16 RX ADMIN — DAPTOMYCIN 700 MG: 500 INJECTION, POWDER, LYOPHILIZED, FOR SOLUTION INTRAVENOUS at 11:46

## 2020-03-16 RX ADMIN — METOPROLOL TARTRATE 12.5 MG: 25 TABLET ORAL at 09:28

## 2020-03-16 RX ADMIN — ACETAMINOPHEN 650 MG: 325 TABLET, FILM COATED ORAL at 06:18

## 2020-03-16 RX ADMIN — METOPROLOL TARTRATE 12.5 MG: 25 TABLET ORAL at 21:32

## 2020-03-16 RX ADMIN — STANDARDIZED SENNA CONCENTRATE 17.2 MG: 8.6 TABLET ORAL at 21:32

## 2020-03-16 RX ADMIN — Medication 1 APPLICATION: at 21:41

## 2020-03-16 RX ADMIN — MEMANTINE 5 MG: 5 TABLET ORAL at 09:28

## 2020-03-16 RX ADMIN — SODIUM CHLORIDE, SODIUM LACTATE, POTASSIUM CHLORIDE, AND CALCIUM CHLORIDE 100 ML/HR: .6; .31; .03; .02 INJECTION, SOLUTION INTRAVENOUS at 20:04

## 2020-03-16 RX ADMIN — IPRATROPIUM BROMIDE 0.5 MG: 0.5 SOLUTION RESPIRATORY (INHALATION) at 22:03

## 2020-03-16 RX ADMIN — QUETIAPINE FUMARATE 25 MG: 25 TABLET ORAL at 21:35

## 2020-03-16 RX ADMIN — POLYETHYLENE GLYCOL 3350 17 G: 17 POWDER, FOR SOLUTION ORAL at 09:27

## 2020-03-16 RX ADMIN — MIRTAZAPINE 15 MG: 15 TABLET, FILM COATED ORAL at 21:32

## 2020-03-16 NOTE — SPEECH THERAPY NOTE
Speech Language/Pathology    Speech/Language Pathology Progress Note    Patient Name: Madeline Page  Today's Date: 3/16/2020         Subjective:  Pt seen for dysphagia tx at lunch, pt on dysphagia 2 diet w/ thin liquids  Pt w/ no verbalizations  Objective:  Pt fed chopped chicken, mashed potatoes, chopped green beans  Pt occas holding, excessive mastication-munching pattern  Moderate oral residue noted  tsps of puree and sips of liquids via straw given to aid w/ oral clearance  Pt appeared w/ adequate transfer  Swallows were timely and complete  Cough noted x1 w/ sips of liquid in attempt to clear oral residue  Tsp amt of food was swiped from oral cavity at end of meal      Assessment:  Pt w/ oral dysphagia due to dementia  May be at risk for aspiration due to oral residue/holding of foods  Plan/Recommendations:  Change diet to dysphagia 1 puree w/ thin liquids   cont meds crushed in puree  Will follow up x1       April Destinee Schultz CCC-SLP  Speech Pathogist  Available via  tiger text

## 2020-03-16 NOTE — ASSESSMENT & PLAN NOTE
Most likely due to combination of obstructive uropathy and sepsis  Baseline creatinine appears to be around 1 4-1 6  Creatinine upon admission was 2 30  Creatinine was 1 45 yesterday but bumped up to 1 72 today  If creatinine increases further then would consider starting back IV fluids

## 2020-03-16 NOTE — PLAN OF CARE
Problem: Prexisting or High Potential for Compromised Skin Integrity  Goal: Skin integrity is maintained or improved  Description  INTERVENTIONS:  - Identify patients at risk for skin breakdown  - Assess and monitor skin integrity  - Assess and monitor nutrition and hydration status  - Monitor labs   - Assess for incontinence   - Turn and reposition patient  - Assist with mobility/ambulation  - Relieve pressure over bony prominences  - Avoid friction and shearing  - Provide appropriate hygiene as needed including keeping skin clean and dry  - Evaluate need for skin moisturizer/barrier cream  - Collaborate with interdisciplinary team   - Patient/family teaching  - Consider wound care consult   Outcome: Progressing     Problem: PAIN - ADULT  Goal: Verbalizes/displays adequate comfort level or baseline comfort level  Description  Interventions:  - Encourage patient to monitor pain and request assistance  - Assess pain using appropriate pain scale  - Administer analgesics based on type and severity of pain and evaluate response  - Implement non-pharmacological measures as appropriate and evaluate response  - Consider cultural and social influences on pain and pain management  - Notify physician/advanced practitioner if interventions unsuccessful or patient reports new pain  Outcome: Progressing     Problem: INFECTION - ADULT  Goal: Absence or prevention of progression during hospitalization  Description  INTERVENTIONS:  - Assess and monitor for signs and symptoms of infection  - Monitor lab/diagnostic results  - Monitor all insertion sites, i e  indwelling lines, tubes, and drains  - Monitor endotracheal if appropriate and nasal secretions for changes in amount and color  - [place] appropriate cooling/warming therapies per order  - Administer medications as ordered  - Instruct and encourage patient and family to use good hand hygiene technique  - Identify and instruct in appropriate isolation precautions for identified infection/condition  Outcome: Progressing  Goal: Absence of fever/infection during neutropenic period  Description  INTERVENTIONS:  - Monitor WBC    Outcome: Progressing     Problem: SAFETY ADULT  Goal: Patient will remain free of falls  Description  INTERVENTIONS:  - Assess patient frequently for physical needs  -  Identify cognitive and physical deficits and behaviors that affect risk of falls    -  [place] fall precautions as indicated by assessment   - Educate patient/family on patient safety including physical limitations  - Instruct patient to call for assistance with activity based on assessment  - Modify environment to reduce risk of injury  - Consider OT/PT consult to assist with strengthening/mobility  Outcome: Progressing  Goal: Maintain or return to baseline ADL function  Description  INTERVENTIONS:  -  Assess patient's ability to carry out ADLs; assess patient's baseline for ADL function and identify physical deficits which impact ability to perform ADLs (bathing, care of mouth/teeth, toileting, grooming, dressing, etc )  - Assess/evaluate cause of self-care deficits   - Assess range of motion  - Assess patient's mobility; develop plan if impaired  - Assess patient's need for assistive devices and provide as appropriate  - Encourage maximum independence but intervene and supervise when necessary  - Involve family in performance of ADLs  - Assess for home care needs following discharge   - Consider OT consult to assist with ADL evaluation and planning for discharge  - Provide patient education as appropriate  Outcome: Progressing  Goal: Maintain or return mobility status to optimal level  Description  INTERVENTIONS:  - Assess patient's baseline mobility status (ambulation, transfers, stairs, etc )    - Identify cognitive and physical deficits and behaviors that affect mobility  - Identify mobility aids required to assist with transfers and/or ambulation (gait belt, sit-to-stand, lift, walker, cane, etc )  - [place] fall precautions as indicated by assessment  - Record patient progress and toleration of activity level on Mobility SBAR; progress patient to next Phase/Stage  - Instruct patient to call for assistance with activity based on assessment  - Consider rehabilitation consult to assist with strengthening/weightbearing, etc   Outcome: Progressing     Problem: DISCHARGE PLANNING  Goal: Discharge to home or other facility with appropriate resources  Description  INTERVENTIONS:  - Identify barriers to discharge w/patient and caregiver  - Arrange for needed discharge resources and transportation as appropriate  - Identify discharge learning needs (meds, wound care, etc )  - Arrange for interpretive services to assist at discharge as needed  - Refer to Case Management Department for coordinating discharge planning if the patient needs post-hospital services based on physician/advanced practitioner order or complex needs related to functional status, cognitive ability, or social support system  Outcome: Progressing     Problem: Knowledge Deficit  Goal: Patient/family/caregiver demonstrates understanding of disease process, treatment plan, medications, and discharge instructions  Description  Complete learning assessment and assess knowledge base  Interventions:  - Provide teaching at level of understanding  - Provide teaching via preferred learning methods  Outcome: Progressing     Problem: Nutrition/Hydration-ADULT  Goal: Nutrient/Hydration intake appropriate for improving, restoring or maintaining nutritional needs  Description  Monitor and assess patient's nutrition/hydration status for malnutrition  Collaborate with interdisciplinary team and initiate plan and interventions as ordered  Monitor patient's weight and dietary intake as ordered or per policy  Utilize nutrition screening tool and intervene as necessary   Determine patient's food preferences and provide high-protein, high-caloric foods as appropriate  INTERVENTIONS:  - Monitor oral intake, urinary output, labs, and treatment plans  - Assess nutrition and hydration status and recommend course of action  - Evaluate amount of meals eaten  - Assist patient with eating if necessary   - Allow adequate time for meals  - Recommend/ encourage appropriate diets, oral nutritional supplements, and vitamin/mineral supplements  - Order, calculate, and assess calorie counts as needed  - Recommend, monitor, and adjust tube feedings and TPN/PPN based on assessed needs  - Assess need for intravenous fluids  - Provide specific nutrition/hydration education as appropriate  - Include patient/family/caregiver in decisions related to nutrition  Outcome: Progressing     Problem: Potential for Falls  Goal: Patient will remain free of falls  Description  INTERVENTIONS:  - Assess patient frequently for physical needs  -  Identify cognitive and physical deficits and behaviors that affect risk of falls    -  [place] fall precautions as indicated by assessment   - Educate patient/family on patient safety including physical limitations  - Instruct patient to call for assistance with activity based on assessment  - Modify environment to reduce risk of injury  - Consider OT/PT consult to assist with strengthening/mobility  Outcome: Progressing

## 2020-03-16 NOTE — PROGRESS NOTES
Progress Note - Rob Escobar [facility] 1950, 68 y o  male MRN: [id number]    Unit/Bed#: S -01 Encounter: [id number]    Primary Care Provider: Jacqueline Cervantes MD   Date and time admitted to hospital: 3/10/2020  8:43 PM        Fecal impaction [facility])  Assessment & Plan  · Patient is a wife is at bedside reports bowel movement yet today and today which was normal  · Consider enema  · For now, will continue with home regimen under direction of patient's wife  Bilateral nephrolithiasis  Assessment & Plan  · Presentation:  Multiple episodes of vomiting that occurred in the evening  Temperature a 100 3° in EMS  From nursing facility  · History:  Suprapubic catheter/neurogenic bladder, history of nephrolithiasis requiring right nephrostomy tube in the past  · Suprapubic catheter site without obvious infection  · CT abdomen:  Bilateral nephrolithiasis with 1 2 x 0 9 cm obstructing stone in the left ureteral junction with mild-to-moderate hydronephrosis and obstructive uropathy  Nonobstructing stones in the right kidney  ·     · Patient had left-sided nephrostomy tube placed 3/11  It is draining well  Continue nephrostomy tube in management as per Urology  Neurogenic bladder  Assessment & Plan  · CT abdomen:  "Bladder is collapsed around suprapubic catheter  There was some fat stranding adjacent to the bladder which could be related to catheter placement versus cystitis "    There did appear to be some cellulitis around the suprapubic catheter  This is improving with antibiotics  Dementia with behavioral disturbance ([facility] [address] )  Assessment & Plan  · Nonverbal at baseline  Patient does not actually have behavioral disturbance  · Prior history of CVA  · Home regimen:  · BuSpar 7 5 mg t i d   · Namenda 5 mg b i d  · Remeron 50 mg HS  · Seroquel 25 mg HS  ·     Essential hypertension  Assessment & Plan  · Home regimen:  Amlodipine 5 mg b i d , metoprolol tartrate 12 5 mg b i d      Blood pressure still elevated at times but not as much as yesterday  Continue amlodipine and Lopressor  Acute kidney injury [facility])  Assessment & Plan  Most likely due to combination of obstructive uropathy and sepsis  Baseline creatinine appears to be around 1 4-1 6  Creatinine upon admission was 2 30  Creatinine was 1 45 yesterday but bumped up to 1 72 today  If creatinine increases further then would consider starting back IV fluids  History of DVT (deep vein thrombosis)  Assessment & Plan  · Continue Eliquis  * Sepsis ([address] [address] )  Assessment & Plan  · Present on admission-as evidenced by fever, tachycardia, leukocytosis  · Urinalysis:  Innumerable WBC  · CT scan:  Obstructing left nephrolithiasis    Cefepime had been changed to meropenem  However, patient's wife states that because of his reaction to Zosyn he needs to be on ertapenem instead  Therefore ertapenem was started and patient is tolerating it well  Urine culture growing ESBL producing E coli, Providencia, beta-hemolytic Streptococcus, Enterococcus faecalis and Pseudomonas  Blood cultures are growing E coli and 1 set is also growing Providencia  Also, 1 set of blood cultures is growing Enterococcus  Patient's wife is very selective about which antibiotics we can use as she is concerned about adverse reactions from the antibiotics  We gave a dose of linezolid on 03/14 but wife became concerned as she felt as if rash was developing on patient's chest   For that reason linezolid was stopped  Dr Malick Amanda did discuss daptomycin with patient's wife explaining that it is different from gentamicin, vancomycin and clindamycin  Patient's wife did agree to trying daptomycin which we started yesterday  Patient is tolerating it well so far  Continue daptomycin and ertapenem  Repeat blood cultures from yesterday are pending  White blood cell count still modestly elevated and patient is still spiking fevers  Discussed with infectious disease    Repeat CT abdomen/pelvis ordered  Patient had projectile vomiting per nursing staff  Will start back IV fluids  CT abdomen/pelvis to be done  Subjective/Objective     Subjective:   Patient seen and examined this afternoon with wife at bedside  History obtained from wife as patient is nonverbal   She states he has been doing okay today  He does still occasionally get some wheezing which he has right now  No other issues reported  Objective:  Vitals: Blood pressure 144/94, pulse (!) 48, temperature 99 9 °F (37 7 °C), temperature source Axillary, resp  rate 18, weight 103 kg (227 lb 15 3 oz), SpO2 97 %  ,Body mass index is 31 79 kg/m²  Intake/Output Summary (Last 24 hours) at 3/16/2020 1610  Last data filed at 3/16/2020 1500  Gross per 24 hour   Intake 240 ml   Output 2100 ml   Net -1860 ml       Invasive Devices     Peripheral Intravenous Line            Long-Dwell Peripheral IV (Midline) 15/57/11 Right Cephalic less than 1 day    Peripheral IV 03/15/20 Left;Ventral (anterior) Forearm less than 1 day          Drain            Suprapubic Catheter Latex 18 Fr  110 days    Nephrostomy Left 1 8 5 Fr  5 days                Physical Exam: /94 (BP Location: Left arm)   Pulse (!) 48   Temp 99 9 °F (37 7 °C) (Axillary)   Resp 18   Wt 103 kg (227 lb 15 3 oz)   SpO2 97%   BMI 31 79 kg/m²   General appearance: Awake  Nonverbal   Chronically ill-appearing  Head: Normocephalic, without obvious abnormality, atraumatic  Eyes: No scleral icterus  Lungs: clear to auscultation bilaterally  Heart: regular rate and rhythm, S1, S2 normal, no murmur, click, rub or gallop  Extremities: extremities normal, warm and well-perfused; no cyanosis, clubbing, or edema  Neurologic:  Awake but nonverbal   :  Left-sided nephrostomy tube in place draining clear yellow urine  Suprapubic catheter in place  Lab, Imaging and other studies: I have personally reviewed pertinent reports      VTE Pharmacologic Prophylaxis:  Eliquis  VTE Mechanical Prophylaxis: sequential compression device

## 2020-03-16 NOTE — PROGRESS NOTES
Progress Note - Infectious Disease   Oneta Showers Genoveva 68 y o  male MRN: [id number]  Unit/Bed#: S -01 Encounter: [id number]      Impression/Plan:  1   Sepsis   POA   Fever, tachycardia, leukocytosis, and RUPESH   Patient with extensive nephrolithiasis history with new obstructing left ureteral kidney stone on CT scan now status post left nephrostomy tube placement   Blood cultures positive for 2 of 2 sets with E coli and Providencia and 1 of 2 sets with Enterococcus faecalis  The white cell count has come down, fever still labile yet  procalcintonin level trending from 278 64 to 62 18  Rec:  · Continues ertapenem and daptomycin at present as below  · Follow-up repeat blood cultures; negative times 24 hours thus far  · Repeat CBC in am  · Repeat BMP in a m  · Supportive care per primary care and urology teams  · Repeat CT the abdomen pelvis     2  Polymicrobial  bacteremia   With 2 gram-negative rods and enterococcus growing  Marijane Course source suspected   Doubt endocarditis with the polymicrobial nature, however with ongoing fever would definitely treat the Enterococcus   2D echo negative for vegetation  Aortic valve was not well visualized  repeat blood cultures x2 are negative today  Rec:  · Continues ertapenem as below  · Patient's wife agreed to trial of daptomycin yesterday after she arrived  He appeared to tolerate 1st dose and continues on daptomycin 8 milligrams/kilogram, dosing weight, Q 24 hours  · Follow-up repeat blood cultures x2 sets  · Repeat CBC in am     3   Left pyelonephritis with acute obstructing left ureteral stone   Likely source of #2  Patient is status post percutaneous left nephrostomy tube placement   Polymicrobial including ESBL E coli, Providencia, and Enterococcus      Rec:  · Continues ertapenem and Daptomycin  · Repeat CBC in am  · Percutaneous nephrostomy and suprapubic tube drainage management per Urology     4  Acute kidney injury   Likely secondary to sepsis and obstructing stone and pre renal issues   Creatinine 1 72   Rec    · Recheck BMP  · Renal dose adjust antibiotics as needed     5   Cellulitis lower abdominal wall   Possible secondary to central scabbed soft tissue boil   Not known to have a prior suprapubic site at this location   clinically improving  Rec    · Serial exams  · Antibiotics as above     6  Aphasia with CVA      Supportive care per primary care team     7   Multiple listed antibiotic allergies: History of anaphylaxis to Zosyn, gentamicin, vancomycin  Patient has tolerated cefepime well and has tolerated ertapenem and cefdinir in past  Of note daptomycin has no cross-reactivity with vancomycin and should be safe to use in this patient  Seems to be tolerating current antibiotics      Above plan discussed in detail with patient's RN and Dr Rg Zamudio      Antibiotics:  Ertapenem 5/Daptomycin D2     Subjective:  Patient tolerated 1st dose of daptomycin when wife arrived yesterday and was present for infusion  Patient and is alert and packing throughout room though nonverbal   Patient offers no complaints and no wincing on exam    ROS: Patient has labile fever yet, no reported chills, sweats; no nausea, vomiting, diarrhea; no cough, shortness of breath; no pain  No new symptoms  Objective:  Vitals:  Temp:  [99 6 °F (37 6 °C)-101 6 °F (38 7 °C)] 100 1 °F (37 8 °C)  HR:  [68-85] 81  Resp:  [18] 18  BP: (138-197)/() 164/80  SpO2:  [93 %-96 %] 93 %  Temp (24hrs), Av 4 °F (38 °C), Min:99 6 °F (37 6 °C), Max:101 6 °F (38 7 °C)  Current: Temperature: 100 1 °F (37 8 °C)    General Appearance:  Awake, alert, cooperative, resting in bed, no acute distress  Nonverbal   Throat: Oropharynx moist without lesions  Lungs:   Decreased breath sounds and few expiratory wheezes, respirations unlabored   Heart:  RRR; S1-S2 heard, no murmur   Abdomen:   Soft, non-tender, non-distended, positive bowel sounds       Extremities: Trace upper extremity edema, legs propped on wedge and venodynes in place, right upper arm midline site nontender   : Suprapubic [place] with clear urine in bag, left nephrostomy tube with blood-tinged urine in bag   Skin: No diffuse rashes      Labs, Imaging, & Other studies:   All pertinent labs and imaging studies were personally reviewed  Results from last 7 days   Lab Units 03/16/20  1146 03/15/20  0544 03/14/20  0526   WBC Thousand/uL 11 45* 10 82* 11 92*   HEMOGLOBIN g/dL 13 6 12 2 11 7*   PLATELETS Thousands/uL 221 154 145*     Results from last 7 days   Lab Units 03/16/20  1146  03/13/20  1131  03/11/20  0505   POTASSIUM mmol/L 3 5   < > 3 7   < > 3 3*   CHLORIDE mmol/L 108   < > 110*   < > 111*   CO2 mmol/L 27   < > 22   < > 24   BUN mg/dL 13   < > 27*   < > 21   CREATININE mg/dL 1 72*   < > 1 96*   < > 2 30*   EGFR ml/min/1 73sq m 39   < > 33   < > 28   CALCIUM mg/dL 8 6   < > 8 0*   < > 8 0*   AST U/L 21  --  20  --  16   ALT U/L 29  --  14  --  14   ALK PHOS U/L 114  --  77  --  84    < > = values in this interval not displayed  Results from last 7 days   Lab Units 03/14/20  0526 03/12/20  0533 03/11/20  1050   PROCALCITONIN ng/ml 62 18* 272 37* 278 64*     Results from last 7 days   Lab Units 03/16/20  1201 03/15/20  1033 03/11/20  2243 03/11/20  0338 03/10/20  2136   BLOOD CULTURE  No Growth at 24 hrs  Received in Microbiology Lab  Culture in Progress    --   --  Escherichia coli*  Providencia stuartii*  Escherichia coli ESBL*  Enterococcus faecalis*  Providencia stuartii*   GRAM STAIN RESULT   --   --   --   --  Gram negative rods*  Gram negative rods*   URINE CULTURE   --   --   --  >100,000 cfu/ml Providencia stuartii*  >100,000 cfu/ml Escherichia coli ESBL*  >100,000 cfu/ml Beta Hemolytic Streptococcus Group F*  30,000-39,000 cfu/ml Enterococcus faecalis* >100,000 cfu/ml Pseudomonas aeruginosa*  >100,000 cfu/ml Escherichia coli ESBL*  >100,000 cfu/ml Providencia stuartii*  >100,000 cfu/ml Beta Hemolytic Streptococcus Group F* 80,000-89,000 cfu/ml Enterococcus faecalis*   MRSA CULTURE ONLY   --   --  No Methicillin Resistant Staphlyococcus aureus (MRSA) isolated  --   --

## 2020-03-16 NOTE — ASSESSMENT & PLAN NOTE
· Present on admission-as evidenced by fever, tachycardia, leukocytosis  · Urinalysis:  Innumerable WBC  · CT scan:  Obstructing left nephrolithiasis    Cefepime had been changed to meropenem  However, patient's wife states that because of his reaction to Zosyn he needs to be on ertapenem instead  Therefore ertapenem was started and patient is tolerating it well  Urine culture growing ESBL producing E coli, Providencia, beta-hemolytic Streptococcus, Enterococcus faecalis and Pseudomonas  Blood cultures are growing E coli and 1 set is also growing Providencia  Also, 1 set of blood cultures is growing Enterococcus  Patient's wife is very selective about which antibiotics we can use as she is concerned about adverse reactions from the antibiotics  We gave a dose of linezolid on 03/14 but wife became concerned as she felt as if rash was developing on patient's chest   For that reason linezolid was stopped  Dr Bautista Angry did discuss daptomycin with patient's wife explaining that it is different from gentamicin, vancomycin and clindamycin  Patient's wife did agree to trying daptomycin which we started yesterday  Patient is tolerating it well so far  Continue daptomycin and ertapenem  Repeat blood cultures from yesterday are pending  White blood cell count still modestly elevated and patient is still spiking fevers  Discussed with infectious disease  Repeat CT abdomen/pelvis ordered

## 2020-03-16 NOTE — ASSESSMENT & PLAN NOTE
· Home regimen:  Amlodipine 5 mg b i d , metoprolol tartrate 12 5 mg b i d  Blood pressure still elevated at times but not as much as yesterday  Continue amlodipine and Lopressor

## 2020-03-16 NOTE — PLAN OF CARE
Problem: Prexisting or High Potential for Compromised Skin Integrity  Goal: Skin integrity is maintained or improved  Description  INTERVENTIONS:  - Identify patients at risk for skin breakdown  - Assess and monitor skin integrity  - Assess and monitor nutrition and hydration status  - Monitor labs   - Assess for incontinence   - Turn and reposition patient  - Assist with mobility/ambulation  - Relieve pressure over bony prominences  - Avoid friction and shearing  - Provide appropriate hygiene as needed including keeping skin clean and dry  - Evaluate need for skin moisturizer/barrier cream  - Collaborate with interdisciplinary team   - Patient/family teaching  - Consider wound care consult   Outcome: Progressing     Problem: PAIN - ADULT  Goal: Verbalizes/displays adequate comfort level or baseline comfort level  Description  Interventions:  - Encourage patient to monitor pain and request assistance  - Assess pain using appropriate pain scale  - Administer analgesics based on type and severity of pain and evaluate response  - Implement non-pharmacological measures as appropriate and evaluate response  - Consider cultural and social influences on pain and pain management  - Notify physician/advanced practitioner if interventions unsuccessful or patient reports new pain  Outcome: Progressing     Problem: INFECTION - ADULT  Goal: Absence or prevention of progression during hospitalization  Description  INTERVENTIONS:  - Assess and monitor for signs and symptoms of infection  - Monitor lab/diagnostic results  - Monitor all insertion sites, i e  indwelling lines, tubes, and drains  - Monitor endotracheal if appropriate and nasal secretions for changes in amount and color  - [place] appropriate cooling/warming therapies per order  - Administer medications as ordered  - Instruct and encourage patient and family to use good hand hygiene technique  - Identify and instruct in appropriate isolation precautions for identified infection/condition  Outcome: Progressing  Goal: Absence of fever/infection during neutropenic period  Description  INTERVENTIONS:  - Monitor WBC    Outcome: Progressing     Problem: SAFETY ADULT  Goal: Patient will remain free of falls  Description  INTERVENTIONS:  - Assess patient frequently for physical needs  -  Identify cognitive and physical deficits and behaviors that affect risk of falls    -  [place] fall precautions as indicated by assessment   - Educate patient/family on patient safety including physical limitations  - Instruct patient to call for assistance with activity based on assessment  - Modify environment to reduce risk of injury  - Consider OT/PT consult to assist with strengthening/mobility  Outcome: Progressing  Goal: Maintain or return to baseline ADL function  Description  INTERVENTIONS:  -  Assess patient's ability to carry out ADLs; assess patient's baseline for ADL function and identify physical deficits which impact ability to perform ADLs (bathing, care of mouth/teeth, toileting, grooming, dressing, etc )  - Assess/evaluate cause of self-care deficits   - Assess range of motion  - Assess patient's mobility; develop plan if impaired  - Assess patient's need for assistive devices and provide as appropriate  - Encourage maximum independence but intervene and supervise when necessary  - Involve family in performance of ADLs  - Assess for home care needs following discharge   - Consider OT consult to assist with ADL evaluation and planning for discharge  - Provide patient education as appropriate  Outcome: Progressing  Goal: Maintain or return mobility status to optimal level  Description  INTERVENTIONS:  - Assess patient's baseline mobility status (ambulation, transfers, stairs, etc )    - Identify cognitive and physical deficits and behaviors that affect mobility  - Identify mobility aids required to assist with transfers and/or ambulation (gait belt, sit-to-stand, lift, walker, cane, etc )  - [place] fall precautions as indicated by assessment  - Record patient progress and toleration of activity level on Mobility SBAR; progress patient to next Phase/Stage  - Instruct patient to call for assistance with activity based on assessment  - Consider rehabilitation consult to assist with strengthening/weightbearing, etc   Outcome: Progressing     Problem: DISCHARGE PLANNING  Goal: Discharge to home or other facility with appropriate resources  Description  INTERVENTIONS:  - Identify barriers to discharge w/patient and caregiver  - Arrange for needed discharge resources and transportation as appropriate  - Identify discharge learning needs (meds, wound care, etc )  - Arrange for interpretive services to assist at discharge as needed  - Refer to Case Management Department for coordinating discharge planning if the patient needs post-hospital services based on physician/advanced practitioner order or complex needs related to functional status, cognitive ability, or social support system  Outcome: Progressing     Problem: Knowledge Deficit  Goal: Patient/family/caregiver demonstrates understanding of disease process, treatment plan, medications, and discharge instructions  Description  Complete learning assessment and assess knowledge base  Interventions:  - Provide teaching at level of understanding  - Provide teaching via preferred learning methods  Outcome: Progressing     Problem: Nutrition/Hydration-ADULT  Goal: Nutrient/Hydration intake appropriate for improving, restoring or maintaining nutritional needs  Description  Monitor and assess patient's nutrition/hydration status for malnutrition  Collaborate with interdisciplinary team and initiate plan and interventions as ordered  Monitor patient's weight and dietary intake as ordered or per policy  Utilize nutrition screening tool and intervene as necessary   Determine patient's food preferences and provide high-protein, high-caloric foods as appropriate  INTERVENTIONS:  - Monitor oral intake, urinary output, labs, and treatment plans  - Assess nutrition and hydration status and recommend course of action  - Evaluate amount of meals eaten  - Assist patient with eating if necessary   - Allow adequate time for meals  - Recommend/ encourage appropriate diets, oral nutritional supplements, and vitamin/mineral supplements  - Order, calculate, and assess calorie counts as needed  - Recommend, monitor, and adjust tube feedings and TPN/PPN based on assessed needs  - Assess need for intravenous fluids  - Provide specific nutrition/hydration education as appropriate  - Include patient/family/caregiver in decisions related to nutrition  Outcome: Progressing     Problem: Potential for Falls  Goal: Patient will remain free of falls  Description  INTERVENTIONS:  - Assess patient frequently for physical needs  -  Identify cognitive and physical deficits and behaviors that affect risk of falls    -  [place] fall precautions as indicated by assessment   - Educate patient/family on patient safety including physical limitations  - Instruct patient to call for assistance with activity based on assessment  - Modify environment to reduce risk of injury  - Consider OT/PT consult to assist with strengthening/mobility  Outcome: Progressing

## 2020-03-16 NOTE — NURSING NOTE
Upon entering room to give evening dose medications, patient projectile vomited twice  Unable to give evening dose medications  SLIM made aware

## 2020-03-17 PROBLEM — L03.311 CELLULITIS, ABDOMINAL WALL: Status: ACTIVE | Noted: 2020-03-17

## 2020-03-17 PROBLEM — N12 PYELONEPHRITIS OF LEFT KIDNEY: Status: ACTIVE | Noted: 2020-03-17

## 2020-03-17 LAB
ANION GAP SERPL CALCULATED.3IONS-SCNC: 10 MMOL/L (ref 4–13)
BASOPHILS # BLD AUTO: 0.04 THOUSANDS/ΜL (ref 0–0.1)
BASOPHILS NFR BLD AUTO: 0 % (ref 0–1)
BUN SERPL-MCNC: 13 MG/DL (ref 5–25)
CALCIUM SERPL-MCNC: 8.2 MG/DL (ref 8.3–10.1)
CHLORIDE SERPL-SCNC: 109 MMOL/L (ref 100–108)
CO2 SERPL-SCNC: 25 MMOL/L (ref 21–32)
CREAT SERPL-MCNC: 1.4 MG/DL (ref 0.6–1.3)
EOSINOPHIL # BLD AUTO: 0.46 THOUSAND/ΜL (ref 0–0.61)
EOSINOPHIL NFR BLD AUTO: 4 % (ref 0–6)
ERYTHROCYTE [DISTWIDTH] IN BLOOD BY AUTOMATED COUNT: 14.6 % (ref 11.6–15.1)
GFR SERPL CREATININE-BSD FRML MDRD: 50 ML/MIN/1.73SQ M
GLUCOSE SERPL-MCNC: 120 MG/DL (ref 65–140)
HCT VFR BLD AUTO: 36.2 % (ref 36.5–49.3)
HGB BLD-MCNC: 11.8 G/DL (ref 12–17)
IMM GRANULOCYTES # BLD AUTO: 0.13 THOUSAND/UL (ref 0–0.2)
IMM GRANULOCYTES NFR BLD AUTO: 1 % (ref 0–2)
LYMPHOCYTES # BLD AUTO: 1.11 THOUSANDS/ΜL (ref 0.6–4.47)
LYMPHOCYTES NFR BLD AUTO: 10 % (ref 14–44)
MAGNESIUM SERPL-MCNC: 1.8 MG/DL (ref 1.6–2.6)
MCH RBC QN AUTO: 29.1 PG (ref 26.8–34.3)
MCHC RBC AUTO-ENTMCNC: 32.6 G/DL (ref 31.4–37.4)
MCV RBC AUTO: 89 FL (ref 82–98)
MONOCYTES # BLD AUTO: 0.92 THOUSAND/ΜL (ref 0.17–1.22)
MONOCYTES NFR BLD AUTO: 8 % (ref 4–12)
NEUTROPHILS # BLD AUTO: 8.5 THOUSANDS/ΜL (ref 1.85–7.62)
NEUTS SEG NFR BLD AUTO: 77 % (ref 43–75)
NRBC BLD AUTO-RTO: 0 /100 WBCS
PLATELET # BLD AUTO: 226 THOUSANDS/UL (ref 149–390)
PMV BLD AUTO: 10.5 FL (ref 8.9–12.7)
POTASSIUM SERPL-SCNC: 3.4 MMOL/L (ref 3.5–5.3)
PROCALCITONIN SERPL-MCNC: 6.51 NG/ML
RBC # BLD AUTO: 4.06 MILLION/UL (ref 3.88–5.62)
SODIUM SERPL-SCNC: 144 MMOL/L (ref 136–145)
WBC # BLD AUTO: 11.16 THOUSAND/UL (ref 4.31–10.16)

## 2020-03-17 PROCEDURE — 94760 N-INVAS EAR/PLS OXIMETRY 1: CPT

## 2020-03-17 PROCEDURE — 85025 COMPLETE CBC W/AUTO DIFF WBC: CPT | Performed by: FAMILY MEDICINE

## 2020-03-17 PROCEDURE — 80048 BASIC METABOLIC PNL TOTAL CA: CPT | Performed by: FAMILY MEDICINE

## 2020-03-17 PROCEDURE — 83735 ASSAY OF MAGNESIUM: CPT | Performed by: FAMILY MEDICINE

## 2020-03-17 PROCEDURE — 99232 SBSQ HOSP IP/OBS MODERATE 35: CPT | Performed by: INTERNAL MEDICINE

## 2020-03-17 PROCEDURE — 92526 ORAL FUNCTION THERAPY: CPT

## 2020-03-17 PROCEDURE — 84145 PROCALCITONIN (PCT): CPT | Performed by: INTERNAL MEDICINE

## 2020-03-17 PROCEDURE — 99233 SBSQ HOSP IP/OBS HIGH 50: CPT | Performed by: INTERNAL MEDICINE

## 2020-03-17 RX ORDER — DIAPER,BRIEF,INFANT-TODD,DISP
EACH MISCELLANEOUS 2 TIMES DAILY
Status: DISCONTINUED | OUTPATIENT
Start: 2020-03-17 | End: 2020-03-19 | Stop reason: HOSPADM

## 2020-03-17 RX ORDER — POTASSIUM CHLORIDE 20 MEQ/1
20 TABLET, EXTENDED RELEASE ORAL ONCE
Status: COMPLETED | OUTPATIENT
Start: 2020-03-17 | End: 2020-03-17

## 2020-03-17 RX ORDER — AMMONIUM LACTATE 12 G/100G
CREAM TOPICAL DAILY
Status: DISCONTINUED | OUTPATIENT
Start: 2020-03-17 | End: 2020-03-19 | Stop reason: HOSPADM

## 2020-03-17 RX ADMIN — BUSPIRONE HYDROCHLORIDE 7.5 MG: 5 TABLET ORAL at 09:07

## 2020-03-17 RX ADMIN — DAPTOMYCIN 700 MG: 500 INJECTION, POWDER, LYOPHILIZED, FOR SOLUTION INTRAVENOUS at 12:34

## 2020-03-17 RX ADMIN — BISACODYL 10 MG: 10 SUPPOSITORY RECTAL at 09:03

## 2020-03-17 RX ADMIN — BUSPIRONE HYDROCHLORIDE 7.5 MG: 5 TABLET ORAL at 17:19

## 2020-03-17 RX ADMIN — METOPROLOL TARTRATE 12.5 MG: 25 TABLET ORAL at 21:53

## 2020-03-17 RX ADMIN — HYDROCORTISONE: 1 CREAM TOPICAL at 12:02

## 2020-03-17 RX ADMIN — MIRTAZAPINE 15 MG: 15 TABLET, FILM COATED ORAL at 21:51

## 2020-03-17 RX ADMIN — FAMOTIDINE 20 MG: 20 TABLET ORAL at 17:20

## 2020-03-17 RX ADMIN — HYDROCORTISONE 1 APPLICATION: 1 CREAM TOPICAL at 17:21

## 2020-03-17 RX ADMIN — METOPROLOL TARTRATE 12.5 MG: 25 TABLET ORAL at 09:07

## 2020-03-17 RX ADMIN — SODIUM CHLORIDE, SODIUM LACTATE, POTASSIUM CHLORIDE, AND CALCIUM CHLORIDE 100 ML/HR: .6; .31; .03; .02 INJECTION, SOLUTION INTRAVENOUS at 19:46

## 2020-03-17 RX ADMIN — DOCUSATE SODIUM 100 MG: 100 CAPSULE, LIQUID FILLED ORAL at 17:21

## 2020-03-17 RX ADMIN — MEMANTINE 5 MG: 5 TABLET ORAL at 09:09

## 2020-03-17 RX ADMIN — APIXABAN 2.5 MG: 2.5 TABLET, FILM COATED ORAL at 09:08

## 2020-03-17 RX ADMIN — AMLODIPINE BESYLATE 5 MG: 5 TABLET ORAL at 09:08

## 2020-03-17 RX ADMIN — MEMANTINE 5 MG: 5 TABLET ORAL at 17:20

## 2020-03-17 RX ADMIN — Medication: at 21:53

## 2020-03-17 RX ADMIN — POTASSIUM CHLORIDE 10 MEQ: 750 TABLET, EXTENDED RELEASE ORAL at 09:06

## 2020-03-17 RX ADMIN — STANDARDIZED SENNA CONCENTRATE 17.2 MG: 8.6 TABLET ORAL at 21:51

## 2020-03-17 RX ADMIN — BUSPIRONE HYDROCHLORIDE 7.5 MG: 5 TABLET ORAL at 21:51

## 2020-03-17 RX ADMIN — APIXABAN 2.5 MG: 2.5 TABLET, FILM COATED ORAL at 17:20

## 2020-03-17 RX ADMIN — AMLODIPINE BESYLATE 5 MG: 5 TABLET ORAL at 17:19

## 2020-03-17 RX ADMIN — Medication: at 12:01

## 2020-03-17 RX ADMIN — QUETIAPINE FUMARATE 25 MG: 25 TABLET ORAL at 21:51

## 2020-03-17 RX ADMIN — ERTAPENEM SODIUM 1000 MG: 1 INJECTION, POWDER, LYOPHILIZED, FOR SOLUTION INTRAMUSCULAR; INTRAVENOUS at 06:16

## 2020-03-17 RX ADMIN — POTASSIUM CHLORIDE 20 MEQ: 1500 TABLET, EXTENDED RELEASE ORAL at 12:01

## 2020-03-17 RX ADMIN — FAMOTIDINE 20 MG: 20 TABLET ORAL at 09:08

## 2020-03-17 NOTE — ASSESSMENT & PLAN NOTE
· Present on admission-as evidenced by fever, tachycardia, leukocytosis  · Due to polymicrobial left pyelonephritis with acute obstructing left ureteral stone  With polymicrobial bacteremia  · Urinalysis:  Innumerable WBC  · CT scan:  Obstructing left nephrolithiasis  · Infectious disease doctor on board  · Patient now on ertapenem and daptomycin  Will continue  · Urologist on board  Has percutaneous nephrostomy and suprapubic tube drainage  · Repeat blood cultures are negative, 24 to 48 hours  · Procalcitonin has trended down significantly

## 2020-03-17 NOTE — ASSESSMENT & PLAN NOTE
· Nonverbal at baseline  Patient does not actually have behavioral disturbance  · Prior history of CVA  · Home regimen:  · BuSpar 7 5 mg t i d   · Namenda 5 mg b i d  · Remeron 50 mg HS  · Seroquel 25 mg HS  · Continue medications

## 2020-03-17 NOTE — PROGRESS NOTES
Patient awake lying in bed  Nonverbal at baseline  No visible signs of distress noted at this time  Repositioned in bed  Bed low and locked; call bell within reach  Will continue to monitor   Char Burr RN

## 2020-03-17 NOTE — SPEECH THERAPY NOTE
Speech Language/Pathology    Speech/Language Pathology Progress Note    Patient Name: Cherri Chaudhary  Today's Date: 3/17/2020       Subjective:  Pt seen for dysphagia tx at lunch, diet now changed to dysphagia 1 puree w/ thin liquids  Pt remains non verbal      Objective:  Pt w/ adequate bolus retrieval via spoon and straw  Took multiple sips/swallows w/ good oral control  Pt w/ slow manipulation, occas holding of puree  Re-presentation of spoon facilitated transfer  No oral residue noted  [place] timely and complete  No coughing, choking noted  Assessment:  Pt tolerated puree diet w/ thin liquids- good po intake; no prolonged oral holding, pocketing or residue noted  Plan/Recommendations:  No further follow up needed     Cont puree diet w/ thin liquids     Destinee Tabares CCC-SLP  Speech Pathogist  Available via  tiger text

## 2020-03-17 NOTE — ASSESSMENT & PLAN NOTE
· History of CVA in the past  · Nonverbal at baseline  · With patient also having dysphagia  · Per discussion with patient's wife, patient has difficulty with present diet and asks me to change diet to pureed in the meantime  Thus I will ask speech therapist to be on board

## 2020-03-17 NOTE — ASSESSMENT & PLAN NOTE
· According to the patient's nurse, patient has been having episodes of smears of stools, but no good bowel movements yet  · Consider enema if constipation continues  · For now, will continue with home regimen under direction of patient's wife

## 2020-03-17 NOTE — PLAN OF CARE
Problem: Prexisting or High Potential for Compromised Skin Integrity  Goal: Skin integrity is maintained or improved  Description  INTERVENTIONS:  - Identify patients at risk for skin breakdown  - Assess and monitor skin integrity  - Assess and monitor nutrition and hydration status  - Monitor labs   - Assess for incontinence   - Turn and reposition patient  - Assist with mobility/ambulation  - Relieve pressure over bony prominences  - Avoid friction and shearing  - Provide appropriate hygiene as needed including keeping skin clean and dry  - Evaluate need for skin moisturizer/barrier cream  - Collaborate with interdisciplinary team   - Patient/family teaching  - Consider wound care consult   Outcome: Progressing     Problem: PAIN - ADULT  Goal: Verbalizes/displays adequate comfort level or baseline comfort level  Description  Interventions:  - Encourage patient to monitor pain and request assistance  - Assess pain using appropriate pain scale  - Administer analgesics based on type and severity of pain and evaluate response  - Implement non-pharmacological measures as appropriate and evaluate response  - Consider cultural and social influences on pain and pain management  - Notify physician/advanced practitioner if interventions unsuccessful or patient reports new pain  Outcome: Progressing     Problem: INFECTION - ADULT  Goal: Absence or prevention of progression during hospitalization  Description  INTERVENTIONS:  - Assess and monitor for signs and symptoms of infection  - Monitor lab/diagnostic results  - Monitor all insertion sites, i e  indwelling lines, tubes, and drains  - Monitor endotracheal if appropriate and nasal secretions for changes in amount and color  - [place] appropriate cooling/warming therapies per order  - Administer medications as ordered  - Instruct and encourage patient and family to use good hand hygiene technique  - Identify and instruct in appropriate isolation precautions for identified infection/condition  Outcome: Progressing  Goal: Absence of fever/infection during neutropenic period  Description  INTERVENTIONS:  - Monitor WBC    Outcome: Progressing     Problem: SAFETY ADULT  Goal: Patient will remain free of falls  Description  INTERVENTIONS:  - Assess patient frequently for physical needs  -  Identify cognitive and physical deficits and behaviors that affect risk of falls    -  [place] fall precautions as indicated by assessment   - Educate patient/family on patient safety including physical limitations  - Instruct patient to call for assistance with activity based on assessment  - Modify environment to reduce risk of injury  - Consider OT/PT consult to assist with strengthening/mobility  Outcome: Progressing  Goal: Maintain or return to baseline ADL function  Description  INTERVENTIONS:  -  Assess patient's ability to carry out ADLs; assess patient's baseline for ADL function and identify physical deficits which impact ability to perform ADLs (bathing, care of mouth/teeth, toileting, grooming, dressing, etc )  - Assess/evaluate cause of self-care deficits   - Assess range of motion  - Assess patient's mobility; develop plan if impaired  - Assess patient's need for assistive devices and provide as appropriate  - Encourage maximum independence but intervene and supervise when necessary  - Involve family in performance of ADLs  - Assess for home care needs following discharge   - Consider OT consult to assist with ADL evaluation and planning for discharge  - Provide patient education as appropriate  Outcome: Progressing  Goal: Maintain or return mobility status to optimal level  Description  INTERVENTIONS:  - Assess patient's baseline mobility status (ambulation, transfers, stairs, etc )    - Identify cognitive and physical deficits and behaviors that affect mobility  - Identify mobility aids required to assist with transfers and/or ambulation (gait belt, sit-to-stand, lift, walker, cane, etc )  - [place] fall precautions as indicated by assessment  - Record patient progress and toleration of activity level on Mobility SBAR; progress patient to next Phase/Stage  - Instruct patient to call for assistance with activity based on assessment  - Consider rehabilitation consult to assist with strengthening/weightbearing, etc   Outcome: Progressing     Problem: DISCHARGE PLANNING  Goal: Discharge to home or other facility with appropriate resources  Description  INTERVENTIONS:  - Identify barriers to discharge w/patient and caregiver  - Arrange for needed discharge resources and transportation as appropriate  - Identify discharge learning needs (meds, wound care, etc )  - Arrange for interpretive services to assist at discharge as needed  - Refer to Case Management Department for coordinating discharge planning if the patient needs post-hospital services based on physician/advanced practitioner order or complex needs related to functional status, cognitive ability, or social support system  Outcome: Progressing     Problem: Knowledge Deficit  Goal: Patient/family/caregiver demonstrates understanding of disease process, treatment plan, medications, and discharge instructions  Description  Complete learning assessment and assess knowledge base  Interventions:  - Provide teaching at level of understanding  - Provide teaching via preferred learning methods  Outcome: Progressing     Problem: Nutrition/Hydration-ADULT  Goal: Nutrient/Hydration intake appropriate for improving, restoring or maintaining nutritional needs  Description  Monitor and assess patient's nutrition/hydration status for malnutrition  Collaborate with interdisciplinary team and initiate plan and interventions as ordered  Monitor patient's weight and dietary intake as ordered or per policy  Utilize nutrition screening tool and intervene as necessary   Determine patient's food preferences and provide high-protein, high-caloric foods as appropriate  INTERVENTIONS:  - Monitor oral intake, urinary output, labs, and treatment plans  - Assess nutrition and hydration status and recommend course of action  - Evaluate amount of meals eaten  - Assist patient with eating if necessary   - Allow adequate time for meals  - Recommend/ encourage appropriate diets, oral nutritional supplements, and vitamin/mineral supplements  - Order, calculate, and assess calorie counts as needed  - Recommend, monitor, and adjust tube feedings and TPN/PPN based on assessed needs  - Assess need for intravenous fluids  - Provide specific nutrition/hydration education as appropriate  - Include patient/family/caregiver in decisions related to nutrition  Outcome: Progressing     Problem: Potential for Falls  Goal: Patient will remain free of falls  Description  INTERVENTIONS:  - Assess patient frequently for physical needs  -  Identify cognitive and physical deficits and behaviors that affect risk of falls    -  [place] fall precautions as indicated by assessment   - Educate patient/family on patient safety including physical limitations  - Instruct patient to call for assistance with activity based on assessment  - Modify environment to reduce risk of injury  - Consider OT/PT consult to assist with strengthening/mobility  Outcome: Progressing

## 2020-03-17 NOTE — ASSESSMENT & PLAN NOTE
· Polymicrobial   · Infectious disease doctor on board    · Continue present antibiotics with ertapenem and daptomycin

## 2020-03-17 NOTE — PROGRESS NOTES
Progress Note - Josselyn Ochoa [facility] 1950, 68 y o  male MRN: [id number]    Unit/Bed#: S -01 Encounter: [id number]    Primary Care Provider: Vito Gilford, MD   Date and time admitted to hospital: 3/10/2020  8:43 PM        * Sepsis [facility])  Assessment & Plan  · Present on admission-as evidenced by fever, tachycardia, leukocytosis  · Due to polymicrobial left pyelonephritis with acute obstructing left ureteral stone  With polymicrobial bacteremia  · Urinalysis:  Innumerable WBC  · CT scan:  Obstructing left nephrolithiasis  · Infectious disease doctor on board  · Patient now on ertapenem and daptomycin  Will continue  · Urologist on board  Has percutaneous nephrostomy and suprapubic tube drainage  · Repeat blood cultures are negative, 24 to 48 hours  · Procalcitonin has trended down significantly  Pyelonephritis of left kidney  Assessment & Plan  · Polymicrobial   · Infectious disease doctor on board  · Continue present antibiotics with ertapenem and daptomycin    Bilateral nephrolithiasis  Assessment & Plan  · Presentation:  Multiple episodes of vomiting that occurred in the evening  Temperature a 100 3° in EMS  From nursing facility  · History:  Suprapubic catheter/neurogenic bladder, history of nephrolithiasis requiring right nephrostomy tube in the past  · Suprapubic catheter site without obvious infection  · CT abdomen:  Bilateral nephrolithiasis with 1 2 x 0 9 cm obstructing stone in the left ureteral junction with mild-to-moderate hydronephrosis and obstructive uropathy  Nonobstructing stones in the right kidney  · Patient had left-sided nephrostomy tube placed 3/11  It is draining well  Continue nephrostomy tube in management as per Urology  Acute kidney injury [facility])  Assessment & Plan  Resolved  Most likely due to combination of obstructive uropathy and sepsis  Baseline creatinine appears to be around 1 4-1 6  Creatinine upon admission was 2 30  Monitor p r n     Avoid nephrotoxins  Avoid hypotension  Cellulitis, abdominal wall  Assessment & Plan  · Improving  · Continue antibiotics as above  · Serial exams  Fecal impaction ([facility] [address] )  Assessment & Plan  · According to the patient's nurse, patient has been having episodes of smears of stools, but no good bowel movements yet  · Consider enema if constipation continues  · For now, will continue with home regimen under direction of patient's wife  Neurogenic bladder  Assessment & Plan  · CT abdomen:  "Bladder is collapsed around suprapubic catheter  There was some fat stranding adjacent to the bladder which could be related to catheter placement versus cystitis "    There did appear to be some cellulitis around the suprapubic catheter  This is improving with antibiotics  Dementia with behavioral disturbance ([facility] [place] [address] )  Assessment & Plan  · Nonverbal at baseline  Patient does not actually have behavioral disturbance  · Prior history of CVA  · Home regimen:  · BuSpar 7 5 mg t i d   · Namenda 5 mg b i d  · Remeron 50 mg HS  · Seroquel 25 mg HS  · Continue medications  Hypokalemia  Assessment & Plan  · Replace p r n  Sandra Given · Monitor electrolytes  Essential hypertension  Assessment & Plan  · Presently controlled  · Home regimen:  Amlodipine 5 mg b i d , metoprolol tartrate 12 5 mg b i d   · Continue above blood pressure medications        History of DVT (deep vein thrombosis)  Assessment & Plan  · Continue Eliquis  Aphasia  Assessment & Plan  · History of CVA in the past  · Nonverbal at baseline  · With patient also having dysphagia  · Per discussion with patient's wife, patient has difficulty with present diet and asks me to change diet to pureed in the meantime  Thus I will ask speech therapist to be on board          VTE Pharmacologic Prophylaxis:   Pharmacologic: Apixaban (Eliquis)  Mechanical VTE Prophylaxis in Place: Yes    Patient Centered Rounds: I have performed bedside rounds with nursing staff today     Discussions with Specialists or Other Care Team Provider:  Case management  Education and Discussions with Family / Patient:  Patient's wife  I discussed our findings and plans at length to her  I answered questions and concerns  She is okay with the plans  From our discussion, she told me, that patient had some loose/soft stools few days ago when he was on linezolid and she told me, that patient is not eating that much, so she really does not expect good bowel movement from him  She told me, that patient has difficulty with present diet and once the diet to be switched to pureed diet  Time Spent for Care: 45 minutes  More than 50% of total time spent on counseling and coordination of care as described above  Current Length of Stay: 6 day(s)    Current Patient Status: Inpatient   Certification Statement: The patient will continue to require additional inpatient hospital stay due to Above findings and plans  Discharge Plan:  None yet  Code Status: Level 3 - DNAR and DNI      Subjective:   Patient is nonverbal at baseline, according to my discussion with the patient's nurse  Patient is not noted to be in pain or having shortness of breath  According the patient's nurse, they noted the patient at times scratching his back  Objective:     Vitals:   Temp (24hrs), Av 3 °F (37 4 °C), Min:98 5 °F (36 9 °C), Max:99 9 °F (37 7 °C)    Temp:  [98 5 °F (36 9 °C)-99 9 °F (37 7 °C)] 98 5 °F (36 9 °C)  HR:  [48-77] 76  Resp:  [18-20] 18  BP: (123-178)/(60-94) 123/68  SpO2:  [92 %-98 %] 98 %  Body mass index is 33 12 kg/m²  Input and Output Summary (last 24 hours): Intake/Output Summary (Last 24 hours) at 3/17/2020 1400  Last data filed at 3/17/2020 1201  Gross per 24 hour   Intake 240 ml   Output 2525 ml   Net -2285 ml       Physical Exam:     Physical Exam   Constitutional: No distress  HENT:   Head: Normocephalic and atraumatic  Eyes: Right eye exhibits no discharge   Left eye exhibits no discharge  No scleral icterus  Neck: No JVD present  No tracheal deviation present  No thyromegaly present  Cardiovascular: Normal rate, regular rhythm and normal heart sounds  Exam reveals no gallop and no friction rub  No murmur heard  Pulmonary/Chest: Effort normal and breath sounds normal  No stridor  No respiratory distress  He has no wheezes  He has no rales  Abdominal: Soft  Bowel sounds are normal  He exhibits no distension and no mass  There is no tenderness  There is no rebound and no guarding  Genitourinary:   Genitourinary Comments: Left-sided nephrostomy tube in place draining clear yellow urine  Suprapubic catheter in place  Musculoskeletal: He exhibits no edema or tenderness  Neurological: He is alert  But nonverbal (baseline)  Skin: Skin is warm  No rash noted  He is not diaphoretic  No erythema  No pallor  Vitals reviewed  Additional Data:     Labs:    Results from last 7 days   Lab Units 03/17/20  0611   WBC Thousand/uL 11 16*   HEMOGLOBIN g/dL 11 8*   HEMATOCRIT % 36 2*   PLATELETS Thousands/uL 226   NEUTROS PCT % 77*   LYMPHS PCT % 10*   MONOS PCT % 8   EOS PCT % 4     Results from last 7 days   Lab Units 03/17/20  0611 03/16/20  1146   POTASSIUM mmol/L 3 4* 3 5   CHLORIDE mmol/L 109* 108   CO2 mmol/L 25 27   BUN mg/dL 13 13   CREATININE mg/dL 1 40* 1 72*   CALCIUM mg/dL 8 2* 8 6   ALK PHOS U/L  --  114   ALT U/L  --  29   AST U/L  --  21     Results from last 7 days   Lab Units 03/10/20  2111   INR  1 11       * I Have Reviewed All Lab Data Listed Above  * Additional Pertinent Lab Tests Reviewed: Nader [facility] Admission Reviewed    Imaging:    Imaging Reports Reviewed Today Include:  Diagnostic imaging studies that were done on this admission  Imaging Personally Reviewed by Myself Includes:  None      Recent Cultures (last 7 days):     Results from last 7 days   Lab Units 03/15/20  1033 03/15/20  0425 03/11/20  2267 03/10/20  2136 BLOOD CULTURE  No Growth at 24 hrs  No Growth at 48 hrs    --  Escherichia coli*  Providencia stuartii*  Escherichia coli ESBL*  Enterococcus faecalis*  Providencia stuartii*   GRAM STAIN RESULT   --   --   --  Gram negative rods*  Gram negative rods*   URINE CULTURE   --   --  >100,000 cfu/ml Providencia stuartii*  >100,000 cfu/ml Escherichia coli ESBL*  >100,000 cfu/ml Beta Hemolytic Streptococcus Group F*  30,000-39,000 cfu/ml Enterococcus faecalis* >100,000 cfu/ml Pseudomonas aeruginosa*  >100,000 cfu/ml Escherichia coli ESBL*  >100,000 cfu/ml Providencia stuartii*  >100,000 cfu/ml Beta Hemolytic Streptococcus Group F*  80,000-89,000 cfu/ml Enterococcus faecalis*       Last 24 Hours Medication List:     Current Facility-Administered Medications:  acetaminophen 650 mg Oral Q6H PRN Liam F Jahoor, DO    aluminum-magnesium hydroxide-simethicone 30 mL Oral Q4H PRN Jenn Anne PA-C    amLODIPine 5 mg Oral BID Liam F Jahoor, DO    ammonium lactate  Topical Daily Yanna Ching MD    apixaban 2 5 mg Oral BID Liam F Jahoor, DO    artificial tear  Both Eyes HS Jenn Anne PA-C    bisacodyl 10 mg Rectal Daily Jenn Anne PA-C    busPIRone 7 5 mg Oral TID Jenn Anne PA-C    DAPTOmycin 8 mg/kg (Adjusted) Intravenous Q24H Pascual Boland MD Last Rate: 700 mg (03/17/20 1234)   docusate sodium 100 mg Oral BID Jenn Anne PA-C    ertapenem 1,000 mg Intravenous Q24H Jenn Anne PA-C Last Rate: 1,000 mg (03/17/20 0616)   famotidine 20 mg Oral BID Jenn Anne PA-C    hydrocortisone  Topical BID Yanna Ching MD    ipratropium 0 5 mg Nebulization Q6H PRN Liam F Jahoor, DO    lactated ringers 100 mL/hr Intravenous Continuous Liam F Jahoor, DO Last Rate: 100 mL/hr (03/16/20 2004)   memantine 5 mg Oral BID Jenn Anne PA-C    metoprolol tartrate 12 5 mg Oral BID Jenn Anne PA-C    mirtazapine 15 mg Oral HS Liam Brown DO ondansetron 4 mg Intravenous Q6H PRN Jenn Anne PA-C    oxyCODONE 5 mg Oral Q6H PRN Liam Brown DO    polyethylene glycol 17 g Oral Daily Jenn Anne PA-C    potassium chloride 10 mEq Oral Daily With Breakfast Jenn Anne PA-C    QUEtiapine 25 mg Oral HS Jenn Anne PA-C    senna 2 tablet Oral HS Jenn Anne PA-C         Today, Patient Was Seen By: Kandace Reed MD    ** Please Note: Dragon 360 Dictation voice to text software may have been used in the creation of this document   **

## 2020-03-17 NOTE — PROGRESS NOTES
Progress Note - Infectious Disease   Miguel Angel Tomas 68 y o  male MRN: [id number]  Unit/Bed#: S -01 Encounter: [id number]      Impression/Plan:  1   Sepsis   POA   Fever, tachycardia, leukocytosis, and RUPESH   Patient with extensive nephrolithiasis history with new obstructing left ureteral kidney stone on CT scan now status post left nephrostomy tube placement   Blood cultures positive for 2 of 2 sets with E coli and Providencia and 1 of 2 sets with Enterococcus faecalis    The white cell count has come down, fever seems to be trending down too  procalcintonin level trending from 278 64 to 6 51  Rec:  · Continues ertapenem and daptomycin at present as below  · Follow-up repeat blood cultures; negative times 48 hours thus far  · Repeat CBC in am  · Repeat BMP in a m  · Supportive care per primary care and urology teams     2  Polymicrobial  bacteremia   With 2 gram-negative rods and enterococcus growing  Donelda Jania source suspected   Doubt endocarditis with the polymicrobial nature, however with ongoing fever would definitely treat the Enterococcus   2D echo negative for vegetation  Aortic valve was not well visualized  repeat blood cultures x2 are negative to date  Rec:  · Continues ertapenem as below  · Patient's wife agreed to trial of daptomycin yesterday after she arrived  He appear to be tolerating and continues on daptomycin 8 milligrams/kilogram, dosing weight, Q 24 hours  · Follow-up repeat blood cultures x2 sets  · Repeat CBC in am     3   Left pyelonephritis with acute obstructing left ureteral stone   Likely source of #2  Patient is status post percutaneous left nephrostomy tube placement   Polymicrobial including ESBL E coli, Providencia, and Enterococcus  3/16/20 Repeat CT A/P shows improving hydronephrosis    Rec:  · Continues ertapenem and Daptomycin  · Repeat CBC in am  · Percutaneous nephrostomy and suprapubic tube drainage management per Urology     4  Acute kidney injury   Likely secondary to sepsis and obstructing stone and pre renal issues   Improving, Creatinine 1 40   Rec    · Recheck BMP  · Renal dose adjust antibiotics as needed     5   Cellulitis lower abdominal wall   Possible secondary to central scabbed soft tissue boil   Not known to have a prior suprapubic site at this location   clinically improving  Rec    · Serial exams  · Antibiotics as above     6  Aphasia with CVA      Supportive care per primary care team     7   Multiple listed antibiotic allergies: History of anaphylaxis to Zosyn, gentamicin, vancomycin  Patient has tolerated cefepime well and has tolerated ertapenem and cefdinir in past  Of note daptomycin has no cross-reactivity with vancomycin and should be safe to use in this patient  Seems to be tolerating current antibiotics      Above plan discussed in detail with patient's RN and Dr Cuca Orlando      Antibiotics:  Ertapenem 6/Daptomycin D3     Subjective:  Patient tolerating daptomycin and fevers appear to be trending down since it started  Patient had 1 episode of projectile vomiting per nurse yesterday  ROS: Patient has declining fever, no reported chills, sweats; no nausea, vomiting, diarrhea today; no cough, shortness of breath; no pain per RN  Patient aphasic  Objective:  Vitals:  Temp:  [98 5 °F (36 9 °C)-99 9 °F (37 7 °C)] 98 5 °F (36 9 °C)  HR:  [48-77] 76  Resp:  [18-20] 18  BP: (123-178)/(60-94) 123/68  SpO2:  [92 %-98 %] 98 %  Temp (24hrs), Av 3 °F (37 4 °C), Min:98 5 °F (36 9 °C), Max:99 9 °F (37 7 °C)  Current: Temperature: 98 5 °F (36 9 °C)    General Appearance:  Awake, alert, cooperative, resting in bed, no acute distress  Nonverbal   Tracks me in room throughout exam   Throat: Oropharynx moist without lesions  Lungs:   Decreased breath sounds throughout, no active cough; respirations unlabored   Heart:  RRR; S1-S2 heard, no murmur   Abdomen:   Soft, non-tender, non-distended, positive bowel sounds       Extremities: Trace generalized edema, left arm IV site and right arm midline nontender   : Suprapubic Thorne with clear urine in bag, left nephrostomy tube with scant blood-tinged urine in bag   Skin: No diffuse rashes      Labs, Imaging, & Other studies:   All pertinent labs and imaging studies were personally reviewed  Results from last 7 days   Lab Units 03/17/20  0611 03/16/20  1146 03/15/20  0544   WBC Thousand/uL 11 16* 11 45* 10 82*   HEMOGLOBIN g/dL 11 8* 13 6 12 2   PLATELETS Thousands/uL 226 221 154     Results from last 7 days   Lab Units 03/17/20  0611 03/16/20  1146  03/13/20  1131  03/11/20  0505   POTASSIUM mmol/L 3 4* 3 5   < > 3 7   < > 3 3*   CHLORIDE mmol/L 109* 108   < > 110*   < > 111*   CO2 mmol/L 25 27   < > 22   < > 24   BUN mg/dL 13 13   < > 27*   < > 21   CREATININE mg/dL 1 40* 1 72*   < > 1 96*   < > 2 30*   EGFR ml/min/1 73sq m 50 39   < > 33   < > 28   CALCIUM mg/dL 8 2* 8 6   < > 8 0*   < > 8 0*   AST U/L  --  21  --  20  --  16   ALT U/L  --  29  --  14  --  14   ALK PHOS U/L  --  114  --  77  --  84    < > = values in this interval not displayed  Results from last 7 days   Lab Units 03/17/20  0611 03/16/20  1146 03/14/20  0526 03/12/20  0533 03/11/20  1050   PROCALCITONIN ng/ml 6 51* 13 05* 62 18* 272 37* 278 64*     Results from last 7 days   Lab Units 03/15/20  1033 03/15/20  0425 03/11/20  2243 03/11/20  0338 03/10/20  2136   BLOOD CULTURE  No Growth at 24 hrs   No Growth at 48 hrs   --   --  Escherichia coli*  Providencia stuartii*  Escherichia coli ESBL*  Enterococcus faecalis*  Providencia stuartii*   GRAM STAIN RESULT   --   --   --   --  Gram negative rods*  Gram negative rods*   URINE CULTURE   --   --   --  >100,000 cfu/ml Providencia stuartii*  >100,000 cfu/ml Escherichia coli ESBL*  >100,000 cfu/ml Beta Hemolytic Streptococcus Group F*  30,000-39,000 cfu/ml Enterococcus faecalis* >100,000 cfu/ml Pseudomonas aeruginosa*  >100,000 cfu/ml Escherichia coli ESBL*  >100,000 cfu/ml Providencia stuartii* >100,000 cfu/ml Beta Hemolytic Streptococcus Group F*  80,000-89,000 cfu/ml Enterococcus faecalis*   MRSA CULTURE ONLY   --   --  No Methicillin Resistant Staphlyococcus aureus (MRSA) isolated  --   --

## 2020-03-17 NOTE — ASSESSMENT & PLAN NOTE
Resolved  Most likely due to combination of obstructive uropathy and sepsis  Baseline creatinine appears to be around 1 4-1 6  Creatinine upon admission was 2 30  Monitor p r n     Avoid nephrotoxins  Avoid hypotension

## 2020-03-17 NOTE — ASSESSMENT & PLAN NOTE
· Presentation:  Multiple episodes of vomiting that occurred in the evening  Temperature a 100 3° in EMS  From nursing facility  · History:  Suprapubic catheter/neurogenic bladder, history of nephrolithiasis requiring right nephrostomy tube in the past  · Suprapubic catheter site without obvious infection  · CT abdomen:  Bilateral nephrolithiasis with 1 2 x 0 9 cm obstructing stone in the left ureteral junction with mild-to-moderate hydronephrosis and obstructive uropathy  Nonobstructing stones in the right kidney  · Patient had left-sided nephrostomy tube placed 3/11  It is draining well  Continue nephrostomy tube in management as per Urology

## 2020-03-17 NOTE — ASSESSMENT & PLAN NOTE
· Presently controlled  · Home regimen:  Amlodipine 5 mg b i d , metoprolol tartrate 12 5 mg b i d   · Continue above blood pressure medications

## 2020-03-18 LAB
ANION GAP SERPL CALCULATED.3IONS-SCNC: 6 MMOL/L (ref 4–13)
BASOPHILS # BLD AUTO: 0.04 THOUSANDS/ΜL (ref 0–0.1)
BASOPHILS NFR BLD AUTO: 0 % (ref 0–1)
BUN SERPL-MCNC: 14 MG/DL (ref 5–25)
CALCIUM SERPL-MCNC: 8.3 MG/DL (ref 8.3–10.1)
CHLORIDE SERPL-SCNC: 109 MMOL/L (ref 100–108)
CO2 SERPL-SCNC: 29 MMOL/L (ref 21–32)
CREAT SERPL-MCNC: 1.51 MG/DL (ref 0.6–1.3)
EOSINOPHIL # BLD AUTO: 0.7 THOUSAND/ΜL (ref 0–0.61)
EOSINOPHIL NFR BLD AUTO: 5 % (ref 0–6)
ERYTHROCYTE [DISTWIDTH] IN BLOOD BY AUTOMATED COUNT: 14.8 % (ref 11.6–15.1)
GFR SERPL CREATININE-BSD FRML MDRD: 46 ML/MIN/1.73SQ M
GLUCOSE SERPL-MCNC: 105 MG/DL (ref 65–140)
HCT VFR BLD AUTO: 38.5 % (ref 36.5–49.3)
HGB BLD-MCNC: 12 G/DL (ref 12–17)
IMM GRANULOCYTES # BLD AUTO: 0.12 THOUSAND/UL (ref 0–0.2)
IMM GRANULOCYTES NFR BLD AUTO: 1 % (ref 0–2)
LYMPHOCYTES # BLD AUTO: 1.54 THOUSANDS/ΜL (ref 0.6–4.47)
LYMPHOCYTES NFR BLD AUTO: 12 % (ref 14–44)
MCH RBC QN AUTO: 28.6 PG (ref 26.8–34.3)
MCHC RBC AUTO-ENTMCNC: 31.2 G/DL (ref 31.4–37.4)
MCV RBC AUTO: 92 FL (ref 82–98)
MONOCYTES # BLD AUTO: 0.8 THOUSAND/ΜL (ref 0.17–1.22)
MONOCYTES NFR BLD AUTO: 6 % (ref 4–12)
NEUTROPHILS # BLD AUTO: 9.72 THOUSANDS/ΜL (ref 1.85–7.62)
NEUTS SEG NFR BLD AUTO: 76 % (ref 43–75)
NRBC BLD AUTO-RTO: 0 /100 WBCS
PLATELET # BLD AUTO: 279 THOUSANDS/UL (ref 149–390)
PMV BLD AUTO: 10.3 FL (ref 8.9–12.7)
POTASSIUM SERPL-SCNC: 3.8 MMOL/L (ref 3.5–5.3)
PROCALCITONIN SERPL-MCNC: 3.8 NG/ML
RBC # BLD AUTO: 4.19 MILLION/UL (ref 3.88–5.62)
SODIUM SERPL-SCNC: 144 MMOL/L (ref 136–145)
WBC # BLD AUTO: 12.92 THOUSAND/UL (ref 4.31–10.16)

## 2020-03-18 PROCEDURE — 80048 BASIC METABOLIC PNL TOTAL CA: CPT | Performed by: INTERNAL MEDICINE

## 2020-03-18 PROCEDURE — 99232 SBSQ HOSP IP/OBS MODERATE 35: CPT | Performed by: INTERNAL MEDICINE

## 2020-03-18 PROCEDURE — 84145 PROCALCITONIN (PCT): CPT | Performed by: INTERNAL MEDICINE

## 2020-03-18 PROCEDURE — 85025 COMPLETE CBC W/AUTO DIFF WBC: CPT | Performed by: INTERNAL MEDICINE

## 2020-03-18 RX ADMIN — BUSPIRONE HYDROCHLORIDE 7.5 MG: 5 TABLET ORAL at 21:37

## 2020-03-18 RX ADMIN — MEMANTINE 5 MG: 5 TABLET ORAL at 17:16

## 2020-03-18 RX ADMIN — BISACODYL 10 MG: 10 SUPPOSITORY RECTAL at 08:46

## 2020-03-18 RX ADMIN — APIXABAN 2.5 MG: 2.5 TABLET, FILM COATED ORAL at 08:46

## 2020-03-18 RX ADMIN — FAMOTIDINE 20 MG: 20 TABLET ORAL at 17:16

## 2020-03-18 RX ADMIN — METOPROLOL TARTRATE 12.5 MG: 25 TABLET ORAL at 08:47

## 2020-03-18 RX ADMIN — AMLODIPINE BESYLATE 5 MG: 5 TABLET ORAL at 08:46

## 2020-03-18 RX ADMIN — ERTAPENEM SODIUM 1000 MG: 1 INJECTION, POWDER, LYOPHILIZED, FOR SOLUTION INTRAMUSCULAR; INTRAVENOUS at 05:39

## 2020-03-18 RX ADMIN — POLYETHYLENE GLYCOL 3350 17 G: 17 POWDER, FOR SOLUTION ORAL at 08:48

## 2020-03-18 RX ADMIN — MEMANTINE 5 MG: 5 TABLET ORAL at 08:46

## 2020-03-18 RX ADMIN — SODIUM CHLORIDE, SODIUM LACTATE, POTASSIUM CHLORIDE, AND CALCIUM CHLORIDE 100 ML/HR: .6; .31; .03; .02 INJECTION, SOLUTION INTRAVENOUS at 05:39

## 2020-03-18 RX ADMIN — METOPROLOL TARTRATE 12.5 MG: 25 TABLET ORAL at 21:38

## 2020-03-18 RX ADMIN — AMLODIPINE BESYLATE 5 MG: 5 TABLET ORAL at 17:16

## 2020-03-18 RX ADMIN — DAPTOMYCIN 700 MG: 500 INJECTION, POWDER, LYOPHILIZED, FOR SOLUTION INTRAVENOUS at 12:29

## 2020-03-18 RX ADMIN — POTASSIUM CHLORIDE 10 MEQ: 750 TABLET, EXTENDED RELEASE ORAL at 08:47

## 2020-03-18 RX ADMIN — MIRTAZAPINE 15 MG: 15 TABLET, FILM COATED ORAL at 21:38

## 2020-03-18 RX ADMIN — BUSPIRONE HYDROCHLORIDE 7.5 MG: 5 TABLET ORAL at 08:45

## 2020-03-18 RX ADMIN — HYDROCORTISONE 1 APPLICATION: 1 CREAM TOPICAL at 17:18

## 2020-03-18 RX ADMIN — APIXABAN 2.5 MG: 2.5 TABLET, FILM COATED ORAL at 17:17

## 2020-03-18 RX ADMIN — QUETIAPINE FUMARATE 25 MG: 25 TABLET ORAL at 21:37

## 2020-03-18 RX ADMIN — BUSPIRONE HYDROCHLORIDE 7.5 MG: 5 TABLET ORAL at 17:16

## 2020-03-18 RX ADMIN — Medication: at 08:47

## 2020-03-18 RX ADMIN — FAMOTIDINE 20 MG: 20 TABLET ORAL at 08:46

## 2020-03-18 RX ADMIN — SODIUM CHLORIDE, SODIUM LACTATE, POTASSIUM CHLORIDE, AND CALCIUM CHLORIDE 100 ML/HR: .6; .31; .03; .02 INJECTION, SOLUTION INTRAVENOUS at 19:32

## 2020-03-18 RX ADMIN — HYDROCORTISONE: 1 CREAM TOPICAL at 08:47

## 2020-03-18 NOTE — ASSESSMENT & PLAN NOTE
· Present on admission-as evidenced by fever, tachycardia, leukocytosis  · Due to polymicrobial left pyelonephritis with acute obstructing left ureteral stone  With polymicrobial bacteremia  · Urinalysis:  Innumerable WBC  · CT scan:  Obstructing left nephrolithiasis  · Infectious disease doctor on board  · Patient now on ertapenem and daptomycin  Will continue  Needs to complete a total of 2 week course, through 3/25, 2020  · Urologist on board  Has percutaneous nephrostomy and suprapubic tube drainage  · Repeat blood cultures are negative  · Procalcitonin has trended down significantly  · As per Infectious Disease doctor, will place PICC line

## 2020-03-18 NOTE — PROGRESS NOTES
Progress Note - Huy Bullard [facility] 1950, 68 y o  male MRN: [id number]    Unit/Bed#: S -01 Encounter: [id number]    Primary Care Provider: Gogo Chand MD   Date and time admitted to hospital: 3/10/2020  8:43 PM        * Sepsis [facility])  Assessment & Plan  · Present on admission-as evidenced by fever, tachycardia, leukocytosis  · Due to polymicrobial left pyelonephritis with acute obstructing left ureteral stone  With polymicrobial bacteremia  · Urinalysis:  Innumerable WBC  · CT scan:  Obstructing left nephrolithiasis  · Infectious disease doctor on board  · Patient now on ertapenem and daptomycin  Will continue  Needs to complete a total of 2 week course, through 3/25, 2020  · Urologist on board  Has percutaneous nephrostomy and suprapubic tube drainage  · Repeat blood cultures are negative  · Procalcitonin has trended down significantly  · As per Infectious Disease doctor, will place PICC line  Pyelonephritis of left kidney  Assessment & Plan  · Polymicrobial   · Infectious disease doctor on board  · Continue present antibiotics with ertapenem and daptomycin  Will need to complete a total 2 week course, through 3/25, 2020  Bilateral nephrolithiasis  Assessment & Plan  · Presentation:  Multiple episodes of vomiting that occurred in the evening  Temperature a 100 3° in EMS  From nursing facility  · History:  Suprapubic catheter/neurogenic bladder, history of nephrolithiasis requiring right nephrostomy tube in the past  · Suprapubic catheter site without obvious infection  · CT abdomen:  Bilateral nephrolithiasis with 1 2 x 0 9 cm obstructing stone in the left ureteral junction with mild-to-moderate hydronephrosis and obstructive uropathy  Nonobstructing stones in the right kidney  · Patient had left-sided nephrostomy tube placed 3/11  It is draining well  Continue nephrostomy tube in management as per Urology  Outpatient follow-up with urology      Acute kidney injury [facility])  Assessment & Plan  Resolved  Most likely due to combination of obstructive uropathy and sepsis  Baseline creatinine appears to be around 1 4-1 6  Creatinine upon admission was 2 30  Monitor p r n     Avoid nephrotoxins  Avoid hypotension  Cellulitis, abdominal wall  Assessment & Plan  · Improving  · Continue antibiotics as above  · Serial exams  Fecal impaction [facility])  Assessment & Plan  · According to the patient's nurse, patient had good bowel movements today  · Continue bowel regimen  Neurogenic bladder  Assessment & Plan  · CT abdomen:  "Bladder is collapsed around suprapubic catheter  There was some fat stranding adjacent to the bladder which could be related to catheter placement versus cystitis "    There did appear to be some cellulitis around the suprapubic catheter  This is improving with antibiotics  Dementia with behavioral disturbance ([facility] [address] )  Assessment & Plan  · Nonverbal at baseline  Patient does not actually have behavioral disturbance  · Prior history of CVA  · Home regimen:  · BuSpar 7 5 mg t i d   · Namenda 5 mg b i d  · Remeron 50 mg HS  · Seroquel 25 mg HS  · Continue medications  Hypokalemia  Assessment & Plan  · Resolved  · Replace p r n  Tia Gao · Monitor electrolytes  Essential hypertension  Assessment & Plan  · Presently controlled  · Home regimen:  Amlodipine 5 mg b i d , metoprolol tartrate 12 5 mg b i d   · Continue above blood pressure medications        History of DVT (deep vein thrombosis)  Assessment & Plan  · Continue Eliquis  Aphasia  Assessment & Plan  · History of CVA in the past  · Nonverbal at baseline  · Patient also having dysphagia  · Speech therapist on board  · Presently, patient is on a modified diet, with pureed, which the patient's wife prefers          VTE Pharmacologic Prophylaxis:   Pharmacologic: Apixaban (Eliquis)  Mechanical VTE Prophylaxis in Place: Yes    Patient Centered Rounds: I have performed bedside rounds with nursing staff today  Discussions with Specialists or Other Care Team Provider:  Case management  Infectious Disease doctor  Education and Discussions with Family / Patient:  Patient's wife  I gave updates for today  I answered questions and concerns  I get consent for the PICC line placement  She is okay with the plans  Time Spent for Care: 30 minutes  More than 50% of total time spent on counseling and coordination of care as described above  Current Length of Stay: 7 day(s)    Current Patient Status: Inpatient   Certification Statement: The patient will continue to require additional inpatient hospital stay due to Above findings and plans  Discharge Plan:  None yet today  Possible tomorrow  Code Status: Level 3 - DNAR and DNI      Subjective:   Patient is aphasic/nonverbal and baseline  Patient is not noted to be in pain or short of breath  According to the patient's nurse, patient had good bowel movements today  Objective:     Vitals:   Temp (24hrs), Av 7 °F (37 1 °C), Min:98 4 °F (36 9 °C), Max:99 1 °F (37 3 °C)    Temp:  [98 4 °F (36 9 °C)-99 1 °F (37 3 °C)] 98 6 °F (37 °C)  HR:  [63-83] 63  Resp:  [18] 18  BP: (153-172)/(77-88) 154/77  SpO2:  [90 %-96 %] 96 %  Body mass index is 33 45 kg/m²  Input and Output Summary (last 24 hours): Intake/Output Summary (Last 24 hours) at 3/18/2020 1533  Last data filed at 3/18/2020 1401  Gross per 24 hour   Intake 1310 ml   Output 2475 ml   Net -1165 ml       Physical Exam:     Physical Exam   Constitutional: No distress  HENT:   Head: Normocephalic and atraumatic  Eyes: Right eye exhibits no discharge  Left eye exhibits no discharge  No scleral icterus  Neck: No JVD present  No tracheal deviation present  No thyromegaly present  Cardiovascular: Normal rate, regular rhythm and normal heart sounds  Exam reveals no gallop and no friction rub  No murmur heard    Pulmonary/Chest: Effort normal and breath sounds normal  No stridor  No respiratory distress  He has no wheezes  He has no rales  Abdominal: Soft  Bowel sounds are normal  He exhibits no distension and no mass  There is no tenderness  There is no rebound and no guarding  Genitourinary:   Genitourinary Comments: Left-sided nephrostomy tube in place draining clear yellow, with very light pink urine  No jacoby bleeding/blood  Suprapubic catheter in place  Musculoskeletal: He exhibits no edema or tenderness  Neurological: He is alert  But aphasic/nonverbal (baseline)  Skin: Skin is warm  No rash noted  He is not diaphoretic  No erythema  No pallor  Vitals reviewed  Additional Data:     Labs:    Results from last 7 days   Lab Units 03/18/20  0506   WBC Thousand/uL 12 92*   HEMOGLOBIN g/dL 12 0   HEMATOCRIT % 38 5   PLATELETS Thousands/uL 279   NEUTROS PCT % 76*   LYMPHS PCT % 12*   MONOS PCT % 6   EOS PCT % 5     Results from last 7 days   Lab Units 03/18/20  0506  03/16/20  1146   POTASSIUM mmol/L 3 8   < > 3 5   CHLORIDE mmol/L 109*   < > 108   CO2 mmol/L 29   < > 27   BUN mg/dL 14   < > 13   CREATININE mg/dL 1 51*   < > 1 72*   CALCIUM mg/dL 8 3   < > 8 6   ALK PHOS U/L  --   --  114   ALT U/L  --   --  29   AST U/L  --   --  21    < > = values in this interval not displayed  * I Have Reviewed All Lab Data Listed Above  * Additional Pertinent Lab Tests Reviewed: [facility]    Imaging:    Imaging Reports Reviewed Today Include:  Diagnostic imaging studies that were done on this admission  Imaging Personally Reviewed by Myself Includes:  None  Recent Cultures (last 7 days):     Results from last 7 days   Lab Units 03/15/20  1033 03/15/20  0425   BLOOD CULTURE  No Growth at 48 hrs  No Growth at 72 hrs         Last 24 Hours Medication List:     Current Facility-Administered Medications:  acetaminophen 650 mg Oral Q6H PRN Liam Brown DO    aluminum-magnesium hydroxide-simethicone 30 mL Oral Q4H PRN Hira Frank VERONICA Anne PA-C    amLODIPine 5 mg Oral BID Liam F Jahoor, DO    ammonium lactate  Topical Daily Yanna Ching MD    apixaban 2 5 mg Oral BID Liam F Jahoor, DO    artificial tear  Both Eyes HS Jenn Anne PA-C    bisacodyl 10 mg Rectal Daily Jenn Anne PA-C    busPIRone 7 5 mg Oral TID Jenn Anne PA-C    DAPTOmycin 8 mg/kg (Adjusted) Intravenous Q24H Vinayak Loja MD Last Rate: 700 mg (03/18/20 1229)   docusate sodium 100 mg Oral BID Jenn Anne PA-C    ertapenem 1,000 mg Intravenous Q24H Jenn Anne PA-C Last Rate: 1,000 mg (03/18/20 0539)   famotidine 20 mg Oral BID Jenn Anne PA-C    hydrocortisone  Topical BID Yanna Ching MD    ipratropium 0 5 mg Nebulization Q6H PRN Liam F Jahoor, DO    lactated ringers 100 mL/hr Intravenous Continuous Noemi Schulz Jaeliazaror, DO Last Rate: 100 mL/hr (03/18/20 0539)   memantine 5 mg Oral BID Jenn Anne PA-C    metoprolol tartrate 12 5 mg Oral BID Jenn Anne PA-C    mirtazapine 15 mg Oral HS Liam F Jahoor, DO    ondansetron 4 mg Intravenous Q6H PRN Jenn Anne PA-C    oxyCODONE 5 mg Oral Q6H PRN Liam F Jahoor, DO    polyethylene glycol 17 g Oral Daily Jenn Anne PA-C    potassium chloride 10 mEq Oral Daily With Breakfast Jenn Anne PA-C    QUEtiapine 25 mg Oral HS Jenn Anne PA-C    senna 2 tablet Oral HS Jenn Anne PA-C         Today, Patient Was Seen By: Kandace Reed MD    ** Please Note: Dragon 360 Dictation voice to text software may have been used in the creation of this document   **

## 2020-03-18 NOTE — PROGRESS NOTES
Progress Note - Infectious Disease   Sherle Krabbe Hoskin 68 y o  male MRN: [id number]  Unit/Bed#: S -01 Encounter: [id number]      Impression/Plan:  1   Sepsis   POA   Fever, tachycardia, leukocytosis, and RUPESH   Patient with extensive nephrolithiasis history with new obstructing left ureteral kidney stone on CT scan now status post left nephrostomy tube placement   Blood cultures positive for 2 of 2 sets with E coli and Providencia and 1 of 2 sets with Enterococcus faecalis    The white cell count has come down, fever seems to be trending down too  procalcintonin level trending from 278 64 to 3 8  Rec:  · Continues ertapenem and daptomycin at present as below  · Follow-up repeat blood cultures; negative times 72 hours thus far  · Repeat CBC in am  · Repeat BMP in a m  · Supportive care per primary care      2  Polymicrobial  bacteremia   With 2 gram-negative rods and enterococcus growing  [place] source suspected   Doubt endocarditis with the polymicrobial nature, however with ongoing fever would definitely treat the Enterococcus   2D echo negative for vegetation   Aortic valve was not well visualized  repeat blood cultures x2 are negative to date  Rec:  · Continue ertapenem to complete total 2 week course through 3/25/20  · Continue daptomycin 8 milligrams/kilogram, dosing weight, Q 24 hours x 2 weeks total through 3/28/20  · Follow-up  final repeat blood cultures x2 sets  · Repeat CBC in am     3   Left pyelonephritis with acute obstructing left ureteral stone   Likely source of #2  Patient is status post percutaneous left nephrostomy tube placement   Polymicrobial including ESBL E coli, Providencia, and Enterococcus  3/16/20 Repeat CT A/P shows improving hydronephrosis    Rec:  · Continues ertapenem and Daptomycin durations as above  · Repeat CBC in am  · Percutaneous nephrostomy and suprapubic tube drainage management per Urology     4  Acute kidney injury   Likely secondary to sepsis and obstructing stone and pre renal issues   Improving, Creatinine 1 50   Rec    · Recheck BMP  · Renal dose adjust antibiotics as needed     5   Cellulitis lower abdominal wall   Possible secondary to central scabbed soft tissue boil   Not known to have a prior suprapubic site at this location   clinically improving  Rec    · Serial exams  · Antibiotics as above     6  Aphasia with CVA      Supportive care per primary care team     7   Multiple listed antibiotic allergies: History of anaphylaxis to Zosyn, gentamicin, vancomycin  Patient has tolerated cefepime well and has tolerated ertapenem and cefdinir in past  Of note daptomycin has no cross-reactivity with vancomycin and should be safe to use in this patient  Seems to be tolerating current antibiotics      Above plan discussed in detail with patient's RN,  and Dr Constanza Tate      Antibiotics:  Ertapenem 7/Daptomycin D4     Subjective:  Patient tolerating daptomycin and fevers appear to be trending down since it started  ROS: Patient has no fever, chills, sweats; no nausea, vomiting, diarrhea; no cough, shortness of breath; no pain per RN  Patient aphasic and unable to provide own review of systems  Objective:  Vitals:  Temp:  [98 4 °F (36 9 °C)-99 5 °F (37 5 °C)] 98 4 °F (36 9 °C)  HR:  [75-83] 83  Resp:  [18] 18  BP: (153-172)/(83-88) 172/88  SpO2:  [90 %-92 %] 92 %  Temp (24hrs), Av °F (37 2 °C), Min:98 4 °F (36 9 °C), Max:99 5 °F (37 5 °C)  Current: Temperature: 98 4 °F (36 9 °C)    General Appearance:  Awake, alert, cooperative, resting in bed, no acute distress  Nonverbal, tracks me throughout exam in room   Throat: Oropharynx moist without lesions  Lungs:   Clear to auscultation bilaterally; no wheezes, rhonchi or rales; respirations unlabored   Heart:  RRR; S1-S2 heard, no murmur   Abdomen:   Soft, non-tender, non-distended, positive bowel sounds       Extremities: No edema, right arm midline site nontender   : Suprapubic Thorne with clear urine in bag and some leaking/wetness appearance on gown  Left nephrostomy tube with arun urine in bag   Skin: No rashes      Labs, Imaging, & Other studies:   All pertinent labs and imaging studies were personally reviewed  Results from last 7 days   Lab Units 03/18/20  0506 03/17/20  0611 03/16/20  1146   WBC Thousand/uL 12 92* 11 16* 11 45*   HEMOGLOBIN g/dL 12 0 11 8* 13 6   PLATELETS Thousands/uL 279 226 221     Results from last 7 days   Lab Units 03/18/20  0506  03/16/20  1146  03/13/20  1131   POTASSIUM mmol/L 3 8   < > 3 5   < > 3 7   CHLORIDE mmol/L 109*   < > 108   < > 110*   CO2 mmol/L 29   < > 27   < > 22   BUN mg/dL 14   < > 13   < > 27*   CREATININE mg/dL 1 51*   < > 1 72*   < > 1 96*   EGFR ml/min/1 73sq m 46   < > 39   < > 33   CALCIUM mg/dL 8 3   < > 8 6   < > 8 0*   AST U/L  --   --  21  --  20   ALT U/L  --   --  29  --  14   ALK PHOS U/L  --   --  114  --  77    < > = values in this interval not displayed  Results from last 7 days   Lab Units 03/18/20  0506 03/17/20  0611 03/16/20  1146 03/14/20  0526 03/12/20  0533   PROCALCITONIN ng/ml 3 80* 6 51* 13 05* 62 18* 272 37*     Results from last 7 days   Lab Units 03/15/20  1033 03/15/20  0425 03/11/20  2243   BLOOD CULTURE  No Growth at 48 hrs  No Growth at 72 hrs    --    MRSA CULTURE ONLY   --   --  No Methicillin Resistant Staphlyococcus aureus (MRSA) isolated

## 2020-03-18 NOTE — ASSESSMENT & PLAN NOTE
· According to the patient's nurse, patient had good bowel movements today  · Continue bowel regimen

## 2020-03-18 NOTE — ASSESSMENT & PLAN NOTE
· History of CVA in the past  · Nonverbal at baseline  · Patient also having dysphagia  · Speech therapist on board  · Presently, patient is on a modified diet, with pureed, which the patient's wife prefers

## 2020-03-18 NOTE — ASSESSMENT & PLAN NOTE
· Polymicrobial   · Infectious disease doctor on board  · Continue present antibiotics with ertapenem and daptomycin  Will need to complete a total 2 week course, through 3/25, 2020

## 2020-03-18 NOTE — ASSESSMENT & PLAN NOTE
· Presentation:  Multiple episodes of vomiting that occurred in the evening  Temperature a 100 3° in EMS  From nursing facility  · History:  Suprapubic catheter/neurogenic bladder, history of nephrolithiasis requiring right nephrostomy tube in the past  · Suprapubic catheter site without obvious infection  · CT abdomen:  Bilateral nephrolithiasis with 1 2 x 0 9 cm obstructing stone in the left ureteral junction with mild-to-moderate hydronephrosis and obstructive uropathy  Nonobstructing stones in the right kidney  · Patient had left-sided nephrostomy tube placed 3/11  It is draining well  Continue nephrostomy tube in management as per Urology  Outpatient follow-up with urology

## 2020-03-18 NOTE — PROGRESS NOTES
Patient resting comfortably in bed  No signs of distress  Vital signs stable  Call bell within reach  Safety measures in place  Will continue to monitor

## 2020-03-19 ENCOUNTER — APPOINTMENT (INPATIENT)
Dept: RADIOLOGY | Facility: HOSPITAL | Age: 70
DRG: 872 | End: 2020-03-19
Payer: MEDICARE

## 2020-03-19 VITALS
HEART RATE: 80 BPM | TEMPERATURE: 97.9 F | WEIGHT: 240.96 LBS | OXYGEN SATURATION: 95 % | RESPIRATION RATE: 16 BRPM | BODY MASS INDEX: 33.61 KG/M2 | DIASTOLIC BLOOD PRESSURE: 82 MMHG | SYSTOLIC BLOOD PRESSURE: 128 MMHG

## 2020-03-19 LAB
ANION GAP SERPL CALCULATED.3IONS-SCNC: 9 MMOL/L (ref 4–13)
BUN SERPL-MCNC: 15 MG/DL (ref 5–25)
CALCIUM SERPL-MCNC: 8.2 MG/DL (ref 8.3–10.1)
CHLORIDE SERPL-SCNC: 108 MMOL/L (ref 100–108)
CK MB SERPL-MCNC: 0.6 NG/ML (ref 0–5)
CK MB SERPL-MCNC: <1 % (ref 0–2.5)
CK SERPL-CCNC: 232 U/L (ref 39–308)
CO2 SERPL-SCNC: 24 MMOL/L (ref 21–32)
CREAT SERPL-MCNC: 1.28 MG/DL (ref 0.6–1.3)
ERYTHROCYTE [DISTWIDTH] IN BLOOD BY AUTOMATED COUNT: 14.9 % (ref 11.6–15.1)
GFR SERPL CREATININE-BSD FRML MDRD: 56 ML/MIN/1.73SQ M
GLUCOSE SERPL-MCNC: 105 MG/DL (ref 65–140)
HCT VFR BLD AUTO: 38.9 % (ref 36.5–49.3)
HGB BLD-MCNC: 12.4 G/DL (ref 12–17)
MCH RBC QN AUTO: 28.8 PG (ref 26.8–34.3)
MCHC RBC AUTO-ENTMCNC: 31.9 G/DL (ref 31.4–37.4)
MCV RBC AUTO: 91 FL (ref 82–98)
PLATELET # BLD AUTO: 375 THOUSANDS/UL (ref 149–390)
PMV BLD AUTO: 10.1 FL (ref 8.9–12.7)
POTASSIUM SERPL-SCNC: 3.7 MMOL/L (ref 3.5–5.3)
PROCALCITONIN SERPL-MCNC: 2.17 NG/ML
RBC # BLD AUTO: 4.3 MILLION/UL (ref 3.88–5.62)
SODIUM SERPL-SCNC: 141 MMOL/L (ref 136–145)
WBC # BLD AUTO: 11.83 THOUSAND/UL (ref 4.31–10.16)

## 2020-03-19 PROCEDURE — 71045 X-RAY EXAM CHEST 1 VIEW: CPT

## 2020-03-19 PROCEDURE — 82553 CREATINE MB FRACTION: CPT | Performed by: INTERNAL MEDICINE

## 2020-03-19 PROCEDURE — 85027 COMPLETE CBC AUTOMATED: CPT | Performed by: INTERNAL MEDICINE

## 2020-03-19 PROCEDURE — 84145 PROCALCITONIN (PCT): CPT | Performed by: INTERNAL MEDICINE

## 2020-03-19 PROCEDURE — 99232 SBSQ HOSP IP/OBS MODERATE 35: CPT | Performed by: INTERNAL MEDICINE

## 2020-03-19 PROCEDURE — 99239 HOSP IP/OBS DSCHRG MGMT >30: CPT | Performed by: INTERNAL MEDICINE

## 2020-03-19 PROCEDURE — 02HV33Z INSERTION OF INFUSION DEVICE INTO SUPERIOR VENA CAVA, PERCUTANEOUS APPROACH: ICD-10-PCS | Performed by: INTERNAL MEDICINE

## 2020-03-19 PROCEDURE — C1751 CATH, INF, PER/CENT/MIDLINE: HCPCS

## 2020-03-19 PROCEDURE — 80048 BASIC METABOLIC PNL TOTAL CA: CPT | Performed by: INTERNAL MEDICINE

## 2020-03-19 PROCEDURE — 82550 ASSAY OF CK (CPK): CPT | Performed by: INTERNAL MEDICINE

## 2020-03-19 PROCEDURE — 36569 INSJ PICC 5 YR+ W/O IMAGING: CPT

## 2020-03-19 RX ORDER — OXYCODONE HYDROCHLORIDE 5 MG/1
5 TABLET ORAL EVERY 6 HOURS PRN
Qty: 12 TABLET | Refills: 0 | Status: SHIPPED | OUTPATIENT
Start: 2020-03-19 | End: 2020-04-10 | Stop reason: SDUPTHER

## 2020-03-19 RX ORDER — POTASSIUM CHLORIDE 750 MG/1
10 TABLET, EXTENDED RELEASE ORAL
Refills: 0
Start: 2020-03-20

## 2020-03-19 RX ORDER — AMMONIUM LACTATE 12 G/100G
CREAM TOPICAL DAILY
Qty: 385 G | Refills: 0 | Status: SHIPPED | OUTPATIENT
Start: 2020-03-20

## 2020-03-19 RX ORDER — LORAZEPAM 2 MG/ML
0.5 INJECTION INTRAMUSCULAR ONCE
Status: COMPLETED | OUTPATIENT
Start: 2020-03-19 | End: 2020-03-19

## 2020-03-19 RX ORDER — BISACODYL 10 MG
10 SUPPOSITORY, RECTAL RECTAL DAILY PRN
Status: DISCONTINUED | OUTPATIENT
Start: 2020-03-19 | End: 2020-03-19 | Stop reason: HOSPADM

## 2020-03-19 RX ORDER — SENNOSIDES 8.6 MG
2 TABLET ORAL
Refills: 0
Start: 2020-03-19

## 2020-03-19 RX ADMIN — APIXABAN 2.5 MG: 2.5 TABLET, FILM COATED ORAL at 09:58

## 2020-03-19 RX ADMIN — LORAZEPAM 0.5 MG: 2 INJECTION INTRAMUSCULAR; INTRAVENOUS at 13:04

## 2020-03-19 RX ADMIN — MEMANTINE 5 MG: 5 TABLET ORAL at 17:01

## 2020-03-19 RX ADMIN — FAMOTIDINE 20 MG: 20 TABLET ORAL at 09:59

## 2020-03-19 RX ADMIN — Medication: at 09:58

## 2020-03-19 RX ADMIN — MEMANTINE 5 MG: 5 TABLET ORAL at 09:58

## 2020-03-19 RX ADMIN — DAPTOMYCIN 700 MG: 500 INJECTION, POWDER, LYOPHILIZED, FOR SOLUTION INTRAVENOUS at 11:40

## 2020-03-19 RX ADMIN — FAMOTIDINE 20 MG: 20 TABLET ORAL at 17:01

## 2020-03-19 RX ADMIN — POTASSIUM CHLORIDE 10 MEQ: 750 TABLET, EXTENDED RELEASE ORAL at 09:58

## 2020-03-19 RX ADMIN — ERTAPENEM SODIUM 1000 MG: 1 INJECTION, POWDER, LYOPHILIZED, FOR SOLUTION INTRAMUSCULAR; INTRAVENOUS at 05:38

## 2020-03-19 RX ADMIN — HYDROCORTISONE: 1 CREAM TOPICAL at 10:02

## 2020-03-19 RX ADMIN — BUSPIRONE HYDROCHLORIDE 7.5 MG: 5 TABLET ORAL at 09:58

## 2020-03-19 RX ADMIN — AMLODIPINE BESYLATE 5 MG: 5 TABLET ORAL at 09:59

## 2020-03-19 RX ADMIN — AMLODIPINE BESYLATE 5 MG: 5 TABLET ORAL at 17:01

## 2020-03-19 RX ADMIN — APIXABAN 2.5 MG: 2.5 TABLET, FILM COATED ORAL at 17:01

## 2020-03-19 RX ADMIN — HYDROCORTISONE: 1 CREAM TOPICAL at 17:01

## 2020-03-19 RX ADMIN — SODIUM CHLORIDE, SODIUM LACTATE, POTASSIUM CHLORIDE, AND CALCIUM CHLORIDE 100 ML/HR: .6; .31; .03; .02 INJECTION, SOLUTION INTRAVENOUS at 05:38

## 2020-03-19 RX ADMIN — BUSPIRONE HYDROCHLORIDE 7.5 MG: 5 TABLET ORAL at 17:01

## 2020-03-19 RX ADMIN — POLYETHYLENE GLYCOL 3350 17 G: 17 POWDER, FOR SOLUTION ORAL at 10:00

## 2020-03-19 RX ADMIN — METOPROLOL TARTRATE 12.5 MG: 25 TABLET ORAL at 09:58

## 2020-03-19 NOTE — ASSESSMENT & PLAN NOTE
· Resolved  · Present on admission-as evidenced by fever, tachycardia, leukocytosis  · Due to polymicrobial left pyelonephritis with acute obstructing left ureteral stone  With polymicrobial bacteremia  · Urinalysis:  Innumerable WBC  · CT scan:  Obstructing left nephrolithiasis  · Infectious disease doctor on board  · Patient now on ertapenem and daptomycin  Will continue  Needs to complete a total of 2 week course  According to Infectious Disease, patient's ertapenem will be through March 25 and daptomycin will be through March 28  · Urologist on board  Has percutaneous nephrostomy and suprapubic tube drainage  · Repeat blood cultures are negative  · Procalcitonin has trended down significantly  · PICC line should be removed after the last dose of IV antibiotics  · As per Infectious Disease doctor, okay for discharge today  For weekly CBC with differential count, serum creatinine and CPK  According to my discussion with the advance practitioner for infectious disease, she provided scripts for these  · Outpatient follow-up with primary care physician

## 2020-03-19 NOTE — ASSESSMENT & PLAN NOTE
· Resolved  · Replace tha Webb · Monitor electrolytes  · Continue potassium supplement  · Outpatient follow-up with primary care physician

## 2020-03-19 NOTE — PROGRESS NOTES
Progress Note - Infectious Disease   Alonzo Tomas 68 y o  male MRN: [id number]  Unit/Bed#: S -01 Encounter: [id number]      Impression/Plan:  1   Sepsis   POA   Fever, tachycardia, leukocytosis, and RUPESH   Patient with extensive nephrolithiasis history with new obstructing left ureteral kidney stone on CT scan now status post left nephrostomy tube placement   Blood cultures positive for 2 of 2 sets with E coli and Providencia and 1 of 2 sets with Enterococcus faecalis    The white cell count has come down, fever seems to be trending down too  procalcintonin level trending from 278 64 to 3 8  Repeat blood cultures are negative after 4 days  Rec:  · Continues ertapenem and daptomycin at present as below  · Follow weekly labs as below  · Supportive care per primary care      2  Polymicrobial  bacteremia   With 2 gram-negative rods and enterococcus growing  [place]ide[place] Manjarrez source suspected   Doubt endocarditis with the polymicrobial nature, however with ongoing fever would definitely treat the Enterococcus   2D echo negative for vegetation   Aortic valve was not well visualized  repeat blood cultures x2 are negative after 4 days  Rec:  · Continue ertapenem to complete total 2 week course through 3/25/20  · Continue daptomycin 8 milligrams/kilogram, dosing weight, Q 24 hours x 2 weeks total through 3/28/20  · Follow-up weekly CBC with diff, creatinine, and CPK on Monday  · Script for IV antibiotics, outpatient labs, and discontinue midline after 3/28/20 daptomycin doses written and placed in patient's paper chart and discussed with       3   Left pyelonephritis with acute obstructing left ureteral stone   Likely source of #2  Patient is status post percutaneous left nephrostomy tube placement   Polymicrobial including ESBL E coli, Providencia, and Enterococcus  3/16/20 Repeat CT A/P shows improving hydronephrosis    Rec:  · Continues ertapenem and Daptomycin durations as above  · Percutaneous nephrostomy and suprapubic tube drainage management per Urology     4  Acute kidney injury   Likely secondary to sepsis and obstructing stone and pre renal issues   Improving, Creatinine 1 28   Rec    · Renal dose adjust antibiotics as needed     5   Cellulitis lower abdominal wall   Possible secondary to central scabbed soft tissue boil   Not known to have a prior suprapubic site at this location   clinically improved  Rec    · Antibiotics as above     6  Aphasia with CVA      Supportive care per primary care team     7   Multiple listed antibiotic allergies: History of anaphylaxis to Zosyn, gentamicin, vancomycin  Patient has tolerated cefepime well and has tolerated ertapenem and cefdinir in past  Of note daptomycin has no cross-reactivity with vancomycin and should be safe to use in this patient  Seems to be tolerating current antibiotics      Above plan discussed in detail with patient's RN,  and Dr Ching  I personally spent over half of a total 40 minutes in counseling and discussion with the patient, care team and coordination of care as described above  Antibiotics:  Ertapenem 8/Daptomycin D5     Subjective:  Patient tolerating daptomycin and fevers appear to be trending down since it started   Patient tracks me throughout exam in room but is nonverbal     ROS:  Unable to provide own review of systems as patient is aphasic  RN reports no fever, chills, sweats; no nausea, vomiting, diarrhea; no cough, shortness of breath; no pain  No new symptoms  Objective:  Vitals:  Temp:  [97 8 °F (36 6 °C)-98 6 °F (37 °C)] 97 8 °F (36 6 °C)  HR:  [63-95] 84  Resp:  [18] 18  BP: (135-171)/(77-89) 135/89  SpO2:  [94 %-96 %] 94 %  Temp (24hrs), Av 3 °F (36 8 °C), Min:97 8 °F (36 6 °C), Max:98 6 °F (37 °C)  Current: Temperature: 97 8 °F (36 6 °C)    General Appearance:  Awake, alert, cooperative, resting in bed, no acute distress    Nonverbal, tracks me throughout exam in room   Throat: Oropharynx moist without lesions  Lungs:   Clear to auscultation bilaterally; no wheezes, rhonchi or rales; respirations unlabored   Heart:  RRR; S1-S2 heard, no murmur   Abdomen:   Soft, non-tender, non-distended, positive bowel sounds  Extremities: No edema, right arm midline site nontender, heels floating on wedge propped   : Suprapubic Thorne with clear urine in bag and left upper ostomy tube with clear urine in bag   Skin: No rashes      Labs, Imaging, & Other studies:   All pertinent labs and imaging studies were personally reviewed  Results from last 7 days   Lab Units 03/19/20  0515 03/18/20  0506 03/17/20  0611   WBC Thousand/uL 11 83* 12 92* 11 16*   HEMOGLOBIN g/dL 12 4 12 0 11 8*   PLATELETS Thousands/uL 375 279 226     Results from last 7 days   Lab Units 03/19/20  0515  03/16/20  1146  03/13/20  1131   POTASSIUM mmol/L 3 7   < > 3 5   < > 3 7   CHLORIDE mmol/L 108   < > 108   < > 110*   CO2 mmol/L 24   < > 27   < > 22   BUN mg/dL 15   < > 13   < > 27*   CREATININE mg/dL 1 28   < > 1 72*   < > 1 96*   EGFR ml/min/1 73sq m 56   < > 39   < > 33   CALCIUM mg/dL 8 2*   < > 8 6   < > 8 0*   AST U/L  --   --  21  --  20   ALT U/L  --   --  29  --  14   ALK PHOS U/L  --   --  114  --  77    < > = values in this interval not displayed  Results from last 7 days   Lab Units 03/19/20  0516 03/18/20  0506 03/17/20  0611 03/16/20  1146 03/14/20  0526   PROCALCITONIN ng/ml 2 17* 3 80* 6 51* 13 05* 62 18*     Results from last 7 days   Lab Units 03/15/20  1033 03/15/20  0425   BLOOD CULTURE  No Growth at 72 hrs  No Growth After 4 Days

## 2020-03-19 NOTE — ASSESSMENT & PLAN NOTE
· Nonverbal at baseline  Patient does not actually have behavioral disturbance  · Prior history of CVA  · Home regimen:  · BuSpar 7 5 mg t i d   · Namenda 5 mg b i d  · Remeron 50 mg HS  · Seroquel 25 mg HS  · Continue medications  · Outpatient follow-up with primary care physician

## 2020-03-19 NOTE — DISCHARGE INSTRUCTIONS
The PICC line should be removed after the last dose of the IV antibiotic March 28, 2020  Nephrostomy Tube Care     WHAT YOU NEED TO KNOW:   A nephrostomy tube is a catheter (thin plastic tube) that is inserted through your skin and into your kidney  The nephrostomy tube drains urine from your kidney into a collecting bag outside your body  You may need a nephrostomy tube when something is blocking the normal flow of urine  A nephrostomy tube may be used for a short or a long period of time  The nephrostomy tube comes out of your back, so you will need someone to help care for your nephrostomy tube  DISCHARGE INSTRUCTIONS:      How to clean the skin around the nephrostomy tube and change the bandage:  Since the nephrostomy tube comes out of your back, you will not be able to care for it by yourself  Ask someone to follow the general directions below to check and care for your nephrostomy tube  Gather the items you will need  Disposable (single use) under-pad, and a clean washcloth  ¨ Plain soap, warm water, and new medical gloves  ¨ Sterile gauze bandages  ¨ Clear adhesive dressing or medical tape  ¨ Skin barrier  ¨ Protective skin film  ¨ Trash bag  · Remove the old bandage, and check the tube entry site  ¨ Have the patient lie on his side with the nephrostomy tube entry site facing up  Place the under-pad where it will catch drainage as you are working with the nephrostomy tube  ¨ Wash your hands with soap and water  Put on new medical gloves  ¨ Gently remove the old bandage, without pulling on the tube  Do this by holding the skin beside the tube with one hand  With the other hand, gently remove sticky tape and the skin barrier by pulling in the same direction as hair growth  Do not touch the side of the bandage that is placed over or around the tube  Throw the bandage and skin barrier away in a trash bag  ¨ Look for signs of infection, such as skin redness and swelling   Report any skin changes to healthcare providers  ¨ Clean the tube entry site  ¨ Hold the tube in place to keep it from being pulled out while you are cleaning around it  ¨ You will need to clean the area twice  For the first cleaning, wet a new gauze bandage with soap and water  Begin at the entry site of the tube  Wipe the skin in circles, moving away from the entry site  Remove blood and any other material with the gauze  Do this as often as needed  Use a new gauze bandage each time you clean the area, moving away from the entry site  ¨ For the second cleaning, wet a new gauze bandage with water  Begin at the entry site of the tube  Wipe the skin in circles, moving away from the entry site  Use a new gauze bandage each time you clean the area, moving away from the entry site  ¨ Gently pat the skin with a clean washcloth to dry it  · Apply the skin barrier and bandages  ¨ Roll up a bandage to make it thick, and place it under  the place where the tube enters the skin  Place it to support the tube, and stop it from kinking or bending  Tape the bandage in place, and apply more bandages if directed by a healthcare provider  ¨ Bring the tubing forward to the front and tape it to the skin  Do not stretch the tube tight, because this may pull the nephrostomy tube out  How often to change the bandage  Change the bandage around the tube, every other day  If your bandages  get dirty or wet, change them right away, and as often as needed  If your nephrostomy tube is to be used for a long period of time, the tube needs to be changed every 2 to 3 months  Healthcare providers will tell you when you need to make an appointment to have your tube changed  How to care for the urine drainage bag:   · Ask if you need to measure and write down how much urine is in the bag before you empty it  Drain urine out of the drainage bag when it is ½ to ? full  Open the spout at the bottom of the bag to empty the urine into the toilet  · You may need to detach the drainage bag from the nephrostomy tube to change it    If so, attach a new drainage bag tightly to the nephrostomy tube  ·   How to prevent problems with your nephrostomy tube:   · Change bandages, directed  This helps to prevent infection  Throw away or clean your drainage bag as directed by your healthcare provider  · Wipe the connecting ends of the drainage bag with alcohol before you reconnect the bag to the tube  This helps prevent infection  Keep the tube taped to your skin and connected to a drainage bag placed below the level of your kidneys  This helps prevent urine from backing up into your kidneys  You may wear a small drainage bag strapped to your leg to let you move around more easily  · Check the catheter to be sure it is in place after you change your clothes or do other activities  Do not wear tight clothing over the tube  Place the tubing over your thigh rather than under it when you are sitting down  Be sure that nothing is pulling on the nephrostomy tube when you move around  · Change positions if you see little or no urine in your drainage bag  Check to see if the urine tube is twisted or bent  Be sure that you are not sitting or lying on the tube  If there are no kinks and there is little or no urine in the drainage bag, tell your healthcare provider  · Flush out the tube as directed  Some tubes get flushed one time a day with 10 mls of NSS You will be given a prescription for the flushes  To flush the nephrostomy tube, clean both connections with alcohol swap  Twist off the drainage bag tube and twist the saline syringe into the nephrostomy tube and flush briskly  Remove the syringe and twist the drainage bag tube back into the nephrostomy tube  · Keep the site covered while you shower  Tape a piece of clear adhesive plastic over the dressing to keep it dry while you shower  Do not take tub baths      Contact Interventional Radiology at [phone] [place] PATIENTS: Contact Interventional Radiology at [phone]) Moe Thompson PATIENTS: Contact Interventional Radiology at [phone]) if:  · The skin around the nephrostomy tube is red, swollen, itches, or has a rash  · You have a fever greater than 101 or chills  · You have lower back or hip pain  · There are changes in how your urine looks or smells  · You have little or no urine draining from the nephrostomy tube  · You have nausea and are vomiting  · The black raghu on your tube has moved, or the tube is longer than when it was put in    · You have questions or concerns about your condition or care  · The nephrostomy tube comes out completely  · There is blood, pus, or a bad smell coming from the place where the tube enters your skin  · Urine is leaking around the tube  The following pharmacies carry the flush syringes  [facility]                     [facility]       [address]  Phone [phone]            Phone [phone]  [address] & [facility]                      [address]                                 [phone]  Phone [phone]            Phone [phone]    [facility]                                                                         [facility] [phone]  [address]   Phone [phone]

## 2020-03-19 NOTE — DISCHARGE SUMMARY
Discharge- Ahmet Jorge [facility] 1950, 68 y o  male MRN: [id number]    Unit/Bed#: S -01 Encounter: [id number]    Primary Care Provider: Mario De La O MD   Date and time admitted to hospital: 3/10/2020  8:43 PM        * Sepsis [facility])  Assessment & Plan  · Resolved  · Present on admission-as evidenced by fever, tachycardia, leukocytosis  · Due to polymicrobial left pyelonephritis with acute obstructing left ureteral stone  With polymicrobial bacteremia  · Urinalysis:  Innumerable WBC  · CT scan:  Obstructing left nephrolithiasis  · Infectious disease doctor on board  · Patient now on ertapenem and daptomycin  Will continue  Needs to complete a total of 2 week course  According to Infectious Disease, patient's ertapenem will be through March 25 and daptomycin will be through March 28  · Urologist on board  Has percutaneous nephrostomy and suprapubic tube drainage  · Repeat blood cultures are negative  · Procalcitonin has trended down significantly  · PICC line should be removed after the last dose of IV antibiotics  · As per Infectious Disease doctor, okay for discharge today  For weekly CBC with differential count, serum creatinine and CPK  According to my discussion with the advance practitioner for infectious disease, she provided scripts for these  · Outpatient follow-up with primary care physician  Pyelonephritis of left kidney  Assessment & Plan  · Polymicrobial   · Infectious disease doctor on board  Okay with discharge of the patient today  · Continue present antibiotics with ertapenem and daptomycin  Will need to complete a total 2 week course  Ertapenem will be completed on March 25  Daptomycin will be completed on March 28  · Please see plans under sepsis  · For weekly CBC with differential count, creatinine and CPK  Results should be sent to the Infectious Disease doctor/primary care physician  · Outpatient follow-up with primary care physician      Bilateral nephrolithiasis  Assessment & Plan  · Presentation:  Multiple episodes of vomiting that occurred in the evening  Temperature a 100 3° in EMS  From nursing facility  · History:  Suprapubic catheter/neurogenic bladder, history of nephrolithiasis requiring right nephrostomy tube in the past  · Suprapubic catheter site without obvious infection  · CT abdomen:  Bilateral nephrolithiasis with 1 2 x 0 9 cm obstructing stone in the left ureteral junction with mild-to-moderate hydronephrosis and obstructive uropathy  Nonobstructing stones in the right kidney  · Patient had left-sided nephrostomy tube placed 3/11  It is draining well  Continue nephrostomy tube in management as per Urology  Outpatient follow-up with urology  Acute kidney injury [facility])  Assessment & Plan  Resolved  Most likely due to combination of obstructive uropathy and sepsis  Baseline creatinine appears to be around 1 4-1 6  Creatinine upon admission was 2 30  Monitor p r n     Avoid nephrotoxins  Avoid hypotension  Outpatient follow-up with primary care physician  Cellulitis, abdominal wall  Assessment & Plan  · Improved  · Continue antibiotics as above  · Serial exams  · Outpatient follow-up with primary care physician  [facility]  Assessment & Plan  · Resolved  · According to the patient's nurse, patient had been having good bowel movements  · Continue bowel regimen  · Outpatient follow-up with primary care physician  Neurogenic bladder  Assessment & Plan  · CT abdomen:  "Bladder is collapsed around suprapubic catheter  There was some fat stranding adjacent to the bladder which could be related to catheter placement versus cystitis "    There did appear to be some cellulitis around the suprapubic catheter  This has improved with antibiotics  Outpatient follow-up with primary care physician  Dementia with behavioral disturbance ([address] )  Assessment & Plan  · Nonverbal at baseline    Patient does not actually have behavioral disturbance  · Prior history of CVA  · Home regimen:  · BuSpar 7 5 mg t i d   · Namenda 5 mg b i d  · Remeron 50 mg HS  · Seroquel 25 mg HS  · Continue medications  · Outpatient follow-up with primary care physician  Hypokalemia  Assessment & Plan  · Resolved  · Replace p r n  Marleta Press · Monitor electrolytes  · Continue potassium supplement  · Outpatient follow-up with primary care physician  Essential hypertension  Assessment & Plan  · Presently controlled  · Home regimen:  Amlodipine 5 mg b i d , metoprolol tartrate 12 5 mg b i d   · Continue above blood pressure medications  · Outpatient follow-up with primary care physician  History of DVT (deep vein thrombosis)  Assessment & Plan  · Continue Eliquis  · Outpatient follow-up with primary care physician  Aphasia  Assessment & Plan  · History of CVA in the past  · Nonverbal at baseline  · Patient also having dysphagia  · Speech therapist on board  · Presently, patient is on a modified diet, with pureed, which the patient's wife prefers  · Outpatient follow-up with primary care physician  · Continue with speech therapy evaluation in the skilled nursing facility  Discharging Physician / Practitioner: Krista Goodson MD  PCP: Gogo Chand MD  Admission Date:   Admission Orders (From admission, onward)     Ordered        03/11/20 0138  Inpatient Admission  Once                   Discharge Date: 03/19/20        Consultations During Hospital Stay:  · Infectious disease doctor  · Urologist     Procedures Performed:     · Please see diagnosis and notes above  Significant Findings / Test Results:     · Please see diagnosis and notes above  Incidental Findings:   · Thyroid nodules  Outpatient follow-up with primary care physician  Radiologist recommending thyroid ultrasound  Primary care physician may facilitate this      Test Results Pending at Discharge (will require follow up):   · Succeeding and final results of the repeat blood culture  So far they have been negative  Patient's primary care physician and/or infectious disease doctor should follow-up results  Outpatient Tests Requested:  · Patient was provided, by the advance practitioner for infectious disease, with scripts for CBC with differential count creatinine and CK, Q weekly  Results should be sent to the primary care physician and/or infectious disease provider  Complications:  None  Reason for Admission:  Vomiting  Hospital Course:     Lauraine Cushing is a 68 y o  male patient who originally presented to the hospital on 3/10/2020 due to vomiting  On this admission, patient was found to be in sepsis secondary to a left pyelonephritis, with bilateral nephrolithiasis  Both infectious disease doctor and urologist are were on board  A left-sided nephrostomy was placed on 03/11  Patient was on IV antibiotics  Results of the blood and urine cultures came back polymicrobial   Please see notes above  With patient having several allergies to different antibiotics, patient was eventually placed on IV ertapenem and daptomycin  Patient's condition improved  Patient did not have any vomiting episodes anymore  A PICC line was placed for continuing of patient's IV antibiotics  This needs to be removed after the last dose of IV antibiotics on 03/28  For details about patient's diagnosis, workups, management, hospital course and discharge plans, please see above list of diagnoses and related notes  It is important to note, that patient's wife requested that patient's oxycodone be continued on discharge on as needed basis, because at times, patient's wife noticed that her  has occasional severe pains  Condition at Discharge: stable     Discharge Day Visit / Exam:     Subjective:    Patient is aphasic/nonverbal at baseline  Patient is not noted to be in pain or short of breath    This was also verified from the patient's nurse According to the patient's nurse, patient had good bowel movements today  Vitals: Blood Pressure: 128/82 (03/19/20 1617)  Pulse: 80 (03/19/20 1617)  Temperature: 97 9 °F (36 6 °C) (03/19/20 1617)  Temp Source: Oral (03/19/20 1617)  Respirations: 16 (03/19/20 1617)  Weight - Scale: 109 kg (240 lb 15 4 oz) (03/19/20 0530)  SpO2: 95 % (03/19/20 1617)  Exam:   Physical Exam   Constitutional: No distress  HENT:   Head: Normocephalic and atraumatic  Eyes: Right eye exhibits no discharge  Left eye exhibits no discharge  No scleral icterus  Neck: No JVD present  No tracheal deviation present  No thyromegaly present  Cardiovascular: Normal rate, regular rhythm and normal heart sounds  Exam reveals no gallop and no friction rub  No murmur heard  Pulmonary/Chest: Effort normal and breath sounds normal  No stridor  No respiratory distress  He has no wheezes  He has no rales  Abdominal: Soft  Bowel sounds are normal  He exhibits no distension and no mass  There is no tenderness  There is no rebound and no guarding  Genitourinary:   Genitourinary Comments:  Left-sided nephrostomy tube in place draining clear yellow urine  Suprapubic catheter in place  Musculoskeletal: He exhibits no edema or tenderness  Neurological: He is alert  But aphasic and nonverbal (patient's baseline)   Skin: Skin is warm  No rash noted  He is not diaphoretic  No erythema  No pallor  Vitals reviewed  Discussion with Family:  I spoke at length to the patient's wife today and gave updates  I also discussed the discharge plans  Answered questions and concerns  She is okay with the discharge plans  She is okay with discharge of her  to the facility today  Discharge instructions/Information to patient and family:   See after visit summary for information provided to patient and family        Provisions for Follow-Up Care:  See after visit summary for information related to follow-up care and any pertinent home health orders  Disposition:     Aly Mercado (see below)    For Discharges to [facility] SNF:   · [address] - I tried to look at [facility] text, however, no on call for [facility]  Planned Readmission:  None  Discharge Statement:  I spent 45 minutes discharging the patient  This time was spent on the day of discharge  I had direct contact with the patient on the day of discharge  Greater than 50% of the total time was spent examining patient, answering all patient questions, arranging and discussing plan of care with patient as well as directly providing post-discharge instructions  Additional time then spent on discharge activities  Discharge Medications:  See after visit summary for reconciled discharge medications provided to patient and family        ** Please Note: This note has been constructed using a voice recognition system **

## 2020-03-19 NOTE — PLAN OF CARE
Problem: Prexisting or High Potential for Compromised Skin Integrity  Goal: Skin integrity is maintained or improved  Description  INTERVENTIONS:  - Identify patients at risk for skin breakdown  - Assess and monitor skin integrity  - Assess and monitor nutrition and hydration status  - Monitor labs   - Assess for incontinence   - Turn and reposition patient  - Assist with mobility/ambulation  - Relieve pressure over bony prominences  - Avoid friction and shearing  - Provide appropriate hygiene as needed including keeping skin clean and dry  - Evaluate need for skin moisturizer/barrier cream  - Collaborate with interdisciplinary team   - Patient/family teaching  - Consider wound care consult   Outcome: Progressing     Problem: PAIN - ADULT  Goal: Verbalizes/displays adequate comfort level or baseline comfort level  Description  Interventions:  - Encourage patient to monitor pain and request assistance  - Assess pain using appropriate pain scale  - Administer analgesics based on type and severity of pain and evaluate response  - Implement non-pharmacological measures as appropriate and evaluate response  - Consider cultural and social influences on pain and pain management  - Notify physician/advanced practitioner if interventions unsuccessful or patient reports new pain  Outcome: Progressing     Problem: INFECTION - ADULT  Goal: Absence or prevention of progression during hospitalization  Description  INTERVENTIONS:  - Assess and monitor for signs and symptoms of infection  - Monitor lab/diagnostic results  - Monitor all insertion sites, i e  indwelling lines, tubes, and drains  - Monitor endotracheal if appropriate and nasal secretions for changes in amount and color  - [place] appropriate cooling/warming therapies per order  - Administer medications as ordered  - Instruct and encourage patient and family to use good hand hygiene technique  - Identify and instruct in appropriate isolation precautions for identified infection/condition  Outcome: Progressing  Goal: Absence of fever/infection during neutropenic period  Description  INTERVENTIONS:  - Monitor WBC    Outcome: Progressing     Problem: SAFETY ADULT  Goal: Patient will remain free of falls  Description  INTERVENTIONS:  - Assess patient frequently for physical needs  -  Identify cognitive and physical deficits and behaviors that affect risk of falls    -  [place] fall precautions as indicated by assessment   - Educate patient/family on patient safety including physical limitations  - Instruct patient to call for assistance with activity based on assessment  - Modify environment to reduce risk of injury  - Consider OT/PT consult to assist with strengthening/mobility  Outcome: Progressing  Goal: Maintain or return to baseline ADL function  Description  INTERVENTIONS:  -  Assess patient's ability to carry out ADLs; assess patient's baseline for ADL function and identify physical deficits which impact ability to perform ADLs (bathing, care of mouth/teeth, toileting, grooming, dressing, etc )  - Assess/evaluate cause of self-care deficits   - Assess range of motion  - Assess patient's mobility; develop plan if impaired  - Assess patient's need for assistive devices and provide as appropriate  - Encourage maximum independence but intervene and supervise when necessary  - Involve family in performance of ADLs  - Assess for home care needs following discharge   - Consider OT consult to assist with ADL evaluation and planning for discharge  - Provide patient education as appropriate  Outcome: Progressing  Goal: Maintain or return mobility status to optimal level  Description  INTERVENTIONS:  - Assess patient's baseline mobility status (ambulation, transfers, stairs, etc )    - Identify cognitive and physical deficits and behaviors that affect mobility  - Identify mobility aids required to assist with transfers and/or ambulation (gait belt, sit-to-stand, lift, walker, cane, etc )  - [place] fall precautions as indicated by assessment  - Record patient progress and toleration of activity level on Mobility SBAR; progress patient to next Phase/Stage  - Instruct patient to call for assistance with activity based on assessment  - Consider rehabilitation consult to assist with strengthening/weightbearing, etc   Outcome: Progressing     Problem: DISCHARGE PLANNING  Goal: Discharge to home or other facility with appropriate resources  Description  INTERVENTIONS:  - Identify barriers to discharge w/patient and caregiver  - Arrange for needed discharge resources and transportation as appropriate  - Identify discharge learning needs (meds, wound care, etc )  - Arrange for interpretive services to assist at discharge as needed  - Refer to Case Management Department for coordinating discharge planning if the patient needs post-hospital services based on physician/advanced practitioner order or complex needs related to functional status, cognitive ability, or social support system  Outcome: Progressing     Problem: Knowledge Deficit  Goal: Patient/family/caregiver demonstrates understanding of disease process, treatment plan, medications, and discharge instructions  Description  Complete learning assessment and assess knowledge base  Interventions:  - Provide teaching at level of understanding  - Provide teaching via preferred learning methods  Outcome: Progressing     Problem: Nutrition/Hydration-ADULT  Goal: Nutrient/Hydration intake appropriate for improving, restoring or maintaining nutritional needs  Description  Monitor and assess patient's nutrition/hydration status for malnutrition  Collaborate with interdisciplinary team and initiate plan and interventions as ordered  Monitor patient's weight and dietary intake as ordered or per policy  Utilize nutrition screening tool and intervene as necessary   Determine patient's food preferences and provide high-protein, high-caloric foods as appropriate  INTERVENTIONS:  - Monitor oral intake, urinary output, labs, and treatment plans  - Assess nutrition and hydration status and recommend course of action  - Evaluate amount of meals eaten  - Assist patient with eating if necessary   - Allow adequate time for meals  - Recommend/ encourage appropriate diets, oral nutritional supplements, and vitamin/mineral supplements  - Order, calculate, and assess calorie counts as needed  - Recommend, monitor, and adjust tube feedings and TPN/PPN based on assessed needs  - Assess need for intravenous fluids  - Provide specific nutrition/hydration education as appropriate  - Include patient/family/caregiver in decisions related to nutrition  Outcome: Progressing     Problem: Potential for Falls  Goal: Patient will remain free of falls  Description  INTERVENTIONS:  - Assess patient frequently for physical needs  -  Identify cognitive and physical deficits and behaviors that affect risk of falls    -  [place] fall precautions as indicated by assessment   - Educate patient/family on patient safety including physical limitations  - Instruct patient to call for assistance with activity based on assessment  - Modify environment to reduce risk of injury  - Consider OT/PT consult to assist with strengthening/mobility  Outcome: Progressing

## 2020-03-19 NOTE — ASSESSMENT & PLAN NOTE
Resolved  Most likely due to combination of obstructive uropathy and sepsis  Baseline creatinine appears to be around 1 4-1 6  Creatinine upon admission was 2 30  Monitor p r n     Avoid nephrotoxins  Avoid hypotension  Outpatient follow-up with primary care physician

## 2020-03-19 NOTE — TRANSPORTATION MEDICAL NECESSITY
Section I - General Information    Name of Patient: Shay Soto                 : 1950    Medicare #: [id number]  Transport Date: 20 (PCS is valid for round trips on this date and for all repetitive trips in the 60-day range as noted below )  Origin: [address] [place] [address] Road: [facility]  AND [place] [facility]  Is the pt's stay covered under Medicare Part A (PPS/DRG)   [x]     Closest appropriate facility? If no, why is transport to more distant facility required? Yes  If hospice pt, is this transport related to pt's terminal illness? NA       Section II - Medical Necessity Questionnaire  Ambulance transportation is medically necessary only if other means of transport are contraindicated or would be potentially harmful to the patient  To meet this requirement, the patient must either be "bed confined" or suffer from a condition such that transport by means other than ambulance is contraindicated by the patient's condition  The following questions must be answered by the medical professional signing below for this form to be valid:    1)  Describe the MEDICAL CONDITION (physical and/or mental) of this patient AT [address] [place] [address] that requires the patient to be transported in an ambulance and why transport by other means is contraindicated by the patient's condition: Patient is confused, patient is nonverbal, contact precautions for MDRO, MRSA, history of MRSA, and ESBL, medical attendant required, unable to tolerate seated position needed for time to transport, stage I pressure injury on patient's sacrum, bed bound, [place] high fall risk  2) Is the patient "bed confined" as defined below?      Yes  To be "be confined" the patient must satisfy all three of the following conditions: (1) unable to get up from bed without Assistance; AND (2) unable to ambulate; AND (3) unable to sit in a chair or wheelchair  3) Can this patient safely be transported by car or wheelchair van (i e , seated during transport without a medical attendant or monitoring)? No    4) In addition to completing questions 1-3 above, please check any of the following conditions that apply*:   *Note: supporting documentation for any boxes checked must be maintained in the patient's medical records  If hosp-hosp transfer, describe services needed at 2nd facility not available at 1st facility? Patient is confused  Medical attendant required   Special handling/isolation/infection control precautions required   Unable to tolerate seated position for time needed to transport   Other(specify) fall risk and contact precautions      Section III - Signature of Physician or Healthcare Professional  I certify that the above information is true and correct based on my evaluation of this patient, and represent that the patient requires transport by ambulance and that other forms of transport are contraindicated  I understand that this information will be used by the Centers for Medicare and Medicaid Services (CMS) to support the determination of medical necessity for ambulance services, and I represent that I have personal knowledge of the patient's condition at time of transport  [x]  If this box is checked, I also certify that the patient is physically or mentally incapable of signing the ambulance service's claim and that the institution with which I am affiliated has furnished care, services, or assistance to the patient  My signature below is made on behalf of the patient pursuant to 42 CFR §424 36(b)(4)  In accordance with 42 CFR §424 37, the specific reason(s) that the patient is physically or mentally incapable of signing the claim form is as follows: Patient is confused        Signature of Physician* or Healthcare Professional______________________________________________________________  Signature Date 03/19/20 (For scheduled repetitive transports, this form is not valid for transports performed more than 60 days after this date)    Printed Name & Credentials of Physician or Healthcare Professional (MD, DO, RN, etc )________________________________  *Form must be signed by patient's attending physician for scheduled, repetitive transports   For non-repetitive, unscheduled ambulance transports, if unable to obtain the signature of the attending physician, any of the following may sign (choose appropriate option below)  [] Physician Assistant []  Clinical Nurse Specialist []  Registered Nurse  []  Nurse Practitioner  [x] Discharge Planner

## 2020-03-19 NOTE — ASSESSMENT & PLAN NOTE
· Improved  · Continue antibiotics as above  · Serial exams  · Outpatient follow-up with primary care physician

## 2020-03-19 NOTE — ASSESSMENT & PLAN NOTE
· Resolved  · According to the patient's nurse, patient had been having good bowel movements  · Continue bowel regimen  · Outpatient follow-up with primary care physician

## 2020-03-19 NOTE — INCIDENTAL FINDINGS
The following findings require follow up:  Radiographic finding   Finding:  Thyroid nodules   Follow up required:  Needs an outpatient thyroid ultrasound     Follow up should be done within 2 week(s)    Please notify the following clinician to assist with the follow up:   Dr Sharon Hampton

## 2020-03-19 NOTE — ASSESSMENT & PLAN NOTE
· History of CVA in the past  · Nonverbal at baseline  · Patient also having dysphagia  · Speech therapist on board  · Presently, patient is on a modified diet, with pureed, which the patient's wife prefers  · Outpatient follow-up with primary care physician  · Continue with speech therapy evaluation in the skilled nursing facility

## 2020-03-19 NOTE — PROCEDURES
Insert PICC line  Date/Time: 3/19/2020 1:30 PM  Performed by: Kathrine Landau, RN  Authorized by: Gordy Matson MD     Patient location:  Bedside  Other Assisting Provider: Yes (comment)    Consent:     Consent obtained:  Written    Consent given by:  Spouse    Procedural risks discussed: Obtained by physician  Alternatives discussed: Obtained by physician  [place] protocol:     Procedure explained and questions answered to patient or proxy's satisfaction: yes      Relevant documents present and verified: yes      Test results available and properly labeled: yes      Radiology Images displayed and confirmed  If images not available, report reviewed: yes      Required blood products, implants, devices, and special equipment available: yes      Site/side marked: yes      Immediately prior to procedure, a time out was called: yes      Patient identity confirmed:  Arm band and hospital-assigned identification number  Pre-procedure details:     Hand hygiene: Hand hygiene performed prior to insertion      Sterile barrier technique: All elements of maximal sterile technique followed      Skin preparation:  ChloraPrep    Skin preparation agent: Skin preparation agent completely dried prior to procedure    Indications:     PICC line indications: long term antibiotics    Sedation:     Sedation type: Anxiolysis  Anesthesia (see MAR for exact dosages): Anesthesia method:  Local infiltration    Local anesthetic:  Lidocaine 1% w/o epi  Procedure details:     Location:  Cephalic    Vessel type: vein      Laterality:  Left    Site selection rationale:  Right arm occupied by midline   Availability of vessels in left arm    Approach: percutaneous technique used      Patient position:  Flat    Procedural supplies:  Single lumen    Catheter size:  4 Fr    Landmarks identified: yes      Ultrasound guidance: yes      Sterile ultrasound techniques: Sterile gel and sterile probe covers were used      Number of attempts:  1 Successful placement: yes      Vessel of catheter tip end:  Chest Xray needed to confirm placement    Total catheter length (cm):  49    Catheter out on skin (cm):  0    Max flow rate:  999mL/hr    Arm circumference:  33  Post-procedure details:     Post-procedure:  Dressing applied and securement device placed    Assessment:  Placement verification pending x-ray result, blood return through all ports and free fluid flow    Post-procedure complications: none      Patient tolerance of procedure:   Tolerated with difficulty

## 2020-03-19 NOTE — SOCIAL WORK
CM made aware patient is medically stable for DC back to [facility]  CM spoke to patient's spouse to make her aware of same and discuss transportation  Wife requested CM arrange transportation  CM spoke to [place] with [facility] to arrange BLS transport with [facility] for a 7pm   Medical necessity completed, placed in chart, copy placed in medical records bin  Facility contacts completed  CM made patient's spouse, bedside RN, SLIM, and facility aware of transport time  IMM reviewed via phone with wife

## 2020-03-19 NOTE — ASSESSMENT & PLAN NOTE
· CT abdomen:  "Bladder is collapsed around suprapubic catheter  There was some fat stranding adjacent to the bladder which could be related to catheter placement versus cystitis "    There did appear to be some cellulitis around the suprapubic catheter  This has improved with antibiotics  Outpatient follow-up with primary care physician

## 2020-03-19 NOTE — ASSESSMENT & PLAN NOTE
· Polymicrobial   · Infectious disease doctor on board  Okay with discharge of the patient today  · Continue present antibiotics with ertapenem and daptomycin  Will need to complete a total 2 week course  Ertapenem will be completed on March 25  Daptomycin will be completed on March 28  · Please see plans under sepsis  · For weekly CBC with differential count, creatinine and CPK  Results should be sent to the Infectious Disease doctor/primary care physician  · Outpatient follow-up with primary care physician

## 2020-03-19 NOTE — ASSESSMENT & PLAN NOTE
· Presently controlled  · Home regimen:  Amlodipine 5 mg b i d , metoprolol tartrate 12 5 mg b i d   · Continue above blood pressure medications  · Outpatient follow-up with primary care physician

## 2020-03-20 LAB
BACTERIA BLD CULT: NORMAL
BACTERIA BLD CULT: NORMAL

## 2020-03-23 NOTE — PATIENT INSTRUCTIONS
Nephrostomy Tube Care   WHAT YOU NEED TO KNOW:   A nephrostomy tube is a catheter (thin plastic tube) that is inserted through your skin and into your kidney  The nephrostomy tube drains urine from your kidney into a collecting bag outside your body  You may need a nephrostomy tube when something is blocking the normal flow of urine  A nephrostomy tube may be used for a short or a long period of time  The nephrostomy tube comes out of your back, so you will need someone to help care for your nephrostomy tube  DISCHARGE INSTRUCTIONS:   Medicines:   · Antibiotics  may be given to prevent or treat an infection caused by bacteria  You may need to take antibiotics for 5 to 10 days after the tube is placed  · Take your medicine as directed  Contact your healthcare provider if you think your medicine is not helping or you have side effects  Tell him if you are allergic to any medicine  Keep a list of the medicines, vitamins, and herbs you take  Include the amounts, and when and why you take them  Bring the list or the pill bottles to follow-up visits  Carry your medicine list with you in case of an emergency  Follow up with your healthcare provider as directed: You may need a test called a nephrostogram to make sure your nephrostomy tube is in the correct place  Write down your questions so you remember to ask them during your visits  How to clean the skin around the nephrostomy tube and change the bandage:  Since the nephrostomy tube comes out of your back, you will not be able to care for it by yourself  Ask someone to follow the general directions below to check and care for your nephrostomy tube  Ask your healthcare provider how your skin should be cleaned and what skin barriers and attachment devices to use  · Gather the items you will need        ¨ Disposable (single use) under-pad, and a clean washcloth    ¨ Plain soap, warm water, and new medical gloves    ¨ Sterile gauze bandages    ¨ Clear adhesive dressing or medical tape    ¨ Skin barrier    ¨ Tube attachment device     ¨ Protective skin film    ¨ Hydrogen peroxide and saline solution (if ordered by a healthcare provider)    [facility] for your skin (if ordered by a healthcare provider)     ¨ Trash bag    · Remove the old bandage, and check the tube entry site  ¨ Have the patient lie on his side with the nephrostomy tube entry site facing up  Place the under-pad where it will catch drainage as you are working with the nephrostomy tube  ¨ Wash your hands with soap and water  Put on new medical gloves  ¨ Gently remove the old bandage and skin barrier  Do this by holding the skin beside the tube with one hand  With the other hand, gently remove sticky tape and the skin barrier by pulling in the same direction as hair growth  Do not touch the side of the bandage that is placed over or around the tube  Throw the bandage and skin barrier away in a trash bag  ¨ Look for signs of infection, such as skin redness and swelling  Report any skin changes to healthcare providers  ¨ There may be a black raghu on the tube to raghu the place where the tube enters the skin  Check to see that the black raghu is next to the skin  If it is further down the tube, the tube has moved  A healthcare provider needs to put it back in  · Clean the tube entry site  ¨ Hold the tube in place to keep it from being pulled out while you are cleaning around it  ¨ You will need to clean the area twice  For the first cleaning, wet a new gauze bandage with soap and water  You may be directed to use hydrogen peroxide instead  Begin at the entry site of the tube  Wipe the skin in circles, moving away from the entry site  Remove blood and any other material with the gauze  Do this as often as needed  Use a new gauze bandage each time you clean the area, moving away from the entry site  ¨ For the second cleaning, wet a new gauze bandage with water   You may be directed to use saline solution instead  Begin at the entry site of the tube  Wipe the skin in circles, moving away from the entry site  Use a new gauze bandage each time you clean the area, moving away from the entry site  ¨ Gently pat the skin with a clean washcloth to dry it  Put medicine on the skin if directed by a healthcare provider  · Apply the skin barrier and bandages  ¨ Cut an opening in the center of the skin barrier large enough to fit around the tube  Cut a slit from the outside edge of the skin barrier to the center opening so that you can fit it around the nephrostomy tube  Place the skin barrier around the nephrostomy tube  Make sure the skin barrier is not touching stitches that may be holding the tube in place  Put a protective skin film over the skin barrier if directed by a healthcare provider  ¨ Roll up a bandage to make it thick, and wrap it around the place where the tube enters the skin  Place it to support the tube, and stop it from kinking or bending  Tape the bandage in place, and apply more bandages if directed by a healthcare provider  ¨ An attachment device may be placed over the bandages to help keep the nephrostomy tube in place  ¨ Bring the tubing forward to the front and tape it to the skin  Do not stretch the tube tight, because this may pull the nephrostomy tube out  How often to change the bandage, skin barrier, and tube attachment device:  Change the bandage around the tube, the bolsters, skin barriers, and tube attachment devices at least every 7 days  If your bandages, barriers, or devices get dirty or wet, change them right away, and as often as needed  If your nephrostomy tube is to be used for a long period of time, the tube needs to be changed every 2 to 3 months  Healthcare providers will tell you when you need to make an appointment to have your tube changed  How to care for the urine drainage bag:   You may use a reusable or a single-use (disposable) urine bag  If you are using a disposable urine bag, use it only once, and then throw it away  If you have a reusable urine drainage bag, ask when and how to clean it  The following are general directions for cleaning a reusable urine drainage bag:  · Ask if you need to measure and write down how much urine is in the bag before you empty it  Drain urine out of the drainage bag when it is ½ to ? full  Open the spout at the bottom of the bag to empty the urine into the toilet  · You may need to detach the drainage bag from the nephrostomy tube to clean it  If so, attach a new drainage bag tightly to the nephrostomy tube  · You may need to use a solution such as phosphoric acid to clean the bag  Ask what kind of cleaning solution to use  Use a plastic syringe (without a needle) to gently force the cleaning solution into the drainage bag as you clean it  · Ask if you should leave the cleaning solution in the bag for a time before you drain it out  When it is time to drain the bag, drain the cleaning solution out through the spout at the bottom  · Ask what to use to rinse the urine drainage bag  After you rinse the bag, empty it and hang it up to air dry before you use it again  Throw reusable bags away after you use them for 1 week  How to prevent problems with your nephrostomy tube:   · Change bandages, skin barriers, and attachment devices as directed  This helps to prevent infection  Throw away or clean your drainage bag as directed by your healthcare provider  · Wipe the connecting ends of the drainage bag with alcohol or iodine before you reconnect the bag to the tube  This helps prevent infection  · Keep the tube taped to your skin and connected to a drainage bag placed below the level of your kidneys  This helps prevent urine from backing up into your kidneys  You may wear a small drainage bag strapped to your leg to let you move around more easily      · Use a larger drainage bag at night and when you take naps during the day  This will help prevent urine from leaking out from the place where the tube enters your skin  · Check the catheter to be sure it is in place after you change your clothes or do other activities  Do not wear tight clothing over the tube  Place the tubing over your thigh rather than under it when you are sitting down  Be sure that nothing is pulling on the nephrostomy tube when you move around  · Change positions if you see little or no urine in your drainage bag  Check to see if the urine tube is twisted or bent  Be sure that you are not sitting or lying on the tube  If there are no kinks and there is little or no urine in the drainage bag, tell your healthcare provider  · Flush out the tube as directed  Do this if you think the tube is blocked  Other things to know:   · Drink 2 to 3 liters of liquid each day  unless you were told to limit liquids because of another condition  This amount is equal to about 8 to 12 (eight-ounce) cups of liquid  There should be 30 to 60 milliliters of urine draining into the bag each hour  A large amount of urine that drains over a shorter period of time should be reported  For example, 2,000 milliliters (2 liters) of urine draining out over 8 hours could be a sign of problems  · Keep the site covered while you shower  Tape a piece of clear adhesive plastic over the dressing to keep it dry while you shower  Do not take tub baths  Contact your healthcare provider if:   · The skin around the nephrostomy tube is red, swollen, itches, or has a rash  · You have a fever  · You have lower back or hip pain  · There are changes in how your urine looks or smells  · You have large amounts of urine draining into the drainage bag over a short period of time  · You have little or no urine draining from the nephrostomy tube  · You have nausea and are vomiting      · The black raghu on your tube has moved, or the tube is longer than when it was put in      · You have questions or concerns about your condition or care  Seek care immediately or call 911 if:   · The nephrostomy tube comes out completely  · There is blood, pus, or a bad smell coming from the place where the tube enters your skin  · Urine is leaking around the tube 10 days after the tube was placed  © 2017 [employer] [employer] Information is for End User's use only and may not be sold, redistributed or otherwise used for commercial purposes  All illustrations and images included in CareNotes® are the copyrighted property of [employer]  or Mika Mcclain  The above information is an  only  It is not intended as medical advice for individual conditions or treatments  Talk to your doctor, nurse or pharmacist before following any medical regimen to see if it is safe and effective for you

## 2020-03-23 NOTE — PROGRESS NOTES
Problem List Items Addressed This Visit        Nervous and Auditory    Dementia with behavioral disturbance ([facility] [address] ) - Primary       Genitourinary    Retention of urine (Chronic)    CKD (chronic kidney disease)    Bilateral nephrolithiasis       Other    Neurogenic bladder    Suprapubic catheter ([facility] [address] )            Discussion:  Zi Garrett continues to have issues with recurrent infection, bilateral nephrolithiasis, and obstruction of his left kidney  I have previously had a long discussion with him and with his wife, the best course of action is for long-term left nephrostomy tube drainage given his multiple comorbidities  He is status post exchange of a suprapubic catheter over a wire today done by me, due to difficulty of this change and failure of easy exchange by our staff    He will continue to see me on a monthly basis for suprapubic catheter changes over a wire  He will require nephrostomy tube exchange every 3 months going forward      Assessment and plan:       Please see problem oriented charting for the assessment plan of today's urological complaints    Francine Collier MD      Chief Complaint     Chief Complaint   Patient presents with    Urinary Retention     SPT change          History of Present Illness     Mariel Mckenna is a 68 y o  gentleman with a history of difficult Thorne catheter placement due to dense urethral stricture, also with history of neurogenic bladder and urinary retention due to stroke, also has a history of difficult suprapubic catheter exchanges  To say that his urologic history is complex would be an under statement  He also has severe bilateral nephrolithiasis, this is due to his immobility and hypercalciuria due to this  Long-term he will need a suprapubic catheter as well as a left nephrostomy tube      No new urologic complaints    The following portions of the patient's history were reviewed and updated as appropriate: allergies, current medications, past family history, past medical history, past social history, past surgical history and problem list     Detailed Urologic History     - please refer to HPI    Review of Systems     Review of Systems   Reason unable to perform ROS: Patient is nonverbal, cannot perform review of systems  Allergies     Allergies   Allergen Reactions    Gentamycin [Gentamicin] Anaphylaxis    Vancomycin Anaphylaxis    Zosyn [Piperacillin Sod-Tazobactam So] Anaphylaxis     However, has tolerated Ertapenem, Cefdinir, and Cefepime, which all have different side chains  Avoid Penicillins and the following Cephs with similar side chains (Cephalexin, Cefadroxil, Cefaclor, Cefprozil, or  Cefoxitin)    Clindamycin Itching    Nsaids Other (See Comments)     Nephrotic Syndrome    Sulfa Antibiotics Rash    Other Rash     antipersperents  Stat lock from lucia catheter       Physical Exam     Physical Exam   Constitutional: He is oriented to person, place, and time  Patient is seen as per his usual in a stretcher   HENT:   Head: Normocephalic and atraumatic  Eyes: Conjunctivae are normal  No scleral icterus  Neck: No tracheal deviation present  Cardiovascular: Intact distal pulses  Pulmonary/Chest: Effort normal    Abdominal: He exhibits no distension  There is no tenderness  Genitourinary: Penis normal    Musculoskeletal: He exhibits deformity  Neurological: He is alert and oriented to person, place, and time  He displays abnormal reflex  He exhibits abnormal muscle tone  Coordination abnormal    Skin: Skin is warm  Rash (Around suprapubic site, being treated with a antifungal agent) noted  Nursing note and vitals reviewed            Vital Signs  Vitals:    03/25/20 1139   BP: 140/80   BP Location: Right arm   Patient Position: Sitting   Cuff Size: Adult   Pulse: 64   Height: 5' 11" (1 803 m)         Current Medications       Current Outpatient Medications:     acetaminophen (TYLENOL) 325 mg tablet, Take 650 mg by mouth every 4 (four) hours as needed for mild pain , Disp: , Rfl:     amLODIPine (NORVASC) 5 mg tablet, Take 5 mg by mouth 2 (two) times a day , Disp: , Rfl:     ammonium lactate (LAC-HYDRIN) 12 % cream, Apply topically daily, Disp: 385 g, Rfl: 0    apixaban (ELIQUIS) 2 5 mg, Take 2 5 mg by mouth 2 (two) times a day, Disp: , Rfl:     bisacodyl (DULCOLAX) 10 mg suppository, Insert 1 suppository (10 mg total) into the rectum daily as needed for constipation, Disp: 12 suppository, Rfl: 0    busPIRone (BUSPAR) 7 5 mg tablet, Take 7 5 mg by mouth 3 (three) times a day, Disp: , Rfl:     DAPTOmycin (CUBICIN) 50 mL IVPB, Infuse 700 mg into a venous catheter every 24 hours for 8 days, Disp: , Rfl: 0    docusate sodium (COLACE) 100 mg capsule, Take 100 mg by mouth 2 (two) times a day, Disp: , Rfl:     ertapenem 1,000 mg in sodium chloride 0 9 % 50 mL IVPB, Infuse 1,000 mg into a venous catheter every 24 hours for 5 days, Disp: , Rfl: 0    famotidine (PEPCID) 20 mg tablet, Take 20 mg by mouth 2 (two) times a day, Disp: , Rfl:     glycerin-hypromellose- (ARTIFICIAL TEARS) 0 2-0 2-1 % SOLN, Administer 1 drop to both eyes 2 (two) times a day, Disp: , Rfl:     ipratropium (ATROVENT) 0 02 % nebulizer solution, Take 1 vial (0 5 mg total) by nebulization every 6 (six) hours as needed for wheezing or shortness of breath, Disp: , Rfl: 0    memantine (NAMENDA) 5 mg tablet, 2 (two) times a day , Disp: , Rfl:     metoprolol tartrate (LOPRESSOR) 25 mg tablet, Take 0 5 tablets (12 5 mg total) by mouth 2 (two) times a day, Disp: 60 tablet, Rfl: 0    mirtazapine (REMERON) 15 mg tablet, daily at bedtime , Disp: , Rfl:     oxyCODONE (ROXICODONE) 5 mg immediate release tablet, Take 1 tablet (5 mg total) by mouth every 6 (six) hours as needed for severe painMax Daily Amount: 20 mg, Disp: 12 tablet, Rfl: 0    polyethylene glycol (MIRALAX) 17 g packet, Take 17 g by mouth daily, Disp: 14 each, Rfl: 0    potassium chloride (K-DUR,KLOR-CON) 10 mEq tablet, Take 1 tablet (10 mEq total) by mouth daily with breakfast, Disp: , Rfl: 0    QUEtiapine (SEROquel) 25 mg tablet, Take 25 mg by mouth daily at bedtime  , Disp: , Rfl:     senna (SENOKOT) 8 6 mg, Take 2 tablets (17 2 mg total) by mouth daily at bedtime, Disp: , Rfl: 0      Active Problems     Patient Active Problem List   Diagnosis    Aphasia    COPD (chronic obstructive pulmonary disease) ([facility] )    History of DVT (deep vein thrombosis)    Aneurysm of thoracic aorta ([facility] )    Retention of urine    Acute kidney injury ([facility] )    Essential hypertension    Aphasia of unknown origin    CKD (chronic kidney disease)    Hypokalemia    GERD (gastroesophageal reflux disease)    HTN, goal below 140/90    Generalized anxiety disorder    H/O blood clots    Anemia    Constipation    Dementia with behavioral disturbance (HCC)    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[facility])    MSSA (methicillin susceptible Staphylococcus aureus) septicemia ([facility])    H/O urethral stricture    CKD (chronic kidney disease) stage 2, GFR 60-89 ml/min    Urinary catheter dysfunction (HCC)    Neurogenic bladder    Nephrolithiasis    Suprapubic catheter ([facility])    Bilateral nephrolithiasis    Fecal impaction ([facility])    Atelectasis    Cellulitis, abdominal wall         Past Medical History     Past Medical History:   Diagnosis Date    Acute kidney injury ([place] [address] ) 10/21/2016    Anxiety     Aortic aneurysm (HCC)     Aphasia     Ataxia     Bladder adhesions     BPH (benign prostatic hypertrophy)     COPD (chronic obstructive pulmonary disease) ([facility])     wife denies - "never had"    Dementia ([place] [address] )     Difficulty walking     Essential (primary) hypertension     Generalized anxiety disorder     GERD (gastroesophageal reflux disease)     Global aphasia     H/O blood clots     High blood pressure     History of kidney stones     Kidney stones     Mental disorder     Muscle weakness     Neuromuscular dysfunction of bladder     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 ([facility] [address] )     Retention of urine, unspecified     Stroke ([facility] [address] )     Thrombosis     Urinary retention     Urinary tract bacterial infections     Urinary tract infection          Surgical History     Past Surgical History:   Procedure Laterality Date    BOTOX INJECTION N/A 6/18/2019    Procedure: INJECTION BOTULINUM TOXIN (BOTOX), intra detrusor;  Surgeon: Kosta Jones MD;  [facility];  Service: Urology    BOTOX INJECTION N/A 10/7/2019    Procedure: INJECTION BOTULINUM TOXIN (BOTOX), intradetrusor;  Surgeon: Kosta Jones MD;  [facility];  Service: Urology    CYSTOSCOPY      CYSTOSCOPY N/A 6/18/2019    Procedure: cystoscopy and all indicated procedures;  Surgeon: Kosta Jones MD;  [facility];  Service: Urology    FL CYSTOGRAM  1/15/2019    IR SUPRAPUBIC TUBE  11/26/2019    IR TUBE PLACEMENT NEPHROSTOMY  3/11/2020    IVC FILTER INSERTION      X2    IVC FILTER INSERTION      x2    KIDNEY STONE SURGERY      KNEE SURGERY      Sepsis infection     NEPHROSTOMY      MA CYSTO/URETERO W/LITHOTRIPSY &INDWELL STENT INSRT Right 6/14/2017    Procedure: CYSTOSCOPY URETEROSCOPY WITH LITHOTRIPSY HOLMIUM LASER,,BASKET STONE EXTRACTION, RETROGRADE PYELOGRAM AND INSERTION STENT URETERAL;  Surgeon: Tamiko De La Torre MD;  [facility];  Service: Urology    MA CYSTOURETHROSCOPY,BIOPSY N/A 1/15/2019    Procedure: CYSTOSCOPY, CYSTOGRAM, DILATION OF URETHRAL STRICTURE;  Surgeon: Kosta Jones MD;  [facility];  Service: Urology    URINARY SURGERY           Family History     Family History   Problem Relation Age of Onset    Diabetes Father     Hypertension Father     Coronary artery disease Father     Cancer Father     Hypertension Mother          Social History     Social History     Social History     Tobacco Use   Smoking Status Never Smoker   Smokeless Tobacco Never Used         Pertinent Lab Values     Lab Results   Component Value Date    CREATININE 1 28 03/19/2020       No results found for: PSA          Pertinent Imaging      No new imaging for my review

## 2020-03-25 ENCOUNTER — PROCEDURE VISIT (OUTPATIENT)
Dept: UROLOGY | Facility: CLINIC | Age: 70
End: 2020-03-25
Payer: COMMERCIAL

## 2020-03-25 VITALS
BODY MASS INDEX: 33.61 KG/M2 | HEART RATE: 64 BPM | DIASTOLIC BLOOD PRESSURE: 80 MMHG | SYSTOLIC BLOOD PRESSURE: 140 MMHG | HEIGHT: 71 IN

## 2020-03-25 DIAGNOSIS — Z93.59 SUPRAPUBIC CATHETER (HCC): ICD-10-CM

## 2020-03-25 DIAGNOSIS — F03.91 DEMENTIA WITH BEHAVIORAL DISTURBANCE, UNSPECIFIED DEMENTIA TYPE (HCC): Primary | ICD-10-CM

## 2020-03-25 DIAGNOSIS — N20.0 BILATERAL NEPHROLITHIASIS: ICD-10-CM

## 2020-03-25 DIAGNOSIS — R33.9 RETENTION OF URINE: Chronic | ICD-10-CM

## 2020-03-25 DIAGNOSIS — N31.9 NEUROGENIC BLADDER: ICD-10-CM

## 2020-03-25 DIAGNOSIS — N18.30 STAGE 3 CHRONIC KIDNEY DISEASE (HCC): ICD-10-CM

## 2020-03-25 PROBLEM — A41.9 SEVERE SEPSIS (HCC): Status: RESOLVED | Noted: 2017-09-21 | Resolved: 2020-03-25

## 2020-03-25 PROBLEM — N12 PYELONEPHRITIS OF LEFT KIDNEY: Status: RESOLVED | Noted: 2020-03-17 | Resolved: 2020-03-25

## 2020-03-25 PROBLEM — N39.0 UTI (URINARY TRACT INFECTION): Status: RESOLVED | Noted: 2019-11-27 | Resolved: 2020-03-25

## 2020-03-25 PROBLEM — R65.20 SEVERE SEPSIS (HCC): Status: RESOLVED | Noted: 2017-09-21 | Resolved: 2020-03-25

## 2020-03-25 PROCEDURE — 99213 OFFICE O/P EST LOW 20 MIN: CPT | Performed by: UROLOGY

## 2020-03-25 PROCEDURE — 51710 CHANGE OF BLADDER TUBE: CPT | Performed by: UROLOGY

## 2020-03-25 NOTE — LETTER
March 25, 2020     Rosette Kay MD  [address] [place] [address]  [address]  [address]    Patient: Sherle Krabbe [facility]   YOB: 1950   Date of Visit: 3/25/2020       Dear Dr Tete Manzano: Thank you for referring Tonie Bishop to me for evaluation  Below are my notes for this consultation  If you have questions, please do not hesitate to call me  I look forward to following your patient along with you  Sincerely,        Marry Paul MD        CC: No Recipients  Marry Paul MD  3/25/2020 12:47 PM  Sign at close encounter        Cystostomy tube change     Date/Time 3/25/2020 12:46 PM     Performed by  Marry Paul MD     Authorized by Marry Paul MD      Universal Protocol Consent: Verbal consent obtained  Written consent obtained  Risks and benefits: risks, benefits and alternatives were discussed  Consent given by: patient  Patient understanding: patient states understanding of the procedure being performed  Patient consent: the patient's understanding of the procedure matches consent given  Procedure consent: procedure consent matches procedure scheduled  Relevant documents: relevant documents present and verified  Test results: test results available and properly labeled  Site marked: the operative site was not marked  Radiology Images displayed and confirmed  If images not available, report reviewed: imaging studies available  Required items: required blood products, implants, devices, and special equipment available  Patient identity confirmed: provided demographic data        Preparation: Patient was prepped and draped in the usual sterile fashion  Local anesthesia used: no     Anesthesia   Local anesthesia used: no     Sedation   Patient sedated: no        Specimen: no    Culture: no   Procedure Details   Procedure Notes: The patient's 20 Western Brisa [place] tip Thorne catheter was changed over a superstiff wire, after being prepped and draped in the usual sterile fashion  This was seen to irrigate well without debris, and slight blood, as per his usual               Adan Medina MD  3/25/2020 12:45 PM  Sign at close encounter       Problem List Items Addressed This Visit        Nervous and Auditory    Dementia with behavioral disturbance ([facility] ) - Primary       Genitourinary    Retention of urine (Chronic)    CKD (chronic kidney disease)    Bilateral nephrolithiasis       Other    Neurogenic bladder    Suprapubic catheter ([facility] )            Discussion:  Nick Nguyen continues to have issues with recurrent infection, bilateral nephrolithiasis, and obstruction of his left kidney  I have previously had a long discussion with him and with his wife, the best course of action is for long-term left nephrostomy tube drainage given his multiple comorbidities  He is status post exchange of a suprapubic catheter over a wire today done by me, due to difficulty of this change and failure of easy exchange by our staff    He will continue to see me on a monthly basis for suprapubic catheter changes over a wire  He will require nephrostomy tube exchange every 3 months going forward      Assessment and plan:       Please see problem oriented charting for the assessment plan of today's urological complaints    Adan Medina MD      Chief Complaint     Chief Complaint   Patient presents with    Urinary Retention     SPT change          History of Present Illness     Liudmila Lizama is a 68 y o  gentleman with a history of difficult Thorne catheter placement due to dense urethral stricture, also with history of neurogenic bladder and urinary retention due to stroke, also has a history of difficult suprapubic catheter exchanges  To say that his urologic history is complex would be an under statement  He also has severe bilateral nephrolithiasis, this is due to his immobility and hypercalciuria due to this  Long-term he will need a suprapubic catheter as well as a left nephrostomy tube      No new urologic complaints    The following portions of the patient's history were reviewed and updated as appropriate: allergies, current medications, past family history, past medical history, past social history, past surgical history and problem list     Detailed Urologic History     - please refer to HPI    Review of Systems     Review of Systems   Reason unable to perform ROS: Patient is nonverbal, cannot perform review of systems  Allergies     Allergies   Allergen Reactions    Gentamycin [Gentamicin] Anaphylaxis    Vancomycin Anaphylaxis    Zosyn [Piperacillin Sod-Tazobactam So] Anaphylaxis     However, has tolerated Ertapenem, Cefdinir, and Cefepime, which all have different side chains  Avoid Penicillins and the following Cephs with similar side chains (Cephalexin, Cefadroxil, Cefaclor, Cefprozil, or  Cefoxitin)    Clindamycin Itching    Nsaids Other (See Comments)     Nephrotic Syndrome    Sulfa Antibiotics Rash    Other Rash     antipersperents  Stat lock from lucia catheter       Physical Exam     Physical Exam   Constitutional: He is oriented to person, place, and time  Patient is seen as per his usual in a stretcher   HENT:   Head: Normocephalic and atraumatic  Eyes: Conjunctivae are normal  No scleral icterus  Neck: No tracheal deviation present  Cardiovascular: Intact distal pulses  Pulmonary/Chest: Effort normal    Abdominal: He exhibits no distension  There is no tenderness  Genitourinary: Penis normal    Musculoskeletal: He exhibits deformity  Neurological: He is alert and oriented to person, place, and time  He displays abnormal reflex  He exhibits abnormal muscle tone  Coordination abnormal    Skin: Skin is warm  Rash (Around suprapubic site, being treated with a antifungal agent) noted  Nursing note and vitals reviewed            Vital Signs  Vitals:    03/25/20 1139   BP: 140/80   BP Location: Right arm   Patient Position: Sitting   Cuff Size: Adult   Pulse: 64 Height: 5' 11" (1 803 m)         Current Medications       Current Outpatient Medications:     acetaminophen (TYLENOL) 325 mg tablet, Take 650 mg by mouth every 4 (four) hours as needed for mild pain , Disp: , Rfl:     amLODIPine (NORVASC) 5 mg tablet, Take 5 mg by mouth 2 (two) times a day , Disp: , Rfl:     ammonium lactate (LAC-HYDRIN) 12 % cream, Apply topically daily, Disp: 385 g, Rfl: 0    apixaban (ELIQUIS) 2 5 mg, Take 2 5 mg by mouth 2 (two) times a day, Disp: , Rfl:     bisacodyl (DULCOLAX) 10 mg suppository, Insert 1 suppository (10 mg total) into the rectum daily as needed for constipation, Disp: 12 suppository, Rfl: 0    busPIRone (BUSPAR) 7 5 mg tablet, Take 7 5 mg by mouth 3 (three) times a day, Disp: , Rfl:     DAPTOmycin (CUBICIN) 50 mL IVPB, Infuse 700 mg into a venous catheter every 24 hours for 8 days, Disp: , Rfl: 0    docusate sodium (COLACE) 100 mg capsule, Take 100 mg by mouth 2 (two) times a day, Disp: , Rfl:     ertapenem 1,000 mg in sodium chloride 0 9 % 50 mL IVPB, Infuse 1,000 mg into a venous catheter every 24 hours for 5 days, Disp: , Rfl: 0    famotidine (PEPCID) 20 mg tablet, Take 20 mg by mouth 2 (two) times a day, Disp: , Rfl:     glycerin-hypromellose- (ARTIFICIAL TEARS) 0 2-0 2-1 % SOLN, Administer 1 drop to both eyes 2 (two) times a day, Disp: , Rfl:     ipratropium (ATROVENT) 0 02 % nebulizer solution, Take 1 vial (0 5 mg total) by nebulization every 6 (six) hours as needed for wheezing or shortness of breath, Disp: , Rfl: 0    memantine (NAMENDA) 5 mg tablet, 2 (two) times a day , Disp: , Rfl:     metoprolol tartrate (LOPRESSOR) 25 mg tablet, Take 0 5 tablets (12 5 mg total) by mouth 2 (two) times a day, Disp: 60 tablet, Rfl: 0    mirtazapine (REMERON) 15 mg tablet, daily at bedtime , Disp: , Rfl:     oxyCODONE (ROXICODONE) 5 mg immediate release tablet, Take 1 tablet (5 mg total) by mouth every 6 (six) hours as needed for severe painMax Daily Amount: 20 mg, Disp: 12 tablet, Rfl: 0    polyethylene glycol (MIRALAX) 17 g packet, Take 17 g by mouth daily, Disp: 14 each, Rfl: 0    potassium chloride (K-DUR,KLOR-CON) 10 mEq tablet, Take 1 tablet (10 mEq total) by mouth daily with breakfast, Disp: , Rfl: 0    QUEtiapine (SEROquel) 25 mg tablet, Take 25 mg by mouth daily at bedtime  , Disp: , Rfl:     senna (SENOKOT) 8 6 mg, Take 2 tablets (17 2 mg total) by mouth daily at bedtime, Disp: , Rfl: 0      Active Problems     Patient Active Problem List   Diagnosis    Aphasia    COPD [facility]chronic obstructive [facility] disease) [facility] )    History of DVT (deep vein thrombosis)    Aneurysm of thoracic aorta ([place] [address] )    Retention of urine    Acute kidney injury ([facility] )    Essential hypertension    Aphasia of unknown origin    CKD (chronic kidney disease)    Hypokalemia    GERD (gastroesophageal reflux disease)    HTN, goal below 140/90    Generalized anxiety disorder    H/O blood clots    Anemia    Constipation    Dementia with behavioral disturbance (HCC)    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[facility])    MSSA (methicillin susceptible Staphylococcus aureus) septicemia ([facility])    H/O urethral stricture    CKD (chronic kidney disease) stage 2, GFR 60-89 ml/min    Urinary catheter dysfunction (HCC)    Neurogenic bladder    Nephrolithiasis    Suprapubic catheter (HCC)    Bilateral nephrolithiasis    Fecal impaction (HCC)    Atelectasis    Cellulitis, abdominal wall         Past Medical History     Past Medical History:   Diagnosis Date    Acute kidney injury ([place] [address] ) 10/21/2016    Anxiety     Aortic aneurysm (HCC)     Aphasia     Ataxia     Bladder adhesions     BPH (benign prostatic hypertrophy)     COPD (chronic obstructive pulmonary disease) (HCC)     wife denies - "never had"    [email or web address]     Difficulty walking     Essential (primary) hypertension     Generalized anxiety disorder     GERD (gastroesophageal reflux disease)     Global aphasia     H/O blood clots     High blood pressure     History of kidney stones     Kidney stones     Mental disorder     Muscle weakness     Neuromuscular dysfunction of bladder     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 ([facility] )     Retention of urine, unspecified     Stroke ([facility] )     Thrombosis     Urinary retention     Urinary tract bacterial infections     Urinary tract infection          Surgical History     Past Surgical History:   Procedure Laterality Date    BOTOX INJECTION N/A 6/18/2019    Procedure: INJECTION BOTULINUM TOXIN (BOTOX), intra detrusor;  Surgeon: Ansley Alfonso MD;  [facility];  Service: Urology    BOTOX INJECTION N/A 10/7/2019    Procedure: INJECTION BOTULINUM TOXIN (BOTOX), intradetrusor;  Surgeon: Ansley Alfonso MD;  [facility];  Service: Urology    CYSTOSCOPY      CYSTOSCOPY N/A 6/18/2019    Procedure: cystoscopy and all indicated procedures;  Surgeon: Ansley Alfonso MD;  [facility];  Service: Urology    FL CYSTOGRAM  1/15/2019    IR SUPRAPUBIC TUBE  11/26/2019    IR TUBE PLACEMENT NEPHROSTOMY  3/11/2020    IVC FILTER INSERTION      X2    IVC FILTER INSERTION      x2    KIDNEY STONE SURGERY      KNEE SURGERY      Sepsis infection     NEPHROSTOMY      OK CYSTO/URETERO W/LITHOTRIPSY &INDWELL STENT INSRT Right 6/14/2017    Procedure: CYSTOSCOPY URETEROSCOPY WITH LITHOTRIPSY HOLMIUM LASER,,BASKET STONE EXTRACTION, RETROGRADE PYELOGRAM AND INSERTION STENT URETERAL;  Surgeon: Ty Guidry MD;  [facility];  Service: Urology    OK CYSTOURETHROSCOPY,BIOPSY N/A 1/15/2019    Procedure: CYSTOSCOPY, CYSTOGRAM, DILATION OF URETHRAL STRICTURE;  Surgeon: Ansley Alfonso MD;  [facility];  Service: Urology    URINARY SURGERY           Family History     Family History   Problem Relation Age of Onset    Diabetes Father     Hypertension Father     Coronary artery disease Father     Cancer Father    [facility] Hypertension Mother          Social History     Social History     Social History     Tobacco Use   Smoking Status Never Smoker   Smokeless Tobacco Never Used         Pertinent Lab Values     Lab Results   Component Value Date    CREATININE 1 28 03/19/2020       No results found for: PSA          Pertinent Imaging      No new imaging for my review

## 2020-03-25 NOTE — PROGRESS NOTES
Cystostomy tube change     Date/Time 3/25/2020 12:46 PM     Performed by  Adan Medina MD     Authorized by Adan Medina MD      Universal Protocol Consent: Verbal consent obtained  Written consent obtained  Risks and benefits: risks, benefits and alternatives were discussed  Consent given by: patient  Patient understanding: patient states understanding of the procedure being performed  Patient consent: the patient's understanding of the procedure matches consent given  Procedure consent: procedure consent matches procedure scheduled  Relevant documents: relevant documents present and verified  Test results: test results available and properly labeled  Site marked: the operative site was not marked  Radiology Images displayed and confirmed  If images not available, report reviewed: imaging studies available  Required items: required blood products, implants, devices, and special equipment available  Patient identity confirmed: provided demographic data        Preparation: Patient was prepped and draped in the usual sterile fashion  Local anesthesia used: no     Anesthesia   Local anesthesia used: no     Sedation   Patient sedated: no        Specimen: no    Culture: no   Procedure Details   Procedure Notes: The patient's 20 Western Brisa [place] tip Thorne catheter was changed over a superstiff wire, after being prepped and draped in the usual sterile fashion    This was seen to irrigate well without debris, and slight blood, as per his usual

## 2020-04-10 DIAGNOSIS — R52 SEVERE PAIN: ICD-10-CM

## 2020-04-10 RX ORDER — OXYCODONE HYDROCHLORIDE 5 MG/1
5 TABLET ORAL 4 TIMES DAILY
Qty: 240 TABLET | Refills: 0 | Status: SHIPPED | OUTPATIENT
Start: 2020-04-10 | End: 2020-04-10 | Stop reason: SDUPTHER

## 2020-04-10 RX ORDER — OXYCODONE HYDROCHLORIDE 5 MG/1
5 TABLET ORAL 4 TIMES DAILY
Qty: 240 TABLET | Refills: 0 | Status: SHIPPED | OUTPATIENT
Start: 2020-04-10 | End: 2020-08-03 | Stop reason: SDUPTHER

## 2020-05-08 ENCOUNTER — PROCEDURE VISIT (OUTPATIENT)
Dept: UROLOGY | Facility: CLINIC | Age: 70
End: 2020-05-08
Payer: COMMERCIAL

## 2020-05-08 VITALS — SYSTOLIC BLOOD PRESSURE: 132 MMHG | DIASTOLIC BLOOD PRESSURE: 70 MMHG | HEART RATE: 80 BPM

## 2020-05-08 DIAGNOSIS — T83.018D: ICD-10-CM

## 2020-05-08 DIAGNOSIS — Z93.59 SUPRAPUBIC CATHETER (HCC): ICD-10-CM

## 2020-05-08 PROCEDURE — 51710 CHANGE OF BLADDER TUBE: CPT | Performed by: UROLOGY

## 2020-05-08 PROCEDURE — 99213 OFFICE O/P EST LOW 20 MIN: CPT | Performed by: UROLOGY

## 2020-05-12 ENCOUNTER — TELEPHONE (OUTPATIENT)
Dept: UROLOGY | Facility: MEDICAL CENTER | Age: 70
End: 2020-05-12

## 2020-05-22 ENCOUNTER — TELEPHONE (OUTPATIENT)
Dept: UROLOGY | Facility: MEDICAL CENTER | Age: 70
End: 2020-05-22

## 2020-06-01 PROBLEM — T83.018A URINARY CATHETER DYSFUNCTION (HCC): Status: RESOLVED | Noted: 2018-11-29 | Resolved: 2020-06-01

## 2020-06-03 ENCOUNTER — PROCEDURE VISIT (OUTPATIENT)
Dept: UROLOGY | Facility: CLINIC | Age: 70
End: 2020-06-03
Payer: COMMERCIAL

## 2020-06-03 VITALS
TEMPERATURE: 98.5 F | BODY MASS INDEX: 33.61 KG/M2 | SYSTOLIC BLOOD PRESSURE: 130 MMHG | HEIGHT: 71 IN | HEART RATE: 65 BPM | DIASTOLIC BLOOD PRESSURE: 78 MMHG

## 2020-06-03 DIAGNOSIS — L92.9 GRANULATION TISSUE ABNORMALITY: ICD-10-CM

## 2020-06-03 DIAGNOSIS — R33.9 RETENTION OF URINE: Chronic | ICD-10-CM

## 2020-06-03 DIAGNOSIS — N20.0 NEPHROLITHIASIS: ICD-10-CM

## 2020-06-03 DIAGNOSIS — N31.9 NEUROGENIC BLADDER: ICD-10-CM

## 2020-06-03 DIAGNOSIS — Z93.59 SUPRAPUBIC CATHETER (HCC): ICD-10-CM

## 2020-06-03 DIAGNOSIS — N20.0 BILATERAL NEPHROLITHIASIS: Primary | ICD-10-CM

## 2020-06-03 PROCEDURE — 99213 OFFICE O/P EST LOW 20 MIN: CPT | Performed by: UROLOGY

## 2020-06-03 PROCEDURE — 17250 CHEM CAUT OF GRANLTJ TISSUE: CPT | Performed by: UROLOGY

## 2020-06-03 RX ORDER — AMLODIPINE BESYLATE 2.5 MG/1
TABLET ORAL
COMMUNITY
Start: 2020-06-02 | End: 2020-09-09

## 2020-06-06 ENCOUNTER — DOCUMENTATION (OUTPATIENT)
Dept: INTERNAL MEDICINE CLINIC | Facility: CLINIC | Age: 70
End: 2020-06-06

## 2020-06-08 ENCOUNTER — TELEPHONE (OUTPATIENT)
Dept: RADIOLOGY | Facility: HOSPITAL | Age: 70
End: 2020-06-08

## 2020-06-08 RX ORDER — SODIUM CHLORIDE 9 MG/ML
75 INJECTION, SOLUTION INTRAVENOUS CONTINUOUS
Status: CANCELLED | OUTPATIENT
Start: 2020-06-08

## 2020-06-09 ENCOUNTER — TELEPHONE (OUTPATIENT)
Dept: SURGERY | Facility: HOSPITAL | Age: 70
End: 2020-06-09

## 2020-06-10 ENCOUNTER — HOSPITAL ENCOUNTER (OUTPATIENT)
Dept: RADIOLOGY | Facility: HOSPITAL | Age: 70
Discharge: HOME | End: 2020-06-10
Attending: UROLOGY
Payer: COMMERCIAL

## 2020-06-10 VITALS
RESPIRATION RATE: 17 BRPM | SYSTOLIC BLOOD PRESSURE: 165 MMHG | HEIGHT: 71 IN | TEMPERATURE: 98.5 F | WEIGHT: 215 LBS | HEART RATE: 71 BPM | OXYGEN SATURATION: 98 % | BODY MASS INDEX: 30.1 KG/M2 | DIASTOLIC BLOOD PRESSURE: 90 MMHG

## 2020-06-10 PROCEDURE — C1769 GUIDE WIRE: HCPCS

## 2020-06-10 PROCEDURE — C1729 CATH, DRAINAGE: HCPCS

## 2020-06-10 PROCEDURE — 99152 MOD SED SAME PHYS/QHP 5/>YRS: CPT

## 2020-06-10 PROCEDURE — 50435 EXCHANGE NEPHROSTOMY CATH: CPT

## 2020-06-10 PROCEDURE — 50435 EXCHANGE NEPHROSTOMY CATH: CPT | Performed by: RADIOLOGY

## 2020-06-10 RX ORDER — LEVOFLOXACIN 5 MG/ML
500 INJECTION, SOLUTION INTRAVENOUS ONCE
Status: COMPLETED | OUTPATIENT
Start: 2020-06-10 | End: 2020-06-10

## 2020-06-10 RX ORDER — SODIUM CHLORIDE 9 MG/ML
75 INJECTION, SOLUTION INTRAVENOUS CONTINUOUS
Status: DISCONTINUED | OUTPATIENT
Start: 2020-06-10 | End: 2020-06-11 | Stop reason: HOSPADM

## 2020-06-10 RX ORDER — FENTANYL CITRATE 50 UG/ML
INJECTION, SOLUTION INTRAMUSCULAR; INTRAVENOUS CODE/TRAUMA/SEDATION MEDICATION
Status: COMPLETED | OUTPATIENT
Start: 2020-06-10 | End: 2020-06-10

## 2020-06-10 RX ORDER — MIDAZOLAM HYDROCHLORIDE 2 MG/2ML
INJECTION, SOLUTION INTRAMUSCULAR; INTRAVENOUS CODE/TRAUMA/SEDATION MEDICATION
Status: COMPLETED | OUTPATIENT
Start: 2020-06-10 | End: 2020-06-10

## 2020-06-10 RX ADMIN — FENTANYL CITRATE 25 MCG: 50 INJECTION INTRAMUSCULAR; INTRAVENOUS at 10:10

## 2020-06-10 RX ADMIN — LEVOFLOXACIN 500 MG: 5 INJECTION, SOLUTION INTRAVENOUS at 10:15

## 2020-06-10 RX ADMIN — MIDAZOLAM HYDROCHLORIDE 0.5 MG: 1 INJECTION, SOLUTION INTRAMUSCULAR; INTRAVENOUS at 09:57

## 2020-06-10 RX ADMIN — FENTANYL CITRATE 25 MCG: 50 INJECTION INTRAMUSCULAR; INTRAVENOUS at 09:57

## 2020-06-10 RX ADMIN — MIDAZOLAM HYDROCHLORIDE 0.5 MG: 1 INJECTION, SOLUTION INTRAMUSCULAR; INTRAVENOUS at 10:10

## 2020-06-12 ENCOUNTER — DOCUMENTATION (OUTPATIENT)
Dept: NEPHROLOGY | Facility: CLINIC | Age: 70
End: 2020-06-12

## 2020-06-12 LAB
CK SERPL-CCNC: 78 U/L (ref 39–308)
EXTERNAL CREATININE: 1.43
HCT VFR BLD AUTO: 39.7 % (ref 36.5–49.3)
HGB BLD-MCNC: 12.7 G/DL (ref 12–17)
PLATELET # BLD AUTO: 514 THOUSANDS/UL (ref 149–390)
WBC # BLD AUTO: 10.1 THOUSAND/UL

## 2020-07-09 ENCOUNTER — NURSING HOME VISIT (OUTPATIENT)
Dept: FAMILY MEDICINE CLINIC | Facility: CLINIC | Age: 70
End: 2020-07-09
Payer: COMMERCIAL

## 2020-07-09 DIAGNOSIS — R33.9 RETENTION OF URINE: Chronic | ICD-10-CM

## 2020-07-09 DIAGNOSIS — K21.9 GASTROESOPHAGEAL REFLUX DISEASE WITHOUT ESOPHAGITIS: Primary | ICD-10-CM

## 2020-07-09 DIAGNOSIS — F41.8 DEPRESSION WITH ANXIETY: ICD-10-CM

## 2020-07-09 DIAGNOSIS — Z86.718 HISTORY OF DVT (DEEP VEIN THROMBOSIS): Chronic | ICD-10-CM

## 2020-07-09 DIAGNOSIS — K59.00 CONSTIPATION, UNSPECIFIED CONSTIPATION TYPE: ICD-10-CM

## 2020-07-09 DIAGNOSIS — F03.91 DEMENTIA WITH BEHAVIORAL DISTURBANCE, UNSPECIFIED DEMENTIA TYPE (HCC): ICD-10-CM

## 2020-07-09 DIAGNOSIS — K63.9 BOWEL TROUBLE: ICD-10-CM

## 2020-07-09 DIAGNOSIS — I10 ESSENTIAL HYPERTENSION: Chronic | ICD-10-CM

## 2020-07-09 PROCEDURE — 99309 SBSQ NF CARE MODERATE MDM 30: CPT | Performed by: INTERNAL MEDICINE

## 2020-07-10 PROBLEM — K59.00 CONSTIPATION: Status: ACTIVE | Noted: 2020-07-10

## 2020-07-10 PROBLEM — F41.8 DEPRESSION WITH ANXIETY: Status: ACTIVE | Noted: 2020-07-10

## 2020-07-10 PROBLEM — N32.0 BLADDER NECK OBSTRUCTION: Status: ACTIVE | Noted: 2020-07-10

## 2020-07-10 PROBLEM — N23 RENAL COLIC: Status: ACTIVE | Noted: 2020-07-10

## 2020-07-10 PROBLEM — N20.1 CALCULUS OF URETER: Status: ACTIVE | Noted: 2017-05-01

## 2020-07-10 PROBLEM — K56.41 FECAL IMPACTION (HCC): Status: RESOLVED | Noted: 2020-03-11 | Resolved: 2020-07-10

## 2020-07-10 PROBLEM — R69 OTHER ILL-DEFINED AND UNKNOWN CAUSES OF MORBIDITY AND MORTALITY: Status: ACTIVE | Noted: 2017-05-10

## 2020-07-10 PROBLEM — I69.320 APHASIA AS LATE EFFECT OF CEREBROVASCULAR ACCIDENT: Status: ACTIVE | Noted: 2020-07-10

## 2020-07-10 PROBLEM — I63.9 CEREBRAL INFARCTION (HCC): Status: ACTIVE | Noted: 2020-07-10

## 2020-07-10 NOTE — ASSESSMENT & PLAN NOTE
Continue Norvasc  Blood pressure range 140/82 to 170/85  Will increase Norvasc 10mg po daily  Will continue to Monitor blood pressure  Obtain CMP level

## 2020-07-10 NOTE — PROGRESS NOTES
[place], [place], [address]      NAME: Crow Ortiz [facility]  AGE: 68 y o  SEX: male [phone]    DATE OF ENCOUNTER: 7/9/2020     Code Status: DNR    Assessment and Plan     Problem List Items Addressed This Visit        Digestive    GERD (gastroesophageal reflux disease) - Primary     Continue Pepcid  No complaints of heartburn         Constipation     Continue Colace, Miralax and Senna  No complaints of constipation            Cardiovascular and Mediastinum    Essential hypertension (Chronic)     Continue Norvasc  Blood pressure range 140/82 to 170/85  Will increase Norvasc 10mg po daily  Will continue to Monitor blood pressure  Obtain CMP level  Nervous and Auditory    Dementia (HCC)     Continue Memantine and Seroquel  Continue supportive care  No behaviors noted  Genitourinary    Retention of urine (Chronic)     Continue suprapubic catheter  Draining clear yellow urine without any difficulties  Other    History of DVT (deep vein thrombosis) (Chronic)     History of DVT LE - continue apixaban  No bleeding present         Depression with anxiety     Continue Remeron and Buspar  No depressive symptoms present  Constipation     Continue Colace and Dulcolax supp  No noted current constipation noted  Chief Complaint     Follow up hypertension and medication management    History of Present Illness     Patient seen and examined  No noted denies any nausea, vomiting or diarrhea  He does have expressive aphagia  Suprapubic cathetor draining without any difficulties  His blood pressure remains slightly elevated  He denies any headaches or vision changes        The following portions of the patient's history were reviewed and updated as appropriate: allergies, current medications, past family history, past medical history, past social history, past surgical history and problem list     Review of Systems     Review of Systems   Constitutional: Negative  HENT: Negative  Eyes: Negative  Respiratory: Negative  Cardiovascular: Negative  Gastrointestinal: Negative  Endocrine: Negative  Genitourinary: Negative  Musculoskeletal: Negative  Skin: Negative  Allergic/Immunologic: Negative  Neurological: Negative  Hematological: Negative  Psychiatric/Behavioral: Negative  Active Problem List     Patient Active Problem List   Diagnosis    Aphasia    COPD (ch[facility] pulmonary disease) ([facility] )    History of DVT (deep vein thrombosis)    Aneurysm of thoracic aorta ([facility] )    Retention of urine    Acute kidney injury ([facility] )    Essential hypertension    Aphasia of unknown origin    CKD (chronic kidney disease)    Hypokalemia    GERD (gastroesophageal reflux disease)    HTN, goal below 140/90    Generalized anxiety disorder    H/O blood clots    Anemia    Constipation    Dementia with behavioral disturbance ([facility] )    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    H/O urethral stricture    CKD (chronic kidney disease) stage 2, GFR 60-89 ml/min    Neurogenic bladder    Nephrolithiasis    [facility]    Bilateral nephrolithiasis    Fecal impaction ([facility])    Atelectasis    Cellulitis, abdominal wall       Objective     T-97 5 P- 51 R-18 /82 Sat 95% Weight 201     Physical Exam   Constitutional: He appears well-developed and well-nourished  No distress  HENT:   Head: Normocephalic and atraumatic  Eyes: Pupils are equal, round, and reactive to light  Conjunctivae and EOM are normal    Neck: Normal range of motion  Neck supple  Cardiovascular: Normal rate, regular rhythm and normal heart sounds  Pulmonary/Chest: Effort normal and breath sounds normal    Abdominal: Soft  Bowel sounds are normal    Genitourinary:   Genitourinary Comments: Suprapubic catheter in place   Musculoskeletal: Normal range of motion  Generalized weakness   Neurological: He is alert     Global expressive aphagia   Skin: Skin is warm and dry  Capillary refill takes less than 2 seconds  Psychiatric: He has a normal mood and affect  Pertinent Laboratory/Diagnostic Studies:   Will obtain CMP, CBC,  Fasting lipids and Vitamin D level, CMP and CBC every 6 months    Current Medications     Current Outpatient Medications:     acetaminophen (TYLENOL) 325 mg tablet, Take 650 mg by mouth every 4 (four) hours as needed for mild pain , Disp: , Rfl:     amLODIPine (NORVASC) 2 5 mg tablet, , Disp: , Rfl:     amLODIPine (NORVASC) 5 mg tablet, Take 5 mg by mouth 2 (two) times a day , Disp: , Rfl:     ammonium lactate (LAC-HYDRIN) 12 % cream, Apply topically daily, Disp: 385 g, Rfl: 0    apixaban (ELIQUIS) 2 5 mg, Take 2 5 mg by mouth 2 (two) times a day, Disp: , Rfl:     bisacodyl (DULCOLAX) 10 mg suppository, Insert 1 suppository (10 mg total) into the rectum daily as needed for constipation, Disp: 12 suppository, Rfl: 0    busPIRone (BUSPAR) 7 5 mg tablet, Take 7 5 mg by mouth 3 (three) times a day, Disp: , Rfl:     docusate sodium (COLACE) 100 mg capsule, Take 100 mg by mouth 2 (two) times a day, Disp: , Rfl:     famotidine (PEPCID) 20 mg tablet, Take 20 mg by mouth 2 (two) times a day, Disp: , Rfl:     glycerin-hypromellose- (ARTIFICIAL TEARS) 0 2-0 2-1 % SOLN, Administer 1 drop to both eyes 2 (two) times a day, Disp: , Rfl:     ipratropium (ATROVENT) 0 02 % nebulizer solution, Take 1 vial (0 5 mg total) by nebulization every 6 (six) hours as needed for wheezing or shortness of breath, Disp: , Rfl: 0    memantine (NAMENDA) 5 mg tablet, 2 (two) times a day , Disp: , Rfl:     metoprolol tartrate (LOPRESSOR) 25 mg tablet, Take 0 5 tablets (12 5 mg total) by mouth 2 (two) times a day, Disp: 60 tablet, Rfl: 0    mirtazapine (REMERON) 15 mg tablet, daily at bedtime , Disp: , Rfl:     oxyCODONE (ROXICODONE) 5 mg immediate release tablet, Take 1 tablet (5 mg total) by mouth 4 (four) times a day Hold if pt is drowsyMax Daily Amount: 20 mg, Disp: 240 tablet, Rfl: 0    polyethylene glycol (MIRALAX) 17 g packet, Take 17 g by mouth daily, Disp: 14 each, Rfl: 0    potassium chloride (K-DUR,KLOR-CON) 10 mEq tablet, Take 1 tablet (10 mEq total) by mouth daily with breakfast, Disp: , Rfl: 0    QUEtiapine (SEROquel) 25 mg tablet, Take 25 mg by mouth daily at bedtime  , Disp: , Rfl:     senna (SENOKOT) 8 6 mg, Take 2 tablets (17 2 mg total) by mouth daily at bedtime, Disp: , Rfl: 0

## 2020-07-12 NOTE — PATIENT INSTRUCTIONS
Suprapubic Cystostomy   WHAT YOU NEED TO KNOW:   What do I need to know about suprapubic cystostomy? Suprapubic cystostomy is surgery to create a stoma (opening) through your abdomen into your bladder  This opening is where a catheter is inserted to drain urine  You may need a cystostomy if your urine flow is blocked  How do I prepare for surgery? Your healthcare provider will talk to you about how to prepare for surgery  He may tell you not to eat or drink anything after midnight on the day of your surgery  He will tell you what medicines to take or not take on the day of your surgery  What will happen during surgery? Your surgeon will make a small incision in your abdomen below your belly button  Another small incision will be made in your bladder  The catheter will be inserted into your abdomen and bladder  Once the catheter is in place, the balloon on the end of the catheter will be filled with sterile water  This balloon keeps the catheter in place inside the bladder  The other end of the catheter will be connected to a clean drainage bag or closed with a valve  The catheter may be secured in the stoma with stitches or surgical tape  What are the risks of surgery? You may bleed more than expected or get an infection  Your organs or blood vessels may be damaged during surgery  Your bladder may become irritated  Long-term use of a catheter can lead to kidney stones, blood in your urine, or bladder inflammation  CARE AGREEMENT:   You have the right to help plan your care  Learn about your health condition and how it may be treated  Discuss treatment options with your caregivers to decide what care you want to receive  You always have the right to refuse treatment  The above information is an  only  It is not intended as medical advice for individual conditions or treatments   Talk to your doctor, nurse or pharmacist before following any medical regimen to see if it is safe and effective for you   © 2017 [address] [facility] Information is for End User's use only and may not be sold, redistributed or otherwise used for commercial purposes  All illustrations and images included in CareNotes® are the copyrighted property of A D A M , Inc  or Mika Mcclain

## 2020-07-12 NOTE — PROGRESS NOTES
Problem List Items Addressed This Visit        Genitourinary    Retention of urine - Primary (Chronic)    Nephrolithiasis    Bilateral nephrolithiasis    Bladder neck obstruction       Other    H/O urethral stricture    Neurogenic bladder    Suprapubic catheter (HCC)    Nephrostomy status (HCC)            Discussion:  Patient continues with his suprapubic catheter, did require chemical cauterization of granulation tissue at his suprapubic catheter site, nephrostomy tube irrigates well    Assessment and plan:       Please see problem oriented charting for the assessment plan of today's urological complaints      Vernon Goltz, MD      Chief Complaint     Neurogenic bladder, indwelling suprapubic catheter, nephrostomy tube      History of Present Illness     Rosalba Belcher is a 68 y o  gentleman with a history of aphasia, history of stroke, history of bladder neck obstruction with benign prostatic enlargement, history of urethral stricture, managed currently with a suprapubic catheter, can only be changed over a wire due to his anatomy, we changed this for him every 5 weeks  Also has a extraordinarily high stone burden, managed with a nephrostomy tube given his poor performance status, this was last changed in early June  Does continue to have some debris within his lines, this is his baseline  I irrigated both his fresh suprapubic catheter as well as his nephrostomy tube which both irrigate nicely  He did require a chemical cauterization of granulation tissue by his suprapubic catheter tract, yet again today  Otherwise currently at his baseline      The following portions of the patient's history were reviewed and updated as appropriate: allergies, current medications, past family history, past medical history, past social history, past surgical history and problem list     Detailed Urologic History     - please refer to HPI    Review of Systems     Review of Systems   Reason unable to perform ROS: Unable to perform due to patient's aphasia  Allergies     Allergies   Allergen Reactions    Gentamycin [Gentamicin] Anaphylaxis    Piperacillin Sod-Tazobactam So Anaphylaxis and Rash     However, has tolerated Ertapenem, Cefdinir, and Cefepime, which all have different side chains  Avoid Penicillins and the following Cephs with similar side chains (Cephalexin, Cefadroxil, Cefaclor, Cefprozil, or  Cefoxitin)  Other reaction(s): Hemoptysis    Vancomycin Anaphylaxis and Rash     Other reaction(s): Hemoptysis    Clindamycin Itching and Rash     Other reaction(s): Hemoptysis    Nsaids Other (See Comments)     Nephrotic Syndrome    Sulfa Antibiotics Rash    Piperacillin     Tazobactam     Other Rash     antipersperents  Stat lock from lucia catheter    Tigecycline Rash     Other reaction(s): Hemoptysis       Physical Exam     Physical Exam   Constitutional:   Andrew Hills appears to be more interactive than at previous visits, somewhat unkempt, his wife is cutting his hair when I enter the room today   HENT:   Head: Normocephalic and atraumatic  Flat facial expression, aphasia present   Eyes: Conjunctivae are normal    Neck: No tracheal deviation present  Cardiovascular: Intact distal pulses  Pulmonary/Chest: Effort normal    Some rhonchi, his baseline   Abdominal: Soft  He exhibits no distension  There is no tenderness  Genitourinary: Penis normal    Genitourinary Comments: Suprapubic catheter site with significant granulation tissue, this was cauterized with chemical cautery   Musculoskeletal: He exhibits deformity  Deformity due to contractures, muscle wasting is present   Neurological: He is alert  He displays abnormal reflex  He exhibits abnormal muscle tone  Coordination abnormal    Skin: Skin is dry  No rash noted  No erythema  Psychiatric:   Patient is at his baseline, less focal than at previous visits   Nursing note and vitals reviewed            Vital Signs  Vitals:    07/13/20 0259 BP: 136/80   Pulse: 75   Temp: (!) 97 °F (36 1 °C)   Height: 5' 11" (1 803 m)         Current Medications       Current Outpatient Medications:     acetaminophen (TYLENOL) 325 mg tablet, Take 650 mg by mouth every 4 (four) hours as needed for mild pain , Disp: , Rfl:     amLODIPine (NORVASC) 10 mg tablet, , Disp: , Rfl:     amLODIPine (NORVASC) 2 5 mg tablet, , Disp: , Rfl:     amLODIPine (NORVASC) 5 mg tablet, Take 5 mg by mouth 2 (two) times a day , Disp: , Rfl:     ammonium lactate (LAC-HYDRIN) 12 % cream, Apply topically daily, Disp: 385 g, Rfl: 0    apixaban (ELIQUIS) 2 5 mg, Take 2 5 mg by mouth 2 (two) times a day, Disp: , Rfl:     bisacodyl (DULCOLAX) 10 mg suppository, Insert 1 suppository (10 mg total) into the rectum daily as needed for constipation, Disp: 12 suppository, Rfl: 0    busPIRone (BUSPAR) 7 5 mg tablet, Take 7 5 mg by mouth 3 (three) times a day, Disp: , Rfl:     docusate sodium (COLACE) 100 mg capsule, Take 100 mg by mouth 2 (two) times a day, Disp: , Rfl:     famotidine (PEPCID) 20 mg tablet, Take 20 mg by mouth 2 (two) times a day, Disp: , Rfl:     glycerin-hypromellose- (ARTIFICIAL TEARS) 0 2-0 2-1 % SOLN, Administer 1 drop to both eyes 2 (two) times a day, Disp: , Rfl:     ipratropium (ATROVENT) 0 02 % nebulizer solution, Take 1 vial (0 5 mg total) by nebulization every 6 (six) hours as needed for wheezing or shortness of breath, Disp: , Rfl: 0    memantine (NAMENDA) 5 mg tablet, 2 (two) times a day , Disp: , Rfl:     metoprolol tartrate (LOPRESSOR) 25 mg tablet, Take 0 5 tablets (12 5 mg total) by mouth 2 (two) times a day, Disp: 60 tablet, Rfl: 0    mirtazapine (REMERON) 15 mg tablet, daily at bedtime , Disp: , Rfl:     oxyCODONE (ROXICODONE) 5 mg immediate release tablet, Take 1 tablet (5 mg total) by mouth 4 (four) times a day Hold if pt is drowsyMax Daily Amount: 20 mg, Disp: 240 tablet, Rfl: 0    polyethylene glycol (MIRALAX) 17 g packet, Take 17 g by mouth daily, Disp: 14 each, Rfl: 0    potassium chloride (K-DUR,KLOR-CON) 10 mEq tablet, Take 1 tablet (10 mEq total) by mouth daily with breakfast, Disp: , Rfl: 0    QUEtiapine (SEROquel) 25 mg tablet, Take 25 mg by mouth daily at bedtime  , Disp: , Rfl:     senna (SENOKOT) 8 6 mg, Take 2 tablets (17 2 mg total) by mouth daily at bedtime, Disp: , Rfl: 0      Active Problems     Patient Active Problem List   Diagnosis    Aphasia    COPD (chronic obstructive pulmonary disease) ([facility] )    History of DVT (deep vein thrombosis)    Aneurysm of thoracic aorta (HCC)    Retention of urine    Essential hypertension    Aphasia of unknown origin    CKD (chronic kidney disease)    Hypokalemia    GERD (gastroesophageal reflux disease)    Generalized anxiety disorder    H/O blood clots    Anemia    Constipation    Dementia ([facility]    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[facility]    Other ill-defined and unknown causes of morbidity and mortality    H/O urethral stricture    CKD (chronic kidney disease) stage 2, GFR 60-89 ml/min    Neurogenic bladder    Nephrolithiasis    Suprapubic catheter ([facility]    Bilateral nephrolithiasis    Atelectasis    Cellulitis, abdominal wall    Depression with anxiety    Constipation    Cerebral infarction ([facility])    Aphasia as late effect of cerebrovascular accident    Bladder neck obstruction    Nephrostomy status ([facility] )         Past Medical History     Past Medical History:   Diagnosis Date    Acute kidney injury ([facility] ) 10/21/2016    Anxiety     Aortic aneurysm ([facility])     Aphasia     Ataxia     Bladder adhesions     BPH (benign prostatic hypertrophy)     COPD (chronic obstructive pulmonary disease) ([facility])     wife denies - "never had"    Dementia [facility]     Difficulty walking     Essential (primary) hypertension     Generalized anxiety disorder     GERD (gastroesophageal reflux disease)     Global aphasia     H/O blood clots     High blood pressure     History of kidney stones     Kidney stones     Mental disorder     Muscle weakness     Neuromuscular dysfunction of bladder     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 ([facility] )     Retention of urine, unspecified     Stroke ([facility] )     Thrombosis     Urinary catheter dysfunction ([facility] ) 11/29/2018    Added automatically from request for surgery [id number]    Urinary retention     Urinary tract bacterial infections     Urinary tract infection          Surgical History     Past Surgical History:   Procedure Laterality Date    BOTOX INJECTION N/A 6/18/2019    Procedure: INJECTION BOTULINUM TOXIN (BOTOX), intra detrusor;  Surgeon: Vernon Goltz, MD;  [facility];  Service: Urology    BOTOX INJECTION N/A 10/7/2019    Procedure: INJECTION BOTULINUM TOXIN (BOTOX), intradetrusor;  Surgeon: Vernon Goltz, MD;  [facility];  Service: Urology    CYSTOSCOPY      CYSTOSCOPY N/A 6/18/2019    Procedure: cystoscopy and all indicated procedures;  Surgeon: Vernon Goltz, MD;  [facility];  Service: Urology    FL CYSTOGRAM  1/15/2019    IR SUPRAPUBIC TUBE  11/26/2019    IR TUBE PLACEMENT NEPHROSTOMY  3/11/2020    IVC FILTER INSERTION      X2    IVC FILTER INSERTION      x2    KIDNEY STONE SURGERY      KNEE SURGERY      Sepsis infection     NEPHROSTOMY      NH CYSTO/URETERO W/LITHOTRIPSY &INDWELL STENT INSRT Right 6/14/2017    Procedure: CYSTOSCOPY URETEROSCOPY WITH LITHOTRIPSY HOLMIUM LASER,,BASKET STONE EXTRACTION, RETROGRADE PYELOGRAM AND INSERTION STENT URETERAL;  Surgeon: Mary Schofield MD;  [facility];  Service: Urology    NH CYSTOURETHROSCOPY,BIOPSY N/A 1/15/2019    Procedure: CYSTOSCOPY, CYSTOGRAM, DILATION OF URETHRAL STRICTURE;  Surgeon: Vernon Goltz, MD;  [facility];  Service: Urology    URINARY SURGERY           Family History     Family History   Problem Relation Age of Onset    Diabetes Father     Hypertension Father     Coronary artery disease Father     Cancer Father     Hypertension Mother          Social History     Social History     Social History     Tobacco Use   Smoking Status Never Smoker   Smokeless Tobacco Never Used         Pertinent Lab Values     Lab Results   Component Value Date    CREATININE 1 43 03/23/2020       No results found for: PSA          Pertinent Imaging      No imaging for my review

## 2020-07-13 ENCOUNTER — PROCEDURE VISIT (OUTPATIENT)
Dept: UROLOGY | Facility: CLINIC | Age: 70
End: 2020-07-13
Payer: COMMERCIAL

## 2020-07-13 VITALS
TEMPERATURE: 97 F | HEIGHT: 71 IN | HEART RATE: 75 BPM | DIASTOLIC BLOOD PRESSURE: 80 MMHG | SYSTOLIC BLOOD PRESSURE: 136 MMHG | BODY MASS INDEX: 29.99 KG/M2

## 2020-07-13 DIAGNOSIS — Z87.448 H/O URETHRAL STRICTURE: ICD-10-CM

## 2020-07-13 DIAGNOSIS — Z93.6 NEPHROSTOMY STATUS (HCC): ICD-10-CM

## 2020-07-13 DIAGNOSIS — N32.0 BLADDER NECK OBSTRUCTION: ICD-10-CM

## 2020-07-13 DIAGNOSIS — R33.9 RETENTION OF URINE: Primary | Chronic | ICD-10-CM

## 2020-07-13 DIAGNOSIS — L92.9 GRANULATION TISSUE ABNORMALITY: ICD-10-CM

## 2020-07-13 DIAGNOSIS — N31.9 NEUROGENIC BLADDER: ICD-10-CM

## 2020-07-13 DIAGNOSIS — Z93.59 SUPRAPUBIC CATHETER (HCC): ICD-10-CM

## 2020-07-13 DIAGNOSIS — N20.0 BILATERAL NEPHROLITHIASIS: ICD-10-CM

## 2020-07-13 DIAGNOSIS — N20.0 NEPHROLITHIASIS: ICD-10-CM

## 2020-07-13 PROBLEM — N23 RENAL COLIC: Status: RESOLVED | Noted: 2020-07-10 | Resolved: 2020-07-13

## 2020-07-13 PROBLEM — N20.1 CALCULUS OF URETER: Status: RESOLVED | Noted: 2017-05-01 | Resolved: 2020-07-13

## 2020-07-13 PROBLEM — N39.0 URINARY TRACT INFECTION: Status: RESOLVED | Noted: 2019-11-27 | Resolved: 2020-07-13

## 2020-07-13 PROCEDURE — 99213 OFFICE O/P EST LOW 20 MIN: CPT | Performed by: UROLOGY

## 2020-07-13 PROCEDURE — 17250 CHEM CAUT OF GRANLTJ TISSUE: CPT | Performed by: UROLOGY

## 2020-07-13 PROCEDURE — 51710 CHANGE OF BLADDER TUBE: CPT | Performed by: UROLOGY

## 2020-07-13 RX ORDER — AMLODIPINE BESYLATE 10 MG/1
TABLET ORAL
COMMUNITY
Start: 2020-07-11 | End: 2020-09-09

## 2020-07-13 NOTE — PROGRESS NOTES
Irrigation of bladder  Date/Time: 7/13/2020 9:40 AM  Performed by: Linda Howard MD  Authorized by: Linda Howard MD     Patient location:  Bedside  Other Assisting Provider: No    Consent:     Consent obtained:  Verbal    Consent given by:  Spouse    Risks discussed:  False passage, incomplete procedure, infection, pain and urethral injury    Alternatives discussed:  No treatment  Universal protocol:     Procedure explained and questions answered to patient or proxy's satisfaction: yes      Relevant documents present and verified: yes      Test results available and properly labeled: yes      Radiology Images displayed and confirmed  If images not available, report reviewed: yes      Required blood products, implants, devices and special equipment available: yes      Site/side marked: no      Immediately prior to procedure a time out was called: yes      Patient identity confirmed:  Provided demographic data  Pre-procedure details:     Procedure purpose:  Therapeutic    Preparation: Patient was prepped and draped in usual sterile fashion    Anesthesia (see MAR for exact dosages): Anesthesia method:  None  Procedure details:     Bladder irrigation: yes      Urine characteristics:  Yellow and mildly cloudy    Altered anatomy:  Urethral stricture  Post-procedure details:     Patient tolerance of procedure: Tolerated well, no immediate complications  Comments:       The patient's indwelling suprapubic catheter was cannulated with a wire as per usual, a 18 [employer] tip catheter was then placed into the bladder, the bladder was then irrigated with sterile irrigant with return of some debris, then placed to gravity drainage

## 2020-07-13 NOTE — PROGRESS NOTES
Chemical cauterization granulation tissue     Date/Time 7/13/2020 9:39 AM     Performed by  Scarlett Simms MD     Authorized by Scarlett Simms MD      Universal Protocol Consent: Verbal consent obtained  Written consent obtained  Risks and benefits: risks, benefits and alternatives were discussed  Consent given by: spouse  Patient understanding: patient does not state understanding of the procedure being performed (Patient proxy does)  Patient consent: the patient's understanding of the procedure matches consent given  Procedure consent: procedure consent matches procedure scheduled  Relevant documents: relevant documents present and verified  Test results: test results available and properly labeled  Site marked: the operative site was not marked  Radiology Images displayed and confirmed  If images not available, report reviewed: imaging studies not available  Required items: required blood products, implants, devices, and special equipment available  Patient identity confirmed: anonymous protocol, patient vented/unresponsive        Preparation: Patient was prepped and draped in the usual sterile fashion  Site preparation: Betadine    Local anesthesia used: no     Anesthesia   Local anesthesia used: no     Sedation   Patient sedated: no        Specimen: no    Culture: no   Procedure Details   Procedure Notes: Silver nitrate sticks were used to perform chemical cautery of granulation tissue around his suprapubic catheter site, this will likely be required again as it has been required previously    Patient tolerated this well

## 2020-07-13 NOTE — LETTER
July 13, 2020     Barbara Mccoy MD  [address] [place] [address]  [address]  [address]    Patient: Unique ALVARENGA [facility]   YOB: 1950   Date of Visit: 7/13/2020       Dear Dr Angel Mackey: Thank you for referring Page Shaw to me for evaluation  Below are my notes for this consultation  If you have questions, please do not hesitate to call me  I look forward to following your patient along with you  Sincerely,        Lupe Guevara MD        CC: No Recipients  Lupe Guevara MD  7/13/2020  9:42 AM  Sign at close encounter  Irrigation of bladder  Date/Time: 7/13/2020 9:40 AM  Performed by: Lupe Guevara MD  Authorized by: Lupe Guevara MD     Patient location:  Bedside  Other Assisting Provider: No    Consent:     Consent obtained:  Verbal    Consent given by:  Spouse    Risks discussed:  False passage, incomplete procedure, infection, pain and urethral injury    Alternatives discussed:  No treatment  Universal protocol:     Procedure explained and questions answered to patient or proxy's satisfaction: yes      Relevant documents present and verified: yes      Test results available and properly labeled: yes      Radiology Images displayed and confirmed  If images not available, report reviewed: yes      Required blood products, implants, devices and special equipment available: yes      Site/side marked: no      Immediately prior to procedure a time out was called: yes      Patient identity confirmed:  Provided demographic data  Pre-procedure details:     Procedure purpose:  Therapeutic    Preparation: Patient was prepped and draped in usual sterile fashion    Anesthesia (see MAR for exact dosages): Anesthesia method:  None  Procedure details:     Bladder irrigation: yes      Urine characteristics:  Yellow and mildly cloudy    Altered anatomy:  Urethral stricture  Post-procedure details:     Patient tolerance of procedure:   Tolerated well, no immediate complications  Comments: The patient's indwelling suprapubic catheter was cannulated with a wire as per usual, a 18 [facility] tip catheter was then placed into the bladder, the bladder was then irrigated with sterile irrigant with return of some debris, then placed to gravity drainage          Bridget Tang MD  7/13/2020  9:40 AM  Sign at close encounter        Chemical cauterization granulation tissue     Date/Time 7/13/2020 9:39 AM     Performed by  Bridget Tang MD     Authorized by Bridget Tang MD      Universal Protocol Consent: Verbal consent obtained  Written consent obtained  Risks and benefits: risks, benefits and alternatives were discussed  Consent given by: spouse  Patient understanding: patient does not state understanding of the procedure being performed (Patient proxy does)  Patient consent: the patient's understanding of the procedure matches consent given  Procedure consent: procedure consent matches procedure scheduled  Relevant documents: relevant documents present and verified  Test results: test results available and properly labeled  Site marked: the operative site was not marked  Radiology Images displayed and confirmed  If images not available, report reviewed: imaging studies not available  Required items: required blood products, implants, devices, and special equipment available  Patient identity confirmed: anonymous protocol, patient vented/unresponsive        Preparation: Patient was prepped and draped in the usual sterile fashion  Site preparation: Betadine    Local anesthesia used: no     Anesthesia   Local anesthesia used: no     Sedation   Patient sedated: no        Specimen: no    Culture: no   Procedure Details   Procedure Notes: Silver nitrate sticks were used to perform chemical cautery of granulation tissue around his suprapubic catheter site, this will likely be required again as it has been required previously    Patient tolerated this well               Bridget Tang MD 7/13/2020  9:39 AM  Sign at close encounter       Problem List Items Addressed This Visit        Genitourinary    Retention of urine - Primary (Chronic)    Nephrolithiasis    Bilateral nephrolithiasis    Bladder neck obstruction       Other    H/O urethral stricture    Neurogenic bladder    Suprapubic catheter ([facility])    Nephrostomy status ([facility] )            Discussion:  Patient continues with his suprapubic catheter, did require chemical cauterization of granulation tissue at his suprapubic catheter site, nephrostomy tube irrigates well    Assessment and plan:       Please see problem oriented charting for the assessment plan of today's urological complaints      Mallika Valerio MD      Chief Complaint     Neurogenic bladder, indwelling suprapubic catheter, nephrostomy tube      History of Present Illness     Christopher Tinajero is a 68 y o  gentleman with a history of aphasia, history of stroke, history of bladder neck obstruction with benign prostatic enlargement, history of urethral stricture, managed currently with a suprapubic catheter, can only be changed over a wire due to his anatomy, we changed this for him every 5 weeks  Also has a extraordinarily high stone burden, managed with a nephrostomy tube given his poor performance status, this was last changed in early June  Does continue to have some debris within his lines, this is his baseline  I irrigated both his fresh suprapubic catheter as well as his nephrostomy tube which both irrigate nicely  He did require a chemical cauterization of granulation tissue by his suprapubic catheter tract, yet again today  Otherwise currently at his baseline      The following portions of the patient's history were reviewed and updated as appropriate: allergies, current medications, past family history, past medical history, past social history, past surgical history and problem list     Detailed Urologic History     - please refer to HPI    Review of Systems Review of Systems   Reason unable to perform ROS: Unable to perform due to patient's aphasia  Allergies     Allergies   Allergen Reactions    Gentamycin [Gentamicin] Anaphylaxis    Piperacillin Sod-Tazobactam So Anaphylaxis and Rash     However, has tolerated Ertapenem, Cefdinir, and Cefepime, which all have different side chains  Avoid Penicillins and the following Cephs with similar side chains (Cephalexin, Cefadroxil, Cefaclor, Cefprozil, or  Cefoxitin)  Other reaction(s): Hemoptysis    Vancomycin Anaphylaxis and Rash     Other reaction(s): Hemoptysis    Clindamycin Itching and Rash     Other reaction(s): Hemoptysis    Nsaids Other (See Comments)     Nephrotic Syndrome    Sulfa Antibiotics Rash    Piperacillin     Tazobactam     Other Rash     antipersperents  Stat lock from lucia catheter    Tigecycline Rash     Other reaction(s): Hemoptysis       Physical Exam     Physical Exam   Constitutional:   Guerrero Nice appears to be more interactive than at previous visits, somewhat unkempt, his wife is cutting his hair when I enter the room today   HENT:   Head: Normocephalic and atraumatic  Flat facial expression, aphasia present   Eyes: Conjunctivae are normal    Neck: No tracheal deviation present  Cardiovascular: Intact distal pulses  Pulmonary/Chest: Effort normal    Some rhonchi, his baseline   Abdominal: Soft  He exhibits no distension  There is no tenderness  Genitourinary: Penis normal    Genitourinary Comments: Suprapubic catheter site with significant granulation tissue, this was cauterized with chemical cautery   Musculoskeletal: He exhibits deformity  Deformity due to contractures, muscle wasting is present   Neurological: He is alert  He displays abnormal reflex  He exhibits abnormal muscle tone  Coordination abnormal    Skin: Skin is dry  No rash noted  No erythema     Psychiatric:   Patient is at his baseline, less focal than at previous visits   Nursing note and vitals reviewed            Vital Signs  Vitals:    07/13/20 0909   BP: 136/80   Pulse: 75   Temp: (!) 97 °F (36 1 °C)   Height: 5' 11" (1 803 m)         Current Medications       Current Outpatient Medications:     acetaminophen (TYLENOL) 325 mg tablet, Take 650 mg by mouth every 4 (four) hours as needed for mild pain , Disp: , Rfl:     amLODIPine (NORVASC) 10 mg tablet, , Disp: , Rfl:     amLODIPine (NORVASC) 2 5 mg tablet, , Disp: , Rfl:     amLODIPine (NORVASC) 5 mg tablet, Take 5 mg by mouth 2 (two) times a day , Disp: , Rfl:     ammonium lactate (LAC-HYDRIN) 12 % cream, Apply topically daily, Disp: 385 g, Rfl: 0    apixaban (ELIQUIS) 2 5 mg, Take 2 5 mg by mouth 2 (two) times a day, Disp: , Rfl:     bisacodyl (DULCOLAX) 10 mg suppository, Insert 1 suppository (10 mg total) into the rectum daily as needed for constipation, Disp: 12 suppository, Rfl: 0    busPIRone (BUSPAR) 7 5 mg tablet, Take 7 5 mg by mouth 3 (three) times a day, Disp: , Rfl:     docusate sodium (COLACE) 100 mg capsule, Take 100 mg by mouth 2 (two) times a day, Disp: , Rfl:     famotidine (PEPCID) 20 mg tablet, Take 20 mg by mouth 2 (two) times a day, Disp: , Rfl:     glycerin-hypromellose- (ARTIFICIAL TEARS) 0 2-0 2-1 % SOLN, Administer 1 drop to both eyes 2 (two) times a day, Disp: , Rfl:     ipratropium (ATROVENT) 0 02 % nebulizer solution, Take 1 vial (0 5 mg total) by nebulization every 6 (six) hours as needed for wheezing or shortness of breath, Disp: , Rfl: 0    memantine (NAMENDA) 5 mg tablet, 2 (two) times a day , Disp: , Rfl:     metoprolol tartrate (LOPRESSOR) 25 mg tablet, Take 0 5 tablets (12 5 mg total) by mouth 2 (two) times a day, Disp: 60 tablet, Rfl: 0    mirtazapine (REMERON) 15 mg tablet, daily at bedtime , Disp: , Rfl:     oxyCODONE (ROXICODONE) 5 mg immediate release tablet, Take 1 tablet (5 mg total) by mouth 4 (four) times a day Hold if pt is drowsyMax Daily Amount: 20 mg, Disp: 240 tablet, Rfl: 0   polyethylene glycol (MIRALAX) 17 g packet, Take 17 g by mouth daily, Disp: 14 each, Rfl: 0    potassium chloride (K-DUR,KLOR-CON) 10 mEq tablet, Take 1 tablet (10 mEq total) by mouth daily with breakfast, Disp: , Rfl: 0    QUEtiapine (SEROquel) 25 mg tablet, Take 25 mg by mouth daily at bedtime  , Disp: , Rfl:     senna (SENOKOT) 8 6 mg, Take 2 tablets (17 2 mg total) by mouth daily at bedtime, Disp: , Rfl: 0      Active Problems     Patient Active Problem List   Diagnosis    Aphasia    COPD (chronic obstructive pulmonary disease) ([facility] )    History of DVT (deep vein thrombosis)    Aneurysm of thoracic aorta (HCC)    Retention of urine    Essential hypertension    Aphasia of unknown origin    CKD (chronic kidney disease)    Hypokalemia    GERD (gastroesophageal reflux disease)    Generalized anxiety disorder    H/O blood clots    Anemia    Constipation    [facility]    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HCC)    Other ill-defined and unknown causes of morbidity and mortality    H/O urethral stricture    CKD (chronic kidney disease) stage 2, GFR 60-89 ml/min    Neurogenic bladder    Nephrolithiasis    Suprapubic catheter (HCC)    Bilateral nephrolithiasis    Atelectasis    Cellulitis, abdominal wall    Depression with anxiety    Constipation    Cerebral infarction (HCC)    Aphasia as late effect of cerebrovascular accident    Bladder neck obstruction    Nephrostomy status ([facility] )         Past Medical History     Past Medical History:   Diagnosis Date    Acute kidney injury ([facility] ) 10/21/2016    Anxiety     Aortic aneurysm (HCC)     Aphasia     Ataxia     Bladder adhesions     BPH (benign prostatic hypertrophy)     [facility]) [facility]     wife denies - "never had"    D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 )     Retention of urine, unspecified     Stroke ([facility] )     Thrombosis     [facility] 11/[facility]/2018    Added automatically from request for surgery [id number]    Urinary retention     Urinary tract bacterial infections     Urinary tract infection          Surgical History     Past Surgical History:   Procedure Laterality Date    BOTOX INJECTION N/A 6/18/2019    Procedure: INJECTION BOTULINUM TOXIN (BOTOX), intra detrusor;  Surgeon: William Law MD;  [facility];  Service: Urology    BOTOX INJECTION N/A 10/7/2019    Procedure: INJECTION BOTULINUM TOXIN (BOTOX), intradetrusor;  Surgeon: William Law MD;  [facility];  Service: Urology    CYSTOSCOPY      CYSTOSCOPY N/A 6/18/2019    Procedure: cystoscopy and all indicated procedures;  Surgeon: William Law MD;  [facility];  Service: Urology    FL CYSTOGRAM  1/15/2019    IR SUPRAPUBIC TUBE  11/26/2019    IR TUBE PLACEMENT NEPHROSTOMY  3/11/2020    IVC FILTER INSERTION      X2    IVC FILTER INSERTION      x2    KIDNEY STONE SURGERY      KNEE SURGERY      Sepsis infection     NEPHROSTOMY      MO CYSTO/URETERO W/LITHOTRIPSY &INDWELL STENT INSRT Right 6/14/2017    Procedure: CYSTOSCOPY URETEROSCOPY WITH LITHOTRIPSY HOLMIUM LASER,,BASKET STONE EXTRACTION, RETROGRADE PYELOGRAM AND INSERTION STENT URETERAL;  Surgeon: Leanne Rapp MD;  [facility];  Service: Urology    MO CYSTOURETHROSCOPY,BIOPSY N/A 1/15/2019    Procedure: CYSTOSCOPY, CYSTOGRAM, DILATION OF URETHRAL STRICTURE;  Surgeon: William Law MD;  [facility];  Service: Urology    URINARY SURGERY           Family History     Family History   Problem Relation Age of Onset    Diabetes Father     Hypertension Father     Coronary artery disease Father     Cancer Father     Hypertension Mother          Social History     Social History     Social History     Tobacco Use   Smoking Status Never Smoker   Smokeless Tobacco Never Used         Pertinent Lab Values     Lab Results   Component Value Date    CREATININE 1 43 03/23/2020       No results found for: PSA          Pertinent Imaging      No imaging for my review

## 2020-08-03 DIAGNOSIS — R52 SEVERE PAIN: ICD-10-CM

## 2020-08-03 RX ORDER — OXYCODONE HYDROCHLORIDE 5 MG/1
5 TABLET ORAL 4 TIMES DAILY
Qty: 240 TABLET | Refills: 0 | Status: SHIPPED | OUTPATIENT
Start: 2020-08-03 | End: 2020-10-01 | Stop reason: SDUPTHER

## 2020-08-20 NOTE — H&P (VIEW-ONLY)
Problem List Items Addressed This Visit        Genitourinary    Retention of urine (Chronic)    Bilateral nephrolithiasis - Primary       Other    Neurogenic bladder    Suprapubic catheter ([address] )            Discussion:    I had a nice visit with Rajwinder Couch in with his wife today  His Thorne catheter is in good position through his suprapubic tract, we change this for a fresh 20 [place] catheter over a wire, he has previously had much difficulties with suprapubic catheter changes hence the need for a urologist to change this tube  I also performed chemical cautery of his granulation tissue around his suprapubic tract  His pubic hair was also trimmed today given that it does tend to adhere to his suprapubic catheter  He is due for a nephrostomy tube upcoming, this is necessary due to large stone burden bilaterally, he is not healthy enough for surgical intervention  His nephrostomy tube will be a long-term tube for him, as is his suprapubic catheter  He will return in 5 weeks for catheter change    Assessment and plan:       Please see problem oriented charting for the assessment plan of today's urological complaints    Daniele Mayorga MD      Chief Complaint     No chief complaint on file  History of Present Illness     Edith Mendez is a 68 y o  gentleman with multiple medical issues including a high bladder neck, bladder outlet obstruction, neurogenic bladder, and suprapubic catheter  He also has a history of severe urethral stricture and bulky bilateral nephrolithiasis, he is managed with a chronic suprapubic catheter and a chronic nephrostomy tube  Nephrostomy tube and suprapubic catheter have been draining well, uneventful change and chemical cautery of granulation tissue today      The following portions of the patient's history were reviewed and updated as appropriate: allergies, current medications, past family history, past medical history, past social history, past surgical history and problem list     Detailed Urologic History     - please refer to HPI    Review of Systems     Patient is unable to participate in the review of systems          Allergies     Allergies   Allergen Reactions    Gentamycin [Gentamicin] Anaphylaxis    Piperacillin Sod-Tazobactam So Anaphylaxis and Rash     However, has tolerated Ertapenem, Cefdinir, and Cefepime, which all have different side chains  Avoid Penicillins and the following Cephs with similar side chains (Cephalexin, Cefadroxil, Cefaclor, Cefprozil, or  Cefoxitin)  Other reaction(s): Hemoptysis    Vancomycin Anaphylaxis and Rash     Other reaction(s): Hemoptysis    Clindamycin Itching and Rash     Other reaction(s): Hemoptysis    Nsaids Other (See Comments)     Nephrotic Syndrome    Sulfa Antibiotics Rash    Piperacillin     Tazobactam     Other Rash     antipersperents  Stat lock from lucia catheter    Tigecycline Rash     Other reaction(s): Hemoptysis       Physical Exam     Physical Exam  Vitals signs and nursing note reviewed  Constitutional:       Comments: Milan Wills is in his normal state, seen in a stretcher, he does appear to be more alert than at previous visits, does appear to have a unkempt beard and hair   Cardiovascular:      Pulses: Normal pulses  Pulmonary:      Effort: Pulmonary effort is normal    Abdominal:      Palpations: Abdomen is soft  Genitourinary:     Comments: Suprapubic catheter in place, draining cloudy urine, uneventful change today, granulation tissue present, this was cauterized with silver nitrate sticks  Musculoskeletal:      Comments: Decreased range of motion, contractures present   Skin:     General: Skin is warm  Neurological:      Mental Status: Mental status is at baseline  Sensory: Sensory deficit present  Motor: Weakness present  Coordination: Coordination abnormal       Gait: Gait abnormal       Deep Tendon Reflexes: Reflexes abnormal    Psychiatric:      Comments:  At his baseline, aphasia present             Vital Signs  There were no vitals filed for this visit        Current Medications       Current Outpatient Medications:     acetaminophen (TYLENOL) 325 mg tablet, Take 650 mg by mouth every 4 (four) hours as needed for mild pain , Disp: , Rfl:     amLODIPine (NORVASC) 10 mg tablet, , Disp: , Rfl:     amLODIPine (NORVASC) 2 5 mg tablet, , Disp: , Rfl:     amLODIPine (NORVASC) 5 mg tablet, Take 5 mg by mouth 2 (two) times a day , Disp: , Rfl:     ammonium lactate (LAC-HYDRIN) 12 % cream, Apply topically daily, Disp: 385 g, Rfl: 0    apixaban (ELIQUIS) 2 5 mg, Take 2 5 mg by mouth 2 (two) times a day, Disp: , Rfl:     bisacodyl (DULCOLAX) 10 mg suppository, Insert 1 suppository (10 mg total) into the rectum daily as needed for constipation, Disp: 12 suppository, Rfl: 0    busPIRone (BUSPAR) 7 5 mg tablet, Take 7 5 mg by mouth 3 (three) times a day, Disp: , Rfl:     docusate sodium (COLACE) 100 mg capsule, Take 100 mg by mouth 2 (two) times a day, Disp: , Rfl:     famotidine (PEPCID) 20 mg tablet, Take 20 mg by mouth 2 (two) times a day, Disp: , Rfl:     glycerin-hypromellose- (ARTIFICIAL TEARS) 0 2-0 2-1 % SOLN, Administer 1 drop to both eyes 2 (two) times a day, Disp: , Rfl:     ipratropium (ATROVENT) 0 02 % nebulizer solution, Take 1 vial (0 5 mg total) by nebulization every 6 (six) hours as needed for wheezing or shortness of breath, Disp: , Rfl: 0    memantine (NAMENDA) 5 mg tablet, 2 (two) times a day , Disp: , Rfl:     metoprolol tartrate (LOPRESSOR) 25 mg tablet, Take 0 5 tablets (12 5 mg total) by mouth 2 (two) times a day, Disp: 60 tablet, Rfl: 0    mirtazapine (REMERON) 15 mg tablet, daily at bedtime , Disp: , Rfl:     oxyCODONE (ROXICODONE) 5 mg immediate release tablet, Take 1 tablet (5 mg total) by mouth 4 (four) times a day Hold if pt is drowsyMax Daily Amount: 20 mg, Disp: 240 tablet, Rfl: 0    polyethylene glycol (MIRALAX) 17 g packet, Take 17 g by mouth daily, Disp: 14 each, Rfl: 0    potassium chloride (K-DUR,KLOR-CON) 10 mEq tablet, Take 1 tablet (10 mEq total) by mouth daily with breakfast, Disp: , Rfl: 0    QUEtiapine (SEROquel) 25 mg tablet, Take 25 mg by mouth daily at bedtime  , Disp: , Rfl:     senna (SENOKOT) 8 6 mg, Take 2 tablets (17 2 mg total) by mouth daily at bedtime, Disp: , Rfl: 0      Active Problems     Patient Active Problem List   Diagnosis    Aphasia    [facility]    History of DVT (deep vein thrombosis)    Aneurysm of thoracic aorta (HCC)    Retention of urine    Essential hypertension    CKD (chronic kidney disease)    Hypokalemia    GERD (gastroesophageal reflux disease)    Generalized anxiety disorder    H/O blood clots    Anemia    Constipation    Dementia ([facility]    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HCC)    Other ill-defined and unknown causes of morbidity and mortality    H/O urethral stricture    CKD (chronic kidney disease) stage 2, GFR 60-89 ml/min    Neurogenic bladder    Nephrolithiasis    Suprapubic catheter (HCC)    Bilateral nephrolithiasis    Atelectasis    Cellulitis, abdominal wall    Depression with anxiety    Constipation    Cerebral infarction (HCC)    Aphasia as late effect of cerebrovascular accident    Bladder neck obstruction    [facility]         Past Medical History     Past Medical History:   Diagnosis Date    [facility] 10[facility]2016    Anxiety     Aortic aneurysm (HCC)     Aphasia     Ataxia     Bladder adhesions     BPH (benign prostatic hypertrophy)     COPD (chronic obstructive pulmonary disease) ([facility])     wife denies - "never had"    CVA [facility]cerebral vascular accident) [facility]     Dementia ([facility] )     Depression     Difficulty swallowing     Difficulty walking     Essential (primary) hypertension     Generalized anxiety disorder     GERD (gastroesophageal reflux disease)     Global aphasia     H/O blood clots     Heartburn     High blood pressure     High blood pressure     History of kidney stones     Hypertension     Kidney stones     Mental disorder     Muscle weakness     Neuromuscular dysfunction of bladder     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 ([facility] )     Retention of urine, unspecified     Stroke [facility]     Thrombosis     Urinary catheter dysfunction ([facility] ) 11/29/2018    Added automatically from request for surgery [id number]    Urinary retention     Urinary tract bacterial infections     Urinary tract infection          Surgical History     Past Surgical History:   Procedure Laterality Date    BOTOX INJECTION N/A 6/18/2019    Procedure: INJECTION BOTULINUM TOXIN (BOTOX), intra detrusor;  Surgeon: Roro Landis MD;  [facility];  Service: Urology    BOTOX INJECTION N/A 10/7/2019    Procedure: INJECTION BOTULINUM TOXIN (BOTOX), intradetrusor;  Surgeon: Roro Landis MD;  [facility];  Service: Urology    CYSTOSCOPY      CYSTOSCOPY N/A 6/18/2019    Procedure: cystoscopy and all indicated procedures;  Surgeon: Roro Landis MD;  [facility];  Service: Urology    FL CYSTOGRAM  1/15/2019    IR SUPRAPUBIC TUBE  11/26/2019    IR TUBE PLACEMENT NEPHROSTOMY  3/11/2020    IVC FILTER INSERTION      X2    IVC FILTER INSERTION      x2    KIDNEY STONE SURGERY      KNEE SURGERY      Sepsis infection     NEPHROSTOMY      CT CYSTO/URETERO W/LITHOTRIPSY &INDWELL STENT INSRT Right 6/14/2017    Procedure: CYSTOSCOPY URETEROSCOPY WITH LITHOTRIPSY HOLMIUM LASER,,BASKET STONE EXTRACTION, RETROGRADE PYELOGRAM AND INSERTION STENT URETERAL;  Surgeon: Laura Root MD;  [facility];  Service: Urology    CT CYSTOURETHROSCOPY,BIOPSY N/A 1/15/2019    Procedure: CYSTOSCOPY, CYSTOGRAM, DILATION OF URETHRAL STRICTURE;  Surgeon: Roro Landis MD;  [facility];  Service: Urology    URINARY SURGERY           [facility] History     Family History   Problem Relation Age of Onset    Diabetes Father     Hypertension Father     Coronary artery disease Father     Cancer Father     Hypertension Mother          Social History     Social History     Social History     Tobacco Use   Smoking Status Never Smoker   Smokeless Tobacco Never Used         Pertinent Lab Values     Lab Results   Component Value Date    CREATININE 1 43 03/23/2020       No results found for: PSA          Pertinent Imaging      No new imaging for my review

## 2020-08-20 NOTE — PROGRESS NOTES
Problem List Items Addressed This Visit        Genitourinary    Retention of urine (Chronic)    Bilateral nephrolithiasis - Primary       Other    Neurogenic bladder    Suprapubic catheter ([address] )            Discussion:    I had a nice visit with Jalyn Johnson in with his wife today  His Thorne catheter is in good position through his suprapubic tract, we change this for a fresh 20 [place] catheter over a wire, he has previously had much difficulties with suprapubic catheter changes hence the need for a urologist to change this tube  I also performed chemical cautery of his granulation tissue around his suprapubic tract  His pubic hair was also trimmed today given that it does tend to adhere to his suprapubic catheter  He is due for a nephrostomy tube upcoming, this is necessary due to large stone burden bilaterally, he is not healthy enough for surgical intervention  His nephrostomy tube will be a long-term tube for him, as is his suprapubic catheter  He will return in 5 weeks for catheter change    Assessment and plan:       Please see problem oriented charting for the assessment plan of today's urological complaints    Nereyda Otero MD      Chief Complaint     No chief complaint on file  History of Present Illness     Trevor Ramos is a 68 y o  gentleman with multiple medical issues including a high bladder neck, bladder outlet obstruction, neurogenic bladder, and suprapubic catheter  He also has a history of severe urethral stricture and bulky bilateral nephrolithiasis, he is managed with a chronic suprapubic catheter and a chronic nephrostomy tube  Nephrostomy tube and suprapubic catheter have been draining well, uneventful change and chemical cautery of granulation tissue today      The following portions of the patient's history were reviewed and updated as appropriate: allergies, current medications, past family history, past medical history, past social history, past surgical history and problem list     Detailed Urologic History     - please refer to HPI    Review of Systems     Patient is unable to participate in the review of systems          Allergies     Allergies   Allergen Reactions    Gentamycin [Gentamicin] Anaphylaxis    Piperacillin Sod-Tazobactam So Anaphylaxis and Rash     However, has tolerated Ertapenem, Cefdinir, and Cefepime, which all have different side chains  Avoid Penicillins and the following Cephs with similar side chains (Cephalexin, Cefadroxil, Cefaclor, Cefprozil, or  Cefoxitin)  Other reaction(s): Hemoptysis    Vancomycin Anaphylaxis and Rash     Other reaction(s): Hemoptysis    Clindamycin Itching and Rash     Other reaction(s): Hemoptysis    Nsaids Other (See Comments)     Nephrotic Syndrome    Sulfa Antibiotics Rash    Piperacillin     Tazobactam     Other Rash     antipersperents  Stat lock from lucia catheter    Tigecycline Rash     Other reaction(s): Hemoptysis       Physical Exam     Physical Exam  Vitals signs and nursing note reviewed  Constitutional:       Comments: Andrew Hills is in his normal state, seen in a stretcher, he does appear to be more alert than at previous visits, does appear to have a unkempt beard and hair   Cardiovascular:      Pulses: Normal pulses  Pulmonary:      Effort: Pulmonary effort is normal    Abdominal:      Palpations: Abdomen is soft  Genitourinary:     Comments: Suprapubic catheter in place, draining cloudy urine, uneventful change today, granulation tissue present, this was cauterized with silver nitrate sticks  Musculoskeletal:      Comments: Decreased range of motion, contractures present   Skin:     General: Skin is warm  Neurological:      Mental Status: Mental status is at baseline  Sensory: Sensory deficit present  Motor: Weakness present  Coordination: Coordination abnormal       Gait: Gait abnormal       Deep Tendon Reflexes: Reflexes abnormal    Psychiatric:      Comments:  At his baseline, aphasia present             Vital Signs  There were no vitals filed for this visit        Current Medications       Current Outpatient Medications:     acetaminophen (TYLENOL) 325 mg tablet, Take 650 mg by mouth every 4 (four) hours as needed for mild pain , Disp: , Rfl:     amLODIPine (NORVASC) 10 mg tablet, , Disp: , Rfl:     amLODIPine (NORVASC) 2 5 mg tablet, , Disp: , Rfl:     amLODIPine (NORVASC) 5 mg tablet, Take 5 mg by mouth 2 (two) times a day , Disp: , Rfl:     ammonium lactate (LAC-HYDRIN) 12 % cream, Apply topically daily, Disp: 385 g, Rfl: 0    apixaban (ELIQUIS) 2 5 mg, Take 2 5 mg by mouth 2 (two) times a day, Disp: , Rfl:     bisacodyl (DULCOLAX) 10 mg suppository, Insert 1 suppository (10 mg total) into the rectum daily as needed for constipation, Disp: 12 suppository, Rfl: 0    busPIRone (BUSPAR) 7 5 mg tablet, Take 7 5 mg by mouth 3 (three) times a day, Disp: , Rfl:     docusate sodium (COLACE) 100 mg capsule, Take 100 mg by mouth 2 (two) times a day, Disp: , Rfl:     famotidine (PEPCID) 20 mg tablet, Take 20 mg by mouth 2 (two) times a day, Disp: , Rfl:     glycerin-hypromellose- (ARTIFICIAL TEARS) 0 2-0 2-1 % SOLN, Administer 1 drop to both eyes 2 (two) times a day, Disp: , Rfl:     ipratropium (ATROVENT) 0 02 % nebulizer solution, Take 1 vial (0 5 mg total) by nebulization every 6 (six) hours as needed for wheezing or shortness of breath, Disp: , Rfl: 0    memantine (NAMENDA) 5 mg tablet, 2 (two) times a day , Disp: , Rfl:     metoprolol tartrate (LOPRESSOR) 25 mg tablet, Take 0 5 tablets (12 5 mg total) by mouth 2 (two) times a day, Disp: 60 tablet, Rfl: 0    mirtazapine (REMERON) 15 mg tablet, daily at bedtime , Disp: , Rfl:     oxyCODONE (ROXICODONE) 5 mg immediate release tablet, Take 1 tablet (5 mg total) by mouth 4 (four) times a day Hold if pt is drowsyMax Daily Amount: 20 mg, Disp: 240 tablet, Rfl: 0    polyethylene glycol (MIRALAX) 17 g packet, Take 17 g by mouth daily, Disp: 14 each, Rfl: 0    potassium chloride (K-DUR,KLOR-CON) 10 mEq tablet, Take 1 tablet (10 mEq total) by mouth daily with breakfast, Disp: , Rfl: 0    QUEtiapine (SEROquel) 25 mg tablet, Take 25 mg by mouth daily at bedtime  , Disp: , Rfl:     senna (SENOKOT) 8 6 mg, Take 2 tablets (17 2 mg total) by mouth daily at bedtime, Disp: , Rfl: 0      Active Problems     Patient Active Problem List   Diagnosis    Aphasia    [facility]) [facility] [facility]    History of DVT (deep vein thrombosis)    Aneurysm of thoracic aorta (HCC)    Retention of urine    Essential hypertension    CKD (chronic kidney disease)    Hypokalemia    GERD (gastroesophageal reflux disease)    Generalized anxiety disorder    H/O blood clots    Anemia    Constipation    Dementia ([facility]    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HCC)    Other ill-defined and unknown causes of morbidity and mortality    H/O urethral stricture    CKD (chronic kidney disease) stage 2, GFR 60-89 ml/min    Neurogenic bladder    Nephrolithiasis    Suprapubic catheter (HCC)    Bilateral nephrolithiasis    Atelectasis    Cellulitis, abdominal wall    Depression with anxiety    Constipation    Cerebral infarction (HCC)    Aphasia as late effect of cerebrovascular accident    Bladder neck obstruction    [facility]         Past Medical History     Past Medical History:   Diagnosis Date    [facility]    Anxiety     Aortic aneurysm (HCC)     Aphasia     Ataxia     Bladder adhesions     BPH (benign prostatic hypertrophy)     [facility])     wife denies - "never had"    CVA (cerebral vascular accident) ([facility] [facility] )     Dementia ([facility] )     Depression     Difficulty swallowing     Difficulty walking     Essential (primary) hypertension     Generalized anxiety disorder     GERD (gastroesophageal reflux disease)     Global aphasia     H/O blood clots     Heartburn     High blood pressure     High blood pressure     History of kidney stones     Hypertension     Kidney stones     Mental disorder     Muscle weakness     Neuromuscular dysfunction of bladder     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 ([facility] [address] [facility]     Retention of urine, unspecified     Stroke ([facility] )     Thrombosis     Urinary catheter dysfunction ([facility] [facility] ) 11/29/2018    Added automatically from request for surgery [id number]    Urinary retention     Urinary tract bacterial infections     Urinary tract infection          Surgical History     Past Surgical History:   Procedure Laterality Date    BOTOX INJECTION N/A 6/18/2019    Procedure: INJECTION BOTULINUM TOXIN (BOTOX), intra detrusor;  Surgeon: Cam Jones MD;  [facility];  Service: Urology    BOTOX INJECTION N/A 10/7/2019    Procedure: INJECTION BOTULINUM TOXIN (BOTOX), intradetrusor;  Surgeon: Cam Jones MD;  [facility];  Service: Urology    CYSTOSCOPY      CYSTOSCOPY N/A 6/18/2019    Procedure: cystoscopy and all indicated procedures;  Surgeon: Cam Jones MD;  [facility];  Service: Urology    FL CYSTOGRAM  1/15/2019    IR SUPRAPUBIC TUBE  11/26/2019    IR TUBE PLACEMENT NEPHROSTOMY  3/11/2020    IVC FILTER INSERTION      X2    IVC FILTER INSERTION      x2    KIDNEY STONE SURGERY      KNEE SURGERY      Sepsis infection     NEPHROSTOMY      PA CYSTO/URETERO W/LITHOTRIPSY &INDWELL STENT INSRT Right 6/14/2017    Procedure: CYSTOSCOPY URETEROSCOPY WITH LITHOTRIPSY HOLMIUM LASER,,BASKET STONE EXTRACTION, RETROGRADE PYELOGRAM AND INSERTION STENT URETERAL;  Surgeon: Ursula Phelps MD;  [facility];  Service: Urology    PA CYSTOURETHROSCOPY,BIOPSY N/A 1/15/2019    Procedure: CYSTOSCOPY, CYSTOGRAM, DILATION OF URETHRAL STRICTURE;  Surgeon: Cam Jones MD;  [facility];  Service: Urology    URINARY SURGERY           Family History     Family History   Problem Relation Age of Onset    Diabetes Father     Hypertension Father     Coronary artery disease Father     Cancer Father     Hypertension Mother          Social History     Social History     Social History     Tobacco Use   Smoking Status Never Smoker   Smokeless Tobacco Never Used         Pertinent Lab Values     Lab Results   Component Value Date    CREATININE 1 43 03/23/2020       No results found for: PSA          Pertinent Imaging      No new imaging for my review

## 2020-08-20 NOTE — PATIENT INSTRUCTIONS
Suprapubic Cystostomy   WHAT YOU NEED TO KNOW:   Suprapubic cystostomy is surgery to create a stoma (opening) through your abdomen into your bladder  This opening is where a catheter is inserted to drain urine  You may need a cystostomy if your urine flow is blocked  DISCHARGE INSTRUCTIONS:   Medicines:   · Medicines  can help decrease pain or prevent an infection  · Take your medicine as directed  Contact your healthcare provider if you think your medicine is not helping or if you have side effects  Tell him or her if you are allergic to any medicine  Keep a list of the medicines, vitamins, and herbs you take  Include the amounts, and when and why you take them  Bring the list or the pill bottles to follow-up visits  Carry your medicine list with you in case of an emergency  Follow up with your healthcare provider as directed: You may need to return to have your suprapubic catheter changed or removed  Write down your questions so you remember to ask them during your visits  Empty your urine drainage bag:  Empty your urine drainage bag when it is ½ to ? full, or every 8 hours  If you have a smaller leg bag, empty it every 3 to 4 hours  Do the following when you empty your urine drainage bag:  · Hold the urine bag over a toilet or large container  · Remove the drain spout from its sleeve at the bottom of the urine bag  Do not touch the tip of the drain spout  Open the slide valve on the spout  · Let the urine flow out of the urine bag into the toilet or container  Do not let the drainage tube touch anything  · Clean the end of the drain spout with alcohol when the bag is empty  Ask which cleaning solution is best to use  · Close the slide valve and put the drain spout into its sleeve at the bottom of the urine bag  Write down how much urine was in your bag if you were asked to keep a record  Prevent an infection:   · Clean the stoma and skin around it daily    Wash your hands before and after cystostomy care  Put on a new pair of clean medical gloves  Ask for more information about stoma and skin care  · Change your urine bag or clean reusable bags  Ask how often you need to change or clean your urine drainage bag  You may need to change your reusable bag at least once a week  · Keep the bag below your waist   This will prevent urine from flowing back into your bladder and causing an infection or other problems  Also, keep the tube free of kinks so the urine will drain properly  Do not pull on the catheter  This can cause pain and bleeding and may cause the catheter to come out  Contact your healthcare provider if:   · You have a fever or chills  · You have a burning pain in your stoma  · Your stoma is red, swollen, or draining pus  · You have blood in your urine  · You have questions or concerns about your condition or care  Seek care immediately or call 911 if:   · No urine is draining into the urine bag  · You have severe pain  © 2017 [employer]  Information is for End User's use only and may not be sold, redistributed or otherwise used for commercial purposes  All illustrations and images included in CareNotes® are the copyrighted property of [employer]  or Mika Mcclain  The above information is an  only  It is not intended as medical advice for individual conditions or treatments  Talk to your doctor, nurse or pharmacist before following any medical regimen to see if it is safe and effective for you

## 2020-08-21 ENCOUNTER — PROCEDURE VISIT (OUTPATIENT)
Dept: UROLOGY | Facility: CLINIC | Age: 70
End: 2020-08-21
Payer: COMMERCIAL

## 2020-08-21 VITALS
HEART RATE: 58 BPM | DIASTOLIC BLOOD PRESSURE: 86 MMHG | HEIGHT: 71 IN | SYSTOLIC BLOOD PRESSURE: 132 MMHG | TEMPERATURE: 97.9 F | BODY MASS INDEX: 29.99 KG/M2

## 2020-08-21 DIAGNOSIS — R33.9 RETENTION OF URINE: Chronic | ICD-10-CM

## 2020-08-21 DIAGNOSIS — Z93.59 SUPRAPUBIC CATHETER (HCC): ICD-10-CM

## 2020-08-21 DIAGNOSIS — L92.9 GRANULATION TISSUE ABNORMALITY: ICD-10-CM

## 2020-08-21 DIAGNOSIS — N31.9 NEUROGENIC BLADDER: ICD-10-CM

## 2020-08-21 DIAGNOSIS — N20.0 BILATERAL NEPHROLITHIASIS: Primary | ICD-10-CM

## 2020-08-21 PROCEDURE — 51710 CHANGE OF BLADDER TUBE: CPT | Performed by: UROLOGY

## 2020-08-21 PROCEDURE — 99213 OFFICE O/P EST LOW 20 MIN: CPT | Performed by: UROLOGY

## 2020-08-21 PROCEDURE — 17250 CHEM CAUT OF GRANLTJ TISSUE: CPT | Performed by: UROLOGY

## 2020-08-21 NOTE — PROGRESS NOTES
Chemical cauterization granulation tissue     Date/Time 8/21/2020 9:59 AM     Performed by  Naina Freedman MD     Authorized by Naina Freedman MD      Universal Protocol Consent: Verbal consent obtained  Written consent obtained  Risks and benefits: risks, benefits and alternatives were discussed  Consent given by: spouse and power of   Patient understanding: patient states understanding of the procedure being performed (Patient proxy does)  Patient consent: the patient's understanding of the procedure matches consent given  Procedure consent: procedure consent matches procedure scheduled  Relevant documents: relevant documents present and verified  Test results: test results available and properly labeled  Site marked: the operative site was not marked  Radiology Images displayed and confirmed  If images not available, report reviewed: imaging studies available  Required items: required blood products, implants, devices, and special equipment available  Patient identity confirmed: provided demographic data        Local anesthesia used: no     Anesthesia   Local anesthesia used: no     Sedation   Patient sedated: no        Specimen: no    Culture: no   Procedure Details   Procedure Notes: Thorne catheter was exchanged over a wire, [facility], without trauma, good position confirmed in the bladder, bladder was also irrigated    Three silver nitrate sticks were then used to perform chemical cautery of granulation tissue around the suprapubic tube tract

## 2020-08-27 ENCOUNTER — TELEPHONE (OUTPATIENT)
Dept: UROLOGY | Facility: CLINIC | Age: 70
End: 2020-08-27

## 2020-08-27 NOTE — TELEPHONE ENCOUNTER
Due to OR bump patient's appointment on 9/23/20 had to be rescheduled to 9/29/20 at 845am      Please confirm with patient's facility and patient's wife

## 2020-09-04 ENCOUNTER — NURSING HOME VISIT (OUTPATIENT)
Dept: FAMILY MEDICINE CLINIC | Facility: CLINIC | Age: 70
End: 2020-09-04
Payer: COMMERCIAL

## 2020-09-04 ENCOUNTER — TELEPHONE (OUTPATIENT)
Dept: RADIOLOGY | Facility: HOSPITAL | Age: 70
End: 2020-09-04

## 2020-09-04 DIAGNOSIS — Z93.59 SUPRAPUBIC CATHETER (HCC): ICD-10-CM

## 2020-09-04 DIAGNOSIS — K63.9 BOWEL TROUBLE: Primary | ICD-10-CM

## 2020-09-04 DIAGNOSIS — I10 ESSENTIAL HYPERTENSION: Chronic | ICD-10-CM

## 2020-09-04 DIAGNOSIS — R13.0 APHAGIA: ICD-10-CM

## 2020-09-04 DIAGNOSIS — Z93.6 NEPHROSTOMY STATUS (HCC): ICD-10-CM

## 2020-09-04 DIAGNOSIS — N20.0 BILATERAL NEPHROLITHIASIS: ICD-10-CM

## 2020-09-04 DIAGNOSIS — I71.2 THORACIC AORTIC ANEURYSM WITHOUT RUPTURE (HCC): Chronic | ICD-10-CM

## 2020-09-04 DIAGNOSIS — R33.9 RETENTION OF URINE: Chronic | ICD-10-CM

## 2020-09-04 DIAGNOSIS — F01.50 VASCULAR DEMENTIA WITHOUT BEHAVIORAL DISTURBANCE (HCC): ICD-10-CM

## 2020-09-04 DIAGNOSIS — J41.0 SIMPLE CHRONIC BRONCHITIS (HCC): Chronic | ICD-10-CM

## 2020-09-04 DIAGNOSIS — K21.9 GASTROESOPHAGEAL REFLUX DISEASE WITHOUT ESOPHAGITIS: ICD-10-CM

## 2020-09-04 DIAGNOSIS — Z86.718 HISTORY OF DVT (DEEP VEIN THROMBOSIS): Chronic | ICD-10-CM

## 2020-09-04 PROCEDURE — 99309 SBSQ NF CARE MODERATE MDM 30: CPT | Performed by: NURSE PRACTITIONER

## 2020-09-04 RX ORDER — SODIUM CHLORIDE 9 MG/ML
75 INJECTION, SOLUTION INTRAVENOUS CONTINUOUS
Status: CANCELLED | OUTPATIENT
Start: 2020-09-04

## 2020-09-07 NOTE — PROGRESS NOTES
s  Nursing Home Visit      NAME: Juan Antonio Tomas  AGE: 68 y o  SEX: male [id number]    DATE OF ENCOUNTER: 9/4/2020    Assessment and Plan     Problem List Items Addressed This Visit        Digestive    GERD (gastroesophageal reflux disease)     Continue Pepcid         Constipation - Primary     Continue Colace, MiraLAX and Senna  No noted constipation            Respiratory    COPD (chronic obstructive pulmonary disease) (HCC) (Chronic)     No exacerbation pulse ox 95% on room air            Cardiovascular and Mediastinum    Aneurysm of thoracic aorta (HCC) (Chronic)     Per CT chest/abd/pelvis 3 9 cm         Essential hypertension (Chronic)     Continue Norvasc and Metoprolol  Blood pressure 133/64            Nervous and Auditory    Dementia (HCC)     Continue memantine and seroquel  No behaviors noted  Genitourinary    Retention of urine (Chronic)     Patient has chronic suprapubic catheter and left nephrostomy tube  This is required because he has large stone burn bilaterally he is not healthy enough or surgical intervention  His nephrostomy tube and suprapubic catheter are long term  He has them changes every five weeks with urology                   Bilateral nephrolithiasis     Continue left-sided nephrostomy tube and suprapubic catheter  He has an obstructing stone in his left ureter and non-obstructing stones in his right kidney  He is not a good surgical candidate  Other    History of DVT (deep vein thrombosis) (Chronic)     Continue apixaban         Aphasia     History of CVA and is nonverbal at baseline  He has DysphasiaHe is on puréed diet  Suprapubic catheter ([address] )    Nephrostomy status [facility])            Chief Complaint     Chief Complaint   Patient presents with    Cerebrovascular Accident       History of Present Illness     Patient seen and examined  The patient has aphasia  His nonverbal  He is unable to express his needs or concerns   He requires to be fed a purée diet due to dysphagia  Tolerating diet  Left left nephrostomy tube in place with clear yellow urine  Suprapubic catheter in place with clear yellow urine  Cerebrovascular Accident   This is a chronic problem  The current episode started more than 1 year ago  The problem has been unchanged  Associated symptoms include weakness  Pertinent negatives include no abdominal pain, anorexia, change in bowel habit, congestion, coughing, diaphoresis, fever, joint swelling, myalgias, nausea, rash, sore throat, swollen glands, urinary symptoms, visual change or vomiting  Nothing aggravates the symptoms  The following portions of the patient's history were reviewed and updated as appropriate: allergies, current medications, past family history, past medical history, past social history, past surgical history and problem list     Review of Systems     Review of Systems   Constitutional: Negative  Negative for appetite change, diaphoresis, fever and unexpected weight change  HENT: Negative  Negative for congestion, dental problem, ear discharge, ear pain, facial swelling, mouth sores, postnasal drip, rhinorrhea, sinus pain, sore throat and trouble swallowing  Patient is a aphasic and nonverbal   Eyes: Negative  Negative for photophobia, redness and visual disturbance  Respiratory: Negative  Negative for cough, choking, shortness of breath and wheezing  Cardiovascular: Negative  Negative for palpitations and leg swelling  Gastrointestinal: Negative  Negative for abdominal distention, abdominal pain, anorexia, blood in stool, change in bowel habit, constipation, diarrhea, nausea and vomiting  Endocrine: Negative  Negative for polydipsia, polyphagia and polyuria  Genitourinary: Negative  Negative for decreased urine volume, difficulty urinating, frequency, hematuria, scrotal swelling and urgency  Musculoskeletal: Positive for gait problem  Negative for joint swelling and myalgias  Skin: Negative  Negative for pallor, rash and wound  Allergic/Immunologic: Negative  Negative for immunocompromised state  Neurological: Positive for speech difficulty and weakness  Negative for tremors and seizures  Hematological: Negative  Does not bruise/bleed easily  Psychiatric/Behavioral: Positive for confusion  Negative for agitation and behavioral problems  Active Problem List     Patient Active Problem List   Diagnosis    Aphasia    COPD (chronic obstructive pulmonary disease) ([facility] )    History of DVT (deep vein thrombosis)    Aneurysm of thoracic aorta ([facility] )    Retention of urine    Essential hypertension    CKD (chronic kidney disease)    Hypokalemia    GERD (gastroesophageal reflux disease)    Generalized anxiety disorder    H/O blood clots    Anemia    Constipation    Dementia ([facility] )    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HCC)    Other ill-defined and unknown causes of morbidity and mortality    H/O urethral stricture    CKD (chronic kidney disease) stage 2, GFR 60-89 ml/min    Neurogenic bladder    Nephrolithiasis    Suprapubic catheter ([place] [address] )    Bilateral nephrolithiasis    Atelectasis    Cellulitis, abdominal wall    Depression with anxiety    Constipation    Cerebral infarction ([place] [address] )    Aphasia as late effect of cerebrovascular accident    Bladder neck obstruction    Nephrostomy status (HCC)       Objective     /64   Pulse 62   Temp (!) 96 8 °F (36 °C)   Resp 20   Wt 87 1 kg (192 lb)   SpO2 95%   BMI 26 78 kg/m²     Physical Exam  Vitals signs and nursing note reviewed  Constitutional:       General: He is not in acute distress  Appearance: Normal appearance  He is well-developed and normal weight  He is not toxic-appearing  HENT:      Head: Normocephalic and atraumatic  Nose: Nose normal  No congestion or rhinorrhea  Mouth/Throat:      Mouth: Mucous membranes are moist    Eyes:      Extraocular Movements: Extraocular movements intact        Conjunctiva/sclera: Conjunctivae normal       Pupils: Pupils are equal, round, and reactive to light  Neck:      Musculoskeletal: Normal range of motion and neck supple  No neck rigidity or muscular tenderness  Cardiovascular:      Rate and Rhythm: Normal rate and regular rhythm  Pulses: Normal pulses  Dorsalis pedis pulses are 2+ on the right side and 2+ on the left side  Posterior tibial pulses are 2+ on the right side and 2+ on the left side  Heart sounds: Normal heart sounds  No murmur  No friction rub  No gallop  Pulmonary:      Effort: Pulmonary effort is normal  No respiratory distress  Breath sounds: Normal breath sounds  No stridor  No wheezing, rhonchi or rales  Abdominal:      General: Abdomen is flat  Bowel sounds are normal  There is no distension  Palpations: Abdomen is soft  There is no mass  Tenderness: There is no abdominal tenderness  There is no rebound  Genitourinary:     Comments: Left side nephrostomy tube with clear yellow urine  Suprapubic cathetor in place  Musculoskeletal: Normal range of motion  General: No tenderness or deformity  Comments: Generalized weakness   Skin:     General: Skin is warm and dry  Capillary Refill: Capillary refill takes less than 2 seconds  Coloration: Skin is not pale  Findings: No erythema, lesion or rash  Neurological:      Mental Status: He is alert  Mental status is at baseline  He is disoriented  Motor: Weakness present        Gait: Gait abnormal    Psychiatric:         Mood and Affect: Mood normal          Behavior: Behavior normal          Pertinent Laboratory/Diagnostic Studies:    No labs    Current Medications     Current Outpatient Medications:     acetaminophen (TYLENOL) 325 mg tablet, Take 650 mg by mouth every 4 (four) hours as needed for mild pain , Disp: , Rfl:     amLODIPine (NORVASC) 10 mg tablet, , Disp: , Rfl:     amLODIPine (NORVASC) 2 5 mg tablet, , Disp: , Rfl:    amLODIPine (NORVASC) 5 mg tablet, Take 5 mg by mouth 2 (two) times a day , Disp: , Rfl:     ammonium lactate (LAC-HYDRIN) 12 % cream, Apply topically daily, Disp: 385 g, Rfl: 0    apixaban (ELIQUIS) 2 5 mg, Take 2 5 mg by mouth 2 (two) times a day, Disp: , Rfl:     bisacodyl (DULCOLAX) 10 mg suppository, Insert 1 suppository (10 mg total) into the rectum daily as needed for constipation, Disp: 12 suppository, Rfl: 0    busPIRone (BUSPAR) 7 5 mg tablet, Take 7 5 mg by mouth 3 (three) times a day, Disp: , Rfl:     cholecalciferol (VITAMIN D3) 1,000 units tablet, Take 2,000 Units by mouth daily, Disp: , Rfl:     docusate sodium (COLACE) 100 mg capsule, Take 100 mg by mouth 2 (two) times a day, Disp: , Rfl:     famotidine (PEPCID) 20 mg tablet, Take 20 mg by mouth 2 (two) times a day, Disp: , Rfl:     glycerin-hypromellose- (ARTIFICIAL TEARS) 0 2-0 2-1 % SOLN, Administer 1 drop to both eyes 2 (two) times a day, Disp: , Rfl:     ipratropium (ATROVENT) 0 02 % nebulizer solution, Take 1 vial (0 5 mg total) by nebulization every 6 (six) hours as needed for wheezing or shortness of breath, Disp: , Rfl: 0    memantine (NAMENDA) 5 mg tablet, 2 (two) times a day , Disp: , Rfl:     metoprolol tartrate (LOPRESSOR) 25 mg tablet, Take 0 5 tablets (12 5 mg total) by mouth 2 (two) times a day, Disp: 60 tablet, Rfl: 0    mirtazapine (REMERON) 15 mg tablet, daily at bedtime , Disp: , Rfl:     oxyCODONE (ROXICODONE) 5 mg immediate release tablet, Take 1 tablet (5 mg total) by mouth 4 (four) times a day Hold if pt is drowsyMax Daily Amount: 20 mg, Disp: 240 tablet, Rfl: 0    polyethylene glycol (MIRALAX) 17 g packet, Take 17 g by mouth daily, Disp: 14 each, Rfl: 0    potassium chloride (K-DUR,KLOR-CON) 10 mEq tablet, Take 1 tablet (10 mEq total) by mouth daily with breakfast, Disp: , Rfl: 0    QUEtiapine (SEROquel) 25 mg tablet, Take 25 mg by mouth daily at bedtime  , Disp: , Rfl:     saccharomyces boulardii (FLORASTOR) 250 mg capsule, Take 250 mg by mouth 2 (two) times a day, Disp: , Rfl:     senna (SENOKOT) 8 6 mg, Take 2 tablets (17 2 mg total) by mouth daily at bedtime, Disp: , Rfl: 0  No current facility-administered medications for this visit       Facility-Administered Medications Ordered in Other Visits:     sodium chloride 0 9 % infusion, 75 mL/hr, Intravenous, Continuous, Mana Arguello MD

## 2020-09-08 ENCOUNTER — TELEPHONE (OUTPATIENT)
Dept: SURGERY | Facility: HOSPITAL | Age: 70
End: 2020-09-08

## 2020-09-09 ENCOUNTER — HOSPITAL ENCOUNTER (OUTPATIENT)
Dept: RADIOLOGY | Facility: HOSPITAL | Age: 70
Discharge: HOME | End: 2020-09-09
Admitting: RADIOLOGY
Payer: COMMERCIAL

## 2020-09-09 VITALS
HEART RATE: 62 BPM | SYSTOLIC BLOOD PRESSURE: 133 MMHG | BODY MASS INDEX: 26.78 KG/M2 | WEIGHT: 192 LBS | DIASTOLIC BLOOD PRESSURE: 64 MMHG | RESPIRATION RATE: 20 BRPM | TEMPERATURE: 96.8 F | OXYGEN SATURATION: 95 %

## 2020-09-09 VITALS
SYSTOLIC BLOOD PRESSURE: 123 MMHG | OXYGEN SATURATION: 100 % | TEMPERATURE: 97.5 F | HEART RATE: 80 BPM | DIASTOLIC BLOOD PRESSURE: 68 MMHG | RESPIRATION RATE: 16 BRPM

## 2020-09-09 DIAGNOSIS — N13.30 HYDRONEPHROSIS: ICD-10-CM

## 2020-09-09 DIAGNOSIS — R33.9 RETENTION OF URINE: Primary | Chronic | ICD-10-CM

## 2020-09-09 PROBLEM — N31.9 NEUROGENIC BLADDER: Status: RESOLVED | Noted: 2019-05-22 | Resolved: 2020-09-09

## 2020-09-09 PROBLEM — R69 OTHER ILL-DEFINED AND UNKNOWN CAUSES OF MORBIDITY AND MORTALITY: Status: RESOLVED | Noted: 2017-05-10 | Resolved: 2020-09-09

## 2020-09-09 PROBLEM — N20.0 NEPHROLITHIASIS: Status: RESOLVED | Noted: 2019-08-12 | Resolved: 2020-09-09

## 2020-09-09 PROBLEM — E87.6 HYPOKALEMIA: Status: RESOLVED | Noted: 2017-05-02 | Resolved: 2020-09-09

## 2020-09-09 PROBLEM — L03.311 CELLULITIS, ABDOMINAL WALL: Status: RESOLVED | Noted: 2020-03-17 | Resolved: 2020-09-09

## 2020-09-09 PROBLEM — Z87.448 H/O URETHRAL STRICTURE: Status: RESOLVED | Noted: 2018-03-08 | Resolved: 2020-09-09

## 2020-09-09 PROBLEM — K59.00 CONSTIPATION: Status: RESOLVED | Noted: 2020-07-10 | Resolved: 2020-09-09

## 2020-09-09 PROBLEM — N32.0 BLADDER NECK OBSTRUCTION: Status: RESOLVED | Noted: 2020-07-10 | Resolved: 2020-09-09

## 2020-09-09 PROBLEM — N18.2 CKD (CHRONIC KIDNEY DISEASE) STAGE 2, GFR 60-89 ML/MIN: Status: RESOLVED | Noted: 2018-05-10 | Resolved: 2020-09-09

## 2020-09-09 PROBLEM — J98.11 ATELECTASIS: Status: RESOLVED | Noted: 2020-03-11 | Resolved: 2020-09-09

## 2020-09-09 LAB
ANION GAP SERPL CALCULATED.3IONS-SCNC: 8 MMOL/L (ref 4–13)
BUN SERPL-MCNC: 13 MG/DL (ref 5–25)
CALCIUM SERPL-MCNC: 8.5 MG/DL (ref 8.3–10.1)
CHLORIDE SERPL-SCNC: 106 MMOL/L (ref 100–108)
CO2 SERPL-SCNC: 29 MMOL/L (ref 21–32)
CREAT SERPL-MCNC: 1.5 MG/DL (ref 0.6–1.3)
GFR SERPL CREATININE-BSD FRML MDRD: 46 ML/MIN/1.73SQ M
GLUCOSE P FAST SERPL-MCNC: 93 MG/DL (ref 65–99)
GLUCOSE SERPL-MCNC: 93 MG/DL (ref 65–140)
POTASSIUM SERPL-SCNC: 4.1 MMOL/L (ref 3.5–5.3)
SODIUM SERPL-SCNC: 143 MMOL/L (ref 136–145)

## 2020-09-09 PROCEDURE — C1769 GUIDE WIRE: HCPCS

## 2020-09-09 PROCEDURE — 50435 EXCHANGE NEPHROSTOMY CATH: CPT

## 2020-09-09 PROCEDURE — C1729 CATH, DRAINAGE: HCPCS

## 2020-09-09 PROCEDURE — 50435 EXCHANGE NEPHROSTOMY CATH: CPT | Performed by: RADIOLOGY

## 2020-09-09 PROCEDURE — 80048 BASIC METABOLIC PNL TOTAL CA: CPT | Performed by: RADIOLOGY

## 2020-09-09 RX ORDER — MELATONIN
2000 DAILY
COMMUNITY

## 2020-09-09 RX ORDER — SACCHAROMYCES BOULARDII 250 MG
250 CAPSULE ORAL 2 TIMES DAILY
COMMUNITY

## 2020-09-09 RX ORDER — SODIUM CHLORIDE 9 MG/ML
10 INJECTION INTRAVENOUS DAILY
Qty: 30 SYRINGE | Refills: 2 | Status: SHIPPED | OUTPATIENT
Start: 2020-09-09 | End: 2021-12-06 | Stop reason: HOSPADM

## 2020-09-09 RX ORDER — SODIUM CHLORIDE 9 MG/ML
75 INJECTION, SOLUTION INTRAVENOUS CONTINUOUS
Status: DISCONTINUED | OUTPATIENT
Start: 2020-09-09 | End: 2020-09-10 | Stop reason: HOSPADM

## 2020-09-09 RX ADMIN — IOHEXOL 10 ML: 350 INJECTION, SOLUTION INTRAVENOUS at 11:42

## 2020-09-09 NOTE — INTERVAL H&P NOTE
Update: (This section must be completed if the H&P was completed greater than 24 hrs to procedure or admission)    H&P reviewed  After examining the patient, I find no changed to the H&P since it had been written  Patient re-evaluated   Accept as history and physical     Ender Davis MD/September 9, 2020/11:39 AM

## 2020-09-09 NOTE — ASSESSMENT & PLAN NOTE
Patient has chronic suprapubic catheter and left nephrostomy tube  This is required because he has large stone burn bilaterally he is not healthy enough or surgical intervention  His nephrostomy tube and suprapubic catheter are long term   He has them changes every five weeks with urology

## 2020-09-09 NOTE — BRIEF OP NOTE (RAD/CATH)
INTERVENTIONAL RADIOLOGY PROCEDURE NOTE    Date: 9/9/2020    Procedure: IR NEPHROSTOMY TUBE CHECK/CHANGE/REPOSITION/REINSERTION/UPSIZE    Preoperative diagnosis:   1  Hydronephrosis         Postoperative diagnosis: Same  Surgeon: Brissa Gabriel MD     Assistant: None  No qualified resident was available  Blood loss: None    Specimens: None     Findings: Left nephrostomy tube check and exchange    Complications: None immediate      Anesthesia: conscious sedation

## 2020-09-09 NOTE — ASSESSMENT & PLAN NOTE
Continue left-sided nephrostomy tube and suprapubic catheter  He has an obstructing stone in his left ureter and non-obstructing stones in his right kidney  He is not a good surgical candidate

## 2020-09-24 NOTE — PROGRESS NOTES
Problem List Items Addressed This Visit        Genitourinary    Retention of urine (Chronic)    Bilateral nephrolithiasis       Other    Suprapubic catheter ([facility] [address] ) - Primary    Nephrostomy status ([facility] [address] )            Discussion:    Andrew Hills has lost some weight since his last visit, his catheter has been doing well from a drainage perspective, he has done much better overall with a suprapubic catheter versus with his urethral Thorne catheter in terms of dislodgement and adequate drainage of the urinary bladder  He will require long-term nephrostomy tube drainage given his extensive stone burden given his poor performance status and likely increased mobilization of calcium due to this  Successful change of his suprapubic catheter over wire, this was difficult, and required 2 attempts, by me, for successful placement within the lumen of the urinary bladder  He will return in 5 weeks for suprapubic catheter exchange over a wire    Assessment and plan:       Please see problem oriented charting for the assessment plan of today's urological complaints      Bridget Tang MD      Chief Complaint     Chief Complaint   Patient presents with    retention of urine         History of Present Illness     Isaiah Lynn is a 65 y o  gentleman with history of neurogenic bladder, also with history of painful bladder spasms previously treated with botulinum toxin, these have improved greatly with suprapubic catheter placement and he no longer has this complaint  He is suprapubic catheter dependent, and nephrostomy tube dependent as well, left nephrostomy tube was recently changed, this is draining well  He will require every 3 months nephrostomy tube changes indefinitely  Successful change of his suprapubic catheter by me, over a wire, today  No new complaints, has lost some weight since his last visit      The following portions of the patient's history were reviewed and updated as appropriate: allergies, current medications, past family history, past medical history, past social history, past surgical history and problem list     Detailed Urologic History     - please refer to HPI    Review of Systems     Review of Systems   Reason unable to perform ROS: Unable to participate in review of systems  Allergies     Allergies   Allergen Reactions    Gentamycin [Gentamicin] Anaphylaxis    Piperacillin Sod-Tazobactam So Anaphylaxis and Rash     However, has tolerated Ertapenem, Cefdinir, and Cefepime, which all have different side chains  Avoid Penicillins and the following Cephs with similar side chains (Cephalexin, Cefadroxil, Cefaclor, Cefprozil, or  Cefoxitin)  Other reaction(s): Hemoptysis    Vancomycin Anaphylaxis and Rash     Other reaction(s): Hemoptysis    Clindamycin Itching and Rash     Other reaction(s): Hemoptysis    Nsaids Other (See Comments)     Nephrotic Syndrome    Sulfa Antibiotics Rash    Piperacillin     Tazobactam     Other Rash     antipersperents  Stat lock from lucia catheter    Tigecycline Rash     Other reaction(s): Hemoptysis       Physical Exam     Physical Exam  Vitals signs reviewed  Constitutional:       General: He is not in acute distress  Appearance: Normal appearance  HENT:      Head: Normocephalic and atraumatic  Comments: Slight facial expression, aphasia present  Cardiovascular:      Pulses: Normal pulses  Pulmonary:      Effort: Pulmonary effort is normal    Abdominal:      General: There is no distension  Musculoskeletal:         General: Deformity (Decreased range of motion as well) present  Skin:     Coloration: Skin is not pale  Findings: No erythema or rash  Neurological:      Mental Status: Mental status is at baseline  Motor: Weakness present        Coordination: Coordination abnormal       Gait: Gait abnormal       Deep Tendon Reflexes: Reflexes abnormal    Psychiatric:         Mood and Affect: Mood normal          Behavior: Behavior normal          Thought Content:  Thought content normal          Judgment: Judgment normal              Vital Signs  Vitals:    09/29/20 0852   BP: 148/82   BP Location: Left arm   Patient Position: Sitting   Cuff Size: Standard   Pulse: 63   Temp: 98 4 °F (36 9 °C)         Current Medications       Current Outpatient Medications:     acetaminophen (TYLENOL) 325 mg tablet, Take 650 mg by mouth every 4 (four) hours as needed for mild pain , Disp: , Rfl:     amLODIPine (NORVASC) 10 mg tablet, Take 10 mg by mouth daily, Disp: , Rfl:     ammonium lactate (LAC-HYDRIN) 12 % cream, Apply topically daily, Disp: 385 g, Rfl: 0    apixaban (ELIQUIS) 2 5 mg, Take 2 5 mg by mouth 2 (two) times a day, Disp: , Rfl:     bisacodyl (DULCOLAX) 10 mg suppository, Insert 1 suppository (10 mg total) into the rectum daily as needed for constipation, Disp: 12 suppository, Rfl: 0    busPIRone (BUSPAR) 7 5 mg tablet, Take 7 5 mg by mouth 3 (three) times a day, Disp: , Rfl:     cholecalciferol (VITAMIN D3) 1,000 units tablet, Take 2,000 Units by mouth daily, Disp: , Rfl:     docusate sodium (COLACE) 100 mg capsule, Take 100 mg by mouth 2 (two) times a day, Disp: , Rfl:     famotidine (PEPCID) 20 mg tablet, Take 20 mg by mouth 2 (two) times a day, Disp: , Rfl:     glycerin-hypromellose- (ARTIFICIAL TEARS) 0 2-0 2-1 % SOLN, Administer 1 drop to both eyes 2 (two) times a day, Disp: , Rfl:     ipratropium (ATROVENT) 0 02 % nebulizer solution, Take 1 vial (0 5 mg total) by nebulization every 6 (six) hours as needed for wheezing or shortness of breath, Disp: , Rfl: 0    memantine (NAMENDA) 5 mg tablet, 2 (two) times a day , Disp: , Rfl:     metoprolol tartrate (LOPRESSOR) 25 mg tablet, Take 0 5 tablets (12 5 mg total) by mouth 2 (two) times a day, Disp: 60 tablet, Rfl: 0    mirtazapine (REMERON) 15 mg tablet, daily at bedtime , Disp: , Rfl:     oxyCODONE (ROXICODONE) 5 mg immediate release tablet, Take 1 tablet (5 mg total) by mouth 4 (four) times a day Hold if pt is drowsyMax Daily Amount: 20 mg, Disp: 240 tablet, Rfl: 0    polyethylene glycol (MIRALAX) 17 g packet, Take 17 g by mouth daily, Disp: 14 each, Rfl: 0    potassium chloride (K-DUR,KLOR-CON) 10 mEq tablet, Take 1 tablet (10 mEq total) by mouth daily with breakfast, Disp: , Rfl: 0    QUEtiapine (SEROquel) 25 mg tablet, Take 25 mg by mouth daily at bedtime  , Disp: , Rfl:     saccharomyces boulardii (FLORASTOR) 250 mg capsule, Take 250 mg by mouth 2 (two) times a day, Disp: , Rfl:     senna (SENOKOT) 8 6 mg, Take 2 tablets (17 2 mg total) by mouth daily at bedtime, Disp: , Rfl: 0    sodium chloride, PF, 0 9 %, 10 mL by Intracatheter route daily Intracatheter flushing daily, Disp: 30 Syringe, Rfl: 2      Active Problems     Patient Active Problem List   Diagnosis    Aphasia    COPD (chronic obstructive pulmonary disease) ([facility] [address] )    History of DVT (deep vein thrombosis)    Aneurysm of thoracic aorta (HCC)    Retention of urine    Essential hypertension    CKD (chronic kidney disease)    GERD (gastroesophageal reflux disease)    Constipation    Dementia ([facility] [address] )    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HCC)    Suprapubic catheter ([facility] [address] )    Bilateral nephrolithiasis    Depression with anxiety    Cerebral infarction ([facility] [address] )    Aphasia as late effect of cerebrovascular accident    Nephrostomy status ([facility] [address] )         Past Medical History     Past Medical History:   Diagnosis Date    Acute kidney injury ([facility] [address] ) 10/21/2016    Anxiety     Aortic aneurysm (HCC)     Aphasia     Ataxia     BPH (benign prostatic hypertrophy)     COPD (chronic obstructive pulmonary disease) ([address] )     wife denies - "never had"    CVA (cerebral vascular accident) ([facility] [address] )     Dementia ([address] )     Depression     Difficulty swallowing     Difficulty walking     Essential (primary) hypertension     Generalized anxiety disorder     GERD (gastroesophageal reflux disease)     Global aphasia     H/O blood clots     Heartburn     High blood pressure     History of kidney stones     Hypertension     Mental disorder     Muscle weakness     Neuromuscular dysfunction of bladder     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 ([facility] [address] )     Retention of urine, unspecified     Stroke ([facility] [address] )     Thrombosis     Urinary retention     Urinary tract bacterial infections     Urinary tract infection          Surgical History     Past Surgical History:   Procedure Laterality Date    BOTOX INJECTION N/A 6/18/2019    Procedure: INJECTION BOTULINUM TOXIN (BOTOX), intra detrusor;  Surgeon: Chanda Leggett MD;  [facility];  Service: Urology    BOTOX INJECTION N/A 10/7/2019    Procedure: INJECTION BOTULINUM TOXIN (BOTOX), intradetrusor;  Surgeon: Chanda Leggett MD;  [facility];  Service: Urology    CYSTOSCOPY      CYSTOSCOPY N/A 6/18/2019    Procedure: cystoscopy and all indicated procedures;  Surgeon: Chanda Leggett MD;  [facility];  Service: Urology    FL CYSTOGRAM  1/15/2019    IR NEPHROSTOMY TUBE PLACEMENT  3/11/2020    IR SUPRAPUBIC CATHETER PLACEMENT  11/26/2019    IVC FILTER INSERTION      X2    IVC FILTER INSERTION      x2    KIDNEY STONE SURGERY      KNEE SURGERY      Sepsis infection     NEPHROSTOMY      MS CYSTO/URETERO W/LITHOTRIPSY &INDWELL STENT INSRT Right 6/14/2017    Procedure: CYSTOSCOPY URETEROSCOPY WITH LITHOTRIPSY HOLMIUM LASER,,BASKET STONE EXTRACTION, RETROGRADE PYELOGRAM AND INSERTION STENT URETERAL;  Surgeon: Miya Espinosa MD;  [facility];  Service: Urology    MS CYSTOURETHROSCOPY,BIOPSY N/A 1/15/2019    Procedure: CYSTOSCOPY, CYSTOGRAM, DILATION OF URETHRAL STRICTURE;  Surgeon: Chanda Leggett MD;  [facility];  Service: Urology    URINARY SURGERY           Family History     Family History   Problem Relation Age of Onset    Diabetes Father     Hypertension Father     Coronary artery disease Father     Cancer Father     Hypertension Mother          Social History     Social History     Social History     Tobacco Use   Smoking Status Never Smoker   Smokeless Tobacco Never Used         Pertinent Lab Values     Lab Results   Component Value Date    CREATININE 1 50 (H) 09/09/2020       No results found for: PSA          Pertinent Imaging      No new imaging for my review

## 2020-09-24 NOTE — PATIENT INSTRUCTIONS
Suprapubic Cystostomy   WHAT YOU NEED TO KNOW:   Suprapubic cystostomy is surgery to create a stoma (opening) through your abdomen into your bladder  This opening is where a catheter is inserted to drain urine  You may need a cystostomy if your urine flow is blocked  DISCHARGE INSTRUCTIONS:   Medicines:   · Medicines  can help decrease pain or prevent an infection  · Take your medicine as directed  Contact your healthcare provider if you think your medicine is not helping or if you have side effects  Tell him or her if you are allergic to any medicine  Keep a list of the medicines, vitamins, and herbs you take  Include the amounts, and when and why you take them  Bring the list or the pill bottles to follow-up visits  Carry your medicine list with you in case of an emergency  Follow up with your healthcare provider as directed: You may need to return to have your suprapubic catheter changed or removed  Write down your questions so you remember to ask them during your visits  Empty your urine drainage bag:  Empty your urine drainage bag when it is ½ to ? full, or every 8 hours  If you have a smaller leg bag, empty it every 3 to 4 hours  Do the following when you empty your urine drainage bag:  · Hold the urine bag over a toilet or large container  · Remove the drain spout from its sleeve at the bottom of the urine bag  Do not touch the tip of the drain spout  Open the slide valve on the spout  · Let the urine flow out of the urine bag into the toilet or container  Do not let the drainage tube touch anything  · Clean the end of the drain spout with alcohol when the bag is empty  Ask which cleaning solution is best to use  · Close the slide valve and put the drain spout into its sleeve at the bottom of the urine bag  Write down how much urine was in your bag if you were asked to keep a record  Prevent an infection:   · Clean the stoma and skin around it daily    Wash your hands before and after cystostomy care  Put on a new pair of clean medical gloves  Ask for more information about stoma and skin care  · Change your urine bag or clean reusable bags  Ask how often you need to change or clean your urine drainage bag  You may need to change your reusable bag at least once a week  · Keep the bag below your waist   This will prevent urine from flowing back into your bladder and causing an infection or other problems  Also, keep the tube free of kinks so the urine will drain properly  Do not pull on the catheter  This can cause pain and bleeding and may cause the catheter to come out  Contact your healthcare provider if:   · You have a fever or chills  · You have a burning pain in your stoma  · Your stoma is red, swollen, or draining pus  · You have blood in your urine  · You have questions or concerns about your condition or care  Seek care immediately or call 911 if:   · No urine is draining into the urine bag  · You have severe pain  © 2017 [facility] Information is for End User's use only and may not be sold, redistributed or otherwise used for commercial purposes  All illustrations and images included in CareNotes® are the copyrighted property of A D [employer] M [employer]  or Mika Mcclain  The above information is an  only  It is not intended as medical advice for individual conditions or treatments  Talk to your doctor, nurse or pharmacist before following any medical regimen to see if it is safe and effective for you

## 2020-09-29 ENCOUNTER — PROCEDURE VISIT (OUTPATIENT)
Dept: UROLOGY | Facility: CLINIC | Age: 70
End: 2020-09-29
Payer: COMMERCIAL

## 2020-09-29 VITALS — DIASTOLIC BLOOD PRESSURE: 82 MMHG | TEMPERATURE: 98.4 F | SYSTOLIC BLOOD PRESSURE: 148 MMHG | HEART RATE: 63 BPM

## 2020-09-29 DIAGNOSIS — Z93.6 NEPHROSTOMY STATUS (HCC): ICD-10-CM

## 2020-09-29 DIAGNOSIS — N20.0 BILATERAL NEPHROLITHIASIS: ICD-10-CM

## 2020-09-29 DIAGNOSIS — R33.9 RETENTION OF URINE: Chronic | ICD-10-CM

## 2020-09-29 DIAGNOSIS — Z93.59 SUPRAPUBIC CATHETER (HCC): Primary | ICD-10-CM

## 2020-09-29 PROCEDURE — 99213 OFFICE O/P EST LOW 20 MIN: CPT | Performed by: UROLOGY

## 2020-09-29 RX ORDER — AMLODIPINE BESYLATE 10 MG/1
10 TABLET ORAL DAILY
COMMUNITY

## 2020-09-29 NOTE — PROGRESS NOTES
Cystostomy tube change     Date/Time 9/29/2020 9:04 AM     Performed by  Uriah Law MD     Authorized by Uriah Law MD      Universal Protocol Consent: Verbal consent obtained  Written consent obtained  Risks and benefits: risks, benefits and alternatives were discussed  Consent given by: guardian and spouse  Patient understanding: patient states understanding of the procedure being performed  Patient consent: the patient's understanding of the procedure matches consent given  Procedure consent: procedure consent matches procedure scheduled  Relevant documents: relevant documents present and verified  Test results: test results available and properly labeled  Site marked: the operative site was not marked  Radiology Images displayed and confirmed  If images not available, report reviewed: imaging studies available  Required items: required blood products, implants, devices, and special equipment available  Patient identity confirmed: verbally with patient and provided demographic data        Preparation: Patient was prepped and draped in the usual sterile fashion  Site preparation: Betadine    Local anesthesia used: no     Anesthesia   Local anesthesia used: no     Sedation   Patient sedated: no        Specimen: no    Culture: no   Procedure Details   Procedure Notes: The patient's indwelling 20 [employer] [place] tip catheter was cut (his facility places the drainage to well within the catheter such that it cannot be removed)  The balloon was taken down, a superstiff wire was placed through this, and 2 attempts were necessary to successfully place the Thorne catheter within the lumen of the bladder, ultimately successful, however    He will return in 5 weeks for 20 [place] tip catheter exchange over a wire with me  Patient Transportation: confirmed  Patient tolerance: Patient tolerated the procedure well with no immediate complications

## 2020-11-04 ENCOUNTER — PROCEDURE VISIT (OUTPATIENT)
Dept: UROLOGY | Facility: CLINIC | Age: 70
End: 2020-11-04
Payer: COMMERCIAL

## 2020-11-04 ENCOUNTER — NURSING HOME VISIT (OUTPATIENT)
Dept: INTERNAL MEDICINE CLINIC | Facility: CLINIC | Age: 70
End: 2020-11-04
Payer: COMMERCIAL

## 2020-11-04 VITALS — TEMPERATURE: 97.7 F | HEART RATE: 72 BPM | SYSTOLIC BLOOD PRESSURE: 138 MMHG | DIASTOLIC BLOOD PRESSURE: 72 MMHG

## 2020-11-04 DIAGNOSIS — Z86.718 HISTORY OF DVT (DEEP VEIN THROMBOSIS): Chronic | ICD-10-CM

## 2020-11-04 DIAGNOSIS — Z93.59 SUPRAPUBIC CATHETER (HCC): ICD-10-CM

## 2020-11-04 DIAGNOSIS — R33.9 RETENTION OF URINE: Chronic | ICD-10-CM

## 2020-11-04 DIAGNOSIS — N39.0 UTI (URINARY TRACT INFECTION): ICD-10-CM

## 2020-11-04 DIAGNOSIS — F41.8 DEPRESSION WITH ANXIETY: ICD-10-CM

## 2020-11-04 DIAGNOSIS — I10 ESSENTIAL HYPERTENSION: Primary | Chronic | ICD-10-CM

## 2020-11-04 PROCEDURE — 99308 SBSQ NF CARE LOW MDM 20: CPT | Performed by: INTERNAL MEDICINE

## 2020-11-04 PROCEDURE — 51710 CHANGE OF BLADDER TUBE: CPT | Performed by: UROLOGY

## 2020-11-06 VITALS — HEART RATE: 88 BPM | SYSTOLIC BLOOD PRESSURE: 132 MMHG | DIASTOLIC BLOOD PRESSURE: 78 MMHG | RESPIRATION RATE: 18 BRPM

## 2020-12-01 DIAGNOSIS — R52 SEVERE PAIN: ICD-10-CM

## 2020-12-01 RX ORDER — OXYCODONE HYDROCHLORIDE 5 MG/1
5 TABLET ORAL 4 TIMES DAILY
Qty: 240 TABLET | Refills: 0 | Status: SHIPPED | OUTPATIENT
Start: 2020-12-01 | End: 2021-01-26 | Stop reason: SDUPTHER

## 2020-12-07 ENCOUNTER — TELEPHONE (OUTPATIENT)
Dept: SURGERY | Facility: HOSPITAL | Age: 70
End: 2020-12-07

## 2020-12-08 ENCOUNTER — HOSPITAL ENCOUNTER (OUTPATIENT)
Dept: RADIOLOGY | Facility: HOSPITAL | Age: 70
Discharge: HOME | End: 2020-12-08
Attending: RADIOLOGY | Admitting: RADIOLOGY
Payer: COMMERCIAL

## 2020-12-08 DIAGNOSIS — R33.9 RETENTION OF URINE: Chronic | ICD-10-CM

## 2020-12-08 PROCEDURE — C1769 GUIDE WIRE: HCPCS

## 2020-12-08 PROCEDURE — 50432 PLMT NEPHROSTOMY CATHETER: CPT | Performed by: RADIOLOGY

## 2020-12-08 PROCEDURE — 50435 EXCHANGE NEPHROSTOMY CATH: CPT

## 2020-12-08 PROCEDURE — C1729 CATH, DRAINAGE: HCPCS

## 2020-12-08 RX ADMIN — IOHEXOL 15 ML: 350 INJECTION, SOLUTION INTRAVENOUS at 10:10

## 2020-12-09 ENCOUNTER — HOSPITAL ENCOUNTER (EMERGENCY)
Facility: HOSPITAL | Age: 70
Discharge: HOME | End: 2020-12-09
Attending: EMERGENCY MEDICINE
Payer: COMMERCIAL

## 2020-12-09 VITALS
RESPIRATION RATE: 18 BRPM | BODY MASS INDEX: 29.7 KG/M2 | HEART RATE: 70 BPM | DIASTOLIC BLOOD PRESSURE: 75 MMHG | TEMPERATURE: 98.6 F | OXYGEN SATURATION: 93 % | SYSTOLIC BLOOD PRESSURE: 137 MMHG | WEIGHT: 212.96 LBS

## 2020-12-09 DIAGNOSIS — T83.010A SUPRAPUBIC CATHETER DYSFUNCTION, INITIAL ENCOUNTER (HCC): Primary | ICD-10-CM

## 2020-12-09 PROCEDURE — 99284 EMERGENCY DEPT VISIT MOD MDM: CPT | Performed by: EMERGENCY MEDICINE

## 2020-12-09 PROCEDURE — 99283 EMERGENCY DEPT VISIT LOW MDM: CPT

## 2020-12-09 RX ORDER — FLUCONAZOLE 200 MG/1
200 TABLET ORAL DAILY
Qty: 7 TABLET | Refills: 0 | Status: SHIPPED | OUTPATIENT
Start: 2020-12-09 | End: 2020-12-16

## 2020-12-09 RX ORDER — FLUCONAZOLE 100 MG/1
200 TABLET ORAL ONCE
Status: DISCONTINUED | OUTPATIENT
Start: 2020-12-09 | End: 2020-12-09 | Stop reason: HOSPADM

## 2020-12-15 ENCOUNTER — PROCEDURE VISIT (OUTPATIENT)
Dept: UROLOGY | Facility: CLINIC | Age: 70
End: 2020-12-15
Payer: COMMERCIAL

## 2020-12-15 ENCOUNTER — PREP FOR PROCEDURE (OUTPATIENT)
Dept: INTERVENTIONAL RADIOLOGY/VASCULAR | Facility: CLINIC | Age: 70
End: 2020-12-15

## 2020-12-15 ENCOUNTER — TELEPHONE (OUTPATIENT)
Dept: UROLOGY | Facility: CLINIC | Age: 70
End: 2020-12-15

## 2020-12-15 VITALS — DIASTOLIC BLOOD PRESSURE: 70 MMHG | HEART RATE: 69 BPM | SYSTOLIC BLOOD PRESSURE: 130 MMHG

## 2020-12-15 DIAGNOSIS — N20.0 BILATERAL NEPHROLITHIASIS: Primary | ICD-10-CM

## 2020-12-15 DIAGNOSIS — Z93.59 SUPRAPUBIC CATHETER (HCC): ICD-10-CM

## 2020-12-15 DIAGNOSIS — N20.0 BILATERAL NEPHROLITHIASIS: ICD-10-CM

## 2020-12-15 DIAGNOSIS — I69.320 APHASIA AS LATE EFFECT OF CEREBROVASCULAR ACCIDENT: ICD-10-CM

## 2020-12-15 DIAGNOSIS — Z93.6 NEPHROSTOMY STATUS (HCC): Primary | ICD-10-CM

## 2020-12-15 PROCEDURE — 99213 OFFICE O/P EST LOW 20 MIN: CPT | Performed by: UROLOGY

## 2020-12-15 PROCEDURE — 51710 CHANGE OF BLADDER TUBE: CPT | Performed by: UROLOGY

## 2021-01-19 NOTE — PATIENT INSTRUCTIONS
Suprapubic Cystostomy   WHAT YOU NEED TO KNOW:   What do I need to know about suprapubic cystostomy? Suprapubic cystostomy is surgery to create a stoma (opening) through your abdomen into your bladder  This opening is where a catheter is inserted to drain urine  You may need a cystostomy if your urine flow is blocked  How do I prepare for surgery? Your healthcare provider will talk to you about how to prepare for surgery  He or she may tell you not to eat or drink anything after midnight on the day of your surgery  He or she will tell you what medicines to take or not take on the day of your surgery  What will happen during surgery? Your surgeon will make a small incision in your abdomen below your belly button  Another small incision will be made in your bladder  The catheter will be inserted into your abdomen and bladder  Once the catheter is in place, the balloon on the end of the catheter will be filled with sterile water  This balloon keeps the catheter in place inside the bladder  The other end of the catheter will be connected to a clean drainage bag or closed with a valve  The catheter may be secured in the stoma with stitches or surgical tape  What are the risks of surgery? You may bleed more than expected or get an infection  Your organs or blood vessels may be damaged during surgery  Your bladder may become irritated  Long-term use of a catheter can lead to kidney stones, blood in your urine, or bladder inflammation  CARE AGREEMENT:   You have the right to help plan your care  Learn about your health condition and how it may be treated  Discuss treatment options with your healthcare providers to decide what care you want to receive  You always have the right to refuse treatment  The above information is an  only  It is not intended as medical advice for individual conditions or treatments   Talk to your doctor, nurse or pharmacist before following any medical regimen to see if it is safe and effective for you  © Copyright [address] Information is for End User's use only and may not be sold, redistributed or otherwise used for commercial purposes   All illustrations and images included in CareNotes® are the copyrighted property of A D A M [employer] Inc  or [address] [place], [place] [address]abhishek rashmi

## 2021-01-19 NOTE — PROGRESS NOTES
Problem List Items Addressed This Visit        Genitourinary    Bilateral nephrolithiasis - Primary       Other    Suprapubic catheter ([facility] )    Nephrostomy status ([facility] )    Neuromuscular dysfunction of bladder            Discussion:    Successful exchange of suprapubic catheter, bladder is clean, free of purulent material as opposed to previous visits, nephrostomy tube in place, this will be a long-standing tube for him, patient has been losing weight, being addressed at his facility currently  He will return in 5 weeks for his suprapubic catheter change over a wire      Assessment and plan:     Please see problem oriented charting for the assessment plan of today's urological complaints    Bernadine Dickinson MD      Chief Complaint     No chief complaint on file  History of Present Illness     Leo Samaniego is a 65 y o  gentleman with neurogenic bladder and bilateral nephrolithiasis, managed with a long-standing nephrostomy tube as well as suprapubic catheter, this has previously required changes over a wire due to difficulty and loss of access  Successful change today, 20 Scottish catheter placed, please see separate procedure note  Patient has been losing weight, otherwise does appear more alert than at previous visits  The following portions of the patient's history were reviewed and updated as appropriate: allergies, current medications, past family history, past medical history, past social history, past surgical history and problem list     Detailed Urologic History     - please refer to HPI    Review of Systems     Review of Systems  Unable to perform, patient is nonverbal        Allergies     Allergies   Allergen Reactions    Gentamycin [Gentamicin] Anaphylaxis    Piperacillin Sod-Tazobactam So Anaphylaxis and Rash     However, has tolerated Ertapenem, Cefdinir, and Cefepime, which all have different side chains   Avoid Penicillins and the following Cephs with similar side chains (Cephalexin, Cefadroxil, Cefaclor, Cefprozil, or  Cefoxitin)  Other reaction(s): Hemoptysis    Vancomycin Anaphylaxis and Rash     Other reaction(s): Hemoptysis    Clindamycin Itching and Rash     Other reaction(s): Hemoptysis    Nsaids Other (See Comments)     Nephrotic Syndrome    Sulfa Antibiotics Rash    Piperacillin     Tazobactam     Other Rash     antipersperents  Stat lock from lucia catheter    Tigecycline Rash     Other reaction(s): Hemoptysis       Physical Exam     Physical Exam  Vitals signs reviewed  Constitutional:       General: He is not in acute distress  HENT:      Head: Normocephalic and atraumatic  Comments: Flat affect, due to history of stroke  Cardiovascular:      Pulses: Normal pulses  Pulmonary:      Effort: Pulmonary effort is normal    Abdominal:      General: There is no distension  Genitourinary:     Penis: Normal        Comments: Some granulation tissue present around suprapubic catheter site  Musculoskeletal:         General: Deformity (Due to contractures) present  No tenderness  Skin:     General: Skin is warm  Neurological:      Mental Status: He is alert  Sensory: Sensory deficit present  Motor: Weakness present  Coordination: Coordination abnormal       Gait: Gait abnormal       Deep Tendon Reflexes: Reflexes abnormal    Psychiatric:         Mood and Affect: Mood normal          Behavior: Behavior normal       Comments: Behavior is at baseline             Vital Signs  There were no vitals filed for this visit        Current Medications       Current Outpatient Medications:     acetaminophen (TYLENOL) 325 mg tablet, Take 650 mg by mouth every 4 (four) hours as needed for mild pain , Disp: , Rfl:     amLODIPine (NORVASC) 10 mg tablet, Take 10 mg by mouth daily, Disp: , Rfl:     ammonium lactate (LAC-HYDRIN) 12 % cream, Apply topically daily, Disp: 385 g, Rfl: 0    apixaban (ELIQUIS) 2 5 mg, Take 2 5 mg by mouth 2 (two) times a day, Disp: , Rfl:     bisacodyl (DULCOLAX) 10 mg suppository, Insert 1 suppository (10 mg total) into the rectum daily as needed for constipation, Disp: 12 suppository, Rfl: 0    busPIRone (BUSPAR) 7 5 mg tablet, Take 7 5 mg by mouth 3 (three) times a day, Disp: , Rfl:     cholecalciferol (VITAMIN D3) 1,000 units tablet, Take 2,000 Units by mouth daily, Disp: , Rfl:     docusate sodium (COLACE) 100 mg capsule, Take 100 mg by mouth 2 (two) times a day, Disp: , Rfl:     famotidine (PEPCID) 20 mg tablet, Take 20 mg by mouth 2 (two) times a day, Disp: , Rfl:     glycerin-hypromellose- (ARTIFICIAL TEARS) 0 2-0 2-1 % SOLN, Administer 1 drop to both eyes 2 (two) times a day, Disp: , Rfl:     ipratropium (ATROVENT) 0 02 % nebulizer solution, Take 1 vial (0 5 mg total) by nebulization every 6 (six) hours as needed for wheezing or shortness of breath, Disp: , Rfl: 0    memantine (NAMENDA) 5 mg tablet, 2 (two) times a day , Disp: , Rfl:     metoprolol tartrate (LOPRESSOR) 25 mg tablet, Take 0 5 tablets (12 5 mg total) by mouth 2 (two) times a day, Disp: 60 tablet, Rfl: 0    mirtazapine (REMERON) 15 mg tablet, daily at bedtime , Disp: , Rfl:     oxyCODONE (ROXICODONE) 5 mg immediate release tablet, Take 1 tablet (5 mg total) by mouth 4 (four) times a day Hold if pt is drowsyMax Daily Amount: 20 mg, Disp: 240 tablet, Rfl: 0    polyethylene glycol (MIRALAX) 17 g packet, Take 17 g by mouth daily, Disp: 14 each, Rfl: 0    potassium chloride (K-DUR,KLOR-CON) 10 mEq tablet, Take 1 tablet (10 mEq total) by mouth daily with breakfast, Disp: , Rfl: 0    QUEtiapine (SEROquel) 25 mg tablet, Take 25 mg by mouth daily at bedtime  , Disp: , Rfl:     saccharomyces boulardii (FLORASTOR) 250 mg capsule, Take 250 mg by mouth 2 (two) times a day, Disp: , Rfl:     senna (SENOKOT) 8 6 mg, Take 2 tablets (17 2 mg total) by mouth daily at bedtime, Disp: , Rfl: 0    sodium chloride, PF, 0 9 %, 10 mL by Intracatheter route daily Intracatheter flushing daily, Disp: 30 Syringe, Rfl: 2      Active Problems     Patient Active Problem List   Diagnosis    [facility]    History of DVT (deep vein thrombosis)    Aneurysm of thoracic aorta (HCC)    Essential hypertension    CKD (chronic kidney disease)    GERD (gastroesophageal reflux disease)    Constipation    [facility]    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HCC)    Suprapubic catheter ([facility] )    Bilateral nephrolithiasis    Depression with anxiety    Cerebral infarction ([place] [address] )    Aphasia as late effect of cerebrovascular accident    Nephrostomy status ([place] [address] )         Past Medical History     Past Medical History:   Diagnosis Date    Ataxia     COPD (chronic obstructive pulmonary disease) ([facility] )     wife denies - "never had"    CVA (cerebral vascular accident) ([facility]  )     Dementia ([place] [address] )     Difficulty swallowing     Difficulty walking     Generalized anxiety disorder     GERD (gastroesophageal reflux disease)     Global aphasia     H/O blood clots     Heartburn     Hypertension     Mental disorder     Muscle weakness     Neuromuscular dysfunction of bladder     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 ([place] [address] )     Stroke ([place] [address] )     Thrombosis          Surgical History     Past Surgical History:   Procedure Laterality Date    BOTOX INJECTION N/A 6/18/2019    Procedure: INJECTION BOTULINUM TOXIN (BOTOX), intra detrusor;  Surgeon: Patricio Hassan MD;  [facility];  Service: Urology    BOTOX INJECTION N/A 10/7/2019    Procedure: INJECTION BOTULINUM TOXIN (BOTOX), intradetrusor;  Surgeon: Patricio Hassan MD;  [facility];  Service: Urology    CYSTOSCOPY      CYSTOSCOPY N/A 6/18/2019    Procedure: cystoscopy and all indicated procedures;  Surgeon: Patricio Hassan MD;  [facility];  Service: Urology    FL CYSTOGRAM  1/15/2019    IR NEPHROSTOMY TUBE PLACEMENT  3/11/2020    IR SUPRAPUBIC CATHETER PLACEMENT  11/26/2019    IVC FILTER INSERTION      X2    IVC FILTER INSERTION      x2    KIDNEY STONE SURGERY      KNEE SURGERY      Sepsis infection     NEPHROSTOMY      NM CYSTO/URETERO W/LITHOTRIPSY &INDWELL STENT INSRT Right 6/14/2017    Procedure: CYSTOSCOPY URETEROSCOPY WITH LITHOTRIPSY HOLMIUM LASER,,BASKET STONE EXTRACTION, RETROGRADE PYELOGRAM AND INSERTION STENT URETERAL;  Surgeon: Eden Essex, MD;  [facility]: [facility];  Service: Urology    NM CYSTOURETHROSCOPY,BIOPSY N/A 1/15/2019    Procedure: Memory Sports, CYSTOGRAM, DILATION OF URETHRAL STRICTURE;  Surgeon: Eliezer Millan MD;  [facility];  Service: Urology    URINARY SURGERY           Family History     Family History   Problem Relation Age of Onset    Diabetes Father     Hypertension Father     Coronary artery disease Father     Cancer Father     Hypertension Mother          Social History     Social History     Social History     Tobacco Use   Smoking Status Never Smoker   Smokeless Tobacco Never Used         Pertinent Lab Values     Lab Results   Component Value Date    CREATININE 1 50 (H) 09/09/2020       No results found for: PSA          Pertinent Imaging      No new imaging for my review

## 2021-01-20 ENCOUNTER — NURSING HOME VISIT (OUTPATIENT)
Dept: GERIATRICS | Facility: OTHER | Age: 71
End: 2021-01-20
Payer: COMMERCIAL

## 2021-01-20 ENCOUNTER — PROCEDURE VISIT (OUTPATIENT)
Dept: UROLOGY | Facility: CLINIC | Age: 71
End: 2021-01-20
Payer: COMMERCIAL

## 2021-01-20 VITALS — DIASTOLIC BLOOD PRESSURE: 70 MMHG | SYSTOLIC BLOOD PRESSURE: 134 MMHG | HEART RATE: 62 BPM

## 2021-01-20 DIAGNOSIS — J41.0 SIMPLE CHRONIC BRONCHITIS (HCC): Chronic | ICD-10-CM

## 2021-01-20 DIAGNOSIS — K63.9 BOWEL TROUBLE: ICD-10-CM

## 2021-01-20 DIAGNOSIS — Z93.6 NEPHROSTOMY STATUS (HCC): ICD-10-CM

## 2021-01-20 DIAGNOSIS — F01.50 VASCULAR DEMENTIA WITHOUT BEHAVIORAL DISTURBANCE (HCC): ICD-10-CM

## 2021-01-20 DIAGNOSIS — N20.0 BILATERAL NEPHROLITHIASIS: Primary | ICD-10-CM

## 2021-01-20 DIAGNOSIS — Z93.59 SUPRAPUBIC CATHETER (HCC): ICD-10-CM

## 2021-01-20 DIAGNOSIS — F41.8 DEPRESSION WITH ANXIETY: ICD-10-CM

## 2021-01-20 DIAGNOSIS — N31.9 NEUROMUSCULAR DYSFUNCTION OF BLADDER: ICD-10-CM

## 2021-01-20 DIAGNOSIS — R53.81 DEBILITY: ICD-10-CM

## 2021-01-20 DIAGNOSIS — Z86.718 HISTORY OF DVT (DEEP VEIN THROMBOSIS): Chronic | ICD-10-CM

## 2021-01-20 DIAGNOSIS — K21.9 GASTROESOPHAGEAL REFLUX DISEASE WITHOUT ESOPHAGITIS: ICD-10-CM

## 2021-01-20 DIAGNOSIS — I10 ESSENTIAL HYPERTENSION: Chronic | ICD-10-CM

## 2021-01-20 PROCEDURE — 51710 CHANGE OF BLADDER TUBE: CPT | Performed by: UROLOGY

## 2021-01-20 PROCEDURE — 99213 OFFICE O/P EST LOW 20 MIN: CPT | Performed by: UROLOGY

## 2021-01-20 PROCEDURE — 99309 SBSQ NF CARE MODERATE MDM 30: CPT | Performed by: NURSE PRACTITIONER

## 2021-01-20 RX ORDER — CITRIC ACID, GLUCONOLACTONE AND MAGNESIUM CARBONATE 6.602; .198; 3.268 G/100ML; G/100ML; G/100ML
SOLUTION IRRIGATION
COMMUNITY
Start: 2021-01-14

## 2021-01-20 NOTE — PROGRESS NOTES
[facility] care  POS: 28 (LTC)  Progress Note    Unable to obtain from patient due to:  unable to obtain from patient: Dementia  Chief Complaint/Reason for visit: LTC follow up of bilateral nephrolithiasis; nephrostomy tube dementia; essential hypertension; COPD; GERD; constipation; history of DVT; debility  History of Present Illness:  41-year-old male seen and examined for LTC follow up  Patient was in bed at time of exam and appeared in no distress  He is scheduled for a suprapubic cystostomy today with Urology  Nephrostomy tube intact  Suprapubic catheter intact  Patient is alert, confused and disoriented  No acute concerns reported by nursing  His mental status is at baseline  Patient's weight has been maintaining around 190s  History obtained from nursing staff and EMR  Past Medical History: unchanged from history and physical  Past Medical History:   Diagnosis Date    Ataxia     COPD [facility]chronic obstructive pulmonary disease) ([facility] )     wife denies - "never had"    CVA (cerebral vascular accident) ([facility] )     Dementia ([facility] )     Difficulty swallowing     Difficulty walking     Generalized anxiety disorder     GERD (gastroesophageal reflux disease)     Global aphasia     H/O blood clots     Heartburn     Hypertension     Mental disorder     Muscle weakness     Neuromuscular dysfunction of bladder     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 ([facility] )     Stroke ([facility] )     Thrombosis      Family History: unchanged from history and physical  Social History: unchanged from history and physical  Resident Since: 10/4/2016  Review of systems: Review of Systems   Unable to perform ROS: Dementia     Medications: All medication and routine orders were reviewed and updated as  Allergies: Reviewed and unchanged  Consults reviewed: Other  Labs/Diagnostics (reviewed by this provider): Copy in Chart EMR    Imaging Reviewed:  None today    Physical Exam    Weight:  189 lb Temp:  97 6              BP:  142/76  Pulse: 54 Resp: 18       O2 Sat:  98% on room air  Constitutional: Normocephalic  Orientation:  Alert, confused and disoriented     Physical Exam  Vitals signs and nursing note reviewed  Constitutional:       General: He is not in acute distress  Appearance: He is not toxic-appearing or diaphoretic  HENT:      Head: Normocephalic and atraumatic  Nose: No congestion  Mouth/Throat:      Pharynx: No oropharyngeal exudate  Comments: Patient would not open mouth for exam   Eyes:      Conjunctiva/sclera: Conjunctivae normal       Pupils: Pupils are equal, round, and reactive to light  Neck:      Musculoskeletal: Neck supple  Cardiovascular:      Rate and Rhythm: Normal rate and regular rhythm  Pulmonary:      Effort: Pulmonary effort is normal  No respiratory distress  Breath sounds: Normal breath sounds  No wheezing, rhonchi or rales  Abdominal:      General: Bowel sounds are normal       Palpations: Abdomen is soft  Musculoskeletal:      Right lower leg: No edema  Left lower leg: No edema  Comments: Able to move all 4 extremities with generalized weakness  Lymphadenopathy:      Cervical: No cervical adenopathy  Skin:     General: Skin is warm and dry  Capillary Refill: Capillary refill takes less than 2 seconds  Neurological:      Mental Status: He is alert  Mental status is at baseline  Motor: Weakness present  Gait: Gait abnormal    Psychiatric:      Comments: Periods of agitation especially during care         Assessment/Plan:  68-year-old male with:    Bilateral nephrolithiasis  -with left nephrostomy tube and suprapubic catheter in place  -patient is scheduled for a suprapubic cystostomy procedure today with Urology      Suprapubic catheter [facility])  -with history of neurogenic bladder  -no hematuria noted    Dementia ([facility] [address] )  -becomes agitated during care  -continue memantine, Seroquel  -also on Buspar, mirtazapine  -continue delirium precautions  -continue supportive care  -monitor sleep/wake cycle  -monitor for pain and ensure that pain is adequately controlled  -monitor bowel and bladder status    Essential hypertension  -elevations in blood pressures noted 160s-170s  -continue Norvasc 10 mg daily  -increase metoprolol from 12 5 mg b i d  To 25 mg b i d   -continue to monitor  -monitor BMP q 6 months    CKD (chronic kidney disease)  Lab Results   Component Value Date    EGFR 46 09/09/2020    EGFR 56 03/19/2020    EGFR 46 03/18/2020    CREATININE 1 50 (H) 09/09/2020    CREATININE 1 43 03/23/2020    CREATININE 1 28 03/19/2020   -past baseline creatinine 1 2-1 4 per record review  -most recent creatinine 1 56/GFR 44  -avoid hypotension  -avoid nephrotoxins   -ensure adequate hydration    COPD (chronic obstructive pulmonary disease) (HCC)  -stable without exacerbation  -O2 sats stable  -continue to monitor    GERD (gastroesophageal reflux disease)  -continue Pepcid    Constipation  -controlled on Colace, MiraLax, and senna    History of DVT (deep vein thrombosis)  -continue Eliquis  -no evidence of active bleeding    Depression with anxiety  -continue BuSpar and Remeron    Debility  -multifactorial  -continue 24/7 care and support at LTCF for ADLs  -PT/OT/ST as needed  -continue fall precautions  -ensure adequate hydration and nutrition  -management of acute and chronic medical conditions as outlined    **Please note: This progress note was constructed using a voice recognition system  [address] [place], [place]  [id number]/83/[id number] PM

## 2021-01-20 NOTE — ASSESSMENT & PLAN NOTE
-with left nephrostomy tube and suprapubic catheter in place  -patient is scheduled for a suprapubic cystostomy procedure today with Urology

## 2021-01-20 NOTE — ASSESSMENT & PLAN NOTE
-multifactorial  -continue 24/7 care and support at [facility]TCF for ADLs  -PT/OT/ST as needed  -continue fall precautions  -ensure adequate hydration and nutrition  -management of acute and chronic medical conditions as outlined

## 2021-01-20 NOTE — ASSESSMENT & PLAN NOTE
Lab Results   Component Value Date    EGFR 46 09/09/2020    EGFR 56 03/19/2020    EGFR 46 03/18/2020    CREATININE 1 50 (H) 09/09/2020    CREATININE 1 43 03/23/2020    CREATININE 1 28 03/19/2020   -past baseline creatinine 1 2-1 4 per record review  -most recent creatinine 1 56/GFR 44  -avoid hypotension  -avoid nephrotoxins   -ensure adequate hydration

## 2021-01-20 NOTE — ASSESSMENT & PLAN NOTE
-becomes agitated during care  -continue memantine, Seroquel  -also on Buspar, mirtazapine  -continue delirium precautions  -continue supportive care  -monitor sleep/wake cycle  -monitor for pain and ensure that pain is adequately controlled  -monitor bowel and bladder status

## 2021-01-20 NOTE — PROGRESS NOTES
Suprapubic Tube Placement    Date/Time: 1/20/2021 3:10 PM  Performed by: Francine Collier MD  Authorized by: Francine Collier MD     Consent:     Consent obtained:  Verbal    Consent given by:  Edil Man and spouse    Risks discussed:  Bleeding, bowel perforation, infection and pain    Alternatives discussed:  No treatment  Universal protocol:     Procedure explained and questions answered to patient or proxy's satisfaction: yes      Relevant documents present and verified: yes      Test results available and properly labeled: yes      Radiology Images displayed and confirmed  If images not available, report reviewed: yes      Required blood products, implants, devices, and special equipment available: yes      Site/side marked: no      Immediately prior to procedure a time out was called: yes      Patient identity confirmed:  Verbally with patient and provided demographic data  Procedure details:     Catheter size:  20 Fr    Number of attempts:  2    Successful placement: yes      Urine characteristics:  Clear  Post-procedure details:     Patient tolerance of procedure: Tolerated well, no immediate complications  Comments:      Suprapubic catheter change over a wire, changed by the MD due to previous difficulties and inability to be changed by other providers

## 2021-01-20 NOTE — ASSESSMENT & PLAN NOTE
-elevations in blood pressures noted 160s-170s  -continue Norvasc 10 mg daily  -increase metoprolol from 12 5 mg b i d   To 25 mg b i d   -continue to monitor  -monitor BMP q 6 months

## 2021-01-26 DIAGNOSIS — R52 SEVERE PAIN: ICD-10-CM

## 2021-01-26 RX ORDER — OXYCODONE HYDROCHLORIDE 5 MG/1
5 TABLET ORAL 4 TIMES DAILY
Qty: 240 TABLET | Refills: 0 | Status: ON HOLD | OUTPATIENT
Start: 2021-01-26 | End: 2021-02-05 | Stop reason: SDUPTHER

## 2021-01-29 NOTE — PROGRESS NOTES
Called and spoke with nursing staff at patient's place of residence  States that he has been doing well  Not complaining of any abdominal pain or having any fevers  Urine culture likely is representative of chronic colonization  Will not start antibiotics at this time  Impression: Combined forms of age-related cataract, bilateral: H25.813. Plan: visually significant cataracts OU, schedule cataract extraction OD then OS. Risk level 2. Discussed risk, benefits and alternatives. Discussed iol options.

## 2021-02-01 ENCOUNTER — APPOINTMENT (EMERGENCY)
Dept: CT IMAGING | Facility: HOSPITAL | Age: 71
DRG: 698 | End: 2021-02-01
Payer: MEDICARE

## 2021-02-01 ENCOUNTER — APPOINTMENT (EMERGENCY)
Dept: RADIOLOGY | Facility: HOSPITAL | Age: 71
DRG: 698 | End: 2021-02-01
Payer: MEDICARE

## 2021-02-01 ENCOUNTER — HOSPITAL ENCOUNTER (INPATIENT)
Facility: HOSPITAL | Age: 71
LOS: 4 days | Discharge: NURSING, REHAB OR LONG-TERM CARE | DRG: 698 | End: 2021-02-05
Attending: EMERGENCY MEDICINE | Admitting: INTERNAL MEDICINE
Payer: MEDICARE

## 2021-02-01 DIAGNOSIS — U07.1 COVID-19: ICD-10-CM

## 2021-02-01 DIAGNOSIS — I10 ESSENTIAL HYPERTENSION: Chronic | ICD-10-CM

## 2021-02-01 DIAGNOSIS — U07.1 COVID-19 VIRUS INFECTION: ICD-10-CM

## 2021-02-01 DIAGNOSIS — R11.10 VOMITING: Primary | ICD-10-CM

## 2021-02-01 DIAGNOSIS — N39.0 UTI (URINARY TRACT INFECTION): ICD-10-CM

## 2021-02-01 DIAGNOSIS — R52 SEVERE PAIN: ICD-10-CM

## 2021-02-01 DIAGNOSIS — A41.9 SEPSIS (HCC): ICD-10-CM

## 2021-02-01 LAB
ALBUMIN SERPL BCP-MCNC: 2.8 G/DL (ref 3.5–5)
ALP SERPL-CCNC: 99 U/L (ref 46–116)
ALT SERPL W P-5'-P-CCNC: 11 U/L (ref 12–78)
AMMONIA PLAS-SCNC: <10 UMOL/L (ref 11–35)
ANION GAP SERPL CALCULATED.3IONS-SCNC: 9 MMOL/L (ref 4–13)
APTT PPP: 37 SECONDS (ref 23–37)
AST SERPL W P-5'-P-CCNC: <5 U/L (ref 5–45)
ATRIAL RATE: 100 BPM
BACTERIA UR QL AUTO: ABNORMAL /HPF
BASOPHILS # BLD AUTO: 0.04 THOUSANDS/ΜL (ref 0–0.1)
BASOPHILS NFR BLD AUTO: 0 % (ref 0–1)
BILIRUB SERPL-MCNC: 0.49 MG/DL (ref 0.2–1)
BILIRUB UR QL STRIP: NEGATIVE
BUN SERPL-MCNC: 23 MG/DL (ref 5–25)
CALCIUM ALBUM COR SERPL-MCNC: 9.8 MG/DL (ref 8.3–10.1)
CALCIUM SERPL-MCNC: 8.8 MG/DL (ref 8.3–10.1)
CHLORIDE SERPL-SCNC: 109 MMOL/L (ref 100–108)
CLARITY UR: ABNORMAL
CO2 SERPL-SCNC: 25 MMOL/L (ref 21–32)
COLOR UR: YELLOW
CREAT SERPL-MCNC: 1.85 MG/DL (ref 0.6–1.3)
CRP SERPL QL: 226.8 MG/L
D DIMER PPP FEU-MCNC: 0.64 UG/ML FEU
EOSINOPHIL # BLD AUTO: 0.01 THOUSAND/ΜL (ref 0–0.61)
EOSINOPHIL NFR BLD AUTO: 0 % (ref 0–6)
ERYTHROCYTE [DISTWIDTH] IN BLOOD BY AUTOMATED COUNT: 15.3 % (ref 11.6–15.1)
FLUAV RNA RESP QL NAA+PROBE: NEGATIVE
FLUBV RNA RESP QL NAA+PROBE: NEGATIVE
GFR SERPL CREATININE-BSD FRML MDRD: 36 ML/MIN/1.73SQ M
GLUCOSE SERPL-MCNC: 153 MG/DL (ref 65–140)
GLUCOSE UR STRIP-MCNC: NEGATIVE MG/DL
HCT VFR BLD AUTO: 44.9 % (ref 36.5–49.3)
HGB BLD-MCNC: 14.7 G/DL (ref 12–17)
HGB UR QL STRIP.AUTO: ABNORMAL
IMM GRANULOCYTES # BLD AUTO: 0.1 THOUSAND/UL (ref 0–0.2)
IMM GRANULOCYTES NFR BLD AUTO: 1 % (ref 0–2)
INR PPP: 1.5 (ref 0.84–1.19)
KETONES UR STRIP-MCNC: NEGATIVE MG/DL
LACTATE SERPL-SCNC: 1.6 MMOL/L (ref 0.5–2)
LEUKOCYTE ESTERASE UR QL STRIP: ABNORMAL
LIPASE SERPL-CCNC: 36 U/L (ref 73–393)
LYMPHOCYTES # BLD AUTO: 0.92 THOUSANDS/ΜL (ref 0.6–4.47)
LYMPHOCYTES NFR BLD AUTO: 5 % (ref 14–44)
MCH RBC QN AUTO: 28.7 PG (ref 26.8–34.3)
MCHC RBC AUTO-ENTMCNC: 32.7 G/DL (ref 31.4–37.4)
MCV RBC AUTO: 88 FL (ref 82–98)
MONOCYTES # BLD AUTO: 0.98 THOUSAND/ΜL (ref 0.17–1.22)
MONOCYTES NFR BLD AUTO: 5 % (ref 4–12)
NEUTROPHILS # BLD AUTO: 16.66 THOUSANDS/ΜL (ref 1.85–7.62)
NEUTS SEG NFR BLD AUTO: 89 % (ref 43–75)
NITRITE UR QL STRIP: POSITIVE
NON-SQ EPI CELLS URNS QL MICRO: ABNORMAL /HPF
NRBC BLD AUTO-RTO: 0 /100 WBCS
NT-PROBNP SERPL-MCNC: 902 PG/ML
P AXIS: 66 DEGREES
PH UR STRIP.AUTO: 6.5 [PH]
PLATELET # BLD AUTO: 248 THOUSANDS/UL (ref 149–390)
PMV BLD AUTO: 10.4 FL (ref 8.9–12.7)
POTASSIUM SERPL-SCNC: 3.5 MMOL/L (ref 3.5–5.3)
PR INTERVAL: 136 MS
PROCALCITONIN SERPL-MCNC: 1.76 NG/ML
PROT SERPL-MCNC: 7.5 G/DL (ref 6.4–8.2)
PROT UR STRIP-MCNC: ABNORMAL MG/DL
PROTHROMBIN TIME: 18.2 SECONDS (ref 11.6–14.5)
QRS AXIS: 250 DEGREES
QRSD INTERVAL: 84 MS
QT INTERVAL: 314 MS
QTC INTERVAL: 405 MS
RBC # BLD AUTO: 5.13 MILLION/UL (ref 3.88–5.62)
RBC #/AREA URNS AUTO: ABNORMAL /HPF
RSV RNA RESP QL NAA+PROBE: NEGATIVE
SARS-COV-2 RNA RESP QL NAA+PROBE: POSITIVE
SODIUM SERPL-SCNC: 143 MMOL/L (ref 136–145)
SP GR UR STRIP.AUTO: <=1.005 (ref 1–1.03)
T WAVE AXIS: 74 DEGREES
TROPONIN I SERPL-MCNC: <0.02 NG/ML
URINE COMMENT: ABNORMAL
UROBILINOGEN UR QL STRIP.AUTO: 0.2 E.U./DL
VENTRICULAR RATE: 100 BPM
WBC # BLD AUTO: 18.71 THOUSAND/UL (ref 4.31–10.16)
WBC #/AREA URNS AUTO: ABNORMAL /HPF

## 2021-02-01 PROCEDURE — 87040 BLOOD CULTURE FOR BACTERIA: CPT | Performed by: PHYSICIAN ASSISTANT

## 2021-02-01 PROCEDURE — 99285 EMERGENCY DEPT VISIT HI MDM: CPT

## 2021-02-01 PROCEDURE — G1004 CDSM NDSC: HCPCS

## 2021-02-01 PROCEDURE — 85730 THROMBOPLASTIN TIME PARTIAL: CPT | Performed by: PHYSICIAN ASSISTANT

## 2021-02-01 PROCEDURE — 83880 ASSAY OF NATRIURETIC PEPTIDE: CPT | Performed by: INTERNAL MEDICINE

## 2021-02-01 PROCEDURE — 99223 1ST HOSP IP/OBS HIGH 75: CPT | Performed by: INTERNAL MEDICINE

## 2021-02-01 PROCEDURE — 96361 HYDRATE IV INFUSION ADD-ON: CPT

## 2021-02-01 PROCEDURE — 83690 ASSAY OF LIPASE: CPT | Performed by: PHYSICIAN ASSISTANT

## 2021-02-01 PROCEDURE — 74177 CT ABD & PELVIS W/CONTRAST: CPT

## 2021-02-01 PROCEDURE — 96374 THER/PROPH/DIAG INJ IV PUSH: CPT

## 2021-02-01 PROCEDURE — 85379 FIBRIN DEGRADATION QUANT: CPT | Performed by: INTERNAL MEDICINE

## 2021-02-01 PROCEDURE — 87086 URINE CULTURE/COLONY COUNT: CPT | Performed by: PHYSICIAN ASSISTANT

## 2021-02-01 PROCEDURE — 85610 PROTHROMBIN TIME: CPT | Performed by: PHYSICIAN ASSISTANT

## 2021-02-01 PROCEDURE — 81001 URINALYSIS AUTO W/SCOPE: CPT | Performed by: PHYSICIAN ASSISTANT

## 2021-02-01 PROCEDURE — 86140 C-REACTIVE PROTEIN: CPT | Performed by: INTERNAL MEDICINE

## 2021-02-01 PROCEDURE — 93010 ELECTROCARDIOGRAM REPORT: CPT | Performed by: INTERNAL MEDICINE

## 2021-02-01 PROCEDURE — 71045 X-RAY EXAM CHEST 1 VIEW: CPT

## 2021-02-01 PROCEDURE — 84484 ASSAY OF TROPONIN QUANT: CPT | Performed by: PHYSICIAN ASSISTANT

## 2021-02-01 PROCEDURE — 36415 COLL VENOUS BLD VENIPUNCTURE: CPT | Performed by: PHYSICIAN ASSISTANT

## 2021-02-01 PROCEDURE — 93005 ELECTROCARDIOGRAM TRACING: CPT

## 2021-02-01 PROCEDURE — 82728 ASSAY OF FERRITIN: CPT | Performed by: INTERNAL MEDICINE

## 2021-02-01 PROCEDURE — 85025 COMPLETE CBC W/AUTO DIFF WBC: CPT | Performed by: PHYSICIAN ASSISTANT

## 2021-02-01 PROCEDURE — 82140 ASSAY OF AMMONIA: CPT | Performed by: PHYSICIAN ASSISTANT

## 2021-02-01 PROCEDURE — 80053 COMPREHEN METABOLIC PANEL: CPT | Performed by: PHYSICIAN ASSISTANT

## 2021-02-01 PROCEDURE — 99285 EMERGENCY DEPT VISIT HI MDM: CPT | Performed by: PHYSICIAN ASSISTANT

## 2021-02-01 PROCEDURE — 84145 PROCALCITONIN (PCT): CPT | Performed by: PHYSICIAN ASSISTANT

## 2021-02-01 PROCEDURE — 0241U HB NFCT DS VIR RESP RNA 4 TRGT: CPT | Performed by: PHYSICIAN ASSISTANT

## 2021-02-01 PROCEDURE — 83605 ASSAY OF LACTIC ACID: CPT | Performed by: PHYSICIAN ASSISTANT

## 2021-02-01 RX ORDER — LACTULOSE 20 G/30ML
20 SOLUTION ORAL DAILY
Status: DISCONTINUED | OUTPATIENT
Start: 2021-02-02 | End: 2021-02-05 | Stop reason: HOSPADM

## 2021-02-01 RX ORDER — BUSPIRONE HYDROCHLORIDE 5 MG/1
7.5 TABLET ORAL 3 TIMES DAILY
Status: DISCONTINUED | OUTPATIENT
Start: 2021-02-01 | End: 2021-02-05 | Stop reason: HOSPADM

## 2021-02-01 RX ORDER — MIRTAZAPINE 15 MG/1
15 TABLET, FILM COATED ORAL
Status: DISCONTINUED | OUTPATIENT
Start: 2021-02-01 | End: 2021-02-05 | Stop reason: HOSPADM

## 2021-02-01 RX ORDER — FAMOTIDINE 20 MG/1
20 TABLET, FILM COATED ORAL 2 TIMES DAILY
Status: DISCONTINUED | OUTPATIENT
Start: 2021-02-02 | End: 2021-02-05 | Stop reason: HOSPADM

## 2021-02-01 RX ORDER — ASCORBIC ACID 500 MG
1000 TABLET ORAL EVERY 12 HOURS SCHEDULED
Status: DISCONTINUED | OUTPATIENT
Start: 2021-02-01 | End: 2021-02-05 | Stop reason: HOSPADM

## 2021-02-01 RX ORDER — MAGNESIUM HYDROXIDE 1200 MG/15ML
10 LIQUID ORAL 2 TIMES DAILY
Status: DISCONTINUED | OUTPATIENT
Start: 2021-02-01 | End: 2021-02-05 | Stop reason: HOSPADM

## 2021-02-01 RX ORDER — SACCHAROMYCES BOULARDII 250 MG
250 CAPSULE ORAL 2 TIMES DAILY
Status: DISCONTINUED | OUTPATIENT
Start: 2021-02-02 | End: 2021-02-05 | Stop reason: HOSPADM

## 2021-02-01 RX ORDER — DOCUSATE SODIUM 100 MG/1
100 CAPSULE, LIQUID FILLED ORAL 2 TIMES DAILY
Status: DISCONTINUED | OUTPATIENT
Start: 2021-02-02 | End: 2021-02-05 | Stop reason: HOSPADM

## 2021-02-01 RX ORDER — SODIUM CHLORIDE 9 MG/ML
10 INJECTION INTRAVENOUS DAILY
Status: DISCONTINUED | OUTPATIENT
Start: 2021-02-02 | End: 2021-02-05 | Stop reason: HOSPADM

## 2021-02-01 RX ORDER — MELATONIN
2000 DAILY
Status: DISCONTINUED | OUTPATIENT
Start: 2021-02-02 | End: 2021-02-05 | Stop reason: HOSPADM

## 2021-02-01 RX ORDER — DOCUSATE SODIUM 100 MG/1
100 CAPSULE, LIQUID FILLED ORAL 2 TIMES DAILY
Status: DISCONTINUED | OUTPATIENT
Start: 2021-02-02 | End: 2021-02-02

## 2021-02-01 RX ORDER — ONDANSETRON 2 MG/ML
4 INJECTION INTRAMUSCULAR; INTRAVENOUS EVERY 6 HOURS PRN
Status: DISCONTINUED | OUTPATIENT
Start: 2021-02-01 | End: 2021-02-05 | Stop reason: HOSPADM

## 2021-02-01 RX ORDER — BISACODYL 10 MG
10 SUPPOSITORY, RECTAL RECTAL DAILY PRN
Status: DISCONTINUED | OUTPATIENT
Start: 2021-02-01 | End: 2021-02-05 | Stop reason: HOSPADM

## 2021-02-01 RX ORDER — POLYETHYLENE GLYCOL 3350 17 G/17G
17 POWDER, FOR SOLUTION ORAL DAILY
Status: DISCONTINUED | OUTPATIENT
Start: 2021-02-02 | End: 2021-02-05 | Stop reason: HOSPADM

## 2021-02-01 RX ORDER — ACETAMINOPHEN 325 MG/1
650 TABLET ORAL EVERY 4 HOURS PRN
Status: DISCONTINUED | OUTPATIENT
Start: 2021-02-01 | End: 2021-02-05 | Stop reason: HOSPADM

## 2021-02-01 RX ORDER — SODIUM CHLORIDE 9 MG/ML
75 INJECTION, SOLUTION INTRAVENOUS CONTINUOUS
Status: DISCONTINUED | OUTPATIENT
Start: 2021-02-01 | End: 2021-02-03

## 2021-02-01 RX ORDER — OXYCODONE HYDROCHLORIDE 5 MG/1
5 TABLET ORAL 4 TIMES DAILY
Status: DISCONTINUED | OUTPATIENT
Start: 2021-02-01 | End: 2021-02-05 | Stop reason: HOSPADM

## 2021-02-01 RX ORDER — QUETIAPINE FUMARATE 25 MG/1
25 TABLET, FILM COATED ORAL
Status: DISCONTINUED | OUTPATIENT
Start: 2021-02-01 | End: 2021-02-05 | Stop reason: HOSPADM

## 2021-02-01 RX ORDER — BISACODYL 10 MG
10 SUPPOSITORY, RECTAL RECTAL DAILY
Status: COMPLETED | OUTPATIENT
Start: 2021-02-02 | End: 2021-02-03

## 2021-02-01 RX ORDER — ZINC SULFATE 50(220)MG
220 CAPSULE ORAL DAILY
Status: DISCONTINUED | OUTPATIENT
Start: 2021-02-01 | End: 2021-02-05 | Stop reason: HOSPADM

## 2021-02-01 RX ORDER — MEMANTINE HYDROCHLORIDE 5 MG/1
5 TABLET ORAL 2 TIMES DAILY
Status: DISCONTINUED | OUTPATIENT
Start: 2021-02-02 | End: 2021-02-05 | Stop reason: HOSPADM

## 2021-02-01 RX ORDER — POTASSIUM CHLORIDE 750 MG/1
10 TABLET, EXTENDED RELEASE ORAL
Status: DISCONTINUED | OUTPATIENT
Start: 2021-02-02 | End: 2021-02-05 | Stop reason: HOSPADM

## 2021-02-01 RX ORDER — POLYETHYLENE GLYCOL 3350 17 G/17G
17 POWDER, FOR SOLUTION ORAL DAILY
Status: DISCONTINUED | OUTPATIENT
Start: 2021-02-02 | End: 2021-02-02

## 2021-02-01 RX ORDER — MELATONIN
2000 DAILY
Status: DISCONTINUED | OUTPATIENT
Start: 2021-02-01 | End: 2021-02-02

## 2021-02-01 RX ORDER — AMMONIUM LACTATE 12 G/100G
CREAM TOPICAL DAILY
Status: DISCONTINUED | OUTPATIENT
Start: 2021-02-02 | End: 2021-02-05 | Stop reason: HOSPADM

## 2021-02-01 RX ORDER — MULTIVITAMIN/IRON/FOLIC ACID 18MG-0.4MG
1 TABLET ORAL DAILY
Status: DISCONTINUED | OUTPATIENT
Start: 2021-02-08 | End: 2021-02-05 | Stop reason: HOSPADM

## 2021-02-01 RX ORDER — SENNOSIDES 8.6 MG
2 TABLET ORAL
Status: DISCONTINUED | OUTPATIENT
Start: 2021-02-01 | End: 2021-02-05 | Stop reason: HOSPADM

## 2021-02-01 RX ORDER — ACETAMINOPHEN 650 MG/1
650 SUPPOSITORY RECTAL ONCE
Status: COMPLETED | OUTPATIENT
Start: 2021-02-01 | End: 2021-02-01

## 2021-02-01 RX ORDER — AMLODIPINE BESYLATE 10 MG/1
10 TABLET ORAL DAILY
Status: DISCONTINUED | OUTPATIENT
Start: 2021-02-02 | End: 2021-02-05 | Stop reason: HOSPADM

## 2021-02-01 RX ADMIN — ACETAMINOPHEN 650 MG: 650 SUPPOSITORY RECTAL at 10:28

## 2021-02-01 RX ADMIN — ZINC SULFATE 220 MG (50 MG) CAPSULE 220 MG: CAPSULE at 16:46

## 2021-02-01 RX ADMIN — SODIUM CHLORIDE 500 ML: 0.9 INJECTION, SOLUTION INTRAVENOUS at 10:28

## 2021-02-01 RX ADMIN — CEFEPIME HYDROCHLORIDE 1000 MG: 2 INJECTION, POWDER, FOR SOLUTION INTRAVENOUS at 11:00

## 2021-02-01 RX ADMIN — Medication 1000 MG: at 21:08

## 2021-02-01 RX ADMIN — Medication 2000 UNITS: at 16:43

## 2021-02-01 RX ADMIN — IODIXANOL 100 ML: 320 INJECTION, SOLUTION INTRAVASCULAR at 10:03

## 2021-02-01 NOTE — H&P
H&P- [facility] 1950, 65 y o  male MRN: [id number]    Unit/Bed#: ED 15 Encounter: [id number]    Primary Care Provider: Micaela Dean MD   Date and time admitted to hospital: 2/1/2021  8:30 AM        * Sepsis [facility])  Assessment & Plan  Present on admission  Hemodynamically stable, nontoxic appearing  Likely due to urinary source  IV cefepime  Follow-up on cultures      COVID-19 virus infection  Assessment & Plan  Noted to be COVID-19 positive  Maintaining O2 sats on ambient air  Patient will be placed on mild treatment protocol  Monitor inflammatory markers, ambulatory O2 sats  Supportive care    Nephrostomy status (HCC)  Assessment & Plan  Left-sided nephrostomy tube in place  Nephrostomy tube care      Depression with anxiety  Assessment & Plan  Continue BuSpar, Seroquel, Remeron    Bilateral nephrolithiasis  Assessment & Plan  History of bilateral nephrolithiasis    Suprapubic catheter [facility])  Assessment & Plan  Suprapubic catheter in place  Outpatient Urology follow-up    Dementia [facility])  Assessment & Plan  Monitor for delirium  Reorientation  Sleep hygiene    Constipation  Assessment & Plan  Bowel regimen  Soap suds enema today        Essential hypertension  Assessment & Plan  Monitor blood pressures  Avoid hypotension    History of DVT (deep vein thrombosis)  Assessment & Plan  On Eliquis for anticoagulation    COPD (chronic obstructive pulmonary disease) ([facility] [address] )  Assessment & Plan  Continue respiratory treatments          Patient's spouse Brayan Vieyra - would like to be informed of any medication changes, she also requests regular updates      VTE Prophylaxis: Apixaban (Eliquis)  / sequential compression device   Code Status:  DNR discussed with spouse Brayan Vieyra  POLST: There is no POLST form on file for this patient (pre-hospital)  Discussion with family:  Updated spouse Brayan Vieyra in detail, questions answered    Anticipated Length of Stay:  Patient will be admitted on an Inpatient basis with an anticipated length of stay of  more than 2 midnights  Justification for Hospital Stay:  Sepsis, COVID-19 infection - management as outlined      Chief Complaint:       Vomiting    History of Present Illness:    Kierra Reynolds is a 65 y o  male who presents with vomiting  Patient is a resident of SNF with history of dementia, stroke, hypertension, COPD, suprapubic catheter in place, left nephrostomy tube in place presents from SNF with complaints of vomiting  He was noted to be cold and clammy today morning  Patient is nonverbal at baseline, history is obtained from review of chart from the ER physician as well as spouse Matti Carreon over phone  In the ER workup revealed COVID-19 positive  He also has sepsis likely due to urinary source  Patient has prior history of recurrent urinary tract infections    Presently comfortably lying in bed, patient is nonverbal       Review of Systems:    Review of Systems   Unable to perform ROS: Dementia       Past Medical and Surgical History:     Past Medical History:   Diagnosis Date    Ataxia     COPD (chronic obstructive pulmonary disease) ([facility] [address] )     wife denies - "never had"    CVA (cerebral vascular accident) ([facility] [address] )     Dementia ([facility] [address] )     Difficulty swallowing     Difficulty walking     Generalized anxiety disorder     GERD (gastroesophageal reflux disease)     Global aphasia     H/O blood clots     Heartburn     Hypertension     Mental disorder     Muscle weakness     Neuromuscular dysfunction of bladder     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 ([facility] [address] )     Stroke ([facility] [address] )     Thrombosis        Past Surgical History:   Procedure Laterality Date    BOTOX INJECTION N/A 6/18/2019    Procedure: INJECTION BOTULINUM TOXIN (BOTOX), intra detrusor;  Surgeon: Lucrecia Plascencia MD;  [facility];  Service: Urology    BOTOX INJECTION N/A 10/7/2019    Procedure: INJECTION BOTULINUM TOXIN (BOTOX), intradetrusor;  Surgeon: Lucrecia Plascencia MD; [facility];  Service: Urology    CYSTOSCOPY      CYSTOSCOPY N/A 6/18/2019    Procedure: cystoscopy and all indicated procedures;  Surgeon: Caron Park MD;  [facility];  Service: Urology    FL CYSTOGRAM  1/15/2019    IR NEPHROSTOMY TUBE PLACEMENT  3/11/2020    IR SUPRAPUBIC CATHETER PLACEMENT  11/26/2019    IVC FILTER INSERTION      X2    IVC FILTER INSERTION      x2    KIDNEY STONE SURGERY      KNEE SURGERY      Sepsis infection     NEPHROSTOMY      HI CYSTO/URETERO W/LITHOTRIPSY &INDWELL STENT INSRT Right 6/14/2017    Procedure: CYSTOSCOPY URETEROSCOPY WITH LITHOTRIPSY HOLMIUM LASER,,BASKET STONE EXTRACTION, RETROGRADE PYELOGRAM AND INSERTION STENT URETERAL;  Surgeon: Bryan Hi MD;  [facility];  Service: Urology    HI CYSTOURETHROSCOPY,BIOPSY N/A 1/15/2019    Procedure: CYSTOSCOPY, CYSTOGRAM, DILATION OF URETHRAL STRICTURE;  Surgeon: Caron Park MD;  [facility];  Service: Urology    URINARY SURGERY         Meds/Allergies:    Prior to Admission medications    Medication Sig Start Date End Date Taking?  Authorizing Provider   acetaminophen (TYLENOL) 325 mg tablet Take 650 mg by mouth every 4 (four) hours as needed for mild pain     Historical Provider, MD   amLODIPine (NORVASC) 10 mg tablet Take 10 mg by mouth daily    Historical Provider, MD   ammonium lactate (LAC-HYDRIN) 12 % cream Apply topically daily 3/20/20   Yanna Ching MD   apixaban (ELIQUIS) 2 5 mg Take 2 5 mg by mouth 2 (two) times a day    Historical Provider, MD   bisacodyl (DULCOLAX) 10 mg suppository Insert 1 suppository (10 mg total) into the rectum daily as needed for constipation 12/1/19   Seema Lopez MD   busPIRone (BUSPAR) 7 5 mg tablet Take 7 5 mg by mouth 3 (three) times a day    Historical Provider, MD   cholecalciferol (VITAMIN D3) 1,000 units tablet Take 2,000 Units by mouth daily    Historical Provider, MD   Citric Qu-Dlcrtzgcovy-Bj Carb (Renacidin) SOLN 1/14/21   Historical Provider, MD   docusate sodium (COLACE) 100 mg capsule Take 100 mg by mouth 2 (two) times a day    Historical Provider, MD   famotidine (PEPCID) 20 mg tablet Take 20 mg by mouth 2 (two) times a day    Historical Provider, MD   glycerin-hypromellose- (ARTIFICIAL TEARS) 0 2-0 2-1 % SOLN Administer 1 drop to both eyes 2 (two) times a day    Historical Provider, MD   memantine (NAMENDA) 5 mg tablet 2 (two) times a day  12/17/19   Historical Provider, MD   metoprolol tartrate (LOPRESSOR) 25 mg tablet Take 0 5 tablets (12 5 mg total) by mouth 2 (two) times a day 7/10/19 2/8/22  Maribel Smith MD   mirtazapine (REMERON) 15 mg tablet daily at bedtime  12/14/19   Historical Provider, MD   oxyCODONE (ROXICODONE) 5 mg immediate release tablet Take 1 tablet (5 mg total) by mouth 4 (four) times a day Hold if pt is drowsyMax Daily Amount: 20 mg 1/26/21   Anayeli Stewart MD   polyethylene glycol (MIRALAX) 17 g packet Take 17 g by mouth daily 4/26/19   Wei Daniel MD   potassium chloride (K-DUR,KLOR-CON) 10 mEq tablet Take 1 tablet (10 mEq total) by mouth daily with breakfast 3/20/20   Yanna Ching MD   QUEtiapine (SEROquel) 25 mg tablet Take 25 mg by mouth daily at bedtime      Historical Provider, MD   saccharomyces boulardii (FLORASTOR) 250 mg capsule Take 250 mg by mouth 2 (two) times a day    Historical Provider, MD   senna (SENOKOT) 8 6 mg Take 2 tablets (17 2 mg total) by mouth daily at bedtime 3/19/20   Yanna Ching MD   sodium chloride, PF, 0 9 % 10 mL by Intracatheter route daily Intracatheter flushing daily 9/9/20 12/8/20  Elva Ram MD     I have reveiwed home medications using records provided by [facility]  Allergies: Allergies   Allergen Reactions    Gentamycin [Gentamicin] Anaphylaxis    Piperacillin Sod-Tazobactam So Anaphylaxis and Rash     However, has tolerated Ertapenem, Cefdinir, and Cefepime, which all have different side chains   Avoid Penicillins and the following Cephs with similar side chains (Cephalexin, Cefadroxil, Cefaclor, Cefprozil, or  Cefoxitin)  Other reaction(s): Hemoptysis    Vancomycin Anaphylaxis and Rash     Other reaction(s): Hemoptysis    Clindamycin Itching and Rash     Other reaction(s): Hemoptysis    Nsaids Other (See Comments)     Nephrotic Syndrome    Sulfa Antibiotics Rash    Other Rash     antipersperents  Stat lock from lucia catheter    Tigecycline Rash     Other reaction(s): Hemoptysis       Social History:     Marital Status: /Civil Union   Occupation:   Patient Pre-hospital Living Situation: snf  Patient Pre-hospital Level of Mobility:  Ambulatory dysfunction  Patient Pre-hospital Diet Restrictions: no  Substance Use History:   Social History     Substance and Sexual Activity   Alcohol Use No     Social History     Tobacco Use   Smoking Status Never Smoker   Smokeless Tobacco Never Used     Social History     Substance and Sexual Activity   Drug Use No       Family History:    Family History   Problem Relation Age of Onset    Diabetes Father     Hypertension Father     Coronary artery disease Father     Cancer Father     Hypertension Mother        Physical Exam:     Vitals:   Blood Pressure: 119/64 (02/01/21 1248)  Pulse: 91 (02/01/21 1248)  Temperature: (!) 102 2 °F (39 °C) (02/01/21 0834)  Temp Source: Oral (02/01/21 0834)  Respirations: 16 (02/01/21 0834)  Height: 5' 11" (180 3 cm) (02/01/21 1342)  Weight - Scale: 98 kg (216 lb 0 8 oz) (02/01/21 0834)  SpO2: 95 % (02/01/21 1248)    Physical Exam    Comfortably lying in bed  Neck supple  Lungs diminished breath sounds bilaterally  Heart sounds S1-S2 noted  Abdomen soft nontender  Awake  Nonverbal  Pulses noted  No rash    Additional Data:     Lab Results: I have personally reviewed pertinent reports        Results from last 7 days   Lab Units 02/01/21  0900   WBC Thousand/uL 18 71*   HEMOGLOBIN g/dL 14 7   HEMATOCRIT % 44 9   PLATELETS Thousands/uL 248   NEUTROS PCT % 89*   LYMPHS PCT % 5*   MONOS PCT % 5   EOS PCT % 0     Results from last 7 days   Lab Units 02/01/21  0900   SODIUM mmol/L 143   POTASSIUM mmol/L 3 5   CHLORIDE mmol/L 109*   CO2 mmol/L 25   BUN mg/dL 23   CREATININE mg/dL 1 85*   ANION GAP mmol/L 9   CALCIUM mg/dL 8 8   ALBUMIN g/dL 2 8*   TOTAL BILIRUBIN mg/dL 0 49   ALK PHOS U/L 99   ALT U/L 11*   AST U/L <5*   GLUCOSE RANDOM mg/dL 153*     Results from last 7 days   Lab Units 02/01/21  0900   INR  1 50*             Results from last 7 days   Lab Units 02/01/21  0900   LACTIC ACID mmol/L 1 6   PROCALCITONIN ng/ml 1 76*       Imaging: I have personally reviewed pertinent reports  CT abdomen pelvis with contrast   Final Result by Alize Cross MD (02/01 9474)      Large volume of rectal stool suggesting fecal impaction  Bilateral renal calculi  Left percutaneous nephrostomy catheter present, no hydronephrosis is identified  Cholelithiasis  Workstation performed: [id number]         XR chest 1 view portable   Final Result by Deedee Summers MD (02/01 3463)      No acute cardiopulmonary disease  Workstation performed: [id number]             EKG, Pathology, and Other Studies Reviewed on Admission:   · EKG:  Sinus rhythm, frequent PVCs noted    Allscripts / Epic Records Reviewed: Yes     ** Please Note: This note has been constructed using a voice recognition system   **

## 2021-02-01 NOTE — ASSESSMENT & PLAN NOTE
Noted to be COVID-19 positive  Maintaining O2 sats on ambient air  Patient will be placed on mild treatment protocol  Monitor inflammatory markers, ambulatory O2 sats  Supportive care

## 2021-02-01 NOTE — ASSESSMENT & PLAN NOTE
Present on admission  Hemodynamically stable, nontoxic appearing  Likely due to urinary source  IV cefepime  Follow-up on cultures

## 2021-02-01 NOTE — ED PROVIDER NOTES
History  Chief Complaint   Patient presents with    Vomiting     pt sent from [facility] for episode of vomiting  The patient is a 68-year-old male with extensive past medical history including COPD, dementia, stroke, hypertension and suprapubic catheter/left nephrostomy tube who presents to the emergency department for evaluation of one episode of vomiting  The patient is a resident at [facility]  They report that the patient became cold and clammy while sitting at a table this morning and then had one subsequent episode of vomiting  Patient is nonverbal at baseline  They called to have him brought in for evaluation  The patient does have a history of frequent UTIs previously  Per the wife who called, she states that she wishes to be notified before any treatment of medications is ordered  She was agreeable to me doing a workup in the ED regarding the patient's fever and vomiting  Patient unable to provide any history  History provided by:  Nursing home, EMS personnel and significant other  History limited by:  Dementia and patient nonverbal   used: No    Vomiting  Associated symptoms: fever        Prior to Admission Medications   Prescriptions Last Dose Informant Patient Reported? Taking?    Citric Xw-Kdzecdgtffo-Dr Carb (Renacidin) SOLN   Yes No   QUEtiapine (SEROquel) 25 mg tablet  Outside Facility (Specify) Yes No   Sig: Take 25 mg by mouth daily at bedtime     acetaminophen (TYLENOL) 325 mg tablet  Outside Facility (Specify) Yes No   Sig: Take 650 mg by mouth every 4 (four) hours as needed for mild pain    amLODIPine (NORVASC) 10 mg tablet  Outside Facility (Specify) Yes No   Sig: Take 10 mg by mouth daily   ammonium lactate (LAC-HYDRIN) 12 % cream  Outside Facility (Specify) No No   Sig: Apply topically daily   apixaban (ELIQUIS) 2 5 mg  Outside Facility (Specify) Yes No   Sig: Take 2 5 mg by mouth 2 (two) times a day   bisacodyl (DULCOLAX) 10 mg suppository  Outside Facility (Specify) No No   Sig: Insert 1 suppository (10 mg total) into the rectum daily as needed for constipation   busPIRone (BUSPAR) 7 5 mg tablet  Outside Facility (Specify) Yes No   Sig: Take 7 5 mg by mouth 3 (three) times a day   cholecalciferol (VITAMIN D3) 1,000 units tablet  Outside Facility (Specify) Yes No   Sig: Take 2,000 Units by mouth daily   docusate sodium (COLACE) 100 mg capsule  Outside Facility (Specify) Yes No   Sig: Take 100 mg by mouth 2 (two) times a day   famotidine (PEPCID) 20 mg tablet  Outside Facility (Specify) Yes No   Sig: Take 20 mg by mouth 2 (two) times a day   glycerin-hypromellose- (ARTIFICIAL TEARS) 0 2-0 2-1 % SOLN  Outside Facility (Specify) Yes No   Sig: Administer 1 drop to both eyes 2 (two) times a day   memantine (NAMENDA) 5 mg tablet  Outside Facility (Specify) Yes No   Si (two) times a day    metoprolol tartrate (LOPRESSOR) 25 mg tablet  Outside Facility (Specify) No No   Sig: Take 0 5 tablets (12 5 mg total) by mouth 2 (two) times a day   mirtazapine (REMERON) 15 mg tablet  Outside Facility (Specify) Yes No   Sig: daily at bedtime    oxyCODONE (ROXICODONE) 5 mg immediate release tablet   No No   Sig: Take 1 tablet (5 mg total) by mouth 4 (four) times a day Hold if pt is drowsyMax Daily Amount: 20 mg   polyethylene glycol (MIRALAX) 17 g packet  Outside Facility (Specify) No No   Sig: Take 17 g by mouth daily   potassium chloride (K-DUR,KLOR-CON) 10 mEq tablet  Outside Facility (Specify) No No   Sig: Take 1 tablet (10 mEq total) by mouth daily with breakfast   saccharomyces boulardii (FLORASTOR) 250 mg capsule  Outside Facility (Specify) Yes No   Sig: Take 250 mg by mouth 2 (two) times a day   senna (SENOKOT) 8 6 mg  Outside Facility (Specify) No No   Sig: Take 2 tablets (17 2 mg total) by mouth daily at bedtime   sodium chloride, PF, 0 9 %  Outside Facility (Specify) No No   Sig: 10 mL by Intracatheter route daily Intracatheter flushing daily Facility-Administered Medications: None       Past Medical History:   Diagnosis Date    Ataxia     COPD (chronic obstructive pulmonary disease) ([facility] )     wife denies - "never had"    [facility]     Dementia ([facility] )     Difficulty swallowing     Difficulty walking     Generalized anxiety disorder     GERD (gastroesophageal reflux disease)     Global aphasia     H/O blood clots     Heartburn     Hypertension     Mental disorder     Muscle weakness     Neuromuscular dysfunction of bladder     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 ([facility] )     Stroke ([place] [address] )     Thrombosis        Past Surgical History:   Procedure Laterality Date    BOTOX INJECTION N/A 6/18/2019    Procedure: INJECTION BOTULINUM TOXIN (BOTOX), intra detrusor;  Surgeon: Kaiser Rahman MD;  [facility];  Service: Urology    BOTOX INJECTION N/A 10/7/2019    Procedure: INJECTION BOTULINUM TOXIN (BOTOX), intradetrusor;  Surgeon: Kaiser Rahman MD;  [facility];  Service: Urology    CYSTOSCOPY      CYSTOSCOPY N/A 6/18/2019    Procedure: cystoscopy and all indicated procedures;  Surgeon: Kaiser Rahman MD;  [facility];  Service: Urology    FL CYSTOGRAM  1/15/2019    IR NEPHROSTOMY TUBE PLACEMENT  3/11/2020    IR SUPRAPUBIC CATHETER PLACEMENT  11/26/2019    IVC FILTER INSERTION      X2    IVC FILTER INSERTION      x2    KIDNEY STONE SURGERY      KNEE SURGERY      Sepsis infection     NEPHROSTOMY      AL CYSTO/URETERO W/LITHOTRIPSY &INDWELL STENT INSRT Right 6/14/2017    Procedure: CYSTOSCOPY URETEROSCOPY WITH LITHOTRIPSY HOLMIUM LASER,,BASKET STONE EXTRACTION, RETROGRADE PYELOGRAM AND INSERTION STENT URETERAL;  Surgeon: Katiuska Tovar MD;  [facility];  Service: Urology    AL CYSTOURETHROSCOPY,BIOPSY N/A 1/15/2019    Procedure: CYSTOSCOPY, CYSTOGRAM, DILATION OF URETHRAL STRICTURE;  Surgeon: Kaiser Rahman MD; [facility];  Service: Urology    URINARY SURGERY         Family History   Problem Relation Age of Onset    Diabetes Father     Hypertension Father     Coronary artery disease Father     Cancer Father     Hypertension Mother      I have reviewed and agree with the history as documented  E-Cigarette/Vaping    E-Cigarette Use Never User      E-Cigarette/Vaping Substances     Social History     Tobacco Use    Smoking status: Never Smoker    Smokeless tobacco: Never Used   Substance Use Topics    Alcohol use: No    Drug use: No       Review of Systems   Unable to perform ROS: Dementia   Constitutional: Positive for fever  Gastrointestinal: Positive for vomiting  Physical Exam  Physical Exam  Vitals signs and nursing note reviewed  Constitutional:       General: He is not in acute distress  Appearance: He is well-developed  He is not ill-appearing or toxic-appearing  HENT:      Head: Normocephalic and atraumatic  Eyes:      Pupils: Pupils are equal, round, and reactive to light  Neck:      Musculoskeletal: Normal range of motion and neck supple  Cardiovascular:      Rate and Rhythm: Regular rhythm  Tachycardia present  Heart sounds: Normal heart sounds  Pulmonary:      Effort: Pulmonary effort is normal       Breath sounds: Normal breath sounds  Abdominal:      General: There is no distension  Palpations: Abdomen is soft  Tenderness: There is no guarding or rebound  Comments: Patient is seemingly tensing up when I palpate his abdomen  Skin:     General: Skin is warm and dry  Neurological:      Mental Status: He is alert  Mental status is at baseline           Vital Signs  ED Triage Vitals [02/01/21 0834]   Temperature Pulse Respirations Blood Pressure SpO2   (!) 102 2 °F (39 °C) 101 16 131/63 92 %      Temp Source Heart Rate Source Patient Position - Orthostatic VS BP Location FiO2 (%)   Oral Monitor Sitting Right arm --      Pain Score       -- Vitals:    02/01/21 0834 02/01/21 1100 02/01/21 1248   BP: 131/63 149/68 119/64   Pulse: 101 90 91   Patient Position - Orthostatic VS: Sitting  Lying         Visual Acuity      ED Medications  Medications   sodium chloride 0 9 % bolus 500 mL (0 mL Intravenous Stopped 2/1/21 1358)   acetaminophen (TYLENOL) rectal suppository 650 mg (650 mg Rectal Given 2/1/21 1028)   iodixanol (VISIPAQUE) 320 MG/ML injection 100 mL (100 mL Intravenous Given 2/1/21 1003)   cefepime (MAXIPIME) 1,000 mg in dextrose 5 % 50 mL IVPB (0 mg Intravenous Stopped 2/1/21 1130)       Diagnostic Studies  Results Reviewed     Procedure Component Value Units Date/Time    Blood culture #1 [[id number]] Collected: 02/01/21 0900    Lab Status: Preliminary result Specimen: Blood from Arm, Left Updated: 02/01/21 1301     Blood Culture Received in Microbiology Lab  Culture in Progress  Blood culture #2 [[id number]] Collected: 02/01/21 0900    Lab Status: Preliminary result Specimen: Blood from Arm, Left Updated: 02/01/21 1301     Blood Culture Received in Microbiology Lab  Culture in Progress  Urine Microscopic [[id number]]  (Abnormal) Collected: 02/01/21 1033    Lab Status: Final result Specimen: Urine Updated: 02/01/21 1112     RBC, UA 4-10 /hpf      WBC, UA 10-20 /hpf      Epithelial Cells None Seen /hpf      Bacteria, UA Moderate /hpf      URINE COMMENT unspun microscopic urine  Qns for concentrated microscopic    Urine culture [[id number]] Collected: 02/01/21 1033    Lab Status:  In process Specimen: Urine Updated: 02/01/21 1112    UA w Reflex to Microscopic w Reflex to Culture [[id number]]  (Abnormal) Collected: 02/01/21 1033    Lab Status: Final result Specimen: Urine Updated: 02/01/21 1040     Color, UA Yellow     Clarity, UA Slightly Cloudy     Specific Gravity, UA <=1 005     pH, UA 6 5     Leukocytes, UA Small     Nitrite, UA Positive     Protein,  (2+) mg/dl      Glucose, UA Negative mg/dl      Ketones, UA Negative mg/dl Urobilinogen, UA 0 2 E U /dl      Bilirubin, UA Negative     Blood, UA Large    COVID19, Influenza A/B, RSV PCR, [facility] [[id number]]  (Abnormal) Collected: 02/01/21 0900    Lab Status: Final result Specimen: Nares from Nasopharyngeal Swab Updated: 02/01/21 1012     SARS-CoV-2 Positive     INFLUENZA A PCR Negative     INFLUENZA B PCR Negative     RSV PCR Negative    Narrative: This test has been authorized by FDA under an EUA (Emergency Use Assay) for use by authorized laboratories  Clinical caution and judgement should be used with the interpretation of these results with consideration of the clinical impression and other laboratory testing  Testing reported as "Positive" or "Negative" has been proven to be accurate according to standard laboratory validation requirements  All testing is performed with control materials showing appropriate reactivity at standard intervals      Troponin I [[id number]]  (Normal) Collected: 02/01/21 0900    Lab Status: Final result Specimen: Blood from Arm, Left Updated: 02/01/21 0936     Troponin I <0 02 ng/mL     Protime-INR [[id number]]  (Abnormal) Collected: 02/01/21 0900    Lab Status: Final result Specimen: Blood from Arm, Left Updated: 02/01/21 0932     Protime 18 2 seconds      INR 1 50    APTT [[id number]]  (Normal) Collected: 02/01/21 0900    Lab Status: Final result Specimen: Blood from Arm, Left Updated: 02/01/21 0932     PTT 37 seconds     Comprehensive metabolic panel [[id number]]  (Abnormal) Collected: 02/01/21 0900    Lab Status: Final result Specimen: Blood from Arm, Left Updated: 02/01/21 0929     Sodium 143 mmol/L      Potassium 3 5 mmol/L      Chloride 109 mmol/L      CO2 25 mmol/L      ANION GAP 9 mmol/L      BUN 23 mg/dL      Creatinine 1 85 mg/dL      Glucose 153 mg/dL      Calcium 8 8 mg/dL      Corrected Calcium 9 8 mg/dL      AST <5 U/L      ALT 11 U/L      Alkaline Phosphatase 99 U/L      Total Protein 7 5 g/dL      Albumin 2 8 g/dL      Total Bilirubin 0 49 mg/dL      eGFR 36 ml/min/1 73sq m     Narrative:      [place] guidelines for Chronic Kidney Disease (CKD):     Stage 1 with normal or high GFR (GFR > 90 mL/min/1 73 square meters)    Stage 2 Mild CKD (GFR = 60-89 mL/min/1 73 square meters)    Stage 3A Moderate CKD (GFR = 45-59 mL/min/1 73 square meters)    Stage 3B Moderate CKD (GFR = 30-44 mL/min/1 73 square meters)    Stage 4 Severe CKD (GFR = 15-29 mL/min/1 73 square meters)    Stage 5 End Stage CKD (GFR <15 mL/min/1 73 square meters)  Note: GFR calculation is accurate only with a steady state creatinine    Lipase [[id number]]  (Abnormal) Collected: 02/01/21 0900    Lab Status: Final result Specimen: Blood from Arm, Left Updated: 02/01/21 0929     Lipase 36 u/L     Lactic acid [[id number]]  (Normal) Collected: 02/01/21 0900    Lab Status: Final result Specimen: Blood from Arm, Left Updated: 02/01/21 0929     LACTIC ACID 1 6 mmol/L     Narrative:      Result may be elevated if tourniquet was used during collection      Ammonia [[id number]]  (Abnormal) Collected: 02/01/21 0900    Lab Status: Final result Specimen: Blood from Arm, Left Updated: 02/01/21 0926     Ammonia <10 umol/L     CBC and differential [[id number]]  (Abnormal) Collected: 02/01/21 0900    Lab Status: Final result Specimen: Blood from Arm, Left Updated: 02/01/21 0912     WBC 18 71 Thousand/uL      RBC 5 13 Million/uL      Hemoglobin 14 7 g/dL      Hematocrit 44 9 %      MCV 88 fL      MCH 28 7 pg      MCHC 32 7 g/dL      RDW 15 3 %      MPV 10 4 fL      Platelets 085 Thousands/uL      nRBC 0 /100 WBCs      Neutrophils Relative 89 %      Immat GRANS % 1 %      Lymphocytes Relative 5 %      Monocytes Relative 5 %      Eosinophils Relative 0 %      Basophils Relative 0 %      Neutrophils Absolute 16 66 Thousands/µL      Immature Grans Absolute 0 10 Thousand/uL      Lymphocytes Absolute 0 92 Thousands/µL      Monocytes Absolute 0 98 Thousand/µL      Eosinophils Absolute 0 01 Thousand/µL      Basophils Absolute 0 04 Thousands/µL     Procalcitonin with AM Reflex [[id number]] Collected: 02/01/21 0900    Lab Status: In process Specimen: Blood from Arm, Left Updated: 02/01/21 0909                 CT abdomen pelvis with contrast   Final Result by Diamond Gandhi MD (02/01 1009)      Large volume of rectal stool suggesting fecal impaction  Bilateral renal calculi  Left percutaneous nephrostomy catheter present, no hydronephrosis is identified  Cholelithiasis  Workstation performed: [id number]         XR chest 1 view portable   Final Result by Kamlesh Guerin MD (02/01 7898)      No acute cardiopulmonary disease  Workstation performed: [id number]                    Procedures  ECG 12 Lead Documentation Only    Date/Time: 2/1/2021 1:47 PM  Performed by: Sis Harris PA-C  Authorized by: Dara Broussard PA-C     Comments:      Sinus rhythm with frequent PVCs at 100  Right axis deviation  No acute ST-T changes  ED Course  ED Course as of Feb 01 1436   Mon Feb 01, 2021   9582 Patient's wife wishes to be called prior to any medication administration  0914 WBC(!): 18 71   0914 Spoke with the patient's wife  She is agreeable to the patient getting Tylenol for his fever at this time  9374 LACTIC ACID: 1 6   0938 Creatinine(!): 1 85   1029 SARS-COV-2(!): Positive   1029 Called and spoke with patient's wife again  I removed her aware patient is positive COVID-19  I advised her that I would also like to the patient a dose of cefepime, which she has previously tolerated on multiple occasions  She is agreeable  Initial Sepsis Screening     Row Name 02/01/21 1109                Is the patient's history suggestive of a new or worsening infection?   (!) Yes (Proceed)  -AF        Suspected source of infection  urinary tract infection  -AF        Are two or more of the following signs & symptoms of infection both present and new to the patient?         Indicate SIRS criteria  Hyperthemia > 38 3C (100 9F); Leukocytosis (WBC > 50858 IJL); Tachycardia > 90 bpm  -AF        If the answer is yes to both questions, suspicion of sepsis is present          If severe sepsis is present AND tissue hypoperfusion perists in the hour after fluid resuscitation or lactate > 4, the patient meets criteria for SEPTIC SHOCK          Are any of the following organ dysfunction criteria present within 6 hours of suspected infection and SIRS criteria that are NOT considered to be chronic conditions? No  -AF        Organ dysfunction          Date of presentation of severe sepsis          Time of presentation of severe sepsis          Tissue hypoperfusion persists in the hour after crystalloid fluid administration, evidenced, by either:          Was hypotension present within one hour of the conclusion of crystalloid fluid administration?         Date of presentation of septic shock          Time of presentation of septic shock            User Key  (r) = Recorded By, (t) = Taken By, (c) = Cosigned By    [address] Name Provider Type    AF Dara Galvez PA-C Physician Assistant                        MDM  Number of Diagnoses or Management Options  COVID-19: new and requires workup  Sepsis [facility]): new and requires workup  UTI (urinary tract infection): new and requires workup  Vomiting: new and requires workup  Diagnosis management comments: Patient presents for evaluation after an episode of vomiting at his nursing home  Patient has extensive history of UTIs  Patient found to be febrile in the emergency department  Differential includes but is not limited to UTI versus pneumonia versus COVID-19 infection versus intra-abdominal infection  Labs, imaging and EKG ordered  Tylenol ordered for fever  Labs reviewed  Patient found to have elevated white count    Patient's creatinine is also slightly more elevated than usual, however not more than 0 5 over baseline  UA is nitrate positive with moderate bacteria  Patient additionally tested positive for COVID-19  Patient will be given a dose cefepime, which per record review, he has tolerated well in the past   I did advise the patient's wife of this prior to giving antibiotics  The decision was ultimately made to admit the patient  Case was discussed with ELAYNE agrees to accept the patient under the service of Dr Opal Mcgraw  Patient is stable for admission  Amount and/or Complexity of Data Reviewed  Clinical lab tests: ordered and reviewed  Tests in the radiology section of CPT®: ordered and reviewed  Decide to obtain previous medical records or to obtain history from someone other than the patient: yes  Obtain history from someone other than the patient: yes  Review and summarize past medical records: yes  Discuss the patient with other providers: yes    Risk of Complications, Morbidity, and/or Mortality  Presenting problems: moderate  Diagnostic procedures: moderate  Management options: moderate    Patient Progress  Patient progress: stable      Disposition  Final diagnoses:   Vomiting   UTI (urinary tract infection)   Sepsis ([facility] )   COVID-19     Time reflects when diagnosis was documented in both MDM as applicable and the Disposition within this note     Time User Action Codes Description Comment    2/1/2021 11:11 AM Dara Evans Add [R11 10] Vomiting     2/1/2021 11:11 AM Dara Evans Add [N39 0] UTI (urinary tract infection)     2/1/2021 11:11 AM Dara Evans Add [A41 9] Sepsis ([facility] )     2/1/2021 11:12 AM Teagan Whitney Add [U07 1] COVID-19       ED Disposition     ED Disposition Condition Date/Time Comment    Admit Stable Mon Feb 1, 2021 11:11 AM Case was discussed with ELAYNE and the patient's admission status was agreed to be Admission Status: inpatient status to the service of Dr Opal Mcgraw           Follow-up Information None         Patient's Medications   Discharge Prescriptions    No medications on file     No discharge procedures on file      PDMP Review       Value Time User    PDMP Reviewed  Yes 1/26/2021  8:31 AM Johnson Bertrand MD          ED Provider  Electronically Signed by           Kari Guzmán PA-C  02/01/21 0162

## 2021-02-02 PROBLEM — N17.9 AKI (ACUTE KIDNEY INJURY) (HCC): Status: ACTIVE | Noted: 2021-02-02

## 2021-02-02 LAB
ALBUMIN SERPL BCP-MCNC: 2.4 G/DL (ref 3.5–5)
ALP SERPL-CCNC: 82 U/L (ref 46–116)
ALT SERPL W P-5'-P-CCNC: 13 U/L (ref 12–78)
ANION GAP SERPL CALCULATED.3IONS-SCNC: 6 MMOL/L (ref 4–13)
AST SERPL W P-5'-P-CCNC: 10 U/L (ref 5–45)
BACTERIA UR CULT: NORMAL
BASOPHILS # BLD AUTO: 0.01 THOUSANDS/ΜL (ref 0–0.1)
BASOPHILS NFR BLD AUTO: 0 % (ref 0–1)
BILIRUB SERPL-MCNC: 0.42 MG/DL (ref 0.2–1)
BUN SERPL-MCNC: 24 MG/DL (ref 5–25)
CALCIUM ALBUM COR SERPL-MCNC: 9.8 MG/DL (ref 8.3–10.1)
CALCIUM SERPL-MCNC: 8.5 MG/DL (ref 8.3–10.1)
CHLORIDE SERPL-SCNC: 109 MMOL/L (ref 100–108)
CK SERPL-CCNC: 144 U/L (ref 39–308)
CO2 SERPL-SCNC: 27 MMOL/L (ref 21–32)
CREAT SERPL-MCNC: 2.06 MG/DL (ref 0.6–1.3)
CRP SERPL QL: 251.8 MG/L
D DIMER PPP FEU-MCNC: 0.82 UG/ML FEU
EOSINOPHIL # BLD AUTO: 0.01 THOUSAND/ΜL (ref 0–0.61)
EOSINOPHIL NFR BLD AUTO: 0 % (ref 0–6)
ERYTHROCYTE [DISTWIDTH] IN BLOOD BY AUTOMATED COUNT: 15.6 % (ref 11.6–15.1)
FERRITIN SERPL-MCNC: 85 NG/ML (ref 8–388)
FERRITIN SERPL-MCNC: 90 NG/ML (ref 8–388)
GFR SERPL CREATININE-BSD FRML MDRD: 31 ML/MIN/1.73SQ M
GLUCOSE SERPL-MCNC: 110 MG/DL (ref 65–140)
HCT VFR BLD AUTO: 39.9 % (ref 36.5–49.3)
HGB BLD-MCNC: 13 G/DL (ref 12–17)
IMM GRANULOCYTES # BLD AUTO: 0.04 THOUSAND/UL (ref 0–0.2)
IMM GRANULOCYTES NFR BLD AUTO: 0 % (ref 0–2)
LYMPHOCYTES # BLD AUTO: 1.02 THOUSANDS/ΜL (ref 0.6–4.47)
LYMPHOCYTES NFR BLD AUTO: 9 % (ref 14–44)
MCH RBC QN AUTO: 29 PG (ref 26.8–34.3)
MCHC RBC AUTO-ENTMCNC: 32.6 G/DL (ref 31.4–37.4)
MCV RBC AUTO: 89 FL (ref 82–98)
MONOCYTES # BLD AUTO: 1.03 THOUSAND/ΜL (ref 0.17–1.22)
MONOCYTES NFR BLD AUTO: 9 % (ref 4–12)
NEUTROPHILS # BLD AUTO: 9.2 THOUSANDS/ΜL (ref 1.85–7.62)
NEUTS SEG NFR BLD AUTO: 82 % (ref 43–75)
NRBC BLD AUTO-RTO: 0 /100 WBCS
NT-PROBNP SERPL-MCNC: 1009 PG/ML
PLATELET # BLD AUTO: 211 THOUSANDS/UL (ref 149–390)
PMV BLD AUTO: 10.3 FL (ref 8.9–12.7)
POTASSIUM SERPL-SCNC: 3.3 MMOL/L (ref 3.5–5.3)
PROCALCITONIN SERPL-MCNC: 1.57 NG/ML
PROT SERPL-MCNC: 6.9 G/DL (ref 6.4–8.2)
RBC # BLD AUTO: 4.49 MILLION/UL (ref 3.88–5.62)
SODIUM SERPL-SCNC: 142 MMOL/L (ref 136–145)
TROPONIN I SERPL-MCNC: <0.02 NG/ML
WBC # BLD AUTO: 11.31 THOUSAND/UL (ref 4.31–10.16)

## 2021-02-02 PROCEDURE — 82550 ASSAY OF CK (CPK): CPT | Performed by: INTERNAL MEDICINE

## 2021-02-02 PROCEDURE — 85379 FIBRIN DEGRADATION QUANT: CPT | Performed by: INTERNAL MEDICINE

## 2021-02-02 PROCEDURE — 80053 COMPREHEN METABOLIC PANEL: CPT | Performed by: INTERNAL MEDICINE

## 2021-02-02 PROCEDURE — 99233 SBSQ HOSP IP/OBS HIGH 50: CPT | Performed by: PHYSICIAN ASSISTANT

## 2021-02-02 PROCEDURE — 84145 PROCALCITONIN (PCT): CPT | Performed by: PHYSICIAN ASSISTANT

## 2021-02-02 PROCEDURE — 84484 ASSAY OF TROPONIN QUANT: CPT | Performed by: INTERNAL MEDICINE

## 2021-02-02 PROCEDURE — 82728 ASSAY OF FERRITIN: CPT | Performed by: INTERNAL MEDICINE

## 2021-02-02 PROCEDURE — 86140 C-REACTIVE PROTEIN: CPT | Performed by: INTERNAL MEDICINE

## 2021-02-02 PROCEDURE — 85025 COMPLETE CBC W/AUTO DIFF WBC: CPT | Performed by: INTERNAL MEDICINE

## 2021-02-02 PROCEDURE — 83880 ASSAY OF NATRIURETIC PEPTIDE: CPT | Performed by: INTERNAL MEDICINE

## 2021-02-02 PROCEDURE — 36415 COLL VENOUS BLD VENIPUNCTURE: CPT | Performed by: INTERNAL MEDICINE

## 2021-02-02 RX ADMIN — Medication 250 MG: at 18:51

## 2021-02-02 RX ADMIN — BUSPIRONE HYDROCHLORIDE 7.5 MG: 5 TABLET ORAL at 01:13

## 2021-02-02 RX ADMIN — FAMOTIDINE 20 MG: 20 TABLET ORAL at 11:32

## 2021-02-02 RX ADMIN — BUSPIRONE HYDROCHLORIDE 7.5 MG: 5 TABLET ORAL at 11:44

## 2021-02-02 RX ADMIN — QUETIAPINE 25 MG: 25 TABLET, FILM COATED ORAL at 01:12

## 2021-02-02 RX ADMIN — GLYCERIN 1 DROP: .002; .002; .01 SOLUTION/ DROPS OPHTHALMIC at 18:51

## 2021-02-02 RX ADMIN — LACTULOSE 20 G: 10 SOLUTION ORAL at 12:03

## 2021-02-02 RX ADMIN — Medication 1000 MG: at 22:06

## 2021-02-02 RX ADMIN — AMLODIPINE BESYLATE 10 MG: 10 TABLET ORAL at 11:28

## 2021-02-02 RX ADMIN — FAMOTIDINE 20 MG: 20 TABLET ORAL at 18:44

## 2021-02-02 RX ADMIN — OXYCODONE HYDROCHLORIDE 5 MG: 5 TABLET ORAL at 11:48

## 2021-02-02 RX ADMIN — BUSPIRONE HYDROCHLORIDE 7.5 MG: 5 TABLET ORAL at 22:19

## 2021-02-02 RX ADMIN — MEMANTINE 5 MG: 5 TABLET ORAL at 11:32

## 2021-02-02 RX ADMIN — DOCUSATE SODIUM 100 MG: 100 CAPSULE, LIQUID FILLED ORAL at 12:00

## 2021-02-02 RX ADMIN — METOPROLOL TARTRATE 12.5 MG: 25 TABLET, FILM COATED ORAL at 11:33

## 2021-02-02 RX ADMIN — POLYETHYLENE GLYCOL 3350 17 G: 17 POWDER, FOR SOLUTION ORAL at 11:51

## 2021-02-02 RX ADMIN — Medication 2000 UNITS: at 11:43

## 2021-02-02 RX ADMIN — OXYCODONE HYDROCHLORIDE 5 MG: 5 TABLET ORAL at 01:11

## 2021-02-02 RX ADMIN — DOCUSATE SODIUM 100 MG: 100 CAPSULE, LIQUID FILLED ORAL at 18:51

## 2021-02-02 RX ADMIN — SODIUM CHLORIDE 10 ML: 9 INJECTION INTRAMUSCULAR; INTRAVENOUS; SUBCUTANEOUS at 12:33

## 2021-02-02 RX ADMIN — SODIUM CHLORIDE 10 ML: 0.9 IRRIGANT IRRIGATION at 02:22

## 2021-02-02 RX ADMIN — Medication: at 12:12

## 2021-02-02 RX ADMIN — CEFEPIME HYDROCHLORIDE 2000 MG: 2 INJECTION, POWDER, FOR SOLUTION INTRAVENOUS at 22:21

## 2021-02-02 RX ADMIN — SODIUM CHLORIDE 10 ML: 0.9 IRRIGANT IRRIGATION at 13:51

## 2021-02-02 RX ADMIN — BUSPIRONE HYDROCHLORIDE 7.5 MG: 5 TABLET ORAL at 16:51

## 2021-02-02 RX ADMIN — CEFEPIME HYDROCHLORIDE 2000 MG: 2 INJECTION, POWDER, FOR SOLUTION INTRAVENOUS at 01:04

## 2021-02-02 RX ADMIN — APIXABAN 2.5 MG: 2.5 TABLET, FILM COATED ORAL at 11:45

## 2021-02-02 RX ADMIN — SENNOSIDES 17.2 MG: 8.6 TABLET ORAL at 01:11

## 2021-02-02 RX ADMIN — SODIUM CHLORIDE 10 ML: 0.9 IRRIGANT IRRIGATION at 22:20

## 2021-02-02 RX ADMIN — OXYCODONE HYDROCHLORIDE 5 MG: 5 TABLET ORAL at 22:07

## 2021-02-02 RX ADMIN — ZINC SULFATE 220 MG (50 MG) CAPSULE 220 MG: CAPSULE at 12:23

## 2021-02-02 RX ADMIN — SODIUM CHLORIDE 75 ML/HR: 0.9 INJECTION, SOLUTION INTRAVENOUS at 02:22

## 2021-02-02 RX ADMIN — OXYCODONE HYDROCHLORIDE 5 MG: 5 TABLET ORAL at 18:42

## 2021-02-02 RX ADMIN — GLYCERIN 1 DROP: .002; .002; .01 SOLUTION/ DROPS OPHTHALMIC at 12:09

## 2021-02-02 RX ADMIN — BISACODYL 10 MG: 10 SUPPOSITORY RECTAL at 12:27

## 2021-02-02 RX ADMIN — MIRTAZAPINE 15 MG: 15 TABLET, FILM COATED ORAL at 22:20

## 2021-02-02 RX ADMIN — MIRTAZAPINE 15 MG: 15 TABLET, FILM COATED ORAL at 01:13

## 2021-02-02 RX ADMIN — MEMANTINE 5 MG: 5 TABLET ORAL at 18:37

## 2021-02-02 RX ADMIN — QUETIAPINE 25 MG: 25 TABLET, FILM COATED ORAL at 22:06

## 2021-02-02 RX ADMIN — SENNOSIDES 17.2 MG: 8.6 TABLET ORAL at 22:06

## 2021-02-02 RX ADMIN — APIXABAN 2.5 MG: 2.5 TABLET, FILM COATED ORAL at 18:39

## 2021-02-02 RX ADMIN — Medication 250 MG: at 11:47

## 2021-02-02 RX ADMIN — CEFEPIME HYDROCHLORIDE 2000 MG: 2 INJECTION, POWDER, FOR SOLUTION INTRAVENOUS at 12:07

## 2021-02-02 RX ADMIN — Medication 1000 MG: at 13:55

## 2021-02-02 RX ADMIN — POTASSIUM CHLORIDE 10 MEQ: 750 TABLET, EXTENDED RELEASE ORAL at 11:59

## 2021-02-02 NOTE — ED NOTES
Spoke with wife Muna Gutierres to update status of patient  Wife requesting updates with any changes, and is asking that the television be left on at all times even for the sound  She can be reached at [phone] for further updates       April Carlota Peralta RN  02/02/21 0456

## 2021-02-02 NOTE — ED ATTENDING ATTESTATION
2/1/2021  IVanesa DO, saw and evaluated the patient  I have discussed the patient with the resident/non-physician practitioner and agree with the resident's/non-physician practitioner's findings, Plan of Care, and MDM as documented in the resident's/non-physician practitioner's note, except where noted  All available labs and Radiology studies were reviewed  I was present for key portions of any procedure(s) performed by the resident/non-physician practitioner and I was immediately available to provide assistance  At this point I agree with the current assessment done in the Emergency Department  I have conducted an independent evaluation of this patient a history and physical is as follows:    ED Course    64-year-old male with a complex medical history presents the emergency department from his nursing facility after having an episode where he appeared to be poorly responsive, diaphoretic and had vomiting  Patient has dementia and is nonverbal and unable to provide history  He presents with a fever of 102  2  He appears chronically ill  He has a left nephrostomy tube and a chronic indwelling suprapubic catheter  He has had previous urinary tract infections  It is unclear if he has had a recent COVID-19 exposure  Past medical history:  Obstructing renal stones, COPD, dementia, CVA, left nephrostomy tube, suprapubic catheter    Physical exam:  Patient is lying in bed with eyes closed  He will occasionally open his eyes spontaneously  He appears chronically ill  His mucous membranes are dry  Her skin is slightly diaphoretic  Neck is supple  Heart is tachycardic, regular with frequent ectopy  Lungs are diminished at both bases  Suprapubic catheter has scant amount of thick drainage adherent at the ostomy site  There is thick yellow sediment in the suprapubic collection bag as well as the left nephrostomy tube collection bag  Left nephrostomy site is clean dry and intact    Abdomen is soft and appears to be nontender  Patient is able to move all 4 extremities spontaneously  Plan:  22-year-old male presents from nursing facility for evaluation of vomiting  Patient has a fever  He does meet sepsis criteria  A specimen of urine was obtained directly from his left nephrostomy tube and appears to be consistent with acute infection  Will cover with cefepime as he has tolerated this before  Patient's COVID test is positive    Patient's wife was updated with current status patient will be admitted to the Internal Medicine team       Critical Care Time  Procedures

## 2021-02-02 NOTE — PROGRESS NOTES
[address] - Internal Medicine Service  Progress Note - Nichole Palmer 1950, 65 y o  male MRN: [id number]  Unit/Bed#: ED 15 Encounter: [id number]  Primary Care Provider: Jessica Paulino MD   Date and time admitted to hospital: 2/1/2021  8:30 AM    * Sepsis due to UTI [facility])  Assessment & Plan  · Present on admission, as evidenced by fever and leukocytosis  · Suspected source is urinary tract infection, as he has had multiple UTIs in the past which have turned septic  · He does have history of MDRO, continue with IV cefepime and tailor antibiotics pending cultures  · IV hydration, antipyretic  · He is hemodynamically stable, monitor vital signs    RUPESH (acute kidney injury) ([facility] [address] )  Assessment & Plan  · Appearing upon admission, with a creatinine of 2 06, baseline around 1 5  · Would avoid nephrotoxins  · Gentle hydration tonight, repeat BMP tomorrow  COVID-19 virus infection  Assessment & Plan  · COVID-19 positive from the nursing home today, diagnosed on 02/01  · Noted the patient was also COVID positive in March of 2020  · Respiratory status is stable on room air  · Treat per mild protocol, trend inflammatory markers  Noted they are slightly worsening today  · Continue vitamin-C, vitamin-D, zinc, multivitamin  · Discussed with patient's wife, who is POA, about giving the patient remdesivir secondary to being high risk, however she is refusing at this time  Depression with anxiety  Assessment & Plan  · Continue BuSpar, Seroquel, Remeron    Bilateral nephrolithiasis  Assessment & Plan  · History of bilateral nephrolithiasis, with nephrostomy tube in place  · Outpatient follow-up with Urology  Suprapubic catheter [facility])  Assessment & Plan  · Suprapubic catheter in place  · Outpatient Urology follow-up    Dementia [facility])  Assessment & Plan  · The patient is nonverbal at baseline, but offers no complaints and is at his baseline mental status currently    · Supportive care     Constipation  Assessment & Plan  · Continue bowel regimen  · The patient received soapsuds enema yesterday and had a bowel movement afterwards  · Monitor for bowel movements daily        Essential hypertension  Assessment & Plan  · Blood pressure 144/71  · Continue amlodipine, metoprolol  · Monitor BP  History of DVT (deep vein thrombosis)  Assessment & Plan  · On Eliquis for anticoagulation, will continue  COPD (chronic obstructive pulmonary disease) (HCC)  Assessment & Plan  · No evidence of acute exacerbation  · Continue respiratory treatments  VTE Pharmacologic Prophylaxis:   Pharmacologic: Apixaban (Eliquis)  Mechanical VTE Prophylaxis in Place: Yes    Patient Centered Rounds: I have performed bedside rounds with nursing staff today  Discussions with Specialists or Other Care Team Provider: CM    Education and Discussions with Family / Patient: patient, called wife, Ani Spine    Time Spent for Care: 30 minutes  More than 50% of total time spent on counseling and coordination of care as described above  Current Length of Stay: 1 day(s)    Current Patient Status: Inpatient   Certification Statement: The patient will continue to require additional inpatient hospital stay due to sepsis due to UTI on IV abx    Discharge Plan: >72 hours    Code Status: Level 3 - DNAR and DNI      Subjective:   Patient examined at bedside, is nonverbal   Appearing comfortable  No events reported  Objective:     Vitals:   Temp (24hrs), Av 9 °F (37 2 °C), Min:98 9 °F (37 2 °C), Max:98 9 °F (37 2 °C)    Temp:  [98 9 °F (37 2 °C)] 98 9 °F (37 2 °C)  HR:  [] 90  Resp:  [18] 18  BP: (119-144)/(61-83) 144/71  SpO2:  [93 %-97 %] 96 %  Body mass index is 30 13 kg/m²  Input and Output Summary (last 24 hours):        Intake/Output Summary (Last 24 hours) at 2021 1242  Last data filed at 2021 1233  Gross per 24 hour   Intake 510 ml   Output 875 ml   Net -365 ml       Physical Exam:     Physical Exam  Vitals signs and nursing note reviewed  Constitutional:       General: He is not in acute distress  Appearance: Normal appearance  Comments: Chronically ill-appearing   HENT:      Head: Normocephalic  Mouth/Throat:      Mouth: Mucous membranes are moist    Eyes:      Pupils: Pupils are equal, round, and reactive to light  Neck:      Musculoskeletal: Normal range of motion  Cardiovascular:      Rate and Rhythm: Normal rate and regular rhythm  Heart sounds: No murmur  Pulmonary:      Effort: Pulmonary effort is normal  No respiratory distress  Breath sounds: Normal breath sounds  No wheezing, rhonchi or rales  Abdominal:      General: Bowel sounds are normal  There is no distension  Palpations: Abdomen is soft  Tenderness: There is no abdominal tenderness  There is no guarding  Genitourinary:     Comments: Nephrostomy tube in place, suprapubic catheter in place with yellow urine in bag  Musculoskeletal:         General: No deformity  Right lower leg: No edema  Left lower leg: No edema  Skin:     Capillary Refill: Capillary refill takes less than 2 seconds  Neurological:      General: No focal deficit present  Mental Status: He is alert  Mental status is at baseline        Comments: Nonverbal         Additional Data:     Labs:    Results from last 7 days   Lab Units 02/02/21  0442   WBC Thousand/uL 11 31*   HEMOGLOBIN g/dL 13 0   HEMATOCRIT % 39 9   PLATELETS Thousands/uL 211   NEUTROS PCT % 82*   LYMPHS PCT % 9*   MONOS PCT % 9   EOS PCT % 0     Results from last 7 days   Lab Units 02/02/21  0442   SODIUM mmol/L 142   POTASSIUM mmol/L 3 3*   CHLORIDE mmol/L 109*   CO2 mmol/L 27   BUN mg/dL 24   CREATININE mg/dL 2 06*   ANION GAP mmol/L 6   CALCIUM mg/dL 8 5   ALBUMIN g/dL 2 4*   TOTAL BILIRUBIN mg/dL 0 42   ALK PHOS U/L 82   ALT U/L 13   AST U/L 10   GLUCOSE RANDOM mg/dL 110     Results from last 7 days   Lab Units 02/01/21  0900   INR  1 50* Results from last 7 days   Lab Units 02/01/21  0900   LACTIC ACID mmol/L 1 6   PROCALCITONIN ng/ml 1 76*           * I Have Reviewed All Lab Data Listed Above  * Additional Pertinent Lab Tests Reviewed: [facility] Admission Reviewed    Imaging:    Imaging Reports Reviewed Today Include: none  Imaging Personally Reviewed by Myself Includes:  none    Recent Cultures (last 7 days):     Results from last 7 days   Lab Units 02/01/21  0900   BLOOD CULTURE  No Growth at 24 hrs  No Growth at 24 hrs         Last 24 Hours Medication List:   Current Facility-Administered Medications   Medication Dose Route Frequency Provider Last Rate    acetaminophen  650 mg Oral Q4H PRN Cherelle Newton, MD      amLODIPine  10 mg Oral Daily Cherelle Newton, MD      ammonium lactate   Topical Daily Cherelle Newton, MD      apixaban  2 5 mg Oral BID Cherelle Newton, MD      ascorbic acid  1,000 mg Oral Q12H [facility] Cherelle Newton, MD      bisacodyl  10 mg Rectal Daily PRN Cherelle Newton, MD      bisacodyl  10 mg Rectal Daily Cherelle Newton, MD      busPIRone  7 5 mg Oral TID Cherelle Newton, MD      cefepime  2,000 mg Intravenous Q12H Cherelle Newton, MD 2,000 mg (02/02/21 1207)    cholecalciferol  2,000 Units Oral Daily Cherelle Newton MD      cholecalciferol  2,000 Units Oral Daily Cherelle Newton, MD      docusate sodium  100 mg Oral BID Cherelle Newton, MD      famotidine  20 mg Oral BID Cherelle Newton, MD      glycerin-hypromellose-  1 drop Both Eyes BID Cherelle Newton, MD      lactulose  20 g Oral Daily Cherelle Newton, MD      memantine  5 mg Oral BID Cherelle Newton, MD      metoprolol tartrate  12 5 mg Oral BID Cherelle Newton, MD      mirtazapine  15 mg Oral HS Cherelle Newton, MD      zinc sulfate  220 mg Oral Daily Cherelle Newton MD      Followed by   Ramya Sapp ON 2/8/2021] multivitamin-minerals  1 tablet Oral Daily Collette Hire, MD      ondansetron  4 mg Intravenous Q6H PRN Collette Hire, MD      oxyCODONE  5 mg Oral 4x Daily Collette Hire, MD      polyethylene glycol  17 g Oral Daily Collette Hire, MD      polyethylene glycol  17 g Oral Daily Collette Hire, MD      potassium chloride  10 mEq Oral Daily With Breakfast Collette Hire, MD      QUEtiapine  25 mg Oral HS Collette Hire, MD      saccharomyces boulardii  250 mg Oral BID Collette Hire, MD      senna  2 tablet Oral HS Collette Hire, MD      sodium chloride (PF)  10 mL Intracatheter Daily Collette Hire, MD      sodium chloride  75 mL/hr Intravenous Continuous Collette Hire, MD 75 mL/hr (02/02/21 0222)    sodium chloride  10 mL Irrigation BID Collette Hire, MD          Today, Patient Was Seen By: Angel Peterson PA-C    ** Please Note: Dictation voice to text software may have been used in the creation of this document   **

## 2021-02-02 NOTE — ASSESSMENT & PLAN NOTE
· Appearing upon admission, with a creatinine of 2 06, baseline around 1 5  · Would avoid nephrotoxins  · Gentle hydration tonight, repeat BMP tomorrow

## 2021-02-02 NOTE — UTILIZATION REVIEW
Initial Clinical Review    Admission: Date/Time/Statement:   Admission Orders (From admission, onward)     Ordered        02/01/21 1112  Inpatient Admission  Once                   Orders Placed This Encounter   Procedures    Inpatient Admission     Standing Status:   Standing     Number of Occurrences:   1     Order Specific Question:   Level of Care     Answer:   Med Surg [16]     Order Specific Question:   Estimated length of stay     Answer:   More than 2 Midnights     Order Specific Question:   Certification     Answer:   I certify that inpatient services are medically necessary for this patient for a duration of greater than two midnights  See H&P and MD Progress Notes for additional information about the patient's course of treatment  ED Arrival Information     Expected Arrival Acuity Means of Arrival Escorted By Service Admission Type    - 2/1/2021 08:30 Urgent Ambulance [facility] Hospitalist Urgent    Arrival Complaint    -        Chief Complaint   Patient presents with    Vomiting     pt sent from [facility] for episode of vomiting  HPI:   65 y o  male who presents to the ED from SNF with vomiting  PMH of dementia, stroke, hypertension, COPD, recurrent UTI, MDRO, suprapubic catheter in place, left nephrostomy tube in place  Patient is nonverbal at baseline, history is obtained from review of chart from the ER physician as well as spouse Kelly Marques over phone  Febrile on arrival to the ED,  workup revealed patient COVID-19 positive  Labs revealed leukocytosis  UA + for bacteria, blood and nitrites  Plan: Inpatient Med Surg admission for evaluation and treatment of sepsis, likely due to urinary source, COVID 19 virus infection and constipation:   IV cefepime, follow urine culture, monitor inflammatory markers, soap suds enema  2/2 Internal Medicine:  Continue IV cefepime, pending urine culture  Creatinine 2 06 (baseline 1 5)  IV hydration, BMP in a m   Inflammatory markers slightly worse, continue vitamins  Patient's wife refusing Remdesivir  + bowel movement yesterday  ED Triage Vitals   Temperature Pulse Respirations Blood Pressure SpO2   02/01/21 0834 02/01/21 0834 02/01/21 0834 02/01/21 0834 02/01/21 0834   (!) 102 2 °F (39 °C) 101 16 131/63 92 %      Temp Source Heart Rate Source Patient Position - Orthostatic VS BP Location FiO2 (%)   02/01/21 0834 02/01/21 0834 02/01/21 0834 02/01/21 0834 --   Oral Monitor Sitting Right arm       Pain Score       02/02/21 1148       Med Not Given for Pain - for MAR use only          Wt Readings from Last 1 Encounters:   02/01/21 98 kg (216 lb 0 8 oz)     Additional Vital Signs:     Date/Time  Temp  Pulse  Resp  BP  MAP (mmHg)  SpO2  O2 Device   02/02/21 1626  97 6   81  18  125/65  --  94 %  None (Room air)   02/02/21 1530  --  62  18  134/63  90  95 %  None (Room air)   02/02/21 1430  --  62  18  105/65  81  94 %  None (Room air)   02/02/21 1400  --  68  18  143/65  93  95 %  None (Room air)   02/02/21 1330  --  62  18  101/63  77  95 %  None (Room air)   02/02/21 1300  --  68  18  115/62  81  95 %  None (Room air)   02/02/21 1128  --  --  --  144/71  --  --  --   02/02/21 0600  --  90  18  126/64  86  96 %  None (Room air)   02/02/21 0400  --  90  18  121/76  93  96 %  None (Room air)   02/02/21 0200  --  100  18  122/62  85  94 %  None (Room air)   02/02/21 0100  --  102  18  122/83  97  94 %  None (Room air)   02/01/21 2300  --  106Abnormal   18  120/80  97  93 %  None (Room air)   02/01/21 1900  --  86  18  126/61  86  97 %  --   02/01/21 1700  --  98  18  125/68  92  96 %  --   02/01/21 1500  98 9 °  92  18  140/79  101  96 %  --   02/01/21 1248  --  91  --  119/64  --  95 %  None (Room air)               Pertinent Labs/Diagnostic Test Results:     2/1 CT AP:  Large volume of rectal stool suggesting fecal impaction  Bilateral renal calculi  Left percutaneous nephrostomy catheter present, no hydronephrosis is identified  Cholelithiasis  2/1 CXR:   No acute cardiopulmonary disease       2/1 EKG:      Sinus rhythm with frequent Premature ventricular complexes and Fusion complexes  Right superior axis deviation  Inferior infarct , age undetermined  Abnormal ECG  When compared with ECG of 11-MAR-2020 05:29,  Fusion complexes are now Present  Inferior infarct is now Present      Results from last 7 days   Lab Units 02/01/21  0900   SARS-COV-2  Positive*     Results from last 7 days   Lab Units 02/02/21  0442 02/01/21  0900   WBC Thousand/uL 11 31* 18 71*   HEMOGLOBIN g/dL 13 0 14 7   HEMATOCRIT % 39 9 44 9   PLATELETS Thousands/uL 211 248   NEUTROS ABS Thousands/µL 9 20* 16 66*         Results from last 7 days   Lab Units 02/02/21  0442 02/01/21  0900   SODIUM mmol/L 142 143   POTASSIUM mmol/L 3 3* 3 5   CHLORIDE mmol/L 109* 109*   CO2 mmol/L 27 25   ANION GAP mmol/L 6 9   BUN mg/dL 24 23   CREATININE mg/dL 2 06* 1 85*   EGFR ml/min/1 73sq m 31 36   CALCIUM mg/dL 8 5 8 8     Results from last 7 days   Lab Units 02/02/21  0442 02/01/21  0900   AST U/L 10 <5*   ALT U/L 13 11*   ALK PHOS U/L 82 99   TOTAL PROTEIN g/dL 6 9 7 5   ALBUMIN g/dL 2 4* 2 8*   TOTAL BILIRUBIN mg/dL 0 42 0 49   AMMONIA umol/L  --  <10*         Results from last 7 days   Lab Units 02/02/21  0442 02/01/21  0900   GLUCOSE RANDOM mg/dL 110 153*       Results from last 7 days   Lab Units 02/02/21  0442   CK TOTAL U/L 144     Results from last 7 days   Lab Units 02/02/21  0442 02/01/21  0900   TROPONIN I ng/mL <0 02 <0 02     Results from last 7 days   Lab Units 02/02/21  0442 02/01/21  0900   D-DIMER QUANTITATIVE ug/ml FEU 0 82* 0 64*     Results from last 7 days   Lab Units 02/01/21  0900   PROTIME seconds 18 2*   INR  1 50*   PTT seconds 37         Results from last 7 days   Lab Units 02/02/21  0442 02/01/21  0900   PROCALCITONIN ng/ml 1 57* 1 76*     Results from last 7 days   Lab Units 02/01/21  0900   LACTIC ACID mmol/L 1 6             Results from last 7 days Lab Units 02/02/21  0442 02/01/21  1655   NT-PRO BNP pg/mL 1,009* 902*     Results from last 7 days   Lab Units 02/02/21  0442 02/01/21  1655   FERRITIN ng/mL 90 85         Results from last 7 days   Lab Units 02/01/21  0900   LIPASE u/L 36*     Results from last 7 days   Lab Units 02/02/21  0442 02/01/21  1655   CRP mg/L 251 8* 226 8*         Results from last 7 days   Lab Units 02/01/21  1033   CLARITY UA  Slightly Cloudy   COLOR UA  Yellow   SPEC GRAV UA  <=1 005   PH UA  6 5   GLUCOSE UA mg/dl Negative   KETONES UA mg/dl Negative   BLOOD UA  Large*   PROTEIN UA mg/dl 100 (2+)*   NITRITE UA  Positive*   BILIRUBIN UA  Negative   UROBILINOGEN UA E U /dl 0 2   LEUKOCYTES UA  Small*   WBC UA /hpf 10-20*   RBC UA /hpf 4-10*   BACTERIA UA /hpf Moderate*   EPITHELIAL CELLS WET PREP /hpf None Seen     Results from last 7 days   Lab Units 02/01/21  0900   INFLUENZA A PCR  Negative   INFLUENZA B PCR  Negative   RSV PCR  Negative         Results from last 7 days   Lab Units 02/01/21  1033 02/01/21  0900   BLOOD CULTURE   --  No Growth at 24 hrs  No Growth at 24 hrs     URINE CULTURE  >100,000 cfu/ml   --                ED Treatment:   Medication Administration from 02/01/2021 0830 to 02/02/2021 1626       Date/Time Order Dose Route Action     02/01/2021 1358 sodium chloride 0 9 % bolus 500 mL 0 mL Intravenous Stopped     02/01/2021 1028 sodium chloride 0 9 % bolus 500 mL 500 mL Intravenous New Bag     02/01/2021 1028 acetaminophen (TYLENOL) rectal suppository 650 mg 650 mg Rectal Given     02/01/2021 1003 iodixanol (VISIPAQUE) 320 MG/ML injection 100 mL 100 mL Intravenous Given     02/01/2021 1130 cefepime (MAXIPIME) 1,000 mg in dextrose 5 % 50 mL IVPB 0 mg Intravenous Stopped     02/01/2021 1100 cefepime (MAXIPIME) 1,000 mg in dextrose 5 % 50 mL IVPB 1,000 mg Intravenous New Bag     02/02/2021 0112 QUEtiapine (SEROquel) tablet 25 mg 25 mg Oral Given     02/02/2021 1145 apixaban (ELIQUIS) tablet 2 5 mg 2 5 mg Oral Given 02/02/2021 1132 famotidine (PEPCID) tablet 20 mg 20 mg Oral Given     02/02/2021 1144 busPIRone (BUSPAR) tablet 7 5 mg 7 5 mg Oral Given     02/02/2021 0113 busPIRone (BUSPAR) tablet 7 5 mg 7 5 mg Oral Given     02/02/2021 1200 docusate sodium (COLACE) capsule 100 mg 100 mg Oral Given     02/02/2021 1133 metoprolol tartrate (LOPRESSOR) tablet 12 5 mg 12 5 mg Oral Given     02/02/2021 1209 glycerin-hypromellose- (ARTIFICIAL TEARS) ophthalmic solution 1 drop 1 drop Both Eyes Given     02/02/2021 1132 memantine (NAMENDA) tablet 5 mg 5 mg Oral Given     02/02/2021 0113 mirtazapine (REMERON) tablet 15 mg 15 mg Oral Given     02/02/2021 1159 potassium chloride (K-DUR,KLOR-CON) CR tablet 10 mEq 10 mEq Oral Given     02/02/2021 1212 ammonium lactate (LAC-HYDRIN) 12 % cream   Topical Given     02/02/2021 0111 senna (SENOKOT) tablet 17 2 mg 17 2 mg Oral Given     02/02/2021 1147 saccharomyces boulardii (FLORASTOR) capsule 250 mg 250 mg Oral Given     02/02/2021 1143 cholecalciferol (VITAMIN D3) tablet 2,000 Units 2,000 Units Oral Given     02/02/2021 1233 sodium chloride (PF) 0 9 % injection 10 mL 10 mL Intracatheter Given     02/02/2021 1128 amLODIPine (NORVASC) tablet 10 mg 10 mg Oral Given     02/02/2021 1351 sodium chloride 0 9 % irrigation solution 10 mL 10 mL Irrigation Given     02/02/2021 0222 sodium chloride 0 9 % irrigation solution 10 mL 10 mL Irrigation Given     02/02/2021 1148 oxyCODONE (ROXICODONE) IR tablet 5 mg 5 mg Oral Given     02/02/2021 0111 oxyCODONE (ROXICODONE) IR tablet 5 mg 5 mg Oral Given     02/02/2021 1151 polyethylene glycol (MIRALAX) packet 17 g 17 g Oral Given     02/02/2021 1227 bisacodyl (DULCOLAX) rectal suppository 10 mg 10 mg Rectal Given     02/02/2021 0222 sodium chloride 0 9 % infusion 75 mL/hr Intravenous New Bag     02/02/2021 1207 cefepime (MAXIPIME) 2 g/50 mL dextrose IVPB 2,000 mg Intravenous New Bag     02/02/2021 0134 cefepime (MAXIPIME) 2 g/50 mL dextrose IVPB 0 mg Intravenous Stopped     02/02/2021 0104 cefepime (MAXIPIME) 2 g/50 mL dextrose IVPB 2,000 mg Intravenous New Bag     02/01/2021 1643 cholecalciferol (VITAMIN D3) tablet 2,000 Units 2,000 Units Oral Given     02/02/2021 1355 ascorbic acid (VITAMIN C) tablet 1,000 mg 1,000 mg Oral Given     02/01/2021 2108 ascorbic acid (VITAMIN C) tablet 1,000 mg 1,000 mg Oral Given     02/02/2021 1223 zinc sulfate (ZINCATE) capsule 220 mg 220 mg Oral Given     02/01/2021 1646 zinc sulfate (ZINCATE) capsule 220 mg 220 mg Oral Given     02/02/2021 1203 lactulose oral solution 20 g 20 g Oral Given        Past Medical History:   Diagnosis Date    Ataxia     COPD [facility]chronic obstructive pulmonary disease) [facility] [address] [facility]     wife denies - "never had"    CVA [facility]cerebral vascular accident) [facility] [address] [facility]     Dementia ([facility] [address] [facility]     Difficulty swallowing     Difficulty walking     Generalized anxiety disorder     GERD (gastroesophageal reflux disease)     Global aphasia     H/O blood clots     Heartburn     Hypertension     Mental disorder     Muscle weakness     Neuromuscular dysfunction of bladder     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 ([facility] [address] )     Stroke ([facility] [address] [facility]     Thrombosis      Present on Admission:   Essential hypertension   COPD (chronic obstructive pulmonary disease) ([facility] [address] [facility]   Depression with anxiety   Dementia ([facility] [address] [facility]   Constipation   Bilateral nephrolithiasis   [facility]      Admitting Diagnosis: Vomiting [R11 10]  Age/Sex: 65 y o  male       Admission Orders:    SCD, soap suds enema, incentive spirometry         Scheduled Medications:      amLODIPine, 10 mg, Oral, Daily  ammonium lactate, , Topical, Daily  apixaban, 2 5 mg, Oral, BID  ascorbic acid, 1,000 mg, Oral, Q12H DORON  bisacodyl, 10 mg, Rectal, Daily  busPIRone, 7 5 mg, Oral, TID  cefepime, 2,000 mg, Intravenous, Q12H  cholecalciferol, 2,000 Units, Oral, Daily  docusate sodium, 100 mg, Oral, BID  famotidine, 20 mg, Oral, BID  glycerin-hypromellose-, 1 drop, Both Eyes, BID  lactulose, 20 g, Oral, Daily  memantine, 5 mg, Oral, BID  metoprolol tartrate, 12 5 mg, Oral, BID  mirtazapine, 15 mg, Oral, HS  zinc sulfate, 220 mg, Oral, Daily    Followed by  Flori Dorado ON 2/8/2021] multivitamin-minerals, 1 tablet, Oral, Daily  oxyCODONE, 5 mg, Oral, 4x Daily  polyethylene glycol, 17 g, Oral, Daily  potassium chloride, 10 mEq, Oral, Daily With Breakfast  QUEtiapine, 25 mg, Oral, HS  saccharomyces boulardii, 250 mg, Oral, BID  senna, 2 tablet, Oral, HS  sodium chloride (PF), 10 mL, Intracatheter, Daily  sodium chloride, 10 mL, Irrigation, BID      Continuous IV Infusions:      sodium chloride, 75 mL/hr, Intravenous, Continuous      PRN Meds:      acetaminophen, 650 mg, Oral, Q4H PRN  bisacodyl, 10 mg, Rectal, Daily PRN  ondansetron, 4 mg, Intravenous, Q6H PRN              Network Utilization Review Department  ATTENTION: Please call with any questions or concerns to [phone] and carefully listen to the prompts so that you are directed to the right person  All voicemails are confidential   Abundio Dong all requests for admission clinical reviews, approved or denied determinations and any other requests to dedicated fax number below belonging to the campus where the patient is receiving treatment   List of dedicated fax numbers for the Facilities:  [address] DENIALS (Administrative/Medical Necessity) [phone]   [address] (Maternity/NICU/Pediatrics) [phone]   [address] Bia Almaraz 7071 (  Dieter Grace "Fely" 103) [address] [place] [address] [address]den [address] 1481 [address] [place]) [facility] [phone]

## 2021-02-02 NOTE — ASSESSMENT & PLAN NOTE
· Present on admission, as evidenced by fever and leukocytosis  · Suspected source is urinary tract infection, as he has had multiple UTIs in the past which have turned septic  · He does have history of MDRO, continue with IV cefepime and tailor antibiotics pending cultures    · IV hydration, antipyretic  · He is hemodynamically stable, monitor vital signs

## 2021-02-02 NOTE — ASSESSMENT & PLAN NOTE
· COVID-19 positive from the nursing home today, diagnosed on 02/01  · Noted the patient was also COVID positive in March of 2020  · Respiratory status is stable on room air  · Treat per mild protocol, trend inflammatory markers  Noted they are slightly worsening today  · Continue vitamin-C, vitamin-D, zinc, multivitamin  · Discussed with patient's wife, who is POA, about giving the patient remdesivir secondary to being high risk, however she is refusing at this time    · Continue outpatient Eliquis

## 2021-02-02 NOTE — CASE MANAGEMENT
LOS 1 day, not a bundle or readmission  Patient is a low risk for readmission with risk for readmission score of 23  Patient is nonverbal at baseline with diagnosis of Dementia  Patient has guardianship paperwork on file for 9/9/20 designating wife Ced Vanegas as patient's decision maker  CM called and spoke to spouse to introduce self, explain role, and discuss DC planning  Wife confirms patient is from [facility] and plan upon medical stability is to return  ECIN request sent to [facility] per preference  CM made aware via [employer] patient is a bedhold  CM called and spoke to Kernick Leonard from facility to verify patient's baseline information  Patient's baseline cognitive status is confused per RN  Patient is either in samantha-chair or bed per RN, not because he is physically limited, but due to cognitively being unable to participate  Patient is a nikita lift  For transfers and  Totally dependent for all ADLs  Patient is fed his meals by staff at facility  Patient prefers using [facility] through facility as they manage his medications and meals  PCP is Dr Jarrett Howard  Patient has  diagnoses of depression and anxiety  Patient is on medications and managed by psychiatrist through facility that comes in every 6 months per RN  If patient is transported out of facility it is via BLS  CM will continue to follow for further needs

## 2021-02-02 NOTE — ASSESSMENT & PLAN NOTE
· The patient is nonverbal at baseline, but offers no complaints and is at his baseline mental status currently  · Supportive care

## 2021-02-02 NOTE — ASSESSMENT & PLAN NOTE
· History of bilateral nephrolithiasis, with nephrostomy tube in place  · Outpatient follow-up with Urology

## 2021-02-03 LAB
ALBUMIN SERPL BCP-MCNC: 2.6 G/DL (ref 3.5–5)
ALP SERPL-CCNC: 87 U/L (ref 46–116)
ALT SERPL W P-5'-P-CCNC: 14 U/L (ref 12–78)
ANION GAP SERPL CALCULATED.3IONS-SCNC: 9 MMOL/L (ref 4–13)
AST SERPL W P-5'-P-CCNC: 13 U/L (ref 5–45)
BASOPHILS # BLD AUTO: 0.03 THOUSANDS/ΜL (ref 0–0.1)
BASOPHILS NFR BLD AUTO: 0 % (ref 0–1)
BILIRUB SERPL-MCNC: 0.4 MG/DL (ref 0.2–1)
BUN SERPL-MCNC: 22 MG/DL (ref 5–25)
CALCIUM ALBUM COR SERPL-MCNC: 10.3 MG/DL (ref 8.3–10.1)
CALCIUM SERPL-MCNC: 9.2 MG/DL (ref 8.3–10.1)
CHLORIDE SERPL-SCNC: 109 MMOL/L (ref 100–108)
CO2 SERPL-SCNC: 28 MMOL/L (ref 21–32)
CREAT SERPL-MCNC: 1.98 MG/DL (ref 0.6–1.3)
CRP SERPL QL: 196.8 MG/L
D DIMER PPP FEU-MCNC: 0.75 UG/ML FEU
EOSINOPHIL # BLD AUTO: 0.22 THOUSAND/ΜL (ref 0–0.61)
EOSINOPHIL NFR BLD AUTO: 3 % (ref 0–6)
ERYTHROCYTE [DISTWIDTH] IN BLOOD BY AUTOMATED COUNT: 15.7 % (ref 11.6–15.1)
GFR SERPL CREATININE-BSD FRML MDRD: 33 ML/MIN/1.73SQ M
GLUCOSE SERPL-MCNC: 83 MG/DL (ref 65–140)
HCT VFR BLD AUTO: 44.5 % (ref 36.5–49.3)
HGB BLD-MCNC: 13.9 G/DL (ref 12–17)
IMM GRANULOCYTES # BLD AUTO: 0.03 THOUSAND/UL (ref 0–0.2)
IMM GRANULOCYTES NFR BLD AUTO: 0 % (ref 0–2)
LYMPHOCYTES # BLD AUTO: 1.49 THOUSANDS/ΜL (ref 0.6–4.47)
LYMPHOCYTES NFR BLD AUTO: 18 % (ref 14–44)
MCH RBC QN AUTO: 28.3 PG (ref 26.8–34.3)
MCHC RBC AUTO-ENTMCNC: 31.2 G/DL (ref 31.4–37.4)
MCV RBC AUTO: 91 FL (ref 82–98)
MONOCYTES # BLD AUTO: 0.82 THOUSAND/ΜL (ref 0.17–1.22)
MONOCYTES NFR BLD AUTO: 10 % (ref 4–12)
NEUTROPHILS # BLD AUTO: 5.84 THOUSANDS/ΜL (ref 1.85–7.62)
NEUTS SEG NFR BLD AUTO: 69 % (ref 43–75)
NRBC BLD AUTO-RTO: 0 /100 WBCS
PLATELET # BLD AUTO: 247 THOUSANDS/UL (ref 149–390)
PMV BLD AUTO: 10.5 FL (ref 8.9–12.7)
POTASSIUM SERPL-SCNC: 3.7 MMOL/L (ref 3.5–5.3)
PROCALCITONIN SERPL-MCNC: 0.58 NG/ML
PROT SERPL-MCNC: 7.8 G/DL (ref 6.4–8.2)
RBC # BLD AUTO: 4.91 MILLION/UL (ref 3.88–5.62)
SODIUM SERPL-SCNC: 146 MMOL/L (ref 136–145)
WBC # BLD AUTO: 8.43 THOUSAND/UL (ref 4.31–10.16)

## 2021-02-03 PROCEDURE — 85025 COMPLETE CBC W/AUTO DIFF WBC: CPT | Performed by: PHYSICIAN ASSISTANT

## 2021-02-03 PROCEDURE — 80053 COMPREHEN METABOLIC PANEL: CPT | Performed by: PHYSICIAN ASSISTANT

## 2021-02-03 PROCEDURE — 99232 SBSQ HOSP IP/OBS MODERATE 35: CPT | Performed by: PHYSICIAN ASSISTANT

## 2021-02-03 PROCEDURE — 84145 PROCALCITONIN (PCT): CPT | Performed by: PHYSICIAN ASSISTANT

## 2021-02-03 PROCEDURE — 85379 FIBRIN DEGRADATION QUANT: CPT | Performed by: PHYSICIAN ASSISTANT

## 2021-02-03 PROCEDURE — 86140 C-REACTIVE PROTEIN: CPT | Performed by: PHYSICIAN ASSISTANT

## 2021-02-03 RX ORDER — DEXTROSE AND SODIUM CHLORIDE 5; .45 G/100ML; G/100ML
100 INJECTION, SOLUTION INTRAVENOUS CONTINUOUS
Status: DISCONTINUED | OUTPATIENT
Start: 2021-02-03 | End: 2021-02-04

## 2021-02-03 RX ADMIN — FAMOTIDINE 20 MG: 20 TABLET ORAL at 18:42

## 2021-02-03 RX ADMIN — Medication: at 09:45

## 2021-02-03 RX ADMIN — APIXABAN 2.5 MG: 2.5 TABLET, FILM COATED ORAL at 18:40

## 2021-02-03 RX ADMIN — GLYCERIN 1 DROP: .002; .002; .01 SOLUTION/ DROPS OPHTHALMIC at 18:51

## 2021-02-03 RX ADMIN — ZINC SULFATE 220 MG (50 MG) CAPSULE 220 MG: CAPSULE at 09:45

## 2021-02-03 RX ADMIN — METOPROLOL TARTRATE 12.5 MG: 25 TABLET, FILM COATED ORAL at 09:44

## 2021-02-03 RX ADMIN — DEXTROSE AND SODIUM CHLORIDE 100 ML/HR: 5; .45 INJECTION, SOLUTION INTRAVENOUS at 17:04

## 2021-02-03 RX ADMIN — MEMANTINE 5 MG: 5 TABLET ORAL at 18:42

## 2021-02-03 RX ADMIN — BUSPIRONE HYDROCHLORIDE 7.5 MG: 5 TABLET ORAL at 18:42

## 2021-02-03 RX ADMIN — MEMANTINE 5 MG: 5 TABLET ORAL at 09:44

## 2021-02-03 RX ADMIN — CEFEPIME HYDROCHLORIDE 2000 MG: 2 INJECTION, POWDER, FOR SOLUTION INTRAVENOUS at 12:27

## 2021-02-03 RX ADMIN — Medication 2000 UNITS: at 09:45

## 2021-02-03 RX ADMIN — OXYCODONE HYDROCHLORIDE 5 MG: 5 TABLET ORAL at 09:45

## 2021-02-03 RX ADMIN — OXYCODONE HYDROCHLORIDE 5 MG: 5 TABLET ORAL at 18:42

## 2021-02-03 RX ADMIN — APIXABAN 2.5 MG: 2.5 TABLET, FILM COATED ORAL at 09:44

## 2021-02-03 RX ADMIN — METOPROLOL TARTRATE 12.5 MG: 25 TABLET, FILM COATED ORAL at 18:41

## 2021-02-03 RX ADMIN — BUSPIRONE HYDROCHLORIDE 7.5 MG: 5 TABLET ORAL at 09:44

## 2021-02-03 RX ADMIN — LACTULOSE 20 G: 10 SOLUTION ORAL at 09:45

## 2021-02-03 RX ADMIN — Medication 250 MG: at 18:41

## 2021-02-03 RX ADMIN — OXYCODONE HYDROCHLORIDE 5 MG: 5 TABLET ORAL at 12:27

## 2021-02-03 RX ADMIN — FAMOTIDINE 20 MG: 20 TABLET ORAL at 09:45

## 2021-02-03 RX ADMIN — BISACODYL 10 MG: 10 SUPPOSITORY RECTAL at 09:45

## 2021-02-03 RX ADMIN — AMLODIPINE BESYLATE 10 MG: 10 TABLET ORAL at 09:45

## 2021-02-03 RX ADMIN — SODIUM CHLORIDE 10 ML: 0.9 IRRIGANT IRRIGATION at 09:57

## 2021-02-03 RX ADMIN — GLYCERIN 1 DROP: .002; .002; .01 SOLUTION/ DROPS OPHTHALMIC at 09:46

## 2021-02-03 RX ADMIN — SODIUM CHLORIDE 10 ML: 0.9 IRRIGANT IRRIGATION at 21:30

## 2021-02-03 RX ADMIN — Medication 1000 MG: at 09:45

## 2021-02-03 RX ADMIN — POTASSIUM CHLORIDE 10 MEQ: 750 TABLET, EXTENDED RELEASE ORAL at 09:45

## 2021-02-03 RX ADMIN — Medication 250 MG: at 09:44

## 2021-02-03 NOTE — ED NOTES
Pt had bowel movement  Pt cleaned up and sheets changed  Pt repositioned in bed        Paola Fernandez, JESSE  02/03/21 0060

## 2021-02-03 NOTE — PHYSICAL THERAPY NOTE
PHYSICAL THERAPY SCREEN NOTE    Patient Name: Porter Jade  BCYHN'R Date: 2/3/2021        02/03/21 0859   PT Last Visit   PT Visit Date 02/03/21   Note Type   Note type Screen   Activity Tolerance   Medical Staff Made Aware Spoke to GIORGIO Light   Assessment   Assessment PT orders received, chart review performed  Spoke candido Haro  Pt is admit from [facility] where he is a bedhold  Per pt's RN, [facility]lift [facility]  At this time, pt is at his mobility baseline  Recommend OOB w/ nikita lift w/ RN staff during this admission  Pt w/ no acute PT needs, will DC PT  Please reconsult if any changes or needs arise        Goldie Rodriguez, PT, DPT

## 2021-02-03 NOTE — PLAN OF CARE
Problem: Potential for Falls  Goal: Patient will remain free of falls  Description: INTERVENTIONS:  - Assess patient frequently for physical needs  -  Identify cognitive and physical deficits and behaviors that affect risk of falls  -  [place] fall precautions as indicated by assessment   - Educate patient/family on patient safety including physical limitations  - Instruct patient to call for assistance with activity based on assessment  - Modify environment to reduce risk of injury  - Consider OT/PT consult to assist with strengthening/mobility  Outcome: Progressing     Problem: Prexisting or High Potential for Compromised Skin Integrity  Goal: Skin integrity is maintained or improved  Description: INTERVENTIONS:  - Identify patients at risk for skin breakdown  - Assess and monitor skin integrity  - Assess and monitor nutrition and hydration status  - Monitor labs   - Assess for incontinence   - Turn and reposition patient  - Assist with mobility/ambulation  - Relieve pressure over bony prominences  - Avoid friction and shearing  - Provide appropriate hygiene as needed including keeping skin clean and dry  - Evaluate need for skin moisturizer/barrier cream  - Collaborate with interdisciplinary team   - Patient/family teaching  - Consider wound care consult   Outcome: Progressing     Problem: Nutrition/Hydration-ADULT  Goal: Nutrient/Hydration intake appropriate for improving, restoring or maintaining nutritional needs  Description: Monitor and assess patient's nutrition/hydration status for malnutrition  Collaborate with interdisciplinary team and initiate plan and interventions as ordered  Monitor patient's weight and dietary intake as ordered or per policy  Utilize nutrition screening tool and intervene as necessary  Determine patient's food preferences and provide high-protein, high-caloric foods as appropriate       INTERVENTIONS:  - Monitor oral intake, urinary output, labs, and treatment plans  - Assess nutrition and hydration status and recommend course of action  - Evaluate amount of meals eaten  - Assist patient with eating if necessary   - Allow adequate time for meals  - Recommend/ encourage appropriate diets, oral nutritional supplements, and vitamin/mineral supplements  - Order, calculate, and assess calorie counts as needed  - Recommend, monitor, and adjust tube feedings and TPN/PPN based on assessed needs  - Assess need for intravenous fluids  - Provide specific nutrition/hydration education as appropriate  - Include patient/family/caregiver in decisions related to nutrition  Outcome: Progressing     Problem: INFECTION - ADULT  Goal: Absence or prevention of progression during hospitalization  Description: INTERVENTIONS:  - Assess and monitor for signs and symptoms of infection  - Monitor lab/diagnostic results  - Monitor all insertion sites, i e  indwelling lines, tubes, and drains  - Monitor endotracheal if appropriate and nasal secretions for changes in amount and color  - [place] appropriate cooling/warming therapies per order  - Administer medications as ordered  - Instruct and encourage patient and family to use good hand hygiene technique  - Identify and instruct in appropriate isolation precautions for identified infection/condition  Outcome: Progressing     Problem: GENITOURINARY - ADULT  Goal: Maintains or returns to baseline urinary function  Description: INTERVENTIONS:  - Assess urinary function  - Encourage oral fluids to ensure adequate hydration if ordered  - Administer IV fluids as ordered to ensure adequate hydration  - Administer ordered medications as needed  - Offer frequent toileting  - Follow urinary retention protocol if ordered  Outcome: Progressing  Goal: Urinary catheter remains patent  Description: INTERVENTIONS:  - Assess patency of urinary catheter  - If patient has a chronic lucia, consider changing catheter if non-functioning  - Follow guidelines for intermittent irrigation of non-functioning urinary catheter  Outcome: Progressing     Problem: METABOLIC, FLUID AND ELECTROLYTES - ADULT  Goal: Electrolytes maintained within normal limits  Description: INTERVENTIONS:  - Monitor labs and assess patient for signs and symptoms of electrolyte imbalances  - Administer electrolyte replacement as ordered  - Monitor response to electrolyte replacements, including repeat lab results as appropriate  - Instruct patient on fluid and nutrition as appropriate  Outcome: Progressing  Goal: Fluid balance maintained  Description: INTERVENTIONS:  - Monitor labs   - Monitor I/O and WT  - Instruct patient on fluid and nutrition as appropriate  - Assess for signs & symptoms of volume excess or deficit  Outcome: Progressing     Problem: SKIN/TISSUE INTEGRITY - ADULT  Goal: Skin integrity remains intact  Description: INTERVENTIONS  - Identify patients at risk for skin breakdown  - Assess and monitor skin integrity  - Assess and monitor nutrition and hydration status  - Monitor labs (i e  albumin)  - Assess for incontinence   - Turn and reposition patient  - Assist with mobility/ambulation  - Relieve pressure over bony prominences  - Avoid friction and shearing  - Provide appropriate hygiene as needed including keeping skin clean and dry  - Evaluate need for skin moisturizer/barrier cream  - Collaborate with interdisciplinary team (i e  Nutrition, Rehabilitation, etc )   - Patient/family teaching  Outcome: Progressing  Goal: Incision(s), wounds(s) or drain site(s) healing without S/S of infection  Description: INTERVENTIONS  - Assess and document risk factors for skin impairment   - Assess and document dressing, incision, wound bed, drain sites and surrounding tissue  - Consider nutrition services referral as needed  - Oral mucous membranes remain intact  - Provide patient/ family education  Outcome: Progressing     Problem: MUSCULOSKELETAL - ADULT  Goal: Maintain or return mobility to safest level of function  Description: INTERVENTIONS:  - Assess patient's ability to carry out ADLs; assess patient's baseline for ADL function and identify physical deficits which impact ability to perform ADLs (bathing, care of mouth/teeth, toileting, grooming, dressing, etc )  - Assess/evaluate cause of self-care deficits   - Assess range of motion  - Assess patient's mobility  - Assess patient's need for assistive devices and provide as appropriate  - Encourage maximum independence but intervene and supervise when necessary  - Involve family in performance of ADLs  - Assess for home care needs following discharge   - Consider OT consult to assist with ADL evaluation and planning for discharge  - Provide patient education as appropriate  Outcome: Progressing  Goal: Maintain proper alignment of affected body part  Description: INTERVENTIONS:  - Support, maintain and protect limb and body alignment  - Provide patient/ family with appropriate education  Outcome: Progressing     Problem: DISCHARGE PLANNING  Goal: Discharge to home or other facility with appropriate resources  Description: INTERVENTIONS:  - Identify barriers to discharge w/patient and caregiver  - Arrange for needed discharge resources and transportation as appropriate  - Identify discharge learning needs (meds, wound care, etc )  - Arrange for interpretive services to assist at discharge as needed  - Refer to Case Management Department for coordinating discharge planning if the patient needs post-hospital services based on physician/advanced practitioner order or complex needs related to functional status, cognitive ability, or social support system  Outcome: Progressing     Problem: Knowledge Deficit  Goal: Patient/family/caregiver demonstrates understanding of disease process, treatment plan, medications, and discharge instructions  Description: Complete learning assessment and assess knowledge base    Interventions:  - Provide teaching at level of understanding  - Provide teaching via preferred learning methods  Outcome: Progressing

## 2021-02-03 NOTE — ASSESSMENT & PLAN NOTE
· Max 2 06, baseline around 1 5  · Would avoid nephrotoxins  · Continue IV fluid hydration    · Likely related to poor oral intake    Lab Results   Component Value Date    CREATININE 1 98 (H) 02/03/2021    CREATININE 2 06 (H) 02/02/2021    CREATININE 1 85 (H) 02/01/2021    CREATININE 1 50 (H) 09/09/2020

## 2021-02-03 NOTE — ASSESSMENT & PLAN NOTE
· Present on admission, as evidenced by fever and leukocytosis  · Suspected source is urinary tract infection, as he has had multiple UTIs in the past secondary to chronic suprapubic catheter  · He does have history of MDRO including ESBL E coli, Providencia, Enterococcus  · Urine cultures noted show mixed contaminants x3  · Given clinical improvement with use of IV cefepime will continue  Currently day 3

## 2021-02-03 NOTE — ASSESSMENT & PLAN NOTE
· Continue bowel regimen  · Status post soapsuds enema during admission  · Monitor for bowel movements daily

## 2021-02-03 NOTE — PLAN OF CARE
Problem: Potential for Falls  Goal: Patient will remain free of falls  Description: INTERVENTIONS:  - Assess patient frequently for physical needs  -  Identify cognitive and physical deficits and behaviors that affect risk of falls  -  [place] fall precautions as indicated by assessment   - Educate patient/family on patient safety including physical limitations  - Instruct patient to call for assistance with activity based on assessment  - Modify environment to reduce risk of injury  - Consider OT/PT consult to assist with strengthening/mobility  Outcome: Progressing     Problem: Prexisting or High Potential for Compromised Skin Integrity  Goal: Skin integrity is maintained or improved  Description: INTERVENTIONS:  - Identify patients at risk for skin breakdown  - Assess and monitor skin integrity  - Assess and monitor nutrition and hydration status  - Monitor labs   - Assess for incontinence   - Turn and reposition patient  - Assist with mobility/ambulation  - Relieve pressure over bony prominences  - Avoid friction and shearing  - Provide appropriate hygiene as needed including keeping skin clean and dry  - Evaluate need for skin moisturizer/barrier cream  - Collaborate with interdisciplinary team   - Patient/family teaching  - Consider wound care consult   Outcome: Progressing     Problem: Nutrition/Hydration-ADULT  Goal: Nutrient/Hydration intake appropriate for improving, restoring or maintaining nutritional needs  Description: Monitor and assess patient's nutrition/hydration status for malnutrition  Collaborate with interdisciplinary team and initiate plan and interventions as ordered  Monitor patient's weight and dietary intake as ordered or per policy  Utilize nutrition screening tool and intervene as necessary  Determine patient's food preferences and provide high-protein, high-caloric foods as appropriate       INTERVENTIONS:  - Monitor oral intake, urinary output, labs, and treatment plans  - Assess nutrition and hydration status and recommend course of action  - Evaluate amount of meals eaten  - Assist patient with eating if necessary   - Allow adequate time for meals  - Recommend/ encourage appropriate diets, oral nutritional supplements, and vitamin/mineral supplements  - Order, calculate, and assess calorie counts as needed  - Recommend, monitor, and adjust tube feedings and TPN/PPN based on assessed needs  - Assess need for intravenous fluids  - Provide specific nutrition/hydration education as appropriate  - Include patient/family/caregiver in decisions related to nutrition  Outcome: Progressing     Problem: INFECTION - ADULT  Goal: Absence or prevention of progression during hospitalization  Description: INTERVENTIONS:  - Assess and monitor for signs and symptoms of infection  - Monitor lab/diagnostic results  - Monitor all insertion sites, i e  indwelling lines, tubes, and drains  - Monitor endotracheal if appropriate and nasal secretions for changes in amount and color  - [place] appropriate cooling/warming therapies per order  - Administer medications as ordered  - Instruct and encourage patient and family to use good hand hygiene technique  - Identify and instruct in appropriate isolation precautions for identified infection/condition  Outcome: Progressing

## 2021-02-03 NOTE — OCCUPATIONAL THERAPY NOTE
Occupational Therapy Evaluation     Patient Name: Liudmila Lizama  Today's Date: 2/3/2021  Problem List  Principal Problem:    Sepsis due to UTI [facility])  Active Problems:    COPD (chronic obstructive pulmonary disease) ([facility] )    History of DVT (deep vein thrombosis)    Essential hypertension    Constipation    Dementia ([facility] )    Sup[facility] catheter ([facility] )    Bilateral nephrolithiasis    Depression with anxiety    COVID-19 virus infection    RUPESH (acute kidney injury) ([facility] )    Past Medical History  Past Medical History:   Diagnosis Date    Ataxia     COPD (chronic obstructive pulmonary disease) ([facility] )     wife denies - "never had"    CVA [facility]cerebral vascular accident) [facility]     Dementia ([facility]     Difficulty swallowing     Difficulty walking     Generalized anxiety disorder     GERD (gastroesophageal reflux disease)     Global aphasia     H/O blood clots     Heartburn     Hypertension     Mental disorder     Muscle weakness     Neuromuscular dysfunction of bladder     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 ([facility] )     Stroke ([facility] )     Thrombosis      Past Surgical History  Past Surgical History:   Procedure Laterality Date    BOTOX INJECTION N/A 6/18/2019    Procedure: INJECTION BOTULINUM TOXIN (BOTOX), intra detrusor;  Surgeon: Adan Medina MD;  [facility];  Service: Urology    BOTOX INJECTION N/A 10/7/2019    Procedure: INJECTION BOTULINUM TOXIN (BOTOX), intradetrusor;  Surgeon: Adan Medina MD;  [facility];  Service: Urology    CYSTOSCOPY      CYSTOSCOPY N/A 6/18/2019    Procedure: cystoscopy and all indicated procedures;  Surgeon: Adan Medina MD;  [facility];  Service: Urology    FL CYSTOGRAM  1/15/2019    IR NEPHROSTOMY TUBE PLACEMENT  3/11/2020    IR SUPRAPUBIC CATHETER PLACEMENT  11/26/2019    IVC FILTER INSERTION      X2    IVC FILTER INSERTION      x2    KIDNEY STONE SURGERY      KNEE SURGERY      Sepsis infection     NEPHROSTOMY      PA CYSTO/URETERO W/LITHOTRIPSY &INDWELL STENT INSRT Right 6/14/2017    Procedure: CYSTOSCOPY URETEROSCOPY WITH LITHOTRIPSY HOLMIUM LASER,,BASKET STONE EXTRACTION, RETROGRADE PYELOGRAM AND INSERTION STENT URETERAL;  Surgeon: Katie Askew MD;  [facility];  Service: Urology    PA CYSTOURETHROSCOPY,BIOPSY N/A 1/15/2019    Procedure: CYSTOSCOPY, CYSTOGRAM, DILATION OF URETHRAL STRICTURE;  Surgeon: Hemant Linares MD;  [facility];  Service: Urology    URINARY SURGERY          02/03/21 1350   OT Last Visit   OT Visit Date 02/03/21  (Wednesday)   Note Type   Note type Screen   Activity Tolerance   Medical Staff Made Aware spoke to PTSofia and Padmini MARTINEZ   Assessment   Assessment OT orders received and chart review completed  Pt is a bedhold at [employer]  Pt required dependent means for OOB and total A w/ self care ADL  Pt is able to feed self at baseline  Recommend active participation in ADL w/ nursing staff while in acute care  No acute OT needs at this time  Please re consult if acute needs arise  Will screen from OT caseload  Padmini MARTINEZ aware        Mariya Cantu OTR/L

## 2021-02-03 NOTE — ASSESSMENT & PLAN NOTE
· COVID-19 positive from the nursing home today, diagnosed on 02/01  · Noted the patient was also COVID positive in March of 2020  · Respiratory status is stable on room air  · Treat per mild protocol, trend inflammatory markers  · Continue vitamin-C, vitamin-D, zinc, multivitamin  · Patient's wife refused remdesivir despite being high risk  · Inflammatory markers decreasing  · Continue outpatient Eliquis    Inflammatory Markers (last 3):   Lab Results   Component Value Date    DDIMER 0 75 (H) 02/03/2021    DDIMER 0 82 (H) 02/02/2021    DDIMER 0 64 (H) 02/01/2021     8 (H) 02/03/2021     8 (H) 02/02/2021     8 (H) 02/01/2021    FERRITIN 90 02/02/2021    FERRITIN 85 02/01/2021       Cardiac Markers:  Lab Results   Component Value Date    TROPONINI <0 02 02/02/2021    TROPONINI <0 02 02/01/2021    TROPONINI 0 02 07/08/2019    NTBNP 1,009 (H) 02/02/2021    CKTOTAL 144 02/02/2021

## 2021-02-03 NOTE — PROGRESS NOTES
[facility]  Progress Note - Megan [facility] 1950, 65 y o  male MRN: [id number]  Unit/Bed#: S -01 Encounter: [id number]  Primary Care Provider: Fanny Carpenter MD   Date and time admitted to hospital: 2/1/2021  8:30 AM    * Sepsis due to UTI [facility])  Assessment & Plan  · Present on admission, as evidenced by fever and leukocytosis  · Suspected source is urinary tract infection, as he has had multiple UTIs in the past secondary to chronic suprapubic catheter  · He does have history of MDRO including ESBL E coli, Providencia, Enterococcus  · Urine cultures noted show mixed contaminants x3  · Given clinical improvement with use of IV cefepime will continue  Currently day 3  Suprapubic catheter [facility])  Assessment & Plan  · Suprapubic catheter in place  · Outpatient Urology follow-up    COVID-19 virus infection  Assessment & Plan  · COVID-19 positive from the nursing home today, diagnosed on 02/01  · Noted the patient was also COVID positive in March of 2020  · Respiratory status is stable on room air  · Treat per mild protocol, trend inflammatory markers  · Continue vitamin-C, vitamin-D, zinc, multivitamin  · Patient's wife refused remdesivir despite being high risk  · Inflammatory markers decreasing  · Continue outpatient Eliquis    Inflammatory Markers (last 3): Lab Results   Component Value Date    DDIMER 0 75 (H) 02/03/2021    DDIMER 0 82 (H) 02/02/2021    DDIMER 0 64 (H) 02/01/2021     8 (H) 02/03/2021     8 (H) 02/02/2021     8 (H) 02/01/2021    FERRITIN 90 02/02/2021    FERRITIN 85 02/01/2021       Cardiac Markers:  Lab Results   Component Value Date    TROPONINI <0 02 02/02/2021    TROPONINI <0 02 02/01/2021    TROPONINI 0 02 07/08/2019    NTBNP 1,009 (H) 02/02/2021    CKTOTAL 144 02/02/2021         RUPESH (acute kidney injury) ([facility] [address] )  Assessment & Plan  · Max 2 06, baseline around 1 5  · Would avoid nephrotoxins  · Continue IV fluid hydration    · Likely related to poor oral intake    Lab Results   Component Value Date    CREATININE 1 98 (H) 02/03/2021    CREATININE 2 06 (H) 02/02/2021    CREATININE 1 85 (H) 02/01/2021    CREATININE 1 50 (H) 09/09/2020         Dementia ([facility] [address] )  Assessment & Plan  · The patient is nonverbal at baseline, but offers no complaints and is at his baseline mental status currently  · Supportive care  Bilateral nephrolithiasis  Assessment & Plan  · History of bilateral nephrolithiasis, with nephrostomy tube in place  · Outpatient follow-up with Urology  Essential hypertension  Assessment & Plan  · Blood pressure 110/58  · Continue amlodipine, metoprolol  · Monitor BP  History of DVT (deep vein thrombosis)  Assessment & Plan  · On Eliquis for anticoagulation, will continue  COPD (chronic obstructive pulmonary disease) ([facility])  Assessment & Plan  · No evidence of acute exacerbation  · Continue respiratory treatments  Constipation  Assessment & Plan  · Continue bowel regimen  · Status post soapsuds enema during admission  · Monitor for bowel movements daily        Depression with anxiety  Assessment & Plan  · Continue BuSpar, Seroquel, Remeron      VTE Pharmacologic Prophylaxis: VTE Score: 8 High Risk (Score >/= 5) - Pharmacological DVT Prophylaxis Ordered: Apixaban (Eliquis)  Sequential Compression Devices Ordered  Patient Centered Rounds: I have performed bedside rounds with nursing staff today  Discussions with Specialists or Other Care Team Provider: nursing    Education and Discussions with Family / Patient: Updated  (wife and daughter) via phone  [phone]    Time Spent for Care: 30 minutes  More than 50% of total time spent on counseling and coordination of care as described above      Current Length of Stay: 2 day(s)  Current Patient Status: Inpatient   Certification Statement: The patient will continue to require additional inpatient hospital stay due to IV hydration, Iv ABX  Discharge Plan: Anticipate discharge tomorrow to rehab facility  Code Status: Level 3 - DNAR and DNI    Subjective:   No events per RN    Objective:     Vitals:   Temp (24hrs), Av 2 °F (36 8 °C), Min:97 6 °F (36 4 °C), Max:98 9 °F (37 2 °C)    Temp:  [97 6 °F (36 4 °C)-98 9 °F (37 2 °C)] 97 8 °F (36 6 °C)  HR:  [57-86] 57  Resp:  [12-18] 12  BP: (102-137)/(58-97) 110/58  SpO2:  [94 %-98 %] 95 %  Body mass index is 30 13 kg/m²  Input and Output Summary (last 24 hours): Intake/Output Summary (Last 24 hours) at 2/3/2021 1601  Last data filed at 2/3/2021 1100  Gross per 24 hour   Intake 400 ml   Output 1200 ml   Net -800 ml       Physical Exam:   Physical Exam  Vitals signs and nursing note reviewed  Constitutional:       General: He is not in acute distress  Appearance: Normal appearance  He is ill-appearing  He is not diaphoretic  HENT:      Head: Normocephalic and atraumatic  Mouth/Throat:      Mouth: Mucous membranes are moist    Cardiovascular:      Rate and Rhythm: Normal rate and regular rhythm  Heart sounds: No murmur  Pulmonary:      Effort: Pulmonary effort is normal       Breath sounds: Normal breath sounds  No stridor  No wheezing, rhonchi or rales  Abdominal:      General: Bowel sounds are normal       Palpations: Abdomen is soft  There is no mass  Tenderness: There is no abdominal tenderness  There is no guarding  Musculoskeletal:      Right lower leg: No edema  Left lower leg: No edema  Skin:     General: Skin is warm and dry  Neurological:      Mental Status: He is alert        Comments: Moving left arm to grab thigh and scrotum          Additional Data:     Labs:  Results from last 7 days   Lab Units 21  0705   WBC Thousand/uL 8 43   HEMOGLOBIN g/dL 13 9   HEMATOCRIT % 44 5   PLATELETS Thousands/uL 247   NEUTROS PCT % 69   LYMPHS PCT % 18   MONOS PCT % 10   EOS PCT % 3     Results from last 7 days   Lab Units 21  0705   SODIUM mmol/L 146*   POTASSIUM mmol/L 3 7 CHLORIDE mmol/L 109*   CO2 mmol/L 28   BUN mg/dL 22   CREATININE mg/dL 1 98*   ANION GAP mmol/L 9   CALCIUM mg/dL 9 2   ALBUMIN g/dL 2 6*   TOTAL BILIRUBIN mg/dL 0 40   ALK PHOS U/L 87   ALT U/L 14   AST U/L 13   GLUCOSE RANDOM mg/dL 83     Results from last 7 days   Lab Units 02/01/21  0900   INR  1 50*             Results from last 7 days   Lab Units 02/02/21  0442 02/01/21  0900   LACTIC ACID mmol/L  --  1 6   PROCALCITONIN ng/ml 1 57* 1 76*       Lines/Drains:  Invasive Devices     Peripheral Intravenous Line            Peripheral IV 02/01/21 Left Antecubital 2 days    Peripheral IV 02/01/21 Left Wrist 2 days          Drain            Suprapubic Catheter Latex 18 Fr  434 days    Nephrostomy Left 1 10 Fr  57 days                Telemetry:        Imaging: No pertinent imaging reviewed  Recent Cultures (last 7 days):   Results from last 7 days   Lab Units 02/01/21  1033 02/01/21  0900   BLOOD CULTURE   --  No Growth at 48 hrs  No Growth at 48 hrs     URINE CULTURE  >100,000 cfu/ml   --        Last 24 Hours Medication List:   Current Facility-Administered Medications   Medication Dose Route Frequency Provider Last Rate    acetaminophen  650 mg Oral Q4H PRN Agata Hodges MD      amLODIPine  10 mg Oral Daily Agata Hodges MD      ammonium lactate   Topical Daily Agata Hodges MD      apixaban  2 5 mg Oral BID Agata Hodges MD      ascorbic acid  1,000 mg Oral Q12H [facility] Agata Hodges MD      bisacodyl  10 mg Rectal Daily PRN Agata Hodges MD      busPIRone  7 5 mg Oral TID Agata Hodges MD      cefepime  2,000 mg Intravenous Q12H Agata Hodges MD 2,000 mg (02/03/21 1227)    cholecalciferol  2,000 Units Oral Daily Agata Hodges MD      dextrose 5 % and sodium chloride 0 45 %  100 mL/hr Intravenous Continuous Stephie Enamorado PA-C      docusate sodium  100 mg Oral BID Agata Hodges MD      famotidine  20 mg Oral BID Gómez Magno Landaverde MD      glycerin-hypromellose-  1 drop Both Eyes BID Dusty Rivera MD      lactulose  20 g Oral Daily Dusty Rivera MD      memantine  5 mg Oral BID Dusty Rivera MD      metoprolol tartrate  12 5 mg Oral BID Dusty Rivera MD      mirtazapine  15 mg Oral HS Dusty Rivera MD      zinc sulfate  220 mg Oral Daily Dusty Rivera MD      Followed by   Matthew Echols ON 2/8/2021] multivitamin-minerals  1 tablet Oral Daily Dusty Rivera MD      ondansetron  4 mg Intravenous Q6H PRN Dusty Rivera MD      oxyCODONE  5 mg Oral 4x Daily Dusty Rivera MD      polyethylene glycol  17 g Oral Daily Dusty Rivera MD      potassium chloride  10 mEq Oral Daily With Breakfast Dusty Rivera MD      QUEtiapine  25 mg Oral HS Dusty Rivera MD      saccharomyces boulardii  250 mg Oral BID Dusty Rivera MD      senna  2 tablet Oral HS Dusty Rivera MD      sodium chloride (PF)  10 mL Intracatheter Daily Dusty Rivera MD      sodium chloride  10 mL Irrigation BID Dusty Rivera MD          Today, Patient Was Seen By: Angel Hendrickson PA-C    ** Please Note: Dictation voice to text software may have been used in the creation of this document   **

## 2021-02-04 LAB
ANION GAP SERPL CALCULATED.3IONS-SCNC: 7 MMOL/L (ref 4–13)
BASOPHILS # BLD AUTO: 0.04 THOUSANDS/ΜL (ref 0–0.1)
BASOPHILS NFR BLD AUTO: 1 % (ref 0–1)
BUN SERPL-MCNC: 20 MG/DL (ref 5–25)
CALCIUM SERPL-MCNC: 8.4 MG/DL (ref 8.3–10.1)
CHLORIDE SERPL-SCNC: 111 MMOL/L (ref 100–108)
CO2 SERPL-SCNC: 24 MMOL/L (ref 21–32)
CREAT SERPL-MCNC: 1.66 MG/DL (ref 0.6–1.3)
EOSINOPHIL # BLD AUTO: 0.38 THOUSAND/ΜL (ref 0–0.61)
EOSINOPHIL NFR BLD AUTO: 6 % (ref 0–6)
ERYTHROCYTE [DISTWIDTH] IN BLOOD BY AUTOMATED COUNT: 15.7 % (ref 11.6–15.1)
GFR SERPL CREATININE-BSD FRML MDRD: 41 ML/MIN/1.73SQ M
GLUCOSE SERPL-MCNC: 109 MG/DL (ref 65–140)
HCT VFR BLD AUTO: 38.8 % (ref 36.5–49.3)
HGB BLD-MCNC: 12.3 G/DL (ref 12–17)
IMM GRANULOCYTES # BLD AUTO: 0.01 THOUSAND/UL (ref 0–0.2)
IMM GRANULOCYTES NFR BLD AUTO: 0 % (ref 0–2)
LYMPHOCYTES # BLD AUTO: 1.41 THOUSANDS/ΜL (ref 0.6–4.47)
LYMPHOCYTES NFR BLD AUTO: 22 % (ref 14–44)
MCH RBC QN AUTO: 28.7 PG (ref 26.8–34.3)
MCHC RBC AUTO-ENTMCNC: 31.7 G/DL (ref 31.4–37.4)
MCV RBC AUTO: 90 FL (ref 82–98)
MONOCYTES # BLD AUTO: 0.58 THOUSAND/ΜL (ref 0.17–1.22)
MONOCYTES NFR BLD AUTO: 9 % (ref 4–12)
NEUTROPHILS # BLD AUTO: 4.1 THOUSANDS/ΜL (ref 1.85–7.62)
NEUTS SEG NFR BLD AUTO: 62 % (ref 43–75)
NRBC BLD AUTO-RTO: 0 /100 WBCS
PLATELET # BLD AUTO: 220 THOUSANDS/UL (ref 149–390)
PMV BLD AUTO: 10.4 FL (ref 8.9–12.7)
POTASSIUM SERPL-SCNC: 3.3 MMOL/L (ref 3.5–5.3)
PROCALCITONIN SERPL-MCNC: 0.39 NG/ML
RBC # BLD AUTO: 4.29 MILLION/UL (ref 3.88–5.62)
SODIUM SERPL-SCNC: 142 MMOL/L (ref 136–145)
WBC # BLD AUTO: 6.52 THOUSAND/UL (ref 4.31–10.16)

## 2021-02-04 PROCEDURE — 84145 PROCALCITONIN (PCT): CPT | Performed by: PHYSICIAN ASSISTANT

## 2021-02-04 PROCEDURE — 80048 BASIC METABOLIC PNL TOTAL CA: CPT | Performed by: PHYSICIAN ASSISTANT

## 2021-02-04 PROCEDURE — 85025 COMPLETE CBC W/AUTO DIFF WBC: CPT | Performed by: PHYSICIAN ASSISTANT

## 2021-02-04 PROCEDURE — 99232 SBSQ HOSP IP/OBS MODERATE 35: CPT | Performed by: PHYSICIAN ASSISTANT

## 2021-02-04 RX ORDER — POTASSIUM CHLORIDE 20 MEQ/1
40 TABLET, EXTENDED RELEASE ORAL ONCE
Status: COMPLETED | OUTPATIENT
Start: 2021-02-04 | End: 2021-02-04

## 2021-02-04 RX ADMIN — FAMOTIDINE 20 MG: 20 TABLET ORAL at 08:43

## 2021-02-04 RX ADMIN — LACTULOSE 20 G: 10 SOLUTION ORAL at 08:45

## 2021-02-04 RX ADMIN — GLYCERIN 1 DROP: .002; .002; .01 SOLUTION/ DROPS OPHTHALMIC at 19:02

## 2021-02-04 RX ADMIN — Medication 12.5 MG: at 23:43

## 2021-02-04 RX ADMIN — Medication 250 MG: at 18:48

## 2021-02-04 RX ADMIN — OXYCODONE HYDROCHLORIDE 5 MG: 5 TABLET ORAL at 18:58

## 2021-02-04 RX ADMIN — FAMOTIDINE 20 MG: 20 TABLET ORAL at 18:58

## 2021-02-04 RX ADMIN — ZINC SULFATE 220 MG (50 MG) CAPSULE 220 MG: CAPSULE at 08:44

## 2021-02-04 RX ADMIN — AMLODIPINE BESYLATE 10 MG: 10 TABLET ORAL at 08:43

## 2021-02-04 RX ADMIN — DEXTROSE AND SODIUM CHLORIDE 100 ML/HR: 5; .45 INJECTION, SOLUTION INTRAVENOUS at 03:15

## 2021-02-04 RX ADMIN — MIRTAZAPINE 15 MG: 15 TABLET, FILM COATED ORAL at 23:43

## 2021-02-04 RX ADMIN — Medication 1000 MG: at 08:44

## 2021-02-04 RX ADMIN — OXYCODONE HYDROCHLORIDE 5 MG: 5 TABLET ORAL at 23:44

## 2021-02-04 RX ADMIN — Medication 250 MG: at 08:43

## 2021-02-04 RX ADMIN — CEFEPIME HYDROCHLORIDE 2000 MG: 2 INJECTION, POWDER, FOR SOLUTION INTRAVENOUS at 23:31

## 2021-02-04 RX ADMIN — BUSPIRONE HYDROCHLORIDE 7.5 MG: 5 TABLET ORAL at 18:59

## 2021-02-04 RX ADMIN — Medication 1000 MG: at 23:45

## 2021-02-04 RX ADMIN — MEMANTINE 5 MG: 5 TABLET ORAL at 08:43

## 2021-02-04 RX ADMIN — SODIUM CHLORIDE 10 ML: 0.9 IRRIGANT IRRIGATION at 09:00

## 2021-02-04 RX ADMIN — SODIUM CHLORIDE 10 ML: 0.9 IRRIGANT IRRIGATION at 23:40

## 2021-02-04 RX ADMIN — POTASSIUM CHLORIDE 10 MEQ: 750 TABLET, EXTENDED RELEASE ORAL at 08:43

## 2021-02-04 RX ADMIN — POLYETHYLENE GLYCOL 3350 17 G: 17 POWDER, FOR SOLUTION ORAL at 08:42

## 2021-02-04 RX ADMIN — Medication: at 08:44

## 2021-02-04 RX ADMIN — BUSPIRONE HYDROCHLORIDE 7.5 MG: 5 TABLET ORAL at 23:44

## 2021-02-04 RX ADMIN — CEFEPIME HYDROCHLORIDE 2000 MG: 2 INJECTION, POWDER, FOR SOLUTION INTRAVENOUS at 00:00

## 2021-02-04 RX ADMIN — POTASSIUM CHLORIDE 40 MEQ: 1500 TABLET, EXTENDED RELEASE ORAL at 08:44

## 2021-02-04 RX ADMIN — MEMANTINE 5 MG: 5 TABLET ORAL at 18:59

## 2021-02-04 RX ADMIN — GLYCERIN 1 DROP: .002; .002; .01 SOLUTION/ DROPS OPHTHALMIC at 08:44

## 2021-02-04 RX ADMIN — Medication 2000 UNITS: at 08:43

## 2021-02-04 RX ADMIN — APIXABAN 2.5 MG: 2.5 TABLET, FILM COATED ORAL at 18:48

## 2021-02-04 RX ADMIN — OXYCODONE HYDROCHLORIDE 5 MG: 5 TABLET ORAL at 12:22

## 2021-02-04 RX ADMIN — APIXABAN 2.5 MG: 2.5 TABLET, FILM COATED ORAL at 08:43

## 2021-02-04 RX ADMIN — OXYCODONE HYDROCHLORIDE 5 MG: 5 TABLET ORAL at 08:44

## 2021-02-04 RX ADMIN — CEFEPIME HYDROCHLORIDE 2000 MG: 2 INJECTION, POWDER, FOR SOLUTION INTRAVENOUS at 12:22

## 2021-02-04 RX ADMIN — QUETIAPINE 25 MG: 25 TABLET, FILM COATED ORAL at 23:45

## 2021-02-04 RX ADMIN — DOCUSATE SODIUM 100 MG: 100 CAPSULE, LIQUID FILLED ORAL at 18:48

## 2021-02-04 RX ADMIN — BUSPIRONE HYDROCHLORIDE 7.5 MG: 5 TABLET ORAL at 08:42

## 2021-02-04 NOTE — ASSESSMENT & PLAN NOTE
· Blood pressure 139/89  · Continue amlodipine  Metoprolol was held given reported bradycardia on masimo but on telemetry the patients HR has been 88 - suspect artifact and not true bradycardia  · Resume BB with hold parameters this evening if HR stable on tele monitor  · Monitor BP

## 2021-02-04 NOTE — ASSESSMENT & PLAN NOTE
· Max 2 06, baseline around 1 5  · Would avoid nephrotoxins    · Oral intake improving  · Creatinine improving today - stop fluids and check BMP in AM    Lab Results   Component Value Date    CREATININE 1 66 (H) 02/04/2021    CREATININE 1 98 (H) 02/03/2021    CREATININE 2 06 (H) 02/02/2021    CREATININE 1 85 (H) 02/01/2021

## 2021-02-04 NOTE — QUICK NOTE
Contacted by nursing that patient's Masimo readings indicated a heart rate of 30s-40s  Patient last received metoprolol dose at [place] [id number] on 02/03/2021  Will order telemetry monitoring and hold metoprolol at this time

## 2021-02-04 NOTE — UTILIZATION REVIEW
Notification of Inpatient Admission/Inpatient Authorization Request   This is a Notification of Inpatient Admission for [facility]  Be advised that this patient was admitted to our facility under Inpatient Status  Contact Argelia Braga at [phone] for additional admission information  [facility] DEPT  DEDICATED -[phone]  Patient Name:   Cadence Chiang   YOB: 1950       [address]   [address]:   Ethan Ng  Tax ID: [id number]  NPI: [id number] Attending Provider/NPI:  Address:  Phone: Isrrael Zarate Md [[id number]]  Same as the facility  [phone]   Place of Service Code: 24 Place of Service Name:  [address] [place] [address]   Start Date: 2/1/21 1112     Discharge Date & Time: No discharge date for patient encounter  Type of Admission: Inpatient Status Discharge Disposition   (if discharged): Home/Self Care   Patient Diagnoses: Vomiting [R11 10]  UTI (urinary tract infection) [N39 0]  Sepsis ([address] ) [A41 9]  GKHNS-51 [U07 1]     Orders: Admission Orders (From admission, onward)     Ordered        02/01/21 1112  Inpatient Admission  Once                    Assigned Utilization Review Contact: Argelia Braga  Utilization   Network Utilization Review Department  Phone: [phone]; Fax [phone]  Email: [email or web address]   ATTENTION PAYERS: Please call the assigned Utilization  directly with any questions or concerns ALL voicemails in the department are confidential  Send all requests for admission clinical reviews, approved or denied determinations and any other requests to dedicated fax number belonging to the campus where the patient is receiving treatment        Initial Clinical Review    Admission: Date/Time/Statement:   Admission Orders (From admission, onward)     Ordered        02/01/21 1112  Inpatient Admission  Once                   Orders Placed This Encounter   Procedures    Inpatient Admission     Standing Status:   Standing     Number of Occurrences:   1     Order Specific Question:   Level of Care     Answer:   Med Surg [16]     Order Specific Question:   Estimated length of stay     Answer:   More than 2 Midnights     Order Specific Question:   Certification     Answer:   I certify that inpatient services are medically necessary for this patient for a duration of greater than two midnights  See H&P and MD Progress Notes for additional information about the patient's course of treatment  ED Arrival Information     Expected Arrival Acuity Means of Arrival Escorted By Service Admission Type    - 2/1/2021 08:30 Urgent Ambulance [facility] Hospitalist Urgent    Arrival Complaint    -        Chief Complaint   Patient presents with    Vomiting     [facility] for episode of vomiting  HPI:   65 y o  male who presents to the ED from SNF with vomiting  PMH of dementia, stroke, hypertension, COPD, recurrent UTI, MDRO, suprapubic catheter in place, left nephrostomy tube in place  Patient is nonverbal at baseline, history is obtained from review of chart from the ER physician as well as spouse Jorge Alberto Pena over phone  Febrile on arrival to the ED,  workup revealed patient COVID-19 positive  Labs revealed leukocytosis  UA + for bacteria, blood and nitrites  Plan: Inpatient Med Surg admission for evaluation and treatment of sepsis, likely due to urinary source, COVID 19 virus infection and constipation:   IV cefepime, follow urine culture, monitor inflammatory markers, soap suds enema  2/2 Internal Medicine:  Continue IV cefepime, pending urine culture  Creatinine 2 06 (baseline 1 5)  IV hydration, BMP in a m  Inflammatory markers slightly worse, continue vitamins  Patient's wife refusing Remdesivir  + bowel movement yesterday           ED Triage Vitals   Temperature Pulse Respirations Blood Pressure SpO2   02/01/21 0834 02/01/21 9997 02/01/21 0834 02/01/21 0834 02/01/21 0834   (!) 102 2 °F (39 °C) 101 16 131/63 92 %      Temp Source Heart Rate Source Patient Position - Orthostatic VS BP Location FiO2 (%)   02/01/21 0834 02/01/21 0834 02/01/21 0834 02/01/21 0834 --   Oral Monitor Sitting Right arm       Pain Score       02/02/21 1148       Med Not Given for Pain - for MAR use only          Wt Readings from Last 1 Encounters:   02/01/21 98 kg (216 lb 0 8 oz)     Additional Vital Signs:     Date/Time  Temp  Pulse  Resp  BP  MAP (mmHg)  SpO2  O2 Device   02/02/21 1626  97 6   81  18  125/65  --  94 %  None (Room air)   02/02/21 1530  --  62  18  134/63  90  95 %  None (Room air)   02/02/21 1430  --  62  18  105/65  81  94 %  None (Room air)   02/02/21 1400  --  68  18  143/65  93  95 %  None (Room air)   02/02/21 1330  --  62  18  101/63  77  95 %  None (Room air)   02/02/21 1300  --  68  18  115/62  81  95 %  None (Room air)   02/02/21 1128  --  --  --  144/71  --  --  --   02/02/21 0600  --  90  18  126/64  86  96 %  None (Room air)   02/02/21 0400  --  90  18  121/76  93  96 %  None (Room air)   02/02/21 0200  --  100  18  122/62  85  94 %  None (Room air)   02/02/21 0100  --  102  18  122/83  97  94 %  None (Room air)   02/01/21 2300  --  106Abnormal   18  120/80  97  93 %  None (Room air)   02/01/21 1900  --  86  18  126/61  86  97 %  --   02/01/21 1700  --  98  18  125/68  92  96 %  --   02/01/21 1500  98 9 °  92  18  140/79  101  96 %  --   02/01/21 1248  --  91  --  119/64  --  95 %  None (Room air)               Pertinent Labs/Diagnostic Test Results:     2/1 CT AP:  Large volume of rectal stool suggesting fecal impaction  Bilateral renal calculi  Left percutaneous nephrostomy catheter present, no hydronephrosis is identified  Cholelithiasis  2/1 CXR:   No acute cardiopulmonary disease       2/1 EKG:      Sinus rhythm with frequent Premature ventricular complexes and Fusion complexes  Right superior axis deviation  Inferior infarct , age undetermined  Abnormal ECG  When compared with ECG of 11-MAR-2020 05:29,  Fusion complexes are now Present  Inferior infarct is now Present      Results from last 7 days   Lab Units 02/01/21  0900   SARS-COV-2  Positive*     Results from last 7 days   Lab Units 02/04/21  0527 02/03/21  0705 02/02/21 0442 02/01/21  0900   WBC Thousand/uL 6 52 8 43 11 31* 18 71*   HEMOGLOBIN g/dL 12 3 13 9 13 0 14 7   HEMATOCRIT % 38 8 44 5 39 9 44 9   PLATELETS Thousands/uL 220 247 211 248   NEUTROS ABS Thousands/µL 4 10 5 84 9 20* 16 66*         Results from last 7 days   Lab Units 02/04/21  0527 02/03/21  0705 02/02/21 0442 02/01/21  0900   SODIUM mmol/L 142 146* 142 143   POTASSIUM mmol/L 3 3* 3 7 3 3* 3 5   CHLORIDE mmol/L 111* 109* 109* 109*   CO2 mmol/L 24 28 27 25   ANION GAP mmol/L 7 9 6 9   BUN mg/dL 20 22 24 23   CREATININE mg/dL 1 66* 1 98* 2 06* 1 85*   EGFR ml/min/1 73sq m 41 33 31 36   CALCIUM mg/dL 8 4 9 2 8 5 8 8     Results from last 7 days   Lab Units 02/03/21  0705 02/02/21 0442 02/01/21  0900   AST U/L 13 10 <5*   ALT U/L 14 13 11*   ALK PHOS U/L 87 82 99   TOTAL PROTEIN g/dL 7 8 6 9 7 5   ALBUMIN g/dL 2 6* 2 4* 2 8*   TOTAL BILIRUBIN mg/dL 0 40 0 42 0 49   AMMONIA umol/L  --   --  <10*         Results from last 7 days   Lab Units 02/04/21  0527 02/03/21  0705 02/02/21 0442 02/01/21  0900   GLUCOSE RANDOM mg/dL 109 83 110 153*       Results from last 7 days   Lab Units 02/02/21  0442   CK TOTAL U/L 144     Results from last 7 days   Lab Units 02/02/21  0442 02/01/21  0900   TROPONIN I ng/mL <0 02 <0 02     Results from last 7 days   Lab Units 02/03/21  0705 02/02/21 0442 02/01/21  0900   D-DIMER QUANTITATIVE ug/ml FEU 0 75* 0 82* 0 64*     Results from last 7 days   Lab Units 02/01/21  0900   PROTIME seconds 18 2*   INR  1 50*   PTT seconds 37         Results from last 7 days   Lab Units 02/04/21  0527 02/03/21  1743 02/02/21  0442 02/01/21  0900   PROCALCITONIN ng/ml 0 39* 0 58* 1 57* 1 76*     Results from last 7 days   Lab Units 02/01/21  0900   LACTIC ACID mmol/L 1 6             Results from last 7 days   Lab Units 02/02/21  0442 02/01/21  1655   NT-PRO BNP pg/mL 1,009* 902*     Results from last 7 days   Lab Units 02/02/21  0442 02/01/21  1655   FERRITIN ng/mL 90 85         Results from last 7 days   Lab Units 02/01/21  0900   LIPASE u/L 36*     Results from last 7 days   Lab Units 02/03/21  0705 02/02/21  0442 02/01/21  1655   CRP mg/L 196 8* 251 8* 226 8*         Results from last 7 days   Lab Units 02/01/21  1033   CLARITY UA  Slightly Cloudy   COLOR UA  Yellow   SPEC GRAV UA  <=1 005   PH UA  6 5   GLUCOSE UA mg/dl Negative   KETONES UA mg/dl Negative   BLOOD UA  Large*   PROTEIN UA mg/dl 100 (2+)*   NITRITE UA  Positive*   BILIRUBIN UA  Negative   UROBILINOGEN UA E U /dl 0 2   LEUKOCYTES UA  Small*   WBC UA /hpf 10-20*   RBC UA /hpf 4-10*   BACTERIA UA /hpf Moderate*   EPITHELIAL CELLS WET PREP /hpf None Seen     Results from last 7 days   Lab Units 02/01/21  0900   INFLUENZA A PCR  Negative   INFLUENZA B PCR  Negative   RSV PCR  Negative         Results from last 7 days   Lab Units 02/01/21  1033 02/01/21  0900   BLOOD CULTURE   --  No Growth at 72 hrs  No Growth at 72 hrs     URINE CULTURE  >100,000 cfu/ml   --                ED Treatment:   Medication Administration from 02/01/2021 0830 to 02/02/2021 1626       Date/Time Order Dose Route Action     02/01/2021 1358 sodium chloride 0 9 % bolus 500 mL 0 mL Intravenous Stopped     02/01/2021 1028 sodium chloride 0 9 % bolus 500 mL 500 mL Intravenous New Bag     02/01/2021 1028 acetaminophen (TYLENOL) rectal suppository 650 mg 650 mg Rectal Given     02/01/2021 1003 iodixanol (VISIPAQUE) 320 MG/ML injection 100 mL 100 mL Intravenous Given     02/01/2021 1130 cefepime (MAXIPIME) 1,000 mg in dextrose 5 % 50 mL IVPB 0 mg Intravenous Stopped     02/01/2021 1100 cefepime (MAXIPIME) 1,000 mg in dextrose 5 % 50 mL IVPB 1,000 mg Intravenous New Bag     02/02/2021 0112 QUEtiapine (SEROquel) tablet 25 mg 25 mg Oral Given     02/02/2021 1145 apixaban (ELIQUIS) tablet 2 5 mg 2 5 mg Oral Given     02/02/2021 1132 famotidine (PEPCID) tablet 20 mg 20 mg Oral Given     02/02/2021 1144 busPIRone (BUSPAR) tablet 7 5 mg 7 5 mg Oral Given     02/02/2021 0113 busPIRone (BUSPAR) tablet 7 5 mg 7 5 mg Oral Given     02/02/2021 1200 docusate sodium (COLACE) capsule 100 mg 100 mg Oral Given     02/02/2021 1133 metoprolol tartrate (LOPRESSOR) tablet 12 5 mg 12 5 mg Oral Given     02/02/2021 1209 glycerin-hypromellose- (ARTIFICIAL TEARS) ophthalmic solution 1 drop 1 drop Both Eyes Given     02/02/2021 1132 memantine (NAMENDA) tablet 5 mg 5 mg Oral Given     02/02/2021 0113 mirtazapine (REMERON) tablet 15 mg 15 mg Oral Given     02/02/2021 1159 potassium chloride (K-DUR,KLOR-CON) CR tablet 10 mEq 10 mEq Oral Given     02/02/2021 1212 ammonium lactate (LAC-HYDRIN) 12 % cream   Topical Given     02/02/2021 0111 senna (SENOKOT) tablet 17 2 mg 17 2 mg Oral Given     02/02/2021 1147 saccharomyces boulardii (FLORASTOR) capsule 250 mg 250 mg Oral Given     02/02/2021 1143 cholecalciferol (VITAMIN D3) tablet 2,000 Units 2,000 Units Oral Given     02/02/2021 1233 sodium chloride (PF) 0 9 % injection 10 mL 10 mL Intracatheter Given     02/02/2021 1128 amLODIPine (NORVASC) tablet 10 mg 10 mg Oral Given     02/02/2021 1351 sodium chloride 0 9 % irrigation solution 10 mL 10 mL Irrigation Given     02/02/2021 0222 sodium chloride 0 9 % irrigation solution 10 mL 10 mL Irrigation Given     02/02/2021 1148 oxyCODONE (ROXICODONE) IR tablet 5 mg 5 mg Oral Given     02/02/2021 0111 oxyCODONE (ROXICODONE) IR tablet 5 mg 5 mg Oral Given     02/02/2021 1151 polyethylene glycol (MIRALAX) packet 17 g 17 g Oral Given     02/02/2021 1227 bisacodyl (DULCOLAX) rectal suppository 10 mg 10 mg Rectal Given     02/02/2021 0222 sodium chloride 0 9 % infusion 75 mL/hr Intravenous New Bag     02/02/2021 1207 cefepime (MAXIPIME) 2 g/50 mL dextrose IVPB 2,000 mg Intravenous New Bag     02/02/2021 0134 cefepime (MAXIPIME) 2 g/50 mL dextrose IVPB 0 mg Intravenous Stopped     02/02/2021 0104 cefepime (MAXIPIME) 2 g/50 mL dextrose IVPB 2,000 mg Intravenous New Bag     02/01/2021 1643 cholecalciferol (VITAMIN D3) tablet 2,000 Units 2,000 Units Oral Given     02/02/2021 1355 ascorbic acid (VITAMIN C) tablet 1,000 mg 1,000 mg Oral Given     02/01/2021 2108 ascorbic acid (VITAMIN C) tablet 1,000 mg 1,000 mg Oral Given     02/02/2021 1223 zinc sulfate (ZINCATE) capsule 220 mg 220 mg Oral Given     02/01/2021 1646 zinc sulfate (ZINCATE) capsule 220 mg 220 mg Oral Given     02/02/2021 1203 lactulose oral solution 20 g 20 g Oral Given        Past Medical History:   Diagnosis Date    Ataxia     [facility]     wife denies - "never had"    [facility]     Dementia [facility] )     Difficulty swallowing     Difficulty walking     Generalized anxiety disorder     GERD (gastroesophageal reflux disease)     Global aphasia     H/O blood clots     Heartburn     Hypertension     Mental disorder     Muscle weakness     Neuromuscular dysfunction of bladder     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 ([facility] )     Stroke ([facility] )     Thrombosis      Present on Admission:   Essential hypertension   COPD (chronic obstructive pulmonary disease) ([facility]   Depression with anxiety   Dementia ([facility]   Constipation   Bilateral nephrolithiasis   RUPESH (acute kidney injury) ([facility] )      Admitting Diagnosis: Vomiting [R11 10]  UTI (urinary tract infection) [N39 0]  Sepsis ([facility] ) [A41 9]  COVID-19 [U07 1]  Age/Sex: 65 y o  male       Admission Orders:    SCD, soap suds enema, incentive spirometry         Scheduled Medications:      amLODIPine, 10 mg, Oral, Daily  ammonium lactate, , Topical, Daily  apixaban, 2 5 mg, Oral, BID  ascorbic acid, 1,000 mg, Oral, Q12H [address]  busPIRone, 7 5 mg, Oral, TID  cefepime, 2,000 mg, Intravenous, Q12H  cholecalciferol, 2,000 Units, Oral, Daily  docusate sodium, 100 mg, Oral, BID  famotidine, 20 mg, Oral, BID  glycerin-hypromellose-, 1 drop, Both Eyes, BID  lactulose, 20 g, Oral, Daily  memantine, 5 mg, Oral, BID  metoprolol tartrate, 12 5 mg, Oral, Q12H DORON  mirtazapine, 15 mg, Oral, HS  zinc sulfate, 220 mg, Oral, Daily    Followed by  Ted Souza ON 2/8/2021] multivitamin-minerals, 1 tablet, Oral, Daily  oxyCODONE, 5 mg, Oral, 4x Daily  polyethylene glycol, 17 g, Oral, Daily  potassium chloride, 10 mEq, Oral, Daily With Breakfast  QUEtiapine, 25 mg, Oral, HS  saccharomyces boulardii, 250 mg, Oral, BID  senna, 2 tablet, Oral, HS  sodium chloride (PF), 10 mL, Intracatheter, Daily  sodium chloride, 10 mL, Irrigation, BID      Continuous IV Infusions:         PRN Meds:      acetaminophen, 650 mg, Oral, Q4H PRN  bisacodyl, 10 mg, Rectal, Daily PRN  ondansetron, 4 mg, Intravenous, Q6H PRN              Network Utilization Review Department  ATTENTION: Please call with any questions or concerns to [phone] and carefully listen to the prompts so that you are directed to the right person  All voicemails are confidential   Shantel Fernando all requests for admission clinical reviews, approved or denied determinations and any other requests to dedicated fax number below belonging to the campus where the patient is receiving treatment   List of dedicated fax numbers for the Facilities:  [address] DENIALS (Administrative/Medical Necessity) [phone]   [address] [place] [address] (Maternity/NICU/Pediatrics) 261 [facility] [address] [facility] Denys Almaraz 2608 (  Dieter Grace "Fely" [address]) [address] [place] [address] Hayden Rogers 1481 [phone]   [address] [phone]

## 2021-02-04 NOTE — PROGRESS NOTES
[facility]  Progress Note - Aydin Blake [facility] 1950, 65 y o  male MRN: [id number]  Unit/Bed#: S -01 Encounter: [id number]  Primary Care Provider: Kaitlynn Rizzo MD   Date and time admitted to hospital: 2/1/2021  8:30 AM    * Sepsis due to UTI [facility])  Assessment & Plan  · Present on admission, as evidenced by fever and leukocytosis  · Suspected source is urinary tract infection, as he has had multiple UTIs in the past secondary to chronic suprapubic catheter  · He does have history of MDRO including ESBL E coli, Providencia, Enterococcus  · Urine cultures noted show mixed contaminants x3  · Given clinical improvement with use of IV cefepime will continue  Currently day 4 - will complete 5 days total   · procalcitonin is trending down    Suprapubic catheter [facility])  Assessment & Plan  · Suprapubic catheter in place  · Outpatient Urology follow-up    COVID-19 virus infection  Assessment & Plan  · COVID-19 positive from the nursing home today, diagnosed on 02/01  · Noted the patient was also COVID positive in March of 2020  · Respiratory status is stable on room air  · Treat per mild protocol, trend inflammatory markers  · Continue vitamin-C, vitamin-D, zinc, multivitamin  · Patient's wife refused remdesivir despite being high risk  · Inflammatory markers decreasing  · Continue outpatient Eliquis    Inflammatory Markers (last 3): Lab Results   Component Value Date    DDIMER 0 75 (H) 02/03/2021    DDIMER 0 82 (H) 02/02/2021    DDIMER 0 64 (H) 02/01/2021     8 (H) 02/03/2021     8 (H) 02/02/2021     8 (H) 02/01/2021    FERRITIN 90 02/02/2021    FERRITIN 85 02/01/2021       Cardiac Markers:  Lab Results   Component Value Date    TROPONINI <0 02 02/02/2021    TROPONINI <0 02 02/01/2021    TROPONINI 0 02 07/08/2019    NTBNP 1,009 (H) 02/02/2021    CKTOTAL 144 02/02/2021         RUPESH (acute kidney injury) ([facility] [address] )  Assessment & Plan  · Max 2 06, baseline around 1 5    · Would avoid nephrotoxins  · Oral intake improving  · Creatinine improving today - stop fluids and check BMP in AM    Lab Results   Component Value Date    CREATININE 1 66 (H) 02/04/2021    CREATININE 1 98 (H) 02/03/2021    CREATININE 2 06 (H) 02/02/2021    CREATININE 1 85 (H) 02/01/2021         Dementia ([facility] [address] )  Assessment & Plan  · The patient is nonverbal at baseline, but offers no complaints and is at his baseline mental status currently  · Supportive care  Bilateral nephrolithiasis  Assessment & Plan  · History of bilateral nephrolithiasis, with nephrostomy tube in place  · Outpatient follow-up with Urology  Essential hypertension  Assessment & Plan  · Blood pressure 139/89  · Continue amlodipine  Metoprolol was held given reported bradycardia on masimo but on telemetry the patients HR has been 88 - suspect artifact and not true bradycardia  · Resume BB with hold parameters this evening if HR stable on tele monitor  · Monitor BP  History of DVT (deep vein thrombosis)  Assessment & Plan  · On Eliquis for anticoagulation, will continue  COPD (chronic obstructive pulmonary disease) ([facility])  Assessment & Plan  · No evidence of acute exacerbation  · Continue respiratory treatments  Constipation  Assessment & Plan  · Continue bowel regimen  · Status post soapsuds enema during admission  · Monitor for bowel movements daily        Depression with anxiety  Assessment & Plan  · Continue BuSpar, Seroquel, Remeron    VTE Pharmacologic Prophylaxis: VTE Score: 8 High Risk (Score >/= 5) - Pharmacological DVT Prophylaxis Ordered: Apixaban (Eliquis)  Sequential Compression Devices Ordered  Patient Centered Rounds: I have performed bedside rounds with nursing staff today  Discussions with Specialists or Other Care Team Provider: nursing    Education and Discussions with Family / Patient: Updated  (wife) via phone  0900    Time Spent for Care: 30 minutes    More than 50% of total time spent on counseling and coordination of care as described above  Current Length of Stay: 3 day(s)  Current Patient Status: Inpatient   Certification Statement: The patient will continue to require additional inpatient hospital stay due to need for IV abx, monitor labs post IVF discontinuation  Discharge Plan: Anticipate discharge tomorrow to rehab facility  Code Status: Level 3 - DNAR and DNI    Subjective:   No events per RN  Drank entire thing of milk, ensure, OJ, some banana, some eggs and some cream of wheat this AM    Objective:     Vitals:   Temp (24hrs), Av 2 °F (36 8 °C), Min:97 8 °F (36 6 °C), Max:98 5 °F (36 9 °C)    Temp:  [97 8 °F (36 6 °C)-98 5 °F (36 9 °C)] 98 3 °F (36 8 °C)  HR:  [40-78] 78  Resp:  [12-20] 20  BP: (110-152)/(58-89) 139/89  SpO2:  [95 %-100 %] 98 %  Body mass index is 30 13 kg/m²  Input and Output Summary (last 24 hours): Intake/Output Summary (Last 24 hours) at 2021 0852  Last data filed at 2/3/2021 1100  Gross per 24 hour   Intake 350 ml   Output 550 ml   Net -200 ml       Physical Exam:   Physical Exam  Vitals signs and nursing note reviewed  Constitutional:       General: He is not in acute distress  Appearance: Normal appearance  He is ill-appearing  He is not diaphoretic  HENT:      Head: Normocephalic and atraumatic  Mouth/Throat:      Mouth: Mucous membranes are moist    Cardiovascular:      Rate and Rhythm: Normal rate and regular rhythm  Heart sounds: No murmur  Pulmonary:      Breath sounds: Normal breath sounds  No wheezing or rales  Comments: Decreased effort  Abdominal:      General: Bowel sounds are normal       Palpations: Abdomen is soft  Tenderness: There is no abdominal tenderness  There is no guarding  Comments: SPC without drainage   Musculoskeletal:      Right lower leg: No edema  Left lower leg: No edema  Skin:     General: Skin is warm and dry  Neurological:      Mental Status: He is alert   Mental status is at baseline  Comments: Drinks and eats appropriately (full feed)  Moves head from side to side to track in the room  Moans occasionally  Psychiatric:         Mood and Affect: Mood normal          Behavior: Behavior normal         Additional Data:    Labs:  Results from last 7 days   Lab Units 02/04/21  0527   WBC Thousand/uL 6 52   HEMOGLOBIN g/dL 12 3   HEMATOCRIT % 38 8   PLATELETS Thousands/uL 220   NEUTROS PCT % 62   LYMPHS PCT % 22   MONOS PCT % 9   EOS PCT % 6     Results from last 7 days   Lab Units 02/04/21  0527 02/03/21  0705   SODIUM mmol/L 142 146*   POTASSIUM mmol/L 3 3* 3 7   CHLORIDE mmol/L 111* 109*   CO2 mmol/L 24 28   BUN mg/dL 20 22   CREATININE mg/dL 1 66* 1 98*   ANION GAP mmol/L 7 9   CALCIUM mg/dL 8 4 9 2   ALBUMIN g/dL  --  2 6*   TOTAL BILIRUBIN mg/dL  --  0 40   ALK PHOS U/L  --  87   ALT U/L  --  14   AST U/L  --  13   GLUCOSE RANDOM mg/dL 109 83     Results from last 7 days   Lab Units 02/01/21  0900   INR  1 50*             Results from last 7 days   Lab Units 02/03/21  1743 02/02/21  0442 02/01/21  0900   LACTIC ACID mmol/L  --   --  1 6   PROCALCITONIN ng/ml 0 58* 1 57* 1 76*       Lines/Drains:  Invasive Devices     Peripheral Intravenous Line            Peripheral IV 02/01/21 Left Antecubital 3 days    Peripheral IV 02/01/21 Left Wrist 3 days          Drain            Suprapubic Catheter Latex 18 Fr  435 days    Nephrostomy Left 1 10 Fr  57 days              Telemetry:   Telemetry Orders (From admission, onward)             48 Hour Telemetry Monitoring  Continuous x 48 hours     Question:  Reason for 48 Hour Telemetry  Answer:  Arrhythmias Requiring Medical Therapy (eg  SVT, Vtach/fib, Bradycardia, Uncontrolled A-fib)                    Imaging: No pertinent imaging reviewed  Recent Cultures (last 7 days):   Results from last 7 days   Lab Units 02/01/21  1033 02/01/21  0900   BLOOD CULTURE   --  No Growth at 48 hrs  No Growth at 48 hrs     URINE CULTURE  >100,000 cfu/ml   -- Last 24 Hours Medication List:   Current Facility-Administered Medications   Medication Dose Route Frequency Provider Last Rate    acetaminophen  650 mg Oral Q4H PRN Miguelangel Barfield MD      amLODIPine  10 mg Oral Daily Miguelangel Barfield MD      ammonium lactate   Topical Daily Miguelangel Barfield MD      apixaban  2 5 mg Oral BID Miguelangel Barfield MD      ascorbic acid  1,000 mg Oral Q12H [address] Miguelangel Barfield MD      bisacodyl  10 mg Rectal Daily PRN Miguelangel Barfield MD      busPIRone  7 5 mg Oral TID Miguelangel Barfield MD      cefepime  2,000 mg Intravenous Q12H Miguelangel Barfield MD 2,000 mg (02/04/21 0000)    cholecalciferol  2,000 Units Oral Daily Miguelangel Barfield MD      docusate sodium  100 mg Oral BID Miguelangel Barfield MD      famotidine  20 mg Oral BID Miguelangel Barfield MD      glycerin-hypromellose-  1 drop Both Eyes BID Miguelangel Barfield MD      lactulose  20 g Oral Daily Miguelangel Barfield MD      memantine  5 mg Oral BID Miguelangel Barfield MD      metoprolol tartrate  12 5 mg Oral Q12H [address] Stephie Enamorado PA-C      mirtazapine  15 mg Oral HS Miguelangel Barfield MD      zinc sulfate  220 mg Oral Daily Miguelangel Barfield MD      Followed by   Beto Ba ON 2/8/2021] multivitamin-minerals  1 tablet Oral Daily Miguelangel Barfield MD      ondansetron  4 mg Intravenous Q6H PRN Miguelangel Barfield MD      oxyCODONE  5 mg Oral 4x Daily Miugelangel Barfield MD      polyethylene glycol  17 g Oral Daily Miguelangel Barfield MD      potassium chloride  10 mEq Oral Daily With Breakfast Miguelangel Barfield MD      QUEtiapine  25 mg Oral HS Miguelangel Barfield MD      saccharomyces boulardii  250 mg Oral BID Miguelangel Barfield MD      senna  2 tablet Oral HS Miguelangel Barfield MD      sodium chloride (PF)  10 mL Intracatheter Daily Miguelangel Barfield MD      sodium chloride  10 mL Irrigation BID Gómez Carmen Munguia MD          Today, Patient Was Seen By: Damián Huang PA-C    ** Please Note: Dictation voice to text software may have been used in the creation of this document   **

## 2021-02-04 NOTE — PLAN OF CARE
Problem: Potential for Falls  Goal: Patient will remain free of falls  Description: INTERVENTIONS:  - Assess patient frequently for physical needs  -  Identify cognitive and physical deficits and behaviors that affect risk of falls  -  [place] fall precautions as indicated by assessment   - Educate patient/family on patient safety including physical limitations  - Instruct patient to call for assistance with activity based on assessment  - Modify environment to reduce risk of injury  - Consider OT/PT consult to assist with strengthening/mobility  Outcome: Progressing     Problem: Prexisting or High Potential for Compromised Skin Integrity  Goal: Skin integrity is maintained or improved  Description: INTERVENTIONS:  - Identify patients at risk for skin breakdown  - Assess and monitor skin integrity  - Assess and monitor nutrition and hydration status  - Monitor labs   - Assess for incontinence   - Turn and reposition patient  - Assist with mobility/ambulation  - Relieve pressure over bony prominences  - Avoid friction and shearing  - Provide appropriate hygiene as needed including keeping skin clean and dry  - Evaluate need for skin moisturizer/barrier cream  - Collaborate with interdisciplinary team   - Patient/family teaching  - Consider wound care consult   Outcome: Progressing     Problem: Nutrition/Hydration-ADULT  Goal: Nutrient/Hydration intake appropriate for improving, restoring or maintaining nutritional needs  Description: Monitor and assess patient's nutrition/hydration status for malnutrition  Collaborate with interdisciplinary team and initiate plan and interventions as ordered  Monitor patient's weight and dietary intake as ordered or per policy  Utilize nutrition screening tool and intervene as necessary  Determine patient's food preferences and provide high-protein, high-caloric foods as appropriate       INTERVENTIONS:  - Monitor oral intake, urinary output, labs, and treatment plans  - Assess nutrition and hydration status and recommend course of action  - Evaluate amount of meals eaten  - Assist patient with eating if necessary   - Allow adequate time for meals  - Recommend/ encourage appropriate diets, oral nutritional supplements, and vitamin/mineral supplements  - Order, calculate, and assess calorie counts as needed  - Recommend, monitor, and adjust tube feedings and TPN/PPN based on assessed needs  - Assess need for intravenous fluids  - Provide specific nutrition/hydration education as appropriate  - Include patient/family/caregiver in decisions related to nutrition  Outcome: Progressing     Problem: INFECTION - ADULT  Goal: Absence or prevention of progression during hospitalization  Description: INTERVENTIONS:  - Assess and monitor for signs and symptoms of infection  - Monitor lab/diagnostic results  - Monitor all insertion sites, i e  indwelling lines, tubes, and drains  - Monitor endotracheal if appropriate and nasal secretions for changes in amount and color  - [place] appropriate cooling/warming therapies per order  - Administer medications as ordered  - Instruct and encourage patient and family to use good hand hygiene technique  - Identify and instruct in appropriate isolation precautions for identified infection/condition  Outcome: Progressing     Problem: GENITOURINARY - ADULT  Goal: Maintains or returns to baseline urinary function  Description: INTERVENTIONS:  - Assess urinary function  - Encourage oral fluids to ensure adequate hydration if ordered  - Administer IV fluids as ordered to ensure adequate hydration  - Administer ordered medications as needed  - Offer frequent toileting  - Follow urinary retention protocol if ordered  Outcome: Progressing  Goal: Urinary catheter remains patent  Description: INTERVENTIONS:  - Assess patency of urinary catheter  - If patient has a chronic lucia, consider changing catheter if non-functioning  - Follow guidelines for intermittent irrigation of non-functioning urinary catheter  Outcome: Progressing     Problem: METABOLIC, FLUID AND ELECTROLYTES - ADULT  Goal: Electrolytes maintained within normal limits  Description: INTERVENTIONS:  - Monitor labs and assess patient for signs and symptoms of electrolyte imbalances  - Administer electrolyte replacement as ordered  - Monitor response to electrolyte replacements, including repeat lab results as appropriate  - Instruct patient on fluid and nutrition as appropriate  Outcome: Progressing  Goal: Fluid balance maintained  Description: INTERVENTIONS:  - Monitor labs   - Monitor I/O and WT  - Instruct patient on fluid and nutrition as appropriate  - Assess for signs & symptoms of volume excess or deficit  Outcome: Progressing     Problem: SKIN/TISSUE INTEGRITY - ADULT  Goal: Skin integrity remains intact  Description: INTERVENTIONS  - Identify patients at risk for skin breakdown  - Assess and monitor skin integrity  - Assess and monitor nutrition and hydration status  - Monitor labs (i e  albumin)  - Assess for incontinence   - Turn and reposition patient  - Assist with mobility/ambulation  - Relieve pressure over bony prominences  - Avoid friction and shearing  - Provide appropriate hygiene as needed including keeping skin clean and dry  - Evaluate need for skin moisturizer/barrier cream  - Collaborate with interdisciplinary team (i e  Nutrition, Rehabilitation, etc )   - Patient/family teaching  Outcome: Progressing  Goal: Incision(s), wounds(s) or drain site(s) healing without S/S of infection  Description: INTERVENTIONS  - Assess and document risk factors for skin impairment   - Assess and document dressing, incision, wound bed, drain sites and surrounding tissue  - Consider nutrition services referral as needed  - Oral mucous membranes remain intact  - Provide patient/ family education  Outcome: Progressing     Problem: MUSCULOSKELETAL - ADULT  Goal: Maintain or return mobility to safest level of function  Description: INTERVENTIONS:  - Assess patient's ability to carry out ADLs; assess patient's baseline for ADL function and identify physical deficits which impact ability to perform ADLs (bathing, care of mouth/teeth, toileting, grooming, dressing, etc )  - Assess/evaluate cause of self-care deficits   - Assess range of motion  - Assess patient's mobility  - Assess patient's need for assistive devices and provide as appropriate  - Encourage maximum independence but intervene and supervise when necessary  - Involve family in performance of ADLs  - Assess for home care needs following discharge   - Consider OT consult to assist with ADL evaluation and planning for discharge  - Provide patient education as appropriate  Outcome: Progressing  Goal: Maintain proper alignment of affected body part  Description: INTERVENTIONS:  - Support, maintain and protect limb and body alignment  - Provide patient/ family with appropriate education  Outcome: Progressing     Problem: DISCHARGE PLANNING  Goal: Discharge to home or other facility with appropriate resources  Description: INTERVENTIONS:  - Identify barriers to discharge w/patient and caregiver  - Arrange for needed discharge resources and transportation as appropriate  - Identify discharge learning needs (meds, wound care, etc )  - Arrange for interpretive services to assist at discharge as needed  - Refer to Case Management Department for coordinating discharge planning if the patient needs post-hospital services based on physician/advanced practitioner order or complex needs related to functional status, cognitive ability, or social support system  Outcome: Progressing     Problem: Knowledge Deficit  Goal: Patient/family/caregiver demonstrates understanding of disease process, treatment plan, medications, and discharge instructions  Description: Complete learning assessment and assess knowledge base    Interventions:  - Provide teaching at level of understanding  - Provide teaching via preferred learning methods  Outcome: Progressing

## 2021-02-04 NOTE — PLAN OF CARE
Problem: Potential for Falls  Goal: Patient will remain free of falls  Description: INTERVENTIONS:  - Assess patient frequently for physical needs  -  Identify cognitive and physical deficits and behaviors that affect risk of falls  -  [place] fall precautions as indicated by assessment   - Educate patient/family on patient safety including physical limitations  - Instruct patient to call for assistance with activity based on assessment  - Modify environment to reduce risk of injury  - Consider OT/PT consult to assist with strengthening/mobility  Outcome: Progressing     Problem: Prexisting or High Potential for Compromised Skin Integrity  Goal: Skin integrity is maintained or improved  Description: INTERVENTIONS:  - Identify patients at risk for skin breakdown  - Assess and monitor skin integrity  - Assess and monitor nutrition and hydration status  - Monitor labs   - Assess for incontinence   - Turn and reposition patient  - Assist with mobility/ambulation  - Relieve pressure over bony prominences  - Avoid friction and shearing  - Provide appropriate hygiene as needed including keeping skin clean and dry  - Evaluate need for skin moisturizer/barrier cream  - Collaborate with interdisciplinary team   - Patient/family teaching  - Consider wound care consult   Outcome: Progressing     Problem: Nutrition/Hydration-ADULT  Goal: Nutrient/Hydration intake appropriate for improving, restoring or maintaining nutritional needs  Description: Monitor and assess patient's nutrition/hydration status for malnutrition  Collaborate with interdisciplinary team and initiate plan and interventions as ordered  Monitor patient's weight and dietary intake as ordered or per policy  Utilize nutrition screening tool and intervene as necessary  Determine patient's food preferences and provide high-protein, high-caloric foods as appropriate       INTERVENTIONS:  - Monitor oral intake, urinary output, labs, and treatment plans  - Assess nutrition and hydration status and recommend course of action  - Evaluate amount of meals eaten  - Assist patient with eating if necessary   - Allow adequate time for meals  - Recommend/ encourage appropriate diets, oral nutritional supplements, and vitamin/mineral supplements  - Order, calculate, and assess calorie counts as needed  - Recommend, monitor, and adjust tube feedings and TPN/PPN based on assessed needs  - Assess need for intravenous fluids  - Provide specific nutrition/hydration education as appropriate  - Include patient/family/caregiver in decisions related to nutrition  Outcome: Progressing     Problem: INFECTION - ADULT  Goal: Absence or prevention of progression during hospitalization  Description: INTERVENTIONS:  - Assess and monitor for signs and symptoms of infection  - Monitor lab/diagnostic results  - Monitor all insertion sites, i e  indwelling lines, tubes, and drains  - Monitor endotracheal if appropriate and nasal secretions for changes in amount and color  - [place] appropriate cooling/warming therapies per order  - Administer medications as ordered  - Instruct and encourage patient and family to use good hand hygiene technique  - Identify and instruct in appropriate isolation precautions for identified infection/condition  Outcome: Progressing     Problem: GENITOURINARY - ADULT  Goal: Maintains or returns to baseline urinary function  Description: INTERVENTIONS:  - Assess urinary function  - Encourage oral fluids to ensure adequate hydration if ordered  - Administer IV fluids as ordered to ensure adequate hydration  - Administer ordered medications as needed  - Offer frequent toileting  - Follow urinary retention protocol if ordered  Outcome: Progressing  Goal: Urinary catheter remains patent  Description: INTERVENTIONS:  - Assess patency of urinary catheter  - If patient has a chronic lucia, consider changing catheter if non-functioning  - Follow guidelines for intermittent irrigation of non-functioning urinary catheter  Outcome: Progressing     Problem: METABOLIC, FLUID AND ELECTROLYTES - ADULT  Goal: Electrolytes maintained within normal limits  Description: INTERVENTIONS:  - Monitor labs and assess patient for signs and symptoms of electrolyte imbalances  - Administer electrolyte replacement as ordered  - Monitor response to electrolyte replacements, including repeat lab results as appropriate  - Instruct patient on fluid and nutrition as appropriate  Outcome: Progressing  Goal: Fluid balance maintained  Description: INTERVENTIONS:  - Monitor labs   - Monitor I/O and WT  - Instruct patient on fluid and nutrition as appropriate  - Assess for signs & symptoms of volume excess or deficit  Outcome: Progressing     Problem: SKIN/TISSUE INTEGRITY - ADULT  Goal: Skin integrity remains intact  Description: INTERVENTIONS  - Identify patients at risk for skin breakdown  - Assess and monitor skin integrity  - Assess and monitor nutrition and hydration status  - Monitor labs (i e  albumin)  - Assess for incontinence   - Turn and reposition patient  - Assist with mobility/ambulation  - Relieve pressure over bony prominences  - Avoid friction and shearing  - Provide appropriate hygiene as needed including keeping skin clean and dry  - Evaluate need for skin moisturizer/barrier cream  - Collaborate with interdisciplinary team (i e  Nutrition, Rehabilitation, etc )   - Patient/family teaching  Outcome: Progressing  Goal: Incision(s), wounds(s) or drain site(s) healing without S/S of infection  Description: INTERVENTIONS  - Assess and document risk factors for skin impairment   - Assess and document dressing, incision, wound bed, drain sites and surrounding tissue  - Consider nutrition services referral as needed  - Oral mucous membranes remain intact  - Provide patient/ family education  Outcome: Progressing     Problem: MUSCULOSKELETAL - ADULT  Goal: Maintain or return mobility to safest level of function  Description: INTERVENTIONS:  - Assess patient's ability to carry out ADLs; assess patient's baseline for ADL function and identify physical deficits which impact ability to perform ADLs (bathing, care of mouth/teeth, toileting, grooming, dressing, etc )  - Assess/evaluate cause of self-care deficits   - Assess range of motion  - Assess patient's mobility  - Assess patient's need for assistive devices and provide as appropriate  - Encourage maximum independence but intervene and supervise when necessary  - Involve family in performance of ADLs  - Assess for home care needs following discharge   - Consider OT consult to assist with ADL evaluation and planning for discharge  - Provide patient education as appropriate  Outcome: Progressing  Goal: Maintain proper alignment of affected body part  Description: INTERVENTIONS:  - Support, maintain and protect limb and body alignment  - Provide patient/ family with appropriate education  Outcome: Progressing     Problem: DISCHARGE PLANNING  Goal: Discharge to home or other facility with appropriate resources  Description: INTERVENTIONS:  - Identify barriers to discharge w/patient and caregiver  - Arrange for needed discharge resources and transportation as appropriate  - Identify discharge learning needs (meds, wound care, etc )  - Arrange for interpretive services to assist at discharge as needed  - Refer to Case Management Department for coordinating discharge planning if the patient needs post-hospital services based on physician/advanced practitioner order or complex needs related to functional status, cognitive ability, or social support system  Outcome: Progressing     Problem: Knowledge Deficit  Goal: Patient/family/caregiver demonstrates understanding of disease process, treatment plan, medications, and discharge instructions  Description: Complete learning assessment and assess knowledge base    Interventions:  - Provide teaching at level of understanding  - Provide teaching via preferred learning methods  Outcome: Progressing

## 2021-02-04 NOTE — ASSESSMENT & PLAN NOTE
· Present on admission, as evidenced by fever and leukocytosis  · Suspected source is urinary tract infection, as he has had multiple UTIs in the past secondary to chronic suprapubic catheter  · He does have history of MDRO including ESBL E coli, Providencia, Enterococcus  · Urine cultures noted show mixed contaminants x3  · Given clinical improvement with use of IV cefepime will continue    Currently day 4 - will complete 5 days total   · procalcitonin is trending down

## 2021-02-05 VITALS
WEIGHT: 151.46 LBS | RESPIRATION RATE: 20 BRPM | HEART RATE: 71 BPM | HEIGHT: 71 IN | TEMPERATURE: 98.4 F | OXYGEN SATURATION: 93 % | BODY MASS INDEX: 21.2 KG/M2 | SYSTOLIC BLOOD PRESSURE: 133 MMHG | DIASTOLIC BLOOD PRESSURE: 69 MMHG

## 2021-02-05 LAB
ALBUMIN SERPL BCP-MCNC: 2.2 G/DL (ref 3.5–5)
ALP SERPL-CCNC: 74 U/L (ref 46–116)
ALT SERPL W P-5'-P-CCNC: 14 U/L (ref 12–78)
ANION GAP SERPL CALCULATED.3IONS-SCNC: 7 MMOL/L (ref 4–13)
AST SERPL W P-5'-P-CCNC: 15 U/L (ref 5–45)
BILIRUB SERPL-MCNC: 0.34 MG/DL (ref 0.2–1)
BUN SERPL-MCNC: 17 MG/DL (ref 5–25)
CALCIUM ALBUM COR SERPL-MCNC: 10 MG/DL (ref 8.3–10.1)
CALCIUM SERPL-MCNC: 8.6 MG/DL (ref 8.3–10.1)
CHLORIDE SERPL-SCNC: 111 MMOL/L (ref 100–108)
CO2 SERPL-SCNC: 24 MMOL/L (ref 21–32)
CREAT SERPL-MCNC: 1.55 MG/DL (ref 0.6–1.3)
ERYTHROCYTE [DISTWIDTH] IN BLOOD BY AUTOMATED COUNT: 15.5 % (ref 11.6–15.1)
GFR SERPL CREATININE-BSD FRML MDRD: 44 ML/MIN/1.73SQ M
GLUCOSE SERPL-MCNC: 94 MG/DL (ref 65–140)
HCT VFR BLD AUTO: 38.9 % (ref 36.5–49.3)
HGB BLD-MCNC: 12 G/DL (ref 12–17)
MCH RBC QN AUTO: 27.7 PG (ref 26.8–34.3)
MCHC RBC AUTO-ENTMCNC: 30.8 G/DL (ref 31.4–37.4)
MCV RBC AUTO: 90 FL (ref 82–98)
PLATELET # BLD AUTO: 254 THOUSANDS/UL (ref 149–390)
PMV BLD AUTO: 10.3 FL (ref 8.9–12.7)
POTASSIUM SERPL-SCNC: 3.9 MMOL/L (ref 3.5–5.3)
PROCALCITONIN SERPL-MCNC: 0.19 NG/ML
PROT SERPL-MCNC: 6.6 G/DL (ref 6.4–8.2)
RBC # BLD AUTO: 4.33 MILLION/UL (ref 3.88–5.62)
SODIUM SERPL-SCNC: 142 MMOL/L (ref 136–145)
WBC # BLD AUTO: 6.38 THOUSAND/UL (ref 4.31–10.16)

## 2021-02-05 PROCEDURE — 80053 COMPREHEN METABOLIC PANEL: CPT | Performed by: PHYSICIAN ASSISTANT

## 2021-02-05 PROCEDURE — 84145 PROCALCITONIN (PCT): CPT | Performed by: PHYSICIAN ASSISTANT

## 2021-02-05 PROCEDURE — 99239 HOSP IP/OBS DSCHRG MGMT >30: CPT | Performed by: PHYSICIAN ASSISTANT

## 2021-02-05 PROCEDURE — 85027 COMPLETE CBC AUTOMATED: CPT | Performed by: PHYSICIAN ASSISTANT

## 2021-02-05 RX ORDER — ASCORBIC ACID 500 MG
1000 TABLET ORAL EVERY 12 HOURS SCHEDULED
Refills: 0
Start: 2021-02-05

## 2021-02-05 RX ORDER — ZINC SULFATE 50(220)MG
220 CAPSULE ORAL DAILY
Qty: 2 CAPSULE | Refills: 0
Start: 2021-02-06 | End: 2021-12-06 | Stop reason: HOSPADM

## 2021-02-05 RX ORDER — OXYCODONE HYDROCHLORIDE 5 MG/1
5 TABLET ORAL 4 TIMES DAILY
Qty: 12 TABLET | Refills: 0 | Status: SHIPPED | OUTPATIENT
Start: 2021-02-05 | End: 2021-05-06

## 2021-02-05 RX ADMIN — OXYCODONE HYDROCHLORIDE 5 MG: 5 TABLET ORAL at 08:52

## 2021-02-05 RX ADMIN — SODIUM CHLORIDE 10 ML: 0.9 IRRIGANT IRRIGATION at 08:55

## 2021-02-05 RX ADMIN — Medication 1000 MG: at 08:52

## 2021-02-05 RX ADMIN — SODIUM CHLORIDE 10 ML: 9 INJECTION INTRAMUSCULAR; INTRAVENOUS; SUBCUTANEOUS at 08:55

## 2021-02-05 RX ADMIN — BUSPIRONE HYDROCHLORIDE 7.5 MG: 5 TABLET ORAL at 08:53

## 2021-02-05 RX ADMIN — Medication 2000 UNITS: at 08:52

## 2021-02-05 RX ADMIN — GLYCERIN 1 DROP: .002; .002; .01 SOLUTION/ DROPS OPHTHALMIC at 08:53

## 2021-02-05 RX ADMIN — AMLODIPINE BESYLATE 10 MG: 10 TABLET ORAL at 08:52

## 2021-02-05 RX ADMIN — CEFEPIME HYDROCHLORIDE 2000 MG: 2 INJECTION, POWDER, FOR SOLUTION INTRAVENOUS at 11:10

## 2021-02-05 RX ADMIN — Medication 12.5 MG: at 08:53

## 2021-02-05 RX ADMIN — LACTULOSE 20 G: 10 SOLUTION ORAL at 08:52

## 2021-02-05 RX ADMIN — MEMANTINE 5 MG: 5 TABLET ORAL at 08:53

## 2021-02-05 RX ADMIN — POLYETHYLENE GLYCOL 3350 17 G: 17 POWDER, FOR SOLUTION ORAL at 08:52

## 2021-02-05 RX ADMIN — DOCUSATE SODIUM 100 MG: 100 CAPSULE, LIQUID FILLED ORAL at 08:55

## 2021-02-05 RX ADMIN — Medication: at 08:54

## 2021-02-05 RX ADMIN — ZINC SULFATE 220 MG (50 MG) CAPSULE 220 MG: CAPSULE at 08:52

## 2021-02-05 RX ADMIN — FAMOTIDINE 20 MG: 20 TABLET ORAL at 08:55

## 2021-02-05 RX ADMIN — APIXABAN 2.5 MG: 2.5 TABLET, FILM COATED ORAL at 08:52

## 2021-02-05 RX ADMIN — OXYCODONE HYDROCHLORIDE 5 MG: 5 TABLET ORAL at 11:10

## 2021-02-05 RX ADMIN — POTASSIUM CHLORIDE 10 MEQ: 750 TABLET, EXTENDED RELEASE ORAL at 07:30

## 2021-02-05 RX ADMIN — Medication 250 MG: at 08:53

## 2021-02-05 NOTE — PLAN OF CARE
Problem: Potential for Falls  Goal: Patient will remain free of falls  Description: INTERVENTIONS:  - Assess patient frequently for physical needs  -  Identify cognitive and physical deficits and behaviors that affect risk of falls  -  [place] fall precautions as indicated by assessment   - Educate patient/family on patient safety including physical limitations  - Instruct patient to call for assistance with activity based on assessment  - Modify environment to reduce risk of injury  - Consider OT/PT consult to assist with strengthening/mobility  Outcome: Progressing     Problem: Prexisting or High Potential for Compromised Skin Integrity  Goal: Skin integrity is maintained or improved  Description: INTERVENTIONS:  - Identify patients at risk for skin breakdown  - Assess and monitor skin integrity  - Assess and monitor nutrition and hydration status  - Monitor labs   - Assess for incontinence   - Turn and reposition patient  - Assist with mobility/ambulation  - Relieve pressure over bony prominences  - Avoid friction and shearing  - Provide appropriate hygiene as needed including keeping skin clean and dry  - Evaluate need for skin moisturizer/barrier cream  - Collaborate with interdisciplinary team   - Patient/family teaching  - Consider wound care consult   Outcome: Progressing     Problem: Nutrition/Hydration-ADULT  Goal: Nutrient/Hydration intake appropriate for improving, restoring or maintaining nutritional needs  Description: Monitor and assess patient's nutrition/hydration status for malnutrition  Collaborate with interdisciplinary team and initiate plan and interventions as ordered  Monitor patient's weight and dietary intake as ordered or per policy  Utilize nutrition screening tool and intervene as necessary  Determine patient's food preferences and provide high-protein, high-caloric foods as appropriate       INTERVENTIONS:  - Monitor oral intake, urinary output, labs, and treatment plans  - Assess nutrition and hydration status and recommend course of action  - Evaluate amount of meals eaten  - Assist patient with eating if necessary   - Allow adequate time for meals  - Recommend/ encourage appropriate diets, oral nutritional supplements, and vitamin/mineral supplements  - Order, calculate, and assess calorie counts as needed  - Recommend, monitor, and adjust tube feedings and TPN/PPN based on assessed needs  - Assess need for intravenous fluids  - Provide specific nutrition/hydration education as appropriate  - Include patient/family/caregiver in decisions related to nutrition  Outcome: Progressing     Problem: INFECTION - ADULT  Goal: Absence or prevention of progression during hospitalization  Description: INTERVENTIONS:  - Assess and monitor for signs and symptoms of infection  - Monitor lab/diagnostic results  - Monitor all insertion sites, i e  indwelling lines, tubes, and drains  - Monitor endotracheal if appropriate and nasal secretions for changes in amount and color  - [place] appropriate cooling/warming therapies per order  - Administer medications as ordered  - Instruct and encourage patient and family to use good hand hygiene technique  - Identify and instruct in appropriate isolation precautions for identified infection/condition  Outcome: Progressing     Problem: DISCHARGE PLANNING  Goal: Discharge to home or other facility with appropriate resources  Description: INTERVENTIONS:  - Identify barriers to discharge w/patient and caregiver  - Arrange for needed discharge resources and transportation as appropriate  - Identify discharge learning needs (meds, wound care, etc )  - Arrange for interpretive services to assist at discharge as needed  - Refer to Case Management Department for coordinating discharge planning if the patient needs post-hospital services based on physician/advanced practitioner order or complex needs related to functional status, cognitive ability, or social support system  Outcome: Progressing     Problem: Knowledge Deficit  Goal: Patient/family/caregiver demonstrates understanding of disease process, treatment plan, medications, and discharge instructions  Description: Complete learning assessment and assess knowledge base    Interventions:  - Provide teaching at level of understanding  - Provide teaching via preferred learning methods  Outcome: Progressing     Problem: GENITOURINARY - ADULT  Goal: Maintains or returns to baseline urinary function  Description: INTERVENTIONS:  - Assess urinary function  - Encourage oral fluids to ensure adequate hydration if ordered  - Administer IV fluids as ordered to ensure adequate hydration  - Administer ordered medications as needed  - Offer frequent toileting  - Follow urinary retention protocol if ordered  Outcome: Progressing  Goal: Urinary catheter remains patent  Description: INTERVENTIONS:  - Assess patency of urinary catheter  - If patient has a chronic lucia, consider changing catheter if non-functioning  - Follow guidelines for intermittent irrigation of non-functioning urinary catheter  Outcome: Progressing     Problem: METABOLIC, FLUID AND ELECTROLYTES - ADULT  Goal: Electrolytes maintained within normal limits  Description: INTERVENTIONS:  - Monitor labs and assess patient for signs and symptoms of electrolyte imbalances  - Administer electrolyte replacement as ordered  - Monitor response to electrolyte replacements, including repeat lab results as appropriate  - Instruct patient on fluid and nutrition as appropriate  Outcome: Progressing  Goal: Fluid balance maintained  Description: INTERVENTIONS:  - Monitor labs   - Monitor I/O and WT  - Instruct patient on fluid and nutrition as appropriate  - Assess for signs & symptoms of volume excess or deficit  Outcome: Progressing     Problem: MUSCULOSKELETAL - ADULT  Goal: Maintain or return mobility to safest level of function  Description: INTERVENTIONS:  - Assess patient's ability to carry out ADLs; assess patient's baseline for ADL function and identify physical deficits which impact ability to perform ADLs (bathing, care of mouth/teeth, toileting, grooming, dressing, etc )  - Assess/evaluate cause of self-care deficits   - Assess range of motion  - Assess patient's mobility  - Assess patient's need for assistive devices and provide as appropriate  - Encourage maximum independence but intervene and supervise when necessary  - Involve family in performance of ADLs  - Assess for home care needs following discharge   - Consider OT consult to assist with ADL evaluation and planning for discharge  - Provide patient education as appropriate  Outcome: Progressing  Goal: Maintain proper alignment of affected body part  Description: INTERVENTIONS:  - Support, maintain and protect limb and body alignment  - Provide patient/ family with appropriate education  Outcome: Progressing     Problem: SKIN/TISSUE INTEGRITY - ADULT  Goal: Skin integrity remains intact  Description: INTERVENTIONS  - Identify patients at risk for skin breakdown  - Assess and monitor skin integrity  - Assess and monitor nutrition and hydration status  - Monitor labs (i e  albumin)  - Assess for incontinence   - Turn and reposition patient  - Assist with mobility/ambulation  - Relieve pressure over bony prominences  - Avoid friction and shearing  - Provide appropriate hygiene as needed including keeping skin clean and dry  - Evaluate need for skin moisturizer/barrier cream  - Collaborate with interdisciplinary team (i e  Nutrition, Rehabilitation, etc )   - Patient/family teaching  Outcome: Progressing  Goal: Incision(s), wounds(s) or drain site(s) healing without S/S of infection  Description: INTERVENTIONS  - Assess and document risk factors for skin impairment   - Assess and document dressing, incision, wound bed, drain sites and surrounding tissue  - Consider nutrition services referral as needed  - Oral mucous membranes remain intact  - Provide patient/ family education  Outcome: Progressing

## 2021-02-05 NOTE — ASSESSMENT & PLAN NOTE
· Present on admission, as evidenced by fever and leukocytosis  · Suspected source is urinary tract infection, as he has had multiple UTIs in the past secondary to chronic suprapubic catheter  · He does have history of MDRO including ESBL E coli, Providencia, Enterococcus  · Urine cultures noted show mixed contaminants x3  · Given clinical improvement, downtrending procalcitonin, lack of fevers with IV cefepime this was continued  Has completed 5 days of IV cefepime  Discontinue further ABX and no ABX on discharge

## 2021-02-05 NOTE — CASE MANAGEMENT
CM informed by [facility] that patient is medically stable for return to [facility] today, where he is a LTC resident  CM confirmed with [facility] that they are ready for patient's return today  They are aware he is COVID + and are just requesting transportation after 2 pm  CM sent message to [facility] via [facility] to request BLS transport  CMN completed and copy placed on chart  CM received TT from [place] at [facility]; BLS arranged with [facility] at 2 pm  JESSE Dawson at [facility] aware of transport plans  CM contacted patient's spouse Kirk Borden to review discharge plan as well  IMM reviewed with patient's caregiver  patient's caregiver agrees with discharge determination; copy of IMM mailed to Kirk Borden  She his happy with patient's return to - today

## 2021-02-05 NOTE — DISCHARGE SUMMARY
[facility]  Discharge- Pamela Joe [facility] 1950, 65 y o  male MRN: [id number]  Unit/Bed#: S -01 Encounter: [id number]  Primary Care Provider: Alona Cisse MD   Date and time admitted to hospital: 2/1/2021  8:30 AM    * Sepsis due to UTI [facility])  Assessment & Plan  · Present on admission, as evidenced by fever and leukocytosis  · UTI, POA, probably due to suprapubic catheter, in patient with history of catheter-related UTI's,  being treated with IV cefepime  · Suspected source is urinary tract infection, as he has had multiple UTIs in the past secondary to chronic suprapubic catheter  · He does have history of MDRO including ESBL E coli, Providencia, Enterococcus  · Urine cultures noted show mixed contaminants x3  · Given clinical improvement, downtrending procalcitonin, lack of fevers with IV cefepime this was continued  Has completed 5 days of IV cefepime  Discontinue further ABX and no ABX on discharge  Suprapubic catheter [facility])  Assessment & Plan  · Suprapubic catheter in place  · Outpatient Urology follow-up    COVID-19 virus infection  Assessment & Plan  · COVID-19 positive from the nursing home today, diagnosed on 02/01  · Noted the patient was also COVID positive in March of 2020  · Respiratory status is stable on room air  · Continue vitamin-C, vitamin-D, zinc on discharge  · Patient's wife refused remdesivir despite being high risk  · Inflammatory markers decreasing  · Continue outpatient Eliquis    Inflammatory Markers (last 3):   Lab Results   Component Value Date    DDIMER 0 75 (H) 02/03/2021    DDIMER 0 82 (H) 02/02/2021    DDIMER 0 64 (H) 02/01/2021     8 (H) 02/03/2021     8 (H) 02/02/2021     8 (H) 02/01/2021    FERRITIN 90 02/02/2021    FERRITIN 85 02/01/2021       Cardiac Markers:  Lab Results   Component Value Date    TROPONINI <0 02 02/02/2021    TROPONINI <0 02 02/01/2021    TROPONINI 0 02 07/08/2019    NTBNP 1,009 (H) 02/02/2021    CKTOTAL 144 02/02/2021         RUPESH (acute kidney injury) ([facility] [address] )  Assessment & Plan  · Max 2 06, baseline around 1 5  · Would avoid nephrotoxins  · Oral intake improved  · Creatinine improved    Lab Results   Component Value Date    CREATININE 1 55 (H) 02/05/2021    CREATININE 1 66 (H) 02/04/2021    CREATININE 1 98 (H) 02/03/2021    CREATININE 2 06 (H) 02/02/2021         Dementia ([facility] [address] )  Assessment & Plan  · The patient is nonverbal at baseline, but offers no complaints and is at his baseline mental status currently  · Supportive care  Bilateral nephrolithiasis  Assessment & Plan  · History of bilateral nephrolithiasis, with nephrostomy tube in place  · Outpatient follow-up with Urology  Essential hypertension  Assessment & Plan  · Blood pressure 119/67  · Continue amlodipine  Metoprolol was held given reported bradycardia on masimo but on telemetry the patients HR has been 88 - suspect artifact and not true bradycardia  · Continue BB at 12 5 mg PO BID (at [facility] was increased to 25 mg PO BID on 1/20/21)    History of DVT (deep vein thrombosis)  Assessment & Plan  · On Eliquis for anticoagulation, will continue  COPD (chronic obstructive pulmonary disease) ([facility])  Assessment & Plan  · No evidence of acute exacerbation  · Continue respiratory treatments      Constipation  Assessment & Plan  · Continue bowel regimen  · Status post soapsuds enema during admission  ·  2/4/21      Depression with anxiety  Assessment & Plan  · Continue BuSpar, Seroquel, Remeron    Discharging Physician / Practitioner: Gumaro Barragan PA-C  PCP: Jesus Pepe MD  Admission Date:   Admission Orders (From admission, onward)     Ordered        02/01/21 1112  Inpatient Admission  Once                   Discharge Date: 02/05/21    Disposition:    Short Term Rehab or SNF at a [facility] SNF (see below)    For Discharges to [facility] SNF:   · [address]  [facility] Texted SNF Physician    Reason for Admission: vomiting    Discharge Diagnoses:     Please see assessment and plan section above for further details regarding discharge diagnoses  Resolved Problems  Date Reviewed: 2/5/2021    None          Consultations During Hospital Stay:  · none    Procedures Performed:   · none     Medication Adjustments and Discharge Medications:  · Summary of Medication Adjustments made as a result of this hospitalization: reduce BB to 12 5 mg PO BID  · Medication Dosing Tapers - Please refer to Discharge Medication List for details on any medication dosing tapers (if applicable to patient)  · Medications being temporarily held (include recommended restart time): none  · Discharge Medication List: See after visit summary for reconciled discharge medications  Wound Care Recommendations:  When applicable, please see wound care section of After Visit Summary  Diet Recommendations at Discharge:  Diet -        Diet Orders   (From admission, onward)             Start     Ordered    02/03/21 1023  Dietary nutrition supplements  Once     Question Answer Comment   Select Supplement: Ensure Pudding-Vanila    Frequency Lunch, Dinner        02/03/21 1022    02/03/21 1023  Dietary nutrition supplements  Once     Question Answer Comment   Select Supplement: Ensure Compact-Vanilla    Frequency Breakfast        02/03/21 1022    02/03/21 0815  Room Service  Once     Question:  Type of Service  Answer:  Room Service - Appropriate with Assistance    02/03/21 0814    02/02/21 1148  Diet Dysphagia/Modified Consistency; Dysphagia 3-Dental Soft; Thin Liquid  Diet effective now     Question Answer Comment   Diet Type Dysphagia/Modified Consistency    Dysphagia/Modified Consistency Dysphagia 3-Dental Soft    Liquid Modifier Thin Liquid    RD to adjust diet per protocol?  Yes        02/02/21 1148                Instructions for any Catheters / Lines Present at Discharge (including removal date, if applicable): Suprapubic cath as per protocol - chronic    Significant Findings / Test Results:   · CT AP: Large volume of rectal stool suggesting fecal impaction  Bilateral renal calculi  Left percutaneous nephrostomy catheter present, no hydronephrosis is identified  Cholelithiasis  · CXR: No acute cardiopulmonary disease  Incidental Findings:   · Bilateral renal calculi      Test Results Pending at Discharge (will require follow up): · Blood cultures neg at 72 hrs - final pending     Outpatient Tests Requested:  · none    Complications:  none    Hospital Course:     Nomi Moore is a 65 y o  male patient who originally presented to the hospital on 2/1/2021 due to vomiting  Patient has a PMH of stroke, hypertension, COPD, suprapubic catheter, left nephrostomy tube presents from Replaced by [facility]  [facility] [place] [facility]  Patient is nonverbal at baseline  Patient was noted to be tested for COVID and positive  He was noted to meet septic criteria on admission secondary to fever and leukocytosis and was noted to be started on IV cefepime  Was felt to be potentially related to his suprapubic catheter  Ultimately urine culture was noted to show greater than 100,000 colony-forming units of mixed contaminants x3  Patient was also noted to have acute kidney injury with a baseline of 1 5 with an increased to 2 06  The patient was treated with IV fluid hydration  He was treated with vitamin-C, vitamin-D, zinc and multivitamin in regards was COVID-19  The patient's wife did not want the patient treated with remdesivir  Patient had fecal impaction and was given soapsuds enema  Inflammatory markers were noted to decrease  The patient completed 5 days of IV cefepime and therefore will discontinue further doses on discharge  Condition at Discharge: stable     Discharge Day Visit / Exam:     Subjective:  Eating well this am - drank ensure, OJ, milk, banana  No events per RN    Vitals: Blood Pressure: 133/69 (02/05/21 0900)  Pulse: 71 (02/05/21 0900)  Temperature: 98 4 °F (36 9 °C) (02/05/21 0900)  Temp Source: Rectal (02/05/21 0900)  Respirations: 20 (02/05/21 0900)  Height: 5' 11" (180 3 cm) (02/03/21 1014)  Weight - Scale: 68 8 kg (151 lb 10 8 oz) (02/05/21 0600)  SpO2: 93 % (02/05/21 0900)  Exam:   Physical Exam  Vitals signs and nursing note reviewed  Constitutional:       General: He is not in acute distress  Appearance: Normal appearance  He is not diaphoretic  HENT:      Head: Normocephalic and atraumatic  Mouth/Throat:      Mouth: Mucous membranes are moist    Cardiovascular:      Rate and Rhythm: Normal rate and regular rhythm  Pulmonary:      Effort: Pulmonary effort is normal       Breath sounds: Normal breath sounds  No stridor  No wheezing, rhonchi or rales  Abdominal:      General: Bowel sounds are normal       Palpations: Abdomen is soft  There is no mass  Tenderness: There is no abdominal tenderness  There is no guarding  Musculoskeletal:      Right lower leg: No edema  Left lower leg: No edema  Skin:     General: Skin is warm and dry  Neurological:      Mental Status: He is alert  Psychiatric:         Mood and Affect: Mood normal          Behavior: Behavior normal          Discussion with Family: wife via phone    Goals of Care Discussions:  · Code Status at Discharge: Level 3 - DNAR and DNI  · Were there any Goals of Care Discussions during Hospitalization?: No  · Results of any General Goals of Care Discussions: N/A   · POLST Completed: No   · If POLST Completed, Summary of POLST Agreement Provided Here: N/A   · OK to Rehospitalize if Needed? Yes    Discharge instructions/Information to patient and family:   See after visit summary section titled Discharge Instructions for information provided to patient and family  Planned Readmission: no      Discharge Statement:  I spent 45 minutes discharging the patient  This time was spent on the day of discharge  I had direct contact with the patient on the day of discharge   Greater than 50% of the total time was spent examining patient, answering all patient questions, arranging and discussing plan of care with patient as well as directly providing post-discharge instructions  Additional time then spent on discharge activities      ** Please Note: This note has been constructed using a voice recognition system **

## 2021-02-05 NOTE — ASSESSMENT & PLAN NOTE
· Max 2 06, baseline around 1 5  · Would avoid nephrotoxins    · Oral intake improved  · Creatinine improved    Lab Results   Component Value Date    CREATININE 1 55 (H) 02/05/2021    CREATININE 1 66 (H) 02/04/2021    CREATININE 1 98 (H) 02/03/2021    CREATININE 2 06 (H) 02/02/2021

## 2021-02-05 NOTE — ASSESSMENT & PLAN NOTE
· COVID-19 positive from the nursing home today, diagnosed on 02/01  · Noted the patient was also COVID positive in March of 2020  · Respiratory status is stable on room air  · Continue vitamin-C, vitamin-D, zinc on discharge  · Patient's wife refused remdesivir despite being high risk  · Inflammatory markers decreasing  · Continue outpatient Eliquis    Inflammatory Markers (last 3):   Lab Results   Component Value Date    DDIMER 0 75 (H) 02/03/2021    DDIMER 0 82 (H) 02/02/2021    DDIMER 0 64 (H) 02/01/2021     8 (H) 02/03/2021     8 (H) 02/02/2021     8 (H) 02/01/2021    FERRITIN 90 02/02/2021    FERRITIN 85 02/01/2021       Cardiac Markers:  Lab Results   Component Value Date    TROPONINI <0 02 02/02/2021    TROPONINI <0 02 02/01/2021    TROPONINI 0 02 07/08/2019    NTBNP 1,009 (H) 02/02/2021    CKTOTAL 144 02/02/2021

## 2021-02-05 NOTE — ASSESSMENT & PLAN NOTE
· Blood pressure 119/67  · Continue amlodipine  Metoprolol was held given reported bradycardia on masimo but on telemetry the patients HR has been 88 - suspect artifact and not true bradycardia     · Continue BB at 12 5 mg PO BID (at SNF was increased to 25 mg PO BID on 1/20/21)

## 2021-02-06 LAB
BACTERIA BLD CULT: NORMAL
BACTERIA BLD CULT: NORMAL

## 2021-02-10 ENCOUNTER — NURSING HOME VISIT (OUTPATIENT)
Dept: FAMILY MEDICINE CLINIC | Facility: CLINIC | Age: 71
End: 2021-02-10
Payer: COMMERCIAL

## 2021-02-10 VITALS — HEART RATE: 88 BPM | RESPIRATION RATE: 17 BRPM | DIASTOLIC BLOOD PRESSURE: 82 MMHG | SYSTOLIC BLOOD PRESSURE: 126 MMHG

## 2021-02-10 DIAGNOSIS — A40.3: Primary | ICD-10-CM

## 2021-02-10 DIAGNOSIS — R65.20: Primary | ICD-10-CM

## 2021-02-10 DIAGNOSIS — Z86.718 HISTORY OF DVT (DEEP VEIN THROMBOSIS): Chronic | ICD-10-CM

## 2021-02-10 DIAGNOSIS — I10 ESSENTIAL HYPERTENSION: Chronic | ICD-10-CM

## 2021-02-10 DIAGNOSIS — K72.00: Primary | ICD-10-CM

## 2021-02-10 PROCEDURE — 99309 SBSQ NF CARE MODERATE MDM 30: CPT | Performed by: INTERNAL MEDICINE

## 2021-02-10 NOTE — PROGRESS NOTES
Assessment/Plan:         Problem List Items Addressed This Visit        Cardiovascular and Mediastinum    Essential hypertension (Chronic)       Blood pressure under control with amlodipine  Other    History of DVT (deep vein thrombosis) (Chronic)       Has been on Eliquis  Tolerating well  No episode of any bleeding  Sepsis due to UTI [facility]) - Primary       Patient has received cefepime IV in the hospital for 5 days  Since then no more fever  He did not throw up any more  No diarrhea  Discussed with the staff nurse  Hospital records reviewed  Total time spent on record reviewing was 26 minutes                 Subjective:      Patient ID: Madeline Fisher is a 65 y o  male  Hospital returned follow-up and [facility] [place] [facility]  1  Sepsis due to urinary tract infection  Patient was treated with IV cefepime for 5 days  Send then antibiotic was discontinued  Since then no fever  No diarrhea  No change in mental status  Patient's oral intake is not good  No distress  Was seen with the staff nurse  2   Lower extremity DVT  He is on Eliquis  No sign of bleeding  No increased leg edema or redness  No distress or pain  3   Chronic pain  He is on oxycodone  Tolerating well  No respiratory distress        The following portions of the patient's history were reviewed and updated as appropriate:   Past Medical History:  He has a past medical history of Ataxia, COPD (chronic obstructive pulmonary disease) ([address] ), CVA (cerebral vascular accident) ([address] ), Dementia ([address] ), Difficulty swallowing, Difficulty walking, Generalized anxiety disorder, GERD (gastroesophageal reflux disease), Global aphasia, H/O blood clots, Heartburn, Hypertension, Mental disorder, Muscle weakness, Neuromuscular dysfunction of bladder, 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 ([address] ), Stroke ([address] ), and Thrombosis  ,  _______________________________________________________________________  Medical Problems:  does not have any pertinent problems on file ,  _______________________________________________________________________  Past Surgical History:   has a past surgical history that includes IVC FILTER INSERTION; IVC FILTER INSERTION; Knee surgery; Nephrostomy; Kidney stone surgery; Cystoscopy; Urinary surgery; pr cysto/uretero w/lithotripsy &indwell stent insrt (Right, 6/14/2017); pr cystourethroscopy,biopsy (N/A, 1/15/2019); [place] cystogram (1/15/2019); BOTOX INJECTION (N/A, 6/18/2019); CYSTOSCOPY (N/A, 6/18/2019); BOTOX INJECTION (N/A, 10/7/2019); IR suprapubic catheter placement (11/26/2019); and IR nephrostomy tube placement (3/11/2020)  ,  _______________________________________________________________________  Family History:  family history includes Cancer in his father; Coronary artery disease in his father; Diabetes in his father; Hypertension in his father and mother ,  _______________________________________________________________________  Social History:   reports that he has never smoked  He has never used smokeless tobacco  He reports that he does not drink alcohol or use drugs  ,  _______________________________________________________________________  Allergies:  is allergic to gentamycin [gentamicin]; piperacillin sod-tazobactam so; vancomycin; clindamycin; nsaids; sulfa antibiotics; other; and tigecycline     _______________________________________________________________________  Current Outpatient Medications   Medication Sig Dispense Refill    acetaminophen (TYLENOL) 325 mg tablet Take 650 mg by mouth every 4 (four) hours as needed for mild pain       amLODIPine (NORVASC) 10 mg tablet Take 10 mg by mouth daily      ammonium lactate (LAC-HYDRIN) 12 % cream Apply topically daily 385 g 0    apixaban (ELIQUIS) 2 5 mg Take 2 5 mg by mouth 2 (two) times a day      ascorbic acid (VITAMIN C) 500 MG tablet Take 2 tablets (1,000 mg total) by mouth every 12 (twelve) hours  0    bisacodyl (DULCOLAX) 10 mg suppository Insert 1 suppository (10 mg total) into the rectum daily as needed for constipation 12 suppository 0    busPIRone (BUSPAR) 7 5 mg tablet Take 7 5 mg by mouth 3 (three) times a day      cholecalciferol (VITAMIN D3) 1,000 units tablet Take 2,000 Units by mouth daily      Citric Qc-Jzlcznzgptu-Hh Carb (Renacidin) SOLN       docusate sodium (COLACE) 100 mg capsule Take 100 mg by mouth 2 (two) times a day      famotidine (PEPCID) 20 mg tablet Take 20 mg by mouth 2 (two) times a day      glycerin-hypromellose- (ARTIFICIAL TEARS) 0 2-0 2-1 % SOLN Administer 1 drop to both eyes 2 (two) times a day      memantine (NAMENDA) 5 mg tablet 2 (two) times a day       metoprolol tartrate (LOPRESSOR) 25 mg tablet Take 0 5 tablets (12 5 mg total) by mouth every 12 (twelve) hours  0    mirtazapine (REMERON) 15 mg tablet daily at bedtime       oxyCODONE (ROXICODONE) 5 mg immediate release tablet Take 1 tablet (5 mg total) by mouth 4 (four) times a day Hold if pt is drowsyMax Daily Amount: 20 mg 12 tablet 0    polyethylene glycol (MIRALAX) 17 g packet Take 17 g by mouth daily 14 each 0    potassium chloride (K-DUR,KLOR-CON) 10 mEq tablet Take 1 tablet (10 mEq total) by mouth daily with breakfast  0    QUEtiapine (SEROquel) 25 mg tablet Take 25 mg by mouth daily at bedtime        saccharomyces boulardii (FLORASTOR) 250 mg capsule Take 250 mg by mouth 2 (two) times a day      senna (SENOKOT) 8 6 mg Take 2 tablets (17 2 mg total) by mouth daily at bedtime  0    sodium chloride, PF, 0 9 % 10 mL by Intracatheter route daily Intracatheter flushing daily 30 Syringe 2    zinc sulfate (ZINCATE) 220 mg capsule Take 1 capsule (220 mg total) by mouth daily for 2 doses 2 capsule 0     No current facility-administered medications for this visit  _______________________________________________________________________  Review of Systems   Constitutional: Negative for chills, fatigue and fever  HENT: Negative for congestion  Eyes: Negative for discharge and visual disturbance  Respiratory: Negative for cough, chest tightness, shortness of breath and wheezing  Cardiovascular: Negative for chest pain  Gastrointestinal: Negative for abdominal distention, abdominal pain, constipation, diarrhea and vomiting  Endocrine: Negative for polydipsia and polyuria  Genitourinary: Positive for difficulty urinating  Negative for frequency  Musculoskeletal: Positive for back pain  Negative for arthralgias  Allergic/Immunologic: Negative for environmental allergies  Neurological: Positive for weakness  Hematological: Negative  Psychiatric/Behavioral: Positive for confusion and decreased concentration  Negative for agitation  The patient is not nervous/anxious  All other systems reviewed and are negative  Objective:  Vitals:    02/10/21 1422   BP: 126/82   Pulse: 88   Resp: 17     There is no height or weight on file to calculate BMI  Physical Exam  Vitals signs reviewed  Constitutional:       General: He is not in acute distress  Appearance: He is not ill-appearing  HENT:      Head: Normocephalic and atraumatic  Cardiovascular:      Rate and Rhythm: Normal rate and regular rhythm  Heart sounds: Normal heart sounds  Pulmonary:      Breath sounds: Normal breath sounds  No wheezing  Abdominal:      General: Bowel sounds are normal  There is no distension  Palpations: Abdomen is soft  Tenderness: There is no abdominal tenderness  Musculoskeletal:      Right lower leg: No edema  Left lower leg: No edema  Neurological:      Mental Status: Mental status is at baseline  He is disoriented     Psychiatric:         Behavior: Behavior normal

## 2021-02-10 NOTE — ASSESSMENT & PLAN NOTE
Patient has received cefepime IV in the hospital for 5 days  Since then no more fever  He did not throw up any more  No diarrhea  Discussed with the staff nurse  Hospital records reviewed    Total time spent on record reviewing was 26 minutes

## 2021-02-12 ENCOUNTER — NURSING HOME VISIT (OUTPATIENT)
Dept: GERIATRICS | Facility: OTHER | Age: 71
End: 2021-02-12
Payer: COMMERCIAL

## 2021-02-12 DIAGNOSIS — Z93.59 SUPRAPUBIC CATHETER (HCC): ICD-10-CM

## 2021-02-12 DIAGNOSIS — K72.00: Primary | ICD-10-CM

## 2021-02-12 DIAGNOSIS — A40.3: Primary | ICD-10-CM

## 2021-02-12 DIAGNOSIS — N20.0 BILATERAL NEPHROLITHIASIS: ICD-10-CM

## 2021-02-12 DIAGNOSIS — R65.20: Primary | ICD-10-CM

## 2021-02-12 DIAGNOSIS — Z86.718 HISTORY OF DVT (DEEP VEIN THROMBOSIS): Chronic | ICD-10-CM

## 2021-02-12 DIAGNOSIS — R53.81 PHYSICAL DECONDITIONING: ICD-10-CM

## 2021-02-12 DIAGNOSIS — U07.1 COVID-19 VIRUS INFECTION: ICD-10-CM

## 2021-02-12 DIAGNOSIS — N17.9 AKI (ACUTE KIDNEY INJURY) (HCC): ICD-10-CM

## 2021-02-12 DIAGNOSIS — F01.50 VASCULAR DEMENTIA WITHOUT BEHAVIORAL DISTURBANCE (HCC): ICD-10-CM

## 2021-02-12 PROCEDURE — 99309 SBSQ NF CARE MODERATE MDM 30: CPT | Performed by: NURSE PRACTITIONER

## 2021-02-12 NOTE — ASSESSMENT & PLAN NOTE
- continue Eliquis  - no signs of active bleeding  - most recent hemoglobin stable 14 2/hematocrit 42 4 on 02/08/2021

## 2021-02-12 NOTE — PROGRESS NOTES
[facility] care  POS: 31(STR)  Progress Note    Chief Complaint/Reason for visit: STR follow up of  Sepsis due to UTI; COVID-19 positive 02/01/2021; RUPESH; Bilateral nephrolithiasis; Suprapubic catheter; History of lower extremity DVT; dementia; physical deconditioning  History of Present Illness:  65year-old seen and examined for STR follow up  Patient is a LTC resident at [facility] who  Recently was hospitalized for sepsis  Plan is to transition back to long-term care once rehab is completed  Patient is alert,  nonverbal, confused and disoriented  He does not appear to be in pain  He is normally resistant to care and can be combative  No acute concerns reported by nursing  Vital signs are stable  Past Medical History: unchanged from history and physical  Past Medical History:   Diagnosis Date    Ataxia     COPD (chronic obstructive pulmonary disease) ([facility] )     wife denies - "never had"    CVA (cerebral vascular accident) ([facility] [facility] )     Dementia ([facility] )     Difficulty swallowing     Difficulty walking     Generalized anxiety disorder     GERD (gastroesophageal reflux disease)     Global aphasia     H/O blood clots     Heartburn     Hypertension     Mental disorder     Muscle weakness     Neuromuscular dysfunction of bladder     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 ([facility] [address] )     Stroke ([facility] )     Thrombosis      Family History: unchanged from history and physical  Social History: unchanged from history and physical  Resident Since:  11/11/2016 then readmitted on 02/05/2021  Review of systems: Review of Systems   Unable to perform ROS: Dementia     Medications: All medication and routine orders were reviewed and updated  Allergies: Reviewed and unchanged  Consults reviewed: Other  Labs/Diagnostics (reviewed by this provider): Copy in Chart    Imaging Reviewed: none today  Physical Exam    Weight:  184 lb Temp:   98 4          BP: 138/68 Pulse:  74    Resp: 18 O2 Sat: 96% on room air  Constitutional: Normocephalic  Orientation:Confused and disoriented     Physical Exam  Vitals signs and nursing note reviewed  Constitutional:       General: He is not in acute distress  Appearance: He is not toxic-appearing or diaphoretic  Comments: Elderly male who appears with chronic illness  HENT:      Head: Normocephalic  Mouth/Throat:      Comments: Patient would not allow oral exam     Eyes:      Pupils: Pupils are equal, round, and reactive to light  Cardiovascular:      Rate and Rhythm: Normal rate and regular rhythm  Abdominal:      General: Bowel sounds are normal       Palpations: Abdomen is soft  Genitourinary:     Comments: Suprapubic catheter intact and patent for yellow urine  no signs of infection  left nephrostomy tube intact and patent for pink tinged color urine  Musculoskeletal:      Comments: Able to move all 4 extremities  Non ambulatory  Skin:     General: Skin is warm and dry  Neurological:      Mental Status: He is alert  Mental status is at baseline  Motor: Weakness present  Gait: Gait abnormal    Psychiatric:      Comments:  Resistant to exam and care       Assessment/Plan:  49-year-old male with:    Sepsis due to UTI [facility])  - recently hospitalized for sepsis due to UTI,  Urine cultures revealed mixed contaminants x3, treated with IV cefepime for 5 days  - with history of MDRO including ESBL E coli, Providencia, Enterococcus  - WBC on 2/8 13 7 up from previous of 6 38 on 2/5 inpatient  Will recheck CBC with diff on Monday  - s/p SP tube exchanged on 01/20/2021 per urology  He also has a left nephrostomy tube  - afebrile and hemodynamically stable    COVID-19 virus infection  - COVID-19 positive on 02/01 inpatient  Also COVID-19 positive March 2020  Patient's wife refused Remdesivir  treatment inpatient despite being high risk   Inflammatory markers decreased inpatient  - respiratory status stable on room air  - patient is on Eliquis for history of DVTs lower extremity    RUPESH (acute kidney injury) ([facility] )  - baseline creatinine 1 5  with chronic kidney disease stage 3  - most recent creatinine 1 34/GFR 53 on 02/08/2021  - monitor BMP  - avoid nephrotoxins  - avoid hypotension  - ensure adequate hydration    Bilateral nephrolithiasis  - history of bilateral nephrolithiasis with left nephrostomy tube in place  - urology follows closely  - next follow-up appointment with Urology on 02/22/2021    Suprapubic catheter ([facility] )  - suprapubic catheter in place  - continue suprapubic catheter care per protocol  - follow-up with urology as scheduled    History of DVT (deep vein thrombosis)  - continue Eliquis  - no signs of active bleeding  - most recent hemoglobin stable 14 2/hematocrit 42 4 on 02/08/2021    Dementia ([facility] )  - continue supportive care  - continue delirium precautions  - monitor for pain and ensure that pain is adequately controlled  - monitor for signs and symptom of infection    Physical deconditioning  - in setting of recent hospitalization for sepsis  - continue PT/OT  - fall precautions  - ensure adequate hydration and nutrition    **Please note: This   Follow-up note was constructed using a voice recognition system  [address] [place], [place]  6/78/[id number]:14 PM

## 2021-02-12 NOTE — ASSESSMENT & PLAN NOTE
- recently hospitalized for sepsis due to UTI,  Urine cultures revealed mixed contaminants x3, treated with IV cefepime for 5 days  - with history of MDRO including ESBL E coli, Providencia, Enterococcus  - WBC on 2/8 13 7 up from previous of 6 38 on 2/5 inpatient  Will recheck CBC with diff on Monday  - s/p SP tube exchanged on 01/20/2021 per urology   He also has a left nephrostomy tube  - afebrile and hemodynamically stable

## 2021-02-12 NOTE — ASSESSMENT & PLAN NOTE
- baseline creatinine 1 5  with chronic kidney disease stage 3  - most recent creatinine 1 34/GFR 53 on 02/08/2021  - monitor BMP  - avoid nephrotoxins  - avoid hypotension  - ensure adequate hydration

## 2021-02-12 NOTE — ASSESSMENT & PLAN NOTE
- suprapubic catheter in place  - continue suprapubic catheter care per protocol  - follow-up with urology as scheduled

## 2021-02-12 NOTE — ASSESSMENT & PLAN NOTE
- continue supportive care  - continue delirium precautions  - monitor for pain and ensure that pain is adequately controlled  - monitor for signs and symptom of infection

## 2021-02-12 NOTE — ASSESSMENT & PLAN NOTE
- in setting of recent hospitalization for sepsis  - continue PT/OT  - fall precautions  - ensure adequate hydration and nutrition

## 2021-02-12 NOTE — ASSESSMENT & PLAN NOTE
- COVID-19 positive on 02/01 inpatient  Also COVID-19 positive March 2020  Patient's wife refused Remdesivir  treatment inpatient despite being high risk   Inflammatory markers decreased inpatient  - respiratory status stable on room air  - patient is on Eliquis for history of DVTs lower extremity

## 2021-02-12 NOTE — ASSESSMENT & PLAN NOTE
- history of bilateral nephrolithiasis with left nephrostomy tube in place  - urology follows closely  - next follow-up appointment with Urology on 02/22/2021

## 2021-02-15 ENCOUNTER — NURSING HOME VISIT (OUTPATIENT)
Dept: GERIATRICS | Facility: OTHER | Age: 71
End: 2021-02-15
Payer: COMMERCIAL

## 2021-02-15 DIAGNOSIS — F01.50 VASCULAR DEMENTIA WITHOUT BEHAVIORAL DISTURBANCE (HCC): ICD-10-CM

## 2021-02-15 DIAGNOSIS — A40.3: Primary | ICD-10-CM

## 2021-02-15 DIAGNOSIS — Z93.59 SUPRAPUBIC CATHETER (HCC): ICD-10-CM

## 2021-02-15 DIAGNOSIS — R65.20: Primary | ICD-10-CM

## 2021-02-15 DIAGNOSIS — Z93.6 NEPHROSTOMY STATUS (HCC): ICD-10-CM

## 2021-02-15 DIAGNOSIS — K72.00: Primary | ICD-10-CM

## 2021-02-15 DIAGNOSIS — N20.0 BILATERAL NEPHROLITHIASIS: ICD-10-CM

## 2021-02-15 PROBLEM — N17.9 AKI (ACUTE KIDNEY INJURY) (HCC): Status: RESOLVED | Noted: 2021-02-02 | Resolved: 2021-02-15

## 2021-02-15 PROCEDURE — 99309 SBSQ NF CARE MODERATE MDM 30: CPT | Performed by: NURSE PRACTITIONER

## 2021-02-15 NOTE — ASSESSMENT & PLAN NOTE
- history of bilateral nephrolithiasis with left nephrostomy tube in place  -  Urology follows closely   Follow-up with urology as scheduled on 02/22/2021  - no issues with left nephrostomy tube

## 2021-02-15 NOTE — ASSESSMENT & PLAN NOTE
-  Recently hospitalized for sepsis due to UTI, urine cultures revealed mixed contaminants x3, treated with IV cefepime for 5 days  Patient with history of MDRO including ESBL E coli, Providencia, and Enterococcus  - WBC  13 7 on 02/08<<<8 3 today   CBC WNL for patient  - afebrile and hemodynamically stable

## 2021-02-15 NOTE — ASSESSMENT & PLAN NOTE
- continue supportive care  - continue delirium precautions  - monitor sleep/ wake cycle  - nursing to monitor behavior  - monitor for pain and ensure that pain is adequately controlled  - monitor for signs and symptoms of infection

## 2021-02-15 NOTE — PROGRESS NOTES
[facility] care  POS: 31(STR)  Progress Note    Chief Complaint/Reason for visit: STR follow up of  Sepsis due to UTI COVID-19 positive on 02/01/2021; bilateral nephrolithiasis; suprapubic catheter; history of lower extremity DVT; dementia  History obtained from nursing staff and EMR  History of Present Illness:  70-year-old male seen and examined for STR follow up  Patient will transition to LTC  He was recently hospitalized for sepsis due to UTI, tested positive for COVID-19 on 02/01/2021 inpatient, and has been afebrile and hemodynamically stable since readmission to [facility]  Respiratory status stable  Okay for patient to transfer to regular floor as per facility protocol  He does not appear toxic and does not appear to be in any discomfort  No acute concerns reported by nursing  Past Medical History: unchanged from history and physical  Past Medical History:   Diagnosis Date    Ataxia     COPD (chronic obstructive pulmonary disease) ([facility] [address] )     wife denies - "never had"    CVA (cerebral vascular accident) ([facility] [facility] )     Dementia ([facility] )     Difficulty swallowing     Difficulty walking     Generalized anxiety disorder     GERD (gastroesophageal reflux disease)     Global aphasia     H/O blood clots     Heartburn     Hypertension     Mental disorder     Muscle weakness     Neuromuscular dysfunction of bladder     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 ([facility] )     Stroke ([facility] )     Thrombosis      Family History: unchanged from history and physical  Social History: unchanged from history and physical  Resident Since: 11/11/2016  then readmitted on 02/05/2021 post hospitalization  Review of systems: Review of Systems   Unable to perform ROS: Dementia     Medications: All medication orders were reviewed  Allergies: Reviewed and unchanged  Consults reviewed: Other  Labs/Diagnostics (reviewed by this provider): Copy in Chart  CBC with diff, BMP reviewed today  Imaging Reviewed: None today    Physical Exam  All vital signs reviewed   Weight:  Temp: 97 7 BP: 136/76  Pulse: 80 Resp: 16 O2 Sat: 96% RA  Constitutional: Normocephalic  Orientation:  Confused and disoriented     Physical Exam  Vitals signs and nursing note reviewed  Constitutional:       General: He is not in acute distress  Appearance: He is not toxic-appearing or diaphoretic  Comments: Elderly male who appears with chronic illness   HENT:      Head: Normocephalic  Cardiovascular:      Rate and Rhythm: Normal rate and regular rhythm  Comments: Cardiopulmonary assessment not performed with stethoscope due to COVID-19 Pandemic  Additional assessments obtained from nursing staff  Pulmonary:      Effort: Pulmonary effort is normal       Comments: No audible wheezing or rhonchi noted  Respiratory status stable  Genitourinary:     Comments: Suprapubic catheter intact and patent for yellow urine  Left nephrostomy tube intact and patent for yellow urine  Musculoskeletal:      Right lower leg: No edema  Left lower leg: No edema  Neurological:      Mental Status: He is alert  Mental status is at baseline  Motor: Weakness present  Gait: Gait abnormal        Assessment/Plan:  75-year-old male with:    RUPESH (acute kidney injury) ([facility] [address] )  -resolved     Sepsis due to UTI ([facility] [address] )  -  Recently hospitalized for sepsis due to UTI, urine cultures revealed mixed contaminants x3, treated with IV cefepime for 5 days  Patient with history of MDRO including ESBL E coli, Providencia, and Enterococcus  - WBC  13 7 on 02/08<<<8 3 today  CBC WNL for patient  - afebrile and hemodynamically stable    Bilateral nephrolithiasis  - history of bilateral nephrolithiasis with left nephrostomy tube in place  -  Urology follows closely   Follow-up with urology as scheduled on 02/22/2021  - no issues with left nephrostomy tube    Nephrostomy status ([facility] )  - left sided nephrostomy tube in place without issues  - continue nephrostomy tube care    Suprapubic catheter ([facility] )  - suprapubic catheter in place  - continue suprapubic catheter care per protocol  - follow-up with urology as scheduled    History of DVT (deep vein thrombosis)  - stable on Eliquis  - no signs of active bleeding  - CBC with diff stable today    Dementia ([facility] )  - continue supportive care  - continue delirium precautions  - monitor sleep/ wake cycle  - nursing to monitor behavior  - monitor for pain and ensure that pain is adequately controlled  - monitor for signs and symptoms of infection    **Please note: This follow up note was constructed using a voice recognition system  **    RUPESH (acute kidney injury) ([facility] )  -resolved     Sepsis due to UTI [facility])  -  Recently hospitalized for sepsis due to UTI, urine cultures revealed mixed contaminants x3, treated with IV cefepime for 5 days  Patient with history of MDRO including ESBL E coli, Providencia, and Enterococcus  - WBC  13 7 on 02/08<<<8 3 today  CBC WNL for patient  - afebrile and hemodynamically stable    Bilateral nephrolithiasis  - history of bilateral nephrolithiasis with left nephrostomy tube in place  -  Urology follows closely   Follow-up with urology as scheduled on 02/22/2021  - no issues with left nephrostomy tube    Nephrostomy status ([facility] )  - left sided nephrostomy tube in place without issues  - continue nephrostomy tube care    Suprapubic catheter ([facility] )  - suprapubic catheter in place  - continue suprapubic catheter care per protocol  - follow-up with urology as scheduled    History of DVT (deep vein thrombosis)  - stable on Eliquis  - no signs of active bleeding  - CBC with diff stable today    Dementia ([facility] )  - continue supportive care  - continue delirium precautions  - monitor sleep/ wake cycle  - nursing to monitor behavior  - monitor for pain and ensure that pain is adequately controlled  - monitor for signs and symptoms of infection            Fariba Berkowitz RICARDO  2/15/56291:50 PM

## 2021-02-19 NOTE — PROGRESS NOTES
Problem List Items Addressed This Visit        Other    Suprapubic catheter ([address] ) - Primary    Neuromuscular dysfunction of bladder    COVID-19 virus infection    Physical deconditioning           Discussion:     Zoila Anders has been doing well with his suprapubic catheter and his left nephrostomy tube  Unfortunately he has tested positive for COVID-19 some weeks ago, this appears to be asymptomatic carrier status, appropriate precautions were taken here in the clinic today  He continues to require his suprapubic catheter change to be done by me personally given multiple issues with changes in the past   His facility does appear to be doing a better job in terms of irrigations given that he has not had as many issues with encrustation  This being true, he will still see me on an every 5 week basis for suprapubic catheter exchange  We were able to perform this without a wire today which is progression versus previous visits  He is due for his nephrostomy tube to be exchanged in mid March, this will continue to be changed every 3 months    Assessment and plan:       Please see problem oriented charting for the assessment plan of today's urological complaints    Cesia Hubbard MD      Chief Complaint     Chief Complaint   Patient presents with    Urinary Retention     Suprapubic Catheter Change          History of Present Illness     Madeline Fisher is a 65 y o  Man we know quite well for his history of neurogenic bladder, difficult urethral Thorne catheter and difficult suprapubic catheter placement, and bilateral nephrolithiasis, has a indwelling nephrostomy, this is changed intermittently by Radiology  His next change is scheduled for the 15th of March        It appears that he was COVID positive in early February, in terms of sequela from this he is asymptomatic     The following portions of the patient's history were reviewed and updated as appropriate: allergies, current medications, past family history, past medical history, past social history, past surgical history and problem list     Detailed Urologic History     - please refer to HPI    Review of Systems      unable to perform review of systems, patient is nonverbal          Allergies     Allergies   Allergen Reactions    Gentamycin [Gentamicin] Anaphylaxis    Piperacillin Sod-Tazobactam So Anaphylaxis and Rash     However, has tolerated Ertapenem, Cefdinir, and Cefepime, which all have different side chains  Avoid Penicillins and the following Cephs with similar side chains (Cephalexin, Cefadroxil, Cefaclor, Cefprozil, or  Cefoxitin)  Other reaction(s): Hemoptysis    Vancomycin Anaphylaxis and Rash     Other reaction(s): Hemoptysis    Clindamycin Itching and Rash     Other reaction(s): Hemoptysis    Nsaids Other (See Comments)     Nephrotic Syndrome    Sulfa Antibiotics Rash    Other Rash     antipersperents  Stat lock from lucia catheter    Tigecycline Rash     Other reaction(s): Hemoptysis       Physical Exam     Physical Exam  Vitals signs reviewed  Constitutional:       General: He is not in acute distress  Appearance: He is ill-appearing  He is not toxic-appearing or diaphoretic  HENT:      Head: Normocephalic and atraumatic  Cardiovascular:      Pulses: Normal pulses  Pulmonary:      Effort: Pulmonary effort is normal    Abdominal:      General: There is no distension  Palpations: There is no mass  Tenderness: There is no abdominal tenderness  Hernia: No hernia is present  Comments: Clean suprapubic catheter site   Genitourinary:     Comments: Left nephrostomy tube draining clearer urine, insertion site is clean  Musculoskeletal:         General: Deformity present  No signs of injury  Right lower leg: No edema  Left lower leg: No edema  Skin:     General: Skin is warm  Neurological:      Mental Status: Mental status is at baseline  Motor: Weakness present        Coordination: Coordination abnormal  Gait: Gait abnormal       Deep Tendon Reflexes: Reflexes abnormal    Psychiatric:         Mood and Affect: Mood normal          Behavior: Behavior normal          Thought Content:  Thought content normal          Judgment: Judgment normal              Vital Signs  Vitals:         Current Medications       Current Outpatient Medications:     acetaminophen (TYLENOL) 325 mg tablet, Take 650 mg by mouth every 4 (four) hours as needed for mild pain , Disp: , Rfl:     amLODIPine (NORVASC) 10 mg tablet, Take 10 mg by mouth daily, Disp: , Rfl:     ammonium lactate (LAC-HYDRIN) 12 % cream, Apply topically daily, Disp: 385 g, Rfl: 0    apixaban (ELIQUIS) 2 5 mg, Take 2 5 mg by mouth 2 (two) times a day, Disp: , Rfl:     ascorbic acid (VITAMIN C) 500 MG tablet, Take 2 tablets (1,000 mg total) by mouth every 12 (twelve) hours, Disp:  , Rfl: 0    bisacodyl (DULCOLAX) 10 mg suppository, Insert 1 suppository (10 mg total) into the rectum daily as needed for constipation, Disp: 12 suppository, Rfl: 0    busPIRone (BUSPAR) 7 5 mg tablet, Take 7 5 mg by mouth 3 (three) times a day, Disp: , Rfl:     cholecalciferol (VITAMIN D3) 1,000 units tablet, Take 2,000 Units by mouth daily, Disp: , Rfl:     Citric Eg-Apzvkkkipzf-Dx Carb (Renacidin) SOLN, , Disp: , Rfl:     docusate sodium (COLACE) 100 mg capsule, Take 100 mg by mouth 2 (two) times a day, Disp: , Rfl:     famotidine (PEPCID) 20 mg tablet, Take 20 mg by mouth 2 (two) times a day, Disp: , Rfl:     glycerin-hypromellose- (ARTIFICIAL TEARS) 0 2-0 2-1 % SOLN, Administer 1 drop to both eyes 2 (two) times a day, Disp: , Rfl:     memantine (NAMENDA) 5 mg tablet, 2 (two) times a day , Disp: , Rfl:     metoprolol tartrate (LOPRESSOR) 25 mg tablet, Take 0 5 tablets (12 5 mg total) by mouth every 12 (twelve) hours, Disp: , Rfl: 0    mirtazapine (REMERON) 15 mg tablet, daily at bedtime , Disp: , Rfl:     oxyCODONE (ROXICODONE) 5 mg immediate release tablet, Take 1 tablet (5 mg total) by mouth 4 (four) times a day Hold if pt is drowsyMax Daily Amount: 20 mg, Disp: 12 tablet, Rfl: 0    polyethylene glycol (MIRALAX) 17 g packet, Take 17 g by mouth daily, Disp: 14 each, Rfl: 0    potassium chloride (K-DUR,KLOR-CON) 10 mEq tablet, Take 1 tablet (10 mEq total) by mouth daily with breakfast, Disp: , Rfl: 0    QUEtiapine (SEROquel) 25 mg tablet, Take 25 mg by mouth daily at bedtime  , Disp: , Rfl:     saccharomyces boulardii (FLORASTOR) 250 mg capsule, Take 250 mg by mouth 2 (two) times a day, Disp: , Rfl:     senna (SENOKOT) 8 6 mg, Take 2 tablets (17 2 mg total) by mouth daily at bedtime, Disp: , Rfl: 0    sodium chloride, PF, 0 9 %, 10 mL by Intracatheter route daily Intracatheter flushing daily, Disp: 30 Syringe, Rfl: 2    zinc sulfate (ZINCATE) 220 mg capsule, Take 1 capsule (220 mg total) by mouth daily for 2 doses, Disp: 2 capsule, Rfl: 0      Active Problems     Patient Active Problem List   Diagnosis    COPD (chronic obstructive pulmonary disease) (HCC)    History of DVT (deep vein thrombosis)    Aneurysm of thoracic aorta (HCC)    Essential hypertension    CKD (chronic kidney disease)    GERD (gastroesophageal reflux disease)    Constipation    Dementia (HCC)    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HCC)    [facility]    Bilateral nephrolithiasis    Depression with anxiety    Cerebral infarction ([facility] )    Aphasia as late effect of cerebrovascular accident    Nephrostomy status ([facility] )    Neuromuscular dysfunction of bladder    Debility    COVID-19 virus infection    Sepsis due to UTI [facility])    Physical deconditioning         Past Medical History     Past Medical History:   Diagnosis Date    Ataxia     COPD (chronic obstructive pulmonary disease) ([facility] [address] )     wife denies - "never had"    CVA (cerebral vascular accident) ([facility] [address] )     Dementia ([facility] [address] )     Difficulty swallowing     Difficulty walking     Generalized anxiety disorder     GERD (gastroesophageal reflux disease)     Global aphasia     H/O blood clots     Heartburn     Hypertension     Mental disorder     Muscle weakness     Neuromuscular dysfunction of bladder     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 ([facility] [address] )     Stroke ([facility] [address] )     Thrombosis          Surgical History     Past Surgical History:   Procedure Laterality Date    BOTOX INJECTION N/A 6/18/2019    Procedure: INJECTION BOTULINUM TOXIN (BOTOX), intra detrusor;  Surgeon: Divya Rodriguez MD;  [facility];  Service: Urology    BOTOX INJECTION N/A 10/7/2019    Procedure: INJECTION BOTULINUM TOXIN (BOTOX), intradetrusor;  Surgeon: Divya Rodriguez MD;  [facility];  Service: Urology    CYSTOSCOPY      CYSTOSCOPY N/A 6/18/2019    Procedure: cystoscopy and all indicated procedures;  Surgeon: Divya Rodriguez MD;  [facility];  Service: Urology    FL CYSTOGRAM  1/15/2019    IR NEPHROSTOMY TUBE PLACEMENT  3/11/2020    IR SUPRAPUBIC CATHETER PLACEMENT  11/26/2019    IVC FILTER INSERTION      X2    IVC FILTER INSERTION      x2    KIDNEY STONE SURGERY      KNEE SURGERY      Sepsis infection     NEPHROSTOMY      DC CYSTO/URETERO W/LITHOTRIPSY &INDWELL STENT INSRT Right 6/14/2017    Procedure: CYSTOSCOPY URETEROSCOPY WITH LITHOTRIPSY HOLMIUM LASER,,BASKET STONE EXTRACTION, RETROGRADE PYELOGRAM AND INSERTION STENT URETERAL;  Surgeon: Hailey Schroeder MD;  [facility];  Service: Urology    DC CYSTOURETHROSCOPY,BIOPSY N/A 1/15/2019    Procedure: CYSTOSCOPY, CYSTOGRAM, DILATION OF URETHRAL STRICTURE;  Surgeon: Divya Rodriguez MD;  [facility];  Service: Urology    URINARY SURGERY           Family History     Family History   Problem Relation Age of Onset    Diabetes Father     Hypertension Father     Coronary artery disease Father     Cancer Father     Hypertension Mother          Social History     Social History     Social History     Tobacco Use Smoking Status Never Smoker   Smokeless Tobacco Never Used         Pertinent Lab Values     Lab Results   Component Value Date    CREATININE 1 55 (H) 02/05/2021       No results found for: PSA          Pertinent Imaging       No new imaging for my review

## 2021-02-22 ENCOUNTER — PROCEDURE VISIT (OUTPATIENT)
Dept: UROLOGY | Facility: CLINIC | Age: 71
End: 2021-02-22
Payer: COMMERCIAL

## 2021-02-22 VITALS — DIASTOLIC BLOOD PRESSURE: 66 MMHG | SYSTOLIC BLOOD PRESSURE: 126 MMHG | HEART RATE: 72 BPM

## 2021-02-22 DIAGNOSIS — Z93.59 SUPRAPUBIC CATHETER (HCC): Primary | ICD-10-CM

## 2021-02-22 DIAGNOSIS — R53.81 PHYSICAL DECONDITIONING: ICD-10-CM

## 2021-02-22 DIAGNOSIS — U07.1 COVID-19 VIRUS INFECTION: ICD-10-CM

## 2021-02-22 DIAGNOSIS — N31.9 NEUROMUSCULAR DYSFUNCTION OF BLADDER: ICD-10-CM

## 2021-02-22 PROCEDURE — 99213 OFFICE O/P EST LOW 20 MIN: CPT | Performed by: UROLOGY

## 2021-02-22 PROCEDURE — 51710 CHANGE OF BLADDER TUBE: CPT | Performed by: UROLOGY

## 2021-02-22 NOTE — PROGRESS NOTES
Cystostomy tube change     Date/Time 2/22/2021 10:10 AM     Performed by  Kosta Jones MD     Authorized by Kosta Jones MD      [place] Protocol   Consent: Verbal consent obtained  Written consent obtained  Risks and benefits: risks, benefits and alternatives were discussed  Consent given by: guardian and spouse  Patient understanding: patient states understanding of the procedure being performed  Patient consent: the patient's understanding of the procedure matches consent given  Procedure consent: procedure consent matches procedure scheduled  Relevant documents: relevant documents present and verified  Test results: test results available and properly labeled  Site marked: the operative site was not marked  Radiology Images displayed and confirmed  If images not available, report reviewed: imaging studies available  Required items: required blood products, implants, devices, and special equipment available  Patient identity confirmed: provided demographic data        Local anesthesia used: no     Anesthesia   Local anesthesia used: no     Sedation   Patient sedated: no        Specimen: no    Culture: no   Procedure Details   Procedure Notes:  Suprapubic catheter site is clean, no bleeding granulation tissue noted today, prepared and draped in the usual sterile fashion, a new, 20 K[place] suprapubic catheter was placed by the urologist given previous difficulty changing this tube  This is seen to irrigate well, inflated with 10 cubic centimeters of sterile water, and placed to gravity drainage        He will return in 5 weeks for suprapubic catheter exchange  Patient Transportation: confirmed  Patient tolerance: patient tolerated the procedure well with no immediate complications

## 2021-03-15 ENCOUNTER — HOSPITAL ENCOUNTER (OUTPATIENT)
Dept: RADIOLOGY | Facility: HOSPITAL | Age: 71
Discharge: HOME | End: 2021-03-15
Attending: STUDENT IN AN ORGANIZED HEALTH CARE EDUCATION/TRAINING PROGRAM
Payer: COMMERCIAL

## 2021-03-15 DIAGNOSIS — N20.0 BILATERAL NEPHROLITHIASIS: ICD-10-CM

## 2021-03-15 PROCEDURE — C1729 CATH, DRAINAGE: HCPCS

## 2021-03-15 PROCEDURE — C1769 GUIDE WIRE: HCPCS

## 2021-03-15 PROCEDURE — 50435 EXCHANGE NEPHROSTOMY CATH: CPT | Performed by: RADIOLOGY

## 2021-03-15 PROCEDURE — 50435 EXCHANGE NEPHROSTOMY CATH: CPT

## 2021-03-15 RX ADMIN — IOHEXOL 10 ML: 350 INJECTION, SOLUTION INTRAVENOUS at 10:37

## 2021-03-15 NOTE — PROCEDURES
Left routine PCN tube changed  Capped and will do a 3 month capping trial before next tube check  AVS reviewed with wife  Patient transferred back via ambulance crew

## 2021-03-15 NOTE — DISCHARGE INSTRUCTIONS
Nephrostomy Tube Care     WHAT YOU NEED TO KNOW:   A nephrostomy tube is a catheter (thin plastic tube) that is inserted through your skin and into your kidney  The nephrostomy tube drains urine from your kidney into a collecting bag outside your body  You may need a nephrostomy tube when something is blocking the normal flow of urine  A nephrostomy tube may be used for a short or a long period of time  The nephrostomy tube comes out of your back, so you will need someone to help care for your nephrostomy tube  DISCHARGE INSTRUCTIONS:     Resume your normal diet  Small sips of flat soda will help with mild nausea  How to clean the skin around the nephrostomy tube and change the bandage:  Since the nephrostomy tube comes out of your back, you will not be able to care for it by yourself  Ask someone to follow the general directions below to check and care for your nephrostomy tube  Gather the items you will need  Disposable (single use) under-pad, and a clean washcloth  ¨ Plain soap, warm water, and new medical gloves  ¨ Sterile gauze bandages  ¨ Clear adhesive dressing or medical tape  ¨ Skin barrier  ¨ Protective skin film  ¨ Trash bag  · Remove the old bandage, and check the tube entry site  ¨ Have the patient lie on his side with the nephrostomy tube entry site facing up  Place the under-pad where it will catch drainage as you are working with the nephrostomy tube  ¨ Wash your hands with soap and water  Put on new medical gloves  ¨ Gently remove the old bandage, without pulling on the tube  Do this by holding the skin beside the tube with one hand  With the other hand, gently remove sticky tape and the skin barrier by pulling in the same direction as hair growth  Do not touch the side of the bandage that is placed over or around the tube  Throw the bandage and skin barrier away in a trash bag  ¨ Look for signs of infection, such as skin redness and swelling   Report any skin changes to healthcare providers  ¨ Clean the tube entry site  ¨ Hold the tube in place to keep it from being pulled out while you are cleaning around it  ¨ You will need to clean the area twice  For the first cleaning, wet a new gauze bandage with soap and water  Begin at the entry site of the tube  Wipe the skin in circles, moving away from the entry site  Remove blood and any other material with the gauze  Do this as often as needed  Use a new gauze bandage each time you clean the area, moving away from the entry site  ¨ For the second cleaning, wet a new gauze bandage with water  Begin at the entry site of the tube  Wipe the skin in circles, moving away from the entry site  Use a new gauze bandage each time you clean the area, moving away from the entry site  ¨ Gently pat the skin with a clean washcloth to dry it  · Apply the skin barrier and bandages  ¨ Roll up a bandage to make it thick, and place it under  the place where the tube enters the skin  Place it to support the tube, and stop it from kinking or bending  Tape the bandage in place, and apply more bandages if directed by a healthcare provider  ¨ Bring the tubing forward to the front and tape it to the skin  Do not stretch the tube tight, because this may pull the nephrostomy tube out  How often to change the bandage  Change the bandage around the tube, every other day  If your bandages  get dirty or wet, change them right away, and as often as needed  If your nephrostomy tube is to be used for a long period of time, the tube needs to be changed every 2 to 3 months  Healthcare providers will tell you when you need to make an appointment to have your tube changed  How to care for the urine drainage bag:   · Ask if you need to measure and write down how much urine is in the bag before you empty it  Drain urine out of the drainage bag when it is ½ to ? full  Open the spout at the bottom of the bag to empty the urine into the toilet     · You may need to detach the drainage bag from the nephrostomy tube to change it    If so, attach a new drainage bag tightly to the nephrostomy tube  ·   How to prevent problems with your nephrostomy tube:   · Change bandages, directed  This helps to prevent infection  Throw away or clean your drainage bag as directed by your healthcare provider  · Wipe the connecting ends of the drainage bag with alcohol before you reconnect the bag to the tube  This helps prevent infection  Keep the tube taped to your skin and connected to a drainage bag placed below the level of your kidneys  This helps prevent urine from backing up into your kidneys  You may wear a small drainage bag strapped to your leg to let you move around more easily  · Check the catheter to be sure it is in place after you change your clothes or do other activities  Do not wear tight clothing over the tube  Place the tubing over your thigh rather than under it when you are sitting down  Be sure that nothing is pulling on the nephrostomy tube when you move around  · Change positions if you see little or no urine in your drainage bag  Check to see if the urine tube is twisted or bent  Be sure that you are not sitting or lying on the tube  If there are no kinks and there is little or no urine in the drainage bag, tell your healthcare provider  · Flush out the tube as directed  Some tubes get flushed one time a day with 10 mls of NSS You will be given a prescription for the flushes  To flush the nephrostomy tube, clean both connections with alcohol swap  Twist off the drainage bag tube and twist the saline syringe into the nephrostomy tube and flush briskly  Remove the syringe and twist the drainage bag tube back into the nephrostomy tube  · Keep the site covered while you shower  Tape a piece of clear adhesive plastic over the dressing to keep it dry while you shower  Do not take tub baths      Contact Interventional Radiology at [phone] [place] PATIENTS: Contact Interventional Radiology at [phone]) Moriah Snell PATIENTS: Contact Interventional Radiology at [phone]) if:  · The skin around the nephrostomy tube is red, swollen, itches, or has a rash  · You have a fever  · You have lower back or hip pain  · There are changes in how your urine looks or smells  · You have little or no urine draining from the nephrostomy tube  · You have nausea and are vomiting  · The black raghu on your tube has moved, or the tube is longer than when it was put in    · You have questions or concerns about your condition or care  · The nephrostomy tube comes out completely  · There is blood, pus, or a bad smell coming from the place where the tube enters your skin  · Urine is leaking around the tube  Tube Capping Trial        If your tube is capped and has no drainage bag, the urine will flow through the ureter         and into the bladder for you to urinate normally  This is called a capping trial                    Continue to flush your tube one time per day  You will have an extra drainage bag to       keep at home in case the capping trial fails  Call the IR department if you experience        any of the following: Pain, fever greater than 101  Persistent nausea or vomiting  The following pharmacies carry the flush syringes  [facility]                     Home [facility]       [address] [facility]                    [address] [facility]  Phone [phone]            Phone [phone] [address] Deena Bones                                 [phone]  Phone [phone]            Phone [phone]    [facility]                                                                         [facility] [phone]  [address] [place] [address]   Phone [phone]          Nephrostomy Tube Care     WHAT YOU NEED TO KNOW:   A nephrostomy tube is a catheter (thin plastic tube) that is inserted through your skin and into your kidney  The nephrostomy tube drains urine from your kidney into a collecting bag outside your body  You may need a nephrostomy tube when something is blocking the normal flow of urine  A nephrostomy tube may be used for a short or a long period of time  The nephrostomy tube comes out of your back, so you will need someone to help care for your nephrostomy tube  DISCHARGE INSTRUCTIONS:      How to clean the skin around the nephrostomy tube and change the bandage:  Since the nephrostomy tube comes out of your back, you will not be able to care for it by yourself  Ask someone to follow the general directions below to check and care for your nephrostomy tube  Gather the items you will need  Disposable (single use) under-pad, and a clean washcloth  ¨ Plain soap, warm water, and new medical gloves  ¨ Sterile gauze bandages  ¨ Clear adhesive dressing or medical tape  ¨ Skin barrier  ¨ Protective skin film  ¨ Trash bag  · Remove the old bandage, and check the tube entry site  ¨ Have the patient lie on his side with the nephrostomy tube entry site facing up  Place the under-pad where it will catch drainage as you are working with the nephrostomy tube  ¨ Wash your hands with soap and water  Put on new medical gloves  ¨ Gently remove the old bandage, without pulling on the tube  Do this by holding the skin beside the tube with one hand  With the other hand, gently remove sticky tape and the skin barrier by pulling in the same direction as hair growth   Do not touch the side of the bandage that is placed over or around the tube  Throw the bandage and skin barrier away in a trash bag  ¨ Look for signs of infection, such as skin redness and swelling  Report any skin changes to healthcare providers  ¨ Clean the tube entry site  ¨ Hold the tube in place to keep it from being pulled out while you are cleaning around it  ¨ You will need to clean the area twice  For the first cleaning, wet a new gauze bandage with soap and water  Begin at the entry site of the tube  Wipe the skin in circles, moving away from the entry site  Remove blood and any other material with the gauze  Do this as often as needed  Use a new gauze bandage each time you clean the area, moving away from the entry site  ¨ For the second cleaning, wet a new gauze bandage with water  Begin at the entry site of the tube  Wipe the skin in circles, moving away from the entry site  Use a new gauze bandage each time you clean the area, moving away from the entry site  ¨ Gently pat the skin with a clean washcloth to dry it  · Apply the skin barrier and bandages  ¨ Roll up a bandage to make it thick, and place it under  the place where the tube enters the skin  Place it to support the tube, and stop it from kinking or bending  Tape the bandage in place, and apply more bandages if directed by a healthcare provider  ¨ Bring the tubing forward to the front and tape it to the skin  Do not stretch the tube tight, because this may pull the nephrostomy tube out  How often to change the bandage  Change the bandage around the tube, every other day  If your bandages  get dirty or wet, change them right away, and as often as needed  If your nephrostomy tube is to be used for a long period of time, the tube needs to be changed every 2 to 3 months  Healthcare providers will tell you when you need to make an appointment to have your tube changed       How to care for the urine drainage bag:   · Ask if you need to measure and write down how much urine is in the bag before you empty it  Drain urine out of the drainage bag when it is ½ to ? full  Open the spout at the bottom of the bag to empty the urine into the toilet  · You may need to detach the drainage bag from the nephrostomy tube to change it    If so, attach a new drainage bag tightly to the nephrostomy tube  ·   How to prevent problems with your nephrostomy tube:   · Change bandages, directed  This helps to prevent infection  Throw away or clean your drainage bag as directed by your healthcare provider  · Wipe the connecting ends of the drainage bag with alcohol before you reconnect the bag to the tube  This helps prevent infection  Keep the tube taped to your skin and connected to a drainage bag placed below the level of your kidneys  This helps prevent urine from backing up into your kidneys  You may wear a small drainage bag strapped to your leg to let you move around more easily  · Check the catheter to be sure it is in place after you change your clothes or do other activities  Do not wear tight clothing over the tube  Place the tubing over your thigh rather than under it when you are sitting down  Be sure that nothing is pulling on the nephrostomy tube when you move around  · Change positions if you see little or no urine in your drainage bag  Check to see if the urine tube is twisted or bent  Be sure that you are not sitting or lying on the tube  If there are no kinks and there is little or no urine in the drainage bag, tell your healthcare provider  · Flush out the tube as directed  Some tubes get flushed one time a day with 10 mls of NSS You will be given a prescription for the flushes  To flush the nephrostomy tube, clean both connections with alcohol swap  Twist off the drainage bag tube and twist the saline syringe into the nephrostomy tube and flush briskly  Remove the syringe and twist the drainage bag tube back into the nephrostomy tube    · Keep the site covered while you shower  Tape a piece of clear adhesive plastic over the dressing to keep it dry while you shower  Do not take tub baths  Contact Interventional Radiology at [phone] [place] PATIENTS: Contact Interventional Radiology at [phone]) Arabella Mosquera PATIENTS: Contact Interventional Radiology at [phone]) if:  · The skin around the nephrostomy tube is red, swollen, itches, or has a rash  · You have a fever greater than 101 or chills  · You have lower back or hip pain  · There are changes in how your urine looks or smells  · You have little or no urine draining from the nephrostomy tube  · You have nausea and are vomiting  · The black raghu on your tube has moved, or the tube is longer than when it was put in    · You have questions or concerns about your condition or care  · The nephrostomy tube comes out completely  · There is blood, pus, or a bad smell coming from the place where the tube enters your skin  · Urine is leaking around the tube  The following pharmacies carry the flush syringes  [facility]                     [facility]       [address]  Phone [phone]            Phone [phone]  [address] & [facility]                      [address]                                 [phone]  Phone [phone]            Phone [phone]    [facility]                                                                         [facility] [phone]  [address] Phone [phone]

## 2021-03-29 NOTE — PROGRESS NOTES
Problem List Items Addressed This Visit        Other    Suprapubic catheter ([facility] [address] )    Aphasia as late effect of cerebrovascular accident - Primary    Nephrostomy status ([facility] [address] )    Relevant Orders    Ambulatory referral to Interventional Radiology    Neuromuscular dysfunction of bladder            Discussion:     Frances Fisher is doing well overall, he has had some weight loss, his facility appears to be doing an excellent job with irrigating his catheter as he had no debris today, nephrostomy tube is draining clear yellow urine  With review of the last tube change, it does look like interventional Radiology wanted a capping trial, as such I have placed this order at his facility  I have also placed an order for his tube change /nephrostogram/ potential removal of tube as per the judgment of the performing interventionalist at his next tube change  His suprapubic catheter change has gone well today, we will continue changing this every 5 weeks, I performed this change given that it has been difficult for multiple members of our team given his altered anatomy and his multiple issues as listed above  Assessment and plan:     Please see problem oriented charting for the assessment plan of today's urological complaints    Vernon Goltz, MD      Chief Complaint     Chief Complaint   Patient presents with    Urinary Retention         History of Present Illness     Rosalba Belcher is a 65 y o  Man with neurogenic bladder, also with nephrocalcinosis and recurrent stone disease due to poor mobility, managed with a left nephrostomy tube, his suprapubic catheter change was performed today as well without issue, no issues with clogging of his tube  No new urologic issues      The following portions of the patient's history were reviewed and updated as appropriate: allergies, current medications, past family history, past medical history, past social history, past surgical history and problem list     Detailed Urologic History     - please refer to HPI    Review of Systems     Review of Systems   Reason unable to perform ROS: Is not able  Allergies     Allergies   Allergen Reactions    Gentamycin [Gentamicin] Anaphylaxis    Piperacillin Sod-Tazobactam So Anaphylaxis and Rash     However, has tolerated Ertapenem, Cefdinir, and Cefepime, which all have different side chains  Avoid Penicillins and the following Cephs with similar side chains (Cephalexin, Cefadroxil, Cefaclor, Cefprozil, or  Cefoxitin)  Other reaction(s): Hemoptysis    Vancomycin Anaphylaxis and Rash     Other reaction(s): Hemoptysis    Clindamycin Itching and Rash     Other reaction(s): Hemoptysis    Nsaids Other (See Comments)     Nephrotic Syndrome    Sulfa Antibiotics Rash    Other Rash     antipersperents  Stat lock from lucia catheter    Tigecycline Rash     Other reaction(s): Hemoptysis       Physical Exam     Physical Exam  Vitals signs reviewed  Constitutional:       General: He is not in acute distress  Appearance: He is not ill-appearing, toxic-appearing or diaphoretic  HENT:      Head: Normocephalic and atraumatic  Cardiovascular:      Pulses: Normal pulses  Pulmonary:      Effort: Pulmonary effort is normal    Abdominal:      General: There is no distension  Palpations: There is no mass  Genitourinary:     Comments: Suprapubic catheter site is clean  Skin:     General: Skin is warm  Neurological:      General: No focal deficit present  Mental Status: He is oriented to person, place, and time  Psychiatric:         Mood and Affect: Mood normal          Behavior: Behavior normal          Thought Content:  Thought content normal          Judgment: Judgment normal              Vital Signs  Vitals:    04/02/21 0850   BP: 130/82   Pulse: 60         Current Medications       Current Outpatient Medications:     acetaminophen (TYLENOL) 325 mg tablet, Take 650 mg by mouth every 4 (four) hours as needed for mild pain , Disp: , Rfl:     amLODIPine (NORVASC) 10 mg tablet, Take 10 mg by mouth daily, Disp: , Rfl:     ammonium lactate (LAC-HYDRIN) 12 % cream, Apply topically daily, Disp: 385 g, Rfl: 0    apixaban (ELIQUIS) 2 5 mg, Take 2 5 mg by mouth 2 (two) times a day, Disp: , Rfl:     ascorbic acid (VITAMIN C) 500 MG tablet, Take 2 tablets (1,000 mg total) by mouth every 12 (twelve) hours, Disp:  , Rfl: 0    baclofen 10 mg tablet, Take 10 mg by mouth 3 (three) times a day, Disp: , Rfl:     bisacodyl (DULCOLAX) 10 mg suppository, Insert 1 suppository (10 mg total) into the rectum daily as needed for constipation, Disp: 12 suppository, Rfl: 0    busPIRone (BUSPAR) 7 5 mg tablet, Take 7 5 mg by mouth 3 (three) times a day, Disp: , Rfl:     cholecalciferol (VITAMIN D3) 1,000 units tablet, Take 2,000 Units by mouth daily, Disp: , Rfl:     Citric Up-Zxenldkgvve-Sn Carb (Renacidin) SOLN, , Disp: , Rfl:     docusate sodium (COLACE) 100 mg capsule, Take 100 mg by mouth 2 (two) times a day, Disp: , Rfl:     famotidine (PEPCID) 20 mg tablet, Take 20 mg by mouth 2 (two) times a day, Disp: , Rfl:     glycerin-hypromellose- (ARTIFICIAL TEARS) 0 2-0 2-1 % SOLN, Administer 1 drop to both eyes 2 (two) times a day, Disp: , Rfl:     memantine (NAMENDA) 5 mg tablet, 2 (two) times a day , Disp: , Rfl:     metoprolol tartrate (LOPRESSOR) 25 mg tablet, Take 0 5 tablets (12 5 mg total) by mouth every 12 (twelve) hours, Disp: , Rfl: 0    mirtazapine (REMERON) 15 mg tablet, daily at bedtime , Disp: , Rfl:     oxyCODONE (ROXICODONE) 5 mg immediate release tablet, Take 1 tablet (5 mg total) by mouth 4 (four) times a day Hold if pt is drowsyMax Daily Amount: 20 mg, Disp: 12 tablet, Rfl: 0    polyethylene glycol (MIRALAX) 17 g packet, Take 17 g by mouth daily, Disp: 14 each, Rfl: 0    potassium chloride (K-DUR,KLOR-CON) 10 mEq tablet, Take 1 tablet (10 mEq total) by mouth daily with breakfast, Disp: , Rfl: 0    QUEtiapine (SEROquel) 25 mg tablet, Take 25 mg by mouth daily at bedtime  , Disp: , Rfl:     saccharomyces boulardii (FLORASTOR) 250 mg capsule, Take 250 mg by mouth 2 (two) times a day, Disp: , Rfl:     senna (SENOKOT) 8 6 mg, Take 2 tablets (17 2 mg total) by mouth daily at bedtime, Disp: , Rfl: 0    sodium chloride, PF, 0 9 %, 10 mL by Intracatheter route daily Intracatheter flushing daily, Disp: 30 Syringe, Rfl: 2    zinc sulfate (ZINCATE) 220 mg capsule, Take 1 capsule (220 mg total) by mouth daily for 2 doses, Disp: 2 capsule, Rfl: 0      Active Problems     Patient Active Problem List   Diagnosis    COPD (chronic obstructive pulmonary disease) ([facility] [address] )    History of DVT (deep vein thrombosis)    Aneurysm of thoracic aorta (HCC)    Essential hypertension    CKD (chronic kidney disease)    GERD (gastroesophageal reflux disease)    Constipation    Dementia ([facility] [address] )    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HCC)    Suprapubic catheter ([facility] [address] )    Bilateral nephrolithiasis    Depression with anxiety    Cerebral infarction ([facility] [address] )    Aphasia as late effect of cerebrovascular accident    Nephrostomy status ([facility] [address] )    Neuromuscular dysfunction of bladder    Debility    COVID-19 virus infection    Physical deconditioning         Past Medical History     Past Medical History:   Diagnosis Date    Ataxia     COPD (chronic obstructive pulmonary disease) ([facility] [address] )     wife denies - "never had"    CVA (cerebral vascular accident) ([facility] [address] )     Dementia ([facility] [address] )     Difficulty swallowing     Difficulty walking     Generalized anxiety disorder     GERD (gastroesophageal reflux disease)     Global aphasia     H/O blood clots     Heartburn     Hypertension     Mental disorder     Muscle weakness     Neuromuscular dysfunction of bladder     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 ([facility] [address] )     Stroke ([facility] )     Thrombosis          Surgical History     Past Surgical History:   Procedure Laterality Date    BOTOX INJECTION N/A 6/18/2019    Procedure: INJECTION BOTULINUM TOXIN (BOTOX), intra detrusor;  Surgeon: Jonny Beckett MD;  [facility];  Service: Urology    BOTOX INJECTION N/A 10/7/2019    Procedure: INJECTION BOTULINUM TOXIN (BOTOX), intradetrusor;  Surgeon: Jonny Beckett MD;  [facility];  Service: Urology    CYSTOSCOPY      CYSTOSCOPY N/A 6/18/2019    Procedure: cystoscopy and all indicated procedures;  Surgeon: Jonny Beckett MD;  [facility];  Service: Urology    FL CYSTOGRAM  1/15/2019    IR NEPHROSTOMY TUBE PLACEMENT  3/11/2020    IR SUPRAPUBIC CATHETER PLACEMENT  11/26/2019    IVC FILTER INSERTION      X2    IVC FILTER INSERTION      x2    KIDNEY STONE SURGERY      KNEE SURGERY      Sepsis infection     NEPHROSTOMY      W[place] CYSTO/URETERO W/LITHOTRIPSY &INDWELL STENT INSRT Right 6/14/2017    Procedure: CYSTOSCOPY URETEROSCOPY WITH LITHOTRIPSY HOLMIUM LASER,,BASKET STONE EXTRACTION, RETROGRADE PYELOGRAM AND INSERTION STENT URETERAL;  Surgeon: Claudene Bradford, MD;  [facility];  Service: Urology    [place] CYSTOURETHROSCOPY,BIOPSY N/A 1/15/2019    Procedure: CYSTOSCOPY, CYSTOGRAM, DILATION OF URETHRAL STRICTURE;  Surgeon: Jonny Beckett MD;  [facility];  Service: Urology    URINARY SURGERY           Family History     Family History   Problem Relation Age of Onset    Diabetes Father     Hypertension Father     Coronary artery disease Father     Cancer Father     Hypertension Mother          Social History     Social History     Social History     Tobacco Use   Smoking Status Never Smoker   Smokeless Tobacco Never Used         Pertinent Lab Values     Lab Results   Component Value Date    CREATININE 1 55 (H) 02/05/2021       No results found for: PSA          Pertinent Imaging        No new imaging for my review

## 2021-03-29 NOTE — PATIENT INSTRUCTIONS
Suprapubic Cystostomy   WHAT YOU NEED TO KNOW:   Suprapubic cystostomy is surgery to create a stoma (opening) through your abdomen into your bladder  This opening is where a catheter is inserted to drain urine  You may need a cystostomy if your urine flow is blocked  DISCHARGE INSTRUCTIONS:   Seek immediate care if:   · No urine is draining into the urine bag  · You have severe pain  Call your doctor if:   · You have a fever or chills  · You have a burning pain in your stoma  · Your stoma is red, swollen, or draining pus  · You have blood in your urine  · You have questions or concerns about your condition or care  Medicines:   · Medicines  to treat pain and prevent infection may be given  · Take your medicine as directed  Contact your healthcare provider if you think your medicine is not helping or if you have side effects  Tell him or her if you are allergic to any medicine  Keep a list of the medicines, vitamins, and herbs you take  Include the amounts, and when and why you take them  Bring the list or the pill bottles to follow-up visits  Carry your medicine list with you in case of an emergency  Empty your urine drainage bag:  Empty your urine drainage bag when it is ½ to ? full, or every 8 hours  If you have a smaller leg bag, empty it every 3 to 4 hours  Do the following when you empty your urine drainage bag:  · Hold the urine bag over a toilet or large container  · Remove the drain spout from its sleeve at the bottom of the urine bag  Do not touch the tip of the drain spout  Open the slide valve on the spout  · Let the urine flow out of the urine bag into the toilet or container  Do not let the drainage tube touch anything  · Clean the end of the drain spout with alcohol when the bag is empty  Ask which cleaning solution is best to use  · Close the slide valve and put the drain spout into its sleeve at the bottom of the urine bag   Write down how much urine was in your bag if you were asked to keep a record  Prevent an infection:   · Clean the stoma and skin around it daily  Wash your hands before and after cystostomy care  Put on a new pair of clean medical gloves  Ask for more information about stoma and skin care  · Change your urine bag or clean reusable bags  Ask how often you need to change or clean your urine drainage bag  You may need to change your reusable bag at least once a week  · Keep the bag below your waist   This will prevent urine from flowing back into your bladder and causing an infection or other problems  Also, keep the tube free of kinks so the urine will drain properly  Do not pull on the catheter  This can cause pain and bleeding and may cause the catheter to come out  Follow up with your doctor or surgeon as directed: You may need to return to have your suprapubic catheter changed or removed  Write down your questions so you remember to ask them during your visits  © Copyright [address] Information is for End User's use only and may not be sold, redistributed or otherwise used for commercial purposes  All illustrations and images included in CareNotes® are the copyrighted property of [employer]  or [facility] [place]   The above information is an  only  It is not intended as medical advice for individual conditions or treatments  Talk to your doctor, nurse or pharmacist before following any medical regimen to see if it is safe and effective for you

## 2021-04-02 ENCOUNTER — PROCEDURE VISIT (OUTPATIENT)
Dept: UROLOGY | Facility: CLINIC | Age: 71
End: 2021-04-02
Payer: COMMERCIAL

## 2021-04-02 ENCOUNTER — PREP FOR PROCEDURE (OUTPATIENT)
Dept: INTERVENTIONAL RADIOLOGY/VASCULAR | Facility: CLINIC | Age: 71
End: 2021-04-02

## 2021-04-02 ENCOUNTER — TELEPHONE (OUTPATIENT)
Dept: UROLOGY | Facility: CLINIC | Age: 71
End: 2021-04-02

## 2021-04-02 VITALS — HEART RATE: 60 BPM | SYSTOLIC BLOOD PRESSURE: 130 MMHG | DIASTOLIC BLOOD PRESSURE: 82 MMHG

## 2021-04-02 DIAGNOSIS — N20.0 BILATERAL NEPHROLITHIASIS: Primary | ICD-10-CM

## 2021-04-02 DIAGNOSIS — I69.320 APHASIA AS LATE EFFECT OF CEREBROVASCULAR ACCIDENT: Primary | ICD-10-CM

## 2021-04-02 DIAGNOSIS — Z93.6 NEPHROSTOMY STATUS (HCC): ICD-10-CM

## 2021-04-02 DIAGNOSIS — Z93.59 SUPRAPUBIC CATHETER (HCC): ICD-10-CM

## 2021-04-02 DIAGNOSIS — N31.9 NEUROMUSCULAR DYSFUNCTION OF BLADDER: ICD-10-CM

## 2021-04-02 PROCEDURE — 51705 CHANGE OF BLADDER TUBE: CPT | Performed by: UROLOGY

## 2021-04-02 PROCEDURE — 99212 OFFICE O/P EST SF 10 MIN: CPT | Performed by: UROLOGY

## 2021-04-02 RX ORDER — BACLOFEN 10 MG/1
10 TABLET ORAL 3 TIMES DAILY
COMMUNITY
End: 2021-05-06

## 2021-04-02 NOTE — LETTER
April 2, 2021     Mae Rao MD  [address] [place] [address] 1  [facility]    Patient: Roman Denney [facility]   YOB: 1950   Date of Visit: 4/2/2021       Dear Dr Lisandra Collazo: Thank you for referring Abimbola Lynn to me for evaluation  Below are my notes for this consultation  If you have questions, please do not hesitate to call me  I look forward to following your patient along with you  Sincerely,        Mallika Valerio MD        CC: No Recipients  Mallika Valerio MD  4/2/2021  9:20 AM  Sign when Signing Visit        Cystostomy tube change     Date/Time 4/2/2021 9:19 AM     Performed by  Mallika Valerio MD     Authorized by Mallika Valerio MD      Universal Protocol   Consent: Verbal consent obtained  Written consent obtained  Risks and benefits: risks, benefits and alternatives were discussed  Consent given by: patient  Patient understanding: patient states understanding of the procedure being performed  Patient consent: the patient's understanding of the procedure matches consent given  Procedure consent: procedure consent matches procedure scheduled  Relevant documents: relevant documents present and verified  Test results: test results available and properly labeled  Site marked: the operative site was not marked  Radiology Images displayed and confirmed   If images not available, report reviewed: imaging studies available  Required items: required blood products, implants, devices, and special equipment available  Patient identity confirmed: verbally with patient        Local anesthesia used: no     Anesthesia   Local anesthesia used: no     Sedation   Patient sedated: no        Specimen: no    Culture: no   Procedure Details   Procedure Notes:  21 [place] Thorne catheter changed in a suprapubic fashion, some granulation tissue around the site, bladder irrigates well, urologist performed this tube change given difficulties with tube change by multiple other staff members in the past  Patient Transportation: confirmed  Patient tolerance: patient tolerated the procedure well with no immediate complications               Bong Encinas MD  4/2/2021  9:18 AM  Sign when Signing Visit       Problem List Items Addressed This Visit        Other    Suprapubic catheter ([address] )    Aphasia as late effect of cerebrovascular accident - Primary    Nephrostomy status ([facility] [address] )    Relevant Orders    Ambulatory referral to Interventional Radiology    Neuromuscular dysfunction of bladder            Discussion:     Harjit Medrano is doing well overall, he has had some weight loss, his facility appears to be doing an excellent job with irrigating his catheter as he had no debris today, nephrostomy tube is draining clear yellow urine  With review of the last tube change, it does look like interventional Radiology wanted a capping trial, as such I have placed this order at his facility  I have also placed an order for his tube change /nephrostogram/ potential removal of tube as per the judgment of the performing interventionalist at his next tube change  His suprapubic catheter change has gone well today, we will continue changing this every 5 weeks, I performed this change given that it has been difficult for multiple members of our team given his altered anatomy and his multiple issues as listed above  Assessment and plan:     Please see problem oriented charting for the assessment plan of today's urological complaints    Bong Encinas MD      Chief Complaint     Chief Complaint   Patient presents with    Urinary Retention         History of Present Illness     Micah Hoffman is a 65 y o  Man with neurogenic bladder, also with nephrocalcinosis and recurrent stone disease due to poor mobility, managed with a left nephrostomy tube, his suprapubic catheter change was performed today as well without issue, no issues with clogging of his tube  No new urologic issues      The following portions of the patient's history were reviewed and updated as appropriate: allergies, current medications, past family history, past medical history, past social history, past surgical history and problem list     Detailed Urologic History     - please refer to HPI    Review of Systems     Review of Systems   Reason unable to perform ROS: Is not able  Allergies     Allergies   Allergen Reactions    Gentamycin [Gentamicin] Anaphylaxis    Piperacillin Sod-Tazobactam So Anaphylaxis and Rash     However, has tolerated Ertapenem, Cefdinir, and Cefepime, which all have different side chains  Avoid Penicillins and the following Cephs with similar side chains (Cephalexin, Cefadroxil, Cefaclor, Cefprozil, or  Cefoxitin)  Other reaction(s): Hemoptysis    Vancomycin Anaphylaxis and Rash     Other reaction(s): Hemoptysis    Clindamycin Itching and Rash     Other reaction(s): Hemoptysis    Nsaids Other (See Comments)     Nephrotic Syndrome    Sulfa Antibiotics Rash    Other Rash     antipersperents  Stat lock from lucia catheter    Tigecycline Rash     Other reaction(s): Hemoptysis       Physical Exam     Physical Exam  Vitals signs reviewed  Constitutional:       General: He is not in acute distress  Appearance: He is not ill-appearing, toxic-appearing or diaphoretic  HENT:      Head: Normocephalic and atraumatic  Cardiovascular:      Pulses: Normal pulses  Pulmonary:      Effort: Pulmonary effort is normal    Abdominal:      General: There is no distension  Palpations: There is no mass  Genitourinary:     Comments: Suprapubic catheter site is clean  Skin:     General: Skin is warm  Neurological:      General: No focal deficit present  Mental Status: He is oriented to person, place, and time  Psychiatric:         Mood and Affect: Mood normal          Behavior: Behavior normal          Thought Content:  Thought content normal          Judgment: Judgment normal              Vital Signs  Vitals: 04/02/21 0850   BP: 130/82   Pulse: 60         Current Medications       Current Outpatient Medications:     acetaminophen (TYLENOL) 325 mg tablet, Take 650 mg by mouth every 4 (four) hours as needed for mild pain , Disp: , Rfl:     amLODIPine (NORVASC) 10 mg tablet, Take 10 mg by mouth daily, Disp: , Rfl:     ammonium lactate (LAC-HYDRIN) 12 % cream, Apply topically daily, Disp: 385 g, Rfl: 0    apixaban (ELIQUIS) 2 5 mg, Take 2 5 mg by mouth 2 (two) times a day, Disp: , Rfl:     ascorbic acid (VITAMIN C) 500 MG tablet, Take 2 tablets (1,000 mg total) by mouth every 12 (twelve) hours, Disp:  , Rfl: 0    baclofen 10 mg tablet, Take 10 mg by mouth 3 (three) times a day, Disp: , Rfl:     bisacodyl (DULCOLAX) 10 mg suppository, Insert 1 suppository (10 mg total) into the rectum daily as needed for constipation, Disp: 12 suppository, Rfl: 0    busPIRone (BUSPAR) 7 5 mg tablet, Take 7 5 mg by mouth 3 (three) times a day, Disp: , Rfl:     cholecalciferol (VITAMIN D3) 1,000 units tablet, Take 2,000 Units by mouth daily, Disp: , Rfl:     Citric Lw-Uoprzteatkt-Ym Carb (Renacidin) SOLN, , Disp: , Rfl:     docusate sodium (COLACE) 100 mg capsule, Take 100 mg by mouth 2 (two) times a day, Disp: , Rfl:     famotidine (PEPCID) 20 mg tablet, Take 20 mg by mouth 2 (two) times a day, Disp: , Rfl:     glycerin-hypromellose- (ARTIFICIAL TEARS) 0 2-0 2-1 % SOLN, Administer 1 drop to both eyes 2 (two) times a day, Disp: , Rfl:     memantine (NAMENDA) 5 mg tablet, 2 (two) times a day , Disp: , Rfl:     metoprolol tartrate (LOPRESSOR) 25 mg tablet, Take 0 5 tablets (12 5 mg total) by mouth every 12 (twelve) hours, Disp: , Rfl: 0    mirtazapine (REMERON) 15 mg tablet, daily at bedtime , Disp: , Rfl:     oxyCODONE (ROXICODONE) 5 mg immediate release tablet, Take 1 tablet (5 mg total) by mouth 4 (four) times a day Hold if pt is drowsyMax Daily Amount: 20 mg, Disp: 12 tablet, Rfl: 0    polyethylene glycol (MIRALAX) 17 g packet, Take 17 g by mouth daily, Disp: 14 each, Rfl: 0    potassium chloride (K-DUR,KLOR-CON) 10 mEq tablet, Take 1 tablet (10 mEq total) by mouth daily with breakfast, Disp: , Rfl: 0    QUEtiapine (SEROquel) 25 mg tablet, Take 25 mg by mouth daily at bedtime  , Disp: , Rfl:     saccharomyces boulardii (FLORASTOR) 250 mg capsule, Take 250 mg by mouth 2 (two) times a day, Disp: , Rfl:     senna (SENOKOT) 8 6 mg, Take 2 tablets (17 2 mg total) by mouth daily at bedtime, Disp: , Rfl: 0    sodium chloride, PF, 0 9 %, 10 mL by Intracatheter route daily Intracatheter flushing daily, Disp: 30 Syringe, Rfl: 2    zinc sulfate (ZINCATE) 220 mg capsule, Take 1 capsule (220 mg total) by mouth daily for 2 doses, Disp: 2 capsule, Rfl: 0      Active Problems     Patient Active Problem List   Diagnosis    COPD (chronic obstructive pulmonary disease) (HCC)    History of DVT (deep vein thrombosis)    Aneurysm of thoracic aorta (HCC)    Essential hypertension    CKD (chronic kidney disease)    GERD (gastroesophageal reflux disease)    Constipation    Dementia ([facility] [address] )    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HCC)    Suprapubic catheter ([facility] [address] )    Bilateral nephrolithiasis    Depression with anxiety    Cerebral infarction ([facility] [address] )    Aphasia as late effect of cerebrovascular accident    Nephrostomy status ([facility] [address] )    Neuromuscular dysfunction of bladder    Debility    COVID-19 virus infection    Physical deconditioning         Past Medical History     Past Medical History:   Diagnosis Date    Ataxia     COPD (chronic obstructive pulmonary disease) ([facility] [address] )     wife denies - "never had"    CVA (cerebral vascular accident) ([facility] [address] )     Dementia ([facility] [address] )     Difficulty swallowing     Difficulty walking     Generalized anxiety disorder     GERD (gastroesophageal reflux disease)     Global aphasia     H/O blood clots     Heartburn     Hypertension     Mental disorder     Muscle weakness     Neuromuscular dysfunction of bladder     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 ([facility] [address] )     Stroke ([facility] [address] )     Thrombosis          Surgical History     Past Surgical History:   Procedure Laterality Date    BOTOX INJECTION N/A 6/18/2019    Procedure: INJECTION BOTULINUM TOXIN (BOTOX), intra detrusor;  Surgeon: Donald Pittman MD;  [facility];  Service: Urology    BOTOX INJECTION N/A 10/7/2019    Procedure: INJECTION BOTULINUM TOXIN (BOTOX), intradetrusor;  Surgeon: Donald Pittman MD;  [facility];  Service: Urology    CYSTOSCOPY      CYSTOSCOPY N/A 6/18/2019    Procedure: cystoscopy and all indicated procedures;  Surgeon: Donald Pittman MD;  [facility];  Service: Urology    FL CYSTOGRAM  1/15/2019    IR NEPHROSTOMY TUBE PLACEMENT  3/11/2020    IR SUPRAPUBIC CATHETER PLACEMENT  11/26/2019    IVC FILTER INSERTION      X2    IVC FILTER INSERTION      x2    KIDNEY STONE SURGERY      KNEE SURGERY      Sepsis infection     NEPHROSTOMY      DE CYSTO/URETERO W/LITHOTRIPSY &INDWELL STENT INSRT Right 6/14/2017    Procedure: CYSTOSCOPY URETEROSCOPY WITH LITHOTRIPSY HOLMIUM LASER,,BASKET STONE EXTRACTION, RETROGRADE PYELOGRAM AND INSERTION STENT URETERAL;  Surgeon: Ilsa Sandra MD;  [facility];  Service: Urology    DE CYSTOURETHROSCOPY,BIOPSY N/A 1/15/2019    Procedure: CYSTOSCOPY, CYSTOGRAM, DILATION OF URETHRAL STRICTURE;  Surgeon: Donald Pittman MD;  [facility];  Service: Urology    URINARY SURGERY           Family History     Family History   Problem Relation Age of Onset    Diabetes Father     Hypertension Father     Coronary artery disease Father     Cancer Father     Hypertension Mother          Social History     Social History     Social History     Tobacco Use   Smoking Status Never Smoker   Smokeless Tobacco Never Used         Pertinent Lab Values     Lab Results   Component Value Date    CREATININE 1 55 (H) 02/05/2021       No results found for:  PSA          Pertinent Imaging        No new imaging for my review

## 2021-04-02 NOTE — PROGRESS NOTES
Cystostomy tube change     Date/Time 4/2/2021 9:19 AM     Performed by  Daniele Mayorga MD     Authorized by Daniele Mayorga MD      Universal Protocol   Consent: Verbal consent obtained  Written consent obtained  Risks and benefits: risks, benefits and alternatives were discussed  Consent given by: patient  Patient understanding: patient states understanding of the procedure being performed  Patient consent: the patient's understanding of the procedure matches consent given  Procedure consent: procedure consent matches procedure scheduled  Relevant documents: relevant documents present and verified  Test results: test results available and properly labeled  Site marked: the operative site was not marked  Radiology Images displayed and confirmed   If images not available, report reviewed: imaging studies available  Required items: required blood products, implants, devices, and special equipment available  Patient identity confirmed: verbally with patient        Local anesthesia used: no     Anesthesia   Local anesthesia used: no     Sedation   Patient sedated: no        Specimen: no    Culture: no   Procedure Details   Procedure Notes:  21 Indonesian Thorne catheter changed in a suprapubic fashion, some granulation tissue around the site, bladder irrigates well, urologist performed this tube change given difficulties with tube change by multiple other staff members in the past  Patient Transportation: confirmed  Patient tolerance: patient tolerated the procedure well with no immediate complications

## 2021-04-09 ENCOUNTER — NURSING HOME VISIT (OUTPATIENT)
Dept: FAMILY MEDICINE CLINIC | Facility: CLINIC | Age: 71
End: 2021-04-09
Payer: COMMERCIAL

## 2021-04-09 DIAGNOSIS — R53.81 PHYSICAL DECONDITIONING: ICD-10-CM

## 2021-04-09 DIAGNOSIS — I10 ESSENTIAL HYPERTENSION: Primary | Chronic | ICD-10-CM

## 2021-04-09 DIAGNOSIS — Z86.718 HISTORY OF DVT (DEEP VEIN THROMBOSIS): Chronic | ICD-10-CM

## 2021-04-09 PROCEDURE — 99307 SBSQ NF CARE SF MDM 10: CPT | Performed by: INTERNAL MEDICINE

## 2021-04-11 NOTE — PROGRESS NOTES
Assessment/Plan:         Problem List Items Addressed This Visit        Cardiovascular and Mediastinum    Essential hypertension - Primary (Chronic)     Controlled  Continue amlodipine            Other    History of DVT (deep vein thrombosis) (Chronic)     On eliquis  No recent bleeding  No melena         Physical deconditioning     Chronic  Ongoing  No distress                 Subjective:      Patient ID: Meredith Sharma is a 65 y o  male  [facility] follow[facility]up at [facility]  1  Hypertension  Blood pressure under control  Tolerating medicine well  No syncope  No edema  No orthopnea  No chest pain or distress  2  Chronic lower extremity DVT  He has been on Eliquis  Monitor for any bleeding  No leg edema  The following portions of the patient's history were reviewed and updated as appropriate:   Past Medical History:  He has a past medical history of Ataxia, COPD (chronic obstructive pulmonary disease) ([facility] [address] ), CVA (cerebral vascular accident) ([facility] [address] ), Dementia ([facility] [address] ), Difficulty swallowing, Difficulty walking, Generalized anxiety disorder, GERD (gastroesophageal reflux disease), Global aphasia, H/O blood clots, Heartburn, Hypertension, Mental disorder, Muscle weakness, Neuromuscular dysfunction of bladder, 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 ([facility] [address] ), Stroke ([facility] [address] ), and Thrombosis  ,  _______________________________________________________________________  Medical Problems:  does not have any pertinent problems on file ,  _______________________________________________________________________  Past Surgical History:   has a past surgical history that includes IVC FILTER INSERTION; IVC FILTER INSERTION; Knee surgery; Nephrostomy; Kidney stone surgery;  Cystoscopy; Urinary surgery; pr cysto/uretero w/lithotripsy &indwell stent insrt (Right, 6/14/2017); pr cystourethroscopy,biopsy (N/A, 1/15/2019); FL cystogram (1/15/2019); BOTOX INJECTION (N/A, 6/18/2019); CYSTOSCOPY (N/A, 6/18/2019); BOTOX INJECTION (N/A, 10/7/2019); IR suprapubic catheter placement (11/26/2019); and IR nephrostomy tube placement (3/11/2020)  ,  _______________________________________________________________________  Family History:  family history includes Cancer in his father; Coronary artery disease in his father; Diabetes in his father; Hypertension in his father and mother ,  _______________________________________________________________________  Social History:   reports that he has never smoked  He has never used smokeless tobacco  He reports that he does not drink alcohol or use drugs  ,  _______________________________________________________________________  Allergies:  is allergic to gentamycin [gentamicin]; piperacillin sod-tazobactam so; vancomycin; clindamycin; nsaids; sulfa antibiotics; other; and tigecycline     _______________________________________________________________________  Current Outpatient Medications   Medication Sig Dispense Refill    acetaminophen (TYLENOL) 325 mg tablet Take 650 mg by mouth every 4 (four) hours as needed for mild pain       amLODIPine (NORVASC) 10 mg tablet Take 10 mg by mouth daily      ammonium lactate (LAC-HYDRIN) 12 % cream Apply topically daily 385 g 0    apixaban (ELIQUIS) 2 5 mg Take 2 5 mg by mouth 2 (two) times a day      ascorbic acid (VITAMIN C) 500 MG tablet Take 2 tablets (1,000 mg total) by mouth every 12 (twelve) hours  0    baclofen 10 mg tablet Take 10 mg by mouth 3 (three) times a day      bisacodyl (DULCOLAX) 10 mg suppository Insert 1 suppository (10 mg total) into the rectum daily as needed for constipation 12 suppository 0    busPIRone (BUSPAR) 7 5 mg tablet Take 7 5 mg by mouth 3 (three) times a day      cholecalciferol (VITAMIN D3) 1,000 units tablet Take 2,000 Units by mouth daily      Citric Yh-Sgticljcouu-Pd Carb (Renacidin) SOLN       docusate sodium (COLACE) 100 mg capsule Take 100 mg by mouth 2 (two) times a day      famotidine (PEPCID) 20 mg tablet Take 20 mg by mouth 2 (two) times a day      glycerin-hypromellose- (ARTIFICIAL TEARS) 0 2-0 2-1 % SOLN Administer 1 drop to both eyes 2 (two) times a day      memantine (NAMENDA) 5 mg tablet 2 (two) times a day       metoprolol tartrate (LOPRESSOR) 25 mg tablet Take 0 5 tablets (12 5 mg total) by mouth every 12 (twelve) hours  0    mirtazapine (REMERON) 15 mg tablet daily at bedtime       oxyCODONE (ROXICODONE) 5 mg immediate release tablet Take 1 tablet (5 mg total) by mouth 4 (four) times a day Hold if pt is drowsyMax Daily Amount: 20 mg 12 tablet 0    polyethylene glycol (MIRALAX) 17 g packet Take 17 g by mouth daily 14 each 0    potassium chloride (K-DUR,KLOR-CON) 10 mEq tablet Take 1 tablet (10 mEq total) by mouth daily with breakfast  0    QUEtiapine (SEROquel) 25 mg tablet Take 25 mg by mouth daily at bedtime        saccharomyces boulardii (FLORASTOR) 250 mg capsule Take 250 mg by mouth 2 (two) times a day      senna (SENOKOT) 8 6 mg Take 2 tablets (17 2 mg total) by mouth daily at bedtime  0    sodium chloride, PF, 0 9 % 10 mL by Intracatheter route daily Intracatheter flushing daily 30 Syringe 2    zinc sulfate (ZINCATE) 220 mg capsule Take 1 capsule (220 mg total) by mouth daily for 2 doses 2 capsule 0     No current facility-administered medications for this visit       _______________________________________________________________________  Review of Systems   Constitutional: Negative for chills, fatigue and fever  HENT: Negative for congestion, nosebleeds and rhinorrhea  Eyes: Negative for discharge and visual disturbance  Respiratory: Negative for cough, chest tightness, shortness of breath and wheezing  Cardiovascular: Negative for chest pain, palpitations and leg swelling  Gastrointestinal: Negative for abdominal distention, abdominal pain, constipation, diarrhea and vomiting     Endocrine: Negative for polydipsia and polyuria  Genitourinary: Negative for difficulty urinating, frequency and hematuria  Allergic/Immunologic: Negative for environmental allergies  Neurological: Positive for weakness  Negative for dizziness, syncope, light-headedness and headaches  Hematological: Negative  Psychiatric/Behavioral: Positive for confusion and decreased concentration  Negative for agitation and dysphoric mood  The patient is not nervous/anxious  All other systems reviewed and are negative  Objective: There were no vitals filed for this visit  There is no height or weight on file to calculate BMI  Physical Exam  Vitals signs reviewed  Constitutional:       General: He is not in acute distress  Appearance: He is not ill-appearing  HENT:      Head: Normocephalic  Cardiovascular:      Rate and Rhythm: Regular rhythm  Heart sounds: Normal heart sounds  Pulmonary:      Effort: Pulmonary effort is normal       Breath sounds: Normal breath sounds  Abdominal:      General: There is no distension  Palpations: Abdomen is soft  Tenderness: There is no abdominal tenderness  Musculoskeletal:      Right lower leg: No edema  Left lower leg: No edema  Neurological:      Mental Status: Mental status is at baseline     Psychiatric:         Behavior: Behavior normal

## 2021-05-06 ENCOUNTER — NURSING HOME VISIT (OUTPATIENT)
Dept: GERIATRICS | Facility: OTHER | Age: 71
End: 2021-05-06
Payer: COMMERCIAL

## 2021-05-06 DIAGNOSIS — Z93.6 NEPHROSTOMY STATUS (HCC): ICD-10-CM

## 2021-05-06 DIAGNOSIS — J41.0 SIMPLE CHRONIC BRONCHITIS (HCC): Chronic | ICD-10-CM

## 2021-05-06 DIAGNOSIS — M62.838 MUSCLE SPASTICITY: ICD-10-CM

## 2021-05-06 DIAGNOSIS — R29.898 MUSCLE RIGIDITY: ICD-10-CM

## 2021-05-06 DIAGNOSIS — N20.0 BILATERAL NEPHROLITHIASIS: ICD-10-CM

## 2021-05-06 DIAGNOSIS — N31.9 NEUROMUSCULAR DYSFUNCTION OF BLADDER: ICD-10-CM

## 2021-05-06 DIAGNOSIS — I10 ESSENTIAL HYPERTENSION: Chronic | ICD-10-CM

## 2021-05-06 DIAGNOSIS — R52 SEVERE PAIN: ICD-10-CM

## 2021-05-06 DIAGNOSIS — Z93.59 SUPRAPUBIC CATHETER (HCC): ICD-10-CM

## 2021-05-06 DIAGNOSIS — F41.8 DEPRESSION WITH ANXIETY: ICD-10-CM

## 2021-05-06 DIAGNOSIS — R53.81 DEBILITY: ICD-10-CM

## 2021-05-06 DIAGNOSIS — Z86.718 HISTORY OF DVT (DEEP VEIN THROMBOSIS): Primary | Chronic | ICD-10-CM

## 2021-05-06 DIAGNOSIS — K63.9 BOWEL TROUBLE: ICD-10-CM

## 2021-05-06 PROBLEM — U07.1 COVID-19 VIRUS INFECTION: Status: RESOLVED | Noted: 2021-02-01 | Resolved: 2021-05-06

## 2021-05-06 PROBLEM — R13.12 OROPHARYNGEAL DYSPHAGIA: Status: ACTIVE | Noted: 2021-05-06

## 2021-05-06 PROCEDURE — 99309 SBSQ NF CARE MODERATE MDM 30: CPT | Performed by: NURSE PRACTITIONER

## 2021-05-06 RX ORDER — OXYCODONE HYDROCHLORIDE 5 MG/1
5 TABLET ORAL
Qty: 12 TABLET | Refills: 0
Start: 2021-05-06 | End: 2021-12-06 | Stop reason: HOSPADM

## 2021-05-06 RX ORDER — BACLOFEN 10 MG/1
15 TABLET ORAL 3 TIMES DAILY
Start: 2021-05-06

## 2021-05-06 NOTE — ASSESSMENT & PLAN NOTE
-with elevation in BPs noted, trending 150s-180s  -continue amlodipine 10 mg daily  -increase metoprolol from 12 5 mg to 25 mg q 12 hours with hold parameter  -continue to monitor

## 2021-05-06 NOTE — ASSESSMENT & PLAN NOTE
-history of bilateral nephrolithiasis  -left nephrostomy tube recently capped in IR and he has a follow-up appointment with IR May 26, 2021

## 2021-05-06 NOTE — ASSESSMENT & PLAN NOTE
-vascular dementia with behavior disturbance, history of CVA   -continue 24/7 care and support at [facility] for ADLs; IADLs  -continue delirium precautions  -monitor sleep/wake cycle  -nursing to continue monitoring behavior

## 2021-05-06 NOTE — ASSESSMENT & PLAN NOTE
-with suprapubic catheter    Cystostomy tube last changed by urology on 04/02/2021  -followup with Urology as scheduled on 05/11/2021

## 2021-05-06 NOTE — ASSESSMENT & PLAN NOTE
-multifactorial  -continue 24/7 care and support at LTC facility for ADLs;IADLs  -PT/OT/ST as needed  -ensure routine follow-up care with Urology, Podiatry, Ophthalmology as needed  -fall precautions  -skin breakdown prevention measures in place by nursing; multi Podus boot left heel while in bed  -ensure adequate hydration and nutrition  -management of acute and chronic medical conditions as outlined

## 2021-05-06 NOTE — PROGRESS NOTES
[facility] 1  POS: 28 (LTC)  Progress Note    Unable to obtain history from patient due to:  unable to obtain from patient: Dementia  Chief Complaint/Reason for visit: LTC follow up of acute and chronic medical conditions  Code status:  DNR  History of Present Illness:  68-year-old male seen and examined for LTC follow up  Patient found resting in bed and appeared in no distress  He is alert, confused, and disoriented  He became agitated during examination  Nursing states that he can become combative during care at times  No acute concerns reported by nursing  Patient has a follow-up appointment on May 26, 2021 with IR with possible removal of left nephrostomy tube  Nephrostomy tube currently capped  Past Medical History: unchanged from history and physical  Past Medical History:   Diagnosis Date    Ataxia     COPD (chronic obstructive pulmonary disease) ([facility] )     wife denies - "never had"    CVA (cerebral vascular accident) ([facility] )     Dementia (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 )     Stroke ([facility] )     Thrombosis      Family History: unchanged from history and physical  Social History: unchanged from history and physical  Resident Since:  11/11/2016  Review of systems: Review of Systems   Unable to perform ROS: Dementia   Endocrine: Negative  No nonverbal signs of pain noted  Medications: All medication orders were reviewed  Allergies: Reviewed and unchanged  Consults reviewed: Other urology/IR  Labs/Diagnostics (reviewed by this provider): Copy in Chart   Yearly labs ordered to be done 1 year from previous yearly labs  Imaging Reviewed:  None today    Physical Exam  All vital signs were reviewed via [facility]  Weight:  878 0 lb Temp:  97 1 BP:  176/87  Pulse: 74 Resp:  18 O2 Sat:  96% on room air  Constitutional: Well-nourished and Cachetic  Orientation:  Alert, confused, and disoriented     Physical Exam  Vitals signs and nursing note reviewed  Constitutional:       General: He is not in acute distress  Appearance: He is not toxic-appearing or diaphoretic  Comments: Tamsen Magic elderly male who appears with chronic illness  HENT:      Head: Normocephalic  Nose: No congestion or rhinorrhea  Mouth/Throat:      Comments: Patient would not allow oral exam   Eyes:      General: No scleral icterus  Right eye: No discharge  Left eye: No discharge  Extraocular Movements: Extraocular movements intact  Conjunctiva/sclera: Conjunctivae normal       Pupils: Pupils are equal, round, and reactive to light  Neck:      Musculoskeletal: Neck supple  No neck rigidity  Cardiovascular:      Rate and Rhythm: Normal rate and regular rhythm  Pulses: Normal pulses  Pulmonary:      Effort: Pulmonary effort is normal  No respiratory distress  Breath sounds: Normal breath sounds  No wheezing, rhonchi or rales  Abdominal:      General: Bowel sounds are normal  There is no distension  Palpations: Abdomen is soft  Tenderness: There is no abdominal tenderness  There is no guarding  Genitourinary:     Comments: Suprapubic catheter intact and patent for yellow urine  Left nephrostomy tube currently capped  Musculoskeletal:      Right lower leg: No edema  Left lower leg: No edema  Comments: Able to move all 4 extremities  Extremities are rigid  Lymphadenopathy:      Cervical: No cervical adenopathy  Skin:     General: Skin is warm and dry  Capillary Refill: Capillary refill takes less than 2 seconds  Neurological:      Mental Status: He is alert  Mental status is at baseline  Comments: Nonverbal   Confused and disoriented  Psychiatric:      Comments: Became agitated during exam        Assessment/Plan:  64-year-old male with:    Dementia ([facility] )  -vascular dementia with behavior disturbance, history of CVA   -continue 24/7 care and support at [facility]TC[facility] for ADLs; IADLs  -continue delirium precautions  -monitor sleep/wake cycle  -nursing to continue monitoring behavior    History of DVT (deep vein thrombosis)  -history of IVC insertion  -stable on Eliquis b i d   -no signs of active bleeding noted    Essential hypertension  -with elevation in BPs noted, trending 150s-180s  -continue amlodipine 10 mg daily  -increase metoprolol from 12 5 mg to 25 mg q 12 hours with hold parameter  -continue to monitor    Neuromuscular dysfunction of bladder  -with suprapubic catheter    Cystostomy tube last changed by urology on 04/02/2021  -followup with Urology as scheduled on 05/11/2021    Nephrostomy status ([place] [address] )  -a consideration was made to capping the catheter with potential for removal of nephrostomy tube, however, a large stone is present within the renal pelvis as per IR notes  -follow-up with IR    Suprapubic catheter ([place] [address] )  -suprapubic catheter in place  -continue suprapubic catheter care as per protocol    Depression with anxiety  -anxiety with psychosis  -continue supportive care  -continue BuSpar, Seroquel, Remeron    Debility  -multifactorial  -continue 24/7 care and support at [address] [place] [address] for ADLs;IADLs  -PT/OT/ST as needed  -ensure routine follow-up care with Urology, Podiatry, Ophthalmology as needed  -fall precautions  -skin breakdown prevention measures in place by nursing; multi Podus boot left heel while in bed  -ensure adequate hydration and nutrition  -management of acute and chronic medical conditions as outlined    COPD (chronic obstructive pulmonary disease) ([place] [address] )  -without exacerbation  -patient currently not on any controller medications    Constipation  -controlled with scheduled senna, MiraLax    Muscle rigidity/muscle spasticity  -with history of CVA  -followed by palliative CRNP  -continue baclofen as ordered  -continue oxycodone q h s     **Please note: This progress note was constructed using a voice recognition system  [address] [place] [address] [facility]  2/6/469719:97 AM

## 2021-05-10 NOTE — PROGRESS NOTES
Problem List Items Addressed This Visit        Other    Suprapubic catheter ([address] ) - Primary    Nephrostomy status ([address] )    Neuromuscular dysfunction of bladder    Physical deconditioning            Discussion:    Pietro French is doing well, all things considered  His SPT change was easier than at previous visits and did not require a wire  He does have a tongue of granulation tissue at the side of his SPT site which is benign and we will leave this alone  I will convert him to nursing visits for SPT changes on days where I am also in the office in the event that there are issues with his changes    With regard to his SPT he will need this long term    He appears to be passing a capping trial of his nephrostomy, if he continues to do well then this may be removed by the IR team  Should he develop obstructing stones in the future he will need nephrostomy tube replacement due to severe urethral stricture disease  Assessment and plan:       Please see problem oriented charting for the assessment plan of today's urological complaints      Roro Landis MD      Chief Complaint     Chief Complaint   Patient presents with    Follow-up     SPT change over wire         History of Present Illness     Polo Marroquin is a 65 y o  man with neurogenic bladder, history of stroke, history of bladder spasms and severe urethral stricture and also nephrostomy for history of nephrolithiasis  Doing well overall    The following portions of the patient's history were reviewed and updated as appropriate: allergies, current medications, past family history, past medical history, past social history, past surgical history and problem list       Detailed Urologic History     - please refer to HPI    Review of Systems     Review of Systems   Constitutional: Negative  HENT: Negative  Eyes: Negative  Respiratory: Negative  Cardiovascular: Negative  Gastrointestinal: Negative  Endocrine: Negative      Genitourinary: As per HPI     Musculoskeletal: Negative  Skin: Negative  Allergic/Immunologic: Negative  Neurological: Positive for facial asymmetry, speech difficulty and weakness  Hematological: Negative  Psychiatric/Behavioral: Negative  Allergies     Allergies   Allergen Reactions    Gentamycin [Gentamicin] Anaphylaxis    Piperacillin Sod-Tazobactam So Anaphylaxis and Rash     However, has tolerated Ertapenem, Cefdinir, and Cefepime, which all have different side chains  Avoid Penicillins and the following Cephs with similar side chains (Cephalexin, Cefadroxil, Cefaclor, Cefprozil, or  Cefoxitin)  Other reaction(s): Hemoptysis    Vancomycin Anaphylaxis and Rash     Other reaction(s): Hemoptysis    Clindamycin Itching and Rash     Other reaction(s): Hemoptysis    Nsaids Other (See Comments)     Nephrotic Syndrome    Sulfa Antibiotics Rash    Other Rash     antipersperents  Stat lock from lucia catheter    Tigecycline Rash     Other reaction(s): Hemoptysis       Physical Exam     Physical Exam  Vitals signs reviewed  Constitutional:       General: He is not in acute distress  Appearance: Normal appearance  He is not ill-appearing, toxic-appearing or diaphoretic  HENT:      Head: Normocephalic and atraumatic  Eyes:      General:         Right eye: No discharge  Left eye: No discharge  Cardiovascular:      Pulses: Normal pulses  Pulmonary:      Effort: Pulmonary effort is normal    Abdominal:      General: There is no distension  Palpations: There is no mass  Genitourinary:     Comments: SPT in place, draining well  Some granulation tissue by SPT site,  No signs of infection  Musculoskeletal:         General: No swelling or tenderness  Skin:     General: Skin is warm  Neurological:      General: No focal deficit present  Mental Status: He is alert and oriented to person, place, and time     Psychiatric:         Mood and Affect: Mood normal          Behavior: Behavior normal          Thought Content: Thought content normal          Judgment: Judgment normal              Vital Signs  There were no vitals filed for this visit        Current Medications       Current Outpatient Medications:     acetaminophen (TYLENOL) 325 mg tablet, Take 650 mg by mouth every 4 (four) hours as needed for mild pain , Disp: , Rfl:     amLODIPine (NORVASC) 10 mg tablet, Take 10 mg by mouth daily, Disp: , Rfl:     ammonium lactate (LAC-HYDRIN) 12 % cream, Apply topically daily, Disp: 385 g, Rfl: 0    apixaban (ELIQUIS) 2 5 mg, Take 2 5 mg by mouth 2 (two) times a day, Disp: , Rfl:     ascorbic acid (VITAMIN C) 500 MG tablet, Take 2 tablets (1,000 mg total) by mouth every 12 (twelve) hours, Disp:  , Rfl: 0    baclofen 10 mg tablet, Take 1 5 tablets (15 mg total) by mouth 3 (three) times a day, Disp: , Rfl:     bisacodyl (DULCOLAX) 10 mg suppository, Insert 1 suppository (10 mg total) into the rectum daily as needed for constipation, Disp: 12 suppository, Rfl: 0    busPIRone (BUSPAR) 7 5 mg tablet, Take 7 5 mg by mouth 3 (three) times a day, Disp: , Rfl:     cholecalciferol (VITAMIN D3) 1,000 units tablet, Take 2,000 Units by mouth daily, Disp: , Rfl:     Citric Sv-Jrxfuwkoabh-Tf Carb (Renacidin) SOLN, , Disp: , Rfl:     docusate sodium (COLACE) 100 mg capsule, Take 100 mg by mouth 2 (two) times a day, Disp: , Rfl:     famotidine (PEPCID) 20 mg tablet, Take 20 mg by mouth 2 (two) times a day, Disp: , Rfl:     glycerin-hypromellose- (ARTIFICIAL TEARS) 0 2-0 2-1 % SOLN, Administer 1 drop to both eyes 2 (two) times a day, Disp: , Rfl:     memantine (NAMENDA) 5 mg tablet, 2 (two) times a day , Disp: , Rfl:     metoprolol tartrate (LOPRESSOR) 25 mg tablet, Take 1 tablet (25 mg total) by mouth every 12 (twelve) hours, Disp: , Rfl: 0    mirtazapine (REMERON) 15 mg tablet, daily at bedtime , Disp: , Rfl:     oxyCODONE (ROXICODONE) 5 mg immediate release tablet, Take 1 tablet (5 mg total) by mouth daily at bedtime Hold if pt is drowsyMax Daily Amount: 5 mg, Disp: 12 tablet, Rfl: 0    polyethylene glycol (MIRALAX) 17 g packet, Take 17 g by mouth daily, Disp: 14 each, Rfl: 0    potassium chloride (K-DUR,KLOR-CON) 10 mEq tablet, Take 1 tablet (10 mEq total) by mouth daily with breakfast, Disp: , Rfl: 0    QUEtiapine (SEROquel) 25 mg tablet, Take 25 mg by mouth daily at bedtime  , Disp: , Rfl:     saccharomyces boulardii (FLORASTOR) 250 mg capsule, Take 250 mg by mouth 2 (two) times a day, Disp: , Rfl:     senna (SENOKOT) 8 6 mg, Take 2 tablets (17 2 mg total) by mouth daily at bedtime, Disp: , Rfl: 0    sodium chloride, PF, 0 9 %, 10 mL by Intracatheter route daily Intracatheter flushing daily, Disp: 30 Syringe, Rfl: 2    zinc sulfate (ZINCATE) 220 mg capsule, Take 1 capsule (220 mg total) by mouth daily for 2 doses, Disp: 2 capsule, Rfl: 0      Active Problems     Patient Active Problem List   Diagnosis    COPD (chronic obstructive pulmonary disease) (HCC)    History of DVT (deep vein thrombosis)    Aneurysm of thoracic aorta (HCC)    Essential hypertension    CKD (chronic kidney disease)    GERD (gastroesophageal reflux disease)    Constipation    D[facility] )    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HCC)    Suprapubic catheter ([facility] )    Bilateral nephrolithiasis    Depression with anxiety    Cerebral infarction ([facility] )    Aphasia as late effect of cerebrovascular accident    Nephrostomy status ([facility] )    Neuromuscular dysfunction of bladder    Debility    Physical deconditioning    Oropharyngeal dysphagia    Muscle rigidity    Muscle spasticity         Past Medical History     Past Medical History:   Diagnosis Date    Ataxia     COPD (chronic obstructive pulmonary disease) ([facility] )     wife denies - "never had"    CVA (cerebral vascular accident) ([facility] )     Dementia ([address] )     Difficulty swallowing     Difficulty walking     Generalized anxiety disorder     GERD (gastroesophageal reflux disease)     Global aphasia     H/O blood clots     Heartburn     Hypertension     Mental disorder     Muscle weakness     Neuromuscular dysfunction of bladder     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 ([facility] )     Stroke ([facility] )     Thrombosis          Surgical History     Past Surgical History:   Procedure Laterality Date    BOTOX INJECTION N/A 6/18/2019    Procedure: INJECTION BOTULINUM TOXIN (BOTOX), intra detrusor;  Surgeon: Vernon Goltz, MD;  [facility];  Service: Urology    BOTOX INJECTION N/A 10/7/2019    Procedure: INJECTION BOTULINUM TOXIN (BOTOX), intradetrusor;  Surgeon: Vernon Goltz, MD;  [facility];  Service: Urology    CYSTOSCOPY      CYSTOSCOPY N/A 6/18/2019    Procedure: cystoscopy and all indicated procedures;  Surgeon: Vernon Goltz, MD;  [facility];  Service: Urology    FL CYSTOGRAM  1/15/2019    IR NEPHROSTOMY TUBE PLACEMENT  3/11/2020    IR SUPRAPUBIC CATHETER PLACEMENT  11/26/2019    IVC FILTER INSERTION      X2    IVC FILTER INSERTION      x2    KIDNEY STONE SURGERY      KNEE SURGERY      Sepsis infection     NEPHROSTOMY      ND CYSTO/URETERO W/LITHOTRIPSY &INDWELL STENT INSRT Right 6/14/2017    Procedure: CYSTOSCOPY URETEROSCOPY WITH LITHOTRIPSY HOLMIUM LASER,,BASKET STONE EXTRACTION, RETROGRADE PYELOGRAM AND INSERTION STENT URETERAL;  Surgeon: Mary Schofield MD;  [facility];  Service: Urology    ND CYSTOURETHROSCOPY,BIOPSY N/A 1/15/2019    Procedure: CYSTOSCOPY, CYSTOGRAM, DILATION OF URETHRAL STRICTURE;  Surgeon: Vernon Goltz, MD;  [facility];  Service: Urology    URINARY SURGERY           Family History     Family History   Problem Relation Age of Onset    Diabetes Father     Hypertension Father     Coronary artery disease Father     Cancer Father     Hypertension Mother          Social History     Social History     Social History     Tobacco Use Smoking Status Never Smoker   Smokeless Tobacco Never Used         Pertinent Lab Values     Lab Results   Component Value Date    CREATININE 1 55 (H) 02/05/2021       No results found for: PSA          Pertinent Imaging      no new imaging for my review

## 2021-05-10 NOTE — PATIENT INSTRUCTIONS
Suprapubic Cystostomy   WHAT YOU NEED TO KNOW:   Suprapubic cystostomy is surgery to create a stoma (opening) through your abdomen into your bladder  This opening is where a catheter is inserted to drain urine  You may need a cystostomy if your urine flow is blocked  DISCHARGE INSTRUCTIONS:   Seek immediate care if:   · No urine is draining into the urine bag  · You have severe pain  Call your doctor if:   · You have a fever or chills  · You have a burning pain in your stoma  · Your stoma is red, swollen, or draining pus  · You have blood in your urine  · You have questions or concerns about your condition or care  Medicines:   · Medicines  to treat pain and prevent infection may be given  · Take your medicine as directed  Contact your healthcare provider if you think your medicine is not helping or if you have side effects  Tell him or her if you are allergic to any medicine  Keep a list of the medicines, vitamins, and herbs you take  Include the amounts, and when and why you take them  Bring the list or the pill bottles to follow-up visits  Carry your medicine list with you in case of an emergency  Empty your urine drainage bag:  Empty your urine drainage bag when it is ½ to ? full, or every 8 hours  If you have a smaller leg bag, empty it every 3 to 4 hours  Do the following when you empty your urine drainage bag:  · Hold the urine bag over a toilet or large container  · Remove the drain spout from its sleeve at the bottom of the urine bag  Do not touch the tip of the drain spout  Open the slide valve on the spout  · Let the urine flow out of the urine bag into the toilet or container  Do not let the drainage tube touch anything  · Clean the end of the drain spout with alcohol when the bag is empty  Ask which cleaning solution is best to use  · Close the slide valve and put the drain spout into its sleeve at the bottom of the urine bag   Write down how much urine was in your bag if you were asked to keep a record  Prevent an infection:   · Clean the stoma and skin around it daily  Wash your hands before and after cystostomy care  Put on a new pair of clean medical gloves  Ask for more information about stoma and skin care  · Change your urine bag or clean reusable bags  Ask how often you need to change or clean your urine drainage bag  You may need to change your reusable bag at least once a week  · Keep the bag below your waist   This will prevent urine from flowing back into your bladder and causing an infection or other problems  Also, keep the tube free of kinks so the urine will drain properly  Do not pull on the catheter  This can cause pain and bleeding and may cause the catheter to come out  Follow up with your doctor or surgeon as directed: You may need to return to have your suprapubic catheter changed or removed  Write down your questions so you remember to ask them during your visits  © Copyright [address] Information is for End User's use only and may not be sold, redistributed or otherwise used for commercial purposes  All illustrations and images included in CareNotes® are the copyrighted property of [employer]  or [facility] Darien Olivia   The above information is an  only  It is not intended as medical advice for individual conditions or treatments  Talk to your doctor, nurse or pharmacist before following any medical regimen to see if it is safe and effective for you

## 2021-05-10 NOTE — H&P (VIEW-ONLY)
Problem List Items Addressed This Visit        Other    Suprapubic catheter ([address] ) - Primary    Nephrostomy status ([address] )    Neuromuscular dysfunction of bladder    Physical deconditioning            Discussion:    Damian Wyatt is doing well, all things considered  His SPT change was easier than at previous visits and did not require a wire  He does have a tongue of granulation tissue at the side of his SPT site which is benign and we will leave this alone  I will convert him to nursing visits for SPT changes on days where I am also in the office in the event that there are issues with his changes    With regard to his SPT he will need this long term    He appears to be passing a capping trial of his nephrostomy, if he continues to do well then this may be removed by the IR team  Should he develop obstructing stones in the future he will need nephrostomy tube replacement due to severe urethral stricture disease  Assessment and plan:       Please see problem oriented charting for the assessment plan of today's urological complaints      Cam Jones MD      Chief Complaint     Chief Complaint   Patient presents with    Follow-up     SPT change over wire         History of Present Illness     Jacky Burr is a 65 y o  man with neurogenic bladder, history of stroke, history of bladder spasms and severe urethral stricture and also nephrostomy for history of nephrolithiasis  Doing well overall    The following portions of the patient's history were reviewed and updated as appropriate: allergies, current medications, past family history, past medical history, past social history, past surgical history and problem list       Detailed Urologic History     - please refer to HPI    Review of Systems     Review of Systems   Constitutional: Negative  HENT: Negative  Eyes: Negative  Respiratory: Negative  Cardiovascular: Negative  Gastrointestinal: Negative  Endocrine: Negative      Genitourinary: As per HPI     Musculoskeletal: Negative  Skin: Negative  Allergic/Immunologic: Negative  Neurological: Positive for facial asymmetry, speech difficulty and weakness  Hematological: Negative  Psychiatric/Behavioral: Negative  Allergies     Allergies   Allergen Reactions    Gentamycin [Gentamicin] Anaphylaxis    Piperacillin Sod-Tazobactam So Anaphylaxis and Rash     However, has tolerated Ertapenem, Cefdinir, and Cefepime, which all have different side chains  Avoid Penicillins and the following Cephs with similar side chains (Cephalexin, Cefadroxil, Cefaclor, Cefprozil, or  Cefoxitin)  Other reaction(s): Hemoptysis    Vancomycin Anaphylaxis and Rash     Other reaction(s): Hemoptysis    Clindamycin Itching and Rash     Other reaction(s): Hemoptysis    Nsaids Other (See Comments)     Nephrotic Syndrome    Sulfa Antibiotics Rash    Other Rash     antipersperents  Stat lock from lucia catheter    Tigecycline Rash     Other reaction(s): Hemoptysis       Physical Exam     Physical Exam  Vitals signs reviewed  Constitutional:       General: He is not in acute distress  Appearance: Normal appearance  He is not ill-appearing, toxic-appearing or diaphoretic  HENT:      Head: Normocephalic and atraumatic  Eyes:      General:         Right eye: No discharge  Left eye: No discharge  Cardiovascular:      Pulses: Normal pulses  Pulmonary:      Effort: Pulmonary effort is normal    Abdominal:      General: There is no distension  Palpations: There is no mass  Genitourinary:     Comments: SPT in place, draining well  Some granulation tissue by SPT site,  No signs of infection  Musculoskeletal:         General: No swelling or tenderness  Skin:     General: Skin is warm  Neurological:      General: No focal deficit present  Mental Status: He is alert and oriented to person, place, and time     Psychiatric:         Mood and Affect: Mood normal          Behavior: Behavior normal          Thought Content: Thought content normal          Judgment: Judgment normal              Vital Signs  There were no vitals filed for this visit        Current Medications       Current Outpatient Medications:     acetaminophen (TYLENOL) 325 mg tablet, Take 650 mg by mouth every 4 (four) hours as needed for mild pain , Disp: , Rfl:     amLODIPine (NORVASC) 10 mg tablet, Take 10 mg by mouth daily, Disp: , Rfl:     ammonium lactate (LAC-HYDRIN) 12 % cream, Apply topically daily, Disp: 385 g, Rfl: 0    apixaban (ELIQUIS) 2 5 mg, Take 2 5 mg by mouth 2 (two) times a day, Disp: , Rfl:     ascorbic acid (VITAMIN C) 500 MG tablet, Take 2 tablets (1,000 mg total) by mouth every 12 (twelve) hours, Disp:  , Rfl: 0    baclofen 10 mg tablet, Take 1 5 tablets (15 mg total) by mouth 3 (three) times a day, Disp: , Rfl:     bisacodyl (DULCOLAX) 10 mg suppository, Insert 1 suppository (10 mg total) into the rectum daily as needed for constipation, Disp: 12 suppository, Rfl: 0    busPIRone (BUSPAR) 7 5 mg tablet, Take 7 5 mg by mouth 3 (three) times a day, Disp: , Rfl:     cholecalciferol (VITAMIN D3) 1,000 units tablet, Take 2,000 Units by mouth daily, Disp: , Rfl:     Citric Qy-Aiapayxefub-Kj Carb (Renacidin) SOLN, , Disp: , Rfl:     docusate sodium (COLACE) 100 mg capsule, Take 100 mg by mouth 2 (two) times a day, Disp: , Rfl:     famotidine (PEPCID) 20 mg tablet, Take 20 mg by mouth 2 (two) times a day, Disp: , Rfl:     glycerin-hypromellose- (ARTIFICIAL TEARS) 0 2-0 2-1 % SOLN, Administer 1 drop to both eyes 2 (two) times a day, Disp: , Rfl:     memantine (NAMENDA) 5 mg tablet, 2 (two) times a day , Disp: , Rfl:     metoprolol tartrate (LOPRESSOR) 25 mg tablet, Take 1 tablet (25 mg total) by mouth every 12 (twelve) hours, Disp: , Rfl: 0    mirtazapine (REMERON) 15 mg tablet, daily at bedtime , Disp: , Rfl:     oxyCODONE (ROXICODONE) 5 mg immediate release tablet, Take 1 tablet (5 mg total) by mouth daily at bedtime Hold if pt is drowsyMax Daily Amount: 5 mg, Disp: 12 tablet, Rfl: 0    polyethylene glycol (MIRALAX) 17 g packet, Take 17 g by mouth daily, Disp: 14 each, Rfl: 0    potassium chloride (K-DUR,KLOR-CON) 10 mEq tablet, Take 1 tablet (10 mEq total) by mouth daily with breakfast, Disp: , Rfl: 0    QUEtiapine (SEROquel) 25 mg tablet, Take 25 mg by mouth daily at bedtime  , Disp: , Rfl:     saccharomyces boulardii (FLORASTOR) 250 mg capsule, Take 250 mg by mouth 2 (two) times a day, Disp: , Rfl:     senna (SENOKOT) 8 6 mg, Take 2 tablets (17 2 mg total) by mouth daily at bedtime, Disp: , Rfl: 0    sodium chloride, PF, 0 9 %, 10 mL by Intracatheter route daily Intracatheter flushing daily, Disp: 30 Syringe, Rfl: 2    zinc sulfate (ZINCATE) 220 mg capsule, Take 1 capsule (220 mg total) by mouth daily for 2 doses, Disp: 2 capsule, Rfl: 0      Active Problems     Patient Active Problem List   Diagnosis    COPD (chronic obstructive pulmonary disease) (HCC)    History of DVT (deep vein thrombosis)    Aneurysm of thoracic aorta (HCC)    Essential hypertension    CKD (chronic kidney disease)    GERD (gastroesophageal reflux disease)    Constipation    [facility]    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HCC)    Suprapubic catheter [facility]    Bilateral nephrolithiasis    Depression with anxiety    Cerebral infarction ([facility]    Aphasia as late effect of cerebrovascular accident    Nephrostomy status ([facility]    Neuromuscular dysfunction of bladder    Debility    Physical deconditioning    Oropharyngeal dysphagia    Muscle rigidity    Muscle spasticity         Past Medical History     Past Medical History:   Diagnosis Date    Ataxia     COPD (chronic obstructive pulmonary disease) ([facility]     wife denies - "never had"    CVA (cerebral vascular accident) ([facility]     Dementia ([address] )     Difficulty swallowing     Difficulty walking     Generalized anxiety disorder     GERD (gastroesophageal reflux disease)     Global aphasia     H/O blood clots     Heartburn     Hypertension     Mental disorder     Muscle weakness     Neuromuscular dysfunction of bladder     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 ([facility] [address] )     Stroke ([facility] )     Thrombosis          Surgical History     Past Surgical History:   Procedure Laterality Date    BOTOX INJECTION N/A 6/18/2019    Procedure: INJECTION BOTULINUM TOXIN (BOTOX), intra detrusor;  Surgeon: Nereyda Otero MD;  [facility];  Service: Urology    BOTOX INJECTION N/A 10/7/2019    Procedure: INJECTION BOTULINUM TOXIN (BOTOX), intradetrusor;  Surgeon: Nereyda Otero MD;  [facility];  Service: Urology    CYSTOSCOPY      CYSTOSCOPY N/A 6/18/2019    Procedure: cystoscopy and all indicated procedures;  Surgeon: Nereyda Otero MD;  [facility];  Service: Urology    FL CYSTOGRAM  1/15/2019    IR NEPHROSTOMY TUBE PLACEMENT  3/11/2020    IR SUPRAPUBIC CATHETER PLACEMENT  11/26/2019    IVC FILTER INSERTION      X2    IVC FILTER INSERTION      x2    KIDNEY STONE SURGERY      KNEE SURGERY      Sepsis infection     NEPHROSTOMY      NC CYSTO/URETERO W/LITHOTRIPSY &INDWELL STENT INSRT Right 6/14/2017    Procedure: CYSTOSCOPY URETEROSCOPY WITH LITHOTRIPSY HOLMIUM LASER,,BASKET STONE EXTRACTION, RETROGRADE PYELOGRAM AND INSERTION STENT URETERAL;  Surgeon: Diomedes Sanchez MD;  [facility];  Service: Urology    NC CYSTOURETHROSCOPY,BIOPSY N/A 1/15/2019    Procedure: CYSTOSCOPY, CYSTOGRAM, DILATION OF URETHRAL STRICTURE;  Surgeon: Nereyda Otero MD;  [facility];  Service: Urology    URINARY SURGERY           Family History     Family History   Problem Relation Age of Onset    Diabetes Father     Hypertension Father     Coronary artery disease Father     Cancer Father     Hypertension Mother          Social History     Social History     Social History     Tobacco Use Smoking Status Never Smoker   Smokeless Tobacco Never Used         Pertinent Lab Values     Lab Results   Component Value Date    CREATININE 1 55 (H) 02/05/2021       No results found for: PSA          Pertinent Imaging      no new imaging for my review

## 2021-05-11 ENCOUNTER — PROCEDURE VISIT (OUTPATIENT)
Dept: UROLOGY | Facility: CLINIC | Age: 71
End: 2021-05-11
Payer: COMMERCIAL

## 2021-05-11 VITALS — SYSTOLIC BLOOD PRESSURE: 138 MMHG | DIASTOLIC BLOOD PRESSURE: 86 MMHG

## 2021-05-11 DIAGNOSIS — Z93.59 SUPRAPUBIC CATHETER (HCC): Primary | ICD-10-CM

## 2021-05-11 DIAGNOSIS — Z93.6 NEPHROSTOMY STATUS (HCC): ICD-10-CM

## 2021-05-11 DIAGNOSIS — R53.81 PHYSICAL DECONDITIONING: ICD-10-CM

## 2021-05-11 DIAGNOSIS — N31.9 NEUROMUSCULAR DYSFUNCTION OF BLADDER: ICD-10-CM

## 2021-05-11 PROCEDURE — 99213 OFFICE O/P EST LOW 20 MIN: CPT | Performed by: UROLOGY

## 2021-05-11 PROCEDURE — 51705 CHANGE OF BLADDER TUBE: CPT | Performed by: UROLOGY

## 2021-05-11 RX ORDER — BACLOFEN 5 MG/1
TABLET ORAL
COMMUNITY
Start: 2021-04-14 | End: 2021-12-06 | Stop reason: HOSPADM

## 2021-05-11 NOTE — PROGRESS NOTES
Cystostomy tube change     Date/Time 5/11/2021 12:10 PM     Performed by  Chanda Leggett MD     Authorized by Chanda Leggett MD      Universal Protocol   Consent: Verbal consent obtained  Written consent obtained  Risks and benefits: risks, benefits and alternatives were discussed  Consent given by: patient  Patient understanding: patient states understanding of the procedure being performed  Patient consent: the patient's understanding of the procedure matches consent given  Procedure consent: procedure consent matches procedure scheduled  Relevant documents: relevant documents present and verified  Test results: test results available and properly labeled  Site marked: the operative site was not marked  Radiology Images displayed and confirmed  If images not available, report reviewed: imaging studies available  Required items: required blood products, implants, devices, and special equipment available  Patient identity confirmed: verbally with patient and provided demographic data        Local anesthesia used: no     Anesthesia   Local anesthesia used: no     Sedation   Patient sedated: no        Specimen: no    Culture: no   Procedure Details   Procedure Notes: 20 Fr SPT changed by urologist due to previous difficulties with change requiring wire placement  No wire necessary today  Prepped and draped in usual fashion, bladder irrigates well, no blood, clots, or debris    Patient Transportation: confirmed  Patient tolerance: patient tolerated the procedure well with no immediate complications

## 2021-05-11 NOTE — LETTER
May 11, 2021     Mirian Ryan MD  [address] [place] [address]  [address]  [address]    [facility]   YOB: 1950   Date of Visit: 5/11/2021       Dear Dr Kennie Rinne: Thank you for referring Harden Links to me for evaluation  Below are my notes for this consultation  If you have questions, please do not hesitate to call me  I look forward to following your patient along with you  Sincerely,        William Law MD        CC: No Recipients  William Law MD  5/11/2021 12:11 PM  Sign when Signing Visit        Cystostomy tube change     Date/Time 5/11/2021 12:10 PM     Performed by  William Law MD     Authorized by William Law MD      Universal Protocol   Consent: Verbal consent obtained  Written consent obtained  Risks and benefits: risks, benefits and alternatives were discussed  Consent given by: patient  Patient understanding: patient states understanding of the procedure being performed  Patient consent: the patient's understanding of the procedure matches consent given  Procedure consent: procedure consent matches procedure scheduled  Relevant documents: relevant documents present and verified  Test results: test results available and properly labeled  Site marked: the operative site was not marked  Radiology Images displayed and confirmed  If images not available, report reviewed: imaging studies available  Required items: required blood products, implants, devices, and special equipment available  Patient identity confirmed: verbally with patient and provided demographic data        Local anesthesia used: no     Anesthesia   Local anesthesia used: no     Sedation   Patient sedated: no        Specimen: no    Culture: no   Procedure Details   Procedure Notes: 20 Fr SPT changed by urologist due to previous difficulties with change requiring wire placement  No wire necessary today   Prepped and draped in usual fashion, bladder irrigates well, no blood, clots, or debris  Patient Transportation: confirmed  Patient tolerance: patient tolerated the procedure well with no immediate complications               Francie Vivas MD  5/11/2021 12:10 PM  Sign when Signing Visit       Problem List Items Addressed This Visit        Other    Suprapubic catheter ([address] ) - Primary    Nephrostomy status ([address] )    Neuromuscular dysfunction of bladder    Physical deconditioning            Discussion:    Jono Tinsley is doing well, all things considered  His SPT change was easier than at previous visits and did not require a wire  He does have a tongue of granulation tissue at the side of his SPT site which is benign and we will leave this alone  I will convert him to nursing visits for SPT changes on days where I am also in the office in the event that there are issues with his changes    With regard to his SPT he will need this long term    He appears to be passing a capping trial of his nephrostomy, if he continues to do well then this may be removed by the IR team  Should he develop obstructing stones in the future he will need nephrostomy tube replacement due to severe urethral stricture disease  Assessment and plan:       Please see problem oriented charting for the assessment plan of today's urological complaints      Francie Vivas MD      Chief Complaint     Chief Complaint   Patient presents with    Follow-up     SPT change over wire         History of Present Illness     Kae Mina is a 65 y o  man with neurogenic bladder, history of stroke, history of bladder spasms and severe urethral stricture and also nephrostomy for history of nephrolithiasis   Doing well overall    The following portions of the patient's history were reviewed and updated as appropriate: allergies, current medications, past family history, past medical history, past social history, past surgical history and problem list       Detailed Urologic History     - please refer to HPI    Review of Systems Review of Systems   Constitutional: Negative  HENT: Negative  Eyes: Negative  Respiratory: Negative  Cardiovascular: Negative  Gastrointestinal: Negative  Endocrine: Negative  Genitourinary:        As per HPI     Musculoskeletal: Negative  Skin: Negative  Allergic/Immunologic: Negative  Neurological: Positive for facial asymmetry, speech difficulty and weakness  Hematological: Negative  Psychiatric/Behavioral: Negative  Allergies     Allergies   Allergen Reactions    Gentamycin [Gentamicin] Anaphylaxis    Piperacillin Sod-Tazobactam So Anaphylaxis and Rash     However, has tolerated Ertapenem, Cefdinir, and Cefepime, which all have different side chains  Avoid Penicillins and the following Cephs with similar side chains (Cephalexin, Cefadroxil, Cefaclor, Cefprozil, or  Cefoxitin)  Other reaction(s): Hemoptysis    Vancomycin Anaphylaxis and Rash     Other reaction(s): Hemoptysis    Clindamycin Itching and Rash     Other reaction(s): Hemoptysis    Nsaids Other (See Comments)     Nephrotic Syndrome    Sulfa Antibiotics Rash    Other Rash     antipersperents  Stat lock from lucia catheter    Tigecycline Rash     Other reaction(s): Hemoptysis       Physical Exam     Physical Exam  Vitals signs reviewed  Constitutional:       General: He is not in acute distress  Appearance: Normal appearance  He is not ill-appearing, toxic-appearing or diaphoretic  HENT:      Head: Normocephalic and atraumatic  Eyes:      General:         Right eye: No discharge  Left eye: No discharge  Cardiovascular:      Pulses: Normal pulses  Pulmonary:      Effort: Pulmonary effort is normal    Abdominal:      General: There is no distension  Palpations: There is no mass  Genitourinary:     Comments: SPT in place, draining well  Some granulation tissue by SPT site,  No signs of infection  Musculoskeletal:         General: No swelling or tenderness     Skin: General: Skin is warm  Neurological:      General: No focal deficit present  Mental Status: He is alert and oriented to person, place, and time  Psychiatric:         Mood and Affect: Mood normal          Behavior: Behavior normal          Thought Content: Thought content normal          Judgment: Judgment normal              Vital Signs  There were no vitals filed for this visit        Current Medications       Current Outpatient Medications:     acetaminophen (TYLENOL) 325 mg tablet, Take 650 mg by mouth every 4 (four) hours as needed for mild pain , Disp: , Rfl:     amLODIPine (NORVASC) 10 mg tablet, Take 10 mg by mouth daily, Disp: , Rfl:     ammonium lactate (LAC-HYDRIN) 12 % cream, Apply topically daily, Disp: 385 g, Rfl: 0    apixaban (ELIQUIS) 2 5 mg, Take 2 5 mg by mouth 2 (two) times a day, Disp: , Rfl:     ascorbic acid (VITAMIN C) 500 MG tablet, Take 2 tablets (1,000 mg total) by mouth every 12 (twelve) hours, Disp:  , Rfl: 0    baclofen 10 mg tablet, Take 1 5 tablets (15 mg total) by mouth 3 (three) times a day, Disp: , Rfl:     bisacodyl (DULCOLAX) 10 mg suppository, Insert 1 suppository (10 mg total) into the rectum daily as needed for constipation, Disp: 12 suppository, Rfl: 0    busPIRone (BUSPAR) 7 5 mg tablet, Take 7 5 mg by mouth 3 (three) times a day, Disp: , Rfl:     cholecalciferol (VITAMIN D3) 1,000 units tablet, Take 2,000 Units by mouth daily, Disp: , Rfl:     Citric Fi-Gufyudpsgpi-Nx Carb (Renacidin) SOLN, , Disp: , Rfl:     docusate sodium (COLACE) 100 mg capsule, Take 100 mg by mouth 2 (two) times a day, Disp: , Rfl:     famotidine (PEPCID) 20 mg tablet, Take 20 mg by mouth 2 (two) times a day, Disp: , Rfl:     glycerin-hypromellose- (ARTIFICIAL TEARS) 0 2-0 2-1 % SOLN, Administer 1 drop to both eyes 2 (two) times a day, Disp: , Rfl:     memantine (NAMENDA) 5 mg tablet, 2 (two) times a day , Disp: , Rfl:     metoprolol tartrate (LOPRESSOR) 25 mg tablet, Take 1 tablet (25 mg total) by mouth every 12 (twelve) hours, Disp: , Rfl: 0    mirtazapine (REMERON) 15 mg tablet, daily at bedtime , Disp: , Rfl:     oxyCODONE (ROXICODONE) 5 mg immediate release tablet, Take 1 tablet (5 mg total) by mouth daily at bedtime Hold if pt is drowsyMax Daily Amount: 5 mg, Disp: 12 tablet, Rfl: 0    polyethylene glycol (MIRALAX) 17 g packet, Take 17 g by mouth daily, Disp: 14 each, Rfl: 0    potassium chloride (K-DUR,KLOR-CON) 10 mEq tablet, Take 1 tablet (10 mEq total) by mouth daily with breakfast, Disp: , Rfl: 0    QUEtiapine (SEROquel) 25 mg tablet, Take 25 mg by mouth daily at bedtime  , Disp: , Rfl:     saccharomyces boulardii (FLORASTOR) 250 mg capsule, Take 250 mg by mouth 2 (two) times a day, Disp: , Rfl:     senna (SENOKOT) 8 6 mg, Take 2 tablets (17 2 mg total) by mouth daily at bedtime, Disp: , Rfl: 0    sodium chloride, PF, 0 9 %, 10 mL by Intracatheter route daily Intracatheter flushing daily, Disp: 30 Syringe, Rfl: 2    zinc sulfate (ZINCATE) 220 mg capsule, Take 1 capsule (220 mg total) by mouth daily for 2 doses, Disp: 2 capsule, Rfl: 0      Active Problems     Patient Active Problem List   Diagnosis    COPD (chronic obstructive pulmonary disease) (HCC)    History of DVT (deep vein thrombosis)    Aneurysm of thoracic aorta (HCC)    Essential hypertension    CKD (chronic kidney disease)    GERD (gastroesophageal reflux disease)    Constipation    Dementia ([address] )    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HCC)    Suprapubic catheter ([address] )    Bilateral nephrolithiasis    Depression with anxiety    Cerebral infarction ([address] )    Aphasia as late effect of cerebrovascular accident    Nephrostomy status ([address] )    Neuromuscular dysfunction of bladder    Debility    Physical deconditioning    Oropharyngeal dysphagia    Muscle rigidity    Muscle spasticity         Past Medical History     Past Medical History:   Diagnosis Date    Ataxia     COPD (chronic obstructive pulmonary disease) [facility])     wife denies - "never had"    [facility]     Dementia ([facility] )     Difficulty swallowing     Difficulty walking     Generalized anxiety disorder     GERD (gastroesophageal reflux disease)     Global aphasia     H/O blood clots     Heartburn     Hypertension     Mental disorder     Muscle weakness     Neuromuscular dysfunction of bladder     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 ([facility] )     [facility]     Thrombosis          Surgical History     Past Surgical History:   Procedure Laterality Date    BOTOX INJECTION N/A 6/18/2019    Procedure: INJECTION BOTULINUM TOXIN (BOTOX), intra detrusor;  Surgeon: Chanda Leggett MD;  [facility];  Service: Urology    BOTOX INJECTION N/A 10/7/2019    Procedure: INJECTION BOTULINUM TOXIN (BOTOX), intradetrusor;  Surgeon: Chanda Leggett MD;  [facility];  Service: Urology    CYSTOSCOPY      CYSTOSCOPY N/A 6/18/2019    Procedure: cystoscopy and all indicated procedures;  Surgeon: Chanda Leggett MD;  [facility];  Service: Urology    FL CYSTOGRAM  1/15/2019    IR NEPHROSTOMY TUBE PLACEMENT  3/11/2020    IR SUPRAPUBIC CATHETER PLACEMENT  11/26/2019    IVC FILTER INSERTION      X2    IVC FILTER INSERTION      x2    KIDNEY STONE SURGERY      KNEE SURGERY      Sepsis infection     NEPHROSTOMY      MS CYSTO/URETERO W/LITHOTRIPSY &INDWELL STENT INSRT Right 6/14/2017    Procedure: CYSTOSCOPY URETEROSCOPY WITH LITHOTRIPSY HOLMIUM LASER,,BASKET STONE EXTRACTION, RETROGRADE PYELOGRAM AND INSERTION STENT URETERAL;  Surgeon: Miya Espinosa MD;  [facility];  Service: Urology    MS CYSTOURETHROSCOPY,BIOPSY N/A 1/15/2019    Procedure: CYSTOSCOPY, CYSTOGRAM, DILATION OF URETHRAL STRICTURE;  Surgeon: Chanda Leggett MD;  [facility];  Service: Urology    URINARY SURGERY           Family History     Family History   Problem Relation Age of Onset    Diabetes Father     Hypertension Father     Coronary artery disease Father     Cancer Father     Hypertension Mother          Social History     Social History     Social History     Tobacco Use   Smoking Status Never Smoker   Smokeless Tobacco Never Used         Pertinent Lab Values     Lab Results   Component Value Date    CREATININE 1 55 (H) 02/05/2021       No results found for: PSA          Pertinent Imaging      no new imaging for my review

## 2021-05-26 ENCOUNTER — TELEPHONE (OUTPATIENT)
Dept: OTHER | Facility: OTHER | Age: 71
End: 2021-05-26

## 2021-05-26 ENCOUNTER — HOSPITAL ENCOUNTER (OUTPATIENT)
Dept: RADIOLOGY | Facility: HOSPITAL | Age: 71
Discharge: HOME | End: 2021-05-26
Attending: STUDENT IN AN ORGANIZED HEALTH CARE EDUCATION/TRAINING PROGRAM | Admitting: RADIOLOGY
Payer: COMMERCIAL

## 2021-05-26 VITALS — HEART RATE: 97 BPM | OXYGEN SATURATION: 93 %

## 2021-05-26 DIAGNOSIS — N20.0 BILATERAL NEPHROLITHIASIS: ICD-10-CM

## 2021-05-26 PROCEDURE — 49424 ASSESS CYST CONTRAST INJECT: CPT

## 2021-05-26 PROCEDURE — 76080 X-RAY EXAM OF FISTULA: CPT

## 2021-05-26 PROCEDURE — 50389 REMOVE RENAL TUBE W/FLUORO: CPT | Performed by: RADIOLOGY

## 2021-05-26 RX ADMIN — IOHEXOL 10 ML: 350 INJECTION, SOLUTION INTRAVENOUS at 11:34

## 2021-05-26 NOTE — INTERVAL H&P NOTE
Update: (This section must be completed if the H&P was completed greater than 24 hrs to procedure or admission)    H&P reviewed  After examining the patient, I find no changed to the H&P since it had been written  Patient re-evaluated   Accept as history and physical     Edgar Espinoza MD/May 32, 2021/11:44 AM

## 2021-05-26 NOTE — BRIEF OP NOTE (RAD/CATH)
INTERVENTIONAL RADIOLOGY PROCEDURE NOTE    Date: 5/26/2021    Procedure: IR NEPHROSTOMY TUBE CHECK/CHANGE/REPOSITION/REINSERTION/UPSIZE    Preoperative diagnosis:   1  Bilateral nephrolithiasis         Postoperative diagnosis: Same  Surgeon: Daly Abraham MD     Assistant: None  No qualified resident was available  Blood loss: None    Specimens: None     Findings: Left nephrostogram shows a patient left ureter with free flow of contrast into the bladder  The patient has had his nephrostomy tube capped for approximately 2 months without issue  There is a 1 1 cm stone seen in the left collecting system which is mobile and at risk of causing obstruction  I have reviewedthe urology note and they feel the tube may be removed at this time given the capping trial and patient nephrostogram     I have discussed this in detail with the wife  I mentioned the patient still has stones in his left kidney and they are at risk of becoming obstructing again  We also discussed that the patient did pass a capping trial and there is free flow of drainage into is bladder  The decision to remove the tube was made, however I did discuss with her that if the patient develops obstruction again requiring tube placement I would recommend the tube stay in for life  I also wrote detailed instructions for the patient's nursing home  If the patient develops new fevers, left flank pain or shows any signs of clinical deterioration he needs to come to the ER for evaluation  Complications: None immediate      Anesthesia: none

## 2021-05-26 NOTE — DISCHARGE INSTRUCTIONS
Nephrostomy Tube Care     WHAT YOU NEED TO KNOW:   A nephrostomy tube is a catheter (thin plastic tube) that is inserted through your skin and into your kidney  The nephrostomy tube drains urine from your kidney into a collecting bag outside your body  You may need a nephrostomy tube when something is blocking the normal flow of urine  A nephrostomy tube may be used for a short or a long period of time  The nephrostomy tube comes out of your back, so you will need someone to help care for your nephrostomy tube  DISCHARGE INSTRUCTIONS:      How to clean the skin around the nephrostomy tube and change the bandage:  Since the nephrostomy tube comes out of your back, you will not be able to care for it by yourself  Ask someone to follow the general directions below to check and care for your nephrostomy tube  Gather the items you will need  Disposable (single use) under-pad, and a clean washcloth  ¨ Plain soap, warm water, and new medical gloves  ¨ Sterile gauze bandages  ¨ Clear adhesive dressing or medical tape  ¨ Skin barrier  ¨ Protective skin film  ¨ Trash bag  · Remove the old bandage, and check the tube entry site  ¨ Have the patient lie on his side with the nephrostomy tube entry site facing up  Place the under-pad where it will catch drainage as you are working with the nephrostomy tube  ¨ Wash your hands with soap and water  Put on new medical gloves  ¨ Gently remove the old bandage, without pulling on the tube  Do this by holding the skin beside the tube with one hand  With the other hand, gently remove sticky tape and the skin barrier by pulling in the same direction as hair growth  Do not touch the side of the bandage that is placed over or around the tube  Throw the bandage and skin barrier away in a trash bag  ¨ Look for signs of infection, such as skin redness and swelling  Report any skin changes to healthcare providers  ¨ Clean the tube entry site      ¨ Hold the tube in place to keep it from being pulled out while you are cleaning around it  ¨ You will need to clean the area twice  For the first cleaning, wet a new gauze bandage with soap and water  Begin at the entry site of the tube  Wipe the skin in circles, moving away from the entry site  Remove blood and any other material with the gauze  Do this as often as needed  Use a new gauze bandage each time you clean the area, moving away from the entry site  ¨ For the second cleaning, wet a new gauze bandage with water  Begin at the entry site of the tube  Wipe the skin in circles, moving away from the entry site  Use a new gauze bandage each time you clean the area, moving away from the entry site  ¨ Gently pat the skin with a clean washcloth to dry it  · Apply the skin barrier and bandages  ¨ Roll up a bandage to make it thick, and place it under  the place where the tube enters the skin  Place it to support the tube, and stop it from kinking or bending  Tape the bandage in place, and apply more bandages if directed by a healthcare provider  ¨ Bring the tubing forward to the front and tape it to the skin  Do not stretch the tube tight, because this may pull the nephrostomy tube out  How often to change the bandage  Change the bandage around the tube, every other day  If your bandages  get dirty or wet, change them right away, and as often as needed  If your nephrostomy tube is to be used for a long period of time, the tube needs to be changed every 2 to 3 months  Healthcare providers will tell you when you need to make an appointment to have your tube changed  How to care for the urine drainage bag:   · Ask if you need to measure and write down how much urine is in the bag before you empty it  Drain urine out of the drainage bag when it is ½ to ? full  Open the spout at the bottom of the bag to empty the urine into the toilet  · You may need to detach the drainage bag from the nephrostomy tube to change it    If so, attach a new drainage bag tightly to the nephrostomy tube  ·   How to prevent problems with your nephrostomy tube:   · Change bandages, directed  This helps to prevent infection  Throw away or clean your drainage bag as directed by your healthcare provider  · Wipe the connecting ends of the drainage bag with alcohol before you reconnect the bag to the tube  This helps prevent infection  Keep the tube taped to your skin and connected to a drainage bag placed below the level of your kidneys  This helps prevent urine from backing up into your kidneys  You may wear a small drainage bag strapped to your leg to let you move around more easily  · Check the catheter to be sure it is in place after you change your clothes or do other activities  Do not wear tight clothing over the tube  Place the tubing over your thigh rather than under it when you are sitting down  Be sure that nothing is pulling on the nephrostomy tube when you move around  · Change positions if you see little or no urine in your drainage bag  Check to see if the urine tube is twisted or bent  Be sure that you are not sitting or lying on the tube  If there are no kinks and there is little or no urine in the drainage bag, tell your healthcare provider  · Flush out the tube as directed  Some tubes get flushed one time a day with 10 mls of NSS You will be given a prescription for the flushes  To flush the nephrostomy tube, clean both connections with alcohol swap  Twist off the drainage bag tube and twist the saline syringe into the nephrostomy tube and flush briskly  Remove the syringe and twist the drainage bag tube back into the nephrostomy tube  · Keep the site covered while you shower  Tape a piece of clear adhesive plastic over the dressing to keep it dry while you shower  Do not take tub baths      Contact Interventional Radiology at [phone] [facility] PATIENTS: Contact Interventional Radiology at [phone]) [facility] PATIENTS: Contact Interventional Radiology at [phone]) if:  · The skin around the nephrostomy tube is red, swollen, itches, or has a rash  · You have a fever greater than 101 or chills  · You have lower back or hip pain  · There are changes in how your urine looks or smells  · You have little or no urine draining from the nephrostomy tube  · You have nausea and are vomiting  · The black raghu on your tube has moved, or the tube is longer than when it was put in    · You have questions or concerns about your condition or care  · The nephrostomy tube comes out completely  · There is blood, pus, or a bad smell coming from the place where the tube enters your skin  · Urine is leaking around the tube  The following pharmacies carry the flush syringes  [facility]                     [facility]       [address] [facility]                    [address] [place] [address]  Phone [phone]            Phone [phone]  [address] & [facility]                      [address]                                 [phone]  Phone [phone]            Phone [phone]    [facility]                                                                         [facility] [phone]  [address]   Phone [phone]

## 2021-05-26 NOTE — TELEPHONE ENCOUNTER
Patient's wife states "My  is supposed to have a tube placed by IR today  He resides at [facility] so I need to be there to register him   I didn't receive a confirmation so I'm checking to see where the appointment is "     Patient's wife informed that patient's appointment is at the [address] [place] [address] Interventional Radiology department at 11:00am

## 2021-05-26 NOTE — SEDATION DOCUMENTATION
Arrived to IR suite via ambulance in his non verbal state  Nephrostomy tube has been capped since 4/2/21 per nursing home staff  Successfully removed  DSD applied to site  Placed back on ambulance stretcher for transfer back to facility  AVS reviewed with his wife

## 2021-06-04 ENCOUNTER — NURSING HOME VISIT (OUTPATIENT)
Dept: FAMILY MEDICINE CLINIC | Facility: CLINIC | Age: 71
End: 2021-06-04
Payer: COMMERCIAL

## 2021-06-04 DIAGNOSIS — I82.401 DEEP VEIN THROMBOSIS (DVT) OF RIGHT LOWER EXTREMITY, UNSPECIFIED CHRONICITY, UNSPECIFIED VEIN (HCC): ICD-10-CM

## 2021-06-04 DIAGNOSIS — R13.12 OROPHARYNGEAL DYSPHAGIA: ICD-10-CM

## 2021-06-04 DIAGNOSIS — I10 ESSENTIAL HYPERTENSION: Primary | Chronic | ICD-10-CM

## 2021-06-04 PROCEDURE — 99308 SBSQ NF CARE LOW MDM 20: CPT | Performed by: INTERNAL MEDICINE

## 2021-06-07 VITALS — SYSTOLIC BLOOD PRESSURE: 133 MMHG | RESPIRATION RATE: 15 BRPM | DIASTOLIC BLOOD PRESSURE: 88 MMHG | HEART RATE: 73 BPM

## 2021-06-07 PROBLEM — I82.401 DEEP VEIN THROMBOSIS (DVT) OF RIGHT LOWER EXTREMITY (HCC): Status: ACTIVE | Noted: 2021-06-07

## 2021-06-07 NOTE — ASSESSMENT & PLAN NOTE
Status post IVC filter in place  He has been on Eliquis  Tolerating well without any bleeding episodes  Monitor for any bleeding

## 2021-06-07 NOTE — PROGRESS NOTES
Assessment/Plan:         Problem List Items Addressed This Visit        Digestive    Oropharyngeal dysphagia       No recent episode of aspiration  Monitor for any aspiration episodes  Cardiovascular and Mediastinum    Essential hypertension - Primary (Chronic)       Blood pressure stable with amlodipine  Tolerating well without any syncope  Patient usually does not move due to chronic disability  Deep vein thrombosis (DVT) of right lower extremity (HCC)      Status post IVC filter in place  He has been on Eliquis  Tolerating well without any bleeding episodes  Monitor for any bleeding  Subjective:      Patient ID: Anastacio Collier is a 65 y o  male  Long-term care follow-up visit at [facility]  AND [facility]  1   Hypertension  He is on amlodipine  Blood pressure stable with it  Tolerating it okay  No syncope  No tachycardia  No increased edema  No chest pain or dyspnea  2   Lower extremity DVT  He has been on Eliquis  No bleeding episode lately  No melena  No swelling in the legs  3  Dementia with mood disorder  No agitations or hallucinations  Patient has aphasia  The following portions of the patient's history were reviewed and updated as appropriate:   Past Medical History:  He has a past medical history of Ataxia, COPD (chronic obstructive pulmonary disease) ([facility] [address] CVA (cerebral vascular accident) ([facility] [address] ), Dementia ([facility] [address] ), Difficulty swallowing, Difficulty walking, Generalized anxiety disorder, GERD (gastroesophageal reflux disease), Global aphasia, H/O blood clots, Heartburn, Hypertension, Mental disorder, Muscle weakness, Neuromuscular dysfunction of bladder, 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 ([facility] [address] ), Stroke ([facility] [address] ), and Thrombosis  ,  _______________________________________________________________________  Medical Problems:  does not have any pertinent problems on file ,  _______________________________________________________________________  Past Surgical History:   has a past surgical history that includes IVC FILTER INSERTION; IVC FILTER INSERTION; Knee surgery; Nephrostomy; Kidney stone surgery; Cystoscopy; Urinary surgery; pr cysto/uretero w/lithotripsy &indwell stent insrt (Right, 6/14/2017); pr cystourethroscopy,biopsy (N/A, 1/15/2019); FL cystogram (1/15/2019); BOTOX INJECTION (N/A, 6/18/2019); CYSTOSCOPY (N/A, 6/18/2019); BOTOX INJECTION (N/A, 10/7/2019); IR suprapubic catheter placement (11/26/2019); IR nephrostomy tube placement (3/11/2020); and IR nephrostomy tube check/change/reposition/reinsertion/upsize (5/26/2021)  ,  _______________________________________________________________________  Family History:  family history includes Cancer in his father; Coronary artery disease in his father; Diabetes in his father; Hypertension in his father and mother ,  _______________________________________________________________________  Social History:   reports that he has never smoked  He has never used smokeless tobacco  He reports that he does not drink alcohol or use drugs  ,  _______________________________________________________________________  Allergies:  is allergic to gentamycin [gentamicin]; piperacillin sod-tazobactam so; vancomycin; clindamycin; nsaids; sulfa antibiotics; other; and tigecycline     _______________________________________________________________________  Current Outpatient Medications   Medication Sig Dispense Refill    acetaminophen (TYLENOL) 325 mg tablet Take 650 mg by mouth every 4 (four) hours as needed for mild pain       amLODIPine (NORVASC) 10 mg tablet Take 10 mg by mouth daily      ammonium lactate (LAC-HYDRIN) 12 % cream Apply topically daily 385 g 0    apixaban (ELIQUIS) 2 5 mg Take 2 5 mg by mouth 2 (two) times a day      ascorbic acid (VITAMIN C) 500 MG tablet Take 2 tablets (1,000 mg total) by mouth every 12 (twelve) hours 0    baclofen 10 mg tablet Take 1 5 tablets (15 mg total) by mouth 3 (three) times a day      Baclofen 5 MG TABS       bisacodyl (DULCOLAX) 10 mg suppository Insert 1 suppository (10 mg total) into the rectum daily as needed for constipation 12 suppository 0    busPIRone (BUSPAR) 7 5 mg tablet Take 7 5 mg by mouth 3 (three) times a day      cholecalciferol (VITAMIN D3) 1,000 units tablet Take 2,000 Units by mouth daily      Citric Mp-Otkounpdbwn-Qq Carb (Renacidin) SOLN       docusate sodium (COLACE) 100 mg capsule Take 100 mg by mouth 2 (two) times a day      famotidine (PEPCID) 20 mg tablet Take 20 mg by mouth 2 (two) times a day      glycerin-hypromellose- (ARTIFICIAL TEARS) 0 2-0 2-1 % SOLN Administer 1 drop to both eyes 2 (two) times a day      memantine (NAMENDA) 5 mg tablet 2 (two) times a day       metoprolol tartrate (LOPRESSOR) 25 mg tablet Take 1 tablet (25 mg total) by mouth every 12 (twelve) hours  0    mirtazapine (REMERON) 15 mg tablet daily at bedtime       oxyCODONE (ROXICODONE) 5 mg immediate release tablet Take 1 tablet (5 mg total) by mouth daily at bedtime Hold if pt is drowsyMax Daily Amount: 5 mg 12 tablet 0    polyethylene glycol (MIRALAX) 17 g packet Take 17 g by mouth daily 14 each 0    potassium chloride (K-DUR,KLOR-CON) 10 mEq tablet Take 1 tablet (10 mEq total) by mouth daily with breakfast  0    QUEtiapine (SEROquel) 25 mg tablet Take 25 mg by mouth daily at bedtime        saccharomyces boulardii (FLORASTOR) 250 mg capsule Take 250 mg by mouth 2 (two) times a day      senna (SENOKOT) 8 6 mg Take 2 tablets (17 2 mg total) by mouth daily at bedtime  0    sodium chloride, PF, 0 9 % 10 mL by Intracatheter route daily Intracatheter flushing daily 30 Syringe 2    zinc sulfate (ZINCATE) 220 mg capsule Take 1 capsule (220 mg total) by mouth daily for 2 doses 2 capsule 0     No current facility-administered medications for this visit  _______________________________________________________________________  Review of Systems      Objective:  Vitals:    06/04/21 1522   BP: 133/88   Pulse: 73   Resp: 15     There is no height or weight on file to calculate BMI  Physical Exam  Vitals signs reviewed  Constitutional:       General: He is not in acute distress  Appearance: Normal appearance  He is well-developed  He is not ill-appearing  HENT:      Head: Normocephalic and atraumatic  Eyes:      General:         Right eye: No discharge  Left eye: No discharge  Neck:      Musculoskeletal: Neck supple  Thyroid: No thyromegaly  Cardiovascular:      Rate and Rhythm: Normal rate and regular rhythm  Heart sounds: Normal heart sounds  No murmur  Pulmonary:      Effort: Pulmonary effort is normal       Breath sounds: Normal breath sounds  No wheezing or rhonchi  Abdominal:      General: Bowel sounds are normal  There is no distension  Palpations: Abdomen is soft  Tenderness: There is no abdominal tenderness  Musculoskeletal:         General: No swelling  Right lower leg: No edema  Left lower leg: No edema  Lymphadenopathy:      Cervical: No cervical adenopathy  Skin:     General: Skin is warm  Capillary Refill: Capillary refill takes less than 2 seconds  Findings: No erythema  Neurological:      Mental Status: Mental status is at baseline  He is disoriented     Psychiatric:         Mood and Affect: Mood normal          Behavior: Behavior normal

## 2021-06-07 NOTE — ASSESSMENT & PLAN NOTE
Blood pressure stable with amlodipine  Tolerating well without any syncope  Patient usually does not move due to chronic disability

## 2021-06-28 ENCOUNTER — PROCEDURE VISIT (OUTPATIENT)
Dept: UROLOGY | Facility: CLINIC | Age: 71
End: 2021-06-28
Payer: COMMERCIAL

## 2021-06-28 DIAGNOSIS — N31.9 NEUROMUSCULAR DYSFUNCTION OF BLADDER: ICD-10-CM

## 2021-06-28 DIAGNOSIS — I69.320 APHASIA AS LATE EFFECT OF CEREBROVASCULAR ACCIDENT: ICD-10-CM

## 2021-06-28 DIAGNOSIS — R53.81 PHYSICAL DECONDITIONING: Primary | ICD-10-CM

## 2021-06-28 DIAGNOSIS — Z93.59 SUPRAPUBIC CATHETER (HCC): ICD-10-CM

## 2021-06-28 DIAGNOSIS — N20.0 BILATERAL NEPHROLITHIASIS: ICD-10-CM

## 2021-06-28 PROCEDURE — 99212 OFFICE O/P EST SF 10 MIN: CPT | Performed by: UROLOGY

## 2021-06-28 PROCEDURE — 51705 CHANGE OF BLADDER TUBE: CPT | Performed by: UROLOGY

## 2021-06-28 NOTE — PATIENT INSTRUCTIONS
Suprapubic Cystostomy   WHAT YOU NEED TO KNOW:   What do I need to know about suprapubic cystostomy? Suprapubic cystostomy is surgery to create a stoma (opening) through your abdomen into your bladder  This opening is where a catheter is inserted to drain urine  You may need a cystostomy if your urine flow is blocked  How do I prepare for surgery? Your healthcare provider will talk to you about how to prepare for surgery  He or she may tell you not to eat or drink anything after midnight on the day of your surgery  He or she will tell you what medicines to take or not take on the day of your surgery  What will happen during surgery? Your surgeon will make a small incision in your abdomen below your belly button  Another small incision will be made in your bladder  The catheter will be inserted into your abdomen and bladder  Once the catheter is in place, the balloon on the end of the catheter will be filled with sterile water  This balloon keeps the catheter in place inside the bladder  The other end of the catheter will be connected to a clean drainage bag or closed with a valve  The catheter may be secured in the stoma with stitches or surgical tape  What are the risks of surgery? You may bleed more than expected or get an infection  Your organs or blood vessels may be damaged during surgery  Your bladder may become irritated  Long-term use of a catheter can lead to kidney stones, blood in your urine, or bladder inflammation  CARE AGREEMENT:   You have the right to help plan your care  Learn about your health condition and how it may be treated  Discuss treatment options with your healthcare providers to decide what care you want to receive  You always have the right to refuse treatment  The above information is an  only  It is not intended as medical advice for individual conditions or treatments   Talk to your doctor, nurse or pharmacist before following any medical regimen to see if it is safe and effective for you  © Copyright [address] Information is for End User's use only and may not be sold, redistributed or otherwise used for commercial purposes   All illustrations and images included in CareNotes® are the copyrighted property of A D A M [employer]  or [address] [place], [place] [address]abhishek rashmi

## 2021-06-28 NOTE — PROGRESS NOTES
Cystostomy tube change     Date/Time 6/28/2021 4:34 PM     Performed by  Jonny Beckett MD     Authorized by Jonny Beckett MD      Universal Protocol   Consent: Verbal consent obtained  Written consent obtained  Risks and benefits: risks, benefits and alternatives were discussed  Consent given by: patient  Patient understanding: patient states understanding of the procedure being performed  Patient consent: the patient's understanding of the procedure matches consent given  Procedure consent: procedure consent matches procedure scheduled  Relevant documents: relevant documents present and verified  Test results: test results available and properly labeled  Site marked: the operative site was not marked  Radiology Images displayed and confirmed   If images not available, report reviewed: imaging studies available  Required items: required blood products, implants, devices, and special equipment available  Patient identity confirmed: verbally with patient and provided demographic data        Local anesthesia used: no     Anesthesia   Local anesthesia used: no     Sedation   Patient sedated: no        Specimen: no    Culture: no   Procedure Details   Procedure Notes:   [de-identified] Turkish suprapubic catheter change without issue or difficulty, done by MD given previous history of difficulty requiring use of a wire, I am comfortable having a nurse change his next suprapubic catheter  Patient Transportation: confirmed  Patient tolerance: patient tolerated the procedure well with no immediate complications

## 2021-06-28 NOTE — PROGRESS NOTES
Problem List Items Addressed This Visit        Genitourinary    Bilateral nephrolithiasis       Other    Suprapubic catheter ([address] )    Aphasia as late effect of cerebrovascular accident    Neuromuscular dysfunction of bladder    Physical deconditioning - Primary            Discussion:     Damian Wyatt is doing well overall, he has done well without his nephrostomy tube in place without flank pain or infection  The facility has been doing an excellent job of keeping his catheter draining well  His suprapubic catheter change was quite simple without a wire today, I am comfortable having the nursing staff doing this in the future  He will return for his suprapubic catheter exchange as scheduled  Assessment and plan:       Please see problem oriented charting for the assessment plan of today's urological complaints      Cam Jones MD      Chief Complaint     Chief Complaint   Patient presents with    Urinary Retention     Catheter Change         History of Present Illness     Jacky Burr is a 65 y o  Man with multiple medical issues as listed above, he has a history of a stroke and a neurogenic bladder along with urinary retention, currently managed with a suprapubic catheter, he has a obliterated urethra from urethral stricture disease  His suprapubic catheter changes have previously been quite difficult, his recent change was not particularly difficult and I am comfortable having nurses change this in the future  The following portions of the patient's history were reviewed and updated as appropriate: allergies, current medications, past family history, past medical history, past social history, past surgical history and problem list     Detailed Urologic History     - please refer to HPI    Review of Systems     Review of Systems   Constitutional: Negative  HENT: Negative  Eyes: Negative  Respiratory: Negative  Cardiovascular: Negative  Gastrointestinal: Negative      Endocrine: Negative  Genitourinary: Positive for difficulty urinating  Musculoskeletal: Negative  Skin: Negative  Allergic/Immunologic: Negative  Neurological: Positive for facial asymmetry, speech difficulty and weakness  Hematological: Negative  Psychiatric/Behavioral: Negative  Allergies     Allergies   Allergen Reactions    Gentamycin [Gentamicin] Anaphylaxis    Piperacillin Sod-Tazobactam So Anaphylaxis and Rash     However, has tolerated Ertapenem, Cefdinir, and Cefepime, which all have different side chains  Avoid Penicillins and the following Cephs with similar side chains (Cephalexin, Cefadroxil, Cefaclor, Cefprozil, or  Cefoxitin)  Other reaction(s): Hemoptysis    Vancomycin Anaphylaxis and Rash     Other reaction(s): Hemoptysis    Clindamycin Itching and Rash     Other reaction(s): Hemoptysis    Nsaids Other (See Comments)     Nephrotic Syndrome    Sulfa Antibiotics Rash    Other Rash     antipersperents  Stat lock from lucia catheter    Tigecycline Rash     Other reaction(s): Hemoptysis       Physical Exam     Physical Exam  Vitals reviewed  Constitutional:       General: He is not in acute distress  Appearance: Normal appearance  He is not ill-appearing, toxic-appearing or diaphoretic  HENT:      Head: Normocephalic and atraumatic  Cardiovascular:      Pulses: Normal pulses  Pulmonary:      Effort: Pulmonary effort is normal  No respiratory distress  Abdominal:      General: There is no distension  Palpations: There is no mass  Genitourinary:     Comments: Granulation tissue noted around suprapubic site, stable  Musculoskeletal:         General: No swelling  Neurological:      Mental Status: He is alert  Cranial Nerves: Cranial nerve deficit present  Sensory: Sensory deficit present  Motor: Weakness present        Coordination: Coordination abnormal       Gait: Gait abnormal       Deep Tendon Reflexes: Reflexes abnormal    Psychiatric: Mood and Affect: Mood normal          Behavior: Behavior normal          Thought Content:  Thought content normal          Judgment: Judgment normal              Vital Signs  Vitals:         Current Medications       Current Outpatient Medications:     acetaminophen (TYLENOL) 325 mg tablet, Take 650 mg by mouth every 4 (four) hours as needed for mild pain , Disp: , Rfl:     amLODIPine (NORVASC) 10 mg tablet, Take 10 mg by mouth daily, Disp: , Rfl:     ammonium lactate (LAC-HYDRIN) 12 % cream, Apply topically daily, Disp: 385 g, Rfl: 0    apixaban (ELIQUIS) 2 5 mg, Take 2 5 mg by mouth 2 (two) times a day, Disp: , Rfl:     ascorbic acid (VITAMIN C) 500 MG tablet, Take 2 tablets (1,000 mg total) by mouth every 12 (twelve) hours, Disp:  , Rfl: 0    baclofen 10 mg tablet, Take 1 5 tablets (15 mg total) by mouth 3 (three) times a day, Disp: , Rfl:     Baclofen 5 MG TABS, , Disp: , Rfl:     bisacodyl (DULCOLAX) 10 mg suppository, Insert 1 suppository (10 mg total) into the rectum daily as needed for constipation, Disp: 12 suppository, Rfl: 0    busPIRone (BUSPAR) 7 5 mg tablet, Take 7 5 mg by mouth 3 (three) times a day, Disp: , Rfl:     cholecalciferol (VITAMIN D3) 1,000 units tablet, Take 2,000 Units by mouth daily, Disp: , Rfl:     Citric Yd-Kawtigrblnd-Ol Carb (Renacidin) SOLN, , Disp: , Rfl:     docusate sodium (COLACE) 100 mg capsule, Take 100 mg by mouth 2 (two) times a day, Disp: , Rfl:     famotidine (PEPCID) 20 mg tablet, Take 20 mg by mouth 2 (two) times a day, Disp: , Rfl:     glycerin-hypromellose- (ARTIFICIAL TEARS) 0 2-0 2-1 % SOLN, Administer 1 drop to both eyes 2 (two) times a day, Disp: , Rfl:     memantine (NAMENDA) 5 mg tablet, 2 (two) times a day , Disp: , Rfl:     metoprolol tartrate (LOPRESSOR) 25 mg tablet, Take 1 tablet (25 mg total) by mouth every 12 (twelve) hours, Disp: , Rfl: 0    mirtazapine (REMERON) 15 mg tablet, daily at bedtime , Disp: , Rfl:     oxyCODONE (ROXICODONE) 5 mg immediate release tablet, Take 1 tablet (5 mg total) by mouth daily at bedtime Hold if pt is drowsyMax Daily Amount: 5 mg, Disp: 12 tablet, Rfl: 0    polyethylene glycol (MIRALAX) 17 g packet, Take 17 g by mouth daily, Disp: 14 each, Rfl: 0    potassium chloride (K-DUR,KLOR-CON) 10 mEq tablet, Take 1 tablet (10 mEq total) by mouth daily with breakfast, Disp: , Rfl: 0    QUEtiapine (SEROquel) 25 mg tablet, Take 25 mg by mouth daily at bedtime  , Disp: , Rfl:     saccharomyces boulardii (FLORASTOR) 250 mg capsule, Take 250 mg by mouth 2 (two) times a day, Disp: , Rfl:     senna (SENOKOT) 8 6 mg, Take 2 tablets (17 2 mg total) by mouth daily at bedtime, Disp: , Rfl: 0    sodium chloride, PF, 0 9 %, 10 mL by Intracatheter route daily Intracatheter flushing daily, Disp: 30 Syringe, Rfl: 2    zinc sulfate (ZINCATE) 220 mg capsule, Take 1 capsule (220 mg total) by mouth daily for 2 doses, Disp: 2 capsule, Rfl: 0      Active Problems     Patient Active Problem List   Diagnosis    COPD (chronic obstructive pulmonary disease) (HCC)    History of DVT (deep vein thrombosis)    Aneurysm of thoracic aorta (HCC)    Essential hypertension    CKD (chronic kidney disease)    GERD (gastroesophageal reflux disease)    Dementia ([facility] [address] )    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HCC)    Suprapubic catheter ([facility] [address] )    Bilateral nephrolithiasis    Depression with anxiety    Aphasia as late effect of cerebrovascular accident    Neuromuscular dysfunction of bladder    Debility    Physical deconditioning    Oropharyngeal dysphagia    Muscle rigidity    Muscle spasticity    Deep vein thrombosis (DVT) of right lower extremity (HCC)         Past Medical History     Past Medical History:   Diagnosis Date    Ataxia     COPD (chronic obstructive pulmonary disease) ([facility] [address] )     wife denies - "never had"    CVA (cerebral vascular accident) ([facility] [address] )     Dementia ([facility] [address] )     Difficulty swallowing     Difficulty walking     Generalized anxiety disorder     GERD (gastroesophageal reflux disease)     Global aphasia     H/O blood clots     Heartburn     Hypertension     Mental disorder     Muscle weakness     Neuromuscular dysfunction of bladder     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 ([facility] [address] )     Stroke ([facility] [address] )     Thrombosis          Surgical History     Past Surgical History:   Procedure Laterality Date    BOTOX INJECTION N/A 6/18/2019    Procedure: INJECTION BOTULINUM TOXIN (BOTOX), intra detrusor;  Surgeon: Jonny Beckett MD;  [facility];  Service: Urology    BOTOX INJECTION N/A 10/7/2019    Procedure: INJECTION BOTULINUM TOXIN (BOTOX), intradetrusor;  Surgeon: Jonny Beckett MD;  [facility];  Service: Urology    CYSTOSCOPY      CYSTOSCOPY N/A 6/18/2019    Procedure: cystoscopy and all indicated procedures;  Surgeon: Jonny Beckett MD;  [facility];  Service: Urology    FL CYSTOGRAM  1/15/2019    IR NEPHROSTOMY TUBE CHECK/CHANGE/REPOSITION/REINSERTION/UPSIZE  5/26/2021    IR NEPHROSTOMY TUBE PLACEMENT  3/11/2020    IR SUPRAPUBIC CATHETER PLACEMENT  11/26/2019    IVC FILTER INSERTION      X2    IVC FILTER INSERTION      x2    KIDNEY STONE SURGERY      KNEE SURGERY      Sepsis infection     NEPHROSTOMY      AL CYSTO/URETERO W/LITHOTRIPSY &INDWELL STENT INSRT Right 6/14/2017    Procedure: CYSTOSCOPY URETEROSCOPY WITH LITHOTRIPSY HOLMIUM LASER,,BASKET STONE EXTRACTION, RETROGRADE PYELOGRAM AND INSERTION STENT URETERAL;  Surgeon: Claudene Bradford, MD;  [facility];  Service: Urology    AL CYSTOURETHROSCOPY,BIOPSY N/A 1/15/2019    Procedure: CYSTOSCOPY, CYSTOGRAM, DILATION OF URETHRAL STRICTURE;  Surgeon: Jonny Beckett MD;  [facility];  Service: Urology    URINARY SURGERY           Family History     Family History   Problem Relation Age of Onset    Diabetes Father     Hypertension Father     Coronary artery disease Father  Cancer Father     Hypertension Mother          Social History     Social History     Social History     Tobacco Use   Smoking Status Never Smoker   Smokeless Tobacco Never Used         Pertinent Lab Values     Lab Results   Component Value Date    CREATININE 1 55 (H) 02/05/2021       No results found for: PSA          Pertinent Imaging      No new imaging

## 2021-08-02 ENCOUNTER — PROCEDURE VISIT (OUTPATIENT)
Dept: UROLOGY | Facility: CLINIC | Age: 71
End: 2021-08-02
Payer: COMMERCIAL

## 2021-08-02 VITALS — HEART RATE: 58 BPM | DIASTOLIC BLOOD PRESSURE: 70 MMHG | SYSTOLIC BLOOD PRESSURE: 138 MMHG

## 2021-08-02 DIAGNOSIS — N31.9 NEUROMUSCULAR DYSFUNCTION OF BLADDER: Primary | ICD-10-CM

## 2021-08-02 PROCEDURE — 51705 CHANGE OF BLADDER TUBE: CPT

## 2021-08-02 NOTE — PROGRESS NOTES
8/2/2021    Morris Ledesma [facility]  1950  [id number]    Diagnosis  Chief Complaint     Retention of urine          Patient presents for routine suprapubic catheter change managed by Dr William Pill is to follow up as scheduled for routine suprapubic catheter change    Procedure: Suprapubic Tube Change         Cystostomy tube change     Date/Time 8/2/2021 1:38 PM     Performed by  Awilda Fabian RN     Authorized by Shelli Tsang MD      Universal Protocol   Consent: Verbal consent obtained  Consent given by: patient  Patient identity confirmation method: confirmed with wife who was at bedside  pt nonverbal                    Current catheter removed without difficulty after deflation of an intact balloon  Site prepped with Betadine, new 20F  latex spt change via aseptic technique without incident, 10 ml balloon inflated with sterile water  irrigated easily for clear return, no spasm noted  Patient tolerated well  Attached to drainage bag       Vitals:    08/02/21 1335   BP: 138/70   BP Location: Right arm   Patient Position: Sitting   Cuff Size: Standard   Pulse: [address]ES[address]

## 2021-08-06 ENCOUNTER — NURSING HOME VISIT (OUTPATIENT)
Dept: FAMILY MEDICINE CLINIC | Facility: CLINIC | Age: 71
End: 2021-08-06
Payer: COMMERCIAL

## 2021-08-06 DIAGNOSIS — I10 ESSENTIAL HYPERTENSION: Chronic | ICD-10-CM

## 2021-08-06 DIAGNOSIS — I82.401 ACUTE DEEP VEIN THROMBOSIS (DVT) OF RIGHT LOWER EXTREMITY, UNSPECIFIED VEIN (HCC): ICD-10-CM

## 2021-08-06 DIAGNOSIS — R13.12 OROPHARYNGEAL DYSPHAGIA: Primary | ICD-10-CM

## 2021-08-06 PROCEDURE — 99308 SBSQ NF CARE LOW MDM 20: CPT | Performed by: INTERNAL MEDICINE

## 2021-08-09 VITALS — HEART RATE: 84 BPM | SYSTOLIC BLOOD PRESSURE: 133 MMHG | RESPIRATION RATE: 15 BRPM | DIASTOLIC BLOOD PRESSURE: 76 MMHG

## 2021-08-09 NOTE — PROGRESS NOTES
Assessment/Plan:         Problem List Items Addressed This Visit        Digestive    Oropharyngeal dysphagia - Primary      Continue to monitor for any aspiration  No recent event  Cardiovascular and Mediastinum    Essential hypertension (Chronic)       Blood pressure stable  Monitor  Deep vein thrombosis (DVT) of right lower extremity (HCC)       Has been on Eliquis  No bleeding  Continue the same                 Subjective:      Patient ID: Bubba Child is a 65 y o  male  1  htn- tolerating medicine well  No palpitations tachycardia or syncope  No increased edema  No dyspnea  No chest discomfort  Patient is a poor historian  Most history obtained from nursing staff  2  dvt-  He has been on Eliquis  No bleeding from the nose  No black stool or blood in the stool  No increased edema  3  Dysphagia-  Patient has a chronic dysphagia with history of aspirations in the past   No recent aspiration episode  Monitor      The following portions of the patient's history were reviewed and updated as appropriate:   Past Medical History:  He has a past medical history of Ataxia, COPD (chronic obstructive pulmonary disease) ([facility] ), CVA (cerebral vascular accident) ([facility] ), Dementia ([facility] ), Difficulty swallowing, Difficulty walking, Generalized anxiety disorder, GERD (gastroesophageal reflux disease), Global aphasia, H/O blood clots, Heartburn, Hypertension, Mental disorder, Muscle weakness, Neuromuscular dysfunction of bladder, 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 ([facility] ), Stroke ([facility] ), and Thrombosis  ,  _______________________________________________________________________  Medical Problems:  does not have any pertinent problems on file ,  _______________________________________________________________________  Past Surgical History:   has a past surgical history that includes IVC FILTER INSERTION; IVC FILTER INSERTION; Knee surgery;  Nephrostomy; Kidney stone surgery; Cystoscopy; Urinary surgery; pr cysto/uretero w/lithotripsy &indwell stent insrt (Right, 6/14/2017); pr cystourethroscopy,biopsy (N/A, 1/15/2019); FL cystogram (1/15/2019); BOTOX INJECTION (N/A, 6/18/2019); CYSTOSCOPY (N/A, 6/18/2019); BOTOX INJECTION (N/A, 10/7/2019); IR suprapubic catheter placement (11/26/2019); IR nephrostomy tube placement (3/11/2020); and IR nephrostomy tube check/change/reposition/reinsertion/upsize (5/26/2021)  ,  _______________________________________________________________________  Family History:  family history includes Cancer in his father; Coronary artery disease in his father; Diabetes in his father; Hypertension in his father and mother ,  _______________________________________________________________________  Social History:   reports that he has never smoked  He has never used smokeless tobacco  He reports that he does not drink alcohol and does not use drugs  ,  _______________________________________________________________________  Allergies:  is allergic to gentamycin [gentamicin], piperacillin sod-tazobactam so, vancomycin, clindamycin, nsaids, sulfa antibiotics, other, and tigecycline     _______________________________________________________________________  Current Outpatient Medications   Medication Sig Dispense Refill    acetaminophen (TYLENOL) 325 mg tablet Take 650 mg by mouth every 4 (four) hours as needed for mild pain       amLODIPine (NORVASC) 10 mg tablet Take 10 mg by mouth daily      ammonium lactate (LAC-HYDRIN) 12 % cream Apply topically daily 385 g 0    apixaban (ELIQUIS) 2 5 mg Take 2 5 mg by mouth 2 (two) times a day      ascorbic acid (VITAMIN C) 500 MG tablet Take 2 tablets (1,000 mg total) by mouth every 12 (twelve) hours  0    baclofen 10 mg tablet Take 1 5 tablets (15 mg total) by mouth 3 (three) times a day      Baclofen 5 MG TABS       bisacodyl (DULCOLAX) 10 mg suppository Insert 1 suppository (10 mg total) into the rectum daily as needed for constipation 12 suppository 0    busPIRone (BUSPAR) 7 5 mg tablet Take 7 5 mg by mouth 3 (three) times a day      cholecalciferol (VITAMIN D3) 1,000 units tablet Take 2,000 Units by mouth daily      Citric Td-Mobfydxbals-Uj Carb (Renacidin) SOLN       docusate sodium (COLACE) 100 mg capsule Take 100 mg by mouth 2 (two) times a day      famotidine (PEPCID) 20 mg tablet Take 20 mg by mouth 2 (two) times a day      glycerin-hypromellose- (ARTIFICIAL TEARS) 0 2-0 2-1 % SOLN Administer 1 drop to both eyes 2 (two) times a day      memantine (NAMENDA) 5 mg tablet 2 (two) times a day       metoprolol tartrate (LOPRESSOR) 25 mg tablet Take 1 tablet (25 mg total) by mouth every 12 (twelve) hours  0    mirtazapine (REMERON) 15 mg tablet daily at bedtime       oxyCODONE (ROXICODONE) 5 mg immediate release tablet Take 1 tablet (5 mg total) by mouth daily at bedtime Hold if pt is drowsyMax Daily Amount: 5 mg 12 tablet 0    polyethylene glycol (MIRALAX) 17 g packet Take 17 g by mouth daily 14 each 0    potassium chloride (K-DUR,KLOR-CON) 10 mEq tablet Take 1 tablet (10 mEq total) by mouth daily with breakfast  0    QUEtiapine (SEROquel) 25 mg tablet Take 25 mg by mouth daily at bedtime        saccharomyces boulardii (FLORASTOR) 250 mg capsule Take 250 mg by mouth 2 (two) times a day      senna (SENOKOT) 8 6 mg Take 2 tablets (17 2 mg total) by mouth daily at bedtime  0    sodium chloride, PF, 0 9 % 10 mL by Intracatheter route daily Intracatheter flushing daily 30 Syringe 2    zinc sulfate (ZINCATE) 220 mg capsule Take 1 capsule (220 mg total) by mouth daily for 2 doses 2 capsule 0     No current facility-administered medications for this visit      _______________________________________________________________________  Review of Systems      Objective:  Vitals:    08/09/21 1558   BP: 133/76   Pulse: 84   Resp: 15     There is no height or weight on file to calculate BMI  Physical Exam

## 2021-09-07 ENCOUNTER — PROCEDURE VISIT (OUTPATIENT)
Dept: UROLOGY | Facility: CLINIC | Age: 71
End: 2021-09-07
Payer: COMMERCIAL

## 2021-09-07 DIAGNOSIS — Z93.59 SUPRAPUBIC CATHETER (HCC): ICD-10-CM

## 2021-09-07 DIAGNOSIS — N31.9 NEUROMUSCULAR DYSFUNCTION OF BLADDER: ICD-10-CM

## 2021-09-07 PROCEDURE — 51705 CHANGE OF BLADDER TUBE: CPT

## 2021-09-07 NOTE — PROGRESS NOTES
9/7/2021    Shay Jason [facility]  1950  [id number]    Diagnosis  Chief Complaint     Urinary Retention          Patient presents for SPT exchange managed by Dr Carrie Giles  Next SPT exchange in 6 wk    Procedure: Suprapubic Tube Change   Procedures      Current catheter removed without difficulty after deflation of an intact balloon  Site prepped with Hibiclens, new 12Q  silicone spt change via aseptic technique without incident, 10 ml balloon inflated with sterile water  irrigated easily for straw-yellow return, mild spasm noted  Patient tolerated well  Attached to drainage bag       Vitals:         Hilda Holloway, RN,BSN

## 2021-10-05 ENCOUNTER — PROCEDURE VISIT (OUTPATIENT)
Dept: UROLOGY | Facility: CLINIC | Age: 71
End: 2021-10-05

## 2021-10-05 VITALS — HEART RATE: 63 BPM | DIASTOLIC BLOOD PRESSURE: 82 MMHG | SYSTOLIC BLOOD PRESSURE: 132 MMHG

## 2021-10-05 DIAGNOSIS — N31.9 NEUROGENIC BLADDER: Primary | ICD-10-CM

## 2021-10-05 DIAGNOSIS — R33.9 RETENTION OF URINE: ICD-10-CM

## 2021-10-06 ENCOUNTER — APPOINTMENT (EMERGENCY)
Dept: RADIOLOGY | Facility: HOSPITAL | Age: 71
DRG: 871 | End: 2021-10-06
Payer: MEDICARE

## 2021-10-06 ENCOUNTER — HOSPITAL ENCOUNTER (INPATIENT)
Facility: HOSPITAL | Age: 71
LOS: 7 days | Discharge: NURSING, REHAB OR LONG-TERM CARE | DRG: 871 | End: 2021-10-13
Attending: EMERGENCY MEDICINE | Admitting: HOSPITALIST
Payer: MEDICARE

## 2021-10-06 ENCOUNTER — APPOINTMENT (EMERGENCY)
Dept: CT IMAGING | Facility: HOSPITAL | Age: 71
DRG: 871 | End: 2021-10-06
Payer: MEDICARE

## 2021-10-06 DIAGNOSIS — N39.0 UTI (URINARY TRACT INFECTION): ICD-10-CM

## 2021-10-06 DIAGNOSIS — A41.9 SEVERE SEPSIS (HCC): ICD-10-CM

## 2021-10-06 DIAGNOSIS — K56.609 SBO (SMALL BOWEL OBSTRUCTION) (HCC): Primary | ICD-10-CM

## 2021-10-06 DIAGNOSIS — R11.2 NAUSEA & VOMITING: ICD-10-CM

## 2021-10-06 DIAGNOSIS — R65.20 SEVERE SEPSIS (HCC): ICD-10-CM

## 2021-10-06 LAB
ALBUMIN SERPL BCP-MCNC: 3.4 G/DL (ref 3.5–5)
ALP SERPL-CCNC: 85 U/L (ref 46–116)
ALT SERPL W P-5'-P-CCNC: 16 U/L (ref 12–78)
ANION GAP SERPL CALCULATED.3IONS-SCNC: 15 MMOL/L (ref 4–13)
APTT PPP: 30 SECONDS (ref 23–37)
AST SERPL W P-5'-P-CCNC: 13 U/L (ref 5–45)
BACTERIA UR QL AUTO: ABNORMAL /HPF
BASE EX.OXY STD BLDV CALC-SCNC: 89.7 % (ref 60–80)
BASE EXCESS BLDV CALC-SCNC: -5.4 MMOL/L
BASOPHILS # BLD AUTO: 0.05 THOUSANDS/ΜL (ref 0–0.1)
BASOPHILS NFR BLD AUTO: 0 % (ref 0–1)
BILIRUB SERPL-MCNC: 0.56 MG/DL (ref 0.2–1)
BILIRUB UR QL STRIP: NEGATIVE
BUN SERPL-MCNC: 32 MG/DL (ref 5–25)
CALCIUM ALBUM COR SERPL-MCNC: 9.5 MG/DL (ref 8.3–10.1)
CALCIUM SERPL-MCNC: 9 MG/DL (ref 8.3–10.1)
CHLORIDE SERPL-SCNC: 108 MMOL/L (ref 100–108)
CK MB SERPL-MCNC: 1.6 NG/ML (ref 0–5)
CK MB SERPL-MCNC: <1 % (ref 0–2.5)
CK SERPL-CCNC: 277 U/L (ref 39–308)
CLARITY UR: ABNORMAL
CO2 SERPL-SCNC: 20 MMOL/L (ref 21–32)
COLOR UR: YELLOW
CREAT SERPL-MCNC: 2.13 MG/DL (ref 0.6–1.3)
CRP SERPL QL: 76.1 MG/L
EOSINOPHIL # BLD AUTO: 0 THOUSAND/ΜL (ref 0–0.61)
EOSINOPHIL NFR BLD AUTO: 0 % (ref 0–6)
ERYTHROCYTE [DISTWIDTH] IN BLOOD BY AUTOMATED COUNT: 15.5 % (ref 11.6–15.1)
GFR SERPL CREATININE-BSD FRML MDRD: 30 ML/MIN/1.73SQ M
GLUCOSE SERPL-MCNC: 296 MG/DL (ref 65–140)
GLUCOSE UR STRIP-MCNC: NEGATIVE MG/DL
HCO3 BLDV-SCNC: 18.8 MMOL/L (ref 24–30)
HCT VFR BLD AUTO: 47.1 % (ref 36.5–49.3)
HGB BLD-MCNC: 14.9 G/DL (ref 12–17)
HGB UR QL STRIP.AUTO: ABNORMAL
IMM GRANULOCYTES # BLD AUTO: 0.12 THOUSAND/UL (ref 0–0.2)
IMM GRANULOCYTES NFR BLD AUTO: 1 % (ref 0–2)
INR PPP: 1.15 (ref 0.84–1.19)
KETONES UR STRIP-MCNC: NEGATIVE MG/DL
LACTATE SERPL-SCNC: 1.6 MMOL/L (ref 0.5–2)
LACTATE SERPL-SCNC: 2.1 MMOL/L (ref 0.5–2)
LACTATE SERPL-SCNC: 4.8 MMOL/L (ref 0.5–2)
LACTATE SERPL-SCNC: 5.1 MMOL/L (ref 0.5–2)
LEUKOCYTE ESTERASE UR QL STRIP: ABNORMAL
LIPASE SERPL-CCNC: 74 U/L (ref 73–393)
LYMPHOCYTES # BLD AUTO: 0.58 THOUSANDS/ΜL (ref 0.6–4.47)
LYMPHOCYTES NFR BLD AUTO: 3 % (ref 14–44)
MAGNESIUM SERPL-MCNC: 2.5 MG/DL (ref 1.6–2.6)
MCH RBC QN AUTO: 27.5 PG (ref 26.8–34.3)
MCHC RBC AUTO-ENTMCNC: 31.6 G/DL (ref 31.4–37.4)
MCV RBC AUTO: 87 FL (ref 82–98)
MONOCYTES # BLD AUTO: 0.68 THOUSAND/ΜL (ref 0.17–1.22)
MONOCYTES NFR BLD AUTO: 3 % (ref 4–12)
NEUTROPHILS # BLD AUTO: 21.03 THOUSANDS/ΜL (ref 1.85–7.62)
NEUTS SEG NFR BLD AUTO: 93 % (ref 43–75)
NITRITE UR QL STRIP: POSITIVE
NON-SQ EPI CELLS URNS QL MICRO: ABNORMAL /HPF
NRBC BLD AUTO-RTO: 0 /100 WBCS
O2 CT BLDV-SCNC: 20 ML/DL
PCO2 BLDV: 33.2 MM HG (ref 42–50)
PH BLDV: 7.37 [PH] (ref 7.3–7.4)
PH UR STRIP.AUTO: 5.5 [PH]
PLATELET # BLD AUTO: 374 THOUSANDS/UL (ref 149–390)
PMV BLD AUTO: 9.6 FL (ref 8.9–12.7)
PO2 BLDV: 58.2 MM HG (ref 35–45)
POTASSIUM SERPL-SCNC: 3.6 MMOL/L (ref 3.5–5.3)
PROCALCITONIN SERPL-MCNC: 7.25 NG/ML
PROT SERPL-MCNC: 8.2 G/DL (ref 6.4–8.2)
PROT UR STRIP-MCNC: ABNORMAL MG/DL
PROTHROMBIN TIME: 14.7 SECONDS (ref 11.6–14.5)
RBC # BLD AUTO: 5.41 MILLION/UL (ref 3.88–5.62)
RBC #/AREA URNS AUTO: ABNORMAL /HPF
SARS-COV-2 RNA RESP QL NAA+PROBE: NEGATIVE
SODIUM SERPL-SCNC: 143 MMOL/L (ref 136–145)
SP GR UR STRIP.AUTO: 1.02 (ref 1–1.03)
TROPONIN I SERPL-MCNC: <0.02 NG/ML
UROBILINOGEN UR QL STRIP.AUTO: 0.2 E.U./DL
WBC # BLD AUTO: 22.46 THOUSAND/UL (ref 4.31–10.16)
WBC #/AREA URNS AUTO: ABNORMAL /HPF

## 2021-10-06 PROCEDURE — 96360 HYDRATION IV INFUSION INIT: CPT

## 2021-10-06 PROCEDURE — 87086 URINE CULTURE/COLONY COUNT: CPT | Performed by: EMERGENCY MEDICINE

## 2021-10-06 PROCEDURE — 87077 CULTURE AEROBIC IDENTIFY: CPT | Performed by: EMERGENCY MEDICINE

## 2021-10-06 PROCEDURE — 82553 CREATINE MB FRACTION: CPT | Performed by: EMERGENCY MEDICINE

## 2021-10-06 PROCEDURE — 96365 THER/PROPH/DIAG IV INF INIT: CPT

## 2021-10-06 PROCEDURE — 99285 EMERGENCY DEPT VISIT HI MDM: CPT

## 2021-10-06 PROCEDURE — 99223 1ST HOSP IP/OBS HIGH 75: CPT | Performed by: INTERNAL MEDICINE

## 2021-10-06 PROCEDURE — 84484 ASSAY OF TROPONIN QUANT: CPT | Performed by: EMERGENCY MEDICINE

## 2021-10-06 PROCEDURE — 86140 C-REACTIVE PROTEIN: CPT | Performed by: EMERGENCY MEDICINE

## 2021-10-06 PROCEDURE — 82550 ASSAY OF CK (CPK): CPT | Performed by: EMERGENCY MEDICINE

## 2021-10-06 PROCEDURE — 96361 HYDRATE IV INFUSION ADD-ON: CPT

## 2021-10-06 PROCEDURE — 85730 THROMBOPLASTIN TIME PARTIAL: CPT | Performed by: EMERGENCY MEDICINE

## 2021-10-06 PROCEDURE — 83605 ASSAY OF LACTIC ACID: CPT | Performed by: EMERGENCY MEDICINE

## 2021-10-06 PROCEDURE — 74176 CT ABD & PELVIS W/O CONTRAST: CPT

## 2021-10-06 PROCEDURE — 80053 COMPREHEN METABOLIC PANEL: CPT | Performed by: EMERGENCY MEDICINE

## 2021-10-06 PROCEDURE — 82805 BLOOD GASES W/O2 SATURATION: CPT | Performed by: EMERGENCY MEDICINE

## 2021-10-06 PROCEDURE — 71045 X-RAY EXAM CHEST 1 VIEW: CPT

## 2021-10-06 PROCEDURE — 83605 ASSAY OF LACTIC ACID: CPT | Performed by: INTERNAL MEDICINE

## 2021-10-06 PROCEDURE — 99291 CRITICAL CARE FIRST HOUR: CPT | Performed by: EMERGENCY MEDICINE

## 2021-10-06 PROCEDURE — 84145 PROCALCITONIN (PCT): CPT | Performed by: EMERGENCY MEDICINE

## 2021-10-06 PROCEDURE — 87040 BLOOD CULTURE FOR BACTERIA: CPT | Performed by: EMERGENCY MEDICINE

## 2021-10-06 PROCEDURE — 81001 URINALYSIS AUTO W/SCOPE: CPT | Performed by: EMERGENCY MEDICINE

## 2021-10-06 PROCEDURE — 85025 COMPLETE CBC W/AUTO DIFF WBC: CPT | Performed by: EMERGENCY MEDICINE

## 2021-10-06 PROCEDURE — 99254 IP/OBS CNSLTJ NEW/EST MOD 60: CPT | Performed by: SURGERY

## 2021-10-06 PROCEDURE — 71250 CT THORAX DX C-: CPT

## 2021-10-06 PROCEDURE — 87147 CULTURE TYPE IMMUNOLOGIC: CPT | Performed by: EMERGENCY MEDICINE

## 2021-10-06 PROCEDURE — 85610 PROTHROMBIN TIME: CPT | Performed by: EMERGENCY MEDICINE

## 2021-10-06 PROCEDURE — 83735 ASSAY OF MAGNESIUM: CPT | Performed by: EMERGENCY MEDICINE

## 2021-10-06 PROCEDURE — 83690 ASSAY OF LIPASE: CPT | Performed by: EMERGENCY MEDICINE

## 2021-10-06 PROCEDURE — 93005 ELECTROCARDIOGRAM TRACING: CPT

## 2021-10-06 PROCEDURE — 36415 COLL VENOUS BLD VENIPUNCTURE: CPT | Performed by: EMERGENCY MEDICINE

## 2021-10-06 PROCEDURE — U0005 INFEC AGEN DETEC AMPLI PROBE: HCPCS | Performed by: EMERGENCY MEDICINE

## 2021-10-06 PROCEDURE — U0003 INFECTIOUS AGENT DETECTION BY NUCLEIC ACID (DNA OR RNA); SEVERE ACUTE RESPIRATORY SYNDROME CORONAVIRUS 2 (SARS-COV-2) (CORONAVIRUS DISEASE [COVID-19]), AMPLIFIED PROBE TECHNIQUE, MAKING USE OF HIGH THROUGHPUT TECHNOLOGIES AS DESCRIBED BY CMS-2020-01-R: HCPCS | Performed by: EMERGENCY MEDICINE

## 2021-10-06 RX ORDER — LORAZEPAM 2 MG/ML
0.25 INJECTION INTRAMUSCULAR EVERY 12 HOURS PRN
Status: DISCONTINUED | OUTPATIENT
Start: 2021-10-06 | End: 2021-10-13 | Stop reason: HOSPADM

## 2021-10-06 RX ORDER — METOPROLOL TARTRATE 5 MG/5ML
2.5 INJECTION INTRAVENOUS EVERY 8 HOURS
Status: DISCONTINUED | OUTPATIENT
Start: 2021-10-06 | End: 2021-10-13 | Stop reason: HOSPADM

## 2021-10-06 RX ORDER — ACETAMINOPHEN 650 MG/1
650 SUPPOSITORY RECTAL ONCE
Status: COMPLETED | OUTPATIENT
Start: 2021-10-06 | End: 2021-10-06

## 2021-10-06 RX ORDER — SODIUM CHLORIDE, SODIUM LACTATE, POTASSIUM CHLORIDE, CALCIUM CHLORIDE 600; 310; 30; 20 MG/100ML; MG/100ML; MG/100ML; MG/100ML
125 INJECTION, SOLUTION INTRAVENOUS CONTINUOUS
Status: DISCONTINUED | OUTPATIENT
Start: 2021-10-06 | End: 2021-10-07

## 2021-10-06 RX ORDER — SODIUM CHLORIDE 9 MG/ML
10 INJECTION INTRAVENOUS DAILY
Status: DISCONTINUED | OUTPATIENT
Start: 2021-10-06 | End: 2021-10-13 | Stop reason: HOSPADM

## 2021-10-06 RX ORDER — HEPARIN SODIUM 5000 [USP'U]/ML
5000 INJECTION, SOLUTION INTRAVENOUS; SUBCUTANEOUS EVERY 8 HOURS SCHEDULED
Status: DISCONTINUED | OUTPATIENT
Start: 2021-10-06 | End: 2021-10-11

## 2021-10-06 RX ADMIN — SODIUM CHLORIDE 1000 ML: 0.9 INJECTION, SOLUTION INTRAVENOUS at 09:19

## 2021-10-06 RX ADMIN — FAMOTIDINE 20 MG: 10 INJECTION, SOLUTION INTRAVENOUS at 16:00

## 2021-10-06 RX ADMIN — ERTAPENEM SODIUM 1000 MG: 1 INJECTION, POWDER, LYOPHILIZED, FOR SOLUTION INTRAMUSCULAR; INTRAVENOUS at 09:28

## 2021-10-06 RX ADMIN — SODIUM CHLORIDE, SODIUM LACTATE, POTASSIUM CHLORIDE, AND CALCIUM CHLORIDE 125 ML/HR: .6; .31; .03; .02 INJECTION, SOLUTION INTRAVENOUS at 20:28

## 2021-10-06 RX ADMIN — METOROPROLOL TARTRATE 2.5 MG: 5 INJECTION, SOLUTION INTRAVENOUS at 16:01

## 2021-10-06 RX ADMIN — SODIUM CHLORIDE, SODIUM LACTATE, POTASSIUM CHLORIDE, AND CALCIUM CHLORIDE 125 ML/HR: .6; .31; .03; .02 INJECTION, SOLUTION INTRAVENOUS at 12:40

## 2021-10-06 RX ADMIN — SODIUM CHLORIDE 1000 ML: 0.9 INJECTION, SOLUTION INTRAVENOUS at 10:22

## 2021-10-06 RX ADMIN — ACETAMINOPHEN 650 MG: 650 SUPPOSITORY RECTAL at 09:38

## 2021-10-06 RX ADMIN — SODIUM CHLORIDE, SODIUM LACTATE, POTASSIUM CHLORIDE, AND CALCIUM CHLORIDE 1000 ML: .6; .31; .03; .02 INJECTION, SOLUTION INTRAVENOUS at 14:39

## 2021-10-06 RX ADMIN — SODIUM CHLORIDE 10 ML: 9 INJECTION INTRAMUSCULAR; INTRAVENOUS; SUBCUTANEOUS at 16:00

## 2021-10-06 RX ADMIN — SODIUM CHLORIDE 1000 ML: 0.9 INJECTION, SOLUTION INTRAVENOUS at 09:49

## 2021-10-06 RX ADMIN — HEPARIN SODIUM 5000 UNITS: 5000 INJECTION INTRAVENOUS; SUBCUTANEOUS at 16:43

## 2021-10-07 PROBLEM — E87.0 HYPERNATREMIA: Status: ACTIVE | Noted: 2021-10-07

## 2021-10-07 LAB
ALBUMIN SERPL BCP-MCNC: 2.3 G/DL (ref 3.5–5)
ALP SERPL-CCNC: 50 U/L (ref 46–116)
ALT SERPL W P-5'-P-CCNC: 14 U/L (ref 12–78)
ANION GAP SERPL CALCULATED.3IONS-SCNC: 7 MMOL/L (ref 4–13)
AST SERPL W P-5'-P-CCNC: 40 U/L (ref 5–45)
BASOPHILS # BLD AUTO: 0.03 THOUSANDS/ΜL (ref 0–0.1)
BASOPHILS NFR BLD AUTO: 0 % (ref 0–1)
BILIRUB SERPL-MCNC: 0.33 MG/DL (ref 0.2–1)
BUN SERPL-MCNC: 44 MG/DL (ref 5–25)
CALCIUM ALBUM COR SERPL-MCNC: 9.4 MG/DL (ref 8.3–10.1)
CALCIUM SERPL-MCNC: 8 MG/DL (ref 8.3–10.1)
CHLORIDE SERPL-SCNC: 117 MMOL/L (ref 100–108)
CO2 SERPL-SCNC: 24 MMOL/L (ref 21–32)
CREAT SERPL-MCNC: 1.71 MG/DL (ref 0.6–1.3)
EOSINOPHIL # BLD AUTO: 0.04 THOUSAND/ΜL (ref 0–0.61)
EOSINOPHIL NFR BLD AUTO: 0 % (ref 0–6)
ERYTHROCYTE [DISTWIDTH] IN BLOOD BY AUTOMATED COUNT: 15.6 % (ref 11.6–15.1)
GFR SERPL CREATININE-BSD FRML MDRD: 39 ML/MIN/1.73SQ M
GLUCOSE SERPL-MCNC: 99 MG/DL (ref 65–140)
HCT VFR BLD AUTO: 31.1 % (ref 36.5–49.3)
HCT VFR BLD AUTO: 31.2 % (ref 36.5–49.3)
HGB BLD-MCNC: 9.9 G/DL (ref 12–17)
HGB BLD-MCNC: 9.9 G/DL (ref 12–17)
IMM GRANULOCYTES # BLD AUTO: 0.09 THOUSAND/UL (ref 0–0.2)
IMM GRANULOCYTES NFR BLD AUTO: 1 % (ref 0–2)
LYMPHOCYTES # BLD AUTO: 1.91 THOUSANDS/ΜL (ref 0.6–4.47)
LYMPHOCYTES NFR BLD AUTO: 16 % (ref 14–44)
MCH RBC QN AUTO: 27.7 PG (ref 26.8–34.3)
MCHC RBC AUTO-ENTMCNC: 31.8 G/DL (ref 31.4–37.4)
MCV RBC AUTO: 87 FL (ref 82–98)
MONOCYTES # BLD AUTO: 0.71 THOUSAND/ΜL (ref 0.17–1.22)
MONOCYTES NFR BLD AUTO: 6 % (ref 4–12)
NEUTROPHILS # BLD AUTO: 9 THOUSANDS/ΜL (ref 1.85–7.62)
NEUTS SEG NFR BLD AUTO: 77 % (ref 43–75)
NRBC BLD AUTO-RTO: 0 /100 WBCS
PLATELET # BLD AUTO: 224 THOUSANDS/UL (ref 149–390)
PMV BLD AUTO: 10.1 FL (ref 8.9–12.7)
POTASSIUM SERPL-SCNC: 3.9 MMOL/L (ref 3.5–5.3)
PROCALCITONIN SERPL-MCNC: 8.66 NG/ML
PROT SERPL-MCNC: 5.6 G/DL (ref 6.4–8.2)
RBC # BLD AUTO: 3.58 MILLION/UL (ref 3.88–5.62)
SODIUM SERPL-SCNC: 148 MMOL/L (ref 136–145)
WBC # BLD AUTO: 11.78 THOUSAND/UL (ref 4.31–10.16)

## 2021-10-07 PROCEDURE — 99255 IP/OBS CONSLTJ NEW/EST HI 80: CPT | Performed by: STUDENT IN AN ORGANIZED HEALTH CARE EDUCATION/TRAINING PROGRAM

## 2021-10-07 PROCEDURE — 85014 HEMATOCRIT: CPT | Performed by: INTERNAL MEDICINE

## 2021-10-07 PROCEDURE — 99232 SBSQ HOSP IP/OBS MODERATE 35: CPT | Performed by: SURGERY

## 2021-10-07 PROCEDURE — 84145 PROCALCITONIN (PCT): CPT | Performed by: INTERNAL MEDICINE

## 2021-10-07 PROCEDURE — 36415 COLL VENOUS BLD VENIPUNCTURE: CPT | Performed by: INTERNAL MEDICINE

## 2021-10-07 PROCEDURE — 85018 HEMOGLOBIN: CPT | Performed by: INTERNAL MEDICINE

## 2021-10-07 PROCEDURE — 85025 COMPLETE CBC W/AUTO DIFF WBC: CPT | Performed by: INTERNAL MEDICINE

## 2021-10-07 PROCEDURE — 80053 COMPREHEN METABOLIC PANEL: CPT | Performed by: INTERNAL MEDICINE

## 2021-10-07 PROCEDURE — 99232 SBSQ HOSP IP/OBS MODERATE 35: CPT | Performed by: HOSPITALIST

## 2021-10-07 RX ORDER — DEXTROSE MONOHYDRATE 50 MG/ML
75 INJECTION, SOLUTION INTRAVENOUS CONTINUOUS
Status: DISCONTINUED | OUTPATIENT
Start: 2021-10-07 | End: 2021-10-13 | Stop reason: HOSPADM

## 2021-10-07 RX ADMIN — METOROPROLOL TARTRATE 2.5 MG: 5 INJECTION, SOLUTION INTRAVENOUS at 23:55

## 2021-10-07 RX ADMIN — DEXTROSE 125 ML/HR: 5 SOLUTION INTRAVENOUS at 12:20

## 2021-10-07 RX ADMIN — FAMOTIDINE 20 MG: 10 INJECTION, SOLUTION INTRAVENOUS at 08:26

## 2021-10-07 RX ADMIN — HEPARIN SODIUM 5000 UNITS: 5000 INJECTION INTRAVENOUS; SUBCUTANEOUS at 00:03

## 2021-10-07 RX ADMIN — HEPARIN SODIUM 5000 UNITS: 5000 INJECTION INTRAVENOUS; SUBCUTANEOUS at 22:13

## 2021-10-07 RX ADMIN — DEXTROSE 125 ML/HR: 5 SOLUTION INTRAVENOUS at 17:21

## 2021-10-07 RX ADMIN — METOROPROLOL TARTRATE 2.5 MG: 5 INJECTION, SOLUTION INTRAVENOUS at 15:12

## 2021-10-07 RX ADMIN — METOROPROLOL TARTRATE 2.5 MG: 5 INJECTION, SOLUTION INTRAVENOUS at 08:27

## 2021-10-07 RX ADMIN — METOROPROLOL TARTRATE 2.5 MG: 5 INJECTION, SOLUTION INTRAVENOUS at 00:03

## 2021-10-07 RX ADMIN — GLYCERIN, HYPROMELLOSE, POLYETHYLENE GLYCOL 1 DROP: .2; .2; 1 LIQUID OPHTHALMIC at 17:16

## 2021-10-07 RX ADMIN — HEPARIN SODIUM 5000 UNITS: 5000 INJECTION INTRAVENOUS; SUBCUTANEOUS at 05:26

## 2021-10-07 RX ADMIN — HEPARIN SODIUM 5000 UNITS: 5000 INJECTION INTRAVENOUS; SUBCUTANEOUS at 15:11

## 2021-10-07 RX ADMIN — SODIUM CHLORIDE 10 ML: 9 INJECTION INTRAMUSCULAR; INTRAVENOUS; SUBCUTANEOUS at 08:27

## 2021-10-07 RX ADMIN — ERTAPENEM SODIUM 1000 MG: 1 INJECTION, POWDER, LYOPHILIZED, FOR SOLUTION INTRAMUSCULAR; INTRAVENOUS at 08:30

## 2021-10-07 RX ADMIN — GLYCERIN, HYPROMELLOSE, POLYETHYLENE GLYCOL 1 DROP: .2; .2; 1 LIQUID OPHTHALMIC at 08:36

## 2021-10-08 ENCOUNTER — APPOINTMENT (INPATIENT)
Dept: RADIOLOGY | Facility: HOSPITAL | Age: 71
DRG: 871 | End: 2021-10-08
Payer: MEDICARE

## 2021-10-08 PROBLEM — E87.6 HYPOKALEMIA: Status: ACTIVE | Noted: 2021-10-08

## 2021-10-08 PROBLEM — K56.41 FECAL IMPACTION (HCC): Status: ACTIVE | Noted: 2021-10-08

## 2021-10-08 LAB
ANION GAP SERPL CALCULATED.3IONS-SCNC: 7 MMOL/L (ref 4–13)
ATRIAL RATE: 117 BPM
BACTERIA UR CULT: ABNORMAL
BUN SERPL-MCNC: 28 MG/DL (ref 5–25)
CALCIUM SERPL-MCNC: 8.2 MG/DL (ref 8.3–10.1)
CHLORIDE SERPL-SCNC: 113 MMOL/L (ref 100–108)
CO2 SERPL-SCNC: 26 MMOL/L (ref 21–32)
CREAT SERPL-MCNC: 1.49 MG/DL (ref 0.6–1.3)
ERYTHROCYTE [DISTWIDTH] IN BLOOD BY AUTOMATED COUNT: 15.5 % (ref 11.6–15.1)
GFR SERPL CREATININE-BSD FRML MDRD: 46 ML/MIN/1.73SQ M
GLUCOSE SERPL-MCNC: 100 MG/DL (ref 65–140)
HCT VFR BLD AUTO: 31.8 % (ref 36.5–49.3)
HGB BLD-MCNC: 9.8 G/DL (ref 12–17)
MCH RBC QN AUTO: 27.5 PG (ref 26.8–34.3)
MCHC RBC AUTO-ENTMCNC: 30.8 G/DL (ref 31.4–37.4)
MCV RBC AUTO: 89 FL (ref 82–98)
P AXIS: 57 DEGREES
PLATELET # BLD AUTO: 197 THOUSANDS/UL (ref 149–390)
PMV BLD AUTO: 9.9 FL (ref 8.9–12.7)
POTASSIUM SERPL-SCNC: 3.3 MMOL/L (ref 3.5–5.3)
PR INTERVAL: 150 MS
QRS AXIS: -78 DEGREES
QRSD INTERVAL: 78 MS
QT INTERVAL: 320 MS
QTC INTERVAL: 446 MS
RBC # BLD AUTO: 3.56 MILLION/UL (ref 3.88–5.62)
SODIUM SERPL-SCNC: 146 MMOL/L (ref 136–145)
T WAVE AXIS: 72 DEGREES
VENTRICULAR RATE: 117 BPM
WBC # BLD AUTO: 8.8 THOUSAND/UL (ref 4.31–10.16)

## 2021-10-08 PROCEDURE — 99232 SBSQ HOSP IP/OBS MODERATE 35: CPT | Performed by: SURGERY

## 2021-10-08 PROCEDURE — 85027 COMPLETE CBC AUTOMATED: CPT | Performed by: INTERNAL MEDICINE

## 2021-10-08 PROCEDURE — 93010 ELECTROCARDIOGRAM REPORT: CPT | Performed by: INTERNAL MEDICINE

## 2021-10-08 PROCEDURE — 74018 RADEX ABDOMEN 1 VIEW: CPT

## 2021-10-08 PROCEDURE — 80048 BASIC METABOLIC PNL TOTAL CA: CPT | Performed by: INTERNAL MEDICINE

## 2021-10-08 PROCEDURE — 99232 SBSQ HOSP IP/OBS MODERATE 35: CPT | Performed by: HOSPITALIST

## 2021-10-08 PROCEDURE — 99233 SBSQ HOSP IP/OBS HIGH 50: CPT | Performed by: STUDENT IN AN ORGANIZED HEALTH CARE EDUCATION/TRAINING PROGRAM

## 2021-10-08 RX ORDER — POTASSIUM CHLORIDE 14.9 MG/ML
20 INJECTION INTRAVENOUS
Status: DISPENSED | OUTPATIENT
Start: 2021-10-08 | End: 2021-10-08

## 2021-10-08 RX ORDER — POTASSIUM CHLORIDE 29.8 MG/ML
40 INJECTION INTRAVENOUS ONCE
Status: DISCONTINUED | OUTPATIENT
Start: 2021-10-08 | End: 2021-10-08 | Stop reason: SDUPTHER

## 2021-10-08 RX ADMIN — GLYCERIN, HYPROMELLOSE, POLYETHYLENE GLYCOL 1 DROP: .2; .2; 1 LIQUID OPHTHALMIC at 09:57

## 2021-10-08 RX ADMIN — HEPARIN SODIUM 5000 UNITS: 5000 INJECTION INTRAVENOUS; SUBCUTANEOUS at 04:52

## 2021-10-08 RX ADMIN — METOROPROLOL TARTRATE 2.5 MG: 5 INJECTION, SOLUTION INTRAVENOUS at 23:37

## 2021-10-08 RX ADMIN — FAMOTIDINE 20 MG: 10 INJECTION, SOLUTION INTRAVENOUS at 09:57

## 2021-10-08 RX ADMIN — DEXTROSE 125 ML/HR: 5 SOLUTION INTRAVENOUS at 15:56

## 2021-10-08 RX ADMIN — METOROPROLOL TARTRATE 2.5 MG: 5 INJECTION, SOLUTION INTRAVENOUS at 09:59

## 2021-10-08 RX ADMIN — HEPARIN SODIUM 5000 UNITS: 5000 INJECTION INTRAVENOUS; SUBCUTANEOUS at 14:17

## 2021-10-08 RX ADMIN — HEPARIN SODIUM 5000 UNITS: 5000 INJECTION INTRAVENOUS; SUBCUTANEOUS at 23:37

## 2021-10-08 RX ADMIN — DEXTROSE 125 ML/HR: 5 SOLUTION INTRAVENOUS at 23:38

## 2021-10-08 RX ADMIN — SODIUM CHLORIDE 10 ML: 9 INJECTION INTRAMUSCULAR; INTRAVENOUS; SUBCUTANEOUS at 09:59

## 2021-10-08 RX ADMIN — POTASSIUM CHLORIDE 20 MEQ: 14.9 INJECTION, SOLUTION INTRAVENOUS at 09:56

## 2021-10-08 RX ADMIN — ERTAPENEM SODIUM 1000 MG: 1 INJECTION, POWDER, LYOPHILIZED, FOR SOLUTION INTRAMUSCULAR; INTRAVENOUS at 10:37

## 2021-10-08 RX ADMIN — METOROPROLOL TARTRATE 2.5 MG: 5 INJECTION, SOLUTION INTRAVENOUS at 17:48

## 2021-10-08 RX ADMIN — MICONAZOLE NITRATE: 20 CREAM TOPICAL at 14:17

## 2021-10-08 RX ADMIN — DEXTROSE 125 ML/HR: 5 SOLUTION INTRAVENOUS at 04:52

## 2021-10-08 RX ADMIN — MICONAZOLE NITRATE: 20 CREAM TOPICAL at 17:48

## 2021-10-09 PROCEDURE — 99232 SBSQ HOSP IP/OBS MODERATE 35: CPT | Performed by: HOSPITALIST

## 2021-10-09 PROCEDURE — 99231 SBSQ HOSP IP/OBS SF/LOW 25: CPT | Performed by: SURGERY

## 2021-10-09 RX ORDER — MINERAL OIL 100 G/100G
1 OIL RECTAL 3 TIMES DAILY
Status: DISCONTINUED | OUTPATIENT
Start: 2021-10-09 | End: 2021-10-10

## 2021-10-09 RX ORDER — POTASSIUM CHLORIDE 14.9 MG/ML
20 INJECTION INTRAVENOUS
Status: COMPLETED | OUTPATIENT
Start: 2021-10-09 | End: 2021-10-10

## 2021-10-09 RX ADMIN — MINERAL OIL 1 ENEMA: 100 ENEMA RECTAL at 18:08

## 2021-10-09 RX ADMIN — ERTAPENEM SODIUM 1000 MG: 1 INJECTION, POWDER, LYOPHILIZED, FOR SOLUTION INTRAMUSCULAR; INTRAVENOUS at 09:51

## 2021-10-09 RX ADMIN — HEPARIN SODIUM 5000 UNITS: 5000 INJECTION INTRAVENOUS; SUBCUTANEOUS at 23:24

## 2021-10-09 RX ADMIN — MINERAL OIL 1 ENEMA: 100 ENEMA RECTAL at 21:01

## 2021-10-09 RX ADMIN — METOROPROLOL TARTRATE 2.5 MG: 5 INJECTION, SOLUTION INTRAVENOUS at 18:08

## 2021-10-09 RX ADMIN — POTASSIUM CHLORIDE 20 MEQ: 14.9 INJECTION, SOLUTION INTRAVENOUS at 21:01

## 2021-10-09 RX ADMIN — MINERAL OIL 1 ENEMA: 100 ENEMA RECTAL at 09:49

## 2021-10-09 RX ADMIN — SODIUM CHLORIDE 10 ML: 9 INJECTION INTRAMUSCULAR; INTRAVENOUS; SUBCUTANEOUS at 09:49

## 2021-10-09 RX ADMIN — HEPARIN SODIUM 5000 UNITS: 5000 INJECTION INTRAVENOUS; SUBCUTANEOUS at 18:08

## 2021-10-09 RX ADMIN — MICONAZOLE NITRATE: 20 CREAM TOPICAL at 18:08

## 2021-10-09 RX ADMIN — METOROPROLOL TARTRATE 2.5 MG: 5 INJECTION, SOLUTION INTRAVENOUS at 09:50

## 2021-10-09 RX ADMIN — GLYCERIN, HYPROMELLOSE, POLYETHYLENE GLYCOL 1 DROP: .2; .2; 1 LIQUID OPHTHALMIC at 18:09

## 2021-10-09 RX ADMIN — FAMOTIDINE 20 MG: 10 INJECTION, SOLUTION INTRAVENOUS at 09:49

## 2021-10-09 RX ADMIN — POTASSIUM CHLORIDE 20 MEQ: 14.9 INJECTION, SOLUTION INTRAVENOUS at 23:24

## 2021-10-09 RX ADMIN — MICONAZOLE NITRATE: 20 CREAM TOPICAL at 09:50

## 2021-10-09 RX ADMIN — HEPARIN SODIUM 5000 UNITS: 5000 INJECTION INTRAVENOUS; SUBCUTANEOUS at 05:34

## 2021-10-09 RX ADMIN — METOROPROLOL TARTRATE 2.5 MG: 5 INJECTION, SOLUTION INTRAVENOUS at 23:28

## 2021-10-09 RX ADMIN — POTASSIUM CHLORIDE 20 MEQ: 14.9 INJECTION, SOLUTION INTRAVENOUS at 18:08

## 2021-10-09 RX ADMIN — DEXTROSE 125 ML/HR: 5 SOLUTION INTRAVENOUS at 19:22

## 2021-10-10 PROBLEM — R53.81 PHYSICAL DECONDITIONING: Chronic | Status: ACTIVE | Noted: 2021-02-12

## 2021-10-10 PROBLEM — R13.12 OROPHARYNGEAL DYSPHAGIA: Chronic | Status: ACTIVE | Noted: 2021-05-06

## 2021-10-10 PROBLEM — Z93.59 SUPRAPUBIC CATHETER (HCC): Chronic | Status: ACTIVE | Noted: 2019-08-12

## 2021-10-10 LAB
ERYTHROCYTE [DISTWIDTH] IN BLOOD BY AUTOMATED COUNT: 14.8 % (ref 11.6–15.1)
HCT VFR BLD AUTO: 30.3 % (ref 36.5–49.3)
HGB BLD-MCNC: 9.6 G/DL (ref 12–17)
MAGNESIUM SERPL-MCNC: 2 MG/DL (ref 1.6–2.6)
MCH RBC QN AUTO: 28 PG (ref 26.8–34.3)
MCHC RBC AUTO-ENTMCNC: 31.7 G/DL (ref 31.4–37.4)
MCV RBC AUTO: 88 FL (ref 82–98)
PHOSPHATE SERPL-MCNC: 2.7 MG/DL (ref 2.3–4.1)
PLATELET # BLD AUTO: 199 THOUSANDS/UL (ref 149–390)
PMV BLD AUTO: 9.7 FL (ref 8.9–12.7)
RBC # BLD AUTO: 3.43 MILLION/UL (ref 3.88–5.62)
WBC # BLD AUTO: 6.38 THOUSAND/UL (ref 4.31–10.16)

## 2021-10-10 PROCEDURE — 85027 COMPLETE CBC AUTOMATED: CPT | Performed by: INTERNAL MEDICINE

## 2021-10-10 PROCEDURE — 83735 ASSAY OF MAGNESIUM: CPT | Performed by: INTERNAL MEDICINE

## 2021-10-10 PROCEDURE — 99231 SBSQ HOSP IP/OBS SF/LOW 25: CPT | Performed by: SURGERY

## 2021-10-10 PROCEDURE — 84100 ASSAY OF PHOSPHORUS: CPT | Performed by: SURGERY

## 2021-10-10 PROCEDURE — 99232 SBSQ HOSP IP/OBS MODERATE 35: CPT | Performed by: HOSPITALIST

## 2021-10-10 RX ORDER — DIPHENHYDRAMINE HYDROCHLORIDE, ZINC ACETATE 2; .1 G/100G; G/100G
CREAM TOPICAL 3 TIMES DAILY PRN
Status: DISCONTINUED | OUTPATIENT
Start: 2021-10-10 | End: 2021-10-13 | Stop reason: HOSPADM

## 2021-10-10 RX ADMIN — MINERAL OIL 1 ENEMA: 100 ENEMA RECTAL at 08:18

## 2021-10-10 RX ADMIN — FAMOTIDINE 20 MG: 10 INJECTION, SOLUTION INTRAVENOUS at 08:18

## 2021-10-10 RX ADMIN — DEXTROSE 125 ML/HR: 5 SOLUTION INTRAVENOUS at 08:19

## 2021-10-10 RX ADMIN — GLYCERIN, HYPROMELLOSE, POLYETHYLENE GLYCOL 1 DROP: .2; .2; 1 LIQUID OPHTHALMIC at 08:18

## 2021-10-10 RX ADMIN — HEPARIN SODIUM 5000 UNITS: 5000 INJECTION INTRAVENOUS; SUBCUTANEOUS at 18:21

## 2021-10-10 RX ADMIN — GLYCERIN, HYPROMELLOSE, POLYETHYLENE GLYCOL 1 DROP: .2; .2; 1 LIQUID OPHTHALMIC at 18:21

## 2021-10-10 RX ADMIN — METOROPROLOL TARTRATE 2.5 MG: 5 INJECTION, SOLUTION INTRAVENOUS at 23:32

## 2021-10-10 RX ADMIN — SODIUM CHLORIDE 10 ML: 9 INJECTION INTRAMUSCULAR; INTRAVENOUS; SUBCUTANEOUS at 08:18

## 2021-10-10 RX ADMIN — HEPARIN SODIUM 5000 UNITS: 5000 INJECTION INTRAVENOUS; SUBCUTANEOUS at 22:09

## 2021-10-10 RX ADMIN — HEPARIN SODIUM 5000 UNITS: 5000 INJECTION INTRAVENOUS; SUBCUTANEOUS at 06:39

## 2021-10-10 RX ADMIN — ERTAPENEM SODIUM 1000 MG: 1 INJECTION, POWDER, LYOPHILIZED, FOR SOLUTION INTRAMUSCULAR; INTRAVENOUS at 10:23

## 2021-10-10 RX ADMIN — METOROPROLOL TARTRATE 2.5 MG: 5 INJECTION, SOLUTION INTRAVENOUS at 18:21

## 2021-10-10 RX ADMIN — DEXTROSE 125 ML/HR: 5 SOLUTION INTRAVENOUS at 03:46

## 2021-10-10 RX ADMIN — MICONAZOLE NITRATE: 20 CREAM TOPICAL at 18:21

## 2021-10-10 RX ADMIN — MICONAZOLE NITRATE: 20 CREAM TOPICAL at 08:18

## 2021-10-10 RX ADMIN — METOROPROLOL TARTRATE 2.5 MG: 5 INJECTION, SOLUTION INTRAVENOUS at 08:18

## 2021-10-11 PROBLEM — K56.41 FECAL IMPACTION (HCC): Status: RESOLVED | Noted: 2021-01-01 | Resolved: 2021-01-01

## 2021-10-11 LAB
ANION GAP SERPL CALCULATED.3IONS-SCNC: 8 MMOL/L (ref 4–13)
BACTERIA BLD CULT: NORMAL
BACTERIA BLD CULT: NORMAL
BUN SERPL-MCNC: 11 MG/DL (ref 5–25)
CALCIUM SERPL-MCNC: 8.5 MG/DL (ref 8.3–10.1)
CHLORIDE SERPL-SCNC: 104 MMOL/L (ref 100–108)
CO2 SERPL-SCNC: 23 MMOL/L (ref 21–32)
CREAT SERPL-MCNC: 1.37 MG/DL (ref 0.6–1.3)
GFR SERPL CREATININE-BSD FRML MDRD: 51 ML/MIN/1.73SQ M
GLUCOSE SERPL-MCNC: 115 MG/DL (ref 65–140)
POTASSIUM SERPL-SCNC: 3.4 MMOL/L (ref 3.5–5.3)
SODIUM SERPL-SCNC: 135 MMOL/L (ref 136–145)

## 2021-10-11 PROCEDURE — 99232 SBSQ HOSP IP/OBS MODERATE 35: CPT | Performed by: INTERNAL MEDICINE

## 2021-10-11 PROCEDURE — 99232 SBSQ HOSP IP/OBS MODERATE 35: CPT | Performed by: SURGERY

## 2021-10-11 PROCEDURE — 80048 BASIC METABOLIC PNL TOTAL CA: CPT | Performed by: INTERNAL MEDICINE

## 2021-10-11 PROCEDURE — 92610 EVALUATE SWALLOWING FUNCTION: CPT

## 2021-10-11 PROCEDURE — 99232 SBSQ HOSP IP/OBS MODERATE 35: CPT | Performed by: HOSPITALIST

## 2021-10-11 RX ORDER — POTASSIUM CHLORIDE 29.8 MG/ML
40 INJECTION INTRAVENOUS ONCE
Status: DISCONTINUED | OUTPATIENT
Start: 2021-10-11 | End: 2021-10-11

## 2021-10-11 RX ORDER — POTASSIUM CHLORIDE 14.9 MG/ML
20 INJECTION INTRAVENOUS
Status: COMPLETED | OUTPATIENT
Start: 2021-10-11 | End: 2021-10-12

## 2021-10-11 RX ORDER — POTASSIUM CHLORIDE 14.9 MG/ML
20 INJECTION INTRAVENOUS ONCE
Status: DISCONTINUED | OUTPATIENT
Start: 2021-10-11 | End: 2021-10-11

## 2021-10-11 RX ADMIN — APIXABAN 2.5 MG: 2.5 TABLET, FILM COATED ORAL at 21:21

## 2021-10-11 RX ADMIN — DEXTROSE 125 ML/HR: 5 SOLUTION INTRAVENOUS at 05:31

## 2021-10-11 RX ADMIN — POTASSIUM CHLORIDE 20 MEQ: 14.9 INJECTION, SOLUTION INTRAVENOUS at 15:55

## 2021-10-11 RX ADMIN — GLYCERIN, HYPROMELLOSE, POLYETHYLENE GLYCOL 1 DROP: .2; .2; 1 LIQUID OPHTHALMIC at 08:23

## 2021-10-11 RX ADMIN — POTASSIUM CHLORIDE 20 MEQ: 14.9 INJECTION, SOLUTION INTRAVENOUS at 11:23

## 2021-10-11 RX ADMIN — METOROPROLOL TARTRATE 2.5 MG: 5 INJECTION, SOLUTION INTRAVENOUS at 08:21

## 2021-10-11 RX ADMIN — POTASSIUM CHLORIDE 20 MEQ: 14.9 INJECTION, SOLUTION INTRAVENOUS at 13:53

## 2021-10-11 RX ADMIN — MICONAZOLE NITRATE: 20 CREAM TOPICAL at 08:24

## 2021-10-11 RX ADMIN — METOROPROLOL TARTRATE 2.5 MG: 5 INJECTION, SOLUTION INTRAVENOUS at 17:54

## 2021-10-11 RX ADMIN — FAMOTIDINE 20 MG: 10 INJECTION, SOLUTION INTRAVENOUS at 08:17

## 2021-10-11 RX ADMIN — GLYCERIN, HYPROMELLOSE, POLYETHYLENE GLYCOL 1 DROP: .2; .2; 1 LIQUID OPHTHALMIC at 18:10

## 2021-10-11 RX ADMIN — MICONAZOLE NITRATE: 20 CREAM TOPICAL at 18:08

## 2021-10-11 RX ADMIN — HEPARIN SODIUM 5000 UNITS: 5000 INJECTION INTRAVENOUS; SUBCUTANEOUS at 05:31

## 2021-10-11 RX ADMIN — SODIUM CHLORIDE 10 ML: 9 INJECTION INTRAMUSCULAR; INTRAVENOUS; SUBCUTANEOUS at 08:19

## 2021-10-11 RX ADMIN — APIXABAN 2.5 MG: 2.5 TABLET, FILM COATED ORAL at 14:10

## 2021-10-12 LAB
ANION GAP SERPL CALCULATED.3IONS-SCNC: 6 MMOL/L (ref 4–13)
BASOPHILS # BLD AUTO: 0.03 THOUSANDS/ΜL (ref 0–0.1)
BASOPHILS NFR BLD AUTO: 0 % (ref 0–1)
BUN SERPL-MCNC: 10 MG/DL (ref 5–25)
CALCIUM SERPL-MCNC: 8.1 MG/DL (ref 8.3–10.1)
CHLORIDE SERPL-SCNC: 107 MMOL/L (ref 100–108)
CO2 SERPL-SCNC: 27 MMOL/L (ref 21–32)
CREAT SERPL-MCNC: 1.39 MG/DL (ref 0.6–1.3)
EOSINOPHIL # BLD AUTO: 0.36 THOUSAND/ΜL (ref 0–0.61)
EOSINOPHIL NFR BLD AUTO: 5 % (ref 0–6)
ERYTHROCYTE [DISTWIDTH] IN BLOOD BY AUTOMATED COUNT: 15.1 % (ref 11.6–15.1)
FLUAV RNA RESP QL NAA+PROBE: NEGATIVE
FLUBV RNA RESP QL NAA+PROBE: NEGATIVE
GFR SERPL CREATININE-BSD FRML MDRD: 50 ML/MIN/1.73SQ M
GLUCOSE SERPL-MCNC: 96 MG/DL (ref 65–140)
HCT VFR BLD AUTO: 31.7 % (ref 36.5–49.3)
HGB BLD-MCNC: 10 G/DL (ref 12–17)
IMM GRANULOCYTES # BLD AUTO: 0.03 THOUSAND/UL (ref 0–0.2)
IMM GRANULOCYTES NFR BLD AUTO: 0 % (ref 0–2)
LYMPHOCYTES # BLD AUTO: 1.39 THOUSANDS/ΜL (ref 0.6–4.47)
LYMPHOCYTES NFR BLD AUTO: 18 % (ref 14–44)
MCH RBC QN AUTO: 27.9 PG (ref 26.8–34.3)
MCHC RBC AUTO-ENTMCNC: 31.5 G/DL (ref 31.4–37.4)
MCV RBC AUTO: 88 FL (ref 82–98)
MONOCYTES # BLD AUTO: 0.84 THOUSAND/ΜL (ref 0.17–1.22)
MONOCYTES NFR BLD AUTO: 11 % (ref 4–12)
NEUTROPHILS # BLD AUTO: 5.02 THOUSANDS/ΜL (ref 1.85–7.62)
NEUTS SEG NFR BLD AUTO: 66 % (ref 43–75)
NRBC BLD AUTO-RTO: 0 /100 WBCS
PLATELET # BLD AUTO: 232 THOUSANDS/UL (ref 149–390)
PMV BLD AUTO: 9.7 FL (ref 8.9–12.7)
POTASSIUM SERPL-SCNC: 3.7 MMOL/L (ref 3.5–5.3)
RBC # BLD AUTO: 3.59 MILLION/UL (ref 3.88–5.62)
RSV RNA RESP QL NAA+PROBE: NEGATIVE
SARS-COV-2 RNA RESP QL NAA+PROBE: NEGATIVE
SODIUM SERPL-SCNC: 140 MMOL/L (ref 136–145)
WBC # BLD AUTO: 7.67 THOUSAND/UL (ref 4.31–10.16)

## 2021-10-12 PROCEDURE — 80048 BASIC METABOLIC PNL TOTAL CA: CPT

## 2021-10-12 PROCEDURE — 92526 ORAL FUNCTION THERAPY: CPT

## 2021-10-12 PROCEDURE — 0241U HB NFCT DS VIR RESP RNA 4 TRGT: CPT

## 2021-10-12 PROCEDURE — 99232 SBSQ HOSP IP/OBS MODERATE 35: CPT | Performed by: INTERNAL MEDICINE

## 2021-10-12 PROCEDURE — 85025 COMPLETE CBC W/AUTO DIFF WBC: CPT | Performed by: INTERNAL MEDICINE

## 2021-10-12 PROCEDURE — 99232 SBSQ HOSP IP/OBS MODERATE 35: CPT | Performed by: SURGERY

## 2021-10-12 RX ORDER — BUSPIRONE HYDROCHLORIDE 5 MG/1
7.5 TABLET ORAL 3 TIMES DAILY
Status: DISCONTINUED | OUTPATIENT
Start: 2021-10-12 | End: 2021-10-13 | Stop reason: HOSPADM

## 2021-10-12 RX ORDER — POTASSIUM CHLORIDE 20 MEQ/1
40 TABLET, EXTENDED RELEASE ORAL ONCE
Status: COMPLETED | OUTPATIENT
Start: 2021-10-12 | End: 2021-10-12

## 2021-10-12 RX ORDER — MIRTAZAPINE 15 MG/1
15 TABLET, FILM COATED ORAL
Status: DISCONTINUED | OUTPATIENT
Start: 2021-10-12 | End: 2021-10-13 | Stop reason: HOSPADM

## 2021-10-12 RX ADMIN — MICONAZOLE NITRATE: 20 CREAM TOPICAL at 16:54

## 2021-10-12 RX ADMIN — APIXABAN 2.5 MG: 2.5 TABLET, FILM COATED ORAL at 22:39

## 2021-10-12 RX ADMIN — METOROPROLOL TARTRATE 2.5 MG: 5 INJECTION, SOLUTION INTRAVENOUS at 15:46

## 2021-10-12 RX ADMIN — MIRTAZAPINE 15 MG: 15 TABLET, FILM COATED ORAL at 22:39

## 2021-10-12 RX ADMIN — BUSPIRONE HYDROCHLORIDE 7.5 MG: 5 TABLET ORAL at 15:46

## 2021-10-12 RX ADMIN — METOROPROLOL TARTRATE 2.5 MG: 5 INJECTION, SOLUTION INTRAVENOUS at 07:09

## 2021-10-12 RX ADMIN — DIPHENHYDRAMINE HYDROCHLORIDE, ZINC ACETATE 1 APPLICATION: 2; .1 CREAM TOPICAL at 16:54

## 2021-10-12 RX ADMIN — APIXABAN 2.5 MG: 2.5 TABLET, FILM COATED ORAL at 09:40

## 2021-10-12 RX ADMIN — METOROPROLOL TARTRATE 2.5 MG: 5 INJECTION, SOLUTION INTRAVENOUS at 23:50

## 2021-10-12 RX ADMIN — BUSPIRONE HYDROCHLORIDE 7.5 MG: 5 TABLET ORAL at 22:39

## 2021-10-12 RX ADMIN — FAMOTIDINE 20 MG: 10 INJECTION, SOLUTION INTRAVENOUS at 09:39

## 2021-10-12 RX ADMIN — POTASSIUM CHLORIDE 40 MEQ: 1500 TABLET, EXTENDED RELEASE ORAL at 09:40

## 2021-10-12 RX ADMIN — METOROPROLOL TARTRATE 2.5 MG: 5 INJECTION, SOLUTION INTRAVENOUS at 00:03

## 2021-10-13 VITALS
SYSTOLIC BLOOD PRESSURE: 148 MMHG | HEIGHT: 71 IN | TEMPERATURE: 97.7 F | HEART RATE: 73 BPM | DIASTOLIC BLOOD PRESSURE: 114 MMHG | RESPIRATION RATE: 16 BRPM | BODY MASS INDEX: 33.95 KG/M2 | OXYGEN SATURATION: 99 % | WEIGHT: 242.51 LBS

## 2021-10-13 PROCEDURE — 99239 HOSP IP/OBS DSCHRG MGMT >30: CPT | Performed by: INTERNAL MEDICINE

## 2021-10-13 PROCEDURE — 99232 SBSQ HOSP IP/OBS MODERATE 35: CPT | Performed by: STUDENT IN AN ORGANIZED HEALTH CARE EDUCATION/TRAINING PROGRAM

## 2021-10-13 RX ADMIN — BUSPIRONE HYDROCHLORIDE 7.5 MG: 5 TABLET ORAL at 15:18

## 2021-10-13 RX ADMIN — APIXABAN 2.5 MG: 2.5 TABLET, FILM COATED ORAL at 08:19

## 2021-10-13 RX ADMIN — BUSPIRONE HYDROCHLORIDE 7.5 MG: 5 TABLET ORAL at 08:18

## 2021-10-13 RX ADMIN — METOROPROLOL TARTRATE 2.5 MG: 5 INJECTION, SOLUTION INTRAVENOUS at 15:18

## 2021-10-13 RX ADMIN — MICONAZOLE NITRATE 1 APPLICATION: 20 CREAM TOPICAL at 10:43

## 2021-10-13 RX ADMIN — MICONAZOLE NITRATE 1 APPLICATION: 20 CREAM TOPICAL at 17:24

## 2021-10-13 RX ADMIN — METOROPROLOL TARTRATE 2.5 MG: 5 INJECTION, SOLUTION INTRAVENOUS at 08:34

## 2021-10-13 RX ADMIN — FAMOTIDINE 20 MG: 10 INJECTION, SOLUTION INTRAVENOUS at 08:36

## 2021-11-03 ENCOUNTER — OFFICE VISIT (OUTPATIENT)
Dept: WOUND CARE | Facility: HOSPITAL | Age: 71
End: 2021-11-03
Payer: COMMERCIAL

## 2021-11-03 VITALS
DIASTOLIC BLOOD PRESSURE: 77 MMHG | HEART RATE: 60 BPM | WEIGHT: 210 LBS | TEMPERATURE: 98.3 F | HEIGHT: 71 IN | SYSTOLIC BLOOD PRESSURE: 132 MMHG | RESPIRATION RATE: 20 BRPM | BODY MASS INDEX: 29.4 KG/M2

## 2021-11-03 DIAGNOSIS — I69.320 APHASIA AS LATE EFFECT OF CEREBROVASCULAR ACCIDENT: ICD-10-CM

## 2021-11-03 DIAGNOSIS — L89.321 PRESSURE INJURY OF LEFT BUTTOCK, STAGE 1: Primary | ICD-10-CM

## 2021-11-03 DIAGNOSIS — F02.80 DEMENTIA ASSOCIATED WITH OTHER UNDERLYING DISEASE WITHOUT BEHAVIORAL DISTURBANCE (HCC): ICD-10-CM

## 2021-11-03 DIAGNOSIS — R26.9 GAIT ABNORMALITY: ICD-10-CM

## 2021-11-03 PROCEDURE — 99213 OFFICE O/P EST LOW 20 MIN: CPT | Performed by: PREVENTIVE MEDICINE

## 2021-11-09 ENCOUNTER — PROCEDURE VISIT (OUTPATIENT)
Dept: UROLOGY | Facility: CLINIC | Age: 71
End: 2021-11-09
Payer: COMMERCIAL

## 2021-11-09 DIAGNOSIS — R33.9 RETENTION OF URINE: Primary | ICD-10-CM

## 2021-11-09 PROCEDURE — 51705 CHANGE OF BLADDER TUBE: CPT

## 2021-12-01 ENCOUNTER — APPOINTMENT (EMERGENCY)
Dept: RADIOLOGY | Facility: HOSPITAL | Age: 71
DRG: 698 | End: 2021-12-01
Payer: MEDICARE

## 2021-12-01 ENCOUNTER — APPOINTMENT (EMERGENCY)
Dept: CT IMAGING | Facility: HOSPITAL | Age: 71
DRG: 698 | End: 2021-12-01
Payer: MEDICARE

## 2021-12-01 ENCOUNTER — HOSPITAL ENCOUNTER (INPATIENT)
Facility: HOSPITAL | Age: 71
LOS: 5 days | Discharge: NURSING, REHAB OR LONG-TERM CARE | DRG: 698 | End: 2021-12-06
Attending: EMERGENCY MEDICINE | Admitting: INTERNAL MEDICINE
Payer: MEDICARE

## 2021-12-01 DIAGNOSIS — A41.9 SEPSIS (HCC): ICD-10-CM

## 2021-12-01 DIAGNOSIS — K56.609 SBO (SMALL BOWEL OBSTRUCTION) (HCC): ICD-10-CM

## 2021-12-01 DIAGNOSIS — E87.0 HYPERNATREMIA: ICD-10-CM

## 2021-12-01 DIAGNOSIS — R11.2 NAUSEA AND VOMITING: ICD-10-CM

## 2021-12-01 DIAGNOSIS — N18.9 ACUTE KIDNEY INJURY SUPERIMPOSED ON CHRONIC KIDNEY DISEASE (HCC): ICD-10-CM

## 2021-12-01 DIAGNOSIS — N39.0 UTI (URINARY TRACT INFECTION): ICD-10-CM

## 2021-12-01 DIAGNOSIS — I10 ESSENTIAL HYPERTENSION: Chronic | ICD-10-CM

## 2021-12-01 DIAGNOSIS — F02.80 DEMENTIA ASSOCIATED WITH OTHER UNDERLYING DISEASE WITHOUT BEHAVIORAL DISTURBANCE (HCC): ICD-10-CM

## 2021-12-01 DIAGNOSIS — N17.9 ACUTE KIDNEY INJURY SUPERIMPOSED ON CHRONIC KIDNEY DISEASE (HCC): ICD-10-CM

## 2021-12-01 DIAGNOSIS — Z93.59 SUPRAPUBIC CATHETER (HCC): Chronic | ICD-10-CM

## 2021-12-01 DIAGNOSIS — N17.9 AKI (ACUTE KIDNEY INJURY) (HCC): Primary | ICD-10-CM

## 2021-12-01 DIAGNOSIS — K56.609 SMALL BOWEL OBSTRUCTION (HCC): ICD-10-CM

## 2021-12-01 PROBLEM — E87.2 LACTIC ACID ACIDOSIS: Status: ACTIVE | Noted: 2021-12-01

## 2021-12-01 PROBLEM — E87.20 LACTIC ACID ACIDOSIS: Status: ACTIVE | Noted: 2021-01-01

## 2021-12-01 PROBLEM — R40.3 PERSISTENT VEGETATIVE STATE (HCC): Status: ACTIVE | Noted: 2021-12-01

## 2021-12-01 LAB
ALBUMIN SERPL BCP-MCNC: 4.2 G/DL (ref 3.4–4.8)
ALP SERPL-CCNC: 84.9 U/L (ref 10–129)
ALT SERPL W P-5'-P-CCNC: 10 U/L (ref 5–63)
ANION GAP SERPL CALCULATED.3IONS-SCNC: 8 MMOL/L (ref 4–13)
APTT PPP: 29 SECONDS (ref 23–37)
AST SERPL W P-5'-P-CCNC: 12 U/L (ref 15–41)
BACTERIA UR QL AUTO: ABNORMAL /HPF
BASOPHILS # BLD AUTO: 0.02 THOUSANDS/ΜL (ref 0–0.1)
BASOPHILS NFR BLD AUTO: 0 % (ref 0–1)
BILIRUB SERPL-MCNC: 0.44 MG/DL (ref 0.3–1.2)
BILIRUB UR QL STRIP: ABNORMAL
BUN SERPL-MCNC: 26 MG/DL (ref 6–20)
CALCIUM SERPL-MCNC: 9.5 MG/DL (ref 8.4–10.2)
CHLORIDE SERPL-SCNC: 108 MMOL/L (ref 96–108)
CLARITY UR: ABNORMAL
CO2 SERPL-SCNC: 28 MMOL/L (ref 22–33)
COLOR UR: ABNORMAL
CREAT SERPL-MCNC: 1.94 MG/DL (ref 0.5–1.2)
EOSINOPHIL # BLD AUTO: 0 THOUSAND/ΜL (ref 0–0.61)
EOSINOPHIL NFR BLD AUTO: 0 % (ref 0–6)
ERYTHROCYTE [DISTWIDTH] IN BLOOD BY AUTOMATED COUNT: 16.2 % (ref 11.6–15.1)
FLUAV RNA RESP QL NAA+PROBE: NEGATIVE
FLUBV RNA RESP QL NAA+PROBE: NEGATIVE
GFR SERPL CREATININE-BSD FRML MDRD: 33 ML/MIN/1.73SQ M
GLUCOSE SERPL-MCNC: 194 MG/DL (ref 65–140)
GLUCOSE UR STRIP-MCNC: NEGATIVE MG/DL
HCT VFR BLD AUTO: 45.2 % (ref 36.5–49.3)
HGB BLD-MCNC: 14.3 G/DL (ref 12–17)
HGB UR QL STRIP.AUTO: ABNORMAL
IMM GRANULOCYTES # BLD AUTO: 0.01 THOUSAND/UL (ref 0–0.2)
IMM GRANULOCYTES NFR BLD AUTO: 0 % (ref 0–2)
INR PPP: 1.02 (ref 0.84–1.19)
KETONES UR STRIP-MCNC: ABNORMAL MG/DL
LACTATE SERPL-SCNC: 2.1 MMOL/L (ref 0–2)
LACTATE SERPL-SCNC: 2.5 MMOL/L (ref 0–2)
LACTATE SERPL-SCNC: 2.6 MMOL/L (ref 0–2)
LACTATE SERPL-SCNC: 4 MMOL/L (ref 0–2)
LEUKOCYTE ESTERASE UR QL STRIP: ABNORMAL
LIPASE SERPL-CCNC: 18 U/L (ref 13–60)
LYMPHOCYTES # BLD AUTO: 0.97 THOUSANDS/ΜL (ref 0.6–4.47)
LYMPHOCYTES NFR BLD AUTO: 7 % (ref 14–44)
MCH RBC QN AUTO: 25.9 PG (ref 26.8–34.3)
MCHC RBC AUTO-ENTMCNC: 31.6 G/DL (ref 31.4–37.4)
MCV RBC AUTO: 82 FL (ref 82–98)
MONOCYTES # BLD AUTO: 0.51 THOUSAND/ΜL (ref 0.17–1.22)
MONOCYTES NFR BLD AUTO: 4 % (ref 4–12)
NEUTROPHILS # BLD AUTO: 12.56 THOUSANDS/ΜL (ref 1.85–7.62)
NEUTS SEG NFR BLD AUTO: 89 % (ref 43–75)
NITRITE UR QL STRIP: NEGATIVE
NON-SQ EPI CELLS URNS QL MICRO: ABNORMAL /HPF
PH UR STRIP.AUTO: 8 [PH]
PLATELET # BLD AUTO: 344 THOUSANDS/UL (ref 149–390)
PMV BLD AUTO: 9.9 FL (ref 8.9–12.7)
POTASSIUM SERPL-SCNC: 3.8 MMOL/L (ref 3.5–5)
PROT SERPL-MCNC: 8.2 G/DL (ref 6.4–8.3)
PROT UR STRIP-MCNC: ABNORMAL MG/DL
PROTHROMBIN TIME: 13.3 SECONDS (ref 11.6–14.5)
RBC # BLD AUTO: 5.53 MILLION/UL (ref 3.88–5.62)
RBC #/AREA URNS AUTO: ABNORMAL /HPF
RSV RNA RESP QL NAA+PROBE: NEGATIVE
SARS-COV-2 RNA RESP QL NAA+PROBE: NEGATIVE
SODIUM SERPL-SCNC: 144 MMOL/L (ref 133–145)
SP GR UR STRIP.AUTO: 1.01 (ref 1–1.03)
UROBILINOGEN UR QL STRIP.AUTO: 0.2 E.U./DL
WBC # BLD AUTO: 14.07 THOUSAND/UL (ref 4.31–10.16)
WBC #/AREA URNS AUTO: ABNORMAL /HPF

## 2021-12-01 PROCEDURE — 80053 COMPREHEN METABOLIC PANEL: CPT | Performed by: EMERGENCY MEDICINE

## 2021-12-01 PROCEDURE — 87040 BLOOD CULTURE FOR BACTERIA: CPT | Performed by: EMERGENCY MEDICINE

## 2021-12-01 PROCEDURE — 96374 THER/PROPH/DIAG INJ IV PUSH: CPT

## 2021-12-01 PROCEDURE — 87181 SC STD AGAR DILUTION PER AGT: CPT | Performed by: EMERGENCY MEDICINE

## 2021-12-01 PROCEDURE — 96361 HYDRATE IV INFUSION ADD-ON: CPT

## 2021-12-01 PROCEDURE — 71045 X-RAY EXAM CHEST 1 VIEW: CPT

## 2021-12-01 PROCEDURE — 81001 URINALYSIS AUTO W/SCOPE: CPT | Performed by: EMERGENCY MEDICINE

## 2021-12-01 PROCEDURE — 83605 ASSAY OF LACTIC ACID: CPT | Performed by: INTERNAL MEDICINE

## 2021-12-01 PROCEDURE — 83690 ASSAY OF LIPASE: CPT | Performed by: EMERGENCY MEDICINE

## 2021-12-01 PROCEDURE — 99285 EMERGENCY DEPT VISIT HI MDM: CPT

## 2021-12-01 PROCEDURE — 93005 ELECTROCARDIOGRAM TRACING: CPT

## 2021-12-01 PROCEDURE — 85610 PROTHROMBIN TIME: CPT | Performed by: EMERGENCY MEDICINE

## 2021-12-01 PROCEDURE — 74176 CT ABD & PELVIS W/O CONTRAST: CPT

## 2021-12-01 PROCEDURE — 99223 1ST HOSP IP/OBS HIGH 75: CPT | Performed by: INTERNAL MEDICINE

## 2021-12-01 PROCEDURE — 0241U HB NFCT DS VIR RESP RNA 4 TRGT: CPT | Performed by: EMERGENCY MEDICINE

## 2021-12-01 PROCEDURE — 99285 EMERGENCY DEPT VISIT HI MDM: CPT | Performed by: EMERGENCY MEDICINE

## 2021-12-01 PROCEDURE — 81003 URINALYSIS AUTO W/O SCOPE: CPT | Performed by: EMERGENCY MEDICINE

## 2021-12-01 PROCEDURE — 83605 ASSAY OF LACTIC ACID: CPT | Performed by: EMERGENCY MEDICINE

## 2021-12-01 PROCEDURE — 85730 THROMBOPLASTIN TIME PARTIAL: CPT | Performed by: EMERGENCY MEDICINE

## 2021-12-01 PROCEDURE — 87186 SC STD MICRODIL/AGAR DIL: CPT | Performed by: EMERGENCY MEDICINE

## 2021-12-01 PROCEDURE — 85025 COMPLETE CBC W/AUTO DIFF WBC: CPT | Performed by: EMERGENCY MEDICINE

## 2021-12-01 PROCEDURE — 87086 URINE CULTURE/COLONY COUNT: CPT | Performed by: EMERGENCY MEDICINE

## 2021-12-01 PROCEDURE — 87077 CULTURE AEROBIC IDENTIFY: CPT | Performed by: EMERGENCY MEDICINE

## 2021-12-01 PROCEDURE — 36415 COLL VENOUS BLD VENIPUNCTURE: CPT | Performed by: EMERGENCY MEDICINE

## 2021-12-01 RX ORDER — AMLODIPINE BESYLATE 10 MG/1
10 TABLET ORAL DAILY
Status: DISCONTINUED | OUTPATIENT
Start: 2021-12-02 | End: 2021-12-03

## 2021-12-01 RX ORDER — DOCUSATE SODIUM 100 MG/1
100 CAPSULE, LIQUID FILLED ORAL 2 TIMES DAILY
Status: DISCONTINUED | OUTPATIENT
Start: 2021-12-01 | End: 2021-12-03

## 2021-12-01 RX ORDER — CEFEPIME HYDROCHLORIDE 1 G/50ML
1000 INJECTION, SOLUTION INTRAVENOUS ONCE
Status: COMPLETED | OUTPATIENT
Start: 2021-12-01 | End: 2021-12-02

## 2021-12-01 RX ORDER — MELATONIN
2000 DAILY
Status: DISCONTINUED | OUTPATIENT
Start: 2021-12-02 | End: 2021-12-03

## 2021-12-01 RX ORDER — ACETAMINOPHEN 325 MG/1
650 TABLET ORAL EVERY 4 HOURS PRN
Status: DISCONTINUED | OUTPATIENT
Start: 2021-12-01 | End: 2021-12-03

## 2021-12-01 RX ORDER — POTASSIUM CHLORIDE 750 MG/1
10 TABLET, EXTENDED RELEASE ORAL
Status: DISCONTINUED | OUTPATIENT
Start: 2021-12-02 | End: 2021-12-03

## 2021-12-01 RX ORDER — FAMOTIDINE 20 MG/1
20 TABLET, FILM COATED ORAL 2 TIMES DAILY
Status: DISCONTINUED | OUTPATIENT
Start: 2021-12-01 | End: 2021-12-03

## 2021-12-01 RX ORDER — SODIUM PHOSPHATE, DIBASIC AND SODIUM PHOSPHATE, MONOBASIC 7; 19 G/133ML; G/133ML
1 ENEMA RECTAL ONCE AS NEEDED
Status: DISCONTINUED | OUTPATIENT
Start: 2021-12-01 | End: 2021-12-06 | Stop reason: HOSPADM

## 2021-12-01 RX ORDER — SODIUM CHLORIDE 9 MG/ML
100 INJECTION, SOLUTION INTRAVENOUS CONTINUOUS
Status: DISCONTINUED | OUTPATIENT
Start: 2021-12-01 | End: 2021-12-02

## 2021-12-01 RX ORDER — QUETIAPINE FUMARATE 25 MG/1
25 TABLET, FILM COATED ORAL
Status: DISCONTINUED | OUTPATIENT
Start: 2021-12-01 | End: 2021-12-03

## 2021-12-01 RX ORDER — SENNOSIDES 8.6 MG
2 TABLET ORAL
Status: DISCONTINUED | OUTPATIENT
Start: 2021-12-01 | End: 2021-12-03

## 2021-12-01 RX ORDER — MIRTAZAPINE 15 MG/1
15 TABLET, FILM COATED ORAL
Status: DISCONTINUED | OUTPATIENT
Start: 2021-12-01 | End: 2021-12-03

## 2021-12-01 RX ORDER — POLYETHYLENE GLYCOL 3350 17 G/17G
17 POWDER, FOR SOLUTION ORAL DAILY
Status: DISCONTINUED | OUTPATIENT
Start: 2021-12-02 | End: 2021-12-03

## 2021-12-01 RX ORDER — MINERAL OIL 100 G/100G
1 OIL RECTAL ONCE
Status: COMPLETED | OUTPATIENT
Start: 2021-12-01 | End: 2021-12-01

## 2021-12-01 RX ORDER — BUSPIRONE HYDROCHLORIDE 5 MG/1
7.5 TABLET ORAL 3 TIMES DAILY
Status: DISCONTINUED | OUTPATIENT
Start: 2021-12-01 | End: 2021-12-03

## 2021-12-01 RX ORDER — SODIUM CHLORIDE 9 MG/ML
10 INJECTION INTRAVENOUS DAILY
Status: DISCONTINUED | OUTPATIENT
Start: 2021-12-02 | End: 2021-12-03

## 2021-12-01 RX ORDER — BACLOFEN 10 MG/1
15 TABLET ORAL 3 TIMES DAILY
Status: DISCONTINUED | OUTPATIENT
Start: 2021-12-01 | End: 2021-12-03

## 2021-12-01 RX ORDER — LABETALOL 20 MG/4 ML (5 MG/ML) INTRAVENOUS SYRINGE
10 EVERY 6 HOURS PRN
Status: DISCONTINUED | OUTPATIENT
Start: 2021-12-01 | End: 2021-12-06 | Stop reason: HOSPADM

## 2021-12-01 RX ADMIN — SODIUM CHLORIDE 500 ML: 0.9 INJECTION, SOLUTION INTRAVENOUS at 12:59

## 2021-12-01 RX ADMIN — SODIUM CHLORIDE, SODIUM LACTATE, POTASSIUM CHLORIDE, AND CALCIUM CHLORIDE 1000 ML: .6; .31; .03; .02 INJECTION, SOLUTION INTRAVENOUS at 17:26

## 2021-12-01 RX ADMIN — ERTAPENEM SODIUM 1000 MG: 1 INJECTION, POWDER, LYOPHILIZED, FOR SOLUTION INTRAMUSCULAR; INTRAVENOUS at 19:25

## 2021-12-01 RX ADMIN — SODIUM CHLORIDE 500 ML: 0.9 INJECTION, SOLUTION INTRAVENOUS at 12:45

## 2021-12-01 RX ADMIN — CEFEPIME HYDROCHLORIDE 1000 MG: 1 INJECTION, SOLUTION INTRAVENOUS at 15:01

## 2021-12-01 RX ADMIN — SODIUM CHLORIDE 100 ML/HR: 0.9 INJECTION, SOLUTION INTRAVENOUS at 23:30

## 2021-12-01 RX ADMIN — SODIUM CHLORIDE 500 ML: 0.9 INJECTION, SOLUTION INTRAVENOUS at 15:03

## 2021-12-01 RX ADMIN — MINERAL OIL 1 ENEMA: 100 ENEMA RECTAL at 17:26

## 2021-12-02 ENCOUNTER — APPOINTMENT (INPATIENT)
Dept: RADIOLOGY | Facility: HOSPITAL | Age: 71
DRG: 698 | End: 2021-12-02
Payer: MEDICARE

## 2021-12-02 PROBLEM — E87.20 LACTIC ACID ACIDOSIS: Status: RESOLVED | Noted: 2021-01-01 | Resolved: 2021-01-01

## 2021-12-02 PROBLEM — E87.2 LACTIC ACID ACIDOSIS: Status: RESOLVED | Noted: 2021-12-01 | Resolved: 2021-12-02

## 2021-12-02 LAB
ALBUMIN SERPL BCP-MCNC: 3.1 G/DL (ref 3.4–4.8)
ALP SERPL-CCNC: 57.3 U/L (ref 10–129)
ALT SERPL W P-5'-P-CCNC: 5 U/L (ref 5–63)
ANION GAP SERPL CALCULATED.3IONS-SCNC: 7 MMOL/L (ref 4–13)
AST SERPL W P-5'-P-CCNC: 9 U/L (ref 15–41)
ATRIAL RATE: 113 BPM
BASOPHILS # BLD AUTO: 0.01 THOUSANDS/ΜL (ref 0–0.1)
BASOPHILS NFR BLD AUTO: 0 % (ref 0–1)
BILIRUB SERPL-MCNC: 0.31 MG/DL (ref 0.3–1.2)
BUN SERPL-MCNC: 37 MG/DL (ref 6–20)
CALCIUM ALBUM COR SERPL-MCNC: 9.1 MG/DL (ref 8.3–10.1)
CALCIUM SERPL-MCNC: 8.4 MG/DL (ref 8.4–10.2)
CHLORIDE SERPL-SCNC: 117 MMOL/L (ref 96–108)
CO2 SERPL-SCNC: 25 MMOL/L (ref 22–33)
CREAT SERPL-MCNC: 1.63 MG/DL (ref 0.5–1.2)
EOSINOPHIL # BLD AUTO: 0.03 THOUSAND/ΜL (ref 0–0.61)
EOSINOPHIL NFR BLD AUTO: 0 % (ref 0–6)
ERYTHROCYTE [DISTWIDTH] IN BLOOD BY AUTOMATED COUNT: 15.9 % (ref 11.6–15.1)
GFR SERPL CREATININE-BSD FRML MDRD: 41 ML/MIN/1.73SQ M
GLUCOSE SERPL-MCNC: 105 MG/DL (ref 65–140)
HCT VFR BLD AUTO: 33.5 % (ref 36.5–49.3)
HGB BLD-MCNC: 10.3 G/DL (ref 12–17)
IMM GRANULOCYTES # BLD AUTO: 0.04 THOUSAND/UL (ref 0–0.2)
IMM GRANULOCYTES NFR BLD AUTO: 0 % (ref 0–2)
LACTATE SERPL-SCNC: 1.2 MMOL/L (ref 0–2)
LYMPHOCYTES # BLD AUTO: 1.62 THOUSANDS/ΜL (ref 0.6–4.47)
LYMPHOCYTES NFR BLD AUTO: 15 % (ref 14–44)
MCH RBC QN AUTO: 25.9 PG (ref 26.8–34.3)
MCHC RBC AUTO-ENTMCNC: 30.7 G/DL (ref 31.4–37.4)
MCV RBC AUTO: 84 FL (ref 82–98)
MONOCYTES # BLD AUTO: 0.85 THOUSAND/ΜL (ref 0.17–1.22)
MONOCYTES NFR BLD AUTO: 8 % (ref 4–12)
NEUTROPHILS # BLD AUTO: 8.13 THOUSANDS/ΜL (ref 1.85–7.62)
NEUTS SEG NFR BLD AUTO: 77 % (ref 43–75)
P AXIS: 14 DEGREES
PLATELET # BLD AUTO: 242 THOUSANDS/UL (ref 149–390)
PMV BLD AUTO: 10.6 FL (ref 8.9–12.7)
POTASSIUM SERPL-SCNC: 3.6 MMOL/L (ref 3.5–5)
PR INTERVAL: 167 MS
PROT SERPL-MCNC: 6.1 G/DL (ref 6.4–8.3)
QRS AXIS: -63 DEGREES
QRSD INTERVAL: 78 MS
QT INTERVAL: 346 MS
QTC INTERVAL: 470 MS
RBC # BLD AUTO: 3.98 MILLION/UL (ref 3.88–5.62)
SODIUM SERPL-SCNC: 149 MMOL/L (ref 133–145)
T WAVE AXIS: 63 DEGREES
VENTRICULAR RATE: 111 BPM
WBC # BLD AUTO: 10.68 THOUSAND/UL (ref 4.31–10.16)

## 2021-12-02 PROCEDURE — 87081 CULTURE SCREEN ONLY: CPT | Performed by: INTERNAL MEDICINE

## 2021-12-02 PROCEDURE — 74018 RADEX ABDOMEN 1 VIEW: CPT

## 2021-12-02 PROCEDURE — 83605 ASSAY OF LACTIC ACID: CPT | Performed by: PHYSICIAN ASSISTANT

## 2021-12-02 PROCEDURE — 99232 SBSQ HOSP IP/OBS MODERATE 35: CPT | Performed by: INTERNAL MEDICINE

## 2021-12-02 PROCEDURE — 93010 ELECTROCARDIOGRAM REPORT: CPT | Performed by: INTERNAL MEDICINE

## 2021-12-02 PROCEDURE — 80053 COMPREHEN METABOLIC PANEL: CPT | Performed by: PHYSICIAN ASSISTANT

## 2021-12-02 PROCEDURE — 99222 1ST HOSP IP/OBS MODERATE 55: CPT | Performed by: SURGERY

## 2021-12-02 PROCEDURE — 85025 COMPLETE CBC W/AUTO DIFF WBC: CPT | Performed by: PHYSICIAN ASSISTANT

## 2021-12-02 RX ORDER — DEXTROSE MONOHYDRATE 50 MG/ML
100 INJECTION, SOLUTION INTRAVENOUS CONTINUOUS
Status: DISCONTINUED | OUTPATIENT
Start: 2021-12-02 | End: 2021-12-03

## 2021-12-02 RX ORDER — MINERAL OIL 100 G/100G
1 OIL RECTAL ONCE
Status: COMPLETED | OUTPATIENT
Start: 2021-12-02 | End: 2021-12-02

## 2021-12-02 RX ORDER — POLYVINYL ALCOHOL 14 MG/ML
1 SOLUTION/ DROPS OPHTHALMIC
Status: DISCONTINUED | OUTPATIENT
Start: 2021-12-02 | End: 2021-12-06 | Stop reason: HOSPADM

## 2021-12-02 RX ADMIN — MINERAL OIL 1 ENEMA: 100 ENEMA RECTAL at 14:33

## 2021-12-02 RX ADMIN — POLYVINYL ALCOHOL 1 DROP: 14 SOLUTION/ DROPS OPHTHALMIC at 17:06

## 2021-12-02 RX ADMIN — SODIUM CHLORIDE 100 ML/HR: 0.9 INJECTION, SOLUTION INTRAVENOUS at 05:48

## 2021-12-02 RX ADMIN — DEXTROSE 100 ML/HR: 5 SOLUTION INTRAVENOUS at 22:11

## 2021-12-02 RX ADMIN — ERTAPENEM SODIUM 1000 MG: 1 INJECTION, POWDER, LYOPHILIZED, FOR SOLUTION INTRAMUSCULAR; INTRAVENOUS at 17:06

## 2021-12-02 RX ADMIN — DEXTROSE 100 ML/HR: 5 SOLUTION INTRAVENOUS at 09:26

## 2021-12-03 LAB
ANION GAP SERPL CALCULATED.3IONS-SCNC: 6 MMOL/L (ref 4–13)
BASOPHILS # BLD AUTO: 0.02 THOUSANDS/ΜL (ref 0–0.1)
BASOPHILS NFR BLD AUTO: 0 % (ref 0–1)
BUN SERPL-MCNC: 26 MG/DL (ref 6–20)
CALCIUM SERPL-MCNC: 8.1 MG/DL (ref 8.4–10.2)
CHLORIDE SERPL-SCNC: 114 MMOL/L (ref 96–108)
CO2 SERPL-SCNC: 26 MMOL/L (ref 22–33)
CREAT SERPL-MCNC: 1.51 MG/DL (ref 0.5–1.2)
EOSINOPHIL # BLD AUTO: 0.24 THOUSAND/ΜL (ref 0–0.61)
EOSINOPHIL NFR BLD AUTO: 3 % (ref 0–6)
ERYTHROCYTE [DISTWIDTH] IN BLOOD BY AUTOMATED COUNT: 16.3 % (ref 11.6–15.1)
GFR SERPL CREATININE-BSD FRML MDRD: 45 ML/MIN/1.73SQ M
GLUCOSE SERPL-MCNC: 112 MG/DL (ref 65–140)
HCT VFR BLD AUTO: 31.1 % (ref 36.5–49.3)
HGB BLD-MCNC: 9.6 G/DL (ref 12–17)
IMM GRANULOCYTES # BLD AUTO: 0.01 THOUSAND/UL (ref 0–0.2)
IMM GRANULOCYTES NFR BLD AUTO: 0 % (ref 0–2)
LYMPHOCYTES # BLD AUTO: 1.64 THOUSANDS/ΜL (ref 0.6–4.47)
LYMPHOCYTES NFR BLD AUTO: 20 % (ref 14–44)
MAGNESIUM SERPL-MCNC: 1.9 MG/DL (ref 1.6–2.6)
MCH RBC QN AUTO: 25.9 PG (ref 26.8–34.3)
MCHC RBC AUTO-ENTMCNC: 30.9 G/DL (ref 31.4–37.4)
MCV RBC AUTO: 84 FL (ref 82–98)
MONOCYTES # BLD AUTO: 0.63 THOUSAND/ΜL (ref 0.17–1.22)
MONOCYTES NFR BLD AUTO: 8 % (ref 4–12)
NEUTROPHILS # BLD AUTO: 5.87 THOUSANDS/ΜL (ref 1.85–7.62)
NEUTS SEG NFR BLD AUTO: 69 % (ref 43–75)
PHOSPHATE SERPL-MCNC: 2.5 MG/DL (ref 2.5–5)
PLATELET # BLD AUTO: 222 THOUSANDS/UL (ref 149–390)
PMV BLD AUTO: 9.6 FL (ref 8.9–12.7)
POTASSIUM SERPL-SCNC: 3.6 MMOL/L (ref 3.5–5)
RBC # BLD AUTO: 3.71 MILLION/UL (ref 3.88–5.62)
SODIUM SERPL-SCNC: 146 MMOL/L (ref 133–145)
WBC # BLD AUTO: 8.41 THOUSAND/UL (ref 4.31–10.16)

## 2021-12-03 PROCEDURE — 99232 SBSQ HOSP IP/OBS MODERATE 35: CPT | Performed by: INTERNAL MEDICINE

## 2021-12-03 PROCEDURE — 84100 ASSAY OF PHOSPHORUS: CPT | Performed by: SURGERY

## 2021-12-03 PROCEDURE — 85025 COMPLETE CBC W/AUTO DIFF WBC: CPT | Performed by: INTERNAL MEDICINE

## 2021-12-03 PROCEDURE — 99232 SBSQ HOSP IP/OBS MODERATE 35: CPT | Performed by: SURGERY

## 2021-12-03 PROCEDURE — 83735 ASSAY OF MAGNESIUM: CPT | Performed by: SURGERY

## 2021-12-03 PROCEDURE — 80048 BASIC METABOLIC PNL TOTAL CA: CPT | Performed by: INTERNAL MEDICINE

## 2021-12-03 RX ORDER — DEXTROSE, SODIUM CHLORIDE, AND POTASSIUM CHLORIDE 5; .45; .15 G/100ML; G/100ML; G/100ML
85 INJECTION INTRAVENOUS CONTINUOUS
Status: DISCONTINUED | OUTPATIENT
Start: 2021-12-03 | End: 2021-12-06 | Stop reason: HOSPADM

## 2021-12-03 RX ORDER — HEPARIN SODIUM 5000 [USP'U]/ML
5000 INJECTION, SOLUTION INTRAVENOUS; SUBCUTANEOUS EVERY 12 HOURS SCHEDULED
Status: DISCONTINUED | OUTPATIENT
Start: 2021-12-03 | End: 2021-12-05

## 2021-12-03 RX ORDER — METOPROLOL TARTRATE 5 MG/5ML
2.5 INJECTION INTRAVENOUS EVERY 6 HOURS
Status: DISCONTINUED | OUTPATIENT
Start: 2021-12-03 | End: 2021-12-05

## 2021-12-03 RX ORDER — ACETAMINOPHEN 650 MG/1
650 SUPPOSITORY RECTAL EVERY 6 HOURS PRN
Status: DISCONTINUED | OUTPATIENT
Start: 2021-12-03 | End: 2021-12-06 | Stop reason: HOSPADM

## 2021-12-03 RX ADMIN — DEXTROSE 100 ML/HR: 5 SOLUTION INTRAVENOUS at 10:31

## 2021-12-03 RX ADMIN — HEPARIN SODIUM 5000 UNITS: 5000 INJECTION INTRAVENOUS; SUBCUTANEOUS at 13:53

## 2021-12-03 RX ADMIN — METOROPROLOL TARTRATE 2.5 MG: 5 INJECTION, SOLUTION INTRAVENOUS at 22:43

## 2021-12-03 RX ADMIN — ERTAPENEM SODIUM 1000 MG: 1 INJECTION, POWDER, LYOPHILIZED, FOR SOLUTION INTRAMUSCULAR; INTRAVENOUS at 17:02

## 2021-12-03 RX ADMIN — METOROPROLOL TARTRATE 2.5 MG: 5 INJECTION, SOLUTION INTRAVENOUS at 16:59

## 2021-12-03 RX ADMIN — ACETAMINOPHEN 650 MG: 650 SUPPOSITORY RECTAL at 10:11

## 2021-12-03 RX ADMIN — HEPARIN SODIUM 5000 UNITS: 5000 INJECTION INTRAVENOUS; SUBCUTANEOUS at 21:24

## 2021-12-03 RX ADMIN — POLYETHYLENE GLYCOL 3350, SODIUM SULFATE ANHYDROUS, SODIUM BICARBONATE, SODIUM CHLORIDE, POTASSIUM CHLORIDE 2000 ML: 236; 22.74; 6.74; 5.86; 2.97 POWDER, FOR SOLUTION ORAL at 13:50

## 2021-12-03 RX ADMIN — METOROPROLOL TARTRATE 2.5 MG: 5 INJECTION, SOLUTION INTRAVENOUS at 10:11

## 2021-12-03 RX ADMIN — DEXTROSE, SODIUM CHLORIDE, AND POTASSIUM CHLORIDE 85 ML/HR: 5; .45; .15 INJECTION INTRAVENOUS at 13:51

## 2021-12-04 LAB
ANION GAP SERPL CALCULATED.3IONS-SCNC: 5 MMOL/L (ref 4–13)
BACTERIA UR CULT: ABNORMAL
BASOPHILS # BLD AUTO: 0.03 THOUSANDS/ΜL (ref 0–0.1)
BASOPHILS NFR BLD AUTO: 1 % (ref 0–1)
BUN SERPL-MCNC: 16 MG/DL (ref 6–20)
CALCIUM SERPL-MCNC: 8.4 MG/DL (ref 8.4–10.2)
CHLORIDE SERPL-SCNC: 111 MMOL/L (ref 96–108)
CO2 SERPL-SCNC: 27 MMOL/L (ref 22–33)
CREAT SERPL-MCNC: 1.31 MG/DL (ref 0.5–1.2)
EOSINOPHIL # BLD AUTO: 0.17 THOUSAND/ΜL (ref 0–0.61)
EOSINOPHIL NFR BLD AUTO: 3 % (ref 0–6)
ERYTHROCYTE [DISTWIDTH] IN BLOOD BY AUTOMATED COUNT: 15.9 % (ref 11.6–15.1)
GFR SERPL CREATININE-BSD FRML MDRD: 54 ML/MIN/1.73SQ M
GLUCOSE SERPL-MCNC: 111 MG/DL (ref 65–140)
HCT VFR BLD AUTO: 30.6 % (ref 36.5–49.3)
HGB BLD-MCNC: 9.3 G/DL (ref 12–17)
IMM GRANULOCYTES # BLD AUTO: 0 THOUSAND/UL (ref 0–0.2)
IMM GRANULOCYTES NFR BLD AUTO: 0 % (ref 0–2)
LYMPHOCYTES # BLD AUTO: 1.34 THOUSANDS/ΜL (ref 0.6–4.47)
LYMPHOCYTES NFR BLD AUTO: 20 % (ref 14–44)
MCH RBC QN AUTO: 25.5 PG (ref 26.8–34.3)
MCHC RBC AUTO-ENTMCNC: 30.4 G/DL (ref 31.4–37.4)
MCV RBC AUTO: 84 FL (ref 82–98)
MONOCYTES # BLD AUTO: 0.58 THOUSAND/ΜL (ref 0.17–1.22)
MONOCYTES NFR BLD AUTO: 9 % (ref 4–12)
MRSA NOSE QL CULT: NORMAL
NEUTROPHILS # BLD AUTO: 4.44 THOUSANDS/ΜL (ref 1.85–7.62)
NEUTS SEG NFR BLD AUTO: 67 % (ref 43–75)
PLATELET # BLD AUTO: 195 THOUSANDS/UL (ref 149–390)
PMV BLD AUTO: 9.9 FL (ref 8.9–12.7)
POTASSIUM SERPL-SCNC: 3.4 MMOL/L (ref 3.5–5)
RBC # BLD AUTO: 3.64 MILLION/UL (ref 3.88–5.62)
SODIUM SERPL-SCNC: 143 MMOL/L (ref 133–145)
WBC # BLD AUTO: 6.56 THOUSAND/UL (ref 4.31–10.16)

## 2021-12-04 PROCEDURE — 0T2BX0Z CHANGE DRAINAGE DEVICE IN BLADDER, EXTERNAL APPROACH: ICD-10-PCS | Performed by: SPECIALIST

## 2021-12-04 PROCEDURE — 80048 BASIC METABOLIC PNL TOTAL CA: CPT | Performed by: INTERNAL MEDICINE

## 2021-12-04 PROCEDURE — 85025 COMPLETE CBC W/AUTO DIFF WBC: CPT | Performed by: INTERNAL MEDICINE

## 2021-12-04 PROCEDURE — 99232 SBSQ HOSP IP/OBS MODERATE 35: CPT | Performed by: INTERNAL MEDICINE

## 2021-12-04 RX ORDER — POTASSIUM CHLORIDE 14.9 MG/ML
20 INJECTION INTRAVENOUS
Status: COMPLETED | OUTPATIENT
Start: 2021-12-04 | End: 2021-12-04

## 2021-12-04 RX ORDER — POTASSIUM CHLORIDE 29.8 MG/ML
40 INJECTION INTRAVENOUS ONCE
Status: DISCONTINUED | OUTPATIENT
Start: 2021-12-04 | End: 2021-12-04

## 2021-12-04 RX ADMIN — POTASSIUM CHLORIDE 20 MEQ: 14.9 INJECTION, SOLUTION INTRAVENOUS at 18:38

## 2021-12-04 RX ADMIN — POLYETHYLENE GLYCOL 3350, SODIUM SULFATE ANHYDROUS, SODIUM BICARBONATE, SODIUM CHLORIDE, POTASSIUM CHLORIDE 120 ML: 236; 22.74; 6.74; 5.86; 2.97 POWDER, FOR SOLUTION ORAL at 14:00

## 2021-12-04 RX ADMIN — METOROPROLOL TARTRATE 2.5 MG: 5 INJECTION, SOLUTION INTRAVENOUS at 21:52

## 2021-12-04 RX ADMIN — ACETAMINOPHEN 650 MG: 650 SUPPOSITORY RECTAL at 04:11

## 2021-12-04 RX ADMIN — DEXTROSE, SODIUM CHLORIDE, AND POTASSIUM CHLORIDE 85 ML/HR: 5; .45; .15 INJECTION INTRAVENOUS at 03:54

## 2021-12-04 RX ADMIN — METOROPROLOL TARTRATE 2.5 MG: 5 INJECTION, SOLUTION INTRAVENOUS at 04:23

## 2021-12-04 RX ADMIN — HEPARIN SODIUM 5000 UNITS: 5000 INJECTION INTRAVENOUS; SUBCUTANEOUS at 10:25

## 2021-12-04 RX ADMIN — POLYETHYLENE GLYCOL 3350, SODIUM SULFATE ANHYDROUS, SODIUM BICARBONATE, SODIUM CHLORIDE, POTASSIUM CHLORIDE 120 ML: 236; 22.74; 6.74; 5.86; 2.97 POWDER, FOR SOLUTION ORAL at 11:45

## 2021-12-04 RX ADMIN — POTASSIUM CHLORIDE 20 MEQ: 14.9 INJECTION, SOLUTION INTRAVENOUS at 16:46

## 2021-12-04 RX ADMIN — ERTAPENEM SODIUM 1000 MG: 1 INJECTION, POWDER, LYOPHILIZED, FOR SOLUTION INTRAMUSCULAR; INTRAVENOUS at 17:15

## 2021-12-04 RX ADMIN — DEXTROSE, SODIUM CHLORIDE, AND POTASSIUM CHLORIDE 85 ML/HR: 5; .45; .15 INJECTION INTRAVENOUS at 16:47

## 2021-12-04 RX ADMIN — HEPARIN SODIUM 5000 UNITS: 5000 INJECTION INTRAVENOUS; SUBCUTANEOUS at 21:52

## 2021-12-04 RX ADMIN — METOROPROLOL TARTRATE 2.5 MG: 5 INJECTION, SOLUTION INTRAVENOUS at 11:45

## 2021-12-05 ENCOUNTER — APPOINTMENT (INPATIENT)
Dept: RADIOLOGY | Facility: HOSPITAL | Age: 71
DRG: 698 | End: 2021-12-05
Payer: MEDICARE

## 2021-12-05 LAB
ALBUMIN SERPL BCP-MCNC: 3.5 G/DL (ref 3.4–4.8)
ALP SERPL-CCNC: 60.8 U/L (ref 10–129)
ALT SERPL W P-5'-P-CCNC: 8 U/L (ref 5–63)
ANION GAP SERPL CALCULATED.3IONS-SCNC: 7 MMOL/L (ref 4–13)
AST SERPL W P-5'-P-CCNC: 11 U/L (ref 15–41)
BASOPHILS # BLD AUTO: 0.02 THOUSANDS/ΜL (ref 0–0.1)
BASOPHILS NFR BLD AUTO: 0 % (ref 0–1)
BILIRUB SERPL-MCNC: 0.39 MG/DL (ref 0.3–1.2)
BUN SERPL-MCNC: 11 MG/DL (ref 6–20)
CALCIUM SERPL-MCNC: 9 MG/DL (ref 8.4–10.2)
CHLORIDE SERPL-SCNC: 110 MMOL/L (ref 96–108)
CO2 SERPL-SCNC: 27 MMOL/L (ref 22–33)
CREAT SERPL-MCNC: 1.29 MG/DL (ref 0.5–1.2)
EOSINOPHIL # BLD AUTO: 0.43 THOUSAND/ΜL (ref 0–0.61)
EOSINOPHIL NFR BLD AUTO: 7 % (ref 0–6)
ERYTHROCYTE [DISTWIDTH] IN BLOOD BY AUTOMATED COUNT: 15.6 % (ref 11.6–15.1)
GFR SERPL CREATININE-BSD FRML MDRD: 55 ML/MIN/1.73SQ M
GLUCOSE SERPL-MCNC: 96 MG/DL (ref 65–140)
HCT VFR BLD AUTO: 34.1 % (ref 36.5–49.3)
HGB BLD-MCNC: 10.5 G/DL (ref 12–17)
IMM GRANULOCYTES # BLD AUTO: 0.01 THOUSAND/UL (ref 0–0.2)
IMM GRANULOCYTES NFR BLD AUTO: 0 % (ref 0–2)
LYMPHOCYTES # BLD AUTO: 1.43 THOUSANDS/ΜL (ref 0.6–4.47)
LYMPHOCYTES NFR BLD AUTO: 22 % (ref 14–44)
MCH RBC QN AUTO: 25.8 PG (ref 26.8–34.3)
MCHC RBC AUTO-ENTMCNC: 30.8 G/DL (ref 31.4–37.4)
MCV RBC AUTO: 84 FL (ref 82–98)
MONOCYTES # BLD AUTO: 0.52 THOUSAND/ΜL (ref 0.17–1.22)
MONOCYTES NFR BLD AUTO: 8 % (ref 4–12)
NEUTROPHILS # BLD AUTO: 4.17 THOUSANDS/ΜL (ref 1.85–7.62)
NEUTS SEG NFR BLD AUTO: 63 % (ref 43–75)
PLATELET # BLD AUTO: 239 THOUSANDS/UL (ref 149–390)
PMV BLD AUTO: 10.4 FL (ref 8.9–12.7)
POTASSIUM SERPL-SCNC: 3.6 MMOL/L (ref 3.5–5)
PROT SERPL-MCNC: 7 G/DL (ref 6.4–8.3)
RBC # BLD AUTO: 4.07 MILLION/UL (ref 3.88–5.62)
SODIUM SERPL-SCNC: 144 MMOL/L (ref 133–145)
WBC # BLD AUTO: 6.58 THOUSAND/UL (ref 4.31–10.16)

## 2021-12-05 PROCEDURE — 99232 SBSQ HOSP IP/OBS MODERATE 35: CPT | Performed by: INTERNAL MEDICINE

## 2021-12-05 PROCEDURE — 85025 COMPLETE CBC W/AUTO DIFF WBC: CPT | Performed by: INTERNAL MEDICINE

## 2021-12-05 PROCEDURE — 99232 SBSQ HOSP IP/OBS MODERATE 35: CPT | Performed by: SURGERY

## 2021-12-05 PROCEDURE — 80053 COMPREHEN METABOLIC PANEL: CPT | Performed by: INTERNAL MEDICINE

## 2021-12-05 PROCEDURE — 74018 RADEX ABDOMEN 1 VIEW: CPT

## 2021-12-05 RX ORDER — MEMANTINE HYDROCHLORIDE 10 MG/1
10 TABLET ORAL 2 TIMES DAILY
Status: DISCONTINUED | OUTPATIENT
Start: 2021-12-05 | End: 2021-12-06 | Stop reason: HOSPADM

## 2021-12-05 RX ORDER — MIRTAZAPINE 15 MG/1
15 TABLET, FILM COATED ORAL
Status: DISCONTINUED | OUTPATIENT
Start: 2021-12-05 | End: 2021-12-06 | Stop reason: HOSPADM

## 2021-12-05 RX ORDER — AMLODIPINE BESYLATE 5 MG/1
5 TABLET ORAL DAILY
Status: DISCONTINUED | OUTPATIENT
Start: 2021-12-05 | End: 2021-12-06 | Stop reason: HOSPADM

## 2021-12-05 RX ORDER — DOCUSATE SODIUM 100 MG/1
100 CAPSULE, LIQUID FILLED ORAL 2 TIMES DAILY
Status: DISCONTINUED | OUTPATIENT
Start: 2021-12-05 | End: 2021-12-06 | Stop reason: HOSPADM

## 2021-12-05 RX ORDER — BUSPIRONE HYDROCHLORIDE 5 MG/1
7.5 TABLET ORAL 3 TIMES DAILY
Status: DISCONTINUED | OUTPATIENT
Start: 2021-12-05 | End: 2021-12-06 | Stop reason: HOSPADM

## 2021-12-05 RX ORDER — QUETIAPINE FUMARATE 25 MG/1
25 TABLET, FILM COATED ORAL
Status: DISCONTINUED | OUTPATIENT
Start: 2021-12-05 | End: 2021-12-06

## 2021-12-05 RX ORDER — BACLOFEN 10 MG/1
15 TABLET ORAL 3 TIMES DAILY
Status: DISCONTINUED | OUTPATIENT
Start: 2021-12-05 | End: 2021-12-06 | Stop reason: HOSPADM

## 2021-12-05 RX ADMIN — BACLOFEN 15 MG: 10 TABLET ORAL at 22:56

## 2021-12-05 RX ADMIN — DOCUSATE SODIUM 100 MG: 100 CAPSULE, LIQUID FILLED ORAL at 18:26

## 2021-12-05 RX ADMIN — DEXTROSE, SODIUM CHLORIDE, AND POTASSIUM CHLORIDE 85 ML/HR: 5; .45; .15 INJECTION INTRAVENOUS at 04:23

## 2021-12-05 RX ADMIN — HEPARIN SODIUM 5000 UNITS: 5000 INJECTION INTRAVENOUS; SUBCUTANEOUS at 08:32

## 2021-12-05 RX ADMIN — DEXTROSE, SODIUM CHLORIDE, AND POTASSIUM CHLORIDE 85 ML/HR: 5; .45; .15 INJECTION INTRAVENOUS at 16:40

## 2021-12-05 RX ADMIN — ERTAPENEM SODIUM 1000 MG: 1 INJECTION, POWDER, LYOPHILIZED, FOR SOLUTION INTRAMUSCULAR; INTRAVENOUS at 18:32

## 2021-12-05 RX ADMIN — AMLODIPINE BESYLATE 5 MG: 5 TABLET ORAL at 18:26

## 2021-12-05 RX ADMIN — APIXABAN 2.5 MG: 2.5 TABLET, FILM COATED ORAL at 18:26

## 2021-12-05 RX ADMIN — MIRTAZAPINE 15 MG: 15 TABLET, FILM COATED ORAL at 22:57

## 2021-12-05 RX ADMIN — BACLOFEN 15 MG: 10 TABLET ORAL at 18:26

## 2021-12-05 RX ADMIN — QUETIAPINE FUMARATE 25 MG: 25 TABLET ORAL at 22:57

## 2021-12-05 RX ADMIN — METOROPROLOL TARTRATE 2.5 MG: 5 INJECTION, SOLUTION INTRAVENOUS at 04:19

## 2021-12-05 RX ADMIN — MEMANTINE 10 MG: 10 TABLET ORAL at 18:26

## 2021-12-05 RX ADMIN — METOPROLOL TARTRATE 25 MG: 25 TABLET, FILM COATED ORAL at 22:57

## 2021-12-05 RX ADMIN — METOROPROLOL TARTRATE 2.5 MG: 5 INJECTION, SOLUTION INTRAVENOUS at 09:02

## 2021-12-05 RX ADMIN — BUSPIRONE HYDROCHLORIDE 7.5 MG: 5 TABLET ORAL at 18:26

## 2021-12-05 RX ADMIN — BUSPIRONE HYDROCHLORIDE 7.5 MG: 5 TABLET ORAL at 22:56

## 2021-12-06 VITALS
HEIGHT: 74 IN | TEMPERATURE: 98.4 F | WEIGHT: 197.31 LBS | BODY MASS INDEX: 25.32 KG/M2 | RESPIRATION RATE: 18 BRPM | HEART RATE: 52 BPM | SYSTOLIC BLOOD PRESSURE: 143 MMHG | DIASTOLIC BLOOD PRESSURE: 82 MMHG | OXYGEN SATURATION: 93 %

## 2021-12-06 LAB
ANION GAP SERPL CALCULATED.3IONS-SCNC: 7 MMOL/L (ref 4–13)
BACTERIA BLD CULT: NORMAL
BACTERIA BLD CULT: NORMAL
BASOPHILS # BLD AUTO: 0.03 THOUSANDS/ΜL (ref 0–0.1)
BASOPHILS NFR BLD AUTO: 0 % (ref 0–1)
BUN SERPL-MCNC: 9 MG/DL (ref 6–20)
CALCIUM SERPL-MCNC: 8.9 MG/DL (ref 8.4–10.2)
CHLORIDE SERPL-SCNC: 108 MMOL/L (ref 96–108)
CO2 SERPL-SCNC: 26 MMOL/L (ref 22–33)
CREAT SERPL-MCNC: 1.46 MG/DL (ref 0.5–1.2)
EOSINOPHIL # BLD AUTO: 0.55 THOUSAND/ΜL (ref 0–0.61)
EOSINOPHIL NFR BLD AUTO: 8 % (ref 0–6)
ERYTHROCYTE [DISTWIDTH] IN BLOOD BY AUTOMATED COUNT: 15.7 % (ref 11.6–15.1)
GFR SERPL CREATININE-BSD FRML MDRD: 47 ML/MIN/1.73SQ M
GLUCOSE SERPL-MCNC: 93 MG/DL (ref 65–140)
HCT VFR BLD AUTO: 33.3 % (ref 36.5–49.3)
HGB BLD-MCNC: 10.3 G/DL (ref 12–17)
IMM GRANULOCYTES # BLD AUTO: 0.01 THOUSAND/UL (ref 0–0.2)
IMM GRANULOCYTES NFR BLD AUTO: 0 % (ref 0–2)
LYMPHOCYTES # BLD AUTO: 1.88 THOUSANDS/ΜL (ref 0.6–4.47)
LYMPHOCYTES NFR BLD AUTO: 26 % (ref 14–44)
MCH RBC QN AUTO: 25.8 PG (ref 26.8–34.3)
MCHC RBC AUTO-ENTMCNC: 30.9 G/DL (ref 31.4–37.4)
MCV RBC AUTO: 84 FL (ref 82–98)
MONOCYTES # BLD AUTO: 0.77 THOUSAND/ΜL (ref 0.17–1.22)
MONOCYTES NFR BLD AUTO: 11 % (ref 4–12)
NEUTROPHILS # BLD AUTO: 4.05 THOUSANDS/ΜL (ref 1.85–7.62)
NEUTS SEG NFR BLD AUTO: 55 % (ref 43–75)
PLATELET # BLD AUTO: 246 THOUSANDS/UL (ref 149–390)
PMV BLD AUTO: 10.5 FL (ref 8.9–12.7)
POTASSIUM SERPL-SCNC: 3.7 MMOL/L (ref 3.5–5)
RBC # BLD AUTO: 3.99 MILLION/UL (ref 3.88–5.62)
SODIUM SERPL-SCNC: 141 MMOL/L (ref 133–145)
WBC # BLD AUTO: 7.29 THOUSAND/UL (ref 4.31–10.16)

## 2021-12-06 PROCEDURE — 80048 BASIC METABOLIC PNL TOTAL CA: CPT | Performed by: INTERNAL MEDICINE

## 2021-12-06 PROCEDURE — 99232 SBSQ HOSP IP/OBS MODERATE 35: CPT | Performed by: SURGERY

## 2021-12-06 PROCEDURE — 99239 HOSP IP/OBS DSCHRG MGMT >30: CPT | Performed by: INTERNAL MEDICINE

## 2021-12-06 PROCEDURE — 85025 COMPLETE CBC W/AUTO DIFF WBC: CPT | Performed by: INTERNAL MEDICINE

## 2021-12-06 RX ORDER — AMLODIPINE BESYLATE 5 MG/1
5 TABLET ORAL DAILY
Qty: 30 TABLET | Refills: 0 | Status: SHIPPED | OUTPATIENT
Start: 2021-12-07 | End: 2021-12-06 | Stop reason: HOSPADM

## 2021-12-06 RX ORDER — MEMANTINE HYDROCHLORIDE 10 MG/1
10 TABLET ORAL 2 TIMES DAILY
Qty: 60 TABLET | Refills: 0 | Status: SHIPPED | OUTPATIENT
Start: 2021-12-06 | End: 2021-12-06 | Stop reason: HOSPADM

## 2021-12-06 RX ADMIN — APIXABAN 2.5 MG: 2.5 TABLET, FILM COATED ORAL at 09:33

## 2021-12-06 RX ADMIN — DEXTROSE, SODIUM CHLORIDE, AND POTASSIUM CHLORIDE 85 ML/HR: 5; .45; .15 INJECTION INTRAVENOUS at 10:11

## 2021-12-06 RX ADMIN — BACLOFEN 15 MG: 10 TABLET ORAL at 09:31

## 2021-12-06 RX ADMIN — AMLODIPINE BESYLATE 5 MG: 5 TABLET ORAL at 09:33

## 2021-12-06 RX ADMIN — MEMANTINE 10 MG: 10 TABLET ORAL at 09:33

## 2021-12-06 RX ADMIN — METOPROLOL TARTRATE 25 MG: 25 TABLET, FILM COATED ORAL at 09:33

## 2021-12-06 RX ADMIN — BUSPIRONE HYDROCHLORIDE 7.5 MG: 5 TABLET ORAL at 09:31

## 2021-12-06 RX ADMIN — DOCUSATE SODIUM 100 MG: 100 CAPSULE, LIQUID FILLED ORAL at 09:31

## 2021-12-07 LAB
ATRIAL RATE: 113 BPM
P AXIS: 14 DEGREES
PR INTERVAL: 167 MS
QRS AXIS: -63 DEGREES
QRSD INTERVAL: 78 MS
QT INTERVAL: 346 MS
QTC INTERVAL: 470 MS
T WAVE AXIS: 63 DEGREES
VENTRICULAR RATE: 111 BPM

## 2021-12-07 PROCEDURE — 93010 ELECTROCARDIOGRAM REPORT: CPT | Performed by: INTERNAL MEDICINE

## 2021-12-09 ENCOUNTER — TELEPHONE (OUTPATIENT)
Dept: UROLOGY | Facility: AMBULATORY SURGERY CENTER | Age: 71
End: 2021-12-09

## 2021-12-20 ENCOUNTER — TELEPHONE (OUTPATIENT)
Dept: OTHER | Facility: OTHER | Age: 71
End: 2021-12-20

## 2022-01-05 ENCOUNTER — OFFICE VISIT (OUTPATIENT)
Dept: GASTROENTEROLOGY | Facility: CLINIC | Age: 72
End: 2022-01-05
Payer: COMMERCIAL

## 2022-01-05 DIAGNOSIS — K56.609 SMALL BOWEL OBSTRUCTION (HCC): Primary | ICD-10-CM

## 2022-01-05 PROCEDURE — 99204 OFFICE O/P NEW MOD 45 MIN: CPT | Performed by: INTERNAL MEDICINE

## 2022-01-05 NOTE — PROGRESS NOTES
[facility] - Outpatient Consultation  Elba Juan Tomas 66 y o  male MRN: [id number]  Encounter: [id number]        ASSESSMENT AND PLAN:      1  Small bowel obstruction (HCC)  Etiology these obstructions is not clear as he has no history of abdominal surgery or peritoneal infection, no indication of underlying IBD  May have an internal hernia, possible malignancy, other inflammatory process, etc   We discussed options for evaluation and management  Patient's wife would like to avoid invasive surgical therapy but is willing to consider all options  Will plan CT enterography for further evaluation, and consider further evaluation with EGD and colonoscopy depending on the results  In the meantime, will continue his current bowel regimen including daily oral MiraLax    - CT small bowel enterography; Future        ______________________________________________________________________    HPI:  Patient's wife provides history  Patient in persistent vegetative state after cardiopulmonary arrest secondary to sepsis approximately 10 years ago now presents for consultation after 2 recent episodes of small bowel obstruction  He was hospitalized for these which responded to conservative management, though the underlying etiology for these is not immediately clear  He has no history of abdominal surgery  Has never had a colonoscopy or upper endoscopy  No family history of gastrointestinal disease  No prior history of similar symptoms  CT scan performed with these episodes revealed severe gastric and proximal/mid small bowel distention with decompressed ileum and probable right lower quadrant transition point  Also noted to have large amount of stool in the distal colon  Most recent CT scan performed on December 1, 2021 also suggested a metallic object at the rectosigmoid junction which was not present on October 6, 2021           REVIEW OF SYSTEMS:    Review of Systems   Unable to perform ROS: patient nonverbal        Historical Information   Past Medical History:   Diagnosis Date    Ataxia     COPD (chronic obstructive pulmonary disease) ([facility] )     wife denies - "never had"    CVA [facility]cerebral vascular accident) ([facility] )     [facility]     Difficulty swallowing     Difficulty walking     Generalized anxiety disorder     GERD (gastroesophageal reflux disease)     Global aphasia     H/O blood clots     Heartburn     Hypertension     Mental disorder     Muscle weakness     Neuromuscular dysfunction of bladder     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 ([facility] )     Stroke ([facility] )     Thrombosis      Past Surgical History:   Procedure Laterality Date    BOTOX INJECTION N/A 6/18/2019    Procedure: INJECTION BOTULINUM TOXIN (BOTOX), intra detrusor;  Surgeon: Kelley Carroll MD;  [facility];  Service: Urology    BOTOX INJECTION N/A 10/7/2019    Procedure: INJECTION BOTULINUM TOXIN (BOTOX), intradetrusor;  Surgeon: Kelley Carroll MD;  [facility];  Service: Urology    CYSTOSCOPY      CYSTOSCOPY N/A 6/18/2019    Procedure: cystoscopy and all indicated procedures;  Surgeon: Kelley Carroll MD;  [facility];  Service: Urology    FL CYSTOGRAM  1/15/2019    IR NEPHROSTOMY TUBE CHECK/CHANGE/REPOSITION/REINSERTION/UPSIZE  5/26/2021    IR NEPHROSTOMY TUBE PLACEMENT  3/11/2020    IR SUPRAPUBIC CATHETER PLACEMENT  11/26/2019    IVC FILTER INSERTION      X2    IVC FILTER INSERTION      x2    KIDNEY STONE SURGERY      KNEE SURGERY      Sepsis infection     NEPHROSTOMY      VT CYSTO/URETERO W/LITHOTRIPSY &INDWELL STENT INSRT Right 6/14/2017    Procedure: CYSTOSCOPY URETEROSCOPY WITH LITHOTRIPSY HOLMIUM LASER,,BASKET STONE EXTRACTION, RETROGRADE PYELOGRAM AND INSERTION STENT URETERAL;  Surgeon: Jann Harp MD;  [facility]: [facility];  Service: Urology    VT CYSTOURETHROSCOPY,BIOPSY N/A 1/15/2019    Procedure: Minnie Sandhoff, CYSTOGRAM, DILATION OF URETHRAL STRICTURE;  Surgeon: Carolyn Armstrong MD;  [facility];  Service: Urology    URINARY SURGERY       Social History   Social History     Substance and Sexual Activity   Alcohol Use Not Currently     Social History     Substance and Sexual Activity   Drug Use No     Social History     Tobacco Use   Smoking Status Never Smoker   Smokeless Tobacco Never Used     Family History   Problem Relation Age of Onset    Diabetes Father     Hypertension Father     Coronary artery disease Father     Cancer Father     Hypertension Mother        Meds/Allergies       Current Outpatient Medications:     acetaminophen (TYLENOL) 325 mg tablet    amLODIPine (NORVASC) 10 mg tablet    ammonium lactate (LAC-HYDRIN) 12 % cream    apixaban (ELIQUIS) 2 5 mg    ascorbic acid (VITAMIN C) 500 MG tablet    baclofen 10 mg tablet    bisacodyl (DULCOLAX) 10 mg suppository    busPIRone (BUSPAR) 7 5 mg tablet    cholecalciferol (VITAMIN D3) 1,000 units tablet    Citric An-Ktzqflkqgwq-Qh Carb (Renacidin) SOLN    docusate sodium (COLACE) 100 mg capsule    famotidine (PEPCID) 20 mg tablet    glycerin-hypromellose- (Artificial Tears) 0 2-0 2-1 % SOLN    memantine (NAMENDA) 5 mg tablet    metoprolol tartrate (LOPRESSOR) 25 mg tablet    mirtazapine (REMERON) 15 mg tablet    polyethylene glycol (MIRALAX) 17 g packet    potassium chloride (K-DUR,KLOR-CON) 10 mEq tablet    QUEtiapine (SEROquel) 25 mg tablet    saccharomyces boulardii (FLORASTOR) 250 mg capsule    senna (SENOKOT) 8 6 mg    Allergies   Allergen Reactions    Gentamycin [Gentamicin] Anaphylaxis    Piperacillin Sod-Tazobactam So Anaphylaxis and Rash     However, has tolerated Ertapenem, Cefdinir, and Cefepime, which all have different side chains   Avoid Penicillins and the following Cephs with similar side chains (Cephalexin, Cefadroxil, Cefaclor, Cefprozil, or  Cefoxitin)  Other reaction(s): Hemoptysis    Vancomycin Anaphylaxis and Rash     Other reaction(s): Hemoptysis    Clindamycin Itching and Rash     Other reaction(s): Hemoptysis    Nsaids Other (See Comments)     Nephrotic Syndrome    Sulfa Antibiotics Rash    Other Rash     antipersperents  Stat lock from lucia catheter    Tigecycline Rash     Other reaction(s): Hemoptysis           Objective     There were no vitals taken for this visit  There is no height or weight on file to calculate BMI  PHYSICAL EXAM:      Physical Exam  Vitals and nursing note reviewed  Constitutional:       General: He is not in acute distress  HENT:      Head: Normocephalic and atraumatic  Eyes:      General: No scleral icterus  Cardiovascular:      Rate and Rhythm: Normal rate  Pulmonary:      Effort: Pulmonary effort is normal  No respiratory distress  Abdominal:      General: There is no distension  Palpations: Abdomen is soft  Tenderness: There is no abdominal tenderness  There is no guarding or rebound  Skin:     General: Skin is warm and dry  Lab Results:   No visits with results within 1 Day(s) from this visit  Latest known visit with results is:   No results displayed because visit has over 200 results  Radiology Results:   No results found

## 2022-01-07 ENCOUNTER — PROCEDURE VISIT (OUTPATIENT)
Dept: UROLOGY | Facility: CLINIC | Age: 72
End: 2022-01-07
Payer: COMMERCIAL

## 2022-01-07 DIAGNOSIS — R33.9 RETENTION OF URINE: Primary | ICD-10-CM

## 2022-01-07 PROCEDURE — 51705 CHANGE OF BLADDER TUBE: CPT

## 2022-01-07 NOTE — PROGRESS NOTES
1/7/2022    Mely [facility]  1950  [id number]    Diagnosis  Chief Complaint     spt exchange          Patient presents for routine SPT exchange managed by Dr Madhav Hand  To follow up as scheduled in five weeks for SPT exchange        Procedure: Suprapubic Tube Change   Suprapubic Tube Placement    Date/Time: 1/7/2022 2:18 PM  Performed by: Leeann Bocanegra RN  Authorized by: Chanda Leggett MD     Patient location:  Bedside  Other Assisting Provider: No    Consent:     Consent obtained:  Verbal    Consent given by:  Spouse  Universal protocol:     Procedure explained and questions answered to patient or proxy's satisfaction: yes      Relevant documents present and verified: yes      Patient identity confirmed:  Verbally with patient (verbally with wife)  Anesthesia (see MAR for exact dosages): Anesthesia method:  None  Procedure details:     Complexity:  Simple    Number of attempts:  1    Urine characteristics:  Yellow  Post-procedure details:     Patient tolerance of procedure: Tolerated well, no immediate complications  Comments:      Of note, patient was upsized to 24f during last hospitalization in mid December  Patients wife requested it be changed back to 20F since it was upsized due to an unknown reason and patient has been having increased drainage and uncomfortablness since its been upsized  Discussed with Rachele PEDRO and he gave the ok to downsize back to a 20 f  Wife appreciative  No issues upon insertion           Current catheter removed without difficulty after deflation of an intact balloon  Site prepped with Betadine, new 20F  latex spt change via aseptic technique without incident, 10 ml balloon inflated with sterile water  irrigated easily for straw-yellow return, no spasm noted  Patient tolerated well  Attached to drainage bag  There were no vitals filed for this visit        Leeann Bocanegra RN

## 2022-02-08 ENCOUNTER — PROCEDURE VISIT (OUTPATIENT)
Dept: UROLOGY | Facility: CLINIC | Age: 72
End: 2022-02-08
Payer: COMMERCIAL

## 2022-02-08 VITALS — SYSTOLIC BLOOD PRESSURE: 140 MMHG | DIASTOLIC BLOOD PRESSURE: 88 MMHG | RESPIRATION RATE: 19 BRPM

## 2022-02-08 DIAGNOSIS — Z93.59 SUPRAPUBIC CATHETER (HCC): Primary | ICD-10-CM

## 2022-02-08 PROCEDURE — 51705 CHANGE OF BLADDER TUBE: CPT

## 2022-02-08 NOTE — PROGRESS NOTES
2/8/2022    Erik Escalante [facility]  1950  [id number]    Diagnosis      Patient presents for routine spt exchange managed by Dr Arpita Webber  To follow up as scheduled in five weeks for SPT exchange  Procedure: Suprapubic Tube Change   Suprapubic Tube Placement    Date/Time: 2/8/2022 2:32 PM  Performed by: Farrukh Ram RN  Authorized by: Uriah Law MD     Patient location:  Bedside  Other Assisting Provider: No    Consent:     Consent obtained:  Verbal    Consent given by:  Patient  Universal protocol:     Procedure explained and questions answered to patient or proxy's satisfaction: yes      Relevant documents present and verified: yes      Patient identity confirmed:  Verbally with patient  Procedure details:     Number of attempts:  1  Post-procedure details:     Patient tolerance of procedure: Tolerated well, no immediate complications  Comments: Tolerated well, no immediate complications  Insertion site covered with split gauze per wifes request          Current catheter removed without difficulty after deflation of an intact balloon  Site prepped with Betadine, new 20F  latex spt change via aseptic technique without incident, 10 ml balloon inflated with sterile water  irrigated easily for straw-yellow return, no spasm noted  Patient tolerated well  Attached to drainage bag  There were no vitals filed for this visit        Farrukh Ram RN

## 2022-02-14 ENCOUNTER — HOSPITAL ENCOUNTER (INPATIENT)
Facility: HOSPITAL | Age: 72
LOS: 9 days | Discharge: NURSING, REHAB OR LONG-TERM CARE | DRG: 871 | End: 2022-02-23
Attending: STUDENT IN AN ORGANIZED HEALTH CARE EDUCATION/TRAINING PROGRAM | Admitting: INTERNAL MEDICINE
Payer: MEDICARE

## 2022-02-14 ENCOUNTER — APPOINTMENT (EMERGENCY)
Dept: CT IMAGING | Facility: HOSPITAL | Age: 72
DRG: 871 | End: 2022-02-14
Payer: MEDICARE

## 2022-02-14 ENCOUNTER — APPOINTMENT (EMERGENCY)
Dept: RADIOLOGY | Facility: HOSPITAL | Age: 72
DRG: 871 | End: 2022-02-14
Payer: MEDICARE

## 2022-02-14 DIAGNOSIS — E87.2 INCREASED ANION GAP METABOLIC ACIDOSIS: ICD-10-CM

## 2022-02-14 DIAGNOSIS — K92.0 COFFEE GROUND EMESIS: ICD-10-CM

## 2022-02-14 DIAGNOSIS — K56.41 FECAL IMPACTION IN RECTUM (HCC): ICD-10-CM

## 2022-02-14 DIAGNOSIS — K56.609 SBO (SMALL BOWEL OBSTRUCTION) (HCC): Primary | ICD-10-CM

## 2022-02-14 DIAGNOSIS — J69.0 ASPIRATION PNEUMONITIS (HCC): ICD-10-CM

## 2022-02-14 DIAGNOSIS — A41.9 SEPSIS (HCC): ICD-10-CM

## 2022-02-14 DIAGNOSIS — K59.9 COLONIC DYSMOTILITY: ICD-10-CM

## 2022-02-14 LAB
2HR DELTA HS TROPONIN: -2 NG/L
4HR DELTA HS TROPONIN: -1 NG/L
ABO GROUP BLD: NORMAL
ABO GROUP BLD: NORMAL
ALBUMIN SERPL BCP-MCNC: 3.1 G/DL (ref 3.5–5)
ALP SERPL-CCNC: 88 U/L (ref 46–116)
ALT SERPL W P-5'-P-CCNC: 13 U/L (ref 12–78)
ANION GAP SERPL CALCULATED.3IONS-SCNC: 17 MMOL/L (ref 4–13)
ANISOCYTOSIS BLD QL SMEAR: PRESENT
APTT PPP: 29 SECONDS (ref 23–37)
AST SERPL W P-5'-P-CCNC: 10 U/L (ref 5–45)
BACTERIA UR QL AUTO: ABNORMAL /HPF
BASOPHILS # BLD MANUAL: 0 THOUSAND/UL (ref 0–0.1)
BASOPHILS NFR MAR MANUAL: 0 % (ref 0–1)
BILIRUB SERPL-MCNC: 0.49 MG/DL (ref 0.2–1)
BILIRUB UR QL STRIP: NEGATIVE
BLD GP AB SCN SERPL QL: NEGATIVE
BUN SERPL-MCNC: 27 MG/DL (ref 5–25)
CALCIUM ALBUM COR SERPL-MCNC: 10.2 MG/DL (ref 8.3–10.1)
CALCIUM SERPL-MCNC: 9.5 MG/DL (ref 8.3–10.1)
CARDIAC TROPONIN I PNL SERPL HS: 12 NG/L
CARDIAC TROPONIN I PNL SERPL HS: 13 NG/L
CARDIAC TROPONIN I PNL SERPL HS: 14 NG/L
CHLORIDE SERPL-SCNC: 106 MMOL/L (ref 100–108)
CLARITY UR: ABNORMAL
CO2 SERPL-SCNC: 21 MMOL/L (ref 21–32)
COLOR UR: YELLOW
CREAT SERPL-MCNC: 2.42 MG/DL (ref 0.6–1.3)
EOSINOPHIL # BLD MANUAL: 0 THOUSAND/UL (ref 0–0.4)
EOSINOPHIL NFR BLD MANUAL: 0 % (ref 0–6)
ERYTHROCYTE [DISTWIDTH] IN BLOOD BY AUTOMATED COUNT: 19.3 % (ref 11.6–15.1)
GFR SERPL CREATININE-BSD FRML MDRD: 25 ML/MIN/1.73SQ M
GLUCOSE SERPL-MCNC: 233 MG/DL (ref 65–140)
GLUCOSE SERPL-MCNC: 260 MG/DL (ref 65–140)
GLUCOSE UR STRIP-MCNC: NEGATIVE MG/DL
HCT VFR BLD AUTO: 48.5 % (ref 36.5–49.3)
HGB BLD-MCNC: 14.2 G/DL (ref 12–17)
HGB UR QL STRIP.AUTO: ABNORMAL
HYPERCHROMIA BLD QL SMEAR: PRESENT
INR PPP: 1.21 (ref 0.84–1.19)
KETONES UR STRIP-MCNC: NEGATIVE MG/DL
LACTATE SERPL-SCNC: 3.2 MMOL/L (ref 0.5–2)
LACTATE SERPL-SCNC: 8.1 MMOL/L (ref 0.5–2)
LEUKOCYTE ESTERASE UR QL STRIP: ABNORMAL
LIPASE SERPL-CCNC: 40 U/L (ref 73–393)
LYMPHOCYTES # BLD AUTO: 0.84 THOUSAND/UL (ref 0.6–4.47)
LYMPHOCYTES # BLD AUTO: 4 % (ref 14–44)
MCH RBC QN AUTO: 23.7 PG (ref 26.8–34.3)
MCHC RBC AUTO-ENTMCNC: 29.3 G/DL (ref 31.4–37.4)
MCV RBC AUTO: 81 FL (ref 82–98)
MONOCYTES # BLD AUTO: 0.42 THOUSAND/UL (ref 0–1.22)
MONOCYTES NFR BLD: 2 % (ref 4–12)
NEUTROPHILS # BLD MANUAL: 19.81 THOUSAND/UL (ref 1.85–7.62)
NEUTS BAND NFR BLD MANUAL: 17 % (ref 0–8)
NEUTS SEG NFR BLD AUTO: 77 % (ref 43–75)
NITRITE UR QL STRIP: NEGATIVE
NON-SQ EPI CELLS URNS QL MICRO: ABNORMAL /HPF
PH UR STRIP.AUTO: 8.5 [PH]
PLATELET # BLD AUTO: 434 THOUSANDS/UL (ref 149–390)
PLATELET BLD QL SMEAR: ABNORMAL
PMV BLD AUTO: 10 FL (ref 8.9–12.7)
POTASSIUM SERPL-SCNC: 3.7 MMOL/L (ref 3.5–5.3)
PROT SERPL-MCNC: 8.4 G/DL (ref 6.4–8.2)
PROT UR STRIP-MCNC: ABNORMAL MG/DL
PROTHROMBIN TIME: 15.3 SECONDS (ref 11.6–14.5)
RBC # BLD AUTO: 5.99 MILLION/UL (ref 3.88–5.62)
RBC #/AREA URNS AUTO: ABNORMAL /HPF
RBC MORPH BLD: PRESENT
RH BLD: NEGATIVE
RH BLD: NEGATIVE
SODIUM SERPL-SCNC: 144 MMOL/L (ref 136–145)
SP GR UR STRIP.AUTO: 1.01 (ref 1–1.03)
SPECIMEN EXPIRATION DATE: NORMAL
TRI-PHOS CRY URNS QL MICRO: ABNORMAL /HPF
UROBILINOGEN UR QL STRIP.AUTO: 0.2 E.U./DL
WBC # BLD AUTO: 21.07 THOUSAND/UL (ref 4.31–10.16)
WBC #/AREA URNS AUTO: ABNORMAL /HPF

## 2022-02-14 PROCEDURE — 83690 ASSAY OF LIPASE: CPT | Performed by: PHYSICIAN ASSISTANT

## 2022-02-14 PROCEDURE — 87081 CULTURE SCREEN ONLY: CPT

## 2022-02-14 PROCEDURE — 85610 PROTHROMBIN TIME: CPT | Performed by: PHYSICIAN ASSISTANT

## 2022-02-14 PROCEDURE — 85730 THROMBOPLASTIN TIME PARTIAL: CPT | Performed by: PHYSICIAN ASSISTANT

## 2022-02-14 PROCEDURE — 81001 URINALYSIS AUTO W/SCOPE: CPT | Performed by: PHYSICIAN ASSISTANT

## 2022-02-14 PROCEDURE — 99285 EMERGENCY DEPT VISIT HI MDM: CPT

## 2022-02-14 PROCEDURE — 96365 THER/PROPH/DIAG IV INF INIT: CPT

## 2022-02-14 PROCEDURE — 96367 TX/PROPH/DG ADDL SEQ IV INF: CPT

## 2022-02-14 PROCEDURE — 74018 RADEX ABDOMEN 1 VIEW: CPT

## 2022-02-14 PROCEDURE — 99223 1ST HOSP IP/OBS HIGH 75: CPT | Performed by: INTERNAL MEDICINE

## 2022-02-14 PROCEDURE — 80053 COMPREHEN METABOLIC PANEL: CPT | Performed by: PHYSICIAN ASSISTANT

## 2022-02-14 PROCEDURE — 83605 ASSAY OF LACTIC ACID: CPT | Performed by: PHYSICIAN ASSISTANT

## 2022-02-14 PROCEDURE — 74177 CT ABD & PELVIS W/CONTRAST: CPT

## 2022-02-14 PROCEDURE — 87086 URINE CULTURE/COLONY COUNT: CPT | Performed by: PHYSICIAN ASSISTANT

## 2022-02-14 PROCEDURE — 87040 BLOOD CULTURE FOR BACTERIA: CPT | Performed by: PHYSICIAN ASSISTANT

## 2022-02-14 PROCEDURE — 85007 BL SMEAR W/DIFF WBC COUNT: CPT | Performed by: PHYSICIAN ASSISTANT

## 2022-02-14 PROCEDURE — NC001 PR NO CHARGE: Performed by: INTERNAL MEDICINE

## 2022-02-14 PROCEDURE — 71045 X-RAY EXAM CHEST 1 VIEW: CPT

## 2022-02-14 PROCEDURE — 96366 THER/PROPH/DIAG IV INF ADDON: CPT

## 2022-02-14 PROCEDURE — 86900 BLOOD TYPING SEROLOGIC ABO: CPT | Performed by: PHYSICIAN ASSISTANT

## 2022-02-14 PROCEDURE — 82948 REAGENT STRIP/BLOOD GLUCOSE: CPT

## 2022-02-14 PROCEDURE — 93005 ELECTROCARDIOGRAM TRACING: CPT

## 2022-02-14 PROCEDURE — 86901 BLOOD TYPING SEROLOGIC RH(D): CPT | Performed by: PHYSICIAN ASSISTANT

## 2022-02-14 PROCEDURE — 84484 ASSAY OF TROPONIN QUANT: CPT | Performed by: PHYSICIAN ASSISTANT

## 2022-02-14 PROCEDURE — 99285 EMERGENCY DEPT VISIT HI MDM: CPT | Performed by: PHYSICIAN ASSISTANT

## 2022-02-14 PROCEDURE — 86850 RBC ANTIBODY SCREEN: CPT | Performed by: PHYSICIAN ASSISTANT

## 2022-02-14 PROCEDURE — 36415 COLL VENOUS BLD VENIPUNCTURE: CPT | Performed by: PHYSICIAN ASSISTANT

## 2022-02-14 PROCEDURE — 85027 COMPLETE CBC AUTOMATED: CPT | Performed by: PHYSICIAN ASSISTANT

## 2022-02-14 PROCEDURE — 87077 CULTURE AEROBIC IDENTIFY: CPT | Performed by: PHYSICIAN ASSISTANT

## 2022-02-14 PROCEDURE — 71275 CT ANGIOGRAPHY CHEST: CPT

## 2022-02-14 RX ORDER — CHLORHEXIDINE GLUCONATE 0.12 MG/ML
15 RINSE ORAL EVERY 12 HOURS SCHEDULED
Status: DISCONTINUED | OUTPATIENT
Start: 2022-02-14 | End: 2022-02-19

## 2022-02-14 RX ORDER — SODIUM CHLORIDE, SODIUM GLUCONATE, SODIUM ACETATE, POTASSIUM CHLORIDE, MAGNESIUM CHLORIDE, SODIUM PHOSPHATE, DIBASIC, AND POTASSIUM PHOSPHATE .53; .5; .37; .037; .03; .012; .00082 G/100ML; G/100ML; G/100ML; G/100ML; G/100ML; G/100ML; G/100ML
1000 INJECTION, SOLUTION INTRAVENOUS ONCE
Status: COMPLETED | OUTPATIENT
Start: 2022-02-14 | End: 2022-02-14

## 2022-02-14 RX ORDER — SODIUM CHLORIDE, SODIUM GLUCONATE, SODIUM ACETATE, POTASSIUM CHLORIDE, MAGNESIUM CHLORIDE, SODIUM PHOSPHATE, DIBASIC, AND POTASSIUM PHOSPHATE .53; .5; .37; .037; .03; .012; .00082 G/100ML; G/100ML; G/100ML; G/100ML; G/100ML; G/100ML; G/100ML
125 INJECTION, SOLUTION INTRAVENOUS CONTINUOUS
Status: DISCONTINUED | OUTPATIENT
Start: 2022-02-14 | End: 2022-02-15

## 2022-02-14 RX ORDER — BISACODYL 10 MG
10 SUPPOSITORY, RECTAL RECTAL ONCE
Status: COMPLETED | OUTPATIENT
Start: 2022-02-14 | End: 2022-02-15

## 2022-02-14 RX ORDER — SODIUM CHLORIDE, SODIUM GLUCONATE, SODIUM ACETATE, POTASSIUM CHLORIDE, MAGNESIUM CHLORIDE, SODIUM PHOSPHATE, DIBASIC, AND POTASSIUM PHOSPHATE .53; .5; .37; .037; .03; .012; .00082 G/100ML; G/100ML; G/100ML; G/100ML; G/100ML; G/100ML; G/100ML
1500 INJECTION, SOLUTION INTRAVENOUS ONCE
Status: COMPLETED | OUTPATIENT
Start: 2022-02-14 | End: 2022-02-14

## 2022-02-14 RX ORDER — LINEZOLID 2 MG/ML
600 INJECTION, SOLUTION INTRAVENOUS ONCE
Status: COMPLETED | OUTPATIENT
Start: 2022-02-14 | End: 2022-02-14

## 2022-02-14 RX ORDER — PANTOPRAZOLE SODIUM 40 MG/1
40 INJECTION, POWDER, FOR SOLUTION INTRAVENOUS
Status: DISCONTINUED | OUTPATIENT
Start: 2022-02-15 | End: 2022-02-14

## 2022-02-14 RX ORDER — HYDROMORPHONE HCL/PF 1 MG/ML
0.5 SYRINGE (ML) INJECTION
Status: DISCONTINUED | OUTPATIENT
Start: 2022-02-14 | End: 2022-02-23 | Stop reason: HOSPADM

## 2022-02-14 RX ORDER — HYDROMORPHONE HCL/PF 1 MG/ML
0.5 SYRINGE (ML) INJECTION ONCE
Status: COMPLETED | OUTPATIENT
Start: 2022-02-14 | End: 2022-02-14

## 2022-02-14 RX ORDER — LINEZOLID 2 MG/ML
600 INJECTION, SOLUTION INTRAVENOUS EVERY 12 HOURS
Status: DISCONTINUED | OUTPATIENT
Start: 2022-02-15 | End: 2022-02-15

## 2022-02-14 RX ORDER — HEPARIN SODIUM 5000 [USP'U]/ML
5000 INJECTION, SOLUTION INTRAVENOUS; SUBCUTANEOUS EVERY 8 HOURS SCHEDULED
Status: DISCONTINUED | OUTPATIENT
Start: 2022-02-14 | End: 2022-02-15

## 2022-02-14 RX ORDER — SODIUM CHLORIDE, SODIUM GLUCONATE, SODIUM ACETATE, POTASSIUM CHLORIDE, MAGNESIUM CHLORIDE, SODIUM PHOSPHATE, DIBASIC, AND POTASSIUM PHOSPHATE .53; .5; .37; .037; .03; .012; .00082 G/100ML; G/100ML; G/100ML; G/100ML; G/100ML; G/100ML; G/100ML
500 INJECTION, SOLUTION INTRAVENOUS ONCE
Status: COMPLETED | OUTPATIENT
Start: 2022-02-14 | End: 2022-02-14

## 2022-02-14 RX ADMIN — SODIUM CHLORIDE, SODIUM GLUCONATE, SODIUM ACETATE, POTASSIUM CHLORIDE, MAGNESIUM CHLORIDE, SODIUM PHOSPHATE, DIBASIC, AND POTASSIUM PHOSPHATE 125 ML/HR: .53; .5; .37; .037; .03; .012; .00082 INJECTION, SOLUTION INTRAVENOUS at 21:30

## 2022-02-14 RX ADMIN — HYDROMORPHONE HYDROCHLORIDE 0.5 MG: 1 INJECTION, SOLUTION INTRAMUSCULAR; INTRAVENOUS; SUBCUTANEOUS at 18:32

## 2022-02-14 RX ADMIN — SODIUM CHLORIDE, SODIUM GLUCONATE, SODIUM ACETATE, POTASSIUM CHLORIDE, MAGNESIUM CHLORIDE, SODIUM PHOSPHATE, DIBASIC, AND POTASSIUM PHOSPHATE 1000 ML: .53; .5; .37; .037; .03; .012; .00082 INJECTION, SOLUTION INTRAVENOUS at 16:17

## 2022-02-14 RX ADMIN — IOHEXOL 100 ML: 350 INJECTION, SOLUTION INTRAVENOUS at 12:15

## 2022-02-14 RX ADMIN — HEPARIN SODIUM 5000 UNITS: 5000 INJECTION INTRAVENOUS; SUBCUTANEOUS at 18:15

## 2022-02-14 RX ADMIN — INSULIN LISPRO 3 UNITS: 100 INJECTION, SOLUTION INTRAVENOUS; SUBCUTANEOUS at 17:53

## 2022-02-14 RX ADMIN — SODIUM CHLORIDE, SODIUM GLUCONATE, SODIUM ACETATE, POTASSIUM CHLORIDE, MAGNESIUM CHLORIDE, SODIUM PHOSPHATE, DIBASIC, AND POTASSIUM PHOSPHATE 1500 ML: .53; .5; .37; .037; .03; .012; .00082 INJECTION, SOLUTION INTRAVENOUS at 17:36

## 2022-02-14 RX ADMIN — LINEZOLID 600 MG: 600 INJECTION, SOLUTION INTRAVENOUS at 17:26

## 2022-02-14 RX ADMIN — SODIUM CHLORIDE 1000 ML: 0.9 INJECTION, SOLUTION INTRAVENOUS at 14:00

## 2022-02-14 RX ADMIN — SODIUM CHLORIDE, SODIUM GLUCONATE, SODIUM ACETATE, POTASSIUM CHLORIDE, MAGNESIUM CHLORIDE, SODIUM PHOSPHATE, DIBASIC, AND POTASSIUM PHOSPHATE 500 ML: .53; .5; .37; .037; .03; .012; .00082 INJECTION, SOLUTION INTRAVENOUS at 11:46

## 2022-02-14 RX ADMIN — ERTAPENEM SODIUM 1000 MG: 1 INJECTION, POWDER, LYOPHILIZED, FOR SOLUTION INTRAMUSCULAR; INTRAVENOUS at 14:12

## 2022-02-14 RX ADMIN — CHLORHEXIDINE GLUCONATE 0.12% ORAL RINSE 15 ML: 1.2 LIQUID ORAL at 21:26

## 2022-02-14 NOTE — ED NOTES
Wife (POA) requests we do NOTHING with pt until her arrival  Wife reportedly on her way        Lam Miller RN  02/14/22 6902

## 2022-02-14 NOTE — ED PROVIDER NOTES
History  Chief Complaint   Patient presents with    Vomiting Blood     pt arrives by EMS from [facility] for vomiting  Per EMS pt was witnessed projectile vomiting coffee groung emesis  Patient is a 65 y/o male with a PMHx of COPD, CVA, dementia, GERD, HTN, SBO and chronic vegetative state (2/2 to cardiac arrest in 2011), presenting to the ED for evaluation of vomiting  Patient presents from local SNF after an episode of projectile coffee-ground emesis this morning  Patient is non-verbal and unable to contribute to history  Per EMS, patient was hypoxic in the 80's on their arrival and was placed on O2 via NRB with improvement  Patient's wife notes that he had a recent small bowel obstruction and is concerned this may be contributing to his current symptoms  Patient is awake and alert, no respiratory distress  Abdomen is diffusely tender and distended  Prior to Admission Medications   Prescriptions Last Dose Informant Patient Reported? Taking?    [address]p Carb (Renacidin) SOLN  Spouse/Significant Other Yes No   QUEtiapine (SEROquel) 25 mg tablet  Spouse/Significant Other Yes No   Sig: Take 25 mg by mouth daily at bedtime     acetaminophen (TYLENOL) 325 mg tablet  Spouse/Significant Other Yes No   Sig: Take 650 mg by mouth every 4 (four) hours as needed for mild pain    amLODIPine (NORVASC) 10 mg tablet  Spouse/Significant Other Yes No   Sig: Take 10 mg by mouth daily   ammonium lactate (LAC-HYDRIN) 12 % cream  Spouse/Significant Other No No   Sig: Apply topically daily   apixaban (ELIQUIS) 2 5 mg  Spouse/Significant Other Yes No   Sig: Take 2 5 mg by mouth 2 (two) times a day    ascorbic acid (VITAMIN C) 500 MG tablet  Spouse/Significant Other No No   Sig: Take 2 tablets (1,000 mg total) by mouth every 12 (twelve) hours   baclofen 10 mg tablet  Spouse/Significant Other No No   Sig: Take 1 5 tablets (15 mg total) by mouth 3 (three) times a day   bisacodyl (DULCOLAX) 10 mg suppository  Spouse/Significant Other No No   Sig: Insert 1 suppository (10 mg total) into the rectum daily as needed for constipation   busPIRone (BUSPAR) 7 5 mg tablet  Spouse/Significant Other Yes No   Sig: Take 7 5 mg by mouth 3 (three) times a day   cholecalciferol (VITAMIN D3) 1,000 units tablet  Spouse/Significant Other Yes No   Sig: Take 2,000 Units by mouth daily   docusate sodium (COLACE) 100 mg capsule  Spouse/Significant Other Yes No   Sig: Take 100 mg by mouth 2 (two) times a day   famotidine (PEPCID) 20 mg tablet  Spouse/Significant Other Yes No   Sig: Take 20 mg by mouth 2 (two) times a day   glycerin-hypromellose- (Artificial Tears) 0 2-0 2-1 % SOLN  Spouse/Significant Other Yes No   Sig: Administer 1 drop to both eyes 2 (two) times a day    memantine (NAMENDA) 5 mg tablet  Spouse/Significant Other Yes No   Si (two) times a day    metoprolol tartrate (LOPRESSOR) 25 mg tablet  Spouse/Significant Other No No   Sig: Take 1 tablet (25 mg total) by mouth every 12 (twelve) hours   mirtazapine (REMERON) 15 mg tablet  Spouse/Significant Other Yes No   Sig: daily at bedtime    polyethylene glycol (MIRALAX) 17 g packet  Spouse/Significant Other No No   Sig: Take 17 g by mouth daily   potassium chloride (K-DUR,KLOR-CON) 10 mEq tablet  Spouse/Significant Other No No   Sig: Take 1 tablet (10 mEq total) by mouth daily with breakfast   saccharomyces boulardii (FLORASTOR) 250 mg capsule  Spouse/Significant Other Yes No   Sig: Take 250 mg by mouth 2 (two) times a day   senna (SENOKOT) 8 6 mg  Spouse/Significant Other No No   Sig: Take 2 tablets (17 2 mg total) by mouth daily at bedtime      Facility-Administered Medications: None       Past Medical History:   Diagnosis Date    Ataxia     COPD [facility]ch[facility] obstructive [facility] disease[facility]     wife denies - "never had"    [facility] [facility]     Dementia [facility] [facility]     Difficulty swallowing     Difficulty walking     Generalized anxiety disorder     GERD (gastroesophageal reflux disease)     Global aphasia     H/O blood clots     Heartburn     Hypertension     Mental disorder     Muscle weakness     Neuromuscular dysfunction of bladder     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 ([facility] [address] [facility]     Stroke ([facility] [address] [facility]     Thrombosis        Past Surgical History:   Procedure Laterality Date    BOTOX INJECTION N/A 6/18/2019    Procedure: INJECTION BOTULINUM TOXIN (BOTOX), intra detrusor;  Surgeon: Lona Hurt MD;  [facility];  Service: Urology    BOTOX INJECTION N/A 10/7/2019    Procedure: INJECTION BOTULINUM TOXIN (BOTOX), intradetrusor;  Surgeon: Lona Hurt MD;  [facility];  Service: Urology    CYSTOSCOPY      CYSTOSCOPY N/A 6/18/2019    Procedure: cystoscopy and all indicated procedures;  Surgeon: Lona Hurt MD;  [facility];  Service: Urology    FL CYSTOGRAM  1/15/2019    IR NEPHROSTOMY TUBE CHECK/CHANGE/REPOSITION/REINSERTION/UPSIZE  5/26/2021    IR NEPHROSTOMY TUBE PLACEMENT  3/11/2020    IR SUPRAPUBIC CATHETER PLACEMENT  11/26/2019    IVC FILTER INSERTION      X2    IVC FILTER INSERTION      x2    KIDNEY STONE SURGERY      KNEE SURGERY      Sepsis infection     NEPHROSTOMY      MD CYSTO/URETERO W/LITHOTRIPSY &INDWELL STENT INSRT Right 6/14/2017    Procedure: CYSTOSCOPY URETEROSCOPY WITH LITHOTRIPSY HOLMIUM LASER,,BASKET STONE EXTRACTION, RETROGRADE PYELOGRAM AND INSERTION STENT URETERAL;  Surgeon: Jethro Woods MD;  [facility];  Service: Urology    MD CYSTOURETHROSCOPY,BIOPSY N/A 1/15/2019    Procedure: CYSTOSCOPY, CYSTOGRAM, DILATION OF URETHRAL STRICTURE;  Surgeon: Lona Hurt MD;  [facility];  Service: Urology    URINARY SURGERY         Family History   Problem Relation Age of Onset    Diabetes Father     Hypertension Father     Coronary artery disease Father     Cancer Father     Hypertension Mother      I have reviewed and agree with the history as documented  E-Cigarette/Vaping    E-Cigarette Use Never User      E-Cigarette/Vaping Substances     Social History     Tobacco Use    Smoking status: Never Smoker    Smokeless tobacco: Never Used   Vaping Use    Vaping Use: Never used   Substance Use Topics    Alcohol use: Not Currently    Drug use: No       Review of Systems   Unable to perform ROS: Patient nonverbal   Gastrointestinal: Positive for vomiting  Physical Exam  Physical Exam  Vitals and nursing note reviewed  Constitutional:       General: He is awake  He is in acute distress (moaning in pain)  Appearance: He is ill-appearing  HENT:      Head: Normocephalic and atraumatic  Right Ear: External ear normal       Left Ear: External ear normal       Nose: Nose normal       Mouth/Throat:      Lips: Pink  Mouth: Mucous membranes are dry  Comments: Dry mucous membranes   Eyes:      General: Lids are normal  No scleral icterus  Conjunctiva/sclera: Conjunctivae normal       Pupils: Pupils are equal, round, and reactive to light  Cardiovascular:      Rate and Rhythm: Regular rhythm  Tachycardia present  Pulses: Normal pulses  Radial pulses are 2+ on the right side and 2+ on the left side  Heart sounds: Normal heart sounds, S1 normal and S2 normal    Pulmonary:      Effort: Pulmonary effort is normal  No accessory muscle usage or respiratory distress  Breath sounds: No stridor  Rhonchi present  No decreased breath sounds, wheezing or rales  Abdominal:      General: Bowel sounds are normal  There is distension  Palpations: Abdomen is soft  Tenderness: There is generalized abdominal tenderness  There is no right CVA tenderness, left CVA tenderness, guarding or rebound        Comments: Abdomen is diffusely tender to palpation with marked distension; no rebound tenderness or rigidity   Genitourinary:     Comments: Attempted manual disimpaction - no hard/impacted stool in rectal vault  Musculoskeletal:      Cervical back: Full passive range of motion without pain, normal range of motion and neck supple  No signs of trauma  No pain with movement  Normal range of motion  Right lower leg: No edema  Left lower leg: No edema  Lymphadenopathy:      Cervical: No cervical adenopathy  Skin:     General: Skin is warm and dry  Capillary Refill: Capillary refill takes less than 2 seconds  Coloration: Skin is not cyanotic, jaundiced or pale  Neurological:      Mental Status: He is alert  Mental status is at baseline     Psychiatric:         Attention and Perception: Attention normal          Vital Signs  ED Triage Vitals   Temperature Pulse Respirations Blood Pressure SpO2   02/14/22 1104 02/14/22 1104 02/14/22 1104 02/14/22 1104 02/14/22 1104   97 7 °F (36 5 °C) (!) 115 20 116/72 95 %      Temp Source Heart Rate Source Patient Position - Orthostatic VS BP Location FiO2 (%)   02/14/22 1104 02/14/22 1104 02/14/22 1104 02/14/22 1104 --   Axillary Monitor Lying Right arm       Pain Score       02/14/22 1832       10 - Worst Possible Pain           Vitals:    02/14/22 1315 02/14/22 1515 02/14/22 1547 02/14/22 1614   BP: 131/77 99/62 108/72 119/73   Pulse: (!) 106 (!) 122 (!) 110 (!) 107   Patient Position - Orthostatic VS: Lying Lying Lying Lying         Visual Acuity  Visual Acuity      Most Recent Value   L Pupil Size (mm) 2   R Pupil Size (mm) 2   L Pupil Shape Round   R Pupil Shape Round          ED Medications  Medications   bisacodyl (DULCOLAX) rectal suppository 10 mg (10 mg Rectal Not Given 2/14/22 1726)   insulin lispro (HumaLOG) 100 units/mL subcutaneous injection 1-6 Units (3 Units Subcutaneous Given 2/14/22 1753)   famotidine (PEPCID) injection 20 mg (has no administration in time range)   linezolid (ZYVOX) IVPB (premix in dextrose) 600 mg 300 mL (has no administration in time range)   ertapenem (INVanz) 1,000 mg in sodium chloride 0 9 % 50 mL IVPB (has no administration in time range)   multi-electrolyte (PLASMALYTE-A/ISOLYTE-S PH 7 4) IV solution (has no administration in time range)   chlorhexidine (PERIDEX) 0 12 % oral rinse 15 mL (has no administration in time range)   heparin (porcine) subcutaneous injection 5,000 Units (5,000 Units Subcutaneous Given 2/14/22 1815)   multi-electrolyte (ISOLYTE-S PH 7 4) bolus 500 mL (0 mL Intravenous Stopped 2/14/22 1413)   iohexol (OMNIPAQUE) 350 MG/ML injection (SINGLE-DOSE) 100 mL (100 mL Intravenous Given 2/14/22 1215)   ertapenem (INVanz) 1,000 mg in sodium chloride 0 9 % 50 mL IVPB (0 mg Intravenous Stopped 2/14/22 1617)   multi-electrolyte (ISOLYTE-S PH 7 4) bolus 1,000 mL (0 mL Intravenous Stopped 2/14/22 1725)   linezolid (ZYVOX) IVPB (premix in dextrose) 600 mg 300 mL (600 mg Intravenous New Bag 2/14/22 1726)   multi-electrolyte (ISOLYTE-S PH 7 4) bolus 1,500 mL (1,500 mL Intravenous New Bag 2/14/22 1736)   HYDROmorphone (DILAUDID) injection 0 5 mg (0 5 mg Intravenous Given 2/14/22 1832)       Diagnostic Studies  Results Reviewed     Procedure Component Value Units Date/Time    Lactic acid 2 Hours [[id number]]  (Abnormal) Collected: 02/14/22 1725    Lab Status: Final result Specimen: Blood from Arm, Left Updated: 02/14/22 1815     LACTIC ACID 3 2 mmol/L     Narrative:      Result may be elevated if tourniquet was used during collection      HS Troponin I 4hr [[id number]]  (Normal) Collected: 02/14/22 1725    Lab Status: Final result Specimen: Blood from Arm, Left Updated: 02/14/22 1808     hs TnI 4hr 13 ng/L      Delta 4hr hsTnI -1 ng/L     Urine Microscopic [[id number]]  (Abnormal) Collected: 02/14/22 1524    Lab Status: Final result Specimen: Urine, Suprapubic catheter Updated: 02/14/22 1628     RBC, UA Innumerable /hpf      WBC, UA Innumerable /hpf      Epithelial Cells Occasional /hpf      Bacteria, UA Innumerable /hpf      Triplep Phos Lulu, UA Moderate /hpf     Urine culture [[id number]] Collected: 02/14/22 1524    Lab Status: In process Specimen: Urine, Suprapubic catheter Updated: 02/14/22 1628    Blood culture #1 [[id number]] Collected: 02/14/22 1254    Lab Status: Preliminary result Specimen: Blood from Arm, Right Updated: 02/14/22 1603     Blood Culture Received in Microbiology Lab  Culture in Progress  Blood culture #2 [[id number]] Collected: 02/14/22 1257    Lab Status: Preliminary result Specimen: Blood from Arm, Left Updated: 02/14/22 1603     Blood Culture Received in Microbiology Lab  Culture in Progress  UA w Reflex to Microscopic w Reflex to Culture [[id number]]  (Abnormal) Collected: 02/14/22 1524    Lab Status: Final result Specimen: Urine, Suprapubic catheter Updated: 02/14/22 1551     Color, UA Yellow     Clarity, UA Cloudy     Specific Gravity, UA 1 015     pH, UA 8 5     Leukocytes, UA Large     Nitrite, UA Negative     Protein, UA 30 (1+) mg/dl      Glucose, UA Negative mg/dl      Ketones, UA Negative mg/dl      Urobilinogen, UA 0 2 E U /dl      Bilirubin, UA Negative     Blood, UA Large    HS Troponin I 2hr [[id number]]  (Normal) Collected: 02/14/22 1414    Lab Status: Final result Specimen: Blood from Arm, Left Updated: 02/14/22 1456     hs TnI 2hr 12 ng/L      Delta 2hr hsTnI -2 ng/L     Lactic acid [[id number]]  (Abnormal) Collected: 02/14/22 1254    Lab Status: Final result Specimen: Blood from Arm, Right Updated: 02/14/22 1423     LACTIC ACID 8 1 mmol/L     Narrative:      Result may be elevated if tourniquet was used during collection      APTT [[id number]]  (Normal) Collected: 02/14/22 1257    Lab Status: Final result Specimen: Blood from Arm, Left Updated: 02/14/22 1334     PTT 29 seconds     Protime-INR [[id number]]  (Abnormal) Collected: 02/14/22 1257    Lab Status: Final result Specimen: Blood from Arm, Left Updated: 02/14/22 1334     Protime 15 3 seconds      INR 1 21    Comprehensive metabolic panel [[id number]]  (Abnormal) Collected: 02/14/22 1144    Lab Status: Final result Specimen: Blood from Arm, Right Updated: 02/14/22 1236     Sodium 144 mmol/L      Potassium 3 7 mmol/L      Chloride 106 mmol/L      CO2 21 mmol/L      ANION GAP 17 mmol/L      BUN 27 mg/dL      Creatinine 2 42 mg/dL      Glucose 260 mg/dL      Calcium 9 5 mg/dL      Corrected Calcium 10 2 mg/dL      AST 10 U/L      ALT 13 U/L      Alkaline Phosphatase 88 U/L      Total Protein 8 4 g/dL      Albumin 3 1 g/dL      Total Bilirubin 0 49 mg/dL      eGFR 25 ml/min/1 73sq m     Narrative:      National Kidney Disease Foundation guidelines for Chronic Kidney Disease (CKD):     Stage 1 with normal or high GFR (GFR > 90 mL/min/1 73 square meters)    Stage 2 Mild CKD (GFR = 60-89 mL/min/1 73 square meters)    Stage 3A Moderate CKD (GFR = 45-59 mL/min/1 73 square meters)    Stage 3B Moderate CKD (GFR = 30-44 mL/min/1 73 square meters)    Stage 4 Severe CKD (GFR = 15-29 mL/min/1 73 square meters)    Stage 5 End Stage CKD (GFR <15 mL/min/1 73 square meters)  Note: GFR calculation is accurate only with a steady state creatinine    Lipase [[id number]]  (Abnormal) Collected: 02/14/22 1144    Lab Status: Final result Specimen: Blood from Arm, Right Updated: 02/14/22 1236     Lipase 40 u/L     HS Troponin 0hr (reflex protocol) [[id number]]  (Normal) Collected: 02/14/22 1144    Lab Status: Final result Specimen: Blood from Arm, Right Updated: 02/14/22 1228     hs TnI 0hr 14 ng/L     CBC and differential [[id number]]  (Abnormal) Collected: 02/14/22 1144    Lab Status: Final result Specimen: Blood from Arm, Right Updated: 02/14/22 1225     WBC 21 07 Thousand/uL      RBC 5 99 Million/uL      Hemoglobin 14 2 g/dL      Hematocrit 48 5 %      MCV 81 fL      MCH 23 7 pg      MCHC 29 3 g/dL      RDW 19 3 %      MPV 10 0 fL      Platelets 344 Thousands/uL     Narrative: This is an appended report  These results have been appended to a previously verified report      Manual Differential(PHLEBS Do Not Order) [[id number]]  (Abnormal) Collected: 02/14/22 1144    Lab Status: Final result Specimen: Blood from Arm, Right Updated: 02/14/22 1225     Segmented % 77 %      Bands % 17 %      Lymphocytes % 4 %      Monocytes % 2 %      Eosinophils, % 0 %      Basophils % 0 %      Absolute Neutrophils 19 81 Thousand/uL      Lymphocytes Absolute 0 84 Thousand/uL      Monocytes Absolute 0 42 Thousand/uL      Eosinophils Absolute 0 00 Thousand/uL      Basophils Absolute 0 00 Thousand/uL      Total Counted --     RBC Morphology Present     Anisocytosis Present     Hypochromia Present     Platelet Estimate Increased                 PE Study with CT Abdomen and Pelvis with contrast   Final Result by Lynette Kussmaul, MD (02/14 1312)      Chest:      1  Limited evaluation of the lower lobes due to respiratory motion artifact, however there are no segmental filling defects in the basal segments  Otherwise, no evidence of acute or chronic pulmonary embolism in the entire pulmonary arterial system  2   Extensive diffuse bilateral groundglass opacities consistent with aspiration pneumonitis versus pneumonia  Probable secretions in the right lower lobar bronchus, limited evaluation due to motion  3   Ectatic thoracic aorta measuring up to 42 mm, unchanged from CT of 10/6/2021  Recommendation is for follow-up low radiation dose chest CT in one year  Abdomen/pelvis:      1  Massively distended stomach, dilated small bowel with air-fluid levels, findings suspicious for small bowel obstruction with probable transition point in the right lower quadrant  This is unchanged from prior study  However, there is interval    worsening of the stool-impacted, distended rectosigmoid  No pneumatosis or evidence of perforation  The study was marked in [facility] for immediate notification        Workstation performed: [id number]         XR chest 1 view portable   Final Result by Patti Bragg MD (02/14 7890)      Left lower lobe atelectasis versus infiltrate Bowel distention is again identified similar to prior studies  Please see abdomen for further report                  Workstation performed: [id number]         XR abdomen 1 view kub   Final Result by Shari Russo MD (02/14 1343)      Marked bowel distention may be on the basis of ileus or obstruction  Please see subsequent CT for further report  Workstation performed: [id number]         XR abdomen 1 view kub    (Results Pending)              Procedures  ECG 12 Lead Documentation Only    Date/Time: 2/14/2022 1:10 PM  Performed by: Sarita Almendarez PA-C  Authorized by: Sarita Almendarez PA-C     Indications / Diagnosis:  Hemoptysis  ECG reviewed by me, the ED Provider: yes    Patient location:  ED  Previous ECG:     Previous ECG:  Compared to current    Comparison ECG info:  12/1/21    Similarity:  No change    Comparison to cardiac monitor: Yes    Interpretation:     Interpretation: non-specific    Rate:     ECG rate:  108    ECG rate assessment: tachycardic    Rhythm:     Rhythm: sinus tachycardia    Ectopy:     Ectopy: none    QRS:     QRS axis:  Left    QRS intervals:  Normal  Conduction:     Conduction: normal    ST segments:     ST segments:  Normal  T waves:     T waves: normal    Comments:      No STEMI  QT/QTc 302/404             ED Course  ED Course as of 02/14/22 1912 Mon Feb 14, 2022   1127 Patient's wife and POA would not like anything done for patient until she arrives at the hospital    36 Spoke with patient's wife Romana Kern) who is on her way to the hospital  She is okay with patient receiving IV fluids and obtaining labs/x-rays but would not like any other invasive procedures done until she arrives  She is also agreeable to NG tube if needed  1215 Discussed concern for possible pneumoperitoneum with patient's wife at bedside  Explained that this is a surgical emergency and would require immediate surgery to prevent death   Patient's wife understands that this is life-threatening and states that this is not what she wants for her  and would just like to keep him comfortable at this time  1239 Anion Gap(!): 17   1239 WBC(!): 21 07   1239 Pulse(!): 115   1239 Creatinine(!): 2 42   1335 Bands Relative(!): 17   1430 LACTIC ACID(!!): 8 1             HEART Risk Score      Most Recent Value   Heart Score Risk Calculator    History 0 Filed at: 02/14/2022 1910   ECG 0 Filed at: 02/14/2022 1910   Age 2 Filed at: 02/14/2022 1910   Risk Factors 1 Filed at: 02/14/2022 1910   Troponin 1 Filed at: 02/14/2022 1910   HEART Score 4 Filed at: 02/14/2022 1910                     Initial Sepsis Screening     Row Name 02/14/22 1203                Is the patient's history suggestive of a new or worsening infection? Yes (Proceed)  -LH        Suspected source of infection acute abdominal infection  -LH        Are two or more of the following signs & symptoms of infection both present and new to the patient? Yes (Proceed)  -LH        Indicate SIRS criteria Tachycardia > 90 bpm;Leukocytosis (WBC > 71075 IJL);WBC > 10% bands  -        If the answer is yes to both questions, suspicion of sepsis is present --        If severe sepsis is present AND tissue hypoperfusion perists in the hour after fluid resuscitation or lactate > 4, the patient meets criteria for SEPTIC SHOCK --        Are any of the following organ dysfunction criteria present within 6 hours of suspected infection and SIRS criteria that are NOT considered to be chronic conditions?  Yes  -        Organ dysfunction Creatinine > 2 0 mg/dL  -        Date of presentation of severe sepsis --        Time of presentation of severe sepsis --        Tissue hypoperfusion persists in the hour after crystalloid fluid administration, evidenced, by either: --        Was hypotension present within one hour of the conclusion of crystalloid fluid administration? --        Date of presentation of septic shock --        Time of presentation of septic shock --              User Key  (r) = Recorded By, (t) = Taken By, (c) = Cosigned By    [address] Name Provider Type    Elodia Ibarra PA-C Physician Assistant                              MDM  Number of Diagnoses or Management Options  Aspiration pneumonitis ([address] )  Coffee ground emesis  Fecal impaction in rectum (HCC)  Increased anion gap metabolic acidosis  SBO (small bowel obstruction) (HCC)  Sepsis ([address] )  Diagnosis management comments: Patient is a 65 y/o male with a PMHx of COPD, CVA, dementia, GERD, HTN, SBO and chronic vegetative state (2/2 to cardiac arrest in 2011), presenting to the ED for evaluation of vomiting  KUB notable for marked bowel distension; NG tube was placed due to high level of suspicion for SBO  Patient met sepsis criteria with tachycardia, leukocytosis, bandemia and an RUPESH  Broad spectrum antibiotics and fluids were started in the ED  CT showed evidence of aspiration pneumonitis versus pneumonia as well as finding suspicious for a small bowel obstruction  Manual disimpaction attempted at bedside; however, no hard/impacted stool in rectal vault  Discussed with critical care who would like patient admitted to step-down 1  Discussed all results with patient's wife at bedside who is in agreement with the management plan          Amount and/or Complexity of Data Reviewed  Clinical lab tests: ordered and reviewed  Tests in the radiology section of CPT®: ordered and reviewed  Discuss the patient with other providers: yes (Dr Dariel Walker)    Patient Progress  Patient progress: stable      Disposition  Final diagnoses:   SBO (small bowel obstruction) (HCC)   Coffee ground emesis   Aspiration pneumonitis (HCC)   Increased anion gap metabolic acidosis   Sepsis ([address] )   Fecal impaction in rectum [facility])     Time reflects when diagnosis was documented in both MDM as applicable and the Disposition within this note     Time User Action Codes Description Comment    2/14/2022  1:29 PM Stella Sharona Savage [M00 [id number]] SBO (small bowel obstruction) ([facility] [address] )     2/14/2022  1:29 PM Starr Douglas Add [K92 0] Coffee ground emesis     2/14/2022  1:29 PM Starr Douglas Add [J69 0] [facility]     2/14/2022  1:33 PM Starr Douglas Add [E87 2] Increased anion gap metabolic acidosis     4/30/3229  1:34 PM Starr Douglas Add [A41 9] Sepsis ([place] [facility]     2/14/2022  1:40 PM Starr Douglas Add [K56 41] Fecal impaction in rectum [facility])       ED Disposition     ED Disposition Condition Date/Time Comment    Admit Stable Mon Feb 14, 2022  2:32 PM Case was discussed with ELAYNE and the patient's admission status was agreed to be Admission Status: inpatient status to the service of Dr Gregory Scherer  Follow-up Information    None         Current Discharge Medication List      CONTINUE these medications which have NOT CHANGED    Details   acetaminophen (TYLENOL) 325 mg tablet Take 650 mg by mouth every 4 (four) hours as needed for mild pain       amLODIPine (NORVASC) 10 mg tablet Take 10 mg by mouth daily      ammonium lactate (LAC-HYDRIN) 12 % cream Apply topically daily  Qty: 385 g, Refills: 0    Associated Diagnoses: Dry skin      apixaban (ELIQUIS) 2 5 mg Take 2 5 mg by mouth 2 (two) times a day       ascorbic acid (VITAMIN C) 500 MG tablet Take 2 tablets (1,000 mg total) by mouth every 12 (twelve) hours  Qty:  , Refills: 0    Associated Diagnoses: COVID-19 virus infection      baclofen 10 mg tablet Take 1 5 tablets (15 mg total) by mouth 3 (three) times a day    Associated Diagnoses: Muscle spasticity      bisacodyl (DULCOLAX) 10 mg suppository Insert 1 suppository (10 mg total) into the rectum daily as needed for constipation  Qty: 12 suppository, Refills: 0    Associated Diagnoses:  Bowel trouble      busPIRone (BUSPAR) 7 5 mg tablet Take 7 5 mg by mouth 3 (three) times a day      cholecalciferol (VITAMIN D3) 1,000 units tablet Take 2,000 Units by mouth daily      Citric Rv-Umzwlswimpo-Ho Carb (Renacidin) SOLN       docusate sodium (COLACE) 100 mg capsule Take 100 mg by mouth 2 (two) times a day      famotidine (PEPCID) 20 mg tablet Take 20 mg by mouth 2 (two) times a day      glycerin-hypromellose- (Artificial Tears) 0 2-0 2-1 % SOLN Administer 1 drop to both eyes 2 (two) times a day       memantine (NAMENDA) 5 mg tablet 2 (two) times a day       metoprolol tartrate (LOPRESSOR) 25 mg tablet Take 1 tablet (25 mg total) by mouth every 12 (twelve) hours  Refills: 0    Associated Diagnoses: Essential hypertension      mirtazapine (REMERON) 15 mg tablet daily at bedtime       polyethylene glycol (MIRALAX) 17 g packet Take 17 g by mouth daily  Qty: 14 each, Refills: 0    Associated Diagnoses: H/O urethral stricture      potassium chloride (K-DUR,KLOR-CON) 10 mEq tablet Take 1 tablet (10 mEq total) by mouth daily with breakfast  Refills: 0    Associated Diagnoses: Hypokalemia      QUEtiapine (SEROquel) 25 mg tablet Take 25 mg by mouth daily at bedtime        saccharomyces boulardii (FLORASTOR) 250 mg capsule Take 250 mg by mouth 2 (two) times a day      senna (SENOKOT) 8 6 mg Take 2 tablets (17 2 mg total) by mouth daily at bedtime  Refills: 0    Associated Diagnoses: Constipation             No discharge procedures on file      PDMP Review       Value Time User    PDMP Reviewed  Yes 1/26/2021  8:31 AM Pipo Aguiar MD          ED Provider  Electronically Signed by           Matthew Ramsay PA-C  02/14/22 1912

## 2022-02-14 NOTE — H&P
H&P Exam - Critical Care   Hayley Angiasif [facility] 66 y o  male MRN: [id number]  Unit/Bed#: ICU 03 Encounter: [id number]      -------------------------------------------------------------------------------------------------------------  Chief Complaint: Hypoxia and vomiting    History of Present Illness   HX and PE limited by: Patient baseline dementia and global aphasia  James Hernandez is a 66 y o  male with pmhx global aphasia, dementia, SBO, CVA, GERD, HTN presents for hypoxia and coffee ground emesis witnessed by staff at [facility] in [place]  Patient is accompanied by his wife, [facility]  She provides history of progressive decline in patient's mental and physical status dating back to 2010, where he suffered an episode of severe sepsis  Patient has had multiple admissions for severe sepsis and has a history of blood clots  She says he has had global aphasia since 2013  Patient is bed-bound and has "lost his memory and is not himself" for quite some time now  She mentions that she is ready to "let him go " Wife expressly states to be notified before any procedures are to be done       History obtained from spouse, chart review and unobtainable from patient due to mental status   -------------------------------------------------------------------------------------------------------------  Assessment and Plan:    Neuro:    Diagnosis: Dementia  o Plan:  o Speech and swallow AM  o Maintain outpatient medications  o Hold home meds until clears swallow test  o Patient is at near baseline per wife  - Global aphasia, bed-bound, no memory    CV:    Diagnosis: Tachycardia in setting of sepsis  o Plan: 30 cc/kg bolus isolyte  o Trend lactic acid  o Hold anti-hypertensives    Pulm:   Diagnosis: Aspiration PNA vs pneumoperitoneum  o Extensive bilateral groundglass opacities (aspiration PNA vs PNA)  o Plan: Linezolid 600 mg IV Q12H  - Ertepenem 1000 mg Q24H   Diagnosis: Acute hypoxic respiratory distress  o Plan: Nasal cannula, wean oxygen >90&    GI:    Diagnosis: Small bowel obstruction  o Plan: NGT decompression  o Dulcolax 10 mg enema  o Monitor stool output  - If stool smears, will need another enema  o Pepcid 20 mg IV for GI ppx  o NPO until speech and swallow test AM    :    Diagnosis: RUPESH in setting of suprapubic catheter  o Plan: IV hydration  o Trend urine output goal >0 5mg/kg/hr  o Strict I/O  o Hold nephrotoxic drugs    F/E/N:    Plan: Strict I/O  Monitor electrolytes, replete as necessary, difficulty swallowing    Heme/Onc:    Diagnosis: History of DVT and blood clots  o Plan: Continue eliquis 2 5 mg BID once speech and swallow clears  o Heparin    Endo:    Diagnosis: Elevated glucose 260  o Plan: Accuchek q6h  o SSI insulin, algorithm 3    ID:    Diagnosis: Severe Sepsis (tachycardia, lactic acidosis, leukocytosis, RUPESH)  o Plan:   o Linezolid q12h  o Ertapenem q24h  o ID consult  o Extensive antibiotic allergies  o History of MDRO and ESBL    MSK/Skin:    Diagnosis: Bed-bound  o Plan: Frequent turning to prevent bed sores      Disposition: Admit to Critical Care   Code Status: Level 3 - DNAR and DNI  --------------------------------------------------------------------------------------------------------------  Review of Systems   Constitutional: Positive for fever  Negative for chills  HENT: Negative for congestion, ear pain and sore throat  Eyes: Negative for pain and visual disturbance  Respiratory: Negative for cough and shortness of breath  Cardiovascular: Negative for chest pain  Gastrointestinal: Positive for vomiting  Negative for abdominal pain, blood in stool, diarrhea and nausea  Endocrine: Negative for polyuria  Genitourinary: Positive for difficulty urinating (suprapubic catheter)  Negative for dysuria and hematuria  Musculoskeletal: Positive for gait problem  Skin: Negative for rash and wound  Allergic/Immunologic: Negative for environmental allergies and food allergies     Neurological: Positive for speech difficulty and weakness  Negative for dizziness, light-headedness and headaches  Hematological: Does not bruise/bleed easily  Psychiatric/Behavioral: Positive for agitation  All other systems reviewed and are negative  A 12-point, complete review of systems was reviewed and negative except as stated above     Physical Exam  Vitals and nursing note reviewed  Constitutional:       Appearance: He is well-developed  He is ill-appearing  HENT:      Head: Normocephalic and atraumatic  Right Ear: External ear normal       Left Ear: External ear normal       Nose: Nose normal       Mouth/Throat:      Mouth: Mucous membranes are moist       Pharynx: Oropharynx is clear  Eyes:      Conjunctiva/sclera: Conjunctivae normal    Cardiovascular:      Rate and Rhythm: Normal rate and regular rhythm  Pulses: Normal pulses  Heart sounds: Normal heart sounds  No murmur heard  Pulmonary:      Effort: Pulmonary effort is normal  No respiratory distress  Breath sounds: Rhonchi and rales present  Abdominal:      General: Abdomen is flat  There is no distension  Palpations: Abdomen is soft  Tenderness: There is no abdominal tenderness  There is no guarding  Genitourinary:     Comments: Suprapubic catheter    Musculoskeletal:         General: No deformity  Cervical back: Neck supple  Skin:     General: Skin is warm and dry  Capillary Refill: Capillary refill takes less than 2 seconds  Findings: No rash  Neurological:      Mental Status: He is alert   Mental status is at baseline        --------------------------------------------------------------------------------------------------------------  Vitals:   Vitals:    02/14/22 1315 02/14/22 1515 02/14/22 1547 02/14/22 1614   BP: 131/77 99/62 108/72 119/73   BP Location: Right arm Right arm Right arm Right arm   Pulse: (!) 106 (!) 122 (!) 110 (!) 107   Resp: 18 20 18 12   Temp:    99 °F (37 2 °C) TempSrc:    Axillary   SpO2: 99% 95% 93% 100%     Temp  Min: 97 7 °F (36 5 °C)  Max: 99 °F (37 2 °C)        There is no height or weight on file to calculate BMI  Laboratory and Diagnostics:  Results from last 7 days   Lab Units 22  1144   WBC Thousand/uL 21 07*   HEMOGLOBIN g/dL 14 2   HEMATOCRIT % 48 5   PLATELETS Thousands/uL 434*   BANDS PCT % 17*   MONO PCT % 2*     Results from last 7 days   Lab Units 22  1144   SODIUM mmol/L 144   POTASSIUM mmol/L 3 7   CHLORIDE mmol/L 106   CO2 mmol/L 21   ANION GAP mmol/L 17*   BUN mg/dL 27*   CREATININE mg/dL 2 42*   CALCIUM mg/dL 9 5   GLUCOSE RANDOM mg/dL 260*   ALT U/L 13   AST U/L 10   ALK PHOS U/L 88   ALBUMIN g/dL 3 1*   TOTAL BILIRUBIN mg/dL 0 49          Results from last 7 days   Lab Units 22  1257   INR  1 21*   PTT seconds 29          Results from last 7 days   Lab Units 22  1725 22  1254   LACTIC ACID mmol/L 3 2* 8 1*     ABG:    VBG:          Micro:  Results from last 7 days   Lab Units 22  1257 22  1254   BLOOD CULTURE  Received in Microbiology Lab  Culture in Progress  Received in Microbiology Lab  Culture in Progress  EK22: Sinus tachycardia  Imaging: I have personally reviewed pertinent reports        Historical Information   Past Medical History:   Diagnosis Date    Ataxia     COPD [facility]chronic obstructive pulmonary disease) [facility]     wife denies - "never had"    CVA (cerebral vascular accident) [facility]     Dementia (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 )     Stroke ([facility]     Thrombosis      Past Surgical History:   Procedure Laterality Date    BOTOX INJECTION N/A 6/18/2019    Procedure: INJECTION BOTULINUM TOXIN (BOTOX), intra detrusor;  Surgeon: Kelley Carroll MD;  [facility];  Service: Urology    BOTOX INJECTION N/A 10/7/2019    Procedure: INJECTION BOTULINUM TOXIN (BOTOX), intradetrusor;  Surgeon: Kelley Carroll MD;  [facility];  Service: Urology    CYSTOSCOPY      CYSTOSCOPY N/A 6/18/2019    Procedure: cystoscopy and all indicated procedures;  Surgeon: Kelley Carroll MD;  [facility];  Service: Urology    FL CYSTOGRAM  1/15/2019    IR NEPHROSTOMY TUBE CHECK/CHANGE/REPOSITION/REINSERTION/UPSIZE  5/26/2021    IR NEPHROSTOMY TUBE PLACEMENT  3/11/2020    IR SUPRAPUBIC CATHETER PLACEMENT  11/26/2019    IVC FILTER INSERTION      X2    IVC FILTER INSERTION      x2    KIDNEY STONE SURGERY      KNEE SURGERY      Sepsis infection     NEPHROSTOMY      PA CYSTO/URETERO W/LITHOTRIPSY &INDWELL STENT INSRT Right 6/14/2017    Procedure: CYSTOSCOPY URETEROSCOPY WITH LITHOTRIPSY HOLMIUM LASER,,BASKET STONE EXTRACTION, RETROGRADE PYELOGRAM AND INSERTION STENT URETERAL;  Surgeon: Jann Harp MD;  [facility];  Service: Urology    PA CYSTOURETHROSCOPY,BIOPSY N/A 1/15/2019    Procedure: CYSTOSCOPY, CYSTOGRAM, DILATION OF URETHRAL STRICTURE;  Surgeon: Kelley Carroll MD;  [facility];  Service: Urology    URINARY SURGERY       Social History   Social History     Substance and Sexual Activity   Alcohol Use Not Currently     Social History     Substance and Sexual Activity   Drug Use No     Social History     Tobacco Use   Smoking Status Never Smoker   Smokeless Tobacco Never Used     Exercise History: Immobile  Family History:   Family History   Problem Relation Age of Onset    Diabetes Father     Hypertension Father     Coronary artery disease Father     Cancer Father     Hypertension Mother      I have reviewed this patient's family history and commented on sigificant items within the HPI      Medications:  Current Facility-Administered Medications   Medication Dose Route Frequency    bisacodyl (DULCOLAX) rectal suppository 10 mg  10 mg Rectal Once    chlorhexidine (PERIDEX) 0 12 % oral rinse 15 mL  15 mL Mouth/Throat Q12H [address]    [START ON 2/15/2022] ertapenem (INVanz) 1,000 mg in sodium chloride 0 9 % 50 mL IVPB  1,000 mg Intravenous Q24H    [START ON 2/15/2022] famotidine (PEPCID) injection 20 mg  20 mg Intravenous Q24H [address]    heparin (porcine) subcutaneous injection 5,000 Units  5,000 Units Subcutaneous Q8H [address]    insulin lispro (HumaLOG) 100 units/mL subcutaneous injection 1-6 Units  1-6 Units Subcutaneous Q6H [address]    [START ON 2/15/2022] linezolid (ZYVOX) IVPB (premix in dextrose) 600 mg 300 mL  600 mg Intravenous Q12H    multi-electrolyte (PLASMALYTE-A/ISOLYTE-S PH 7 4) IV solution  125 mL/hr Intravenous Continuous     Home medications:  Prior to Admission Medications   Prescriptions Last Dose Informant Patient Reported? Taking?    Citric Zr-Tqqpxflltln-Nr Carb (Renacidin) SOLN  Spouse/Significant Other Yes No   QUEtiapine (SEROquel) 25 mg tablet  Spouse/Significant Other Yes No   Sig: Take 25 mg by mouth daily at bedtime     acetaminophen (TYLENOL) 325 mg tablet  Spouse/Significant Other Yes No   Sig: Take 650 mg by mouth every 4 (four) hours as needed for mild pain    amLODIPine (NORVASC) 10 mg tablet  Spouse/Significant Other Yes No   Sig: Take 10 mg by mouth daily   ammonium lactate (LAC-HYDRIN) 12 % cream  Spouse/Significant Other No No   Sig: Apply topically daily   apixaban (ELIQUIS) 2 5 mg  Spouse/Significant Other Yes No   Sig: Take 2 5 mg by mouth 2 (two) times a day    ascorbic acid (VITAMIN C) 500 MG tablet  Spouse/Significant Other No No   Sig: Take 2 tablets (1,000 mg total) by mouth every 12 (twelve) hours   baclofen 10 mg tablet  Spouse/Significant Other No No   Sig: Take 1 5 tablets (15 mg total) by mouth 3 (three) times a day   bisacodyl (DULCOLAX) 10 mg suppository Spouse/Significant Other No No   Sig: Insert 1 suppository (10 mg total) into the rectum daily as needed for constipation   busPIRone (BUSPAR) 7 5 mg tablet  Spouse/Significant Other Yes No   Sig: Take 7 5 mg by mouth 3 (three) times a day   cholecalciferol (VITAMIN D3) 1,000 units tablet  Spouse/Significant Other Yes No   Sig: Take 2,000 Units by mouth daily   docusate sodium (COLACE) 100 mg capsule  Spouse/Significant Other Yes No   Sig: Take 100 mg by mouth 2 (two) times a day   famotidine (PEPCID) 20 mg tablet  Spouse/Significant Other Yes No   Sig: Take 20 mg by mouth 2 (two) times a day   glycerin-hypromellose- (Artificial Tears) 0 2-0 2-1 % SOLN  Spouse/Significant Other Yes No   Sig: Administer 1 drop to both eyes 2 (two) times a day    memantine (NAMENDA) 5 mg tablet  Spouse/Significant Other Yes No   Si (two) times a day    metoprolol tartrate (LOPRESSOR) 25 mg tablet  Spouse/Significant Other No No   Sig: Take 1 tablet (25 mg total) by mouth every 12 (twelve) hours   mirtazapine (REMERON) 15 mg tablet  Spouse/Significant Other Yes No   Sig: daily at bedtime    polyethylene glycol (MIRALAX) 17 g packet  Spouse/Significant Other No No   Sig: Take 17 g by mouth daily   potassium chloride (K-DUR,KLOR-CON) 10 mEq tablet  Spouse/Significant Other No No   Sig: Take 1 tablet (10 mEq total) by mouth daily with breakfast   saccharomyces boulardii (FLORASTOR) 250 mg capsule  Spouse/Significant Other Yes No   Sig: Take 250 mg by mouth 2 (two) times a day   senna (SENOKOT) 8 6 mg  Spouse/Significant Other No No   Sig: Take 2 tablets (17 2 mg total) by mouth daily at bedtime      Facility-Administered Medications: None     Allergies: Allergies   Allergen Reactions    Gentamycin [Gentamicin] Anaphylaxis    Piperacillin Sod-Tazobactam So Anaphylaxis and Rash     However, has tolerated Ertapenem, Cefdinir, and Cefepime, which all have different side chains   Avoid Penicillins and the following Cephs with similar side chains (Cephalexin, Cefadroxil, Cefaclor, Cefprozil, or  Cefoxitin)  Other reaction(s): Hemoptysis    Vancomycin Anaphylaxis and Rash     Other reaction(s): Hemoptysis    Clindamycin Itching and Rash     Other reaction(s): Hemoptysis    Nsaids Other (See Comments)     Nephrotic Syndrome    Sulfa Antibiotics Rash    Other Rash     antipersperents  Stat lock from lucia catheter    Tigecycline Rash     Other reaction(s): Hemoptysis     ------------------------------------------------------------------------------------------------------------  Advance Directive and Living Will:      Power of : Yes  POLST:    ------------------------------------------------------------------------------------------------------------  Anticipated Length of Stay is > 2 midnights    Care Time Delivered:   No Critical Care time spent       Judi Welsh MD        Portions of the record may have been created with voice recognition software  Occasional wrong word or "sound a like" substitutions may have occurred due to the inherent limitations of voice recognition software    Read the chart carefully and recognize, using context, where substitutions have occurred

## 2022-02-14 NOTE — Clinical Note
Case was discussed with ELAYNE and the patient's admission status was agreed to be Admission Status: inpatient status to the service of Dr Fu

## 2022-02-14 NOTE — PROGRESS NOTES
Standard Teaching Supervising Statement    I have reviewed the note performed and documented by the Resident  I personally performed the required components/examined the patient  I agree with the Resident's findings and plan of care with the following additions/exceptions:     Patient was found hypoxic after aspirating vomit this am, appears ill but not dyspneic    VSS lungs rhonchi, abd soft NT/ND ext no edema  Labs reviewed 17% bands, Creatinine increased, CT CAP reviewed on PACS  A/P Acute hypoxic respiratory failure/ Septic shock/ fecal impaction and possible SBO/ anoxic encepholopathy s/p cardiac arrest  -wean oxygen, followup septic pathway/lactic acid  -empiric antibiotics  -enema and NGT decompression  -Level 3    Dia Morfin DO

## 2022-02-15 ENCOUNTER — APPOINTMENT (INPATIENT)
Dept: RADIOLOGY | Facility: HOSPITAL | Age: 72
DRG: 871 | End: 2022-02-15
Payer: MEDICARE

## 2022-02-15 PROBLEM — E87.0 HYPERNATREMIA: Chronic | Status: ACTIVE | Noted: 2021-10-07

## 2022-02-15 PROBLEM — A41.9 SEVERE SEPSIS (HCC): Chronic | Status: ACTIVE | Noted: 2021-10-06

## 2022-02-15 PROBLEM — R65.20 SEVERE SEPSIS (HCC): Chronic | Status: ACTIVE | Noted: 2021-01-01

## 2022-02-15 LAB
ALBUMIN SERPL BCP-MCNC: 2.4 G/DL (ref 3.5–5)
ALP SERPL-CCNC: 70 U/L (ref 46–116)
ALT SERPL W P-5'-P-CCNC: 11 U/L (ref 12–78)
ANION GAP SERPL CALCULATED.3IONS-SCNC: 11 MMOL/L (ref 4–13)
AST SERPL W P-5'-P-CCNC: 11 U/L (ref 5–45)
BASOPHILS # BLD AUTO: 0.04 THOUSANDS/ΜL (ref 0–0.1)
BASOPHILS NFR BLD AUTO: 0 % (ref 0–1)
BILIRUB SERPL-MCNC: 0.28 MG/DL (ref 0.2–1)
BUN SERPL-MCNC: 33 MG/DL (ref 5–25)
CALCIUM ALBUM COR SERPL-MCNC: 10 MG/DL (ref 8.3–10.1)
CALCIUM SERPL-MCNC: 8.7 MG/DL (ref 8.3–10.1)
CHLORIDE SERPL-SCNC: 111 MMOL/L (ref 100–108)
CO2 SERPL-SCNC: 26 MMOL/L (ref 21–32)
CREAT SERPL-MCNC: 2.17 MG/DL (ref 0.6–1.3)
EOSINOPHIL # BLD AUTO: 0.01 THOUSAND/ΜL (ref 0–0.61)
EOSINOPHIL NFR BLD AUTO: 0 % (ref 0–6)
ERYTHROCYTE [DISTWIDTH] IN BLOOD BY AUTOMATED COUNT: 18.6 % (ref 11.6–15.1)
GFR SERPL CREATININE-BSD FRML MDRD: 29 ML/MIN/1.73SQ M
GLUCOSE SERPL-MCNC: 100 MG/DL (ref 65–140)
GLUCOSE SERPL-MCNC: 101 MG/DL (ref 65–140)
GLUCOSE SERPL-MCNC: 101 MG/DL (ref 65–140)
GLUCOSE SERPL-MCNC: 135 MG/DL (ref 65–140)
GLUCOSE SERPL-MCNC: 98 MG/DL (ref 65–140)
GLUCOSE SERPL-MCNC: 98 MG/DL (ref 65–140)
HCT VFR BLD AUTO: 37.7 % (ref 36.5–49.3)
HGB BLD-MCNC: 11.3 G/DL (ref 12–17)
IMM GRANULOCYTES # BLD AUTO: 0.05 THOUSAND/UL (ref 0–0.2)
IMM GRANULOCYTES NFR BLD AUTO: 0 % (ref 0–2)
LACTATE SERPL-SCNC: 2.7 MMOL/L (ref 0.5–2)
LACTATE SERPL-SCNC: 3.3 MMOL/L (ref 0.5–2)
LYMPHOCYTES # BLD AUTO: 1.62 THOUSANDS/ΜL (ref 0.6–4.47)
LYMPHOCYTES NFR BLD AUTO: 13 % (ref 14–44)
MAGNESIUM SERPL-MCNC: 2.5 MG/DL (ref 1.6–2.6)
MCH RBC QN AUTO: 23.4 PG (ref 26.8–34.3)
MCHC RBC AUTO-ENTMCNC: 28.9 G/DL (ref 31.4–37.4)
MCV RBC AUTO: 81 FL (ref 82–98)
MONOCYTES # BLD AUTO: 0.6 THOUSAND/ΜL (ref 0.17–1.22)
MONOCYTES NFR BLD AUTO: 5 % (ref 4–12)
NEUTROPHILS # BLD AUTO: 9.9 THOUSANDS/ΜL (ref 1.85–7.62)
NEUTS SEG NFR BLD AUTO: 82 % (ref 43–75)
NRBC BLD AUTO-RTO: 0 /100 WBCS
PHOSPHATE SERPL-MCNC: 3.1 MG/DL (ref 2.3–4.1)
PLATELET # BLD AUTO: 321 THOUSANDS/UL (ref 149–390)
PMV BLD AUTO: 10.2 FL (ref 8.9–12.7)
POTASSIUM SERPL-SCNC: 3.3 MMOL/L (ref 3.5–5.3)
PROCALCITONIN SERPL-MCNC: 19.41 NG/ML
PROT SERPL-MCNC: 6.7 G/DL (ref 6.4–8.2)
RBC # BLD AUTO: 4.66 MILLION/UL (ref 3.88–5.62)
SODIUM SERPL-SCNC: 148 MMOL/L (ref 136–145)
WBC # BLD AUTO: 12.22 THOUSAND/UL (ref 4.31–10.16)

## 2022-02-15 PROCEDURE — 99232 SBSQ HOSP IP/OBS MODERATE 35: CPT | Performed by: INTERNAL MEDICINE

## 2022-02-15 PROCEDURE — 84100 ASSAY OF PHOSPHORUS: CPT | Performed by: PHYSICIAN ASSISTANT

## 2022-02-15 PROCEDURE — 85025 COMPLETE CBC W/AUTO DIFF WBC: CPT | Performed by: PHYSICIAN ASSISTANT

## 2022-02-15 PROCEDURE — 74018 RADEX ABDOMEN 1 VIEW: CPT

## 2022-02-15 PROCEDURE — 99222 1ST HOSP IP/OBS MODERATE 55: CPT | Performed by: SURGERY

## 2022-02-15 PROCEDURE — 80053 COMPREHEN METABOLIC PANEL: CPT | Performed by: PHYSICIAN ASSISTANT

## 2022-02-15 PROCEDURE — 83735 ASSAY OF MAGNESIUM: CPT | Performed by: PHYSICIAN ASSISTANT

## 2022-02-15 PROCEDURE — 84145 PROCALCITONIN (PCT): CPT | Performed by: INTERNAL MEDICINE

## 2022-02-15 PROCEDURE — 83605 ASSAY OF LACTIC ACID: CPT

## 2022-02-15 PROCEDURE — 82948 REAGENT STRIP/BLOOD GLUCOSE: CPT

## 2022-02-15 PROCEDURE — 99223 1ST HOSP IP/OBS HIGH 75: CPT | Performed by: INTERNAL MEDICINE

## 2022-02-15 PROCEDURE — C9113 INJ PANTOPRAZOLE SODIUM, VIA: HCPCS | Performed by: SURGERY

## 2022-02-15 RX ORDER — AMMONIUM LACTATE 12 G/100G
CREAM TOPICAL DAILY
Status: DISCONTINUED | OUTPATIENT
Start: 2022-02-15 | End: 2022-02-23 | Stop reason: HOSPADM

## 2022-02-15 RX ORDER — SODIUM PHOSPHATE, DIBASIC AND SODIUM PHOSPHATE, MONOBASIC 7; 19 G/133ML; G/133ML
1 ENEMA RECTAL ONCE
Status: COMPLETED | OUTPATIENT
Start: 2022-02-15 | End: 2022-02-15

## 2022-02-15 RX ORDER — BUSPIRONE HYDROCHLORIDE 5 MG/1
7.5 TABLET ORAL 3 TIMES DAILY
Status: DISCONTINUED | OUTPATIENT
Start: 2022-02-15 | End: 2022-02-15

## 2022-02-15 RX ORDER — SACCHAROMYCES BOULARDII 250 MG
250 CAPSULE ORAL 2 TIMES DAILY
Status: DISCONTINUED | OUTPATIENT
Start: 2022-02-15 | End: 2022-02-19

## 2022-02-15 RX ORDER — QUETIAPINE FUMARATE 25 MG/1
25 TABLET, FILM COATED ORAL
Status: DISCONTINUED | OUTPATIENT
Start: 2022-02-15 | End: 2022-02-15

## 2022-02-15 RX ORDER — MEMANTINE HYDROCHLORIDE 5 MG/1
5 TABLET ORAL 2 TIMES DAILY
Status: DISCONTINUED | OUTPATIENT
Start: 2022-02-15 | End: 2022-02-17

## 2022-02-15 RX ORDER — POTASSIUM CHLORIDE 14.9 MG/ML
20 INJECTION INTRAVENOUS ONCE
Status: COMPLETED | OUTPATIENT
Start: 2022-02-15 | End: 2022-02-15

## 2022-02-15 RX ORDER — AMLODIPINE BESYLATE 10 MG/1
10 TABLET ORAL DAILY
Status: DISCONTINUED | OUTPATIENT
Start: 2022-02-15 | End: 2022-02-19

## 2022-02-15 RX ORDER — BACLOFEN 10 MG/1
15 TABLET ORAL 3 TIMES DAILY
Status: DISCONTINUED | OUTPATIENT
Start: 2022-02-15 | End: 2022-02-19

## 2022-02-15 RX ORDER — MIRTAZAPINE 15 MG/1
15 TABLET, FILM COATED ORAL
Status: DISCONTINUED | OUTPATIENT
Start: 2022-02-15 | End: 2022-02-15

## 2022-02-15 RX ORDER — SODIUM CHLORIDE 450 MG/100ML
75 INJECTION, SOLUTION INTRAVENOUS CONTINUOUS
Status: DISCONTINUED | OUTPATIENT
Start: 2022-02-15 | End: 2022-02-23

## 2022-02-15 RX ORDER — PANTOPRAZOLE SODIUM 40 MG/1
40 INJECTION, POWDER, FOR SOLUTION INTRAVENOUS
Status: DISCONTINUED | OUTPATIENT
Start: 2022-02-15 | End: 2022-02-19

## 2022-02-15 RX ADMIN — CHLORHEXIDINE GLUCONATE 0.12% ORAL RINSE 15 ML: 1.2 LIQUID ORAL at 20:52

## 2022-02-15 RX ADMIN — APIXABAN 5 MG: 5 TABLET, FILM COATED ORAL at 12:50

## 2022-02-15 RX ADMIN — CHLORHEXIDINE GLUCONATE 0.12% ORAL RINSE 15 ML: 1.2 LIQUID ORAL at 08:47

## 2022-02-15 RX ADMIN — SODIUM CHLORIDE 150 ML/HR: 0.45 INJECTION, SOLUTION INTRAVENOUS at 12:03

## 2022-02-15 RX ADMIN — POTASSIUM CHLORIDE 20 MEQ: 14.9 INJECTION, SOLUTION INTRAVENOUS at 10:18

## 2022-02-15 RX ADMIN — SODIUM CHLORIDE 150 ML/HR: 0.45 INJECTION, SOLUTION INTRAVENOUS at 18:25

## 2022-02-15 RX ADMIN — Medication 250 MG: at 18:22

## 2022-02-15 RX ADMIN — SODIUM CHLORIDE, SODIUM GLUCONATE, SODIUM ACETATE, POTASSIUM CHLORIDE, MAGNESIUM CHLORIDE, SODIUM PHOSPHATE, DIBASIC, AND POTASSIUM PHOSPHATE 125 ML/HR: .53; .5; .37; .037; .03; .012; .00082 INJECTION, SOLUTION INTRAVENOUS at 10:19

## 2022-02-15 RX ADMIN — GLYCERIN 1 DROP: .002; .002; .01 SOLUTION/ DROPS OPHTHALMIC at 13:10

## 2022-02-15 RX ADMIN — FAMOTIDINE 20 MG: 10 INJECTION INTRAVENOUS at 08:47

## 2022-02-15 RX ADMIN — METOPROLOL TARTRATE 25 MG: 25 TABLET, FILM COATED ORAL at 12:13

## 2022-02-15 RX ADMIN — MEMANTINE 5 MG: 5 TABLET ORAL at 18:22

## 2022-02-15 RX ADMIN — POTASSIUM CHLORIDE 20 MEQ: 14.9 INJECTION, SOLUTION INTRAVENOUS at 11:04

## 2022-02-15 RX ADMIN — MEMANTINE 5 MG: 5 TABLET ORAL at 12:13

## 2022-02-15 RX ADMIN — LINEZOLID 600 MG: 600 INJECTION, SOLUTION INTRAVENOUS at 05:48

## 2022-02-15 RX ADMIN — SODIUM PHOSPHATE, DIBASIC AND SODIUM PHOSPHATE, MONOBASIC 1 ENEMA: 7; 19 ENEMA RECTAL at 16:29

## 2022-02-15 RX ADMIN — BACLOFEN 15 MG: 10 TABLET ORAL at 20:52

## 2022-02-15 RX ADMIN — HEPARIN SODIUM 5000 UNITS: 5000 INJECTION INTRAVENOUS; SUBCUTANEOUS at 05:13

## 2022-02-15 RX ADMIN — PANTOPRAZOLE SODIUM 40 MG: 40 INJECTION, POWDER, FOR SOLUTION INTRAVENOUS at 16:29

## 2022-02-15 RX ADMIN — BISACODYL 10 MG: 10 SUPPOSITORY RECTAL at 11:06

## 2022-02-15 RX ADMIN — Medication: at 13:09

## 2022-02-15 RX ADMIN — ERTAPENEM SODIUM 1000 MG: 1 INJECTION, POWDER, LYOPHILIZED, FOR SOLUTION INTRAMUSCULAR; INTRAVENOUS at 05:12

## 2022-02-15 RX ADMIN — Medication 250 MG: at 12:13

## 2022-02-15 RX ADMIN — GLYCERIN 1 DROP: .002; .002; .01 SOLUTION/ DROPS OPHTHALMIC at 18:21

## 2022-02-15 RX ADMIN — METOPROLOL TARTRATE 25 MG: 25 TABLET, FILM COATED ORAL at 20:53

## 2022-02-15 RX ADMIN — BACLOFEN 15 MG: 10 TABLET ORAL at 12:13

## 2022-02-15 RX ADMIN — APIXABAN 5 MG: 5 TABLET, FILM COATED ORAL at 18:22

## 2022-02-15 RX ADMIN — AMLODIPINE BESYLATE 10 MG: 10 TABLET ORAL at 12:13

## 2022-02-15 RX ADMIN — BACLOFEN 15 MG: 10 TABLET ORAL at 16:29

## 2022-02-15 NOTE — UTILIZATION REVIEW
Inpatient Admission Authorization Request   NOTIFICATION OF INPATIENT ADMISSION/INPATIENT AUTHORIZATION REQUEST   SERVICING FACILITY:   [address] [facility] [place], [address] [place] [address]  Tax ID: [id number]  NPI: [id number]  [facility]  [address]  Place of Service Code: 24     ATTENDING PROVIDER:  Attending Name and NPI#: Ewa Pond Md [[id number]]  [facility] [address] [facility] [address] [place] [address]  Phone: [phone]     UTILIZATION REVIEW CONTACT:  Katelyn Whitley Utilization   Network Utilization Review Department  Phone: [phone]  Fax: [phone]  Email: [email or web address]     PHYSICIAN ADVISORY SERVICES:  FOR EUAD-GU-GZCH REVIEW - MEDICAL NECESSITY DENIAL  Phone: [phone]  Fax: [phone]  Email: [email or web address]     TYPE OF REQUEST:  Inpatient Status     ADMISSION INFORMATION:  ADMISSION DATE/TIME: 2/14/22  2:35 PM  PATIENT DIAGNOSIS CODE/DESCRIPTION:  Aspiration pneumonitis (HCC) [J69 0]  Vomiting [R11 10]  SBO (small bowel obstruction) ([facility] ) [K56 609]  Coffee ground emesis [K92 0]  Fecal impaction in rectum (HCC) [K56 41]  Increased anion gap metabolic acidosis [E82 2]  Sepsis ([facility] ) [A41 9]  DISCHARGE DATE/TIME: No discharge date for patient encounter  DISCHARGE DISPOSITION (IF DISCHARGED): Non [facility] SNF/TCU/SNU     IMPORTANT INFORMATION:  Please contact the Katelyn Whitley directly with any questions or concerns regarding this request  Department voicemails are confidential     Send requests for admission clinical reviews, concurrent reviews, approvals, and administrative denials due to lack of clinical to fax [phone]

## 2022-02-15 NOTE — ASSESSMENT & PLAN NOTE
Patient has had history of multiple hospitalizations for sepsis and blood clots and history of anoxic brain injury  At baseline patient is noncommunicative, global aphasic      Supportive Care    -Home anxiety medications (wife confirmed) Buspar, Remeron and Seroquel not reordered due to NPO and high risk of causing Serotonin syndrome with concurrent Linezolid   -Consider restarting IV home anxiety meds as Linezolid will be discontinued

## 2022-02-15 NOTE — PROGRESS NOTES
[facility]  Progress Note/Transfer Note Mateusz Dub 1950, 66 y o  male MRN: [id number]  Unit/Bed#: S -01 Encounter: [id number]  Primary Care Provider: Farshad Mcmahan MD   Date and time admitted to hospital: 2/14/2022 10:59 AM    Severe sepsis [facility])  Assessment & Plan  Patient arrived via EMS from [facility] for hypoxia and vomiting  POA tachycardia, leukocytosis, bands 17, lactic acidosis  -ID consulted; appreciate recs  -Ertepenem 1000 mg Q24H (Day 2)  -Linezolid 600 mg Q12H (Day 2)     -Wife says patient is allergic and wants to discontinue Linezolid     -Wife tells me that she doesn't want any changes/procedures/new treatment without notifying her first     -discussed with ID with recommendations starting Daptomycin 6 mg/kg adjusted weight q24h after repeating blood culture x2 sets     -Discontinue linezolid     -Primary team PLEASE update wife about new plan from ID and proceed accordingly; wife and ID aware of discontinuing linezolid  -CMP, BMP  -IVF 1/2  ml/hr    Trend lactic acid  Lab Results   Component Value Date    LACTICACID 2 7 (HH) 02/15/2022    LACTICACID 3 3 (HH) 02/15/2022    LACTICACID 3 2 (HH) 02/14/2022    LACTICACID 8 1 ([facility]) 02/14/2022             Hypernatremia  Assessment & Plan  Lab Results   Component Value Date    SODIUM 148 (H) 02/15/2022    SODIUM 144 02/14/2022    SODIUM 141 12/06/2021     Changed mIVF to 1/2  ml/hr  CMP  Monitor electrolytes    Small bowel obstruction (HCC)  Assessment & Plan  Patient's power of  is wife - does not want any surgery or procedures  KUB: marked bowel distension  CT: Massively distended stomach, dilated small bowel with air-fluid levels, concerning for SBO with probably transition point in the RLQ  No pneumatosis or perforation  Repeat KUB: The stomach is no longer massively distended, however there is continued gaseous distention of small bowel loops and increased stool in the rectum  Nasogastric tube tip is in the distal esophagus with advancement recommended as clinically indicated  Hiatal hernia noted  -NGT decompression  -Monitor stool output  -Pepcid 20 mg IV for GI ppx  -NPO until speech and swallow test; patient had aspiration POA  -Surgery consulted for mgmt of SBO    Suprapubic catheter ([facility] )  Assessment & Plan  RUPESH in setting of suprapubic catheter    Follow-up urine culture  125 ml/hr 1/2 NS (hypernatremic today)  Urine output goal >0 5 ml/kg/hr  Monitor I/O  Hold nephrotoxic drugs  CMP, Monitor Cr, Creatine trending down  Lab Results   Component Value Date    CREATININE 2 17 (H) 02/15/2022    CREATININE 2 42 (H) 02/14/2022       * Aspiration pneumonia ([facility] )  Assessment & Plan  2/14 CXR: LLL atelectasis vs infiltrate  2/14 CT: GGO consistent with aspiration pneumonitis vs PNA, probably secretions in the RLL bronchus    -ID consulted; appreciate recs  -PCT  -CBC  -Patient was on NRB in ED, 2-3 L NC in ICU, successfully weaned off oxygen    Persistent vegetative state ([facility] )  Assessment & Plan  Patient has had history of multiple hospitalizations for sepsis and blood clots and history of anoxic brain injury  At baseline patient is noncommunicative, global aphasic      Supportive Care    -Home anxiety medications (wife confirmed) Buspar, Remeron and Seroquel not reordered due to NPO and high risk of causing Serotonin syndrome with concurrent Linezolid   -Consider restarting IV home anxiety meds as Linezolid will be discontinued    GERD (gastroesophageal reflux disease)  Assessment & Plan  -Continue pepcid    Essential hypertension  Assessment & Plan  -Resume home meds        History of DVT of lower extremity  Assessment & Plan  History of blood clots per wife; wife confirms that apixaban is taken for this reason    -Continue home apixaban  -Home dose noted to be 2 5 mg BID; Pharmacy recommendation for 5 mg BID    COPD (chronic obstructive pulmonary disease) ([facility] )  Assessment & Plan  Wife says patient never had COPD  Code Status: Level 3 - DNAR and DNI  POA: Yes  POLST:      Reason for ICU admission:   Severe sepsis, aspiration pneumonia    Active problems:   Principal Problem:    Aspiration pneumonia ([facility] )  Active Problems:    Severe sepsis ([facility] )    Suprapubic catheter ([facility] )    Small bowel obstruction ([facility] )    Hypernatremia    COPD (chronic obstructive pulmonary disease) ([facility] )    History of DVT of lower extremity    Essential hypertension    GERD (gastroesophageal reflux disease)    Persistent vegetative state ([facility] )  Resolved Problems:    * No resolved hospital problems  *      Consultants:   Infectious disease    History of Present Illness:   Leonard Allan is a 66 y o  male with pmhx global aphasia, dementia, SBO, CVA, GERD, HTN presents for hypoxia and coffee ground emesis witnessed by staff at [facility] in [place]  Patient is accompanied by his wife, Carli shetty  She provides history of progressive decline in patient's mental and physical status dating back to 2010, where he suffered an episode of severe sepsis  Patient has had multiple admissions for severe sepsis and has a history of blood clots  She says he has had global aphasia since 2013  Patient is bed-bound and has "lost his memory and is not himself" for quite some time now  She mentions that she is ready to "let him go " Wife expressly states to be notified before any procedures are to be done  Summary of clinical course:   Patient was placed NPO and on 2 L nasal cannula  Patient received 3 L bolus and placed on mIVF  On repeat KUB, patient continues to have large stool burden but no longer massively distended  Recent or scheduled procedures:   None  Wife does not want any procedures/surgeries      Outstanding/pending diagnostics:   CT: Aspiration pneumonitis vs PNA  KUB: concerns for SBO vs constipation    Cultures:   Blood x1: NGTD 24H  Blood x2: G+ cocci in clusters  Urine: Pending       Mobilization Plan: Med-Surg    Nutrition Plan:   NPO  Speech and swallow evaluation    Invasive Devices Review  Invasive Devices  Report    Peripheral Intravenous Line            Peripheral IV 02/14/22 Left Antecubital 1 day    Peripheral IV 02/14/22 Right;Upper;Ventral (anterior) Arm 1 day          Drain            NG/OG/Enteral Tube Nasogastric 18 Fr Left nare 76 days    Suprapubic Catheter 7 days    NG/OG/Enteral Tube Nasogastric 16 Fr Right nare 1 day                Rationale for remaining devices: SBO vs Constipation    VTE Pharmacologic Prophylaxis: Apixaban (Eliquis)  VTE Mechanical Prophylaxis: sequential compression device    Discharge Plan:   Dispo per primary team    Home medications that are not reordered and reason why:   Home anxiety medications (wife confirmed) Buspar, Remeron and Seroquel not reordered due to NPO and high risk of causing Serotonin syndrome with concurrent Linezolid  Spoke with Dr Tony Espino and Dr Shane Hernández  regarding transfer  Please contact critical care via [employer] with any questions or concerns  Portions of the record may have been created with voice recognition software  Occasional wrong word or "sound a like" substitutions may have occurred due to the inherent limitations of voice recognition software  Read the chart carefully and recognize, using context, where substitutions have occurred

## 2022-02-15 NOTE — ASSESSMENT & PLAN NOTE
Patient arrived via EMS from [facility] for hypoxia and vomiting  POA tachycardia, leukocytosis, bands 17, lactic acidosis      -ID consulted; appreciate recs  -Ertepenem 1000 mg Q24H (Day 2)  -Linezolid 600 mg Q12H (Day 2)     -Wife says patient is allergic and wants to discontinue Linezolid     -Wife tells me that she doesn't want any changes/procedures/new treatment without notifying her first     -discussed with ID with recommendations starting Daptomycin 6 mg/kg adjusted weight q24h after repeating blood culture x2 sets     -Discontinue linezolid     -Primary team PLEASE update wife about new plan from ID and proceed accordingly; wife and ID aware of discontinuing linezolid  -CMP, BMP  -IVF 1/2  ml/hr    Trend lactic acid  Lab Results   Component Value Date    LACTICACID 2 7 (HH) 02/15/2022    LACTICACID 3 3 ([place]) 02/15/2022    LACTICACID 3 2 ([place]) 02/14/2022    LACTICACID 8 1 ([place]) 02/14/2022

## 2022-02-15 NOTE — CONSULTS
Consultation -General Surgery  Jean Neighbor Genoveva 66 y o  male MRN: [id number]  Unit/Bed#: S -01 Encounter: [id number]    ASSESSMENT:     65 yo male with baseline dementia and global aphasia presenting with hypoxia and vomiting  CT concerning for SBO with possible transition point in RLQ  AVSS  NGT in place with approximately 250 dark bilious output in canister  Soft, non-distended abdomen     Plan:  -Maintain NPO with NGT to medium continuous suction   -Will repeat obstruction series in the morning  -Daily fleet enemas  -Correct electrolytes to maintain K>4 Mg >2 2  -GI ppx with protonix   -Per ICU nurse, NGT was advanced since last CXR   -Rest of care per primary team  -Discussed with El Mayes    Reason for Consult / Principal Problem:    HPI: Vandana Diggs is a 66y o  year old male with extensive pmhx including  global aphasia, COPD, CVA, Dementia, neurogenic bladder s/p SPC who presents with Aspiration pneumonia ([address] )  [facility] for hypoxia and vomiting  CT scan revealing a massively distended stomach and dilated small bowel with air-fluid levels, suspicious for SBO with probable transition point in RLQ  Per discussion with wife at bedside, she does not want to have any surgical intervention         Review of Systems  Not complete 2/2 baseline AMS/global aphasia     Historical Information   Past Medical History:   Diagnosis Date    Ataxia     COPD (chronic obstructive pulmonary disease) ([address] )     wife denies - "never had"    CVA (cerebral vascular accident) ([address] )     Dementia ([address] )     Difficulty swallowing     Difficulty walking     Generalized anxiety disorder     GERD (gastroesophageal reflux disease)     Global aphasia     H/O blood clots     Heartburn     Hypertension     Mental disorder     Muscle weakness     Neuromuscular dysfunction of bladder     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 ([address] )     Stroke ([address] )     Thrombosis      Past Surgical History:   Procedure Laterality Date    BOTOX INJECTION N/A 6/18/2019    Procedure: INJECTION BOTULINUM TOXIN (BOTOX), intra detrusor;  Surgeon: Uriah Law MD;  [facility];  Service: Urology    BOTOX INJECTION N/A 10/7/2019    Procedure: INJECTION BOTULINUM TOXIN (BOTOX), intradetrusor;  Surgeon: Uriah Law MD;  [facility];  Service: Urology    CYSTOSCOPY      CYSTOSCOPY N/A 6/18/2019    Procedure: cystoscopy and all indicated procedures;  Surgeon: Uriah Law MD;  [facility];  Service: Urology    FL CYSTOGRAM  1/15/2019    IR NEPHROSTOMY TUBE CHECK/CHANGE/REPOSITION/REINSERTION/UPSIZE  5/26/2021    IR NEPHROSTOMY TUBE PLACEMENT  3/11/2020    IR SUPRAPUBIC CATHETER PLACEMENT  11/26/2019    IVC FILTER INSERTION      X2    IVC FILTER INSERTION      x2    KIDNEY STONE SURGERY      KNEE SURGERY      Sepsis infection     NEPHROSTOMY      MA CYSTO/URETERO W/LITHOTRIPSY &INDWELL STENT INSRT Right 6/14/2017    Procedure: CYSTOSCOPY URETEROSCOPY WITH LITHOTRIPSY HOLMIUM LASER,,BASKET STONE EXTRACTION, RETROGRADE PYELOGRAM AND INSERTION STENT URETERAL;  Surgeon: Shantel Mukherjee MD;  [facility];  Service: Urology    MA CYSTOURETHROSCOPY,BIOPSY N/A 1/15/2019    Procedure: CYSTOSCOPY, CYSTOGRAM, DILATION OF URETHRAL STRICTURE;  Surgeon: Uriah Law MD;  [facility];  Service: Urology    URINARY SURGERY       Social History   Social History     Substance and Sexual Activity   Alcohol Use Not Currently     Social History     Substance and Sexual Activity   Drug Use No     Social History     Tobacco Use   Smoking Status Never Smoker   Smokeless Tobacco Never Used     Family History   Problem Relation Age of Onset    Diabetes Father     Hypertension Father     Coronary artery disease Father     Cancer Father     Hypertension Mother        Meds/Allergies     Medications Prior to Admission   Medication    acetaminophen (TYLENOL) 325 mg tablet    amLODIPine (NORVASC) 10 mg tablet    ammonium lactate (LAC-HYDRIN) 12 % cream    apixaban (ELIQUIS) 2 5 mg    ascorbic acid (VITAMIN C) 500 MG tablet    baclofen 10 mg tablet    bisacodyl (DULCOLAX) 10 mg suppository    busPIRone (BUSPAR) 7 5 mg tablet    cholecalciferol (VITAMIN D3) 1,000 units tablet    Citric Uy-Damlzwwybrb-Tz Carb (Renacidin) SOLN    docusate sodium (COLACE) 100 mg capsule    famotidine (PEPCID) 20 mg tablet    glycerin-hypromellose- (Artificial Tears) 0 2-0 2-1 % SOLN    memantine (NAMENDA) 5 mg tablet    metoprolol tartrate (LOPRESSOR) 25 mg tablet    mirtazapine (REMERON) 15 mg tablet    polyethylene glycol (MIRALAX) 17 g packet    potassium chloride (K-DUR,KLOR-CON) 10 mEq tablet    QUEtiapine (SEROquel) 25 mg tablet    saccharomyces boulardii (FLORASTOR) 250 mg capsule    senna (SENOKOT) 8 6 mg     Current Facility-Administered Medications   Medication Dose Route Frequency    amLODIPine (NORVASC) tablet 10 mg  10 mg Per NG Tube Daily    ammonium lactate (LAC-HYDRIN) 12 % cream   Topical Daily    apixaban (ELIQUIS) tablet 5 mg  5 mg Per NG Tube BID    baclofen tablet 15 mg  15 mg Per NG Tube TID    chlorhexidine (PERIDEX) 0 12 % oral rinse 15 mL  15 mL Mouth/Throat Q12H DORON    ertapenem (INVanz) 1,000 mg in sodium chloride 0 9 % 50 mL IVPB  1,000 mg Intravenous Q24H    famotidine (PEPCID) injection 20 mg  20 mg Intravenous Q24H DORON    glycerin-hypromellose- (ARTIFICIAL TEARS) ophthalmic solution 1 drop  1 drop Both Eyes BID    HYDROmorphone (DILAUDID) injection 0 5 mg  0 5 mg Intravenous Q3H PRN    insulin lispro (HumaLOG) 100 units/mL subcutaneous injection 1-6 Units  1-6 Units Subcutaneous Q6H [facility]    linezolid (ZYVOX) IVPB (premix in dextrose) 600 mg 300 mL  600 mg Intravenous Q12H    memantine (NAMENDA) tablet 5 mg  5 mg Per NG Tube BID    metoprolol tartrate (LOPRESSOR) tablet 25 mg  25 mg Per NG Tube Q12H [address]    saccharomyces boulardii (FLORASTOR) capsule 250 mg  250 mg Per NG Tube BID    sodium chloride infusion 0 45 %  150 mL/hr Intravenous Continuous       Allergies   Allergen Reactions    Gentamycin [Gentamicin] Anaphylaxis    Piperacillin Sod-Tazobactam So Anaphylaxis and Rash     However, has tolerated Ertapenem, Cefdinir, and Cefepime, which all have different side chains  Avoid Penicillins and the following Cephs with similar side chains (Cephalexin, Cefadroxil, Cefaclor, Cefprozil, or  Cefoxitin)  Other reaction(s): Hemoptysis    Vancomycin Anaphylaxis and Rash     Other reaction(s): Hemoptysis    Clindamycin Itching and Rash     Other reaction(s): Hemoptysis    Nsaids Other (See Comments)     Nephrotic Syndrome    Sulfa Antibiotics Rash    Other Rash     antipersperents  Stat lock from lucia catheter    Tigecycline Rash     Other reaction(s): Hemoptysis       Objective     Blood pressure 131/73, pulse 69, temperature 98 3 °F (36 8 °C), temperature source Axillary, resp  rate 18, SpO2 93 %  Intake/Output Summary (Last 24 hours) at 2/15/2022 1506  Last data filed at 2/15/2022 1245  Gross per 24 hour   Intake 6038 5 ml   Output 2175 ml   Net 3863 5 ml       PHYSICAL EXAM  Physical Exam  Vitals reviewed  Constitutional:       General: He is not in acute distress  Appearance: He is ill-appearing  He is not toxic-appearing  Neck:      Comments: NGT in place with minimal dark bilious output in canister  Cardiovascular:      Rate and Rhythm: Normal rate  Pulmonary:      Effort: Pulmonary effort is normal  No respiratory distress  Abdominal:      General: Abdomen is flat  There is no distension  Palpations: Abdomen is soft  Tenderness: There is no abdominal tenderness  There is no guarding  Skin:     General: Skin is warm and dry  Neurological:      Mental Status: He is alert  Mental status is at baseline             Lab Results:   CBC:   Lab Results   Component Value Date WBC 12 22 (H) 02/15/2022    HGB 11 3 (L) 02/15/2022    HCT 37 7 02/15/2022    MCV 81 (L) 02/15/2022     02/15/2022    MCH 23 4 (L) 02/15/2022    MCHC 28 9 (L) 02/15/2022    RDW 18 6 (H) 02/15/2022    MPV 10 2 02/15/2022    NRBC 0 02/15/2022   , CMP:   Lab Results   Component Value Date    SODIUM 148 (H) 02/15/2022    K 3 3 (L) 02/15/2022     (H) 02/15/2022    CO2 26 02/15/2022    BUN 33 (H) 02/15/2022    CREATININE 2 17 (H) 02/15/2022    CALCIUM 8 7 02/15/2022    AST 11 02/15/2022    ALT 11 (L) 02/15/2022    ALKPHOS 70 02/15/2022    EGFR 29 02/15/2022   , Coagulation: No results found for: PT, INR, APTT, Urinalysis: No results found for: COLORU, CLARITYU, SPECGRAV, PHUR, LEUKOCYTESUR, NITRITE, PROTEINUA, GLUCOSEU, KETONESU, BILIRUBINUR, BLOODU, Amylase: No results found for: AMYLASE, Lipase: No results found for: LIPASE  Imaging: I have personally reviewed pertinent reports  EKG, Pathology, and Other Studies: I have personally reviewed pertinent reports  Counseling / Coordination of Care  Total time spent today  20 minutes  Greater than 50% of total time was spent with the patient and / or family counseling and / or coordination of care           Storm Stephens PA-C  2/15/2022 3:06 PM

## 2022-02-15 NOTE — SPEECH THERAPY NOTE
Speech Language/Pathology    Speech/Language Pathology Progress Note    Patient Name: Adrian Rivero  NJMPS'C Date: 2/15/2022     Consult rec'd for swallow eval, chart reviewed, pt w/ possible SBO, NGT in place for decompression  Confirmed with critical care, AP- pt not appropriate for swallow eval today due to SBO, will check back tomorrow for evaluation as appropriate       Destinee Tabares CCC-SLP  Speech Pathologist  Available via  tiger text

## 2022-02-15 NOTE — ASSESSMENT & PLAN NOTE
2/14 CXR: LLL atelectasis vs infiltrate  2/14 CT: GGO consistent with aspiration pneumonitis vs PNA, probably secretions in the RLL bronchus    -ID consulted; appreciate recs  -PCT  -CBC  -Patient was on NRB in ED, 2-3 L NC in ICU, successfully weaned off oxygen

## 2022-02-15 NOTE — ASSESSMENT & PLAN NOTE
History of blood clots per wife; wife confirms that apixaban is taken for this reason    -Continue home apixaban  -Home dose noted to be 2 5 mg BID; Pharmacy recommendation for 5 mg BID

## 2022-02-15 NOTE — ASSESSMENT & PLAN NOTE
Patient's power of  is wife - does not want any surgery or procedures  KUB: marked bowel distension  CT: Massively distended stomach, dilated small bowel with air-fluid levels, concerning for SBO with probably transition point in the RLQ  No pneumatosis or perforation  Repeat KUB: The stomach is no longer massively distended, however there is continued gaseous distention of small bowel loops and increased stool in the rectum  Nasogastric tube tip is in the distal esophagus with advancement recommended as clinically indicated  Hiatal hernia noted       -NGT decompression  -Monitor stool output  -Pepcid 20 mg IV for GI ppx  -NPO until speech and swallow test; patient had aspiration POA  -Surgery consulted for mgmt of SBO

## 2022-02-15 NOTE — CONSULTS
Consultation - Infectious Disease   Sandra Tomas 66 y o  male MRN: [id number]  Unit/Bed#: ICU 03 Encounter: [id number]      IMPRESSION & RECOMMENDATIONS:   1  Systemic inflammatory response syndrome-POA  Tachycardia and leukocytosis  Suspect multifactorial including small-bowel obstruction and probable aspiration pneumonitis  Consideration for the possibility of aspiration pneumonia  Fortunately the patient remains hemodynamically stable despite his systemic illness  He seems to be tolerating the antibiotics without difficulty  -continue ertapenem and linezolid for now  -follow-up blood cultures  -check procalcitonin level now and tomorrow a m   -recheck CBC with diff and CMP  -treatment of small-bowel obstruction  -supportive care  -possibly discontinue antibiotics tomorrow if follow-up with the patient is more consistent with an aspiration pneumonitis  2  Acute hypoxic respiratory failure-in the setting of vomiting and aspiration events  Suspect patient has aspiration pneumonitis  Consideration for the possibility of developing aspiration pneumonia  White cell count has significantly decreased since yesterday and his oxygenation status has remained stable   -antibiotics as above for now  -check procalcitonin level now and tomorrow a m   -monitor respiratory status  -supportive care    3  Small-bowel obstruction-recurrent  Patient has had an NG-tube placed  His abdomen is firm but does not appear to be tender based upon his responses to the exam   -NG tube decompression  -serial abdominal exams  -serial radiographs  -no surgical intervention as per the wife's wishes  4  Acute kidney injury-in a patient with suprapubic catheter in place  No urinary obstruction seen on imaging  Suspect this is a pre renal issue  Consideration for the possibility of ATN  Renal function is a bit better since yesterday   -volume management  -recheck BMP  -dose adjust antibiotics as needed    5   Advanced dementia-with global aphasia  Have discussed the above management plan in detail with the critical care service    Extensive review of the medical records in epic including review of the notes, radiographs, and laboratory results     HISTORY OF PRESENT ILLNESS:  Reason for Consult:  Aspiration pneumonia  HPI: Coleen Myles is a 66y o  year old male with advanced dementia, global aphasia, CVA, previous small-bowel obstruction admitted [facility] with hypoxia and coffee-ground emesis admitted to [facility] with small-bowel obstruction and possible aspiration pneumonia  The patient has numerous antibiotic allergies which limited antibiotic selections  On presentation the patient was found to be afebrile with a brisk leukocytosis and hypoxemia  CT of the chest abdomen pelvis revealed evidence of small-bowel obstruction as well as diffuse ground-glass opacification lungs  He has been admitted to the intensive care unit  He has remained hemodynamically stable off vasopressor support  He continues to require oxygen support  He has an NG tube in place and is being treated supportively  He has not had any reported bowel movements  Because of the patient's current cognitive state the entire history is through chart review and discussion with the critical care service  REVIEW OF SYSTEMS:  A complete review of systems is negative other than that noted in the HPI      PAST MEDICAL HISTORY:  Past Medical History:   Diagnosis Date    Ataxia     COPD [facility]chronic obstructive [facility] disease) [facility]     wife denies - "never had"    CVA (cerebral vascular accident) [facility]     Dementia ([facility] )     Difficulty swallowing     Difficulty walking     Generalized anxiety disorder     GERD (gastroesophageal reflux disease)     Global aphasia     H/O blood clots     Heartburn     Hypertension     Mental disorder     Muscle weakness     Neuromuscular dysfunction of bladder     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 ([facility] )     Stroke ([facility] )     Thrombosis      Past Surgical History:   Procedure Laterality Date    BOTOX INJECTION N/A 6/18/2019    Procedure: INJECTION BOTULINUM TOXIN (BOTOX), intra detrusor;  Surgeon: Sheela Khan MD;  [facility];  Service: Urology    BOTOX INJECTION N/A 10/7/2019    Procedure: INJECTION BOTULINUM TOXIN (BOTOX), intradetrusor;  Surgeon: Sheela Khan MD;  [facility];  Service: Urology    CYSTOSCOPY      CYSTOSCOPY N/A 6/18/2019    Procedure: cystoscopy and all indicated procedures;  Surgeon: Sheela Khan MD;  [facility];  Service: Urology    FL CYSTOGRAM  1/15/2019    IR NEPHROSTOMY TUBE CHECK/CHANGE/REPOSITION/REINSERTION/UPSIZE  5/26/2021    IR NEPHROSTOMY TUBE PLACEMENT  3/11/2020    IR SUPRAPUBIC CATHETER PLACEMENT  11/26/2019    IVC FILTER INSERTION      X2    IVC FILTER INSERTION      x2    KIDNEY STONE SURGERY      KNEE SURGERY      Sepsis infection     NEPHROSTOMY      TN CYSTO/URETERO W/LITHOTRIPSY &INDWELL STENT INSRT Right 6/14/2017    Procedure: CYSTOSCOPY URETEROSCOPY WITH LITHOTRIPSY HOLMIUM LASER,,BASKET STONE EXTRACTION, RETROGRADE PYELOGRAM AND INSERTION STENT URETERAL;  Surgeon: Grace Jean MD;  [facility];  Service: Urology    TN CYSTOURETHROSCOPY,BIOPSY N/A 1/15/2019    Procedure: CYSTOSCOPY, CYSTOGRAM, DILATION OF URETHRAL STRICTURE;  Surgeon: Sheela Khan MD;  [facility];  Service: Urology    URINARY SURGERY         FAMILY HISTORY:  Non-contributory    SOCIAL HISTORY:  Social History   Social History     Substance and Sexual Activity   Alcohol Use Not Currently     Social History     Substance and Sexual Activity   Drug Use No     Social History     Tobacco Use   Smoking Status Never Smoker   Smokeless Tobacco Never Used       ALLERGIES:  Allergies   Allergen Reactions    Gentamycin [Gentamicin] Anaphylaxis    Piperacillin Sod-Tazobactam So Anaphylaxis and Rash     However, has tolerated Ertapenem, Cefdinir, and Cefepime, which all have different side chains  Avoid Penicillins and the following Cephs with similar side chains (Cephalexin, Cefadroxil, Cefaclor, Cefprozil, or  Cefoxitin)  Other reaction(s): Hemoptysis    Vancomycin Anaphylaxis and Rash     Other reaction(s): Hemoptysis    Clindamycin Itching and Rash     Other reaction(s): Hemoptysis    Nsaids Other (See Comments)     Nephrotic Syndrome    Sulfa Antibiotics Rash    Other Rash     antipersperents  Stat lock from lucia catheter    Tigecycline Rash     Other reaction(s): Hemoptysis       MEDICATIONS:  All current active medications have been reviewed  Antibiotics:  Linezolid and ertapenem 2       PHYSICAL EXAM:  Temp:  [97 4 °F (36 3 °C)-99 °F (37 2 °C)] 97 4 °F (36 3 °C)  HR:  [] 90  Resp:  [12-20] 16  BP: ()/(62-83) 139/83  SpO2:  [93 %-100 %] 96 %  Temp (24hrs), Av 2 °F (36 8 °C), Min:97 4 °F (36 3 °C), Max:99 °F (37 2 °C)  Current: Temperature: (!) 97 4 °F (36 3 °C)    Intake/Output Summary (Last 24 hours) at 2/15/2022 0840  Last data filed at 2/15/2022 0543  Gross per 24 hour   Intake 4912 5 ml   Output 2125 ml   Net 2787 5 ml       General Appearance:  Appearing chronically ill, nonverbal, nontoxic, and in no distress   Head:  Normocephalic, without obvious abnormality, atraumatic   Eyes:  Conjunctiva pink and sclera anicteric, both eyes   Nose: Nares normal, mucosa normal, no drainage   Throat: Oropharynx moist without lesions   Neck: Supple, symmetrical, no adenopathy, no tenderness/mass/nodules   Back:   Symmetric, no curvature, ROM normal, no CVA tenderness   Lungs:   Decreased breath sounds bilaterally, respirations unlabored   Chest Wall:  No tenderness or deformity   Heart:  RRR; no murmur, rub or gallop   Abdomen:   Firm, nontender, non-distended, positive bowel sounds    Extremities: No cyanosis, clubbing or edema   Skin: No rashes or lesions  No draining wounds noted  Lymph nodes: Cervical, supraclavicular nodes normal   Neurologic: Alert and oriented times 3, extremity strength 5/5 and symmetric       LABS, IMAGING, & OTHER STUDIES:  Lab Results:  I have personally reviewed pertinent labs  Results from last 7 days   Lab Units 02/15/22  0612 02/14/22  1144   WBC Thousand/uL 12 22* 21 07*   HEMOGLOBIN g/dL 11 3* 14 2   PLATELETS Thousands/uL 321 434*     Results from last 7 days   Lab Units 02/15/22  0611 02/14/22  1144 02/14/22  1144   SODIUM mmol/L 148*  --  144   POTASSIUM mmol/L 3 3*   < > 3 7   CHLORIDE mmol/L 111*   < > 106   CO2 mmol/L 26   < > 21   BUN mg/dL 33*   < > 27*   CREATININE mg/dL 2 17*   < > 2 42*   EGFR ml/min/1 73sq m 29   < > 25   CALCIUM mg/dL 8 7   < > 9 5   AST U/L 11  --  10   ALT U/L 11*   < > 13   ALK PHOS U/L 70   < > 88    < > = values in this interval not displayed  Results from last 7 days   Lab Units 02/14/22  1257 02/14/22  1254   BLOOD CULTURE  Received in Microbiology Lab  Culture in Progress  Received in Microbiology Lab  Culture in Progress  Imaging Studies:     CT chest abdomen pelvis-extensive diffuse ground-glass opacification    Markedly distended stomach and small bowel with air-fluid levels consistent with small-bowel obstruction    Images personally reviewed by me in PACS

## 2022-02-15 NOTE — UTILIZATION REVIEW
Initial Clinical Review    Admission: Date/Time/Statement:   Admission Orders (From admission, onward)     Ordered        02/14/22 1436  Inpatient Admission  Once                      Orders Placed This Encounter   Procedures    Inpatient Admission     Standing Status:   Standing     Number of Occurrences:   1     Order Specific Question:   Level of Care     Answer:   Level 1 Stepdown [13]     Order Specific Question:   Estimated length of stay     Answer:   More than 2 Midnights     Order Specific Question:   Certification     Answer:   I certify that inpatient services are medically necessary for this patient for a duration of greater than two midnights  See H&P and MD Progress Notes for additional information about the patient's course of treatment  ED Arrival Information     Expected Arrival Acuity    - 2/14/2022 10:58 Urgent         Means of arrival Escorted by Service Admission type    [facility] Urgent         Arrival complaint    vomiting        Chief Complaint   Patient presents with    Vomiting Blood     pt arrives by EMS from [facility] for vomiting  Per EMS pt was witnessed projectile vomiting coffee groung emesis  Initial Presentation: 66 y o  male with hx global aphasia, dementia, , CVA, GERD, HTN, severe sepsis, blood clots, recent SBO  presents to ED from [facility]  with hypoxia and coffee ground emesis witnessed by [facility] staff   On EMS arrival O2 sat in 80's, NRB applied with improvementOn exam, pt tachycardic, moaning, in acute distress, dry mucous membraneshas rhonchi and rales, suprapubic cath, abdomen is diffusely tender to palpation with marked distension; no rebound tenderness or rigidity   Attempted manual disimpaction - no hard/impacted stool in rectal vault  Labs WBC 21 07, AG 17, LA 8 1, creat 2 42  ECG -ST  Imaging shows extensive bilat GGO- aspiration pneumonitis vs PNA ,suspicion for SBO and stool impacted rectosigmoid   Pt given IVF, IV abx in ED  NGT placed  Pt admitted as inpatient to critical care/level 1 SD with acute resp failure with hypoxia-likely 2/2 aspiration pneumonitis, septic shock ,fecal impaction and possibleSBO,  RUPESH  Plan - telemetry, ST eval / swallow eval 2/15 , hold po home meds for now, IVF, trend lactic acid, IV abx- linezolid and Ertapenem, O2 wean to keep sat >90%, maintain NGT, pepcid IV, monitor stool output, Dulcolax enema, trend creat, UOP, consult ID  Date:2/15   Day 2:   ID -SIRS-Suspect multifactorial including small-bowel obstruction and probable aspiration pneumoniti, consideration for the possibility of aspiration pneumonia  Continue dual IV abx, f/u cultures, check procal now and 2/16, maintain NGT, serial abdominal exams, serial XR , no surgical intervention per wife's wishes, monitor BMP, CBC w/ dif  Pt remains on O2 this am, down to 2 L nc  Pt hemodynamically stable  NG remains in  No bm yet  Pt has decreased breath sounds  Abdomen firm , tender     ED Triage Vitals   Temperature Pulse Respirations Blood Pressure SpO2   02/14/22 1104 02/14/22 1104 02/14/22 1104 02/14/22 1104 02/14/22 1104   97 7 °F (36 5 °C) (!) 115 20 116/72 95 % on nonrebreather      Temp Source Heart Rate Source Patient Position - Orthostatic VS BP Location FiO2 (%)   02/14/22 1104 02/14/22 1104 02/14/22 1104 02/14/22 1104 --   Axillary Monitor Lying Right arm       Pain Score       02/14/22 1832       10 - Worst Possible Pain          Wt Readings from Last 1 Encounters:   12/01/21 89 5 kg (197 lb 5 oz)     Additional Vital Signs:   Date/Time Temp Pulse Resp BP MAP (mmHg) SpO2 Calculated FIO2 (%) - Nasal Cannula Nasal Cannula O2 Flow Rate (L/min) O2 Device   02/15/22 1131 98 3 °F (36 8 °C) 96 17 117/99 87 93 % -- -- None (Room air)   02/15/22 0900 -- 93 15 -- -- 94 % -- -- --   02/15/22 0710 97 4 °F (36 3 °C) Abnormal  90 16 139/83 109 96 % 28 2 L/min Nasal cannula   02/15/22 0543 98 1 °F (36 7 °C) -- -- -- -- -- 28 2 L/min Nasal cannula   02/15/22 0046 -- 93 13 127/76 94 93 % -- -- Nasal cannula   02/15/22 0000 98 9 °F (37 2 °C) -- -- -- -- -- -- -- --   02/14/22 1942 98 °F (36 7 °C) 94 13 115/75 91 98 % -- -- Nasal cannula   02/14/22 1614 99 °F (37 2 °C) 107 Abnormal  12 119/73 92 100 % -- -- Nasal cannula   02/14/22 1547 -- 110 Abnormal  18 108/72 -- 93 % -- -- None (Room air)   02/14/22 1515 -- 122 Abnormal  20 99/62 75 95 % 36 4 L/min Nasal cannula   02/14/22 1315 -- 106 Abnormal  18 131/77 -- 99 % -- -- Non-rebreather mask     Date and Time Eye Opening Best Verbal Response Best Motor Response [place] Coma Scale Score   02/15/22 1006 4 1 4 9   02/15/22 0800 4 1 4 9   02/15/22 0543 4 1 4 9   02/15/22 0000 4 1 4 9   02/14/22 2000 4 1 4 9   02/14/22 1641 4 1 4 9         Pertinent Labs/Diagnostic Test Results:   2/14  ECG-Interpretation: non-specific    Rate:     ECG rate:  108    ECG rate assessment: tachycardic    Rhythm:     Rhythm: sinus tachycardia    Ectopy:     Ectopy: none    QRS:     QRS axis:  Left    QRS intervals:  Normal  Conduction:     Conduction: normal    ST segments:     ST segments:  Normal  T waves:     T waves: normal    Comments:      No STEMI  QT/QTc 302/404   CXR-Left lower lobe atelectasis versus infiltrate   Bowel distention is again identified similar to prior studies  XR abdomen- KUB-Marked bowel distention may be on the basis of ileus or obstruction  Please see subsequent CT for further report  CTA chest, CT abdomen, pelvis-  Chest:   1   Limited evaluation of the lower lobes due to respiratory motion artifact, however there are no segmental filling defects in the basal segments  Otherwise, no evidence of acute or chronic pulmonary embolism in the entire pulmonary arterial system  2   Extensive diffuse bilateral groundglass opacities consistent with aspiration pneumonitis versus pneumonia  Probable secretions in the right lower lobar bronchus, limited evaluation due to motion    3   Ectatic thoracic aorta measuring up to 42 mm, unchanged from CT of 10/6/2021  Recommendation is for follow-up low radiation dose chest CT in one year    Abdomen/pelvis:   1   Massively distended stomach, dilated small bowel with air-fluid levels, findings suspicious for small bowel obstruction with probable transition point in the right lower quadrant  This is unchanged from prior study  However, there is interval   worsening of the stool-impacted, distended rectosigmoid  No pneumatosis or evidence of perforation  2/15 XR abdomen- KUB-  1  The stomach is no longer massively distended, however there is continued gaseous distention of small bowel loops and increased stool in the rectum  2  Nasogastric tube tip is in the distal esophagus with advancement recommended as clinically indicated    Hiatal hernia noted                   Results from last 7 days   Lab Units 02/15/22  0612 02/14/22  1144   WBC Thousand/uL 12 22* 21 07*   HEMOGLOBIN g/dL 11 3* 14 2   HEMATOCRIT % 37 7 48 5   PLATELETS Thousands/uL 321 434*   NEUTROS ABS Thousands/µL 9 90*  --    BANDS PCT %  --  17*         Results from last 7 days   Lab Units 02/15/22  0611 02/14/22  1144   SODIUM mmol/L 148* 144   POTASSIUM mmol/L 3 3* 3 7   CHLORIDE mmol/L 111* 106   CO2 mmol/L 26 21   ANION GAP mmol/L 11 17*   BUN mg/dL 33* 27*   CREATININE mg/dL 2 17* 2 42*   EGFR ml/min/1 73sq m 29 25   CALCIUM mg/dL 8 7 9 5   MAGNESIUM mg/dL 2 5  --    PHOSPHORUS mg/dL 3 1  --      Results from last 7 days   Lab Units 02/15/22  0611 02/14/22  1144   AST U/L 11 10   ALT U/L 11* 13   ALK PHOS U/L 70 88   TOTAL PROTEIN g/dL 6 7 8 4*   ALBUMIN g/dL 2 4* 3 1*   TOTAL BILIRUBIN mg/dL 0 28 0 49     Results from last 7 days   Lab Units 02/15/22  1155 02/15/22  0540 02/15/22  0036 02/14/22  1734   POC GLUCOSE mg/dl 98 101 135 233*     Results from last 7 days   Lab Units 02/15/22  0611 02/14/22  1144   GLUCOSE RANDOM mg/dL 101 260*           Results from last 7 days   Lab Units 02/14/22  1725 02/14/22  1414 02/14/22  1144   HS TNI 0HR ng/L  --   --  14   HS TNI 2HR ng/L  --  12  --    HSTNI D2 ng/L  --  -2  --    HS TNI 4HR ng/L 13  --   --    HSTNI D4 ng/L -1  --   --          Results from last 7 days   Lab Units 02/14/22  1257   PROTIME seconds 15 3*   INR  1 21*   PTT seconds 29         Results from last 7 days   Lab Units 02/15/22  0955   PROCALCITONIN ng/ml 19 41*     Results from last 7 days   Lab Units 02/15/22  0612 02/14/22  1725 02/14/22  1254   LACTIC ACID mmol/L 3 3* 3 2* 8 1*       Results from last 7 days   Lab Units 02/14/22  1144   LIPASE u/L 40*                 Results from last 7 days   Lab Units 02/14/22  1524   CLARITY UA  Cloudy   COLOR UA  Yellow   SPEC GRAV UA  1 015   PH UA  8 5*   GLUCOSE UA mg/dl Negative   KETONES UA mg/dl Negative   BLOOD UA  Large*   PROTEIN UA mg/dl 30 (1+)*   NITRITE UA  Negative   BILIRUBIN UA  Negative   UROBILINOGEN UA E U /dl 0 2   LEUKOCYTES UA  Large*   WBC UA /hpf Innumerable*   RBC UA /hpf Innumerable*   BACTERIA UA /hpf Innumerable*   EPITHELIAL CELLS WET PREP /hpf Occasional           Results from last 7 days   Lab Units 02/14/22  1257 02/14/22  1254   BLOOD CULTURE  Received in Microbiology Lab  Culture in Progress  Received in Microbiology Lab  Culture in Progress                 ED Treatment:   Medication Administration from 02/14/2022 1058 to 02/14/2022 1611       Date/Time Order Dose Route Action     02/14/2022 1146 multi-electrolyte (ISOLYTE-S PH 7 4) bolus 500 mL 500 mL Intravenous New Bag     02/14/2022 1215 iohexol (OMNIPAQUE) 350 MG/ML injection (SINGLE-DOSE) 100 mL 100 mL Intravenous Given     02/14/2022 1412 ertapenem (INVanz) 1,000 mg in sodium chloride 0 9 % 50 mL IVPB 1,000 mg Intravenous New Bag     02/14/2022 1400 sodium chloride 0 9 % bolus 1,000 mL 1,000 mL Intravenous New Bag        Past Medical History:   Diagnosis Date    Ataxia     COPD (chronic obstructive pulmonary disease) ([address] )     wife denies - "never had"    CVA (cerebral vascular accident) ([place] [address] )     Dementia ([place] [address] )     Difficulty swallowing     Difficulty walking     Generalized anxiety disorder     GERD (gastroesophageal reflux disease)     Global aphasia     H/O blood clots     Heartburn     Hypertension     Mental disorder     Muscle weakness     Neuromuscular dysfunction of bladder     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 ([place] [address] )     Stroke ([place] [address] )     Thrombosis      Present on Admission:   COPD (chronic obstructive pulmonary disease) ([place] [address] )   GERD (gastroesophageal reflux disease)   [facility])   Small bowel obstruction ([facility])   Severe sepsis ([facility])      Admitting Diagnosis: Aspiration pneumonitis ([facility]) [J69 0]  Vomiting [R11 10]  SBO (small bowel obstruction) ([facility]) [K56 609]  Coffee ground emesis [K92 0]  Fecal impaction in rectum ([facility]) [K56 41]  Increased anion gap metabolic acidosis [C05 0]  Sepsis ([place] [address] ) [A41 9]  Age/Sex: 66 y o  male  Admission Orders:  Scheduled Medications:  amLODIPine, 10 mg, Per NG Tube, Daily  ammonium lactate, , Topical, Daily  apixaban, 2 5 mg, Per NG Tube, BID  baclofen, 15 mg, Per NG Tube, TID  busPIRone, 7 5 mg, Per NG Tube, TID  chlorhexidine, 15 mL, Mouth/Throat, Q12H DORON  ertapenem, 1,000 mg, Intravenous, Q24H  famotidine, 20 mg, Intravenous, Q24H [address]  glycerin-hypromellose-, 1 drop, Both Eyes, BID  heparin (porcine), 5,000 Units, Subcutaneous, Q8H [address]  insulin lispro, 1-6 Units, Subcutaneous, Q6H [facility]  linezolid, 600 mg, Intravenous, Q12H  memantine, 5 mg, Per NG Tube, BID  metoprolol tartrate, 25 mg, Per NG Tube, Q12H [address]  mirtazapine, 15 mg, Per NG Tube, HS  potassium chloride, 20 mEq, Intravenous, Once  potassium chloride, 20 mEq, Intravenous, Once  QUEtiapine, 25 mg, Per NG Tube, HS  saccharomyces boulardii, 250 mg, Per NG Tube, BID  potassium chloride 20 mEq IVPB (premix)  Dose: 20 mEq  Freq:  Once Route: IV  Last Dose: 20 mEq (02/15/22 1018)  Start: 02/15/22 0945 End: 02/15/22 1218  multi-electrolyte (ISOLYTE-S PH 7 4) bolus 1,000 mL  Dose: 1,000 mL  Freq: Once Route: IV  Last Dose: Stopped (02/14/22 1725)  Start: 02/14/22 1345 End: 02/14/22 1725  multi-electrolyte (ISOLYTE-S PH 7 4) bolus 1,500 mL  Dose: 1,500 mL  Freq: Once Route: IV  Last Dose: Stopped (02/14/22 2129)  Start: 02/14/22 1730 End: 02/14/22 2129  linezolid (ZYVOX) IVPB (premix in dextrose) 600 mg 300 mL  Dose: 600 mg  Freq: Once Route: IV  Start: 02/14/22 1430 End: 02/14/22 1826  bisacodyl (DULCOLAX) rectal suppository 10 mg  Dose: 10 mg  Freq: Once Route: RE  Start: 02/14/22 1500 End: 02/15/22 1106      Continuous IV Infusions:  sodium chloride, 150 mL/hr, Intravenous, Continuous    multi-electrolyte (PLASMALYTE-A/ISOLYTE-S PH 7 4) IV solution  Rate: 125 mL/hr Dose: 125 mL/hr  Freq: Continuous Route: IV  Indications of Use: IV Hydration  Last Dose: Stopped (02/15/22 1203)  Start: 02/14/22 1815 End: 02/15/22 1120    PRN Meds:  HYDROmorphone, 0 5 mg, Intravenous, Q3H PRN    neuro checks  SCD's   telemetry  NGT  NPO   Cardiopulm monitoring  Fall monitoring  IP CONSULT TO INFECTIOUS DISEASES  IP CONSULT TO CASE MANAGEMENT  IP CONSULT TO ACUTE CARE SURGERY    Network Utilization Review Department  ATTENTION: Please call with any questions or concerns to [phone] and carefully listen to the prompts so that you are directed to the right person  All voicemails are confidential   [facility] all requests for admission clinical reviews, approved or denied determinations and any other requests to dedicated fax number below belonging to the campus where the patient is receiving treatment   List of dedicated fax numbers for the Facilities:  [address] [place] [address] DENIALS (Administrative/Medical Necessity) [phone]   [address] (Maternity/NICU/Pediatrics) [phone]   [address] [place] [address] [phone]   [address] [phone]   [phone] [facility] [place] [address] [id number] [place] [address] [phone] [place] [address] [phone]

## 2022-02-15 NOTE — ASSESSMENT & PLAN NOTE
RUPESH in setting of suprapubic catheter    Follow-up urine culture  125 ml/hr 1/2 NS (hypernatremic today)  Urine output goal >0 5 ml/kg/hr  Monitor I/O  Hold nephrotoxic drugs  CMP, Monitor Cr, Creatine trending down  Lab Results   Component Value Date    CREATININE 2 17 (H) 02/15/2022    CREATININE 2 42 (H) 02/14/2022

## 2022-02-15 NOTE — ASSESSMENT & PLAN NOTE
Patient arrived via EMS from [facility] for hypoxia and vomiting  POA tachycardia, leukocytosis, bands 17, lactic acidosis  Investigate multiple sources of infection      -ID consulted; appreciate recs  -Ertepenem 1000 mg Q24H (Day 2)  -Linezolid 600 mg Q12H (Day 2)     -Wife says patient is allergic and wants to discontinue Linezolid     -Wife tells me that she doesn't want any changes/procedures/new treatment without notifying her first     -discussed with ID with recommendations starting Daptomycin 6 mg/kg adjusted weight q24h after repeating blood culture x2 sets     -Discontinue linezolid     -Primary team PLEASE update wife about new plan from ID and proceed accordingly; wife and ID aware of discontinuing linezolid  -CMP, BMP  -Trend Procalcitonin  -IVF 1/2  ml/hr  -Blood culture x1 NGTD 24H  -Blood culture x2 G+ cocci in clusters  -Urine culture pending    Trend lactic acid  Lab Results   Component Value Date    LACTICACID 2 7 () 02/15/2022    LACTICACID 3 3 ([place]) 02/15/2022    LACTICACID 3 2 ([place]) 02/14/2022    LACTICACID 8 1 ([place]) 02/14/2022

## 2022-02-15 NOTE — ASSESSMENT & PLAN NOTE
Lab Results   Component Value Date    SODIUM 148 (H) 02/15/2022    SODIUM 144 02/14/2022    SODIUM 141 12/06/2021     Changed mIVF to 1/2  ml/hr  CMP  Monitor electrolytes

## 2022-02-16 ENCOUNTER — APPOINTMENT (INPATIENT)
Dept: RADIOLOGY | Facility: HOSPITAL | Age: 72
DRG: 871 | End: 2022-02-16
Payer: MEDICARE

## 2022-02-16 PROBLEM — E87.0 HYPERNATREMIA: Status: RESOLVED | Noted: 2021-10-07 | Resolved: 2022-02-16

## 2022-02-16 LAB
ALBUMIN SERPL BCP-MCNC: 2.2 G/DL (ref 3.5–5)
ALP SERPL-CCNC: 59 U/L (ref 46–116)
ALT SERPL W P-5'-P-CCNC: 10 U/L (ref 12–78)
ANION GAP SERPL CALCULATED.3IONS-SCNC: 9 MMOL/L (ref 4–13)
AST SERPL W P-5'-P-CCNC: 13 U/L (ref 5–45)
BACTERIA UR CULT: ABNORMAL
BILIRUB SERPL-MCNC: 0.3 MG/DL (ref 0.2–1)
BUN SERPL-MCNC: 23 MG/DL (ref 5–25)
CALCIUM ALBUM COR SERPL-MCNC: 9.4 MG/DL (ref 8.3–10.1)
CALCIUM SERPL-MCNC: 8 MG/DL (ref 8.3–10.1)
CHLORIDE SERPL-SCNC: 110 MMOL/L (ref 100–108)
CO2 SERPL-SCNC: 24 MMOL/L (ref 21–32)
CREAT SERPL-MCNC: 1.66 MG/DL (ref 0.6–1.3)
GFR SERPL CREATININE-BSD FRML MDRD: 40 ML/MIN/1.73SQ M
GLUCOSE SERPL-MCNC: 79 MG/DL (ref 65–140)
GLUCOSE SERPL-MCNC: 85 MG/DL (ref 65–140)
GLUCOSE SERPL-MCNC: 87 MG/DL (ref 65–140)
GLUCOSE SERPL-MCNC: 98 MG/DL (ref 65–140)
MAGNESIUM SERPL-MCNC: 2.2 MG/DL (ref 1.6–2.6)
MRSA NOSE QL CULT: NORMAL
POTASSIUM SERPL-SCNC: 3.3 MMOL/L (ref 3.5–5.3)
PROCALCITONIN SERPL-MCNC: 11.76 NG/ML
PROT SERPL-MCNC: 6 G/DL (ref 6.4–8.2)
SODIUM SERPL-SCNC: 143 MMOL/L (ref 136–145)

## 2022-02-16 PROCEDURE — 99232 SBSQ HOSP IP/OBS MODERATE 35: CPT | Performed by: SURGERY

## 2022-02-16 PROCEDURE — 74022 RADEX COMPL AQT ABD SERIES: CPT

## 2022-02-16 PROCEDURE — 99255 IP/OBS CONSLTJ NEW/EST HI 80: CPT | Performed by: INTERNAL MEDICINE

## 2022-02-16 PROCEDURE — 99232 SBSQ HOSP IP/OBS MODERATE 35: CPT | Performed by: INTERNAL MEDICINE

## 2022-02-16 PROCEDURE — 80053 COMPREHEN METABOLIC PANEL: CPT | Performed by: PHYSICIAN ASSISTANT

## 2022-02-16 PROCEDURE — 84145 PROCALCITONIN (PCT): CPT

## 2022-02-16 PROCEDURE — 83735 ASSAY OF MAGNESIUM: CPT | Performed by: PHYSICIAN ASSISTANT

## 2022-02-16 PROCEDURE — 82948 REAGENT STRIP/BLOOD GLUCOSE: CPT

## 2022-02-16 PROCEDURE — 99232 SBSQ HOSP IP/OBS MODERATE 35: CPT | Performed by: PHYSICIAN ASSISTANT

## 2022-02-16 PROCEDURE — C9113 INJ PANTOPRAZOLE SODIUM, VIA: HCPCS | Performed by: SURGERY

## 2022-02-16 RX ORDER — POTASSIUM CHLORIDE 14.9 MG/ML
20 INJECTION INTRAVENOUS
Status: COMPLETED | OUTPATIENT
Start: 2022-02-16 | End: 2022-02-16

## 2022-02-16 RX ORDER — MICONAZOLE NITRATE 20 MG/G
CREAM TOPICAL 2 TIMES DAILY
Status: DISCONTINUED | OUTPATIENT
Start: 2022-02-16 | End: 2022-02-23 | Stop reason: HOSPADM

## 2022-02-16 RX ADMIN — BACLOFEN 15 MG: 10 TABLET ORAL at 17:00

## 2022-02-16 RX ADMIN — Medication 250 MG: at 09:21

## 2022-02-16 RX ADMIN — ERTAPENEM SODIUM 1000 MG: 1 INJECTION, POWDER, LYOPHILIZED, FOR SOLUTION INTRAMUSCULAR; INTRAVENOUS at 05:48

## 2022-02-16 RX ADMIN — APIXABAN 5 MG: 5 TABLET, FILM COATED ORAL at 17:01

## 2022-02-16 RX ADMIN — Medication 250 MG: at 17:01

## 2022-02-16 RX ADMIN — MEMANTINE 5 MG: 5 TABLET ORAL at 09:20

## 2022-02-16 RX ADMIN — BACLOFEN 15 MG: 10 TABLET ORAL at 09:20

## 2022-02-16 RX ADMIN — GLYCERIN 1 DROP: .002; .002; .01 SOLUTION/ DROPS OPHTHALMIC at 09:21

## 2022-02-16 RX ADMIN — PANTOPRAZOLE SODIUM 40 MG: 40 INJECTION, POWDER, FOR SOLUTION INTRAVENOUS at 09:20

## 2022-02-16 RX ADMIN — POTASSIUM CHLORIDE 20 MEQ: 200 INJECTION, SOLUTION INTRAVENOUS at 15:25

## 2022-02-16 RX ADMIN — POTASSIUM CHLORIDE 20 MEQ: 200 INJECTION, SOLUTION INTRAVENOUS at 13:18

## 2022-02-16 RX ADMIN — APIXABAN 5 MG: 5 TABLET, FILM COATED ORAL at 09:20

## 2022-02-16 RX ADMIN — POTASSIUM CHLORIDE 20 MEQ: 200 INJECTION, SOLUTION INTRAVENOUS at 11:26

## 2022-02-16 RX ADMIN — AMLODIPINE BESYLATE 10 MG: 10 TABLET ORAL at 09:20

## 2022-02-16 RX ADMIN — Medication: at 09:21

## 2022-02-16 RX ADMIN — BACLOFEN 15 MG: 10 TABLET ORAL at 21:41

## 2022-02-16 RX ADMIN — MICONAZOLE NITRATE: 20 CREAM TOPICAL at 17:42

## 2022-02-16 RX ADMIN — FAMOTIDINE 20 MG: 10 INJECTION INTRAVENOUS at 09:19

## 2022-02-16 RX ADMIN — METOPROLOL TARTRATE 25 MG: 25 TABLET, FILM COATED ORAL at 21:41

## 2022-02-16 RX ADMIN — SODIUM CHLORIDE 150 ML/HR: 0.45 INJECTION, SOLUTION INTRAVENOUS at 21:51

## 2022-02-16 RX ADMIN — SODIUM CHLORIDE 150 ML/HR: 0.45 INJECTION, SOLUTION INTRAVENOUS at 14:35

## 2022-02-16 RX ADMIN — GLYCERIN 1 DROP: .002; .002; .01 SOLUTION/ DROPS OPHTHALMIC at 17:04

## 2022-02-16 RX ADMIN — METOPROLOL TARTRATE 25 MG: 25 TABLET, FILM COATED ORAL at 09:20

## 2022-02-16 NOTE — ASSESSMENT & PLAN NOTE
· BP above goal today, however majority of readings have been controlled   · Continue Norvasc, Metoprolol

## 2022-02-16 NOTE — MALNUTRITION/BMI
This medical record reflects one or more clinical indicators suggestive of malnutrition and/or morbid obesity  Malnutrition Findings:   Adult Malnutrition type: Acute illness (in the setting of chronic illness)  Adult Degree of Malnutrition: Other severe protein calorie malnutrition (related to inadequate oral intake as evidenced by temporal indentation, orbital hollow, 24 4% weight decrease x ~4 months  Patient is currently NPO)             Body mass index is 23 61 kg/m²  See Nutrition note dated 2/16/2022 for additional details  Completed nutrition assessment is viewable in the nutrition documentation

## 2022-02-16 NOTE — WOUND OSTOMY CARE
Progress Note - Wound   True Tomas 66 y o  male MRN: [id number]  Unit/Bed#: S -01 Encounter: [id number]      Assessment:  Wound care consulted for assessment of the sacrum  Patient admitted with aspiration PNA  History of - COPD, CVA, dementia, YELENA, GERD, aphasia, and HTN  Patient seen in bed, open eyes to verbal stimuli, aphasic  Dependent for care, max assist of two with turning for the assessment  Foam wedges are in use for repositioning  Suprapubic catheter in place  Incontinent of bowel  Patient is NPO with NG tube in place  Contractures noted to b/l lower extremities  Will recommend P-500 mattress for additional support  B/l heels are intact with no redness or wounds  1  R lateral knee - DTI - POA  This wound has the potential to evolve to a full thickness injury: stage 3, stage 4 or unstageable pressure injury  Admission image reviewed -- apparent DTI which is evolving on todays assessment  Presents as approx: 10% intact light purple non-blanchable, 20% pink moist tissue and 70% dry yellow adhered tissue  Edges fragile and attached, no maceration  Vivian-wound is intact with blanchable pink colored scarring  On the anterior surface there is dry blanchable pink scarring    -will recommend to apply foam dressing  2  B/l sacro-buttocks - POA  Mixed etiology of pressure and moisture with candidiasis  Stage 2 pressure injury  Presents as scattered mixed well-defined and irregular shaped 100% moist pink/red partial thickness wounds, areas of dry scabbing, and denuded blanchable pink skin with moist sloughing of the epidermis  All aspects measured together  Edges fragile and attached, no maceration to the partial thickness aspects  Vivian-wounds are intact with blanchable pink/hyperpigmented skin that has irregular borders   Yeast odor noted with assessment   -foam dressing removed, holding too much moisture to the skin  -will recommend migue antifungal cream for protective barrier and to treat the yeast     No induration, fluctuance, odor, warmth/temperature differences, redness, or purulence noted to the above noted wounds and skin areas assessed  New dressings applied  Patient tolerated well- no s/s of non-verbal pain or discomfort observed during the encounter  Primary nurse aware of plan of care  See flow sheets for more detailed assessment findings  Will follow along  Discussed assessment findings, and plan of care/recommendations with SLIM PA, and obtained order for migue antifungal cream     Plan:   1  Cleanse R lateral knee wound with NSS, pat dry, and apply bordered Allevyn foam dressing  Taco dressing with T  Change every other day and as needed for soilage/dislodgement  2  Apply skin nourishing cream the entire skin daily for moisture  3  Turn and reposition patient every  2 hours   4  Elevate heels off of bed with pillows to offload pressure   5  Apply EHOB waffle cushion to chair when OOB, if able  6  Cleanse b/l buttocks and sacrum with soap and water, pat dry, and apply migue antifungal cream BID   7  P-500 mattress  8  Apply Allevyn foam to heels, taco w/P, peel foam check skin integrity q-shift  Change q3d and PRN for soilage/dislodgement   9  Wound care will follow along with patient weekly, please call or tiger text with questions and concerns      Objective:    Vitals: Blood pressure 142/85, pulse 88, temperature 100 °F (37 8 °C), temperature source Axillary, resp  rate 18, weight 83 4 kg (183 lb 13 8 oz), SpO2 90 %  ,Body mass index is 23 61 kg/m²  Wound 12/01/21 Pressure Injury Buttocks Bilateral (Active)   Wound Image   02/16/22 1259   Wound Description Beefy red 02/16/22 1259   Pressure Injury Stage 2 02/16/22 1259   Vivian-wound Assessment Intact;Fragile;Denuded; Maceration;Rash;Pink;Hyperpigmented 02/16/22 1259   Wound Length (cm) 12 cm 02/16/22 1259   Wound Width (cm) 12 cm 02/16/22 1259   Wound Depth (cm) 0 1 cm 02/16/22 1259   Wound Surface Area (cm^2) 144 cm^2 02/16/22 1259 Wound Volume (cm^3) 14 4 cm^3 02/16/22 1259   Calculated Wound Volume (cm^3) 14 4 cm^3 02/16/22 1259   Tunneling 0 cm 02/16/22 1259   Tunneling in depth located at 0 02/16/22 1259   Undermining 0 02/16/22 1259   Undermining is depth extending from 0 02/16/22 1259   Drainage Amount Scant 02/16/22 1259   Drainage Description Serosanguineous 02/16/22 1259   Non-staged Wound Description Partial thickness 02/16/22 1259   Treatments Cleansed;Irrigation with NSS;Site care;Elevated 02/16/22 1259   Dressing Protective barrier 02/16/22 1259   Wound packed? No 02/16/22 1259   Packing- # removed 0 02/16/22 1259   Packing- # inserted 0 02/16/22 1259   Patient Tolerance Tolerated well 02/16/22 1259   Wound 02/15/22 Pressure Injury Knee Right;Lateral (Active)   Wound Image   02/16/22 1255   Wound Description Pink;Light purple;Yellow 02/16/22 1255   Pressure Injury Stage DTPI 02/16/22 1255   Vivian-wound Assessment Dry; Intact;Pink;Scar Tissue 02/16/22 1255   Wound Length (cm) 3 cm 02/16/22 1255   Wound Width (cm) 0 5 cm 02/16/22 1255   Wound Depth (cm) 0 1 cm 02/16/22 1255   Wound Surface Area (cm^2) 1 5 cm^2 02/16/22 1255   Wound Volume (cm^3) 0 15 cm^3 02/16/22 1255   Calculated Wound Volume (cm^3) 0 15 cm^3 02/16/22 1255   Tunneling 0 cm 02/16/22 1255   Tunneling in depth located at 0 02/16/22 1255   Undermining 0 02/16/22 1255   Undermining is depth extending from 0 02/16/22 1255   Drainage Amount Scant 02/16/22 1255   Drainage Description Serosanguineous 02/16/22 1255   Non-staged Wound Description Partial thickness 02/16/22 1255   Treatments Cleansed;Irrigation with NSS 02/16/22 1255   Dressing Foam, Silicon (eg  Allevyn, etc) 02/16/22 1255   Wound packed? No 02/16/22 1255   Packing- # removed 0 02/16/22 1255   Packing- # inserted 0 02/16/22 1255   Dressing Changed New 02/16/22 1255   Patient Tolerance Tolerated well 02/16/22 1255   Dressing Status Clean;Dry; Intact 02/16/22 1255         Recommendations written as orders   AVS updated    Ania España RN BSN CWON

## 2022-02-16 NOTE — ASSESSMENT & PLAN NOTE
· POA, recurrent   Patient was following with GI and had a CT enterography planned to evaluate potential cause  · Could be related to underlying neurologic dysfunction   · XR Obstruction Series from 2/16 showing persistently distended bowel loops   · General Surgery following   · Continue NGT for now   · Removal per then   · Likely need to continue   · NPO for now  · Monitor for return of bowel function   · Will need speech evaluation once NGT removed

## 2022-02-16 NOTE — ASSESSMENT & PLAN NOTE
· POA, evidenced by leukocytosis, bandemia, tachycardia, lactic acidosis, and potential aspiration pneumonia     · Lactic acid resolved  · No CBC today   · Blood cultures with 1/2 GPC likely skin karma contamination   · Continue antibiotics as per ID recommendations

## 2022-02-16 NOTE — PROGRESS NOTES
Progress Note - Infectious Disease   Versa Margy Tomas 66 y o  male MRN: [id number]  Unit/Bed#: S -01 Encounter: [id number]      Impression/Plan:  1  Systemic inflammatory response syndrome-POA  Tachycardia and leukocytosis  Suspect multifactorial including small-bowel obstruction and probable aspiration pneumonitis  Consideration for the possibility of aspiration pneumonia  Fortunately the patient remains hemodynamically stable despite his systemic illness  He seems to be tolerating the antibiotics without difficulty  -continue ertapenem for now  -follow-up blood cultures  -recheck procalcitonin level  -recheck CBC with diff and BMP  -treatment of small-bowel obstruction  -supportive care     2  Acute hypoxic respiratory failure-in the setting of vomiting and aspiration events  Suspect patient has aspiration pneumonitis  Consideration for the possibility of developing aspiration pneumonia  White cell count has significantly decreased since yesterday and his oxygenation status has remained stable  Procalcitonin level is elevated   -antibiotics as above for now  -recheck procalcitonin level  -monitor respiratory status  -supportive care     3  Small-bowel obstruction-recurrent  Patient has had an NG-tube placed  His abdomen is firm but does not appear to be tender based upon his responses to the exam   X-ray still with dilated loops of bowel   -NG tube decompression  -serial abdominal exams  -serial radiographs  -no surgical intervention as per the wife's wishes      4  Acute kidney injury-in a patient with suprapubic catheter in place  No urinary obstruction seen on imaging  Suspect this is a pre renal issue  Consideration for the possibility of ATN  Renal function is much improved  -volume management  -recheck BMP  -dose adjust antibiotics as needed     5  Advanced dementia-with global aphasia        6  Multiple antibiotic allergies-not listed but wife insists the patient also is allergic to linezolid so this is been discontinued  Will continue monitor for any intolerance of antibiotics    Discussed the above management plan with the surgical service    Antibiotics:  Ertapenem 3    Subjective:  Patient has no fever, chills, sweats; no reported vomiting, diarrhea; no cough, shortness of breath; not requiring any oxygen support; remains nonverbal     Objective:  Vitals:  Temp:  [97 9 °F (36 6 °C)-99 4 °F (37 4 °C)] 98 9 °F (37 2 °C)  HR:  [64-96] 77  Resp:  [16-18] 16  BP: (117-162)/(65-99) 162/65  SpO2:  [90 %-93 %] 92 %  Temp (24hrs), Av 6 °F (37 °C), Min:97 9 °F (36 6 °C), Max:99 4 °F (37 4 °C)  Current: Temperature: 98 9 °F (37 2 °C)    Physical Exam:   General Appearance:  Somnolent, nonverbal, nontoxic, no acute distress  Throat: Oropharynx moist without lesions  Lungs:   Clear to auscultation bilaterally; no wheezes, rhonchi or rales; respirations unlabored   Heart:  RRR; no murmur, rub or gallop   Abdomen:   Soft, non-tender, non-distended, positive bowel sounds  Extremities: No clubbing, cyanosis or edema   Skin: No new rashes or lesions  No draining wounds noted         Labs, Imaging, & Other studies:   All pertinent labs and imaging studies were personally reviewed  Results from last 7 days   Lab Units 02/15/22  0612 22  1144   WBC Thousand/uL 12 22* 21 07*   HEMOGLOBIN g/dL 11 3* 14 2   PLATELETS Thousands/uL 321 434*     Results from last 7 days   Lab Units 22  0554 02/15/22  0611 02/15/22  0611 22  1144 22  1144   SODIUM mmol/L 143  --  148*  --  144   POTASSIUM mmol/L 3 3*   < > 3 3*   < > 3 7   CHLORIDE mmol/L 110*   < > 111*   < > 106   CO2 mmol/L 24   < > 26   < > 21   BUN mg/dL 23   < > 33*   < > 27*   CREATININE mg/dL 1 66*   < > 2 17*   < > 2 42*   EGFR ml/min/1 73sq m 40   < > 29   < > 25   CALCIUM mg/dL 8 0*   < > 8 7   < > 9 5   AST U/L 13  --  11  --  10   ALT U/L 10*   < > 11*   < > 13   ALK PHOS U/L 59   < > 70   < > 88    < > = values in this interval not displayed  Results from last 7 days   Lab Units 02/14/22  1524 02/14/22  1257 02/14/22  1254   BLOOD CULTURE   --  Staphylococcus coagulase negative* No Growth at 24 hrs  GRAM STAIN RESULT   --  Gram positive cocci in clusters*  --    URINE CULTURE  Culture results to follow  --   --      Results from last 7 days   Lab Units 02/16/22  0554 02/15/22  0955   PROCALCITONIN ng/ml 11 76* 19 41*        x-ray abdomen-dilated loops of small bowel    Bilateral ground-glass opacification in the lung fields    Images personally reviewed by me in PACS

## 2022-02-16 NOTE — ASSESSMENT & PLAN NOTE
· Suspect POA vs aspiration pneumonitis     · CT chest showing extensive diffuse bilateral groundglass opacities consistent with aspiration pneumonitis versus pneumonia  · He is on room air currently  · Procalcitonin peaked at 19 8 and decreased to 11 7 today   · Continue Ertapenem as per ID recs  · Patient was on Linezolid, however wife reports allergy (not listed) so this was removed   · Further review today for possible discontinuation if more so an aspiration pneumonitis picture   · Appreciate ID input

## 2022-02-16 NOTE — ASSESSMENT & PLAN NOTE
· Chronically present with colonization of bladder   Doubt active urinary tract infection at this time   · SPT care  · Be sure to use care when moving patient

## 2022-02-16 NOTE — PROGRESS NOTES
[facility]  Progress Note [facility] 1950, 66 y o  male MRN: [id number]  Unit/Bed#: S -01 Encounter: [id number]  Primary Care Provider: Lord Joycelyn MD   Date and time admitted to hospital: 2/14/2022 10:59 AM    * Aspiration pneumonia ([address] )  Assessment & Plan  · Suspect POA vs aspiration pneumonitis  · CT chest showing extensive diffuse bilateral groundglass opacities consistent with aspiration pneumonitis versus pneumonia  · He is on room air currently  · Procalcitonin peaked at 19 8 and decreased to 11 7 today   · Continue Ertapenem as per ID recs  · Patient was on Linezolid, however wife reports allergy (not listed) so this was removed   · Further review today for possible discontinuation if more so an aspiration pneumonitis picture   · Appreciate ID input     Severe sepsis ([address] )  Assessment & Plan  · POA, evidenced by leukocytosis, bandemia, tachycardia, lactic acidosis, and potential aspiration pneumonia  · Lactic acid resolved  · No CBC today   · Blood cultures with 1/2 GPC likely skin karma contamination   · Continue antibiotics as per ID recommendations     Small bowel obstruction ([facility])  Assessment & Plan  · POA, recurrent  Patient was following with GI and had a CT enterography planned to evaluate potential cause  · Could be related to underlying neurologic dysfunction   · XR Obstruction Series from 2/16 showing persistently distended bowel loops   · General Surgery following   · Continue NGT for now   · Removal per then   · Likely need to continue   · NPO for now  · Monitor for return of bowel function   · Will need speech evaluation once NGT removed     Persistent vegetative state ([address] )  Assessment & Plan  · Secondary to anoxic brain injury around 10 years ago  Wife is decision maker and advocate   At baseline he is non-verbal   · Supportive care   · All decisions about medical care NEED to be discussed with wife     Hypernatremia-resolved as of 2022  Assessment & Plan  · Now normalized     Suprapubic catheter ([facility] [address] )  Assessment & Plan  · Chronically present with colonization of bladder  Doubt active urinary tract infection at this time   · SPT care  · Be sure to use care when moving patient     COPD (chronic obstructive pulmonary disease) ([facility] [address] )  Assessment & Plan  · Does not appear to be in acute exacerbation  · Continue home management     Essential hypertension  Assessment & Plan  · BP above goal today, however majority of readings have been controlled   · Continue Norvasc, Metoprolol     GERD (gastroesophageal reflux disease)  Assessment & Plan  · Continue IV PPI and Pepcid    History of DVT of lower extremity  Assessment & Plan  · Continue Eliquis per NGT         VTE Pharmacologic Prophylaxis: VTE Score: 13 High Risk (Score >/= 5) - Pharmacological DVT Prophylaxis Ordered: apixaban (Eliquis)  Sequential Compression Devices Ordered  Patient Centered Rounds: I performed bedside rounds with nursing staff today  Discussions with Specialists or Other Care Team Provider: Discussed with Surgery,RN, CM    Education and Discussions with Family / Patient: Updated  (wife) via phone  Time Spent for Care: 30 minutes  More than 50% of total time spent on counseling and coordination of care as described above  Current Length of Stay: 2 day(s)  Current Patient Status: Inpatient   Certification Statement: The patient will continue to require additional inpatient hospital stay due to on going management of SBO, aspiration PNA/pneumonitis  Discharge Plan: Anticipate discharge in 48-72 hrs to prior living arrangement     Code Status: Level 3 - DNAR and DNI    Subjective:   Patient remains non-verbal  No events reported per nursing staff       Objective:     Vitals:   Temp (24hrs), Av 6 °F (37 °C), Min:97 9 °F (36 6 °C), Max:99 4 °F (37 4 °C)    Temp:  [97 9 °F (36 6 °C)-99 4 °F (37 4 °C)] 98 9 °F (37 2 °C)  HR:  [64-96] 77  Resp:  [16-18] 16  BP: (117-162)/(65-99) 162/65  SpO2:  [90 %-93 %] 92 %  Body mass index is 23 61 kg/m²  Input and Output Summary (last 24 hours): Intake/Output Summary (Last 24 hours) at 2/16/2022 1114  Last data filed at 2/16/2022 3292  Gross per 24 hour   Intake 431 ml   Output 1675 ml   Net -1244 ml       Physical Exam:   Physical Exam  Constitutional:       General: He is not in acute distress  Appearance: Normal appearance  He is normal weight  He is not ill-appearing or diaphoretic  HENT:      Head: Normocephalic and atraumatic  Comments: NGT placed        Mouth/Throat:      Mouth: Mucous membranes are dry  Eyes:      General: No scleral icterus  Pupils: Pupils are equal, round, and reactive to light  Cardiovascular:      Rate and Rhythm: Normal rate and regular rhythm  Pulses: Normal pulses  Heart sounds: Normal heart sounds, S1 normal and S2 normal  No murmur heard  No systolic murmur is present  No diastolic murmur is present  No gallop  No S3 or S4 sounds  Pulmonary:      Effort: Pulmonary effort is normal  No accessory muscle usage or respiratory distress  Breath sounds: No stridor  Examination of the right-lower field reveals decreased breath sounds  Examination of the left-lower field reveals decreased breath sounds  Decreased breath sounds and rhonchi present  No wheezing or rales  Chest:      Chest wall: No tenderness  Abdominal:      General: Bowel sounds are decreased  There is no distension  Palpations: Abdomen is soft  Tenderness: There is no abdominal tenderness  There is no guarding  Musculoskeletal:      Right lower leg: No edema  Left lower leg: No edema  Skin:     General: Skin is warm and dry  Coloration: Skin is not jaundiced  Neurological:      General: No focal deficit present  Mental Status: Mental status is at baseline  He is lethargic  Motor: No tremor or seizure activity     Psychiatric:         Behavior: Behavior is cooperative  Additional Data:     Labs:  Results from last 7 days   Lab Units 02/15/22  0612 02/14/22  1144 02/14/22  1144   WBC Thousand/uL 12 22*   < > 21 07*   HEMOGLOBIN g/dL 11 3*   < > 14 2   HEMATOCRIT % 37 7   < > 48 5   PLATELETS Thousands/uL 321   < > 434*   BANDS PCT %  --   --  17*   NEUTROS PCT % 82*  --   --    LYMPHS PCT % 13*  --   --    LYMPHO PCT %  --   --  4*   MONOS PCT % 5  --   --    MONO PCT %  --   --  2*   EOS PCT % 0  --  0    < > = values in this interval not displayed       Results from last 7 days   Lab Units 02/16/22  0554   SODIUM mmol/L 143   POTASSIUM mmol/L 3 3*   CHLORIDE mmol/L 110*   CO2 mmol/L 24   BUN mg/dL 23   CREATININE mg/dL 1 66*   ANION GAP mmol/L 9   CALCIUM mg/dL 8 0*   ALBUMIN g/dL 2 2*   TOTAL BILIRUBIN mg/dL 0 30   ALK PHOS U/L 59   ALT U/L 10*   AST U/L 13   GLUCOSE RANDOM mg/dL 87     Results from last 7 days   Lab Units 02/14/22  1257   INR  1 21*     Results from last 7 days   Lab Units 02/16/22  0543 02/15/22  2357 02/15/22  1629 02/15/22  1155 02/15/22  0540 02/15/22  0036 02/14/22  1734   POC GLUCOSE mg/dl 85 100 98 98 101 135 233*         Results from last 7 days   Lab Units 02/16/22  0554 02/15/22  0955 02/15/22  0612 02/14/22  1725 02/14/22  1254   LACTIC ACID mmol/L  --  2 7* 3 3* 3 2* 8 1*   PROCALCITONIN ng/ml 11 76* 19 41*  --   --   --        Lines/Drains:  Invasive Devices  Report    Peripheral Intravenous Line            Peripheral IV 02/14/22 Left Antecubital 1 day    Peripheral IV 02/14/22 Right;Upper;Ventral (anterior) Arm 1 day          Drain            NG/OG/Enteral Tube Nasogastric 18 Fr Left nare 76 days    Suprapubic Catheter 7 days    NG/OG/Enteral Tube Nasogastric 16 Fr Right nare 1 day                      Imaging: Reviewed radiology reports from this admission including: xray(s)    Recent Cultures (last 7 days):   Results from last 7 days   Lab Units 02/14/22  1524 02/14/22  1257 02/14/22  1254   BLOOD CULTURE   -- Staphylococcus coagulase negative* No Growth at 24 hrs  GRAM STAIN RESULT   --  Gram positive cocci in clusters*  --    URINE CULTURE  Culture results to follow  --   --        Last 24 Hours Medication List:   Current Facility-Administered Medications   Medication Dose Route Frequency Provider Last Rate    amLODIPine  10 mg Per NG Tube Daily Alberto Finnegan MD      ammonium lactate   Topical Daily Alberto Finnegan MD      apixaban  5 mg Per NG Tube BID Alberto Finnegan MD      baclofen  15 mg Per NG Tube TID Alberto Finnegan MD      chlorhexidine  15 mL Mouth/Throat Q12H [address] Alberto Finnegan MD      ertapenem  1,000 mg Intravenous Q24H Alberto Finnegan MD 1,000 mg (02/16/22 0548)    famotidine  20 mg Intravenous Q24H [address] Alberto Finnegan MD      glycerin-hypromellose-  1 drop Both Eyes BID Alberto Finnegan MD      HYDROmorphone  0 5 mg Intravenous Q3H PRN Alberto Finnegan MD      insulin lispro  1-6 Units Subcutaneous Q6H [address] Alberto Finnegan MD      memantine  5 mg Per NG Tube BID Alberto Finnegan MD      metoprolol tartrate  25 mg Per NG Tube Q12H [address] Alberto Finnegan MD      pantoprazole  40 mg Intravenous Q24H [address] Michael Garay DO      potassium chloride  20 mEq Intravenous Q2H Aaron Taylor DO      saccharomyces boulardii  250 mg Per NG Tube BID Alberto Finnegan MD      sodium chloride  150 mL/hr Intravenous Continuous Alberto Finnegan  mL/hr (02/15/22 1825)        Today, Patient Was Seen By: Kelley Rolle PA-C    **Please Note: This note may have been constructed using a voice recognition system  **

## 2022-02-16 NOTE — SPEECH THERAPY NOTE
Speech Language/Pathology    Speech/Language Pathology Progress Note    Patient Name: Susana Gamble  JEQPD'A Date: 2/16/2022     Chart reviewed, Obs series completed and showed "stable gaseous distension of small and large bowel loops "  Discussed with MAYELA Vega, pt to cont NPO and not appropriate for swallow eval   Will sign off and DC order, reconsult when pt appropriate for po/ swallow eval     Destinee Tabares CCC-SLP  Speech Pathologist  Available via  tiger text

## 2022-02-16 NOTE — DISCHARGE INSTR - OTHER ORDERS
1  Cleanse R lateral knee wound with NSS, pat dry, and apply bordered Allevyn foam dressing  Raghu dressing with T  Change every other day and as needed for soilage/dislodgement  2  Apply skin nourishing cream the entire skin daily for moisture  3  Turn and reposition patient every  2 hours   4  Elevate heels off of bed with pillows to offload pressure   5  Apply EHOB waffle cushion to chair when OOB, if able  6  Cleanse b/l buttocks and sacrum with soap and water, pat dry, and apply migue antifungal cream BID   7  P-500 mattress  8  Apply Allevyn foam to heels, raghu w/P, peel foam check skin integrity q-shift   Change q3d and PRN for soilage/dislodgement

## 2022-02-16 NOTE — ASSESSMENT & PLAN NOTE
· Secondary to anoxic brain injury around 10 years ago  Wife is decision maker and advocate   At baseline he is non-verbal   · Supportive care   · All decisions about medical care NEED to be discussed with wife

## 2022-02-16 NOTE — PROGRESS NOTES
Progress Note - General Surgery   Galen Corey Tomas 66 y o  male MRN: [id number]  Unit/Bed#: S -01 Encounter: [id number]    Assessment:  67 yo M with PMH of dementia, CVA, COPD and aphasia who presents with aspiration pneumonia and SBO with possible transition point in the RLQ    NG 250cc clear output    Plan:  Plan for obstruction series this morning  NPO/NGT, likely remove and have speech evaluate for oral diet if obs series is unremarkable  Manaia@[employer], decrease if started on oral diet  Daily fleets enemas PRN  Start bowel regimen if cleared for PO diet  Continue antibiotics for aspiration pneumonia  Rest of care per primary    Subjective/Objective     Subjective: No acute events overnight, remains NPO with NGT, patient non-verbal, had a large BM this morning per nursing    Objective:    Blood pressure 153/72, pulse 64, temperature 99 4 °F (37 4 °C), temperature source Axillary, resp  rate 18, SpO2 90 %  ,There is no height or weight on file to calculate BMI        Intake/Output Summary (Last 24 hours) at 2/16/2022 0556  Last data filed at 2/16/2022 0548  Gross per 24 hour   Intake 1236 ml   Output 2000 ml   Net -764 ml       Invasive Devices  Report    Peripheral Intravenous Line            Peripheral IV 02/14/22 Left Antecubital 1 day    Peripheral IV 02/14/22 Right;Upper;Ventral (anterior) Arm 1 day          Drain            NG/OG/Enteral Tube Nasogastric 18 Fr Left nare 76 days    Suprapubic Catheter 7 days    NG/OG/Enteral Tube Nasogastric 16 Fr Right nare 1 day                Physical Exam:  Gen:    NAD  CV:      warm, well-perfused  Lungs: No respiratory distress on RA  Abd:     soft, NT, very minimally distended but improved from prior exam, NG with clear output  Ext:      no CCE  Neuro: A&Ox3

## 2022-02-16 NOTE — CONSULTS
Consultation - [facility]  [place] Genoveva 66 y o  male MRN: [id number]  Unit/Bed#: S -01 Encounter: [id number]        Inpatient consult to gastroenterology  Consult performed by: Leeann Goff PA-C  Consult ordered by: Nate Gallegos PA-C          Reason for Consult / Principal Problem:  Coffee ground emesis, recurrent SBO    HPI: Jayshree Kevin is a 66y o  year old male resident at [facility] in [place] with history of dementia, anoxic brain injury in the setting of cardiac arrest in 2011 who was brought to the emergency room 2 days ago on 02/14/2022 for evaluation of projectile coffee-ground emesis  History is obtained from chart review and from discussion with his wife Whitney Medina at bedside, as the patient himself is nonverbal   Had also been noted with diffusely tender and distended abdomen  His hemoglobin is found to be stable compared with prior values, yesterday morning was 11 3  CTA of the chest abdomen and pelvis on arrival demonstrated a massively distended stomach with dilated small bowel with air-fluid levels suspicious for small-bowel obstruction with likely transition point in the right lower quadrant, as well as a stool-impacted and distended rectosigmoid region  X-ray was performed yesterday to follow after NG tube was placed to decompress, and stomach appeared no longer massively distended although there was still continued gaseous distention of small-bowel loops and stool distention of the rectum  Similar findings were noted on subsequent x-ray this morning  He is also being treated with antibiotics and followed with Infectious Disease for sepsis which is thought to have been from aspiration pneumonia  The patient's wife tells me he has had some diminished functional status over the last 2 years, since the start of the COVID-19 pandemic, with generally decreased oral intake, decreased physical activity and more sluggish demeanor, essentially bed-bound since 2020    The patient has never had a feeding tube/PEG tube  Has never had EGD or colonoscopy  The patient's wife tells me he typically has chops/soft foods, and does not require thickened liquids  He was seen by Dr Allison Judge of our service last month for evaluation after he had had 2 recent episodes of small-bowel obstruction which had responded to conservative management, albeit without a clear underlying etiology  It was reported he had no history of abdominal surgery, and had never had a colonoscopy or EGD  He was recommended for CT enterography which appears to have been scheduled but not performed yet          REVIEW OF SYSTEMS:    Unobtainable as patient is nonverbal      Historical Information   Past Medical History:   Diagnosis Date    Ataxia     COPD [facility]chronic obstructive pulmonary disease) [facility]     wife denies - "never had"    CVA [facility]cerebral vascular accident) [facility]     Dementia ([facility]     Difficulty swallowing     Difficulty walking     Generalized anxiety disorder     GERD (gastroesophageal reflux disease)     Global aphasia     H/O blood clots     Heartburn     Hypertension     Mental disorder     Muscle weakness     Neuromuscular dysfunction of bladder     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 ([facility] [facility] )     Stroke ([facility]     Thrombosis      Past Surgical History:   Procedure Laterality Date    BOTOX INJECTION N/A 6/18/2019    Procedure: INJECTION BOTULINUM TOXIN (BOTOX), intra detrusor;  Surgeon: Scarlett Simms MD;  [facility];  Service: Urology    BOTOX INJECTION N/A 10/7/2019    Procedure: INJECTION BOTULINUM TOXIN (BOTOX), intradetrusor;  Surgeon: Scarlett Simms MD;  [facility];  Service: Urology    CYSTOSCOPY      CYSTOSCOPY N/A 6/18/2019    Procedure: cystoscopy and all indicated procedures;  Surgeon: Scarlett Simms MD;  [facility];  Service: Urology    FL CYSTOGRAM  1/15/2019    IR NEPHROSTOMY TUBE CHECK/CHANGE/REPOSITION/REINSERTION/UPSIZE  5/26/2021    IR NEPHROSTOMY TUBE PLACEMENT  3/11/2020    IR SUPRAPUBIC CATHETER PLACEMENT  11/26/2019    IVC FILTER INSERTION      X2    IVC FILTER INSERTION      x2    KIDNEY STONE SURGERY      KNEE SURGERY      Sepsis infection     NEPHROSTOMY      OK CYSTO/URETERO W/LITHOTRIPSY &INDWELL STENT INSRT Right 6/14/2017    Procedure: CYSTOSCOPY URETEROSCOPY WITH LITHOTRIPSY HOLMIUM LASER,,BASKET STONE EXTRACTION, RETROGRADE PYELOGRAM AND INSERTION STENT URETERAL;  Surgeon: Yakov Choudhury MD;  [facility];  Service: Urology    OK CYSTOURETHROSCOPY,BIOPSY N/A 1/15/2019    Procedure: CYSTOSCOPY, CYSTOGRAM, DILATION OF URETHRAL STRICTURE;  Surgeon: Shelby Apple MD;  [facility];  Service: Urology    URINARY SURGERY       Social History   Social History     Substance and Sexual Activity   Alcohol Use Not Currently     Social History     Substance and Sexual Activity   Drug Use No     Social History     Tobacco Use   Smoking Status Never Smoker   Smokeless Tobacco Never Used     Family History   Problem Relation Age of Onset    Diabetes Father     Hypertension Father     Coronary artery disease Father     Cancer Father     Hypertension Mother        Meds/Allergies     Medications Prior to Admission   Medication    acetaminophen (TYLENOL) 325 mg tablet    amLODIPine (NORVASC) 10 mg tablet    ammonium lactate (LAC-HYDRIN) 12 % cream    apixaban (ELIQUIS) 2 5 mg    ascorbic acid (VITAMIN C) 500 MG tablet    baclofen 10 mg tablet    bisacodyl (DULCOLAX) 10 mg suppository    busPIRone (BUSPAR) 7 5 mg tablet    cholecalciferol (VITAMIN D3) 1,000 units tablet    Citric Si-Zfalwnedjbf-Ca Carb (Renacidin) SOLN    docusate sodium (COLACE) 100 mg capsule    famotidine (PEPCID) 20 mg tablet    glycerin-hypromellose- (Artificial Tears) 0 2-0 2-1 % SOLN    memantine (NAMENDA) 5 mg tablet    metoprolol tartrate (LOPRESSOR) 25 mg tablet    mirtazapine (REMERON) 15 mg tablet    polyethylene glycol (MIRALAX) 17 g packet    potassium chloride (K-DUR,KLOR-CON) 10 mEq tablet    QUEtiapine (SEROquel) 25 mg tablet    saccharomyces boulardii (FLORASTOR) 250 mg capsule    senna (SENOKOT) 8 6 mg     Current Facility-Administered Medications   Medication Dose Route Frequency    amLODIPine (NORVASC) tablet 10 mg  10 mg Per NG Tube Daily    ammonium lactate (LAC-HYDRIN) 12 % cream   Topical Daily    apixaban (ELIQUIS) tablet 5 mg  5 mg Per NG Tube BID    baclofen tablet 15 mg  15 mg Per NG Tube TID    chlorhexidine (PERIDEX) 0 12 % oral rinse 15 mL  15 mL Mouth/Throat Q12H [facility]    ertapenem (INVanz) 1,000 mg in sodium chloride 0 9 % 50 mL IVPB  1,000 mg Intravenous Q24H    famotidine (PEPCID) injection 20 mg  20 mg Intravenous Q24H DORON    glycerin-hypromellose- (ARTIFICIAL TEARS) ophthalmic solution 1 drop  1 drop Both Eyes BID    HYDROmorphone (DILAUDID) injection 0 5 mg  0 5 mg Intravenous Q3H PRN    insulin lispro (HumaLOG) 100 units/mL subcutaneous injection 1-6 Units  1-6 Units Subcutaneous Q6H [facility]    memantine (NAMENDA) tablet 5 mg  5 mg Per NG Tube BID    metoprolol tartrate (LOPRESSOR) tablet 25 mg  25 mg Per NG Tube Q12H [facility]    pantoprazole (PROTONIX) injection 40 mg  40 mg Intravenous Q24H DORON    potassium chloride 20 mEq IVPB (premix)  20 mEq Intravenous Q2H    saccharomyces boulardii (FLORASTOR) capsule 250 mg  250 mg Per NG Tube BID    sodium chloride infusion 0 45 %  150 mL/hr Intravenous Continuous       Allergies   Allergen Reactions    Gentamycin [Gentamicin] Anaphylaxis    Piperacillin Sod-Tazobactam So Anaphylaxis and Rash     However, has tolerated Ertapenem, Cefdinir, and Cefepime, which all have different side chains   Avoid Penicillins and the following Cephs with similar side chains (Cephalexin, Cefadroxil, Cefaclor, Cefprozil, or  Cefoxitin)  Other reaction(s): Hemoptysis    Vancomycin Anaphylaxis and Rash     Other reaction(s): Hemoptysis    Clindamycin Itching and Rash     Other reaction(s): Hemoptysis    Nsaids Other (See Comments)     Nephrotic Syndrome    Sulfa Antibiotics Rash    Other Rash     antipersperents  Stat lock from lucia catheter    Tigecycline Rash     Other reaction(s): Hemoptysis           Objective     Blood pressure 162/65, pulse 77, temperature 98 9 °F (37 2 °C), temperature source Axillary, resp  rate 16, weight 83 4 kg (183 lb 13 8 oz), SpO2 92 %  Intake/Output Summary (Last 24 hours) at 2/16/2022 1409  Last data filed at 2/16/2022 1126  Gross per 24 hour   Intake 210 ml   Output 1675 ml   Net -1465 ml         PHYSICAL EXAM     General Appearance:   eyes closed, nonverbal, does seem to respond to some verbal cues, does not appear to be in overt discomfort or distress, cooperative, no distress, appears stated age    HEENT:   Normocephalic, atraumatic, anicteric      Neck:  Supple, symmetrical, trachea midline, no adenopathy;    thyroid: no enlargement/tenderness/nodules; no carotid  bruit or JVD    Lungs:   Clear to auscultation bilaterally; no rales, rhonchi or wheezing; respirations unlabored    Heart[de-identified]   S1 and S2 normal; regular rate and rhythm; no murmur, rub, or gallop     Abdomen:   Soft, non-tender, non-distended; normal bowel sounds; no masses, no organomegaly    Genitalia:   Deferred    Rectal:   Deferred    Extremities:  No cyanosis, clubbing or edema    Pulses:  2+ and symmetric all extremities    Skin:  Skin color, texture, turgor normal, no rashes or lesions    Lymph nodes:  No palpable cervical, axillary or inguinal lymphadenopathy        Lab Results:   Admission on 02/14/2022   Component Date Value    WBC 02/14/2022 21 07*    RBC 02/14/2022 5 99*    Hemoglobin 02/14/2022 14 2     Hematocrit 02/14/2022 48 5     MCV 02/14/2022 81*    MCH 02/14/2022 23 7*    MCHC 02/14/2022 29 3*    RDW 02/14/2022 19 3*    MPV 02/14/2022 10 0     Platelets 71/49/5434 434*    Sodium 02/14/2022 144     Potassium 02/14/2022 3 7     Chloride 02/14/2022 106     CO2 02/14/2022 21     ANION GAP 02/14/2022 17*    BUN 02/14/2022 27*    Creatinine 02/14/2022 2 42*    Glucose 02/14/2022 260*    Calcium 02/14/2022 9 5     Corrected Calcium 02/14/2022 10 2*    AST 02/14/2022 10     ALT 02/14/2022 13     Alkaline Phosphatase 02/14/2022 88     Total Protein 02/14/2022 8 4*    Albumin 02/14/2022 3 1*    Total Bilirubin 02/14/2022 0 49     eGFR 02/14/2022 25     Lipase 02/14/2022 40*    ABO Grouping 02/14/2022 O     Rh Factor 02/14/2022 Negative     Antibody Screen 02/14/2022 Negative     Specimen Expiration Date 02/14/2022 [id number]     hs TnI 0hr 02/14/2022 14     ABO Grouping 02/14/2022 O     Rh Factor 02/14/2022 Negative     Segmented % 02/14/2022 77*    Bands % 02/14/2022 17*    Lymphocytes % 02/14/2022 4*    Monocytes % 02/14/2022 2*    Eosinophils, % 02/14/2022 0     Basophils % 02/14/2022 0     Absolute Neutrophils 02/14/2022 19 81*    Lymphocytes Absolute 02/14/2022 0 84     Monocytes Absolute 02/14/2022 0 42     Eosinophils Absolute 02/14/2022 0 00     Basophils Absolute 02/14/2022 0 00     RBC Morphology 02/14/2022 Present     Anisocytosis 02/14/2022 Present     Hypochromia 02/14/2022 Present     Platelet Estimate 51/62/3473 Increased*    hs TnI 2hr 02/14/2022 12     Delta 2hr hsTnI 02/14/2022 -2     hs TnI 4hr 02/14/2022 13     Delta 4hr hsTnI 02/14/2022 -1     Protime 02/14/2022 15 3*    INR 02/14/2022 1 21*    PTT 02/14/2022 29     Blood Culture 02/14/2022 No Growth at 24 hrs       Blood Culture 02/14/2022 Staphylococcus coagulase negative*    Gram Stain Result 02/14/2022 Gram positive cocci in clusters*    LACTIC ACID 02/14/2022 8 1*    LACTIC ACID 02/14/2022 3 2*    Color,  02/14/2022 Yellow     Clarity,  02/14/2022 Cloudy     Specific Gravity,  02/14/2022 1 015     pH,  02/14/2022 8 5*    Leukocytes,  02/14/2022 Large*    Nitrite, UA 02/14/2022 Negative     Protein, UA 02/14/2022 30 (1+)*    Glucose, UA 02/14/2022 Negative     Ketones, UA 02/14/2022 Negative     Urobilinogen, UA 02/14/2022 0 2     Bilirubin, UA 02/14/2022 Negative     Blood, UA 02/14/2022 Large*    RBC, UA 02/14/2022 Innumerable*    WBC, UA 02/14/2022 Innumerable*    Epithelial Cells 02/14/2022 Occasional     Bacteria, UA 02/14/2022 Innumerable*    Triplep Phos Lulu, UA 02/14/2022 Moderate     Urine Culture 02/14/2022 >100,000 cfu/ml Proteus mirabilis*    Urine Culture 02/14/2022 >100,000 cfu/ml Klebsiella-Enterobacter  group*    Urine Culture 02/14/2022 >100,000 cfu/ml Staphylococcus species*    POC Glucose 02/14/2022 233*    MRSA Culture Only 02/14/2022 No Methicillin Resistant Staphlyococcus aureus (MRSA) isolated     WBC 02/15/2022 12 22*    RBC 02/15/2022 4 66     Hemoglobin 02/15/2022 11 3*    Hematocrit 02/15/2022 37 7     MCV 02/15/2022 81*    MCH 02/15/2022 23 4*    MCHC 02/15/2022 28 9*    RDW 02/15/2022 18 6*    MPV 02/15/2022 10 2     Platelets 73/88/4600 321     nRBC 02/15/2022 0     Neutrophils Relative 02/15/2022 82*    Immat GRANS % 02/15/2022 0     Lymphocytes Relative 02/15/2022 13*    Monocytes Relative 02/15/2022 5     Eosinophils Relative 02/15/2022 0     Basophils Relative 02/15/2022 0     Neutrophils Absolute 02/15/2022 9 90*    Immature Grans Absolute 02/15/2022 0 05     Lymphocytes Absolute 02/15/2022 1 62     Monocytes Absolute 02/15/2022 0 60     Eosinophils Absolute 02/15/2022 0 01     Basophils Absolute 02/15/2022 0 04     Sodium 02/15/2022 148*    Potassium 02/15/2022 3 3*    Chloride 02/15/2022 111*    CO2 02/15/2022 26     ANION GAP 02/15/2022 11     BUN 02/15/2022 33*    Creatinine 02/15/2022 2 17*    Glucose 02/15/2022 101     Calcium 02/15/2022 8 7     Corrected Calcium 02/15/2022 10 0     AST 02/15/2022 11     ALT 02/15/2022 11*    Alkaline Phosphatase 02/15/2022 70  Total Protein 02/15/2022 6 7     Albumin 02/15/2022 2 4*    Total Bilirubin 02/15/2022 0 28     eGFR 02/15/2022 29     Magnesium 02/15/2022 2 5     Phosphorus 02/15/2022 3 1     POC Glucose 02/15/2022 135     LACTIC ACID 02/15/2022 3 3*    POC Glucose 02/15/2022 101     LACTIC ACID 02/15/2022 2 7*    Procalcitonin 02/15/2022 19 41*    Procalcitonin 02/16/2022 11 76*    POC Glucose 02/15/2022 98     POC Glucose 02/15/2022 98     POC Glucose 02/15/2022 100     Sodium 02/16/2022 143     Potassium 02/16/2022 3 3*    Chloride 02/16/2022 110*    CO2 02/16/2022 24     ANION GAP 02/16/2022 9     BUN 02/16/2022 23     Creatinine 02/16/2022 1 66*    Glucose 02/16/2022 87     Calcium 02/16/2022 8 0*    Corrected Calcium 02/16/2022 9 4     AST 02/16/2022 13     ALT 02/16/2022 10*    Alkaline Phosphatase 02/16/2022 59     Total Protein 02/16/2022 6 0*    Albumin 02/16/2022 2 2*    Total Bilirubin 02/16/2022 0 30     eGFR 02/16/2022 40     Magnesium 02/16/2022 2 2     POC Glucose 02/16/2022 85     POC Glucose 02/16/2022 98        Imaging Studies: I have personally reviewed pertinent reports  OBSTRUCTION SERIES     INDICATION:   SBO      COMPARISON:  Abdominal radiographs 2/15/2022      EXAM PERFORMED/VIEWS:  XR ABDOMEN OBSTRUCTION SERIES     FINDINGS:  NG tube with tip and side-port in the stomach      Stable gaseous distention of small and large bowel loops        No free air beneath the hemidiaphragms       No pathologic calcifications or soft tissue masses evident  IVC filter noted      Osseous structures are unremarkable      Examination of the chest reveals a normal cardiomediastinal silhouette  Mild pulmonary vascular congestion      IMPRESSION:     Stable gaseous distention of small and large bowel loops  ASSESSMENT/PLAN:     1   Recurrent small-bowel obstruction, patient's 3rd episode since October 2021, each time appearing with transition zone suspected in the right lower quadrant  Patient with no known surgical history, has also never had endoscopies  Given his more longstanding issues with altered bowel habits, poor oral intake over the last 2 years, would certainly want to exclude upper GI or lower GI malignancy, also should evaluate the small bowel, he was plan for CT enterography in the near future    -would recommend pursuing CT enterography as previously planned, after resolution of present small-bowel obstruction    -can recheck obstruction series tomorrow, continue NG tube for now, consider clamping trial if NG output continues to decrease; surgery follow-up    -pending findings of enterography, can also pursue EGD and colonoscopy as outpatient    - minimize or avoid medications that can impair bowel motility such as narcotic pain medications, also monitor electrolytes closely and replete abnormalities as indicated  Encourage positional changes/physical movement within the patient's capabilities    The patient was seen and examined by Dr Safia David, all [place] medical decisions were made with Dr Safia David  Thank you for allowing us to participate in the care of this pleasant patient  We will follow up with you closely

## 2022-02-17 PROBLEM — E43 SEVERE PROTEIN-CALORIE MALNUTRITION (HCC): Status: ACTIVE | Noted: 2022-01-01

## 2022-02-17 LAB
ANION GAP SERPL CALCULATED.3IONS-SCNC: 9 MMOL/L (ref 4–13)
ATRIAL RATE: 108 BPM
BASOPHILS # BLD AUTO: 0.05 THOUSANDS/ΜL (ref 0–0.1)
BASOPHILS NFR BLD AUTO: 1 % (ref 0–1)
BUN SERPL-MCNC: 17 MG/DL (ref 5–25)
CALCIUM SERPL-MCNC: 8.2 MG/DL (ref 8.3–10.1)
CHLORIDE SERPL-SCNC: 108 MMOL/L (ref 100–108)
CO2 SERPL-SCNC: 22 MMOL/L (ref 21–32)
CREAT SERPL-MCNC: 1.41 MG/DL (ref 0.6–1.3)
EOSINOPHIL # BLD AUTO: 0.31 THOUSAND/ΜL (ref 0–0.61)
EOSINOPHIL NFR BLD AUTO: 4 % (ref 0–6)
ERYTHROCYTE [DISTWIDTH] IN BLOOD BY AUTOMATED COUNT: 18.2 % (ref 11.6–15.1)
GFR SERPL CREATININE-BSD FRML MDRD: 49 ML/MIN/1.73SQ M
GLUCOSE SERPL-MCNC: 67 MG/DL (ref 65–140)
GLUCOSE SERPL-MCNC: 69 MG/DL (ref 65–140)
GLUCOSE SERPL-MCNC: 72 MG/DL (ref 65–140)
GLUCOSE SERPL-MCNC: 73 MG/DL (ref 65–140)
GLUCOSE SERPL-MCNC: 76 MG/DL (ref 65–140)
GLUCOSE SERPL-MCNC: 80 MG/DL (ref 65–140)
HCT VFR BLD AUTO: 28.3 % (ref 36.5–49.3)
HGB BLD-MCNC: 8.5 G/DL (ref 12–17)
IMM GRANULOCYTES # BLD AUTO: 0.03 THOUSAND/UL (ref 0–0.2)
IMM GRANULOCYTES NFR BLD AUTO: 0 % (ref 0–2)
LYMPHOCYTES # BLD AUTO: 1.5 THOUSANDS/ΜL (ref 0.6–4.47)
LYMPHOCYTES NFR BLD AUTO: 21 % (ref 14–44)
MCH RBC QN AUTO: 23.7 PG (ref 26.8–34.3)
MCHC RBC AUTO-ENTMCNC: 30 G/DL (ref 31.4–37.4)
MCV RBC AUTO: 79 FL (ref 82–98)
MONOCYTES # BLD AUTO: 0.4 THOUSAND/ΜL (ref 0.17–1.22)
MONOCYTES NFR BLD AUTO: 6 % (ref 4–12)
NEUTROPHILS # BLD AUTO: 4.82 THOUSANDS/ΜL (ref 1.85–7.62)
NEUTS SEG NFR BLD AUTO: 68 % (ref 43–75)
NRBC BLD AUTO-RTO: 0 /100 WBCS
P AXIS: 49 DEGREES
PLATELET # BLD AUTO: 253 THOUSANDS/UL (ref 149–390)
PMV BLD AUTO: 10.2 FL (ref 8.9–12.7)
POTASSIUM SERPL-SCNC: 3.6 MMOL/L (ref 3.5–5.3)
PR INTERVAL: 178 MS
QRS AXIS: -75 DEGREES
QRSD INTERVAL: 80 MS
QT INTERVAL: 302 MS
QTC INTERVAL: 404 MS
RBC # BLD AUTO: 3.58 MILLION/UL (ref 3.88–5.62)
SODIUM SERPL-SCNC: 139 MMOL/L (ref 136–145)
T WAVE AXIS: 67 DEGREES
VENTRICULAR RATE: 108 BPM
WBC # BLD AUTO: 7.11 THOUSAND/UL (ref 4.31–10.16)

## 2022-02-17 PROCEDURE — 93010 ELECTROCARDIOGRAM REPORT: CPT | Performed by: INTERNAL MEDICINE

## 2022-02-17 PROCEDURE — C9113 INJ PANTOPRAZOLE SODIUM, VIA: HCPCS | Performed by: SURGERY

## 2022-02-17 PROCEDURE — 82948 REAGENT STRIP/BLOOD GLUCOSE: CPT

## 2022-02-17 PROCEDURE — 99232 SBSQ HOSP IP/OBS MODERATE 35: CPT | Performed by: PHYSICIAN ASSISTANT

## 2022-02-17 PROCEDURE — 99232 SBSQ HOSP IP/OBS MODERATE 35: CPT | Performed by: INTERNAL MEDICINE

## 2022-02-17 PROCEDURE — 85025 COMPLETE CBC W/AUTO DIFF WBC: CPT | Performed by: INTERNAL MEDICINE

## 2022-02-17 PROCEDURE — 99233 SBSQ HOSP IP/OBS HIGH 50: CPT | Performed by: INTERNAL MEDICINE

## 2022-02-17 PROCEDURE — 99232 SBSQ HOSP IP/OBS MODERATE 35: CPT | Performed by: SURGERY

## 2022-02-17 PROCEDURE — 80048 BASIC METABOLIC PNL TOTAL CA: CPT | Performed by: INTERNAL MEDICINE

## 2022-02-17 RX ORDER — MINERAL OIL 100 G/100G
1 OIL RECTAL DAILY
Status: DISCONTINUED | OUTPATIENT
Start: 2022-02-18 | End: 2022-02-20

## 2022-02-17 RX ADMIN — APIXABAN 5 MG: 5 TABLET, FILM COATED ORAL at 17:41

## 2022-02-17 RX ADMIN — Medication: at 08:40

## 2022-02-17 RX ADMIN — Medication 250 MG: at 08:41

## 2022-02-17 RX ADMIN — AMLODIPINE BESYLATE 10 MG: 10 TABLET ORAL at 08:40

## 2022-02-17 RX ADMIN — MICONAZOLE NITRATE: 20 CREAM TOPICAL at 17:42

## 2022-02-17 RX ADMIN — ERTAPENEM SODIUM 1000 MG: 1 INJECTION, POWDER, LYOPHILIZED, FOR SOLUTION INTRAMUSCULAR; INTRAVENOUS at 05:52

## 2022-02-17 RX ADMIN — APIXABAN 5 MG: 5 TABLET, FILM COATED ORAL at 08:40

## 2022-02-17 RX ADMIN — BACLOFEN 15 MG: 10 TABLET ORAL at 20:43

## 2022-02-17 RX ADMIN — METOPROLOL TARTRATE 25 MG: 25 TABLET, FILM COATED ORAL at 08:40

## 2022-02-17 RX ADMIN — MICONAZOLE NITRATE: 20 CREAM TOPICAL at 08:41

## 2022-02-17 RX ADMIN — Medication 250 MG: at 17:42

## 2022-02-17 RX ADMIN — BACLOFEN 15 MG: 10 TABLET ORAL at 08:40

## 2022-02-17 RX ADMIN — METOPROLOL TARTRATE 25 MG: 25 TABLET, FILM COATED ORAL at 20:43

## 2022-02-17 RX ADMIN — GLYCERIN 1 DROP: .002; .002; .01 SOLUTION/ DROPS OPHTHALMIC at 17:51

## 2022-02-17 RX ADMIN — FAMOTIDINE 20 MG: 10 INJECTION INTRAVENOUS at 08:40

## 2022-02-17 RX ADMIN — SODIUM CHLORIDE 150 ML/HR: 0.45 INJECTION, SOLUTION INTRAVENOUS at 11:21

## 2022-02-17 RX ADMIN — PANTOPRAZOLE SODIUM 40 MG: 40 INJECTION, POWDER, FOR SOLUTION INTRAVENOUS at 08:40

## 2022-02-17 RX ADMIN — SODIUM CHLORIDE 150 ML/HR: 0.45 INJECTION, SOLUTION INTRAVENOUS at 17:37

## 2022-02-17 RX ADMIN — BACLOFEN 15 MG: 10 TABLET ORAL at 17:42

## 2022-02-17 RX ADMIN — GLYCERIN 1 DROP: .002; .002; .01 SOLUTION/ DROPS OPHTHALMIC at 08:41

## 2022-02-17 NOTE — PROGRESS NOTES
Progress Note - Infectious Disease   Janina Tomas 66 y o  male MRN: [id number]  Unit/Bed#: S -01 Encounter: [id number]      Impression/Plan:  1  Systemic inflammatory response syndrome-POA   Tachycardia and leukocytosis   Suspect multifactorial including small-bowel obstruction and probable aspiration pneumonitis   Consideration for the possibility of aspiration pneumonia   Fortunately the patient remains hemodynamically stable despite his systemic illness   He seems to be tolerating the antibiotics without difficulty  -continue ertapenem for now  -follow-up blood cultures  -recheck procalcitonin level  -recheck CBC with diff and BMP  -treatment of small-bowel obstruction  -supportive care  -possibly discontinue the antibiotics tomorrow if the procalcitonin level continues to decrease and no other source of sepsis found      2  Acute hypoxic respiratory failure-in the setting of vomiting and aspiration events   Suspect patient has aspiration pneumonitis   Consideration for the possibility of developing aspiration pneumonia   White cell count has significantly decreased since yesterday and his oxygenation status has remained stable  Procalcitonin level is elevated   -antibiotics as above for now  -recheck procalcitonin level  -monitor respiratory status  -supportive care     3  Small-bowel obstruction-recurrent   Patient has had an NG-tube placed   His abdomen is firm but does not appear to be tender based upon his responses to the exam   X-ray still with dilated loops of bowel   -NG tube decompression  -serial abdominal exams  -serial radiographs  -no surgical intervention as per the wife's wishes   -outpatient CT enterography  -GI follow-up     4  Acute kidney injury-in a patient with suprapubic catheter in place   No urinary obstruction seen on imaging   Suspect this is a pre renal issue   Consideration for the possibility of ATN    Renal function is much improved  -volume management  -recheck BMP  -dose adjust antibiotics as needed     5  Advanced dementia-with global aphasia         6  Multiple antibiotic allergies-not listed but wife insists the patient also is allergic to linezolid so this is been discontinued  Will continue monitor for any intolerance of antibiotics    7  Gram-positive bacteremia-appears to be contaminant with 2 organisms isolated in 1 set  No additional workup or treatment needed for now    Discussed the above management plan with the primary service    Antibiotics:  Ertapenem 4  Subjective:  Patient has no fever, chills, sweats; no report vomiting, diarrhea; stable respiratory status; remains nonverbal     Objective:  Vitals:  Temp:  [98 3 °F (36 8 °C)-100 °F (37 8 °C)] 99 1 °F (37 3 °C)  HR:  [68-88] 68  Resp:  [18-19] 19  BP: (142-156)/(72-85) 156/72  SpO2:  [90 %] 90 %  Temp (24hrs), Av 1 °F (37 3 °C), Min:98 3 °F (36 8 °C), Max:100 °F (37 8 °C)  Current: Temperature: 99 1 °F (37 3 °C)    Physical Exam:   General Appearance:  Alert, interactive, nontoxic, no acute distress  Throat: Oropharynx moist without lesions  Lungs:   Decreased breath sounds bilaterally; no wheezes, rhonchi or rales; respirations unlabored   Heart:  RRR; no murmur, rub or gallop   Abdomen:   Soft, mildly distended, positive bowel sounds  Extremities: No clubbing, cyanosis or edema   Skin: No new rashes or lesions  No draining wounds noted         Labs, Imaging, & Other studies:   All pertinent labs and imaging studies were personally reviewed  Results from last 7 days   Lab Units 22  0416 02/15/22  0612 22  1144   WBC Thousand/uL 7 11 12 22* 21 07*   HEMOGLOBIN g/dL 8 5* 11 3* 14 2   PLATELETS Thousands/uL 253 321 434*     Results from last 7 days   Lab Units 22  0416 22  0554 22  0554 02/15/22  0611 02/15/22  0611 22  1144 22  1144   SODIUM mmol/L 139  --  143  --  148*   < > 144   POTASSIUM mmol/L 3 6   < > 3 3*   < > 3 3*   < > 3 7   CHLORIDE mmol/L 108 < > 110*   < > 111*   < > 106   CO2 mmol/L 22   < > 24   < > 26   < > 21   BUN mg/dL 17   < > 23   < > 33*   < > 27*   CREATININE mg/dL 1 41*   < > 1 66*   < > 2 17*   < > 2 42*   EGFR ml/min/1 73sq m 49   < > 40   < > 29   < > 25   CALCIUM mg/dL 8 2*   < > 8 0*   < > 8 7   < > 9 5   AST U/L  --   --  13  --  11  --  10   ALT U/L  --   --  10*   < > 11*   < > 13   ALK PHOS U/L  --   --  59   < > 70   < > 88    < > = values in this interval not displayed  Results from last 7 days   Lab Units 02/14/22  1743 02/14/22  1524 02/14/22  1257 02/14/22  1254   BLOOD CULTURE   --   --   --  No Growth at 48 hrs     GRAM STAIN RESULT   --   --  Gram positive cocci in clusters*  Gram positive rods*  --    URINE CULTURE   --  >100,000 cfu/ml Proteus mirabilis*  >100,000 cfu/ml Klebsiella-Enterobacter  group*  >100,000 cfu/ml Staphylococcus species*  --   --    MRSA CULTURE ONLY  No Methicillin Resistant Staphlyococcus aureus (MRSA) isolated  --   --   --      Results from last 7 days   Lab Units 02/16/22  0554 02/15/22  0955   PROCALCITONIN ng/ml 11 76* 19 41*        x-ray abdomen-still with very large dilated loops of small bowel    Images personally reviewed by me in PACS

## 2022-02-17 NOTE — PLAN OF CARE
Problem: MOBILITY - ADULT  Goal: Maintain or return to baseline ADL function  Description: INTERVENTIONS:  -  Assess patient's ability to carry out ADLs; assess patient's baseline for ADL function and identify physical deficits which impact ability to perform ADLs (bathing, care of mouth/teeth, toileting, grooming, dressing, etc )  - Assess/evaluate cause of self-care deficits   - Assess range of motion  - Assess patient's mobility; develop plan if impaired  - Assess patient's need for assistive devices and provide as appropriate  - Encourage maximum independence but intervene and supervise when necessary  - Involve family in performance of ADLs  - Assess for home care needs following discharge   - Consider OT consult to assist with ADL evaluation and planning for discharge  - Provide patient education as appropriate  Outcome: Progressing  Goal: Maintains/Returns to pre admission functional level  Description: INTERVENTIONS:  - Perform BMAT or MOVE assessment daily    - Set and communicate daily mobility goal to care team and patient/family/caregiver  - Collaborate with rehabilitation services on mobility goals if consulted  - Perform Range of Motion  times a day  - Reposition patient every  hours    - Dangle patient  times a day  - Stand patient  times a day  - Ambulate patient  times a day  - Out of bed to chair  times a day   - Out of bed for meals  times a day  - Out of bed for toileting  - Record patient progress and toleration of activity level   Outcome: Progressing     Problem: Potential for Falls  Goal: Patient will remain free of falls  Description: INTERVENTIONS:  - Educate patient/family on patient safety including physical limitations  - Instruct patient to call for assistance with activity   - Consult OT/PT to assist with strengthening/mobility   - Keep Call bell within reach  - Keep bed low and locked with side rails adjusted as appropriate  - Keep care items and personal belongings within reach  - Initiate and maintain comfort rounds  - Make Fall Risk Sign visible to staff  - Offer Toileting every  Hours, in advance of need  - Initiate/Maintain alarm  - Obtain necessary fall risk management equipment:  - Apply yellow socks and bracelet for high fall risk patients  - Consider moving patient to room near nurses station  Outcome: Progressing     Problem: Prexisting or High Potential for Compromised Skin Integrity  Goal: Skin integrity is maintained or improved  Description: INTERVENTIONS:  - Identify patients at risk for skin breakdown  - Assess and monitor skin integrity  - Assess and monitor nutrition and hydration status  - Monitor labs   - Assess for incontinence   - Turn and reposition patient  - Assist with mobility/ambulation  - Relieve pressure over bony prominences  - Avoid friction and shearing  - Provide appropriate hygiene as needed including keeping skin clean and dry  - Evaluate need for skin moisturizer/barrier cream  - Collaborate with interdisciplinary team   - Patient/family teaching  - Consider wound care consult   Outcome: Progressing     Problem: Nutrition/Hydration-ADULT  Goal: Nutrient/Hydration intake appropriate for improving, restoring or maintaining nutritional needs  Description: Monitor and assess patient's nutrition/hydration status for malnutrition  Collaborate with interdisciplinary team and initiate plan and interventions as ordered  Monitor patient's weight and dietary intake as ordered or per policy  Utilize nutrition screening tool and intervene as necessary  Determine patient's food preferences and provide high-protein, high-caloric foods as appropriate       INTERVENTIONS:  - Monitor oral intake, urinary output, labs, and treatment plans  - Assess nutrition and hydration status and recommend course of action  - Evaluate amount of meals eaten  - Assist patient with eating if necessary   - Allow adequate time for meals  - Recommend/ encourage appropriate diets, oral nutritional supplements, and vitamin/mineral supplements  - Order, calculate, and assess calorie counts as needed  - Recommend, monitor, and adjust tube feedings and TPN/PPN based on assessed needs  - Assess need for intravenous fluids  - Provide specific nutrition/hydration education as appropriate  - Include patient/family/caregiver in decisions related to nutrition  Outcome: Progressing

## 2022-02-17 NOTE — ASSESSMENT & PLAN NOTE
· POA, recurrent   Patient was following with GI and had a CT enterography planned to evaluate potential cause  · Could be related to underlying neurologic dysfunction   · XR Obstruction Series from 2/16 showing persistently distended bowel loops   · General Surgery following   · Continue NGT for now   · Removal per them, possibly later today   · NPO for now  · Speech eval once safe to advance diet   · Monitor for return of bowel function

## 2022-02-17 NOTE — ASSESSMENT & PLAN NOTE
Malnutrition Findings:   Adult Malnutrition type: Acute illness (in the setting of chronic illness)  Adult Degree of Malnutrition: Other severe protein calorie malnutrition (related to inadequate oral intake as evidenced by temporal indentation, orbital hollow, 24 4% weight decrease x ~4 months  Patient is currently NPO)    BMI Findings: Body mass index is 24 26 kg/m²     · In the setting of prior anoxic brain injury, multiple bowel obstructions  · Encourage oral intake, when able

## 2022-02-17 NOTE — PLAN OF CARE
Problem: MOBILITY - ADULT  Goal: Maintain or return to baseline ADL function  Description: INTERVENTIONS:  -  Assess patient's ability to carry out ADLs; assess patient's baseline for ADL function and identify physical deficits which impact ability to perform ADLs (bathing, care of mouth/teeth, toileting, grooming, dressing, etc )  - Assess/evaluate cause of self-care deficits   - Assess range of motion  - Assess patient's mobility; develop plan if impaired  - Assess patient's need for assistive devices and provide as appropriate  - Encourage maximum independence but intervene and supervise when necessary  - Involve family in performance of ADLs  - Assess for home care needs following discharge   - Consider OT consult to assist with ADL evaluation and planning for discharge  - Provide patient education as appropriate  Outcome: Progressing  Goal: Maintains/Returns to pre admission functional level  Description: INTERVENTIONS:  - Perform BMAT or MOVE assessment daily    - Set and communicate daily mobility goal to care team and patient/family/caregiver  - Collaborate with rehabilitation services on mobility goals if consulted  - Perform Range of Motion 2 times a day  - Reposition patient every 2 hours    - Dangle patient 2 times a day  - Stand patient 2 times a day  - Ambulate patient 2 times a day  - Out of bed to chair 2 times a day   - Out of bed for meals 2 times a day  - Out of bed for toileting  - Record patient progress and toleration of activity level   Outcome: Progressing     Problem: Potential for Falls  Goal: Patient will remain free of falls  Description: INTERVENTIONS:  - Educate patient/family on patient safety including physical limitations  - Instruct patient to call for assistance with activity   - Consult OT/PT to assist with strengthening/mobility   - Keep Call bell within reach  - Keep bed low and locked with side rails adjusted as appropriate  - Keep care items and personal belongings within reach  - Initiate and maintain comfort rounds  - Make Fall Risk Sign visible to staff  - Offer Toileting every 2 Hours, in advance of need  - Initiate/Maintain bed/chair alarm  - Obtain necessary fall risk management equipment: bed alarm  - Apply yellow socks and bracelet for high fall risk patients  - Consider moving patient to room near nurses station  Outcome: Progressing     Problem: Prexisting or High Potential for Compromised Skin Integrity  Goal: Skin integrity is maintained or improved  Description: INTERVENTIONS:  - Identify patients at risk for skin breakdown  - Assess and monitor skin integrity  - Assess and monitor nutrition and hydration status  - Monitor labs   - Assess for incontinence   - Turn and reposition patient  - Assist with mobility/ambulation  - Relieve pressure over bony prominences  - Avoid friction and shearing  - Provide appropriate hygiene as needed including keeping skin clean and dry  - Evaluate need for skin moisturizer/barrier cream  - Collaborate with interdisciplinary team   - Patient/family teaching  - Consider wound care consult   Outcome: Progressing     Problem: Nutrition/Hydration-ADULT  Goal: Nutrient/Hydration intake appropriate for improving, restoring or maintaining nutritional needs  Description: Monitor and assess patient's nutrition/hydration status for malnutrition  Collaborate with interdisciplinary team and initiate plan and interventions as ordered  Monitor patient's weight and dietary intake as ordered or per policy  Utilize nutrition screening tool and intervene as necessary  Determine patient's food preferences and provide high-protein, high-caloric foods as appropriate       INTERVENTIONS:  - Monitor oral intake, urinary output, labs, and treatment plans  - Assess nutrition and hydration status and recommend course of action  - Evaluate amount of meals eaten  - Assist patient with eating if necessary   - Allow adequate time for meals  - Recommend/ encourage appropriate diets, oral nutritional supplements, and vitamin/mineral supplements  - Order, calculate, and assess calorie counts as needed  - Recommend, monitor, and adjust tube feedings and TPN/PPN based on assessed needs  - Assess need for intravenous fluids  - Provide specific nutrition/hydration education as appropriate  - Include patient/family/caregiver in decisions related to nutrition  Outcome: Progressing

## 2022-02-17 NOTE — PROGRESS NOTES
[facility]  Progress Note [facility] 1950, 66 y o  male MRN: [id number]  Unit/Bed#: S -01 Encounter: [id number]  Primary Care Provider: Farshad Mcmahan MD   Date and time admitted to hospital: 2/14/2022 10:59 AM    * Aspiration pneumonia ([facility] [address] )  Assessment & Plan  · Suspect POA vs aspiration pneumonitis  · CT chest showing extensive diffuse bilateral groundglass opacities consistent with aspiration pneumonitis versus pneumonia  · He is on room air currently  · Procalcitonin peaked at 19 8 and decreased to 11 7 today   · Continue Ertapenem as per ID recs  · Patient was on Linezolid, however wife reports allergy (not listed) so this was removed   · Further review today for possible discontinuation if more so an aspiration pneumonitis picture   · Appreciate ID input     Severe sepsis ([facility] [address] )  Assessment & Plan  · POA, evidenced by leukocytosis, bandemia, tachycardia, lactic acidosis, and potential aspiration pneumonia  · Lactic acid resolved  · White count resolved   · Blood cultures with 1/2 GPC and GPR likely skin karma contamination   · Continue antibiotics as per ID recommendations     Small bowel obstruction (HCC)  Assessment & Plan  · POA, recurrent  Patient was following with GI and had a CT enterography planned to evaluate potential cause  · Could be related to underlying neurologic dysfunction   · XR Obstruction Series from 2/16 showing persistently distended bowel loops   · General Surgery following   · Continue NGT for now   · Removal per them, possibly later today   · NPO for now  · Speech eval once safe to advance diet   · Monitor for return of bowel function       Persistent vegetative state ([facility] [address] )  Assessment & Plan  · Secondary to anoxic brain injury around 10 years ago  Wife is decision maker and advocate   At baseline he is non-verbal   · Supportive care   · All decisions about medical care NEED to be discussed with wife     Suprapubic catheter [facility])  Assessment & Plan  · Chronically present with colonization of bladder  Doubt active urinary tract infection at this time   · SPT care  · Be sure to use care when moving patient     COPD (chronic obstructive pulmonary disease) ([facility] [address] )  Assessment & Plan  · Does not appear to be in acute exacerbation  · Continue home management     Essential hypertension  Assessment & Plan  · BP above goal today, however majority of readings have been controlled   · Continue Norvasc, Metoprolol     GERD (gastroesophageal reflux disease)  Assessment & Plan  · Continue IV PPI and Pepcid    History of DVT of lower extremity  Assessment & Plan  · Continue Eliquis per NGT     Severe protein-calorie malnutrition (HCC)  Assessment & Plan  Malnutrition Findings:   Adult Malnutrition type: Acute illness (in the setting of chronic illness)  Adult Degree of Malnutrition: Other severe protein calorie malnutrition (related to inadequate oral intake as evidenced by temporal indentation, orbital hollow, 24 4% weight decrease x ~4 months  Patient is currently NPO)    BMI Findings: Body mass index is 24 26 kg/m²  · In the setting of prior anoxic brain injury, multiple bowel obstructions  · Encourage oral intake, when able         VTE Pharmacologic Prophylaxis: VTE Score: 13 High Risk (Score >/= 5) - Pharmacological DVT Prophylaxis Ordered: apixaban (Eliquis)  Sequential Compression Devices Ordered  Patient Centered Rounds: I performed bedside rounds with nursing staff today  Discussions with Specialists or Other Care Team Provider: Discussed with RN, CM    Education and Discussions with Family / Patient: Updated  (wife) via phone  Time Spent for Care: 30 minutes  More than 50% of total time spent on counseling and coordination of care as described above      Current Length of Stay: 3 day(s)  Current Patient Status: Inpatient   Certification Statement: The patient will continue to require additional inpatient hospital stay due to on going IV antibiotics, continuing NGT  Discharge Plan: Anticipate discharge in 48-72 hrs to prior arrangements     Code Status: Level 3 - DNAR and DNI    Subjective:   Patient remains at baseline  Unable to give history  Objective:     Vitals:   Temp (24hrs), Av 1 °F (37 3 °C), Min:98 3 °F (36 8 °C), Max:100 °F (37 8 °C)    Temp:  [98 3 °F (36 8 °C)-100 °F (37 8 °C)] 99 1 °F (37 3 °C)  HR:  [68-88] 68  Resp:  [18-19] 19  BP: (142-156)/(72-85) 156/72  SpO2:  [90 %] 90 %  Body mass index is 24 26 kg/m²  Input and Output Summary (last 24 hours): Intake/Output Summary (Last 24 hours) at 2022 0943  Last data filed at 2022 0601  Gross per 24 hour   Intake 2562 5 ml   Output 2955 ml   Net -392 5 ml       Physical Exam:   Physical Exam  Constitutional:       General: He is not in acute distress  Appearance: Normal appearance  He is normal weight  He is not ill-appearing or diaphoretic  HENT:      Head: Normocephalic and atraumatic  Mouth/Throat:      Mouth: Mucous membranes are dry  Comments: NGT in place     Eyes:      General: No scleral icterus  Pupils: Pupils are equal, round, and reactive to light  Cardiovascular:      Rate and Rhythm: Normal rate and regular rhythm  Pulses: Normal pulses  Heart sounds: Normal heart sounds, S1 normal and S2 normal  No murmur heard  No systolic murmur is present  No diastolic murmur is present  No gallop  No S3 or S4 sounds  Pulmonary:      Effort: Pulmonary effort is normal  No accessory muscle usage or respiratory distress  Breath sounds: Normal breath sounds  No stridor  No decreased breath sounds, wheezing, rhonchi or rales  Chest:      Chest wall: No tenderness  Abdominal:      General: Bowel sounds are decreased  There is no distension  Palpations: Abdomen is soft  Tenderness: There is no abdominal tenderness  There is no guarding  Musculoskeletal:      Right lower leg: No edema        Left lower leg: No edema  Skin:     General: Skin is warm and dry  Coloration: Skin is not jaundiced  Neurological:      Mental Status: Mental status is at baseline  Motor: No tremor or seizure activity  Psychiatric:         Behavior: Behavior is cooperative  Additional Data:     Labs:  Results from last 7 days   Lab Units 02/17/22  0416 02/15/22  0612 02/14/22  1144   WBC Thousand/uL 7 11   < > 21 07*   HEMOGLOBIN g/dL 8 5*   < > 14 2   HEMATOCRIT % 28 3*   < > 48 5   PLATELETS Thousands/uL 253   < > 434*   BANDS PCT %  --   --  17*   NEUTROS PCT % 68   < >  --    LYMPHS PCT % 21   < >  --    LYMPHO PCT %  --   --  4*   MONOS PCT % 6   < >  --    MONO PCT %  --   --  2*   EOS PCT % 4   < > 0    < > = values in this interval not displayed  Results from last 7 days   Lab Units 02/17/22  0416 02/16/22  0554 02/16/22  0554   SODIUM mmol/L 139   < > 143   POTASSIUM mmol/L 3 6   < > 3 3*   CHLORIDE mmol/L 108   < > 110*   CO2 mmol/L 22   < > 24   BUN mg/dL 17   < > 23   CREATININE mg/dL 1 41*   < > 1 66*   ANION GAP mmol/L 9   < > 9   CALCIUM mg/dL 8 2*   < > 8 0*   ALBUMIN g/dL  --   --  2 2*   TOTAL BILIRUBIN mg/dL  --   --  0 30   ALK PHOS U/L  --   --  59   ALT U/L  --   --  10*   AST U/L  --   --  13   GLUCOSE RANDOM mg/dL 76   < > 87    < > = values in this interval not displayed       Results from last 7 days   Lab Units 02/14/22  1257   INR  1 21*     Results from last 7 days   Lab Units 02/17/22  0558 02/17/22  0003 02/16/22  1720 02/16/22  1204 02/16/22  0543 02/15/22  2357 02/15/22  1629 02/15/22  1155 02/15/22  0540 02/15/22  0036 02/14/22  1734   POC GLUCOSE mg/dl 73 80 79 98 85 100 98 98 101 135 233*         Results from last 7 days   Lab Units 02/16/22  0554 02/15/22  0955 02/15/22  0612 02/14/22  1725 02/14/22  1254   LACTIC ACID mmol/L  --  2 7* 3 3* 3 2* 8 1*   PROCALCITONIN ng/ml 11 76* 19 41*  --   --   --        Lines/Drains:  Invasive Devices  Report    Peripheral Intravenous Line Peripheral IV 02/14/22 Left Antecubital 2 days    Peripheral IV 02/14/22 Right;Upper;Ventral (anterior) Arm 2 days    Peripheral IV 02/17/22 Dorsal (posterior); Right Hand <1 day          Drain            NG/OG/Enteral Tube Nasogastric 18 Fr Left nare 77 days    Suprapubic Catheter 8 days    NG/OG/Enteral Tube Nasogastric 16 Fr Right nare 2 days                      Imaging: No pertinent imaging reviewed  Recent Cultures (last 7 days):   Results from last 7 days   Lab Units 02/14/22  1524 02/14/22  1257 02/14/22  1254   BLOOD CULTURE   --   --  No Growth at 48 hrs     GRAM STAIN RESULT   --  Gram positive cocci in clusters*  Gram positive rods*  --    URINE CULTURE  >100,000 cfu/ml Proteus mirabilis*  >100,000 cfu/ml Klebsiella-Enterobacter  group*  >100,000 cfu/ml Staphylococcus species*  --   --        Last 24 Hours Medication List:   Current Facility-Administered Medications   Medication Dose Route Frequency Provider Last Rate    amLODIPine  10 mg Per NG Tube Daily Yuni Weinstein MD      ammonium lactate   Topical Daily Yuni Weinstein MD      apixaban  5 mg Per NG Tube BID Yuni Weinstein MD      baclofen  15 mg Per NG Tube TID Yuni Weinstein MD      chlorhexidine  15 mL Mouth/Throat Q12H [address] Yuni Weinstein MD      ertapenem  1,000 mg Intravenous Q24H Yuni Weinstein MD 1,000 mg (02/17/22 0552)    famotidine  20 mg Intravenous Q24H [address] Yuni Weinstein MD      glycerin-hypromellose-  1 drop Both Eyes BID Yuni Weinstein MD      HYDROmorphone  0 5 mg Intravenous Q3H PRN Yuni Weinstein MD      insulin lispro  1-6 Units Subcutaneous Q6H [address] Yuni Weinstein MD      metoprolol tartrate  25 mg Per NG Tube Q12H [address] Yuni Weinstein MD      [START ON 2/18/2022] mineral oil  1 enema Rectal Daily DO Mamadou Beltran ANTIFUNGAL   Topical BID Kerwin Haji PA-C      pantoprazole  40 mg Intravenous Q24H [address] Michael Dahl DO  saccharomyces boulardii  250 mg Per NG Tube BID Leonard Reason, MD      sodium chloride  150 mL/hr Intravenous Continuous Judeen Reason,  mL/hr (02/17/22 0601)        Today, Patient Was Seen By: Sylwia Rosario PA-C    **Please Note: This note may have been constructed using a voice recognition system  **

## 2022-02-17 NOTE — PROGRESS NOTES
Progress Note - Kim Jean Baptiste [facility] 66 y o  male MRN: [id number]    Unit/Bed#: S -01 Encounter: [id number]        Subjective:   Patient is nonverbal, does not appear in acute distress, NG tube output appears to be approximately 400 cc nonbloody material over the last 24 hours based on markings on the canister at bedside, nursing reports he had a couple of bowel movements yesterday and has not had any vomiting  Objective:     Vitals: Blood pressure 156/72, pulse 68, temperature 99 1 °F (37 3 °C), temperature source Axillary, resp  rate 19, weight 85 7 kg (188 lb 15 oz), SpO2 90 %  ,Body mass index is 24 26 kg/m²  Intake/Output Summary (Last 24 hours) at 2/17/2022 1340  Last data filed at 2/17/2022 1145  Gross per 24 hour   Intake 3342 5 ml   Output 3805 ml   Net -462 5 ml       Physical Exam:   General appearance: alert, appears stated age and cooperative  Lungs: clear to auscultation bilaterally, no labored breathing/accessory muscle use  Heart: regular rate and rhythm, S1, S2 normal, no murmur, click, rub or gallop  Abdomen: soft, non-tender; bowel sounds normal; no masses,  no organomegaly  Extremities: no edema    Invasive Devices  Report    Peripheral Intravenous Line            Peripheral IV 02/14/22 Left Antecubital 3 days    Peripheral IV 02/14/22 Right;Upper;Ventral (anterior) Arm 2 days    Peripheral IV 02/17/22 Dorsal (posterior); Right Hand <1 day          Drain            NG/OG/Enteral Tube Nasogastric 18 Fr Left nare 77 days    Suprapubic Catheter 8 days    NG/OG/Enteral Tube Nasogastric 16 Fr Right nare 3 days                Lab, Imaging and other studies: I have personally reviewed pertinent reports  No results displayed because visit has over 200 results  Results for Samir Males (MRN [id number]) as of 2/17/2022 13:40   Ref   Range 2/17/2022 00:03 2/17/2022 04:16 2/17/2022 05:58 2/17/2022 11:31   POC Glucose Latest Ref Range: 65 - 140 mg/dl 80  73 67   Sodium Latest Ref Range: 136 - 145 mmol/L  139     Potassium Latest Ref Range: 3 5 - 5 3 mmol/L  3 6     Chloride Latest Ref Range: 100 - 108 mmol/L  108     CO2 Latest Ref Range: 21 - 32 mmol/L  22     Anion Gap Latest Ref Range: 4 - 13 mmol/L  9     BUN Latest Ref Range: 5 - 25 mg/dL  17     Creatinine Latest Ref Range: 0 60 - 1 30 mg/dL  1 41 (H)     Glucose, Random Latest Ref Range: 65 - 140 mg/dL  76     Calcium Latest Ref Range: 8 3 - 10 1 mg/dL  8 2 (L)     eGFR Latest Units: ml/min/1 73sq m  49     WBC Latest Ref Range: 4 31 - 10 16 Thousand/uL  7 11     Red Blood Cell Count Latest Ref Range: 3 88 - 5 62 Million/uL  3 58 (L)     Hemoglobin Latest Ref Range: 12 0 - 17 0 g/dL  8 5 (L)     HCT Latest Ref Range: 36 5 - 49 3 %  28 3 (L)     MCV Latest Ref Range: 82 - 98 fL  79 (L)     MCH Latest Ref Range: 26 8 - 34 3 pg  23 7 (L)     MCHC Latest Ref Range: 31 4 - 37 4 g/dL  30 0 (L)     RDW Latest Ref Range: 11 6 - 15 1 %  18 2 (H)     Platelet Count Latest Ref Range: 149 - 390 Thousands/uL  253     MPV Latest Ref Range: 8 9 - 12 7 fL  10 2     nRBC Latest Units: /100 WBCs  0     Neutrophils % Latest Ref Range: 43 - 75 %  68     Immat GRANS % Latest Ref Range: 0 - 2 %  0     Lymphocytes Relative Latest Ref Range: 14 - 44 %  21     Monocytes Relative Latest Ref Range: 4 - 12 %  6     Eosinophils Latest Ref Range: 0 - 6 %  4     Basophils Relative Latest Ref Range: 0 - 1 %  1     Immature Grans Absolute Latest Ref Range: 0 00 - 0 20 Thousand/uL  0 03     Absolute Neutrophils Latest Ref Range: 1 85 - 7 62 Thousands/µL  4 82     Lymphocytes Absolute Latest Ref Range: 0 60 - 4 47 Thousands/µL  1 50     Absolute Monocytes Latest Ref Range: 0 17 - 1 22 Thousand/µL  0 40     Absolute Eosinophils Latest Ref Range: 0 00 - 0 61 Thousand/µL  0 31     Basophils Absolute Latest Ref Range: 0 00 - 0 10 Thousands/µL  0 05           Assessment/Plan:    1   Recurrent small-bowel obstructions with transition point in the right lower quadrant, unclear etiology, appears to be decompressing with NG tube over the last couple of days, has had bowel movements after administration of enemas    -would recommend eventual CT enterography to evaluate the small bowel after resolution of his current obstruction, can keep plans for his upcoming examination    -can also consider EGD and colonoscopy for further workup as outpatient, pending results of the CT    -management for small bowel obstruction as per surgery, consider clamping trial or discontinuing NG tube

## 2022-02-17 NOTE — PROGRESS NOTES
Progress Note - General Surgery   Bhumika Tomas 66 y o  male MRN: [id number]  Unit/Bed#: S -01 Encounter: [id number]    Assessment:  65 yo M with PMH of dementia, CVA, COPD and aphasia who presents with aspiration pneumonia and SBO with possible transition point in the RLQ    AVSS  2 BMs yday  NG-405, gastric contents  Uo-2 55L    Abd is soft/nontender/nondistended    Plan:  -Consider NG removal today, will discuss with team  If NG removed will need speech eval prior to initiation of oral diet   -Appreciate GI recommendations, consider CT enterography as inpt vs outpt  -Continue daily enemas  -Abx per ID  -Remainder of care per primary    Subjective/Objective     Subjective: No acute events overnight  2 bms yday per nursing  Nonverbal     Objective:    Blood pressure 146/73, pulse 80, temperature 98 3 °F (36 8 °C), temperature source Oral, resp  rate 18, weight 83 4 kg (183 lb 13 8 oz), SpO2 90 %  ,Body mass index is 23 61 kg/m²  Intake/Output Summary (Last 24 hours) at 2/17/2022 0055  Last data filed at 2/16/2022 2200  Gross per 24 hour   Intake 1440 ml   Output 2130 ml   Net -690 ml       Invasive Devices  Report    Peripheral Intravenous Line            Peripheral IV 02/14/22 Left Antecubital 2 days    Peripheral IV 02/14/22 Right;Upper;Ventral (anterior) Arm 2 days          Drain            NG/OG/Enteral Tube Nasogastric 18 Fr Left nare 77 days    Suprapubic Catheter 8 days    NG/OG/Enteral Tube Nasogastric 16 Fr Right nare 2 days                Physical Exam:  General: NAD  HENT: NCAT MMM  Neck: supple, no JVD  CV: nl rate  Lungs: nl wob   No resp distress  ABD: Soft, nontender, nondistended  Extrem: No CCE  Neuro: nonverbal

## 2022-02-17 NOTE — ASSESSMENT & PLAN NOTE
· POA, evidenced by leukocytosis, bandemia, tachycardia, lactic acidosis, and potential aspiration pneumonia     · Lactic acid resolved  · White count resolved   · Blood cultures with 1/2 GPC and GPR likely skin karma contamination   · Continue antibiotics as per ID recommendations

## 2022-02-17 NOTE — CASE MANAGEMENT
Case Management Assessment & Discharge Planning Note    Patient name Guera Strickland S /S -01 MRN [id number]  : 1950 Date 2022       Current Admission Date: 2022  Current Admission Diagnosis:Aspiration pneumonia [facility])   Patient Active Problem List    Diagnosis Date Noted    Severe protein-calorie malnutrition ([facility] [address] ) 2022    Aspiration pneumonia ([facility] [address] ) 2022    Persistent vegetative state ([address] ) 2021    UTI (urinary tract infection) 2021    Hypokalemia 10/08/2021    Severe sepsis ([facility] [address] ) 10/06/2021    Small bowel obstruction ([address] ) 10/06/2021    Deep vein thrombosis (DVT) of right lower extremity ([facility] [address] ) 2021    Oropharyngeal dysphagia 2021    Muscle rigidity 2021    Muscle spasticity 2021    Physical deconditioning 2021    Debility 2021    Neuromuscular dysfunction of bladder     Depression with anxiety 07/10/2020    Aphasia as late effect of cerebrovascular accident 07/10/2020    Bilateral nephrolithiasis 2020    Suprapubic catheter ([facility] [address] ) 2019    GERD (gastroesophageal reflux disease)     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    COPD (chronic obstructive pulmonary disease) ([facility] [address] ) 10/21/2016    History of DVT of lower extremity 10/21/2016    Aneurysm of thoracic aorta ([facility] [address] ) 10/21/2016    Essential hypertension 10/21/2016    Dementia ([facility] [address] ) 2016      LOS (days): 3  Geometric Mean LOS (GMLOS) (days): 4 80  Days to GMLOS:1 8     OBJECTIVE:    Risk of Unplanned Readmission Score: 33         Current admission status: Inpatient  Referral Reason: Other (Facility return)    Preferred Pharmacy:   [address] [place] [address]  Phone: [phone] Fax: [phone]    Primary Care Provider: Marcy Hayes MD    Primary Insurance: MEDICARE  Secondary Insurance: [address] [place] [address] [address] [employer]    ASSESSMENT:  Active Health Care Agents     Mariam T[address] - Spouse   Primary Phone: [phone] (Mobile)  Home Phone: [phone]               Patient Information  Admitted from[de-identified] Facility  Mental Status: Confused  During Assessment patient was accompanied by: Not accompanied during assessment  Assessment information provided by[de-identified] Spouse  Primary Caregiver: Other (Comment) ([facility])  Support Systems: Spouse/significant other  What city do you live in[place]  Type of Current Residence: Facility  Living Arrangements: Other (Comment) (LTC at [facility])    Activities of Daily Living Prior to Admission  Functional Status: Total dependent    Patient Information Continued  Does patient receive dialysis treatments?: No    Means of Transportation  In the past 12 months, has lack of transportation kept you from medical appointments or from getting medications?: No  In the past 12 months, has lack of transportation kept you from meetings, work, or from getting things needed for daily living?: No  Was application for public transport provided?: N/A      DISCHARGE DETAILS:    Discharge planning discussed with[de-identified] spouse/guardian-Bashir  Freedom of Choice: Yes  Comments - Freedom of Choice: FOC discussed regarding return to LTC at [facility], referral placed in [place] per request of family                Contacts  Patient Contacts: Bashir Tomas  Relationship to Patient[de-identified] Family  Contact Method: Phone  Phone Number: [phone]  Reason/Outcome: Emergency Contact,Referral,Discharge Planning    Other Referral/Resources/Interventions Provided:  Interventions: SNF,Facility Return  Referral Comments: JUAN LOWE pt's wife whom confirmed pt is a resident at [facility]  Family would like pt to return when stable for DC    Baruch Cheadle confirmed she is pt's Guardian and paperwork is uploaded    Treatment Team Recommendation: Facility Return  Discharge Destination Plan[de-identified] Facility Return

## 2022-02-18 ENCOUNTER — APPOINTMENT (INPATIENT)
Dept: RADIOLOGY | Facility: HOSPITAL | Age: 72
DRG: 871 | End: 2022-02-18
Payer: MEDICARE

## 2022-02-18 LAB
ANION GAP SERPL CALCULATED.3IONS-SCNC: 9 MMOL/L (ref 4–13)
BACTERIA BLD CULT: ABNORMAL
BACTERIA BLD CULT: ABNORMAL
BASOPHILS # BLD AUTO: 0.04 THOUSANDS/ΜL (ref 0–0.1)
BASOPHILS NFR BLD AUTO: 1 % (ref 0–1)
BUN SERPL-MCNC: 13 MG/DL (ref 5–25)
CALCIUM SERPL-MCNC: 8.4 MG/DL (ref 8.3–10.1)
CHLORIDE SERPL-SCNC: 106 MMOL/L (ref 100–108)
CO2 SERPL-SCNC: 22 MMOL/L (ref 21–32)
CREAT SERPL-MCNC: 1.33 MG/DL (ref 0.6–1.3)
EOSINOPHIL # BLD AUTO: 0.41 THOUSAND/ΜL (ref 0–0.61)
EOSINOPHIL NFR BLD AUTO: 5 % (ref 0–6)
ERYTHROCYTE [DISTWIDTH] IN BLOOD BY AUTOMATED COUNT: 17.8 % (ref 11.6–15.1)
GFR SERPL CREATININE-BSD FRML MDRD: 52 ML/MIN/1.73SQ M
GLUCOSE SERPL-MCNC: 115 MG/DL (ref 65–140)
GLUCOSE SERPL-MCNC: 74 MG/DL (ref 65–140)
GLUCOSE SERPL-MCNC: 75 MG/DL (ref 65–140)
GLUCOSE SERPL-MCNC: 78 MG/DL (ref 65–140)
GLUCOSE SERPL-MCNC: 80 MG/DL (ref 65–140)
GRAM STN SPEC: ABNORMAL
GRAM STN SPEC: ABNORMAL
HCT VFR BLD AUTO: 30.6 % (ref 36.5–49.3)
HGB BLD-MCNC: 9.4 G/DL (ref 12–17)
IMM GRANULOCYTES # BLD AUTO: 0.02 THOUSAND/UL (ref 0–0.2)
IMM GRANULOCYTES NFR BLD AUTO: 0 % (ref 0–2)
LYMPHOCYTES # BLD AUTO: 1.45 THOUSANDS/ΜL (ref 0.6–4.47)
LYMPHOCYTES NFR BLD AUTO: 19 % (ref 14–44)
MCH RBC QN AUTO: 24.4 PG (ref 26.8–34.3)
MCHC RBC AUTO-ENTMCNC: 30.7 G/DL (ref 31.4–37.4)
MCV RBC AUTO: 80 FL (ref 82–98)
MONOCYTES # BLD AUTO: 0.4 THOUSAND/ΜL (ref 0.17–1.22)
MONOCYTES NFR BLD AUTO: 5 % (ref 4–12)
NEUTROPHILS # BLD AUTO: 5.29 THOUSANDS/ΜL (ref 1.85–7.62)
NEUTS SEG NFR BLD AUTO: 70 % (ref 43–75)
NRBC BLD AUTO-RTO: 0 /100 WBCS
PLATELET # BLD AUTO: 275 THOUSANDS/UL (ref 149–390)
PMV BLD AUTO: 10 FL (ref 8.9–12.7)
POTASSIUM SERPL-SCNC: 3.2 MMOL/L (ref 3.5–5.3)
PROCALCITONIN SERPL-MCNC: 2.2 NG/ML
RBC # BLD AUTO: 3.85 MILLION/UL (ref 3.88–5.62)
SODIUM SERPL-SCNC: 137 MMOL/L (ref 136–145)
WBC # BLD AUTO: 7.61 THOUSAND/UL (ref 4.31–10.16)

## 2022-02-18 PROCEDURE — 99232 SBSQ HOSP IP/OBS MODERATE 35: CPT | Performed by: PHYSICIAN ASSISTANT

## 2022-02-18 PROCEDURE — 99232 SBSQ HOSP IP/OBS MODERATE 35: CPT | Performed by: SURGERY

## 2022-02-18 PROCEDURE — C9113 INJ PANTOPRAZOLE SODIUM, VIA: HCPCS | Performed by: SURGERY

## 2022-02-18 PROCEDURE — 85025 COMPLETE CBC W/AUTO DIFF WBC: CPT | Performed by: INTERNAL MEDICINE

## 2022-02-18 PROCEDURE — 82948 REAGENT STRIP/BLOOD GLUCOSE: CPT

## 2022-02-18 PROCEDURE — 99232 SBSQ HOSP IP/OBS MODERATE 35: CPT | Performed by: INTERNAL MEDICINE

## 2022-02-18 PROCEDURE — 74018 RADEX ABDOMEN 1 VIEW: CPT

## 2022-02-18 PROCEDURE — 84145 PROCALCITONIN (PCT): CPT | Performed by: INTERNAL MEDICINE

## 2022-02-18 PROCEDURE — 80048 BASIC METABOLIC PNL TOTAL CA: CPT | Performed by: INTERNAL MEDICINE

## 2022-02-18 RX ORDER — DEXTROSE MONOHYDRATE 100 MG/ML
INJECTION, SOLUTION INTRAVENOUS
Status: COMPLETED
Start: 2022-02-18 | End: 2022-02-18

## 2022-02-18 RX ORDER — POTASSIUM CHLORIDE 14.9 MG/ML
20 INJECTION INTRAVENOUS ONCE
Status: COMPLETED | OUTPATIENT
Start: 2022-02-18 | End: 2022-02-18

## 2022-02-18 RX ORDER — POTASSIUM CHLORIDE 29.8 MG/ML
40 INJECTION INTRAVENOUS ONCE
Status: DISCONTINUED | OUTPATIENT
Start: 2022-02-18 | End: 2022-02-18

## 2022-02-18 RX ORDER — METOCLOPRAMIDE HYDROCHLORIDE 5 MG/ML
10 INJECTION INTRAMUSCULAR; INTRAVENOUS EVERY 6 HOURS SCHEDULED
Status: COMPLETED | OUTPATIENT
Start: 2022-02-18 | End: 2022-02-19

## 2022-02-18 RX ORDER — POTASSIUM CHLORIDE 14.9 MG/ML
20 INJECTION INTRAVENOUS
Status: DISPENSED | OUTPATIENT
Start: 2022-02-18 | End: 2022-02-18

## 2022-02-18 RX ORDER — BISACODYL 10 MG
10 SUPPOSITORY, RECTAL RECTAL DAILY
Status: COMPLETED | OUTPATIENT
Start: 2022-02-18 | End: 2022-02-18

## 2022-02-18 RX ADMIN — MICONAZOLE NITRATE 1 APPLICATION: 20 CREAM TOPICAL at 18:19

## 2022-02-18 RX ADMIN — SODIUM CHLORIDE 150 ML/HR: 0.45 INJECTION, SOLUTION INTRAVENOUS at 07:45

## 2022-02-18 RX ADMIN — POTASSIUM CHLORIDE 20 MEQ: 200 INJECTION, SOLUTION INTRAVENOUS at 19:37

## 2022-02-18 RX ADMIN — BACLOFEN 15 MG: 10 TABLET ORAL at 20:09

## 2022-02-18 RX ADMIN — Medication 250 MG: at 18:19

## 2022-02-18 RX ADMIN — BISACODYL 10 MG: 10 SUPPOSITORY RECTAL at 13:25

## 2022-02-18 RX ADMIN — GLYCERIN 1 DROP: .002; .002; .01 SOLUTION/ DROPS OPHTHALMIC at 18:29

## 2022-02-18 RX ADMIN — DEXTROSE MONOHYDRATE 125 ML: 100 INJECTION, SOLUTION INTRAVENOUS at 00:15

## 2022-02-18 RX ADMIN — PANTOPRAZOLE SODIUM 40 MG: 40 INJECTION, POWDER, FOR SOLUTION INTRAVENOUS at 09:04

## 2022-02-18 RX ADMIN — AMLODIPINE BESYLATE 10 MG: 10 TABLET ORAL at 09:05

## 2022-02-18 RX ADMIN — METOPROLOL TARTRATE 25 MG: 25 TABLET, FILM COATED ORAL at 20:09

## 2022-02-18 RX ADMIN — FAMOTIDINE 20 MG: 10 INJECTION INTRAVENOUS at 09:05

## 2022-02-18 RX ADMIN — MICONAZOLE NITRATE: 20 CREAM TOPICAL at 09:06

## 2022-02-18 RX ADMIN — BACLOFEN 15 MG: 10 TABLET ORAL at 09:05

## 2022-02-18 RX ADMIN — POTASSIUM CHLORIDE 20 MEQ: 200 INJECTION, SOLUTION INTRAVENOUS at 16:12

## 2022-02-18 RX ADMIN — BACLOFEN 15 MG: 10 TABLET ORAL at 16:10

## 2022-02-18 RX ADMIN — Medication: at 09:06

## 2022-02-18 RX ADMIN — METOCLOPRAMIDE HYDROCHLORIDE 10 MG: 5 INJECTION INTRAMUSCULAR; INTRAVENOUS at 13:25

## 2022-02-18 RX ADMIN — APIXABAN 5 MG: 5 TABLET, FILM COATED ORAL at 09:05

## 2022-02-18 RX ADMIN — GLYCERIN 1 DROP: .002; .002; .01 SOLUTION/ DROPS OPHTHALMIC at 09:06

## 2022-02-18 RX ADMIN — METOCLOPRAMIDE HYDROCHLORIDE 10 MG: 5 INJECTION INTRAMUSCULAR; INTRAVENOUS at 18:19

## 2022-02-18 RX ADMIN — CHLORHEXIDINE GLUCONATE 0.12% ORAL RINSE 15 ML: 1.2 LIQUID ORAL at 20:09

## 2022-02-18 RX ADMIN — ERTAPENEM SODIUM 1000 MG: 1 INJECTION, POWDER, LYOPHILIZED, FOR SOLUTION INTRAMUSCULAR; INTRAVENOUS at 05:01

## 2022-02-18 RX ADMIN — MINERAL OIL 1 ENEMA: 100 ENEMA RECTAL at 09:04

## 2022-02-18 RX ADMIN — METOPROLOL TARTRATE 25 MG: 25 TABLET, FILM COATED ORAL at 09:05

## 2022-02-18 RX ADMIN — CHLORHEXIDINE GLUCONATE 0.12% ORAL RINSE 15 ML: 1.2 LIQUID ORAL at 09:04

## 2022-02-18 RX ADMIN — SODIUM CHLORIDE 150 ML/HR: 0.45 INJECTION, SOLUTION INTRAVENOUS at 00:01

## 2022-02-18 RX ADMIN — APIXABAN 5 MG: 5 TABLET, FILM COATED ORAL at 18:19

## 2022-02-18 RX ADMIN — Medication 250 MG: at 09:05

## 2022-02-18 NOTE — ASSESSMENT & PLAN NOTE
· POA, evidenced by leukocytosis, bandemia, tachycardia, lactic acidosis, and potential aspiration pneumonia     · Lactic acid resolved  · White count resolved   · Blood cultures with 1/2 GPC and GPR likely skin karma contamination   · Monitor off antibiotics per ID recommendations

## 2022-02-18 NOTE — ASSESSMENT & PLAN NOTE
· Suspect POA vs aspiration pneumonitis     · CT chest showing extensive diffuse bilateral groundglass opacities consistent with aspiration pneumonitis versus pneumonia  · He is on room air currently  · Procalcitonin peaked at 19 8 and decreased to 2 2 today   · D/C Ertapenem as per ID recs today   · Patient was on Linezolid, however wife reports allergy (not listed) so this was removed    · Appreciate ID input   · Speech eval once NGT removed   · Recommend aspiration precautions/changing positions

## 2022-02-18 NOTE — PROGRESS NOTES
Progress Note - General Surgery   Marlon Tomas 66 y o  male MRN: [id number]  Unit/Bed#: S -01 Encounter: [id number]    Assessment:  67 yo M with PMH of dementia, CVA, COPD and aphasia who presents with aspiration pneumonia and SBO with possible transition point in the RLQ    NG 600cc brownish enteric contents    Plan:  KUB this morning  If KUB ok, D/C NG, start sips of clears  Continue daily enemas  Appreciate GI recs, CT enterography as an outpatient  Antibiotics per ID recs, ertapenem currently  Rest of care per primary    Subjective/Objective     Subjective: No acute events overnight, patient non-verbal, per nursing patient had a BM yesterday    Objective:    Blood pressure 134/78, pulse 61, temperature 98 1 °F (36 7 °C), temperature source Axillary, resp  rate 20, weight 90 9 kg (200 lb 6 4 oz), SpO2 94 %  ,Body mass index is 25 73 kg/m²  Intake/Output Summary (Last 24 hours) at 2/18/2022 0613  Last data filed at 2/18/2022 0557  Gross per 24 hour   Intake 2065 ml   Output 3850 ml   Net -1785 ml       Invasive Devices  Report    Peripheral Intravenous Line            Peripheral IV 02/14/22 Right;Upper;Ventral (anterior) Arm 3 days    Peripheral IV 02/17/22 Dorsal (posterior); Right Hand 1 day          Drain            NG/OG/Enteral Tube Nasogastric 18 Fr Left nare 78 days    Suprapubic Catheter 9 days    NG/OG/Enteral Tube Nasogastric 16 Fr Right nare 3 days                Physical Exam:  Gen:    NAD  CV:      warm, well-perfused  Lungs: No respiratory distress on RA  Abd:     soft, NT/ND  Ext:      no CCE  Neuro:  At baseline

## 2022-02-18 NOTE — ASSESSMENT & PLAN NOTE
Malnutrition Findings:   Adult Malnutrition type: Acute illness (in the setting of chronic illness)  Adult Degree of Malnutrition: Other severe protein calorie malnutrition (related to inadequate oral intake as evidenced by temporal indentation, orbital hollow, 24 4% weight decrease x ~4 months  Patient is currently NPO)    BMI Findings: Body mass index is 25 73 kg/m²     · In the setting of prior anoxic brain injury, multiple bowel obstructions  · Encourage oral intake, when able

## 2022-02-18 NOTE — PROGRESS NOTES
[facility]  Progress Note Luis Dixon [facility] 1950, 66 y o  male MRN: [id number]  Unit/Bed#: S -01 Encounter: [id number]  Primary Care Provider: Renee Villareal MD   Date and time admitted to hospital: 2/14/2022 10:59 AM    * Aspiration pneumonia ([address] )  Assessment & Plan  · Suspect POA vs aspiration pneumonitis  · CT chest showing extensive diffuse bilateral groundglass opacities consistent with aspiration pneumonitis versus pneumonia  · He is on room air currently  · Procalcitonin peaked at 19 8 and decreased to 2 2 today   · D/C Ertapenem as per ID recs today   · Patient was on Linezolid, however wife reports allergy (not listed) so this was removed    · Appreciate ID input   · Speech eval once NGT removed   · Recommend aspiration precautions/changing positions     Severe sepsis [facility])  Assessment & Plan  · POA, evidenced by leukocytosis, bandemia, tachycardia, lactic acidosis, and potential aspiration pneumonia  · Lactic acid resolved  · White count resolved   · Blood cultures with 1/2 GPC and GPR likely skin karma contamination   · Monitor off antibiotics per ID recommendations     Small bowel obstruction (HCC)  Assessment & Plan  · POA, recurrent  Patient was following with GI and had a CT enterography planned to evaluate potential cause  · Could be related to underlying neurologic dysfunction   · XR Obstruction Series from 2/16 showing persistently distended bowel loops   · XR Obstruction Series from 2/18 continued with Ileus   · General Surgery following   · Continue NGT for now   · Removal per them, possibly later today   · NPO for now  · Speech eval once safe to advance diet   · Monitor for return of bowel function   · GI following  · ? Enterography while inpatient   · Defer this conversation to GI and General Surgery       Persistent vegetative state [facility])  Assessment & Plan  · Secondary to anoxic brain injury around 10 years ago  Wife is decision maker and advocate   At baseline he is non-verbal   · Supportive care   · All decisions about medical care NEED to be discussed with wife     Suprapubic catheter [facility])  Assessment & Plan  · Chronically present with colonization of bladder  Doubt active urinary tract infection at this time   · SPT care  · Be sure to use care when moving patient     COPD (chronic obstructive pulmonary disease) ([address] )  Assessment & Plan  · Does not appear to be in acute exacerbation  · Continue home management     Essential hypertension  Assessment & Plan  · BP above goal today, however majority of readings have been controlled   · Continue Norvasc, Metoprolol     GERD (gastroesophageal reflux disease)  Assessment & Plan  · Continue IV PPI and Pepcid    History of DVT of lower extremity  Assessment & Plan  · Continue Eliquis per NGT     Severe protein-calorie malnutrition (HCC)  Assessment & Plan  Malnutrition Findings:   Adult Malnutrition type: Acute illness (in the setting of chronic illness)  Adult Degree of Malnutrition: Other severe protein calorie malnutrition (related to inadequate oral intake as evidenced by temporal indentation, orbital hollow, 24 4% weight decrease x ~4 months  Patient is currently NPO)    BMI Findings: Body mass index is 25 73 kg/m²  · In the setting of prior anoxic brain injury, multiple bowel obstructions  · Encourage oral intake, when able         VTE Pharmacologic Prophylaxis: VTE Score: 13 High Risk (Score >/= 5) - Pharmacological DVT Prophylaxis Ordered: apixaban (Eliquis)  Sequential Compression Devices Ordered  Patient Centered Rounds: I performed bedside rounds with nursing staff today  Discussions with Specialists or Other Care Team Provider: Discussed with RN, CM    Education and Discussions with Family / Patient: Updated  (wife) via phone  Time Spent for Care: 30 minutes  More than 50% of total time spent on counseling and coordination of care as described above      Current Length of Stay: 4 day(s)  Current Patient Status: Inpatient   Certification Statement: The patient will continue to require additional inpatient hospital stay due to on going NGT and return of bowel function   Discharge Plan: Anticipate discharge in 48-72 hrs to home with home services  Code Status: Level 3 - DNAR and DNI    Subjective:   Patient remains at baseline  No events per nursing  Objective:     Vitals:   Temp (24hrs), Av 2 °F (36 8 °C), Min:97 7 °F (36 5 °C), Max:98 8 °F (37 1 °C)    Temp:  [97 7 °F (36 5 °C)-98 8 °F (37 1 °C)] 98 8 °F (37 1 °C)  HR:  [61-73] 71  Resp:  [19-20] 19  BP: (130-158)/(30-82) 158/76  SpO2:  [92 %-94 %] 92 %  Body mass index is 25 73 kg/m²  Input and Output Summary (last 24 hours): Intake/Output Summary (Last 24 hours) at 2022 1344  Last data filed at 2022 1043  Gross per 24 hour   Intake 3355 ml   Output 3475 ml   Net -120 ml       Physical Exam:   Physical Exam  Constitutional:       General: He is sleeping  He is not in acute distress  Appearance: Normal appearance  He is normal weight  He is not ill-appearing or diaphoretic  HENT:      Head: Normocephalic and atraumatic  Nose:      Comments: NGT placed      Mouth/Throat:      Mouth: Mucous membranes are dry  Eyes:      General: No scleral icterus  Pupils: Pupils are equal, round, and reactive to light  Cardiovascular:      Rate and Rhythm: Normal rate and regular rhythm  Pulses: Normal pulses  Heart sounds: Normal heart sounds, S1 normal and S2 normal  No murmur heard  No systolic murmur is present  No diastolic murmur is present  No gallop  No S3 or S4 sounds  Pulmonary:      Effort: Pulmonary effort is normal  No accessory muscle usage or respiratory distress  Breath sounds: No stridor  Examination of the right-lower field reveals decreased breath sounds  Examination of the left-lower field reveals decreased breath sounds  Decreased breath sounds present   No wheezing, rhonchi or rales  Chest:      Chest wall: No tenderness  Abdominal:      General: Bowel sounds are decreased  There is no distension  Palpations: Abdomen is soft  Tenderness: There is no abdominal tenderness  There is no guarding  Musculoskeletal:      Right lower leg: No edema  Left lower leg: No edema  Skin:     General: Skin is warm and dry  Coloration: Skin is not jaundiced  Neurological:      Mental Status: Mental status is at baseline  Motor: No tremor or seizure activity  Psychiatric:         Behavior: Behavior is cooperative  Additional Data:     Labs:  Results from last 7 days   Lab Units 02/18/22  0000 02/15/22  0612 02/14/22  1144   WBC Thousand/uL 7 61   < > 21 07*   HEMOGLOBIN g/dL 9 4*   < > 14 2   HEMATOCRIT % 30 6*   < > 48 5   PLATELETS Thousands/uL 275   < > 434*   BANDS PCT %  --   --  17*   NEUTROS PCT % 70   < >  --    LYMPHS PCT % 19   < >  --    LYMPHO PCT %  --   --  4*   MONOS PCT % 5   < >  --    MONO PCT %  --   --  2*   EOS PCT % 5   < > 0    < > = values in this interval not displayed  Results from last 7 days   Lab Units 02/18/22  0000 02/17/22  0416 02/16/22  0554   SODIUM mmol/L 137   < > 143   POTASSIUM mmol/L 3 2*   < > 3 3*   CHLORIDE mmol/L 106   < > 110*   CO2 mmol/L 22   < > 24   BUN mg/dL 13   < > 23   CREATININE mg/dL 1 33*   < > 1 66*   ANION GAP mmol/L 9   < > 9   CALCIUM mg/dL 8 4   < > 8 0*   ALBUMIN g/dL  --   --  2 2*   TOTAL BILIRUBIN mg/dL  --   --  0 30   ALK PHOS U/L  --   --  59   ALT U/L  --   --  10*   AST U/L  --   --  13   GLUCOSE RANDOM mg/dL 75   < > 87    < > = values in this interval not displayed       Results from last 7 days   Lab Units 02/14/22  1257   INR  1 21*     Results from last 7 days   Lab Units 02/18/22  1208 02/18/22  0505 02/18/22  0043 02/17/22  2352 02/17/22  1547 02/17/22  1131 02/17/22  0558 02/17/22  0003 02/16/22  1720 02/16/22  1204 02/16/22  0543 02/15/22  2357   POC GLUCOSE mg/dl 74 80 115 69 72 67 73 80 79 98 85 100         Results from last 7 days   Lab Units 02/18/22  0000 02/16/22  0554 02/15/22  0955 02/15/22  0612 02/14/22  1725 02/14/22  1254   LACTIC ACID mmol/L  --   --  2 7* 3 3* 3 2* 8 1*   PROCALCITONIN ng/ml 2 20* 11 76* 19 41*  --   --   --        Lines/Drains:  Invasive Devices  Report    Peripheral Intravenous Line            Peripheral IV 02/14/22 Right;Upper;Ventral (anterior) Arm 3 days    Peripheral IV 02/17/22 Dorsal (posterior); Right Hand 1 day          Drain            NG/OG/Enteral Tube Nasogastric 18 Fr Left nare 78 days    Suprapubic Catheter 9 days    NG/OG/Enteral Tube Nasogastric 16 Fr Right nare 4 days                      Imaging: Reviewed radiology reports from this admission including: xray(s)    Recent Cultures (last 7 days):   Results from last 7 days   Lab Units 02/14/22  1524 02/14/22  1257 02/14/22  1254   BLOOD CULTURE   --  Staphylococcus coagulase negative* No Growth at 72 hrs     GRAM STAIN RESULT   --  Gram positive cocci in clusters*  Gram positive rods*  --    URINE CULTURE  >100,000 cfu/ml Proteus mirabilis*  >100,000 cfu/ml Klebsiella-Enterobacter  group*  >100,000 cfu/ml Staphylococcus species*  --   --        Last 24 Hours Medication List:   Current Facility-Administered Medications   Medication Dose Route Frequency Provider Last Rate    amLODIPine  10 mg Per NG Tube Daily Laney Powell MD      ammonium lactate   Topical Daily Laney Powell MD      apixaban  5 mg Per NG Tube BID Laney Powell MD      baclofen  15 mg Per NG Tube TID Laney Powell MD      chlorhexidine  15 mL Mouth/Throat Q12H [address] Laney Powell MD      ertapenem  1,000 mg Intravenous Q24H Laney Powell MD Stopped (02/18/22 0557)    famotidine  20 mg Intravenous Q24H [address] Laney Powell MD      glycerin-hypromellose-  1 drop Both Eyes BID Laney Powell MD      HYDROmorphone  0 5 mg Intravenous Q3H PRN Laney Powell MD      insulin lispro  1-6 Units Subcutaneous Q6H [address] Charletta Schilder, MD      metoclopramide  10 mg Intravenous Q6H [address] Mana Cullen PA-C      metoprolol tartrate  25 mg Per NG Tube Q12H [address] Charletta Schilder, MD      mineral oil  1 enema Rectal Daily Mireya Lehman DO      TRENTON ANTIFUNGAL   Topical BID Madison Askew PA-C      pantoprazole  40 mg Intravenous Q24H [address] Michael Dahl DO      potassium chloride  20 mEq Intravenous Q2H Kerwin Church PA-C      saccharomyces boulardii  250 mg Per NG Tube BID Charletta Schilder, MD      sodium chloride  150 mL/hr Intravenous Continuous Charletta Schilder,  mL/hr (02/18/22 0745)        Today, Patient Was Seen By: [employer], PA-C    **Please Note: This note may have been constructed using a voice recognition system  **

## 2022-02-18 NOTE — PROGRESS NOTES
Progress Note - Infectious Disease   Bong Tomas 66 y o  male MRN: [id number]  Unit/Bed#: S -01 Encounter: [id number]      Impression/Plan:  1  Systemic inflammatory response syndrome-POA   Tachycardia and leukocytosis   Suspect multifactorial including small-bowel obstruction and probable aspiration pneumonitis   Consideration for the possibility of aspiration pneumonia   Fortunately the patient remains hemodynamically stable despite his systemic illness   He seems to be tolerating the antibiotics without difficulty  Patient has completed 5 days of antibiotics in the procalcitonin level has decreased substantially   -discontinue ertapenem  -monitor off all antibiotics  -recheck CBC diff  -follow-up blood cultures  -treatment of small-bowel obstruction  -supportive care     2  Acute hypoxic respiratory failure-in the setting of vomiting and aspiration events   Suspect patient has aspiration pneumonitis   Consideration for the possibility of developing aspiration pneumonia   White cell count has significantly decreased since yesterday and his oxygenation status has remained stable   Procalcitonin level is elevated   -discontinue antibiotics as above  -monitor respiratory status  -supportive care     3  Small-bowel obstruction-recurrent   Patient has had an NG-tube placed   His abdomen is firm but does not appear to be tender based upon his responses to the exam   X-ray still with dilated loops of bowel   -NG tube decompression  -serial abdominal exams  -serial radiographs  -no surgical intervention as per the wife's wishes   -outpatient CT enterography  -GI follow-up     4  Acute kidney injury-in a patient with suprapubic catheter in place   No urinary obstruction seen on imaging   Suspect this is a pre renal issue   Consideration for the possibility of ATN   Renal function is much improved  -volume management  -recheck BMP  -dose adjust antibiotics as needed     5   Advanced dementia-with global aphasia         6  Multiple antibiotic allergies-not listed but wife insists the patient also is allergic to linezolid so this is been discontinued  Oral Colonel continue monitor for any intolerance of antibiotics     7  Gram-positive bacteremia-appears to be contaminant with 2 organisms isolated in 1 set  No additional workup or treatment needed for now    Discussed the above management plan with the primary service    Antibiotics:  Ertapenem 5    Subjective:  Patient has no fever, chills, sweats; no reported vomiting, diarrhea; seems stable from respiratory standpoint; not having bowel movements  Objective:  Vitals:  Temp:  [97 7 °F (36 5 °C)-98 8 °F (37 1 °C)] 98 8 °F (37 1 °C)  HR:  [61-73] 71  Resp:  [19-20] 19  BP: (130-158)/(30-82) 158/76  SpO2:  [90 %-94 %] 92 %  Temp (24hrs), Av 2 °F (36 8 °C), Min:97 7 °F (36 5 °C), Max:98 8 °F (37 1 °C)  Current: Temperature: 98 8 °F (37 1 °C)    Physical Exam:   General Appearance:  Appears comfortable in no acute distress   Throat: Oropharynx moist without lesions  NG tube in place   Lungs:   Decreased breath sounds; no wheezes, rhonchi or rales; respirations unlabored   Heart:  RRR; no murmur, rub or gallop   Abdomen:   Soft, non-tender, non-distended, positive bowel sounds  Extremities: No clubbing, cyanosis or edema   Skin: No new rashes or lesions  No draining wounds noted         Labs, Imaging, & Other studies:   All pertinent labs and imaging studies were personally reviewed  Results from last 7 days   Lab Units 22  0000 22  0416 02/15/22  0612   WBC Thousand/uL 7 61 7 11 12 22*   HEMOGLOBIN g/dL 9 4* 8 5* 11 3*   PLATELETS Thousands/uL 275 253 321     Results from last 7 days   Lab Units 22  0000 22  0416 22  0416 22  0554 22  0554 02/15/22  0611 02/15/22  0611 22  1144 22  1144   SODIUM mmol/L 137  --  139  --  143   < > 148*   < > 144   POTASSIUM mmol/L 3 2*   < > 3 6   < > 3 3*   < > 3 3*   < > 3 7 CHLORIDE mmol/L 106   < > 108   < > 110*   < > 111*   < > 106   CO2 mmol/L 22   < > 22   < > 24   < > 26   < > 21   BUN mg/dL 13   < > 17   < > 23   < > 33*   < > 27*   CREATININE mg/dL 1 33*   < > 1 41*   < > 1 66*   < > 2 17*   < > 2 42*   EGFR ml/min/1 73sq m 52   < > 49   < > 40   < > 29   < > 25   CALCIUM mg/dL 8 4   < > 8 2*   < > 8 0*   < > 8 7   < > 9 5   AST U/L  --   --   --   --  13  --  11  --  10   ALT U/L  --   --   --   --  10*   < > 11*   < > 13   ALK PHOS U/L  --   --   --   --  59   < > 70   < > 88    < > = values in this interval not displayed  Results from last 7 days   Lab Units 02/14/22  1743 02/14/22  1524 02/14/22  1257 02/14/22  1254   BLOOD CULTURE   --   --  Staphylococcus coagulase negative* No Growth at 72 hrs     GRAM STAIN RESULT   --   --  Gram positive cocci in clusters*  Gram positive rods*  --    URINE CULTURE   --  >100,000 cfu/ml Proteus mirabilis*  >100,000 cfu/ml Klebsiella-Enterobacter  group*  >100,000 cfu/ml Staphylococcus species*  --   --    MRSA CULTURE ONLY  No Methicillin Resistant Staphlyococcus aureus (MRSA) isolated  --   --   --      Results from last 7 days   Lab Units 02/18/22  0000 02/16/22  0554 02/15/22  0955   PROCALCITONIN ng/ml 2 20* 11 76* 19 41*        x-ray abdomen-persistent prominent dilated loops of bowel    Images personally reviewed by me in PACS

## 2022-02-18 NOTE — ASSESSMENT & PLAN NOTE
· POA, recurrent  Patient was following with GI and had a CT enterography planned to evaluate potential cause  · Could be related to underlying neurologic dysfunction   · XR Obstruction Series from 2/16 showing persistently distended bowel loops   · XR Obstruction Series from 2/18 continued with Ileus   · General Surgery following   · Continue NGT for now   · Removal per them, possibly later today   · NPO for now  · Speech eval once safe to advance diet   · Monitor for return of bowel function   · GI following  · ? Enterography while inpatient   · Defer this conversation to GI and General Surgery

## 2022-02-19 PROBLEM — K31.89 DYSMOTILITY OF STOMACH: Status: ACTIVE | Noted: 2022-02-19

## 2022-02-19 PROBLEM — G93.1 ANOXIC BRAIN INJURY (HCC): Status: ACTIVE | Noted: 2021-12-01

## 2022-02-19 LAB
ANION GAP SERPL CALCULATED.3IONS-SCNC: 14 MMOL/L (ref 4–13)
BACTERIA BLD CULT: NORMAL
BASOPHILS # BLD AUTO: 0.03 THOUSANDS/ΜL (ref 0–0.1)
BASOPHILS NFR BLD AUTO: 0 % (ref 0–1)
BUN SERPL-MCNC: 9 MG/DL (ref 5–25)
CALCIUM SERPL-MCNC: 8.2 MG/DL (ref 8.3–10.1)
CHLORIDE SERPL-SCNC: 106 MMOL/L (ref 100–108)
CO2 SERPL-SCNC: 15 MMOL/L (ref 21–32)
CREAT SERPL-MCNC: 1.21 MG/DL (ref 0.6–1.3)
EOSINOPHIL # BLD AUTO: 0.42 THOUSAND/ΜL (ref 0–0.61)
EOSINOPHIL NFR BLD AUTO: 5 % (ref 0–6)
ERYTHROCYTE [DISTWIDTH] IN BLOOD BY AUTOMATED COUNT: 17.3 % (ref 11.6–15.1)
GFR SERPL CREATININE-BSD FRML MDRD: 59 ML/MIN/1.73SQ M
GLUCOSE SERPL-MCNC: 106 MG/DL (ref 65–140)
GLUCOSE SERPL-MCNC: 62 MG/DL (ref 65–140)
GLUCOSE SERPL-MCNC: 66 MG/DL (ref 65–140)
GLUCOSE SERPL-MCNC: 72 MG/DL (ref 65–140)
GLUCOSE SERPL-MCNC: 78 MG/DL (ref 65–140)
GLUCOSE SERPL-MCNC: 84 MG/DL (ref 65–140)
GLUCOSE SERPL-MCNC: 90 MG/DL (ref 65–140)
HCT VFR BLD AUTO: 33.2 % (ref 36.5–49.3)
HGB BLD-MCNC: 10.2 G/DL (ref 12–17)
IMM GRANULOCYTES # BLD AUTO: 0.05 THOUSAND/UL (ref 0–0.2)
IMM GRANULOCYTES NFR BLD AUTO: 1 % (ref 0–2)
LYMPHOCYTES # BLD AUTO: 1.32 THOUSANDS/ΜL (ref 0.6–4.47)
LYMPHOCYTES NFR BLD AUTO: 15 % (ref 14–44)
MCH RBC QN AUTO: 24.2 PG (ref 26.8–34.3)
MCHC RBC AUTO-ENTMCNC: 30.7 G/DL (ref 31.4–37.4)
MCV RBC AUTO: 79 FL (ref 82–98)
MONOCYTES # BLD AUTO: 0.65 THOUSAND/ΜL (ref 0.17–1.22)
MONOCYTES NFR BLD AUTO: 8 % (ref 4–12)
NEUTROPHILS # BLD AUTO: 6.18 THOUSANDS/ΜL (ref 1.85–7.62)
NEUTS SEG NFR BLD AUTO: 71 % (ref 43–75)
NRBC BLD AUTO-RTO: 0 /100 WBCS
PLATELET # BLD AUTO: 299 THOUSANDS/UL (ref 149–390)
PMV BLD AUTO: 9.7 FL (ref 8.9–12.7)
POTASSIUM SERPL-SCNC: 4.5 MMOL/L (ref 3.5–5.3)
RBC # BLD AUTO: 4.22 MILLION/UL (ref 3.88–5.62)
SODIUM SERPL-SCNC: 135 MMOL/L (ref 136–145)
WBC # BLD AUTO: 8.65 THOUSAND/UL (ref 4.31–10.16)

## 2022-02-19 PROCEDURE — 80048 BASIC METABOLIC PNL TOTAL CA: CPT | Performed by: PHYSICIAN ASSISTANT

## 2022-02-19 PROCEDURE — 85025 COMPLETE CBC W/AUTO DIFF WBC: CPT | Performed by: PHYSICIAN ASSISTANT

## 2022-02-19 PROCEDURE — C9113 INJ PANTOPRAZOLE SODIUM, VIA: HCPCS | Performed by: SURGERY

## 2022-02-19 PROCEDURE — 82948 REAGENT STRIP/BLOOD GLUCOSE: CPT

## 2022-02-19 PROCEDURE — 99232 SBSQ HOSP IP/OBS MODERATE 35: CPT | Performed by: SURGERY

## 2022-02-19 PROCEDURE — 99232 SBSQ HOSP IP/OBS MODERATE 35: CPT | Performed by: PHYSICIAN ASSISTANT

## 2022-02-19 PROCEDURE — 99232 SBSQ HOSP IP/OBS MODERATE 35: CPT | Performed by: INTERNAL MEDICINE

## 2022-02-19 RX ORDER — FAMOTIDINE 20 MG/1
20 TABLET, FILM COATED ORAL DAILY
Status: DISCONTINUED | OUTPATIENT
Start: 2022-02-20 | End: 2022-02-20

## 2022-02-19 RX ORDER — AMLODIPINE BESYLATE 10 MG/1
10 TABLET ORAL DAILY
Status: DISCONTINUED | OUTPATIENT
Start: 2022-02-20 | End: 2022-02-23 | Stop reason: HOSPADM

## 2022-02-19 RX ORDER — PANTOPRAZOLE SODIUM 40 MG/1
40 TABLET, DELAYED RELEASE ORAL
Status: DISCONTINUED | OUTPATIENT
Start: 2022-02-20 | End: 2022-02-23 | Stop reason: HOSPADM

## 2022-02-19 RX ORDER — DEXTROSE MONOHYDRATE 100 MG/ML
125 INJECTION, SOLUTION INTRAVENOUS ONCE
Status: COMPLETED | OUTPATIENT
Start: 2022-02-19 | End: 2022-02-19

## 2022-02-19 RX ADMIN — Medication: at 08:38

## 2022-02-19 RX ADMIN — BACLOFEN 15 MG: 10 TABLET ORAL at 08:37

## 2022-02-19 RX ADMIN — MICONAZOLE NITRATE: 20 CREAM TOPICAL at 17:41

## 2022-02-19 RX ADMIN — APIXABAN 5 MG: 5 TABLET, FILM COATED ORAL at 08:37

## 2022-02-19 RX ADMIN — Medication 250 MG: at 08:36

## 2022-02-19 RX ADMIN — PANTOPRAZOLE SODIUM 40 MG: 40 INJECTION, POWDER, FOR SOLUTION INTRAVENOUS at 08:36

## 2022-02-19 RX ADMIN — METOPROLOL TARTRATE 25 MG: 25 TABLET, FILM COATED ORAL at 08:37

## 2022-02-19 RX ADMIN — METOCLOPRAMIDE HYDROCHLORIDE 10 MG: 5 INJECTION INTRAMUSCULAR; INTRAVENOUS at 00:13

## 2022-02-19 RX ADMIN — MINERAL OIL 1 ENEMA: 100 ENEMA RECTAL at 08:37

## 2022-02-19 RX ADMIN — GLYCERIN 1 DROP: .002; .002; .01 SOLUTION/ DROPS OPHTHALMIC at 17:40

## 2022-02-19 RX ADMIN — DEXTROSE MONOHYDRATE 125 ML: 100 INJECTION, SOLUTION INTRAVENOUS at 10:45

## 2022-02-19 RX ADMIN — FAMOTIDINE 20 MG: 10 INJECTION INTRAVENOUS at 08:34

## 2022-02-19 RX ADMIN — ERTAPENEM SODIUM 1000 MG: 1 INJECTION, POWDER, LYOPHILIZED, FOR SOLUTION INTRAMUSCULAR; INTRAVENOUS at 05:02

## 2022-02-19 RX ADMIN — APIXABAN 5 MG: 5 TABLET, FILM COATED ORAL at 17:43

## 2022-02-19 RX ADMIN — AMLODIPINE BESYLATE 10 MG: 10 TABLET ORAL at 08:37

## 2022-02-19 RX ADMIN — MICONAZOLE NITRATE: 20 CREAM TOPICAL at 08:38

## 2022-02-19 RX ADMIN — METOPROLOL TARTRATE 25 MG: 25 TABLET, FILM COATED ORAL at 20:35

## 2022-02-19 RX ADMIN — GLYCERIN 1 DROP: .002; .002; .01 SOLUTION/ DROPS OPHTHALMIC at 08:38

## 2022-02-19 RX ADMIN — BISACODYL 5 MG: 5 TABLET, COATED ORAL at 18:12

## 2022-02-19 NOTE — PROGRESS NOTES
Progress Note - General Surgery   Elba Tomas 66 y o  male MRN: [id number]  Unit/Bed#: S -01 Encounter: [id number]    Assessment:  67 yo M with PMH of dementia, CVA, COPD and aphasia who presents with aspiration pneumonia and SBO with possible transition point in the RLQ    Plan:  Advance to clear liquids  Dulcolax BID x 3 days  Daily enemas  GI recommendations appreciated  Rest of care per primary    Subjective/Objective     Subjective: No acute events overnight, patient non-verbal, per nursing patient tolerating sips of clears but did not have a bowel movement yesterday    Objective:     Blood pressure (!) 176/98, pulse 69, temperature 99 6 °F (37 6 °C), temperature source Axillary, resp  rate 20, weight 92 4 kg (203 lb 11 3 oz), SpO2 93 %  ,Body mass index is 26 15 kg/m²  Intake/Output Summary (Last 24 hours) at 2/20/2022 7547  Last data filed at 2/20/2022 0601  Gross per 24 hour   Intake --   Output 4450 ml   Net -4450 ml       Invasive Devices  Report    Peripheral Intravenous Line            Peripheral IV 02/14/22 Right;Upper;Ventral (anterior) Arm 5 days    Peripheral IV 02/17/22 Dorsal (posterior); Right Hand 3 days          Drain            NG/OG/Enteral Tube Nasogastric 18 Fr Left nare 80 days    Suprapubic Catheter 11 days                Physical Exam:  Gen:    NAD  CV:      warm, well-perfused  Lungs: No respiratory distress on RA  Abd:     soft, NT/ND  Ext:      no CCE  Neuro: A&Ox3

## 2022-02-19 NOTE — SPEECH THERAPY NOTE
Speech Language Pathology    No Treatment Note    Order received for Swallow Evaluation this day  Patient ordered for sips of Clears only  Unable to conduct assessment as patient is not medically ready for significant amounts of solids/ liquids or initiation of a PO diet  Patient is tolerating sips of thin liquid via straw    Will continue to follow for readiness to perform swallow exam     Eleonora Hebert MA CCC-SLP  Speech Language Pathologist

## 2022-02-19 NOTE — PROGRESS NOTES
[facility]  Progress Note Angie Flavia [facility] 1950, 66 y o  male MRN: [id number]  Unit/Bed#: S -01 Encounter: [id number]  Primary Care Provider: Goran Yancey MD   Date and time admitted to hospital: 2/14/2022 10:59 AM    * Small bowel obstruction (HCC)  Assessment & Plan  · POA, recurrent  · Patient was following with GI and had a CT enterography planned to evaluate potential cause - this will be done as outpt (not inpt in discussion with GI previously)  · Could be related to underlying neurologic dysfunction   · XR Obstruction Series from 2/16 showing persistently distended bowel loops   · XR Obstruction Series from 2/18 continued with Ileus   · Clamp trial 2/18-2/19 - NG tube removed 2/19 at 1230  · General Surgery following   · NPO until seen by speech therapy  · Continue IV fluids      Aspiration pneumonia (HCC)  Assessment & Plan  · Present on admission  · CT chest showing extensive diffuse bilateral groundglass opacities consistent with aspiration pneumonitis versus pneumonia  · Procalcitonin peaked at 19 8 and decreased to 2 2   · D/C Ertapenem as per Infectious Disease recommendations given significant drop in procalcitonin and treatment course  This was discontinued 2/19/22  · Speech eval pending now that NG tube has been removed  · Recommend aspiration precautions/changing positions     Severe sepsis [facility])  Assessment & Plan  · POA, evidenced by leukocytosis, bandemia, tachycardia, lactic acidosis, and potential aspiration pneumonia  · Lactic acid resolved  · Leukocytosis resolved  · Blood cultures with 1/2 GPC and GPR likely skin karma contamination   · Monitor off antibiotics per ID recommendations     Suprapubic catheter ([address] )  Assessment & Plan  · Chronically present with colonization of bladder   Doubt active urinary tract infection at this time   · SPT care    GERD (gastroesophageal reflux disease)  Assessment & Plan  · Changed from IV PPI and IV Pepcid to oral agents    Essential hypertension  Assessment & Plan  · Systolic blood pressure currently 160s  · Continue Norvasc and Metoprolol     History of DVT of lower extremity  Assessment & Plan  · Continue Eliquis - was given via NG previously - change to PO    COPD (chronic obstructive pulmonary disease) ([facility])  Assessment & Plan  · Does not appear to be in acute exacerbation    Anoxic brain injury ([facility] [address] )  Assessment & Plan  · Appprox 10 years ago  Wife is decision maker and advocate  · At baseline he is non-verbal   · Supportive care   · All decisions about medical care NEED to be discussed with wife     Severe protein-calorie malnutrition ([facility] )  Assessment & Plan  Malnutrition Findings:   Adult Malnutrition type: Acute illness (in the setting of chronic illness)  Adult Degree of Malnutrition: Other severe protein calorie malnutrition (related to inadequate oral intake as evidenced by temporal indentation, orbital hollow, 24 4% weight decrease x ~4 months  Patient is currently NPO)    BMI Findings: Body mass index is 26 15 kg/m²  · In the setting of prior anoxic brain injury, multiple bowel obstructions  · Encourage oral intake, when able         VTE Pharmacologic Prophylaxis: VTE Score: 13 High Risk (Score >/= 5) - Pharmacological DVT Prophylaxis Ordered: apixaban (Eliquis)  Sequential Compression Devices Ordered  Patient Centered Rounds: I performed bedside rounds with nursing staff today  Discussions with Specialists or Other Care Team Provider: surgery    Education and Discussions with Family / Patient: Updated  (wife) via phone  Time Spent for Care: 45 minutes  More than 50% of total time spent on counseling and coordination of care as described above      Current Length of Stay: 5 day(s)  Current Patient Status: Inpatient   Certification Statement: The patient will continue to require additional inpatient hospital stay due to speech eval, monitor obstruction, IV fluids  Discharge Plan: Anticipate discharge in 48 hrs to home with home services  Code Status: Level 3 - DNAR and DNI    Subjective:   No overnight events per nursing staff  NG tube removed at approximately 12:30 p m     At 12:00 p m  Midnight there was 100 out on clamp trial   At 6:00 a m  There was no output  Objective:     Vitals:   Temp (24hrs), Av 3 °F (37 4 °C), Min:99 °F (37 2 °C), Max:99 5 °F (37 5 °C)    Temp:  [99 °F (37 2 °C)-99 5 °F (37 5 °C)] 99 °F (37 2 °C)  HR:  [65-76] 65  Resp:  [18-20] 18  BP: (162-168)/(76-83) 168/83  SpO2:  [92 %-95 %] 92 %  Body mass index is 26 15 kg/m²  Input and Output Summary (last 24 hours): Intake/Output Summary (Last 24 hours) at 2022 1435  Last data filed at 2022 1134  Gross per 24 hour   Intake --   Output 4250 ml   Net -4250 ml       Physical Exam:   Physical Exam  Vitals and nursing note reviewed  Constitutional:       General: He is not in acute distress  Appearance: Normal appearance  He is ill-appearing  He is not diaphoretic  HENT:      Head: Normocephalic and atraumatic  Mouth/Throat:      Mouth: Mucous membranes are dry  Cardiovascular:      Rate and Rhythm: Normal rate and regular rhythm  Pulmonary:      Effort: Pulmonary effort is normal       Breath sounds: Normal breath sounds  No stridor  No wheezing, rhonchi or rales  Comments: NG tube now removed  Abdominal:      General: Bowel sounds are normal       Palpations: Abdomen is soft  There is no mass  Tenderness: There is no abdominal tenderness  There is no guarding  Comments: Suprapubic in place   Musculoskeletal:      Right lower leg: No edema  Left lower leg: No edema  Skin:     General: Skin is warm and dry  Coloration: Skin is pale  Neurological:      Comments: Opens eyes and moans occasionally            Additional Data:     Labs:  Results from last 7 days   Lab Units 22  1024 02/15/22  0612 22  1144   WBC Thousand/uL 8 65   < > 21 07* HEMOGLOBIN g/dL 10 2*   < > 14 2   HEMATOCRIT % 33 2*   < > 48 5   PLATELETS Thousands/uL 299   < > 434*   BANDS PCT %  --   --  17*   NEUTROS PCT % 71   < >  --    LYMPHS PCT % 15   < >  --    LYMPHO PCT %  --   --  4*   MONOS PCT % 8   < >  --    MONO PCT %  --   --  2*   EOS PCT % 5   < > 0    < > = values in this interval not displayed  Results from last 7 days   Lab Units 02/19/22  1134 02/17/22  0416 02/16/22  0554   SODIUM mmol/L 135*   < > 143   POTASSIUM mmol/L 4 5   < > 3 3*   CHLORIDE mmol/L 106   < > 110*   CO2 mmol/L 15*   < > 24   BUN mg/dL 9   < > 23   CREATININE mg/dL 1 21   < > 1 66*   ANION GAP mmol/L 14*   < > 9   CALCIUM mg/dL 8 2*   < > 8 0*   ALBUMIN g/dL  --   --  2 2*   TOTAL BILIRUBIN mg/dL  --   --  0 30   ALK PHOS U/L  --   --  59   ALT U/L  --   --  10*   AST U/L  --   --  13   GLUCOSE RANDOM mg/dL 62*   < > 87    < > = values in this interval not displayed  Results from last 7 days   Lab Units 02/14/22  1257   INR  1 21*     Results from last 7 days   Lab Units 02/19/22  1216 02/19/22  1103 02/19/22  0705 02/19/22  0553 02/19/22  0010 02/18/22  1748 02/18/22  1208 02/18/22  0505 02/18/22  0043 02/17/22  2352 02/17/22  1547 02/17/22  1131   POC GLUCOSE mg/dl 84 106 66 72 78 78 74 80 115 69 72 67         Results from last 7 days   Lab Units 02/18/22  0000 02/16/22  0554 02/15/22  0955 02/15/22  0612 02/14/22  1725 02/14/22  1254   LACTIC ACID mmol/L  --   --  2 7* 3 3* 3 2* 8 1*   PROCALCITONIN ng/ml 2 20* 11 76* 19 41*  --   --   --        Lines/Drains:  Invasive Devices  Report    Peripheral Intravenous Line            Peripheral IV 02/14/22 Right;Upper;Ventral (anterior) Arm 4 days    Peripheral IV 02/17/22 Dorsal (posterior); Right Hand 2 days          Drain            NG/OG/Enteral Tube Nasogastric 18 Fr Left nare 79 days    Suprapubic Catheter 11 days                      Imaging: Reviewed radiology reports from this admission including: xray(s)    Recent Cultures (last 7 days):   Results from last 7 days   Lab Units 02/14/22  1524 02/14/22  1257 02/14/22  1254   BLOOD CULTURE   --  Staphylococcus coagulase negative*  Diphtheroids No Growth After 4 Days  GRAM STAIN RESULT   --  Gram positive cocci in clusters*  Gram positive rods*  --    URINE CULTURE  >100,000 cfu/ml Proteus mirabilis*  >100,000 cfu/ml Klebsiella-Enterobacter  group*  >100,000 cfu/ml Staphylococcus species*  --   --        Last 24 Hours Medication List:   Current Facility-Administered Medications   Medication Dose Route Frequency Provider Last Rate    [START ON 2/20/2022] amLODIPine  10 mg Oral Daily Stephie Enamorado PA-C      ammonium lactate   Topical Daily Loyda Urbina MD      apixaban  5 mg Oral BID Mykel Sandoval MD      [START ON 2/20/2022] famotidine  20 mg Oral Daily Stephie Enamorado PA-C      glycerin-hypromellose-  1 drop Both Eyes BID Loyda Urbina MD      HYDROmorphone  0 5 mg Intravenous Q3H PRN Loyda Urbina MD      insulin lispro  1-6 Units Subcutaneous Q6H [facility] Loyda Urbina MD      metoprolol tartrate  25 mg Oral Q12H [facility] Stephie Enamorado PA-C      mineral oil  1 enema Rectal Daily Shama DO Leslee      TR[place] ANTIFUNGAL   Topical BID Nisha Salinas PA-C      [START ON 2/20/2022] pantoprazole  40 mg Oral Early Morning Stephie Enamorado PA-C      sodium chloride  150 mL/hr Intravenous Continuous Loyda Urbina  mL/hr (02/18/22 0745)        Today, Patient Was Seen By: Hilary Freire PA-C    **Please Note: This note may have been constructed using a voice recognition system  **

## 2022-02-19 NOTE — ASSESSMENT & PLAN NOTE
· POA, recurrent     · Patient was following with GI and had a CT enterography planned to evaluate potential cause - this will be done as outpt (not inpt in discussion with GI previously)  · Could be related to underlying neurologic dysfunction   · XR Obstruction Series from 2/16 showing persistently distended bowel loops   · XR Obstruction Series from 2/18 continued with Ileus   · Clamp trial 2/18-2/19 - NG tube removed 2/19 at 1230  · General Surgery following   · NPO until seen by speech therapy  · Continue IV fluids

## 2022-02-19 NOTE — ASSESSMENT & PLAN NOTE
· Chronically present with colonization of bladder   Doubt active urinary tract infection at this time   · SPT care

## 2022-02-19 NOTE — ASSESSMENT & PLAN NOTE
· Appprox 10 years ago  Wife is decision maker and advocate     · At baseline he is non-verbal   · Supportive care   · All decisions about medical care NEED to be discussed with wife

## 2022-02-19 NOTE — PROGRESS NOTES
Progress Note - General Surgery   Bhumika Tomas 66 y o  male MRN: [id number]  Unit/Bed#: S -01 Encounter: [id number]    Assessment:  67 yo M with PMH of dementia, CVA, COPD and aphasia who presents with aspiration pneumonia and SBO with possible transition point in the RLQ    KUB-Consistent with ileus  NG-150 cc so far during 24 hour clamp trial    Plan:  -Follow up 24 hour clamp trial, very likely DC NG tube today  -Will need speech evaluation prior to initiation of diet  -Continue reglan for a total of 3 doses  -daily enemas  -GI recs appreciated  -remainder of care per primary  Subjective/Objective     Subjective: No acute events overnight, nonverbal    Objective:    Blood pressure 168/83, pulse 65, temperature 99 °F (37 2 °C), temperature source Axillary, resp  rate 18, weight 90 9 kg (200 lb 6 4 oz), SpO2 92 %  ,Body mass index is 25 73 kg/m²  Intake/Output Summary (Last 24 hours) at 2/18/2022 2334  Last data filed at 2/18/2022 1937  Gross per 24 hour   Intake 2295 ml   Output 3575 ml   Net -1280 ml       Invasive Devices  Report    Peripheral Intravenous Line            Peripheral IV 02/14/22 Right;Upper;Ventral (anterior) Arm 4 days    Peripheral IV 02/17/22 Dorsal (posterior); Right Hand 1 day          Drain            NG/OG/Enteral Tube Nasogastric 18 Fr Left nare 79 days    Suprapubic Catheter 10 days    NG/OG/Enteral Tube Nasogastric 16 Fr Right nare 4 days                Physical Exam:  General: NAD  HENT: NCAT MMM  NG in place  Neck: supple, no JVD  CV: nl rate  Lungs: nl wob   No resp distress  ABD: Soft, nontender, nondistended  Extrem: No CCE  Neuro: nonverbal

## 2022-02-19 NOTE — ASSESSMENT & PLAN NOTE
Malnutrition Findings:   Adult Malnutrition type: Acute illness (in the setting of chronic illness)  Adult Degree of Malnutrition: Other severe protein calorie malnutrition (related to inadequate oral intake as evidenced by temporal indentation, orbital hollow, 24 4% weight decrease x ~4 months  Patient is currently NPO)    BMI Findings: Body mass index is 26 15 kg/m²     · In the setting of prior anoxic brain injury, multiple bowel obstructions  · Encourage oral intake, when able

## 2022-02-19 NOTE — PROGRESS NOTES
Progress Note - Infectious Disease   Darshady Tomas 66 y o  male MRN: [id number]  Unit/Bed#: S -01 Encounter: [id number]      Impression/Plan:  1  Systemic inflammatory response syndrome-POA   Tachycardia and leukocytosis   Suspect multifactorial including small-bowel obstruction and probable aspiration pneumonitis   Consideration for the possibility of aspiration pneumonia   Fortunately the patient remains hemodynamically stable despite his systemic illness   He seems to be tolerating the antibiotics without difficulty  Patient has completed 5 days of antibiotics in the procalcitonin level has decreased substantially  He now remains stable off all antibiotics  -monitor off all antibiotics  -recheck CBC diff  -follow-up blood cultures  -treatment of small-bowel obstruction  -supportive care     2  Acute hypoxic respiratory failure-in the setting of vomiting and aspiration events   Suspect patient has aspiration pneumonitis   Consideration for the possibility of developing aspiration pneumonia   White cell count has significantly decreased since yesterday and his oxygenation status has remained stable   Procalcitonin level is elevated but has decreased substantially  Patient completed 5 days of ertapenem  -no additional antibiotics  -monitor respiratory status  -supportive care     3  Small-bowel obstruction-recurrent   Patient has had an NG-tube placed   His abdomen is firm but does not appear to be tender based upon his responses to the exam   X-ray still with dilated loops of bowel  NG tube is now been removed  -serial abdominal exams  -serial radiographs  -no surgical intervention as per the wife's wishes   -outpatient CT enterography  -GI follow-up     4  Acute kidney injury-in a patient with suprapubic catheter in place   No urinary obstruction seen on imaging   Suspect this is a pre renal issue   Consideration for the possibility of ATN    Renal function is much improved  -volume management  -recheck BMP  -dose adjust antibiotics as needed     5  Advanced dementia-with global aphasia         6  Multiple antibiotic allergies-not listed but wife insists the patient also is allergic to linezolid so this is been discontinued  Marcella To continue monitor for any intolerance of antibiotics     7  Gram-positive bacteremia-appears to be contaminant with 2 organisms isolated in 1 set   No additional workup or treatment needed for now    Discussed the above management plan with the primary service    Will see the patient again 2022 if not discharged  Please call if questions  Antibiotics:  None status post 5 days of ertapenem  Off antibiotics 1    Subjective:  Patient has no fever, chills, sweats; no reported vomiting, diarrhea; respiratory status is stable; remains nonverbal   NG tube removed  Objective:  Vitals:  Temp:  [99 °F (37 2 °C)-99 5 °F (37 5 °C)] 99 °F (37 2 °C)  HR:  [65-76] 65  Resp:  [18-20] 18  BP: (162-168)/(76-83) 168/83  SpO2:  [92 %-95 %] 92 %  Temp (24hrs), Av 3 °F (37 4 °C), Min:99 °F (37 2 °C), Max:99 5 °F (37 5 °C)  Current: Temperature: 99 °F (37 2 °C)    Physical Exam:   General Appearance:  Somnolent, nontoxic, no acute distress  Throat: Oropharynx moist without lesions  Lungs:   Clear to auscultation bilaterally; no wheezes, rhonchi or rales; respirations unlabored   Heart:  RRR; no murmur, rub or gallop   Abdomen:   Soft, non-tender, non-distended, positive bowel sounds  Extremities: No clubbing, cyanosis or edema   Skin: No new rashes or lesions  No draining wounds noted         Labs, Imaging, & Other studies:   All pertinent labs and imaging studies were personally reviewed  Results from last 7 days   Lab Units 22  1024 22  0000 22  0416   WBC Thousand/uL 8 65 7 61 7 11   HEMOGLOBIN g/dL 10 2* 9 4* 8 5*   PLATELETS Thousands/uL 299 275 253     Results from last 7 days   Lab Units 22  1134 22  0000 22  0000 22  0416 22  0416 02/16/22  0554 02/16/22  0554 02/15/22  0611 02/15/22  0611 02/14/22  1144 02/14/22  1144   SODIUM mmol/L 135*  --  137  --  139   < > 143   < > 148*   < > 144   POTASSIUM mmol/L 4 5   < > 3 2*   < > 3 6   < > 3 3*   < > 3 3*   < > 3 7   CHLORIDE mmol/L 106   < > 106   < > 108   < > 110*   < > 111*   < > 106   CO2 mmol/L 15*   < > 22   < > 22   < > 24   < > 26   < > 21   BUN mg/dL 9   < > 13   < > 17   < > 23   < > 33*   < > 27*   CREATININE mg/dL 1 21   < > 1 33*   < > 1 41*   < > 1 66*   < > 2 17*   < > 2 42*   EGFR ml/min/1 73sq m 59   < > 52   < > 49   < > 40   < > 29   < > 25   CALCIUM mg/dL 8 2*   < > 8 4   < > 8 2*   < > 8 0*   < > 8 7   < > 9 5   AST U/L  --   --   --   --   --   --  13  --  11  --  10   ALT U/L  --   --   --   --   --   --  10*   < > 11*   < > 13   ALK PHOS U/L  --   --   --   --   --   --  59   < > 70   < > 88    < > = values in this interval not displayed  Results from last 7 days   Lab Units 02/14/22  1743 02/14/22  1524 02/14/22  1257 02/14/22  1254   BLOOD CULTURE   --   --  Staphylococcus coagulase negative*  Diphtheroids No Growth After 5 Days     GRAM STAIN RESULT   --   --  Gram positive cocci in clusters*  Gram positive rods*  --    URINE CULTURE   --  >100,000 cfu/ml Proteus mirabilis*  >100,000 cfu/ml Klebsiella-Enterobacter  group*  >100,000 cfu/ml Staphylococcus species*  --   --    MRSA CULTURE ONLY  No Methicillin Resistant Staphlyococcus aureus (MRSA) isolated  --   --   --      Results from last 7 days   Lab Units 02/18/22  0000 02/16/22  0554 02/15/22  0955   PROCALCITONIN ng/ml 2 20* 11 76* 19 41*

## 2022-02-19 NOTE — ASSESSMENT & PLAN NOTE
· Present on admission  · CT chest showing extensive diffuse bilateral groundglass opacities consistent with aspiration pneumonitis versus pneumonia  · Procalcitonin peaked at 19 8 and decreased to 2 2   · D/C Ertapenem as per Infectious Disease recommendations given significant drop in procalcitonin and treatment course    This was discontinued 2/19/22  · Speech eval pending now that NG tube has been removed  · Recommend aspiration precautions/changing positions

## 2022-02-19 NOTE — ASSESSMENT & PLAN NOTE
· POA, evidenced by leukocytosis, bandemia, tachycardia, lactic acidosis, and potential aspiration pneumonia     · Lactic acid resolved  · Leukocytosis resolved  · Blood cultures with 1/2 GPC and GPR likely skin karma contamination   · Monitor off antibiotics per ID recommendations

## 2022-02-20 LAB
ALBUMIN SERPL BCP-MCNC: 2.3 G/DL (ref 3.5–5)
ALP SERPL-CCNC: 71 U/L (ref 46–116)
ALT SERPL W P-5'-P-CCNC: 10 U/L (ref 12–78)
ANION GAP SERPL CALCULATED.3IONS-SCNC: 13 MMOL/L (ref 4–13)
AST SERPL W P-5'-P-CCNC: 10 U/L (ref 5–45)
BASOPHILS # BLD AUTO: 0.03 THOUSANDS/ΜL (ref 0–0.1)
BASOPHILS NFR BLD AUTO: 0 % (ref 0–1)
BILIRUB SERPL-MCNC: 0.37 MG/DL (ref 0.2–1)
BUN SERPL-MCNC: 7 MG/DL (ref 5–25)
CALCIUM ALBUM COR SERPL-MCNC: 9.6 MG/DL (ref 8.3–10.1)
CALCIUM SERPL-MCNC: 8.2 MG/DL (ref 8.3–10.1)
CHLORIDE SERPL-SCNC: 105 MMOL/L (ref 100–108)
CO2 SERPL-SCNC: 20 MMOL/L (ref 21–32)
CREAT SERPL-MCNC: 1.25 MG/DL (ref 0.6–1.3)
EOSINOPHIL # BLD AUTO: 0.34 THOUSAND/ΜL (ref 0–0.61)
EOSINOPHIL NFR BLD AUTO: 5 % (ref 0–6)
ERYTHROCYTE [DISTWIDTH] IN BLOOD BY AUTOMATED COUNT: 17.7 % (ref 11.6–15.1)
GFR SERPL CREATININE-BSD FRML MDRD: 56 ML/MIN/1.73SQ M
GLUCOSE SERPL-MCNC: 137 MG/DL (ref 65–140)
GLUCOSE SERPL-MCNC: 79 MG/DL (ref 65–140)
GLUCOSE SERPL-MCNC: 89 MG/DL (ref 65–140)
GLUCOSE SERPL-MCNC: 94 MG/DL (ref 65–140)
HCT VFR BLD AUTO: 33.1 % (ref 36.5–49.3)
HGB BLD-MCNC: 10.3 G/DL (ref 12–17)
IMM GRANULOCYTES # BLD AUTO: 0.03 THOUSAND/UL (ref 0–0.2)
IMM GRANULOCYTES NFR BLD AUTO: 0 % (ref 0–2)
LYMPHOCYTES # BLD AUTO: 1.15 THOUSANDS/ΜL (ref 0.6–4.47)
LYMPHOCYTES NFR BLD AUTO: 16 % (ref 14–44)
MCH RBC QN AUTO: 24.2 PG (ref 26.8–34.3)
MCHC RBC AUTO-ENTMCNC: 31.1 G/DL (ref 31.4–37.4)
MCV RBC AUTO: 78 FL (ref 82–98)
MONOCYTES # BLD AUTO: 0.47 THOUSAND/ΜL (ref 0.17–1.22)
MONOCYTES NFR BLD AUTO: 6 % (ref 4–12)
NEUTROPHILS # BLD AUTO: 5.39 THOUSANDS/ΜL (ref 1.85–7.62)
NEUTS SEG NFR BLD AUTO: 73 % (ref 43–75)
NRBC BLD AUTO-RTO: 0 /100 WBCS
PLATELET # BLD AUTO: 300 THOUSANDS/UL (ref 149–390)
PMV BLD AUTO: 9.5 FL (ref 8.9–12.7)
POTASSIUM SERPL-SCNC: 3 MMOL/L (ref 3.5–5.3)
PROT SERPL-MCNC: 6.4 G/DL (ref 6.4–8.2)
RBC # BLD AUTO: 4.25 MILLION/UL (ref 3.88–5.62)
SODIUM SERPL-SCNC: 138 MMOL/L (ref 136–145)
WBC # BLD AUTO: 7.41 THOUSAND/UL (ref 4.31–10.16)

## 2022-02-20 PROCEDURE — 99232 SBSQ HOSP IP/OBS MODERATE 35: CPT | Performed by: PHYSICIAN ASSISTANT

## 2022-02-20 PROCEDURE — 99232 SBSQ HOSP IP/OBS MODERATE 35: CPT | Performed by: SURGERY

## 2022-02-20 PROCEDURE — 80053 COMPREHEN METABOLIC PANEL: CPT | Performed by: PHYSICIAN ASSISTANT

## 2022-02-20 PROCEDURE — 82948 REAGENT STRIP/BLOOD GLUCOSE: CPT

## 2022-02-20 PROCEDURE — 85025 COMPLETE CBC W/AUTO DIFF WBC: CPT | Performed by: PHYSICIAN ASSISTANT

## 2022-02-20 RX ORDER — DOCUSATE SODIUM 100 MG/1
100 CAPSULE, LIQUID FILLED ORAL 2 TIMES DAILY
Status: DISCONTINUED | OUTPATIENT
Start: 2022-02-20 | End: 2022-02-23 | Stop reason: HOSPADM

## 2022-02-20 RX ORDER — BACLOFEN 10 MG/1
15 TABLET ORAL 3 TIMES DAILY
Status: DISCONTINUED | OUTPATIENT
Start: 2022-02-20 | End: 2022-02-23 | Stop reason: HOSPADM

## 2022-02-20 RX ORDER — POTASSIUM CHLORIDE 20MEQ/15ML
40 LIQUID (ML) ORAL ONCE
Status: COMPLETED | OUTPATIENT
Start: 2022-02-20 | End: 2022-02-20

## 2022-02-20 RX ORDER — POTASSIUM CHLORIDE 20MEQ/15ML
10 LIQUID (ML) ORAL DAILY
Status: DISCONTINUED | OUTPATIENT
Start: 2022-02-21 | End: 2022-02-23 | Stop reason: HOSPADM

## 2022-02-20 RX ORDER — FAMOTIDINE 20 MG/1
20 TABLET, FILM COATED ORAL 2 TIMES DAILY
Status: DISCONTINUED | OUTPATIENT
Start: 2022-02-20 | End: 2022-02-23 | Stop reason: HOSPADM

## 2022-02-20 RX ORDER — MIRTAZAPINE 15 MG/1
15 TABLET, FILM COATED ORAL
Status: DISCONTINUED | OUTPATIENT
Start: 2022-02-20 | End: 2022-02-23 | Stop reason: HOSPADM

## 2022-02-20 RX ORDER — BUSPIRONE HYDROCHLORIDE 5 MG/1
7.5 TABLET ORAL 3 TIMES DAILY
Status: DISCONTINUED | OUTPATIENT
Start: 2022-02-20 | End: 2022-02-23 | Stop reason: HOSPADM

## 2022-02-20 RX ORDER — HYDRALAZINE HYDROCHLORIDE 20 MG/ML
5 INJECTION INTRAMUSCULAR; INTRAVENOUS EVERY 6 HOURS PRN
Status: DISCONTINUED | OUTPATIENT
Start: 2022-02-20 | End: 2022-02-23 | Stop reason: HOSPADM

## 2022-02-20 RX ORDER — POLYETHYLENE GLYCOL 3350 17 G/17G
17 POWDER, FOR SOLUTION ORAL DAILY
Status: DISCONTINUED | OUTPATIENT
Start: 2022-02-20 | End: 2022-02-23 | Stop reason: HOSPADM

## 2022-02-20 RX ORDER — POTASSIUM CHLORIDE 20MEQ/15ML
20 LIQUID (ML) ORAL ONCE
Status: DISCONTINUED | OUTPATIENT
Start: 2022-02-20 | End: 2022-02-20

## 2022-02-20 RX ORDER — NYSTATIN 100000 U/G
CREAM TOPICAL 2 TIMES DAILY
Status: DISCONTINUED | OUTPATIENT
Start: 2022-02-20 | End: 2022-02-23 | Stop reason: HOSPADM

## 2022-02-20 RX ORDER — POTASSIUM CHLORIDE 20MEQ/15ML
20 LIQUID (ML) ORAL ONCE
Status: COMPLETED | OUTPATIENT
Start: 2022-02-20 | End: 2022-02-20

## 2022-02-20 RX ORDER — ACETAMINOPHEN 325 MG/1
650 TABLET ORAL EVERY 6 HOURS PRN
Status: DISCONTINUED | OUTPATIENT
Start: 2022-02-20 | End: 2022-02-23 | Stop reason: HOSPADM

## 2022-02-20 RX ORDER — QUETIAPINE FUMARATE 25 MG/1
25 TABLET, FILM COATED ORAL
Status: DISCONTINUED | OUTPATIENT
Start: 2022-02-20 | End: 2022-02-23 | Stop reason: HOSPADM

## 2022-02-20 RX ADMIN — GLYCERIN 1 DROP: .002; .002; .01 SOLUTION/ DROPS OPHTHALMIC at 09:27

## 2022-02-20 RX ADMIN — MICONAZOLE NITRATE 1 APPLICATION: 20 CREAM TOPICAL at 17:31

## 2022-02-20 RX ADMIN — BACLOFEN 15 MG: 10 TABLET ORAL at 15:50

## 2022-02-20 RX ADMIN — ACETAMINOPHEN 650 MG: 325 TABLET, FILM COATED ORAL at 17:40

## 2022-02-20 RX ADMIN — FAMOTIDINE 20 MG: 20 TABLET, FILM COATED ORAL at 17:37

## 2022-02-20 RX ADMIN — BISACODYL 5 MG: 5 TABLET, COATED ORAL at 17:37

## 2022-02-20 RX ADMIN — APIXABAN 2.5 MG: 2.5 TABLET, FILM COATED ORAL at 17:37

## 2022-02-20 RX ADMIN — BUSPIRONE HYDROCHLORIDE 7.5 MG: 5 TABLET ORAL at 21:10

## 2022-02-20 RX ADMIN — BUSPIRONE HYDROCHLORIDE 7.5 MG: 5 TABLET ORAL at 15:50

## 2022-02-20 RX ADMIN — APIXABAN 5 MG: 5 TABLET, FILM COATED ORAL at 09:16

## 2022-02-20 RX ADMIN — NYSTATIN: 100000 CREAM TOPICAL at 17:47

## 2022-02-20 RX ADMIN — METOPROLOL TARTRATE 25 MG: 25 TABLET, FILM COATED ORAL at 21:10

## 2022-02-20 RX ADMIN — AMLODIPINE BESYLATE 10 MG: 10 TABLET ORAL at 09:16

## 2022-02-20 RX ADMIN — Medication: at 10:06

## 2022-02-20 RX ADMIN — BACLOFEN 15 MG: 10 TABLET ORAL at 21:10

## 2022-02-20 RX ADMIN — QUETIAPINE FUMARATE 25 MG: 25 TABLET ORAL at 22:59

## 2022-02-20 RX ADMIN — PANTOPRAZOLE SODIUM 40 MG: 40 TABLET, DELAYED RELEASE ORAL at 05:16

## 2022-02-20 RX ADMIN — POLYETHYLENE GLYCOL 3350 17 G: 17 POWDER, FOR SOLUTION ORAL at 15:44

## 2022-02-20 RX ADMIN — FAMOTIDINE 20 MG: 20 TABLET, FILM COATED ORAL at 09:16

## 2022-02-20 RX ADMIN — MIRTAZAPINE 15 MG: 15 TABLET, FILM COATED ORAL at 22:59

## 2022-02-20 RX ADMIN — GLYCERIN 1 DROP: .002; .002; .01 SOLUTION/ DROPS OPHTHALMIC at 17:47

## 2022-02-20 RX ADMIN — METOPROLOL TARTRATE 25 MG: 25 TABLET, FILM COATED ORAL at 09:16

## 2022-02-20 RX ADMIN — POTASSIUM CHLORIDE 40 MEQ: 20 SOLUTION ORAL at 09:27

## 2022-02-20 RX ADMIN — MICONAZOLE NITRATE: 20 CREAM TOPICAL at 10:06

## 2022-02-20 RX ADMIN — POTASSIUM CHLORIDE 20 MEQ: 20 SOLUTION ORAL at 15:55

## 2022-02-20 RX ADMIN — BISACODYL 5 MG: 5 TABLET, COATED ORAL at 09:16

## 2022-02-20 NOTE — PROGRESS NOTES
[facility]  Progress Note Kavita Grigsby [facility] 1950, 66 y o  male MRN: [id number]  Unit/Bed#: S -01 Encounter: [id number]  Primary Care Provider: Thong Hudson MD   Date and time admitted to hospital: 2/14/2022 10:59 AM    * Small bowel obstruction (HCC)  Assessment & Plan  · POA, recurrent  · Patient was following with GI and had a CT enterography planned to evaluate potential cause - this will be done as outpt (not inpt in discussion with GI previously)  · Could be related to underlying neurologic dysfunction   · XR Obstruction Series from 2/16 showing persistently distended bowel loops   · XR Obstruction Series from 2/18 continued with Ileus   · Clamp trial 2/18-2/19 - NG tube removed 2/19 at 1230  · General Surgery following   · Clear liquid diet today (from sips of clear liquid only on 2/19)      Aspiration pneumonia (HCC)  Assessment & Plan  · Present on admission  · CT chest showing extensive diffuse bilateral groundglass opacities consistent with aspiration pneumonitis versus pneumonia  · Procalcitonin peaked at 19 8 and decreased to 2 2   · Discontinued ertapenem as per Infectious Disease recommendations given significant drop in procalcitonin and treatment course  This was discontinued 2/20/22 after 6 days of treatment  · NG tube was removed, was seen by speech therapy and cleared for clear liquid diet  Further advancement in diet as per speech therapy and surgical recommendations  · Recommend aspiration precautions/changing positions     Severe sepsis [facility])  Assessment & Plan  · POA, evidenced by leukocytosis, bandemia, tachycardia, lactic acidosis, and potential aspiration pneumonia  · Lactic acid resolved  · Leukocytosis resolved  · Blood cultures with 1/2 GPC and GPR likely skin karma contamination   · Monitor off antibiotics per ID recommendations     Suprapubic catheter ([address] )  Assessment & Plan  · Chronically present with colonization of bladder   Doubt active urinary tract infection at this time   · SPT care    GERD (gastroesophageal reflux disease)  Assessment & Plan  · Continue Protonix and Pepcid    Essential hypertension  Assessment & Plan  · Systolic blood pressure currently 170s  · Continue Norvasc and Metoprolol     History of DVT of lower extremity  Assessment & Plan  · Continue Eliquis BID    Hypokalemia: replete with 40 PO this AM and 20 this afternoon  WILL CONFIRM MED LIST WITH WIFE WHEN SHE ARRIVES    VTE Pharmacologic Prophylaxis: VTE Score: 13 High Risk (Score >/= 5) - Pharmacological DVT Prophylaxis Ordered: apixaban (Eliquis)  Sequential Compression Devices Ordered  Patient Centered Rounds: I performed bedside rounds with nursing staff today  Discussions with Specialists or Other Care Team Provider: nursing    Education and Discussions with Family / Patient: Updated  (wife) via phone  Time Spent for Care: 30 minutes  More than 50% of total time spent on counseling and coordination of care as described above  Current Length of Stay: 6 day(s)  Current Patient Status: Inpatient   Certification Statement: The patient will continue to require additional inpatient hospital stay due to advancement of water  Discharge Plan: Anticipate discharge in 48 hrs to home with home services  Code Status: Level 3 - DNAR and DNI    Subjective:   Med list to be confirmed with patient's wife when she arrives after [employer]  Patient was noted to receive Dulcolax yesterday  Was noted to have small bowel movements with enema  Per patient's wife patient was noted to have burping episodes after receiving Dulcolax last evening  Objective:     Vitals:   Temp (24hrs), Av 4 °F (37 4 °C), Min:99 3 °F (37 4 °C), Max:99 6 °F (37 6 °C)    Temp:  [99 3 °F (37 4 °C)-99 6 °F (37 6 °C)] 99 4 °F (37 4 °C)  HR:  [69-93] 72  Resp:  [20-22] 22  BP: (156-183)/(70-98) 170/70  SpO2:  [92 %-94 %] 92 %  Body mass index is 26 15 kg/m²       Input and Output Summary (last 24 hours): Intake/Output Summary (Last 24 hours) at 2/20/2022 0918  Last data filed at 2/20/2022 0915  Gross per 24 hour   Intake 375 ml   Output 4450 ml   Net -4075 ml       Physical Exam:   Physical Exam  Vitals and nursing note reviewed  Constitutional:       General: He is not in acute distress  Appearance: He is ill-appearing  He is not diaphoretic  HENT:      Head: Normocephalic and atraumatic  Mouth/Throat:      Mouth: Mucous membranes are dry  Cardiovascular:      Rate and Rhythm: Normal rate and regular rhythm  Pulmonary:      Breath sounds: No stridor  No wheezing, rhonchi or rales  Comments: Decreased effort  No rales or rhonchi  Abdominal:      Palpations: There is no mass  Tenderness: There is no abdominal tenderness  There is no guarding  Comments: Hypoactive bowel sounds but increased as compared to yesterday  No distention  Suprapubic catheter noted   Musculoskeletal:      Right lower leg: No edema  Left lower leg: No edema  Skin:     General: Skin is warm and dry  Coloration: Skin is pale  Neurological:      Comments: Intermittently moans  Opens eyes to touch          Additional Data:    Labs:  Results from last 7 days   Lab Units 02/20/22  0446 02/15/22  0612 02/14/22  1144   WBC Thousand/uL 7 41   < > 21 07*   HEMOGLOBIN g/dL 10 3*   < > 14 2   HEMATOCRIT % 33 1*   < > 48 5   PLATELETS Thousands/uL 300   < > 434*   BANDS PCT %  --   --  17*   NEUTROS PCT % 73   < >  --    LYMPHS PCT % 16   < >  --    LYMPHO PCT %  --   --  4*   MONOS PCT % 6   < >  --    MONO PCT %  --   --  2*   EOS PCT % 5   < > 0    < > = values in this interval not displayed       Results from last 7 days   Lab Units 02/20/22  0446   SODIUM mmol/L 138   POTASSIUM mmol/L 3 0*   CHLORIDE mmol/L 105   CO2 mmol/L 20*   BUN mg/dL 7   CREATININE mg/dL 1 25   ANION GAP mmol/L 13   CALCIUM mg/dL 8 2*   ALBUMIN g/dL 2 3*   TOTAL BILIRUBIN mg/dL 0 37   ALK PHOS U/L 71   ALT U/L 10*   AST U/L 10   GLUCOSE RANDOM mg/dL 89     Results from last 7 days   Lab Units 02/14/22  1257   INR  1 21*     Results from last 7 days   Lab Units 02/20/22  0600 02/20/22  0002 02/19/22  1830 02/19/22  1216 02/19/22  1103 02/19/22  0705 02/19/22  0553 02/19/22  0010 02/18/22  1748 02/18/22  1208 02/18/22  0505 02/18/22  0043   POC GLUCOSE mg/dl 79 94 90 84 106 66 72 78 78 74 80 115         Results from last 7 days   Lab Units 02/18/22  0000 02/16/22  0554 02/15/22  0955 02/15/22  0612 02/14/22  1725 02/14/22  1254   LACTIC ACID mmol/L  --   --  2 7* 3 3* 3 2* 8 1*   PROCALCITONIN ng/ml 2 20* 11 76* 19 41*  --   --   --        Lines/Drains:  Invasive Devices  Report    Peripheral Intravenous Line            Peripheral IV 02/14/22 Right;Upper;Ventral (anterior) Arm 5 days    Peripheral IV 02/17/22 Dorsal (posterior); Right Hand 3 days          Drain            NG/OG/Enteral Tube Nasogastric 18 Fr Left nare 80 days    Suprapubic Catheter 11 days                Imaging: No pertinent imaging reviewed  Recent Cultures (last 7 days):   Results from last 7 days   Lab Units 02/14/22  1524 02/14/22  1257 02/14/22  1254   BLOOD CULTURE   --  Staphylococcus coagulase negative*  Diphtheroids No Growth After 5 Days     GRAM STAIN RESULT   --  Gram positive cocci in clusters*  Gram positive rods*  --    URINE CULTURE  >100,000 cfu/ml Proteus mirabilis*  >100,000 cfu/ml Klebsiella-Enterobacter  group*  >100,000 cfu/ml Staphylococcus species*  --   --        Last 24 Hours Medication List:   Current Facility-Administered Medications   Medication Dose Route Frequency Provider Last Rate    amLODIPine  10 mg Oral Daily Stephie Enamorado PA-C      ammonium lactate   Topical Daily Sally Gibson MD      apixaban  5 mg Oral BID Wesley Larsen MD      bisacodyl  5 mg Oral BID Wesley Larsen MD      famotidine  20 mg Oral Daily Stephie Diasio, PA-C      glycerin-hypromellose-  1 drop Both Eyes BID Bayron Mehta Chris River MD      HYDROmorphone  0 5 mg Intravenous Q3H PRN Colette Olivera MD      insulin lispro  1-6 Units Subcutaneous Q6H [facility] Colette Olivera MD      metoprolol tartrate  25 mg Oral Q12H [facility] Anton Enamorado PA-C      mineral oil  1 enema Rectal Daily DO Mamadou Finch ANTIFUNGAL   Topical BID Kerwin Church PA-C      pantoprazole  40 mg Oral Early Morning StephieMAYELA Taylor-C      potassium chloride  20 mEq Oral Once [address] Diatahmina, PA-C      potassium chloride  40 mEq Oral Once [address] MAYELA Enamorado-DEBRA      sodium chloride  75 mL/hr Intravenous Continuous StephieMAYELA Taylor-C 75 mL/hr (02/20/22 0916)        Today, Patient Was Seen By: Avery Perkins PA-C    **Please Note: This note may have been constructed using a voice recognition system  **

## 2022-02-20 NOTE — ASSESSMENT & PLAN NOTE
· POA, recurrent     · Patient was following with GI and had a CT enterography planned to evaluate potential cause - this will be done as outpt (not inpt in discussion with GI previously)  · Could be related to underlying neurologic dysfunction   · XR Obstruction Series from 2/16 showing persistently distended bowel loops   · XR Obstruction Series from 2/18 continued with Ileus   · Clamp trial 2/18-2/19 - NG tube removed 2/19 at 1230  · General Surgery following   · Clear liquid diet today (from sips of clear liquid only on 2/19)

## 2022-02-20 NOTE — QUICK NOTE
Discussed with surgical team   Will plan for upper GI tomorrow for further assessment  Unclear if this was true obstruction versus dysmotility and chronic constipation issues  Given patient is unable to hold an enema, will discontinue mineral oil enemas and initiate MiraLax daily and continue Dulcolax  Home medications which were reviewed with the patient's medication list from facility were noted to be resumed with the exception of some vitamins to reduce total pill burden while admitted  Had some tool with enema 2/19 AM  No other stool since  CT enterography to be delayed secondary to current admission  Will discuss with GI when this can be performed as an outpatient however should not be performed on February 25th as initially planned  Clears only for now  Wife updated at bedside

## 2022-02-20 NOTE — ASSESSMENT & PLAN NOTE
· Present on admission  · CT chest showing extensive diffuse bilateral groundglass opacities consistent with aspiration pneumonitis versus pneumonia  · Procalcitonin peaked at 19 8 and decreased to 2 2   · Discontinued ertapenem as per Infectious Disease recommendations given significant drop in procalcitonin and treatment course  This was discontinued 2/20/22 after 6 days of treatment  · NG tube was removed, was seen by speech therapy and cleared for clear liquid diet  Further advancement in diet as per speech therapy and surgical recommendations    · Recommend aspiration precautions/changing positions

## 2022-02-20 NOTE — SPEECH THERAPY NOTE
Speech Language/Pathology  Speech/Language Pathology Note    Patient Name: Axel Osborn  NFCAY'W Date: 2/20/2022        Pt unable to be evaluated yesterday due to not being medically cleared for significant amounts of PO  Spoke with nursing and PA [employer]  His diet order continues to be sips of clear liquids  Nursing reports that he is doing well with no overt s/s of aspiration and he is taking it via the straw  Evaluation to continued to be deferred until medically cleared to advance past clears per PA  Will f/u and continue to assess when appropriate  Infliximab Counseling:  I discussed with the patient the risks of infliximab including but not limited to myelosuppression, immunosuppression, autoimmune hepatitis, demyelinating diseases, lymphoma, and serious infections.  The patient understands that monitoring is required including a PPD at baseline and must alert us or the primary physician if symptoms of infection or other concerning signs are noted.

## 2022-02-21 ENCOUNTER — APPOINTMENT (INPATIENT)
Dept: RADIOLOGY | Facility: HOSPITAL | Age: 72
DRG: 871 | End: 2022-02-21
Payer: MEDICARE

## 2022-02-21 LAB
ANION GAP SERPL CALCULATED.3IONS-SCNC: 7 MMOL/L (ref 4–13)
BUN SERPL-MCNC: 5 MG/DL (ref 5–25)
CALCIUM SERPL-MCNC: 8.4 MG/DL (ref 8.3–10.1)
CHLORIDE SERPL-SCNC: 108 MMOL/L (ref 100–108)
CO2 SERPL-SCNC: 25 MMOL/L (ref 21–32)
CREAT SERPL-MCNC: 1.31 MG/DL (ref 0.6–1.3)
GFR SERPL CREATININE-BSD FRML MDRD: 53 ML/MIN/1.73SQ M
GLUCOSE SERPL-MCNC: 91 MG/DL (ref 65–140)
MAGNESIUM SERPL-MCNC: 1.9 MG/DL (ref 1.6–2.6)
POTASSIUM SERPL-SCNC: 3.2 MMOL/L (ref 3.5–5.3)
SODIUM SERPL-SCNC: 140 MMOL/L (ref 136–145)

## 2022-02-21 PROCEDURE — 99232 SBSQ HOSP IP/OBS MODERATE 35: CPT | Performed by: INTERNAL MEDICINE

## 2022-02-21 PROCEDURE — 80048 BASIC METABOLIC PNL TOTAL CA: CPT | Performed by: PHYSICIAN ASSISTANT

## 2022-02-21 PROCEDURE — 92610 EVALUATE SWALLOWING FUNCTION: CPT

## 2022-02-21 PROCEDURE — 99232 SBSQ HOSP IP/OBS MODERATE 35: CPT | Performed by: SURGERY

## 2022-02-21 PROCEDURE — 99232 SBSQ HOSP IP/OBS MODERATE 35: CPT | Performed by: PHYSICIAN ASSISTANT

## 2022-02-21 PROCEDURE — 83735 ASSAY OF MAGNESIUM: CPT | Performed by: PHYSICIAN ASSISTANT

## 2022-02-21 RX ORDER — POTASSIUM CHLORIDE 20MEQ/15ML
40 LIQUID (ML) ORAL ONCE
Status: COMPLETED | OUTPATIENT
Start: 2022-02-21 | End: 2022-02-21

## 2022-02-21 RX ORDER — POTASSIUM CHLORIDE 14.9 MG/ML
20 INJECTION INTRAVENOUS
Status: COMPLETED | OUTPATIENT
Start: 2022-02-21 | End: 2022-02-21

## 2022-02-21 RX ADMIN — BUSPIRONE HYDROCHLORIDE 7.5 MG: 5 TABLET ORAL at 22:12

## 2022-02-21 RX ADMIN — PANTOPRAZOLE SODIUM 40 MG: 40 TABLET, DELAYED RELEASE ORAL at 05:14

## 2022-02-21 RX ADMIN — BACLOFEN 15 MG: 10 TABLET ORAL at 09:12

## 2022-02-21 RX ADMIN — DOCUSATE SODIUM 100 MG: 100 CAPSULE ORAL at 17:01

## 2022-02-21 RX ADMIN — FAMOTIDINE 20 MG: 20 TABLET, FILM COATED ORAL at 17:01

## 2022-02-21 RX ADMIN — BISACODYL 5 MG: 5 TABLET, COATED ORAL at 17:01

## 2022-02-21 RX ADMIN — Medication: at 09:13

## 2022-02-21 RX ADMIN — AMLODIPINE BESYLATE 10 MG: 10 TABLET ORAL at 09:12

## 2022-02-21 RX ADMIN — MICONAZOLE NITRATE: 20 CREAM TOPICAL at 17:01

## 2022-02-21 RX ADMIN — POTASSIUM CHLORIDE 10 MEQ: 20 SOLUTION ORAL at 09:14

## 2022-02-21 RX ADMIN — BISACODYL 5 MG: 5 TABLET, COATED ORAL at 09:12

## 2022-02-21 RX ADMIN — METOPROLOL TARTRATE 25 MG: 25 TABLET, FILM COATED ORAL at 09:12

## 2022-02-21 RX ADMIN — POLYETHYLENE GLYCOL 3350 17 G: 17 POWDER, FOR SOLUTION ORAL at 09:13

## 2022-02-21 RX ADMIN — NYSTATIN: 100000 CREAM TOPICAL at 17:02

## 2022-02-21 RX ADMIN — QUETIAPINE FUMARATE 25 MG: 25 TABLET ORAL at 22:12

## 2022-02-21 RX ADMIN — NYSTATIN: 100000 CREAM TOPICAL at 09:14

## 2022-02-21 RX ADMIN — APIXABAN 2.5 MG: 2.5 TABLET, FILM COATED ORAL at 09:12

## 2022-02-21 RX ADMIN — GLYCERIN 1 DROP: .002; .002; .01 SOLUTION/ DROPS OPHTHALMIC at 17:02

## 2022-02-21 RX ADMIN — POTASSIUM CHLORIDE 40 MEQ: 20 SOLUTION ORAL at 09:13

## 2022-02-21 RX ADMIN — BUSPIRONE HYDROCHLORIDE 7.5 MG: 5 TABLET ORAL at 16:54

## 2022-02-21 RX ADMIN — POTASSIUM CHLORIDE 20 MEQ: 200 INJECTION, SOLUTION INTRAVENOUS at 10:51

## 2022-02-21 RX ADMIN — BACLOFEN 15 MG: 10 TABLET ORAL at 22:13

## 2022-02-21 RX ADMIN — BACLOFEN 15 MG: 10 TABLET ORAL at 16:55

## 2022-02-21 RX ADMIN — MIRTAZAPINE 15 MG: 15 TABLET, FILM COATED ORAL at 22:13

## 2022-02-21 RX ADMIN — MICONAZOLE NITRATE: 20 CREAM TOPICAL at 09:13

## 2022-02-21 RX ADMIN — GLYCERIN 1 DROP: .002; .002; .01 SOLUTION/ DROPS OPHTHALMIC at 09:13

## 2022-02-21 RX ADMIN — FAMOTIDINE 20 MG: 20 TABLET, FILM COATED ORAL at 09:12

## 2022-02-21 RX ADMIN — BUSPIRONE HYDROCHLORIDE 7.5 MG: 5 TABLET ORAL at 09:12

## 2022-02-21 RX ADMIN — APIXABAN 2.5 MG: 2.5 TABLET, FILM COATED ORAL at 17:01

## 2022-02-21 RX ADMIN — METOPROLOL TARTRATE 25 MG: 25 TABLET, FILM COATED ORAL at 22:12

## 2022-02-21 RX ADMIN — SODIUM CHLORIDE 75 ML/HR: 0.45 INJECTION, SOLUTION INTRAVENOUS at 04:45

## 2022-02-21 RX ADMIN — SODIUM CHLORIDE 75 ML/HR: 0.45 INJECTION, SOLUTION INTRAVENOUS at 17:19

## 2022-02-21 RX ADMIN — POTASSIUM CHLORIDE 20 MEQ: 200 INJECTION, SOLUTION INTRAVENOUS at 09:11

## 2022-02-21 NOTE — PROGRESS NOTES
Progress Note - General Surgery   Marlon Tomas 66 y o  male MRN: [id number]  Unit/Bed#: S -01 Encounter: [id number]    Assessment:  65 yo M with PMH of dementia, CVA, COPD and aphasia who presents with aspiration pneumonia and possible SBO vs  Ileus  Patient with likely dysmotility disorder    Plan:  Plan for UGI today  Advance diet per speech recommendations  Replete lytes PRN  Continue bowel regimen  Appreciate GI recommendations  Rest of care per primary    Subjective/Objective     Subjective: No acute events overnight, patient non-verbal, per nursing tolerating diet, had a large BM yesterday morning    Objective:    Blood pressure 144/74, pulse 60, temperature 99 4 °F (37 4 °C), temperature source Axillary, resp  rate 18, weight 92 4 kg (203 lb 11 3 oz), SpO2 97 %  ,Body mass index is 26 15 kg/m²  Intake/Output Summary (Last 24 hours) at 2/22/2022 0601  Last data filed at 2/21/2022 1719  Gross per 24 hour   Intake 942 5 ml   Output 2050 ml   Net -1107 5 ml       Invasive Devices  Report    Peripheral Intravenous Line            Peripheral IV 02/21/22 Right Arm 1 day          Drain            NG/OG/Enteral Tube Nasogastric 18 Fr Left nare 82 days    Suprapubic Catheter 13 days                Physical Exam:  Gen:    NAD  CV:      warm, well-perfused  Lungs: No respiratory distress on RA  Abd:     soft, NT/ND  Ext:      no CCE  Neuro:  At baseline

## 2022-02-21 NOTE — SPEECH THERAPY NOTE
Speech Language/Pathology    Speech/Language Pathology Progress Note    Patient Name: Anastacio FLETCHER Date: 2/21/2022     Consult rec'd for swallow eval, pt started on clear liquids but is tentative for an UGI today  Pt needs to be NPO prior to UGI, will try to follow up w/ swallow eval after UGI        Destinee Tabares CCC-SLP  Speech Pathologist  Available via  tiger text

## 2022-02-21 NOTE — PROGRESS NOTES
Progress Note - General Surgery   Ines Tomas 66 y o  male MRN: [id number]  Unit/Bed#: S -01 Encounter: [id number]    Assessment:  65 yo M with PMH of dementia, CVA, COPD and aphasia who presents with aspiration pneumonia and possible SBO vs  Ileus  Patient with likely dysmotility disorder    AVSS  +BM  Plan:  Advance diet per speech  Replete electrolytes PRN, goal K>4, Mg>2, P>3  Suspect gastric/colonic dysmotility disorder will get UGI today  Continue bowel regimen  GI recommendations appreciated  Rest of care per primary    Subjective/Objective     Subjective: No acute events overnight  Nonverbal    Objective:     Blood pressure 159/77, pulse 59, temperature 98 9 °F (37 2 °C), temperature source Axillary, resp  rate 18, weight 92 4 kg (203 lb 11 3 oz), SpO2 94 %  ,Body mass index is 26 15 kg/m²  Intake/Output Summary (Last 24 hours) at 2/21/2022 0749  Last data filed at 2/21/2022 0445  Gross per 24 hour   Intake 1675 ml   Output 3250 ml   Net -1575 ml       Invasive Devices  Report    Peripheral Intravenous Line            Peripheral IV 02/21/22 Right Arm <1 day          Drain            NG/OG/Enteral Tube Nasogastric 18 Fr Left nare 81 days    Suprapubic Catheter 12 days                Physical Exam:  General: NAD  HENT: NCAT MMM  Neck: supple, no JVD  CV: nl rate  Lungs: nl wob   No resp distress  ABD: Soft, nontender, nondistended  Extrem: No CCE  Neuro: nonverbal

## 2022-02-21 NOTE — ASSESSMENT & PLAN NOTE
· POA, evidenced by leukocytosis, bandemia, tachycardia, lactic acidosis, and potential aspiration pneumonia     · Lactic acid resolved  · Leukocytosis resolved  · Blood cultures with 1/2 coag neg staph likely skin karma contamination   · Monitor off antibiotics per ID recommendations

## 2022-02-21 NOTE — ASSESSMENT & PLAN NOTE
· Present on admission  · CT chest showing extensive diffuse bilateral groundglass opacities consistent with aspiration pneumonitis versus pneumonia  · Procalcitonin peaked at 19 8 and decreased to 2 2   · Discontinued ertapenem as per Infectious Disease recommendations given significant drop in procalcitonin and treatment course  This was discontinued 2/20/22 after 6 days of treatment  · NG tube was removed, was seen by speech therapy and cleared for clear liquid diet  Further advancement in diet as per speech therapy and surgical recommendations    · Recommend aspiration precautions/changing positions labored

## 2022-02-21 NOTE — PROGRESS NOTES
[facility]  Progress Note [facility] 1950, 66 y o  male MRN: [id number]  Unit/Bed#: S -01 Encounter: [id number]  Primary Care Provider: Samina Rogers MD   Date and time admitted to hospital: 2/14/2022 10:59 AM    * Small bowel obstruction (HCC)  Assessment & Plan  · POA, recurrent  · Patient was following with GI and had a CT enterography planned to evaluate potential cause - this will be done as outpt (not inpt in discussion with GI previously)  · Could be related to underlying neurologic dysfunction   · XR Obstruction Series from 2/16 showing persistently distended bowel loops   · XR Obstruction Series from 2/18 continued with Ileus   · Clamp trial 2/18-2/19 - NG tube removed 2/19 at 1230  · General Surgery following   · Clear liquid diet today (from sips of clear liquid only on 2/19)  · Recommending [place] [facility] today, order placed awaiting scheduling    Severe protein-calorie malnutrition ([address] )  Assessment & Plan  Malnutrition Findings:   Adult Malnutrition type: Acute illness (in the setting of chronic illness)  Adult Degree of Malnutrition: Other severe protein calorie malnutrition (related to inadequate oral intake as evidenced by temporal indentation, orbital hollow, 24 4% weight decrease x ~4 months  Patient is currently NPO)    BMI Findings: Body mass index is 26 15 kg/m²  · In the setting of prior anoxic brain injury, multiple bowel obstructions  · Encourage oral intake, when able     Aspiration pneumonia ([address] )  Assessment & Plan  · Present on admission  · CT chest showing extensive diffuse bilateral groundglass opacities consistent with aspiration pneumonitis versus pneumonia  · Procalcitonin peaked at 19 8 and decreased to 2 2   · Discontinued ertapenem as per Infectious Disease recommendations given significant drop in procalcitonin and treatment course    This was discontinued 2/20/22 after 6 days of treatment  · NG tube was removed, was seen by speech therapy and cleared for clear liquid diet  Further advancement in diet as per speech therapy and surgical recommendations  · Recommend aspiration precautions/changing positions     Anoxic brain injury ([facility] )  Assessment & Plan  · [employer] 10 years ago  Wife is decision maker and advocate  · At baseline he is non-verbal   · Supportive care   · All decisions about medical care NEED to be discussed with wife     Severe sepsis ([facility] )  Assessment & Plan  · POA, evidenced by leukocytosis, bandemia, tachycardia, lactic acidosis, and potential aspiration pneumonia  · Lactic acid resolved  · Leukocytosis resolved  · Blood cultures with 1/2 coag neg staph likely skin karma contamination   · Monitor off antibiotics per ID recommendations     Suprapubic catheter ([facility] )  Assessment & Plan  · Chronically present with colonization of bladder  Doubt active urinary tract infection at this time   · SPT care    GERD (gastroesophageal reflux disease)  Assessment & Plan  · Continue Protonix and Pepcid    Essential hypertension  Assessment & Plan  · Systolic blood pressure currently 140-150s  · Continue Norvasc and Metoprolol     History of DVT of lower extremity  Assessment & Plan  · Continue Eliquis BID    COPD (chronic obstructive pulmonary disease) ([facility])  Assessment & Plan  · Does not appear to be in acute exacerbation        VTE Pharmacologic Prophylaxis: VTE Score: 13 High Risk (Score >/= 5) - Pharmacological DVT Prophylaxis Ordered: apixaban (Eliquis)  Sequential Compression Devices Ordered  Patient Centered Rounds: I performed bedside rounds with nursing staff today  Discussions with Specialists or Other Care Team Provider: CM, nursing    Education and Discussions with Family / Patient: Updated  (wife) via phone  [phone]    Time Spent for Care: 30 minutes  More than 50% of total time spent on counseling and coordination of care as described above      Current Length of Stay: 7 day(s)  Current Patient Status: Inpatient   Certification Statement: The patient will continue to require additional inpatient hospital stay due to SBO awaiting [place] UGI and diet  Discharge Plan: Anticipate discharge in 24-48 hrs to prior assisted or independent living facility  Code Status: Level 3 - DNAR and DNI    Subjective:   No overnight events per nursing  Patient awaiting upper GI series today  Reported bowel movements as well as belching  He does have bowel sounds  Patient himself is nonverbal but does appear comfortable  Objective:     Vitals:   Temp (24hrs), Av 7 °F (37 1 °C), Min:98 4 °F (36 9 °C), Max:98 9 °F (37 2 °C)    Temp:  [98 4 °F (36 9 °C)-98 9 °F (37 2 °C)] 98 9 °F (37 2 °C)  HR:  [59-69] 59  Resp:  [18] 18  BP: (148-159)/(68-79) 159/77  SpO2:  [93 %-94 %] 94 %  Body mass index is 26 15 kg/m²  Input and Output Summary (last 24 hours): Intake/Output Summary (Last 24 hours) at 2022 0955  Last data filed at 2022 0935  Gross per 24 hour   Intake 1300 ml   Output 4000 ml   Net -2700 ml       Physical Exam:   Physical Exam  Vitals and nursing note reviewed  Constitutional:       General: He is sleeping  He is not in acute distress  Appearance: Normal appearance  HENT:      Head: Normocephalic  Mouth/Throat:      Mouth: Mucous membranes are moist    Eyes:      Pupils: Pupils are equal, round, and reactive to light  Cardiovascular:      Rate and Rhythm: Normal rate and regular rhythm  Heart sounds: No murmur heard  Pulmonary:      Effort: Pulmonary effort is normal  No respiratory distress  Breath sounds: Normal breath sounds  No wheezing, rhonchi or rales  Abdominal:      General: Bowel sounds are normal  There is no distension  Palpations: Abdomen is soft  Tenderness: There is no abdominal tenderness  There is no guarding  Musculoskeletal:         General: No deformity  Cervical back: Normal range of motion  Right lower leg: No edema        Left lower leg: No edema  Skin:     Capillary Refill: Capillary refill takes less than 2 seconds  Neurological:      General: No focal deficit present  Mental Status: Mental status is at baseline  Comments: Non verbal         Additional Data:     Labs:  Results from last 7 days   Lab Units 02/20/22  0446 02/15/22  0612 02/14/22  1144   WBC Thousand/uL 7 41   < > 21 07*   HEMOGLOBIN g/dL 10 3*   < > 14 2   HEMATOCRIT % 33 1*   < > 48 5   PLATELETS Thousands/uL 300   < > 434*   BANDS PCT %  --   --  17*   NEUTROS PCT % 73   < >  --    LYMPHS PCT % 16   < >  --    LYMPHO PCT %  --   --  4*   MONOS PCT % 6   < >  --    MONO PCT %  --   --  2*   EOS PCT % 5   < > 0    < > = values in this interval not displayed  Results from last 7 days   Lab Units 02/21/22  0446 02/20/22  0446 02/20/22  0446   SODIUM mmol/L 140   < > 138   POTASSIUM mmol/L 3 2*   < > 3 0*   CHLORIDE mmol/L 108   < > 105   CO2 mmol/L 25   < > 20*   BUN mg/dL 5   < > 7   CREATININE mg/dL 1 31*   < > 1 25   ANION GAP mmol/L 7   < > 13   CALCIUM mg/dL 8 4   < > 8 2*   ALBUMIN g/dL  --   --  2 3*   TOTAL BILIRUBIN mg/dL  --   --  0 37   ALK PHOS U/L  --   --  71   ALT U/L  --   --  10*   AST U/L  --   --  10   GLUCOSE RANDOM mg/dL 91   < > 89    < > = values in this interval not displayed       Results from last 7 days   Lab Units 02/14/22  1257   INR  1 21*     Results from last 7 days   Lab Units 02/20/22  1159 02/20/22  0600 02/20/22  0002 02/19/22  1830 02/19/22  1216 02/19/22  1103 02/19/22  0705 02/19/22  0553 02/19/22  0010 02/18/22  1748 02/18/22  1208 02/18/22  0505   POC GLUCOSE mg/dl 137 79 94 90 84 106 66 72 78 78 74 80         Results from last 7 days   Lab Units 02/18/22  0000 02/16/22  0554 02/15/22  0955 02/15/22  0612 02/14/22  1725 02/14/22  1254   LACTIC ACID mmol/L  --   --  2 7* 3 3* 3 2* 8 1*   PROCALCITONIN ng/ml 2 20* 11 76* 19 41*  --   --   --        Lines/Drains:  Invasive Devices  Report    Peripheral Intravenous Line Peripheral IV 02/21/22 Right Arm <1 day          Drain            NG/OG/Enteral Tube Nasogastric 18 Fr Left nare 81 days    Suprapubic Catheter 12 days                      Imaging: Reviewed radiology reports from this admission including: xray(s)    Recent Cultures (last 7 days):   Results from last 7 days   Lab Units 02/14/22  1524 02/14/22  1257 02/14/22  1254   BLOOD CULTURE   --  Staphylococcus coagulase negative*  Diphtheroids No Growth After 5 Days     GRAM STAIN RESULT   --  Gram positive cocci in clusters*  Gram positive rods*  --    URINE CULTURE  >100,000 cfu/ml Proteus mirabilis*  >100,000 cfu/ml Klebsiella-Enterobacter  group*  >100,000 cfu/ml Staphylococcus species*  --   --        Last 24 Hours Medication List:   Current Facility-Administered Medications   Medication Dose Route Frequency Provider Last Rate    acetaminophen  650 mg Oral Q6H PRN MAYELA Munson-DEBRA      amLODIPine  10 mg Oral Daily Stephie Diasio, PA-C      ammonium lactate   Topical Daily Ricarda Hugo MD      apixaban  2 5 mg Oral BID Brayan Saenz PA-C      baclofen  15 mg Oral TID Merissa Enamorado, PA-C      bisacodyl  5 mg Oral BID Bharti Orellana MD      busPIRone  7 5 mg Oral TID MAYELA Hardwick-DEBRA      docusate sodium  100 mg Oral BID Stephie Diasio, PA-DEBRA      famotidine  20 mg Oral BID Stephie Diasio, PA-C      glycerin-hypromellose-  1 drop Both Eyes BID Ricarda Hugo MD      hydrALAZINE  5 mg Intravenous Q6H PRN Brayan Saenz PA-C      HYDROmorphone  0 5 mg Intravenous Q3H PRN Ricarda Hugo MD      metoprolol tartrate  25 mg Oral Q12H [address] Stephie Enamorado, PA-DEBRA      mirtazapine  15 mg Oral HS Stephie Enamorado PA-C      [place] ANTIFUNGAL   Topical BID Irena Fernandez PA-C      nystatin   Topical BID MAYELA Munson-DEBRA      pantoprazole  40 mg Oral Early Morning Stephie Enamorado PA-DEBRA      polyethylene glycol  17 g Oral Daily Stephie Enamorado, PA-C      potassium chloride  20 mEq Intravenous Q2H Efrem Mina PA-C 20 mEq (02/21/22 0911)    potassium chloride  10 mEq Oral Daily Stephie Enamorado PA-C      QUEtiapine  25 mg Oral HS Stephie Enamorado PA-C      sodium chloride  75 mL/hr Intravenous Continuous Stephie Enamorado PA-C 75 mL/hr (02/21/22 1675)        Today, Patient Was Seen By: Shannon Hicks PA-C    **Please Note: This note may have been constructed using a voice recognition system  **

## 2022-02-21 NOTE — ASSESSMENT & PLAN NOTE
· POA, recurrent     · Patient was following with GI and had a CT enterography planned to evaluate potential cause - this will be done as outpt (not inpt in discussion with GI previously)  · Could be related to underlying neurologic dysfunction   · XR Obstruction Series from 2/16 showing persistently distended bowel loops   · XR Obstruction Series from 2/18 continued with Ileus   · Clamp trial 2/18-2/19 - NG tube removed 2/19 at 1230  · General Surgery following   · Clear liquid diet today (from sips of clear liquid only on 2/19)  · Recommending [place] [facility] today, order placed awaiting scheduling

## 2022-02-21 NOTE — PLAN OF CARE
Problem: MOBILITY - ADULT  Goal: Maintain or return to baseline ADL function  Description: INTERVENTIONS:  -  Assess patient's ability to carry out ADLs; assess patient's baseline for ADL function and identify physical deficits which impact ability to perform ADLs (bathing, care of mouth/teeth, toileting, grooming, dressing, etc )  - Assess/evaluate cause of self-care deficits   - Assess range of motion  - Assess patient's mobility; develop plan if impaired  - Assess patient's need for assistive devices and provide as appropriate  - Encourage maximum independence but intervene and supervise when necessary  - Involve family in performance of ADLs  - Assess for home care needs following discharge   - Consider OT consult to assist with ADL evaluation and planning for discharge  - Provide patient education as appropriate  Outcome: Progressing  Goal: Maintains/Returns to pre admission functional level  Description: INTERVENTIONS:  - Perform BMAT or MOVE assessment daily    - Set and communicate daily mobility goal to care team and patient/family/caregiver  - Collaborate with rehabilitation services on mobility goals if consulted  - Perform Range of Motion 4 times a day  - Reposition patient every 2 hours    - Record patient progress and toleration of activity level   Outcome: Progressing     Problem: Potential for Falls  Goal: Patient will remain free of falls  Description: INTERVENTIONS:  - Educate patient/family on patient safety including physical limitations  - Instruct patient to call for assistance with activity   - Consult OT/PT to assist with strengthening/mobility   - Keep Call bell within reach  - Keep bed low and locked with side rails adjusted as appropriate  - Keep care items and personal belongings within reach  - Initiate and maintain comfort rounds  - Make Fall Risk Sign visible to staff  - Offer Toileting every 2 Hours, in advance of need  - Initiate/Maintain bed alarm  - Obtain necessary fall risk management equipment  - Apply yellow socks and bracelet for high fall risk patients  - Consider moving patient to room near nurses station  Outcome: Progressing     Problem: Prexisting or High Potential for Compromised Skin Integrity  Goal: Skin integrity is maintained or improved  Description: INTERVENTIONS:  - Identify patients at risk for skin breakdown  - Assess and monitor skin integrity  - Assess and monitor nutrition and hydration status  - Monitor labs   - Assess for incontinence   - Turn and reposition patient  - Assist with mobility/ambulation  - Relieve pressure over bony prominences  - Avoid friction and shearing  - Provide appropriate hygiene as needed including keeping skin clean and dry  - Evaluate need for skin moisturizer/barrier cream  - Collaborate with interdisciplinary team   - Patient/family teaching  - Consider wound care consult   Outcome: Progressing     Problem: Nutrition/Hydration-ADULT  Goal: Nutrient/Hydration intake appropriate for improving, restoring or maintaining nutritional needs  Description: Monitor and assess patient's nutrition/hydration status for malnutrition  Collaborate with interdisciplinary team and initiate plan and interventions as ordered  Monitor patient's weight and dietary intake as ordered or per policy  Utilize nutrition screening tool and intervene as necessary  Determine patient's food preferences and provide high-protein, high-caloric foods as appropriate       INTERVENTIONS:  - Monitor oral intake, urinary output, labs, and treatment plans  - Assess nutrition and hydration status and recommend course of action  - Evaluate amount of meals eaten  - Assist patient with eating if necessary   - Allow adequate time for meals  - Recommend/ encourage appropriate diets, oral nutritional supplements, and vitamin/mineral supplements  - Order, calculate, and assess calorie counts as needed  - Recommend, monitor, and adjust tube feedings and TPN/PPN based on assessed needs  - Assess need for intravenous fluids  - Provide specific nutrition/hydration education as appropriate  - Include patient/family/caregiver in decisions related to nutrition  Outcome: Progressing

## 2022-02-21 NOTE — SPEECH THERAPY NOTE
Speech-Language Pathology Bedside Swallow Evaluation        Patient Name: Susana Gamble    Today's Date: 2/21/2022     Problem List  Principal Problem:    Small bowel obstruction ([facility] )  Active Problems:    COPD (chronic obstructive pulmonary disease) ([facility] )    History of DVT of lower extremity    Essential hypertension    GERD (gastroesophageal reflux disease)    Suprapubic catheter (HCC)    Severe sepsis ([facility] )    Anoxic brain injury ([facility] )    Aspiration pneumonia ([facility] )    Severe protein-calorie malnutrition ([facility] )         Past Medical History  Past Medical History:   Diagnosis Date    Ataxia     COPD (chronic obstructive pulmonary disease) ([facility] )     wife denies - "never had"    CVA (cerebral vascular accident) ([facility] )     Dementia ([facility] )     Difficulty swallowing     Difficulty walking     Generalized anxiety disorder     GERD (gastroesophageal reflux disease)     Global aphasia     H/O blood clots     Heartburn     Hypertension     Mental disorder     Muscle weakness     Neuromuscular dysfunction of bladder     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 [facility]     Stroke ([facility] )     Thrombosis        Past Surgical History  Past Surgical History:   Procedure Laterality Date    BOTOX INJECTION N/A 6/18/2019    Procedure: INJECTION BOTULINUM TOXIN (BOTOX), intra detrusor;  Surgeon: Carolyn Armstrong MD;  [facility];  Service: Urology    BOTOX INJECTION N/A 10/7/2019    Procedure: INJECTION BOTULINUM TOXIN (BOTOX), intradetrusor;  Surgeon: Carolyn Armstrong MD;  [facility];  Service: Urology    CYSTOSCOPY      CYSTOSCOPY N/A 6/18/2019    Procedure: cystoscopy and all indicated procedures;  Surgeon: Carloyn Armstrong MD;  [facility];  Service: Urology    [place] CYSTOGRAM  1/15/2019    IR NEPHROSTOMY TUBE CHECK/CHANGE/REPOSITION/REINSERTION/UPSIZE  5/26/2021    IR NEPHROSTOMY TUBE PLACEMENT  3/11/2020    IR SUPRAPUBIC CATHETER PLACEMENT 11/26/2019    IVC FILTER INSERTION      X2    IVC FILTER INSERTION      x2    KIDNEY STONE SURGERY      KNEE SURGERY      Sepsis infection     NEPHROSTOMY      CT CYSTO/URETERO W/LITHOTRIPSY &INDWELL STENT INSRT Right 6/14/2017    Procedure: CYSTOSCOPY URETEROSCOPY WITH LITHOTRIPSY HOLMIUM LASER,,BASKET STONE EXTRACTION, RETROGRADE PYELOGRAM AND INSERTION STENT URETERAL;  Surgeon: Sai Bridges MD;  [facility];  Service: Urology    CT CYSTOURETHROSCOPY,BIOPSY N/A 1/15/2019    Procedure: CYSTOSCOPY, CYSTOGRAM, DILATION OF URETHRAL STRICTURE;  Surgeon: Bong Encinas MD;  [facility];  Service: Urology    URINARY SURGERY         Summary    Limited assessment due to pt only allowed to take clear liquid; pt appeared at risk for aspiration with thin liquids; tolerated nectar thick liquids well and meds crushed in puree per nsg  Recommendations:   Diet: nectar thick liquids and clear liquids per MD   Meds: crushed with puree   Frequent Oral care: 2x/day  Aspiration precautions  Other Recommendations/ considerations: will cont to follow for ongoing assessment as per surgery    Current Medical Status  Pt is a 66 y o  male who presented to [address] [place] [address]  with SBO  Pt presents for hypoxia and coffee ground emesis witnessed by staff at [facility] in [place]  Patient is accompanied by his wife, Anwtan Covarrubias  She provides history of progressive decline in patient's mental and physical status dating back to 2010, where he suffered an episode of severe sepsis  Patient has had multiple admissions for severe sepsis and has a history of blood clots  She says he has had global aphasia since 2013  Patient is bed-bound and has "lost his memory and is not himself" for quite some time now  She mentions that she is ready to "let him go " Wife expressly states to be notified before any procedures are to be done  Ileus and SBO appear to be resolved, pt started on clear liquid diet      Past medical history:   Please see H&P for details    Special Studies:  XR abd kub: 2/18/22 Prominent gas-filled bowel loops  Ileus suspected    Social/Education/Vocational Hx:  Pt lives [place] [employer]   Current Risks for Dysphagia & Aspiration: known history of dysphagia  Current Symptoms/Concerns: hx of dysphagia  Current Diet: clears, NPO for possible UGI- now on hold pending speech eval    Baseline Diet: mechanically altered/level 2 diet and thin liquids  Takes pills-    Baseline Assessment   Behavior/Cognition: alert  Speech/Language Status: not able to to follow commands and no verbal output noted  Patient Positioning: upright in bed     Swallow Mechanism Exam   Facial: symmetrical  Labial: not able to assess due to no command following   Lingual: unable to test 2/2 limited command following  Velum: unable to visualize  Mandible: adequate ROM  Dentition: limited dentition  Vocal quality:none elicited   Volitional Cough: unable to initiate volitional cough   Respiratory: RA    Consistencies Assessed and Performance   Consistencies Administered: thin liquids, nectar thick and jello    Oral Stage: pt w/ better bolus retrieval via straw than cup, adequate retrieval from spoon for jello  Pt w/ good oral control and transfer w/ NTL; decreased control suspected w/ thin liquids  occas oral holding noted toward end of session  Pharyngeal Stage: swallows were mildly delayed but complete; coughing noted x2 with 4 oz of jello, coughing x2 w/ straw sips for 6 oz thin liquids and wheezing noted x1  No coughing, throat clearing or wheezing noted w/ NTL by cup and straw  Esophageal Concerns: none reported      Results Reviewed with: RN and PA   Dysphagia Goals: pt will tolerate NTL by straw and cont for ongoing assessment for diet upgrade as able/as appropriate         April Liana Dandy, MA CCC-SLP  Speech Pathologist  [place] license # [id number]  [place] license # [id number]  Available via [employer]

## 2022-02-21 NOTE — UTILIZATION REVIEW
Continued Stay Review    Date: 2/19/22                          Current Patient Class:  IP  Current Level of Care:  MS    HPI:71 y o  male with hx dementia, CVA,SBO,  COPD and aphasia  initially admitted on 2/14/22 with acute resp failure with hypoxia-likely 2/2 aspiration pneumonitis, sepsis, SBO  ID following, general surgery following  GI consulted 2/16 who recommends NG clamp trials with f/u XR's    Pt on dual IV abx, NGT, IVF  Procalcitonin peaked at 19 8 and decreased to 2 2    Linezoid d/c'ed 2/2 allergy per wife  Blood cultures with 1/2 GPC and GPR likely skin karma contamination   IV Ertapenem d/c'ed 2/18 per ID rec  XR Obstruction Series from 2/16 showing persistently distended bowel loops   XR Obstruction Series from 2/18- continued with Ileus   Assessment/Plan:21/9  Clamp trial 2/18-2/19 - At 12:00 p m  Midnight ,there was 100 out on clamp trial   At 6:00 a m  There was no output  NG tube removed 2/19 at 1230  General surgery following  Pt NPO until seen by ST  Continue IVF  Daily enemas  CT enterography planned as outpatient to evaluate potential cause of SBO  IV PPI , IV pepcid changed to po  Leukocytosis resolved  Pt opens eyes and moans occasionally  Abdomen soft, non distended  Respiratory status stable  ST-Unable to conduct assessment as patient is not medically ready for significant amounts of solids/ liquids or initiation of a PO diet  Patient is tolerating sips of thin liquid via straw  Will continue to follow for readiness to perform swallow exam     Vital Signs:   02/19/22 2147 99 6 °F (37 6 °C) 69 20 176/98 Abnormal  -- 93 %   02/19/22 2035 -- 93 -- 183/90 Abnormal  -- --   02/19/22 1500 99 3 °F (37 4 °C) 79 20 156/96 109 94 %         Pertinent Labs/Diagnostic Results:   2/16 XR obstr series - Stable gaseous distention of small and large bowel loops  2/18 XR abdomen KUB- Prominent gas-filled bowel loops    Ileus suspected          Results from last 7 days   Lab Units 02/19/22  1024 02/18/22  0000 02/17/22  0416 02/17/22  0416 02/15/22  0612 02/15/22  0612 02/14/22  1144 02/14/22  1144   WBC Thousand/uL 8 65 7 61   < > 7 11   < > 12 22*   < > 21 07*   HEMOGLOBIN g/dL 10 2* 9 4*  --  8 5*  --  11 3*   < > 14 2   HEMATOCRIT % 33 2* 30 6*  --  28 3*  --  37 7   < > 48 5   PLATELETS Thousands/uL 299 275   < > 253   < > 321   < > 434*   NEUTROS ABS Thousands/µL 6 18 5 29   < > 4 82   < > 9 90*  --   --    BANDS PCT %  --   --   --   --   --   --   --  17*    < > = values in this interval not displayed  Results from last 7 days   Lab Units 02/19/22  1134 02/18/22  0000 02/17/22  0416 02/16/22  0554 02/16/22  0554 02/15/22  0611 02/15/22  0611   SODIUM mmol/L 135* 137 139   < > 143   < > 148*   POTASSIUM mmol/L 4 5 3 2* 3 6   < > 3 3*   < > 3 3*   CHLORIDE mmol/L 106 106 108   < > 110*   < > 111*   CO2 mmol/L 15* 22 22   < > 24   < > 26   ANION GAP mmol/L 14* 9 9   < > 9   < > 11   BUN mg/dL 9 13 17   < > 23   < > 33*   CREATININE mg/dL 1 21 1 33* 1 41*   < > 1 66*   < > 2 17*   EGFR ml/min/1 73sq m 59 52 49   < > 40   < > 29   CALCIUM mg/dL 8 2* 8 4 8 2*   < > 8 0*   < > 8 7   MAGNESIUM mg/dL  --   --   --   --  2 2  --  2 5   PHOSPHORUS mg/dL  --   --   --   --   --   --  3 1    < > = values in this interval not displayed       Results from last 7 days   Lab Units 02/16/22  0554 02/15/22  0611 02/14/22  1144   AST U/L 13 11 10   ALT U/L 10* 11* 13   ALK PHOS U/L 59 70 88   TOTAL PROTEIN g/dL 6 0* 6 7 8 4*   ALBUMIN g/dL 2 2* 2 4* 3 1*   TOTAL BILIRUBIN mg/dL 0 30 0 28 0 49     Results from last 7 days   Lab Units 02/19/22  1830 02/19/22  1216 02/19/22  1103 02/19/22  0705 02/19/22  0553 02/19/22  0010 02/18/22  1748 02/18/22  1208 02/18/22  0505   POC GLUCOSE mg/dl 90 84 106 66 72 78 78 74 80     Results from last 7 days   Lab Units 02/19/22  1134 02/18/22  0000 02/17/22  0416 02/16/22  0554 02/15/22  0611 02/14/22  1144   GLUCOSE RANDOM mg/dL 62* 75 76 87 101 260*               Results from last 7 days   Lab Units 02/14/22  1725 02/14/22  1414 02/14/22  1144   HS TNI 0HR ng/L  --   --  14   HS TNI 2HR ng/L  --  12  --    HSTNI D2 ng/L  --  -2  --    HS TNI 4HR ng/L 13  --   --    HSTNI D4 ng/L -1  --   --          Results from last 7 days   Lab Units 02/14/22  1257   PROTIME seconds 15 3*   INR  1 21*   PTT seconds 29         Results from last 7 days   Lab Units 02/18/22  0000 02/16/22  0554 02/15/22  0955   PROCALCITONIN ng/ml 2 20* 11 76* 19 41*     Results from last 7 days   Lab Units 02/15/22  0955 02/15/22  0612 02/14/22  1725 02/14/22  1254   LACTIC ACID mmol/L 2 7* 3 3* 3 2* 8 1*                         Results from last 7 days   Lab Units 02/14/22  1144   LIPASE u/L 40*                 Results from last 7 days   Lab Units 02/14/22  1524   CLARITY UA  Cloudy   COLOR UA  Yellow   SPEC GRAV UA  1 015   PH UA  8 5*   GLUCOSE UA mg/dl Negative   KETONES UA mg/dl Negative   BLOOD UA  Large*   PROTEIN UA mg/dl 30 (1+)*   NITRITE UA  Negative   BILIRUBIN UA  Negative   UROBILINOGEN UA E U /dl 0 2   LEUKOCYTES UA  Large*   WBC UA /hpf Innumerable*   RBC UA /hpf Innumerable*   BACTERIA UA /hpf Innumerable*   EPITHELIAL CELLS WET PREP /hpf Occasional               Results from last 7 days   Lab Units 02/14/22  1524 02/14/22  1257 02/14/22  1254   BLOOD CULTURE   --  Staphylococcus coagulase negative*  Diphtheroids No Growth After 5 Days     GRAM STAIN RESULT   --  Gram positive cocci in clusters*  Gram positive rods*  --    URINE CULTURE  >100,000 cfu/ml Proteus mirabilis*  >100,000 cfu/ml Klebsiella-Enterobacter  group*  >100,000 cfu/ml Staphylococcus species*  --   --                  Medications:   Scheduled Medications:  amLODIPine, 10 mg, Oral, Daily  ammonium lactate, , Topical, Daily  apixaban, 2 5 mg, Oral, BID  baclofen, 15 mg, Oral, TID      Start 2/20  bisacodyl, 5 mg, Oral, BID  busPIRone, 7 5 mg, Oral, TID      start 2/20  docusate sodium, 100 mg, Oral, BID  famotidine, 20 mg, Oral, BID start 2/20  glycerin-hypromellose-, 1 drop, Both Eyes, BID  metoprolol tartrate, 25 mg, Oral, Q12H DORON  mirtazapine, 15 mg, Oral, HS      start 2/20  TRENTON ANTIFUNGAL, , Topical, BID  pantoprazole, 40 mg, Oral, Early Morning  start 2/20  polyethylene glycol, 17 g, Oral, Daily  -start 2/20  potassium chloride, 10 mEq, Oral, Daily    apixaban (ELIQUIS) tablet 5 mg  Dose: 5 mg  Freq: 2 times daily Route: PER NG TUBE  Start: 02/15/22 1200 End: 02/19/22 1119    saccharomyces boulardii (FLORASTOR) capsule 250 mg  Dose: 250 mg  Freq: 2 times daily Route: PER NG TUBE  Start: 02/15/22 1200 End: 02/19/22 1119  pantoprazole (PROTONIX) injection 40 mg  Dose: 40 mg  Freq: Every 24 hours scheduled Route: IV  Start: 02/15/22 1545 End: 02/19/22 1432  metoprolol tartrate (LOPRESSOR) tablet 25 mg  Dose: 25 mg  Freq: Every 12 hours scheduled Route: PER NG TUBE  Start: 02/15/22 1200 End: 02/19/22 1432  metoclopramide (REGLAN) injection 10 mg  Dose: 10 mg  Freq: Every 6 hours scheduled Route: IV  Start: 02/18/22 1245 End: 02/19/22 0013  famotidine (PEPCID) injection 20 mg  Dose: 20 mg  Freq: Every 24 hours scheduled Route: IV  Start: 02/15/22 0900 End: 02/19/22 1432  ertapenem (INVanz) 1,000 mg in sodium chloride 0 9 % 50 mL IVPB  Dose: 1,000 mg  Freq: Every 24 hours Route: IV  Last Dose: 1,000 mg (02/19/22 0502)  Start: 02/15/22 0600 End: 02/19/22 0716  dextrose infusion 10 %  Dose: 125 mL  Freq: Once Route: IV  Last Dose: 125 mL (02/19/22 1045)  Start: 02/19/22 1030 End: 02/19/22 1045 x1 2/19  Continuous IV Infusions:  sodium chloride, 75 mL/hr, Intravenous, Continuous      PRN Meds:  acetaminophen, 650 mg, Oral, Q6H PRN  hydrALAZINE, 5 mg, Intravenous, Q6H PRN  HYDROmorphone, 0 5 mg, Intravenous, Q3H PRN        Discharge Plan: TBD    Network Utilization Review Department  ATTENTION: Please call with any questions or concerns to [phone] and carefully listen to the prompts so that you are directed to the right person   All voicemails are confidential   Sneha Ruy all requests for admission clinical reviews, approved or denied determinations and any other requests to dedicated fax number below belonging to the campus where the patient is receiving treatment   List of dedicated fax numbers for the Facilities:  [address] DENIALS (Administrative/Medical Necessity) [phone]   [address] (Maternity/NICU/Pediatrics) [phone]   [address]  [address] [facility] [place] [phone] [place] [address] [address] [facility] [phone]

## 2022-02-22 ENCOUNTER — APPOINTMENT (INPATIENT)
Dept: RADIOLOGY | Facility: HOSPITAL | Age: 72
DRG: 871 | End: 2022-02-22
Payer: MEDICARE

## 2022-02-22 LAB
ANION GAP SERPL CALCULATED.3IONS-SCNC: 9 MMOL/L (ref 4–13)
BUN SERPL-MCNC: 5 MG/DL (ref 5–25)
CALCIUM SERPL-MCNC: 8.3 MG/DL (ref 8.3–10.1)
CHLORIDE SERPL-SCNC: 105 MMOL/L (ref 100–108)
CO2 SERPL-SCNC: 19 MMOL/L (ref 21–32)
CREAT SERPL-MCNC: 1.25 MG/DL (ref 0.6–1.3)
GFR SERPL CREATININE-BSD FRML MDRD: 56 ML/MIN/1.73SQ M
GLUCOSE SERPL-MCNC: 94 MG/DL (ref 65–140)
POTASSIUM SERPL-SCNC: 4.7 MMOL/L (ref 3.5–5.3)
SODIUM SERPL-SCNC: 133 MMOL/L (ref 136–145)

## 2022-02-22 PROCEDURE — 74240 X-RAY XM UPR GI TRC 1CNTRST: CPT

## 2022-02-22 PROCEDURE — 99232 SBSQ HOSP IP/OBS MODERATE 35: CPT | Performed by: SURGERY

## 2022-02-22 PROCEDURE — 80048 BASIC METABOLIC PNL TOTAL CA: CPT | Performed by: PHYSICIAN ASSISTANT

## 2022-02-22 PROCEDURE — 99232 SBSQ HOSP IP/OBS MODERATE 35: CPT | Performed by: PHYSICIAN ASSISTANT

## 2022-02-22 RX ADMIN — BUSPIRONE HYDROCHLORIDE 7.5 MG: 5 TABLET ORAL at 18:06

## 2022-02-22 RX ADMIN — MICONAZOLE NITRATE: 20 CREAM TOPICAL at 18:07

## 2022-02-22 RX ADMIN — MICONAZOLE NITRATE: 20 CREAM TOPICAL at 08:47

## 2022-02-22 RX ADMIN — BACLOFEN 15 MG: 10 TABLET ORAL at 18:07

## 2022-02-22 RX ADMIN — BUSPIRONE HYDROCHLORIDE 7.5 MG: 5 TABLET ORAL at 22:22

## 2022-02-22 RX ADMIN — BACLOFEN 15 MG: 10 TABLET ORAL at 22:22

## 2022-02-22 RX ADMIN — DOCUSATE SODIUM 100 MG: 100 CAPSULE ORAL at 18:06

## 2022-02-22 RX ADMIN — GLYCERIN 1 DROP: .002; .002; .01 SOLUTION/ DROPS OPHTHALMIC at 18:07

## 2022-02-22 RX ADMIN — METOPROLOL TARTRATE 25 MG: 25 TABLET, FILM COATED ORAL at 22:23

## 2022-02-22 RX ADMIN — APIXABAN 2.5 MG: 2.5 TABLET, FILM COATED ORAL at 18:06

## 2022-02-22 RX ADMIN — NYSTATIN: 100000 CREAM TOPICAL at 18:08

## 2022-02-22 RX ADMIN — FAMOTIDINE 20 MG: 20 TABLET, FILM COATED ORAL at 18:07

## 2022-02-22 RX ADMIN — SODIUM CHLORIDE 75 ML/HR: 0.45 INJECTION, SOLUTION INTRAVENOUS at 20:30

## 2022-02-22 RX ADMIN — Medication: at 08:47

## 2022-02-22 RX ADMIN — QUETIAPINE FUMARATE 25 MG: 25 TABLET ORAL at 22:23

## 2022-02-22 RX ADMIN — GLYCERIN 1 DROP: .002; .002; .01 SOLUTION/ DROPS OPHTHALMIC at 08:47

## 2022-02-22 RX ADMIN — MIRTAZAPINE 15 MG: 15 TABLET, FILM COATED ORAL at 22:23

## 2022-02-22 RX ADMIN — SODIUM CHLORIDE 75 ML/HR: 0.45 INJECTION, SOLUTION INTRAVENOUS at 06:10

## 2022-02-22 RX ADMIN — NYSTATIN: 100000 CREAM TOPICAL at 08:47

## 2022-02-22 NOTE — ASSESSMENT & PLAN NOTE
· POA, recurrent  · Patient was following with GI and had a CT enterography planned to evaluate potential cause - this will be done as outpt (not inpt in discussion with GI previously)  · Could be related to underlying neurologic dysfunction   · XR Obstruction Series from 2/16 showing persistently distended bowel loops   · XR Obstruction Series from 2/18 continued with Ileus   · Clamp trial 2/18-2/19 - NG tube removed 2/19 at 1230  · General Surgery following   · Recommending [place] UGI today, order placed awaiting scheduling  · NGT placement ordered for contrast administration  Can remove after and recommend SLP re-eval for diet

## 2022-02-22 NOTE — PROGRESS NOTES
[facility]  Progress Note Eldon Beatty [facility] 1950, 66 y o  male MRN: [id number]  Unit/Bed#: S -01 Encounter: [id number]  Primary Care Provider: Carmela Bledsoe MD   Date and time admitted to hospital: 2/14/2022 10:59 AM    * Small bowel obstruction (HCC)  Assessment & Plan  · POA, recurrent  · Patient was following with GI and had a CT enterography planned to evaluate potential cause - this will be done as outpt (not inpt in discussion with GI previously)  · Could be related to underlying neurologic dysfunction   · XR Obstruction Series from 2/16 showing persistently distended bowel loops   · XR Obstruction Series from 2/18 continued with Ileus   · Clamp trial 2/18-2/19 - NG tube removed 2/19 at 1230  · General Surgery following   · Recommending [place] UGI today, order placed awaiting scheduling  · NGT placement ordered for contrast administration  Can remove after and recommend SLP re-eval for diet  Severe protein-calorie malnutrition ([address] )  Assessment & Plan  Malnutrition Findings:   Adult Malnutrition type: Acute illness (in the setting of chronic illness)  Adult Degree of Malnutrition: Other severe protein calorie malnutrition (related to inadequate oral intake as evidenced by temporal indentation, orbital hollow, 24 4% weight decrease x ~4 months  Patient is currently NPO)    BMI Findings: Body mass index is 26 15 kg/m²  · In the setting of prior anoxic brain injury, multiple bowel obstructions  · Encourage oral intake, when able     Aspiration pneumonia ([address] )  Assessment & Plan  · Present on admission  · CT chest showing extensive diffuse bilateral groundglass opacities consistent with aspiration pneumonitis versus pneumonia  · Procalcitonin peaked at 19 8 and decreased to 2 2   · Discontinued ertapenem as per Infectious Disease recommendations given significant drop in procalcitonin and treatment course    This was discontinued 2/20/22 after 6 days of treatment  · NG tube was removed, was seen by speech therapy and cleared for clear liquid diet  Further advancement in diet as per speech therapy and surgical recommendations  · Recommend aspiration precautions/changing positions     Anoxic brain injury ([facility] )  Assessment & Plan  · [employer] 10 years ago  Wife is decision maker and advocate  · At baseline he is non-verbal   · Supportive care   · All decisions about medical care NEED to be discussed with wife     Severe sepsis ([facility] )  Assessment & Plan  · POA, evidenced by leukocytosis, bandemia, tachycardia, lactic acidosis, and potential aspiration pneumonia  · Lactic acid resolved  · Leukocytosis resolved  · Blood cultures with 1/2 coag neg staph likely skin karma contamination   · Monitor off antibiotics per ID recommendations     Suprapubic catheter ([facility] [address] )  Assessment & Plan  · Chronically present with colonization of bladder  Doubt active urinary tract infection at this time   · SPT care    GERD (gastroesophageal reflux disease)  Assessment & Plan  · Continue Protonix and Pepcid    Essential hypertension  Assessment & Plan  · Systolic blood pressure currently 140-150s  · Continue Norvasc and Metoprolol     History of DVT of lower extremity  Assessment & Plan  · Continue Eliquis BID    COPD (chronic obstructive pulmonary disease) (HCC)  Assessment & Plan  · Does not appear to be in acute exacerbation        VTE Pharmacologic Prophylaxis: VTE Score: 13 High Risk (Score >/= 5) - Pharmacological DVT Prophylaxis Ordered: apixaban (Eliquis)  Sequential Compression Devices Ordered  Patient Centered Rounds: I performed bedside rounds with nursing staff today  Discussions with Specialists or Other Care Team Provider: CM, nursing    Education and Discussions with Family / Patient: Updated  (wife) via phone  0840    Time Spent for Care: 30 minutes  More than 50% of total time spent on counseling and coordination of care as described above      Current Length of Stay: 8 day(s)  Current Patient Status: Inpatient   Certification Statement: The patient will continue to require additional inpatient hospital stay due to for UGI today  Discharge Plan: Anticipate discharge tomorrow to prior assisted or independent living facility  Code Status: Level 3 - DNAR and DNI    Subjective:   Patient still having bowel movements overnight  For upper GI series today at 11:30 a m  [place] Patient appears comfortable  Objective:     Vitals:   Temp (24hrs), Av 7 °F (37 1 °C), Min:98 2 °F (36 8 °C), Max:99 4 °F (37 4 °C)    Temp:  [98 2 °F (36 8 °C)-99 4 °F (37 4 °C)] 98 6 °F (37 °C)  HR:  [55-60] 56  Resp:  [18] 18  BP: (144-159)/(68-94) 148/68  SpO2:  [93 %-97 %] 93 %  Body mass index is 26 15 kg/m²  Input and Output Summary (last 24 hours): Intake/Output Summary (Last 24 hours) at 2022 1023  Last data filed at 2022 4838  Gross per 24 hour   Intake 942 5 ml   Output 2000 ml   Net -1057 5 ml       Physical Exam:   Physical Exam  Vitals and nursing note reviewed  Constitutional:       General: He is not in acute distress  Appearance: Normal appearance  Comments: Non-verbal   HENT:      Head: Normocephalic  Mouth/Throat:      Mouth: Mucous membranes are moist    Eyes:      Pupils: Pupils are equal, round, and reactive to light  Cardiovascular:      Rate and Rhythm: Normal rate and regular rhythm  Heart sounds: No murmur heard  Pulmonary:      Effort: Pulmonary effort is normal  No respiratory distress  Breath sounds: Normal breath sounds  No wheezing, rhonchi or rales  Abdominal:      General: Bowel sounds are normal  There is no distension  Palpations: Abdomen is soft  Tenderness: There is no abdominal tenderness  There is no guarding  Musculoskeletal:         General: No deformity  Cervical back: Normal range of motion  Right lower leg: No edema  Left lower leg: No edema     Skin:     Capillary Refill: Capillary refill takes less than 2 seconds  Neurological:      General: No focal deficit present  Mental Status: Mental status is at baseline  Additional Data:     Labs:  Results from last 7 days   Lab Units 02/20/22  0446   WBC Thousand/uL 7 41   HEMOGLOBIN g/dL 10 3*   HEMATOCRIT % 33 1*   PLATELETS Thousands/uL 300   NEUTROS PCT % 73   LYMPHS PCT % 16   MONOS PCT % 6   EOS PCT % 5     Results from last 7 days   Lab Units 02/22/22  0446 02/21/22  0446 02/20/22  0446   SODIUM mmol/L 133*   < > 138   POTASSIUM mmol/L 4 7   < > 3 0*   CHLORIDE mmol/L 105   < > 105   CO2 mmol/L 19*   < > 20*   BUN mg/dL 5   < > 7   CREATININE mg/dL 1 25   < > 1 25   ANION GAP mmol/L 9   < > 13   CALCIUM mg/dL 8 3   < > 8 2*   ALBUMIN g/dL  --   --  2 3*   TOTAL BILIRUBIN mg/dL  --   --  0 37   ALK PHOS U/L  --   --  71   ALT U/L  --   --  10*   AST U/L  --   --  10   GLUCOSE RANDOM mg/dL 94   < > 89    < > = values in this interval not displayed  Results from last 7 days   Lab Units 02/20/22  1159 02/20/22  0600 02/20/22  0002 02/19/22  1830 02/19/22  1216 02/19/22  1103 02/19/22  0705 02/19/22  0553 02/19/22  0010 02/18/22  1748 02/18/22  1208 02/18/22  0505   POC GLUCOSE mg/dl 137 79 94 90 84 106 66 72 78 78 74 80         Results from last 7 days   Lab Units 02/18/22  0000 02/16/22  0554   PROCALCITONIN ng/ml 2 20* 11 76*       Lines/Drains:  Invasive Devices  Report    Peripheral Intravenous Line            Peripheral IV 02/21/22 Right Arm 1 day          Drain            NG/OG/Enteral Tube Nasogastric 18 Fr Left nare 82 days    Suprapubic Catheter 13 days                      Imaging: No pertinent imaging reviewed      Recent Cultures (last 7 days):         Last 24 Hours Medication List:   Current Facility-Administered Medications   Medication Dose Route Frequency Provider Last Rate    acetaminophen  650 mg Oral Q6H PRN Stephie Enamorado PA-C      amLODIPine  10 mg Oral Daily Stephie Enamorado PA-C      ammonium lactate   Topical Daily Clayton Dean MD      apixaban  2 5 mg Oral BID Marijean Breath, PA-C      baclofen  15 mg Oral TID Marijean Breath, PA-C      bisacodyl  5 mg Oral BID Margaret Winters MD      busPIRone  7 5 mg Oral TID Marijean Breath, PA-C      docusate sodium  100 mg Oral BID Stephie Diasio, PA-C      famotidine  20 mg Oral BID Marijean Breath, PA-C      glycerin-hypromellose-  1 drop Both Eyes BID Clayton Dean MD      hydrALAZINE  5 mg Intravenous Q6H PRN Marijean Breath, PA-C      HYDROmorphone  0 5 mg Intravenous Q3H PRN Clayton Dean MD      metoprolol tartrate  25 mg Oral Q12H [facility] Stephie Diasio, PA-C      mirtazapine  15 mg Oral HS Stephie Diasio, PA-C      TRENTON ANTIFUNGAL   Topical BID Clyda Narrow, PA-C      nystatin   Topical BID Stephie Diasio, PA-C      pantoprazole  40 mg Oral Early Morning Stephie Diasio, PA-C      polyethylene glycol  17 g Oral Daily Stephie Diasio, PA-C      potassium chloride  10 mEq Oral Daily Stephie Diasio, PA-C      QUEtiapine  25 mg Oral HS Stephie Diasio, PA-C      sodium chloride  75 mL/hr Intravenous Continuous Stephie Diasio, PA-C 75 mL/hr (02/22/22 0610)        Today, Patient Was Seen By: Shannon Hicks PA-C    **Please Note: This note may have been constructed using a voice recognition system  **

## 2022-02-23 ENCOUNTER — TELEPHONE (OUTPATIENT)
Dept: FAMILY MEDICINE CLINIC | Facility: CLINIC | Age: 72
End: 2022-02-23

## 2022-02-23 VITALS
RESPIRATION RATE: 18 BRPM | DIASTOLIC BLOOD PRESSURE: 72 MMHG | BODY MASS INDEX: 26.15 KG/M2 | TEMPERATURE: 98.3 F | WEIGHT: 203.71 LBS | HEART RATE: 50 BPM | SYSTOLIC BLOOD PRESSURE: 170 MMHG | OXYGEN SATURATION: 93 %

## 2022-02-23 PROCEDURE — 99231 SBSQ HOSP IP/OBS SF/LOW 25: CPT | Performed by: SURGERY

## 2022-02-23 PROCEDURE — 99239 HOSP IP/OBS DSCHRG MGMT >30: CPT | Performed by: PHYSICIAN ASSISTANT

## 2022-02-23 PROCEDURE — 92526 ORAL FUNCTION THERAPY: CPT

## 2022-02-23 RX ORDER — PANTOPRAZOLE SODIUM 40 MG/1
40 TABLET, DELAYED RELEASE ORAL
Refills: 0
Start: 2022-02-24 | End: 2022-03-26

## 2022-02-23 RX ORDER — METOCLOPRAMIDE 5 MG/1
5 TABLET ORAL
Refills: 0
Start: 2022-02-23

## 2022-02-23 RX ORDER — METOCLOPRAMIDE 5 MG/1
5 TABLET ORAL
Status: DISCONTINUED | OUTPATIENT
Start: 2022-02-23 | End: 2022-02-23 | Stop reason: HOSPADM

## 2022-02-23 RX ADMIN — METOPROLOL TARTRATE 25 MG: 25 TABLET, FILM COATED ORAL at 08:50

## 2022-02-23 RX ADMIN — AMLODIPINE BESYLATE 10 MG: 10 TABLET ORAL at 08:51

## 2022-02-23 RX ADMIN — BACLOFEN 15 MG: 10 TABLET ORAL at 08:50

## 2022-02-23 RX ADMIN — NYSTATIN: 100000 CREAM TOPICAL at 08:45

## 2022-02-23 RX ADMIN — Medication: at 08:45

## 2022-02-23 RX ADMIN — FAMOTIDINE 20 MG: 20 TABLET, FILM COATED ORAL at 08:51

## 2022-02-23 RX ADMIN — BUSPIRONE HYDROCHLORIDE 7.5 MG: 5 TABLET ORAL at 08:50

## 2022-02-23 RX ADMIN — METOCLOPRAMIDE 5 MG: 5 TABLET ORAL at 08:54

## 2022-02-23 RX ADMIN — PANTOPRAZOLE SODIUM 40 MG: 40 TABLET, DELAYED RELEASE ORAL at 05:21

## 2022-02-23 RX ADMIN — POTASSIUM CHLORIDE 10 MEQ: 20 SOLUTION ORAL at 08:50

## 2022-02-23 RX ADMIN — MICONAZOLE NITRATE: 20 CREAM TOPICAL at 08:44

## 2022-02-23 RX ADMIN — APIXABAN 2.5 MG: 2.5 TABLET, FILM COATED ORAL at 08:50

## 2022-02-23 RX ADMIN — GLYCERIN 1 DROP: .002; .002; .01 SOLUTION/ DROPS OPHTHALMIC at 08:44

## 2022-02-23 NOTE — PROGRESS NOTES
Progress Note - General Surgery   Dawn Tomas 66 y o  male MRN: [id number]  Unit/Bed#: S -01 Encounter: [id number]    Assessment:  67 yo M with PMH of dementia, CVA, COPD and aphasia who presents with aspiration pneumonia and possible SBO vs  Ileus  Patient with likely dysmotility disorder    UGI consistent with possible dysmotility disorder    Plan:  Advance diet per speech recommendations  Replete lytes PRN  Continue bowel regimen  Rest of care per primary    Subjective/Objective     SubjectiveNo acute events overnight  Nonverbal tolerating diet  Having bowel function     Objective:    Blood pressure 141/80, pulse 55, temperature 98 8 °F (37 1 °C), temperature source Axillary, resp  rate 17, weight 92 4 kg (203 lb 11 3 oz), SpO2 91 %  ,Body mass index is 26 15 kg/m²  Intake/Output Summary (Last 24 hours) at 2/23/2022 0726  Last data filed at 2/22/2022 2031  Gross per 24 hour   Intake 1000 ml   Output 1500 ml   Net -500 ml       Invasive Devices  Report    Peripheral Intravenous Line            Peripheral IV 02/21/22 Right Arm 2 days          Drain            NG/OG/Enteral Tube Nasogastric 18 Fr Left nare 83 days    Suprapubic Catheter 14 days                Physical Exam:  General: NAD  HENT: NCAT MMM  Neck: supple, no JVD  CV: nl rate  Lungs: nl wob   No resp distress  ABD: Soft, nontender, nondistended  Extrem: No CCE  Neuro: AAOx3

## 2022-02-23 NOTE — SPEECH THERAPY NOTE
Speech Language/Pathology    Speech/Language Pathology Progress Note    Patient Name: Sangita Reich  SVUQL'R Date: 2/23/2022       Subjective:  Pt seen for dysphagia tx follow up, asked to re-eval for po diet prior to discharge today  UGI completed 2/22/22: There is no evidence of gastric outlet obstruction  The stomach is nondistended  The proximal small bowel is also normal caliber   Further distally increased bowel gas, suggesting ileus/dysmotility disorder   Small amount of gastroesophageal reflux occurred    Objective:  Pt sleeping but arousable; nonverbal, does not follow commands  Dysphagia 2 w/ nectar thick liquids rec'd for lunch  Pt ate bites of puree, minced meatloaf, minced green beans and cup sips of NTL milk  Pt w/ adequate, somewhat excessive mastication/manipulation of textured foods  Sips of NTL or applesauce used to ensure oral clearance of residue  Slow transfer suspected  Swallow initiation appeared mildly delayed but complete  No coughing, throat clearing or wet voice noted       Assessment:  Pt tolerated dysphagia 2 diet w/ nectar thick liquids by cup    Plan/Recommendations:  Cont dysphagia 2 diet w/ nectar thick liquids  Pt for discharge today, speech to cont to follow pt for diet tolerance and potential to advance to thin liquids  Cont meds crushed in puree    April Arvin [place] CCC-SLP  Speech Pathologist  Available via  tiger text

## 2022-02-23 NOTE — DISCHARGE SUMMARY
[facility]  Discharge- Mely Johnson [facility] 1950, 66 y o  male MRN: [id number]  Unit/Bed#: S -01 Encounter: [id number]  Primary Care Provider: Yesica Simms MD   Date and time admitted to hospital: 2/14/2022 10:59 AM    * Small bowel obstruction (HCC)  Assessment & Plan  · POA, recurrent  · Patient was following with GI and had a CT enterography planned to evaluate potential cause - this will be done as outpt (not inpt in discussion with GI previously)  · Could be related to underlying neurologic dysfunction   · XR Obstruction Series from 2/16 showing persistently distended bowel loops   · XR Obstruction Series from 2/18 continued with Ileus   · Clamp trial 2/18-2/19 - NG tube removed 2/19 at 1230  · General Surgery following   · FL UGI 2/22 with dysmotility noted, will start 5 mg of Reglan TID with meals  · SLP appreciated, Level 2 diet with nectar thick liquids  Severe protein-calorie malnutrition ([address] )  Assessment & Plan  Malnutrition Findings:   Adult Malnutrition type: Acute illness (in the setting of chronic illness)  Adult Degree of Malnutrition: Other severe protein calorie malnutrition (related to inadequate oral intake as evidenced by temporal indentation, orbital hollow, 24 4% weight decrease x ~4 months  Patient is currently NPO)    BMI Findings: Body mass index is 26 15 kg/m²  · In the setting of prior anoxic brain injury, multiple bowel obstructions  · Encourage oral intake, when able     Aspiration pneumonia ([address] )  Assessment & Plan  · Present on admission  · CT chest showing extensive diffuse bilateral groundglass opacities consistent with aspiration pneumonitis versus pneumonia  · Procalcitonin peaked at 19 8 and decreased to 2 2   · Discontinued ertapenem as per Infectious Disease recommendations given significant drop in procalcitonin and treatment course    This was discontinued 2/20/22 after 6 days of treatment  · NG tube was removed, was seen by speech therapy and cleared for clear liquid diet  Further advancement in diet as per speech therapy and surgical recommendations  · Recommend aspiration precautions/changing positions     Anoxic brain injury ([facility] )  Assessment & Plan  · App[employer]x 10 years ago  Wife is decision maker and advocate  · At baseline he is non-verbal   · Supportive care   · All decisions about medical care NEED to be discussed with wife     Severe sepsis ([facility] )  Assessment & Plan  · POA, evidenced by leukocytosis, bandemia, tachycardia, lactic acidosis, and potential aspiration pneumonia  · Lactic acid resolved  · Leukocytosis resolved  · Blood cultures with 1/2 coag neg staph likely skin karma contamination   · Monitor off antibiotics per ID recommendations     Suprapubic catheter ([facility] )  Assessment & Plan  · Chronically present with colonization of bladder   Doubt active urinary tract infection at this time   · SPT care    GERD (gastroesophageal reflux disease)  Assessment & Plan  · Continue Protonix and Pepcid    Essential hypertension  Assessment & Plan  · Systolic blood pressure stable  · Continue Norvasc and Metoprolol     History of DVT of lower extremity  Assessment & Plan  · Continue Eliquis BID    COPD (chronic obstructive pulmonary disease) ([facility] )  Assessment & Plan  · Does not appear to be in acute exacerbation    Medical Problems             Resolved Problems  Date Reviewed: 2/22/2022          Resolved    Hypernatremia 2/16/2022     Resolved by  Alphonso Brown PA-C              Discharging Physician / Practitioner: Ariela Muñiz PA-C  PCP: Veronica Meadows MD  Admission Date:   Admission Orders (From admission, onward)     Ordered        02/14/22 1436  Inpatient Admission  Once                      Discharge Date: 02/23/22    Disposition:    Other [facility]    Reason for Admission: SBO    Discharge Diagnoses:   Please see assessment and plan section above for further details regarding discharge diagnoses  Consultations During Hospital Stay:  · ID  · General Surgery  · GI    Procedures Performed:   · none     Significant Findings / Test Results:   · Admission CT with massively distended stomach, dilated small bowel consistent with small-bowel obstruction  Stool impaction noted  · Upper GI series with no evidence of gastric outlet obstruction, nondistended stomach with normal proximal small bowel  Further distally there was increased bowel gas suggesting ileus/dysmotility disorder  · 1/2 admission blood cultures with Staph coagulase negative  · Urine culture with growth of Proteus, Klebsiella, Enterobacter, Staph coccus  Completed antibiotics  Incidental Findings:   ·  Ectatic thoracic aorta measuring up to 42 mm, unchanged from CT of 10/6/2021  Recommendation is for follow-up low radiation dose chest CT in one year  Test Results Pending at Discharge (will require follow up):   · none     Outpatient Tests Requested:  · none    Complications:  none    Hospital Course:      Bull Gambino is a 66 y o  male patient who originally presented to the hospital on 2/14/2022 due to hypoxia and coffee-ground emesis  Patient is nonverbal wounds unable to provide any HPI  He is a resident of [facility]  CT upon admission was consistent with small-bowel obstruction for which she has history of same  He was treated conservatively with NG tube and surgery evaluation  Was also found to have 1/2 blood cultures upon admission positive for coagulase-negative Staph  Received a dose of ertapenem however was monitor oral off antibiotics per Infectious Disease recommendations  His small-bowel obstruction resolved with NG tube and gastric decompression  He underwent an upper GI series which did show colonic dysmotility was started on Reglan and Protonix  Was tolerating diet upon day of discharge and was discharged back to [facility]      Condition at Discharge: stable    Discharge Day Visit / Exam: Subjective:  Passing gas and having bowel movements  Patient is nonverbal but appears comfortable  No overnight events  Stable for hospital discharge today  Vitals: Blood Pressure: 170/72 (02/23/22 0700)  Pulse: (!) 50 (02/23/22 0700)  Temperature: 98 3 °F (36 8 °C) (02/23/22 0700)  Temp Source: Axillary (02/23/22 0700)  Respirations: 18 (02/23/22 0700)  Weight - Scale: 92 4 kg (203 lb 11 3 oz) (02/19/22 0600)  SpO2: 93 % (02/23/22 0700)  Exam:   Physical Exam  Vitals and nursing note reviewed  Constitutional:       General: He is not in acute distress  Appearance: Normal appearance  He is obese  HENT:      Head: Normocephalic  Mouth/Throat:      Mouth: Mucous membranes are moist    Eyes:      Pupils: Pupils are equal, round, and reactive to light  Cardiovascular:      Rate and Rhythm: Normal rate and regular rhythm  Heart sounds: No murmur heard  Pulmonary:      Effort: Pulmonary effort is normal  No respiratory distress  Breath sounds: Normal breath sounds  No wheezing, rhonchi or rales  Abdominal:      General: Bowel sounds are normal  There is no distension  Palpations: Abdomen is soft  Tenderness: There is no abdominal tenderness  There is no guarding  Musculoskeletal:         General: No deformity  Cervical back: Normal range of motion  Right lower leg: No edema  Left lower leg: No edema  Skin:     Capillary Refill: Capillary refill takes less than 2 seconds  Neurological:      General: No focal deficit present  Mental Status: He is alert  Mental status is at baseline  Comments: Non verbal         Discussion with Family: called wife Sean Peer [phone]    Medication Adjustments and Discharge Medications:  · Discharge Medication List: See after visit summary for reconciled discharge medications  · Medication Dosing Tapers - Please refer to Discharge Medication List for details on any medication dosing tapers (if applicable to patient)  · Summary of Medication Adjustments made as a result of this hospitalization: Add Reglan 5 mg TID before meals, Add protonix 40 mg daily   · Medications being temporarily held (include recommended restart time): none    Wound Care Recommendations:  When applicable, please see wound care section of After Visit Summary  Instructions for any Catheters / Lines Present at Discharge (including removal date, if applicable): none    Diet Recommendations at Discharge:  Diet -        Diet Orders   (From admission, onward)             Start     Ordered    02/23/22 0828  Diet Dysphagia/Modified Consistency; Dysphagia 2-Mechanical Soft; Nectar Thick Liquid; Nectar Thick Liquid  Diet effective now        References:    Nutrtion Support Algorithm Enteral vs  Parenteral   Question Answer Comment   Diet Type Dysphagia/Modified Consistency    Dysphagia/Modified Consistency Dysphagia 2-Mechanical Soft    Liquid Modifier Nectar Thick Liquid    Other Restriction(s): Nectar Thick Liquid    RD to adjust diet per protocol? Yes        02/23/22 0827    02/14/22 1631  Room Service  Once        Question:  Type of Service  Answer:  Room Service - Appropriate with Assistance    02/14/22 1630                Goals of Care Discussions:  · Code Status at Discharge: Level 3 - DNAR and DNI  · Goals of care were not discussed during this admission  Discharge instructions/Information to patient and family:   See after visit summary section titled Discharge Instructions for information provided to patient and family  Planned Readmission: no      Discharge Statement:  I spent 45 minutes discharging the patient  This time was spent on the day of discharge  I had direct contact with the patient on the day of discharge  Greater than 50% of the total time was spent examining patient, answering all patient questions, arranging and discussing plan of care with patient as well as directly providing post-discharge instructions    Additional time then spent on discharge activities  **Please Note: This note may have been constructed using a voice recognition system  **

## 2022-02-23 NOTE — ASSESSMENT & PLAN NOTE
· POA, recurrent  · Patient was following with GI and had a CT enterography planned to evaluate potential cause - this will be done as outpt (not inpt in discussion with GI previously)  · Could be related to underlying neurologic dysfunction   · XR Obstruction Series from 2/16 showing persistently distended bowel loops   · XR Obstruction Series from 2/18 continued with Ileus   · Clamp trial 2/18-2/19 - NG tube removed 2/19 at 1230  · General Surgery following   · FL UGI 2/22 with dysmotility noted, will start 5 mg of Reglan TID with meals  · SLP appreciated, Level 2 diet with nectar thick liquids

## 2022-02-23 NOTE — INCIDENTAL FINDINGS
The following findings require follow up:  Radiographic finding   Finding: "Ectatic thoracic aorta measuring up to 42 mm, unchanged from CT of 10/6/2021   Recommendation is for follow-up low radiation dose chest CT in one year "   Follow up required: CT chest 1 yr   Follow up should be done within 12 month(s)    Please notify the following clinician to assist with the follow up:   Dr Tere Layton, PCP

## 2022-02-23 NOTE — CASE MANAGEMENT
Case Management Discharge Planning Note    Patient name Christopher Tinajero  Location S /S -01 MRN [id number]  : 1950 Date 2022       Current Admission Date: 2022  Current Admission Diagnosis:Small bowel obstruction [facility])   Patient Active Problem List    Diagnosis Date Noted    Dysmotility of stomach 2022    Severe protein-calorie malnutrition ([address] ) 2022    Aspiration pneumonia ([address] ) 2022    Anoxic brain injury ([address] ) 2021    UTI (urinary tract infection) 2021    Hypokalemia 10/08/2021    Severe sepsis ([address] ) 10/06/2021    Small bowel obstruction ([facility] ) 10/06/2021    Deep vein thrombosis (DVT) of right lower extremity ([facility] ) 2021    Oropharyngeal dysphagia 2021    Muscle rigidity 2021    Muscle spasticity 2021    Physical deconditioning 2021    Debility 2021    Neuromuscular dysfunction of bladder     Depression with anxiety 07/10/2020    Aphasia as late effect of cerebrovascular accident 07/10/2020    Bilateral nephrolithiasis 2020    Suprapubic catheter ([facility] ) 2019    GERD (gastroesophageal reflux disease)     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    COPD (chronic obstructive pulmonary disease) ([facility] ) 10/21/2016    History of DVT of lower extremity 10/21/2016    Aneurysm of thoracic aorta ([facility] ) 10/21/2016    Essential hypertension 10/21/2016    Dementia ([facility] ) 2016      LOS (days): 9  Geometric Mean LOS (GMLOS) (days): 4 80  Days to GMLOS:-4     OBJECTIVE:  Risk of Unplanned Readmission Score: 24         Current admission status: Inpatient   Preferred Pharmacy:   [address] [address]  Phone: [phone] Fax: [phone]    Primary Care Provider: Hubert Lew MD    Primary Insurance: MEDICARE  Secondary Insurance: Cecilia Vega    DISCHARGE DETAILS:    Discharge planning discussed with[de-identified] wife/guardian - Yvette Ellis  Freedom of Choice: Yes  Comments - Freedom of Choice: Return to [facility] Team Recommendation: Facility Return  Discharge Destination Plan[de-identified] Facility Return ([facility])  Transport at Discharge : [facility]  Dispatcher Contacted: Yes  Number/Name of Dispatcher: Marcy Shah [phone]  Transported by [employer] and Unit #): [address]  ETA of Transport (Date): 02/23/22  ETA of Transport (Time): 1200 (HONORIO Bo, nurse, Daquan Sin, and pt wife/guardian - Yvette Ellis)    IMM Given (Date):: 02/23/22  IMM Given to[de-identified] Family (wife/guardian - Bashir)    CM informed by Evy Winchester that pt is medically stable for d/c today  BLS set up via [facility] leungp at 12 pm  CM spoke with pt wife, Yvette Ellis, who agrees to dc today and denies additional questions  IMM reviewed  CM contacted Norma at [address] [facility] [address] at [facility]  Aware of 12 pm transport

## 2022-02-24 NOTE — UTILIZATION REVIEW
Notification of Discharge   This is a Notification of Discharge from our facility [address]  Please be advised that this patient has been discharge from our facility  Below you will find the admission and discharge date and time including the patients disposition  UTILIZATION REVIEW CONTACT:  Mushtaq Hidalgo MA  Utilization   Network Utilization Review Department  Phone: [phone] x carefully listen to the prompts  All voicemails are confidential   Email: [email or web address]     PHYSICIAN ADVISORY SERVICES:  FOR TIHV-ER-CLFA REVIEW - MEDICAL NECESSITY DENIAL  Phone: [phone]  Fax: [phone]  Email: [email or web address]     PRESENTATION DATE: 2/14/2022 10:59 AM  OBERVATION ADMISSION DATE:   INPATIENT ADMISSION DATE: 2/14/22  2:35 PM   DISCHARGE DATE: 2/23/2022 12:00 PM  DISPOSITION: Non [facility] SN[facility]/S[facility] Non [facility] SN[facility]/SNU      IMPORTANT INFORMATION:  Send all requests for admission clinical reviews, approved or denied determinations and any other requests to dedicated fax number below belonging to the campus where the patient is receiving treatment   List of dedicated fax numbers:  [address] DENIALS (Administrative/Medical Necessity) [phone]   1000  [facility] (Maternity/NICU/Pediatrics) [phone]   [facility] [phone]   [address] [facility] [phone]   [address] [place] [address] [phone]   [address] [phone]   [facility]  [phone]   [address] [place] [address], [facility]  [address] [address] [address] [facility] [phone]

## 2022-02-28 NOTE — ASSESSMENT & PLAN NOTE
Status post chronic indwelling Thorne catheter  Seen by Urology  Next catheter change on 07/18  22-Mar-2021

## 2022-03-15 ENCOUNTER — PROCEDURE VISIT (OUTPATIENT)
Dept: UROLOGY | Facility: CLINIC | Age: 72
End: 2022-03-15
Payer: COMMERCIAL

## 2022-03-15 DIAGNOSIS — Z93.59 SUPRAPUBIC CATHETER (HCC): Chronic | ICD-10-CM

## 2022-03-15 PROCEDURE — 51705 CHANGE OF BLADDER TUBE: CPT

## 2022-03-15 NOTE — PROGRESS NOTES
3/15/2022    Dawn Soliman [facility]  1950  [id number]    Diagnosis  Chief Complaint     spt exchange          Patient presents for spt exchange managed by Dr Leodan Sloan  Will follow up as scheduled for next SPT change  -Of note, patient was transitioned to hospice care  Procedure: Suprapubic Tube Change         Cystostomy tube change     Date/Time 3/15/2022 3:33 PM     Performed by  Venecia Us RN     Authorized by Ermias Mane MD      Universal Protocol   Consent: Verbal consent obtained  Risks and benefits: risks, benefits and alternatives were discussed  Consent given by: spouse  Patient understanding: patient states understanding of the procedure being performed  Patient consent: the patient's understanding of the procedure matches consent given  Patient identity confirmed: verbally with patient       Procedure Details   Procedure Notes: Insertion site covered with split gauze per wifes request     Patient tolerance: patient tolerated the procedure well with no immediate complications               Current catheter removed without difficulty after deflation of an intact balloon  Site prepped with Betadine, new 20F  suprapubic spt change via aseptic technique without incident, 10 ml balloon inflated with sterile water  irrigated easily for clear return, no spasm noted  Patient tolerated well  Attached to drainage bag  There were no vitals filed for this visit        Venecia Us RN

## 2022-04-15 ENCOUNTER — PROCEDURE VISIT (OUTPATIENT)
Dept: UROLOGY | Facility: CLINIC | Age: 72
End: 2022-04-15
Payer: COMMERCIAL

## 2022-04-15 VITALS — RESPIRATION RATE: 18 BRPM

## 2022-04-15 DIAGNOSIS — N31.9 NEUROGENIC BLADDER: Primary | ICD-10-CM

## 2022-04-15 DIAGNOSIS — Z93.59 SUPRAPUBIC CATHETER (HCC): ICD-10-CM

## 2022-04-15 PROCEDURE — 51705 CHANGE OF BLADDER TUBE: CPT

## 2022-04-15 NOTE — PROGRESS NOTES
4/15/2022    Brown Xiao [facility]  1950  [id number]    Diagnosis  Chief Complaint     Urinary Retention          Patient presents for routine suprapubic catheter change managed by Dr Ming Abraham  Patient will follow up as scheduled in 5 weeks for next routine SPT change  Procedure: Suprapubic Tube Change         Cystostomy tube change     Date/Time 4/15/2022 1:45 PM     Performed by  Mahnaz Mendoza RN     Authorized by Adan Medina MD      Universal Protocol   Consent: Verbal consent obtained  Risks and benefits: risks, benefits and alternatives were discussed  Consent given by: spouse  Patient understanding: patient states understanding of the procedure being performed  Patient consent: the patient's understanding of the procedure matches consent given  Procedure consent: procedure consent matches procedure scheduled  Patient identity confirmed: provided demographic data                 Current catheter removed without difficulty after deflation of an intact balloon  Site prepped with Betadine, new 20F  latex spt change via aseptic technique without incident, 10 ml balloon inflated with sterile water  irrigated easily for straw-yellow return, mild spasm noted  Patient tolerated well  Attached to large drainage bag       Vitals:    04/15/22 1558   Resp: [address], RN BSN

## 2022-05-20 ENCOUNTER — PROCEDURE VISIT (OUTPATIENT)
Dept: UROLOGY | Facility: CLINIC | Age: 72
End: 2022-05-20
Payer: COMMERCIAL

## 2022-05-20 DIAGNOSIS — Z93.59 SUPRAPUBIC CATHETER (HCC): Chronic | ICD-10-CM

## 2022-05-20 PROCEDURE — 51705 CHANGE OF BLADDER TUBE: CPT

## 2022-05-20 NOTE — PROGRESS NOTES
5/20/2022    Megan Murray [facility]  1950  [id number]    Diagnosis  Chief Complaint     Urinary Retention          Patient presents for SPT exchange managed by Dr Ngoc Calixto  Patient will follow up as scheduled for next lucia exchange  Patient is now on hospice    Procedure: Suprapubic Tube Change   Procedures      Current catheter removed without difficulty after deflation of an intact balloon  Site prepped with Betadine, new 20F  suprapubic spt change via aseptic technique without incident, 10 ml balloon inflated with sterile water  irrigated easily for blood-tinged return,no spasm noted  Patient tolerated well  Attached to drainage bag  There were no vitals filed for this visit        Paz Espinoza RN

## 2022-06-24 ENCOUNTER — PROCEDURE VISIT (OUTPATIENT)
Dept: UROLOGY | Facility: CLINIC | Age: 72
End: 2022-06-24
Payer: COMMERCIAL

## 2022-06-24 DIAGNOSIS — Z93.59 SUPRAPUBIC CATHETER (HCC): Chronic | ICD-10-CM

## 2022-06-24 PROCEDURE — 51705 CHANGE OF BLADDER TUBE: CPT

## 2022-06-27 NOTE — PROGRESS NOTES
6/24/2022    Penny Granados [facility]  1950  [id number]    Diagnosis  Chief Complaint     Urinary Retention          Patient presents for SPT exchange managed by Dr Socrates Muhammad  Will follow up as scheduled for next SPT exchange  Patient remains on hospice     Procedure: Suprapubic Tube Change         Cystostomy tube change     Date/Time 6/27/2022 7:11 AM     Performed by  Brittany Martini RN     Authorized by Harry Solano MD      Universal Protocol   Consent: Verbal consent obtained  Risks and benefits: risks, benefits and alternatives were discussed  Consent given by: spouse  Patient understanding: patient states understanding of the procedure being performed  Patient consent: the patient's understanding of the procedure matches consent given        Local anesthesia used: no     Anesthesia   Local anesthesia used: no     Sedation   Patient sedated: no        Specimen: no    Culture: no   Procedure Details   Patient tolerance: patient tolerated the procedure well with no immediate complications               Current catheter removed without difficulty after deflation of an intact balloon  Site prepped with Betadine, new 20F  suprapubic spt change via aseptic technique without incident, 10 ml balloon inflated with sterile water  irrigated easily for straw-yellow return, no spasm noted  Patient tolerated well  Attached to drainage bag  There were no vitals filed for this visit        Brittany Martini RN

## 2022-06-28 ENCOUNTER — TELEPHONE (OUTPATIENT)
Dept: UROLOGY | Facility: AMBULATORY SURGERY CENTER | Age: 72
End: 2022-06-28

## 2022-06-28 ENCOUNTER — TELEPHONE (OUTPATIENT)
Dept: UROLOGY | Facility: MEDICAL CENTER | Age: 72
End: 2022-06-28

## 2022-06-28 NOTE — TELEPHONE ENCOUNTER
Received call from [facility]  They do not have a 20F latex catheter they only have a 73Y silicone catheter  Advised to use 88I silicone cath  New orders will need to be faxed   Call received from facility   Spt change was done , draining without difficulty

## 2022-06-28 NOTE — TELEPHONE ENCOUNTER
Bhumika Johnson called back in asking to speak with the nurse who she just spoke with, call transferred

## 2022-06-28 NOTE — TELEPHONE ENCOUNTER
Received call [facility]  Patient coughed and spt came out calling to see if they can put spt back in  Last change was 6/24/22 in our office      "    office note Current catheter removed without difficulty after deflation of an intact balloon  Site prepped with Betadine, new 20F  latex spt change via aseptic technique without incident, 10 ml balloon inflated with sterile water  irrigated easily for straw-yellow return, mild spasm noted  Patient tolerated well   Attached to large drainage bag  "

## 2022-06-28 NOTE — TELEPHONE ENCOUNTER
Nurse called about nursing home patient that coughed while SPT was being flushed and it came out  Call soft transferred to RN to speak to [facility] RN

## 2022-06-28 NOTE — TELEPHONE ENCOUNTER
OK to replace dislodged catheter with 07VR latex or silicone catheter, 34XI balloon  Appt remains as scheduled for next catheter exchange on 7/22/22

## 2022-07-22 ENCOUNTER — PROCEDURE VISIT (OUTPATIENT)
Dept: UROLOGY | Facility: CLINIC | Age: 72
End: 2022-07-22
Payer: COMMERCIAL

## 2022-07-22 DIAGNOSIS — Z93.59 SUPRAPUBIC CATHETER (HCC): Chronic | ICD-10-CM

## 2022-07-22 PROCEDURE — 51705 CHANGE OF BLADDER TUBE: CPT

## 2022-07-22 NOTE — PROGRESS NOTES
7/22/2022    True Masterson [facility]  1950  [id number]    Diagnosis  Chief Complaint     Urinary Retention          Patient presents for SPT exchange managed by Dr Jonatan Steele  Patient will return for next catheter exchange as scheduled     Procedure: Suprapubic Tube Change   Procedures      Current catheter removed without difficulty after deflation of an intact balloon  Site prepped with Betadine, new 20F  latex spt change via aseptic technique without incident, 10 ml balloon inflated with sterile water  irrigated easily for blood-tinged return, no spasm noted  Patient tolerated well  Attached to drainage bag  Of note, patients wife reports after last catheter change, patient sneezed and SPT came out  On assessment there was no water in the balloon  Thorne was replaced without issue by home hospice nurse     There were no vitals filed for this visit        Yo Ramirez RN

## 2022-08-19 ENCOUNTER — PROCEDURE VISIT (OUTPATIENT)
Dept: UROLOGY | Facility: CLINIC | Age: 72
End: 2022-08-19
Payer: COMMERCIAL

## 2022-08-19 DIAGNOSIS — Z93.59 SUPRAPUBIC CATHETER (HCC): Chronic | ICD-10-CM

## 2022-08-19 PROCEDURE — 51705 CHANGE OF BLADDER TUBE: CPT

## 2022-08-19 NOTE — PROGRESS NOTES
8/19/2022    Juan Antonio Feng [facility]  1950  [id number]    Diagnosis  Chief Complaint     spt          Patient presents for spt exchange managed by Dr Candy Litten  Follow up as scheduled for next change     Procedure: Suprapubic Tube Change         Cystostomy tube change     Date/Time 8/19/2022 3:37 PM     Performed by  Yobani Charlton RN     Authorized by Shelby Apple MD      Universal Protocol   Consent: Verbal consent not obtained  Risks and benefits: risks, benefits and alternatives were discussed  Consent given by: spouse  Patient understanding: patient states understanding of the procedure being performed  Patient consent: the patient's understanding of the procedure matches consent given  Patient identity confirmed: verbally with patient       Procedure Details   Patient tolerance: patient tolerated the procedure well with no immediate complications               Current catheter removed without difficulty after deflation of an intact balloon  Site prepped with Betadine, new 20F  latex spt change via aseptic technique without incident, 10 ml balloon inflated with sterile water  irrigated easily for straw-yellow return, no spasm noted  Patient tolerated well  Attached to drainage bag  There were no vitals filed for this visit        Yobani Charlton RN

## 2022-09-16 ENCOUNTER — PROCEDURE VISIT (OUTPATIENT)
Dept: UROLOGY | Facility: CLINIC | Age: 72
End: 2022-09-16
Payer: COMMERCIAL

## 2022-09-16 VITALS — RESPIRATION RATE: 18 BRPM

## 2022-09-16 DIAGNOSIS — Z93.59 SUPRAPUBIC CATHETER (HCC): ICD-10-CM

## 2022-09-16 DIAGNOSIS — N31.9 NEUROGENIC BLADDER: Primary | ICD-10-CM

## 2022-09-16 PROCEDURE — 51705 CHANGE OF BLADDER TUBE: CPT

## 2022-09-16 NOTE — PROGRESS NOTES
9/16/2022    Ines Reynolds [facility]  1950  [id number]    Diagnosis  Chief Complaint     Urinary Retention          Patient presents for routine suprapubic catheter change managed by Dr Sis Degroot  Patient will follow up as scheduled in 4 weeks for next routine exchange  Procedure: Suprapubic Tube Change         Cystostomy tube change     Date/Time 9/16/2022 1:30 PM     Performed by  Brendan Goddard RN     Authorized by Jinny Botello MD      Universal Protocol   Consent: Verbal consent obtained  Risks and benefits: risks, benefits and alternatives were discussed  Consent given by: patient  Patient understanding: patient states understanding of the procedure being performed  Patient consent: the patient's understanding of the procedure matches consent given  Procedure consent: procedure consent matches procedure scheduled  Patient identity confirmed: verbally with patient                 Current catheter removed without difficulty after deflation of an intact balloon  Site prepped with Betadine, new 20F  latex spt change via aseptic technique without incident, 10 ml balloon inflated with sterile water  irrigated easily for clear return, no spasm noted  Patient tolerated well  Attached to large drainage bag       Vitals:    09/16/22 1327   Resp: [address], RN BSN

## 2023-09-28 NOTE — TELEPHONE ENCOUNTER
Called and spoke to wife Domenica Mobley,  Rescheduled for 8/12/19 at 10 am with DR Calderón Both for follow up and nurse visit for lucia change  Appt for 7/18/19 cancelled  [facility] and spoke to Arias Aponte and advised appt for 7/18/19 has been cancelled and patient is scheduled for 8/12/19 at 10 am with Dr Calderón Both for follow up/ and nurse visit lucia desean  [place] 
[facility] patient, managed by DR Guerrero Blair, s/p bladder botox on 6/18/19 , patient with chronic lucia catheter  He was scheduled for 4 week lucia change on 7/18/19    Patient subsequently seen in ER on 7/8/19, and lucia was changed in ER  CT scan done 7/8/19, bilateral kidney stones, no hydro, no immediate urologic intervention needed at time of consult with Dr Guerrero Blair on 7/8/19  Called and spoke to wife  She would like to know when DR Prince would like to see patient back for follow up, states that infectious disease felt that stones could be contributing to patient's urinary infections  Advised her message will be sent to Dr Guerrero Blari to recommend when patient needs to be seen by provider for follow up  He will also need to be rescheduled for lucia change as lucia was just changed on 7/8/19 
Patient can see me in 4-6 weeks, I am not certain that treating his stones will help with his recurrent infections but we can discuss this further with the wife if she desires
Patient of Dr Zulma Durant seen in [place]  Patients wife calling in regards to upcoming appointment on 7/18  Patient had Lucia change when he was admitted  She would like to know if they can still come in for the appointment even though he is not going to need a lucia change      She can be reached at [phone]
Yes

## 2024-03-18 NOTE — SEDATION DOCUMENTATION
. Calvin Wang(Pt's PCP)     [address] [place] [address] [phone]      Next Steps: Follow up    [facility]     Someone will be reaching out to you for when your Home Health services will be again. If you have not heard from anyone within 2 days please call [phone]     Next Steps: Follow up    Torsten Otto MD  Orthopedic Surgery [phone] [address] [place] [address] [address] [place] [id number]     Next Steps: Follow up on 4/23/2024  Instructions: 9:00 - the office will contact Mr. Bolanos  with a sooner apt.      18 fr Suprapubic catheter placed by Dr Tee Friday without complications  Urethral lucia removed  Discharge to PACU
